# Patient Record
Sex: MALE | Race: WHITE | NOT HISPANIC OR LATINO | Employment: UNEMPLOYED | URBAN - METROPOLITAN AREA
[De-identification: names, ages, dates, MRNs, and addresses within clinical notes are randomized per-mention and may not be internally consistent; named-entity substitution may affect disease eponyms.]

---

## 2018-09-04 ENCOUNTER — HOSPITAL ENCOUNTER (OUTPATIENT)
Facility: HOSPITAL | Age: 38
LOS: 1 days | Discharge: HOME | End: 2018-09-05
Attending: EMERGENCY MEDICINE | Admitting: INTERNAL MEDICINE
Payer: MEDICAID

## 2018-09-04 DIAGNOSIS — K92.1 MELENA: ICD-10-CM

## 2018-09-04 DIAGNOSIS — R06.2 WHEEZING: ICD-10-CM

## 2018-09-04 DIAGNOSIS — R10.13 EPIGASTRIC PAIN: ICD-10-CM

## 2018-09-04 DIAGNOSIS — R19.7 ACUTE DIARRHEA: ICD-10-CM

## 2018-09-04 DIAGNOSIS — K92.2 ACUTE UPPER GI BLEED: Primary | ICD-10-CM

## 2018-09-04 DIAGNOSIS — R10.11 RUQ PAIN: ICD-10-CM

## 2018-09-04 DIAGNOSIS — K92.2 GASTROINTESTINAL HEMORRHAGE, UNSPECIFIED GASTROINTESTINAL HEMORRHAGE TYPE: ICD-10-CM

## 2018-09-04 DIAGNOSIS — D50.0 IRON DEFICIENCY ANEMIA DUE TO CHRONIC BLOOD LOSS: ICD-10-CM

## 2018-09-04 DIAGNOSIS — D72.10 EOSINOPHILIA: ICD-10-CM

## 2018-09-04 DIAGNOSIS — K92.2 UPPER GI BLEED: ICD-10-CM

## 2018-09-04 DIAGNOSIS — K92.2 GI BLEED: ICD-10-CM

## 2018-09-04 DIAGNOSIS — K27.9 PUD (PEPTIC ULCER DISEASE): ICD-10-CM

## 2018-09-04 PROBLEM — D50.9 MICROCYTIC ANEMIA: Status: ACTIVE | Noted: 2018-09-04

## 2018-09-04 LAB
ABO + RH BLD: NORMAL
ALBUMIN SERPL BCP-MCNC: 3.9 G/DL
ALP SERPL-CCNC: 52 U/L
ALT SERPL W/O P-5'-P-CCNC: 39 U/L
ANION GAP SERPL CALC-SCNC: 11 MMOL/L
AST SERPL-CCNC: 18 U/L
BASOPHILS # BLD AUTO: 0.06 K/UL
BASOPHILS NFR BLD: 0.8 %
BILIRUB SERPL-MCNC: 0.4 MG/DL
BILIRUB UR QL STRIP: NEGATIVE
BLD GP AB SCN CELLS X3 SERPL QL: NORMAL
BUN SERPL-MCNC: 17 MG/DL
CALCIUM SERPL-MCNC: 9.8 MG/DL
CHLORIDE SERPL-SCNC: 107 MMOL/L
CLARITY UR: CLEAR
CO2 SERPL-SCNC: 24 MMOL/L
COLOR UR: YELLOW
CREAT SERPL-MCNC: 0.9 MG/DL
DIFFERENTIAL METHOD: ABNORMAL
EOSINOPHIL # BLD AUTO: 0.9 K/UL
EOSINOPHIL NFR BLD: 12.3 %
ERYTHROCYTE [DISTWIDTH] IN BLOOD BY AUTOMATED COUNT: 19 %
EST. GFR  (AFRICAN AMERICAN): >60 ML/MIN/1.73 M^2
EST. GFR  (NON AFRICAN AMERICAN): >60 ML/MIN/1.73 M^2
GLUCOSE SERPL-MCNC: 96 MG/DL
GLUCOSE UR QL STRIP: NEGATIVE
HCT VFR BLD AUTO: 35.3 %
HGB BLD-MCNC: 10.8 G/DL
HGB UR QL STRIP: NEGATIVE
INR PPP: 1
KETONES UR QL STRIP: NEGATIVE
LEUKOCYTE ESTERASE UR QL STRIP: NEGATIVE
LIPASE SERPL-CCNC: 29 U/L
LYMPHOCYTES # BLD AUTO: 1.9 K/UL
LYMPHOCYTES NFR BLD: 24.5 %
MCH RBC QN AUTO: 23.6 PG
MCHC RBC AUTO-ENTMCNC: 30.6 G/DL
MCV RBC AUTO: 77 FL
MONOCYTES # BLD AUTO: 1.2 K/UL
MONOCYTES NFR BLD: 15.5 %
NEUTROPHILS # BLD AUTO: 3.5 K/UL
NEUTROPHILS NFR BLD: 46.9 %
NITRITE UR QL STRIP: NEGATIVE
PH UR STRIP: 7 [PH] (ref 5–8)
PLATELET # BLD AUTO: 406 K/UL
PMV BLD AUTO: 10.9 FL
POTASSIUM SERPL-SCNC: 4 MMOL/L
PROT SERPL-MCNC: 7.1 G/DL
PROT UR QL STRIP: NEGATIVE
PROTHROMBIN TIME: 10.1 SEC
RBC # BLD AUTO: 4.57 M/UL
SODIUM SERPL-SCNC: 142 MMOL/L
SP GR UR STRIP: 1.01 (ref 1–1.03)
URN SPEC COLLECT METH UR: NORMAL
UROBILINOGEN UR STRIP-ACNC: NEGATIVE EU/DL
WBC # BLD AUTO: 7.54 K/UL

## 2018-09-04 PROCEDURE — 93010 ELECTROCARDIOGRAM REPORT: CPT | Mod: ,,, | Performed by: INTERNAL MEDICINE

## 2018-09-04 PROCEDURE — 25000003 PHARM REV CODE 250: Performed by: INTERNAL MEDICINE

## 2018-09-04 PROCEDURE — 80053 COMPREHEN METABOLIC PANEL: CPT

## 2018-09-04 PROCEDURE — G0378 HOSPITAL OBSERVATION PER HR: HCPCS

## 2018-09-04 PROCEDURE — 82728 ASSAY OF FERRITIN: CPT

## 2018-09-04 PROCEDURE — 84443 ASSAY THYROID STIM HORMONE: CPT

## 2018-09-04 PROCEDURE — 83540 ASSAY OF IRON: CPT

## 2018-09-04 PROCEDURE — 83690 ASSAY OF LIPASE: CPT

## 2018-09-04 PROCEDURE — 96365 THER/PROPH/DIAG IV INF INIT: CPT

## 2018-09-04 PROCEDURE — 85025 COMPLETE CBC W/AUTO DIFF WBC: CPT

## 2018-09-04 PROCEDURE — 25000003 PHARM REV CODE 250: Performed by: STUDENT IN AN ORGANIZED HEALTH CARE EDUCATION/TRAINING PROGRAM

## 2018-09-04 PROCEDURE — 25000003 PHARM REV CODE 250: Performed by: EMERGENCY MEDICINE

## 2018-09-04 PROCEDURE — 96366 THER/PROPH/DIAG IV INF ADDON: CPT

## 2018-09-04 PROCEDURE — 85610 PROTHROMBIN TIME: CPT

## 2018-09-04 PROCEDURE — 86850 RBC ANTIBODY SCREEN: CPT

## 2018-09-04 PROCEDURE — 99285 EMERGENCY DEPT VISIT HI MDM: CPT | Mod: 25

## 2018-09-04 PROCEDURE — 36415 COLL VENOUS BLD VENIPUNCTURE: CPT

## 2018-09-04 PROCEDURE — 63600175 PHARM REV CODE 636 W HCPCS: Performed by: EMERGENCY MEDICINE

## 2018-09-04 PROCEDURE — C9113 INJ PANTOPRAZOLE SODIUM, VIA: HCPCS | Performed by: EMERGENCY MEDICINE

## 2018-09-04 PROCEDURE — 82607 VITAMIN B-12: CPT

## 2018-09-04 PROCEDURE — 25500020 PHARM REV CODE 255: Performed by: EMERGENCY MEDICINE

## 2018-09-04 PROCEDURE — 93005 ELECTROCARDIOGRAM TRACING: CPT

## 2018-09-04 PROCEDURE — 96375 TX/PRO/DX INJ NEW DRUG ADDON: CPT

## 2018-09-04 PROCEDURE — 85045 AUTOMATED RETICULOCYTE COUNT: CPT

## 2018-09-04 PROCEDURE — 96376 TX/PRO/DX INJ SAME DRUG ADON: CPT | Mod: 59

## 2018-09-04 PROCEDURE — 81003 URINALYSIS AUTO W/O SCOPE: CPT

## 2018-09-04 PROCEDURE — 82746 ASSAY OF FOLIC ACID SERUM: CPT

## 2018-09-04 PROCEDURE — 87040 BLOOD CULTURE FOR BACTERIA: CPT

## 2018-09-04 RX ORDER — TRAMADOL HYDROCHLORIDE 50 MG/1
50 TABLET ORAL EVERY 6 HOURS PRN
Status: DISCONTINUED | OUTPATIENT
Start: 2018-09-04 | End: 2018-09-05 | Stop reason: HOSPADM

## 2018-09-04 RX ORDER — ONDANSETRON 2 MG/ML
4 INJECTION INTRAMUSCULAR; INTRAVENOUS
Status: COMPLETED | OUTPATIENT
Start: 2018-09-04 | End: 2018-09-04

## 2018-09-04 RX ORDER — MORPHINE SULFATE 2 MG/ML
2 INJECTION, SOLUTION INTRAMUSCULAR; INTRAVENOUS
Status: COMPLETED | OUTPATIENT
Start: 2018-09-04 | End: 2018-09-04

## 2018-09-04 RX ORDER — METHYLPHENIDATE HYDROCHLORIDE 5 MG/1
20 TABLET ORAL 2 TIMES DAILY WITH MEALS
Status: DISCONTINUED | OUTPATIENT
Start: 2018-09-05 | End: 2018-09-04

## 2018-09-04 RX ORDER — MORPHINE SULFATE 4 MG/ML
4 INJECTION, SOLUTION INTRAMUSCULAR; INTRAVENOUS
Status: COMPLETED | OUTPATIENT
Start: 2018-09-04 | End: 2018-09-04

## 2018-09-04 RX ORDER — SODIUM CHLORIDE 0.9 % (FLUSH) 0.9 %
3 SYRINGE (ML) INJECTION
Status: DISCONTINUED | OUTPATIENT
Start: 2018-09-04 | End: 2018-09-05 | Stop reason: HOSPADM

## 2018-09-04 RX ORDER — METOCLOPRAMIDE HYDROCHLORIDE 5 MG/ML
10 INJECTION INTRAMUSCULAR; INTRAVENOUS
Status: COMPLETED | OUTPATIENT
Start: 2018-09-04 | End: 2018-09-04

## 2018-09-04 RX ORDER — PANTOPRAZOLE SODIUM 40 MG/10ML
80 INJECTION, POWDER, LYOPHILIZED, FOR SOLUTION INTRAVENOUS ONCE
Status: DISCONTINUED | OUTPATIENT
Start: 2018-09-04 | End: 2018-09-04

## 2018-09-04 RX ORDER — PANTOPRAZOLE SODIUM 40 MG/10ML
40 INJECTION, POWDER, LYOPHILIZED, FOR SOLUTION INTRAVENOUS
Status: DISCONTINUED | OUTPATIENT
Start: 2018-09-04 | End: 2018-09-04

## 2018-09-04 RX ORDER — HYDROXYZINE PAMOATE 25 MG/1
25 CAPSULE ORAL EVERY 8 HOURS PRN
Status: DISCONTINUED | OUTPATIENT
Start: 2018-09-04 | End: 2018-09-05 | Stop reason: HOSPADM

## 2018-09-04 RX ORDER — PAROXETINE 10 MG/1
20 TABLET, FILM COATED ORAL EVERY MORNING
Status: DISCONTINUED | OUTPATIENT
Start: 2018-09-05 | End: 2018-09-05 | Stop reason: HOSPADM

## 2018-09-04 RX ORDER — PANTOPRAZOLE SODIUM 40 MG/10ML
40 INJECTION, POWDER, LYOPHILIZED, FOR SOLUTION INTRAVENOUS
Status: DISCONTINUED | OUTPATIENT
Start: 2018-09-05 | End: 2018-09-04

## 2018-09-04 RX ADMIN — HYDROXYZINE PAMOATE 25 MG: 25 CAPSULE ORAL at 08:09

## 2018-09-04 RX ADMIN — ONDANSETRON 4 MG: 2 INJECTION INTRAMUSCULAR; INTRAVENOUS at 05:09

## 2018-09-04 RX ADMIN — TRAMADOL HYDROCHLORIDE 50 MG: 50 TABLET, COATED ORAL at 10:09

## 2018-09-04 RX ADMIN — DEXTROSE 40 MG: 50 INJECTION, SOLUTION INTRAVENOUS at 08:09

## 2018-09-04 RX ADMIN — METOCLOPRAMIDE 10 MG: 5 INJECTION, SOLUTION INTRAMUSCULAR; INTRAVENOUS at 02:09

## 2018-09-04 RX ADMIN — LIDOCAINE HYDROCHLORIDE: 20 SOLUTION ORAL; TOPICAL at 12:09

## 2018-09-04 RX ADMIN — DEXTROSE 8 MG/HR: 50 INJECTION, SOLUTION INTRAVENOUS at 01:09

## 2018-09-04 RX ADMIN — ONDANSETRON 4 MG: 2 INJECTION INTRAMUSCULAR; INTRAVENOUS at 12:09

## 2018-09-04 RX ADMIN — MORPHINE SULFATE 2 MG: 2 INJECTION, SOLUTION INTRAMUSCULAR; INTRAVENOUS at 04:09

## 2018-09-04 RX ADMIN — SODIUM CHLORIDE 1000 ML: 0.9 INJECTION, SOLUTION INTRAVENOUS at 12:09

## 2018-09-04 RX ADMIN — MORPHINE SULFATE 4 MG: 4 INJECTION INTRAVENOUS at 02:09

## 2018-09-04 RX ADMIN — IOHEXOL 125 ML: 350 INJECTION, SOLUTION INTRAVENOUS at 07:09

## 2018-09-04 NOTE — H&P
"[facility] Medicine H&P Note     Admitting Team: [facility] Hospitalist Team A  Attending Physician: Sony Falcon Jr., MD  Resident: Ronnell  Intern: Owen    Date of Admit: 9/4/2018    Chief Complaint     Hemoptysis (started vomiting blood this morning.  Fever for 2 days, abdominal pain, and diarrhea.  Rash to neck and back area with joint pain.  Patient just returned from [place] on a 2 week trip )      Subjective:      History of Present Illness:  Solo Black is a 37 y.o. male who has a h/o eosinophilic esophagitis, erosive gastritis, GERD who presented to the ED for hematemesis.    The patient was in their usual state of health until 2 days ago when he started to develop a red and itchy rash to his back and neck.  He also developed fever at this time to 101.5 and he took Advil, tylenol which resolved the fever.  He had associated joint pain and myalgia.  He felt significant pain in his bilateral knees, hips, wrists which did not improve with tylenol, advil.  Denies joint trauma, swelling.  1 day ago he started to have severe abdominal pain which he describes as intermittent burning and cramping pain to the "inside" of his belly.  "feels like a hot vise ."  He also experienced 8-9 episodes of diarrhea which was watery and black over the last day and a half. He also notes mucus in the stool.  No bright red blood.  On the day of presentation he woke up and vomited a large amount of blood after eating potato soup.  He describes it as bright red blood with chunks of undigested food.  This occurred again and he decided to come to the ED.  He is an avid hiker and recently returned from a 1 week [place] hike in [place].  While there he and his friends hiked barefoot, swam in local rivers, ate native food including fish, were bitten by mosquitos.  He did not take any prophylactic medicines while there and did not receive any vaccines prior to the trip.  None of his friends are having any symptoms.  He denies chest " "pain, shortness of breath, neck stiffness, headache.    Per chart review the patient has had several admissions and workups for "hematemesis" including many colonoscopies and EGDs all of which only revealed mild gastritis/eosinophilic esophagitis.  In May of this year patient underwent one of these work-ups in [place] and per chart review there was concern for factitious disorder vs conversion.  Per notes from outside hospital he was witnessed drinking a vial of blood and spitting it up claiming he vomited blood.    Past Medical History:  Past Medical History:   Diagnosis Date    C. difficile colitis feb 2014    postop of hand surgery    Eosinophilic esophagitis 3/11/2014    GERD (gastroesophageal reflux disease)     MRSA carrier     says multiple times nose swab was +    Nephrolithiasis apr 2014    bilateral punctate - never passed a stone    Urinary tract infection feb 2014    MRSA       Past Surgical History:  Past Surgical History:   Procedure Laterality Date    APPENDECTOMY      BACK SURGERY      CIRCUMCISION, PRIMARY      COLONOSCOPY      drainage of abscess from hand  2009    MRSA    drainage of abscess from R thigh  2006    MRSA    ESOPHAGOGASTRODUODENOSCOPY  3/11/2014    HAND SURGERY  jan 2014    power saw fell on hand - laceration 3 tendons       Allergies:  Review of patient's allergies indicates:   Allergen Reactions    Nsaids (non-steroidal anti-inflammatory drug)      GI bleed    Demerol [meperidine]     Ketamine      Severe dysphoria (bad trip)    Penicillins     Contrast media Rash     Rash after 7/2016 CT abd/pelvis study. NO anaphylaxis       Home Medications:  Prior to Admission medications    Medication Sig Start Date End Date Taking? Authorizing Provider   dextroamphetamine-amphetamine (ADDERALL) 20 mg tablet Take 1 tablet by mouth 2 (two) times daily before meals. Take before breakfast and lunch   Yes Historical Provider, MD   paroxetine (PAXIL) 20 MG tablet Take 20 mg by " mouth every morning.   Yes Historical Provider, MD   hydrocodone-acetaminophen 5-325mg (NORCO) 5-325 mg per tablet Take 1 tablet by mouth every 6 (six) hours as needed for Pain. 3/18/18 9/4/18  Marlin Gomez MD       Family History:  Sister - Celiac  Great Aunt - Crohn's    Social History:  Social History     Tobacco Use    Smoking status: Light Tobacco Smoker    Smokeless tobacco: Never Used   Substance Use Topics    Alcohol use: Yes     Comment: ocassional    Drug use: No       Review of Systems:  Gen: positive for fever, chills, wt. loss, fatigue, change in appetite  HEENT: positive for changes in vision, negative for hearing changes, sore throat, difficulty swallowing  CV: negative for chest pain, palpitations, syncope  Resp: negative for shortness of breath, cough, wheezing  : negative for changes in urgency, frequency, blood in urine, dysuria  GI: positive for abdominal pain, nausea, vomiting, diarrhea, constipation, heartburn, blood in stool  Rheum: positive for joint pain, negative for swelling, weakness  Skin: negative for bruising, edema, jaundice, rashes  Neuro: negative for confusion, seizures, memory loss, falls  All other systems are reviewed and are negative.    Health Maintaince :   Primary Care Physician: none    Immunizations:   TDap 2016  Flu UTD last season  Pna not indicated    Cancer Screening:  Colonoscopy: as recent as May 2018     Objective:   Last 24 Hour Vital Signs:  BP  Min: 109/59  Max: 142/76  Temp  Av.1 °F (36.7 °C)  Min: 97.9 °F (36.6 °C)  Max: 98.2 °F (36.8 °C)  Pulse  Av.4  Min: 72  Max: 97  Resp  Av.8  Min: 14  Max: 20  SpO2  Av.3 %  Min: 93 %  Max: 100 %  Height  Av' (182.9 cm)  Min: 6' (182.9 cm)  Max: 6' (182.9 cm)  Weight  Av.2 kg (190 lb)  Min: 86.2 kg (190 lb)  Max: 86.2 kg (190 lb)  Body mass index is 25.77 kg/m².  No intake/output data recorded.    Physical Examination:  Gen: alert and oriented, appears in distress holding  abdomen in pain  Head: normocephalic, atraumatic  Eyes: PERLLA, EOM intact, sclera and conjunctiva clear, no icterus  ENT: external ear canals clear.  Moist mucus membranes, oropharynx clear and without exudate.  No lymphadenopathy, trachea midline, thyroid non-tender, not grossly enlarged or nodular  Cardiac: regular rate and rhythm, normal S1 and S2, no murmurs, no rubs, no extra heart sounds  Chest: no use of accessory muscles, symmetric chest rise, lung fields clear to auscultation bilaterally, no wheezes, no rales, no rhonci, several small mosquito bites to posterior neck and neck  Abdomen: soft, tender to palpation diffusely, normoactive bowel sounds, no organomegaly  Extremities: warm, no cyanosis, no jaundice, no bruising, no edema, joints non-tender  Pulses: 2+ dorsalis pedis and radial pulses bilaterally  Neuro: Gen: alert, oriented to person, place, time, pressured speech was apparent     Laboratory:  Most Recent Data:  CBC:   Lab Results   Component Value Date    WBC 7.54 09/04/2018    HGB 10.8 (L) 09/04/2018    HCT 35.3 (L) 09/04/2018     (H) 09/04/2018    MCV 77 (L) 09/04/2018    RDW 19.0 (H) 09/04/2018     WBC Differential: 46 % N, 0 % Bands, 24 % L, 15 % M, 1 % Eo, 0 % Baso, 0 additional cells seen  BMP:   Lab Results   Component Value Date     09/04/2018    K 4.0 09/04/2018     09/04/2018    CO2 24 09/04/2018    BUN 17 09/04/2018    CREATININE 0.9 09/04/2018    GLU 96 09/04/2018    CALCIUM 9.8 09/04/2018    MG 1.8 10/21/2016    PHOS 3.6 04/01/2012     LFTs:   Lab Results   Component Value Date    PROT 7.1 09/04/2018    ALBUMIN 3.9 09/04/2018    BILITOT 0.4 09/04/2018    AST 18 09/04/2018    ALKPHOS 52 (L) 09/04/2018    ALT 39 09/04/2018     Coags:   Lab Results   Component Value Date    INR 1.0 09/04/2018     FLP: No results found for: CHOL, HDL, LDLCALC, TRIG, CHOLHDL  DM:   Lab Results   Component Value Date    HGBA1C 5.4 03/27/2016    CREATININE 0.9 09/04/2018     Thyroid: No  results found for: TSH, FREET4, X1CBAWN, H9HBDVJ, THYROIDAB  Anemia: No results found for: IRON, TIBC, FERRITIN, TVCBJQXA61, FOLATE  Cardiac:   Lab Results   Component Value Date    TROPONINI 0.010 03/28/2011     Urinalysis:   Lab Results   Component Value Date    LABURIN  04/10/2014     Multiple organisms isolated. None in predominance.  Repeat if    LABURIN clinically necessary. 04/10/2014    COLORU Yellow 09/04/2018    SPECGRAV 1.015 09/04/2018    NITRITE Negative 09/04/2018    KETONESU Negative 09/04/2018    UROBILINOGEN Negative 09/04/2018    WBCUA 1 03/27/2016       Trended Lab Data:  Recent Labs   Lab  09/04/18   1225   WBC  7.54   HGB  10.8*   HCT  35.3*   PLT  406*   MCV  77*   RDW  19.0*   NA  142   K  4.0   CL  107   CO2  24   BUN  17   CREATININE  0.9   GLU  96   PROT  7.1   ALBUMIN  3.9   BILITOT  0.4   AST  18   ALKPHOS  52*   ALT  39       Trended Cardiac Data:  No results for input(s): TROPONINI, CKTOTAL, CKMB, BNP in the last 168 hours.    Microbiology Data:  BCx: pending  StoolCx: pending    Other Results:  EKG (my interpretation):  pending     Radiology (my interpretation):  CXR: no acute cardiopulmonary processes  KUB: no acute findings, no free air, no dilated loops of bowel  RUQ U/S: post-jane, possible hepatic steatosis     Assessment and Plan:       Upper GI Bleed with Microcytic Anemia  - patient reports 2 episodes of hematemesis day of presentation associated with severe abdominal pain, subjective fevers, diarrhea, joint pain, myalgias and recent travel to [place]  - exam consistent with pain out of proportion to exam, patient pacing around unit prior to exam then acutely in pain and distress  - per chart review history of hematemesis with several workups all negative, mention of patient tampering with specimens and drinking vials of blood from outside hospital records  - Hgb 10.8 on admission, baseline from outside hospital labs around 13-14  - Not currently vomiting, hemodynamically  stable, afebrile  - KUB and RUQ US with no acute findings  - PPI drip  - GI onboard and plan for EGD in AM  - stool studies, cultures, ova/parasites, c. Diff  - Abdominal CT with contrast    Eosinophilic Esophagitis (presumptive)  - EGD 2014 with presumptive clinical diagnosis, no stains were obtained on pathology to rule this diagnosis in or out.  - Subsequent EGDs w/o evidence of eosinophilic characteristics    Erosive Gastritis  - seen on previous EGDs  - takes Protonix and pepcid daily  - PPI drip, EGD in AM    ADHD  - reports taking paxil and adderal  - will continue home meds      Diet: NPO  VTE PPx: SCDs  Code: Full    Dispo: admit for GI workup including EGD in AM    Stephane Weaver MD  [facility] -[facility]    [facility] Medicine Hospitalist Pager numbers:   [facility] Hospitalist Medicine Team A (Jaime/Smita): [phone]  [facility] Hospitalist Medicine Team B (Quintin/Francisca):  [phone]

## 2018-09-04 NOTE — ED TRIAGE NOTES
"Patient states he started throwing up blood this morning. He states he was suppose to get vaccinations before he went on a trip to [place] but didn't. States he just returned from a two week long vacation in [place] and has noticed a rash to his arm and trunk; nausea, abdominal pain , throwing up blood, and chills. Patient states " I just feel like crap"  "

## 2018-09-04 NOTE — ED PROVIDER NOTES
Encounter Date: 9/4/2018       History     Chief Complaint   Patient presents with    Hemoptysis     started vomiting blood this morning.  Fever for 2 days, abdominal pain, and diarrhea.  Rash to neck and back area with joint pain.  Patient just returned from [place] on a 2 week trip           Solo Black is a 37 y.o. male who  has a past medical history of C. difficile colitis (feb 2014), Eosinophilic esophagitis (3/11/2014), GERD (gastroesophageal reflux disease), MRSA carrier, Nephrolithiasis (apr 2014), and Urinary tract infection (feb 2014).    The patient presents to the ED due to hemetemesis.   Patient reports symptoms started this morning, and have been present  3 times. Described as food with blood tinged emesis and that progressed to blood. Aggravating/alleviating factors include eating/ non identified. Patient has had associated subj fever, coryza, myalgia and abdominal pain. He has recent travel to [place] denies sick contacts or abx use.           Review of patient's allergies indicates:   Allergen Reactions    Nsaids (non-steroidal anti-inflammatory drug)      GI bleed    Demerol [meperidine]     Ketamine      Severe dysphoria (bad trip)    Penicillins     Contrast media Rash     Rash after 7/2016 CT abd/pelvis study. NO anaphylaxis     Past Medical History:   Diagnosis Date    C. difficile colitis feb 2014    postop of hand surgery    Eosinophilic esophagitis 3/11/2014    GERD (gastroesophageal reflux disease)     MRSA carrier     says multiple times nose swab was +    Nephrolithiasis apr 2014    bilateral punctate - never passed a stone    Urinary tract infection feb 2014    MRSA     Past Surgical History:   Procedure Laterality Date    APPENDECTOMY      BACK SURGERY      CIRCUMCISION, PRIMARY      COLONOSCOPY      drainage of abscess from hand  2009    MRSA    drainage of abscess from R thigh  2006    MRSA    ESOPHAGOGASTRODUODENOSCOPY  3/11/2014    HAND SURGERY  jan 2014     power saw fell on hand - laceration 3 tendons     Family History   Problem Relation Age of Onset    Urolithiasis Father      Social History     Tobacco Use    Smoking status: Light Tobacco Smoker    Smokeless tobacco: Never Used   Substance Use Topics    Alcohol use: Yes     Comment: ocassional    Drug use: No     Review of Systems   Constitutional: Positive for fever. Negative for chills.   HENT: Negative for rhinorrhea, sore throat and trouble swallowing.    Eyes: Negative for visual disturbance.   Respiratory: Positive for cough. Negative for shortness of breath.    Cardiovascular: Negative for chest pain.   Gastrointestinal: Positive for abdominal pain and vomiting. Negative for blood in stool and diarrhea.   Genitourinary: Negative for dysuria and hematuria.   Musculoskeletal: Negative for back pain.   Skin: Positive for rash.   Neurological: Negative for numbness and headaches.   Psychiatric/Behavioral: Negative for suicidal ideas.       Physical Exam     Initial Vitals [09/04/18 1132]   BP Pulse Resp Temp SpO2   (!) 142/76 97 16 97.9 °F (36.6 °C) 98 %      MAP       --         Physical Exam    Constitutional: He is not diaphoretic.  Non-toxic appearance.   HENT:   Head: Normocephalic and atraumatic.   Eyes: Conjunctivae are normal. Pupils are equal, round, and reactive to light. Right eye exhibits no nystagmus. Left eye exhibits no nystagmus.   Neck: Neck supple.   Cardiovascular: Regular rhythm, S1 normal and S2 normal. Exam reveals no gallop.    No murmur heard.  Pulmonary/Chest: No respiratory distress. He has wheezes.   Faint expiratory wheeze   Abdominal: Soft. He exhibits no distension.   Epigastric/ ruq ttp. No rebound, voluntary guarding    Neurological: He is alert. No cranial nerve deficit. GCS eye subscore is 4. GCS verbal subscore is 5. GCS motor subscore is 6.   Skin: Skin is warm and dry. No rash noted.   Blanchable erythematous papules to back without induration warmth   Psychiatric: He  has a normal mood and affect. His behavior is normal.         ED Course   Procedures  Labs Reviewed   CBC W/ AUTO DIFFERENTIAL - Abnormal; Notable for the following components:       Result Value    RBC 4.57 (*)     Hemoglobin 10.8 (*)     Hematocrit 35.3 (*)     MCV 77 (*)     MCH 23.6 (*)     MCHC 30.6 (*)     RDW 19.0 (*)     Platelets 406 (*)     Mono # 1.2 (*)     Eos # 0.9 (*)     Mono% 15.5 (*)     Eosinophil% 12.3 (*)     All other components within normal limits   COMPREHENSIVE METABOLIC PANEL - Abnormal; Notable for the following components:    Alkaline Phosphatase 52 (*)     All other components within normal limits   PROTIME-INR   LIPASE   URINALYSIS   TYPE & SCREEN     EKG Readings: (Independently Interpreted)   Initial Reading: No STEMI. Rhythm: Normal Sinus Rhythm. Heart Rate: 66.       Imaging Results    None          Medical Decision Making:   History:   Old Medical Records: I decided to obtain old medical records.       <> Summary of Records: Pt with similar presentation in past   Initial Assessment:   Pt with diffuse abdominal pain, hx gi bleed/ esophagititis. Vss. Recent hx of travel and viral syndrome. . Suspect Candice bates vs esophagitis  NO signs of sepsis /menigitis at this time   Differential Diagnosis:   Differential Diagnosis includes, but is not limited to:  Lower GI bleed (AVM, colitis, colon cancer, polyp, internal/external hemorrhoid, IBS, rectal injury/foreign body, chronic diarrhea, anal fissure), upper GI bleed (PUD, perforated ulcer, esophageal variceal bleed), Crohn's disease, ulcerative colitis, chronic diarrhea, dietary intake    Clinical Tests:   Lab Tests: Reviewed       <> Summary of Lab: Labs notable for stable anemia (per note 7/18), eosinophilia   Radiological Study: Reviewed  Medical Tests: Reviewed  ED Management:  Pt guiac negative, pain had improved after protonix / zofran/ however intermittently but worsened, gi was consulted fellow for Dr. Wall recommends  admission for EGD the following day.          Patient admited to [facility] under Dr. Sandoval                    ED Course as of Sep 04 2032   Tue Sep 04, 2018   1335 US Abdomen Limited (Gallbladder) [RN]   1335 US Abdomen Limited (Gallbladder) [RN]   1404 Sodium: 142 [RN]   1405 US Abdomen Limited (Gallbladder) [RN]      ED Course User Index  [RN] Sony Falcon Jr., MD     Clinical Impression:   The primary encounter diagnosis was Acute upper GI bleed. Diagnoses of Wheezing, RUQ pain, Upper GI bleed, GI bleed, PUD (peptic ulcer disease), Acute diarrhea, Eosinophilia, Epigastric pain, Gastrointestinal hemorrhage, unspecified gastrointestinal hemorrhage type, Iron deficiency anemia due to chronic blood loss, and Melena were also pertinent to this visit.      Disposition:   Disposition: Admitted  Condition: Fair                        Sony Falcon Jr., MD  09/06/18 1100       Sony Falcon Jr., MD  09/06/18 1102       Sony Falcon Jr., MD  09/06/18 1112

## 2018-09-04 NOTE — CONSULTS
[facility]    CC:  Hematemesis    HPI   Mr. Black is a 37 year old male with h/o erosive gastritis, presumed eosinophilic esophagitis, recurrent hematemesis, and possible psych hx (see Care Everywhere 1/4/2018 note at [facility] concerning for Factitious disorder) presents with abdominal pain and diarrhea since Monday. The patient was feeling well in his usual state of health until Monday (day PTA) morning, when he awoke from sleep with colicky, squeezing epigastric pain radiating to the back. He also reports black tarry diarrhea with mucus and fevers to T 101.5. He took 2 tabs of Advil yesterday and 1 today for fevers. The patient reports associated myalgias, bilateral knee and hip arthralgias, and a pruritic pustular skin rash on his back. The day of admit, symptoms progressed to include large-volume hematemesis x 4. Of note, the patient recently returned on Thursday from international travel [place] of [place] where he reports eating a lot of local foods including a meal with an indigenous [place] in the forest. He denies any prophylactic antibiotics or vaccinations prior to the trip. He denies sexual activity during his travel or recent ETOH intake. The patient takes daily protonix at home for h/o erosive gastritis.     Chart reviewed with records summarized above.    Past Medical History  Erosive gastritis, Eosinophilic esophagitis (3/11/2014), GERD (gastroesophageal reflux disease), C. difficile colitis (feb 2014), MRSA carrier, Nephrolithiasis (apr 2014), and Urinary tract infection (feb 2014), concern for psych hx (factitious behavior jan 2018)    Past Surgical History  Appendectomy; Back surgery; Colonoscopy; Hand surgery (jan 2014); Circumcision, primary; drainage of abscess from R thigh (2006); drainage of abscess from hand (2009); Esophagogastroduodenoscopy (3/11/2014); ESOPHAGOGASTRODUODENOSCOPY (EGD) (N/A, 7/21/2016); ESOPHAGOGASTRODUODENOSCOPY (EGD) (N/A, 3/17/2016); and EGD  (ESOPHAGOGASTRODUODENOSCOPY) (Left, 3/11/2014).    Social History  Social History     Tobacco Use    Smoking status: Light Tobacco Smoker    Smokeless tobacco: Never Used   Substance Use Topics    Alcohol use: Yes     Comment: ocassional    Drug use: No     Positive for recent international [place]    Family History  Family History   Problem Relation Age of Onset    Urolithiasis Father        Review of Systems  General ROS: positive for fever and recent international travel  Psychological ROS: negative for hallucination, depression or suicidal ideation  Ophthalmic ROS: negative for blurry vision, photophobia or eye pain  ENT ROS: negative for epistaxis, sore throat or rhinorrhea  Respiratory ROS: no cough, shortness of breath, or wheezing  Cardiovascular ROS: no chest pain or dyspnea on exertion  Gastrointestinal ROS: positive for dysentery with melena and mucus, hematemesis, epigastric abdominal pain, intermittent nausea  Genito-Urinary ROS: positive for urinary frequency, no dysuria, trouble voiding, or hematuria  Musculoskeletal ROS: negative for gait disturbance, positive for myalgias  Neurological ROS: no syncope or seizures; no ataxia  Dermatological ROS: positive for pruritic rash, negative for jaundice or cyanosis    Physical Examination  /72 (BP Location: Left arm, Patient Position: Lying)   Pulse 75   Temp 97.9 °F (36.6 °C) (Oral)   Resp 20   Ht 6' (1.829 m)   Wt 86.2 kg (190 lb)   SpO2 98%   BMI 25.77 kg/m²     General appearance: alert, cooperative, moderate intermittent distress due to abdominal cramping pain  HENT: Normocephalic, atraumatic, neck symmetrical, no nasal discharge, moist mucous membranes, no identifiable oral lesions  Eyes: conjunctivae/corneas clear, PERRL, EOM's intact  Lungs: clear to auscultation in all fields, no dullness to percussion bilaterally, no wheeze  Heart: normal rate, regular rhythm without  murmur, palpable peripheral pulses  Abdomen: soft, nondistended, diffusely tender to palpation most severe at epigastrium, normoactive bowel sounds, voluntary guarding, no rebound tenderness, negative Salazar's sign, negative Rovsing sight  Back: costovertebral angle tenderness to palpation/percussion  Rectal: LEONARD negative for overt blood  Extremities: extremities symmetric; no clubbing, cyanosis, or edema  Integument: Skin color, texture, turgor normal; hair distrubution normal; warm erythematous rash on anterior chest wall, dispersed pustules on back  Neurologic: Alert and oriented X 3, normal strength, normal coordination  Psychiatric: no evidence of impaired cognition, questionable reliability as history provided verbally was inconsistent with chart review    Labs:  Lab Results   Component Value Date    WBC 7.54 09/04/2018    HGB 10.8 (L) 09/04/2018    HCT 35.3 (L) 09/04/2018    MCV 77 (L) 09/04/2018     (H) 09/04/2018     CMP  Lab Results   Component Value Date    ALT 39 09/04/2018    AST 18 09/04/2018    ALKPHOS 52 (L) 09/04/2018    BILITOT 0.4 09/04/2018     Lab Results   Component Value Date    INR 1.0 09/04/2018     No results found for: IRON, TIBC, FERRITIN, SATURATEDIRO    Imaging:  RUQ ultrasound and KUB unremarkable.      I have personally reviewed these images    Assessment:   37 y.o. male h/o erosive gastritis and possible eosinophilic esophagitis presenting with acute dysentery, fevers, hematemesis and microcytic anemia in setting of recent travel to [place].    Plan:    Dysentery with Fevers  - recent travel to [place], no antibiotic prophylaxis or vaccinations received  - extensive GI workup in June 2018 at [facility], diagnosed with Campylobacter  - obtain stool studies for ova/parasites  - plan for flex sig tomorrow  - hold off on antibiotics until stool studies result, then recommend starting empiric antibiotics with macrolide or fluoroquinolone as  indicated    Hematemesis   - NPO @ [facility] for EGD/flex sig tomorrow  - IV PPI BID  - trend CBCs  - zofran prn for nausea    Microcytic Anemia  - hgb on admit 10.8  - would recommend iron studies/anemia workup to further evaluate etiology  - trend CBCs    Presumed Eosinophilic Esophagitis  - unclear if this is a definitive diagnosis as specific criteria should be met that were not reported on EGD, differential includes eosinophilic esophagitis versus reactive eosinophilia  - plan for EGD tomorrow with biopsies as indicated      Radha Espino 9/4/2018 4:18 PM   [facility], [facility]

## 2018-09-04 NOTE — ED NOTES
Patient had two episodes of emesis at this time both emesis bloody; Dr. Falcon visualized and aware.

## 2018-09-05 ENCOUNTER — ANESTHESIA (OUTPATIENT)
Dept: ENDOSCOPY | Facility: HOSPITAL | Age: 38
End: 2018-09-05
Payer: MEDICAID

## 2018-09-05 ENCOUNTER — ANESTHESIA EVENT (OUTPATIENT)
Dept: ENDOSCOPY | Facility: HOSPITAL | Age: 38
End: 2018-09-05
Payer: MEDICAID

## 2018-09-05 VITALS
OXYGEN SATURATION: 99 % | TEMPERATURE: 98 F | HEIGHT: 72 IN | SYSTOLIC BLOOD PRESSURE: 119 MMHG | HEART RATE: 67 BPM | DIASTOLIC BLOOD PRESSURE: 72 MMHG | RESPIRATION RATE: 20 BRPM | WEIGHT: 187.38 LBS | BODY MASS INDEX: 25.38 KG/M2

## 2018-09-05 PROBLEM — M02.30 REACTIVE ARTHRITIS: Status: ACTIVE | Noted: 2018-09-05

## 2018-09-05 LAB
ALBUMIN SERPL BCP-MCNC: 3.6 G/DL
ALP SERPL-CCNC: 50 U/L
ALT SERPL W/O P-5'-P-CCNC: 34 U/L
AMPHET+METHAMPHET UR QL: NEGATIVE
ANION GAP SERPL CALC-SCNC: 9 MMOL/L
ANISOCYTOSIS BLD QL SMEAR: SLIGHT
AST SERPL-CCNC: 17 U/L
BARBITURATES UR QL SCN>200 NG/ML: NEGATIVE
BASOPHILS # BLD AUTO: 0.04 K/UL
BASOPHILS # BLD AUTO: 0.04 K/UL
BASOPHILS # BLD AUTO: 0.06 K/UL
BASOPHILS # BLD AUTO: ABNORMAL K/UL
BASOPHILS NFR BLD: 0 %
BASOPHILS NFR BLD: 0.6 %
BASOPHILS NFR BLD: 0.6 %
BASOPHILS NFR BLD: 1.2 %
BENZODIAZ UR QL SCN>200 NG/ML: NEGATIVE
BILIRUB SERPL-MCNC: 0.9 MG/DL
BUN SERPL-MCNC: 14 MG/DL
BZE UR QL SCN: NEGATIVE
CALCIUM SERPL-MCNC: 8.8 MG/DL
CANNABINOIDS UR QL SCN: NEGATIVE
CHLORIDE SERPL-SCNC: 107 MMOL/L
CO2 SERPL-SCNC: 24 MMOL/L
CREAT SERPL-MCNC: 0.9 MG/DL
CREAT UR-MCNC: 26 MG/DL
DIFFERENTIAL METHOD: ABNORMAL
EOSINOPHIL # BLD AUTO: 0.6 K/UL
EOSINOPHIL # BLD AUTO: ABNORMAL K/UL
EOSINOPHIL NFR BLD: 10 %
EOSINOPHIL NFR BLD: 12.7 %
EOSINOPHIL NFR BLD: 6 %
EOSINOPHIL NFR BLD: 8.1 %
ERYTHROCYTE [DISTWIDTH] IN BLOOD BY AUTOMATED COUNT: 18.2 %
ERYTHROCYTE [DISTWIDTH] IN BLOOD BY AUTOMATED COUNT: 18.9 %
ERYTHROCYTE [DISTWIDTH] IN BLOOD BY AUTOMATED COUNT: 18.9 %
ERYTHROCYTE [DISTWIDTH] IN BLOOD BY AUTOMATED COUNT: 19 %
EST. GFR  (AFRICAN AMERICAN): >60 ML/MIN/1.73 M^2
EST. GFR  (NON AFRICAN AMERICAN): >60 ML/MIN/1.73 M^2
ETHANOL UR-MCNC: <10 MG/DL
FERRITIN SERPL-MCNC: 7 NG/ML
FOLATE SERPL-MCNC: 11.8 NG/ML
GLUCOSE SERPL-MCNC: 85 MG/DL
HCT VFR BLD AUTO: 32.2 %
HCT VFR BLD AUTO: 32.3 %
HCT VFR BLD AUTO: 32.4 %
HCT VFR BLD AUTO: 32.9 %
HGB BLD-MCNC: 10 G/DL
HGB BLD-MCNC: 10 G/DL
HGB BLD-MCNC: 10.1 G/DL
HGB BLD-MCNC: 9.9 G/DL
HYPOCHROMIA BLD QL SMEAR: ABNORMAL
IRON SERPL-MCNC: 21 UG/DL
LYMPHOCYTES # BLD AUTO: 1.3 K/UL
LYMPHOCYTES # BLD AUTO: ABNORMAL K/UL
LYMPHOCYTES NFR BLD: 18.8 %
LYMPHOCYTES NFR BLD: 20.1 %
LYMPHOCYTES NFR BLD: 26.5 %
LYMPHOCYTES NFR BLD: 28 %
MCH RBC QN AUTO: 23.7 PG
MCH RBC QN AUTO: 23.8 PG
MCHC RBC AUTO-ENTMCNC: 30.4 G/DL
MCHC RBC AUTO-ENTMCNC: 30.7 G/DL
MCHC RBC AUTO-ENTMCNC: 31.1 G/DL
MCHC RBC AUTO-ENTMCNC: 31.2 G/DL
MCV RBC AUTO: 76 FL
MCV RBC AUTO: 77 FL
MCV RBC AUTO: 77 FL
MCV RBC AUTO: 78 FL
METHADONE UR QL SCN>300 NG/ML: NEGATIVE
MONOCYTES # BLD AUTO: 0.9 K/UL
MONOCYTES # BLD AUTO: ABNORMAL K/UL
MONOCYTES NFR BLD: 13.3 %
MONOCYTES NFR BLD: 13.6 %
MONOCYTES NFR BLD: 17.4 %
MONOCYTES NFR BLD: 4 %
NEUTROPHILS # BLD AUTO: 2.1 K/UL
NEUTROPHILS # BLD AUTO: 3.5 K/UL
NEUTROPHILS # BLD AUTO: 4.1 K/UL
NEUTROPHILS NFR BLD: 42.2 %
NEUTROPHILS NFR BLD: 55.7 %
NEUTROPHILS NFR BLD: 59.2 %
NEUTROPHILS NFR BLD: 61 %
NEUTS BAND NFR BLD MANUAL: 1 %
NRBC BLD-RTO: ABNORMAL /100 WBC
OPIATES UR QL SCN: NORMAL
PCP UR QL SCN>25 NG/ML: NEGATIVE
PLATELET # BLD AUTO: 309 K/UL
PLATELET # BLD AUTO: 318 K/UL
PLATELET # BLD AUTO: 343 K/UL
PLATELET # BLD AUTO: 357 K/UL
PLATELET BLD QL SMEAR: ABNORMAL
PLATELET BLD QL SMEAR: ABNORMAL
PMV BLD AUTO: 10.6 FL
PMV BLD AUTO: 11.3 FL
PMV BLD AUTO: 11.4 FL
PMV BLD AUTO: 18.9 FL
POIKILOCYTOSIS BLD QL SMEAR: SLIGHT
POLYCHROMASIA BLD QL SMEAR: ABNORMAL
POTASSIUM SERPL-SCNC: 3.8 MMOL/L
PROT SERPL-MCNC: 6.2 G/DL
RBC # BLD AUTO: 4.18 M/UL
RBC # BLD AUTO: 4.21 M/UL
RBC # BLD AUTO: 4.22 M/UL
RBC # BLD AUTO: 4.27 M/UL
RETICS/RBC NFR AUTO: 0.9 %
SATURATED IRON: 5 %
SODIUM SERPL-SCNC: 140 MMOL/L
TOTAL IRON BINDING CAPACITY: 453 UG/DL
TOXICOLOGY INFORMATION: NORMAL
TRANSFERRIN SERPL-MCNC: 306 MG/DL
TSH SERPL DL<=0.005 MIU/L-ACNC: 2.24 UIU/ML
VIT B12 SERPL-MCNC: 419 PG/ML
WBC # BLD AUTO: 4.62 K/UL
WBC # BLD AUTO: 4.95 K/UL
WBC # BLD AUTO: 6.32 K/UL
WBC # BLD AUTO: 6.92 K/UL

## 2018-09-05 PROCEDURE — 36415 COLL VENOUS BLD VENIPUNCTURE: CPT

## 2018-09-05 PROCEDURE — 37000008 HC ANESTHESIA 1ST 15 MINUTES: Performed by: INTERNAL MEDICINE

## 2018-09-05 PROCEDURE — 93005 ELECTROCARDIOGRAM TRACING: CPT

## 2018-09-05 PROCEDURE — 88305 TISSUE EXAM BY PATHOLOGIST: CPT | Mod: 26,,, | Performed by: PATHOLOGY

## 2018-09-05 PROCEDURE — 85025 COMPLETE CBC W/AUTO DIFF WBC: CPT | Mod: 91

## 2018-09-05 PROCEDURE — 43239 EGD BIOPSY SINGLE/MULTIPLE: CPT | Performed by: INTERNAL MEDICINE

## 2018-09-05 PROCEDURE — 80307 DRUG TEST PRSMV CHEM ANLYZR: CPT

## 2018-09-05 PROCEDURE — 88305 TISSUE EXAM BY PATHOLOGIST: CPT | Performed by: PATHOLOGY

## 2018-09-05 PROCEDURE — 85027 COMPLETE CBC AUTOMATED: CPT

## 2018-09-05 PROCEDURE — 25000003 PHARM REV CODE 250: Performed by: NURSE ANESTHETIST, CERTIFIED REGISTERED

## 2018-09-05 PROCEDURE — 27201012 HC FORCEPS, HOT/COLD, DISP: Performed by: INTERNAL MEDICINE

## 2018-09-05 PROCEDURE — 45330 DIAGNOSTIC SIGMOIDOSCOPY: CPT | Performed by: INTERNAL MEDICINE

## 2018-09-05 PROCEDURE — 85007 BL SMEAR W/DIFF WBC COUNT: CPT | Mod: NCS

## 2018-09-05 PROCEDURE — 80053 COMPREHEN METABOLIC PANEL: CPT

## 2018-09-05 PROCEDURE — 37000009 HC ANESTHESIA EA ADD 15 MINS: Performed by: INTERNAL MEDICINE

## 2018-09-05 PROCEDURE — 63600175 PHARM REV CODE 636 W HCPCS: Performed by: EMERGENCY MEDICINE

## 2018-09-05 PROCEDURE — G0378 HOSPITAL OBSERVATION PER HR: HCPCS

## 2018-09-05 PROCEDURE — 25000003 PHARM REV CODE 250: Performed by: EMERGENCY MEDICINE

## 2018-09-05 PROCEDURE — C9113 INJ PANTOPRAZOLE SODIUM, VIA: HCPCS | Performed by: EMERGENCY MEDICINE

## 2018-09-05 PROCEDURE — 88342 IMHCHEM/IMCYTCHM 1ST ANTB: CPT | Mod: 26,,, | Performed by: PATHOLOGY

## 2018-09-05 PROCEDURE — 93010 ELECTROCARDIOGRAM REPORT: CPT | Mod: ,,, | Performed by: INTERNAL MEDICINE

## 2018-09-05 PROCEDURE — 94761 N-INVAS EAR/PLS OXIMETRY MLT: CPT

## 2018-09-05 PROCEDURE — 25000003 PHARM REV CODE 250: Performed by: INTERNAL MEDICINE

## 2018-09-05 PROCEDURE — 63600175 PHARM REV CODE 636 W HCPCS: Performed by: NURSE ANESTHETIST, CERTIFIED REGISTERED

## 2018-09-05 RX ORDER — SODIUM CHLORIDE 9 MG/ML
INJECTION, SOLUTION INTRAVENOUS CONTINUOUS PRN
Status: DISCONTINUED | OUTPATIENT
Start: 2018-09-05 | End: 2018-09-05

## 2018-09-05 RX ORDER — FENTANYL CITRATE 50 UG/ML
INJECTION, SOLUTION INTRAMUSCULAR; INTRAVENOUS
Status: DISCONTINUED | OUTPATIENT
Start: 2018-09-05 | End: 2018-09-05

## 2018-09-05 RX ORDER — PROPOFOL 10 MG/ML
VIAL (ML) INTRAVENOUS CONTINUOUS PRN
Status: DISCONTINUED | OUTPATIENT
Start: 2018-09-05 | End: 2018-09-05

## 2018-09-05 RX ORDER — PANTOPRAZOLE SODIUM 40 MG/1
40 TABLET, DELAYED RELEASE ORAL 2 TIMES DAILY
Qty: 180 TABLET | Refills: 3 | Status: ON HOLD | OUTPATIENT
Start: 2018-09-05 | End: 2020-03-25

## 2018-09-05 RX ORDER — PROPOFOL 10 MG/ML
VIAL (ML) INTRAVENOUS
Status: DISCONTINUED | OUTPATIENT
Start: 2018-09-05 | End: 2018-09-05

## 2018-09-05 RX ORDER — FERROUS SULFATE 325(65) MG
325 TABLET ORAL 2 TIMES DAILY
Qty: 180 TABLET | Refills: 3 | Status: SHIPPED | OUTPATIENT
Start: 2018-09-05 | End: 2019-09-05

## 2018-09-05 RX ORDER — LIDOCAINE HCL/PF 100 MG/5ML
SYRINGE (ML) INTRAVENOUS
Status: DISCONTINUED | OUTPATIENT
Start: 2018-09-05 | End: 2018-09-05

## 2018-09-05 RX ADMIN — PROPOFOL 50 MG: 10 INJECTION, EMULSION INTRAVENOUS at 01:09

## 2018-09-05 RX ADMIN — PROPOFOL 150 MCG/KG/MIN: 10 INJECTION, EMULSION INTRAVENOUS at 01:09

## 2018-09-05 RX ADMIN — DEXTROSE 40 MG: 50 INJECTION, SOLUTION INTRAVENOUS at 06:09

## 2018-09-05 RX ADMIN — LIDOCAINE HYDROCHLORIDE 100 MG: 20 INJECTION, SOLUTION INTRAVENOUS at 01:09

## 2018-09-05 RX ADMIN — PROPOFOL 100 MG: 10 INJECTION, EMULSION INTRAVENOUS at 01:09

## 2018-09-05 RX ADMIN — PAROXETINE 20 MG: 10 TABLET, FILM COATED ORAL at 06:09

## 2018-09-05 RX ADMIN — FENTANYL CITRATE 100 MCG: 50 INJECTION, SOLUTION INTRAMUSCULAR; INTRAVENOUS at 01:09

## 2018-09-05 RX ADMIN — TRAMADOL HYDROCHLORIDE 50 MG: 50 TABLET, COATED ORAL at 06:09

## 2018-09-05 RX ADMIN — SODIUM CHLORIDE: 0.9 INJECTION, SOLUTION INTRAVENOUS at 01:09

## 2018-09-05 NOTE — [employer] PATIENT INSTRUCTIONS
Discharge Summary/Instructions after an Endoscopic Procedure  Patient Name: Solo Black  Patient MRN: [id number]  Patient YOB: 1980 Wednesday, September 05, 2018  Kolby Wall MD  RESTRICTIONS:  During your procedure today, you received medications for sedation.  These   medications may affect your judgment, balance and coordination.  Therefore,   for 24 hours, you have the following restrictions:   - DO NOT drive a car, operate machinery, make legal/financial decisions,   sign important papers or drink alcohol.    ACTIVITY:  Today: no heavy lifting, straining or running due to procedural   sedation/anesthesia.  The following day: return to full activity including work.  DIET:  Eat and drink normally unless instructed otherwise.     TREATMENT FOR COMMON SIDE EFFECTS:  - Mild abdominal pain, nausea, belching, bloating or excessive gas:  rest,   eat lightly and use a heating pad.  - Sore Throat: treat with throat lozenges and/or gargle with warm salt   water.  - Because air was used during the procedure, expelling large amounts of air   from your rectum or belching is normal.  - If a bowel prep was taken, you may not have a bowel movement for 1-3 days.    This is normal.  SYMPTOMS TO WATCH FOR AND REPORT TO YOUR PHYSICIAN:  1. Abdominal pain or bloating, other than gas cramps.  2. Chest pain.  3. Back pain.  4. Signs of infection such as: chills or fever occurring within 24 hours   after the procedure.  5. Rectal bleeding, which would show as bright red, maroon, or black stools.   (A tablespoon of blood from the rectum is not serious, especially if   hemorrhoids are present.)  6. Vomiting.  7. Weakness or dizziness.  GO DIRECTLY TO THE NEAREST EMERGENCY ROOM IF YOU HAVE ANY OF THE FOLLOWING:      Difficulty breathing              Chills and/or fever over 101 F   Persistent vomiting and/or vomiting blood   Severe abdominal pain   Severe chest pain   Black, tarry stools   Bleeding- more than one  tablespoon   Any other symptom or condition that you feel may need urgent attention  Your doctor recommends these additional instructions:  If any biopsies were taken, your doctors clinic will contact you in 1 to 2   weeks with any results.  Supportive care for atypical bowel habits   Note history of bizarre behavior with multiple complaints in the past   resulting in repeat hospitalization for a variety of apparently facticious   symptoms   Conservative treatment  For questions, problems or results please call your physician - Kolby Wall MD at Work:  [phone].  EMERGENCY PHONE NUMBER: [phone],  LAB RESULTS: [phone]  IF A COMPLICATION OR EMERGENCY SITUATION ARISES AND YOU ARE UNABLE TO REACH   YOUR PHYSICIAN - GO DIRECTLY TO THE EMERGENCY ROOM.  MD Kolby Irby MD  9/5/2018 1:50:53 PM  This report has been verified and signed electronically.  [employer]

## 2018-09-05 NOTE — NURSING
Discharge instructions and education reviewed with pt. Reviewed follow up appts, new medications, diet, and importance of medication compliance. Allowed time for questions. Patient verbalized complete understanding.    PIV discontinued. Catheter tip intact. Secured with gauze and coban. Patient tolerated well. Telemetry monitor discontinued. No acute distress noted.

## 2018-09-05 NOTE — ANESTHESIA PREPROCEDURE EVALUATION
09/05/2018     Solo Black is a 37 y.o., male here for EGD/sigmoidoscopy for esophagitis and diarrhea.    Patient had been in [place] for trip recently    Patient with EGD 6/22/18 at [facility] with [facility] per chart    Past Medical History:   Diagnosis Date    C. difficile colitis feb 2014    postop of hand surgery    Eosinophilic esophagitis 3/11/2014    GERD (gastroesophageal reflux disease)     MRSA carrier     says multiple times nose swab was +    Nephrolithiasis apr 2014    bilateral punctate - never passed a stone    Urinary tract infection feb 2014    MRSA     Past Surgical History:   Procedure Laterality Date    APPENDECTOMY      BACK SURGERY      CIRCUMCISION, PRIMARY      COLONOSCOPY      drainage of abscess from hand  2009    MRSA    drainage of abscess from R thigh  2006    MRSA    ESOPHAGOGASTRODUODENOSCOPY  3/11/2014    HAND SURGERY  jan 2014    power saw fell on hand - laceration 3 tendons         Anesthesia Evaluation    I have reviewed the Patient Summary Reports.    I have reviewed the Nursing Notes.   I have reviewed the Medications.     Review of Systems  Anesthesia Hx:  History of prior surgery of interest to airway management or planning:   Cardiovascular:   Exercise tolerance: good    Pulmonary:  Pulmonary Normal    Renal/:   renal calculi    Hepatic/GI:   PUD, GERD Esophagitis   Neurological:  Neurology Normal        Physical Exam  General:  Well nourished    Airway/Jaw/Neck:  Airway Findings: Mouth Opening: Normal Tongue: Normal  Mallampati: III  Improves to II with phonation.  TM Distance: Normal, at least 6 cm     Eyes/Ears/Nose:  EYES/EARS/NOSE FINDINGS: Normal    Chest/Lungs:  Chest/Lungs Clear    Heart/Vascular:  Heart Findings: Normal       Mental Status:  Mental Status Findings: Normal        Anesthesia Plan  Type of Anesthesia, risks & benefits  discussed:  Anesthesia Type:  general, MAC  Patient's Preference:   Intra-op Monitoring Plan:   Intra-op Monitoring Plan Comments:   Post Op Pain Control Plan:   Post Op Pain Control Plan Comments:   Induction:   IV  Beta Blocker:         Informed Consent: Patient understands risks and agrees with Anesthesia plan.  Questions answered. Anesthesia consent signed with patient.  ASA Score: 2     Day of Surgery Review of History & Physical:            Ready For Surgery From Anesthesia Perspective.

## 2018-09-05 NOTE — ED NOTES
Spoke with resident, all allergies will be deleted from chart except for penicillin because of pt's statement saying the only allergy he has is to penicillin. Pt requested benadryl for rash, resident notified of request.

## 2018-09-05 NOTE — ED NOTES
Pt currently free of pain, but does c/o intermittent pain rated at 8-9 with onset. Pt awake, alert, oriented. In no acute distress. Rash to pt's chest and back, pt c/o itching in area of rash. IV patent but positional, no c/o pain in area of IV and no signs of redness or swelling at IV site.

## 2018-09-05 NOTE — DISCHARGE SUMMARY
[facility] Hospital Medicine Discharge Summary    Primary Team: [facility] Hospitalist Team A  Attending Physician: Leyda att. providers found  Resident: Ronnell  Intern: Owen    Date of Admit: 9/4/2018  Date of Discharge: 9/5/2018    Discharge to: home  Condition: stable    Discharge Diagnoses     Patient Active Problem List   Diagnosis    Hematemesis    Eosinophilic esophagitis    PUD (peptic ulcer disease)    Erosive gastritis    Lifetime need for proton pump inhibitor    Melena    Abdominal pain    Cervicalgia    Adult ADHD    Adverse reaction to nonsteroidal anti-inflammatory drug (NSAID)    Bright red blood per rectum    Therapeutic opioid induced constipation    Acute upper GI bleed    Acute diarrhea    Epigastric pain    Iron deficiency anemia    Eosinophilia    GI bleed    Upper GI bleed    Reactive arthritis       Consultants and Procedures     Consultants:  GI    Procedures:   EGD, Flex Sig (for results see below)    Imaging:  CXR, KUB, RUQ US, CTA Abdomen (for results see below)    Brief History of Present Illness      In summary Mr. Black is a 37M with a history of eosinophilic esophagitis, erosive gastritis, ADHD, and a recent Campylobacter colitis in June 2018 who presented to the ED for hematemesis.  He had returned from a hiking trip 4 days prior in [place] where he ate local food, swam in rivers, slept outdoors, and was bitten by many mosquitos.  He did not get any vaccines prior to travel.  4 days prior to presentation he developed a fever and diffuse body rash.  A day later he had joint pain and myalgias and developed diffuse abdominal pain associated with watery diarrhea.  On day of presentation he vomited asaf blood twice.  He takes a PPI and H2 blocker daily.  In the ED the patient was afebrile, tachycardic and appeared in pain but otherwise stable.  Exam was significant for flat diffuse erythematous rash along back, neck, chest.  Guarding on abdominal exam with diffuse tenderness to  palpation.  Joints were not swollen or warm to the touch, normal ROM.  Labs showed an anemia of 10.8 (baseline around 13).  KUB, RUQ US, CTA abdomen all unremarkable except for post-appe and post-jane changes.  Patient was started on a PPI drip and evaluated by GI in ED who planned on preforming EGD and Flex Sig the following day.  The patient was admitted for Acute GI Bleed and Reactive arthritis.  His pain was controlled with tramadol, avoiding NSAIDs in setting of GI bleed.  He remained NPO and scope the following day revealed esophagitis, small peptic ulcers, hiatal hernia, no signs of acute bleeding.  Biopsies were taken to check for eosinophilia and H pylori.  GI recommended PPI BID for 8 weeks with re-evaluation with EGD in a few months.  Patent should avoid all NSAIDs until instructed otherwise.  Patient was also found to be significantly iron deficient with an Fe of 21 and ferritin of 7.  He was given Fe 325mg BID on discharge.  He will follow up with Dr. Weaver in the [facility] on 10/15/18.  GI follow up as well within a few weeks.  Instructed to return for fever, bloody bowel movements or bloody vomit.  Patient stable and no longer having diarrhea or emesis at time of discharge.    For the full HPI please refer to the History & Physical from this admission.    Hospital Course By Problem with Pertinent Findings     Acute Upper GI Bleed  - Hgb 10.8 on admission, baseline from outside hospital labs around 13-14  - No subsequent vomiting, hemodynamically stable, afebrile  - KUB and RUQ US with no acute findings, CTA w/o signs of hemorrhage  - PPI drip  - GI preformed EGD with esophagitis, small peptic ulcers  - biopsies taken and sent to path, to be followed up by GI and [facility] Team  - PPI BID for 8weeks, follow up with GI in clinic  - stool studies, cultures, ova/parasites, c. Diff pending at time of discharge to be followed up by [facility] Team.     Iron Deficiency Anemia  - Hgb 10.8 -->9.9 on  day of discharge  - Fe 21, ferritin 7, TIBC 453  - Rx for ferrous sulfate 325mg BID given at discharge  - to follow up with Dr. Weaver in [facility] primary care clinic, plan to recheck labs at that time     Eosinophilic Esophagitis (presumptive)  - EGD 2014 with presumptive clinical diagnosis, no stains were obtained on pathology to rule this diagnosis in or out.  - biopsy specimens taken from esophagus, to be followed up     Erosive Gastritis  - seen on previous EGDs  - takes Protonix and pepcid daily  - avoid NSAIDs  - PPI BID  - to follow up path from EGD bx     ADHD  - reports taking paxil and adderal  - paxil continued while admitted, adderal not on formulary      Discharge Medications      Solo Black   Home Medication Instructions SAIMA:[id number]    Printed on:09/05/18 1600   Medication Information                      dextroamphetamine-amphetamine (ADDERALL) 20 mg tablet  Take 1 tablet by mouth 2 (two) times daily before meals. Take before breakfast and lunch             ferrous sulfate 325 mg (65 mg iron) Tab tablet  Take 1 tablet (325 mg total) by mouth 2 (two) times daily.             pantoprazole (PROTONIX) 40 MG tablet  Take 1 tablet (40 mg total) by mouth 2 (two) times daily.             paroxetine (PAXIL) 20 MG tablet  Take 20 mg by mouth every morning.                 Discharge Information:   Diet:  regular    Physical Activity:  As tolerated             Instructions:  1. Take all medications as prescribed  2. Keep all follow-up appointments  3. Return to the hospital or call your primary care physicians if any worsening symptoms such as fever, vomiting, diarrhea, worsening of abdominal pain, chest pain, shortness of breath, return of symptoms, or any other concerns.    Follow-Up Appointments:  Dr. Weaver (new PCP) 10/5/2018  GI - Dr. Mae Weaver MD  [facility], [facility]

## 2018-09-05 NOTE — TRANSFER OF CARE
Anesthesia Transfer of Care Note    Patient: Solo Black    Procedure(s) Performed: Procedure(s) (LRB):  SIGMOIDOSCOPY, FLEXIBLE (N/A)  EGD (ESOPHAGOGASTRODUODENOSCOPY) (N/A)    Patient location: GI    Anesthesia Type: MAC    Transport from OR: Transported from OR on room air with adequate spontaneous ventilation    Post pain: adequate analgesia    Post assessment: no apparent anesthetic complications and tolerated procedure well    Post vital signs: stable    Level of consciousness: awake, oriented and alert    Nausea/Vomiting: no nausea/vomiting    Complications: none    Transfer of care protocol was followed      Last vitals:   Visit Vitals  /66   Pulse 68   Temp 36.9 °C (98.4 °F) (Temporal)   Resp 20   Ht 6' (1.829 m)   Wt 85 kg (187 lb 6.3 oz)   SpO2 99%   BMI 25.41 kg/m²

## 2018-09-05 NOTE — PLAN OF CARE
Patient's grandmother will pick patient up for DC.      Dr. Wall clinic will contact patient to arrange hospital follow up appt.  Follow-up With  Details  Why  Contact Info   Stephane Weaver MD  Go on 10/15/2018  10/15/2018 at 10:00am  [facility]  [address] [place] [address]  [phone]   Kolby Wall MD  Call  This clinic will call you to set your hospital follow up appt.  [address]  [facility] [address]  [phone]          09/05/18 1411   Final Note   Assessment Type Final Discharge Note   Discharge Disposition Home   Hospital Follow Up  Appt(s) scheduled? Yes  (NOTIFIED CLINIC)   Discharge plans and expectations educations in teach back method with documentation complete? Yes   Right Care Referral Info   Post Acute Recommendation No Care

## 2018-09-05 NOTE — NURSING
"Nurse called to pt's room. Pt reports abdominal pain is increasing and PRN Tramadol is not helping and wants to get "something stronger". Nurse notified Dr. Alvarez and he will take a look at his chart.   "

## 2018-09-05 NOTE — PLAN OF CARE
Problem: Patient Care Overview  Goal: Plan of Care Review  Outcome: Revised  Pt stable. NO distress noted. POC reviewed with pt. Pt verbalized understanding. Pt remains SB on the monitor. NO true red alarms noted. Po pain med administered. No bowel movement noted during shift. Special contact precautions maintained. Fall precautions maintained. Bed in lowest position, call light in reach and bed alarm on.

## 2018-09-05 NOTE — OR NURSING
Patient transferred to [facility] for EGD/Flex Sig (poss colonoscopy) with Dr. Wall. Npo status appropriate for exams, chart reviewed. Consents verified. VS recorded, comfort measures provided.

## 2018-09-05 NOTE — PROVATION PATIENT INSTRUCTIONS
Discharge Summary/Instructions after an Endoscopic Procedure  Patient Name: Solo Black  Patient MRN: [id number]  Patient YOB: 1980 Wednesday, September 05, 2018  Kolby Wall MD  RESTRICTIONS:  During your procedure today, you received medications for sedation.  These   medications may affect your judgment, balance and coordination.  Therefore,   for 24 hours, you have the following restrictions:   - DO NOT drive a car, operate machinery, make legal/financial decisions,   sign important papers or drink alcohol.    ACTIVITY:  Today: no heavy lifting, straining or running due to procedural   sedation/anesthesia.  The following day: return to full activity including work.  DIET:  Eat and drink normally unless instructed otherwise.     TREATMENT FOR COMMON SIDE EFFECTS:  - Mild abdominal pain, nausea, belching, bloating or excessive gas:  rest,   eat lightly and use a heating pad.  - Sore Throat: treat with throat lozenges and/or gargle with warm salt   water.  - Because air was used during the procedure, expelling large amounts of air   from your rectum or belching is normal.  - If a bowel prep was taken, you may not have a bowel movement for 1-3 days.    This is normal.  SYMPTOMS TO WATCH FOR AND REPORT TO YOUR PHYSICIAN:  1. Abdominal pain or bloating, other than gas cramps.  2. Chest pain.  3. Back pain.  4. Signs of infection such as: chills or fever occurring within 24 hours   after the procedure.  5. Rectal bleeding, which would show as bright red, maroon, or black stools.   (A tablespoon of blood from the rectum is not serious, especially if   hemorrhoids are present.)  6. Vomiting.  7. Weakness or dizziness.  GO DIRECTLY TO THE NEAREST EMERGENCY ROOM IF YOU HAVE ANY OF THE FOLLOWING:      Difficulty breathing              Chills and/or fever over 101 F   Persistent vomiting and/or vomiting blood   Severe abdominal pain   Severe chest pain   Black, tarry stools   Bleeding- more than one  tablespoon   Any other symptom or condition that you feel may need urgent attention  Your doctor recommends these additional instructions:  If any biopsies were taken, your doctors clinic will contact you in 1 to 2   weeks with any results.  - Discharge patient to home (ambulatory).   - Advance diet as tolerated.   - Continue present medications.   - Await pathology results.   - Patient states that he is using a PPI once daily so will treat with   Protonix (pantoprazole) 40 mg PO BID for 8 weeks.   For questions, problems or results please call your physician - Kolby Wall MD at Work:  [phone].  EMERGENCY PHONE NUMBER: [phone],  LAB RESULTS: [phone]  IF A COMPLICATION OR EMERGENCY SITUATION ARISES AND YOU ARE UNABLE TO REACH   YOUR PHYSICIAN - GO DIRECTLY TO THE EMERGENCY ROOM.  MD Kolby Irby MD  9/5/2018 1:49:21 PM  This report has been verified and signed electronically.  [employer]

## 2018-09-05 NOTE — PLAN OF CARE
INdependent drives, lives alone has supportive grandmother of same capacity.    Patient states he has Medicaid, [place] notified Francie Zavala of his issues.    Expecting C-scope today, possible DC, denies having PCP.       09/05/18 0954   Discharge Assessment   Assessment Type Discharge Planning Assessment   Confirmed/corrected address and phone number on facesheet? Yes   Assessment information obtained from? Patient   Prior to hospitilization cognitive status: Alert/Oriented   Prior to hospitalization functional status: Independent   Current cognitive status: Alert/Oriented   Current Functional Status: Independent   Facility Arrived From: home   Lives With alone   Able to Return to Prior Arrangements yes   Is patient able to care for self after discharge? Yes   Who are your caregiver(s) and their phone number(s)? Umm Camargo Grandmother  [phone]    Patient's perception of discharge disposition home or selfcare   Patient currently being followed by outpatient case management? No   Patient currently receives any other outside agency services? No   Equipment Currently Used at Home none   Do you have any problems affording any of your prescribed medications? No   Does the patient have transportation home? Yes   Transportation Available family or friend will provide   Dialysis Name and Scheduled days n/a   Does the patient receive services at the Coumadin Clinic? No   Discharge Plan A Home   Patient/Family In Agreement With Plan yes

## 2018-09-05 NOTE — ANESTHESIA POSTPROCEDURE EVALUATION
Anesthesia Post Evaluation    Patient: Solo Black    Procedure(s) Performed: Procedure(s) (LRB):  SIGMOIDOSCOPY, FLEXIBLE (N/A)  EGD (ESOPHAGOGASTRODUODENOSCOPY) (N/A)    Final Anesthesia Type: MAC  Patient location during evaluation: GI PACU  Patient participation: Yes- Able to Participate  Level of consciousness: awake and alert and oriented  Post-procedure vital signs: reviewed and stable  Pain management: adequate  Airway patency: patent  PONV status at discharge: No PONV  Anesthetic complications: no      Cardiovascular status: blood pressure returned to baseline, hemodynamically stable and stable  Respiratory status: unassisted, spontaneous ventilation and room air  Hydration status: euvolemic  Follow-up not needed.        Visit Vitals  /66   Pulse 68   Temp 36.9 °C (98.4 °F) (Temporal)   Resp 20   Ht 6' (1.829 m)   Wt 85 kg (187 lb 6.3 oz)   SpO2 99%   BMI 25.41 kg/m²       Pain/Marcia Score: Pain Assessment Performed: Yes (9/5/2018 11:00 AM)  Presence of Pain: complains of pain/discomfort (9/5/2018 11:00 AM)  Pain Rating Prior to Med Admin: 5 (9/5/2018  6:16 AM)  Pain Rating Post Med Admin: 2 (9/5/2018  7:16 AM)

## 2018-09-05 NOTE — PROGRESS NOTES
[facility] Medicine Progress Note    Primary Team: [facility] Hospitalist Team A  Attending Physician: Jeramie Sandoval MD  Resident: Ronnell  Intern: Owen    Subjective:      Patient doing better this AM.  No reported episodes of emesis or BM since admission.  He is still complaining of some abdominal pain but it is improved.  Plan is for EGD this AM.  Patient again with pressured speech and dodging questions, seems like his story has changes several times.  Denies fever, chills, vomiting, BM, chest pain, shortness of breath.     Objective:     Last 24 Hour Vital Signs:  BP  Min: 89/53  Max: 142/76  Temp  Av.8 °F (36.6 °C)  Min: 97.3 °F (36.3 °C)  Max: 98.5 °F (36.9 °C)  Pulse  Av.9  Min: 54  Max: 97  Resp  Av.4  Min: 14  Max: 20  SpO2  Av.5 %  Min: 93 %  Max: 100 %  Height  Av' (182.9 cm)  Min: 6' (182.9 cm)  Max: 6' (182.9 cm)  Weight  Av.6 kg (188 lb 11.1 oz)  Min: 85 kg (187 lb 6.3 oz)  Max: 86.2 kg (190 lb)  I/O last 3 completed shifts:  In: -   Out: 400 [Urine:400]    Physical Examination:  Gen: alert and oriented, appears in distress holding abdomen in pain  Head: normocephalic, atraumatic  Eyes: PERLLA, EOM intact, sclera and conjunctiva clear, no icterus  ENT: external ear canals clear.  Moist mucus membranes, oropharynx clear and without exudate.  No lymphadenopathy, trachea midline, thyroid non-tender, not grossly enlarged or nodular  Cardiac: regular rate and rhythm, normal S1 and S2, no murmurs, no rubs, no extra heart sounds  Chest: no use of accessory muscles, symmetric chest rise, lung fields clear to auscultation bilaterally, no wheezes, no rales, no rhonci, several small mosquito bites to posterior neck and neck  Abdomen: soft, tender to palpation diffusely, normoactive bowel sounds, no organomegaly  Extremities: warm, no cyanosis, no jaundice, no bruising, no edema, joints non-tender  Pulses: 2+ dorsalis pedis and radial pulses bilaterally  Neuro: Gen: alert, oriented to  person, place, time, pressured speech was apparent      Laboratory:  Laboratory Data Reviewed:   Lab Results   Component Value Date    WBC 6.32 09/05/2018    HGB 9.9 (L) 09/05/2018    HCT 32.3 (L) 09/05/2018    MCV 77 (L) 09/05/2018     (H) 09/05/2018     Lab Results   Component Value Date    IRON 21 (L) 09/04/2018    TIBC 453 (H) 09/04/2018    FERRITIN 7 (L) 09/04/2018     Lab Results   Component Value Date    TSH 2.240 09/04/2018       Microbiology Data Reviewed:  Blood culture 9/4: no growth  stool culture 9/4: not collected    Other Results:   EKG (my interpretation): NSR, rate 60, normal ECG    Radiology:  CXR: no acute cardiopulmonary processes  KUB: no acute findings, no free air, no dilated loops of bowel  RUQ U/S: post-jane, possible hepatic steatosis  CTA Abdomen: no evidence of bleeding, scattered diverticulosis        Current Medications:     Infusions:       Scheduled:   pantoprazole 40 mg in dextrose 5 % 100 mL infusion (ready to mix system)  40 mg Intravenous BID    paroxetine  20 mg Oral QAM        PRN:  hydrOXYzine pamoate, sodium chloride 0.9%, traMADol    Antibiotics and Day Number of Therapy:  none    Lines and Day Number of Therapy:  Piv 9/4    Assessment and Plan:     Upper GI Bleed  - patient reports 2 episodes of hematemesis day of presentation associated with severe abdominal pain, subjective fevers, diarrhea, joint pain, myalgias and recent travel to [place]  - exam consistent with pain out of proportion to exam, patient pacing around unit prior to exam then acutely in pain and distress  - per chart review history of hematemesis with several workups all negative, mention of patient tampering with specimens and drinking vials of blood from outside hospital records  - Hgb 10.8 on admission, baseline from outside hospital labs around 13-14  - Not currently vomiting, hemodynamically stable, afebrile  - KUB and RUQ US with no acute findings, CTA w/o signs of hemorrhage  - PPI drip  - GI  onboard and plan for EGD today  - stool studies, cultures, ova/parasites, c. Diff not collected- no BMs    Iron Deficiency Anemia  - Hgb 10.8 -->9.9  - Fe 21, ferritin 7, TIBC 453  - EGD today     Eosinophilic Esophagitis (presumptive)  - EGD 2014 with presumptive clinical diagnosis, no stains were obtained on pathology to rule this diagnosis in or out.  - Subsequent EGDs w/o evidence of eosinophilic characteristics     Erosive Gastritis  - seen on previous EGDs  - takes Protonix and pepcid daily  - avoid NSAIDs  - PPI drip, EGD today     ADHD  - reports taking paxil and adderal  - will continue home meds      Diet: NPO  VTE PPx: SCDs  Code Status: Full    Dispo: pending EGD    Stephane Weaver MD  [facility] HO-I  [facility] Hospitalist Medicine Team A    [facility] Medicine Hospitalist Pager numbers:   [facility] Hospitalist Medicine Team A (Jaime/Smita): [phone]  [facility] Hospitalist Medicine Team B (Quintin/Francisca):  [phone]

## 2018-09-09 LAB — BACTERIA BLD CULT: NORMAL

## 2018-09-18 ENCOUNTER — TELEPHONE (OUTPATIENT)
Dept: ORTHOPEDICS | Facility: CLINIC | Age: 38
End: 2018-09-18

## 2018-09-18 NOTE — TELEPHONE ENCOUNTER
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  He is requesting a returned call for approval/disapproval discussion.    He can be reached at [phone].    Thank you.

## 2018-09-18 NOTE — TELEPHONE ENCOUNTER
Ortho Telephone Triage Message   [phone]  Pt states that he has found appt with another provider for R shoulder pain.

## 2018-09-21 DIAGNOSIS — S43.004A SHOULDER DISLOCATION, RIGHT, INITIAL ENCOUNTER: Primary | ICD-10-CM

## 2018-09-24 ENCOUNTER — HOSPITAL ENCOUNTER (OUTPATIENT)
Dept: RADIOLOGY | Facility: HOSPITAL | Age: 38
Discharge: HOME | End: 2018-09-24
Attending: PHYSICIAN ASSISTANT
Payer: MEDICAID

## 2018-09-24 DIAGNOSIS — S43.004A SHOULDER DISLOCATION, RIGHT, INITIAL ENCOUNTER: ICD-10-CM

## 2018-09-24 PROCEDURE — 73221 MRI JOINT UPR EXTREM W/O DYE: CPT | Mod: TC,RT

## 2018-09-24 PROCEDURE — 73221 MRI JOINT UPR EXTREM W/O DYE: CPT | Mod: 26,RT,, | Performed by: RADIOLOGY

## 2018-09-24 PROCEDURE — 73221 MRI JOINT UPR EXTREM W/O DYE: CPT | Mod: TC,LT

## 2018-09-24 PROCEDURE — 73221 MRI JOINT UPR EXTREM W/O DYE: CPT | Mod: 26,LT,, | Performed by: RADIOLOGY

## 2018-10-29 ENCOUNTER — ANESTHESIA EVENT (OUTPATIENT)
Dept: SURGERY | Facility: HOSPITAL | Age: 38
End: 2018-10-29
Payer: MEDICAID

## 2018-10-29 ENCOUNTER — HOSPITAL ENCOUNTER (OUTPATIENT)
Dept: PREADMISSION TESTING | Facility: HOSPITAL | Age: 38
Discharge: HOME | End: 2018-10-29
Attending: ORTHOPAEDIC SURGERY
Payer: MEDICAID

## 2018-10-29 VITALS
DIASTOLIC BLOOD PRESSURE: 84 MMHG | TEMPERATURE: 98 F | SYSTOLIC BLOOD PRESSURE: 121 MMHG | HEART RATE: 75 BPM | OXYGEN SATURATION: 100 % | BODY MASS INDEX: 25.86 KG/M2 | HEIGHT: 72 IN | RESPIRATION RATE: 16 BRPM | WEIGHT: 190.94 LBS

## 2018-10-29 DIAGNOSIS — M75.101 TEAR OF RIGHT ROTATOR CUFF, UNSPECIFIED TEAR EXTENT: Primary | ICD-10-CM

## 2018-10-29 RX ORDER — ACETAMINOPHEN 500 MG
1000 TABLET ORAL
Status: CANCELLED | OUTPATIENT
Start: 2018-11-01 | End: 2018-11-01

## 2018-10-29 RX ORDER — CLINDAMYCIN PHOSPHATE 600 MG/50ML
600 INJECTION, SOLUTION INTRAVENOUS
Status: CANCELLED | OUTPATIENT
Start: 2018-11-01 | End: 2018-11-01

## 2018-10-29 RX ORDER — SODIUM CHLORIDE, SODIUM LACTATE, POTASSIUM CHLORIDE, CALCIUM CHLORIDE 600; 310; 30; 20 MG/100ML; MG/100ML; MG/100ML; MG/100ML
INJECTION, SOLUTION INTRAVENOUS CONTINUOUS
Status: CANCELLED | OUTPATIENT
Start: 2018-10-29

## 2018-10-29 RX ORDER — PREGABALIN 75 MG/1
150 CAPSULE ORAL
Status: CANCELLED | OUTPATIENT
Start: 2018-11-01 | End: 2018-11-01

## 2018-10-29 RX ORDER — LIDOCAINE HYDROCHLORIDE 10 MG/ML
1 INJECTION, SOLUTION EPIDURAL; INFILTRATION; INTRACAUDAL; PERINEURAL ONCE
Status: CANCELLED | OUTPATIENT
Start: 2018-10-29 | End: 2018-10-29

## 2018-10-29 NOTE — ANESTHESIA PREPROCEDURE EVALUATION
10/29/2018  Solo Black is a 37 y.o., male PMH ADHD, eosinophilic esophagitis with multiple endoscopies in the past now scheduled for arthroscopic shoulder surgery with right rotator cuff repair and distal clavicle excision.    Anesthesia Evaluation    I have reviewed the Patient Summary Reports.    I have reviewed the Nursing Notes.   I have reviewed the Medications.     Review of Systems  Anesthesia Hx:  Denies Family Hx of Anesthesia complications.   Denies Personal Hx of Anesthesia complications. ( Multiple endoscopies under MAC and lap jane/appy under General with no anesthetic issues per patient)   Social:  Smoker, Social Alcohol Use Light tobacco smoking   Hematology/Oncology:         -- Anemia:   EENT/Dental:EENT/Dental Normal   Cardiovascular:  Cardiovascular Normal Exercise tolerance: good  ECG has been reviewed.    Pulmonary:  Pulmonary Normal    Renal/:  Renal/ Normal     Hepatic/GI:   GERD, well controlled Eosinophilic esophagitis   Neurological:  Neurology Normal    Endocrine:  Endocrine Normal    Psych:   Psychiatric History ( ADHD)          Physical Exam  General:  Well nourished    Airway/Jaw/Neck:  Airway Findings: Mouth Opening: Normal Tongue: Normal  Mallampati: II  TM Distance: Normal, at least 6 cm      Dental:  Dental Findings: In tact   Chest/Lungs:  Chest/Lungs Findings: Clear to auscultation, Normal Respiratory Rate     Heart/Vascular:  Heart Findings: Rate: Normal  Rhythm: Regular Rhythm  Sounds: Normal      Musculoskeletal:  Musculoskeletal Findings: Normal    Mental Status:  Mental Status Findings:  Cooperative, Alert and Oriented       EKG 9/5/18:  Normal sinus rhythm  Normal ECG  When compared with ECG of 04-SEP-2018 17:25,  No significant change was found  Confirmed by Olu FITCH, Ruth ([id number]) on 9/5/2018 12:51:55 PM      Anesthesia Plan  Type of Anesthesia, risks &  benefits discussed:  Anesthesia Type:  general, regional  Patient's Preference:   Intra-op Monitoring Plan: standard ASA monitors  Intra-op Monitoring Plan Comments:   Post Op Pain Control Plan: multimodal analgesia and per primary service following discharge from PACU  Post Op Pain Control Plan Comments:   Induction:   IV  Beta Blocker:  Patient is not currently on a Beta-Blocker (No further documentation required).       Informed Consent: Patient understands risks and agrees with Anesthesia plan.  Questions answered. Anesthesia consent signed with patient.  ASA Score: 2     Day of Surgery Review of History & Physical:            Ready For Surgery From Anesthesia Perspective.

## 2018-10-29 NOTE — DISCHARGE INSTRUCTIONS
Your surgery is scheduled for 11/1    Please report to Outpatient Surgery Intake Office on the 2nd FLOOR at 0630a.m.          INSTRUCTIONS IMPORTANT!!!  ¨ Do not eat or drink after 12 midnight-including water. OK to brush teeth, no   gum, candy or mints!    ____  Proceed to [facility] on *** for additional blood test.        ____  Do not wear makeup, including mascara.  ____  No powder, lotions or creams to surgical area.  ____  Please remove all jewelry, including piercings and leave at home.  ____  No money or valuables needed. Please leave at home.  ____  Please bring any documents given by your doctor.  ____  If going home the same day, arrange for a ride home. You will not be able to             drive if Anesthesia was used.  ____  Children under 18 years require a parent / guardian present the entire time             they are in surgery / recovery.  ____  Wear loose fitting clothing. Allow for dressings, bandages.  ____  Stop Aspirin, Ibuprofen, Motrin and Aleve at least 3-5 days before surgery, unless otherwise instructed by your doctor, or the nurse.   You MAY use Tylenol/acetaminophen until day of surgery.  ____  Wash the surgical area with Hibiclens the night before surgery, and again the             morning of surgery.  Be sure to rinse hibiclens off completely (if instructed by   nurse).  ____  If you take diabetic medication, do not take am of surgery unless instructed by Doctor.  ____  Call MD for temperature above 101 degrees or any other signs of infection such as Urinary (bladder) infection, Upper respiratory infection, skin boils, etc.  ____ Stop taking any Fish Oil supplement or any Vitamins that contain Vitamin E at least 5 days prior to surgery.  ____ Do Not wear your contact lenses the day of your procedure.  You may wear your glasses.      ____Do not shave for 3 days prior to surgery.  ____ Practice Good hand washing before, during, and after procedure.      I have read or had  read and explained to me, and understand the above information.  Additional comments or instructions:  For additional questions call [phone]      Pre-Op Bathing Instructions    Before surgery, you can play an important role in your own health.    Because skin is not sterile, we need to be sure that your skin is as free of germs as possible. By following the instructions below, you can reduce the number of germs on your skin before surgery.    IMPORTANT: You will need to shower with a special soap called Hibiclens*, available at any pharmacy.  If you are allergic to Chlorhexidine (the antiseptic in Hibiclens), use an antibacterial soap such as Dial Soap for your preoperative shower.  You will shower with Hibiclens both the night before your surgery and the morning of your surgery.  Do not use Hibiclens on the head, face or genitals to avoid injury to those areas.    STEP #1: THE NIGHT BEFORE YOUR SURGERY     1. Do not shave the area of your body where your surgery will be performed.  2. Shower and wash your hair and body as usual with your normal soap and shampoo.  3. Rinse your hair and body thoroughly after you shower to remove all soap residue.  4. With your hand, apply one packet of Hibiclens soap to the surgical site.   5. Wash the site gently for five (5) minutes. Do not scrub your skin too hard.   6. Do not wash with your regular soap after Hibiclens is used.  7. Rinse your body thoroughly.  8. Pat yourself dry with a clean, soft towel.  9. Do not use lotion, cream, or powder.  10. Wear clean clothes.    STEP #2: THE MORNING OF YOUR SURGERY     1. Repeat Step #1.    * Not to be used by people allergic to Chlorhexidine.      Shoulder Arthroscopy: Conditions Treated  You will be given instructions for how to prepare for your surgery and when you should arrive at the hospital or surgery center. Just before surgery, a doctor will talk to you about the anesthesia that will be used to keep you free of pain during  surgery. You may be asked by several people to confirm which shoulder is being operated on. This is for your safety. Below are common shoulder problems and how they are treated during arthroscopy.       Impingement  · Repeated overhead movements can inflame your rotator cuff and bursa. A bone spur may also form. This causes pain and problems with certain arm movements. Impingement is also called bursitis or tendinitis.  · During surgery, an inflamed bursa may be removed. Bone may be trimmed. And the coracoacromial ligament may be detached. These steps make more room, relieving pressure and allowing the arm to move more freely.    Torn rotator cuff  · A rotator cuff can tear due to a sudden injury or from overuse. This can cause pain, weakness, and loss of normal shoulder movement.  · During surgery, torn rotator cuff tendons may be trimmed. The tendons are then reattached to the humerus. This is done with stitches, anchors, or surgical tacks.    Stretched capsule  · A stretched capsule will remain loose. A loose capsule cant hold the joint firmly in place. The bones of the joint may feel like they move too much and the shoulder can dislocate.  · During arthroscopy, a stretched capsule is folded over itself and sutured in place. (This is done from inside the capsule.) This tightens the capsule, helping make the shoulder joint more stable.    Torn labrum  · The shoulder is a ball and socket joint.  The labrum normally supports and cushions the shoulder joint. In the case of a torn labrum, the socket that the ball (the upper end of your arm) fits into is not very deep. The shallow socket increases the  potential for instability.  · The labrum may tear off the rim of the glenoid. This can cause the joint to catch or feel like its slipping out of place. The shoulder may even dislocate.  · A torn labrum is repaired by reattaching it to the glenoid. This is often done with special anchors put into the glenoid bone.  Sutures attached to the anchors are tied to hold the labrum in place. The joint then feels more stable.       Arthritis and loose bodies  · Arthritis is damage to joint cartilage with age and use. Injury or disease can also cause it. Wear and tear may also lead to loose bodies (pieces of bone or cartilage) or bone spurs in a joint.  · During surgery, bone spurs are removed. Rough parts of the joint are smoothed. Any loose body is removed from the joint. Bone may also be scraped or shaved to promote new cartilage growth.  Date Last Reviewed: 8/31/2015  © 6303-8952 [employer]. [address] [place] [address], [place] [address]. All rights reserved. This information is not intended as a substitute for professional medical care. Always follow your healthcare professional's instructions.      Types of Anesthesia  Your anesthesiologist is a key member of your surgical team. He or she gives you anesthetics (medications to keep you comfortable and decrease your awareness of surgery) and monitors your condition to keep you safe during surgery. You will have 1 of 3 kinds of anesthesia during your surgery.  Monitored anesthesia care (MAC)  · Often used for surgery that is short or not too invasive.  · Sedatives (medicines to relax you) are given through an IV (intravenous) line.  · The area around the surgical site is usually numbed with a local anesthetic.  · You may choose to remain awake or sleep lightly.  Regional anesthesia (sometimes called spinal epidural or ann block)  · Often used for surgery on the arms, legs, and abdomen. It is also used during childbirth.  · A specific region of your body is numbed by injecting anesthetic near nerves, near your spine, or near the operative site.  · You may also be given sedatives through an IV line to relax you.  · With regional anesthesia, you may choose to remain awake or sleep lightly.  General anesthesia  · Often used for extensive surgery, such as on the heart, brain,  or abdominal operation.  · Also used when the patient wants to be totally asleep.  · May be given as a gas that you breathe and as medicines that are injected through an IV line.  · Because you are asleep, you feel no pain and remember nothing of the surgery.  The risks and complications of anesthesia depend on your overall health. If you are healthy, the risks are low. The risks are higher for patients with heart or lung problems. Your anesthesiologist or nurse anesthetist will discuss the risks with you.   Date Last Reviewed: 12/1/2016 © 2000-2017 [employer]. [address] [place] [address]. All rights reserved. This information is not intended as a substitute for professional medical care. Always follow your healthcare professional's instructions.

## 2018-10-29 NOTE — PRE-PROCEDURE INSTRUCTIONS
Grandmother will be here.    Allergies, medical, surgical, family and psychosocial histories reviewed with patient. Periop plan of care reviewed. Preop instructions given, including medications to take and to hold. Hibiclens soap and instructions on use given. Time allotted for questions to be addressed.  Patient verbalized understanding.
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
## 2018-11-01 ENCOUNTER — ANESTHESIA (OUTPATIENT)
Dept: SURGERY | Facility: HOSPITAL | Age: 38
End: 2018-11-01
Payer: MEDICAID

## 2018-11-01 ENCOUNTER — HOSPITAL ENCOUNTER (OUTPATIENT)
Facility: HOSPITAL | Age: 38
Discharge: HOME | End: 2018-11-01
Attending: ORTHOPAEDIC SURGERY | Admitting: ORTHOPAEDIC SURGERY
Payer: MEDICAID

## 2018-11-01 VITALS
HEART RATE: 95 BPM | TEMPERATURE: 98 F | SYSTOLIC BLOOD PRESSURE: 121 MMHG | BODY MASS INDEX: 25.73 KG/M2 | HEIGHT: 72 IN | DIASTOLIC BLOOD PRESSURE: 65 MMHG | RESPIRATION RATE: 20 BRPM | OXYGEN SATURATION: 99 % | WEIGHT: 190 LBS

## 2018-11-01 DIAGNOSIS — M19.011 ARTHROSIS OF RIGHT ACROMIOCLAVICULAR JOINT: Primary | ICD-10-CM

## 2018-11-01 DIAGNOSIS — M75.111 INCOMPLETE ROTATOR CUFF TEAR OR RUPTURE OF RIGHT SHOULDER, NOT SPECIFIED AS TRAUMATIC: ICD-10-CM

## 2018-11-01 DIAGNOSIS — M75.101 TEAR OF RIGHT ROTATOR CUFF, UNSPECIFIED TEAR EXTENT: ICD-10-CM

## 2018-11-01 PROCEDURE — 25000003 PHARM REV CODE 250: Performed by: ANESTHESIOLOGY

## 2018-11-01 PROCEDURE — 71000016 HC POSTOP RECOV ADDL HR: Performed by: ORTHOPAEDIC SURGERY

## 2018-11-01 PROCEDURE — 64416 NJX AA&/STRD BRCH PL NFS IMG: CPT | Performed by: ANESTHESIOLOGY

## 2018-11-01 PROCEDURE — 63600175 PHARM REV CODE 636 W HCPCS: Performed by: STUDENT IN AN ORGANIZED HEALTH CARE EDUCATION/TRAINING PROGRAM

## 2018-11-01 PROCEDURE — 63600175 PHARM REV CODE 636 W HCPCS: Performed by: ORTHOPAEDIC SURGERY

## 2018-11-01 PROCEDURE — 37000009 HC ANESTHESIA EA ADD 15 MINS: Performed by: ORTHOPAEDIC SURGERY

## 2018-11-01 PROCEDURE — 63600175 PHARM REV CODE 636 W HCPCS: Performed by: ANESTHESIOLOGY

## 2018-11-01 PROCEDURE — 36000710: Performed by: ORTHOPAEDIC SURGERY

## 2018-11-01 PROCEDURE — 71000015 HC POSTOP RECOV 1ST HR: Performed by: ORTHOPAEDIC SURGERY

## 2018-11-01 PROCEDURE — 71000033 HC RECOVERY, INTIAL HOUR: Performed by: ORTHOPAEDIC SURGERY

## 2018-11-01 PROCEDURE — 71000039 HC RECOVERY, EACH ADD'L HOUR: Performed by: ORTHOPAEDIC SURGERY

## 2018-11-01 PROCEDURE — 36000711: Performed by: ORTHOPAEDIC SURGERY

## 2018-11-01 PROCEDURE — 27201423 OPTIME MED/SURG SUP & DEVICES STERILE SUPPLY: Performed by: ORTHOPAEDIC SURGERY

## 2018-11-01 PROCEDURE — 25000003 PHARM REV CODE 250: Performed by: STUDENT IN AN ORGANIZED HEALTH CARE EDUCATION/TRAINING PROGRAM

## 2018-11-01 PROCEDURE — 25000003 PHARM REV CODE 250: Performed by: ORTHOPAEDIC SURGERY

## 2018-11-01 PROCEDURE — 37000008 HC ANESTHESIA 1ST 15 MINUTES: Performed by: ORTHOPAEDIC SURGERY

## 2018-11-01 PROCEDURE — S0077 INJECTION, CLINDAMYCIN PHOSP: HCPCS | Performed by: STUDENT IN AN ORGANIZED HEALTH CARE EDUCATION/TRAINING PROGRAM

## 2018-11-01 RX ORDER — MIDAZOLAM HYDROCHLORIDE 1 MG/ML
INJECTION, SOLUTION INTRAMUSCULAR; INTRAVENOUS
Status: DISCONTINUED | OUTPATIENT
Start: 2018-11-01 | End: 2018-11-01

## 2018-11-01 RX ORDER — PROMETHAZINE HYDROCHLORIDE 25 MG/ML
6.25 INJECTION, SOLUTION INTRAMUSCULAR; INTRAVENOUS EVERY 10 MIN PRN
Status: DISCONTINUED | OUTPATIENT
Start: 2018-11-01 | End: 2018-11-01 | Stop reason: HOSPADM

## 2018-11-01 RX ORDER — PROPOFOL 10 MG/ML
VIAL (ML) INTRAVENOUS
Status: DISCONTINUED | OUTPATIENT
Start: 2018-11-01 | End: 2018-11-01

## 2018-11-01 RX ORDER — EPINEPHRINE 1 MG/ML
INJECTION, SOLUTION INTRACARDIAC; INTRAMUSCULAR; INTRAVENOUS; SUBCUTANEOUS
Status: DISCONTINUED | OUTPATIENT
Start: 2018-11-01 | End: 2018-11-01 | Stop reason: HOSPADM

## 2018-11-01 RX ORDER — SODIUM CHLORIDE, SODIUM LACTATE, POTASSIUM CHLORIDE, CALCIUM CHLORIDE 600; 310; 30; 20 MG/100ML; MG/100ML; MG/100ML; MG/100ML
INJECTION, SOLUTION INTRAVENOUS CONTINUOUS
Status: DISCONTINUED | OUTPATIENT
Start: 2018-11-01 | End: 2018-11-01 | Stop reason: HOSPADM

## 2018-11-01 RX ORDER — ONDANSETRON 2 MG/ML
INJECTION INTRAMUSCULAR; INTRAVENOUS
Status: DISCONTINUED | OUTPATIENT
Start: 2018-11-01 | End: 2018-11-01

## 2018-11-01 RX ORDER — NEOSTIGMINE METHYLSULFATE 1 MG/ML
INJECTION, SOLUTION INTRAVENOUS
Status: DISCONTINUED | OUTPATIENT
Start: 2018-11-01 | End: 2018-11-01

## 2018-11-01 RX ORDER — HYDROCODONE BITARTRATE AND ACETAMINOPHEN 7.5; 325 MG/1; MG/1
1 TABLET ORAL EVERY 6 HOURS PRN
Qty: 42 TABLET | Refills: 0 | Status: SHIPPED | OUTPATIENT
Start: 2018-11-01 | End: 2018-11-12

## 2018-11-01 RX ORDER — FENTANYL CITRATE 50 UG/ML
INJECTION, SOLUTION INTRAMUSCULAR; INTRAVENOUS
Status: DISCONTINUED | OUTPATIENT
Start: 2018-11-01 | End: 2018-11-01

## 2018-11-01 RX ORDER — CLINDAMYCIN PHOSPHATE 900 MG/50ML
INJECTION, SOLUTION INTRAVENOUS
Status: DISCONTINUED | OUTPATIENT
Start: 2018-11-01 | End: 2018-11-01

## 2018-11-01 RX ORDER — ROPIVACAINE HYDROCHLORIDE 5 MG/ML
INJECTION, SOLUTION EPIDURAL; INFILTRATION; PERINEURAL
Status: COMPLETED | OUTPATIENT
Start: 2018-11-01 | End: 2018-11-01

## 2018-11-01 RX ORDER — ACETAMINOPHEN 500 MG
1000 TABLET ORAL
Status: COMPLETED | OUTPATIENT
Start: 2018-11-01 | End: 2018-11-01

## 2018-11-01 RX ORDER — OXYCODONE AND ACETAMINOPHEN 5; 325 MG/1; MG/1
1 TABLET ORAL
Status: DISCONTINUED | OUTPATIENT
Start: 2018-11-01 | End: 2018-11-01 | Stop reason: SDUPTHER

## 2018-11-01 RX ORDER — PREGABALIN 75 MG/1
150 CAPSULE ORAL
Status: COMPLETED | OUTPATIENT
Start: 2018-11-01 | End: 2018-11-01

## 2018-11-01 RX ORDER — HYDROMORPHONE HYDROCHLORIDE 2 MG/ML
0.2 INJECTION, SOLUTION INTRAMUSCULAR; INTRAVENOUS; SUBCUTANEOUS EVERY 5 MIN PRN
Status: DISCONTINUED | OUTPATIENT
Start: 2018-11-01 | End: 2018-11-01 | Stop reason: HOSPADM

## 2018-11-01 RX ORDER — ACETAMINOPHEN 10 MG/ML
INJECTION, SOLUTION INTRAVENOUS
Status: DISCONTINUED | OUTPATIENT
Start: 2018-11-01 | End: 2018-11-01

## 2018-11-01 RX ORDER — CLINDAMYCIN PHOSPHATE 600 MG/50ML
600 INJECTION, SOLUTION INTRAVENOUS
Status: DISCONTINUED | OUTPATIENT
Start: 2018-11-01 | End: 2018-11-01 | Stop reason: HOSPADM

## 2018-11-01 RX ORDER — EPHEDRINE SULFATE 50 MG/ML
INJECTION, SOLUTION INTRAVENOUS
Status: DISCONTINUED | OUTPATIENT
Start: 2018-11-01 | End: 2018-11-01

## 2018-11-01 RX ORDER — LIDOCAINE HYDROCHLORIDE 20 MG/ML
INJECTION, SOLUTION EPIDURAL; INFILTRATION; INTRACAUDAL; PERINEURAL
Status: DISCONTINUED | OUTPATIENT
Start: 2018-11-01 | End: 2018-11-01

## 2018-11-01 RX ORDER — GLYCOPYRROLATE 0.2 MG/ML
INJECTION INTRAMUSCULAR; INTRAVENOUS
Status: DISCONTINUED | OUTPATIENT
Start: 2018-11-01 | End: 2018-11-01

## 2018-11-01 RX ORDER — HYDROMORPHONE HYDROCHLORIDE 2 MG/ML
0.5 INJECTION, SOLUTION INTRAMUSCULAR; INTRAVENOUS; SUBCUTANEOUS EVERY 5 MIN PRN
Status: DISCONTINUED | OUTPATIENT
Start: 2018-11-01 | End: 2018-11-01 | Stop reason: HOSPADM

## 2018-11-01 RX ORDER — OXYCODONE AND ACETAMINOPHEN 5; 325 MG/1; MG/1
1 TABLET ORAL
Status: DISCONTINUED | OUTPATIENT
Start: 2018-11-01 | End: 2018-11-01 | Stop reason: HOSPADM

## 2018-11-01 RX ORDER — HYDROMORPHONE HYDROCHLORIDE 2 MG/ML
0.5 INJECTION, SOLUTION INTRAMUSCULAR; INTRAVENOUS; SUBCUTANEOUS EVERY 5 MIN PRN
Status: DISCONTINUED | OUTPATIENT
Start: 2018-11-01 | End: 2018-11-01 | Stop reason: SDUPTHER

## 2018-11-01 RX ORDER — SUCCINYLCHOLINE CHLORIDE 20 MG/ML
INJECTION INTRAMUSCULAR; INTRAVENOUS
Status: DISCONTINUED | OUTPATIENT
Start: 2018-11-01 | End: 2018-11-01

## 2018-11-01 RX ORDER — HYDROMORPHONE HYDROCHLORIDE 2 MG/ML
0.2 INJECTION, SOLUTION INTRAMUSCULAR; INTRAVENOUS; SUBCUTANEOUS EVERY 5 MIN PRN
Status: DISCONTINUED | OUTPATIENT
Start: 2018-11-01 | End: 2018-11-01 | Stop reason: SDUPTHER

## 2018-11-01 RX ORDER — PROMETHAZINE HYDROCHLORIDE 25 MG/ML
INJECTION, SOLUTION INTRAMUSCULAR; INTRAVENOUS
Status: DISCONTINUED
Start: 2018-11-01 | End: 2018-11-01 | Stop reason: HOSPADM

## 2018-11-01 RX ORDER — LIDOCAINE HYDROCHLORIDE 10 MG/ML
1 INJECTION, SOLUTION EPIDURAL; INFILTRATION; INTRACAUDAL; PERINEURAL ONCE
Status: DISCONTINUED | OUTPATIENT
Start: 2018-11-01 | End: 2018-11-01 | Stop reason: HOSPADM

## 2018-11-01 RX ORDER — SODIUM CHLORIDE, SODIUM LACTATE, POTASSIUM CHLORIDE, CALCIUM CHLORIDE 600; 310; 30; 20 MG/100ML; MG/100ML; MG/100ML; MG/100ML
INJECTION, SOLUTION INTRAVENOUS CONTINUOUS PRN
Status: DISCONTINUED | OUTPATIENT
Start: 2018-11-01 | End: 2018-11-01

## 2018-11-01 RX ORDER — ROCURONIUM BROMIDE 10 MG/ML
INJECTION, SOLUTION INTRAVENOUS
Status: DISCONTINUED | OUTPATIENT
Start: 2018-11-01 | End: 2018-11-01

## 2018-11-01 RX ADMIN — HYDROMORPHONE HYDROCHLORIDE 0.5 MG: 2 INJECTION INTRAMUSCULAR; INTRAVENOUS; SUBCUTANEOUS at 12:11

## 2018-11-01 RX ADMIN — GLYCOPYRROLATE 1 MG: 0.2 INJECTION, SOLUTION INTRAMUSCULAR; INTRAVENOUS at 11:11

## 2018-11-01 RX ADMIN — ROCURONIUM BROMIDE 10 MG: 10 INJECTION, SOLUTION INTRAVENOUS at 11:11

## 2018-11-01 RX ADMIN — ACETAMINOPHEN 1000 MG: 500 TABLET, FILM COATED ORAL at 07:11

## 2018-11-01 RX ADMIN — PREGABALIN 150 MG: 75 CAPSULE ORAL at 07:11

## 2018-11-01 RX ADMIN — FENTANYL CITRATE 50 MCG: 50 INJECTION, SOLUTION INTRAMUSCULAR; INTRAVENOUS at 11:11

## 2018-11-01 RX ADMIN — PROPOFOL 200 MG: 10 INJECTION, EMULSION INTRAVENOUS at 09:11

## 2018-11-01 RX ADMIN — MIDAZOLAM 5 MG: 1 INJECTION INTRAMUSCULAR; INTRAVENOUS at 08:11

## 2018-11-01 RX ADMIN — OXYCODONE HYDROCHLORIDE AND ACETAMINOPHEN 1 TABLET: 5; 325 TABLET ORAL at 12:11

## 2018-11-01 RX ADMIN — NEOSTIGMINE METHYLSULFATE 5 MG: 1 INJECTION INTRAVENOUS at 11:11

## 2018-11-01 RX ADMIN — EPHEDRINE SULFATE 10 MG: 50 INJECTION, SOLUTION INTRAMUSCULAR; INTRAVENOUS; SUBCUTANEOUS at 10:11

## 2018-11-01 RX ADMIN — ROCURONIUM BROMIDE 5 MG: 10 INJECTION, SOLUTION INTRAVENOUS at 09:11

## 2018-11-01 RX ADMIN — HYDROMORPHONE HYDROCHLORIDE 5 MG: 2 INJECTION INTRAMUSCULAR; INTRAVENOUS; SUBCUTANEOUS at 12:11

## 2018-11-01 RX ADMIN — SODIUM CHLORIDE, SODIUM LACTATE, POTASSIUM CHLORIDE, AND CALCIUM CHLORIDE: .6; .31; .03; .02 INJECTION, SOLUTION INTRAVENOUS at 09:11

## 2018-11-01 RX ADMIN — SUCCINYLCHOLINE CHLORIDE 140 MG: 20 INJECTION, SOLUTION INTRAMUSCULAR; INTRAVENOUS at 09:11

## 2018-11-01 RX ADMIN — FENTANYL CITRATE 50 MCG: 50 INJECTION, SOLUTION INTRAMUSCULAR; INTRAVENOUS at 10:11

## 2018-11-01 RX ADMIN — ROCURONIUM BROMIDE 25 MG: 10 INJECTION, SOLUTION INTRAVENOUS at 09:11

## 2018-11-01 RX ADMIN — ROCURONIUM BROMIDE 10 MG: 10 INJECTION, SOLUTION INTRAVENOUS at 10:11

## 2018-11-01 RX ADMIN — ACETAMINOPHEN 1000 MG: 10 INJECTION, SOLUTION INTRAVENOUS at 11:11

## 2018-11-01 RX ADMIN — FENTANYL CITRATE 150 MCG: 50 INJECTION, SOLUTION INTRAMUSCULAR; INTRAVENOUS at 09:11

## 2018-11-01 RX ADMIN — ROPIVACAINE HYDROCHLORIDE 17.5 ML: 5 INJECTION, SOLUTION EPIDURAL; INFILTRATION; PERINEURAL at 08:11

## 2018-11-01 RX ADMIN — CLINDAMYCIN PHOSPHATE 900 MG: 18 INJECTION, SOLUTION INTRAVENOUS at 10:11

## 2018-11-01 RX ADMIN — ONDANSETRON 4 MG: 2 INJECTION, SOLUTION INTRAMUSCULAR; INTRAVENOUS at 11:11

## 2018-11-01 RX ADMIN — LIDOCAINE HYDROCHLORIDE 100 MG: 20 INJECTION, SOLUTION EPIDURAL; INFILTRATION; INTRACAUDAL; PERINEURAL at 09:11

## 2018-11-01 RX ADMIN — PROMETHAZINE HYDROCHLORIDE 6.25 MG: 25 INJECTION INTRAMUSCULAR; INTRAVENOUS at 12:11

## 2018-11-01 NOTE — ANESTHESIA PROCEDURE NOTES
Peripheral    Patient location during procedure: post-op   Block not for primary anesthetic.  Reason for block: at surgeon's request and post-op pain management   Post-op Pain Location: Right Interscalene  Start time: 11/1/2018 8:05 AM  Timeout: 11/1/2018 8:04 AM   End time: 11/1/2018 8:17 AM  Surgery related to: Right Shoulder  Staffing  Anesthesiologist: Chaim Elliott MD  Resident/CRNA: Kaiser Selby MD  Performed: anesthesiologist and resident/CRNA   Preanesthetic Checklist  Completed: patient identified, site marked, surgical consent, pre-op evaluation, timeout performed, IV checked, risks and benefits discussed and monitors and equipment checked  Peripheral Block  Patient position: supine  Prep: ChloraPrep  Patient monitoring: heart rate, cardiac monitor, continuous capnometry and frequent blood pressure checks  Block type: interscalene  Laterality: right  Injection technique: continuous  Needle  Needle type: Echogenic   Needle gauge: 22 G  Needle length: 5 in  Needle localization: ultrasound guidance  Needle insertion depth: 3 cm  Catheter type: non-stimulating  Catheter size: 20 G  Catheter at skin depth: 3 cm     Assessment  Injection assessment: negative aspiration, negative parasthesia and local visualized surrounding nerve  Paresthesia pain: none  Heart rate change: no  Additional Notes  35 ml of 0.25% Ropivacaine injected perineural under ultrasound guidance    0.5% Ropivacaine diluted with saline

## 2018-11-01 NOTE — DISCHARGE INSTRUCTIONS
Discharge Instructions for Open Rotator Cuff Repair  You had a procedure called open rotator cuff repair. The rotator cuff consists of the muscles and tendons that surround your shoulder. The rotator cuff keeps the top of your upper arm bone (humerus) securely in the shoulder joint. Your doctor made an incision near your shoulder blade and repaired the torn muscles or tendons in your shoulder. Here are instructions to follow when caring for your arm at home.  Activity        · After surgery, rest your arm and relax for the rest of day.  · If you had general anesthesia (were put to sleep for the procedure), dont operate power tools or machinery, drink alcohol, or make any major decisions for at least 24 hours after surgery.  · Wear your sling, brace, or immobilizer, as directed.  · Dont drive a car until your doctor says its OK. And never drive while taking opioid pain medicine.  · Flex your wrist and wiggle your fingers often to help blood flow.  Incision care  · Check your incision daily for redness, tenderness, or drainage.  · Dont soak in a bathtub, hot tub, or pool until your doctor says its OK.  · Wait 3 days after your surgery to start showering,. Then shower as needed. Cover incisions/stitches with bandaids after dressing removed carefully wash your incision with soap and water. Gently pat it dry. Dont rub the incision, or apply creams or lotions.  · Your incision was closed using sutures, staples, or strips of tape. If you have sutures or staples, they may need to be removed up to 2 to 3 weeks after surgery. Allow the strips of tape to fall off on their own.  Home care  · Use pain medicine as directed by your doctor.  · Apply an ice pack or bag of frozen peas--or something similar--wrapped in a thin towel on your shoulder to reduce swelling for the first 48 hours. Leave the ice pack on for 20 minutes; then take it off for 20 minutes. Repeat as needed.  · Take your temperature daily for 7 days after  your surgery. Report a fever above 100.4°F (38°C)  to your doctor. Fever may be a sign of infection.  Follow-up care  Follow up with your healthcare provider, or as advised.      When to call your healthcare provider  Call 911 right away if you have any of the following:  · Chest pain  · Shortness of breath  Otherwise, call your healthcare provider right away if you have any of these:  · Increasing shoulder pain or pain not relieved by medicine  · Pain or swelling in the arm on the side of your surgery  · Numbness, tingling, coolness, or blue-gray color of your arm or fingers on the side of your surgery  · Fever above 100.4°F (38°C), or as advised  · Shaking chills  · Drainage or oozing, redness, or warmth at the incision  · Nausea or vomiting   Date Last Reviewed: 7/1/2016  © 1826-1974 [employer]. [address] [place] [address]. All rights reserved. This information is not intended as a substitute for professional medical care. Always follow your healthcare professional's instructions.  ANESTHESIA  -For the first 24 hours after surgery:  Do not drive, use heavy equipment, make important decisions, or drink alcohol  -It is normal to feel sleepy for several hours.  Rest until you are more awake.  -Have someone stay with you, if needed.  They can watch for problems and help keep you safe.  -Some possible post anesthesia side effects include: nausea and vomiting, sore throat and hoarseness, sleepiness, and dizziness.    PAIN  -If you have pain after surgery, pain medicine will help you feel better.  Take it as directed, before pain becomes severe.  Most pain relievers taken by mouth need at least 20-30 minutes to start working.  -Do not drive or drink alcohol while taking pain medicine.  -Pain medication can upset your stomach.  Taking them with a little food may help.  -Other ways to help control pain: elevation, ice, and relaxation  -Call your surgeon if still having unmanageable pain an  hour after taking pain medicine.  -Pain medicine can cause constipation.  Taking an over-the counter stool softener while on prescription pain medicine and drinking plenty of fluids can prevent this side effect.  -Call your surgeon if you have severe side effects like: breathing problems, trouble waking up, dizziness, confusion, or severe constipation.    NAUSEA  -Some people have nausea after surgery.  This is often because of anesthesia, pain, pain medicine, or the stress of surgery.  -Do not push yourself to eat.  Start off with clear liquids and soup.  Slowly move to solid foods.  Don't eat fatty, rich, spicy foods at first.  Eat smaller amounts.  -If you develop persistent nausea and vomiting please notify your surgeon immediately.    BLEEDING  -Different types of surgery require different types of care and dressing changes.  It is important to follow all instructions and advice from your surgeon.  Change dressing as directed.  Call your surgeon for any concerns regarding postop bleeding.    SIGNS OF INFECTION  -Signs of infection include: fever, swelling, drainage, and redness  -Notify your surgeon if you have a fever of 100.4 F (38.0 C) or higher.  -Notify your surgeon if you notice redness, swelling, increased pain, pus, or a foul smell at the incision site.      Acetaminophen; Hydrocodone tablets or capsules  What is this medicine?  ACETAMINOPHEN; HYDROCODONE (a set a RUBEN vitaly fen; jo droe KOE done) is a pain reliever. It is used to treat moderate to severe pain.  How should I use this medicine?  Take this medicine by mouth with a glass of water. Follow the directions on the prescription label. You can take it with or without food. If it upsets your stomach, take it with food. Do not take your medicine more often than directed.  A special MedGuide will be given to you by the pharmacist with each prescription and refill. Be sure to read this information carefully each time.  Talk to your pediatrician  regarding the use of this medicine in children. Special care may be needed.  What side effects may I notice from receiving this medicine?  Side effects that you should report to your doctor or health care professional as soon as possible:  · allergic reactions like skin rash, itching or hives, swelling of the face, lips, or tongue  · breathing problems  · confusion  · redness, blistering, peeling or loosening of the skin, including inside the mouth  · signs and symptoms of low blood pressure like dizziness; feeling faint or lightheaded, falls; unusually weak or tired  · trouble passing urine or change in the amount of urine  · yellowing of the eyes or skin  Side effects that usually do not require medical attention (report to your doctor or health care professional if they continue or are bothersome):  · constipation  · dry mouth  · nausea, vomiting  · tiredness  What may interact with this medicine?  This medicine may interact with the following medications:  · alcohol  · antiviral medicines for HIV or AIDS  · atropine  · antihistamines for allergy, cough and cold  · certain antibiotics like erythromycin, clarithromycin  · certain medicines for anxiety or sleep  · certain medicines for bladder problems like oxybutynin, tolterodine  · certain medicines for depression like amitriptyline, fluoxetine, sertraline  · certain medicines for fungal infections like ketoconazole and itraconazole  · certain medicines for Parkinson's disease like benztropine, trihexyphenidyl  · certain medicines for seizures like carbamazepine, phenobarbital, phenytoin, primidone  · certain medicines for stomach problems like dicyclomine, hyoscyamine  · certain medicines for travel sickness like scopolamine  · general anesthetics like halothane, isoflurane, methoxyflurane, propofol  · ipratropium  · local anesthetics like lidocaine, pramoxine, tetracaine  · MAOIs like Carbex, Eldepryl, Marplan, Nardil, and Parnate  · medicines that relax  muscles for surgery  · other medicines with acetaminophen  · other narcotic medicines for pain or cough  · phenothiazines like chlorpromazine, mesoridazine, prochlorperazine, thioridazine  · rifampin  What if I miss a dose?  If you miss a dose, take it as soon as you can. If it is almost time for your next dose, take only that dose. Do not take double or extra doses.  Where should I keep my medicine?  Keep out of the reach of children. This medicine can be abused. Keep your medicine in a safe place to protect it from theft. Do not share this medicine with anyone. Selling or giving away this medicine is dangerous and against the law.  This medicine may cause accidental overdose and death if it taken by other adults, children, or pets. Mix any unused medicine with a substance like cat litter or coffee grounds. Then throw the medicine away in a sealed container like a sealed bag or a coffee can with a lid. Do not use the medicine after the expiration date.  Store at room temperature between 15 and 30 degrees C (59 and 86 degrees F).  What should I tell my health care provider before I take this medicine?  They need to know if you have any of these conditions:  · brain tumor  · Crohn's disease, inflammatory bowel disease, or ulcerative colitis  · drug abuse or addiction  · head injury  · heart or circulation problems  · if you often drink alcohol  · kidney disease or problems going to the bathroom  · liver disease  · lung disease, asthma, or breathing problems  · an unusual or allergic reaction to acetaminophen, hydrocodone, other opioid analgesics, other medicines, foods, dyes, or preservatives  · pregnant or trying to get pregnant  · breast-feeding  What should I watch for while using this medicine?  Tell your doctor or health care professional if your pain does not go away, if it gets worse, or if you have new or a different type of pain. You may develop tolerance to the medicine. Tolerance means that you will need a  higher dose of the medicine for pain relief. Tolerance is normal and is expected if you take the medicine for a long time.  Do not suddenly stop taking your medicine because you may develop a severe reaction. Your body becomes used to the medicine. This does NOT mean you are addicted. Addiction is a behavior related to getting and using a drug for a non-medical reason. If you have pain, you have a medical reason to take pain medicine. Your doctor will tell you how much medicine to take. If your doctor wants you to stop the medicine, the dose will be slowly lowered over time to avoid any side effects.  There are different types of narcotic medicines (opiates). If you take more than one type at the same time or if you are taking another medicine that also causes drowsiness, you may have more side effects. Give your health care provider a list of all medicines you use. Your doctor will tell you how much medicine to take. Do not take more medicine than directed. Call emergency for help if you have problems breathing or unusual sleepiness.  Do not take other medicines that contain acetaminophen with this medicine. Always read labels carefully. If you have questions, ask your doctor or pharmacist.  If you take too much acetaminophen get medical help right away. Too much acetaminophen can be very dangerous and cause liver damage. Even if you do not have symptoms, it is important to get help right away.  You may get drowsy or dizzy. Do not drive, use machinery, or do anything that needs mental alertness until you know how this medicine affects you. Do not stand or sit up quickly, especially if you are an older patient. This reduces the risk of dizzy or fainting spells. Alcohol may interfere with the effect of this medicine. Avoid alcoholic drinks.  The medicine will cause constipation. Try to have a bowel movement at least every 2 to 3 days. If you do not have a bowel movement for 3 days, call your doctor or health care  professional.  Your mouth may get dry. Chewing sugarless gum or sucking hard candy, and drinking plenty of water may help. Contact your doctor if the problem does not go away or is severe.  NOTE:This sheet is a summary. It may not cover all possible information. If you have questions about this medicine, talk to your doctor, pharmacist, or health care provider. Copyright© 2017 [employer]

## 2018-11-01 NOTE — DISCHARGE SUMMARY
Physician Discharge Summary     Patient ID:      Admit date: 11/1/2018    Discharge date: Same    Admitting Physician: Roberto    Discharge Physician: Same    Admission Diagnoses: Right shoulder AC joint arthrosis    Discharge Diagnoses: Same    Admission Condition: good    Discharged Condition: good    Indication for Admission: Outpatient surgery    Hospital Course: Patient arrived for their surgery and underwent the procedure without issue. He recovered uneventfully and was discharged home on the same day    Consults: None    Significant Diagnostic Studies: None    Treatments: Right shoulder arthroscopy with distal clavicle excision    Discharge Exam:  Dressing in place, clean dry and intact    Disposition: Home or Self Care    Patient Instructions:     Discharge Medications  Refer to Discharge Medication List    Activity: No lifting with RUE. Sling for comfort  Diet: regular diet  Wound Care: keep wound clean and dry and as directed    Discussed plan with patient and answered questions: Yes        Signed:  Trung Weiner  (11/1/18)    I agree with findings outlined by the resident.

## 2018-11-01 NOTE — TRANSFER OF CARE
Anesthesia Transfer of Care Note    Patient: Solo Black    Procedure(s) Performed: Procedure(s) (LRB):  ARTHROSCOPY, SHOULDER, WITH DISTAL CLAVICLE EXCISION (Right)    Patient location: PACU    Anesthesia Type: general and regional    Transport from OR: Transported from OR on 6-10 L/min O2 by face mask with adequate spontaneous ventilation    Post pain: adequate analgesia    Post assessment: no apparent anesthetic complications    Post vital signs: stable    Level of consciousness: awake    Nausea/Vomiting: no nausea/vomiting    Complications: none    Transfer of care protocol was followed      Last vitals:   Visit Vitals  /65   Pulse 107   Temp 37.2 °C (99 °F) (Skin)   Resp 16   Ht 6' (1.829 m)   Wt 86.2 kg (190 lb)   SpO2 100%   BMI 25.77 kg/m²

## 2018-11-01 NOTE — OP NOTE
DATE OF PROCEDURE:  11/1/2018     FACILITY:  [facility] [place].     Attending:  Gabino Mejia M.D.     Surgeon: Trung Weiner MD.     ASSISTANT: Kaylah Benitez MD     PREOPERATIVE DIAGNOSIS:  Right AC joint arthrosis, partial tear of right supraspinatus     INDICATION:  To improve pain and function.     IMPLANTS: None     POSTPROCEDURE DIAGNOSIS:  Right AC joint arthrosis     PROCEDURES:  1.  Right shoulder diagnostic arthroscopy  2.  Right distal clavicle excision     NARRATIVE:  The patient was first correctly identified in the preoperative holding area.  Written informed consent was obtained.  The correct extremity was marked with a surgical pen.  The patient received an interscalene block prior to surgery.  The patient was then brought into the Operating Room.  The patient was placed supine on the operating room table.  General anesthesia was administered.  Once the patient was asleep, he was positioned into a lateral   decubitus position on a beanbag.  All bony prominences were well padded.  The right shoulder was prepped and draped in the usual sterile fashion.  The arm was suspended with 12 pounds of traction.       A surgical time-out was performed verifying the right upper extremity and preoperative administration of IV antibiotics. A standard posterior portal was made.  The arthroscope was introduced into the glenohumeral joint. The cartilaginous surfaces of the glenoid and humeral head were found to be relatively smooth, we documented our anatomy survey with pictures. We noted some fraying along the anterior rotator cuff near the footprint but no discrete tearing. The biceps tendon was intact without evidence of pathology. A [place] complex was noted. The subscapularis was visualized and found to be intact.  We next made our anterior portal with a spinal needle to guide our trajectory. The scope was then positioned   into the subacromial space through the posterior skin portal.  A partial  bursectomy was performed to visualize the rotator cuff.  After gaining adequate visualization of the rotator cuff we noted no tear on the bursal side. We then exposed the bony undersurface of the acromion. We noted that he had adequate subacromial space and his acromial morphology was such that he did not need a subacromial   Decompression. We then exposed the AC joint and distal clavicle. We then performed our distal clavicle excision with a combination of the 30 and 70 degrees scopes and the arthroscopic anitha. We were careful to not remove the superior and posterior AC ligaments. We used the width of the anitha to confirm adequate resection of the distal clavicle. At this point our work was complete. We turned on the suction to removed as much fluid from the glenohumeral joint and subacromial space. The portals were closed with 3-0 nylon and dressed with xeroform, 4x4 gauze and abd pads. The arm was placed in a sling. The patient was   awakened and transferred to PACU in stable condition.     ESTIMATED BLOOD LOSS:  Minimal     COMPLICATIONS:  None known.     DRAINS:  None.    Trung Weiner MD     I agree with findings outlined by the resident.

## 2018-11-01 NOTE — ANESTHESIA RELEASE NOTE
Anesthesia Release from PACU Note    Patient: Solo Black    Procedure(s) Performed: Procedure(s) (LRB):  ARTHROSCOPY, SHOULDER, WITH DISTAL CLAVICLE EXCISION (Right)    Anesthesia type: general    Post pain: Adequate analgesia    Post assessment: no apparent anesthetic complications, tolerated procedure well and no evidence of recall    Last Vitals:   Visit Vitals  BP (!) 121/58   Pulse 90   Temp 36.6 °C (97.9 °F)   Resp 20   Ht 6' (1.829 m)   Wt 86.2 kg (190 lb)   SpO2 97%   BMI 25.77 kg/m²       Post vital signs: stable    Level of consciousness: awake, alert  and oriented    Nausea/Vomiting: no nausea/no vomiting    Complications: none    Airway Patency: patent    Respiratory: unassisted    Cardiovascular: stable and blood pressure at baseline    Hydration: euvolemic

## 2018-11-01 NOTE — ANESTHESIA POSTPROCEDURE EVALUATION
Anesthesia Post Evaluation    Patient: Solo Black    Procedure(s) Performed: Procedure(s) (LRB):  ARTHROSCOPY, SHOULDER, WITH DISTAL CLAVICLE EXCISION (Right)    Final Anesthesia Type: general  Patient location during evaluation: PACU  Patient participation: Yes- Able to Participate  Level of consciousness: awake and alert  Post-procedure vital signs: reviewed and stable  Pain management: adequate  Airway patency: patent  PONV status at discharge: No PONV  Anesthetic complications: no      Cardiovascular status: blood pressure returned to baseline  Respiratory status: unassisted  Hydration status: euvolemic  Follow-up not needed.        Visit Vitals  BP (!) 121/58   Pulse 90   Temp 36.6 °C (97.9 °F)   Resp 20   Ht 6' (1.829 m)   Wt 86.2 kg (190 lb)   SpO2 97%   BMI 25.77 kg/m²       Pain/Marcia Score: Pain Assessment Performed: Yes (11/1/2018 12:44 PM)  Presence of Pain: complains of pain/discomfort (11/1/2018 12:44 PM)  Pain Rating Prior to Med Admin: 7 (11/1/2018 12:44 PM)  Marcia Score: 10 (11/1/2018 12:05 PM)

## 2018-11-02 ENCOUNTER — ANESTHESIA (OUTPATIENT)
Dept: EMERGENCY MEDICINE | Facility: HOSPITAL | Age: 38
End: 2018-11-02
Payer: MEDICAID

## 2018-11-02 ENCOUNTER — ANESTHESIA EVENT (OUTPATIENT)
Dept: EMERGENCY MEDICINE | Facility: HOSPITAL | Age: 38
End: 2018-11-02
Payer: MEDICAID

## 2018-11-02 ENCOUNTER — HOSPITAL ENCOUNTER (EMERGENCY)
Facility: HOSPITAL | Age: 38
Discharge: HOME | End: 2018-11-02
Attending: EMERGENCY MEDICINE
Payer: MEDICAID

## 2018-11-02 VITALS
TEMPERATURE: 99 F | BODY MASS INDEX: 25.73 KG/M2 | WEIGHT: 190 LBS | DIASTOLIC BLOOD PRESSURE: 80 MMHG | HEIGHT: 72 IN | SYSTOLIC BLOOD PRESSURE: 138 MMHG | OXYGEN SATURATION: 98 % | RESPIRATION RATE: 19 BRPM | HEART RATE: 102 BPM

## 2018-11-02 DIAGNOSIS — Z45.1 ENCOUNTER FOR ADJUSTMENT AND MANAGEMENT OF INFUSION PUMP: Primary | ICD-10-CM

## 2018-11-02 PROCEDURE — S0020 INJECTION, BUPIVICAINE HYDRO: HCPCS | Performed by: ANESTHESIOLOGY

## 2018-11-02 PROCEDURE — C9290 INJ, BUPIVACAINE LIPOSOME: HCPCS | Performed by: ANESTHESIOLOGY

## 2018-11-02 PROCEDURE — 64415 NJX AA&/STRD BRCH PLXS IMG: CPT | Performed by: ANESTHESIOLOGY

## 2018-11-02 PROCEDURE — 99281 EMR DPT VST MAYX REQ PHY/QHP: CPT

## 2018-11-02 PROCEDURE — 63600175 PHARM REV CODE 636 W HCPCS: Performed by: ANESTHESIOLOGY

## 2018-11-02 PROCEDURE — 25000003 PHARM REV CODE 250: Performed by: ANESTHESIOLOGY

## 2018-11-02 PROCEDURE — 76942 ECHO GUIDE FOR BIOPSY: CPT | Performed by: ANESTHESIOLOGY

## 2018-11-02 RX ORDER — HYDROCODONE BITARTRATE AND ACETAMINOPHEN 5; 325 MG/1; MG/1
1 TABLET ORAL
Status: DISCONTINUED | OUTPATIENT
Start: 2018-11-02 | End: 2018-11-02

## 2018-11-02 RX ORDER — BUPIVACAINE HYDROCHLORIDE 5 MG/ML
INJECTION, SOLUTION EPIDURAL; INTRACAUDAL
Status: COMPLETED | OUTPATIENT
Start: 2018-11-02 | End: 2018-11-02

## 2018-11-02 RX ADMIN — BUPIVACAINE HYDROCHLORIDE 20 ML: 5 INJECTION, SOLUTION EPIDURAL; INTRACAUDAL; PERINEURAL at 08:11

## 2018-11-02 RX ADMIN — BUPIVACAINE 20 ML: 13.3 INJECTION, SUSPENSION, LIPOSOMAL INFILTRATION at 08:11

## 2018-11-02 NOTE — ANESTHESIA PROCEDURE NOTES
Peripheral    Patient location during procedure: ED   Block not for primary anesthetic.  Reason for block: at surgeon's request and post-op pain management   Post-op Pain Location: right shoulder  Start time: 11/2/2018 7:56 AM  Timeout: 11/2/2018 7:55 AM   End time: 11/2/2018 8:02 AM  Surgery related to: right shoulder  Staffing  Anesthesiologist: Gulshan Wood MD  Performed: anesthesiologist   Preanesthetic Checklist  Completed: patient identified, site marked, surgical consent, pre-op evaluation, timeout performed, IV checked, risks and benefits discussed and monitors and equipment checked  Peripheral Block  Patient position: sitting  Prep: ChloraPrep  Patient monitoring: heart rate, cardiac monitor, continuous pulse ox, continuous capnometry and frequent blood pressure checks  Block type: interscalene  Laterality: right  Injection technique: single shot  Needle  Needle type: Stimuplex   Needle gauge: 22 G  Needle length: 2 in  Needle localization: anatomical landmarks and ultrasound guidance   -ultrasound image captured on disc.  Assessment  Injection assessment: negative aspiration, negative parasthesia and local visualized surrounding nerve  Paresthesia pain: none  Heart rate change: no  Slow fractionated injection: yes  Additional Notes  VSS.  [facility] RN monitoring vitals throughout procedure.  Patient tolerated procedure well.     Exparel 266 mg mixed with bupivacaine 0.5%, injected to interscalene block and PECS.    20 mL injected to each location.

## 2018-11-02 NOTE — ED NOTES
APPEARANCE: Alert, oriented and in no acute distress.  CARDIAC: Tachycardic  PERIPHERAL VASCULAR: peripheral pulses present. Normal cap refill. No edema. Warm to touch.    RESPIRATORY:Normal rate and effort, breath sounds clear bilaterally throughout chest. Respirations are equal and unlabored no obvious signs of distress.  GASTRO: soft, bowel sounds normal, no tenderness, no abdominal distention.  MUSC: Full ROM to LUE, RLE, LLE. Limited  ROM to RUE. RUE soft tissue tenderness. .  SKIN: Skin is warm and dry, normal skin turgor, mucous membranes moist. Pain pump and incision to right shoulder from previous rotator cuff repair.  NEURO: 5/5 strength major flexors/extensors bilaterally. Sensory intact to light touch bilaterally. Christ coma scale: eyes open spontaneously-4, oriented & converses-5, obeys commands-6. No neurological abnormalities.   MENTAL STATUS: awake, alert and aware of environment. Anxious  EYE: PERRL, both eyes: pupils brisk and reactive to light. Normal size.  ENT: EARS: no obvious drainage. NOSE: no active bleeding.   Pt complaining of painful urination and hesitancy.

## 2018-11-02 NOTE — ED NOTES
Instructed by Anesthesiologist that pt is good to go home after 10 minute wait time, Dr. Falcon notified.

## 2018-11-02 NOTE — ED TRIAGE NOTES
Pt states he was instructed to come to ED per Dr. Carr and Dr. Wood. Pt had rotator cuff repair yesterday by Dr. Mejia. Pt complaining of right arm/shoulder pain, pt reports taking his prescribed narcotics and doubling them up but has no relief. Pt reports calling Dr. Vallejo office this morning and telling them he is tried doubling his medications and lidocaine pump not working. Pt appears anxious and jittery.

## 2018-11-02 NOTE — ED PROVIDER NOTES
Encounter Date: 11/2/2018    SCRIBE #1 NOTE: I, Raymundo Salcedo, am scribing for, and in the presence of,  . I have scribed the entire note.       History     Chief Complaint   Patient presents with    Shoulder Pain     pt presents to ED today reports rotator cuff surgery yesterday and c/o severe pain. pt has pain pump to right shoulder pt reports it may be defected. he spoke with doctor and was informed to report to ED to further eval.      Solo Black is a 37 y.o. male who  has a past medical history of C. difficile colitis (feb 2014), Eosinophilic esophagitis (3/11/2014), GERD (gastroesophageal reflux disease), MRSA carrier, Nephrolithiasis (apr 2014), and Urinary tract infection (feb 2014).    The patient presents to the ED due to shoulder pain. Patient had surgery on his rotator cuff yesterday and reports to ER with pain. Patient believes he is having complications with his surgery pain pump not releasing the pain medication as it should. The patient rates the pain 10/10. The pain worsened at 2AM and the patient has been awake in pain since. Patient has been taking his pain medication po without relief.        The history is provided by the patient.     Review of patient's allergies indicates:   Allergen Reactions    Nsaids (non-steroidal anti-inflammatory drug)      GI bleed    Ketamine      Severe dysphoria (bad trip)    Penicillins      Past Medical History:   Diagnosis Date    C. difficile colitis feb 2014    postop of hand surgery    Eosinophilic esophagitis 3/11/2014    GERD (gastroesophageal reflux disease)     MRSA carrier     says multiple times nose swab was +    Nephrolithiasis apr 2014    bilateral punctate - never passed a stone    Urinary tract infection feb 2014    MRSA     Past Surgical History:   Procedure Laterality Date    APPENDECTOMY      BACK SURGERY      CIRCUMCISION, PRIMARY      COLONOSCOPY      drainage of abscess from hand  2009    MRSA    drainage of  abscess from R thigh  2006    MRSA    EGD (ESOPHAGOGASTRODUODENOSCOPY) N/A 9/5/2018    Performed by Kol[facility] at [facility]    EGD (ESOPHAGOGASTRODUODENOSCOPY) Left 3/11/2014    [facility]    ESOPHAGOGASTRODUODENOSCOPY  3/11/2014    ESOPHAGOGASTRODUODENOSCOPY N/A 9/5/2018    Procedure: EGD (ESOPHAGOGASTRODUODENOSCOPY);  Surgeon: Kolby Wall MD;  Location: [facility];  Service: Endoscopy;  Laterality: N/A;    ESOPHAGOGASTRODUODENOSCOPY (EGD) N/A 7/21/2016    Performed [facility] at [facility]    ESOPHAGOGASTRODUODENOSCOPY (EGD) N/A 3/17/2016    Performed by [facility] at [facility]    FLEXIBLE SIGMOIDOSCOPY N/A 9/5/2018    Procedure: SIGMOIDOSCOPY, FLEXIBLE;  Surgeon: Kolby Wall MD;  Location: [facility];  Service: Endoscopy;  Laterality: N/A;    HAND SURGERY  jan 2014    power saw fell on hand - laceration 3 tendons    SIGMOIDOSCOPY, FLEXIBLE N/A 9/5/2018    Performed by [facility] at [facility]     Family History   Problem Relation Age of Onset    Urolithiasis Father      Social History     Tobacco Use    Smoking status: Light Tobacco Smoker    Smokeless tobacco: Never Used   Substance Use Topics    Alcohol use: Yes     Comment: ocassional    Drug use: No     Review of Systems   Constitutional: Negative for chills.   HENT: Negative for facial swelling and trouble swallowing.    Eyes: Negative for redness.   Respiratory: Negative for shortness of breath.    Cardiovascular: Negative for chest pain.   Gastrointestinal: Negative for abdominal pain, diarrhea, nausea and vomiting.   Genitourinary: Negative for dysuria and hematuria.   Musculoskeletal: Positive for joint swelling. Negative for gait problem.        Joint pain   Skin: Negative for rash.   Neurological: Negative for facial asymmetry and speech difficulty.       Physical Exam     Initial Vitals [11/02/18 0734]   BP Pulse Resp Temp SpO2   (!) 143/88 (!) 112 (!)  22 99.2 °F (37.3 °C) (!) 94 %      MAP       --         Physical Exam    Nursing note and vitals reviewed.  Constitutional: He appears well-developed and well-nourished. He appears distressed.   HENT:   Head: Normocephalic and atraumatic.   Eyes: Conjunctivae are normal.   Neck: Neck supple.   Cardiovascular: Normal rate, regular rhythm, normal heart sounds and intact distal pulses. Exam reveals no gallop and no friction rub.    No murmur heard.  Pulmonary/Chest: Breath sounds normal. He has no wheezes. He has no rhonchi. He has no rales.   Abdominal: Soft. He exhibits no distension. There is no tenderness.   Musculoskeletal: Normal range of motion.   Radial pulses palpable, cap refill <2seconds   Neurological: He is alert and oriented to person, place, and time. He has normal strength.   Sensation intact to light touch   Skin: No rash noted. No erythema.   Psychiatric: He has a normal mood and affect.         ED Course   Procedures  Labs Reviewed - No data to display       Imaging Results    None          Medical Decision Making:   Differential Diagnosis:   Post-op pain, pain pump malfunction  ED Management:  Anesthesia consulted to adjust pump    8:23 AM  Patient's pain has resolved after pain pump adjustment    Patient with resolution of symptoms - reported fever at home x 1 is afebrile here and in NAD. Advised follow up/ return to ED if fever persists but do not suspect sepsis/ post operative infection on POD 1.     After taking into careful account the historical factors and physical exam findings of the patient's presentation today, in conjunction with the empirical and objective data obtained on ED workup, no acute emergent medical condition has been identified. The patient appears to be low risk for an emergent medical condition and I feel it is safe and appropriate at this time for the patient to be discharged to follow-up as detailed in their discharge instructions for reevaluation and possible continued  outpatient workup and management. I have discussed the specifics of the workup with the patient and the patient has verbalized understanding of the details of the workup, the diagnosis, the treatment plan, and the need for outpatient follow-up.  Although the patient has no emergent etiology today this does not preclude the development of an emergent condition so in addition, I have advised the patient that they can return to the ED and/or activate EMS at any time with worsening of their symptoms, change of their symptoms, or with any other medical complaint.  The patient remained comfortable and stable during their visit in the ED.  Discharge and follow-up instructions discussed with the patient who expressed understanding and willingness to comply with my recommendations.                         Clinical Impression:     1. Encounter for adjustment and management of infusion pump          Disposition:   Disposition: Discharged  Condition: Stable       Scribe Attestation I, Sony Falcon,  personally performed the services described in this documentation. All medical record entries made by the scribe were at my direction and in my presence.  I have reviewed the chart and agree that the record reflects my personal performance and is accurate and complete. Sony Falcon M.D. 8:30 AM11/02/2018                  Sony Falcon Jr., MD  11/02/18 0830

## 2018-11-04 NOTE — ANESTHESIA POST-OP PAIN MANAGEMENT
Acute Pain Service Progress Note    Solo Black is a 37 y.o., male, [id number].    Surgery:  Right Shoulder arthroscopy with distal clavicle excision    Post Op Day #: 3    Catheter type: n/a , Exparel injection      Problem List:    Active Hospital Problems    Diagnosis  POA    *Incomplete rotator cuff tear or rupture of right shoulder, not specified as traumatic [M75.111]  Yes      Resolved Hospital Problems   No resolved problems to display.       Multiple attempts to contact patient via phone number provided.        Evaluator Kaiser Selby

## 2018-11-05 NOTE — ANESTHESIA POST-OP PAIN MANAGEMENT
Acute Pain Service Progress Note    Solo Black is a 37 y.o., male, [id number].     Surgery:  Right Shoulder arthroscopy with distal clavicle excision     Post Op Day #: 3     Catheter type: n/a , Exparel injection    Problem List:    Active Hospital Problems    Diagnosis  POA    *Incomplete rotator cuff tear or rupture of right shoulder, not specified as traumatic [M75.111]  Yes      Resolved Hospital Problems   No resolved problems to display.       Subjective:    Patient reports adequate pain relief until this morning. Patient received Exparel injection on 11-2. Patient reports the numbness has completely resolved as of 11-4. Patient endorsed a fever and increased shoulder pain     General appearance of alert, oriented, no complaints   Pain with rest: 7    Numbers   Pain with movement: 8    Numbers   Side Effects    1. Pruritis No    2. Nausea No    3. Motor Blockade No, 0=Ability to raise lower extremities off bed    4. Sedation No, 1=awake and alert    Assessment:     Pain control adequate for 2-3 days following exparel injection    Plan:     Patient reported adequate pain relief following exparel injection. Medication likely no longer active. Patient reports starting to feel intense pain at 4am this morning and a fever of 102F. Patient was encouraged to report to urgent care or emergency room for evaluation.     Evaluator Kaiser Selby

## 2018-11-05 NOTE — ADDENDUM NOTE
Addendum  created 11/05/18 1358 by Kaiser Selby MD    Intraprocedure Event edited, Sign clinical note

## 2018-11-09 ENCOUNTER — PATIENT MESSAGE (OUTPATIENT)
Dept: ADMINISTRATIVE | Facility: OTHER | Age: 38
End: 2018-11-09

## 2019-04-12 ENCOUNTER — HOSPITAL ENCOUNTER (INPATIENT)
Facility: HOSPITAL | Age: 39
LOS: 6 days | Discharge: HOME | DRG: 854 | End: 2019-04-18
Attending: EMERGENCY MEDICINE | Admitting: INTERNAL MEDICINE
Payer: MEDICAID

## 2019-04-12 DIAGNOSIS — M75.111 INCOMPLETE ROTATOR CUFF TEAR OR RUPTURE OF RIGHT SHOULDER, NOT SPECIFIED AS TRAUMATIC: ICD-10-CM

## 2019-04-12 DIAGNOSIS — R78.81 BACTEREMIA: ICD-10-CM

## 2019-04-12 DIAGNOSIS — F90.9 ADULT ADHD: Chronic | ICD-10-CM

## 2019-04-12 DIAGNOSIS — D50.0 IRON DEFICIENCY ANEMIA DUE TO CHRONIC BLOOD LOSS: ICD-10-CM

## 2019-04-12 DIAGNOSIS — E87.6 HYPOKALEMIA: ICD-10-CM

## 2019-04-12 DIAGNOSIS — A41.01 STAPHYLOCOCCUS AUREUS BACTEREMIA WITH SEPSIS: ICD-10-CM

## 2019-04-12 DIAGNOSIS — L03.119 CELLULITIS AND ABSCESS OF FOOT: ICD-10-CM

## 2019-04-12 DIAGNOSIS — R10.13 EPIGASTRIC PAIN: ICD-10-CM

## 2019-04-12 DIAGNOSIS — B95.62 MRSA BACTEREMIA: Primary | ICD-10-CM

## 2019-04-12 DIAGNOSIS — K20.0 EOSINOPHILIC ESOPHAGITIS: Chronic | ICD-10-CM

## 2019-04-12 DIAGNOSIS — R78.81 MRSA BACTEREMIA: Primary | ICD-10-CM

## 2019-04-12 DIAGNOSIS — K92.2 UPPER GI BLEED: ICD-10-CM

## 2019-04-12 DIAGNOSIS — K92.2 ACUTE UPPER GI BLEED: ICD-10-CM

## 2019-04-12 DIAGNOSIS — L02.619 CELLULITIS AND ABSCESS OF FOOT: ICD-10-CM

## 2019-04-12 DIAGNOSIS — K62.5 BRIGHT RED BLOOD PER RECTUM: ICD-10-CM

## 2019-04-12 DIAGNOSIS — K92.0 HEMATEMESIS WITH NAUSEA: ICD-10-CM

## 2019-04-12 DIAGNOSIS — L03.90 CELLULITIS: ICD-10-CM

## 2019-04-12 DIAGNOSIS — R07.9 CHEST PAIN: ICD-10-CM

## 2019-04-12 DIAGNOSIS — L03.116 CELLULITIS OF LEFT LOWER EXTREMITY: ICD-10-CM

## 2019-04-12 LAB
ALBUMIN SERPL BCP-MCNC: 3 G/DL (ref 3.5–5.2)
ALP SERPL-CCNC: 47 U/L (ref 55–135)
ALT SERPL W/O P-5'-P-CCNC: 26 U/L (ref 10–44)
AMPHET+METHAMPHET UR QL: NEGATIVE
ANION GAP SERPL CALC-SCNC: 10 MMOL/L (ref 8–16)
AST SERPL-CCNC: 16 U/L (ref 10–40)
BARBITURATES UR QL SCN>200 NG/ML: NEGATIVE
BASOPHILS # BLD AUTO: 0 K/UL (ref 0–0.2)
BASOPHILS NFR BLD: 0 % (ref 0–1.9)
BENZODIAZ UR QL SCN>200 NG/ML: NEGATIVE
BILIRUB SERPL-MCNC: 0.5 MG/DL (ref 0.1–1)
BUN SERPL-MCNC: 12 MG/DL (ref 6–20)
BZE UR QL SCN: NEGATIVE
CALCIUM SERPL-MCNC: 8.8 MG/DL (ref 8.7–10.5)
CANNABINOIDS UR QL SCN: NEGATIVE
CHLORIDE SERPL-SCNC: 99 MMOL/L (ref 95–110)
CK SERPL-CCNC: 162 U/L (ref 20–200)
CO2 SERPL-SCNC: 29 MMOL/L (ref 23–29)
CREAT SERPL-MCNC: 0.8 MG/DL (ref 0.5–1.4)
CREAT UR-MCNC: 27.7 MG/DL (ref 23–375)
DIFFERENTIAL METHOD: ABNORMAL
EOSINOPHIL # BLD AUTO: 0.1 K/UL (ref 0–0.5)
EOSINOPHIL NFR BLD: 0.7 % (ref 0–8)
ERYTHROCYTE [DISTWIDTH] IN BLOOD BY AUTOMATED COUNT: 20.6 % (ref 11.5–14.5)
EST. GFR  (AFRICAN AMERICAN): >60 ML/MIN/1.73 M^2
EST. GFR  (NON AFRICAN AMERICAN): >60 ML/MIN/1.73 M^2
ESTIMATED AVG GLUCOSE: 105 MG/DL (ref 68–131)
FERRITIN SERPL-MCNC: 12 NG/ML (ref 20–300)
GLUCOSE SERPL-MCNC: 109 MG/DL (ref 70–110)
HBA1C MFR BLD HPLC: 5.3 % (ref 4–5.6)
HCT VFR BLD AUTO: 33.5 % (ref 40–54)
HGB BLD-MCNC: 10.1 G/DL (ref 14–18)
LACTATE SERPL-SCNC: 1.7 MMOL/L (ref 0.5–2.2)
LACTATE SERPL-SCNC: 2.8 MMOL/L (ref 0.5–2.2)
LYMPHOCYTES # BLD AUTO: 0.6 K/UL (ref 1–4.8)
LYMPHOCYTES NFR BLD: 5.7 % (ref 18–48)
MAGNESIUM SERPL-MCNC: 2 MG/DL (ref 1.6–2.6)
MCH RBC QN AUTO: 22.8 PG (ref 27–31)
MCHC RBC AUTO-ENTMCNC: 30.1 G/DL (ref 32–36)
MCV RBC AUTO: 76 FL (ref 82–98)
METHADONE UR QL SCN>300 NG/ML: NEGATIVE
MONOCYTES # BLD AUTO: 0.9 K/UL (ref 0.3–1)
MONOCYTES NFR BLD: 9.2 % (ref 4–15)
NEUTROPHILS # BLD AUTO: 8.3 K/UL (ref 1.8–7.7)
NEUTROPHILS NFR BLD: 84.1 % (ref 38–73)
OPIATES UR QL SCN: NEGATIVE
PCP UR QL SCN>25 NG/ML: NEGATIVE
PLATELET # BLD AUTO: 244 K/UL (ref 150–350)
PMV BLD AUTO: 10.3 FL (ref 9.2–12.9)
POTASSIUM SERPL-SCNC: 2.6 MMOL/L (ref 3.5–5.1)
PROCALCITONIN SERPL IA-MCNC: 0.14 NG/ML
PROT SERPL-MCNC: 5.6 G/DL (ref 6–8.4)
RBC # BLD AUTO: 4.43 M/UL (ref 4.6–6.2)
SODIUM SERPL-SCNC: 138 MMOL/L (ref 136–145)
T4 FREE SERPL-MCNC: 0.91 NG/DL (ref 0.71–1.51)
TOXICOLOGY INFORMATION: NORMAL
TSH SERPL DL<=0.005 MIU/L-ACNC: 0.27 UIU/ML (ref 0.4–4)
WBC # BLD AUTO: 9.83 K/UL (ref 3.9–12.7)

## 2019-04-12 PROCEDURE — 94761 N-INVAS EAR/PLS OXIMETRY MLT: CPT

## 2019-04-12 PROCEDURE — 80053 COMPREHEN METABOLIC PANEL: CPT

## 2019-04-12 PROCEDURE — 63600175 PHARM REV CODE 636 W HCPCS: Performed by: INTERNAL MEDICINE

## 2019-04-12 PROCEDURE — 82607 VITAMIN B-12: CPT

## 2019-04-12 PROCEDURE — 87186 SC STD MICRODIL/AGAR DIL: CPT

## 2019-04-12 PROCEDURE — 84439 ASSAY OF FREE THYROXINE: CPT

## 2019-04-12 PROCEDURE — 93010 ELECTROCARDIOGRAM REPORT: CPT | Mod: ,,, | Performed by: INTERNAL MEDICINE

## 2019-04-12 PROCEDURE — 93010 EKG 12-LEAD: ICD-10-PCS | Mod: ,,, | Performed by: INTERNAL MEDICINE

## 2019-04-12 PROCEDURE — 80307 DRUG TEST PRSMV CHEM ANLYZR: CPT

## 2019-04-12 PROCEDURE — 85025 COMPLETE CBC W/AUTO DIFF WBC: CPT

## 2019-04-12 PROCEDURE — 36415 COLL VENOUS BLD VENIPUNCTURE: CPT

## 2019-04-12 PROCEDURE — 83540 ASSAY OF IRON: CPT

## 2019-04-12 PROCEDURE — 63600175 PHARM REV CODE 636 W HCPCS: Performed by: EMERGENCY MEDICINE

## 2019-04-12 PROCEDURE — 82728 ASSAY OF FERRITIN: CPT

## 2019-04-12 PROCEDURE — 96365 THER/PROPH/DIAG IV INF INIT: CPT

## 2019-04-12 PROCEDURE — 25000003 PHARM REV CODE 250: Performed by: INTERNAL MEDICINE

## 2019-04-12 PROCEDURE — 25000003 PHARM REV CODE 250: Performed by: STUDENT IN AN ORGANIZED HEALTH CARE EDUCATION/TRAINING PROGRAM

## 2019-04-12 PROCEDURE — 82746 ASSAY OF FOLIC ACID SERUM: CPT

## 2019-04-12 PROCEDURE — 83605 ASSAY OF LACTIC ACID: CPT

## 2019-04-12 PROCEDURE — 87077 CULTURE AEROBIC IDENTIFY: CPT

## 2019-04-12 PROCEDURE — 99285 EMERGENCY DEPT VISIT HI MDM: CPT | Mod: 25

## 2019-04-12 PROCEDURE — 83735 ASSAY OF MAGNESIUM: CPT

## 2019-04-12 PROCEDURE — 83036 HEMOGLOBIN GLYCOSYLATED A1C: CPT

## 2019-04-12 PROCEDURE — 87040 BLOOD CULTURE FOR BACTERIA: CPT | Mod: 59

## 2019-04-12 PROCEDURE — 82550 ASSAY OF CK (CPK): CPT

## 2019-04-12 PROCEDURE — 83605 ASSAY OF LACTIC ACID: CPT | Mod: 91

## 2019-04-12 PROCEDURE — 25000003 PHARM REV CODE 250: Performed by: EMERGENCY MEDICINE

## 2019-04-12 PROCEDURE — 11000001 HC ACUTE MED/SURG PRIVATE ROOM

## 2019-04-12 PROCEDURE — 84145 PROCALCITONIN (PCT): CPT

## 2019-04-12 PROCEDURE — S0077 INJECTION, CLINDAMYCIN PHOSP: HCPCS | Performed by: EMERGENCY MEDICINE

## 2019-04-12 PROCEDURE — 96375 TX/PRO/DX INJ NEW DRUG ADDON: CPT

## 2019-04-12 PROCEDURE — 93005 ELECTROCARDIOGRAM TRACING: CPT

## 2019-04-12 PROCEDURE — 84443 ASSAY THYROID STIM HORMONE: CPT

## 2019-04-12 RX ORDER — SUCRALFATE 1 G/1
1 TABLET ORAL
COMMUNITY
End: 2023-05-06

## 2019-04-12 RX ORDER — HYDROCODONE BITARTRATE AND ACETAMINOPHEN 5; 325 MG/1; MG/1
1 TABLET ORAL ONCE
Status: COMPLETED | OUTPATIENT
Start: 2019-04-12 | End: 2019-04-12

## 2019-04-12 RX ORDER — SUCRALFATE 1 G/1
1 TABLET ORAL
Status: DISCONTINUED | OUTPATIENT
Start: 2019-04-12 | End: 2019-04-18 | Stop reason: HOSPADM

## 2019-04-12 RX ORDER — POTASSIUM CHLORIDE 7.45 MG/ML
10 INJECTION INTRAVENOUS
Status: DISCONTINUED | OUTPATIENT
Start: 2019-04-12 | End: 2019-04-12

## 2019-04-12 RX ORDER — LORATADINE 10 MG/1
10 TABLET ORAL EVERY MORNING
Status: ON HOLD | COMMUNITY
End: 2020-08-21 | Stop reason: CLARIF

## 2019-04-12 RX ORDER — POTASSIUM CHLORIDE 20 MEQ/1
20 TABLET, EXTENDED RELEASE ORAL 3 TIMES DAILY
Status: DISCONTINUED | OUTPATIENT
Start: 2019-04-12 | End: 2019-04-13

## 2019-04-12 RX ORDER — IBUPROFEN 200 MG
24 TABLET ORAL
Status: DISCONTINUED | OUTPATIENT
Start: 2019-04-12 | End: 2019-04-18 | Stop reason: HOSPADM

## 2019-04-12 RX ORDER — POTASSIUM CHLORIDE 20 MEQ/1
40 TABLET, EXTENDED RELEASE ORAL
Status: COMPLETED | OUTPATIENT
Start: 2019-04-12 | End: 2019-04-12

## 2019-04-12 RX ORDER — SODIUM CHLORIDE 0.9 % (FLUSH) 0.9 %
10 SYRINGE (ML) INJECTION
Status: DISCONTINUED | OUTPATIENT
Start: 2019-04-12 | End: 2019-04-18 | Stop reason: HOSPADM

## 2019-04-12 RX ORDER — ENOXAPARIN SODIUM 100 MG/ML
40 INJECTION SUBCUTANEOUS EVERY 24 HOURS
Status: DISCONTINUED | OUTPATIENT
Start: 2019-04-12 | End: 2019-04-18 | Stop reason: HOSPADM

## 2019-04-12 RX ORDER — GLUCAGON 1 MG
1 KIT INJECTION
Status: DISCONTINUED | OUTPATIENT
Start: 2019-04-12 | End: 2019-04-18 | Stop reason: HOSPADM

## 2019-04-12 RX ORDER — CLINDAMYCIN PHOSPHATE 900 MG/50ML
900 INJECTION, SOLUTION INTRAVENOUS
Status: COMPLETED | OUTPATIENT
Start: 2019-04-12 | End: 2019-04-12

## 2019-04-12 RX ORDER — PAROXETINE 10 MG/1
20 TABLET, FILM COATED ORAL EVERY MORNING
Status: DISCONTINUED | OUTPATIENT
Start: 2019-04-13 | End: 2019-04-18 | Stop reason: HOSPADM

## 2019-04-12 RX ORDER — IBUPROFEN 200 MG
16 TABLET ORAL
Status: DISCONTINUED | OUTPATIENT
Start: 2019-04-12 | End: 2019-04-18 | Stop reason: HOSPADM

## 2019-04-12 RX ORDER — PREDNISONE 20 MG/1
20 TABLET ORAL ONCE
Status: DISCONTINUED | OUTPATIENT
Start: 2019-04-12 | End: 2019-04-12

## 2019-04-12 RX ORDER — PROMETHAZINE HYDROCHLORIDE 12.5 MG/1
25 TABLET ORAL EVERY 6 HOURS PRN
Status: ON HOLD | COMMUNITY
End: 2020-08-28 | Stop reason: SDUPTHER

## 2019-04-12 RX ORDER — FAMOTIDINE 20 MG/1
20 TABLET, FILM COATED ORAL 2 TIMES DAILY
Status: DISCONTINUED | OUTPATIENT
Start: 2019-04-12 | End: 2019-04-18 | Stop reason: HOSPADM

## 2019-04-12 RX ORDER — MORPHINE SULFATE 4 MG/ML
4 INJECTION, SOLUTION INTRAMUSCULAR; INTRAVENOUS
Status: COMPLETED | OUTPATIENT
Start: 2019-04-12 | End: 2019-04-12

## 2019-04-12 RX ORDER — HYDROCODONE BITARTRATE AND ACETAMINOPHEN 5; 325 MG/1; MG/1
1 TABLET ORAL EVERY 4 HOURS PRN
Status: DISCONTINUED | OUTPATIENT
Start: 2019-04-12 | End: 2019-04-18 | Stop reason: HOSPADM

## 2019-04-12 RX ORDER — CETIRIZINE HYDROCHLORIDE 10 MG/1
10 TABLET ORAL DAILY
Status: DISCONTINUED | OUTPATIENT
Start: 2019-04-13 | End: 2019-04-18 | Stop reason: HOSPADM

## 2019-04-12 RX ORDER — MAGNESIUM SULFATE HEPTAHYDRATE 40 MG/ML
2 INJECTION, SOLUTION INTRAVENOUS ONCE
Status: COMPLETED | OUTPATIENT
Start: 2019-04-12 | End: 2019-04-12

## 2019-04-12 RX ORDER — PANTOPRAZOLE SODIUM 40 MG/1
40 TABLET, DELAYED RELEASE ORAL 2 TIMES DAILY
Status: DISCONTINUED | OUTPATIENT
Start: 2019-04-12 | End: 2019-04-18 | Stop reason: HOSPADM

## 2019-04-12 RX ORDER — FAMOTIDINE 20 MG/1
20 TABLET, FILM COATED ORAL 2 TIMES DAILY
Status: ON HOLD | COMMUNITY
End: 2021-03-04 | Stop reason: HOSPADM

## 2019-04-12 RX ORDER — PREDNISONE 20 MG/1
20 TABLET ORAL 2 TIMES DAILY
COMMUNITY
End: 2020-07-05

## 2019-04-12 RX ADMIN — POTASSIUM CHLORIDE 10 MEQ: 7.46 INJECTION, SOLUTION INTRAVENOUS at 03:04

## 2019-04-12 RX ADMIN — MAGNESIUM SULFATE IN WATER 2 G: 40 INJECTION, SOLUTION INTRAVENOUS at 05:04

## 2019-04-12 RX ADMIN — POTASSIUM CHLORIDE 20 MEQ: 20 TABLET, EXTENDED RELEASE ORAL at 03:04

## 2019-04-12 RX ADMIN — CLINDAMYCIN IN 5 PERCENT DEXTROSE 900 MG: 18 INJECTION, SOLUTION INTRAVENOUS at 12:04

## 2019-04-12 RX ADMIN — POTASSIUM CHLORIDE 40 MEQ: 20 TABLET, EXTENDED RELEASE ORAL at 12:04

## 2019-04-12 RX ADMIN — SODIUM CHLORIDE, SODIUM LACTATE, POTASSIUM CHLORIDE, AND CALCIUM CHLORIDE 1000 ML: .6; .31; .03; .02 INJECTION, SOLUTION INTRAVENOUS at 05:04

## 2019-04-12 RX ADMIN — FAMOTIDINE 20 MG: 20 TABLET ORAL at 09:04

## 2019-04-12 RX ADMIN — ENOXAPARIN SODIUM 40 MG: 100 INJECTION SUBCUTANEOUS at 06:04

## 2019-04-12 RX ADMIN — MORPHINE SULFATE 4 MG: 4 INJECTION INTRAVENOUS at 12:04

## 2019-04-12 RX ADMIN — VANCOMYCIN HYDROCHLORIDE 2500 MG: 1 INJECTION, POWDER, LYOPHILIZED, FOR SOLUTION INTRAVENOUS at 06:04

## 2019-04-12 RX ADMIN — HYDROCODONE BITARTRATE AND ACETAMINOPHEN 1 TABLET: 5; 325 TABLET ORAL at 06:04

## 2019-04-12 RX ADMIN — PANTOPRAZOLE SODIUM 40 MG: 40 TABLET, DELAYED RELEASE ORAL at 09:04

## 2019-04-12 RX ADMIN — POTASSIUM CHLORIDE 20 MEQ: 20 TABLET, EXTENDED RELEASE ORAL at 09:04

## 2019-04-12 RX ADMIN — HYDROCODONE BITARTRATE AND ACETAMINOPHEN 1 TABLET: 5; 325 TABLET ORAL at 09:04

## 2019-04-12 RX ADMIN — HYDROCODONE BITARTRATE AND ACETAMINOPHEN 1 TABLET: 5; 325 TABLET ORAL at 02:04

## 2019-04-12 NOTE — ED NOTES
YONATAN Nash MD notified of patient's pain and request of relief. MD en route. See MAR for orders.

## 2019-04-12 NOTE — H&P
[facility] Medicine H&P Note     Admitting Team: [facility] Hospitalist Team A  Attending Physician: Dr. Sandoval  Resident: Dr. Jerry Lerner  Intern: Dr. Randall Hernández    Date of Admit: 4/12/2019    Chief Complaint     Foot pain and swelling x 2 days    Subjective:      History of Present Illness:    Solo Black is a 38 y.o. male with eosinophilic esophagitis, chronic anemia, PUD, ADHD, OCD/Anxiety, and recurrent MRSA infections who presents with 2 days of worsening foot pain and swelling 1 day post discharge from OSH with PIV site infection and cellulitis.     The patient was in their usual state of health (lives with grandparents without functional barriers), until last Monday when he was admitted to [facility] for abdominal pain, and BRBPR consistent with his past episodes of eosinophilic esophagitis, pt reports that he was admitted, treated with sulcrafate, protonix, and had improvement in his symptoms and eventually discharged the day prior to presentation. Pt reports that he had a PIV line placed on Tuesday during that hospitalization. Line was pulled prior to discharge, pt noted some mild pain at the site prior to discharge and was advised to observe it but it would likely resolve. 2-3 hours after returning home, patient noted worsening pain which excalated throughout the night with progressive erythema. Pt awoke at 3 am PTA with fevers measured to 102 and chills. Pain 8/10 and pt was unable to ambulate 2/2 the pain, Attempted tylenol x 1 with minimal pain relief, Pt noted erythema beginning to spread up in inner thigh. Presented to the ED for evaluation.     Brief Hospital Course:  Pt presented to ED noted to be tachycardic to 112, temperature mildly elevated to 99. Hemodynamically stable. Initial labs noted without leukocytosis, stable H/H from baseline, and hypokalemia to 2.6. LE XR was taken with no acute bony abnormality. Pt given 1 dose of clindamycin for LLE cellulitis, 40mEQ of K  and morphine 1 dose for pain.    Past Medical History:  Past Medical History:   Diagnosis Date    C. difficile colitis feb 2014    postop of hand surgery    Eosinophilic esophagitis 3/11/2014    GERD (gastroesophageal reflux disease)     IBS (irritable bowel syndrome)     MRSA carrier     says multiple times nose swab was +    Nephrolithiasis apr 2014    bilateral punctate - never passed a stone    Urinary tract infection feb 2014    MRSA     Past Surgical History:  Past Surgical History:   Procedure Laterality Date    APPENDECTOMY      ARTHROSCOPY, SHOULDER, WITH DISTAL CLAVICLE EXCISION Right 11/1/2018    Per[facility] at [facility] OR    BACK SURGERY      CIRCUMCISION, PRIMARY      COLONOSCOPY N/A 11/14/2018    [facility]O    drainage of abscess from hand  2009    MRSA    drainage of abscess from R thigh  2006    MRSA    EGD (ESOPHAGOGASTRODUODENOSCOPY) N/A 9/5/2018    Performed by Kolby Wall [facility] at [facility] ENDO    EGD (ESOPHAGOGASTRODUODENOSCOPY) Left 3/11/2014    Per[facility]    ESOPHAGOGASTRODUODENOSCOPY  3/11/2014    ESOPHAGOGASTRODUODENOSCOPY (EGD) N/A 7/21/2016    Performed by Kol[facility] at [facility] ENDO    ESOPHAGOGASTRODUODENOSCOPY (EGD) N/A 3/17/2016    Performed by [facility] at [facility] ENDO    HAND SURGERY  jan 2014    power saw fell on hand - laceration 3 tendons    SIGMOIDOSCOPY, FLEXIBLE N/A 9/5/2018    Performed by Kolby Wall [facility] at [facility] ENDO     Allergies:  Review of patient's allergies indicates:   Allergen Reactions    Nsaids (non-steroidal anti-inflammatory drug)      GI bleed    Ketamine      Severe dysphoria (bad trip)    Penicillins      Home Medications:  Prior to Admission medications    Medication Sig Start Date End Date Taking? Authorizing Provider   dextroamphetamine-amphetamine (ADDERALL) 20 mg tablet Take 1 tablet by mouth 2 (two) times daily before meals.  Take before breakfast and lunch   Yes Historical Provider, MD   famotidine (PEPCID) 20 MG tablet Take 20 mg by mouth 2 (two) times daily.   Yes Historical Provider, MD   ferrous sulfate 325 mg (65 mg iron) Tab tablet Take 1 tablet (325 mg total) by mouth 2 (two) times daily. 9/5/18 9/5/19 Yes Stephane Weaver MD   loratadine (CLARITIN) 10 mg tablet Take 10 mg by mouth every morning.   Yes Historical Provider, MD   MULTIVIT-IRON-MIN-FOLIC ACID 3,500-18-0.4 UNIT-MG-MG ORAL CHEW Take 10 mg by mouth once daily.   Yes Historical Provider, MD   pantoprazole (PROTONIX) 40 MG tablet Take 1 tablet (40 mg total) by mouth 2 (two) times daily. 9/5/18 9/5/19 Yes Stephane Weaver MD   paroxetine (PAXIL) 20 MG tablet Take 20 mg by mouth every morning.   Yes Historical Provider, MD   predniSONE (DELTASONE) 20 MG tablet Take 20 mg by mouth 2 (two) times daily.   Yes Historical Provider, MD   promethazine (PHENERGAN) 12.5 MG Tab Take 25 mg by mouth every 6 (six) hours as needed (nausea/vomiting).   Yes Historical Provider, MD   sucralfate (CARAFATE) 1 gram tablet Take 1 g by mouth 4 (four) times daily before meals and nightly.   Yes Historical Provider, MD     Family History:  Family History   Problem Relation Age of Onset    Urolithiasis Father     Celiac disease Sister     Hypertension Maternal Grandmother     Hypertension Maternal Grandfather      Social History:  Social History     Tobacco Use    Smoking status: Light Tobacco Smoker    Smokeless tobacco: Never Used   Substance Use Topics    Alcohol use: Yes     Comment: ocassional    Drug use: No       Review of Systems:  Review of Systems   Constitutional: Fever chills, diaphoresis, fatigue, unexpected weight change.   HENT: Negative for congestion and sore throat, rhinorrhea, vision change.    Respiratory: Negative for cough, shortness of breath and wheezing.    Cardiovascular: Negative for chest pain, palpitations, orhtopnea.   Gastrointestinal: Negative for abdominal  pain, constipation, diarrhea, nausea and vomiting.   Genitourinary: Negative for dysuria and hematuria.   Musculoskeletal: Negative for arthralgias and myalgias.   Skin: As per HPI  Neurological: Negative for light-headedness, numbness and headaches.   All other systems are reviewed and are negative.    Health Maintaince :   Primary Care Physician: Dr. Cecilia Tsai    Immunizations:   TDap Up to date (5 years ago)    Pna Never recieved  Flu Up to date        Cancer Screening:  Colonoscopy: not indicated yet  Low-Dose CT: Not indicated  AAA: not indicated     Objective:   Last 24 Hour Vital Signs:  BP  Min: 114/66  Max: 114/66  Pulse  Av  Min: 101  Max: 101  Resp  Av  Min: 18  Max: 18  SpO2  Av %  Min: 98 %  Max: 98 %  BP  Min: 114/66  Max: 127/74  Temp  Av.8 °F (37.7 °C)  Min: 99.7 °F (37.6 °C)  Max: 99.8 °F (37.7 °C)  Pulse  Av.8  Min: 101  Max: 112  Resp  Av.3  Min: 16  Max: 19  SpO2  Av %  Min: 96 %  Max: 98 %  Height  Av' (182.9 cm)  Min: 6' (182.9 cm)  Max: 6' (182.9 cm)  Weight  Av.5 kg (195 lb)  Min: 88.5 kg (195 lb)  Max: 88.5 kg (195 lb)  Body mass index is 26.45 kg/m².  No intake/output data recorded.    Physical Examination:  Gen:  Uncomfortable appearing, non-toxic  HENT:  Conjunctiva clear with no icterus or pallor   OP clear with excudates. MMM   EYES:  Conjunctivae normal. EOMI. PERRL.   NECK:  Normal ROM. Neck supple.   CV:  Tachycardic  Normal S1 & S2. No murmurs, rubs, gallops.    Pulses:  Radial, DP, and PT 2+ bilateral and symmetric  Cap refil <2 secs.  PULM:  CTAB. No respiratory distress. No wheezes, no rales.   ABD:   Normoactive BS. Soft, NTND. No rebound, no guarding, no mass.  Back:  Spine midline  no deformity, stepoff, or tenderness  no CVA tenderness  SKIN:   Warm and dry.   Shallow Scattered Excoriations on exam including upper back, with linear excoriation to R back. Multiple punctate excoriations to forehead. Erythema to  anterior chest.   Extremity:   RLE unremarkable  LLE with marked erythema and swelling to foot especially over dorsum with tracking up to ankle and down to dorsal toes. Punctate 2x5mm escar on mid dorsal foot as seen on imaging provided. Marked TTP at Dorsal foot. No flatulence or purulent drainage  NEURO:  A&Ox4. MITALI. Face symmetric. Tongue midline. Palatal rise equal. SCM/TPZ intact. [place] intact. Facial sensation intact. Sensation to light tough intact to distal extremities.  Psychiatric:  Normal mood & affect.    Laboratory:  Most Recent Data:  CBC:   Lab Results   Component Value Date    WBC 9.83 04/12/2019    HGB 10.1 (L) 04/12/2019    HCT 33.5 (L) 04/12/2019     04/12/2019    MCV 76 (L) 04/12/2019    RDW 20.6 (H) 04/12/2019     WBC Differential: 84.1 % N, 0 % Bands, 5.7 % L, 9.2 % M, 0.7 % Eo, 0.0 % Baso, 0 additional cells seen  BMP:   Lab Results   Component Value Date     04/12/2019    K 2.6 (LL) 04/12/2019    CL 99 04/12/2019    CO2 29 04/12/2019    BUN 12 04/12/2019    CREATININE 0.8 04/12/2019     04/12/2019    CALCIUM 8.8 04/12/2019    MG 1.8 10/21/2016    PHOS 3.6 04/01/2012     LFTs:   Lab Results   Component Value Date    PROT 5.6 (L) 04/12/2019    ALBUMIN 3.0 (L) 04/12/2019    BILITOT 0.5 04/12/2019    AST 16 04/12/2019    ALKPHOS 47 (L) 04/12/2019    ALT 26 04/12/2019     Coags:   Lab Results   Component Value Date    INR 1.0 09/04/2018     FLP: No results found for: CHOL, HDL, LDLCALC, TRIG, CHOLHDL  DM:   Lab Results   Component Value Date    HGBA1C 5.4 03/27/2016    CREATININE 0.8 04/12/2019     Thyroid:   Lab Results   Component Value Date    TSH 2.240 09/04/2018     Anemia:   Lab Results   Component Value Date    IRON 21 (L) 09/04/2018    TIBC 453 (H) 09/04/2018    FERRITIN 7 (L) 09/04/2018    ZEGIDAHJ11 419 09/04/2018    FOLATE 11.8 09/04/2018     Cardiac:   Lab Results   Component Value Date    TROPONINI 0.010 03/28/2011     Urinalysis:   Lab Results   Component  Value Date    LABURIN  04/10/2014     Multiple organisms isolated. None in predominance.  Repeat if    LABURIN clinically necessary. 04/10/2014    COLORU Yellow 10/05/2018    SPECGRAV 1.020 10/05/2018    NITRITE Negative 10/05/2018    KETONESU Negative 10/05/2018    UROBILINOGEN Negative 10/05/2018    WBCUA 1 03/27/2016       Trended Lab Data:  Recent Labs   Lab 04/12/19  1133   WBC 9.83   HGB 10.1*   HCT 33.5*      MCV 76*   RDW 20.6*      K 2.6*   CL 99   CO2 29   BUN 12   CREATININE 0.8      PROT 5.6*   ALBUMIN 3.0*   BILITOT 0.5   AST 16   ALKPHOS 47*   ALT 26       Trended Cardiac Data:  No results for input(s): TROPONINI, CKTOTAL, CKMB, BNP in the last 168 hours.      Microbiology Data:  4/12/19: Blood cultures pending    Other Results:  EKG (my interpretation):  None    Radiology:  Imaging Results          X-Ray Foot Complete Left (Final result)  Result time 04/12/19 11:50:30    Final result by Chi Cifuentes MD (04/12/19 11:50:30)                 Impression:      No osseous abnormalities.      Electronically signed by: Chi Cifuentes MD  Date:    04/12/2019  Time:    11:50             Narrative:    EXAMINATION:  XR FOOT COMPLETE 3 VIEW LEFT    CLINICAL HISTORY:  .  Cellulitis, unspecified    TECHNIQUE:  AP, lateral and oblique views of the left foot were performed.    COMPARISON:  None    FINDINGS:  The alignment is within normal limits.  No fracture.  No marrow replacement.  No radiopaque foreign body.                              Assessment/Plan:     Solo Black is a 38 y.o. male with eosinophilic esophagitis, chronic anemia, PUD, ADHD, OCD/Anxiety who presents with 2 days of worsening foot pain and swelling 1 day post discharge from OSH with PIV site infection and cellulitis. Pt reports that his GI symptoms from his prior admission have completely resolved at this time. Now with worsening cellulitis of LLE s/p PIV line. Pt afebrile while during admission so far and  hemodynamically stable but with mild tachycardia. Initial labs without leukocytosis but notable for hypokalemia to 2.6 and elevated lactate Will admit for IV antibiotics. Given recent hospitalization, LLE swelling will get RLE U/S to rule out DVT.      LLE Cellulitis  - 1 days of progressive redness and erythema to LLE at prior PIV site from recent hospital discharge with fevers measured to 10, chills and progressive erythema and swelling   SIRS 1/4 (tachycardia 112)  qSOFA 0   - LLE XR without bony abnormality   - pt received clindamycin in ed x 1 dose  - recent hospitalization with RLE swelling and tachycardia, will get LLE US to rule out DVT  - initial lactate elevated 2.8 will give IV bolus and repeat  - Blood cultures pending  - procalcitonin  - high suspicion for MRSA, will cover with Vancomycin IV  - monitor for improvement with plan to transition to PO regiment prior to discharge   - Norco q4h for pain    Eosinophilic esophagitis  - recently discharged from [facility] for N/V, abdominal pain, BRBPR, c/w with patients past episodes of eosinophilic esophagitis  - home medications include pepcid AC 20mg qam, Protonix 20mg bid, loratadin 10mg qam,   - pt reports that he was treated with IV steroids, sulcrafate, protonix and had gradual resolution of his symptoms over 4 day hospital stay and discharge home with prednisone taper.   - will continue prednisone taper  - will continue home medications    PUD  - home meds as above  - continue protonix, H2, H1 blocker    Chronic blood loss Anemia  - microcytic anemia with H/H 10.1 with MCV 76  stable at his baseline since 2018  - profile in 2018  Fe 21  tibc 453  sat Iron 5  Xferrin 306  ferritin 7  folate 11.8  419  - consistent with iron deficiency anemia  - on home iron 325 bid  - will hold in setting of acute infection    Hypokalemia  - K low 2.6 at presentation given 40mEq in EDx1  - will give mag 2mg and IV KCL 10mEQ q3hour   - monitor  and replete PRN    ADD  - no aucte issues  - home medications Adderall 20mg bid (am and lunch)  - will hold acutely     HCM  - flu shot up to date  - tetanus up to date  - has seen PCP once but has not followed up  - follows with Dr. Alaniz for psychiatry  - follows with Dr. Wall for GI  - tsh   - lipid panel   - a1c    Code: Full  PPx: SCDs, lovenox  Diet: Full  Dispo: Admitted to general floor for IV antibiotics pending clinical improvement of LLE cellulitis    Randall Hernández MD  [facility] Internal Medicine HO-I    [facility] Medicine Hospitalist Pager numbers:   [facility] Hospitalist Medicine Team A (Jaime/Smita): [phone]  [facility] Hospitalist Medicine Team B (Quintin/Francisca):  [phone]

## 2019-04-12 NOTE — ED NOTES
Pt updated on plan of care. Pt complaining of severe left foot pain and requesting pain medication.

## 2019-04-12 NOTE — ED NOTES
Pt to Room 26 with c/o left foot pain. Pt states he was discharged from a hospital where he was received IV infusion via IV in foot. Pt states after he was discharge he began having a return of nausea, vomiting and abdominal pain. Pt also began having rapid swelling to the left foot. Diffuse swelling, redness at site, and foot hot to the touch. Pt has pulses present in the foot and has full ROM. Pt describes pain as pressure, rated as a 10/10. Pt also states he was running fever at home, highest temperature was 102. Pt denies any constipation, diarrhea, headache, chest pain, SOB, confusion or vision changes. Pt is AAO x 3. Respirations even and unlabored, lung sounds clear and equal bilaterally. Skin warm and dry to touch. Abdomen soft and non-distended, no guarding or tenderness noted. BS present x 4. VSS. Fall precautions initiated. Family at the bedside. No acute distress noted. Will continue to monitor closely.

## 2019-04-12 NOTE — PLAN OF CARE
VN cued into patients room for introduction and admission questions. VN role explained and informed pt that VN will be working alongside the bedside care team throughout the day. Pt verbalized understanding that VN is available for any questions and education, and nurse and PCT will continue hourly rounding at bedside. Fall education provided. Allotted time given for questions - all questions answered. Will continue to monitor and intervene PRN.    VN paged primary team for uncontrolled pain - pt continues to complain of pain 10/10 at this time. Awaiting call back.

## 2019-04-12 NOTE — PROGRESS NOTES
Vancomycin Dosing and Monitoring Pharmacy Protocol    Solo Black is a 38 y.o. male    Height: 6' (1.829 m)   Wt Readings from Last 3 Encounters:   04/12/19 88.5 kg (195 lb)   11/05/18 86.2 kg (190 lb)   11/02/18 86.2 kg (190 lb)       Temp Readings from Last 3 Encounters:   04/12/19 99.8 °F (37.7 °C) (Oral)   11/14/18 98 °F (36.7 °C) (Oral)   11/05/18 99.7 °F (37.6 °C) (Oral)      Lab Results   Component Value Date/Time    WBC 9.83 04/12/2019 11:33 AM    WBC 6.33 11/05/2018 03:19 PM    WBC 8.04 10/05/2018 12:35 AM      Lab Results   Component Value Date/Time    CREATININE 0.8 04/12/2019 11:33 AM    CREATININE 1.06 11/05/2018 03:19 PM    CREATININE 0.90 10/05/2018 12:35 AM      Lab Results   Component Value Date/Time    LACTATE 2.8 (H) 04/12/2019 11:33 AM    LACTATE 1.4 10/05/2018 12:35 AM    LACTATE 0.7 03/27/2016 03:50 PM       Serum creatinine: 0.8 mg/dL 04/12/19 1133  Estimated creatinine clearance: 140.1 mL/min    Antibiotics (From admission, onward)      Start     Stop Route Frequency Ordered    04/13/19 0430  vancomycin (VANCOCIN) 1,500 mg in dextrose 5 % 250 mL IVPB  (vancomycin IVPB)      -- IV Every 12 hours (non-standard times) 04/12/19 1523    04/12/19 1630  vancomycin (VANCOCIN) 2,500 mg in dextrose 5 % 500 mL IVPB  (vancomycin IVPB)      -- IV Once 04/12/19 1522          Antifungals (From admission, onward)      None            Microbiology Results (last 7 days)       Procedure Component Value Units Date/Time    Blood Culture #2 **CANNOT BE ORDERED STAT** [[id number]] Collected:  04/12/19 1215    Order Status:  Sent Specimen:  Blood from Peripheral, Hand, Right Updated:  04/12/19 1216    Blood Culture #1 **CANNOT BE ORDERED STAT** [[id number]] Collected:  04/12/19 1128    Order Status:  Sent Specimen:  Blood from Peripheral, Hand, Left Updated:  04/12/19 1134            Indication/Target trough:   Skin and skin structure (Target trough: 10-15 mcg/ml)    Hemodialysis:   N/A    Dosing Weight:   Wt  Readings from Last 1 Encounters:   19 88.5 kg (195 lb)       Last Vancomycin dose: N/A   Date/Time given: N/A         Vancomycin level:  No results for input(s): VANCOMYCIN-TROUGH in the last 72 hours.  No results for input(s): VANCOMYCIN, RANDOM in the last 72 hours.    Per Protocol Initial/Adjustments Dosin. Initial/Adjustment Dose: Loading dose = 2500mg x1, followed by Maintenance dose of 1500 mg q12hr to be given at  @ 0430 date/time  2. Vancomycin Trough Level will be drawn on  @ 0330 date/time     Pharmacy will continue to follow.    Please contact if you have any further questions. Thank you.    Xin Lerner, PharmD  [phone]

## 2019-04-12 NOTE — ED PROVIDER NOTES
Encounter Date: 4/12/2019    SCRIBE #1 NOTE: I, Sharad Kohler, am scribing for, and in the presence of,  Dr. Nash. I have scribed the entire note.       History     Chief Complaint   Patient presents with    Foot Pain     pt states he was discharged from hospital yesterday and had IV removed from L foot. Pt states yesterday he began having foot pain, swelling, redness, pus, and fever. Pt states fever today was 102. pt took tylenol with temp relief     Time seen by provider: 10:42 AM    This is a 38 y.o. male who presents with complaint of left foot pain, swelling, redness, and drainage with fever   Patient states he was discharged from a hospital yesterday and had an IV removed from his left foot. Over the past 24 hours, he developed foot pain, redness, swelling, and drainage from the site. The patient reports severe pain with weight-bearing or movement of the foot. He also reports his fever this morning was 102 F. On arrival, patient has a temp of 99.7 F and a HR of 112. Patient does not report any other concerning symptoms. He reports temporary relief with Tylenol this morning. The patient reports he was admitted for the past 4 days after being diagnosed with gastritis, and states his symptoms have mostly resolved.    The history is provided by the patient.     Review of patient's allergies indicates:   Allergen Reactions    Nsaids (non-steroidal anti-inflammatory drug)      GI bleed    Ketamine      Severe dysphoria (bad trip)    Penicillins      Past Medical History:   Diagnosis Date    C. difficile colitis feb 2014    postop of hand surgery    Eosinophilic esophagitis 3/11/2014    GERD (gastroesophageal reflux disease)     IBS (irritable bowel syndrome)     MRSA carrier     says multiple times nose swab was +    Nephrolithiasis apr 2014    bilateral punctate - never passed a stone    Urinary tract infection feb 2014    MRSA     Past Surgical History:   Procedure Laterality Date    APPENDECTOMY       ARTHROSCOPY, SHOULDER, WITH DISTAL CLAVICLE EXCISION Right 11/1/2018    Performed by Gab[facility]jia [facility] at [facility] OR    BACK SURGERY      CIRCUMCISION, PRIMARY      COLONOSCOPY N/A 11/14/2018    Performed by Milton Brunson [facility] at [facility] [place] [facility]    drainage of abscess from hand  2009    MRSA    drainage of abscess from R thigh  2006    MRSA    EGD (ESOPHAGOGASTRODUODENOSCOPY) N/A 9/5/2018    Performed by Kolby Wall MD at [facility] ENDO    EGD (ESOPHAGOGASTRODUODENOSCOPY) Left 3/11/2014    [facility] at [facility]    ESOPHAGOGASTRODUODENOSCOPY  3/11/2014    ESOPHAGOGASTRODUODENOSCOPY (EGD) N/A 7/21/2016    Performed by Kolby Wall MD at [facility] ENDO    ESOPHAGOGASTRODUODENOSCOPY (EGD) N/A 3/17/2016    Performed by Kolby Wall MD at [facility] ENDO    HAND SURGERY  jan 2014    power saw fell on hand - laceration 3 tendons    SIGMOIDOSCOPY, FLEXIBLE N/A 9/5/2018    Performed by Kolby Wall MD at [facility] ENDO     Family History   Problem Relation Age of Onset    Urolithiasis Father      Social History     Tobacco Use    Smoking status: Light Tobacco Smoker    Smokeless tobacco: Never Used   Substance Use Topics    Alcohol use: Yes     Comment: ocassional    Drug use: No     Review of Systems   Constitutional: Positive for fever.   Musculoskeletal: Positive for arthralgias.   All other systems reviewed and are negative.      Physical Exam     Initial Vitals [04/12/19 1009]   BP Pulse Resp Temp SpO2   127/74 (!) 112 16 99.7 °F (37.6 °C) 96 %      MAP       --         Physical Exam    Nursing note and vitals reviewed.  Constitutional: He appears well-developed and well-nourished. He is not diaphoretic. No distress.   HENT:   Head: Normocephalic and atraumatic.   Mouth/Throat: Oropharynx is clear and moist.   Eyes: Conjunctivae and EOM are normal.   Neck: Normal range of motion. Neck supple.   Cardiovascular: Normal rate, regular rhythm and normal heart sounds.   No murmur  heard.  Pulmonary/Chest: Breath sounds normal. No respiratory distress.   Abdominal: Soft. There is no tenderness.   Musculoskeletal: Normal range of motion. He exhibits no edema or tenderness.   Neurological: He is alert and oriented to person, place, and time. He has normal strength.   Skin: Skin is warm and dry.   Left foot: diffuse swelling to the dorsum of the foot extending to the ankle with associated erythema and warmth; no areas of fluctuance         ED Course   Procedures  Labs Reviewed   LACTIC ACID, PLASMA - Abnormal; Notable for the following components:       Result Value    Lactate (Lactic Acid) 2.8 (*)     All other components within normal limits   CBC W/ AUTO DIFFERENTIAL - Abnormal; Notable for the following components:    RBC 4.43 (*)     Hemoglobin 10.1 (*)     Hematocrit 33.5 (*)     MCV 76 (*)     MCH 22.8 (*)     MCHC 30.1 (*)     RDW 20.6 (*)     Gran # (ANC) 8.3 (*)     Lymph # 0.6 (*)     Gran% 84.1 (*)     Lymph% 5.7 (*)     All other components within normal limits   COMPREHENSIVE METABOLIC PANEL - Abnormal; Notable for the following components:    Potassium 2.6 (*)     Total Protein 5.6 (*)     Albumin 3.0 (*)     Alkaline Phosphatase 47 (*)     All other components within normal limits    Narrative:      Potassium  critical result(s) called and verbal readback obtained   from Deanna Frost RN. at 12:26 on 041/12/2019 , 04/12/2019 12:26   CULTURE, BLOOD   CULTURE, BLOOD            X-Rays:   Independently Interpreted Readings:   Other Readings:  Reviewed by myself, read by radiology.       Imaging Results          X-Ray Foot Complete Left (Final result)  Result time 04/12/19 11:50:30    Final result by Chi Cifuentes MD (04/12/19 11:50:30)                 Impression:      No osseous abnormalities.      Electronically signed by: Chi Cifuentes MD  Date:    04/12/2019  Time:    11:50             Narrative:    EXAMINATION:  XR FOOT COMPLETE 3 VIEW LEFT    CLINICAL HISTORY:  .  Cellulitis,  unspecified    TECHNIQUE:  AP, lateral and oblique views of the left foot were performed.    COMPARISON:  None    FINDINGS:  The alignment is within normal limits.  No fracture.  No marrow replacement.  No radiopaque foreign body.                                Medical Decision Making:   Clinical Tests:   Lab Tests: Ordered and Reviewed  Radiological Study: Ordered and Reviewed  ED Management:  38-year-old male with left leg cellulitis.  Patient will be admitted by [facility] internal medicine for IV antibiotics.                      Clinical Impression:       ICD-10-CM ICD-9-CM   1. Cellulitis L03.90 682.9   2. Iron deficiency anemia due to chronic blood loss D50.0 280.0       Disposition:   Disposition: Admitted  Condition: Fair       I, Dr. Jd Nash, personally performed the services described in this documentation. All medical record entries made by the scribe were at my direction and in my presence. I have reviewed the chart and agree that the record reflects my personal performance and is accurate and complete. Jd Nash MD.  2:47 PM 04/12/2019       Jd Nash MD  04/12/19 2034

## 2019-04-12 NOTE — ED NOTES
Pt updated on plan of care and impending admission. VSS. Pain medication administered. Pt repositioned to increase comfort. No acute distress noted. Will continue to monitor closely.

## 2019-04-12 NOTE — ED NOTES
Updated patient on plan of care and impending admission. Pt verbalizes understanding. YONATAN Nash MD notified of patient's pain level. No acute distress noted. Will continue to monitor closely. VSS.

## 2019-04-13 PROBLEM — L03.116 CELLULITIS OF LEFT LOWER EXTREMITY: Status: ACTIVE | Noted: 2019-04-13

## 2019-04-13 PROBLEM — L03.90 CELLULITIS: Status: RESOLVED | Noted: 2019-04-12 | Resolved: 2019-04-13

## 2019-04-13 LAB
ALBUMIN SERPL BCP-MCNC: 3 G/DL (ref 3.5–5.2)
ALP SERPL-CCNC: 50 U/L (ref 55–135)
ALT SERPL W/O P-5'-P-CCNC: 26 U/L (ref 10–44)
ANION GAP SERPL CALC-SCNC: 11 MMOL/L (ref 8–16)
AST SERPL-CCNC: 23 U/L (ref 10–40)
BASOPHILS # BLD AUTO: 0.01 K/UL (ref 0–0.2)
BASOPHILS NFR BLD: 0.1 % (ref 0–1.9)
BILIRUB SERPL-MCNC: 0.9 MG/DL (ref 0.1–1)
BUN SERPL-MCNC: 11 MG/DL (ref 6–20)
CALCIUM SERPL-MCNC: 8.2 MG/DL (ref 8.7–10.5)
CHLORIDE SERPL-SCNC: 98 MMOL/L (ref 95–110)
CHOLEST SERPL-MCNC: 125 MG/DL (ref 120–199)
CHOLEST/HDLC SERPL: 2.6 {RATIO} (ref 2–5)
CO2 SERPL-SCNC: 26 MMOL/L (ref 23–29)
CREAT SERPL-MCNC: 0.8 MG/DL (ref 0.5–1.4)
DIFFERENTIAL METHOD: ABNORMAL
EOSINOPHIL # BLD AUTO: 0.5 K/UL (ref 0–0.5)
EOSINOPHIL NFR BLD: 3.4 % (ref 0–8)
ERYTHROCYTE [DISTWIDTH] IN BLOOD BY AUTOMATED COUNT: 20.6 % (ref 11.5–14.5)
EST. GFR  (AFRICAN AMERICAN): >60 ML/MIN/1.73 M^2
EST. GFR  (NON AFRICAN AMERICAN): >60 ML/MIN/1.73 M^2
FOLATE SERPL-MCNC: 7.5 NG/ML (ref 4–24)
GLUCOSE SERPL-MCNC: 74 MG/DL (ref 70–110)
HCT VFR BLD AUTO: 34.3 % (ref 40–54)
HDLC SERPL-MCNC: 48 MG/DL (ref 40–75)
HDLC SERPL: 38.4 % (ref 20–50)
HGB BLD-MCNC: 10.4 G/DL (ref 14–18)
IRON SERPL-MCNC: 19 UG/DL (ref 45–160)
LDLC SERPL CALC-MCNC: 54.6 MG/DL (ref 63–159)
LYMPHOCYTES # BLD AUTO: 1.4 K/UL (ref 1–4.8)
LYMPHOCYTES NFR BLD: 8.9 % (ref 18–48)
MAGNESIUM SERPL-MCNC: 2.5 MG/DL (ref 1.6–2.6)
MCH RBC QN AUTO: 23 PG (ref 27–31)
MCHC RBC AUTO-ENTMCNC: 30.3 G/DL (ref 32–36)
MCV RBC AUTO: 76 FL (ref 82–98)
MONOCYTES # BLD AUTO: 0.5 K/UL (ref 0.3–1)
MONOCYTES NFR BLD: 3.2 % (ref 4–15)
NEUTROPHILS # BLD AUTO: 12.8 K/UL (ref 1.8–7.7)
NEUTROPHILS NFR BLD: 84.1 % (ref 38–73)
NONHDLC SERPL-MCNC: 77 MG/DL
PLATELET # BLD AUTO: 198 K/UL (ref 150–350)
PMV BLD AUTO: 9.4 FL (ref 9.2–12.9)
POTASSIUM SERPL-SCNC: 3.6 MMOL/L (ref 3.5–5.1)
PROT SERPL-MCNC: 6.2 G/DL (ref 6–8.4)
RBC # BLD AUTO: 4.53 M/UL (ref 4.6–6.2)
SATURATED IRON: 6 % (ref 20–50)
SODIUM SERPL-SCNC: 135 MMOL/L (ref 136–145)
TOTAL IRON BINDING CAPACITY: 336 UG/DL (ref 250–450)
TRANSFERRIN SERPL-MCNC: 227 MG/DL (ref 200–375)
TRIGL SERPL-MCNC: 112 MG/DL (ref 30–150)
VIT B12 SERPL-MCNC: 553 PG/ML (ref 210–950)
WBC # BLD AUTO: 15.22 K/UL (ref 3.9–12.7)

## 2019-04-13 PROCEDURE — S0073 INJECTION, AZTREONAM, 500 MG: HCPCS | Performed by: STUDENT IN AN ORGANIZED HEALTH CARE EDUCATION/TRAINING PROGRAM

## 2019-04-13 PROCEDURE — 80053 COMPREHEN METABOLIC PANEL: CPT

## 2019-04-13 PROCEDURE — 11000001 HC ACUTE MED/SURG PRIVATE ROOM

## 2019-04-13 PROCEDURE — 63600175 PHARM REV CODE 636 W HCPCS: Performed by: INTERNAL MEDICINE

## 2019-04-13 PROCEDURE — 25000003 PHARM REV CODE 250: Performed by: STUDENT IN AN ORGANIZED HEALTH CARE EDUCATION/TRAINING PROGRAM

## 2019-04-13 PROCEDURE — 94761 N-INVAS EAR/PLS OXIMETRY MLT: CPT

## 2019-04-13 PROCEDURE — 36415 COLL VENOUS BLD VENIPUNCTURE: CPT

## 2019-04-13 PROCEDURE — 63600175 PHARM REV CODE 636 W HCPCS: Performed by: STUDENT IN AN ORGANIZED HEALTH CARE EDUCATION/TRAINING PROGRAM

## 2019-04-13 PROCEDURE — 25000003 PHARM REV CODE 250: Performed by: INTERNAL MEDICINE

## 2019-04-13 PROCEDURE — S0030 INJECTION, METRONIDAZOLE: HCPCS | Performed by: STUDENT IN AN ORGANIZED HEALTH CARE EDUCATION/TRAINING PROGRAM

## 2019-04-13 PROCEDURE — 85025 COMPLETE CBC W/AUTO DIFF WBC: CPT

## 2019-04-13 PROCEDURE — 80061 LIPID PANEL: CPT

## 2019-04-13 PROCEDURE — 87040 BLOOD CULTURE FOR BACTERIA: CPT

## 2019-04-13 PROCEDURE — 83735 ASSAY OF MAGNESIUM: CPT

## 2019-04-13 RX ORDER — MORPHINE SULFATE 4 MG/ML
4 INJECTION, SOLUTION INTRAMUSCULAR; INTRAVENOUS ONCE
Status: COMPLETED | OUTPATIENT
Start: 2019-04-13 | End: 2019-04-13

## 2019-04-13 RX ORDER — POTASSIUM CHLORIDE 20 MEQ/1
20 TABLET, EXTENDED RELEASE ORAL
Status: COMPLETED | OUTPATIENT
Start: 2019-04-13 | End: 2019-04-13

## 2019-04-13 RX ORDER — HYDROCODONE BITARTRATE AND ACETAMINOPHEN 5; 325 MG/1; MG/1
1 TABLET ORAL ONCE
Status: COMPLETED | OUTPATIENT
Start: 2019-04-13 | End: 2019-04-13

## 2019-04-13 RX ORDER — METRONIDAZOLE 500 MG/100ML
500 INJECTION, SOLUTION INTRAVENOUS
Status: DISCONTINUED | OUTPATIENT
Start: 2019-04-13 | End: 2019-04-15

## 2019-04-13 RX ADMIN — MORPHINE SULFATE 4 MG: 4 INJECTION INTRAVENOUS at 09:04

## 2019-04-13 RX ADMIN — HYDROCODONE BITARTRATE AND ACETAMINOPHEN 1 TABLET: 5; 325 TABLET ORAL at 05:04

## 2019-04-13 RX ADMIN — HYDROCODONE BITARTRATE AND ACETAMINOPHEN 1 TABLET: 5; 325 TABLET ORAL at 02:04

## 2019-04-13 RX ADMIN — SUCRALFATE 1 G: 1 TABLET ORAL at 12:04

## 2019-04-13 RX ADMIN — SUCRALFATE 1 G: 1 TABLET ORAL at 04:04

## 2019-04-13 RX ADMIN — SUCRALFATE 1 G: 1 TABLET ORAL at 05:04

## 2019-04-13 RX ADMIN — ENOXAPARIN SODIUM 40 MG: 100 INJECTION SUBCUTANEOUS at 04:04

## 2019-04-13 RX ADMIN — METRONIDAZOLE 500 MG: 500 SOLUTION INTRAVENOUS at 09:04

## 2019-04-13 RX ADMIN — PANTOPRAZOLE SODIUM 40 MG: 40 TABLET, DELAYED RELEASE ORAL at 09:04

## 2019-04-13 RX ADMIN — POTASSIUM CHLORIDE 20 MEQ: 20 TABLET, EXTENDED RELEASE ORAL at 02:04

## 2019-04-13 RX ADMIN — FAMOTIDINE 20 MG: 20 TABLET ORAL at 08:04

## 2019-04-13 RX ADMIN — HYDROCODONE BITARTRATE AND ACETAMINOPHEN 1 TABLET: 5; 325 TABLET ORAL at 11:04

## 2019-04-13 RX ADMIN — VANCOMYCIN HYDROCHLORIDE 1500 MG: 1 INJECTION, POWDER, LYOPHILIZED, FOR SOLUTION INTRAVENOUS at 05:04

## 2019-04-13 RX ADMIN — CETIRIZINE HYDROCHLORIDE 10 MG: 10 TABLET, FILM COATED ORAL at 08:04

## 2019-04-13 RX ADMIN — PANTOPRAZOLE SODIUM 40 MG: 40 TABLET, DELAYED RELEASE ORAL at 08:04

## 2019-04-13 RX ADMIN — SUCRALFATE 1 G: 1 TABLET ORAL at 09:04

## 2019-04-13 RX ADMIN — VANCOMYCIN HYDROCHLORIDE 1500 MG: 1 INJECTION, POWDER, LYOPHILIZED, FOR SOLUTION INTRAVENOUS at 04:04

## 2019-04-13 RX ADMIN — POTASSIUM CHLORIDE 10 MEQ: 7.46 INJECTION, SOLUTION INTRAVENOUS at 12:04

## 2019-04-13 RX ADMIN — FAMOTIDINE 20 MG: 20 TABLET ORAL at 09:04

## 2019-04-13 RX ADMIN — POTASSIUM CHLORIDE 20 MEQ: 20 TABLET, EXTENDED RELEASE ORAL at 12:04

## 2019-04-13 RX ADMIN — AZTREONAM 1000 MG: 1 INJECTION, POWDER, LYOPHILIZED, FOR SOLUTION INTRAMUSCULAR; INTRAVENOUS at 09:04

## 2019-04-13 RX ADMIN — HYDROCODONE BITARTRATE AND ACETAMINOPHEN 1 TABLET: 5; 325 TABLET ORAL at 09:04

## 2019-04-13 RX ADMIN — HYDROCODONE BITARTRATE AND ACETAMINOPHEN 1 TABLET: 5; 325 TABLET ORAL at 12:04

## 2019-04-13 RX ADMIN — PAROXETINE HYDROCHLORIDE 20 MG: 10 TABLET, FILM COATED ORAL at 06:04

## 2019-04-13 RX ADMIN — SUCRALFATE 1 G: 1 TABLET ORAL at 11:04

## 2019-04-13 RX ADMIN — POTASSIUM CHLORIDE 20 MEQ: 20 TABLET, EXTENDED RELEASE ORAL at 08:04

## 2019-04-13 NOTE — PLAN OF CARE
Problem: Adult Inpatient Plan of Care  Goal: Plan of Care Review  Outcome: Ongoing (interventions implemented as appropriate)  A&Ox4, VSS, rested quietly over night w/ no complications.   Pain in foot LLE - helped w/ prn Norco provided x2.   No n/v, no SOB.  Pt reports no new events or onset of new s/s.   US of LLE was performed at bedside this shift - results posted   Pt is on room air, pt needs some assistance when trying to void - he desires to stand but is unable to bare weight on his cellulitis affected foot and has been straining to initiate urination for a day or two now according to pt.   Patient safety maintained. Medications given as ordered, having to run on a slower rate for patient's tolerance.

## 2019-04-13 NOTE — PLAN OF CARE
SHARAN contacted MD in regards to patient's temp of 101.3. No PRN meds ordered. MD notified and to review orders.

## 2019-04-13 NOTE — PROGRESS NOTES
[facility] Medicine Progress Note    Admitting Team: [facility] Hospitalist Team A  Attending Physician: Jeramie Sandoval MD  Resident: Dr. Jerry Lerner  Intern: Dr. Randall Hernández    Subjective:      No acute changes overnight. Pt still with significant pain localized to his L foot and unable to bear weight. Reports subjective fevers and chills throughout the night. Denies chest pain, dyspnea, abdominal pain, nausea, vomiting. Reports normal brown stools, no melena or BRBPR.     Objective:     Last 24 Hour Vital Signs:  BP  Min: 100/54  Max: 127/74  Temp  Av.2 °F (37.3 °C)  Min: 97.5 °F (36.4 °C)  Max: 99.8 °F (37.7 °C)  Pulse  Av.8  Min: 71  Max: 112  Resp  Av.7  Min: 16  Max: 19  SpO2  Av.4 %  Min: 96 %  Max: 99 %  Height  Av' (182.9 cm)  Min: 6' (182.9 cm)  Max: 6' (182.9 cm)  Weight  Av.9 kg (206 lb 15.6 oz)  Min: 88.5 kg (195 lb)  Max: 98.7 kg (217 lb 9.5 oz)  I/O last 3 completed shifts:  In: 50 [IV Piggyback:50]  Out: -     Physical Examination:  Gen:                Uncomfortable appearing, non-toxic  HENT:             Conjunctiva clear with no icterus or pallor   OP clear with excudates. MMM   EYES:             Conjunctivae normal. EOMI. PERRL.   NECK:             Normal ROM. Neck supple.   CV:                  RRR  Normal S1 & S2. No murmurs, rubs, gallops.    Pulses:           Radial, DP, and PT 2+ bilateral and symmetric  Cap refil <2 secs.  PULM:             CTAB. No respiratory distress. No wheezes, no rales.   ABD:               Normoactive BS. Soft, NTND. No rebound, no guarding, no mass.  SKIN:              Warm and dry.   Shallow Scattered Excoriations on exam including upper back, with linear excoriation to R back. Multiple punctate excoriations to forehead. Erythema to anterior chest.   Extremity:        RLE unremarkable  LLE with marked erythema and swelling to foot especially over dorsum with tracking up to ankle and down to dorsal toes. Punctate 2x5mm escar on mid  dorsal foot as seen on imaging provided. Marked TTP at Dorsal foot. No flatulence or purulent drainage. Interval improvement in erythema, still TTP  NEURO:          A&Ox4. MITALI. Face symmetric. Tongue midline. Palatal rise equal. SCM/TPZ intact. [place] intact. Facial sensation intact. Sensation to light tough intact to distal extremities.  Psychiatric:    Normal mood & affect.    Laboratory:  Laboratory Data Reviewed: yes  Recent Labs   Lab 04/12/19  1133   WBC 9.83   HGB 10.1*   HCT 33.5*      MCV 76*   RDW 20.6*      K 2.6*   CL 99   CO2 29   BUN 12   CREATININE 0.8      PROT 5.6*   ALBUMIN 3.0*   BILITOT 0.5   AST 16   ALKPHOS 47*   ALT 26     WBC differential: 84% Granulocytes, 0% Bands, 8.9% Lymphocytes, 3.2% Monocytes, 3.4%Eosinophils, 0.1% Basophils    Microbiology Data Reviewed: yes  4/12/19: Blood cultures pending     Other Results:  EKG (my interpretation):  None    Radiology Data Reviewed: yes  Pertinent Findings:  None new    Current Medications:     Infusions:       Scheduled:   cetirizine  10 mg Oral Daily    enoxaparin  40 mg Subcutaneous Daily    famotidine  20 mg Oral BID    pantoprazole  40 mg Oral BID    paroxetine  20 mg Oral QAM    potassium chloride  20 mEq Oral TID    sucralfate  1 g Oral QID (AC & HS)    vancomycin (VANCOCIN) IVPB  1,500 mg Intravenous Q12H        PRN:  dextrose 50%, dextrose 50%, glucagon (human recombinant), glucose, glucose, HYDROcodone-acetaminophen, sodium chloride 0.9%    Antibiotics and Day Number of Therapy:  Vancomycin    Lines and Day Number of Therapy:  PIV    Assessment:     Solo Black is a 38 y.o. male with eosinophilic esophagitis, chronic anemia, PUD, ADHD, OCD/Anxiety who presents with 2 days of worsening foot pain and swelling 1 day post discharge from OSH with PIV site infection and cellulitis. Pt reports that his GI symptoms from his prior admission have completely resolved at this time. Now with worsening cellulitis of  LLE s/p PIV line. Pt afebrile while during admission so far and hemodynamically stable but with mild tachycardia. Initial labs without leukocytosis but notable for hypokalemia to 2.6 and elevated lactate Will admit for IV antibiotics. Given recent hospitalization, LLE swelling will get RLE U/S to rule out DVT.     Plan:     LLE Cellulitis  - 1 days of progressive redness and erythema to LLE at prior PIV site from recent hospital discharge with fevers measured to 10, chills and progressive erythema and swelling   SIRS 1/4 (tachycardia 112)  qSOFA 0   - LLE XR without bony abnormality   - pt received clindamycin in ed x 1 dose  - recent hospitalization with RLE swelling and tachycardia, will get LLE US to rule out DVT  - initial lactate elevated 2.8 normalized with 1L bolus  - Blood cultures NG x1   - procalcitonin normal  - high suspicion for MRSA, will cover with Vancomycin IV  - monitor for improvement with plan to transition to PO regiment prior to discharge   - Norco q4h for pain     Eosinophilic esophagitis  - recently discharged from [facility] for N/V, abdominal pain, BRBPR, c/w with patients past episodes of eosinophilic esophagitis  - home medications include pepcid AC 20mg qam, Protonix 20mg bid, loratadin 10mg qam,   - pt reports that he was treated with IV steroids, sulcrafate, protonix and had gradual resolution of his symptoms over 4 day hospital stay and discharge home with prednisone taper.   - will continue prednisone taper  - will continue home medications     PUD  - home meds as above  - continue protonix, H2, H1 blocker     Chronic blood loss Anemia  - microcytic anemia with H/H 10.1 with MCV 76  stable at his baseline since 2018  - profile in 2018  Fe 21  tibc 453  sat Iron 5  Xferrin 306  ferritin 7  folate 11.8  419  - repeat iron profile consistent with ASAD  - consistent with iron deficiency anemia  - on home iron 325 bid  - will hold in setting of acute  infection     Hypokalemia  - K low 2.6 at presentation given 40mEq in EDx1  - will give mag 2mg and IV KCL 10mEQ q3hour   - monitor and replete PRN     ADD  - no aucte issues  - home medications Adderall 20mg bid (am and lunch)  - will hold acutely      HCM  - flu shot up to date  - tetanus up to date  - has seen PCP once but has not followed up  - follows with Dr. Alaniz for psychiatry  - follows with Dr. Wall for GI  - tsh   - lipid panel   - a1c     Code: Full  PPx: SCDs, lovenox  Diet: Full  Dispo: Admitted to general floor for IV antibiotics pending clinical improvement of LLE cellulitis    Randall Hernández MD  [facility] Internal Medicine HO-I    [facility] Medicine Hospitalist Pager numbers:   [facility] Hospitalist Medicine Team A (Jaime/Smita): [phone]  [facility] Hospitalist Medicine Team B (Quintin/Francisca):  [phone]

## 2019-04-13 NOTE — PLAN OF CARE
Problem: Adult Inpatient Plan of Care  Goal: Plan of Care Review  Patient safety maintained. Bed in low position, wheels locked, alarm set up. Medications given as ordered, having to run on a slower rate for patient's tolerance. Assistance provided as needed for standing.

## 2019-04-13 NOTE — PLAN OF CARE
Notified Dr. Hernández (primary team) of blood culture results called from lab at [facility]. Results: gram + cocci resembling staph.

## 2019-04-13 NOTE — PLAN OF CARE
VN cued into patients room for rounding. VN role explained and informed pt that VN will be working alongside the bedside care team throughout the day. Pt verbalized understanding that VN is available for any questions and education, and nurse and PCT will continue hourly rounding at bedside. Fall education provided. Pain medications reviewed with patient. Pt states that pain to left foot is not controlled at this time; PRN med not due yet; MD notified - additional, one time dose of norco ordered and to be administered. Allotted time given for questions - all questions answered. Will continue to monitor and intervene PRN.       04/13/19 1205   Type of Frequent Check   Type Other (see comments)  (VN rounds)   Safety/Activity   Patient Rounds bed in low position;bed wheels locked;visualized patient   Safety Promotion/Fall Prevention side rails raised x 2   Positioning   Body Position positioned/repositioned independently   Head of Bed (HOB) HOB elevated   Pain/Comfort/Sleep   Preferred Pain Scale number (Numeric Rating Pain Scale)   Pain Rating (0-10): Rest 4   Pain Rating (0-10): Activity 4   Assessments (Pre/Post)   Level of Consciousness (AVPU) alert

## 2019-04-13 NOTE — PLAN OF CARE
VN contacted MD as pt lost PIV. Pt states he is a hard stick. Bedside nurse and ICU/ER nurse to attempt. If unsuccessful, team to be paged again for anesthesia consult. Bedside nurse aware.

## 2019-04-14 LAB
ALBUMIN SERPL BCP-MCNC: 2.7 G/DL (ref 3.5–5.2)
ALP SERPL-CCNC: 49 U/L (ref 55–135)
ALT SERPL W/O P-5'-P-CCNC: 18 U/L (ref 10–44)
ANION GAP SERPL CALC-SCNC: 7 MMOL/L (ref 8–16)
AST SERPL-CCNC: 14 U/L (ref 10–40)
BASOPHILS # BLD AUTO: 0 K/UL (ref 0–0.2)
BASOPHILS NFR BLD: 0 % (ref 0–1.9)
BILIRUB SERPL-MCNC: 0.4 MG/DL (ref 0.1–1)
BUN SERPL-MCNC: 9 MG/DL (ref 6–20)
CALCIUM SERPL-MCNC: 8.2 MG/DL (ref 8.7–10.5)
CHLORIDE SERPL-SCNC: 103 MMOL/L (ref 95–110)
CO2 SERPL-SCNC: 25 MMOL/L (ref 23–29)
CREAT SERPL-MCNC: 0.8 MG/DL (ref 0.5–1.4)
DIFFERENTIAL METHOD: ABNORMAL
EOSINOPHIL # BLD AUTO: 1.3 K/UL (ref 0–0.5)
EOSINOPHIL NFR BLD: 11.2 % (ref 0–8)
ERYTHROCYTE [DISTWIDTH] IN BLOOD BY AUTOMATED COUNT: 20.8 % (ref 11.5–14.5)
EST. GFR  (AFRICAN AMERICAN): >60 ML/MIN/1.73 M^2
EST. GFR  (NON AFRICAN AMERICAN): >60 ML/MIN/1.73 M^2
GLUCOSE SERPL-MCNC: 93 MG/DL (ref 70–110)
HCT VFR BLD AUTO: 34.3 % (ref 40–54)
HGB BLD-MCNC: 10.4 G/DL (ref 14–18)
HYPOCHROMIA BLD QL SMEAR: ABNORMAL
LYMPHOCYTES # BLD AUTO: 1.1 K/UL (ref 1–4.8)
LYMPHOCYTES NFR BLD: 9.2 % (ref 18–48)
MCH RBC QN AUTO: 22.7 PG (ref 27–31)
MCHC RBC AUTO-ENTMCNC: 30.3 G/DL (ref 32–36)
MCV RBC AUTO: 75 FL (ref 82–98)
MONOCYTES # BLD AUTO: 1.1 K/UL (ref 0.3–1)
MONOCYTES NFR BLD: 9.1 % (ref 4–15)
NEUTROPHILS # BLD AUTO: 8.2 K/UL (ref 1.8–7.7)
NEUTROPHILS NFR BLD: 70.5 % (ref 38–73)
PLATELET # BLD AUTO: 274 K/UL (ref 150–350)
PLATELET BLD QL SMEAR: ABNORMAL
PMV BLD AUTO: 10.4 FL (ref 9.2–12.9)
POTASSIUM SERPL-SCNC: 3.3 MMOL/L (ref 3.5–5.1)
PROT SERPL-MCNC: 5.8 G/DL (ref 6–8.4)
RBC # BLD AUTO: 4.59 M/UL (ref 4.6–6.2)
SODIUM SERPL-SCNC: 135 MMOL/L (ref 136–145)
VANCOMYCIN SERPL-MCNC: 7.2 UG/ML
WBC # BLD AUTO: 11.7 K/UL (ref 3.9–12.7)

## 2019-04-14 PROCEDURE — 85025 COMPLETE CBC W/AUTO DIFF WBC: CPT

## 2019-04-14 PROCEDURE — 36415 COLL VENOUS BLD VENIPUNCTURE: CPT

## 2019-04-14 PROCEDURE — 25000003 PHARM REV CODE 250: Performed by: STUDENT IN AN ORGANIZED HEALTH CARE EDUCATION/TRAINING PROGRAM

## 2019-04-14 PROCEDURE — 94761 N-INVAS EAR/PLS OXIMETRY MLT: CPT

## 2019-04-14 PROCEDURE — 93010 ELECTROCARDIOGRAM REPORT: CPT | Mod: ,,, | Performed by: INTERNAL MEDICINE

## 2019-04-14 PROCEDURE — S0030 INJECTION, METRONIDAZOLE: HCPCS | Performed by: STUDENT IN AN ORGANIZED HEALTH CARE EDUCATION/TRAINING PROGRAM

## 2019-04-14 PROCEDURE — 80202 ASSAY OF VANCOMYCIN: CPT

## 2019-04-14 PROCEDURE — 63600175 PHARM REV CODE 636 W HCPCS: Performed by: STUDENT IN AN ORGANIZED HEALTH CARE EDUCATION/TRAINING PROGRAM

## 2019-04-14 PROCEDURE — 25000003 PHARM REV CODE 250: Performed by: INTERNAL MEDICINE

## 2019-04-14 PROCEDURE — 25000003 PHARM REV CODE 250

## 2019-04-14 PROCEDURE — 93005 ELECTROCARDIOGRAM TRACING: CPT

## 2019-04-14 PROCEDURE — 80053 COMPREHEN METABOLIC PANEL: CPT

## 2019-04-14 PROCEDURE — 63600175 PHARM REV CODE 636 W HCPCS: Performed by: INTERNAL MEDICINE

## 2019-04-14 PROCEDURE — S0073 INJECTION, AZTREONAM, 500 MG: HCPCS | Performed by: STUDENT IN AN ORGANIZED HEALTH CARE EDUCATION/TRAINING PROGRAM

## 2019-04-14 PROCEDURE — 11000001 HC ACUTE MED/SURG PRIVATE ROOM

## 2019-04-14 PROCEDURE — 93010 EKG 12-LEAD: ICD-10-PCS | Mod: ,,, | Performed by: INTERNAL MEDICINE

## 2019-04-14 RX ORDER — TRAMADOL HYDROCHLORIDE 50 MG/1
TABLET ORAL
Status: COMPLETED
Start: 2019-04-14 | End: 2019-04-14

## 2019-04-14 RX ORDER — MORPHINE SULFATE 15 MG/1
15 TABLET, FILM COATED, EXTENDED RELEASE ORAL EVERY 12 HOURS
Status: DISCONTINUED | OUTPATIENT
Start: 2019-04-14 | End: 2019-04-16

## 2019-04-14 RX ORDER — MORPHINE SULFATE 2 MG/ML
2 INJECTION, SOLUTION INTRAMUSCULAR; INTRAVENOUS EVERY 4 HOURS PRN
Status: DISCONTINUED | OUTPATIENT
Start: 2019-04-14 | End: 2019-04-18 | Stop reason: HOSPADM

## 2019-04-14 RX ORDER — TRAMADOL HYDROCHLORIDE 50 MG/1
50 TABLET ORAL ONCE
Status: COMPLETED | OUTPATIENT
Start: 2019-04-14 | End: 2019-04-14

## 2019-04-14 RX ORDER — MORPHINE SULFATE 15 MG/1
TABLET, FILM COATED, EXTENDED RELEASE ORAL
Status: COMPLETED
Start: 2019-04-14 | End: 2019-04-14

## 2019-04-14 RX ORDER — LIDOCAINE HYDROCHLORIDE 10 MG/ML
1 INJECTION INFILTRATION; PERINEURAL ONCE
Status: DISCONTINUED | OUTPATIENT
Start: 2019-04-14 | End: 2019-04-18 | Stop reason: HOSPADM

## 2019-04-14 RX ORDER — POTASSIUM CHLORIDE 20 MEQ/1
TABLET, EXTENDED RELEASE ORAL
Status: COMPLETED
Start: 2019-04-14 | End: 2019-04-14

## 2019-04-14 RX ADMIN — MORPHINE SULFATE 2 MG: 2 INJECTION, SOLUTION INTRAMUSCULAR; INTRAVENOUS at 01:04

## 2019-04-14 RX ADMIN — SUCRALFATE 1 G: 1 TABLET ORAL at 08:04

## 2019-04-14 RX ADMIN — AZTREONAM 1000 MG: 1 INJECTION, POWDER, LYOPHILIZED, FOR SOLUTION INTRAMUSCULAR; INTRAVENOUS at 06:04

## 2019-04-14 RX ADMIN — METRONIDAZOLE 500 MG: 500 SOLUTION INTRAVENOUS at 05:04

## 2019-04-14 RX ADMIN — SUCRALFATE 1 G: 1 TABLET ORAL at 11:04

## 2019-04-14 RX ADMIN — PANTOPRAZOLE SODIUM 40 MG: 40 TABLET, DELAYED RELEASE ORAL at 09:04

## 2019-04-14 RX ADMIN — TRAMADOL HYDROCHLORIDE: 50 TABLET ORAL at 11:04

## 2019-04-14 RX ADMIN — MORPHINE SULFATE 15 MG: 15 TABLET, FILM COATED, EXTENDED RELEASE ORAL at 10:04

## 2019-04-14 RX ADMIN — MORPHINE SULFATE: 15 TABLET, FILM COATED, EXTENDED RELEASE ORAL at 11:04

## 2019-04-14 RX ADMIN — SUCRALFATE 1 G: 1 TABLET ORAL at 04:04

## 2019-04-14 RX ADMIN — AZTREONAM 1000 MG: 1 INJECTION, POWDER, LYOPHILIZED, FOR SOLUTION INTRAMUSCULAR; INTRAVENOUS at 08:04

## 2019-04-14 RX ADMIN — POTASSIUM CHLORIDE: 1500 TABLET, EXTENDED RELEASE ORAL at 11:04

## 2019-04-14 RX ADMIN — TRAMADOL HYDROCHLORIDE 50 MG: 50 TABLET, FILM COATED ORAL at 10:04

## 2019-04-14 RX ADMIN — MORPHINE SULFATE: 15 TABLET, EXTENDED RELEASE ORAL at 11:04

## 2019-04-14 RX ADMIN — MORPHINE SULFATE 2 MG: 2 INJECTION, SOLUTION INTRAMUSCULAR; INTRAVENOUS at 09:04

## 2019-04-14 RX ADMIN — SUCRALFATE 1 G: 1 TABLET ORAL at 06:04

## 2019-04-14 RX ADMIN — CETIRIZINE HYDROCHLORIDE 10 MG: 10 TABLET, FILM COATED ORAL at 09:04

## 2019-04-14 RX ADMIN — FAMOTIDINE 20 MG: 20 TABLET ORAL at 09:04

## 2019-04-14 RX ADMIN — METRONIDAZOLE 500 MG: 500 SOLUTION INTRAVENOUS at 01:04

## 2019-04-14 RX ADMIN — METRONIDAZOLE 500 MG: 500 SOLUTION INTRAVENOUS at 08:04

## 2019-04-14 RX ADMIN — FAMOTIDINE 20 MG: 20 TABLET ORAL at 08:04

## 2019-04-14 RX ADMIN — PANTOPRAZOLE SODIUM 40 MG: 40 TABLET, DELAYED RELEASE ORAL at 08:04

## 2019-04-14 RX ADMIN — PAROXETINE HYDROCHLORIDE 20 MG: 10 TABLET, FILM COATED ORAL at 07:04

## 2019-04-14 RX ADMIN — AZTREONAM 1000 MG: 1 INJECTION, POWDER, LYOPHILIZED, FOR SOLUTION INTRAMUSCULAR; INTRAVENOUS at 01:04

## 2019-04-14 RX ADMIN — MORPHINE SULFATE 2 MG: 2 INJECTION, SOLUTION INTRAMUSCULAR; INTRAVENOUS at 05:04

## 2019-04-14 RX ADMIN — VANCOMYCIN HYDROCHLORIDE 1500 MG: 1 INJECTION, POWDER, LYOPHILIZED, FOR SOLUTION INTRAVENOUS at 04:04

## 2019-04-14 NOTE — NURSING
Notified Dr. Adame that pt is in severe pain on left foot radiating to knee, rated as 10 out of 10.  Pt requested to have IV medication.  MD will visualize pt soon.

## 2019-04-14 NOTE — PROGRESS NOTES
[facility] Medicine Progress Note    Admitting Team: [facility] Hospitalist Team A  Attending Physician: Jeramie Sandoval MD  Resident: Dr. Jerry Lerner  Intern: Dr. Randall Hernández    Subjective:     Overnight with worsening of pain to foot only briefly relieved by IV pain medications. Also noted to be febrile ~ 830pm with antibiotics broadened to include aztreonam and flagyl. He denies chest pain shortness of breath, abdominal pain, nausea/ vomiting.      Objective:     Last 24 Hour Vital Signs:  BP  Min: 125/66  Max: 127/69  Temp  Av.1 °F (37.8 °C)  Min: 99.7 °F (37.6 °C)  Max: 100.5 °F (38.1 °C)  Pulse  Av.3  Min: 92  Max: 110  Resp  Av  Min: 18  Max: 18  SpO2  Av.5 %  Min: 96 %  Max: 97 %  I/O last 3 completed shifts:  In: 775 [P.O.:775]  Out: 2575 [Urine:2575]    Physical Examination:  Gen:                Uncomfortable appearing, non-toxic  HENT:             Conjunctiva clear with no icterus or pallor   OP clear with excudates. MMM   EYES:             Conjunctivae normal. EOMI. PERRL.   NECK:             Normal ROM. Neck supple.   CV:                  RRR  Normal S1 & S2. No murmurs, rubs, gallops.    Pulses:           Radial, DP, and PT 2+ bilateral and symmetric  Cap refil <2 secs.  PULM:             CTAB. No respiratory distress. No wheezes, no rales.   ABD:               Normoactive BS. Soft, NTND. No rebound, no guarding, no mass.  SKIN:              Warm and dry.   Shallow Scattered Excoriations on exam including upper back, with linear excoriation to R back. Multiple punctate excoriations to forehead.  Extremity:        RLE unremarkable  LLE with marked erythema and swelling to foot especially over dorsum with tracking up to ankle and down to dorsal toes. Punctate 2x5mm escar on mid dorsal foot. Marked TTP at Dorsal foot. No fluctuance or purulent drainage. More defined area of induration on exam this morning    NEURO:          A&Ox4. MITALI. Face symmetric.   Psychiatric:    Normal mood  & affect.    Laboratory:  Laboratory Data Reviewed: yes  Recent Labs   Lab 04/12/19  1133 04/13/19  0619 04/13/19  0705 04/14/19  0800   WBC 9.83  --  15.22* 11.70   HGB 10.1*  --  10.4* 10.4*   HCT 33.5*  --  34.3* 34.3*     --  198 274   MCV 76*  --  76* 75*   RDW 20.6*  --  20.6* 20.8*    135*  --  135*   K 2.6* 3.6  --  3.3*   CL 99 98  --  103   CO2 29 26  --  25   BUN 12 11  --  9   CREATININE 0.8 0.8  --  0.8    74  --  93   PROT 5.6* 6.2  --  5.8*   ALBUMIN 3.0* 3.0*  --  2.7*   BILITOT 0.5 0.9  --  0.4   AST 16 23  --  14   ALKPHOS 47* 50*  --  49*   ALT 26 26  --  18     WBC differential: 84% Granulocytes, 0% Bands, 8.9% Lymphocytes, 3.2% Monocytes, 3.4%Eosinophils, 0.1% Basophils    Microbiology Data Reviewed: yes  4/12/19: Blood cultures pending     Other Results:  EKG (my interpretation):  None    Radiology Data Reviewed: yes  Pertinent Findings:  None new    Current Medications:     Infusions:       Scheduled:   aztreonam  1,000 mg Intravenous Q8H    cetirizine  10 mg Oral Daily    enoxaparin  40 mg Subcutaneous Daily    famotidine  20 mg Oral BID    metronidazole  500 mg Intravenous Q8H    morphine        pantoprazole  40 mg Oral BID    paroxetine  20 mg Oral QAM    potassium chloride SA        sucralfate  1 g Oral QID (AC & HS)    traMADol        vancomycin (VANCOCIN) IVPB  1,500 mg Intravenous Q12H        PRN:  dextrose 50%, dextrose 50%, glucagon (human recombinant), glucose, glucose, HYDROcodone-acetaminophen, morphine, sodium chloride 0.9%    Antibiotics and Day Number of Therapy:  Vancomycin    Lines and Day Number of Therapy:  PIV    Assessment:     Solo Black is a 38 y.o. male with eosinophilic esophagitis, chronic anemia, PUD, ADHD, OCD/Anxiety who presents with 2 days of worsening foot pain and swelling 1 day post discharge from OSH with PIV site infection and cellulitis. Pt reports that his GI symptoms from his prior admission have completely resolved  at this time. Now with worsening cellulitis of LLE s/p PIV line.    Plan:     LLE Cellulitis  - 1 days of progressive redness and erythema to LLE at prior PIV site from recent hospital discharge with fevers measured to 10, chills and progressive erythema and swelling   SIRS 1/4 (tachycardia 112)  qSOFA 0   - LLE XR without bony abnormality   - pt received clindamycin in ed x 1 dose  - recent hospitalization with RLE swelling and tachycardia, will get LLE US to rule out DVT  - initial lactate elevated 2.8 normalized with 1L bolus  - Blood cultures NG x1   - procalcitonin normal  - high suspicion for MRSA, will cover with Vancomycin IV. Added Aztreonam and Flagyl with acute worsening on 4/13, will continue   - Consulted surgery for I&D, will perform 4/15   - monitor for improvement with plan to transition to PO regiment prior to discharge   - MS Contin 15 mg BID with Morphine 2 mg q4 PRN for breakthrough pain       Eosinophilic esophagitis  - recently discharged from [facility] for N/V, abdominal pain, BRBPR, c/w with patients past episodes of eosinophilic esophagitis  - home medications include pepcid AC 20mg qam, Protonix 20mg bid, loratadin 10mg qam,   - pt reports that he was treated with IV steroids, sulcrafate, protonix and had gradual resolution of his symptoms over 4 day hospital stay and discharge home with prednisone taper.   - will continue prednisone taper  - will continue home medications     PUD  - home meds as above  - continue protonix, H2, H1 blocker     Chronic blood loss Anemia  - microcytic anemia with H/H 10.1 with MCV 76  stable at his baseline since 2018  - profile in 2018  Fe 21  tibc 453  sat Iron 5  Xferrin 306  ferritin 7  folate 11.8  419  - repeat iron profile consistent with ASAD  - consistent with iron deficiency anemia  - on home iron 325 bid  - will hold in setting of acute infection     Hypokalemia  - K low 2.6 at presentation given 40mEq in EDx1  - given  mag 2mg and IV KCL 10mEQ q3hour   - monitor and replete PRN     ADD  - no aucte issues  - home medications Adderall 20mg bid (am and lunch)  - will hold acutely      HCM  - flu shot up to date  - tetanus up to date  - has seen PCP once but has not followed up  - follows with Dr. Alaniz for psychiatry  - follows with Dr. Wall for GI  - tsh   - lipid panel   - a1c     Code: Full  PPx: SCDs, lovenox  Diet: Full  Dispo: pending I&D with gen surgery     Santiago Alves MD  [facility]/Pediatrics PGY-3  4/14/2019  6:46 PM    [facility] Medicine Hospitalist Pager numbers:   [facility] Hospitalist Medicine Team A (Jaime/Smita): [phone]  [facility]ospit[facility] Hospitalist Medicine Team B (Quintin/Francisca):  [phone]

## 2019-04-14 NOTE — NURSING
Spoke to Dr. Love that pt is complaining of pain on L foot and stated morphine IV was working better than hydrocodone po.  Requested MD that pt will need prn order of morphine.  MD will review the pt chart and will order.

## 2019-04-14 NOTE — PLAN OF CARE
"Notified by nursing earlier this evening that pt was febrile to 101.3, immediate repeat temp of 100. Deferred additional orders at that time as pt reportedly did not have subjective complaints other than a headache. Called by RN at ~8pm regarding increased LLE pain. Evaluated pt at bedside, complaints of progressive, worsening LLE pain, throbbing "bruising" pain up to the knee, increased L foot swelling. Overall, pt feels L foot pain and swelling has worsening today. L foot with palpable DP pulse, very TTP. L foot imaged and sent to primary team for comparison; primary team reports improved erythema but worsened edema based on image. Decision made to broaden antibiotic coverage to cover potential gram negative & anaerobic organisms. Aztreonam & Flagyl added given pt's history of PCN allergy (reports hives as a child). One time dose of Morphine 4mg IV ordered for pain. Will defer ID consult to primary team in AM.     Joe Adame MD  [facility]  "

## 2019-04-14 NOTE — PLAN OF CARE
Problem: Adult Inpatient Plan of Care  Goal: Plan of Care Review  Outcome: Ongoing (interventions implemented as appropriate)  Patient safety maintained. Bed in low position, wheels locked, alarm set up. Medications given as ordered. Monitoring pain and administering medications as ordered. Assistance provided to the patient as needed for standing.

## 2019-04-14 NOTE — NURSING
Contacted by MD in relation to patient having a fever. Received order to administered the next dose of pain medication at this time to control pain and help fever with the acetaminophen present on this medication.

## 2019-04-15 ENCOUNTER — ANESTHESIA (OUTPATIENT)
Dept: SURGERY | Facility: HOSPITAL | Age: 39
DRG: 854 | End: 2019-04-15
Payer: MEDICAID

## 2019-04-15 ENCOUNTER — ANESTHESIA EVENT (OUTPATIENT)
Dept: SURGERY | Facility: HOSPITAL | Age: 39
DRG: 854 | End: 2019-04-15
Payer: MEDICAID

## 2019-04-15 PROBLEM — A41.01 STAPHYLOCOCCUS AUREUS BACTEREMIA WITH SEPSIS: Status: ACTIVE | Noted: 2019-04-15

## 2019-04-15 LAB
ALBUMIN SERPL BCP-MCNC: 2.8 G/DL (ref 3.5–5.2)
ALP SERPL-CCNC: 45 U/L (ref 55–135)
ALT SERPL W/O P-5'-P-CCNC: 18 U/L (ref 10–44)
ANION GAP SERPL CALC-SCNC: 9 MMOL/L (ref 8–16)
AST SERPL-CCNC: 11 U/L (ref 10–40)
BASOPHILS # BLD AUTO: 0.01 K/UL (ref 0–0.2)
BASOPHILS NFR BLD: 0.1 % (ref 0–1.9)
BILIRUB SERPL-MCNC: 0.4 MG/DL (ref 0.1–1)
BUN SERPL-MCNC: 13 MG/DL (ref 6–20)
CALCIUM SERPL-MCNC: 8.8 MG/DL (ref 8.7–10.5)
CHLORIDE SERPL-SCNC: 101 MMOL/L (ref 95–110)
CO2 SERPL-SCNC: 25 MMOL/L (ref 23–29)
CREAT SERPL-MCNC: 0.8 MG/DL (ref 0.5–1.4)
DIFFERENTIAL METHOD: ABNORMAL
EOSINOPHIL # BLD AUTO: 1 K/UL (ref 0–0.5)
EOSINOPHIL NFR BLD: 12.3 % (ref 0–8)
ERYTHROCYTE [DISTWIDTH] IN BLOOD BY AUTOMATED COUNT: 20.7 % (ref 11.5–14.5)
EST. GFR  (AFRICAN AMERICAN): >60 ML/MIN/1.73 M^2
EST. GFR  (NON AFRICAN AMERICAN): >60 ML/MIN/1.73 M^2
GLUCOSE SERPL-MCNC: 100 MG/DL (ref 70–110)
GRAM STN SPEC: NORMAL
GRAM STN SPEC: NORMAL
HCT VFR BLD AUTO: 34.4 % (ref 40–54)
HGB BLD-MCNC: 10.8 G/DL (ref 14–18)
LYMPHOCYTES # BLD AUTO: 1.3 K/UL (ref 1–4.8)
LYMPHOCYTES NFR BLD: 15.7 % (ref 18–48)
MCH RBC QN AUTO: 23.3 PG (ref 27–31)
MCHC RBC AUTO-ENTMCNC: 31.4 G/DL (ref 32–36)
MCV RBC AUTO: 74 FL (ref 82–98)
MONOCYTES # BLD AUTO: 1.1 K/UL (ref 0.3–1)
MONOCYTES NFR BLD: 13 % (ref 4–15)
NEUTROPHILS # BLD AUTO: 5 K/UL (ref 1.8–7.7)
NEUTROPHILS NFR BLD: 58.5 % (ref 38–73)
PLATELET # BLD AUTO: 281 K/UL (ref 150–350)
PMV BLD AUTO: 9.3 FL (ref 9.2–12.9)
POTASSIUM SERPL-SCNC: 3.8 MMOL/L (ref 3.5–5.1)
PROT SERPL-MCNC: 6.2 G/DL (ref 6–8.4)
RBC # BLD AUTO: 4.63 M/UL (ref 4.6–6.2)
SODIUM SERPL-SCNC: 135 MMOL/L (ref 136–145)
WBC # BLD AUTO: 8.47 K/UL (ref 3.9–12.7)

## 2019-04-15 PROCEDURE — 63600175 PHARM REV CODE 636 W HCPCS: Performed by: NURSE ANESTHETIST, CERTIFIED REGISTERED

## 2019-04-15 PROCEDURE — S0030 INJECTION, METRONIDAZOLE: HCPCS | Performed by: STUDENT IN AN ORGANIZED HEALTH CARE EDUCATION/TRAINING PROGRAM

## 2019-04-15 PROCEDURE — 36000704 HC OR TIME LEV I 1ST 15 MIN: Performed by: SURGERY

## 2019-04-15 PROCEDURE — 25000003 PHARM REV CODE 250: Performed by: NURSE ANESTHETIST, CERTIFIED REGISTERED

## 2019-04-15 PROCEDURE — 87070 CULTURE OTHR SPECIMN AEROBIC: CPT

## 2019-04-15 PROCEDURE — 36000705 HC OR TIME LEV I EA ADD 15 MIN: Performed by: SURGERY

## 2019-04-15 PROCEDURE — S0077 INJECTION, CLINDAMYCIN PHOSP: HCPCS | Performed by: NURSE ANESTHETIST, CERTIFIED REGISTERED

## 2019-04-15 PROCEDURE — 80053 COMPREHEN METABOLIC PANEL: CPT

## 2019-04-15 PROCEDURE — 37000009 HC ANESTHESIA EA ADD 15 MINS: Performed by: SURGERY

## 2019-04-15 PROCEDURE — 87102 FUNGUS ISOLATION CULTURE: CPT

## 2019-04-15 PROCEDURE — 87075 CULTR BACTERIA EXCEPT BLOOD: CPT

## 2019-04-15 PROCEDURE — 63600175 PHARM REV CODE 636 W HCPCS: Performed by: INTERNAL MEDICINE

## 2019-04-15 PROCEDURE — 87077 CULTURE AEROBIC IDENTIFY: CPT

## 2019-04-15 PROCEDURE — 63600175 PHARM REV CODE 636 W HCPCS: Performed by: ANESTHESIOLOGY

## 2019-04-15 PROCEDURE — 71000033 HC RECOVERY, INTIAL HOUR: Performed by: SURGERY

## 2019-04-15 PROCEDURE — 63600175 PHARM REV CODE 636 W HCPCS: Performed by: STUDENT IN AN ORGANIZED HEALTH CARE EDUCATION/TRAINING PROGRAM

## 2019-04-15 PROCEDURE — 85025 COMPLETE CBC W/AUTO DIFF WBC: CPT

## 2019-04-15 PROCEDURE — 36415 COLL VENOUS BLD VENIPUNCTURE: CPT

## 2019-04-15 PROCEDURE — 11000001 HC ACUTE MED/SURG PRIVATE ROOM

## 2019-04-15 PROCEDURE — S0073 INJECTION, AZTREONAM, 500 MG: HCPCS | Performed by: STUDENT IN AN ORGANIZED HEALTH CARE EDUCATION/TRAINING PROGRAM

## 2019-04-15 PROCEDURE — 87205 SMEAR GRAM STAIN: CPT

## 2019-04-15 PROCEDURE — 25000003 PHARM REV CODE 250: Performed by: SURGERY

## 2019-04-15 PROCEDURE — 37000008 HC ANESTHESIA 1ST 15 MINUTES: Performed by: SURGERY

## 2019-04-15 PROCEDURE — 94761 N-INVAS EAR/PLS OXIMETRY MLT: CPT

## 2019-04-15 PROCEDURE — 25000003 PHARM REV CODE 250: Performed by: STUDENT IN AN ORGANIZED HEALTH CARE EDUCATION/TRAINING PROGRAM

## 2019-04-15 PROCEDURE — 25000003 PHARM REV CODE 250: Performed by: INTERNAL MEDICINE

## 2019-04-15 PROCEDURE — 87186 SC STD MICRODIL/AGAR DIL: CPT

## 2019-04-15 RX ORDER — LIDOCAINE HYDROCHLORIDE 10 MG/ML
INJECTION, SOLUTION EPIDURAL; INFILTRATION; INTRACAUDAL; PERINEURAL
Status: DISCONTINUED | OUTPATIENT
Start: 2019-04-15 | End: 2019-04-15 | Stop reason: HOSPADM

## 2019-04-15 RX ORDER — CLINDAMYCIN PHOSPHATE 900 MG/50ML
INJECTION, SOLUTION INTRAVENOUS
Status: DISCONTINUED | OUTPATIENT
Start: 2019-04-15 | End: 2019-04-15

## 2019-04-15 RX ORDER — PROPOFOL 10 MG/ML
VIAL (ML) INTRAVENOUS
Status: DISCONTINUED | OUTPATIENT
Start: 2019-04-15 | End: 2019-04-15

## 2019-04-15 RX ORDER — ONDANSETRON 2 MG/ML
4 INJECTION INTRAMUSCULAR; INTRAVENOUS DAILY PRN
Status: DISCONTINUED | OUTPATIENT
Start: 2019-04-15 | End: 2019-04-18 | Stop reason: HOSPADM

## 2019-04-15 RX ORDER — PROPOFOL 10 MG/ML
VIAL (ML) INTRAVENOUS CONTINUOUS PRN
Status: DISCONTINUED | OUTPATIENT
Start: 2019-04-15 | End: 2019-04-15

## 2019-04-15 RX ORDER — SODIUM CHLORIDE, SODIUM LACTATE, POTASSIUM CHLORIDE, CALCIUM CHLORIDE 600; 310; 30; 20 MG/100ML; MG/100ML; MG/100ML; MG/100ML
INJECTION, SOLUTION INTRAVENOUS CONTINUOUS PRN
Status: DISCONTINUED | OUTPATIENT
Start: 2019-04-15 | End: 2019-04-15

## 2019-04-15 RX ORDER — OXYCODONE HYDROCHLORIDE 5 MG/1
5 TABLET ORAL
Status: DISCONTINUED | OUTPATIENT
Start: 2019-04-15 | End: 2019-04-16

## 2019-04-15 RX ORDER — HYDROMORPHONE HYDROCHLORIDE 2 MG/ML
0.5 INJECTION, SOLUTION INTRAMUSCULAR; INTRAVENOUS; SUBCUTANEOUS EVERY 5 MIN PRN
Status: COMPLETED | OUTPATIENT
Start: 2019-04-15 | End: 2019-04-15

## 2019-04-15 RX ORDER — MIDAZOLAM HYDROCHLORIDE 1 MG/ML
INJECTION, SOLUTION INTRAMUSCULAR; INTRAVENOUS
Status: DISCONTINUED | OUTPATIENT
Start: 2019-04-15 | End: 2019-04-15

## 2019-04-15 RX ORDER — BACITRACIN 50000 [IU]/1
INJECTION, POWDER, FOR SOLUTION INTRAMUSCULAR
Status: DISCONTINUED | OUTPATIENT
Start: 2019-04-15 | End: 2019-04-15 | Stop reason: HOSPADM

## 2019-04-15 RX ORDER — FENTANYL CITRATE 50 UG/ML
INJECTION, SOLUTION INTRAMUSCULAR; INTRAVENOUS
Status: DISCONTINUED | OUTPATIENT
Start: 2019-04-15 | End: 2019-04-15

## 2019-04-15 RX ADMIN — SUCRALFATE 1 G: 1 TABLET ORAL at 09:04

## 2019-04-15 RX ADMIN — MORPHINE SULFATE 2 MG: 2 INJECTION, SOLUTION INTRAMUSCULAR; INTRAVENOUS at 07:04

## 2019-04-15 RX ADMIN — MORPHINE SULFATE 2 MG: 2 INJECTION, SOLUTION INTRAMUSCULAR; INTRAVENOUS at 03:04

## 2019-04-15 RX ADMIN — HYDROMORPHONE HYDROCHLORIDE 0.5 MG: 2 INJECTION, SOLUTION INTRAMUSCULAR; INTRAVENOUS; SUBCUTANEOUS at 12:04

## 2019-04-15 RX ADMIN — FAMOTIDINE 20 MG: 20 TABLET ORAL at 08:04

## 2019-04-15 RX ADMIN — MORPHINE SULFATE 2 MG: 2 INJECTION, SOLUTION INTRAMUSCULAR; INTRAVENOUS at 06:04

## 2019-04-15 RX ADMIN — MORPHINE SULFATE 15 MG: 15 TABLET, FILM COATED, EXTENDED RELEASE ORAL at 09:04

## 2019-04-15 RX ADMIN — CLINDAMYCIN PHOSPHATE 900 MG: 18 INJECTION, SOLUTION INTRAVENOUS at 12:04

## 2019-04-15 RX ADMIN — VANCOMYCIN HYDROCHLORIDE 1500 MG: 1 INJECTION, POWDER, LYOPHILIZED, FOR SOLUTION INTRAVENOUS at 04:04

## 2019-04-15 RX ADMIN — PAROXETINE HYDROCHLORIDE 20 MG: 10 TABLET, FILM COATED ORAL at 06:04

## 2019-04-15 RX ADMIN — PROPOFOL 100 MCG/KG/MIN: 10 INJECTION, EMULSION INTRAVENOUS at 11:04

## 2019-04-15 RX ADMIN — METRONIDAZOLE 500 MG: 500 SOLUTION INTRAVENOUS at 05:04

## 2019-04-15 RX ADMIN — FENTANYL CITRATE 50 MCG: 50 INJECTION, SOLUTION INTRAMUSCULAR; INTRAVENOUS at 12:04

## 2019-04-15 RX ADMIN — MORPHINE SULFATE 2 MG: 2 INJECTION, SOLUTION INTRAMUSCULAR; INTRAVENOUS at 11:04

## 2019-04-15 RX ADMIN — MIDAZOLAM 1 MG: 1 INJECTION INTRAMUSCULAR; INTRAVENOUS at 12:04

## 2019-04-15 RX ADMIN — FENTANYL CITRATE 50 MCG: 50 INJECTION, SOLUTION INTRAMUSCULAR; INTRAVENOUS at 11:04

## 2019-04-15 RX ADMIN — FAMOTIDINE 20 MG: 20 TABLET ORAL at 09:04

## 2019-04-15 RX ADMIN — MORPHINE SULFATE 2 MG: 2 INJECTION, SOLUTION INTRAMUSCULAR; INTRAVENOUS at 02:04

## 2019-04-15 RX ADMIN — MIDAZOLAM 2 MG: 1 INJECTION INTRAMUSCULAR; INTRAVENOUS at 11:04

## 2019-04-15 RX ADMIN — PANTOPRAZOLE SODIUM 40 MG: 40 TABLET, DELAYED RELEASE ORAL at 09:04

## 2019-04-15 RX ADMIN — ENOXAPARIN SODIUM 40 MG: 100 INJECTION SUBCUTANEOUS at 04:04

## 2019-04-15 RX ADMIN — SUCRALFATE 1 G: 1 TABLET ORAL at 05:04

## 2019-04-15 RX ADMIN — PANTOPRAZOLE SODIUM 40 MG: 40 TABLET, DELAYED RELEASE ORAL at 08:04

## 2019-04-15 RX ADMIN — PROPOFOL 50 MG: 10 INJECTION, EMULSION INTRAVENOUS at 11:04

## 2019-04-15 RX ADMIN — SODIUM CHLORIDE, SODIUM LACTATE, POTASSIUM CHLORIDE, AND CALCIUM CHLORIDE: .6; .31; .03; .02 INJECTION, SOLUTION INTRAVENOUS at 11:04

## 2019-04-15 RX ADMIN — HYDROCODONE BITARTRATE AND ACETAMINOPHEN 1 TABLET: 5; 325 TABLET ORAL at 12:04

## 2019-04-15 RX ADMIN — SUCRALFATE 1 G: 1 TABLET ORAL at 06:04

## 2019-04-15 RX ADMIN — CETIRIZINE HYDROCHLORIDE 10 MG: 10 TABLET, FILM COATED ORAL at 08:04

## 2019-04-15 RX ADMIN — VANCOMYCIN HYDROCHLORIDE 1500 MG: 1 INJECTION, POWDER, LYOPHILIZED, FOR SOLUTION INTRAVENOUS at 05:04

## 2019-04-15 RX ADMIN — AZTREONAM 1000 MG: 1 INJECTION, POWDER, LYOPHILIZED, FOR SOLUTION INTRAMUSCULAR; INTRAVENOUS at 05:04

## 2019-04-15 NOTE — PLAN OF CARE
VN note: VN cued into pt's room for introduction.  The patient's family member is in the room, but the patient is not.  He is in endo.   VN will continue to be available to patient and intervene prn.

## 2019-04-15 NOTE — ANESTHESIA POSTPROCEDURE EVALUATION
Anesthesia Post Evaluation    Patient: Solo Black    Procedure(s) Performed: Procedure(s) (LRB):  INCISION AND DRAINAGE, LOWER EXTREMITY (Left)    Final Anesthesia Type: MAC  Patient location during evaluation: PACU  Patient participation: Yes- Able to Participate  Level of consciousness: awake and alert and oriented  Post-procedure vital signs: reviewed and stable  Pain management: adequate  Airway patency: patent  PONV status at discharge: No PONV  Anesthetic complications: no      Cardiovascular status: blood pressure returned to baseline  Respiratory status: unassisted  Hydration status: euvolemic  Follow-up not needed.          Vitals Value Taken Time   /73 4/15/2019  8:24 AM   Temp 36.6 °C (97.9 °F) 4/15/2019  8:24 AM   Pulse 64 4/15/2019  8:24 AM   Resp 20 4/15/2019  8:24 AM   SpO2 96 % 4/15/2019  8:20 AM         No case tracking events are documented in the log.      Pain/Marcia Score: Pain Rating Prior to Med Admin: 9 (4/15/2019  9:51 AM)  Pain Rating Post Med Admin: 5 (4/15/2019  2:30 AM)

## 2019-04-15 NOTE — OP NOTE
DATE OF PROCEDURE:  04/15/2019.    PREOPERATIVE DIAGNOSES:  Left foot abscess x2, history of drug abuse,   cellulitis, abscess of the foot.    POSTOPERATIVE DIAGNOSES:  Left foot abscess x2, history of drug abuse,   cellulitis, abscess of the foot.    OPERATION:  I and D, excision and debridement of left foot abscess x2.    SURGEON:  Troy Honeycutt M.D.    ANESTHESIA:  1% with IV sedation.    PROCEDURE IN DETAILS:  After satisfactory IV sedation, the patient was   positioned and left foot was prepped and draped in normal sterile manner using   Betadine scrub solution.  Stockinette was applied.  A timeout was called.  The   area was confirmed.  The area of the infected abscess was infiltrated using 1%   Xylocaine solution.  Incision was made in the same area, taken down to deep   subcutaneous tissue.  Subcutaneous bleeders clamped and bovied, further taken   down.  Abscess was drained out and cultured for aerobic, anaerobic and Gram   stain.  Hemostasis was satisfactorily maintained.  Wound was thoroughly   irrigated with antibiotic solution.  An incision was made at the base of the   fourth toe.  Infection was drained out.  Wound was irrigated with antibiotic   solution.  Hemostasis satisfactorily maintained.  Wound was packed using   iodoform packing and was dressed using Xeroform, 4 x 4s and Kerlix.  A sterile   gauze dressing was applied.  Instrument count, sponge count, needle count was   correct.  The patient tolerated it well.  Estimated blood loss was 20 mL.    Specimen was removed, but not submitted.    POSTOPERATIVE DIAGNOSES:  Left foot abscess, cellulitis.      MS/HN  dd: 04/15/2019 12:26:53 (CDT)  td: 04/15/2019 12:40:33 (CDT)  Doc ID   #[id number]  Job ID #[id number]    CC:

## 2019-04-15 NOTE — PLAN OF CARE
Visit with pt just prior to being transported to surgery.  Pt in good spirits, independent with all ADL's at baseline.  Anticipates no needs at time of d/c but informed CM will be assisting with any needs regarding f/u's, DME, medications.  Verbalized understanding.    Discharge planning brochure provided. White board updated with CM name & contact info.  Pt encouraged to call with any questions or needs. CM will continue to follow patient throughout the transitions of care, and assist with any discharge needs.       04/15/19 1003   Discharge Assessment   Assessment Type Discharge Planning Assessment   Confirmed/corrected address and phone number on facesheet? Yes   Assessment information obtained from? Patient   Expected Length of Stay (days) 2   Communicated expected length of stay with patient/caregiver yes   Prior to hospitilization cognitive status: Alert/Oriented   Prior to hospitalization functional status: Independent   Current cognitive status: Alert/Oriented   Current Functional Status: Independent   Able to Return to Prior Arrangements yes   Is patient able to care for self after discharge? Yes   Readmission Within the Last 30 Days no previous admission in last 30 days   Patient currently being followed by outpatient case management? No   Patient currently receives any other outside agency services? No   Equipment Currently Used at Home none   Do you have any problems affording any of your prescribed medications? No   Is the patient taking medications as prescribed? yes   Does the patient have transportation home? Yes   Transportation Anticipated family or friend will provide   Does the patient receive services at the [facility]? No   Discharge Plan A Home   Discharge Plan B Home with family   DME Needed Upon Discharge  none   Patient/Family in Agreement with Plan yes

## 2019-04-15 NOTE — PLAN OF CARE
Problem: Adult Inpatient Plan of Care  Goal: Plan of Care Review  Outcome: Ongoing (interventions implemented as appropriate)     04/14/19 0510   Plan of Care Review   Plan of Care Reviewed With patient   POC reviewed with the pt, verbalized understanding.  AAOx3, VSS.  Complaint of pain on left foot pain, prn norco and morphine given.  No other distress or any other complaint of pain throughout night.  On continuous telemetry monitor, SR.  No ectopy noted.  IV antibiotics given.  Safety maintained, free of falls throughout shift.  Instructed to call for any assistance.  Will continue to monitor.

## 2019-04-15 NOTE — PROGRESS NOTES
[facility] Medicine Progress Note    Admitting Team: \[facility]\"" Hospitalist Team A  Attending Physician: Jeramie Sandoval MD  Resident: Dr. Jerry Lerner  Intern: Dr. Randall Hernández    Subjective:     Patient stated he didn't sleep well because of the pain and people coming in and out of the room. He is a febrile. Denies chest pain shortness of breath, abdominal pain, nausea/ vomiting.      Objective:     Last 24 Hour Vital Signs:  BP  Min: 115/71  Max: 131/78  Temp  Av °F (37.2 °C)  Min: 98.6 °F (37 °C)  Max: 99.8 °F (37.7 °C)  Pulse  Av.6  Min: 67  Max: 97  Resp  Av  Min: 20  Max: 20  SpO2  Av %  Min: 98 %  Max: 98 %  I/O last 3 completed shifts:  In: 375 [P.O.:375]  Out: 1775 [Urine:1775]    Physical Examination:  Gen:                Uncomfortable appearing, non-toxic  HENT:             Conjunctiva clear with no icterus or pallor   OP clear with excudates. MMM   EYES:             Conjunctivae normal. EOMI. PERRL.   NECK:             Normal ROM. Neck supple.   CV:                  RRR  Normal S1 & S2. No murmurs, rubs, gallops.    Pulses:           Radial, DP, and PT 2+ bilateral and symmetric  Cap refil <2 secs.  PULM:             CTAB. No respiratory distress. No wheezes, no rales.   ABD:               Normoactive BS. Soft, NTND. No rebound, no guarding, no mass.  SKIN:              Warm and dry.   Shallow Scattered Excoriations on exam including upper back, with linear excoriation to R back. Multiple punctate excoriations to forehead.  Extremity:        RLE unremarkable  LLE with marked erythema and swelling to foot especially over dorsum with tracking up to ankle and down to dorsal toes. Punctate 2x5mm escar on mid dorsal foot. Marked TTP at Dorsal foot. No fluctuance or purulent drainage. Improving in general but TTP  NEURO:          A&Ox4. MITALI. Face symmetric.   Psychiatric:    Normal mood & affect.    Laboratory:  Laboratory Data Reviewed: yes  Recent Labs   Lab 19  0619 19  0705  04/14/19  0800 04/15/19  0527   WBC  --  15.22* 11.70 8.47   HGB  --  10.4* 10.4* 10.8*   HCT  --  34.3* 34.3* 34.4*   PLT  --  198 274 281   MCV  --  76* 75* 74*   RDW  --  20.6* 20.8* 20.7*   *  --  135* 135*   K 3.6  --  3.3* 3.8   CL 98  --  103 101   CO2 26  --  25 25   BUN 11  --  9 13   CREATININE 0.8  --  0.8 0.8   GLU 74  --  93 100   PROT 6.2  --  5.8* 6.2   ALBUMIN 3.0*  --  2.7* 2.8*   BILITOT 0.9  --  0.4 0.4   AST 23  --  14 11   ALKPHOS 50*  --  49* 45*   ALT 26  --  18 18     WBC differential: 84% Granulocytes, 0% Bands, 8.9% Lymphocytes, 3.2% Monocytes, 3.4%Eosinophils, 0.1% Basophils    Microbiology Data Reviewed: yes  4/12/19: Blood cultures Staph Aureus 1/4 bottles  4/13/19: Blood cx NGTD     Other Results:  EKG (my interpretation):  None    Radiology Data Reviewed: yes  Pertinent Findings:  None new    Current Medications:     Infusions:       Scheduled:   aztreonam  1,000 mg Intravenous Q8H    cetirizine  10 mg Oral Daily    enoxaparin  40 mg Subcutaneous Daily    famotidine  20 mg Oral BID    lidocaine HCL 10 mg/ml (1%)  1 mL Intradermal Once    metronidazole  500 mg Intravenous Q8H    morphine  15 mg Oral Q12H    pantoprazole  40 mg Oral BID    paroxetine  20 mg Oral QAM    sucralfate  1 g Oral QID (AC & HS)    vancomycin (VANCOCIN) IVPB  1,500 mg Intravenous Q12H        PRN:  dextrose 50%, dextrose 50%, glucagon (human recombinant), glucose, glucose, HYDROcodone-acetaminophen, morphine, sodium chloride 0.9%    Antibiotics and Day Number of Therapy:  Vancomycin start 4/12  Aztreonam start 4/13-stopped 4/15  Metronidazole 4/13-stopped 4/15    Lines and Day Number of Therapy:  PIV    Assessment:     Sloo Black is a 38 y.o. male with eosinophilic esophagitis, chronic anemia, PUD, ADHD, OCD/Anxiety who presents with 2 days of worsening foot pain and swelling 1 day post discharge from OSH with PIV site infection and cellulitis. Pt reports that his GI symptoms from his  prior admission have completely resolved at this time. Now with worsening cellulitis of LLE s/p PIV line.    Plan:     Sepsis due to staph aureus bacteremia and cellulitis   - Secondary to LL cellulitis.  - Blood cx on 4/12 +ve S.Aureus 1/4 bottles  - Sensitivities pending.  - Surveillance Blood cx until clear.  - Will consult ID once sensitivities resulted.        LLE Cellulitis with possible abscess   - 1 days of progressive redness and erythema to LLE at prior PIV site from recent hospital discharge with fevers measured to 10, chills and progressive erythema and swelling   SIRS 1/4 (tachycardia 112)  qSOFA 0   - LLE XR without bony abnormality   - pt received clindamycin in ed x 1 dose  - RLE with swelling, US negative for DVT.  - initial lactate elevated 2.8 normalized with 1L bolus  - Blood cultures +ve for S. Aureus. Started on Vanc empirically.  - Random Vanc yesterday is 7.2, continue with vanc and check trough today.  - Aztreonam and Flagyl added by night team due to fever, no need for gram -ve coverage at this time. Patient is not diabetic and not immunocompromised. Fever is expected until blood is clear. Will stop Aztreonam and flagyl.  - Surgery consulted for concern of abscess, possible I&D today.  - Continue with pain control.         Eosinophilic esophagitis  - recently discharged from [facility] for N/V, abdominal pain, BRBPR, c/w with patients past episodes of eosinophilic esophagitis  - home medications include pepcid AC 20mg qam, Protonix 20mg bid, loratadin 10mg qam,   - pt reports that he was treated with IV steroids, sulcrafate, protonix and had gradual resolution of his symptoms over 4 day hospital stay and discharge home with prednisone taper.   - will continue home medications.       PUD  - Home meds as above  - continue protonix, H2, H1 blocker       Chronic blood loss Anemia  - microcytic anemia with H/H 10.1 with MCV 76  stable at his baseline since 2018  - profile in  2018  Fe 21  tibc 453  sat Iron 5  Xferrin 306  ferritin 7  folate 11.8  419  - repeat iron profile consistent with ASAD  - consistent with iron deficiency anemia  - on home iron 325 bid  - will hold in setting of acute infection, will increase dose on discharge and refer to Hematology for IV iron.       Hypokalemia  - K low 2.6 at presentation given 40mEq in EDx1  - given mag 2mg and IV KCL 10mEQ q3hour   - monitor and replete PRN       ADD  - no aucte issues  - home medications Adderall 20mg bid (am and lunch)  - will hold acutely        Subclinical hyperthyroidism  - TSH 0.26, T4 0.91  - Need repeat at outpatient.      HCM  - flu and tetanus shot up to date  - has seen PCP once but has not followed up  - follows with Dr. Alaniz for psychiatry  - follows with Dr. Wall for GI  - lipid panel wnl  - A1C wnl         Code: Full  PPx: lovenox  Diet: NPO  Dispo: pending I&D with gen surgery and ID consult for bacteremia       Jerry Lerner MD  [facility] Internal Medicine    [facility] Medicine Hospitalist Pager numbers:   [facility] Hospitalist Medicine Team A (Jaime/Smita): [phone]  [facility] Hospitalist Medicine Team B (Quintin/Francisca):  [phone]

## 2019-04-15 NOTE — PLAN OF CARE
Problem: Adult Inpatient Plan of Care  Goal: Plan of Care Review  Outcome: Ongoing (interventions implemented as appropriate)  Patient safety maintained. Bed in low position, wells locked, alarm set up. Medications administered as ordered. Pain monitored and treated as needed. Patient to be NPO after midnight for surgery tomorrow. Assistance provided as needed for standing.

## 2019-04-15 NOTE — OP NOTE
[facility]  Brief Operative Note    SUMMARY     Surgery Date: 4/15/2019     Surgeon(s) and Role:     * Troy Honeycutt MD - Primary    Assisting Surgeon: None    Pre-op Diagnosis:  Cellulitis [L03.90]  Iron deficiency anemia due to chronic blood loss [D50.0]  Cellulitis of left lower extremity [L03.116]  Hypokalemia [E87.6]    Post-op Diagnosis:  Post-Op Diagnosis Codes:     * Cellulitis [L03.90]     * Iron deficiency anemia due to chronic blood loss [D50.0]     * Cellulitis of left lower extremity [L03.116]     * Hypokalemia [E87.6]    Procedure(s) (LRB):  INCISION AND DRAINAGE, LOWER EXTREMITY (Left)  Foot  X 2   Anesthesia: Local MAC    Description of Procedure: incision and drainage left foot abscess x 2 done under mac anaesthesia , patient tolerated well and was sent to recovery room in stable condition.    Description of the findings of the procedure: abscess left foot drained under mac anaesthesia    Estimated Blood Loss: 30 cc       Specimens:   Specimen (12h ago, onward)    None

## 2019-04-15 NOTE — ANESTHESIA PREPROCEDURE EVALUATION
04/15/2019  Solo Black is a 38 y.o., male PMH ADHD, eosinophilic esophagitis with multiple endoscopies, PUD, h/o shoulder repair. anxiety. Here for I&D lower extremity.     Anesthesia Evaluation    I have reviewed the Patient Summary Reports.    I have reviewed the Nursing Notes.   I have reviewed the Medications.     Review of Systems  Anesthesia Hx:  Denies Family Hx of Anesthesia complications.   Denies Personal Hx of Anesthesia complications. ( Multiple endoscopies under MAC and lap jane/appy under General with no anesthetic issues per patient)   Social:  Smoker, Social Alcohol Use Light tobacco smoking   Hematology/Oncology:         -- Anemia:   EENT/Dental:EENT/Dental Normal   Cardiovascular:  Cardiovascular Normal Exercise tolerance: good  ECG has been reviewed.    Pulmonary:  Pulmonary Normal    Renal/:  Renal/ Normal     Hepatic/GI:   GERD, well controlled Eosinophilic esophagitis   Neurological:  Neurology Normal    Endocrine:  Endocrine Normal    Dermatological:   LLE cellulitis   Psych:   Psychiatric History ( ADHD)          Physical Exam  General:  Well nourished    Airway/Jaw/Neck:  Airway Findings: Mouth Opening: Normal Tongue: Normal  Mallampati: II  TM Distance: Normal, at least 6 cm      Dental:  Dental Findings: In tact   Chest/Lungs:  Chest/Lungs Findings: Clear to auscultation, Normal Respiratory Rate     Heart/Vascular:  Heart Findings: Rate: Normal  Rhythm: Regular Rhythm  Sounds: Normal      Musculoskeletal:  Musculoskeletal Findings: Normal    Mental Status:  Mental Status Findings:  Cooperative, Alert and Oriented       EKG 9/5/18:  Normal sinus rhythm  Normal ECG  When compared with ECG of 04-SEP-2018 17:25,  No significant change was found  Confirmed by Olu FITCH, Ruth ([id number]) on 9/5/2018 12:51:55 PM      Anesthesia Plan  Type of Anesthesia, risks & benefits  discussed:  Anesthesia Type:  MAC  Patient's Preference:   Intra-op Monitoring Plan: standard ASA monitors  Intra-op Monitoring Plan Comments:   Post Op Pain Control Plan: multimodal analgesia and per primary service following discharge from PACU  Post Op Pain Control Plan Comments:   Induction:   IV  Beta Blocker:  Patient is not currently on a Beta-Blocker (No further documentation required).       Informed Consent: Patient understands risks and agrees with Anesthesia plan.  Questions answered. Anesthesia consent signed with patient.  ASA Score: 2     Day of Surgery Review of History & Physical:            Ready For Surgery From Anesthesia Perspective.

## 2019-04-15 NOTE — PROGRESS NOTES
.Pharmacy New Medication Education    Patient accepted medication education.    Pharmacy educated patient on name and purpose of medications and possible side effects, using the teach-back method.     Current Inpatient Medication Orders     D/C Current Inpatient Medication Orders Link Status Route Frequency PRN Reason Start End   D/C cetirizine tablet 10 mg  Dispensed Oral Daily  04/13 0900     D/C dextrose 50% injection 12.5 g  Dispensed IV As needed (PRN) For blood glucose 04/12 1525     D/C dextrose 50% injection 25 g  Dispensed IV As needed (PRN) For blood glucose 04/12 1525     D/C enoxaparin injection 40 mg  Dispensed SubQ Daily  04/12 1700     D/C famotidine tablet 20 mg  Dispensed Oral 2 times daily  04/12 2100     D/C glucagon (human recombinant) injection 1 mg  Verified IM As needed (PRN) Blood Glucose less than 04/12 1525     D/C glucose chewable tablet 16 g  Verified Oral As needed (PRN) For blood glucose 04/12 1525     D/C glucose chewable tablet 24 g  Verified Oral As needed (PRN) Blood Glucose less than 04/12 1525     D/C HYDROcodone-acetaminophen 5-325 mg per tablet 1 tablet  Dispensed Oral Every 4 hours PRN moderate pain 4-6/10 pain scale 04/12 1533     D/C lidocaine HCL 10 mg/ml (1%) injection 1 mL  Verified IDrm Once  04/14 2215     D/C morphine 12 hr tablet 15 mg  Dispensed Oral Every 12 hours  04/14 2215     D/C morphine injection 2 mg  Dispensed IV Every 4 hours PRN severe pain 7-10/10 pain scale 04/14 0155     D/C ondansetron injection 4 mg  Verified IV Daily PRN Nausea/Vomiting (1st choice) - use as first treatment 04/15 1225     D/C oxyCODONE immediate release tablet 5 mg  Verified Oral Every 3 hours PRN  04/15 1225     D/C pantoprazole EC tablet 40 mg  Dispensed Oral 2 times daily  04/12 2100     D/C paroxetine tablet 20 mg  Dispensed Oral Every morning  04/13 0700     D/C sodium chloride 0.9% flush 10 mL  Verified IV As needed (PRN) Line Care 04/12 1525     D/C sucralfate tablet 1 g   Dispensed Oral Before meals & nightly  04/12 9967     D/C vancomycin (VANCOCIN) 1,500 mg in dextrose 5 % 250 mL IVPB  Dispensed IV Every 12 hours (non-standard times)  04/13 0430        Learners of pharmacy medication education included:  Patient    Patient +/- learner response:  Verbalized Understanding, Teachback

## 2019-04-15 NOTE — PLAN OF CARE
,Patient has met PACU discharge criteria, VSS, pain well controlled. Family updated by phone. Released from PACU by Dr.Armstead Rey*

## 2019-04-15 NOTE — TRANSFER OF CARE
Anesthesia Transfer of Care Note    Patient: Solo Black    Procedure(s) Performed: Procedure(s) (LRB):  INCISION AND DRAINAGE, LOWER EXTREMITY (Left)    Patient location: PACU    Anesthesia Type: MAC    Transport from OR: Transported from OR on room air with adequate spontaneous ventilation    Post pain: adequate analgesia    Post assessment: no apparent anesthetic complications and tolerated procedure well    Post vital signs: stable    Level of consciousness: awake, alert and oriented    Nausea/Vomiting: no nausea/vomiting    Complications: none    Transfer of care protocol was followed      Last vitals:   Visit Vitals  /73 (Patient Position: Lying)   Pulse 64   Temp 36.6 °C (97.9 °F) (Oral)   Resp 20   Ht 6' (1.829 m)   Wt 98.7 kg (217 lb 9.5 oz)   SpO2 96%   BMI 29.51 kg/m²

## 2019-04-15 NOTE — PLAN OF CARE
Problem: Adult Inpatient Plan of Care  Goal: Plan of Care Review  Outcome: Ongoing (interventions implemented as appropriate)     04/15/19 9948   Plan of Care Review   Plan of Care Reviewed With patient   POC reviewed with the pt, verbalized understanding.  AAOx3, VSS.  Complaint of pain on left foot pain, prn morphine and scheduled mscontin given.  No other distress or any other complaint of pain throughout night.  On continuous telemetry monitor, SR.  No ectopy noted.  IV antibiotics given.  On NPO started at midnight; pt aware.  Pt will receive US and I/D.  Safety maintained, free of falls throughout shift.  Instructed to call for any assistance.  Will continue to monitor.

## 2019-04-15 NOTE — CONSULTS
Today`s Date: 4/14/2019     Admit Date: 4/12/2019    Admitting Physician: Jeramie Sandoval MD    Patient`s Name: Solo Black , 38 y.o. male    Reason for consultation Left foot abscess    Patient Active Problem List:     Hematemesis     Eosinophilic esophagitis     PUD (peptic ulcer disease)     Erosive gastritis     Lifetime need for proton pump inhibitor     Melena     Hypokalemia     Abdominal pain     Cervicalgia     Adult ADHD     Adverse reaction to nonsteroidal anti-inflammatory drug (NSAID)     Bright red blood per rectum     Therapeutic opioid induced constipation     Acute upper GI bleed     Acute diarrhea     Epigastric pain     Iron deficiency anemia     Eosinophilia     GI bleed     Upper GI bleed     Reactive arthritis     Incomplete rotator cuff tear or rupture of right shoulder, not specified as traumatic     Cellulitis      Past Medical History:  feb 2014: C. difficile colitis      Comment:  postop of hand surgery  3/11/2014: Eosinophilic esophagitis  No date: GERD (gastroesophageal reflux disease)  No date: IBS (irritable bowel syndrome)  No date: MRSA carrier      Comment:  says multiple times nose swab was +  apr 2014: Nephrolithiasis      Comment:  bilateral punctate - never passed a stone  feb 2014: Urinary tract infection      Comment:  MRSA    Past Surgical History:  No date: APPENDECTOMY  11/1/2018: ARTHROSCOPY, SHOULDER, WITH DISTAL CLAVICLE EXCISION; Right      Comment:  Performed by Gabino Mejia MD at [facility] OR  No date: BACK SURGERY  No date: CIRCUMCISION, PRIMARY  11/14/2018: COLONOSCOPY; N/A      Comment:  Performed by Milton Brunson MD at [facility] ENDO  2009: drainage of abscess from hand      Comment:  MRSA  2006: drainage of abscess from R thigh      Comment:  MRSA  9/5/2018: EGD (ESOPHAGOGASTRODUODENOSCOPY); N/A      Comment:  Performed by Kolby Wall MD at [facility] ENDO  3/11/2014: EGD (ESOPHAGOGASTRODUODENOSCOPY); Left      Comment:  Performed by Galo Hdez MD at  [facility] ENDO  3/11/2014: ESOPHAGOGASTRODUODENOSCOPY  7/21/2016: ESOPHAGOGASTRODUODENOSCOPY (EGD); N/A      Comment:  Performed by Kolby Wall MD at [facility] ENDO  3/17/2016: ESOPHAGOGASTRODUODENOSCOPY (EGD); N/A      Comment:  Performed by Kolby Wall MD at [facility] ENDO  jan 2014: HAND SURGERY      Comment:  power saw fell on hand - laceration 3 tendons  9/5/2018: SIGMOIDOSCOPY, FLEXIBLE; N/A      Comment:  Performed by Kolby Wall MD at [facility] ENDO    Prior to Admission medications :  Medication dextroamphetamine-amphetamine (ADDERALL) 20 mg tablet, Sig Take 1 tablet by mouth 2 (two) times daily before meals. Take before breakfast and lunch, Start Date , End Date , Taking? Yes, Authorizing Provider Historical MD Anton    Medication famotidine (PEPCID) 20 MG tablet, Sig Take 20 mg by mouth 2 (two) times daily., Start Date , End Date , Taking? Yes, Authorizing Provider Roz Walton MD    Medication ferrous sulfate 325 mg (65 mg iron) Tab tablet, Sig Take 1 tablet (325 mg total) by mouth 2 (two) times daily., Start Date 9/5/18, End Date 9/5/19, Taking? Yes, Authorizing Provider Stephane Weaver MD    Medication loratadine (CLARITIN) 10 mg tablet, Sig Take 10 mg by mouth every morning., Start Date , End Date , Taking? Yes, Authorizing Provider Historical MD Anton    Medication MULTIVIT-IRON-MIN-FOLIC ACID 3,500-18-0.4 UNIT-MG-MG ORAL CHEW, Sig Take 10 mg by mouth once daily., Start Date , End Date , Taking? Yes, Authorizing Provider Historical MD Anton    Medication pantoprazole (PROTONIX) 40 MG tablet, Sig Take 1 tablet (40 mg total) by mouth 2 (two) times daily., Start Date 9/5/18, End Date 9/5/19, Taking? Yes, Authorizing Provider Stephane Weaver MD    Medication paroxetine (PAXIL) 20 MG tablet, Sig Take 20 mg by mouth every morning., Start Date , End Date , Taking? Yes, Authorizing Provider Roz Walton MD    Medication predniSONE (DELTASONE) 20 MG tablet, Sig Take 20 mg by mouth 2  (two) times daily., Start Date , End Date , Taking? Yes, Authorizing Provider Historical Provider, MD    Medication promethazine (PHENERGAN) 12.5 MG Tab, Sig Take 25 mg by mouth every 6 (six) hours as needed (nausea/vomiting)., Start Date , End Date , Taking? Yes, Authorizing Provider Historical Provider, MD    Medication sucralfate (CARAFATE) 1 gram tablet, Sig Take 1 g by mouth 4 (four) times daily before meals and nightly., Start Date , End Date , Taking? Yes, Authorizing Provider Historical Provider, MD      No current facility-administered medications on file prior to encounter.   Current Outpatient Medications on File Prior to Encounter:  dextroamphetamine-amphetamine (ADDERALL) 20 mg tablet, Take 1 tablet by mouth 2 (two) times daily before meals. Take before breakfast and lunch, Disp: , Rfl:   famotidine (PEPCID) 20 MG tablet, Take 20 mg by mouth 2 (two) times daily., Disp: , Rfl:   ferrous sulfate 325 mg (65 mg iron) Tab tablet, Take 1 tablet (325 mg total) by mouth 2 (two) times daily., Disp: 180 tablet, Rfl: 3  loratadine (CLARITIN) 10 mg tablet, Take 10 mg by mouth every morning., Disp: , Rfl:   MULTIVIT-IRON-MIN-FOLIC ACID 3,500-18-0.4 UNIT-MG-MG ORAL CHEW, Take 10 mg by mouth once daily., Disp: , Rfl:   pantoprazole (PROTONIX) 40 MG tablet, Take 1 tablet (40 mg total) by mouth 2 (two) times daily., Disp: 180 tablet, Rfl: 3  paroxetine (PAXIL) 20 MG tablet, Take 20 mg by mouth every morning., Disp: , Rfl:   predniSONE (DELTASONE) 20 MG tablet, Take 20 mg by mouth 2 (two) times daily., Disp: , Rfl:   promethazine (PHENERGAN) 12.5 MG Tab, Take 25 mg by mouth every 6 (six) hours as needed (nausea/vomiting)., Disp: , Rfl:   sucralfate (CARAFATE) 1 gram tablet, Take 1 g by mouth 4 (four) times daily before meals and nightly., Disp: , Rfl:          Review of patient's allergies indicates:   -- Nsaids (non-steroidal anti-inflammatory drug)     --  GI bleed   -- Ketamine     --  Severe dysphoria (bad trip)   --  Penicillins     Social History:   reports that he has been smoking.  He has never used smokeless tobacco. He reports that he drinks alcohol. He reports that he does not use drugs.     Review of patient's family history indicates:  Problem: Urolithiasis      Relation: Father          Age of Onset: (Not Specified)  Problem: Celiac disease      Relation: Sister          Age of Onset: (Not Specified)  Problem: Hypertension      Relation: Maternal Grandmother          Age of Onset: (Not Specified)  Problem: Hypertension      Relation: Maternal Grandfather          Age of Onset: (Not Specified)      PHYSICAL EXAMINATION  Temp:  [99.7 °F (37.6 °C)-99.8 °F (37.7 °C)] 99.8 °F (37.7 °C)  Pulse:  [] 97  Resp:  [18-20] 20  SpO2:  [97 %] 97 %  BP: (115-125)/(66-71) 115/71    General Condition:   c/o pain left foot s/p iv placement left foot two days ago    Head & Neck  Anemia: None  Jaundice: None  Neck vein: Not distended  Carotid Bruits: none  Lymph nodes: none palpable  Thyroid: normal    Chest: normal    Heart: normal    Rt. Breast: not examined  Lt. Breast: not examined  Axillary lymph nodes: none    Abdomen: Soft,  None tender with no palpable mass or organ  Hernia: none    Rectal: Defered    Extremities: Cellulitis left foot , vein puncture site with redness and erythema, possible abscess    Vascular: normal    Specific focus Examination    Imp: cellulitis , possible abscess,     Plan: npo , check us and possible I&D in am

## 2019-04-16 LAB
ALBUMIN SERPL BCP-MCNC: 2.9 G/DL (ref 3.5–5.2)
ALP SERPL-CCNC: 51 U/L (ref 55–135)
ALT SERPL W/O P-5'-P-CCNC: 21 U/L (ref 10–44)
ANION GAP SERPL CALC-SCNC: 9 MMOL/L (ref 8–16)
AORTIC ROOT ANNULUS: 3.87 CM
AST SERPL-CCNC: 12 U/L (ref 10–40)
AV INDEX (PROSTH): 0.77
AV MEAN GRADIENT: 3.87 MMHG
AV PEAK GRADIENT: 5.86 MMHG
AV VALVE AREA: 3.29 CM2
AV VELOCITY RATIO: 0.69
BACTERIA BLD CULT: NORMAL
BASOPHILS # BLD AUTO: 0.01 K/UL (ref 0–0.2)
BASOPHILS NFR BLD: 0.1 % (ref 0–1.9)
BILIRUB SERPL-MCNC: 0.3 MG/DL (ref 0.1–1)
BSA FOR ECHO PROCEDURE: 2.19 M2
BUN SERPL-MCNC: 13 MG/DL (ref 6–20)
CALCIUM SERPL-MCNC: 8.8 MG/DL (ref 8.7–10.5)
CHLORIDE SERPL-SCNC: 99 MMOL/L (ref 95–110)
CO2 SERPL-SCNC: 30 MMOL/L (ref 23–29)
CREAT SERPL-MCNC: 0.9 MG/DL (ref 0.5–1.4)
CV ECHO LV RWT: 0.45 CM
DIFFERENTIAL METHOD: ABNORMAL
DOP CALC AO PEAK VEL: 1.21 M/S
DOP CALC AO VTI: 19.8 CM
DOP CALC LVOT AREA: 4.26 CM2
DOP CALC LVOT DIAMETER: 2.33 CM
DOP CALC LVOT PEAK VEL: 0.83 M/S
DOP CALC LVOT STROKE VOLUME: 65.16 CM3
DOP CALCLVOT PEAK VEL VTI: 15.29 CM
E WAVE DECELERATION TIME: 244.65 MSEC
E/A RATIO: 1.06
ECHO LV POSTERIOR WALL: 1.13 CM (ref 0.6–1.1)
EOSINOPHIL # BLD AUTO: 0.6 K/UL (ref 0–0.5)
EOSINOPHIL NFR BLD: 7.9 % (ref 0–8)
ERYTHROCYTE [DISTWIDTH] IN BLOOD BY AUTOMATED COUNT: 20.5 % (ref 11.5–14.5)
EST. GFR  (AFRICAN AMERICAN): >60 ML/MIN/1.73 M^2
EST. GFR  (NON AFRICAN AMERICAN): >60 ML/MIN/1.73 M^2
FRACTIONAL SHORTENING: 30 % (ref 28–44)
GLUCOSE SERPL-MCNC: 105 MG/DL (ref 70–110)
HCT VFR BLD AUTO: 36.2 % (ref 40–54)
HGB BLD-MCNC: 11.1 G/DL (ref 14–18)
HYPOCHROMIA BLD QL SMEAR: ABNORMAL
INTERVENTRICULAR SEPTUM: 1.15 CM (ref 0.6–1.1)
LA MAJOR: 3.48 CM
LA MINOR: 3.46 CM
LA WIDTH: 3.48 CM
LEFT ATRIUM SIZE: 3.25 CM
LEFT ATRIUM VOLUME INDEX: 15.1 ML/M2
LEFT ATRIUM VOLUME: 33.36 CM3
LEFT INTERNAL DIMENSION IN SYSTOLE: 3.53 CM (ref 2.1–4)
LEFT VENTRICLE DIASTOLIC VOLUME INDEX: 54.88 ML/M2
LEFT VENTRICLE DIASTOLIC VOLUME: 121.17 ML
LEFT VENTRICLE MASS INDEX: 100.1 G/M2
LEFT VENTRICLE SYSTOLIC VOLUME INDEX: 23.5 ML/M2
LEFT VENTRICLE SYSTOLIC VOLUME: 51.95 ML
LEFT VENTRICULAR INTERNAL DIMENSION IN DIASTOLE: 5.05 CM (ref 3.5–6)
LEFT VENTRICULAR MASS: 221.12 G
LV LATERAL E/E' RATIO: 7.43
LYMPHOCYTES # BLD AUTO: 1.3 K/UL (ref 1–4.8)
LYMPHOCYTES NFR BLD: 17.3 % (ref 18–48)
MCH RBC QN AUTO: 22.9 PG (ref 27–31)
MCHC RBC AUTO-ENTMCNC: 30.7 G/DL (ref 32–36)
MCV RBC AUTO: 75 FL (ref 82–98)
MONOCYTES # BLD AUTO: 1.3 K/UL (ref 0.3–1)
MONOCYTES NFR BLD: 17.7 % (ref 4–15)
MV PEAK A VEL: 0.49 M/S
MV PEAK E VEL: 0.52 M/S
NEUTROPHILS # BLD AUTO: 4.1 K/UL (ref 1.8–7.7)
NEUTROPHILS NFR BLD: 57 % (ref 38–73)
PISA TR MAX VEL: 2.44 M/S
PLATELET # BLD AUTO: 338 K/UL (ref 150–350)
PMV BLD AUTO: 9.8 FL (ref 9.2–12.9)
POTASSIUM SERPL-SCNC: 3.8 MMOL/L (ref 3.5–5.1)
PROT SERPL-MCNC: 6.3 G/DL (ref 6–8.4)
RA MAJOR: 3.08 CM
RA PRESSURE: 3 MMHG
RBC # BLD AUTO: 4.84 M/UL (ref 4.6–6.2)
RIGHT VENTRICULAR END-DIASTOLIC DIMENSION: 3.47 CM
SODIUM SERPL-SCNC: 138 MMOL/L (ref 136–145)
TDI LATERAL: 0.07
TR MAX PG: 23.81 MMHG
TV REST PULMONARY ARTERY PRESSURE: 27 MMHG
VANCOMYCIN TROUGH SERPL-MCNC: 16.3 UG/ML (ref 10–22)
WBC # BLD AUTO: 7.22 K/UL (ref 3.9–12.7)

## 2019-04-16 PROCEDURE — 11000001 HC ACUTE MED/SURG PRIVATE ROOM

## 2019-04-16 PROCEDURE — 25000003 PHARM REV CODE 250: Performed by: STUDENT IN AN ORGANIZED HEALTH CARE EDUCATION/TRAINING PROGRAM

## 2019-04-16 PROCEDURE — 80053 COMPREHEN METABOLIC PANEL: CPT

## 2019-04-16 PROCEDURE — 25000003 PHARM REV CODE 250: Performed by: INTERNAL MEDICINE

## 2019-04-16 PROCEDURE — 63600175 PHARM REV CODE 636 W HCPCS: Performed by: INTERNAL MEDICINE

## 2019-04-16 PROCEDURE — 36415 COLL VENOUS BLD VENIPUNCTURE: CPT

## 2019-04-16 PROCEDURE — 63600175 PHARM REV CODE 636 W HCPCS: Performed by: STUDENT IN AN ORGANIZED HEALTH CARE EDUCATION/TRAINING PROGRAM

## 2019-04-16 PROCEDURE — 25000003 PHARM REV CODE 250: Performed by: ANESTHESIOLOGY

## 2019-04-16 PROCEDURE — 85025 COMPLETE CBC W/AUTO DIFF WBC: CPT

## 2019-04-16 PROCEDURE — 80202 ASSAY OF VANCOMYCIN: CPT

## 2019-04-16 PROCEDURE — A4216 STERILE WATER/SALINE, 10 ML: HCPCS | Performed by: INTERNAL MEDICINE

## 2019-04-16 PROCEDURE — 94761 N-INVAS EAR/PLS OXIMETRY MLT: CPT

## 2019-04-16 RX ORDER — SODIUM CHLORIDE 0.9 % (FLUSH) 0.9 %
10 SYRINGE (ML) INJECTION
Status: DISCONTINUED | OUTPATIENT
Start: 2019-04-16 | End: 2019-04-18 | Stop reason: HOSPADM

## 2019-04-16 RX ORDER — MORPHINE SULFATE 15 MG/1
30 TABLET, FILM COATED, EXTENDED RELEASE ORAL EVERY 12 HOURS
Status: DISCONTINUED | OUTPATIENT
Start: 2019-04-16 | End: 2019-04-18 | Stop reason: HOSPADM

## 2019-04-16 RX ORDER — SODIUM CHLORIDE 0.9 % (FLUSH) 0.9 %
10 SYRINGE (ML) INJECTION EVERY 6 HOURS
Status: DISCONTINUED | OUTPATIENT
Start: 2019-04-16 | End: 2019-04-18 | Stop reason: HOSPADM

## 2019-04-16 RX ORDER — HYDROCODONE BITARTRATE AND ACETAMINOPHEN 7.5; 325 MG/1; MG/1
1 TABLET ORAL EVERY 4 HOURS PRN
Status: DISCONTINUED | OUTPATIENT
Start: 2019-04-16 | End: 2019-04-18 | Stop reason: HOSPADM

## 2019-04-16 RX ADMIN — ENOXAPARIN SODIUM 40 MG: 100 INJECTION SUBCUTANEOUS at 04:04

## 2019-04-16 RX ADMIN — Medication 10 ML: at 06:04

## 2019-04-16 RX ADMIN — MORPHINE SULFATE 15 MG: 15 TABLET, FILM COATED, EXTENDED RELEASE ORAL at 10:04

## 2019-04-16 RX ADMIN — SUCRALFATE 1 G: 1 TABLET ORAL at 04:04

## 2019-04-16 RX ADMIN — MORPHINE SULFATE 2 MG: 2 INJECTION, SOLUTION INTRAMUSCULAR; INTRAVENOUS at 07:04

## 2019-04-16 RX ADMIN — OXYCODONE HYDROCHLORIDE 5 MG: 5 TABLET ORAL at 11:04

## 2019-04-16 RX ADMIN — MORPHINE SULFATE 2 MG: 2 INJECTION, SOLUTION INTRAMUSCULAR; INTRAVENOUS at 11:04

## 2019-04-16 RX ADMIN — FAMOTIDINE 20 MG: 20 TABLET ORAL at 08:04

## 2019-04-16 RX ADMIN — HYDROCODONE BITARTRATE AND ACETAMINOPHEN 1 TABLET: 5; 325 TABLET ORAL at 11:04

## 2019-04-16 RX ADMIN — SUCRALFATE 1 G: 1 TABLET ORAL at 08:04

## 2019-04-16 RX ADMIN — PAROXETINE HYDROCHLORIDE 20 MG: 10 TABLET, FILM COATED ORAL at 06:04

## 2019-04-16 RX ADMIN — MORPHINE SULFATE 2 MG: 2 INJECTION, SOLUTION INTRAMUSCULAR; INTRAVENOUS at 03:04

## 2019-04-16 RX ADMIN — VANCOMYCIN HYDROCHLORIDE 1500 MG: 1 INJECTION, POWDER, LYOPHILIZED, FOR SOLUTION INTRAVENOUS at 05:04

## 2019-04-16 RX ADMIN — HYDROCODONE BITARTRATE AND ACETAMINOPHEN 1 TABLET: 7.5; 325 TABLET ORAL at 02:04

## 2019-04-16 RX ADMIN — PANTOPRAZOLE SODIUM 40 MG: 40 TABLET, DELAYED RELEASE ORAL at 10:04

## 2019-04-16 RX ADMIN — SUCRALFATE 1 G: 1 TABLET ORAL at 06:04

## 2019-04-16 RX ADMIN — MORPHINE SULFATE 2 MG: 2 INJECTION, SOLUTION INTRAMUSCULAR; INTRAVENOUS at 08:04

## 2019-04-16 RX ADMIN — MORPHINE SULFATE 30 MG: 15 TABLET, FILM COATED, EXTENDED RELEASE ORAL at 08:04

## 2019-04-16 RX ADMIN — Medication 10 ML: at 11:04

## 2019-04-16 RX ADMIN — CETIRIZINE HYDROCHLORIDE 10 MG: 10 TABLET, FILM COATED ORAL at 10:04

## 2019-04-16 RX ADMIN — PANTOPRAZOLE SODIUM 40 MG: 40 TABLET, DELAYED RELEASE ORAL at 08:04

## 2019-04-16 RX ADMIN — SUCRALFATE 1 G: 1 TABLET ORAL at 11:04

## 2019-04-16 RX ADMIN — VANCOMYCIN HYDROCHLORIDE 1500 MG: 1 INJECTION, POWDER, LYOPHILIZED, FOR SOLUTION INTRAVENOUS at 06:04

## 2019-04-16 RX ADMIN — FAMOTIDINE 20 MG: 20 TABLET ORAL at 10:04

## 2019-04-16 RX ADMIN — MORPHINE SULFATE 2 MG: 2 INJECTION, SOLUTION INTRAMUSCULAR; INTRAVENOUS at 06:04

## 2019-04-16 NOTE — PROGRESS NOTES
.Pharmacy New Medication Education    Patient accepted medication education.    Pharmacy educated patient on name and purpose of medications and possible side effects, using the teach-back method.     Current Inpatient Medication Orders     D/C Current Inpatient Medication Orders Link Status Route Frequency PRN Reason Start End   D/C cetirizine tablet 10 mg  Dispensed Oral Daily  04/13 0900     D/C dextrose 50% injection 12.5 g  Dispensed IV As needed (PRN) For blood glucose 04/12 1525     D/C dextrose 50% injection 25 g  Dispensed IV As needed (PRN) For blood glucose 04/12 1525     D/C enoxaparin injection 40 mg  Dispensed SubQ Daily  04/12 1700     D/C famotidine tablet 20 mg  Dispensed Oral 2 times daily  04/12 2100     D/C glucagon (human recombinant) injection 1 mg  Verified IM As needed (PRN) Blood Glucose less than 04/12 1525     D/C glucose chewable tablet 16 g  Verified Oral As needed (PRN) For blood glucose 04/12 1525     D/C glucose chewable tablet 24 g  Verified Oral As needed (PRN) Blood Glucose less than 04/12 1525     D/C HYDROcodone-acetaminophen 5-325 mg per tablet 1 tablet  Dispensed Oral Every 4 hours PRN moderate pain 4-6/10 pain scale 04/12 1533     D/C HYDROcodone-acetaminophen 7.5-325 mg per tablet 1 tablet  Dispensed Oral Every 4 hours PRN moderate pain 4-6/10 pain scale, severe pain 7-10/10 pain scale 04/16 1246     D/C lidocaine HCL 10 mg/ml (1%) injection 1 mL  Verified IDrm Once  04/14 2215     D/C morphine 12 hr tablet 30 mg  Verified Oral Every 12 hours  04/16 2100     D/C morphine injection 2 mg  Dispensed IV Every 4 hours PRN severe pain 7-10/10 pain scale 04/14 0155     D/C ondansetron injection 4 mg  Verified IV Daily PRN Nausea/Vomiting (1st choice) - use as first treatment 04/15 1225     D/C pantoprazole EC tablet 40 mg  Dispensed Oral 2 times daily  04/12 2100     D/C paroxetine tablet 20 mg  Dispensed Oral Every morning  04/13 0700     D/C sodium chloride 0.9% flush 10 mL   Verified IV As needed (PRN) Line Care 04/12 1525     D/C sucralfate tablet 1 g  Dispensed Oral Before meals & nightly  04/12 2145     D/C vancomycin (VANCOCIN) 1,500 mg in dextrose 5 % 250 mL IVPB  Dispensed IV Every 12 hours (non-standard times)  04/13 0430        Learners of pharmacy medication education included:  Patient    Patient +/- learner response:  Verbalized Understanding, Teachback

## 2019-04-16 NOTE — PLAN OF CARE
Problem: Adult Inpatient Plan of Care  Goal: Plan of Care Review  Outcome: Ongoing (interventions implemented as appropriate)  AAOx4, room air plan of care reviewed patient verbalized understanding. Medications administered. Left foot dressing clean, dry, and intact. PICC line double lumen inserted left upper  Arm. Confirmed placement. Safety maintained bed alarm on, call bell within reach, bed in lowest position will continue to monitor.

## 2019-04-16 NOTE — PROCEDURES
Solo Black is a 38 y.o. male patient.    Temp: 98.9 °F (37.2 °C) (04/16/19 1619)  Pulse: 76 (04/16/19 1619)  Resp: 17 (04/16/19 1619)  BP: 132/85 (04/16/19 1619)  SpO2: 100 % (04/16/19 0815)  Weight: 92.5 kg (203 lb 14.8 oz) (04/16/19 0410)  Height: 6' (182.9 cm) (04/12/19 1705)    PICC  Date/Time: 4/16/2019 4:40 PM  Consent Done: Yes  Time out: Immediately prior to procedure a time out was called to verify the correct patient, procedure, equipment, support staff and site/side marked as required  Indications: med administration and vascular access  Anesthesia: local infiltration  Local anesthetic: lidocaine 1% without epinephrine  Anesthetic Total (mL): 2  Preparation: skin prepped with ChloraPrep  Skin prep agent dried: skin prep agent completely dried prior to procedure  Sterile barriers: all five maximum sterile barriers used - cap, mask, sterile gown, sterile gloves, and large sterile sheet  Hand hygiene: hand hygiene performed prior to central venous catheter insertion  Location details: left basilic  Catheter type: double lumen  Catheter size: 5 Fr  Catheter Length: 39cm    Ultrasound guidance: yes  Needle advanced into vessel with real time Ultrasound guidance.  Guidewire confirmed in vessel.  Sterile sheath used.  Number of attempts: 1  Post-procedure: blood return through all ports, chlorhexidine patch and sterile dressing applied    Assessment: placement verified by x-ray  Complications: none          Eddie Beasley  4/16/2019

## 2019-04-16 NOTE — PROGRESS NOTES
Vancomycin Dosing and Monitoring Pharmacy Protocol    Solo Black is a 38 y.o. male    Height: 6' (1.829 m)   Wt Readings from Last 3 Encounters:   04/16/19 92.5 kg (203 lb 14.8 oz)   11/05/18 86.2 kg (190 lb)   11/02/18 86.2 kg (190 lb)       Temp Readings from Last 3 Encounters:   04/16/19 97.7 °F (36.5 °C) (Axillary)   11/14/18 98 °F (36.7 °C) (Oral)   11/05/18 99.7 °F (37.6 °C) (Oral)      Lab Results   Component Value Date/Time    WBC 7.22 04/16/2019 03:57 AM    WBC 8.47 04/15/2019 05:27 AM    WBC 11.70 04/14/2019 08:00 AM      Lab Results   Component Value Date/Time    CREATININE 0.9 04/16/2019 03:57 AM    CREATININE 0.8 04/15/2019 05:27 AM    CREATININE 0.8 04/14/2019 08:00 AM      Lab Results   Component Value Date/Time    LACTATE 1.7 04/12/2019 06:47 PM    LACTATE 2.8 (H) 04/12/2019 11:33 AM    LACTATE 1.4 10/05/2018 12:35 AM       Serum creatinine: 0.9 mg/dL 04/16/19 0357  Estimated creatinine clearance: 124.5 mL/min    Antibiotics (From admission, onward)      Start     Stop Route Frequency Ordered    04/13/19 0430  vancomycin (VANCOCIN) 1,500 mg in dextrose 5 % 250 mL IVPB  (vancomycin IVPB)      -- IV Every 12 hours (non-standard times) 04/12/19 1523          Antifungals (From admission, onward)      None            Microbiology Results (last 7 days)       Procedure Component Value Units Date/Time    Culture, Anaerobe [[id number]] Collected:  04/15/19 1215    Order Status:  Completed Specimen:  Abscess from Foot, Left Updated:  04/16/19 0752     Anaerobic Culture Culture in progress    Blood culture [[id number]] Collected:  04/13/19 1716    Order Status:  Completed Specimen:  Blood Updated:  04/15/19 2012     Blood Culture, Routine No Growth to date     Blood Culture, Routine No Growth to date     Blood Culture, Routine No Growth to date    Blood culture [[id number]] Collected:  04/13/19 1705    Order Status:  Completed Specimen:  Blood Updated:  04/15/19 2012     Blood Culture, Routine No Growth to  date     Blood Culture, Routine No Growth to date     Blood Culture, Routine No Growth to date    Blood Culture #2 **CANNOT BE ORDERED STAT** [[id number]] Collected:  04/12/19 1215    Order Status:  Completed Specimen:  Blood from Peripheral, Hand, Right Updated:  04/15/19 2012     Blood Culture, Routine No Growth to date     Blood Culture, Routine No Growth to date     Blood Culture, Routine No Growth to date     Blood Culture, Routine No Growth to date    Gram stain [[id number]] Collected:  04/15/19 1215    Order Status:  Completed Specimen:  Abscess from Foot, Left Updated:  04/15/19 1948     Gram Stain Result No WBC's      Rare Gram positive cocci    Fungus culture [[id number]] Collected:  04/15/19 1215    Order Status:  Sent Specimen:  Abscess from Foot, Left Updated:  04/15/19 1641    Aerobic culture [[id number]] Collected:  04/15/19 1215    Order Status:  Sent Specimen:  Abscess from Foot, Left Updated:  04/15/19 1641    Blood Culture #1 **CANNOT BE ORDERED STAT** [[id number]] Collected:  04/12/19 1128    Order Status:  Completed Specimen:  Blood from Peripheral, Hand, Left Updated:  04/15/19 1010     Blood Culture, Routine Gram stain barby bottle: Gram positive cocci in clusters resembling Staph      Blood Culture, Routine Results called to and read back by: Neris Juarez RN  04/13/2019  13:06     Blood Culture, Routine --     STAPHYLOCOCCUS AUREUS  Susceptibility pending  ID consult required at [facility].              Indication/Target trough:   Diabetic foot infection (Target trough: 10-15 mcg/ml)    Hemodialysis:   N/A    Dosing Weight:   Wt Readings from Last 1 Encounters:   04/16/19 92.5 kg (203 lb 14.8 oz)       Last Vancomycin dose: 1500 mg   Date/Time given: 4/16 0528          Vancomycin level:  Recent Labs   Lab Result Units 04/16/19  0357   Vancomycin-Trough ug/mL 16.3     Recent Labs   Lab Result Units 04/14/19  0800 04/16/19  0357   Vancomycin, Random ug/mL  7.2  --    Vancomycin-Trough ug/mL  --  16.3       Per Protocol Initial/Adjustments Dosin. Initial/Adjustment Dose: NO CHANGE, continue the same Vancomycin dose of 1500mg q12hr  2. Vancomycin Re-assess in 3 days unless renal function worsens.      Pharmacy will continue to follow.    Please contact if you have any further questions. Thank you.    Omar Elliott, PharmD  [phone]

## 2019-04-16 NOTE — PLAN OF CARE
Problem: Adult Inpatient Plan of Care  Goal: Plan of Care Review  Outcome: Ongoing (interventions implemented as appropriate)  Plan of car reviewed patient verbalized understanding. Medications administered. IV antibiotics infused. Incsion and drainage on the left leg dressing intact, clean and dry. Safety maintained bed alarm on, bed in lowest position, call bell within reach. Will continue to monitor.

## 2019-04-16 NOTE — PROGRESS NOTES
[facility] Medicine Progress Note    Admitting Team: [facility] [place] Hospitalist Team A  Attending Physician: Jeramie Sandoval MD  Resident: Dr. Jerry Lerner  Intern: Dr. Randall Hernández    Subjective:     I&D done yesterday without event. He complains this morning of continued significant pain in his right foot. He denies nausea, vomiting, abdominal pain, shortness of breath.      Objective:     Last 24 Hour Vital Signs:  BP  Min: 113/73  Max: 156/85  Temp  Av.9 °F (36.6 °C)  Min: 97.2 °F (36.2 °C)  Max: 98.6 °F (37 °C)  Pulse  Av.4  Min: 64  Max: 94  Resp  Av.5  Min: 16  Max: 20  SpO2  Av.8 %  Min: 96 %  Max: 100 %  Weight  Av.5 kg (203 lb 14.8 oz)  Min: 92.5 kg (203 lb 14.8 oz)  Max: 92.5 kg (203 lb 14.8 oz)  I/O last 3 completed shifts:  In: 525 [P.O.:325; I.V.:200]  Out: 2450 [Urine:2450]    Physical Examination:  Gen:                Uncomfortable appearing, non-toxic  HENT:             Conjunctiva clear with no icterus or pallor   OP clear with excudates. MMM   EYES:             Conjunctivae normal. EOMI. PERRL.   NECK:             Normal ROM. Neck supple.   CV:                  RRR  Normal S1 & S2. No murmurs, rubs, gallops.    Pulses:           Radial, DP, and PT 2+ bilateral and symmetric  Cap refil <2 secs.  PULM:             CTAB. No respiratory distress. No wheezes, no rales.   ABD:               Normoactive BS. Soft, NTND. No rebound, no guarding, no mass.  SKIN:              Warm and dry.   Shallow Scattered Excoriations on exam including upper back, with linear excoriation to R back. Multiple punctate excoriations to forehead.  Extremity:        RLE unremarkable  LLE with marked erythema and swelling to foot especially over dorsum with tracking up to ankle and down to dorsal toes. Punctate 2x5mm escar on mid dorsal foot. Marked TTP at Dorsal foot. No fluctuance or purulent drainage. Improving in general but TTP  NEURO:          A&Ox4. MITALI. Face symmetric.   Psychiatric:    Normal mood  & affect.    Laboratory:  Laboratory Data Reviewed: yes  Recent Labs   Lab 04/14/19  0800 04/15/19  0527 04/16/19  0357   WBC 11.70 8.47 7.22   HGB 10.4* 10.8* 11.1*   HCT 34.3* 34.4* 36.2*    281 338   MCV 75* 74* 75*   RDW 20.8* 20.7* 20.5*   * 135* 138   K 3.3* 3.8 3.8    101 99   CO2 25 25 30*   BUN 9 13 13   CREATININE 0.8 0.8 0.9   GLU 93 100 105   PROT 5.8* 6.2 6.3   ALBUMIN 2.7* 2.8* 2.9*   BILITOT 0.4 0.4 0.3   AST 14 11 12   ALKPHOS 49* 45* 51*   ALT 18 18 21     WBC differential: 84% Granulocytes, 0% Bands, 8.9% Lymphocytes, 3.2% Monocytes, 3.4%Eosinophils, 0.1% Basophils    Microbiology Data Reviewed: yes  4/12/19: Blood cultures Staph Aureus 1/4 bottles  4/13/19: Blood cx NGTD     Other Results:  EKG (my interpretation):  None    Radiology Data Reviewed: yes  Pertinent Findings:  None new    Current Medications:     Infusions:       Scheduled:   cetirizine  10 mg Oral Daily    enoxaparin  40 mg Subcutaneous Daily    famotidine  20 mg Oral BID    lidocaine HCL 10 mg/ml (1%)  1 mL Intradermal Once    morphine  15 mg Oral Q12H    pantoprazole  40 mg Oral BID    paroxetine  20 mg Oral QAM    sucralfate  1 g Oral QID (AC & HS)    vancomycin (VANCOCIN) IVPB  1,500 mg Intravenous Q12H        PRN:  dextrose 50%, dextrose 50%, glucagon (human recombinant), glucose, glucose, HYDROcodone-acetaminophen, morphine, ondansetron, oxyCODONE, sodium chloride 0.9%    Antibiotics and Day Number of Therapy:  Vancomycin start 4/12  Aztreonam start 4/13-stopped 4/15  Metronidazole 4/13-stopped 4/15    Lines and Day Number of Therapy:  PIV    Assessment:     Solo Black is a 38 y.o. male with eosinophilic esophagitis, chronic anemia, PUD, ADHD, OCD/Anxiety who presents with 2 days of worsening foot pain and swelling 1 day post discharge from OSH with PIV site infection and cellulitis. Pt reports that his GI symptoms from his prior admission have completely resolved at this time. Now with  worsening cellulitis of LLE s/p PIV line.    Plan:     Sepsis due to staph aureus bacteremia and cellulitis   - Secondary to LL cellulitis.  - Blood cx on 4/12 +ve S.Aureus 1/4 bottles  - Sensitivities pending.  - Surveillance Blood cx until clear      LLE Cellulitis with possible abscess   - 1 days of progressive redness and erythema to LLE at prior PIV site from recent hospital discharge with fevers measured to 10, chills and progressive erythema and swelling   SIRS 1/4 (tachycardia 112)  qSOFA 0   - LLE XR without bony abnormality   - pt received clindamycin in ed x 1 dose  - RLE with swelling, US negative for DVT.  - initial lactate elevated 2.8 normalized with 1L bolus  - Blood cultures +ve for S. Aureus. Started on Vanc empirically.  - Random Vanc 4/14 is 7.2, continue with vanc and check trough today.  - Aztreonam and Flagyl added by night team due to fever, no need for gram -ve coverage at this time. Patient is not diabetic and not immunocompromised. Fever is expected until blood is clear. Stopped Aztreonam and flagyl.  - Surgery consulted for concern of abscess, I&D 4/16  - Continue with pain control.    Eosinophilic esophagitis  - recently discharged from [facility] for N/V, abdominal pain, BRBPR, c/w with patients past episodes of eosinophilic esophagitis  - home medications include pepcid AC 20mg qam, Protonix 20mg bid, loratadin 10mg qam,   - pt reports that he was treated with IV steroids, sulcrafate, protonix and had gradual resolution of his symptoms over 4 day hospital stay and discharge home with prednisone taper.   - will continue home medications.     PUD  - Home meds as above  - continue protonix, H2, H1 blocker     ASAD 2/2 Chronic blood loss Anemia  - microcytic anemia with H/H 10.1 with MCV 76  stable at his baseline since 2018  - profile in 2018  Fe 21  tibc 453  sat Iron 5  Xferrin 306  ferritin 7  folate 11.8  419  - repeat iron profile consistent with ASAD  -  consistent with iron deficiency anemia  - on home iron 325 bid  - will hold in setting of acute infection, will increase dose on discharge and refer to Hematology for IV iron.     Hypokalemia  - K low 2.6 at presentation given 40mEq in EDx1  - given mag 2mg and IV KCL 10mEQ q3hour   - monitor and replete PRN     ADD  - no aucte issues  - home medications Adderall 20mg bid (am and lunch)  - will hold acutely     Subclinical hyperthyroidism  - TSH 0.26, T4 0.91  - Need repeat at outpatient.    HCM  - flu and tetanus shot up to date  - has seen PCP once but has not followed up  - follows with Dr. Alaniz for psychiatry  - follows with Dr. Wall for GI  - lipid panel wnl  - A1C wnl     Code: Full  PPx: lovenox  Diet: regular   Dispo: pending sensitivities       Santiago Alves MD  [facility] Internal Medicine/Pediatrics PGY-3  4/16/2019  8:08 AM      [facility] Medicine Hospitalist Pager numbers:   [facility] Hospitalist Medicine Team A (Jaime/Smita): [phone]  [facility] Hospitalist Medicine Team B (Quintin/Francisca):  [phone]

## 2019-04-16 NOTE — CONSULTS
[facility] Infectious Disease Consult     Primary Team: Smita  Consultant Attending: Patti  Date of Admit: 4/12/2019    Reason for Consult     S. aureus bacteremia     History of Present Illness   Solo Black is a 38 y.o. male with a relevant history of recurrent MRSA abscesses eosinophilic esophagitis.     The patient presented to [facility] on 4/12/2019 with a primary complaint of left foot pain and swelling. Patient was admitted to a hospital in [place] last week for GI bleed related to his eosinophilic esophagitis. While he was there, he had a peripheral IV placed in his left foot because they were unable to find any other peripheral access. A few days after he was discharged and the IV was removed, he started experiencing pain and swelling in the left foot. He additionally noticed that the foot was very erythematous. The day of admission, he reports a fever to 102 and chills so he presented to the ED. He was admitted for cellulitis and started on vancomycin. Ultrasound on 4/14 confirmed a 1.2 cm abscess on the left foot which underwent I&D on 4/14. Culture from the abscess is positive for staph aureus and blood cultures from 4/12 with MRSA.     Patient reports recurrent MRSA abscesses since he was a teenager. He has required I&D of one on the right groin and one on the right hand. He reports he required intubation and ICU admission for MRSA bacteremia about 1.5 years ago but does not know the source. He denies IVDU. He has previously had nasal decontamination.     Review of Systems (positives in bold):  Constitutional: wt loss, fever, chills, night sweats, fatigue, malaise  HEENT: dysphonia, epistaxis, tinnitus, vertigo, otalgia, otorrhea, visual changes, lacrimation, changes in hearing, rhinorrhea, sore throat  Urogenital: frequency, nocturia, dysuria, hematuria, retention, incontinence, discharge  Neurological: headaches, syncope, weakness, numbness, paresthesia, dysequilibrium, falls, amnesia,  dysarthria, facial assymetry  Gastrointestinal: anorexia, nausea, vomiting, melena, hematochezia, abdominal pain, hematemesis, diarrhea, constipation, dyspepsia, dysphagia, odynophagia, dysgeusia  Respiratory: dyspnea, cough, sputum production, wheeze, hemoptysis, cyanosis, apnea  Integumentary: hypo/hyperpigmentation, rash, lesions, pruritus, alopecia, nail changes  Cardiovascular: chest pain, orthopnea, cyanosis, edema, claudication, syncope, palpitations  Hematological: bleeding, bruising, lymphadenopathy  Psych: insomnia, mood changes, hallucinations, anxiety  Reproductive: erectile dysfunction in men, abnormal uterine bleeding in women, changes in libido  Immune/Allergy: urticaria, localized pain/erythema/warmth/swelling  Musculoskeletal: arthralgia, myalgia, joint swelling, stiffness, wasting, deformity, weakness, back pain  Endocrine: gynecomastia, galactorrhea, weight gain, heat/cold intolerance, polyphagia, polydipsia, polyuria    Allergies:  Review of patient's allergies indicates:   Allergen Reactions    Nsaids (non-steroidal anti-inflammatory drug)      GI bleed    Ketamine      Severe dysphoria (bad trip)    Penicillins      Has taken cephalosporins without issue       Medications:   In-Hospital Scheduled Medications:   cetirizine  10 mg Oral Daily    enoxaparin  40 mg Subcutaneous Daily    famotidine  20 mg Oral BID    lidocaine HCL 10 mg/ml (1%)  1 mL Intradermal Once    morphine  30 mg Oral Q12H    pantoprazole  40 mg Oral BID    paroxetine  20 mg Oral QAM    sucralfate  1 g Oral QID (AC & HS)    vancomycin (VANCOCIN) IVPB  1,500 mg Intravenous Q12H      In-Hospital PRN Medications:  dextrose 50%, dextrose 50%, glucagon (human recombinant), glucose, glucose, HYDROcodone-acetaminophen, HYDROcodone-acetaminophen, morphine, ondansetron, sodium chloride 0.9%   In-Hospital IV Infusion Medications:     Home Medications:  Prior to Admission medications    Medication Sig Start Date End Date  Taking? Authorizing Provider   dextroamphetamine-amphetamine (ADDERALL) 20 mg tablet Take 1 tablet by mouth 2 (two) times daily before meals. Take before breakfast and lunch   Yes Historical Provider, MD   famotidine (PEPCID) 20 MG tablet Take 20 mg by mouth 2 (two) times daily.   Yes Historical Provider, MD   ferrous sulfate 325 mg (65 mg iron) Tab tablet Take 1 tablet (325 mg total) by mouth 2 (two) times daily. 9/5/18 9/5/19 Yes Stephane Weaver MD   loratadine (CLARITIN) 10 mg tablet Take 10 mg by mouth every morning.   Yes Historical Provider, MD   MULTIVIT-IRON-MIN-FOLIC ACID 3,500-18-0.4 UNIT-MG-MG ORAL CHEW Take 10 mg by mouth once daily.   Yes Historical Provider, MD   pantoprazole (PROTONIX) 40 MG tablet Take 1 tablet (40 mg total) by mouth 2 (two) times daily. 9/5/18 9/5/19 Yes Stephane Weaver MD   paroxetine (PAXIL) 20 MG tablet Take 20 mg by mouth every morning.   Yes Historical Provider, MD   predniSONE (DELTASONE) 20 MG tablet Take 20 mg by mouth 2 (two) times daily.   Yes Historical Provider, MD   promethazine (PHENERGAN) 12.5 MG Tab Take 25 mg by mouth every 6 (six) hours as needed (nausea/vomiting).   Yes Historical Provider, MD   sucralfate (CARAFATE) 1 gram tablet Take 1 g by mouth 4 (four) times daily before meals and nightly.   Yes Historical Provider, MD       Antibiotics and Day Number of Therapy:  Vancomycin    Past Medical History:  Past Medical History:   Diagnosis Date    C. difficile colitis feb 2014    postop of hand surgery    Eosinophilic esophagitis 3/11/2014    GERD (gastroesophageal reflux disease)     IBS (irritable bowel syndrome)     MRSA carrier     says multiple times nose swab was +    Nephrolithiasis apr 2014    bilateral punctate - never passed a stone    Urinary tract infection feb 2014    MRSA       Past Surgical History/ObGyn Hx if gender appropriate:  Past Surgical History:   Procedure Laterality Date    APPENDECTOMY      ARTHROSCOPY, SHOULDER, WITH DISTAL  CLAVICLE EXCISION Right 2018    Performed [facility]ia [facility] at [facility] OR    BACK SURGERY      CIRCUMCISION, PRIMARY      COLONOSCOPY N/A 2018    [facility]    drainage of abscess from hand      MRSA    drainage of abscess from R thigh      MRSA    EGD (ESOPHAGOGASTRODUODENOSCOPY) N/A 2018    Performed by Kolby Wall MD at [facility] ENDO    EGD (ESOPHAGOGASTRODUODENOSCOPY) Left 3/11/2014    [facility]    ESOPHAGOGASTRODUODENOSCOPY  3/11/2014    ESOPHAGOGASTRODUODENOSCOPY (EGD) N/A 2016    Performed by [facility] at [facility] ENDO    ESOPHAGOGASTRODUODENOSCOPY (EGD) N/A 3/17/2016    Performed by [facility] MD at [facility] ENDO    HAND SURGERY  2014    power saw fell on hand - laceration 3 tendons    INCISION AND DRAINAGE, LOWER EXTREMITY Left 4/15/2019    Performed by Troy Honeycutt MD at [facility] OR    SIGMOIDOSCOPY, FLEXIBLE N/A 2018    Performed by [facility]by Wall [facility] at [facility] ENDO     OB History   No data available      Family History:  Family History   Problem Relation Age of Onset    Urolithiasis Father     Celiac disease Sister     Hypertension Maternal Grandmother     Hypertension Maternal Grandfather        Social History:  Social History     Tobacco Use    Smoking status: Never Smoker    Smokeless tobacco: Never Used    Tobacco comment: Pt states he has smoked 1 or 2 cigarettes in his life.   Substance Use Topics    Alcohol use: Yes     Comment: occassionaly    Drug use: No       Tobacco: Denies    EtOH: Denies    Illicits: Denies    Lives: in [place]    Works: construction     Objective   Last 24 Hour Vital Signs:  BP  Min: 120/71  Max: 156/85  Temp  Av.2 °F (36.8 °C)  Min: 97.2 °F (36.2 °C)  Max: 99.9 °F (37.7 °C)  Pulse  Av.3  Min: 67  Max: 94  Resp  Av.3  Min: 16  Max: 20  SpO2  Av.1 %  Min: 96 %  Max: 100 %  Weight  Av.5 kg (203 lb  14.8 oz)  Min: 92.5 kg (203 lb 14.8 oz)  Max: 92.5 kg (203 lb 14.8 oz)  I/O's    Lines and Day Number of Therapy:  PIV    Physical Examination:  Examination  General: appears comfortable, not in distress  HEENT: no nasal discharge, normal dentition   Neck: no cervical lymphadenopathy  Cardiac: normal rate and rhythm, no murmurs  Pulmonary/Chest: normal breath sounds, no wheezes or crackles  GI/ Rectal: Abdomen soft non-tender, normal bowel sounds  : deferred  Msk: normal strength, no swelling   Vascular: 2+ pulses, cap refill <2sec  Lymph nodes: no lymphadenopathy  Skin/ Extremities: left foot post surgical bandage in place, no surrounding erythema or swelling   Neurology/ Mental status: AAOx4, moves all extremities symmetrically    Photo taken on admit 4/12        Laboratory:  CBC:   Lab Results   Component Value Date    WBC 7.22 04/16/2019    HGB 11.1 (L) 04/16/2019     04/16/2019    MCV 75 (L) 04/16/2019    RDW 20.5 (H) 04/16/2019       Estimated Creatinine Clearance: 124.5 mL/min (based on SCr of 0.9 mg/dL).    Other Lab data   Procal 0.14  HbA1c 5.3    Microbiology Data:  BCx (4/12) MRSA  BCx (4/13) no growth   Abscess gram stain (4/15) rare gram positive  Abscess culture (4/15) staph aureus, sensitivities pending    Other Results:  EKG  Qtc 439    Radiology:  TTE 4/15  Mild tricuspid regurgitation, no visible vegetations     Assessment     Solo Black is a 38 y.o. male with a history of recurrent MRSA abscesses presenting with left foot abscess following IV placement at outside hospital. Blood cultures positive for MRSA that have since cleared. Abscess culture with staph aureus (sensitivities pending) that was taken during I&D on 4/15. Currently on vancomycin.      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 We will follow.      Sarita Arteaga, HO-1  [facility] Internal Medicine  [facility] Infectious Disease Service

## 2019-04-16 NOTE — PLAN OF CARE
Problem: Adult Inpatient Plan of Care  Goal: Plan of Care Review  Outcome: Ongoing (interventions implemented as appropriate)     04/16/19 1796   Plan of Care Review   Plan of Care Reviewed With patient   POC reviewed with the pt, verbalized understanding.  AAOx3, VSS.  Complaint of pain on left foot pain, prn morphine and scheduled mscontin given.  No other distress or any other complaint of pain throughout night.  On continuous telemetry monitor, SR.  No ectopy noted.  IV antibiotic given.  Dressing on left foot maintained d/c/i.  Safety maintained, free of falls throughout shift.  Instructed to call for any assistance.  Will continue to monitor.

## 2019-04-17 LAB
ALBUMIN SERPL BCP-MCNC: 2.9 G/DL (ref 3.5–5.2)
ALP SERPL-CCNC: 48 U/L (ref 55–135)
ALT SERPL W/O P-5'-P-CCNC: 20 U/L (ref 10–44)
ANION GAP SERPL CALC-SCNC: 6 MMOL/L (ref 8–16)
AST SERPL-CCNC: 14 U/L (ref 10–40)
BACTERIA BLD CULT: NORMAL
BACTERIA SPEC AEROBE CULT: NORMAL
BASOPHILS # BLD AUTO: 0.01 K/UL (ref 0–0.2)
BASOPHILS NFR BLD: 0.1 % (ref 0–1.9)
BILIRUB SERPL-MCNC: 0.3 MG/DL (ref 0.1–1)
BUN SERPL-MCNC: 14 MG/DL (ref 6–20)
CALCIUM SERPL-MCNC: 9.1 MG/DL (ref 8.7–10.5)
CHLORIDE SERPL-SCNC: 103 MMOL/L (ref 95–110)
CO2 SERPL-SCNC: 28 MMOL/L (ref 23–29)
CREAT SERPL-MCNC: 1 MG/DL (ref 0.5–1.4)
CRP SERPL-MCNC: 7.4 MG/L (ref 0–8.2)
DIFFERENTIAL METHOD: ABNORMAL
EOSINOPHIL # BLD AUTO: 0.4 K/UL (ref 0–0.5)
EOSINOPHIL NFR BLD: 5.6 % (ref 0–8)
ERYTHROCYTE [DISTWIDTH] IN BLOOD BY AUTOMATED COUNT: 20.4 % (ref 11.5–14.5)
ERYTHROCYTE [SEDIMENTATION RATE] IN BLOOD BY WESTERGREN METHOD: 33 MM/HR (ref 0–10)
EST. GFR  (AFRICAN AMERICAN): >60 ML/MIN/1.73 M^2
EST. GFR  (NON AFRICAN AMERICAN): >60 ML/MIN/1.73 M^2
GLUCOSE SERPL-MCNC: 105 MG/DL (ref 70–110)
HCT VFR BLD AUTO: 36 % (ref 40–54)
HGB BLD-MCNC: 11 G/DL (ref 14–18)
LYMPHOCYTES # BLD AUTO: 1.8 K/UL (ref 1–4.8)
LYMPHOCYTES NFR BLD: 23 % (ref 18–48)
MCH RBC QN AUTO: 23 PG (ref 27–31)
MCHC RBC AUTO-ENTMCNC: 30.6 G/DL (ref 32–36)
MCV RBC AUTO: 75 FL (ref 82–98)
MONOCYTES # BLD AUTO: 1.4 K/UL (ref 0.3–1)
MONOCYTES NFR BLD: 17.6 % (ref 4–15)
NEUTROPHILS # BLD AUTO: 4.2 K/UL (ref 1.8–7.7)
NEUTROPHILS NFR BLD: 52.9 % (ref 38–73)
PLATELET # BLD AUTO: 303 K/UL (ref 150–350)
PMV BLD AUTO: 9.3 FL (ref 9.2–12.9)
POTASSIUM SERPL-SCNC: 4.1 MMOL/L (ref 3.5–5.1)
PROT SERPL-MCNC: 6.6 G/DL (ref 6–8.4)
RBC # BLD AUTO: 4.78 M/UL (ref 4.6–6.2)
SODIUM SERPL-SCNC: 137 MMOL/L (ref 136–145)
WBC # BLD AUTO: 7.84 K/UL (ref 3.9–12.7)

## 2019-04-17 PROCEDURE — 63600175 PHARM REV CODE 636 W HCPCS: Mod: JG | Performed by: STUDENT IN AN ORGANIZED HEALTH CARE EDUCATION/TRAINING PROGRAM

## 2019-04-17 PROCEDURE — 85652 RBC SED RATE AUTOMATED: CPT

## 2019-04-17 PROCEDURE — 86703 HIV-1/HIV-2 1 RESULT ANTBDY: CPT

## 2019-04-17 PROCEDURE — A4216 STERILE WATER/SALINE, 10 ML: HCPCS | Performed by: INTERNAL MEDICINE

## 2019-04-17 PROCEDURE — 63600175 PHARM REV CODE 636 W HCPCS: Performed by: INTERNAL MEDICINE

## 2019-04-17 PROCEDURE — 80053 COMPREHEN METABOLIC PANEL: CPT

## 2019-04-17 PROCEDURE — 94761 N-INVAS EAR/PLS OXIMETRY MLT: CPT

## 2019-04-17 PROCEDURE — 11000001 HC ACUTE MED/SURG PRIVATE ROOM

## 2019-04-17 PROCEDURE — 86140 C-REACTIVE PROTEIN: CPT

## 2019-04-17 PROCEDURE — 25000003 PHARM REV CODE 250: Performed by: INTERNAL MEDICINE

## 2019-04-17 PROCEDURE — 85025 COMPLETE CBC W/AUTO DIFF WBC: CPT

## 2019-04-17 PROCEDURE — 25000003 PHARM REV CODE 250: Performed by: STUDENT IN AN ORGANIZED HEALTH CARE EDUCATION/TRAINING PROGRAM

## 2019-04-17 PROCEDURE — 36415 COLL VENOUS BLD VENIPUNCTURE: CPT

## 2019-04-17 PROCEDURE — 63600175 PHARM REV CODE 636 W HCPCS: Performed by: STUDENT IN AN ORGANIZED HEALTH CARE EDUCATION/TRAINING PROGRAM

## 2019-04-17 RX ORDER — HYDROCODONE BITARTRATE AND ACETAMINOPHEN 7.5; 325 MG/1; MG/1
1 TABLET ORAL EVERY 6 HOURS PRN
Qty: 25 TABLET | Refills: 0 | Status: ON HOLD | OUTPATIENT
Start: 2019-04-17 | End: 2019-05-06 | Stop reason: HOSPADM

## 2019-04-17 RX ADMIN — ALTEPLASE 2 MG: 2.2 INJECTION, POWDER, LYOPHILIZED, FOR SOLUTION INTRAVENOUS at 05:04

## 2019-04-17 RX ADMIN — PAROXETINE HYDROCHLORIDE 20 MG: 10 TABLET, FILM COATED ORAL at 06:04

## 2019-04-17 RX ADMIN — ENOXAPARIN SODIUM 40 MG: 100 INJECTION SUBCUTANEOUS at 04:04

## 2019-04-17 RX ADMIN — SUCRALFATE 1 G: 1 TABLET ORAL at 06:04

## 2019-04-17 RX ADMIN — MORPHINE SULFATE 2 MG: 2 INJECTION, SOLUTION INTRAMUSCULAR; INTRAVENOUS at 11:04

## 2019-04-17 RX ADMIN — MORPHINE SULFATE 2 MG: 2 INJECTION, SOLUTION INTRAMUSCULAR; INTRAVENOUS at 08:04

## 2019-04-17 RX ADMIN — PANTOPRAZOLE SODIUM 40 MG: 40 TABLET, DELAYED RELEASE ORAL at 09:04

## 2019-04-17 RX ADMIN — MORPHINE SULFATE 2 MG: 2 INJECTION, SOLUTION INTRAMUSCULAR; INTRAVENOUS at 07:04

## 2019-04-17 RX ADMIN — VANCOMYCIN HYDROCHLORIDE 1500 MG: 1 INJECTION, POWDER, LYOPHILIZED, FOR SOLUTION INTRAVENOUS at 07:04

## 2019-04-17 RX ADMIN — SUCRALFATE 1 G: 1 TABLET ORAL at 11:04

## 2019-04-17 RX ADMIN — SUCRALFATE 1 G: 1 TABLET ORAL at 08:04

## 2019-04-17 RX ADMIN — MORPHINE SULFATE 30 MG: 15 TABLET, FILM COATED, EXTENDED RELEASE ORAL at 09:04

## 2019-04-17 RX ADMIN — SUCRALFATE 1 G: 1 TABLET ORAL at 04:04

## 2019-04-17 RX ADMIN — VANCOMYCIN HYDROCHLORIDE 1500 MG: 1 INJECTION, POWDER, LYOPHILIZED, FOR SOLUTION INTRAVENOUS at 05:04

## 2019-04-17 RX ADMIN — MORPHINE SULFATE 2 MG: 2 INJECTION, SOLUTION INTRAMUSCULAR; INTRAVENOUS at 04:04

## 2019-04-17 RX ADMIN — Medication 10 ML: at 05:04

## 2019-04-17 RX ADMIN — MORPHINE SULFATE 2 MG: 2 INJECTION, SOLUTION INTRAMUSCULAR; INTRAVENOUS at 03:04

## 2019-04-17 RX ADMIN — Medication 10 ML: at 06:04

## 2019-04-17 RX ADMIN — FAMOTIDINE 20 MG: 20 TABLET ORAL at 08:04

## 2019-04-17 RX ADMIN — FAMOTIDINE 20 MG: 20 TABLET ORAL at 09:04

## 2019-04-17 RX ADMIN — CETIRIZINE HYDROCHLORIDE 10 MG: 10 TABLET, FILM COATED ORAL at 09:04

## 2019-04-17 RX ADMIN — MORPHINE SULFATE 30 MG: 15 TABLET, FILM COATED, EXTENDED RELEASE ORAL at 08:04

## 2019-04-17 RX ADMIN — PANTOPRAZOLE SODIUM 40 MG: 40 TABLET, DELAYED RELEASE ORAL at 08:04

## 2019-04-17 RX ADMIN — Medication 10 ML: at 11:04

## 2019-04-17 NOTE — PLAN OF CARE
CM did confirm pt has appt arranged by [facility] for f/u on 5/1/19 at 9:30.  Weekly lab results should be faxed to Dr Lemons at [email or web address] at [phone].      Kadie Patel RN    [phone]

## 2019-04-17 NOTE — PLAN OF CARE
Pt has received teaching from Randall at [employer].  Medication will be delivered on tomorrow pending d/c.      Crutches have been ordered, will await authorization from Medicaid for delivery to room.  PT has been ordered for crutch teaching.      Pt awaiting MRI do be done.  Is aware primary team was attempting to d/c today, pt refused d/c due to need for IV pain med.  Informed pt that it would be difficult to get IV antibiotics delivered prior to dose tonight, would be leaving after scheduled infusion.  CM and primary team agree d/c will be held until tomorrow to allow more timely and safe d/c.    Kadie Patel RN    [phone]

## 2019-04-17 NOTE — PROGRESS NOTES
[facility] Infectious Disease Progress Note     Primary Team: Smita  Consultant Attending: Patti  Date of Admit: 4/12/2019    Summary of history   The patient presented to [facility] on 4/12/2019 with a primary complaint of left foot pain and swelling. Patient was admitted to a hospital in [place] last week for GI bleed related to his eosinophilic esophagitis. While he was there, he had a peripheral IV placed in his left foot because they were unable to find any other peripheral access. A few days after he was discharged and the IV was removed, he started experiencing pain and swelling in the left foot. He additionally noticed that the foot was very erythematous. The day of admission, he reports a fever to 102 and chills so he presented to the ED. He was admitted for cellulitis and started on vancomycin. Ultrasound on 4/14 confirmed a 1.2 cm abscess on the left foot which underwent I&D on 4/14. Culture from the abscess is positive for staph aureus and blood cultures from 4/12 with MRSA.      Interval events     Hemodynamically stable     Subjective     Reporting worsening pain on the abscess closest to his toes on his left foot.       Current Medications:     Infusions:       Scheduled:   cetirizine  10 mg Oral Daily    enoxaparin  40 mg Subcutaneous Daily    famotidine  20 mg Oral BID    lidocaine HCL 10 mg/ml (1%)  1 mL Intradermal Once    morphine  30 mg Oral Q12H    pantoprazole  40 mg Oral BID    paroxetine  20 mg Oral QAM    sodium chloride 0.9%  10 mL Intravenous Q6H    sucralfate  1 g Oral QID (AC & HS)    vancomycin (VANCOCIN) IVPB  1,500 mg Intravenous Q12H        PRN:  dextrose 50%, dextrose 50%, glucagon (human recombinant), glucose, glucose, HYDROcodone-acetaminophen, HYDROcodone-acetaminophen, morphine, ondansetron, sodium chloride 0.9%, Flushing PICC Protocol **AND** sodium chloride 0.9% **AND** sodium chloride 0.9%, DIPH,PERTUS (ADACEL),TETANUS PF VAC (ADULT)    Antibiotics and Day Number of  Therapy:  Vancomycin     Objective   Last 24 Hour Vital Signs:  BP  Min: 108/63  Max: 132/85  Temp  Av.5 °F (36.9 °C)  Min: 97.7 °F (36.5 °C)  Max: 99.9 °F (37.7 °C)  Pulse  Av.4  Min: 65  Max: 84  Resp  Av  Min: 14  Max: 18  SpO2  Av.4 %  Min: 97 %  Max: 100 %  Input/Output    Lines and Day Number of Therapy:  PICC    Physical Examination:  Examination  General: appears comfortable, not in distress  HEENT: no nasal discharge, normal dentition   Neck: no cervical lymphadenopathy  Cardiac: normal rate and rhythm, no murmurs  Pulmonary/Chest: normal breath sounds, no wheezes or crackles  GI/ Rectal: Abdomen soft non-tender, normal bowel sounds  : deferred  Msk: normal strength, no swelling   Vascular: 2+ pulses, cap refill <2sec  Lymph nodes: no lymphadenopathy  Skin/ Extremities: left foot post surgical bandage in place, no surrounding erythema or swelling   Neurology/ Mental status: AAOx4, moves all extremities symmetrically     Lab data:  CBC:   Lab Results   Component Value Date    WBC 7.84 2019    HGB 11.0 (L) 2019     2019    MCV 75 (L) 2019    RDW 20.4 (H) 2019       Estimated Creatinine Clearance: 112.1 mL/min (based on SCr of 1 mg/dL).    Other Lab data   Procal 0.14  HbA1c 5.3     Microbiology Data:  BCx () MRSA  BCx () no growth   Abscess gram stain (4/15) rare gram positive  Abscess culture (4/15) staph aureus, sensitivities pending     Other Results:  EKG  Qtc 439     Radiology:  TTE 4/15  Mild tricuspid regurgitation, no visible vegetations      Assessment     Solo Black is a 38 y.o. male with a history of recurrent MRSA abscesses presenting with left foot abscess following IV placement at outside hospital. Blood cultures positive for MRSA that have since cleared. Abscess culture with staph aureus (sensitivities pending) that was taken during I&D on 4/15. Currently on vancomycin.     Recommendations      MRSA  bacteremia  -recommend MRI of left foot given patient having considerable pain   -continue vancomycin, goal trough 15-20, will follow up on MRI to give final recommendations on length of antibiotics but will likely be 2 weeks for bacteremia  -pt will need weekly vancomycin trough, CMP, CBC, ESR, CRP. Please have the labs faxed to Dr. Hartman at [phone]  -will need to follow up with [facility] ID, appointment is May 1 at 9am    Thank you for this consult. We will follow.      Sarita Arteaga, HO-1  [facility] Internal Medicine  [facility] Infectious Disease Service

## 2019-04-17 NOTE — PROGRESS NOTES
[facility] Hospital Medicine Progress Note    Admitting Team: [facility] Hospitalist Team A  Attending Physician: Jeramie Sandoval MD  Resident: Dr. Jerry Lerner  Intern: Dr. Randall Hernández    Subjective:     I&D done yesterday without event. He complains this morning of continued significant pain in his right foot. He denies nausea, vomiting, abdominal pain, shortness of breath.      Objective:     Last 24 Hour Vital Signs:  BP  Min: 108/63  Max: 132/85  Temp  Av.6 °F (37 °C)  Min: 97.7 °F (36.5 °C)  Max: 99.9 °F (37.7 °C)  Pulse  Av.7  Min: 65  Max: 84  Resp  Av  Min: 14  Max: 18  SpO2  Av.6 %  Min: 97 %  Max: 100 %  I/O last 3 completed shifts:  In: 1140 [P.O.:640; IV Piggyback:500]  Out: 3700 [Urine:3700]    Physical Examination:  Gen:                Uncomfortable appearing, non-toxic  HENT:             Conjunctiva clear with no icterus or pallor   OP clear with excudates. MMM   EYES:             Conjunctivae normal. EOMI. PERRL.   NECK:             Normal ROM. Neck supple.   CV:                  RRR  Normal S1 & S2. No murmurs, rubs, gallops.    Pulses:           Radial, DP, and PT 2+ bilateral and symmetric  Cap refil <2 secs.  PULM:             CTAB. No respiratory distress. No wheezes, no rales.   ABD:               Normoactive BS. Soft, NTND. No rebound, no guarding, no mass.  SKIN:              Warm and dry.   Shallow Scattered Excoriations on exam including upper back, with linear excoriation to R back. Multiple punctate excoriations to forehead.  Extremity:        RLE unremarkable  LLE with wound drained over dorsum and erythema improved significantly and swelling to foot  NEURO:          A&Ox4. MITALI. Face symmetric.   Psychiatric:    Normal mood & affect.    Laboratory:  Laboratory Data Reviewed: yes  Recent Labs   Lab 04/15/19  0527 19  0357 19  0411   WBC 8.47 7.22 7.84   HGB 10.8* 11.1* 11.0*   HCT 34.4* 36.2* 36.0*    338 303   MCV 74* 75* 75*   RDW 20.7* 20.5* 20.4*   NA  135* 138 137   K 3.8 3.8 4.1    99 103   CO2 25 30* 28   BUN 13 13 14   CREATININE 0.8 0.9 1.0    105 105   PROT 6.2 6.3 6.6   ALBUMIN 2.8* 2.9* 2.9*   BILITOT 0.4 0.3 0.3   AST 11 12 14   ALKPHOS 45* 51* 48*   ALT 18 21 20     WBC differential: 84% Granulocytes, 0% Bands, 8.9% Lymphocytes, 3.2% Monocytes, 3.4%Eosinophils, 0.1% Basophils    Microbiology Data Reviewed: yes  4/12/19: Blood cultures MRSA 1/4 bottles  4/13/19: Blood cx NGTD     Other Results:  EKG (my interpretation):  None    Radiology Data Reviewed: yes  Pertinent Findings:  None new    Current Medications:     Infusions:       Scheduled:   cetirizine  10 mg Oral Daily    enoxaparin  40 mg Subcutaneous Daily    famotidine  20 mg Oral BID    lidocaine HCL 10 mg/ml (1%)  1 mL Intradermal Once    morphine  30 mg Oral Q12H    pantoprazole  40 mg Oral BID    paroxetine  20 mg Oral QAM    sodium chloride 0.9%  10 mL Intravenous Q6H    sucralfate  1 g Oral QID (AC & HS)    vancomycin (VANCOCIN) IVPB  1,500 mg Intravenous Q12H        PRN:  dextrose 50%, dextrose 50%, glucagon (human recombinant), glucose, glucose, HYDROcodone-acetaminophen, HYDROcodone-acetaminophen, morphine, ondansetron, sodium chloride 0.9%, Flushing PICC Protocol **AND** sodium chloride 0.9% **AND** sodium chloride 0.9%, DIPH,PERTUS (ADACEL),TETANUS PF VAC (ADULT)    Antibiotics and Day Number of Therapy:  Vancomycin start 4/12  Aztreonam start 4/13-stopped 4/15  Metronidazole 4/13-stopped 4/15    Lines and Day Number of Therapy:  PIV    Assessment:     Solo Black is a 38 y.o. male with eosinophilic esophagitis, chronic anemia, PUD, ADHD, OCD/Anxiety who presents with 2 days of worsening foot pain and swelling 1 day post discharge from OSH with PIV site infection and cellulitis. Pt reports that his GI symptoms from his prior admission have completely resolved at this time. Now with worsening cellulitis of LLE s/p PIV line.    Plan:     Sepsis due to staph  aureus bacteremia and cellulitis   - Secondary to LL cellulitis.  - Blood cx on 4/12 +ve MRSA 1/4  - Repeat cx negative.  - Continue with Vanc.  - Patient will need Vanc for 2 weeks from I&D 4/16. End date 4/30.  - PICC line placed, will consult case management for IV Abx.      LLE Cellulitis with possible abscess   - 1 days of progressive redness and erythema to LLE at prior PIV site from recent hospital discharge with fevers measured to 10, chills and progressive erythema and swelling   SIRS 1/4 (tachycardia 112)  qSOFA 0   - LLE XR without bony abnormality   - pt received clindamycin in ed x 1 dose then started on vanc empirically   - RLE with swelling, US negative for DVT.  - initial lactate elevated 2.8 normalized with 1L bolus  - Blood cultures +ve for MRSA 1/4. Repeat Blood cx NGTD.  - TTE with no vegetations.  - Vanc trough 16.3  - Patient s/p I&D on 4/16.  - ID consulted, patient will need IV vanc for at least 2 weeks. End date 4/30.  - Social work consulted to help with IV ABx.        Eosinophilic esophagitis  - recently discharged from [facility] for N/V, abdominal pain, BRBPR, c/w with patients past episodes of eosinophilic esophagitis  - home medications include pepcid AC 20mg qam, Protonix 20mg bid, loratadin 10mg qam,   - pt reports that he was treated with IV steroids, sulcrafate, protonix and had gradual resolution of his symptoms over 4 day hospital stay and discharge home with prednisone taper.   - will continue home medications.     PUD  - Home meds as above  - continue protonix, H2, H1 blocker     ASAD 2/2 Chronic blood loss Anemia  - microcytic anemia with H/H 10.1 with MCV 76  stable at his baseline since 2018  - repeat iron profile consistent with ID  - on home iron 325 bid  - will hold in setting of acute infection, will increase dose on discharge and refer to Hematology for IV iron.     Hypokalemia  - K low 2.6 at presentation given 40mEq in EDx1  - Replete as  needed.       ADD  - no aucte issues  - home medications Adderall 20mg bid (am and lunch)  - will hold acutely     Subclinical hyperthyroidism  - TSH 0.26, T4 0.91  - Need repeat at outpatient.    HCM  - flu and tetanus shot up to date  - has seen PCP once but has not followed up  - follows with Dr. Alaniz for psychiatry  - follows with Dr. Wall for GI  - lipid panel wnl  - A1C wnl       Code: Full  PPx: lovenox  Diet: regular   Dispo: Discharge to home with IV Abx today.        Jerry Lerner MD  [facility] Internal Medicine       [facility] Medicine Hospitalist Pager numbers:   [facility] Hospitalist Medicine Team A (Jaime/Smita): [phone]  [facility] Hospitalist Medicine Team B (Quintin/Francisca):  [phone]

## 2019-04-17 NOTE — PLAN OF CARE
VN note: VN attempted to round on patient to educate regarding CLABSI. VN unable to access room since monitor off, nursing notified.      04/17/19 1021   Type of Frequent Check   Type Patient Rounds;Other (see comments)  (VN Rounds, Monitor Off)

## 2019-04-17 NOTE — PLAN OF CARE
CM is aware pt will need home IV antibiotics.  Did discuss plan with pt, is capable of administering at home.  Referral has been sent to [facility], pt has no preference.  Will arrange ID and Surg f/u.    Kadie Patel RN    [phone]

## 2019-04-17 NOTE — PLAN OF CARE
Problem: Adult Inpatient Plan of Care  Goal: Plan of Care Review  Outcome: Ongoing (interventions implemented as appropriate)  Plan of care reviewed with patient. Patient remained in bed with bed alarm on, yellow socks (one), and call light within reach. Bed in lowest position, wheels locked and thing within reach. Vital signs remained stable. NSR on telemetry. PICC dressing intact. Foot dressing intact. Morphine given for pain with full relief. Contact precautions initiated. IV ABX given. Will continue to monitor.

## 2019-04-17 NOTE — PROGRESS NOTES
Progress Note  General Surgery   Admit Date: 4/12/2019   LOS: 5 days   SUBJECTIVE:   Follow-up For: Left Foot Cellulitis with abscess       Patient seen and examined this AM. Patient continues to endorses pain at left foot this Am. 4/10. Erythema improved. No purulent drainage at site. Pain is controlled. Denies any fevers, chills, n/v, chest pain or SOB.      Review of Systems   Constitutional: Negative for chills and fever.   HENT: Negative for sore throat.    Eyes: Negative for double vision.   Respiratory: Negative for cough.    Cardiovascular: Negative for chest pain and palpitations.   Gastrointestinal: Negative for nausea and vomiting.   Genitourinary: Negative for dysuria and urgency.   Musculoskeletal: Negative for myalgias.        Foot pain      Skin: Negative for itching and rash.   Neurological: Negative for headaches.   Endo/Heme/Allergies: Does not bruise/bleed easily.   Psychiatric/Behavioral: The patient is not nervous/anxious.        OBJECTIVE:   Vital Signs (Most Recent)  Temp: 98.3 °F (36.8 °C) (04/17/19 0729)  Pulse: 79 (04/17/19 0800)  Resp: 16 (04/17/19 0729)  BP: 116/71 (04/17/19 0729)  SpO2: 99 % (04/17/19 0729)    I & O (Last 24H):    Intake/Output Summary (Last 24 hours) at 4/17/2019 1134  Last data filed at 4/17/2019 0700  Gross per 24 hour   Intake 610 ml   Output 3000 ml   Net -2390 ml     Wt Readings from Last 3 Encounters:   04/16/19 92.5 kg (203 lb 14.8 oz)   11/05/18 86.2 kg (190 lb)   11/02/18 86.2 kg (190 lb)       Current Diet Order   Procedures    Diet Adult Regular (IDDSI Level 7)        Physical Exam   Constitutional: He is oriented to person, place, and time and well-developed, well-nourished, and in no distress.   HENT:   Head: Normocephalic.   Eyes: Pupils are equal, round, and reactive to light. EOM are normal.   Neck: Normal range of motion. Neck supple.   Cardiovascular: Normal rate, regular rhythm and normal heart sounds.   Pulmonary/Chest: Effort normal and breath  sounds normal.   Abdominal: Soft. Bowel sounds are normal.   Musculoskeletal: Normal range of motion. He exhibits tenderness.   I&D sites are clean, dry and intact. No notable drainage. Some tenderness along the plantar aspect of the left foot below the 3rd phalange. No edema.     Neurological: He is alert and oriented to person, place, and time.   Skin: Skin is warm and dry.   Psychiatric: Affect normal.     Laboratory Data:  CBC  Recent Labs   Lab 04/15/19  0527 04/16/19  0357 04/17/19  0411   WBC 8.47 7.22 7.84   RBC 4.63 4.84 4.78   HGB 10.8* 11.1* 11.0*   HCT 34.4* 36.2* 36.0*    338 303   MCV 74* 75* 75*   MCH 23.3* 22.9* 23.0*   MCHC 31.4* 30.7* 30.6*     CMP  Recent Labs   Lab 04/15/19  0527 04/16/19  0357 04/17/19  0411   CALCIUM 8.8 8.8 9.1   PROT 6.2 6.3 6.6   * 138 137   K 3.8 3.8 4.1   CO2 25 30* 28    99 103   BUN 13 13 14   CREATININE 0.8 0.9 1.0   ALKPHOS 45* 51* 48*   ALT 18 21 20   AST 11 12 14   BILITOT 0.4 0.3 0.3     POCT-Glucose  No results found for: POCTGLUCOSE  COAGS  No results for input(s): PT, INR, APTT in the last 168 hours.  UA  No results for input(s): COLORU, CLARITYU, SPECGRAV, PHUR, PROTEINUA, GLUCOSEU, BLOODU, WBCU, RBCU, BACTERIA, MUCUS in the last 24 hours.    Invalid input(s):  BILIRUBINCON  MICRO  Microbiology Results (last 7 days)     Procedure Component Value Units Date/Time    Fungus culture [[id number]] Collected:  04/15/19 1215    Order Status:  Completed Specimen:  Abscess from Foot, Left Updated:  04/17/19 1102     Fungus (Mycology) Culture Culture in progress    Blood culture [[id number]] Collected:  04/13/19 1705    Order Status:  Completed Specimen:  Blood Updated:  04/16/19 2012     Blood Culture, Routine No Growth to date     Blood Culture, Routine No Growth to date     Blood Culture, Routine No Growth to date     Blood Culture, Routine No Growth to date    Blood culture [[id number]] Collected:  04/13/19 0826    Order Status:  Completed Specimen:   Blood Updated:  04/16/19 2012     Blood Culture, Routine No Growth to date     Blood Culture, Routine No Growth to date     Blood Culture, Routine No Growth to date     Blood Culture, Routine No Growth to date    Blood Culture #2 **CANNOT BE ORDERED STAT** [[id number]] Collected:  04/12/19 1215    Order Status:  Completed Specimen:  Blood from Peripheral, Hand, Right Updated:  04/16/19 2012     Blood Culture, Routine No Growth to date     Blood Culture, Routine No Growth to date     Blood Culture, Routine No Growth to date     Blood Culture, Routine No Growth to date     Blood Culture, Routine No Growth to date    Blood Culture #1 **CANNOT BE ORDERED STAT** [[id number]]  (Susceptibility) Collected:  04/12/19 1128    Order Status:  Completed Specimen:  Blood from Peripheral, Hand, Left Updated:  04/16/19 1330     Blood Culture, Routine Gram stain barby bottle: Gram positive cocci in clusters resembling Staph      Blood Culture, Routine Results called to and read back by: Neris Juarez RN  04/13/2019  13:06     Blood Culture, Routine --     METHICILLIN RESISTANT STAPHYLOCOCCUS AUREUS  ID consult required at [facility] and [facility]   locations.      Aerobic culture [[id number]] Collected:  04/15/19 1215    Order Status:  Completed Specimen:  Abscess from Foot, Left Updated:  04/16/19 1305     Aerobic Bacterial Culture --     STAPHYLOCOCCUS AUREUS  Few  Susceptibility pending      Culture, Anaerobe [[id number]] Collected:  04/15/19 1215    Order Status:  Completed Specimen:  Abscess from Foot, Left Updated:  04/16/19 0752     Anaerobic Culture Culture in progress    Gram stain [[id number]] Collected:  04/15/19 1215    Order Status:  Completed Specimen:  Abscess from Foot, Left Updated:  04/15/19 1948     Gram Stain Result No WBC's      Rare Gram positive cocci        LIPID PANEL  Lab Results   Component Value Date    CHOL 125 04/13/2019     Lab Results   Component Value Date    HDL 48 04/13/2019     Lab  Results   Component Value Date    LDLCALC 54.6 (L) 04/13/2019     Lab Results   Component Value Date    TRIG 112 04/13/2019     Lab Results   Component Value Date    CHOLHDL 38.4 04/13/2019       Diagnostic Results:  No new imaging     ASSESSMENT/PLAN:   Solo Black is a 38 y.o. male with Left foot cellulitis s/p I&D on 4/16. Foot with reduced erythema. Minimal drainage. Left foot improving.       Plan:  -Will order Xray of the left foot    -Dressing changed at bedside this AM   -Pain control per primary team   -ABx as per ID recs    Discussed with Dr. Yinka Horta MD  [facility], PGY-1

## 2019-04-17 NOTE — PROGRESS NOTES
.Pharmacy New Medication Education    Patient accepted medication education.    Pharmacy educated patient on name and purpose of medications and possible side effects, using the teach-back method.     Current Inpatient Medication Orders     D/C Current Inpatient Medication Orders Link Status Route Frequency PRN Reason Start End   D/C cetirizine tablet 10 mg  Dispensed Oral Daily  04/13 0900     D/C dextrose 50% injection 12.5 g  Dispensed IV As needed (PRN) For blood glucose 04/12 1525     D/C dextrose 50% injection 25 g  Dispensed IV As needed (PRN) For blood glucose 04/12 1525     D/C enoxaparin injection 40 mg  Dispensed SubQ Daily  04/12 1700     D/C famotidine tablet 20 mg  Dispensed Oral 2 times daily  04/12 2100     D/C glucagon (human recombinant) injection 1 mg  Verified IM As needed (PRN) Blood Glucose less than 04/12 1525     D/C glucose chewable tablet 16 g  Verified Oral As needed (PRN) For blood glucose 04/12 1525     D/C glucose chewable tablet 24 g  Verified Oral As needed (PRN) Blood Glucose less than 04/12 1525     D/C HYDROcodone-acetaminophen 5-325 mg per tablet 1 tablet  Dispensed Oral Every 4 hours PRN moderate pain 4-6/10 pain scale 04/12 1533     D/C HYDROcodone-acetaminophen 7.5-325 mg per tablet 1 tablet  Dispensed Oral Every 4 hours PRN moderate pain 4-6/10 pain scale, severe pain 7-10/10 pain scale 04/16 1246     D/C lidocaine HCL 10 mg/ml (1%) injection 1 mL  Verified IDrm Once  04/14 2215     D/C morphine 12 hr tablet 30 mg  Dispensed Oral Every 12 hours  04/16 2100     D/C morphine injection 2 mg  Dispensed IV Every 4 hours PRN severe pain 7-10/10 pain scale 04/14 0155     D/C ondansetron injection 4 mg  Verified IV Daily PRN Nausea/Vomiting (1st choice) - use as first treatment 04/15 1225     D/C pantoprazole EC tablet 40 mg  Dispensed Oral 2 times daily  04/12 2100     D/C paroxetine tablet 20 mg  Dispensed Oral Every morning  04/13 0700     D/C sodium chloride 0.9% flush 10 mL   Verified IV As needed (PRN) Line Care 04/12 1525     D/C sodium chloride 0.9% flush 10 mL L1 Verified IV Every 6 hours  04/16 1800     D/C sodium chloride 0.9% flush 10 mL L1 Verified IV As needed (PRN) Line Care 04/16 1749     D/C sucralfate tablet 1 g  Dispensed Oral Before meals & nightly  04/12 2145     D/C Tdap vaccine injection 0.5 mL  Verified IM vaccine x 1 dose Meets Vaccination Criteria 04/16 1947     D/C vancomycin (VANCOCIN) 1,500 mg in dextrose 5 % 250 mL IVPB  Dispensed IV Every 12 hours (non-standard times)  04/13 0430        Learners of pharmacy medication education included:  Patient    Patient +/- learner response:  Verbalized Understanding, Teachback

## 2019-04-18 VITALS
HEART RATE: 79 BPM | TEMPERATURE: 98 F | RESPIRATION RATE: 18 BRPM | WEIGHT: 203.94 LBS | OXYGEN SATURATION: 100 % | SYSTOLIC BLOOD PRESSURE: 138 MMHG | BODY MASS INDEX: 27.62 KG/M2 | DIASTOLIC BLOOD PRESSURE: 88 MMHG | HEIGHT: 72 IN

## 2019-04-18 LAB
ALBUMIN SERPL BCP-MCNC: 2.9 G/DL (ref 3.5–5.2)
ALP SERPL-CCNC: 50 U/L (ref 55–135)
ALT SERPL W/O P-5'-P-CCNC: 20 U/L (ref 10–44)
ANION GAP SERPL CALC-SCNC: 8 MMOL/L (ref 8–16)
AST SERPL-CCNC: 13 U/L (ref 10–40)
BACTERIA BLD CULT: NORMAL
BACTERIA BLD CULT: NORMAL
BASOPHILS # BLD AUTO: 0.01 K/UL (ref 0–0.2)
BASOPHILS NFR BLD: 0.1 % (ref 0–1.9)
BILIRUB SERPL-MCNC: 0.2 MG/DL (ref 0.1–1)
BUN SERPL-MCNC: 13 MG/DL (ref 6–20)
CALCIUM SERPL-MCNC: 9.3 MG/DL (ref 8.7–10.5)
CHLORIDE SERPL-SCNC: 100 MMOL/L (ref 95–110)
CO2 SERPL-SCNC: 30 MMOL/L (ref 23–29)
CREAT SERPL-MCNC: 0.9 MG/DL (ref 0.5–1.4)
DIFFERENTIAL METHOD: ABNORMAL
EOSINOPHIL # BLD AUTO: 0.3 K/UL (ref 0–0.5)
EOSINOPHIL NFR BLD: 4.6 % (ref 0–8)
ERYTHROCYTE [DISTWIDTH] IN BLOOD BY AUTOMATED COUNT: 20.3 % (ref 11.5–14.5)
EST. GFR  (AFRICAN AMERICAN): >60 ML/MIN/1.73 M^2
EST. GFR  (NON AFRICAN AMERICAN): >60 ML/MIN/1.73 M^2
GLUCOSE SERPL-MCNC: 103 MG/DL (ref 70–110)
HCT VFR BLD AUTO: 35.1 % (ref 40–54)
HGB BLD-MCNC: 10.6 G/DL (ref 14–18)
HIV 1+2 AB+HIV1 P24 AG SERPL QL IA: NEGATIVE
LYMPHOCYTES # BLD AUTO: 1.7 K/UL (ref 1–4.8)
LYMPHOCYTES NFR BLD: 24.3 % (ref 18–48)
MCH RBC QN AUTO: 22.8 PG (ref 27–31)
MCHC RBC AUTO-ENTMCNC: 30.2 G/DL (ref 32–36)
MCV RBC AUTO: 76 FL (ref 82–98)
MONOCYTES # BLD AUTO: 1.3 K/UL (ref 0.3–1)
MONOCYTES NFR BLD: 18.1 % (ref 4–15)
NEUTROPHILS # BLD AUTO: 3.6 K/UL (ref 1.8–7.7)
NEUTROPHILS NFR BLD: 51.7 % (ref 38–73)
PLATELET # BLD AUTO: 278 K/UL (ref 150–350)
PMV BLD AUTO: 9.1 FL (ref 9.2–12.9)
POTASSIUM SERPL-SCNC: 3.8 MMOL/L (ref 3.5–5.1)
PROT SERPL-MCNC: 6.6 G/DL (ref 6–8.4)
RBC # BLD AUTO: 4.64 M/UL (ref 4.6–6.2)
SODIUM SERPL-SCNC: 138 MMOL/L (ref 136–145)
WBC # BLD AUTO: 6.9 K/UL (ref 3.9–12.7)

## 2019-04-18 PROCEDURE — 80053 COMPREHEN METABOLIC PANEL: CPT

## 2019-04-18 PROCEDURE — 25000003 PHARM REV CODE 250: Performed by: STUDENT IN AN ORGANIZED HEALTH CARE EDUCATION/TRAINING PROGRAM

## 2019-04-18 PROCEDURE — 25000003 PHARM REV CODE 250: Performed by: INTERNAL MEDICINE

## 2019-04-18 PROCEDURE — A4216 STERILE WATER/SALINE, 10 ML: HCPCS | Performed by: INTERNAL MEDICINE

## 2019-04-18 PROCEDURE — 63600175 PHARM REV CODE 636 W HCPCS: Performed by: INTERNAL MEDICINE

## 2019-04-18 PROCEDURE — 85025 COMPLETE CBC W/AUTO DIFF WBC: CPT

## 2019-04-18 PROCEDURE — 97161 PT EVAL LOW COMPLEX 20 MIN: CPT

## 2019-04-18 PROCEDURE — 94761 N-INVAS EAR/PLS OXIMETRY MLT: CPT

## 2019-04-18 PROCEDURE — 63600175 PHARM REV CODE 636 W HCPCS: Performed by: STUDENT IN AN ORGANIZED HEALTH CARE EDUCATION/TRAINING PROGRAM

## 2019-04-18 RX ADMIN — MORPHINE SULFATE 30 MG: 15 TABLET, FILM COATED, EXTENDED RELEASE ORAL at 09:04

## 2019-04-18 RX ADMIN — Medication 10 ML: at 06:04

## 2019-04-18 RX ADMIN — MORPHINE SULFATE 2 MG: 2 INJECTION, SOLUTION INTRAMUSCULAR; INTRAVENOUS at 03:04

## 2019-04-18 RX ADMIN — CETIRIZINE HYDROCHLORIDE 10 MG: 10 TABLET, FILM COATED ORAL at 09:04

## 2019-04-18 RX ADMIN — MORPHINE SULFATE 2 MG: 2 INJECTION, SOLUTION INTRAMUSCULAR; INTRAVENOUS at 08:04

## 2019-04-18 RX ADMIN — VANCOMYCIN HYDROCHLORIDE 1500 MG: 1 INJECTION, POWDER, LYOPHILIZED, FOR SOLUTION INTRAVENOUS at 06:04

## 2019-04-18 RX ADMIN — FAMOTIDINE 20 MG: 20 TABLET ORAL at 09:04

## 2019-04-18 RX ADMIN — PANTOPRAZOLE SODIUM 40 MG: 40 TABLET, DELAYED RELEASE ORAL at 09:04

## 2019-04-18 RX ADMIN — HYDROCODONE BITARTRATE AND ACETAMINOPHEN 1 TABLET: 7.5; 325 TABLET ORAL at 10:04

## 2019-04-18 RX ADMIN — SUCRALFATE 1 G: 1 TABLET ORAL at 06:04

## 2019-04-18 RX ADMIN — PAROXETINE HYDROCHLORIDE 20 MG: 10 TABLET, FILM COATED ORAL at 06:04

## 2019-04-18 NOTE — PROGRESS NOTES
.Pharmacy New Medication Education    Patient accepted medication education.    Pharmacy educated patient on name and purpose of medications and possible side effects, using the teach-back method.     [place]    Learners of pharmacy medication education included:  Patient    Patient +/- learner response:  Verbalized Understanding, Teachback

## 2019-04-18 NOTE — PT/OT/SLP PROGRESS
Physical Therapy Crutch Evaluation/Training  Discharge  Solo Black   MRN: [id number]   Admitting Diagnosis: Staphylococcus aureus bacteremia with sepsis                          Billable Minutes:  Evaluation 10     Order: gait training with axillary crutches  Order Date: 4/18/2019    Precautions Weight Bearing Status: non weight bearing: left leg    Patient Active Problem List   Diagnosis    Hematemesis    Eosinophilic esophagitis    PUD (peptic ulcer disease)    Erosive gastritis    Lifetime need for proton pump inhibitor    Melena    Hypokalemia    Abdominal pain    Cervicalgia    Adult ADHD    Adverse reaction to nonsteroidal anti-inflammatory drug (NSAID)    Bright red blood per rectum    Therapeutic opioid induced constipation    Acute upper GI bleed    Acute diarrhea    Epigastric pain    Iron deficiency anemia    Eosinophilia    GI bleed    Upper GI bleed    Reactive arthritis    Incomplete rotator cuff tear or rupture of right shoulder, not specified as traumatic    Cellulitis    Staphylococcus aureus bacteremia with sepsis    Cellulitis and abscess of foot    MRSA bacteremia     Past Medical History:   Diagnosis Date    C. difficile colitis feb 2014    postop of hand surgery    Eosinophilic esophagitis 3/11/2014    GERD (gastroesophageal reflux disease)     IBS (irritable bowel syndrome)     MRSA carrier     says multiple times nose swab was +    Nephrolithiasis apr 2014    bilateral punctate - never passed a stone    Urinary tract infection feb 2014    MRSA     Past Surgical History:   Procedure Laterality Date    APPENDECTOMY      ARTHROSCOPY, SHOULDER, WITH DISTAL CLAVICLE EXCISION Right 11/1/2018    [facility]    BACK SURGERY      CIRCUMCISION, PRIMARY      COLONOSCOPY N/A 11/14/2018    [facility]    drainage of abscess from hand  2009    MRSA    drainage of abscess from R thigh  2006     MRSA    EGD (ESOPHAGOGASTRODUODENOSCOPY) N/A 9/5/2018    Performed [facility] at [facility] ENDO    EGD (ESOPHAGOGASTRODUODENOSCOPY) Left 3/11/2014    [facility]    ESOPHAGOGASTRODUODENOSCOPY  3/11/2014    ESOPHAGOGASTRODUODENOSCOPY (EGD) N/A 7/21/2016    [facility] at [facility] ENDO    ESOPHAGOGASTRODUODENOSCOPY (EGD) N/A 3/17/2016    Per[facility] at [facility] ENDO    HAND SURGERY  jan 2014    power saw fell on hand - laceration 3 tendons    INCISION AND DRAINAGE, LOWER EXTREMITY Left 4/15/2019    Performed by Troy [facility]cutt [facility] at [facility] OR    SIGMOIDOSCOPY, FLEXIBLE N/A 9/5/2018    Performed by Kol[facility] Wall [facility] at [facility] ENDO       Subjective Information     Prior level of function: independent  Residence: lives with their family 1-story house/ trailer   Support available: family  Equipment owned: Crutches, Type: axillary  Mental Status: Oriented X 4  Skin: Intact  Sensation: Intact  Posture: Good  Hearing: Intact  Vision:  Intact    Pain at time of assessment: 2/10 located in left leg, aggravated by activity, relieved by rest.    Objective findings/Assessment  Bed mobility: Mod I  Transfers: Mod I  Gross assessment: WFL  Standing balance: good with AD  ROM: WFL except ankle and foot  Strength: WFL except ankle and foot na  Patient requires crutch fitting and training.    Treatment  Gait: Mod I with axillary crutches   Stairs: teach back method  Transfers: na  Therapeutic Exercise: na  Education provided in form of: stair climbing    AM-PAC 6 CLICK MOBILITY  How much help from another person does this patient currently need?   1 = Unable, Total/Dependent Assistance  2 = A lot, Maximum/Moderate Assistance  3 = A little, Minimum/Contact Guard/Supervision  4 = None, Modified [place]/Independent          AM-PAC Raw Score CMS G-Code Modifier Level of Impairment Assistance   6 % Total / Unable   7 - 9 CM 80 - 100%  Maximal Assist   10 - 14 CL 60 - 80% Moderate Assist   15 - 19 CK 40 - 60% Moderate Assist   20 - 22 CJ 20 - 40% Minimal Assist   23 CI 1-20% SBA / CGA   24 CH 0% Independent/ Mod I     Goals/Discharge Status  Patient safely and effectively ambulates with crutches with  modified independent,  non weight bearing: left leg on level surfaces.    Recommended Plan:  Patient to be discharged to home with family support.    Kolby Feliciano, PT  04/18/2019

## 2019-04-18 NOTE — PROGRESS NOTES
[facility] Infectious Disease Progress Note     Primary Team: Smita  Consultant Attending: Patti  Date of Admit: 4/12/2019    Summary of history   The patient presented to [facility] on 4/12/2019 with a primary complaint of left foot pain and swelling. Patient was admitted to a hospital in [place] last week for GI bleed related to his eosinophilic esophagitis. While he was there, he had a peripheral IV placed in his left foot because they were unable to find any other peripheral access. A few days after he was discharged and the IV was removed, he started experiencing pain and swelling in the left foot. He additionally noticed that the foot was very erythematous. The day of admission, he reports a fever to 102 and chills so he presented to the ED. He was admitted for cellulitis and started on vancomycin. Ultrasound on 4/14 confirmed a 1.2 cm abscess on the left foot which underwent I&D on 4/14. Culture from the abscess is positive for staph aureus and blood cultures from 4/12 with MRSA.      Interval events     Hemodynamically stable, MRI without osteomyelitis or abscess    Subjective     Reports improvement in his pain. Remains afebrile.       Current Medications:     Infusions:       Scheduled:   cetirizine  10 mg Oral Daily    enoxaparin  40 mg Subcutaneous Daily    famotidine  20 mg Oral BID    lidocaine HCL 10 mg/ml (1%)  1 mL Intradermal Once    morphine  30 mg Oral Q12H    pantoprazole  40 mg Oral BID    paroxetine  20 mg Oral QAM    sodium chloride 0.9%  10 mL Intravenous Q6H    sucralfate  1 g Oral QID (AC & HS)    vancomycin (VANCOCIN) IVPB  1,500 mg Intravenous Q12H        PRN:  dextrose 50%, dextrose 50%, glucagon (human recombinant), glucose, glucose, HYDROcodone-acetaminophen, HYDROcodone-acetaminophen, morphine, ondansetron, sodium chloride 0.9%, Flushing PICC Protocol **AND** sodium chloride 0.9% **AND** sodium chloride 0.9%, DIPH,PERTUS (ADACEL),TETANUS PF VAC (ADULT)    Antibiotics and Day  Number of Therapy:  Vancomycin     Objective   Last 24 Hour Vital Signs:  BP  Min: 118/71  Max: 140/75  Temp  Av.1 °F (36.2 °C)  Min: 96.3 °F (35.7 °C)  Max: 99 °F (37.2 °C)  Pulse  Av.5  Min: 62  Max: 100  Resp  Av.2  Min: 14  Max: 19  SpO2  Av.2 %  Min: 97 %  Max: 99 %  Input/Output    Lines and Day Number of Therapy:  PICC    Physical Examination:  Examination  General: appears comfortable, not in distress  HEENT: no nasal discharge, normal dentition   Neck: no cervical lymphadenopathy  Cardiac: normal rate and rhythm, no murmurs  Pulmonary/Chest: normal breath sounds, no wheezes or crackles  GI/ Rectal: Abdomen soft non-tender, normal bowel sounds  : deferred  Msk: normal strength, no swelling   Vascular: 2+ pulses, cap refill <2sec  Lymph nodes: no lymphadenopathy  Skin/ Extremities: left foot post surgical bandage in place, no surrounding erythema or swelling   Neurology/ Mental status: AAOx4, moves all extremities symmetrically     Lab data:  CBC:   Lab Results   Component Value Date    WBC 6.90 2019    HGB 10.6 (L) 2019     2019    MCV 76 (L) 2019    RDW 20.3 (H) 2019       Estimated Creatinine Clearance: 124.5 mL/min (based on SCr of 0.9 mg/dL).    Other Lab data   Procal 0.14  HbA1c 5.3     Microbiology Data:  BCx () MRSA  BCx () no growth   Abscess gram stain (4/15) rare gram positive  Abscess culture (4/15) staph aureus, sensitivities pending     Other Results:  EKG  Qtc 439     Radiology:  TTE 4/15  Mild tricuspid regurgitation, no visible vegetations      Assessment     Solo Black is a 38 y.o. male with a history of recurrent MRSA abscesses presenting with left foot abscess following IV placement at outside hospital. Blood cultures positive for MRSA that have since cleared. Abscess culture with staph aureus (sensitivities pending) that was taken during I&D on 4/15. Currently on vancomycin. MRI without osteomyelitis or abscess.      Recommendations      MRSA bacteremia  -IV vancomycin for 2 weeks from I&D, end date 4/30  -pt will need weekly vancomycin trough, CMP, CBC, ESR, CRP. Please have the labs faxed to Dr. Hartman at [phone]  -will need to follow up with [facility], appointment is May 1 at 9am    Thank you for this consult. We will sign off.      Sarita Arteaga, HO-1  [facility]  [facility]

## 2019-04-18 NOTE — PLAN OF CARE
Pt has received crutches, therapy to come by for teaching.  Pt has been accepted by [facility]  for nursing care to begin on Saturday.  [facility] has accepted pt, teaching done, awaiting medication delivery to room between 10:30-11:30.  Team to round on pt and d/c today, pt anxiously awaiting d/c.  He has transportation home.  appts are in AVS.        Kadie Patel RN    [phone]

## 2019-04-18 NOTE — PLAN OF CARE
D/C rounds complete. All questions answered.  Nurse to discuss d/c medications.  Discussed need to keep f/u appts, adherence to medication regimen for health maintenance, verbalized understanding.    Pt has received crutches and training by physical therapy.       04/18/19 1219   Final Note   Assessment Type Final Discharge Note   Anticipated Discharge Disposition Home-Health   Hospital Follow Up  Appt(s) scheduled? Yes   Discharge plans and expectations educations in teach back method with documentation complete? Yes   Right Care Referral Info   Post Acute Recommendation Home-care

## 2019-04-18 NOTE — PROGRESS NOTES
[facility] Medicine HO-I Progress Note    Subjective:     MRI ordered yesterday due to continuing severe pain in digits of left foot, negative for osteomyelitis. XR showing soft tissue swelling at dorsum of foot without evidence of underlying bone destruction. Gen Surgery evaluated, no new recommendations or interventions.    Mr. Black is resting comfortably this morning, says he finally feels like his pain is improving. Says the pain is worst near the base of his third digit. Reports better movement and decreased swelling. Requests one more dose of IV pain medication before being switched to po. Denies any other problems or complaints at this time, including fever, chills, nausea, vomiting, abdominal pain, rash.      Objective:     Last 24 Hour Vital Signs:  BP  Min: 116/71  Max: 140/75  Temp  Av.4 °F (36.3 °C)  Min: 96.3 °F (35.7 °C)  Max: 99 °F (37.2 °C)  Pulse  Av.6  Min: 63  Max: 100  Resp  Avg: 15.8  Min: 14  Max: 19  SpO2  Av %  Min: 97 %  Max: 99 %  I/O last 3 completed shifts:  In: 500 [IV Piggyback:500]  Out: 3650 [Urine:3650]    Physical Examination:  Gen:                awake and alert, comfortable appearing, non-toxic  HENT:             Conjunctiva clear with no icterus or pallor   OP clear without exudates. MMM   EYES:             Conjunctivae normal. EOMI. PERRL.   NECK:             Normal ROM. Neck supple.   CV:                  RRR  Normal S1 & S2. No murmurs, rubs, gallops.    Pulses:           Radial, DP, and PT 2+ bilateral and symmetric  Cap refil <2 secs.  PULM:             CTAB. No respiratory distress. No wheezes, no rales.   ABD:               Normoactive BS. Soft, NTND. No rebound, no guarding, no mass.  SKIN:              Warm and dry.   Shallow Scattered Excoriations on exam including upper back, with linear excoriation to R back. Multiple punctate excoriations to forehead.  Extremity:        RLE unremarkable  LLE with wound drained over dorsum and erythema  improved significantly and swelling to foot  NEURO:          A&Ox4. MITALI. Face symmetric.   Psychiatric:    Normal mood & affect.    Laboratory:  Laboratory Data Reviewed: yes  Recent Labs   Lab 04/16/19  0357 04/17/19  0411 04/18/19  0407   WBC 7.22 7.84 6.90   HGB 11.1* 11.0* 10.6*   HCT 36.2* 36.0* 35.1*    303 278   MCV 75* 75* 76*   RDW 20.5* 20.4* 20.3*    137 138   K 3.8 4.1 3.8   CL 99 103 100   CO2 30* 28 30*   BUN 13 14 13   CREATININE 0.9 1.0 0.9    105 103   PROT 6.3 6.6 6.6   ALBUMIN 2.9* 2.9* 2.9*   BILITOT 0.3 0.3 0.2   AST 12 14 13   ALKPHOS 51* 48* 50*   ALT 21 20 20       Microbiology Data Reviewed: yes  4/12/19: Blood cultures MRSA 1/4 bottles  4/13/19: Blood cx NGTD final   4/15 Abscess gram stain: no WBCs, rare GPC   4/15 Abscess cx: MRSA      Other Results:  EKG (my interpretation):  None    Radiology Data Reviewed: yes  Pertinent Findings:  None new    Current Medications:     Infusions:       Scheduled:   cetirizine  10 mg Oral Daily    enoxaparin  40 mg Subcutaneous Daily    famotidine  20 mg Oral BID    lidocaine HCL 10 mg/ml (1%)  1 mL Intradermal Once    morphine  30 mg Oral Q12H    pantoprazole  40 mg Oral BID    paroxetine  20 mg Oral QAM    sodium chloride 0.9%  10 mL Intravenous Q6H    sucralfate  1 g Oral QID (AC & HS)    vancomycin (VANCOCIN) IVPB  1,500 mg Intravenous Q12H        PRN:  dextrose 50%, dextrose 50%, glucagon (human recombinant), glucose, glucose, HYDROcodone-acetaminophen, HYDROcodone-acetaminophen, morphine, ondansetron, sodium chloride 0.9%, Flushing PICC Protocol **AND** sodium chloride 0.9% **AND** sodium chloride 0.9%, DIPH,PERTUS (ADACEL),TETANUS PF VAC (ADULT)    Antibiotics and Day Number of Therapy:  Vancomycin start 4/12  Aztreonam start 4/13-stopped 4/15  Metronidazole 4/13-stopped 4/15    Lines and Day Number of Therapy:  PIV  PICC     Assessment:     Solo Black is a 38 y.o. male with eosinophilic esophagitis, chronic  anemia, PUD, ADHD, OCD/Anxiety who presents with 2 days of worsening foot pain and swelling 1 day post discharge from OSH with PIV site infection and cellulitis. Pt reports that his GI symptoms from his prior admission have completely resolved at this time. Now with worsening cellulitis of LLE s/p PIV line.    Plan:     Sepsis due to staph aureus bacteremia and cellulitis   - Secondary to LL cellulitis.  - Blood cx on 4/12 +ve MRSA 1/4. Repeat blood cultures NGTD final.  - Continue with Vanc.  - Patient will need Vanc for 2 weeks from I&D 4/16. End date 4/30.  - PICC line placed, will consult case management for IV Abx.  - Will need outpatient follow up with [facility] ID (Dr. Hill), 5/1/19 9:00am.     LLE Cellulitis with abscess   Pt presents with 1 day of progressive redness and erythema to LLE at prior PIV site from recent hospital discharge with fever, chills and progressive erythema and swelling   - SIRS 1/4 (tachycardia 112)  qSOFA 0   - LLE XR without bony abnormality   - pt received clindamycin in ed x 1 dose then started on vanc empirically   - RLE with swelling, US negative for DVT.  - initial lactate elevated 2.8 normalized with 1L bolus  - Blood cultures +ve for MRSA 1/4. Repeat Blood cx NGTD.  - TTE with no vegetations.  - Patient s/p I&D on 4/16.  - ID consulted, patient will need IV vanc for at least 2 weeks. End date 4/30.  - Social work consulted to help with IV ABx.    Eosinophilic esophagitis  - recently discharged from [facility] for N/V, abdominal pain, BRBPR, c/w with patients past episodes of eosinophilic esophagitis  - home medications include pepcid AC 20mg qam, Protonix 20mg bid, loratadin 10mg qam,   - pt reports that he was treated with IV steroids, sulcrafate, protonix and had gradual resolution of his symptoms over 4 day hospital stay and discharge home with prednisone taper.   - will continue home medications.     PUD  - Home meds as above  - continue protonix, H2, H1  blocker     ASAD 2/2 Chronic blood loss Anemia  - microcytic anemia with H/H 10.1 with MCV 76  stable at his baseline since 2018  - repeat iron profile consistent with ID  - on home iron 325 bid  - will hold in setting of acute infection, will increase dose on discharge and refer to Hematology for IV iron.     Hypokalemia  - K low 2.6 at presentation given 40mEq in EDx1  - Replete as needed.     ADD  - no aucte issues  - home medications Adderall 20mg bid (am and lunch)  - will hold acutely     Subclinical hyperthyroidism  - TSH 0.26, T4 0.91  - Need repeat at outpatient.    HCM  - flu and tetanus shot up to date  - has seen PCP once but has not followed up  - follows with Dr. Alaniz for psychiatry  - follows with Dr. Wall for GI  - lipid panel wnl  - A1C wnl       Code: Full  PPx: lovenox  Diet: regular     Dispo: Discharge to home with IV Abx today. Follow up with [facility] ID 5/1/2019.    Taryn Diaz  \[facility] [place] [facility]\"" Internal Medicine HO-I  [facility] Medicine Service Firm A       \[facility] [place] [facility]\"" Medicine Hospitalist Pager numbers:   \[facility] [place] [facility]\"" Hospitalist Medicine Team A (Jaime/Smita): [phone]  \[facility] [place] [facility]\"" Hospitalist Medicine Team B (Quintin/Francisca):  [phone]

## 2019-04-18 NOTE — PLAN OF CARE
Problem: Adult Inpatient Plan of Care  Goal: Plan of Care Review  Outcome: Ongoing (interventions implemented as appropriate)     04/17/19 1935   Plan of Care Review   Plan of Care Reviewed With patient   Progress improving     POC discussed with pt. VSS,Tele monitoring no ectopy. Pt remains on contact precautions. Wound care done new dressing applied. Patient able to turn self in bed. Bed alarm on, call light in reach,with non skid socks.

## 2019-04-19 NOTE — DISCHARGE SUMMARY
[facility] IM Discharge Summary    Primary Team: [facility] IM Team A  Attending Physician: Smita  Resident: Yann  Intern: Betty    Date of Admit: 4/12/2019  Date of Discharge: 4/18/2019    Discharge to: Home  Condition: Good    Discharge Diagnoses     Patient Active Problem List   Diagnosis    Hematemesis    Eosinophilic esophagitis    PUD (peptic ulcer disease)    Erosive gastritis    Lifetime need for proton pump inhibitor    Melena    Hypokalemia    Abdominal pain    Cervicalgia    Adult ADHD    Adverse reaction to nonsteroidal anti-inflammatory drug (NSAID)    Bright red blood per rectum    Therapeutic opioid induced constipation    Acute upper GI bleed    Acute diarrhea    Epigastric pain    Iron deficiency anemia    Eosinophilia    GI bleed    Upper GI bleed    Reactive arthritis    Incomplete rotator cuff tear or rupture of right shoulder, not specified as traumatic    Cellulitis    Staphylococcus aureus bacteremia with sepsis    Cellulitis and abscess of foot    MRSA bacteremia        Consultants and Procedures     Consultants:  Surgery, ID    Procedures:   I&D on 4/16    Brief History of Present Illness      Solo Black is a 38 y.o. male who  has a past medical history of C. difficile colitis (feb 2014), Eosinophilic esophagitis (3/11/2014), GERD (gastroesophageal reflux disease), IBS (irritable bowel syndrome), MRSA carrier, Nephrolithiasis (apr 2014), and Urinary tract infection (feb 2014).  The patient presented to [facility] on 4/12/2019 with a primary complaint of Foot Pain (pt states he was discharged from hospital yesterday and had IV removed from L foot. Pt states yesterday he began having foot pain, swelling, redness, pus, and fever. Pt states fever today was 102. pt took tylenol with temp relief)    The patient was in their usual state of health (lives with grandparents without functional barriers), until last Monday when he was admitted to [facility] for abdominal pain, and BRBPR consistent with his past episodes of eosinophilic esophagitis, pt reports that he was admitted, treated with sulcrafate, protonix, and had improvement in his symptoms and eventually discharged the day prior to presentation. Pt reports that he had a PIV line placed on Tuesday during that hospitalization. Line was pulled prior to discharge, pt noted some mild pain at the site prior to discharge and was advised to observe it but it would likely resolve. 2-3 hours after returning home, patient noted worsening pain which excalated throughout the night with progressive erythema. Pt awoke at 3 am PTA with fevers measured to 102 and chills. Pain 8/10 and pt was unable to ambulate 2/2 the pain, Attempted tylenol x 1 with minimal pain relief, Pt noted erythema beginning to spread up in inner thigh. Presented to the ED for evaluation.     In ED noted to be tachycardic to 112, temperature mildly elevated to 99. Hemodynamically stable. Initial labs noted without leukocytosis, stable H/H from baseline, and hypokalemia to 2.6. LE XR was taken with no acute bony abnormality. Pt given 1 dose of clindamycin for LLE cellulitis, 40mEQ of K and morphine 1 dose for pain.       For the full HPI please refer to the History & Physical from this admission.    Hospital Course By Problem with Pertinent Findings        Sepsis due to staph aureus bacteremia and cellulitis   - Secondary to LL cellulitis.  - Blood cx on 4/12 +ve MRSA 1/4. Repeat blood cultures NGTD final.  - Continue with Vanc.  - Patient will need Vanc for 2 weeks from I&D 4/16. End date 4/30.  - PICC line placed, case management consulted for IV Abx.  - Will need outpatient follow up with [facility] ID (Dr. Hill), 5/1/19 9:00am.        LLE Cellulitis with abscess   Pt presents with 1 day of progressive redness and erythema to LLE at prior PIV site from recent hospital discharge with fever, chills and progressive erythema and  swelling   - SIRS 1/4 (tachycardia 112)  qSOFA 0   - LLE XR without bony abnormality   - pt received clindamycin in ed x 1 dose then started on vanc empirically   - RLE with swelling, US negative for DVT.  - initial lactate elevated 2.8 normalized with 1L bolus  - Blood cultures +ve for MRSA 1/4. Repeat Blood cx NGTD.  - TTE with no vegetations.  - Patient s/p I&D on 4/16.  - ID consulted, patient will need IV vanc for at least 2 weeks. End date 4/30.  - MRI foot with no evidence of OM.  - Social work consulted, IV ABx arranged. Patient discharged in a good condition with ID follow up on 5/1/19  - Patient seen by PT/OT and crutch was orderd  - Short course of Norco was prescribed.     Eosinophilic esophagitis  - recently discharged from [facility] for N/V, abdominal pain, BRBPR, c/w with patients past episodes of eosinophilic esophagitis  - home medications include pepcid AC 20mg qam, Protonix 20mg bid, loratadin 10mg qam,   - pt reports that he was treated with IV steroids, sulcrafate, protonix and had gradual resolution of his symptoms over 4 day hospital stay and discharge home with prednisone taper.   - will continue home medications.      PUD  - Home meds as above  - continue protonix, H2, H1 blocker     ASAD 2/2 Chronic blood loss Anemia  - microcytic anemia with H/H 10.1 with MCV 76  stable at his baseline since 2018  - repeat iron profile consistent with ID  - on home iron 325 bid  - Held initiialy in setting of acute infection, Resumed home dose on discharge and refered to Hematology for IV iron.     Hypokalemia  - K low 2.6 at presentation given 40mEq in EDx1  - Repleted      ADD  - no aucte issues  - home medications Adderall 20mg bid (am and lunch)  - will hold acutely      Subclinical hyperthyroidism  - TSH 0.26, T4 0.91  - Need repeat at outpatient.     HCM  - flu and tetanus shot up to date  - has seen PCP once but has not followed up  - follows with Dr. Alaniz for psychiatry  -  follows with Dr. Wall for GI  - lipid panel wnl  - A1C wnl        Discharge Medications        Medication List      START taking these medications    dextrose 5 % SolP 250 mL with vancomycin 1,000 mg SolR 1,500 mg  Inject 1,500 mg into the vein every 12 (twelve) hours. for 13 days     HYDROcodone-acetaminophen 7.5-325 mg per tablet  Commonly known as:  NORCO  Take 1 tablet by mouth every 6 (six) hours as needed.        CONTINUE taking these medications    ADDERALL 20 mg tablet  Generic drug:  dextroamphetamine-amphetamine     CENTRUM 3,500-18-0.4 unit-mg-mg Chew  Generic drug:  multivit-iron-min-folic acid     famotidine 20 MG tablet  Commonly known as:  PEPCID     ferrous sulfate 325 mg (65 mg iron) Tab tablet  Commonly known as:  FEOSOL  Take 1 tablet (325 mg total) by mouth 2 (two) times daily.     loratadine 10 mg tablet  Commonly known as:  CLARITIN     pantoprazole 40 MG tablet  Commonly known as:  PROTONIX  Take 1 tablet (40 mg total) by mouth 2 (two) times daily.     paroxetine 20 MG tablet  Commonly known as:  PAXIL     predniSONE 20 MG tablet  Commonly known as:  DELTASONE     promethazine 12.5 MG Tab  Commonly known as:  PHENERGAN     sucralfate 1 gram tablet  Commonly known as:  CARAFATE           Where to Get Your Medications      These medications were sent to [facility] [address] [place] [address] AT [facility] [address] [place] [id number]    Phone:  [phone]   · dextrose 5 % SolP 250 mL with vancomycin 1,000 mg SolR 1,500 mg     You can get these medications from any pharmacy    Bring a paper prescription for each of these medications  · HYDROcodone-acetaminophen 7.5-325 mg per tablet         Discharge Information:   Diet:  Regular    Physical Activity:  As tolerated    Instructions:  1. Take all medications as prescribed  2. Keep all follow-up appointments  3. Return to the hospital or call your primary care physicians if any worsening  symptoms such as fever or increased swelling in left foot occur.      Follow-Up Appointments:  ID, PCP, Hematology       Jerry Lerner MD  [facility], [facility]

## 2019-04-22 LAB — BACTERIA SPEC ANAEROBE CULT: NORMAL

## 2019-04-23 ENCOUNTER — TELEPHONE (OUTPATIENT)
Dept: ADMINISTRATIVE | Facility: CLINIC | Age: 39
End: 2019-04-23

## 2019-04-24 ENCOUNTER — LAB VISIT (OUTPATIENT)
Dept: LAB | Facility: HOSPITAL | Age: 39
End: 2019-04-24
Attending: INTERNAL MEDICINE
Payer: MEDICAID

## 2019-04-24 DIAGNOSIS — L03.90 CELLULITIS: Primary | ICD-10-CM

## 2019-04-24 LAB
ALBUMIN SERPL BCP-MCNC: 3.1 G/DL (ref 3.5–5.2)
ALP SERPL-CCNC: 50 U/L (ref 55–135)
ALT SERPL W/O P-5'-P-CCNC: 41 U/L (ref 10–44)
ANION GAP SERPL CALC-SCNC: 9 MMOL/L (ref 8–16)
AST SERPL-CCNC: 22 U/L (ref 10–40)
BASOPHILS # BLD AUTO: 0.07 K/UL (ref 0–0.2)
BASOPHILS NFR BLD: 1.1 % (ref 0–1.9)
BILIRUB SERPL-MCNC: 0.3 MG/DL (ref 0.1–1)
BUN SERPL-MCNC: 11 MG/DL (ref 6–20)
CALCIUM SERPL-MCNC: 9.1 MG/DL (ref 8.7–10.5)
CHLORIDE SERPL-SCNC: 106 MMOL/L (ref 95–110)
CO2 SERPL-SCNC: 26 MMOL/L (ref 23–29)
CREAT SERPL-MCNC: 0.9 MG/DL (ref 0.5–1.4)
CRP SERPL-MCNC: 1 MG/L (ref 0–8.2)
DIFFERENTIAL METHOD: ABNORMAL
EOSINOPHIL # BLD AUTO: 0.3 K/UL (ref 0–0.5)
EOSINOPHIL NFR BLD: 4 % (ref 0–8)
ERYTHROCYTE [DISTWIDTH] IN BLOOD BY AUTOMATED COUNT: 21.6 % (ref 11.5–14.5)
ERYTHROCYTE [SEDIMENTATION RATE] IN BLOOD BY WESTERGREN METHOD: 15 MM/HR (ref 0–10)
EST. GFR  (AFRICAN AMERICAN): >60 ML/MIN/1.73 M^2
EST. GFR  (NON AFRICAN AMERICAN): >60 ML/MIN/1.73 M^2
GLUCOSE SERPL-MCNC: 73 MG/DL (ref 70–110)
HCT VFR BLD AUTO: 33.3 % (ref 40–54)
HGB BLD-MCNC: 10 G/DL (ref 14–18)
LYMPHOCYTES # BLD AUTO: 1.3 K/UL (ref 1–4.8)
LYMPHOCYTES NFR BLD: 19.9 % (ref 18–48)
MCH RBC QN AUTO: 22.9 PG (ref 27–31)
MCHC RBC AUTO-ENTMCNC: 30 G/DL (ref 32–36)
MCV RBC AUTO: 76 FL (ref 82–98)
MONOCYTES # BLD AUTO: 0.8 K/UL (ref 0.3–1)
MONOCYTES NFR BLD: 12 % (ref 4–15)
NEUTROPHILS # BLD AUTO: 4 K/UL (ref 1.8–7.7)
NEUTROPHILS NFR BLD: 63 % (ref 38–73)
PLATELET # BLD AUTO: 283 K/UL (ref 150–350)
PMV BLD AUTO: 10.2 FL (ref 9.2–12.9)
POTASSIUM SERPL-SCNC: 3.2 MMOL/L (ref 3.5–5.1)
PROT SERPL-MCNC: 6.7 G/DL (ref 6–8.4)
RBC # BLD AUTO: 4.36 M/UL (ref 4.6–6.2)
SODIUM SERPL-SCNC: 141 MMOL/L (ref 136–145)
VANCOMYCIN TROUGH SERPL-MCNC: 6.9 UG/ML (ref 10–22)
WBC # BLD AUTO: 6.42 K/UL (ref 3.9–12.7)

## 2019-04-24 PROCEDURE — 36415 COLL VENOUS BLD VENIPUNCTURE: CPT

## 2019-04-24 PROCEDURE — 85651 RBC SED RATE NONAUTOMATED: CPT

## 2019-04-24 PROCEDURE — 80053 COMPREHEN METABOLIC PANEL: CPT

## 2019-04-24 PROCEDURE — 85025 COMPLETE CBC W/AUTO DIFF WBC: CPT

## 2019-04-24 PROCEDURE — 80202 ASSAY OF VANCOMYCIN: CPT

## 2019-04-24 PROCEDURE — 86140 C-REACTIVE PROTEIN: CPT

## 2019-04-29 ENCOUNTER — TELEPHONE (OUTPATIENT)
Dept: ADMINISTRATIVE | Facility: CLINIC | Age: 39
End: 2019-04-29

## 2019-05-03 ENCOUNTER — HOSPITAL ENCOUNTER (INPATIENT)
Facility: HOSPITAL | Age: 39
LOS: 3 days | Discharge: HOME HEALTH | DRG: 603 | End: 2019-05-06
Attending: EMERGENCY MEDICINE | Admitting: INTERNAL MEDICINE
Payer: MEDICAID

## 2019-05-03 DIAGNOSIS — K29.60 EROSIVE GASTRITIS: ICD-10-CM

## 2019-05-03 DIAGNOSIS — L03.116 CELLULITIS OF LEFT LOWER EXTREMITY: ICD-10-CM

## 2019-05-03 DIAGNOSIS — L03.116 CELLULITIS OF FOOT, LEFT: Primary | ICD-10-CM

## 2019-05-03 DIAGNOSIS — B95.62 MRSA BACTEREMIA: ICD-10-CM

## 2019-05-03 DIAGNOSIS — K27.9 PUD (PEPTIC ULCER DISEASE): ICD-10-CM

## 2019-05-03 DIAGNOSIS — K20.0 EOSINOPHILIC ESOPHAGITIS: Chronic | ICD-10-CM

## 2019-05-03 DIAGNOSIS — E87.6 HYPOKALEMIA: ICD-10-CM

## 2019-05-03 DIAGNOSIS — K92.1 MELENA: ICD-10-CM

## 2019-05-03 DIAGNOSIS — R78.81 MRSA BACTEREMIA: ICD-10-CM

## 2019-05-03 DIAGNOSIS — Z79.899 CURRENT USE OF PROTON PUMP INHIBITOR: Chronic | ICD-10-CM

## 2019-05-03 LAB
ALBUMIN SERPL BCP-MCNC: 3.6 G/DL (ref 3.5–5.2)
ALP SERPL-CCNC: 61 U/L (ref 55–135)
ALT SERPL W/O P-5'-P-CCNC: 34 U/L (ref 10–44)
ANION GAP SERPL CALC-SCNC: 9 MMOL/L (ref 8–16)
AST SERPL-CCNC: 23 U/L (ref 10–40)
BASOPHILS # BLD AUTO: 0.03 K/UL (ref 0–0.2)
BASOPHILS NFR BLD: 0.5 % (ref 0–1.9)
BILIRUB SERPL-MCNC: 0.4 MG/DL (ref 0.1–1)
BUN SERPL-MCNC: 10 MG/DL (ref 6–20)
CALCIUM SERPL-MCNC: 9.3 MG/DL (ref 8.7–10.5)
CHLORIDE SERPL-SCNC: 107 MMOL/L (ref 95–110)
CO2 SERPL-SCNC: 23 MMOL/L (ref 23–29)
CREAT SERPL-MCNC: 1 MG/DL (ref 0.5–1.4)
CRP SERPL-MCNC: 0.9 MG/L (ref 0–8.2)
DIFFERENTIAL METHOD: ABNORMAL
EOSINOPHIL # BLD AUTO: 0.4 K/UL (ref 0–0.5)
EOSINOPHIL NFR BLD: 6.9 % (ref 0–8)
ERYTHROCYTE [DISTWIDTH] IN BLOOD BY AUTOMATED COUNT: 19.1 % (ref 11.5–14.5)
ERYTHROCYTE [SEDIMENTATION RATE] IN BLOOD BY WESTERGREN METHOD: 16 MM/HR (ref 0–10)
EST. GFR  (AFRICAN AMERICAN): >60 ML/MIN/1.73 M^2
EST. GFR  (NON AFRICAN AMERICAN): >60 ML/MIN/1.73 M^2
GLUCOSE SERPL-MCNC: 83 MG/DL (ref 70–110)
HCT VFR BLD AUTO: 36.7 % (ref 40–54)
HGB BLD-MCNC: 11.2 G/DL (ref 14–18)
LACTATE SERPL-SCNC: 0.8 MMOL/L (ref 0.5–2.2)
LYMPHOCYTES # BLD AUTO: 1.4 K/UL (ref 1–4.8)
LYMPHOCYTES NFR BLD: 25.6 % (ref 18–48)
MCH RBC QN AUTO: 23.4 PG (ref 27–31)
MCHC RBC AUTO-ENTMCNC: 30.5 G/DL (ref 32–36)
MCV RBC AUTO: 77 FL (ref 82–98)
MONOCYTES # BLD AUTO: 0.6 K/UL (ref 0.3–1)
MONOCYTES NFR BLD: 10.9 % (ref 4–15)
NEUTROPHILS # BLD AUTO: 3.1 K/UL (ref 1.8–7.7)
NEUTROPHILS NFR BLD: 55.9 % (ref 38–73)
PLATELET # BLD AUTO: 322 K/UL (ref 150–350)
PMV BLD AUTO: 9.4 FL (ref 9.2–12.9)
POTASSIUM SERPL-SCNC: 3.8 MMOL/L (ref 3.5–5.1)
PROT SERPL-MCNC: 7.4 G/DL (ref 6–8.4)
RBC # BLD AUTO: 4.79 M/UL (ref 4.6–6.2)
SODIUM SERPL-SCNC: 139 MMOL/L (ref 136–145)
VANCOMYCIN TROUGH SERPL-MCNC: 18.5 UG/ML (ref 10–22)
WBC # BLD AUTO: 5.51 K/UL (ref 3.9–12.7)

## 2019-05-03 PROCEDURE — A9585 GADOBUTROL INJECTION: HCPCS | Performed by: EMERGENCY MEDICINE

## 2019-05-03 PROCEDURE — 85025 COMPLETE CBC W/AUTO DIFF WBC: CPT

## 2019-05-03 PROCEDURE — 96376 TX/PRO/DX INJ SAME DRUG ADON: CPT

## 2019-05-03 PROCEDURE — 63600175 PHARM REV CODE 636 W HCPCS: Performed by: EMERGENCY MEDICINE

## 2019-05-03 PROCEDURE — 25000003 PHARM REV CODE 250: Performed by: EMERGENCY MEDICINE

## 2019-05-03 PROCEDURE — 63600175 PHARM REV CODE 636 W HCPCS: Mod: JG | Performed by: STUDENT IN AN ORGANIZED HEALTH CARE EDUCATION/TRAINING PROGRAM

## 2019-05-03 PROCEDURE — 63600175 PHARM REV CODE 636 W HCPCS: Performed by: STUDENT IN AN ORGANIZED HEALTH CARE EDUCATION/TRAINING PROGRAM

## 2019-05-03 PROCEDURE — 80202 ASSAY OF VANCOMYCIN: CPT

## 2019-05-03 PROCEDURE — 11000001 HC ACUTE MED/SURG PRIVATE ROOM

## 2019-05-03 PROCEDURE — 25000003 PHARM REV CODE 250: Performed by: STUDENT IN AN ORGANIZED HEALTH CARE EDUCATION/TRAINING PROGRAM

## 2019-05-03 PROCEDURE — 83605 ASSAY OF LACTIC ACID: CPT

## 2019-05-03 PROCEDURE — 96374 THER/PROPH/DIAG INJ IV PUSH: CPT

## 2019-05-03 PROCEDURE — 86140 C-REACTIVE PROTEIN: CPT

## 2019-05-03 PROCEDURE — 80053 COMPREHEN METABOLIC PANEL: CPT

## 2019-05-03 PROCEDURE — 25500020 PHARM REV CODE 255: Performed by: EMERGENCY MEDICINE

## 2019-05-03 PROCEDURE — 87040 BLOOD CULTURE FOR BACTERIA: CPT

## 2019-05-03 PROCEDURE — 99285 EMERGENCY DEPT VISIT HI MDM: CPT | Mod: 25

## 2019-05-03 PROCEDURE — 85652 RBC SED RATE AUTOMATED: CPT

## 2019-05-03 RX ORDER — HYDROMORPHONE HYDROCHLORIDE 2 MG/ML
1 INJECTION, SOLUTION INTRAMUSCULAR; INTRAVENOUS; SUBCUTANEOUS ONCE
Status: DISCONTINUED | OUTPATIENT
Start: 2019-05-04 | End: 2019-05-03

## 2019-05-03 RX ORDER — MORPHINE SULFATE 2 MG/ML
2 INJECTION, SOLUTION INTRAMUSCULAR; INTRAVENOUS EVERY 8 HOURS
Status: DISCONTINUED | OUTPATIENT
Start: 2019-05-04 | End: 2019-05-03

## 2019-05-03 RX ORDER — MORPHINE SULFATE 2 MG/ML
2 INJECTION, SOLUTION INTRAMUSCULAR; INTRAVENOUS ONCE
Status: COMPLETED | OUTPATIENT
Start: 2019-05-03 | End: 2019-05-03

## 2019-05-03 RX ORDER — MORPHINE SULFATE 15 MG/1
15 TABLET, FILM COATED, EXTENDED RELEASE ORAL EVERY 12 HOURS
Status: DISCONTINUED | OUTPATIENT
Start: 2019-05-04 | End: 2019-05-04

## 2019-05-03 RX ORDER — PAROXETINE HYDROCHLORIDE 20 MG/1
20 TABLET, FILM COATED ORAL EVERY MORNING
Status: DISCONTINUED | OUTPATIENT
Start: 2019-05-04 | End: 2019-05-06 | Stop reason: HOSPADM

## 2019-05-03 RX ORDER — DEXTROAMPHETAMINE SACCHARATE, AMPHETAMINE ASPARTATE, DEXTROAMPHETAMINE SULFATE AND AMPHETAMINE SULFATE 5; 5; 5; 5 MG/1; MG/1; MG/1; MG/1
1 TABLET ORAL
Status: DISCONTINUED | OUTPATIENT
Start: 2019-05-03 | End: 2019-05-03

## 2019-05-03 RX ORDER — MORPHINE SULFATE 4 MG/ML
4 INJECTION, SOLUTION INTRAMUSCULAR; INTRAVENOUS
Status: COMPLETED | OUTPATIENT
Start: 2019-05-03 | End: 2019-05-03

## 2019-05-03 RX ORDER — FAMOTIDINE 20 MG/1
20 TABLET, FILM COATED ORAL 2 TIMES DAILY
Status: DISCONTINUED | OUTPATIENT
Start: 2019-05-03 | End: 2019-05-06 | Stop reason: HOSPADM

## 2019-05-03 RX ORDER — OXYCODONE AND ACETAMINOPHEN 7.5; 325 MG/1; MG/1
1 TABLET ORAL EVERY 4 HOURS PRN
Status: DISCONTINUED | OUTPATIENT
Start: 2019-05-03 | End: 2019-05-03

## 2019-05-03 RX ORDER — RAMELTEON 8 MG/1
8 TABLET ORAL ONCE
Status: COMPLETED | OUTPATIENT
Start: 2019-05-04 | End: 2019-05-04

## 2019-05-03 RX ORDER — MORPHINE SULFATE 2 MG/ML
2 INJECTION, SOLUTION INTRAMUSCULAR; INTRAVENOUS EVERY 8 HOURS PRN
Status: DISCONTINUED | OUTPATIENT
Start: 2019-05-04 | End: 2019-05-05

## 2019-05-03 RX ORDER — SODIUM CHLORIDE 0.9 % (FLUSH) 0.9 %
10 SYRINGE (ML) INJECTION
Status: DISCONTINUED | OUTPATIENT
Start: 2019-05-03 | End: 2019-05-06 | Stop reason: HOSPADM

## 2019-05-03 RX ORDER — GADOBUTROL 604.72 MG/ML
10 INJECTION INTRAVENOUS
Status: COMPLETED | OUTPATIENT
Start: 2019-05-03 | End: 2019-05-03

## 2019-05-03 RX ORDER — MORPHINE SULFATE 2 MG/ML
2 INJECTION, SOLUTION INTRAMUSCULAR; INTRAVENOUS EVERY 6 HOURS PRN
Status: DISCONTINUED | OUTPATIENT
Start: 2019-05-03 | End: 2019-05-03

## 2019-05-03 RX ORDER — SUCRALFATE 1 G/1
1 TABLET ORAL
Status: DISCONTINUED | OUTPATIENT
Start: 2019-05-03 | End: 2019-05-06 | Stop reason: HOSPADM

## 2019-05-03 RX ORDER — CETIRIZINE HYDROCHLORIDE 5 MG/1
10 TABLET ORAL DAILY
Status: DISCONTINUED | OUTPATIENT
Start: 2019-05-03 | End: 2019-05-06 | Stop reason: HOSPADM

## 2019-05-03 RX ADMIN — SUCRALFATE 1 G: 1 TABLET ORAL at 06:05

## 2019-05-03 RX ADMIN — VANCOMYCIN HYDROCHLORIDE 2000 MG: 100 INJECTION, POWDER, LYOPHILIZED, FOR SOLUTION INTRAVENOUS at 06:05

## 2019-05-03 RX ADMIN — FAMOTIDINE 20 MG: 20 TABLET, FILM COATED ORAL at 08:05

## 2019-05-03 RX ADMIN — MORPHINE SULFATE 4 MG: 4 INJECTION INTRAVENOUS at 09:05

## 2019-05-03 RX ADMIN — ALTEPLASE 2 MG: 2.2 INJECTION, POWDER, LYOPHILIZED, FOR SOLUTION INTRAVENOUS at 10:05

## 2019-05-03 RX ADMIN — MORPHINE SULFATE 2 MG: 2 INJECTION, SOLUTION INTRAMUSCULAR; INTRAVENOUS at 06:05

## 2019-05-03 RX ADMIN — OXYCODONE HYDROCHLORIDE AND ACETAMINOPHEN 1 TABLET: 7.5; 325 TABLET ORAL at 07:05

## 2019-05-03 RX ADMIN — CETIRIZINE HYDROCHLORIDE 10 MG: 5 TABLET ORAL at 05:05

## 2019-05-03 RX ADMIN — MORPHINE SULFATE 2 MG: 2 INJECTION, SOLUTION INTRAMUSCULAR; INTRAVENOUS at 11:05

## 2019-05-03 RX ADMIN — MORPHINE SULFATE 4 MG: 4 INJECTION INTRAVENOUS at 11:05

## 2019-05-03 RX ADMIN — OXYCODONE HYDROCHLORIDE AND ACETAMINOPHEN 1 TABLET: 7.5; 325 TABLET ORAL at 03:05

## 2019-05-03 RX ADMIN — GADOBUTROL 10 ML: 604.72 INJECTION INTRAVENOUS at 10:05

## 2019-05-03 NOTE — ED PROVIDER NOTES
Encounter Date: 5/3/2019    SCRIBE #1 NOTE: I, Mike Hester, am scribing for, and in the presence of,  Dr. Townsend. I have scribed the entire note.       History     Chief Complaint   Patient presents with    Wound Infection     pt presents to ED today c/o wound infection to left foot. pt was advised by Infectious disease to be readmitted to hospital      Time seen by provider: 8:51 AM    This is a 38 y.o. male who presents with complaint of wound infection. Patient complains of yellow-green drainage from wound on left foot with pain radiating up his left leg. Patient was seen 04/12/2019 for cellulitis in the left foot from an IV during a hospital admission. He had the wound drained by I&D two days ago with alleviation of pain and swelling but reports increased drainage and swelling yesterday associated with fever and chills. He was seen by Infectious disease yesterday who instructed him to come here to be readmitted. Denies nausea, vomiting, or abdominal pain. He has been taking Vancomycin with no alleviation of symptoms and pain medication.        Review of patient's allergies indicates:   Allergen Reactions    Nsaids (non-steroidal anti-inflammatory drug)      GI bleed    Ketamine      Severe dysphoria (bad trip)    Penicillins      Has taken cephalosporins without issue     Past Medical History:   Diagnosis Date    C. difficile colitis feb 2014    postop of hand surgery    Eosinophilic esophagitis 3/11/2014    GERD (gastroesophageal reflux disease)     IBS (irritable bowel syndrome)     MRSA carrier     says multiple times nose swab was +    Nephrolithiasis apr 2014    bilateral punctate - never passed a stone    Urinary tract infection feb 2014    MRSA     Past Surgical History:   Procedure Laterality Date    APPENDECTOMY      ARTHROSCOPY, SHOULDER, WITH DISTAL CLAVICLE EXCISION Right 11/1/2018    [facility] at [facility] OR    BACK SURGERY      CIRCUMCISION, PRIMARY       COLONOSCOPY N/A 11/14/2018    [facility] END[facility]    drainage of abscess from hand  2009    MRSA    drainage of abscess from R thigh  2006    MRSA    EGD (ESOPHAGOGASTRODUODENOSCOPY) N/A 9/5/2018    Per[facility] by Kolby [facility] at [facility] ENDO    EGD (ESOPHAGOGASTRODUODENOSCOPY) Left 3/11/2014    [facility] Galo Hdez [facility] ENDO    ESOPHAGOGASTRODUODENOSCOPY  3/11/2014    ESOPHAGOGASTRODUODENOSCOPY (EGD) N/A 7/21/2016    Performed by Kolby Wall MD at [facility] ENDO    ESOPHAGOGASTRODUODENOSCOPY (EGD) N/A 3/17/2016    Performed by Kolby Wall MD at [facility] ENDO    HAND SURGERY  jan 2014    power saw fell on hand - laceration 3 tendons    INCISION AND DRAINAGE, LOWER EXTREMITY Left 4/15/2019    Performed by Troy Honeycutt MD at [facility] OR    SIGMOIDOSCOPY, FLEXIBLE N/A 9/5/2018    Performed by Kolby Wall MD at [facility] ENDO     Family History   Problem Relation Age of Onset    Urolithiasis Father     Celiac disease Sister     Hypertension Maternal Grandmother     Hypertension Maternal Grandfather      Social History     Tobacco Use    Smoking status: Never Smoker    Smokeless tobacco: Never Used    Tobacco comment: Pt states he has smoked 1 or 2 cigarettes in his life.   Substance Use Topics    Alcohol use: Yes     Comment: occassionaly    Drug use: No     Review of Systems   Constitutional: Positive for chills and fever.   HENT: Negative for facial swelling and sore throat.    Eyes: Negative for pain and redness.   Respiratory: Negative for shortness of breath.    Cardiovascular: Negative for chest pain.   Gastrointestinal: Negative for abdominal pain, diarrhea, nausea and vomiting.   Genitourinary: Negative for dysuria and hematuria.   Musculoskeletal: Negative for back pain and neck pain.        Pain to left foot and leg   Skin: Positive for color change and wound. Negative for rash.   Neurological: Negative for seizures and facial asymmetry.    All other systems reviewed and are negative.      Physical Exam     Initial Vitals [05/03/19 0842]   BP Pulse Resp Temp SpO2   133/82 90 16 98.7 °F (37.1 °C) 100 %      MAP       --         Physical Exam    Nursing note and vitals reviewed.  Constitutional: He appears well-developed and well-nourished. He is not diaphoretic. No distress.   HENT:   Head: Normocephalic and atraumatic.   Eyes: Conjunctivae and EOM are normal.   Neck: Normal range of motion. Neck supple.   Cardiovascular: Normal rate, regular rhythm and normal heart sounds.   Pulmonary/Chest: Breath sounds normal. No respiratory distress.   Lungs clear   Abdominal: Soft. There is no tenderness.   Musculoskeletal: Normal range of motion. He exhibits no edema or tenderness.   Neurological: He is alert and oriented to person, place, and time. He has normal strength.   Skin: Skin is warm and dry. There is erythema.   Dorsum pf left foot with 1 cm area with dehiscence  Surrounding erythema and tenderness  No fluctuants         ED Course   Procedures  Labs Reviewed   SEDIMENTATION RATE - Abnormal; Notable for the following components:       Result Value    Sed Rate 16 (*)     All other components within normal limits   CBC W/ AUTO DIFFERENTIAL - Abnormal; Notable for the following components:    Hemoglobin 11.2 (*)     Hematocrit 36.7 (*)     Mean Corpuscular Volume 77 (*)     Mean Corpuscular Hemoglobin 23.4 (*)     Mean Corpuscular Hemoglobin Conc 30.5 (*)     RDW 19.1 (*)     All other components within normal limits   CULTURE, BLOOD   CULTURE, BLOOD   C-REACTIVE PROTEIN   COMPREHENSIVE METABOLIC PANEL   LACTIC ACID, PLASMA   VANCOMYCIN, TROUGH    Narrative:     Collection Instructions:->before 4th dose           MRI Foot (Midfoot) Left W W/O Contrast   Final Result      See above      No significant abnormality seen.  Distal metatarsals and toes were not imaged.  There is mild edema of the skin and subcutaneous soft tissues of the dorsum of the foot.          Electronically signed by: Kris Perdomo MD   Date:    05/03/2019   Time:    11:11             Medical Decision Making:   Clinical Tests:   Lab Tests: Ordered and Reviewed  Radiological Study: Ordered and Reviewed  ED Management:  38-year-old male with recurrent cellulitis of his left foot.  Patient will be admitted by [facility] internal medicine.                      Clinical Impression:       ICD-10-CM ICD-9-CM   1. Cellulitis of foot, left L03.116 682.7       Disposition:   Disposition: Admitted  Condition: Fair       I, Dr. Jd Nash, personally performed the services described in this documentation. All medical record entries made by the scribe were at my direction and in my presence. I have reviewed the chart and agree that the record reflects my personal performance and is accurate and complete. Jd Nash MD.  9:40 AM 05/03/2019                   Jd Nash MD  05/03/19 1383

## 2019-05-03 NOTE — ED NOTES
Phlebotomy at . Unable to obtain all blood specimens. Will return to draw remaining labs when pt returns from MRI. Dr. Townsend notified.

## 2019-05-03 NOTE — PLAN OF CARE
Problem: Adult Inpatient Plan of Care  Goal: Plan of Care Review  Outcome: Unable to achieve outcome(s) by discharge  Admission completed    Iv antibiotics initiated following validation of picc line placement    Dressing to left foot left in place  Pt reported had been changed 2x today by medical staff and requested to not have any additional exams at present   Reports a throbbing pain level of 8 to left foot   Elevated foot on 2 pillows for comfort   picc line site clean dry and intact with dressing change due this coming Monday   Placed in contact isolation   Medicated for pain

## 2019-05-03 NOTE — PROGRESS NOTES
Pharmacokinetic Initial Assessment: IV Vancomycin    Assessment/Plan:    Initiate intravenous vancomycin with loading dose of 2500 mg once followed by a maintenance dose of vancomycin 1750mg IV every 12 hours  Desired empiric serum trough concentration is 10 to 20 mcg/mL.  Draw vancomycin trough level 30 min prior to fourth dose on  5/5/19 at approximately 0230    Pharmacy will continue to follow and monitor vancomycin.      Please contact pharmacy at extension [phone] with any questions regarding this assessment.     Thank you for the consult,   Allen Kamara     Patient brief summary:  Solo Black is a 38 y.o. male initiated on antimicrobial therapy with IV Vancomycin for treatment of suspected skin & soft tissue    Drug Allergies:   Review of patient's allergies indicates:   Allergen Reactions    Nsaids (non-steroidal anti-inflammatory drug)      GI bleed    Ketamine      Severe dysphoria (bad trip)    Penicillins      Has taken cephalosporins without issue       Actual Body Weight:   92.1kg    Renal Function:   Estimated Creatinine Clearance: 112.1 mL/min (based on SCr of 1 mg/dL).,     Dialysis Method (if applicable):  none     CBC (last 72 hours):  Recent Labs   Lab Result Units 05/03/19  1110   WBC K/uL 5.51   Hemoglobin g/dL 11.2*   Hematocrit % 36.7*   Platelets K/uL 322   Gran% % 55.9   Lymph% % 25.6   Mono% % 10.9   Eosinophil% % 6.9   Basophil% % 0.5   Differential Method  Automated       Metabolic Panel (last 72 hours):  Recent Labs   Lab Result Units 05/03/19  0920   Sodium mmol/L 139   Potassium mmol/L 3.8   Chloride mmol/L 107   CO2 mmol/L 23   Glucose mg/dL 83   BUN, Bld mg/dL 10   Creatinine mg/dL 1.0   Albumin g/dL 3.6   Total Bilirubin mg/dL 0.4   Alkaline Phosphatase U/L 61   AST U/L 23   ALT U/L 34       Drug levels (last 3 results):  Recent Labs   Lab Result Units 05/03/19  1110   Vancomycin-Trough ug/mL 18.5       Microbiologic Results:  Microbiology Results (last 7 days)       Procedure  Component Value Units Date/Time    Blood Culture #1 **CANNOT BE ORDERED STAT** [[id number]] Collected:  05/03/19 0923    Order Status:  Sent Specimen:  Blood from Wrist, Left Updated:  05/03/19 1239    Blood Culture #2 **CANNOT BE ORDERED STAT** [[id number]] Collected:  05/03/19 1110    Order Status:  Sent Specimen:  Blood from Wrist, Right Updated:  05/03/19 1239

## 2019-05-03 NOTE — PROGRESS NOTES
Pharmacokinetic Assessment Follow Up: IV Vancomycin    Vancomycin serum concentration assessment(s):    The trough level was drawned correctly and can be used to guide therapy at this time.    Vancomycin Regimen Plan:    Continue regimen to Vancomycin 2000 mg IV every 12hour with next serum trough concentration measured at 5/5/19 prior to 4th dose on 0300     Pharmacy will continue to follow and monitor vancomycin.    Please contact pharmacy at extension [phone] for questions regarding this assessment.    Thank you for the consult,   Allen Kamara     Patient brief summary:  Solo Black is a 38 y.o. male initiated on antimicrobial therapy with IV Vancomycin for treatment of suspected skin & soft tissue  Per dr. David Chand trough goal 15-20  The patient did not receive a loading dose, followed by the current treatment regimen: vancomycin 2000 mg IV every 12 hours  Patient was receiving vancomycin 2000mg q12 outpatient and has a trough at goal 18.5  Drug Allergies:   Review of patient's allergies indicates:   Allergen Reactions    Nsaids (non-steroidal anti-inflammatory drug)      GI bleed    Ketamine      Severe dysphoria (bad trip)    Penicillins      Has taken cephalosporins without issue       Actual Body Weight:   92.1kg    Renal Function:   Estimated Creatinine Clearance: 112.1 mL/min (based on SCr of 1 mg/dL).,     Dialysis Method (if applicable):  Na      CBC (last 72 hours):  Recent Labs   Lab Result Units 05/03/19  1110   WBC K/uL 5.51   Hemoglobin g/dL 11.2*   Hematocrit % 36.7*   Platelets K/uL 322   Gran% % 55.9   Lymph% % 25.6   Mono% % 10.9   Eosinophil% % 6.9   Basophil% % 0.5   Differential Method  Automated       Metabolic Panel (last 72 hours):  Recent Labs   Lab Result Units 05/03/19  0920   Sodium mmol/L 139   Potassium mmol/L 3.8   Chloride mmol/L 107   CO2 mmol/L 23   Glucose mg/dL 83   BUN, Bld mg/dL 10   Creatinine mg/dL 1.0   Albumin g/dL 3.6   Total Bilirubin mg/dL 0.4   Alkaline  Phosphatase U/L 61   AST U/L 23   ALT U/L 34       Vancomycin Administrations:  vancomycin given in the last 96 hours        No antibiotic orders with administrations found.                      Drug levels (last 3 results):  Recent Labs   Lab Result Units 05/03/19  1110   Vancomycin-Trough ug/mL 18.5       Microbiologic Results:  Microbiology Results (last 7 days)       Procedure Component Value Units Date/Time    Blood Culture #1 **CANNOT BE ORDERED STAT** [[id number]] Collected:  05/03/19 0923    Order Status:  Sent Specimen:  Blood from Wrist, Left Updated:  05/03/19 1239    Blood Culture #2 **CANNOT BE ORDERED STAT** [[id number]] Collected:  05/03/19 1110    Order Status:  Sent Specimen:  Blood from Wrist, Right Updated:  05/03/19 1235

## 2019-05-03 NOTE — ED TRIAGE NOTES
39 Y/O M with CC of wound infection. Reports was admitted a month ago with MRSA of L foot. Pt has PICC line and has been using Vancomycin 2grams Q12 hrs, next dose due for 11Am. Reports last dose of pain medication at 0600 this AM Tyrone 7.5/325. Pt was advised to come to Hospital to be readmitted due to worsening infection, states last culture showed vancomycin resistance with new strand growth. No other complaints verbalized. Will continue to monitor.

## 2019-05-04 LAB
AMPHET+METHAMPHET UR QL: NEGATIVE
APAP SERPL-MCNC: <3 UG/ML (ref 10–20)
BARBITURATES UR QL SCN>200 NG/ML: NEGATIVE
BENZODIAZ UR QL SCN>200 NG/ML: NEGATIVE
BZE UR QL SCN: NEGATIVE
CANNABINOIDS UR QL SCN: NEGATIVE
CREAT UR-MCNC: 41 MG/DL (ref 23–375)
ESTIMATED AVG GLUCOSE: 105 MG/DL (ref 68–131)
ETHANOL UR-MCNC: <10 MG/DL
HBA1C MFR BLD HPLC: 5.3 % (ref 4–5.6)
METHADONE UR QL SCN>300 NG/ML: NEGATIVE
OPIATES UR QL SCN: NORMAL
PCP UR QL SCN>25 NG/ML: NEGATIVE
SALICYLATES SERPL-MCNC: <5 MG/DL (ref 15–30)
TOXICOLOGY INFORMATION: NORMAL

## 2019-05-04 PROCEDURE — 63600175 PHARM REV CODE 636 W HCPCS: Performed by: STUDENT IN AN ORGANIZED HEALTH CARE EDUCATION/TRAINING PROGRAM

## 2019-05-04 PROCEDURE — 80320 DRUG SCREEN QUANTALCOHOLS: CPT

## 2019-05-04 PROCEDURE — 94761 N-INVAS EAR/PLS OXIMETRY MLT: CPT

## 2019-05-04 PROCEDURE — A4216 STERILE WATER/SALINE, 10 ML: HCPCS | Performed by: STUDENT IN AN ORGANIZED HEALTH CARE EDUCATION/TRAINING PROGRAM

## 2019-05-04 PROCEDURE — 83036 HEMOGLOBIN GLYCOSYLATED A1C: CPT

## 2019-05-04 PROCEDURE — 25000003 PHARM REV CODE 250: Performed by: STUDENT IN AN ORGANIZED HEALTH CARE EDUCATION/TRAINING PROGRAM

## 2019-05-04 PROCEDURE — 80307 DRUG TEST PRSMV CHEM ANLYZR: CPT

## 2019-05-04 PROCEDURE — 25000003 PHARM REV CODE 250: Performed by: EMERGENCY MEDICINE

## 2019-05-04 PROCEDURE — 80329 ANALGESICS NON-OPIOID 1 OR 2: CPT

## 2019-05-04 PROCEDURE — 36415 COLL VENOUS BLD VENIPUNCTURE: CPT

## 2019-05-04 PROCEDURE — 63600175 PHARM REV CODE 636 W HCPCS: Performed by: EMERGENCY MEDICINE

## 2019-05-04 PROCEDURE — 11000001 HC ACUTE MED/SURG PRIVATE ROOM

## 2019-05-04 RX ORDER — MORPHINE SULFATE 15 MG/1
15 TABLET, FILM COATED, EXTENDED RELEASE ORAL EVERY 12 HOURS
Status: DISCONTINUED | OUTPATIENT
Start: 2019-05-04 | End: 2019-05-06 | Stop reason: HOSPADM

## 2019-05-04 RX ADMIN — MORPHINE SULFATE 15 MG: 15 TABLET, EXTENDED RELEASE ORAL at 11:05

## 2019-05-04 RX ADMIN — MORPHINE SULFATE 2 MG: 2 INJECTION, SOLUTION INTRAMUSCULAR; INTRAVENOUS at 01:05

## 2019-05-04 RX ADMIN — VANCOMYCIN HYDROCHLORIDE 2000 MG: 100 INJECTION, POWDER, LYOPHILIZED, FOR SOLUTION INTRAVENOUS at 06:05

## 2019-05-04 RX ADMIN — Medication 10 ML: at 06:05

## 2019-05-04 RX ADMIN — CETIRIZINE HYDROCHLORIDE 10 MG: 5 TABLET ORAL at 09:05

## 2019-05-04 RX ADMIN — FAMOTIDINE 20 MG: 20 TABLET, FILM COATED ORAL at 09:05

## 2019-05-04 RX ADMIN — MORPHINE SULFATE 2 MG: 2 INJECTION, SOLUTION INTRAMUSCULAR; INTRAVENOUS at 09:05

## 2019-05-04 RX ADMIN — SUCRALFATE 1 G: 1 TABLET ORAL at 05:05

## 2019-05-04 RX ADMIN — MORPHINE SULFATE 15 MG: 15 TABLET, EXTENDED RELEASE ORAL at 02:05

## 2019-05-04 RX ADMIN — SUCRALFATE 1 G: 1 TABLET ORAL at 09:05

## 2019-05-04 RX ADMIN — SUCRALFATE 1 G: 1 TABLET ORAL at 06:05

## 2019-05-04 RX ADMIN — VANCOMYCIN HYDROCHLORIDE 2000 MG: 100 INJECTION, POWDER, LYOPHILIZED, FOR SOLUTION INTRAVENOUS at 05:05

## 2019-05-04 RX ADMIN — PAROXETINE HYDROCHLORIDE HEMIHYDRATE 20 MG: 20 TABLET, FILM COATED ORAL at 06:05

## 2019-05-04 RX ADMIN — MORPHINE SULFATE 15 MG: 15 TABLET, EXTENDED RELEASE ORAL at 09:05

## 2019-05-04 RX ADMIN — RAMELTEON 8 MG: 8 TABLET, FILM COATED ORAL at 12:05

## 2019-05-04 NOTE — H&P
[facility]   History and Physical  Intern Note    Admitting Team: [facility] medicine Team B  Attending Physician: Meek  Resident: Dalila  Interns: Lexi     Date of Admit: 5/3/2019    Chief Complaint     Worsening pain and drainage from cellulitis of left foot for approximately 5 days     Subjective:      History of Present Illness:  Solo Black is a 38 y.o.  male with a PMHx of ADD, OCD, Eosinophilic esophagitis, Chronic Iron deficiency anemia 2/2 GI blood loss, PUD. The patient presented to [facility] on 5/3/2019 with a primary complaint of worsening pain and drainage from his left foot cellulitis x 5 days.    The patient was in their usual state of health ( lives with grandparents without functional barriers) until approximally the 8th of April when he was admitted to [facility] for abdominal pain and BRBPR. He reportedly has a history of GI bleeds 2/2 his eosinophilic esophagitis. He was admitted and treated with IV protonix and carafate with improvement of his symptoms and he was subsequently discharged. He went home for approximately 1 to 2 days before he began to have worsening pain and erythema from an IV site on his left foot from the previous admission. The IV had been removed prior to his discharge and he noted slight erythema at first but this progressively worsened until he presented to [facility] and was admitted on 4/12/2019 for Sepsis 2/2 MRSA infection and cellulitis in his left foot. He underwent and I&D of his left foot and was placed on Vancomycin. After clinical improvement, he was subsequently discharged from the hospital with PICC line in place. The end date of his Abx was 4/30. He followed up in clinic with his infectious disease physician (Dr. Hill). It was noted that he had worsening rash and pain and so his Abx therapy was extended for another 2 weeks. He was to get an outpatient MRI and follow up with   Prem for further evaluation. He was instructed to return to the ED should his pain/redness/or swelling continue to get worse, which it did. On Presentation, he is afebrile, vital signs stable. Notes severe pain in his left foot radiating into his left ankle. Denies HA, vision changes, CP, SOB, abdominal pain, Nausea, vomiting, Fever, Chills. He notes that his vancomycin dose was recently increased to 2g q 12 due to low troughs.     Past Medical History:  Past Medical History:   Diagnosis Date    C. difficile colitis feb 2014    postop of hand surgery    Eosinophilic esophagitis 3/11/2014    GERD (gastroesophageal reflux disease)     IBS (irritable bowel syndrome)     MRSA carrier     says multiple times nose swab was +    Nephrolithiasis apr 2014    bilateral punctate - never passed a stone    Urinary tract infection feb 2014    MRSA       Past Surgical History:  Past Surgical History:   Procedure Laterality Date    APPENDECTOMY      ARTHROSCOPY, SHOULDER, WITH DISTAL CLAVICLE EXCISION Right 11/1/2018    Performed by [facility]jia [facility] at [facility] OR    BACK SURGERY      CIRCUMCISION, PRIMARY      COLONOSCOPY N/A 11/14/2018    [facility]    drainage of abscess from hand  2009    MRSA    drainage of abscess from R thigh  2006    MRSA    EGD (ESOPHAGOGASTRODUODENOSCOPY) N/A 9/5/2018    Performed by Kolby Wall MD at [facility] ENDO    EGD (ESOPHAGOGASTRODUODENOSCOPY) Left 3/11/2014    [facility]    ESOPHAGOGASTRODUODENOSCOPY  3/11/2014    ESOPHAGOGASTRODUODENOSCOPY (EGD) N/A 7/21/2016    Performed by Kolby Wall [facility] at [facility] ENDO    ESOPHAGOGASTRODUODENOSCOPY (EGD) N/A 3/17/2016    Performed by Kolby Wall MD at [facility] ENDO    HAND SURGERY  jan 2014    power saw fell on hand - laceration 3 tendons    INCISION AND DRAINAGE, LOWER EXTREMITY Left 4/15/2019    Performed by Troy Honeycutt MD at [facility] OR     SIGMOIDOSCOPY, FLEXIBLE N/A 9/5/2018    Performed by Kol[facility] at [facility] ENDO        Allergies:  Review of patient's allergies indicates:   Allergen Reactions    Nsaids (non-steroidal anti-inflammatory drug)      GI bleed    Ketamine      Severe dysphoria (bad trip)    Penicillins      Has taken cephalosporins without issue       Home Medications:  Prior to Admission medications    Medication Sig Start Date End Date Taking? Authorizing Provider   dextroamphetamine-amphetamine (ADDERALL) 20 mg tablet Take 1 tablet by mouth 2 (two) times daily before meals. Take before breakfast and lunch   Yes Historical Provider, MD   famotidine (PEPCID) 20 MG tablet Take 20 mg by mouth 2 (two) times daily.   Yes Historical Provider, MD   ferrous sulfate 325 mg (65 mg iron) Tab tablet Take 1 tablet (325 mg total) by mouth 2 (two) times daily. 9/5/18 9/5/19 Yes Stephane Weaver MD   HYDROcodone-acetaminophen (NORCO) 7.5-325 mg per tablet Take 1 tablet by mouth every 6 (six) hours as needed. 4/17/19  Yes Jerry Lerner MD   loratadine (CLARITIN) 10 mg tablet Take 10 mg by mouth every morning.   Yes Historical Provider, MD   MULTIVIT-IRON-MIN-FOLIC ACID 3,500-18-0.4 UNIT-MG-MG ORAL CHEW Take 10 mg by mouth once daily.   Yes Historical Provider, MD   pantoprazole (PROTONIX) 40 MG tablet Take 1 tablet (40 mg total) by mouth 2 (two) times daily. 9/5/18 9/5/19 Yes Stephane Weaver MD   paroxetine (PAXIL) 20 MG tablet Take 20 mg by mouth every morning.   Yes Historical Provider, MD   sucralfate (CARAFATE) 1 gram tablet Take 1 g by mouth 4 (four) times daily before meals and nightly.   Yes Historical Provider, MD   predniSONE (DELTASONE) 20 MG tablet Take 20 mg by mouth 2 (two) times daily.    Historical Provider, MD   promethazine (PHENERGAN) 12.5 MG Tab Take 25 mg by mouth every 6 (six) hours as needed (nausea/vomiting).    Historical Provider, MD       Family History:  Family History   Problem Relation Age of Onset     Urolithiasis Father     Celiac disease Sister     Hypertension Maternal Grandmother     Hypertension Maternal Grandfather        Social History:  Social History     Tobacco Use    Smoking status: Never Smoker    Smokeless tobacco: Never Used    Tobacco comment: Pt states he has smoked 1 or 2 cigarettes in his life.   Substance Use Topics    Alcohol use: Yes     Comment: occassionaly    Drug use: No       Review of Systems:  Pertinent items are noted in HPI. All other systems are reviewed and are negative.    Health Maintaince :   Primary Care Physician: Sergio  Immunizations:     TDap is up to date.  Influenza is UTD  Pneumovax is not indicated.     Objective:   Last 24 Hour Vital Signs:  BP  Min: 119/75  Max: 146/86  Temp  Av °F (36.7 °C)  Min: 97.1 °F (36.2 °C)  Max: 98.7 °F (37.1 °C)  Pulse  Av.1  Min: 65  Max: 90  Resp  Av.1  Min: 13  Max: 20  SpO2  Av %  Min: 98 %  Max: 100 %  Height  Av' (182.9 cm)  Min: 6' (182.9 cm)  Max: 6' (182.9 cm)  Weight  Av.1 kg (203 lb)  Min: 92.1 kg (203 lb)  Max: 92.1 kg (203 lb)  Body mass index is 27.53 kg/m².  I/O last 3 completed shifts:  In: -   Out: 400 [Urine:400]    Physical Examination:  General: Awake, alert, No apparent distress, sitting up in bed   HEENT: Normocephalic, atraumatic, EOMI, PERR, MMM   Neck: Trachea midline, Supple   Heart: RRR, No murmurs, gallups, or rubs appreciated   Lungs: CTAB, Symmetrical chest rise   Abdomen: Soft, non-distended, non-tender, +BS   Extremities: Left foot with erythematous, warm, tender cellulitis rash over the dorsum of the foot. Two healing incisions from previous I and D procedure, No active drainage present, no Flunctuance. Tender to palpation. Neuro vascularly intact. Otherwise, extremities atraumatic, well perfused.   Skin: cellulitic rash as above, Chest with blanching erythema which patient notes is persistent with his Eosinophilic esophagitis   Psych: Calm, appropriate affect        Laboratory:  Most Recent Data:  CBC:   Lab Results   Component Value Date    WBC 5.51 05/03/2019    HGB 11.2 (L) 05/03/2019    HCT 36.7 (L) 05/03/2019     05/03/2019    MCV 77 (L) 05/03/2019    RDW 19.1 (H) 05/03/2019       BMP:   Lab Results   Component Value Date     05/03/2019    K 3.8 05/03/2019     05/03/2019    CO2 23 05/03/2019    BUN 10 05/03/2019    CREATININE 1.0 05/03/2019    GLU 83 05/03/2019    CALCIUM 9.3 05/03/2019    MG 2.5 04/13/2019    PHOS 3.6 04/01/2012     LFTs:   Lab Results   Component Value Date    PROT 7.4 05/03/2019    ALBUMIN 3.6 05/03/2019    BILITOT 0.4 05/03/2019    AST 23 05/03/2019    ALKPHOS 61 05/03/2019    ALT 34 05/03/2019     Coags:   Lab Results   Component Value Date    INR 1.0 09/04/2018     FLP:   Lab Results   Component Value Date    CHOL 125 04/13/2019    HDL 48 04/13/2019    LDLCALC 54.6 (L) 04/13/2019    TRIG 112 04/13/2019    CHOLHDL 38.4 04/13/2019     DM:   Lab Results   Component Value Date    HGBA1C 5.3 04/12/2019    HGBA1C 5.4 03/27/2016    LDLCALC 54.6 (L) 04/13/2019    CREATININE 1.0 05/03/2019     Thyroid:   Lab Results   Component Value Date    TSH 0.265 (L) 04/12/2019    FREET4 0.91 04/12/2019     Anemia:   Lab Results   Component Value Date    IRON 19 (L) 04/12/2019    TIBC 336 04/12/2019    FERRITIN 12 (L) 04/12/2019    CHUBTTFN78 553 04/12/2019    FOLATE 7.5 04/12/2019     Cardiac:   Lab Results   Component Value Date    TROPONINI 0.010 03/28/2011     Urinalysis:   Lab Results   Component Value Date    LABURIN  04/10/2014     Multiple organisms isolated. None in predominance.  Repeat if    LABURIN clinically necessary. 04/10/2014    COLORU Yellow 10/05/2018    SPECGRAV 1.020 10/05/2018    NITRITE Negative 10/05/2018    KETONESU Negative 10/05/2018    UROBILINOGEN Negative 10/05/2018    WBCUA 1 03/27/2016       Trended Lab Data:  Recent Labs   Lab 04/29/19  1100 05/03/19  0920 05/03/19  1110   WBC 5.54  --  5.51   HGB 9.6*  --  11.2*    HCT 31.6*  --  36.7*     --  322   MCV 77*  --  77*   RDW 20.7*  --  19.1*    139  --    K 3.4* 3.8  --     107  --    CO2 26 23  --    BUN 8 10  --    CREATININE 0.9 1.0  --    * 83  --    PROT 6.3 7.4  --    ALBUMIN 3.0* 3.6  --    BILITOT 0.4 0.4  --    AST 16 23  --    ALKPHOS 45* 61  --    ALT 30 34  --        Trended Cardiac Data:  No results for input(s): TROPONINI, CKTOTAL, CKMB, BNP in the last 168 hours.    Microbiology Data:  Blood Cx x 2 pending     Other Results:    Radiology:  Imaging Results          MRI Foot (Midfoot) Left W W/O Contrast (Final result)  Result time 05/03/19 11:11:49   Procedure changed from MRI Foot Toes Without Contrast Left     Final result by Kris Perdomo III, MD (05/03/19 11:11:49)                 Impression:      See above    No significant abnormality seen.  Distal metatarsals and toes were not imaged.  There is mild edema of the skin and subcutaneous soft tissues of the dorsum of the foot.      Electronically signed by: Kris Perdomo MD  Date:    05/03/2019  Time:    11:11             Narrative:    EXAMINATION:  MRI FOOT (MIDFOOT) LEFT W W/O CONTRAST    CLINICAL HISTORY:  Osteomyelitis suspected, foot swelling, diabetic;    FINDINGS:  Patient was administered 10 cc of Gadavist.  No marrow edema marrow replacement marrow infection neoplasm or trauma seen.  No fracture dislocation bone destruction seen.  Anterior posterolateral and medial tendons are normal.  Anterolateral posterolateral ligaments are intact.  Deltoid ligament is intact.  No fracture dislocation bone destruction or OCD seen.  The talar dome and sinus tarsi are unremarkable.  Distal metatarsals and toes were not imaged.  No soft tissue abnormality seen.  No masses are seen.                                   Assessment:     Solo Black is a 38 y.o. male with a PMHx of ADD, OCD, Esosinophilic esophagitits, PUD, chronic iron deficiency anemia, presenting at [facility] with  worsening of his left lower extremity esophagitis .      Plan:     Cellulitis   - Patient with worsening rash, drainage, and pain x 5 days   -Follows with [facility] as outpatient. Vanc course prolonged x 2 weeks due to persistent rash. S/P I&D last admission   -ESR 16  - MRI completed in the ED notable for soft tissue swelling/edema but not asaf abscess or fluid collection. No evidence of osteomyelitis   -Will consult Dr. Amaro to evaluate wound and make sure not further debridement is warranted   - Will Consult Infectious disease to assist with Abx management and duration of therapy   -Continue current outpatient Vanc dose. 2g q 12    Chronic Iron deficiency anemia 2/2 chronic GI blood loss   - patient on outpatient Iron therapy. Reportedly planning for IV iron infusions   -Will hold iron while admitted with active infection   -Has had several admissions for recurrent GI bleeding   -SCDs for DVT prophylaxis. Avoid NSAIDS     PUD  Continue patient Current Home regimen of Protonix, Famotidine, Loratadine     ADD   home medications include Adderall 20 mg BID   - Hold in acute setting     DVT PPx: SCDs  Diet; Full Diet       Disposition: Pending clinical improvement     Code Status:     Full Code    [unfilled]  [facility] Im Service Team B    [facility] Hospitalist Pager numbers:   [facility] Hospitalist Medicine Team A (Jaime/Smita): [phone]  [facility] Hospitalist Medicine Team B (Quintin/Francisca):  [phone]

## 2019-05-04 NOTE — PLAN OF CARE
Notified Dr. Garcia of patients complaint of pain in his Left foot. He reported mild relief from the percocet stating his pain is still a 8 out of 10.  Physician going to patients bedside to speak with patient.

## 2019-05-04 NOTE — PLAN OF CARE
VN note: VN cued into patient's room for introduction. VN informed patient that VN would be working closely along side bedside nurse, PCT, and the rest of care team and making rounds throughout the shift. He verbalized understanding. Allowed time for questions. VN will continue to be available to patient and intervene prn.       05/04/19 8946   Type of Frequent Check   Type Patient Rounds;Other (see comments)  (VN Rounds)   Safety/Activity   Patient Rounds bed in low position;visualized patient   Safety Promotion/Fall Prevention instructed to call staff for mobility;side rails raised x 2   Activity Management activity adjusted per tolerance   Positioning   Body Position positioned/repositioned independently;side-lying, right   Head of Bed (HOB) HOB at 30-45 degrees   Assessments (Pre/Post)   Level of Consciousness (AVPU) alert

## 2019-05-04 NOTE — PLAN OF CARE
Problem: Adult Inpatient Plan of Care  Goal: Plan of Care Review  Outcome: Ongoing (interventions implemented as appropriate)  Pt remains A+O. Pain controlled with scheduled and PRN medication per MAR. Pt tolerating vancomycin well. Blood return noted to both lumens of PICC, dressing CDI. Dressing to foot removed, AMRIK at this time, no drainage noted. Plan for possible I&D Monday. Will continue to monitor and intervene PRN.

## 2019-05-04 NOTE — PROGRESS NOTES
[facility] Resident HO-II Progress Note    Subjective:      Solo Black is a 38 y.o.  male who is being followed by the [facility] IM service at [facility] for Cellulitis of the left foot.     Patient continues to report significant pain from his left ankle. Denies systemic symptoms of infection. Denies fever, chills, nausea, vomiting, HA, Dizziness, vision changes, Abdominal pain, Dysuria, changes in bowel habits, or other new aches or pains. He is aware of the plan of care. No further questions at this time.     Objective:   Last 24 Hour Vital Signs:  BP  Min: 97/59  Max: 146/86  Temp  Av.9 °F (36.6 °C)  Min: 97.1 °F (36.2 °C)  Max: 98.7 °F (37.1 °C)  Pulse  Av.6  Min: 53  Max: 90  Resp  Av.1  Min: 13  Max: 20  SpO2  Av.6 %  Min: 96 %  Max: 100 %  Height  Av' (182.9 cm)  Min: 6' (182.9 cm)  Max: 6' (182.9 cm)  Weight  Av.1 kg (203 lb)  Min: 92.1 kg (203 lb)  Max: 92.1 kg (203 lb)  I/O last 3 completed shifts:  In: -   Out: 400 [Urine:400]    Physical Examination:  General: Awake, alert, No apparent distress, sitting up in bed   HEENT: Normocephalic, atraumatic, EOMI, PERR, MMM   Neck: Trachea midline, Supple   Heart: RRR, No murmurs, gallups, or rubs appreciated   Lungs: CTAB, Symmetrical chest rise   Abdomen: Soft, non-distended, non-tender, +BS   Extremities: Left foot with erythematous, warm, tender cellulitis rash over the dorsum of the foot. Two healing incisions from previous I and D procedure, No active drainage present, no Flunctuance. Tender to palpation. Neuro vascularly intact. Otherwise, extremities atraumatic, well perfused.   Skin: cellulitic rash as above, Chest with blanching erythema which patient notes is persistent with his Eosinophilic esophagitis   Psych: Calm, appropriate affect     Laboratory:  Laboratory Data Reviewed:   Pertinent Findings:  Recent Labs     19  1110   WBC 5.51   HGB 11.2*   HCT 36.7*      MCV 77*   RDW 19.1*   GRAN  55.9  3.1   LYMPH 25.6  1.4   MONO 10.9  0.6   EOS 0.4   BASO 0.03   EOSINOPHIL 6.9   BASOPHIL 0.5       Recent Labs     05/03/19  0920      K 3.8      CO2 23   BUN 10   CREATININE 1.0   GLU 83     No results for input(s): POCTGLUCOSE in the last 72 hours.  Recent Labs     05/03/19  0920   PROT 7.4   ALBUMIN 3.6   BILITOT 0.4   AST 23   ALT 34   ALKPHOS 61     Morning Labs pending    Microbiology Data Reviewed:   Pertinent Findings:  Blood cultures in process    Radiology Data Reviewed:   Pertinent Findings:  Imaging Results          MRI Foot (Midfoot) Left W W/O Contrast (Final result)  Result time 05/03/19 11:11:49   Procedure changed from MRI Foot Toes Without Contrast Left     Final result by Kris Perdomo III, MD (05/03/19 11:11:49)                 Impression:      See above    No significant abnormality seen.  Distal metatarsals and toes were not imaged.  There is mild edema of the skin and subcutaneous soft tissues of the dorsum of the foot.      Electronically signed by: Kris Perdomo MD  Date:    05/03/2019  Time:    11:11             Narrative:    EXAMINATION:  MRI FOOT (MIDFOOT) LEFT W W/O CONTRAST    CLINICAL HISTORY:  Osteomyelitis suspected, foot swelling, diabetic;    FINDINGS:  Patient was administered 10 cc of Gadavist.  No marrow edema marrow replacement marrow infection neoplasm or trauma seen.  No fracture dislocation bone destruction seen.  Anterior posterolateral and medial tendons are normal.  Anterolateral posterolateral ligaments are intact.  Deltoid ligament is intact.  No fracture dislocation bone destruction or OCD seen.  The talar dome and sinus tarsi are unremarkable.  Distal metatarsals and toes were not imaged.  No soft tissue abnormality seen.  No masses are seen.                                  Current Medications:     Infusions:       Scheduled:   cetirizine  10 mg Oral Daily    famotidine  20 mg Oral BID    morphine  15 mg Oral Q12H    paroxetine  20 mg  Oral QAM    sucralfate  1 g Oral QID (AC & HS)    vancomycin (VANCOCIN) IVPB  2,000 mg Intravenous Q12H        PRN:  morphine, sodium chloride 0.9%    Antibiotics and Day Number of Therapy:  Vancomycin since 5/3/19    Lines and Day Number of Therapy:  PICC double lumen left basilic    Assessment:     Solo Black is a 38 y.o.male with ADD, Eosinophilic esophagitis, Chronic ASAD 2/2 chronic blood loss, PUD, who [facility] with Cellulitis of his left foot after representing due to worsening pain and eytherma as well as purulent drainage from his incision sites. He does not have any systemic signs of infection. MRI does not suggest Osteomyelitis.        Plan:     Cellulitis   - Patient with worsening rash, drainage, and pain x 5 days   -Follows with [facility] as outpatient. Vanc course prolonged x 2 weeks due to persistent rash. S/P I&D last admission   -ESR 16  - MRI completed in the ED notable for soft tissue swelling/edema but not asaf abscess or fluid collection. No evidence of osteomyelitis   -Will consult Dr. Amaro to evaluate wound and make sure not further debridement is warranted   - Will Consult Infectious disease to assist with Abx management and duration of therapy   -Blood cultures pending   -Continue current outpatient Vanc dose. 2g q 12    Chronic Iron deficiency anemia 2/2 chronic GI blood loss   - patient on outpatient Iron therapy. Reportedly planning for IV iron infusions   -Will hold iron while admitted with active infection   -Has had several admissions for recurrent GI bleeding   -SCDs for DVT prophylaxis. Avoid NSAIDS     PUD  Continue patient Current Home regimen of Protonix, Famotidine, Loratadine     ADD   home medications include Adderall 20 mg BID   - Hold in acute setting     DVT PPx: SCDs  Diet; Full Diet       Disposition: Pending clinical improvement     Mike Conner  \[facility]\"" Internal Medicine HO-II  \[facility]\"" IM Service Team B    \[facility]\""  Medicine Hospitalist Pager numbers:   [facility] Hospitalist Medicine Team A (Jaime/Smita): [phone]  [facility] Hospitalist Medicine Team B (Quintin/Francisca):  [phone]

## 2019-05-04 NOTE — PLAN OF CARE
Problem: Adult Inpatient Plan of Care  Goal: Plan of Care Review  Patient on RA, no respiratory distress noted. Will continue to monitor.

## 2019-05-04 NOTE — ASSESSMENT & PLAN NOTE
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

## 2019-05-04 NOTE — CONSULTS
Today`s Date: 5/4/2019     Admit Date: 5/3/2019    Admitting Physician: Oh Espinosa MD    Patient`s Name: Solo Black , 38 y.o. male    Reason for consultation  C/o recurrent swelling and edema left foot   Patient Active Problem List:     Hematemesis     Eosinophilic esophagitis     PUD (peptic ulcer disease)     Erosive gastritis     Lifetime need for proton pump inhibitor     Melena     Hypokalemia     Abdominal pain     Cervicalgia     Adult ADHD     Adverse reaction to nonsteroidal anti-inflammatory drug (NSAID)     Bright red blood per rectum     Therapeutic opioid induced constipation     Acute upper GI bleed     Acute diarrhea     Epigastric pain     Iron deficiency anemia     Eosinophilia     GI bleed     Upper GI bleed     Reactive arthritis     Incomplete rotator cuff tear or rupture of right shoulder, not specified as traumatic     Cellulitis     Staphylococcus aureus bacteremia with sepsis     Cellulitis and abscess of foot     MRSA bacteremia     Cellulitis of foot, left      Past Medical History:  feb 2014: C. difficile colitis      Comment:  postop of hand surgery  3/11/2014: Eosinophilic esophagitis  No date: GERD (gastroesophageal reflux disease)  No date: IBS (irritable bowel syndrome)  No date: MRSA carrier      Comment:  says multiple times nose swab was +  apr 2014: Nephrolithiasis      Comment:  bilateral punctate - never passed a stone  feb 2014: Urinary tract infection      Comment:  MRSA    Past Surgical History:  No date: APPENDECTOMY  11/1/2018: ARTHROSCOPY, SHOULDER, WITH DISTAL CLAVICLE EXCISION; Right      Comment:  Performed by Gabino Mejia MD at [facility] OR  No date: BACK SURGERY  No date: CIRCUMCISION, PRIMARY  11/14/2018: COLONOSCOPY; N/A      Comment:  Performed by Milton Brunson MD at [facility] ENDO  2009: drainage of abscess from hand      Comment:  MRSA  2006: drainage of abscess from R thigh      Comment:  MRSA  9/5/2018: EGD (ESOPHAGOGASTRODUODENOSCOPY); N/A       Comment:  Performed by Kolby Wall MD at [facility] ENDO  3/11/2014: EGD (ESOPHAGOGASTRODUODENOSCOPY); Left      Comment:  Performed by Galo Hdez MD at [facility] ENDO  3/11/2014: ESOPHAGOGASTRODUODENOSCOPY  7/21/2016: ESOPHAGOGASTRODUODENOSCOPY (EGD); N/A      Comment:  Performed by Kolby Wall MD at [facility] ENDO  3/17/2016: ESOPHAGOGASTRODUODENOSCOPY (EGD); N/A      Comment:  Performed by Kolby Wall MD at [facility] ENDO  jan 2014: HAND SURGERY      Comment:  power saw fell on hand - laceration 3 tendons  4/15/2019: INCISION AND DRAINAGE, LOWER EXTREMITY; Left      Comment:  Performed by Troy Honeycutt MD at [facility] OR  9/5/2018: SIGMOIDOSCOPY, FLEXIBLE; N/A      Comment:  Performed by Kolby Wall MD at [facility] ENDO    Prior to Admission medications :  Medication dextroamphetamine-amphetamine (ADDERALL) 20 mg tablet, Sig Take 1 tablet by mouth 2 (two) times daily before meals. Take before breakfast and lunch, Start Date , End Date , Taking? Yes, Authorizing Provider Historical Provider, MD    Medication famotidine (PEPCID) 20 MG tablet, Sig Take 20 mg by mouth 2 (two) times daily., Start Date , End Date , Taking? Yes, Authorizing Provider Historical Provider, MD    Medication ferrous sulfate 325 mg (65 mg iron) Tab tablet, Sig Take 1 tablet (325 mg total) by mouth 2 (two) times daily., Start Date 9/5/18, End Date 9/5/19, Taking? Yes, Authorizing Provider Stephane Weaver MD    Medication HYDROcodone-acetaminophen (NORCO) 7.5-325 mg per tablet, Sig Take 1 tablet by mouth every 6 (six) hours as needed., Start Date 4/17/19, End Date , Taking? Yes, Authorizing Provider Jerry Lerner MD    Medication loratadine (CLARITIN) 10 mg tablet, Sig Take 10 mg by mouth every morning., Start Date , End Date , Taking? Yes, Authorizing Provider Historical Provider, MD    Medication MULTIVIT-IRON-MIN-FOLIC ACID 3,500-18-0.4 UNIT-MG-MG ORAL CHEW, Sig Take 10 mg by mouth once daily., Start Date , End Date , Taking?  Yes, Authorizing Provider Historical MD Anton    Medication pantoprazole (PROTONIX) 40 MG tablet, Sig Take 1 tablet (40 mg total) by mouth 2 (two) times daily., Start Date 9/5/18, End Date 9/5/19, Taking? Yes, Authorizing Provider Stephane Weaver MD    Medication paroxetine (PAXIL) 20 MG tablet, Sig Take 20 mg by mouth every morning., Start Date , End Date , Taking? Yes, Authorizing Provider Roz Walton MD    Medication sucralfate (CARAFATE) 1 gram tablet, Sig Take 1 g by mouth 4 (four) times daily before meals and nightly., Start Date , End Date , Taking? Yes, Authorizing Provider Roz Walton MD    Medication predniSONE (DELTASONE) 20 MG tablet, Sig Take 20 mg by mouth 2 (two) times daily., Start Date , End Date , Taking? , Authorizing Provider Historical MD Anton    Medication promethazine (PHENERGAN) 12.5 MG Tab, Sig Take 25 mg by mouth every 6 (six) hours as needed (nausea/vomiting)., Start Date , End Date , Taking? , Authorizing Provider Roz Walton MD      No current facility-administered medications on file prior to encounter.   Current Outpatient Medications on File Prior to Encounter:  dextroamphetamine-amphetamine (ADDERALL) 20 mg tablet, Take 1 tablet by mouth 2 (two) times daily before meals. Take before breakfast and lunch, Disp: , Rfl:   famotidine (PEPCID) 20 MG tablet, Take 20 mg by mouth 2 (two) times daily., Disp: , Rfl:   ferrous sulfate 325 mg (65 mg iron) Tab tablet, Take 1 tablet (325 mg total) by mouth 2 (two) times daily., Disp: 180 tablet, Rfl: 3  HYDROcodone-acetaminophen (NORCO) 7.5-325 mg per tablet, Take 1 tablet by mouth every 6 (six) hours as needed., Disp: 25 tablet, Rfl: 0  loratadine (CLARITIN) 10 mg tablet, Take 10 mg by mouth every morning., Disp: , Rfl:   MULTIVIT-IRON-MIN-FOLIC ACID 3,500-18-0.4 UNIT-MG-MG ORAL CHEW, Take 10 mg by mouth once daily., Disp: , Rfl:   pantoprazole (PROTONIX) 40 MG tablet, Take 1 tablet (40 mg total) by mouth 2  (two) times daily., Disp: 180 tablet, Rfl: 3  paroxetine (PAXIL) 20 MG tablet, Take 20 mg by mouth every morning., Disp: , Rfl:   sucralfate (CARAFATE) 1 gram tablet, Take 1 g by mouth 4 (four) times daily before meals and nightly., Disp: , Rfl:   predniSONE (DELTASONE) 20 MG tablet, Take 20 mg by mouth 2 (two) times daily., Disp: , Rfl:   promethazine (PHENERGAN) 12.5 MG Tab, Take 25 mg by mouth every 6 (six) hours as needed (nausea/vomiting)., Disp: , Rfl:          Review of patient's allergies indicates:   -- Nsaids (non-steroidal anti-inflammatory drug)     --  GI bleed   -- Ketamine     --  Severe dysphoria (bad trip)   -- Penicillins     --  Has taken cephalosporins without issue    Social History:   reports that he has never smoked. He has never used smokeless tobacco. He reports that he drinks alcohol. He reports that he does not use drugs.     Review of patient's family history indicates:  Problem: Urolithiasis      Relation: Father          Age of Onset: (Not Specified)  Problem: Celiac disease      Relation: Sister          Age of Onset: (Not Specified)  Problem: Hypertension      Relation: Maternal Grandmother          Age of Onset: (Not Specified)  Problem: Hypertension      Relation: Maternal Grandfather          Age of Onset: (Not Specified)      PHYSICAL EXAMINATION  Temp:  [96.5 °F (35.8 °C)-98.6 °F (37 °C)] 97.6 °F (36.4 °C)  Pulse:  [52-90] 54  Resp:  [13-20] 18  SpO2:  [95 %-99 %] 97 %  BP: ()/(53-83) 100/57    General Condition:   alert x 3     Head & Neck  Anemia: None  Jaundice: None  Neck vein: Not distended  Carotid Bruits: none  Lymph nodes: none palpable  Thyroid: normal    Chest: normal    Heart: normal    Rt. Breast: not examined  Lt. Breast: not examined  Axillary lymph nodes: none    Abdomen: Soft,  None tender with no palpable mass or organ  Hernia: none    Rectal: Defered    Extremities: s/p I&D left foot abscess , mild redness and edema with induration at the proximal wound  left foot, no definite abscess  Pulses intact  Vascular: normal    Specific focus Examination    Imp: Recurrent left foot cellulitis    Plan: Agree with the treatment plan

## 2019-05-04 NOTE — HPI
Solo Black is a 38 y.o.  male with a PMHx of ADD, OCD, Eosinophilic esophagitis, Chronic Iron deficiency anemia 2/2 GI blood loss, PUD. The patient presented to [facility] on 5/3/2019 with a primary complaint of worsening pain and drainage from his left foot cellulitis x 5 days.     The patient was in their usual state of health ( lives with grandparents without functional barriers) until approximally the 8th of April when he was admitted to [facility] for abdominal pain and BRBPR. He reportedly has a history of GI bleeds 2/2 his eosinophilic esophagitis. He was admitted and treated with IV protonix and carafate with improvement of his symptoms and he was subsequently discharged. He went home for approximately 1 to 2 days before he began to have worsening pain and erythema from an IV site on his left foot from the previous admission. The IV had been removed prior to his discharge and he noted slight erythema at first but this progressively worsened until he presented to [facility] and was admitted on 4/12/2019 for Sepsis 2/2 MRSA infection and cellulitis in his left foot. He underwent and I&D of his left foot and was placed on Vancomycin. After clinical improvement, he was subsequently discharged from the hospital with PICC line in place. The end date of his Abx was 4/30. He followed up in clinic with his infectious disease physician (Dr. Hill). It was noted that he had worsening rash and pain and so his Abx therapy was extended for another 2 weeks. He was to get an outpatient MRI and follow up with Dr. Amaro for further evaluation. He was instructed to return to the ED should his pain/redness/or swelling continue to get worse, which it did. On Presentation, he is afebrile, vital signs stable. Notes severe pain in his left foot radiating into his left ankle. Denies HA, vision changes, CP, SOB, abdominal pain, Nausea, vomiting, Fever, Chills. He notes that his  vancomycin dose was recently increased to 2g q 12 due to low troughs    Patient noted that pain and swelling is reduced today.  He has had 4-5 SA infections over the past few years - mostly sound liek bacteremia.  No FHX of similar events but he does have the eo esophagitis as described above

## 2019-05-04 NOTE — PLAN OF CARE
VN note: Patient's monitor offline, unable to round. Nursing aware.     05/04/19 1024   Type of Frequent Check   Type Patient Rounds;Other (see comments)  (VN Rounds, Monitor Offline, Bedside Nurse notified.)      no

## 2019-05-04 NOTE — CONSULTS
[facility]  Infectious Disease  Consult Note    Patient Name: Solo Black  MRN: [id number]  Admission Date: 5/3/2019  Hospital Length of Stay: 1 days  Attending Physician: Oh Espinosa MD  Primary Care Provider: Cecilia Tsai MD     Isolation Status: Contact    Patient information was obtained from patient and ER records.      Consults  Assessment/Plan:     Cellulitis of foot, left  Cellulitis as noted above - would keep on vanc and push doses to trough >15    History is suggestive of an immune defect - CGD or possible Job's - hyper IgE   We should try to get these labs but would be better to do this on Monday and ask for the lab assistance - (NBT or whatever the test the lab does for CGD, IgE levels etc.)   IgE levels - were found in Epic but I was not sure that was the correct test to do   He did have normal Igs in 2016 but IgE was not tested         Thank you for your consult. I will follow-up with patient. Please contact us if you have any additional questions.    Gabino Adrian MD  Infectious Disease  [facility]    Subjective:     Principal Problem: <principal problem not specified>    HPI: Solo Black is a 38 y.o.  male with a PMHx of ADD, OCD, Eosinophilic esophagitis, Chronic Iron deficiency anemia 2/2 GI blood loss, PUD. The patient presented to [facility] on 5/3/2019 with a primary complaint of worsening pain and drainage from his left foot cellulitis x 5 days.     The patient was in their usual state of health ( lives with grandparents without functional barriers) until approximally the 8th of April when he was admitted to [facility] for abdominal pain and BRBPR. He reportedly has a history of GI bleeds 2/2 his eosinophilic esophagitis. He was admitted and treated with IV protonix and carafate with improvement of his symptoms and he was subsequently discharged. He went home for approximately 1 to 2 days  before he began to have worsening pain and erythema from an IV site on his left foot from the previous admission. The IV had been removed prior to his discharge and he noted slight erythema at first but this progressively worsened until he presented to [facility] and was admitted on 4/12/2019 for Sepsis 2/2 MRSA infection and cellulitis in his left foot. He underwent and I&D of his left foot and was placed on Vancomycin. After clinical improvement, he was subsequently discharged from the hospital with PICC line in place. The end date of his Abx was 4/30. He followed up in clinic with his infectious disease physician (Dr. Hill). It was noted that he had worsening rash and pain and so his Abx therapy was extended for another 2 weeks. He was to get an outpatient MRI and follow up with Dr. Amaro for further evaluation. He was instructed to return to the ED should his pain/redness/or swelling continue to get worse, which it did. On Presentation, he is afebrile, vital signs stable. Notes severe pain in his left foot radiating into his left ankle. Denies HA, vision changes, CP, SOB, abdominal pain, Nausea, vomiting, Fever, Chills. He notes that his vancomycin dose was recently increased to 2g q 12 due to low troughs    Patient noted that pain and swelling is reduced today.  He has had 4-5 SA infections over the past few years - mostly sound liek bacteremia.  No FHX of similar events but he does have the eo esophagitis as described above     Past Medical History:   Diagnosis Date    C. difficile colitis feb 2014    postop of hand surgery    Eosinophilic esophagitis 3/11/2014    GERD (gastroesophageal reflux disease)     IBS (irritable bowel syndrome)     MRSA carrier     says multiple times nose swab was +    Nephrolithiasis apr 2014    bilateral punctate - never passed a stone    Urinary tract infection feb 2014    MRSA       Past Surgical History:   Procedure Laterality Date    APPENDECTOMY      ARTHROSCOPY,  SHOULDER, WITH DISTAL CLAVICLE EXCISION Right 11/1/2018    [facility]    BACK SURGERY      CIRCUMCISION, PRIMARY      COLONOSCOPY N/A 11/14/2018    [facility]    drainage of abscess from hand  2009    MRSA    drainage of abscess from R thigh  2006    MRSA    EGD (ESOPHAGOGASTRODUODENOSCOPY) N/A 9/5/2018    [facility] by Kolby [facility] at [facility]    EGD (ESOPHAGOGASTRODUODENOSCOPY) Left 3/11/2014    [facility]    ESOPHAGOGASTRODUODENOSCOPY  3/11/2014    ESOPHAGOGASTRODUODENOSCOPY (EGD) N/A 7/21/2016    [facility] ENDO    ESOPHAGOGASTRODUODENOSCOPY (EGD) N/A 3/17/2016    [facility] ENDO    HAND SURGERY  jan 2014    power saw fell on hand - laceration 3 tendons    INCISION AND DRAINAGE, LOWER EXTREMITY Left 4/15/2019    [facility] by Troy [facility]cutt [facility] at [facility] OR    SIGMOIDOSCOPY, FLEXIBLE N/A 9/5/2018    Per[facility] by [facility] at [facility] ENDO       Review of patient's allergies indicates:   Allergen Reactions    Nsaids (non-steroidal anti-inflammatory drug)      GI bleed    Ketamine      Severe dysphoria (bad trip)    Penicillins      Has taken cephalosporins without issue       Medications:  Medications Prior to Admission   Medication Sig    dextroamphetamine-amphetamine (ADDERALL) 20 mg tablet Take 1 tablet by mouth 2 (two) times daily before meals. Take before breakfast and lunch    famotidine (PEPCID) 20 MG tablet Take 20 mg by mouth 2 (two) times daily.    ferrous sulfate 325 mg (65 mg iron) Tab tablet Take 1 tablet (325 mg total) by mouth 2 (two) times daily.    HYDROcodone-acetaminophen (NORCO) 7.5-325 mg per tablet Take 1 tablet by mouth every 6 (six) hours as needed.    loratadine (CLARITIN) 10 mg tablet Take 10 mg by mouth every morning.    MULTIVIT-IRON-MIN-FOLIC ACID 3,500-18-0.4 UNIT-MG-MG ORAL CHEW  Take 10 mg by mouth once daily.    pantoprazole (PROTONIX) 40 MG tablet Take 1 tablet (40 mg total) by mouth 2 (two) times daily.    paroxetine (PAXIL) 20 MG tablet Take 20 mg by mouth every morning.    sucralfate (CARAFATE) 1 gram tablet Take 1 g by mouth 4 (four) times daily before meals and nightly.    predniSONE (DELTASONE) 20 MG tablet Take 20 mg by mouth 2 (two) times daily.    promethazine (PHENERGAN) 12.5 MG Tab Take 25 mg by mouth every 6 (six) hours as needed (nausea/vomiting).     Antibiotics (From admission, onward)    Start     Stop Route Frequency Ordered    05/03/19 1600  vancomycin (VANCOCIN) 2,000 mg in dextrose 5 % 500 mL IVPB  (vancomycin IVPB)      -- IV Every 12 hours (non-standard times) 05/03/19 1531        Antifungals (From admission, onward)    None        Antivirals (From admission, onward)    None           Immunization History   Administered Date(s) Administered    Influenza - Quadrivalent - PF 10/01/2016    Pneumococcal Polysaccharide - 23 Valent 10/01/2016       Family History     Problem Relation (Age of Onset)    Celiac disease Sister    Hypertension Maternal Grandmother, Maternal Grandfather    Urolithiasis Father        Social History     Socioeconomic History    Marital status: Single     Spouse name: Not on file    Number of children: 2    Years of education: Not on file    Highest education level: Not on file   Occupational History    Occupation:  electric forklifts     Employer: [employer]   Social Needs    Financial resource strain: Not on file    Food insecurity:     Worry: Not on file     Inability: Not on file    Transportation needs:     Medical: Not on file     Non-medical: Not on file   Tobacco Use    Smoking status: Never Smoker    Smokeless tobacco: Never Used    Tobacco comment: Pt states he has smoked 1 or 2 cigarettes in his life.   Substance and Sexual Activity    Alcohol use: Yes     Comment: occassionaly    Drug use: No     Sexual activity: Yes     Partners: Female   Lifestyle    Physical activity:     Days per week: Not on file     Minutes per session: Not on file    Stress: Not on file   Relationships    Social connections:     Talks on phone: Not on file     Gets together: Not on file     Attends Latter-day service: Not on file     Active member of club or organization: Not on file     Attends meetings of clubs or organizations: Not on file     Relationship status: Not on file   Other Topics Concern    Not on file   Social History Narrative    Not on file     Review of Systems   Constitutional: Positive for activity change, chills and fever. Negative for diaphoresis and fatigue.   HENT: Negative.    Eyes: Negative.    Respiratory: Negative.    Cardiovascular: Negative.    Gastrointestinal: Negative.    Endocrine: Negative.    Genitourinary: Negative.    Musculoskeletal: Positive for myalgias. Negative for joint swelling.   Skin: Positive for color change, pallor, rash and wound.   Allergic/Immunologic: Positive for immunocompromised state.   Neurological: Negative.      Objective:     Vital Signs (Most Recent):  Temp: 97.6 °F (36.4 °C) (05/04/19 1123)  Pulse: (!) 54 (05/04/19 1123)  Resp: 18 (05/04/19 1123)  BP: (!) 100/57 (05/04/19 1123)  SpO2: 97 % (05/04/19 1200) Vital Signs (24h Range):  Temp:  [96.5 °F (35.8 °C)-98.6 °F (37 °C)] 97.6 °F (36.4 °C)  Pulse:  [52-90] 54  Resp:  [16-20] 18  SpO2:  [95 %-98 %] 97 %  BP: ()/(53-83) 100/57     Weight: 93.6 kg (206 lb 5.6 oz)  Body mass index is 27.99 kg/m².    Estimated Creatinine Clearance: 121.3 mL/min (based on SCr of 1 mg/dL).    Physical Exam   Constitutional: He is oriented to person, place, and time. He appears well-developed and well-nourished.   Normal facies    HENT:   Head: Normocephalic and atraumatic.   Eyes: Pupils are equal, round, and reactive to light.   Neck: Neck supple.   Cardiovascular: Normal rate and regular rhythm.   Abdominal: Soft. Bowel sounds are  normal.   Musculoskeletal: Normal range of motion.   Neurological: He is alert and oriented to person, place, and time.   Skin: Rash noted. There is erythema.   Left ankle with 2 x 3 cm area of erythema and escar present - some induration but no fluctuance        Significant Labs: All pertinent labs within the past 24 hours have been reviewed.    Significant Imaging: I have reviewed all pertinent imaging results/findings within the past 24 hours.

## 2019-05-04 NOTE — PLAN OF CARE
Problem: Adult Inpatient Plan of Care  Goal: Plan of Care Review  Outcome: Ongoing (interventions implemented as appropriate)  Patient medicated for pain as ordered. Notified MD for uncontrolled pain and medications adjusted. Dressing intact no drainage. Patient remains free from falls,bed alarm in use.

## 2019-05-05 LAB — VANCOMYCIN TROUGH SERPL-MCNC: 22 UG/ML (ref 10–22)

## 2019-05-05 PROCEDURE — 25000003 PHARM REV CODE 250: Performed by: STUDENT IN AN ORGANIZED HEALTH CARE EDUCATION/TRAINING PROGRAM

## 2019-05-05 PROCEDURE — 63600175 PHARM REV CODE 636 W HCPCS: Performed by: STUDENT IN AN ORGANIZED HEALTH CARE EDUCATION/TRAINING PROGRAM

## 2019-05-05 PROCEDURE — 63600175 PHARM REV CODE 636 W HCPCS: Performed by: INTERNAL MEDICINE

## 2019-05-05 PROCEDURE — 94761 N-INVAS EAR/PLS OXIMETRY MLT: CPT

## 2019-05-05 PROCEDURE — 94760 N-INVAS EAR/PLS OXIMETRY 1: CPT

## 2019-05-05 PROCEDURE — 25000003 PHARM REV CODE 250: Performed by: INTERNAL MEDICINE

## 2019-05-05 PROCEDURE — 11000001 HC ACUTE MED/SURG PRIVATE ROOM

## 2019-05-05 PROCEDURE — 80202 ASSAY OF VANCOMYCIN: CPT

## 2019-05-05 RX ORDER — ACETAMINOPHEN 325 MG/1
650 TABLET ORAL ONCE
Status: DISCONTINUED | OUTPATIENT
Start: 2019-05-05 | End: 2019-05-06 | Stop reason: HOSPADM

## 2019-05-05 RX ORDER — MORPHINE SULFATE 2 MG/ML
2 INJECTION, SOLUTION INTRAMUSCULAR; INTRAVENOUS EVERY 6 HOURS PRN
Status: DISCONTINUED | OUTPATIENT
Start: 2019-05-05 | End: 2019-05-06 | Stop reason: HOSPADM

## 2019-05-05 RX ORDER — MUPIROCIN 20 MG/G
OINTMENT TOPICAL 2 TIMES DAILY
Status: DISCONTINUED | OUTPATIENT
Start: 2019-05-05 | End: 2019-05-06 | Stop reason: HOSPADM

## 2019-05-05 RX ORDER — CHLORHEXIDINE GLUCONATE ORAL RINSE 1.2 MG/ML
10 SOLUTION DENTAL 2 TIMES DAILY
Status: DISCONTINUED | OUTPATIENT
Start: 2019-05-05 | End: 2019-05-06 | Stop reason: HOSPADM

## 2019-05-05 RX ADMIN — MUPIROCIN: 20 OINTMENT TOPICAL at 08:05

## 2019-05-05 RX ADMIN — SUCRALFATE 1 G: 1 TABLET ORAL at 11:05

## 2019-05-05 RX ADMIN — FAMOTIDINE 20 MG: 20 TABLET, FILM COATED ORAL at 08:05

## 2019-05-05 RX ADMIN — CETIRIZINE HYDROCHLORIDE 10 MG: 5 TABLET ORAL at 08:05

## 2019-05-05 RX ADMIN — MORPHINE SULFATE 15 MG: 15 TABLET, EXTENDED RELEASE ORAL at 08:05

## 2019-05-05 RX ADMIN — LACTOBACILLUS TAB 4 TABLET: TAB at 05:05

## 2019-05-05 RX ADMIN — MORPHINE SULFATE 2 MG: 2 INJECTION, SOLUTION INTRAMUSCULAR; INTRAVENOUS at 05:05

## 2019-05-05 RX ADMIN — MUPIROCIN: 20 OINTMENT TOPICAL at 11:05

## 2019-05-05 RX ADMIN — MORPHINE SULFATE 2 MG: 2 INJECTION, SOLUTION INTRAMUSCULAR; INTRAVENOUS at 11:05

## 2019-05-05 RX ADMIN — CHLORHEXIDINE GLUCONATE 0.12% ORAL RINSE 10 ML: 1.2 LIQUID ORAL at 08:05

## 2019-05-05 RX ADMIN — SUCRALFATE 1 G: 1 TABLET ORAL at 05:05

## 2019-05-05 RX ADMIN — SUCRALFATE 1 G: 1 TABLET ORAL at 06:05

## 2019-05-05 RX ADMIN — PAROXETINE HYDROCHLORIDE HEMIHYDRATE 20 MG: 20 TABLET, FILM COATED ORAL at 06:05

## 2019-05-05 RX ADMIN — CHLORHEXIDINE GLUCONATE 0.12% ORAL RINSE 10 ML: 1.2 LIQUID ORAL at 11:05

## 2019-05-05 RX ADMIN — VANCOMYCIN HYDROCHLORIDE 1500 MG: 100 INJECTION, POWDER, LYOPHILIZED, FOR SOLUTION INTRAVENOUS at 11:05

## 2019-05-05 NOTE — PLAN OF CARE
Chief Complaint   Patient presents with    Wound Infection       pt presents to ED today c/o wound infection to left foot. pt was advised by Infectious disease to be readmitted to hospital           Pt last admitted: [facility], ERNESTINA, Cellulitis. Discharged home with [facility] and [facility] for IV ABX    Pt currently staying with his grandmother, Umm, [place] [address], while he is on IV ABX. She assists him with the admin of the abx. Pt has Axillary crutches.. Grandmother provides transportation while he is unable to drive and will provide transportation on discharge    Future Appointments   Date Time Provider Department Center   5/8/2019  9:30 AM Troy Honeycutt MD [facility] W[facility]       Pt denies any needs or concerns at this time. Discharge planning brochure and business card provided. CM numbers written on white board. Pt encouraged to call with any questions or needs. CM will continue to follow patient throughout the transitions of care, and assist with any discharge needs.       05/05/19 4910   Discharge Assessment   Assessment Type Discharge Planning Assessment   Confirmed/corrected address and phone number on facesheet? Yes   Assessment information obtained from? Patient   Expected Length of Stay (days) 3   Communicated expected length of stay with patient/caregiver yes   Prior to hospitilization cognitive status: Alert/Oriented   Prior to hospitalization functional status: Needs Assistance   Current cognitive status: Alert/Oriented   Current Functional Status: Needs Assistance   Facility Arrived From: (grandmother's home)   Lives With grandparent(s)   Able to Return to Prior Arrangements yes   Is patient able to care for self after discharge? Unable to determine at this time (comments)   Who are your caregiver(s) and their phone number(s)? grandmother: Umm Camargo [phone], [phone]   Patient's perception of discharge disposition home health  (/home  infusion)   Readmission Within the Last 30 Days other (see comments)  (last admit: [facility], IP, Cellulitis 4/12--4/18/19)   Patient currently being followed by outpatient case management? No   Patient currently receives any other outside agency services? No   Equipment Currently Used at Home crutches, axillary   Do you have any problems affording any of your prescribed medications? No   Is the patient taking medications as prescribed? yes   Does the patient have transportation home? Yes   Transportation Anticipated family or friend will provide   Dialysis Name and Scheduled days N/A   Does the patient receive services at the [facility]? No   Discharge Plan A Home Health   Discharge Plan B Home with family   DME Needed Upon Discharge  none   Patient/Family in Agreement with Plan yes     Teresa Ramirez, RN Transitional Navigator  [phone]

## 2019-05-05 NOTE — PLAN OF CARE
VN note: VN cued into patient's room for introduction. VN informed patient that VN would be working closely along side bedside nurse, PCT, and the rest of care team and making rounds throughout the shift. He verbalized understanding. Allowed time for questions. VN will continue to be available to patient and intervene prn.      11:27 pm-Nurse Sadie informed VN patient requested a probiotic. VN notified Dr. Chisholm.     05/05/19 8375   Type of Frequent Check   Type Patient Rounds;Other (see comments)  (VN Rounds)   Safety/Activity   Patient Rounds bed in low position;visualized patient   Safety Promotion/Fall Prevention side rails raised x 2   Activity Management activity adjusted per tolerance   Positioning   Body Position positioned/repositioned independently;supine   Head of Bed (HOB) HOB at 60-90 degrees   Assessments (Pre/Post)   Level of Consciousness (AVPU) alert

## 2019-05-05 NOTE — NURSING
Spoke with [facility] hospitalist to inform of pt's c/o waking up in pain. Next dose of morphine cannot be given until 0510. MD states pt cannot have increased frequency of morphine. Pt updated and states understanding.

## 2019-05-05 NOTE — PLAN OF CARE
Problem: Adult Inpatient Plan of Care  Goal: Plan of Care Review  Outcome: Ongoing (interventions implemented as appropriate)  Pt in NAD. AAOx4. VSS. Pain controlled with prn and scheduled morphine. Swelling, heat, and redness noted to left foot, up into ankle region. IV antibiotics. Vanc trough due for 0530 before next vanc dose. PICC line CDI and dressing change due on 5/6. Contact precautions maintained. Safety maintained. Will continue to monitor.

## 2019-05-05 NOTE — PLAN OF CARE
VN note: VN cued into patient's room. No family at bedside. Patient denies any needs at this time. Will continue to follow.     05/05/19 7987   Type of Frequent Check   Type Patient Rounds;Other (see comments)  (VN Rounds)   Safety/Activity   Patient Rounds bed in low position;visualized patient   Safety Promotion/Fall Prevention side rails raised x 2   Activity Management activity adjusted per tolerance   Positioning   Body Position positioned/repositioned independently;side-lying, left   Head of Bed (HOB) HOB at 30-45 degrees   Assessments (Pre/Post)   Level of Consciousness (AVPU) alert

## 2019-05-05 NOTE — PROGRESS NOTES
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 I will follow-up with patient. Please contact us if you have any additional questions.    David Chand MD, PhD  Infectious Disease  [facility]    Subjective:     Principal Problem:<principal problem not specified>    HPI: Solo Black is a 38 y.o.  male with a PMHx of ADD, OCD, Eosinophilic esophagitis, Chronic Iron deficiency anemia 2/2 GI blood loss, PUD. The patient presented to [facility] on 5/3/2019 with a primary complaint of worsening pain and drainage from his left foot cellulitis x 5 days.     The patient was in their usual state of health ( lives with grandparents without functional barriers) until approximally the 8th of April when he was admitted to [facility] for abdominal pain and BRBPR. He reportedly has a history of GI bleeds 2/2 his eosinophilic esophagitis. He was admitted and treated with IV protonix and carafate with improvement of his symptoms and he was subsequently discharged. He went home for  approximately 1 to 2 days before he began to have worsening pain and erythema from an IV site on his left foot from the previous admission. The IV had been removed prior to his discharge and he noted slight erythema at first but this progressively worsened until he presented to [facility] and was admitted on 4/12/2019 for Sepsis 2/2 MRSA infection and cellulitis in his left foot. He underwent and I&D of his left foot and was placed on Vancomycin. After clinical improvement, he was subsequently discharged from the hospital with PICC line in place. The end date of his Abx was 4/30. He followed up in clinic with his infectious disease physician (Dr. Hill). It was noted that he had worsening rash and pain and so his Abx therapy was extended for another 2 weeks. He was to get an outpatient MRI and follow up with Dr. Amaro for further evaluation. He was instructed to return to the ED should his pain/redness/or swelling continue to get worse, which it did. On Presentation, he is afebrile, vital signs stable. Notes severe pain in his left foot radiating into his left ankle. Denies HA, vision changes, CP, SOB, abdominal pain, Nausea, vomiting, Fever, Chills. He notes that his vancomycin dose was recently increased to 2g q 12 due to low troughs    Patient noted that pain and swelling is reduced today.  He has had 4-5 SA infections over the past few years - mostly sound liek bacteremia.  No FHX of similar events but he does have the eo esophagitis as described above   Interval History: Pt denies fever or chills, nausea or vomiting, chest pain or SOB.    Review of Systems   Constitutional: Negative for chills and fever.   Respiratory: Negative for shortness of breath.    Cardiovascular: Negative for chest pain.   Gastrointestinal: Negative for diarrhea, nausea and vomiting.     Objective:     Vital Signs (Most Recent):  Temp: 98.7 °F (37.1 °C) (05/05/19 1122)  Pulse: 63 (05/05/19 1122)  Resp: 20 (05/05/19 1122)  BP: 113/67  (05/05/19 1122)  SpO2: 99 % (05/05/19 0752) Vital Signs (24h Range):  Temp:  [97.2 °F (36.2 °C)-98.7 °F (37.1 °C)] 98.7 °F (37.1 °C)  Pulse:  [56-77] 63  Resp:  [16-20] 20  SpO2:  [98 %-99 %] 99 %  BP: (104-114)/(57-67) 113/67     Weight: 95.7 kg (210 lb 15.7 oz)  Body mass index is 28.61 kg/m².    Estimated Creatinine Clearance: 122.5 mL/min (based on SCr of 1 mg/dL).    Physical Exam   Constitutional: He is oriented to person, place, and time. He appears well-developed and well-nourished.   Normal facies    HENT:   Head: Normocephalic and atraumatic.   Eyes: Pupils are equal, round, and reactive to light.   Neck: Neck supple.   Cardiovascular: Normal rate and regular rhythm.   Abdominal: Soft. Bowel sounds are normal.   Musculoskeletal: Normal range of motion.   Neurological: He is alert and oriented to person, place, and time.   Skin: Rash noted. There is erythema.   Left ankle with 2 x 3 cm area of erythema and escar present - some induration but no fluctuance        Significant Labs:   CBC: No results for input(s): WBC, HGB, HCT, PLT in the last 48 hours.  CMP: No results for input(s): NA, K, CL, CO2, GLU, BUN, CREATININE, CALCIUM, PROT, ALBUMIN, BILITOT, ALKPHOS, AST, ALT, ANIONGAP, EGFRNONAA in the last 48 hours.    Invalid input(s): [facility]  Microbiology Results (last 7 days)     Procedure Component Value Units Date/Time    Blood Culture #2 **CANNOT BE ORDERED STAT** [[id number]] Collected:  05/03/19 1110    Order Status:  Completed Specimen:  Blood from Wrist, Right Updated:  05/04/19 1412     Blood Culture, Routine No Growth to date     Blood Culture, Routine No Growth to date    Blood Culture #1 **CANNOT BE ORDERED STAT** [[id number]] Collected:  05/03/19 0923    Order Status:  Completed Specimen:  Blood from Wrist, Left Updated:  05/04/19 1412     Blood Culture, Routine No Growth to date     Blood Culture, Routine No Growth to date        All pertinent labs within the past 24 hours have been  reviewed.    Significant Imaging: I have reviewed all pertinent imaging results/findings within the past 24 hours.

## 2019-05-05 NOTE — PLAN OF CARE
Problem: Adult Inpatient Plan of Care  Goal: Plan of Care Review  Outcome: Ongoing (interventions implemented as appropriate)  Complaints of pain the left foot especially after multiple episodes of left foot being palpated. Iv morphine given with relief.  NPO at midnight due to I&D in the morning with Dr mckinley. NPO at midnight.

## 2019-05-05 NOTE — ASSESSMENT & PLAN NOTE
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

## 2019-05-05 NOTE — NURSING
"Cued into patient's room for evening rounds. In supine position states "Nurse is bringing my pain medication." Patient is alert and oriented. Lying in supine position. Reviewed plan of care with patient. Time allowed for questions and concerns on care. Patient education done on fall precautions and pain medication frequency. Patient verbalized understanding.  Bed alarm is activated and is on for patient safety. Will continue to be available to intervene and care of this patient  "

## 2019-05-05 NOTE — SUBJECTIVE & OBJECTIVE
Interval History: Pt denies fever or chills, nausea or vomiting, chest pain or SOB.    Review of Systems   Constitutional: Negative for chills and fever.   Respiratory: Negative for shortness of breath.    Cardiovascular: Negative for chest pain.   Gastrointestinal: Negative for diarrhea, nausea and vomiting.     Objective:     Vital Signs (Most Recent):  Temp: 98.7 °F (37.1 °C) (05/05/19 1122)  Pulse: 63 (05/05/19 1122)  Resp: 20 (05/05/19 1122)  BP: 113/67 (05/05/19 1122)  SpO2: 99 % (05/05/19 0752) Vital Signs (24h Range):  Temp:  [97.2 °F (36.2 °C)-98.7 °F (37.1 °C)] 98.7 °F (37.1 °C)  Pulse:  [56-77] 63  Resp:  [16-20] 20  SpO2:  [98 %-99 %] 99 %  BP: (104-114)/(57-67) 113/67     Weight: 95.7 kg (210 lb 15.7 oz)  Body mass index is 28.61 kg/m².    Estimated Creatinine Clearance: 122.5 mL/min (based on SCr of 1 mg/dL).    Physical Exam   Constitutional: He is oriented to person, place, and time. He appears well-developed and well-nourished.   Normal facies    HENT:   Head: Normocephalic and atraumatic.   Eyes: Pupils are equal, round, and reactive to light.   Neck: Neck supple.   Cardiovascular: Normal rate and regular rhythm.   Abdominal: Soft. Bowel sounds are normal.   Musculoskeletal: Normal range of motion.   Neurological: He is alert and oriented to person, place, and time.   Skin: Rash noted. There is erythema.   Left ankle with 2 x 3 cm area of erythema and escar present - some induration but no fluctuance        Significant Labs:   CBC: No results for input(s): WBC, HGB, HCT, PLT in the last 48 hours.  CMP: No results for input(s): NA, K, CL, CO2, GLU, BUN, CREATININE, CALCIUM, PROT, ALBUMIN, BILITOT, ALKPHOS, AST, ALT, ANIONGAP, EGFRNONAA in the last 48 hours.    Invalid input(s): ESTGFAFRICA  Microbiology Results (last 7 days)     Procedure Component Value Units Date/Time    Blood Culture #2 **CANNOT BE ORDERED STAT** [[id number]] Collected:  05/03/19 1110    Order Status:  Completed Specimen:  Blood  from Wrist, Right Updated:  05/04/19 1412     Blood Culture, Routine No Growth to date     Blood Culture, Routine No Growth to date    Blood Culture #1 **CANNOT BE ORDERED STAT** [[id number]] Collected:  05/03/19 0923    Order Status:  Completed Specimen:  Blood from Wrist, Left Updated:  05/04/19 1412     Blood Culture, Routine No Growth to date     Blood Culture, Routine No Growth to date        All pertinent labs within the past 24 hours have been reviewed.    Significant Imaging: I have reviewed all pertinent imaging results/findings within the past 24 hours.

## 2019-05-05 NOTE — PROGRESS NOTES
Pharmacokinetic Assessment Follow Up: IV Vancomycin    Vancomycin serum concentration assessment(s):    The trough level was drawned correctly and can be used to guide therapy at this time.    Vancomycin Regimen Plan:    Change regimen to Vancomycin 1500 mg IV every 12hour with next serum trough concentration measured at 30 minutes prior to 2300 dose on 5/6/19     Pharmacy will continue to follow and monitor vancomycin.    Please contact pharmacy at extension [phone] for questions regarding this assessment.    Thank you for the consult,   Santosh Drake     Patient brief summary:  Solo Black is a 38 y.o. male initiated on antimicrobial therapy with IV Vancomycin for treatment of suspected skin & soft tissue    The patient did not receive a loading dose, followed by the current treatment regimen: vancomycin 2000 mg IV every 12 hours    Drug Allergies:   Review of patient's allergies indicates:   Allergen Reactions    Nsaids (non-steroidal anti-inflammatory drug)      GI bleed    Ketamine      Severe dysphoria (bad trip)    Penicillins      Has taken cephalosporins without issue       Actual Body Weight:   95.7 kg    Renal Function:   Estimated Creatinine Clearance: 122.5 mL/min (based on SCr of 1 mg/dL).,     Dialysis Method (if applicable):  N/A     CBC (last 72 hours):  Recent Labs   Lab Result Units 05/03/19  1110 05/04/19  0007   WBC K/uL 5.51  --    Hemoglobin g/dL 11.2*  --    Hemoglobin A1C %  --  5.3   Hematocrit % 36.7*  --    Platelets K/uL 322  --    Gran% % 55.9  --    Lymph% % 25.6  --    Mono% % 10.9  --    Eosinophil% % 6.9  --    Basophil% % 0.5  --    Differential Method  Automated  --        Metabolic Panel (last 72 hours):  Recent Labs   Lab Result Units 05/03/19  0920 05/04/19  0007   Sodium mmol/L 139  --    Potassium mmol/L 3.8  --    Chloride mmol/L 107  --    CO2 mmol/L 23  --    Glucose mg/dL 83  --    BUN, Bld mg/dL 10  --    Creatinine mg/dL 1.0  --    Creatinine, Random Ur mg/dL  --   41.0   Albumin g/dL 3.6  --    Total Bilirubin mg/dL 0.4  --    Alkaline Phosphatase U/L 61  --    AST U/L 23  --    ALT U/L 34  --        Vancomycin Administrations:  vancomycin given in the last 96 hours                     vancomycin (VANCOCIN) 2,000 mg in dextrose 5 % 500 mL IVPB (mg) 2,000 mg New Bag 05/04/19 1721     2,000 mg New Bag  0624     2,000 mg New Bag 05/03/19 1818                      Drug levels (last 3 results):  Recent Labs   Lab Result Units 05/03/19  1110 05/05/19  0529   Vancomycin-Trough ug/mL 18.5 22.0       Microbiologic Results:  Microbiology Results (last 7 days)       Procedure Component Value Units Date/Time    Blood Culture #2 **CANNOT BE ORDERED STAT** [[id number]] Collected:  05/03/19 1110    Order Status:  Completed Specimen:  Blood from Wrist, Right Updated:  05/04/19 1412     Blood Culture, Routine No Growth to date     Blood Culture, Routine No Growth to date    Blood Culture #1 **CANNOT BE ORDERED STAT** [[id number]] Collected:  05/03/19 0923    Order Status:  Completed Specimen:  Blood from Wrist, Left Updated:  05/04/19 1412     Blood Culture, Routine No Growth to date     Blood Culture, Routine No Growth to date

## 2019-05-05 NOTE — PROGRESS NOTES
Surgery follow up  /67 (Patient Position: Lying)   Pulse 63   Temp 98.7 °F (37.1 °C) (Oral)   Resp 20   Ht 6' (1.829 m)   Wt 95.7 kg (210 lb 15.7 oz)   SpO2 99%   BMI 28.61 kg/m²   I/O last 3 completed shifts:  In: 2530 [P.O.:1030; IV Piggyback:1500]  Out: 4175 [Urine:4175]  I/O this shift:  In: 200 [P.O.:200]  Out: -   Recent Results (from the past 336 hour(s))   CBC auto differential    Collection Time: 05/03/19 11:10 AM   Result Value Ref Range    WBC 5.51 3.90 - 12.70 K/uL    Hemoglobin 11.2 (L) 14.0 - 18.0 g/dL    Hematocrit 36.7 (L) 40.0 - 54.0 %    Platelets 322 150 - 350 K/uL   CBC W/ AUTO DIFFERENTIAL    Collection Time: 04/29/19 11:00 AM   Result Value Ref Range    WBC 5.54 3.90 - 12.70 K/uL    Hemoglobin 9.6 (L) 14.0 - 18.0 g/dL    Hematocrit 31.6 (L) 40.0 - 54.0 %    Platelets 346 150 - 350 K/uL   CBC auto differential    Collection Time: 04/24/19 12:45 PM   Result Value Ref Range    WBC 6.42 3.90 - 12.70 K/uL    Hemoglobin 10.0 (L) 14.0 - 18.0 g/dL    Hematocrit 33.3 (L) 40.0 - 54.0 %    Platelets 283 150 - 350 K/uL   left foot , swelling better with decreased redness and erythema , less tender    Check us for possible deep abscess..

## 2019-05-05 NOTE — PROGRESS NOTES
[facility] Hospital Medicine Progress Note    Primary Team: [facility] Hospitalist Team A  Attending Physician: Oh Espinosa MD  Resident: Harry  Intern: Kathrine    Subjective:      Patient complains of continued pain on the dorsum of his left foot extending up his ankle and shin. PRNs help alleviate the pain for a short period but it returns before next PRN is due. Denies any other complaints including F/C, N/V, HA or dizziness. Patient states general surgery is planning for I&D tomorrow, but unclear if this is the case per GS consult note.     Objective:     Last 24 Hour Vital Signs:  BP  Min: 100/57  Max: 114/62  Temp  Av °F (36.7 °C)  Min: 97.2 °F (36.2 °C)  Max: 98.7 °F (37.1 °C)  Pulse  Av.6  Min: 54  Max: 77  Resp  Av.6  Min: 16  Max: 20  SpO2  Av %  Min: 97 %  Max: 99 %  Weight  Av.7 kg (210 lb 15.7 oz)  Min: 95.7 kg (210 lb 15.7 oz)  Max: 95.7 kg (210 lb 15.7 oz)  I/O last 3 completed shifts:  In: 2530 [P.O.:1030; IV Piggyback:1500]  Out: 4175 [Urine:4175]    Physical Examination:  General: Awake, alert, No apparent distress, sitting up in bed   HEENT: Normocephalic, atraumatic, EOMI, PERR, MMM   Neck: Trachea midline, Supple   Heart: RRR, No murmurs, gallups, or rubs appreciated   Lungs: CTAB, Symmetrical chest rise   Abdomen: Soft, non-distended, non-tender, +BS   Extremities: Left foot with erythematous, warm, tender cellulitis rash over the dorsum of the foot. Two healing incisions from previous I and D procedure, No active drainage present, no Flunctuance. Tender to palpation. Neuro vascularly intact. Otherwise, extremities atraumatic, well perfused.   Skin: cellulitic rash as above, Chest with blanching erythema which patient notes is persistent with his Eosinophilic esophagitis   Psych: Calm, appropriate affect     Laboratory:  Laboratory Data Reviewed: yes  Pertinent Findings:  Lab Results   Component Value Date    WBC 5.51 2019    HGB 11.2 (L) 2019    HCT 36.7 (L)  05/03/2019    MCV 77 (L) 05/03/2019     05/03/2019     Lab Results   Component Value Date    CREATININE 1.0 05/03/2019    BUN 10 05/03/2019     05/03/2019    K 3.8 05/03/2019     05/03/2019    CO2 23 05/03/2019     Vanc trough 22    Microbiology Data Reviewed: yes  Pertinent Findings:  Microbiology Results (last 7 days)     Procedure Component Value Units Date/Time    Blood Culture #2 **CANNOT BE ORDERED STAT** [[id number]] Collected:  05/03/19 1110    Order Status:  Completed Specimen:  Blood from Wrist, Right Updated:  05/04/19 1412     Blood Culture, Routine No Growth to date     Blood Culture, Routine No Growth to date    Blood Culture #1 **CANNOT BE ORDERED STAT** [[id number]] Collected:  05/03/19 0923    Order Status:  Completed Specimen:  Blood from Wrist, Left Updated:  05/04/19 1412     Blood Culture, Routine No Growth to date     Blood Culture, Routine No Growth to date          Other Results:  EKG (my interpretation):   No new tracings    Radiology Data Reviewed: yes  Pertinent Findings:  5/3 MRI left foot  Patient was administered 10 cc of Gadavist.  No marrow edema marrow replacement marrow infection neoplasm or trauma seen.  No fracture dislocation bone destruction seen.  Anterior posterolateral and medial tendons are normal.  Anterolateral posterolateral ligaments are intact.  Deltoid ligament is intact.  No fracture dislocation bone destruction or OCD seen.  The talar dome and sinus tarsi are unremarkable.  Distal metatarsals and toes were not imaged.  No soft tissue abnormality seen.  No masses are seen.    Current Medications:     Infusions:       Scheduled:   acetaminophen  650 mg Oral Once    cetirizine  10 mg Oral Daily    famotidine  20 mg Oral BID    morphine  15 mg Oral Q12H    paroxetine  20 mg Oral QAM    sucralfate  1 g Oral QID (AC & HS)    vancomycin (VANCOCIN) IVPB  1,500 mg Intravenous Q12H        PRN:  morphine, sodium chloride 0.9%      Antibiotics and Day  Number of Therapy:  Vancomycin since 5/3/19     Lines and Day Number of Therapy:  PICC double lumen left basilic     Assessment:     Solo Black is a 38 y.o.male with ADD, Eosinophilic esophagitis, Chronic ASAD 2/2 chronic blood loss, PUD, who [facility] with Cellulitis of his left foot after representing due to worsening pain and eytherma as well as purulent drainage from his incision sites. He does not have any systemic signs of infection. MRI does not suggest Osteomyelitis.        Plan:     Cellulitis   - Patient with worsening rash, drainage, and pain x 5 days   -Follows with [facility] as outpatient. Vanc course prolonged x 2 weeks due to persistent rash. S/P I&D last admission   -ESR 16  - MRI completed in the ED notable for soft tissue swelling/edema but not asaf abscess or fluid collection. No evidence of osteomyelitis   -Will consult Dr. Amaro to evaluate wound and make sure not further debridement is warranted   - Infectious disease consulted to assist with Abx management and duration of therapy. Recommended continued vancomycin with goal trough >15.  -Blood cultures NGTD  -Initiated vanc 2g q12h. 5/5 changed to vanc 1500mg q12h given recent vanc trough of 22.   - Surgery evaluated patient and agrees with treatment plan. No plans for I&D per consult note.     Chronic Iron deficiency anemia 2/2 chronic GI blood loss   - patient on outpatient Iron therapy. Reportedly planning for IV iron infusions   -Will hold iron while admitted with active infection   -Has had several admissions for recurrent GI bleeding   -SCDs for DVT prophylaxis. Avoid NSAIDS      PUD  Continue patient Current Home regimen of Protonix, Famotidine, Loratadine      ADD   home medications include Adderall 20 mg BID   - Hold in acute setting      DVT PPx: SCDs  Diet; Full Diet         Disposition: Pending clinical improvement     Bethel Chisholm MD  [facility] HO-1    [facility] Medicine  Hospitalist Pager numbers:   [facility] Hospitalist Medicine Team A (Jaime/Smita): [phone]  [facility] Hospitalist Medicine Team B (Quintin/Francisca):  [phone]

## 2019-05-06 VITALS
HEART RATE: 77 BPM | BODY MASS INDEX: 28.58 KG/M2 | SYSTOLIC BLOOD PRESSURE: 109 MMHG | RESPIRATION RATE: 18 BRPM | TEMPERATURE: 99 F | DIASTOLIC BLOOD PRESSURE: 80 MMHG | OXYGEN SATURATION: 100 % | HEIGHT: 72 IN | WEIGHT: 211 LBS

## 2019-05-06 PROBLEM — R10.13 EPIGASTRIC PAIN: Status: RESOLVED | Noted: 2018-09-04 | Resolved: 2019-05-06

## 2019-05-06 PROBLEM — R19.7 ACUTE DIARRHEA: Status: RESOLVED | Noted: 2018-09-04 | Resolved: 2019-05-06

## 2019-05-06 LAB
ALBUMIN SERPL BCP-MCNC: 3.6 G/DL (ref 3.5–5.2)
ALP SERPL-CCNC: 66 U/L (ref 55–135)
ALT SERPL W/O P-5'-P-CCNC: 44 U/L (ref 10–44)
ANION GAP SERPL CALC-SCNC: 8 MMOL/L (ref 8–16)
AST SERPL-CCNC: 20 U/L (ref 10–40)
BASOPHILS # BLD AUTO: 0.02 K/UL (ref 0–0.2)
BASOPHILS NFR BLD: 0.5 % (ref 0–1.9)
BILIRUB SERPL-MCNC: 0.6 MG/DL (ref 0.1–1)
BUN SERPL-MCNC: 11 MG/DL (ref 6–20)
CALCIUM SERPL-MCNC: 9.5 MG/DL (ref 8.7–10.5)
CHLORIDE SERPL-SCNC: 103 MMOL/L (ref 95–110)
CO2 SERPL-SCNC: 28 MMOL/L (ref 23–29)
CREAT SERPL-MCNC: 0.9 MG/DL (ref 0.5–1.4)
DIFFERENTIAL METHOD: ABNORMAL
EOSINOPHIL # BLD AUTO: 0.6 K/UL (ref 0–0.5)
EOSINOPHIL NFR BLD: 13.3 % (ref 0–8)
ERYTHROCYTE [DISTWIDTH] IN BLOOD BY AUTOMATED COUNT: 18.3 % (ref 11.5–14.5)
ERYTHROCYTE [SEDIMENTATION RATE] IN BLOOD BY WESTERGREN METHOD: 16 MM/HR (ref 0–10)
EST. GFR  (AFRICAN AMERICAN): >60 ML/MIN/1.73 M^2
EST. GFR  (NON AFRICAN AMERICAN): >60 ML/MIN/1.73 M^2
GLUCOSE SERPL-MCNC: 78 MG/DL (ref 70–110)
HCT VFR BLD AUTO: 36.6 % (ref 40–54)
HGB BLD-MCNC: 11.1 G/DL (ref 14–18)
LYMPHOCYTES # BLD AUTO: 1 K/UL (ref 1–4.8)
LYMPHOCYTES NFR BLD: 22.8 % (ref 18–48)
MCH RBC QN AUTO: 22.9 PG (ref 27–31)
MCHC RBC AUTO-ENTMCNC: 30.3 G/DL (ref 32–36)
MCV RBC AUTO: 76 FL (ref 82–98)
MONOCYTES # BLD AUTO: 0.8 K/UL (ref 0.3–1)
MONOCYTES NFR BLD: 18.9 % (ref 4–15)
NEUTROPHILS # BLD AUTO: 1.9 K/UL (ref 1.8–7.7)
NEUTROPHILS NFR BLD: 44.5 % (ref 38–73)
PLATELET # BLD AUTO: 308 K/UL (ref 150–350)
PMV BLD AUTO: 9.7 FL (ref 9.2–12.9)
POTASSIUM SERPL-SCNC: 3.8 MMOL/L (ref 3.5–5.1)
PROT SERPL-MCNC: 7.2 G/DL (ref 6–8.4)
RBC # BLD AUTO: 4.85 M/UL (ref 4.6–6.2)
SODIUM SERPL-SCNC: 139 MMOL/L (ref 136–145)
WBC # BLD AUTO: 4.29 K/UL (ref 3.9–12.7)

## 2019-05-06 PROCEDURE — 94761 N-INVAS EAR/PLS OXIMETRY MLT: CPT

## 2019-05-06 PROCEDURE — 63600175 PHARM REV CODE 636 W HCPCS: Performed by: INTERNAL MEDICINE

## 2019-05-06 PROCEDURE — 63600175 PHARM REV CODE 636 W HCPCS: Performed by: STUDENT IN AN ORGANIZED HEALTH CARE EDUCATION/TRAINING PROGRAM

## 2019-05-06 PROCEDURE — 85652 RBC SED RATE AUTOMATED: CPT

## 2019-05-06 PROCEDURE — 63600175 PHARM REV CODE 636 W HCPCS: Mod: JG | Performed by: STUDENT IN AN ORGANIZED HEALTH CARE EDUCATION/TRAINING PROGRAM

## 2019-05-06 PROCEDURE — 86352 CELL FUNCTION ASSAY W/STIM: CPT

## 2019-05-06 PROCEDURE — 36415 COLL VENOUS BLD VENIPUNCTURE: CPT

## 2019-05-06 PROCEDURE — 85025 COMPLETE CBC W/AUTO DIFF WBC: CPT

## 2019-05-06 PROCEDURE — 25000003 PHARM REV CODE 250: Performed by: INTERNAL MEDICINE

## 2019-05-06 PROCEDURE — 82785 ASSAY OF IGE: CPT

## 2019-05-06 PROCEDURE — 25000003 PHARM REV CODE 250: Performed by: STUDENT IN AN ORGANIZED HEALTH CARE EDUCATION/TRAINING PROGRAM

## 2019-05-06 PROCEDURE — 30000890 MAYO MISCELLANEOUS TEST (REFLEX)

## 2019-05-06 PROCEDURE — 80053 COMPREHEN METABOLIC PANEL: CPT

## 2019-05-06 RX ORDER — HYDROCODONE BITARTRATE AND ACETAMINOPHEN 7.5; 325 MG/1; MG/1
1 TABLET ORAL EVERY 6 HOURS PRN
Qty: 20 TABLET | Refills: 0 | Status: SHIPPED | OUTPATIENT
Start: 2019-05-06 | End: 2019-05-11

## 2019-05-06 RX ADMIN — PAROXETINE HYDROCHLORIDE HEMIHYDRATE 20 MG: 20 TABLET, FILM COATED ORAL at 06:05

## 2019-05-06 RX ADMIN — MORPHINE SULFATE 2 MG: 2 INJECTION, SOLUTION INTRAMUSCULAR; INTRAVENOUS at 11:05

## 2019-05-06 RX ADMIN — LACTOBACILLUS TAB 4 TABLET: TAB at 12:05

## 2019-05-06 RX ADMIN — CHLORHEXIDINE GLUCONATE 0.12% ORAL RINSE 10 ML: 1.2 LIQUID ORAL at 08:05

## 2019-05-06 RX ADMIN — MORPHINE SULFATE 2 MG: 2 INJECTION, SOLUTION INTRAMUSCULAR; INTRAVENOUS at 05:05

## 2019-05-06 RX ADMIN — FAMOTIDINE 20 MG: 20 TABLET, FILM COATED ORAL at 08:05

## 2019-05-06 RX ADMIN — VANCOMYCIN HYDROCHLORIDE 1500 MG: 100 INJECTION, POWDER, LYOPHILIZED, FOR SOLUTION INTRAVENOUS at 12:05

## 2019-05-06 RX ADMIN — CETIRIZINE HYDROCHLORIDE 10 MG: 5 TABLET ORAL at 08:05

## 2019-05-06 RX ADMIN — MUPIROCIN: 20 OINTMENT TOPICAL at 08:05

## 2019-05-06 RX ADMIN — ALTEPLASE 2 MG: 2.2 INJECTION, POWDER, LYOPHILIZED, FOR SOLUTION INTRAVENOUS at 10:05

## 2019-05-06 RX ADMIN — MORPHINE SULFATE 15 MG: 15 TABLET, EXTENDED RELEASE ORAL at 08:05

## 2019-05-06 RX ADMIN — LACTOBACILLUS TAB 4 TABLET: TAB at 08:05

## 2019-05-06 NOTE — PLAN OF CARE
TN spoke with MD - pt to d/c to home today - will resume iv abx until 5/15;  home health   TN spoke with intake at [facility] - pt to resume IV abx infusion - all info sent per [facility]     TN spoke with Bettie at [facility] - pt will resume HH -- ok per Bettie   pt has retained PICC line - no new line insertion       Future Appointments   Date Time Provider Department Center   5/8/2019  9:30 AM Troy Honeycutt MD General acute hospitali   pt aware that he must see ID MD at [facility] post infusion -- 5/15        05/06/19 1610   Final Note   Assessment Type Final Discharge Note   Anticipated Discharge Disposition IV Therapy  ([facility] )   What phone number can be called within the next 1-3 days to see how you are doing after discharge? [phone]   Hospital Follow Up  Appt(s) scheduled? Yes   Discharge plans and expectations educations in teach back method with documentation complete? Yes   [facility] Referral Info   Post Acute Recommendation No Care   Referral Type   (home infusion/home health )   [facility] )

## 2019-05-06 NOTE — DISCHARGE INSTRUCTIONS
Cellulitis, Discharge Instructions for (English) View Edit Remove  Skin Infection, Cellulitis (English) View Edit Remove  Caring for Your Peripherally Inserted Central Catheter (PICC), Discharge Instructions for (English) View Edit Remove  Changing the Dressing on Your Peripherally Inserted Central Catheter (PICC), Discharge Instructions (English) View Edit Remove  Flushing Your PICC Line at Home (English) View Edit Remove  Acetaminophen; Hydrocodone tablets or capsules (English) View Edit Remove

## 2019-05-06 NOTE — PLAN OF CARE
VN cued into pt's room for introduction. VN informed pt that VN would be working along side bedside nurse and PCT throughout shift. Level of present pain assessed. At present no distress noted. Thoroughly discussed with patient today's plan of care and surgery. Discussed with patient High fall risk protocol and interventions that have been initiated and cont be in place for safety. Patient verbalized clear understanding and cooperation using teach back method. Bed alarm presently activated and in use. Will cont to be available to patient and intervene prn.

## 2019-05-06 NOTE — PLAN OF CARE
VN note: Patient requested VN via I Pad. Patient wanting update on today's surgery time. VN contacted Surgery desk, then Dr Honeycutt. Dr Honeycutt states that surgery was cancelled today and ordered Regular diet for patient. VN cued back into patient's room and informed him of update. Patient verbalized clear understanding of all discussed. Will cont to be available and intervene prn.

## 2019-05-06 NOTE — PROGRESS NOTES
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   We should try to get these labs but would be better to do this on Monday and ask for the lab assistance - (NBT or whatever the test the lab does for CGD, IgE levels etc.)   IgE levels - were found in Epic but I was not sure that was the correct test to do   He did have normal Igs in 2016 but IgE was not tested   No washout today as repeat US without visible fluid collection  Will extend pt's vancomycin course to 5/15/2019 at which time he will been seen in ID clinic prior to discontinuation  Dose will be 1.5g q 12 hours, discussed with [facility] pharmacy who will extend the course. They will also get weekly CBC, CMP, ESR, CRP and vancomycin levels and fax them to [facility].   Will sign off at this time, please call with further questions.          Anticipated Disposition: As per primary team    Thank you for your consult. I will sign off. Please contact us if you have any additional questions.    David Chand MD, PhD  Infectious Disease, PGY-5  [facility]    Subjective:     Principal Problem:<principal problem not specified>    HPI: Solo Black is a 38 y.o.  male with a PMHx of ADD, OCD, Eosinophilic esophagitis, Chronic Iron deficiency anemia 2/2 GI blood loss, PUD. The patient presented to [facility] on 5/3/2019 with a primary complaint of worsening pain and drainage from his left foot cellulitis x 5 days.     The patient was in their usual state of health ( lives with grandparents without  functional barriers) until approximally the 8th of April when he was admitted to [facility] for abdominal pain and BRBPR. He reportedly has a history of GI bleeds 2/2 his eosinophilic esophagitis. He was admitted and treated with IV protonix and carafate with improvement of his symptoms and he was subsequently discharged. He went home for approximately 1 to 2 days before he began to have worsening pain and erythema from an IV site on his left foot from the previous admission. The IV had been removed prior to his discharge and he noted slight erythema at first but this progressively worsened until he presented to [facility] and was admitted on 4/12/2019 for Sepsis 2/2 MRSA infection and cellulitis in his left foot. He underwent and I&D of his left foot and was placed on Vancomycin. After clinical improvement, he was subsequently discharged from the hospital with PICC line in place. The end date of his Abx was 4/30. He followed up in clinic with his infectious disease physician (Dr. Hill). It was noted that he had worsening rash and pain and so his Abx therapy was extended for another 2 weeks. He was to get an outpatient MRI and follow up with Dr. Amaro for further evaluation. He was instructed to return to the ED should his pain/redness/or swelling continue to get worse, which it did. On Presentation, he is afebrile, vital signs stable. Notes severe pain in his left foot radiating into his left ankle. Denies HA, vision changes, CP, SOB, abdominal pain, Nausea, vomiting, Fever, Chills. He notes that his vancomycin dose was recently increased to 2g q 12 due to low troughs    Patient noted that pain and swelling is reduced today.  He has had 4-5 SA infections over the past few years - mostly sound liek bacteremia.  No FHX of similar events but he does have the eo esophagitis as described above   Interval History: Pt denies fever or chills, nausea or vomiting, chest pain or SOB. States that his  foot is much improved.     Review of Systems   Constitutional: Negative for chills and fever.   Respiratory: Negative for shortness of breath.    Cardiovascular: Negative for chest pain.   Gastrointestinal: Negative for diarrhea, nausea and vomiting.     Objective:     Vital Signs (Most Recent):  Temp: 98.8 °F (37.1 °C) (05/06/19 1134)  Pulse: 100 (05/06/19 1134)  Resp: 18 (05/06/19 1134)  BP: (!) 128/90 (05/06/19 1134)  SpO2: 100 % (05/06/19 0807) Vital Signs (24h Range):  Temp:  [97.4 °F (36.3 °C)-98.8 °F (37.1 °C)] 98.8 °F (37.1 °C)  Pulse:  [] 100  Resp:  [16-20] 18  SpO2:  [95 %-100 %] 100 %  BP: ()/(51-90) 128/90     Weight: 95.7 kg (210 lb 15.7 oz)  Body mass index is 28.61 kg/m².    Estimated Creatinine Clearance: 136.1 mL/min (based on SCr of 0.9 mg/dL).    Physical Exam   Constitutional: He is oriented to person, place, and time. He appears well-developed and well-nourished.   Normal facies    HENT:   Head: Normocephalic and atraumatic.   Eyes: Pupils are equal, round, and reactive to light.   Neck: Neck supple.   Cardiovascular: Normal rate and regular rhythm.   Abdominal: Soft. Bowel sounds are normal.   Musculoskeletal: Normal range of motion.   Neurological: He is alert and oriented to person, place, and time.   Skin: Rash noted. There is erythema.   Left ankle with 1 x 2 cm area of erythema, appears to be healing nicely.         Significant Labs:   CBC:   Recent Labs   Lab 05/06/19  1115   WBC 4.29   HGB 11.1*   HCT 36.6*        CMP:   Recent Labs   Lab 05/06/19  1115      K 3.8      CO2 28   GLU 78   BUN 11   CREATININE 0.9   CALCIUM 9.5   PROT 7.2   ALBUMIN 3.6   BILITOT 0.6   ALKPHOS 66   AST 20   ALT 44   ANIONGAP 8   EGFRNONAA >60     Microbiology Results (last 7 days)     Procedure Component Value Units Date/Time    Blood Culture #2 **CANNOT BE ORDERED STAT** [[id number]] Collected:  05/03/19 1110    Order Status:  Completed Specimen:  Blood from Wrist, Right  Updated:  05/06/19 1412     Blood Culture, Routine No Growth to date     Blood Culture, Routine No Growth to date     Blood Culture, Routine No Growth to date     Blood Culture, Routine No Growth to date    Blood Culture #1 **CANNOT BE ORDERED STAT** [[id number]] Collected:  05/03/19 0923    Order Status:  Completed Specimen:  Blood from Wrist, Left Updated:  05/06/19 1412     Blood Culture, Routine No Growth to date     Blood Culture, Routine No Growth to date     Blood Culture, Routine No Growth to date     Blood Culture, Routine No Growth to date        All pertinent labs within the past 24 hours have been reviewed.    Significant Imaging: I have reviewed all pertinent imaging results/findings within the past 24 hours.

## 2019-05-06 NOTE — PROGRESS NOTES
Follow-Up       Follow-up With  Details  Why  Contact Info   [facility]  On 5/8/2019  9:30 am   [address]  [facility] [id number] [phone]   [facility]    Infusion - stop date 5/15   [address]  [place] [address]  [phone]   [facility] [place]    Home Health  [address]  [address]  [place] [address]  [phone]

## 2019-05-06 NOTE — PROGRESS NOTES
Surgery follow up  /63   Pulse (!) 55   Temp 98.3 °F (36.8 °C)   Resp 16   Ht 6' (1.829 m)   Wt 95.7 kg (210 lb 15.7 oz)   SpO2 100%   BMI 28.61 kg/m²   I/O last 3 completed shifts:  In: 1930 [P.O.:1430; IV Piggyback:500]  Out: 5950 [Urine:5950]  I/O this shift:  In: -   Out: 250 [Urine:250]   Recent Results (from the past 336 hour(s))   CBC auto differential    Collection Time: 05/03/19 11:10 AM   Result Value Ref Range    WBC 5.51 3.90 - 12.70 K/uL    Hemoglobin 11.2 (L) 14.0 - 18.0 g/dL    Hematocrit 36.7 (L) 40.0 - 54.0 %    Platelets 322 150 - 350 K/uL   CBC W/ AUTO DIFFERENTIAL    Collection Time: 04/29/19 11:00 AM   Result Value Ref Range    WBC 5.54 3.90 - 12.70 K/uL    Hemoglobin 9.6 (L) 14.0 - 18.0 g/dL    Hematocrit 31.6 (L) 40.0 - 54.0 %    Platelets 346 150 - 350 K/uL   CBC auto differential    Collection Time: 04/24/19 12:45 PM   Result Value Ref Range    WBC 6.42 3.90 - 12.70 K/uL    Hemoglobin 10.0 (L) 14.0 - 18.0 g/dL    Hematocrit 33.3 (L) 40.0 - 54.0 %    Platelets 283 150 - 350 K/uL       Us negative for drain able fluid collection , responding well to iv antibiotics.

## 2019-05-06 NOTE — PLAN OF CARE
Problem: Adult Inpatient Plan of Care  Goal: Plan of Care Review  Outcome: Ongoing (interventions implemented as appropriate)  Pt in NAD. AAOx4. VSS. Pain controlled with prn and scheduled morphine. PICC line CDI and dressing change due today 5/6. IV antibiotics. NPO status maintained since midnight. Scheduled for I&D of left foot in am. Safety maintained. Will continue to monitor.

## 2019-05-06 NOTE — PLAN OF CARE
Cued into patient's room.  Permission received per patient to turn camera to view patient.  Introduced as VN for night shift that will be working with floor nurse and nursing assistant.  Mari RN at bedside.  Educated patient on VN's role in patient care. Plan of care reviewed with patient. Education per flowsheet.  Opportunity given for questions and questions answered.  No complaints.  Instructed to call for assistance.  Will cont to monitor.

## 2019-05-06 NOTE — PROGRESS NOTES
[facility] Hospital Medicine Progress Note    Primary Team: [facility] Hospitalist Team A  Attending Physician: Oh Espinosa MD  Resident:  Dalila  Intern: Lexi    Subjective:      Patient reports that he is doing well this morning. He notes continued pain over the dorsum of his foot but otherwise denies having any complaints. He is aware of the plan of care to go for I&D today. Denies fever, chills, nausea, vomiting, abdominal pain, CP, SOB.       Objective:     Last 24 Hour Vital Signs:  BP  Min: 94/51  Max: 121/74  Temp  Av °F (36.7 °C)  Min: 97.4 °F (36.3 °C)  Max: 98.7 °F (37.1 °C)  Pulse  Av.2  Min: 59  Max: 68  Resp  Av.7  Min: 16  Max: 20  SpO2  Av %  Min: 95 %  Max: 99 %  I/O last 3 completed shifts:  In: 2680 [P.O.:1430; IV Piggyback:1250]  Out: 4625 [Urine:4625]    Physical Examination:  General: Awake, alert, No apparent distress, sitting up in bed   HEENT: Normocephalic, atraumatic, EOMI, PERR, MMM   Neck: Trachea midline, Supple   Heart: RRR, No murmurs, gallups, or rubs appreciated   Lungs: CTAB, Symmetrical chest rise   Abdomen: Soft, non-distended, non-tender, +BS   Extremities: Left foot wrapped in fresh gauz and kurlex, clean and dry. Otherwise extremities are atraumatic, no swelling, no erythema.   Skin: Chest with blanching erythema which patient notes is persistent with his Eosinophilic esophagitis   Psych: Calm, appropriate affect     Laboratory:  Laboratory Data Reviewed: yes  Pertinent Findings:  Lab Results   Component Value Date    WBC 5.51 2019    HGB 11.2 (L) 2019    HCT 36.7 (L) 2019    MCV 77 (L) 2019     2019     Lab Results   Component Value Date    CREATININE 1.0 2019    BUN 10 2019     2019    K 3.8 2019     2019    CO2 23 2019     Vanc trough 22    Microbiology Data Reviewed: yes  Pertinent Findings:  Microbiology Results (last 7 days)     Procedure Component Value Units  Date/Time    Blood Culture #1 **CANNOT BE ORDERED STAT** [[id number]] Collected:  05/03/19 0923    Order Status:  Completed Specimen:  Blood from Wrist, Left Updated:  05/05/19 1412     Blood Culture, Routine No Growth to date     Blood Culture, Routine No Growth to date     Blood Culture, Routine No Growth to date    Blood Culture #2 **CANNOT BE ORDERED STAT** [[id number]] Collected:  05/03/19 1110    Order Status:  Completed Specimen:  Blood from Wrist, Right Updated:  05/05/19 1412     Blood Culture, Routine No Growth to date     Blood Culture, Routine No Growth to date     Blood Culture, Routine No Growth to date          Other Results:  EKG (my interpretation):   No new tracings    Radiology Data Reviewed: yes  Pertinent Findings:  5/3 MRI left foot  Patient was administered 10 cc of Gadavist.  No marrow edema marrow replacement marrow infection neoplasm or trauma seen.  No fracture dislocation bone destruction seen.  Anterior posterolateral and medial tendons are normal.  Anterolateral posterolateral ligaments are intact.  Deltoid ligament is intact.  No fracture dislocation bone destruction or OCD seen.  The talar dome and sinus tarsi are unremarkable.  Distal metatarsals and toes were not imaged.  No soft tissue abnormality seen.  No masses are seen.    Current Medications:     Infusions:       Scheduled:   acetaminophen  650 mg Oral Once    cetirizine  10 mg Oral Daily    chlorhexidine  10 mL Mouth/Throat BID    famotidine  20 mg Oral BID    Lactobacillus acidoph-L.bulgar  4 tablet Oral TID WM    morphine  15 mg Oral Q12H    mupirocin   Nasal BID    paroxetine  20 mg Oral QAM    sucralfate  1 g Oral QID (AC & HS)    vancomycin (VANCOCIN) IVPB  1,500 mg Intravenous Q12H        PRN:  morphine, sodium chloride 0.9%      Antibiotics and Day Number of Therapy:  Vancomycin since 5/3/19     Lines and Day Number of Therapy:  PICC double lumen left basilic     Assessment:     Solo Black is a 38  y.o.male with ADD, Eosinophilic esophagitis, Chronic ASAD 2/2 chronic blood loss, PUD, who [facility] with Cellulitis of his left foot after representing due to worsening pain and eytherma as well as purulent drainage from his incision sites. He does not have any systemic signs of infection. MRI does not suggest Osteomyelitis.        Plan:     Cellulitis   - Patient with worsening rash, drainage, and pain x 5 days   -Follows with [facility] as outpatient. Vanc course prolonged x 2 weeks due to persistent rash. S/P I&D last admission   -ESR 16  - MRI completed in the ED notable for soft tissue swelling/edema but not asaf abscess or fluid collection. No evidence of osteomyelitis   -Will consult Dr. Amaro to evaluate wound and make sure not further debridement is warranted   - Infectious disease consulted to assist with Abx management and duration of therapy. Recommended continued vancomycin with goal trough >15.  -Blood cultures NGTD  -Initiated vanc 2g q12h. 5/5 changed to vanc 1500mg q12h given recent vanc trough of 22.   - Surgery evaluated patient and agrees with treatment plan. Planning to take for I&D this morning. Per ID, will likely extend Abx therapy to two weeks past I & D.   - Inquired with laboratory about CGD testing. Waiting for return call.      Chronic Iron deficiency anemia 2/2 chronic GI blood loss   - patient on outpatient Iron therapy. Reportedly planning for IV iron infusions   -Will hold iron while admitted with active infection   -Has had several admissions for recurrent GI bleeding   -SCDs for DVT prophylaxis. Avoid NSAIDS      PUD  Continue patient Current Home regimen of Protonix, Famotidine, Loratadine      ADD   home medications include Adderall 20 mg BID   - Hold in acute setting      DVT PPx: SCDs  Diet; Full Diet         Disposition: Pending clinical improvement     Mike Conner MD  [facility]    [facility] Hospitalist  Pager numbers:   [facility] Hospitalist Medicine Team A (Jaime/Smita): [phone]  [facility] Hospitalist Medicine Team B (Quintin/Francisca):  [phone]

## 2019-05-06 NOTE — PLAN OF CARE
AVS and educational attachments shared with patient via [employer]. Discussed thoroughly. Patient verbalized clear understanding using teach back method. Notified bedside nurse of completion of education. At present no distress noted. Patient to be discharged via w/c with escort service and family with all of patient's belonings. Will cont to be available to patient and family and intervene prn.

## 2019-05-06 NOTE — ASSESSMENT & PLAN NOTE
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 They will also get weekly CBC, CMP, ESR, CRP and vancomycin levels and fax them to [facility].   Will sign off at this time, please call with further questions.

## 2019-05-06 NOTE — SUBJECTIVE & OBJECTIVE
Interval History: Pt denies fever or chills, nausea or vomiting, chest pain or SOB. States that his foot is much improved.     Review of Systems   Constitutional: Negative for chills and fever.   Respiratory: Negative for shortness of breath.    Cardiovascular: Negative for chest pain.   Gastrointestinal: Negative for diarrhea, nausea and vomiting.     Objective:     Vital Signs (Most Recent):  Temp: 98.8 °F (37.1 °C) (05/06/19 1134)  Pulse: 100 (05/06/19 1134)  Resp: 18 (05/06/19 1134)  BP: (!) 128/90 (05/06/19 1134)  SpO2: 100 % (05/06/19 0807) Vital Signs (24h Range):  Temp:  [97.4 °F (36.3 °C)-98.8 °F (37.1 °C)] 98.8 °F (37.1 °C)  Pulse:  [] 100  Resp:  [16-20] 18  SpO2:  [95 %-100 %] 100 %  BP: ()/(51-90) 128/90     Weight: 95.7 kg (210 lb 15.7 oz)  Body mass index is 28.61 kg/m².    Estimated Creatinine Clearance: 136.1 mL/min (based on SCr of 0.9 mg/dL).    Physical Exam   Constitutional: He is oriented to person, place, and time. He appears well-developed and well-nourished.   Normal facies    HENT:   Head: Normocephalic and atraumatic.   Eyes: Pupils are equal, round, and reactive to light.   Neck: Neck supple.   Cardiovascular: Normal rate and regular rhythm.   Abdominal: Soft. Bowel sounds are normal.   Musculoskeletal: Normal range of motion.   Neurological: He is alert and oriented to person, place, and time.   Skin: Rash noted. There is erythema.   Left ankle with 1 x 2 cm area of erythema, appears to be healing nicely.         Significant Labs:   CBC:   Recent Labs   Lab 05/06/19  1115   WBC 4.29   HGB 11.1*   HCT 36.6*        CMP:   Recent Labs   Lab 05/06/19  1115      K 3.8      CO2 28   GLU 78   BUN 11   CREATININE 0.9   CALCIUM 9.5   PROT 7.2   ALBUMIN 3.6   BILITOT 0.6   ALKPHOS 66   AST 20   ALT 44   ANIONGAP 8   EGFRNONAA >60     Microbiology Results (last 7 days)     Procedure Component Value Units Date/Time    Blood Culture #2 **CANNOT BE ORDERED STAT**  [[id number]] Collected:  05/03/19 1110    Order Status:  Completed Specimen:  Blood from Wrist, Right Updated:  05/06/19 1412     Blood Culture, Routine No Growth to date     Blood Culture, Routine No Growth to date     Blood Culture, Routine No Growth to date     Blood Culture, Routine No Growth to date    Blood Culture #1 **CANNOT BE ORDERED STAT** [[id number]] Collected:  05/03/19 0923    Order Status:  Completed Specimen:  Blood from Wrist, Left Updated:  05/06/19 1412     Blood Culture, Routine No Growth to date     Blood Culture, Routine No Growth to date     Blood Culture, Routine No Growth to date     Blood Culture, Routine No Growth to date        All pertinent labs within the past 24 hours have been reviewed.    Significant Imaging: I have reviewed all pertinent imaging results/findings within the past 24 hours.

## 2019-05-07 ENCOUNTER — PATIENT OUTREACH (OUTPATIENT)
Dept: ADMINISTRATIVE | Facility: CLINIC | Age: 39
End: 2019-05-07

## 2019-05-07 LAB
AMPHETAMINES SERPL QL: NEGATIVE
BARBITURATES SERPL QL SCN: NEGATIVE
BENZODIAZ SERPL QL SCN: NEGATIVE
BZE SERPL QL: NEGATIVE
CARBOXYTHC SERPL QL SCN: NEGATIVE
ETHANOL SERPL QL SCN: NEGATIVE
IGE SERPL-ACNC: 629 IU/ML (ref 0–100)
METHADONE SERPL QL SCN: NEGATIVE
OPIATES SERPL QL SCN: NEGATIVE
PCP SERPL QL SCN: NEGATIVE
PROPOXYPH SERPL QL: NEGATIVE

## 2019-05-07 NOTE — DISCHARGE SUMMARY
[facility] Hospital Medicine Discharge Summary    Primary Team: [facility] Hospitalist Team B  Attending Physician: Oh Espinosa MD  Resident: Mike Conner MD  Intern: Aman Elliott MD    Date of Admit: 5/3/2019  Date of Discharge: 5/6/2019    Discharge to: Home/Self-care  Condition: Stable    Discharge Diagnoses     Patient Active Problem List   Diagnosis    Eosinophilic esophagitis    PUD (peptic ulcer disease)    Erosive gastritis    Lifetime need for proton pump inhibitor    Cervicalgia    Adult ADHD    Adverse reaction to nonsteroidal anti-inflammatory drug (NSAID)    Therapeutic opioid induced constipation    Iron deficiency anemia    Eosinophilia    Reactive arthritis    Incomplete rotator cuff tear or rupture of right shoulder, not specified as traumatic    Cellulitis    Staphylococcus aureus bacteremia with sepsis    Cellulitis and abscess of foot    MRSA bacteremia    Cellulitis of foot, left       Consultants and Procedures     Consultants:  Infectious Disease  General Surgery    Procedures:   None    Brief History of Present Illness      Solo Black is a 39y/o white man with a significant medical history of eosinophilic esophagitis complicated by multiple GI bleeds and PUD, recent diagnosis of MRSA bacteremia and L foot cellulitis on OP IV antibiotics, who presented to [facility] on 5/3/2019 with a primary complaint of worsening pain and drainage from his left foot wound x5 days.      Was reportedly admitted at OSH for GIB 2/2 eosinophilic esophagitis; due to poor vascular access, IV access was obtained via PIV to his left foot.  1-2 days following his discharge, he began to have worsening pain and erythema to his left foot where his PIV had been placed.  He was admitted at this facility from 4/12/19 for sepsis 2/2 MRSA bacteremia and left foot cellulitis; during that admission, he underwent I&D of his left foot and was initiated on long-term IV vancomycin with  end date of 4/30/19.  He followed-up OP with ID Clinic (Dr. Hill); noted that he had worsening rash and pain and was to have his antibiotic therapy extended for another 2 weeks and undergo OP MRI.  However, symptoms progressed (pain, erythema, discharge) and he was recommended to report to the ED by his MD.      For the full HPI please refer to the History & Physical from this admission.    Hospital Course By Problem with Pertinent Findings     #Left Foot Cellulitis  - Previously diagnosed with MRSA bacteremia and left foot cellulitis.  Underwent inpatient I&D and discharged home on long-term OP IV vancomycin  - Worsening erythema, drainage, and pain x5 days PTA despite IV vancomycin therapy.  Follows with [facility] ID as OP.  Vanc course prolonged x2 weeks due to persistent rash, and dosage increased due to recent low vancomycin levels.  - ESR 16, but no leukocytosis or bandemia.  CPK WNL  - MRI completed in ED notable for soft tissue swelling/edema but not asaf abscess or fluid collection; no evidence of osteomyelitis   - Dr. Amaro (Surgery) consulted to evaluate wound.  Left foot soft tissue ultrasound with no evidence for drainable abscess.  No surgical intervention warranted.  - Infectious disease consulted to assist with Abx management and duration of therapy.  Blood cultures NGTD.  Recommended continued vancomycin (1500mg IV vancomycin q12hrs) with goal trough >15.  New end date: 5/15/19.  Follow-up with Dr. Hill at [facility] on 5/15/19  - Discharged with resumption of Home Health for outpatient IV antibiotic therapy.  Short-course Norco prescribed.    #Eosinophilic Esophagitis and PUD  - Home regimen pantoprazole, famotidine, and loratadine continued inpatient and at discharge  - NSAIDs avoided as able  - With history of multiple Staph infections, concern for possible chronic granulomatous disease.  IgE level and dihydrorhodamine (DHR) flow cytometry studies pending at time of discharge.    #Chronic  Iron Deficiency Anemia 2/2 Chronic GI Blood Loss   - On chart review, noted that patient has had several admissions for recurrent GI bleeding, likely 2/2 eosinophilic esophagitis  - Patient on outpatient iron therapy; reportedly planning for IV iron infusions   - Iron supplementation held while admitted with active infection; continued at discharge  - NSAIDs avoided as able while inpatient     #ADD  - Home medication Adderall 20 mg BID held in acute setting, but resumed at discharge    Discharge Medications      Solo Black   Home Medication Instructions SAIMA:[id number]    Printed on:05/06/19 2048   Medication Information                      dextroamphetamine-amphetamine (ADDERALL) 20 mg tablet  Take 1 tablet by mouth 2 (two) times daily before meals. Take before breakfast and lunch             famotidine (PEPCID) 20 MG tablet  Take 20 mg by mouth 2 (two) times daily.             ferrous sulfate 325 mg (65 mg iron) Tab tablet  Take 1 tablet (325 mg total) by mouth 2 (two) times daily.             HYDROcodone-acetaminophen (NORCO) 7.5-325 mg per tablet  Take 1 tablet by mouth every 6 (six) hours as needed for Pain.             loratadine (CLARITIN) 10 mg tablet  Take 10 mg by mouth every morning.             MULTIVIT-IRON-MIN-FOLIC ACID 3,500-18-0.4 UNIT-MG-MG ORAL CHEW  Take 10 mg by mouth once daily.             pantoprazole (PROTONIX) 40 MG tablet  Take 1 tablet (40 mg total) by mouth 2 (two) times daily.             paroxetine (PAXIL) 20 MG tablet  Take 20 mg by mouth every morning.             predniSONE (DELTASONE) 20 MG tablet  Take 20 mg by mouth 2 (two) times daily.             promethazine (PHENERGAN) 12.5 MG Tab  Take 25 mg by mouth every 6 (six) hours as needed (nausea/vomiting).             sucralfate (CARAFATE) 1 gram tablet  Take 1 g by mouth 4 (four) times daily before meals and nightly.                 Discharge Information:     Diet:  Regular    Physical Activity:  Activity as tolerated              Instructions:  1. Take all medications as prescribed  2. Keep all follow-up appointments  3. Return to the hospital or call your primary care physicians if any worsening symptoms such as fever, chest pain, shortness of breath, return of symptoms, or any other concerns.    Follow-Up Appointments:  Future Appointments   Date Time Provider Department Center   5/8/2019  9:30 AM Troy Honeycutt MD [facility] WOUND [facility]   5/15/19: Anthony Hill MD at [facility] Infectious Disease    ----------------------  Aman Elliott MD  [facility] IM/EM PGY-1  5/6/2019

## 2019-05-07 NOTE — PROGRESS NOTES
C3 nurse attempted to contact patient. The following occurred:   C3 nurse attempted to contact Solo Black for a TCC post hospital discharge follow up call. The patient is unable to conduct the call @ this time. The patient requested a callback.    The patient does not have a scheduled HOSFU appointment within 7-14 days post hospital discharge date 5/6/19.

## 2019-05-08 ENCOUNTER — HOSPITAL ENCOUNTER (OUTPATIENT)
Dept: WOUND CARE | Facility: HOSPITAL | Age: 39
Discharge: HOME | End: 2019-05-08
Attending: SURGERY
Payer: MEDICAID

## 2019-05-08 VITALS
DIASTOLIC BLOOD PRESSURE: 87 MMHG | SYSTOLIC BLOOD PRESSURE: 134 MMHG | HEIGHT: 72 IN | WEIGHT: 200 LBS | TEMPERATURE: 99 F | HEART RATE: 89 BPM | BODY MASS INDEX: 27.09 KG/M2

## 2019-05-08 DIAGNOSIS — R78.81 MRSA BACTEREMIA: ICD-10-CM

## 2019-05-08 DIAGNOSIS — L03.119 CELLULITIS AND ABSCESS OF FOOT: ICD-10-CM

## 2019-05-08 DIAGNOSIS — L02.619 CELLULITIS AND ABSCESS OF FOOT: ICD-10-CM

## 2019-05-08 DIAGNOSIS — B95.62 MRSA BACTEREMIA: ICD-10-CM

## 2019-05-08 DIAGNOSIS — L03.116 CELLULITIS OF LEFT LOWER EXTREMITY: ICD-10-CM

## 2019-05-08 DIAGNOSIS — F90.9 ADULT ADHD: Primary | Chronic | ICD-10-CM

## 2019-05-08 LAB
ACETONE SERPL-MCNC: NOT DETECTED MG/DL
BACTERIA BLD CULT: NORMAL
BACTERIA BLD CULT: NORMAL
ETHANOL SERPL-MCNC: NOT DETECTED MG/DL
ISOPROPANOL SERPL-MCNC: NOT DETECTED MG/DL
METHANOL SERPL-MCNC: NOT DETECTED MG/DL
VOLATILE SCREEN SERUM, CHAIN OF CUSTODY: NORMAL
VOLATILES SERPL: NORMAL

## 2019-05-08 PROCEDURE — 99203 OFFICE O/P NEW LOW 30 MIN: CPT

## 2019-05-08 NOTE — PATIENT INSTRUCTIONS
Discharge Instructions for Cellulitis  You have been diagnosed with cellulitis. This is an infection in the deepest layer of the skin. In some cases, the infection also affects the muscle. Cellulitis is caused by bacteria. The bacteria can enter the body through broken skin. This can happen with a cut, scratch, animal bite, or an insect bite that has been scratched. You may have been treated in the hospital with antibiotics and fluids. You will likely be given a prescription for antibiotics to take at home. This sheet will help you take care of yourself at home.  Home care  When you are home:  · Take the prescribed antibiotic medicine you are given as directed until it is gone. Take it even if you feel better. It treats the infection and stops it from returning. Not taking all the medicine can make future infections hard to treat.  · Keep the infected area clean.  · When possible, raise the infected area above the level of your heart. This helps keep swelling down.  · Talk with your healthcare provider if you are in pain. Ask what kind of over-the-counter medicine you can take for pain.  · Apply clean bandages as advised.  · Take your temperature once a day for a week.  · Wash your hands often to prevent spreading the infection.  In the future, wash your hands before and after you touch cuts, scratches, or bandages. This will help prevent infection.   When to call your healthcare provider  Call your healthcare provider immediately if you have any of the following:  · Difficulty or pain when moving the joints above or below the infected area  · Discharge or pus draining from the area  · Fever of 100.4°F (38°C) or higher, or as directed by your healthcare provider  · Pain that gets worse in or around the infected   · Redness that gets worse in or around the infected area, particularly if the area of redness expands to a wider area  · Shaking chills  · Swelling of the infected area  · Vomiting   Date Last Reviewed:  8/1/2016  © 1992-9408 [employer]. [address] [place] [address], [place] [address]. All rights reserved. This information is not intended as a substitute for professional medical care. Always follow your healthcare professional's instructions.

## 2019-05-08 NOTE — PROGRESS NOTES
Subjective:       Patient ID: Solo Black is a 38 y.o. male.    Chief Complaint: Wound Care (left foot)    Seen today by Dr. Honeycutt. Client seen by Dr. Honeycutt for MRSA infection of left foot in ED. Currently receiving Vanco IV 1500ml every 12 hours via left arm PICC per [facility]. Stop date 5/15/19. Today left foot slightly red, warm, and indurated. No exudate. Client is afebrile. States has had multiple MRSA infections in past.    Review of Systems   Constitutional: Negative.    HENT: Negative.    Eyes: Negative.    Respiratory: Negative.    Cardiovascular: Negative.    Gastrointestinal: Negative.    Genitourinary: Negative.    Musculoskeletal: Negative.    Neurological: Negative.    Psychiatric/Behavioral: Negative.        Objective:      Physical Exam   Constitutional: He is oriented to person, place, and time. He appears well-developed and well-nourished.   HENT:   Head: Normocephalic.   Eyes: Pupils are equal, round, and reactive to light. Conjunctivae and EOM are normal.   Neck: Normal range of motion. Neck supple.   Cardiovascular: Normal rate, regular rhythm, normal heart sounds and intact distal pulses.   Pulmonary/Chest: Effort normal and breath sounds normal.   Abdominal: Soft. Bowel sounds are normal.   Musculoskeletal: Normal range of motion.   Neurological: He is alert and oriented to person, place, and time. He has normal reflexes.   Skin: Skin is warm and dry.       Assessment:       1. Adult ADHD    2. Cellulitis of left lower extremity    3. Cellulitis and abscess of foot    4. MRSA bacteremia           Wound 05/08/19 0850 Other (comment) dorsal Foot #1 (Active)   05/08/19 0850    Pre-existing: Yes   Primary Wound Type: Other   Side: Left   Orientation: dorsal   Location: Foot   Wound/PI Number (optional): #1   Ankle-Brachial Index:    Pulses: doppler   Removal Indication and Assessment:    Wound Outcome:    (Retired) Wound Type:    (Retired) Wound Length (cm):    (Retired) Wound  Width (cm):    (Retired) Depth (cm):    Wound Description (Comments):    Removal Indications:    Dressing Appearance Intact;Dry 5/8/2019  9:59 AM   Drainage Amount None 5/8/2019  9:59 AM   Appearance Red;Dry 5/8/2019  9:59 AM   Tissue loss description Partial thickness 5/8/2019  9:59 AM   Red (%), Wound Tissue Color 100 % 5/8/2019  9:59 AM   Periwound Area Dry;Redness;Swelling 5/8/2019  9:59 AM   Wound Edges Jagged 5/8/2019  9:59 AM   Wound Length (cm) 0.6 cm 5/8/2019  9:59 AM   Wound Width (cm) 0.3 cm 5/8/2019  9:59 AM   Wound Depth (cm) 0.1 cm 5/8/2019  9:59 AM   Wound Volume (cm^3) 0.02 cm^3 5/8/2019  9:59 AM   Wound Surface Area (cm^2) 0.18 cm^2 5/8/2019  9:59 AM   Care Cleansed with:;Sterile normal saline 5/8/2019  9:59 AM   Dressing Non-adherent;Island/border;Foam 5/8/2019  9:59 AM   Periwound Care Skin barrier film applied 5/8/2019  9:59 AM   Dressing Change Due 05/09/19 5/8/2019  9:59 AM      Cleaned with NS. Xeroform, small square of foam, covered with 4 x 4 aquacel bordered adhesive dressing. Client changes daily.     Plan:       Dr. Honeycutt Wednesday 5/15/19.

## 2019-05-09 LAB — MAYO MISCELLANEOUS RESULT (REF): NORMAL

## 2019-05-11 ENCOUNTER — HOSPITAL ENCOUNTER (EMERGENCY)
Facility: HOSPITAL | Age: 39
Discharge: HOME | End: 2019-05-11
Attending: EMERGENCY MEDICINE
Payer: MEDICAID

## 2019-05-11 VITALS
RESPIRATION RATE: 15 BRPM | DIASTOLIC BLOOD PRESSURE: 89 MMHG | WEIGHT: 200 LBS | SYSTOLIC BLOOD PRESSURE: 138 MMHG | TEMPERATURE: 98 F | HEART RATE: 71 BPM | OXYGEN SATURATION: 100 % | HEIGHT: 72 IN | BODY MASS INDEX: 27.09 KG/M2

## 2019-05-11 DIAGNOSIS — Z87.2 HISTORY OF CELLULITIS: Primary | ICD-10-CM

## 2019-05-11 DIAGNOSIS — M79.672 LEFT FOOT PAIN: ICD-10-CM

## 2019-05-11 LAB
ANISOCYTOSIS BLD QL SMEAR: SLIGHT
BASOPHILS # BLD AUTO: 0.04 K/UL (ref 0–0.2)
BASOPHILS NFR BLD: 0.6 % (ref 0–1.9)
CRP SERPL-MCNC: 2.6 MG/L (ref 0–8.2)
DIFFERENTIAL METHOD: ABNORMAL
EOSINOPHIL # BLD AUTO: 0.4 K/UL (ref 0–0.5)
EOSINOPHIL NFR BLD: 6.1 % (ref 0–8)
ERYTHROCYTE [DISTWIDTH] IN BLOOD BY AUTOMATED COUNT: 18.3 % (ref 11.5–14.5)
ERYTHROCYTE [SEDIMENTATION RATE] IN BLOOD BY WESTERGREN METHOD: 15 MM/HR (ref 0–10)
HCT VFR BLD AUTO: 30.5 % (ref 40–54)
HGB BLD-MCNC: 9.4 G/DL (ref 14–18)
HYPOCHROMIA BLD QL SMEAR: ABNORMAL
LYMPHOCYTES # BLD AUTO: 1 K/UL (ref 1–4.8)
LYMPHOCYTES NFR BLD: 14.1 % (ref 18–48)
MCH RBC QN AUTO: 23 PG (ref 27–31)
MCHC RBC AUTO-ENTMCNC: 30.8 G/DL (ref 32–36)
MCV RBC AUTO: 75 FL (ref 82–98)
MONOCYTES # BLD AUTO: 1.3 K/UL (ref 0.3–1)
MONOCYTES NFR BLD: 17.9 % (ref 4–15)
NEUTROPHILS # BLD AUTO: 4.4 K/UL (ref 1.8–7.7)
NEUTROPHILS NFR BLD: 61.3 % (ref 38–73)
OVALOCYTES BLD QL SMEAR: ABNORMAL
PLATELET # BLD AUTO: 241 K/UL (ref 150–350)
PLATELET BLD QL SMEAR: ABNORMAL
PMV BLD AUTO: 9.8 FL (ref 9.2–12.9)
POIKILOCYTOSIS BLD QL SMEAR: SLIGHT
RBC # BLD AUTO: 4.08 M/UL (ref 4.6–6.2)
WBC # BLD AUTO: 7.17 K/UL (ref 3.9–12.7)

## 2019-05-11 PROCEDURE — 85652 RBC SED RATE AUTOMATED: CPT

## 2019-05-11 PROCEDURE — 63600175 PHARM REV CODE 636 W HCPCS: Performed by: EMERGENCY MEDICINE

## 2019-05-11 PROCEDURE — 99284 EMERGENCY DEPT VISIT MOD MDM: CPT | Mod: 25

## 2019-05-11 PROCEDURE — 85025 COMPLETE CBC W/AUTO DIFF WBC: CPT

## 2019-05-11 PROCEDURE — 86140 C-REACTIVE PROTEIN: CPT

## 2019-05-11 RX ORDER — KETOROLAC TROMETHAMINE 30 MG/ML
15 INJECTION, SOLUTION INTRAMUSCULAR; INTRAVENOUS
Status: DISCONTINUED | OUTPATIENT
Start: 2019-05-11 | End: 2019-05-11 | Stop reason: HOSPADM

## 2019-05-11 RX ORDER — VANCOMYCIN/0.9 % SOD CHLORIDE 750 MG/250
1500 PLASTIC BAG, INJECTION (ML) INTRAVENOUS EVERY 12 HOURS
Status: ON HOLD | COMMUNITY
End: 2020-03-25 | Stop reason: HOSPADM

## 2019-05-11 NOTE — ED PROVIDER NOTES
Encounter Date: 5/11/2019    SCRIBE #1 NOTE: I, Sharad Kohler, am scribing for, and in the presence of,  Dr. Oakes. I have scribed the entire note.       History     Chief Complaint   Patient presents with    Leg Swelling     pt was hospitalized recently for MRSA infection to his left foot. Is still on vancomycin through PICC line. Began having swelling to his left foot and ankle yesterday. Also c/o fever since yesterday.      Time seen by provider: 4:07 PM    The patient is a  38 y.o. male who presents with left foot and ankle swelling and pain with fever. He was been having pain to the proximal left foot for about a month due to cellulitis at this area that developed subsequent to intravenous access in the foot. He was last hospitalized on 5/3 for cellulitis and was discharged on 5/6. He is receiving twice daily Vancomycin through a PICC line. He has been taking Norco 7.5 and Tylenol for the pain. He reports that his pain was well-controlled with this regimen until last night. He describes the pain as a severe tightness that is worse with palpation and movement. He has been keeping the extremity elevated. He reports fever with an oral temperature of 102° F earlier today.  He also reports recent SOB, nausea, and diarrhea. He denies any CP, SOB, HA, or vomiting. He last took Norco and Tylenol at 2:30 this afternoon.    The history is provided by the patient.     Review of patient's allergies indicates:   Allergen Reactions    Legumes Nausea And Vomiting and Swelling     Other reaction(s): GI Intolerance  vomiting  vomiting  Pt reports legumes make his lips swell and induce severe vomiting  Pt reports legumes make his lips swell and induce severe vomiting      Nsaids (non-steroidal anti-inflammatory drug)      GI bleed    Bean pod extract      Lips swelling, vomiting  Lips swelling, vomiting      Ketamine      Severe dysphoria (bad trip)    Lentils      Lips swelling, vomiting  Lips swelling, vomiting       "Meloxicam Nausea Only     GI bleed    Peas      Lips swelling, vomiting    Penicillins      Has taken cephalosporins without issue    Haloperidol Anxiety and Other (See Comments)     "feels like crawling out of his skin"       Past Medical History:   Diagnosis Date    C. difficile colitis feb 2014    postop of hand surgery    Eosinophilic esophagitis 3/11/2014    GERD (gastroesophageal reflux disease)     IBS (irritable bowel syndrome)     MRSA carrier     says multiple times nose swab was +    Nephrolithiasis apr 2014    bilateral punctate - never passed a stone    Urinary tract infection feb 2014    MRSA     Past Surgical History:   Procedure Laterality Date    APPENDECTOMY      ARTHROSCOPY, SHOULDER, WITH DISTAL CLAVICLE EXCISION Right 11/1/2018    [facility]    BACK SURGERY      CIRCUMCISION, PRIMARY      COLONOSCOPY N/A 11/14/2018    [facility]    drainage of abscess from hand  2009    MRSA    drainage of abscess from R thigh  2006    MRSA    EGD (ESOPHAGOGASTRODUODENOSCOPY) N/A 9/5/2018    Performed by Kolby Wall [facility] at [facility] ENDO    EGD (ESOPHAGOGASTRODUODENOSCOPY) Left 3/11/2014    [facility]    ESOPHAGOGASTRODUODENOSCOPY  3/11/2014    ESOPHAGOGASTRODUODENOSCOPY (EGD) N/A 7/21/2016    [facility] ENDO    ESOPHAGOGASTRODUODENOSCOPY (EGD) N/A 3/17/2016    [facility] at [facility] ENDO    HAND SURGERY  jan 2014    power saw fell on hand - laceration 3 tendons    INCISION AND DRAINAGE, LOWER EXTREMITY Left 4/15/2019    [facility] by Troy [facility]cutt [facility] at [facility] OR    SIGMOIDOSCOPY, FLEXIBLE N/A 9/5/2018    Performed by Kol[facility] at [facility] ENDO     Family History   Problem Relation Age of Onset    Urolithiasis Father     Celiac disease Sister     Hypertension Maternal Grandmother     Hypertension Maternal Grandfather  "     Social History     Tobacco Use    Smoking status: Never Smoker    Smokeless tobacco: Never Used    Tobacco comment: Pt states he has smoked 1 or 2 cigarettes in his life.   Substance Use Topics    Alcohol use: Yes     Comment: occassionaly    Drug use: No     Review of Systems   Constitutional: Positive for fever. Negative for chills.   HENT: Negative for sore throat.    Respiratory: Positive for shortness of breath. Negative for cough.    Cardiovascular: Positive for leg swelling. Negative for chest pain.   Gastrointestinal: Positive for blood in stool, diarrhea and nausea. Negative for abdominal pain and vomiting.   Musculoskeletal:        + pain and swelling to left ankle and foot   Neurological: Negative for dizziness, light-headedness and headaches.     Physical Exam     Initial Vitals [05/11/19 1602]   BP Pulse Resp Temp SpO2   (!) 141/78 100 20 98.8 °F (37.1 °C) 100 %      MAP       --         Physical Exam    Nursing note and vitals reviewed.  Constitutional: He appears well-developed and well-nourished. He is not diaphoretic. No distress.   HENT:   Head: Normocephalic and atraumatic.   Mouth/Throat: Oropharynx is clear and moist.   Eyes: Conjunctivae are normal. No scleral icterus.   Neck: Normal range of motion and phonation normal. Neck supple. No stridor present. No neck rigidity.   Cardiovascular: Normal rate and regular rhythm. Exam reveals no gallop and no friction rub.    No murmur heard.  Pulses:       Popliteal pulses are 2+ on the left side.        Dorsalis pedis pulses are 2+ on the left side.        Posterior tibial pulses are 2+ on the left side.   Pulmonary/Chest: Effort normal and breath sounds normal. No respiratory distress. He has no decreased breath sounds. He has no wheezes. He has no rhonchi. He has no rales.   Abdominal: Soft. He exhibits no distension. There is no tenderness.   Musculoskeletal:   Left ankle/foot:  Mild swelling. The skin is not hot to touch compared to  surrounding areas.  No overlying rashes to the ankle.  There is a healing wound to the dorsal surface of the proximal foot with mild hyperpigmentation surrounding the wound.  Disproportionate tenderness to the ankle and proximal foot.  No areas of fluctuance.   Neurological: He is alert and oriented to person, place, and time. He has normal strength. No cranial nerve deficit. Coordination normal. GCS eye subscore is 4. GCS verbal subscore is 5. GCS motor subscore is 6.   Skin: Skin is warm and dry. No pallor.                     ED Course   Procedures  Labs Reviewed   CBC W/ AUTO DIFFERENTIAL - Abnormal; Notable for the following components:       Result Value    RBC 4.08 (*)     Hemoglobin 9.4 (*)     Hematocrit 30.5 (*)     Mean Corpuscular Volume 75 (*)     Mean Corpuscular Hemoglobin 23.0 (*)     Mean Corpuscular Hemoglobin Conc 30.8 (*)     RDW 18.3 (*)     Mono # 1.3 (*)     Lymph% 14.1 (*)     Mono% 17.9 (*)     All other components within normal limits   SEDIMENTATION RATE - Abnormal; Notable for the following components:    Sed Rate 15 (*)     All other components within normal limits   C-REACTIVE PROTEIN          X-Rays:   Independently Interpreted Readings:   Other Readings:  Reviewed by myself, read by radiology.    Imaging Results          X-Ray Foot Complete Left (Final result)  Result time 05/11/19 16:58:22    Final result by Kim Zafar MD (05/11/19 16:58:22)                 Impression:      No fracture or malalignment.      Electronically signed by: Kim Zafar  Date:    05/11/2019  Time:    16:58             Narrative:    EXAMINATION:  XR FOOT COMPLETE 3 VIEW LEFT    CLINICAL HISTORY:  .  Pain in left foot    TECHNIQUE:  AP, lateral and oblique views of the left foot were performed.    COMPARISON:  04/17/2019    FINDINGS:  Frontal, oblique and lateral views presented.  Midfoot alignment appears normal.  No fracture or erosion or periosteal reaction or effusion.  The Achilles tendon appears  normal.  No focal soft tissue swelling.  The toes are partially excluded on the lateral view.  The lateral process of the talus and the anterior process of the calcaneus appear normal.                              Medical Decision Making:   Clinical Tests:   Lab Tests: Ordered and Reviewed  Radiological Study: Ordered and Reviewed  Medical decision making:  This patient was evaluated for acute worsening left ankle and foot pain and swelling. He is currently receiving IV antibiotics for cellulitis.  He reports fever at home earlier today.  He is afebrile here.  He is well and nontoxic appearing.  There is mild swelling to the ankle and proximal foot on exam as well as tenderness. There is a healing wound to the proximal foot without concerning findings for abscess or acute worsening of the previously diagnosed cellulitis.  There is no leukocytosis.  CRP is without elevation.  Sed rate is slightly improved from several days ago.  There are no concerning findings on plain radiographs of the foot.  The patient was discharged in good condition. He was instructed on supportive management of pain and swelling. He has previously scheduled follow-up with infectious disease on 5/15.                      Clinical Impression:       ICD-10-CM ICD-9-CM   1. History of cellulitis Z87.2 V13.3   2. Left foot pain M79.672 729.5       Disposition:   Disposition: Discharged  Condition: Stable       Morgan Oakes III, MD  05/11/19 1956

## 2019-05-11 NOTE — ED NOTES
Dr Okaes notified that the patient is unable to take the prescribed Ketorolac and is still in significant pain. He will come and speak to the patient.

## 2019-05-11 NOTE — ED NOTES
APPEARANCE: Alert, oriented and in no acute distress.  CARDIAC: Sinus tachycardia, no murmur heard.   PERIPHERAL VASCULAR: peripheral pulses present. Normal cap refill. No edema. Warm to touch.    RESPIRATORY:Normal rate and effort, breath sounds clear bilaterally throughout chest. Respirations are equal and unlabored no obvious signs of distress.  GASTRO: soft, bowel sounds normal, no tenderness, no abdominal distention.  MUSC: Full ROM. Swollen and painful L foot and ankle.  SKIN: Skin is warm and dry, normal skin turgor, mucous membranes moist.  NEURO: 5/5 strength major flexors/extensors bilaterally. Sensory intact to light touch bilaterally. Christ coma scale: eyes open spontaneously-4, oriented & converses-5, obeys commands-6. No neurological abnormalities.

## 2019-05-13 ENCOUNTER — LAB VISIT (OUTPATIENT)
Dept: LAB | Facility: HOSPITAL | Age: 39
End: 2019-05-13
Attending: INTERNAL MEDICINE
Payer: MEDICAID

## 2019-05-13 DIAGNOSIS — A41.01 MSSA (METHICILLIN SUSCEPTIBLE STAPHYLOCOCCUS AUREUS) SEPTICEMIA: Primary | ICD-10-CM

## 2019-05-13 LAB
ALBUMIN SERPL BCP-MCNC: 3.7 G/DL (ref 3.5–5.2)
ALP SERPL-CCNC: 54 U/L (ref 55–135)
ALT SERPL W/O P-5'-P-CCNC: 29 U/L (ref 10–44)
ANION GAP SERPL CALC-SCNC: 10 MMOL/L (ref 8–16)
AST SERPL-CCNC: 19 U/L (ref 10–40)
BASOPHILS # BLD AUTO: 0.03 K/UL (ref 0–0.2)
BASOPHILS NFR BLD: 0.6 % (ref 0–1.9)
BILIRUB SERPL-MCNC: 0.4 MG/DL (ref 0.1–1)
BUN SERPL-MCNC: 8 MG/DL (ref 6–20)
CALCIUM SERPL-MCNC: 9 MG/DL (ref 8.7–10.5)
CHLORIDE SERPL-SCNC: 107 MMOL/L (ref 95–110)
CO2 SERPL-SCNC: 26 MMOL/L (ref 23–29)
CREAT SERPL-MCNC: 1.1 MG/DL (ref 0.5–1.4)
CRP SERPL-MCNC: 3.2 MG/L (ref 0–8.2)
DIFFERENTIAL METHOD: ABNORMAL
EOSINOPHIL # BLD AUTO: 0.3 K/UL (ref 0–0.5)
EOSINOPHIL NFR BLD: 6.4 % (ref 0–8)
ERYTHROCYTE [DISTWIDTH] IN BLOOD BY AUTOMATED COUNT: 19.1 % (ref 11.5–14.5)
ERYTHROCYTE [SEDIMENTATION RATE] IN BLOOD BY WESTERGREN METHOD: 3 MM/HR (ref 0–10)
EST. GFR  (AFRICAN AMERICAN): >60 ML/MIN/1.73 M^2
EST. GFR  (NON AFRICAN AMERICAN): >60 ML/MIN/1.73 M^2
GLUCOSE SERPL-MCNC: 82 MG/DL (ref 70–110)
HCT VFR BLD AUTO: 31.8 % (ref 40–54)
HGB BLD-MCNC: 9.5 G/DL (ref 14–18)
LYMPHOCYTES # BLD AUTO: 1.2 K/UL (ref 1–4.8)
LYMPHOCYTES NFR BLD: 25.3 % (ref 18–48)
MCH RBC QN AUTO: 22.8 PG (ref 27–31)
MCHC RBC AUTO-ENTMCNC: 29.9 G/DL (ref 32–36)
MCV RBC AUTO: 76 FL (ref 82–98)
MONOCYTES # BLD AUTO: 0.9 K/UL (ref 0.3–1)
MONOCYTES NFR BLD: 17.8 % (ref 4–15)
NEUTROPHILS # BLD AUTO: 2.4 K/UL (ref 1.8–7.7)
NEUTROPHILS NFR BLD: 49.9 % (ref 38–73)
PLATELET # BLD AUTO: 264 K/UL (ref 150–350)
PMV BLD AUTO: 11.5 FL (ref 9.2–12.9)
POTASSIUM SERPL-SCNC: 3.4 MMOL/L (ref 3.5–5.1)
PROT SERPL-MCNC: 6.9 G/DL (ref 6–8.4)
RBC # BLD AUTO: 4.16 M/UL (ref 4.6–6.2)
SODIUM SERPL-SCNC: 143 MMOL/L (ref 136–145)
VANCOMYCIN TROUGH SERPL-MCNC: 13.5 UG/ML (ref 10–22)
WBC # BLD AUTO: 4.82 K/UL (ref 3.9–12.7)

## 2019-05-13 PROCEDURE — 80202 ASSAY OF VANCOMYCIN: CPT

## 2019-05-13 PROCEDURE — 36415 COLL VENOUS BLD VENIPUNCTURE: CPT

## 2019-05-13 PROCEDURE — 85025 COMPLETE CBC W/AUTO DIFF WBC: CPT

## 2019-05-13 PROCEDURE — 80053 COMPREHEN METABOLIC PANEL: CPT

## 2019-05-13 PROCEDURE — 85651 RBC SED RATE NONAUTOMATED: CPT

## 2019-05-13 PROCEDURE — 86140 C-REACTIVE PROTEIN: CPT

## 2019-05-20 LAB — FUNGUS SPEC CULT: NORMAL

## 2020-03-19 ENCOUNTER — TELEPHONE (OUTPATIENT)
Dept: GASTROENTEROLOGY | Facility: CLINIC | Age: 40
End: 2020-03-19

## 2020-03-19 ENCOUNTER — TELEPHONE (OUTPATIENT)
Dept: NEUROLOGY | Facility: HOSPITAL | Age: 40
End: 2020-03-19

## 2020-03-19 NOTE — TELEPHONE ENCOUNTER
Pt with heart burn, unable to sleep times 2 weeks.  Now  spitting up phlegm with blood.  Pt takes 40mg pantoprazole twice daily with maalox . H/O erosive gastritis, pud, e. esophagitis  Pt requesting to schedule egd.  Pt informed Dr. Wall is not available at this time, Messages sent to [facility] and attempt to contact Dr. Wu's staff to schedule clinic appt this evening or tomorrow, 3/20/20.  Pt advised to go to urgent care.  Pt states he is not willing to go unless symptoms worsen.  Pt informed he can contact clinic early tomorrow morning with status of appt with other gi staff. Pt to call clinic in am.

## 2020-03-19 NOTE — TELEPHONE ENCOUNTER
----- Message from Teresa Newton LPN sent at 3/19/2020  3:22 PM CDT -----  Dr. Wall out due to quarantine.  I have a pt on call with heart burn and spitting up blood.  Do you have any md that can see pt today or tomorrow/

## 2020-03-20 ENCOUNTER — TELEPHONE (OUTPATIENT)
Dept: GASTROENTEROLOGY | Facility: CLINIC | Age: 40
End: 2020-03-20

## 2020-03-20 ENCOUNTER — OFFICE VISIT (OUTPATIENT)
Dept: GASTROENTEROLOGY | Facility: CLINIC | Age: 40
End: 2020-03-20
Payer: MEDICAID

## 2020-03-20 ENCOUNTER — TELEPHONE (OUTPATIENT)
Dept: NEUROLOGY | Facility: HOSPITAL | Age: 40
End: 2020-03-20

## 2020-03-20 VITALS — BODY MASS INDEX: 28.49 KG/M2 | WEIGHT: 210.13 LBS

## 2020-03-20 DIAGNOSIS — K92.0 HEMATEMESIS WITHOUT NAUSEA: Primary | ICD-10-CM

## 2020-03-20 DIAGNOSIS — Z71.89 ADVICE GIVEN ABOUT COVID-19 VIRUS INFECTION: ICD-10-CM

## 2020-03-20 PROCEDURE — 99215 PR OFFICE/OUTPT VISIT, EST, LEVL V, 40-54 MIN: ICD-10-PCS | Mod: S$PBB,,, | Performed by: INTERNAL MEDICINE

## 2020-03-20 PROCEDURE — 99213 OFFICE O/P EST LOW 20 MIN: CPT | Mod: PBBFAC,PO | Performed by: INTERNAL MEDICINE

## 2020-03-20 PROCEDURE — 99215 OFFICE O/P EST HI 40 MIN: CPT | Mod: S$PBB,,, | Performed by: INTERNAL MEDICINE

## 2020-03-20 PROCEDURE — 99999 PR PBB SHADOW E&M-EST. PATIENT-LVL III: ICD-10-PCS | Mod: PBBFAC,,, | Performed by: INTERNAL MEDICINE

## 2020-03-20 PROCEDURE — 99999 PR PBB SHADOW E&M-EST. PATIENT-LVL III: CPT | Mod: PBBFAC,,, | Performed by: INTERNAL MEDICINE

## 2020-03-20 NOTE — PATIENT INSTRUCTIONS
EGD Prep Instructions    [facility] [address] Clinic Office [phone]  Endoscopy Lab [phone]    You are scheduled for an EGD with Dr. Tillman on 3/26/2020 at [facility].  You will check in at Admit on the first floor of the hospital. Please contact the office two days before procedure date to reschedule if needed.    You may not have anything to eat after 7pm and nothing to drink after midnight.  You can drink clear liquids between 7pm and midnight.    You MAY take your blood pressure, heart, and seizure medication on the morning of the procedure, with a SIP of water.  Hold ALL other medications until after the procedure.    If you are on blood thinners THAT YOU HAVE BEEN INSTRUCTED TO HOLD BY YOUR DOCTOR FOR THIS PROCEDURE, then do NOT take this the morning of your EGD.  Do NOT stop these medications on your own, they must be approved to be held by your doctor.  Your EGD can NOT be done if you are on these medications.  Examples of blood thinners include: Coumadin, Aggrenox, Plavix, Pradaxa, Reapro, Pletal, Xarelto, Ticagrelor, Brilinta, Eliquis, and high dose aspirin (325 mg).  You do not have to stop baby aspirin 81 mg.

## 2020-03-20 NOTE — H&P (VIEW-ONLY)
"Subjective:       Patient ID: Solo Black is a 39 y.o. male.    Chief Complaint: Rectal Bleeding and Abdominal Pain    Patient here today with above complaints.  Patient generally follows with Dr. Wall however he is out. Apparently for the last week he has been experiencing an acute worsening of his chronic gerd. He reports daily, unceasing epigastric burning that does not seem to respond to current antacid regimen: Protonix BID + Pepcid AC BID + Maalox. It is worse in the evening and precludes him from laying down. He associates intermittent nausea/vomiting with occasional hematemesis with his c/o. He is also experiencing significant pruritis for which he is taking benadryl. This is typical for his "flares" he's had previously.     Most recent endoscopic history reviewed  - colonoscopy 11/2018: normal  - sigmoidoscopy 09/2018:  Limited by prep however no sources of bleeding noted  - EGD 09/2018:  Endoscopic EOE, biopsied.  LA grade A esophagitis.  Hiatal hernia, medium.  Gastric ulcer without stigmata.  Gastritis.  Endoclip in stomach.      Past Medical History:   Diagnosis Date    C. difficile colitis feb 2014    postop of hand surgery    Eosinophilic esophagitis 3/11/2014    GERD (gastroesophageal reflux disease)     IBS (irritable bowel syndrome)     MRSA carrier     says multiple times nose swab was +    Nephrolithiasis apr 2014    bilateral punctate - never passed a stone    Urinary tract infection feb 2014    MRSA       Past Surgical History:   Procedure Laterality Date    APPENDECTOMY      ARTHROSCOPY OF SHOULDER WITH REMOVAL OF DISTAL CLAVICLE Right 11/1/2018    Procedure: ARTHROSCOPY, SHOULDER, WITH DISTAL CLAVICLE EXCISION;  Surgeon: Gabino Mejia MD;  Location: [facility] OR;  Service: Orthopedics;  Laterality: Right;  video    BACK SURGERY      CIRCUMCISION, PRIMARY      COLONOSCOPY N/A 11/14/2018    Procedure: COLONOSCOPY;  Surgeon: Milton Brunson MD;  Location: [facility] ENDO;  " Service: Endoscopy;  Laterality: N/A;    drainage of abscess from hand  2009    MRSA    drainage of abscess from R thigh  2006    MRSA    ESOPHAGOGASTRODUODENOSCOPY  3/11/2014    ESOPHAGOGASTRODUODENOSCOPY N/A 9/5/2018    Procedure: EGD (ESOPHAGOGASTRODUODENOSCOPY);  Surgeon: Kolby Wall MD;  Location: [facility];  Service: Endoscopy;  Laterality: N/A;    FLEXIBLE SIGMOIDOSCOPY N/A 9/5/2018    Procedure: SIGMOIDOSCOPY, FLEXIBLE;  Surgeon: Kolby Wall MD;  Location: [facility] ENDO;  Service: Endoscopy;  Laterality: N/A;    HAND SURGERY  jan 2014    power saw fell on hand - laceration 3 tendons       Social History  Social History     Tobacco Use    Smoking status: Never Smoker    Smokeless tobacco: Never Used    Tobacco comment: Pt states he has smoked 1 or 2 cigarettes in his life.   Substance Use Topics    Alcohol use: Yes     Comment: occassionaly    Drug use: No       Family History   Problem Relation Age of Onset    Urolithiasis Father     Celiac disease Sister     Hypertension Maternal Grandmother     Hypertension Maternal Grandfather        Review of Systems   Constitutional: Negative for chills, fever and unexpected weight change.   HENT: Positive for trouble swallowing. Negative for congestion.    Eyes: Negative for photophobia and visual disturbance.   Respiratory: Negative for cough and shortness of breath.    Cardiovascular: Negative for chest pain and leg swelling.   Gastrointestinal: Positive for abdominal pain and blood in stool. Negative for abdominal distention, constipation, diarrhea, nausea and vomiting.   Genitourinary: Negative for dysuria and hematuria.   Musculoskeletal: Negative for arthralgias and myalgias.   Skin: Positive for rash. Negative for color change.   Neurological: Negative for dizziness, light-headedness and numbness.   Psychiatric/Behavioral: Negative for agitation and confusion.           Objective:      Physical Exam   Constitutional: He is oriented to  person, place, and time. He appears well-developed and well-nourished.   HENT:   Head: Normocephalic and atraumatic.   Eyes: Pupils are equal, round, and reactive to light. EOM are normal. No scleral icterus.   Neck: Normal range of motion. Neck supple.   Cardiovascular: Normal rate, regular rhythm, normal heart sounds and intact distal pulses.   Pulmonary/Chest: Effort normal and breath sounds normal. No respiratory distress.   Abdominal: Soft. Bowel sounds are normal. He exhibits no distension. There is tenderness. There is no guarding.   Musculoskeletal: Normal range of motion. He exhibits no edema.   Neurological: He is alert and oriented to person, place, and time.   No asterixis   Skin: Skin is warm and dry. Rash noted. No erythema.   Psychiatric: He has a normal mood and affect. His behavior is normal.   Nursing note and vitals reviewed.        Pertinent labs and imaging studies reviewed    Assessment:       1. Hematemesis without nausea        Plan:       Will plan expedited EGD  Discussed COVID risk. Pt understands and is amendable    (Portions of this note were dictated using voice recognition software and may contain dictation related errors in spelling/grammar/syntax not found on text review)

## 2020-03-20 NOTE — TELEPHONE ENCOUNTER
Pt requesting status of requested appt.  Pt informed Dr. Tillman's staff to contact.  Pt acknowledged understanding.

## 2020-03-20 NOTE — TELEPHONE ENCOUNTER
----- Message from Lola Patel sent at 3/20/2020  8:58 AM CDT -----  Contact: self [phone]  GI - Patient is calling to speak with you he said the problem is getting worse. Please call

## 2020-03-20 NOTE — TELEPHONE ENCOUNTER
----- Message from Arline King sent at 3/20/2020  3:40 PM CDT -----  Contact: [phone]/ self   Patient requesting to speak with you regarding vomiting blood with abdominal pain. Please call.

## 2020-03-20 NOTE — TELEPHONE ENCOUNTER
Spoke with patient informing him that he will have to go to the ER since he's vommitting blood and having abdominal pain. Patient stated he do not want to go to the ER unless Dr. Tillman says its fine for him to go and he do not want to expose himself to the COVID-19. Informed patient that Dr. Tillman is out of the office at this time and a message will be sent explaining his concerns. Staff again informed patient that he needed to go to the hospital ASAP.

## 2020-03-20 NOTE — TELEPHONE ENCOUNTER
Spoke with patient informing him that staff spoke with Dr. Tillman, and Dr. Tillman would like for him to go to the ER. Patient stated he was going to the ER.

## 2020-03-20 NOTE — PROGRESS NOTES
"Subjective:       Patient ID: Solo Black is a 39 y.o. male.    Chief Complaint: Rectal Bleeding and Abdominal Pain    Patient here today with above complaints.  Patient generally follows with Dr. Wall however he is out. Apparently for the last week he has been experiencing an acute worsening of his chronic gerd. He reports daily, unceasing epigastric burning that does not seem to respond to current antacid regimen: Protonix BID + Pepcid AC BID + Maalox. It is worse in the evening and precludes him from laying down. He associates intermittent nausea/vomiting with occasional hematemesis with his c/o. He is also experiencing significant pruritis for which he is taking benadryl. This is typical for his "flares" he's had previously.     Most recent endoscopic history reviewed  - colonoscopy 11/2018: normal  - sigmoidoscopy 09/2018:  Limited by prep however no sources of bleeding noted  - EGD 09/2018:  Endoscopic EOE, biopsied.  LA grade A esophagitis.  Hiatal hernia, medium.  Gastric ulcer without stigmata.  Gastritis.  Endoclip in stomach.      Past Medical History:   Diagnosis Date    C. difficile colitis feb 2014    postop of hand surgery    Eosinophilic esophagitis 3/11/2014    GERD (gastroesophageal reflux disease)     IBS (irritable bowel syndrome)     MRSA carrier     says multiple times nose swab was +    Nephrolithiasis apr 2014    bilateral punctate - never passed a stone    Urinary tract infection feb 2014    MRSA       Past Surgical History:   Procedure Laterality Date    APPENDECTOMY      ARTHROSCOPY OF SHOULDER WITH REMOVAL OF DISTAL CLAVICLE Right 11/1/2018    Procedure: ARTHROSCOPY, SHOULDER, WITH DISTAL CLAVICLE EXCISION;  Surgeon: Gabino Mejia MD;  Location: [facility] OR;  Service: Orthopedics;  Laterality: Right;  video    BACK SURGERY      CIRCUMCISION, PRIMARY      COLONOSCOPY N/A 11/14/2018    Procedure: COLONOSCOPY;  Surgeon: Milton Brunson MD;  Location: [facility] ENDO;  " Service: Endoscopy;  Laterality: N/A;    drainage of abscess from hand  2009    MRSA    drainage of abscess from R thigh  2006    MRSA    ESOPHAGOGASTRODUODENOSCOPY  3/11/2014    ESOPHAGOGASTRODUODENOSCOPY N/A 9/5/2018    Procedure: EGD (ESOPHAGOGASTRODUODENOSCOPY);  Surgeon: Kolby Wall MD;  Location: [facility];  Service: Endoscopy;  Laterality: N/A;    FLEXIBLE SIGMOIDOSCOPY N/A 9/5/2018    Procedure: SIGMOIDOSCOPY, FLEXIBLE;  Surgeon: Kolby Wall MD;  Location: [facility] ENDO;  Service: Endoscopy;  Laterality: N/A;    HAND SURGERY  jan 2014    power saw fell on hand - laceration 3 tendons       Social History  Social History     Tobacco Use    Smoking status: Never Smoker    Smokeless tobacco: Never Used    Tobacco comment: Pt states he has smoked 1 or 2 cigarettes in his life.   Substance Use Topics    Alcohol use: Yes     Comment: occassionaly    Drug use: No       Family History   Problem Relation Age of Onset    Urolithiasis Father     Celiac disease Sister     Hypertension Maternal Grandmother     Hypertension Maternal Grandfather        Review of Systems   Constitutional: Negative for chills, fever and unexpected weight change.   HENT: Positive for trouble swallowing. Negative for congestion.    Eyes: Negative for photophobia and visual disturbance.   Respiratory: Negative for cough and shortness of breath.    Cardiovascular: Negative for chest pain and leg swelling.   Gastrointestinal: Positive for abdominal pain and blood in stool. Negative for abdominal distention, constipation, diarrhea, nausea and vomiting.   Genitourinary: Negative for dysuria and hematuria.   Musculoskeletal: Negative for arthralgias and myalgias.   Skin: Positive for rash. Negative for color change.   Neurological: Negative for dizziness, light-headedness and numbness.   Psychiatric/Behavioral: Negative for agitation and confusion.           Objective:      Physical Exam   Constitutional: He is oriented to  person, place, and time. He appears well-developed and well-nourished.   HENT:   Head: Normocephalic and atraumatic.   Eyes: Pupils are equal, round, and reactive to light. EOM are normal. No scleral icterus.   Neck: Normal range of motion. Neck supple.   Cardiovascular: Normal rate, regular rhythm, normal heart sounds and intact distal pulses.   Pulmonary/Chest: Effort normal and breath sounds normal. No respiratory distress.   Abdominal: Soft. Bowel sounds are normal. He exhibits no distension. There is tenderness. There is no guarding.   Musculoskeletal: Normal range of motion. He exhibits no edema.   Neurological: He is alert and oriented to person, place, and time.   No asterixis   Skin: Skin is warm and dry. Rash noted. No erythema.   Psychiatric: He has a normal mood and affect. His behavior is normal.   Nursing note and vitals reviewed.        Pertinent labs and imaging studies reviewed    Assessment:       1. Hematemesis without nausea        Plan:       Will plan expedited EGD  Discussed COVID risk. Pt understands and is amendable    (Portions of this note were dictated using voice recognition software and may contain dictation related errors in spelling/grammar/syntax not found on text review)

## 2020-03-21 ENCOUNTER — TELEPHONE (OUTPATIENT)
Dept: GASTROENTEROLOGY | Facility: HOSPITAL | Age: 40
End: 2020-03-21

## 2020-03-21 RX ORDER — FLUCONAZOLE 200 MG/1
TABLET ORAL
Qty: 15 TABLET | Refills: 0 | Status: SHIPPED | OUTPATIENT
Start: 2020-03-21 | End: 2020-04-04

## 2020-03-21 NOTE — TELEPHONE ENCOUNTER
Patient called to discuss symptoms. He is having issues with esophageal pain with swallowing that is progressively worsening. He discussed this with Dr. Tillman  yesterday but feels like it is not improving. He was treated for oral candidiasis 4 weeks ago with Nystatin and continues to use oral fluticasone. He takes Protonix bid and Prevacid bid along with Carafate qid. He denies fever. Plan is to do EGD on Thursday, which is the soonest it can be done due to recent outpatient procedure limitations.     Plan: Will give fluconazole 400 mg on day 1 and then 200 mg daily for 13 days to empirically treat for esophageal candidiasis. If symptoms do not improve after a few days then he will call back. He is avoiding the ED due to possible contamination with COVID-19. He will go to the ED if there is concern for his symptoms that cannot be addressed via telephone.

## 2020-03-22 ENCOUNTER — TELEPHONE (OUTPATIENT)
Dept: GASTROENTEROLOGY | Facility: HOSPITAL | Age: 40
End: 2020-03-22

## 2020-03-22 NOTE — TELEPHONE ENCOUNTER
Patient called back today complaining of nausea, vomiting, and small volume thin hematemesis. He states that his stool is also darker in color. He to day 1 of fluconazole and is unsure of improvement. He still states that he would like to stay out of the ED and can be treated outpatient. I am concerned that he has a small mucosal tear, Candiec-Person, from retching that appears to be mild.     Plan:   - He has Zofran 4 mg disintegrating tablets at home which he will take to control nausea.   - Continue PPI, H2-blocker therapy, and Carafate therapy  - Continue fluconazole for candida esophagitis  - He will come to the ED if his symptoms progress or if he feels like they cannot be managed at home  - He will call back to Dr. Tillman to check in on symptom improvement/worsening.

## 2020-03-23 ENCOUNTER — TELEPHONE (OUTPATIENT)
Dept: GASTROENTEROLOGY | Facility: CLINIC | Age: 40
End: 2020-03-23

## 2020-03-23 NOTE — TELEPHONE ENCOUNTER
I have reviewed the chart and have discussed the patient in detail with the resident/fellow. I have reviewed the resident/fellow progress note and agree with the findings and plan of care as documented.

## 2020-03-23 NOTE — TELEPHONE ENCOUNTER
Spoke with patient. Patient stated he went to the ER over the weekend and the doctor gave him some medicine but his symptoms are getting worst. Patient wanted to know if his procedure can be moved up. Informed patient that Dr. Tillman do not do procedures on Tuesdays but he can come that Wednesday instead of that Thursday he's scheduled. Patient stated he will schedule for Wednesday, Informed patient the office will give him a call with his time.

## 2020-03-23 NOTE — TELEPHONE ENCOUNTER
----- Message from Solo Garcia sent at 3/23/2020 12:03 PM CDT -----  Contact: [phone] / self   Patient is returning a call from your office regarding an urgent matter. Please Advise.

## 2020-03-24 ENCOUNTER — TELEPHONE (OUTPATIENT)
Dept: ENDOSCOPY | Facility: HOSPITAL | Age: 40
End: 2020-03-24

## 2020-03-24 NOTE — TELEPHONE ENCOUNTER
Spoke with patient about arrival time @ 0830.     NPO status reviewed: Patient must have nothing to eat after midnight.      Medications: Do not take Insulin or oral diabetic medications the day of the procedure.  Take as prescribed: heart, seizure and blood pressure medication in the morning with a sip of water (less than an ounce).  Take any breathing medications and bring inhalers to hospital with you Leave all valuables and jewelry at home.     Wear comfortable clothes to procedure to change into hospital gown You cannot drive for 24 hours after your procedure because you will receive sedation for your procedure to make you comfortable.  A ride must be provided at discharge.     Patient is a very hard stick, requires US and such in past.

## 2020-03-25 ENCOUNTER — TELEPHONE (OUTPATIENT)
Dept: SURGERY | Facility: CLINIC | Age: 40
End: 2020-03-25

## 2020-03-25 ENCOUNTER — ANESTHESIA (OUTPATIENT)
Dept: ENDOSCOPY | Facility: HOSPITAL | Age: 40
End: 2020-03-25
Payer: MEDICAID

## 2020-03-25 ENCOUNTER — ANESTHESIA EVENT (OUTPATIENT)
Dept: ENDOSCOPY | Facility: HOSPITAL | Age: 40
End: 2020-03-25
Payer: MEDICAID

## 2020-03-25 ENCOUNTER — HOSPITAL ENCOUNTER (OUTPATIENT)
Facility: HOSPITAL | Age: 40
Discharge: HOME | End: 2020-03-25
Attending: INTERNAL MEDICINE | Admitting: INTERNAL MEDICINE
Payer: MEDICAID

## 2020-03-25 VITALS
TEMPERATURE: 99 F | OXYGEN SATURATION: 98 % | SYSTOLIC BLOOD PRESSURE: 106 MMHG | HEIGHT: 72 IN | RESPIRATION RATE: 20 BRPM | WEIGHT: 209 LBS | HEART RATE: 78 BPM | BODY MASS INDEX: 28.31 KG/M2 | DIASTOLIC BLOOD PRESSURE: 70 MMHG

## 2020-03-25 DIAGNOSIS — R10.9 ABDOMINAL PAIN: ICD-10-CM

## 2020-03-25 DIAGNOSIS — K20.0 EOSINOPHILIC ESOPHAGITIS: Primary | Chronic | ICD-10-CM

## 2020-03-25 PROCEDURE — 25000003 PHARM REV CODE 250: Performed by: NURSE ANESTHETIST, CERTIFIED REGISTERED

## 2020-03-25 PROCEDURE — 88305 TISSUE EXAM BY PATHOLOGIST: ICD-10-PCS | Mod: 26,,, | Performed by: PATHOLOGY

## 2020-03-25 PROCEDURE — 43239 EGD BIOPSY SINGLE/MULTIPLE: CPT | Mod: ,,, | Performed by: INTERNAL MEDICINE

## 2020-03-25 PROCEDURE — 00731 ANES UPR GI NDSC PX NOS: CPT | Performed by: INTERNAL MEDICINE

## 2020-03-25 PROCEDURE — 88305 TISSUE EXAM BY PATHOLOGIST: CPT | Performed by: PATHOLOGY

## 2020-03-25 PROCEDURE — 43239 EGD BIOPSY SINGLE/MULTIPLE: CPT | Performed by: INTERNAL MEDICINE

## 2020-03-25 PROCEDURE — 27201012 HC FORCEPS, HOT/COLD, DISP: Performed by: INTERNAL MEDICINE

## 2020-03-25 PROCEDURE — 88305 TISSUE EXAM BY PATHOLOGIST: CPT | Mod: 26,,, | Performed by: PATHOLOGY

## 2020-03-25 PROCEDURE — 37000009 HC ANESTHESIA EA ADD 15 MINS: Performed by: INTERNAL MEDICINE

## 2020-03-25 PROCEDURE — 43239 PR EGD, FLEX, W/BIOPSY, SGL/MULTI: ICD-10-PCS | Mod: ,,, | Performed by: INTERNAL MEDICINE

## 2020-03-25 PROCEDURE — 63600175 PHARM REV CODE 636 W HCPCS: Performed by: NURSE ANESTHETIST, CERTIFIED REGISTERED

## 2020-03-25 PROCEDURE — 37000008 HC ANESTHESIA 1ST 15 MINUTES: Performed by: INTERNAL MEDICINE

## 2020-03-25 RX ORDER — SODIUM CHLORIDE 9 MG/ML
INJECTION, SOLUTION INTRAVENOUS CONTINUOUS
Status: DISCONTINUED | OUTPATIENT
Start: 2020-03-25 | End: 2020-03-25 | Stop reason: HOSPADM

## 2020-03-25 RX ORDER — SODIUM CHLORIDE 9 MG/ML
INJECTION, SOLUTION INTRAVENOUS CONTINUOUS PRN
Status: DISCONTINUED | OUTPATIENT
Start: 2020-03-25 | End: 2020-03-25

## 2020-03-25 RX ORDER — ONDANSETRON 2 MG/ML
INJECTION INTRAMUSCULAR; INTRAVENOUS
Status: DISCONTINUED | OUTPATIENT
Start: 2020-03-25 | End: 2020-03-25

## 2020-03-25 RX ORDER — PROPOFOL 10 MG/ML
VIAL (ML) INTRAVENOUS
Status: DISCONTINUED | OUTPATIENT
Start: 2020-03-25 | End: 2020-03-25

## 2020-03-25 RX ORDER — LIDOCAINE HCL/PF 100 MG/5ML
SYRINGE (ML) INTRAVENOUS
Status: DISCONTINUED | OUTPATIENT
Start: 2020-03-25 | End: 2020-03-25

## 2020-03-25 RX ORDER — FENTANYL CITRATE 50 UG/ML
INJECTION, SOLUTION INTRAMUSCULAR; INTRAVENOUS
Status: DISCONTINUED | OUTPATIENT
Start: 2020-03-25 | End: 2020-03-25

## 2020-03-25 RX ORDER — PANTOPRAZOLE SODIUM 40 MG/1
40 TABLET, DELAYED RELEASE ORAL 2 TIMES DAILY
Qty: 180 TABLET | Refills: 3 | Status: ON HOLD | OUTPATIENT
Start: 2020-03-25 | End: 2020-08-31 | Stop reason: HOSPADM

## 2020-03-25 RX ORDER — GLYCOPYRROLATE 0.2 MG/ML
INJECTION INTRAMUSCULAR; INTRAVENOUS
Status: DISCONTINUED | OUTPATIENT
Start: 2020-03-25 | End: 2020-03-25

## 2020-03-25 RX ORDER — PANTOPRAZOLE SODIUM 40 MG/1
40 TABLET, DELAYED RELEASE ORAL 2 TIMES DAILY
Qty: 180 TABLET | Refills: 3 | Status: SHIPPED | OUTPATIENT
Start: 2020-03-25 | End: 2020-03-25 | Stop reason: SDUPTHER

## 2020-03-25 RX ORDER — SODIUM CHLORIDE 0.9 % (FLUSH) 0.9 %
10 SYRINGE (ML) INJECTION
Status: DISCONTINUED | OUTPATIENT
Start: 2020-03-25 | End: 2020-03-25 | Stop reason: HOSPADM

## 2020-03-25 RX ADMIN — PROPOFOL 50 MG: 10 INJECTION, EMULSION INTRAVENOUS at 09:03

## 2020-03-25 RX ADMIN — GLYCOPYRROLATE 0.1 MG: 0.2 INJECTION, SOLUTION INTRAMUSCULAR; INTRAVENOUS at 09:03

## 2020-03-25 RX ADMIN — LIDOCAINE HYDROCHLORIDE 40 MG: 20 INJECTION, SOLUTION INTRAVENOUS at 09:03

## 2020-03-25 RX ADMIN — SODIUM CHLORIDE: 0.9 INJECTION, SOLUTION INTRAVENOUS at 09:03

## 2020-03-25 RX ADMIN — PROPOFOL 250 MG: 10 INJECTION, EMULSION INTRAVENOUS at 09:03

## 2020-03-25 RX ADMIN — PROPOFOL 200 MG: 10 INJECTION, EMULSION INTRAVENOUS at 09:03

## 2020-03-25 RX ADMIN — FENTANYL CITRATE 100 MCG: 50 INJECTION, SOLUTION INTRAMUSCULAR; INTRAVENOUS at 09:03

## 2020-03-25 RX ADMIN — ONDANSETRON 8 MG: 2 INJECTION, SOLUTION INTRAMUSCULAR; INTRAVENOUS at 09:03

## 2020-03-25 NOTE — TELEPHONE ENCOUNTER
----- Message from Giuliana Juarez Jhon sent at 3/25/2020  2:27 PM CDT -----  Contact:  [phone]/self   Type:  Sooner Appointment Request     Caller is requesting a sooner appointment.  Caller declined first available appointment listed below.  Caller will not accept being placed on the waitlist and is requesting a message be sent to doctor.  Name of Caller: pt  When is the first available appointment? 04-03-20  Symptoms or Reason: Moderate grade 3 hiatal hernia   Would the patient rather a call back or a response via [facility]? Call back  Best Call Back Number: [phone]  Additional Information: Pt had endoscopy today with Dr. Tillman and was told he needed additional surgery      3/25/20  4:36pm  Spoke to patient regarding above message. Patient scheduled to see Dr. John on Thur 3/26/20 @ 10:00am. Patient verbalized understanding.

## 2020-03-25 NOTE — ANESTHESIA POSTPROCEDURE EVALUATION
Anesthesia Post Evaluation    Patient: Solo Black    Procedure(s) Performed: Procedure(s) (LRB):  EGD (ESOPHAGOGASTRODUODENOSCOPY) (N/A)    Final Anesthesia Type: MAC    Patient location during evaluation: GI PACU  Patient participation: Yes- Able to Participate  Level of consciousness: awake and alert and oriented  Post-procedure vital signs: reviewed and stable  Pain management: adequate  Airway patency: patent    PONV status at discharge: No PONV  Anesthetic complications: no      Cardiovascular status: blood pressure returned to baseline, hemodynamically stable and stable  Respiratory status: unassisted, spontaneous ventilation and room air  Hydration status: euvolemic  Follow-up not needed.          Vitals Value Taken Time   /84 3/25/2020  8:37 AM   Temp 36.6 °C (97.8 °F) 3/25/2020  8:37 AM   Pulse 88 3/25/2020  8:37 AM   Resp 18 3/25/2020  8:37 AM   SpO2 95% 3/25/2020  8:37 AM         No case tracking events are documented in the log.      Pain/Marcia Score: No data recorded

## 2020-03-25 NOTE — [employer] PATIENT INSTRUCTIONS
Discharge Summary/Instructions after an Endoscopic Procedure  Patient Name: Solo Black  Patient MRN: [id number]  Patient YOB: 1980 Wednesday, March 25, 2020  Ruslan Tillman MD  RESTRICTIONS:  During your procedure today, you received medications for sedation.  These   medications may affect your judgment, balance and coordination.  Therefore,   for 24 hours, you have the following restrictions:   - DO NOT drive a car, operate machinery, make legal/financial decisions,   sign important papers or drink alcohol.    ACTIVITY:  Today: no heavy lifting, straining or running due to procedural   sedation/anesthesia.  The following day: return to full activity including work.  DIET:  Eat and drink normally unless instructed otherwise.     TREATMENT FOR COMMON SIDE EFFECTS:  - Mild abdominal pain, nausea, belching, bloating or excessive gas:  rest,   eat lightly and use a heating pad.  - Sore Throat: treat with throat lozenges and/or gargle with warm salt   water.  - Because air was used during the procedure, expelling large amounts of air   from your rectum or belching is normal.  - If a bowel prep was taken, you may not have a bowel movement for 1-3 days.    This is normal.  SYMPTOMS TO WATCH FOR AND REPORT TO YOUR PHYSICIAN:  1. Abdominal pain or bloating, other than gas cramps.  2. Chest pain.  3. Back pain.  4. Signs of infection such as: chills or fever occurring within 24 hours   after the procedure.  5. Rectal bleeding, which would show as bright red, maroon, or black stools.   (A tablespoon of blood from the rectum is not serious, especially if   hemorrhoids are present.)  6. Vomiting.  7. Weakness or dizziness.  GO DIRECTLY TO THE NEAREST EMERGENCY ROOM IF YOU HAVE ANY OF THE FOLLOWING:      Difficulty breathing              Chills and/or fever over 101 F   Persistent vomiting and/or vomiting blood   Severe abdominal pain   Severe chest pain   Black, tarry stools   Bleeding- more than one  tablespoon   Any other symptom or condition that you feel may need urgent attention  Your doctor recommends these additional instructions:  If any biopsies were taken, your doctors clinic will contact you in 1 to 2   weeks with any results.  - Discharge patient to home.   - Resume previous diet.   - Continue present medications.   - Await pathology results.   - Return to GI clinic ([facility]) as previously scheduled.  For questions, problems or results please call your physician - Ruslan Tillman MD at Work:  [phone].  EMERGENCY PHONE NUMBER: [phone],  LAB RESULTS: [phone]  IF A COMPLICATION OR EMERGENCY SITUATION ARISES AND YOU ARE UNABLE TO REACH   YOUR PHYSICIAN - GO DIRECTLY TO THE EMERGENCY ROOM.  Ruslan Tillman MD  3/25/2020 9:50:24 AM  This report has been verified and signed electronically.  [employer]

## 2020-03-25 NOTE — OR NURSING
Procedure had to be interrupted due to extreme agitation by pt while trying to sedate. IV needle pulled out. Replaced by CRNA. Pt thrashing and cursing and unable to settle down. After a few minutes, pt calmed complained of dry heaves and nausea. Sedation administered again.

## 2020-03-25 NOTE — TRANSFER OF CARE
Anesthesia Transfer of Care Note    Patient: Solo Black    Procedure(s) Performed: Procedure(s) (LRB):  EGD (ESOPHAGOGASTRODUODENOSCOPY) (N/A)    Patient location: GI    Anesthesia Type: MAC    Transport from OR: Transported from OR on room air with adequate spontaneous ventilation    Post pain: adequate analgesia    Post assessment: no apparent anesthetic complications and tolerated procedure well    Post vital signs: stable    Level of consciousness: awake and alert    Nausea/Vomiting: no nausea/vomiting    Complications: none    Transfer of care protocol was followed      Last vitals:   Visit Vitals  /84 (Patient Position: Lying)   Pulse 88   Temp 36.6 °C (97.8 °F) (Temporal)   Resp 18   Ht 6' (1.829 m)   Wt 94.8 kg (209 lb)   SpO2 95%   BMI 28.35 kg/m²

## 2020-03-26 ENCOUNTER — TELEPHONE (OUTPATIENT)
Dept: SURGERY | Facility: CLINIC | Age: 40
End: 2020-03-26

## 2020-03-26 ENCOUNTER — OFFICE VISIT (OUTPATIENT)
Dept: SURGERY | Facility: CLINIC | Age: 40
End: 2020-03-26
Payer: MEDICAID

## 2020-03-26 VITALS
TEMPERATURE: 99 F | DIASTOLIC BLOOD PRESSURE: 87 MMHG | BODY MASS INDEX: 29.32 KG/M2 | SYSTOLIC BLOOD PRESSURE: 129 MMHG | HEIGHT: 72 IN | WEIGHT: 216.5 LBS | HEART RATE: 86 BPM

## 2020-03-26 DIAGNOSIS — K44.9 HIATAL HERNIA: Primary | ICD-10-CM

## 2020-03-26 LAB
FINAL PATHOLOGIC DIAGNOSIS: NORMAL
GROSS: NORMAL

## 2020-03-26 PROCEDURE — 99213 OFFICE O/P EST LOW 20 MIN: CPT | Mod: PBBFAC,PO | Performed by: STUDENT IN AN ORGANIZED HEALTH CARE EDUCATION/TRAINING PROGRAM

## 2020-03-26 PROCEDURE — 99204 OFFICE O/P NEW MOD 45 MIN: CPT | Mod: S$PBB,,, | Performed by: STUDENT IN AN ORGANIZED HEALTH CARE EDUCATION/TRAINING PROGRAM

## 2020-03-26 PROCEDURE — 99999 PR PBB SHADOW E&M-EST. PATIENT-LVL III: ICD-10-PCS | Mod: PBBFAC,,, | Performed by: STUDENT IN AN ORGANIZED HEALTH CARE EDUCATION/TRAINING PROGRAM

## 2020-03-26 PROCEDURE — 99999 PR PBB SHADOW E&M-EST. PATIENT-LVL III: CPT | Mod: PBBFAC,,, | Performed by: STUDENT IN AN ORGANIZED HEALTH CARE EDUCATION/TRAINING PROGRAM

## 2020-03-26 PROCEDURE — 99204 PR OFFICE/OUTPT VISIT, NEW, LEVL IV, 45-59 MIN: ICD-10-PCS | Mod: S$PBB,,, | Performed by: STUDENT IN AN ORGANIZED HEALTH CARE EDUCATION/TRAINING PROGRAM

## 2020-03-26 NOTE — LETTER
March 27, 2020      Ruslan Tillman MD  [address]  [facility] [address]           [facility]  [address]  [facility] [address]  Phone: [phone]          Patient: Solo Black   MR Number: [id number]   YOB: 1980   Date of Visit: 3/26/2020       Dear Dr. Ruslan Tillman:    Thank you for referring Solo Black to me for evaluation. Attached you will find relevant portions of my assessment and plan of care.    If you have questions, please do not hesitate to call me. I look forward to following Solo Black along with you.    Sincerely,    Carlos John MD    Enclosure  CC:  No Recipients    If you would like to receive this communication electronically, please contact [email or web address] or [phone] to request more information on [employer] access.    For providers and/or their staff who would like to refer a patient to [facility], please contact us through our one-stop-shop provider referral line, [facility], at [phone].    If you feel you have received this communication in error or would no longer like to receive these types of communications, please e-mail [email or web address]

## 2020-03-26 NOTE — TELEPHONE ENCOUNTER
"----- Message from Eduard Ayoub sent at 3/26/2020 11:54 AM CDT -----  Contact: [phone]/self  Patient calling to speak with the nurse about his symptoms getting worse and he states he's vomiting blood.    Please call back to assist today at [phone]      3/26/20  12:59pm  Spoke to patient regarding above message. I advised patient to report to nearest emergency room. Patient stated "if it gets worse, I'll go". Verbalized understanding.  "

## 2020-03-27 PROBLEM — K44.9 HIATAL HERNIA: Status: ACTIVE | Noted: 2020-03-27

## 2020-03-27 NOTE — PROGRESS NOTES
Patient ID: Solo Black is a 39 y.o. male.    Chief Complaint: No chief complaint on file.      HPI:  39M with epigastric pain that has been severe for about two weeks. He has had this pain off and on for years but never this bad. Diagnosed with eosinophilic esophagitis years ago, known GERD, multiple EGDs most recently last week. EGD showed camerons ulcer and moderate HH that appeared stable. Hx of occasional bloody vomitus including last week. Colonoscopy unremarkable. Hx of MRSA infections in foot but no recent abx. Takes protonix for his GERD and recently increased dosage with no benefit. Pain is burning, squeezing pain, worse after eating or drinking, worse if he strains or lays flat. Feels like it comes up to his throat some times.       Review of Systems   Constitutional: Negative for chills, diaphoresis and fever.   HENT: Negative for trouble swallowing.    Respiratory: Negative for cough, shortness of breath, wheezing and stridor.    Cardiovascular: Negative for chest pain and palpitations.   Gastrointestinal: Positive for abdominal pain, blood in stool, nausea and vomiting. Negative for abdominal distention and diarrhea.   Endocrine: Negative for cold intolerance and heat intolerance.   Genitourinary: Negative for difficulty urinating.   Musculoskeletal: Negative for back pain.   Skin: Negative for rash.   Allergic/Immunologic: Negative for immunocompromised state.   Neurological: Negative for dizziness, syncope and numbness.   Hematological: Negative for adenopathy.   Psychiatric/Behavioral: Negative for agitation.       Current Outpatient Medications   Medication Sig Dispense Refill    dextroamphetamine-amphetamine (ADDERALL) 20 mg tablet Take 1 tablet by mouth 2 (two) times daily before meals. Take before breakfast and lunch      famotidine (PEPCID) 20 MG tablet Take 20 mg by mouth 2 (two) times daily.      fluconazole (DIFLUCAN) 200 MG Tab Take 2 tablets (400 mg total) by mouth once daily for  "1 day, THEN 1 tablet (200 mg total) once daily for 13 days. 15 tablet 0    loratadine (CLARITIN) 10 mg tablet Take 10 mg by mouth every morning.      MULTIVIT-IRON-MIN-FOLIC ACID 3,500-18-0.4 UNIT-MG-MG ORAL CHEW Take 10 mg by mouth once daily.      pantoprazole (PROTONIX) 40 MG tablet Take 1 tablet (40 mg total) by mouth 2 (two) times daily. 180 tablet 3    paroxetine (PAXIL) 20 MG tablet Take 20 mg by mouth every morning.      predniSONE (DELTASONE) 20 MG tablet Take 20 mg by mouth 2 (two) times daily.      promethazine (PHENERGAN) 12.5 MG Tab Take 25 mg by mouth every 6 (six) hours as needed (nausea/vomiting).      sucralfate (CARAFATE) 1 gram tablet Take 1 g by mouth 4 (four) times daily before meals and nightly.       No current facility-administered medications for this visit.        Review of patient's allergies indicates:   Allergen Reactions    Legumes Nausea And Vomiting and Swelling     Other reaction(s): GI Intolerance  vomiting  vomiting  Pt reports legumes make his lips swell and induce severe vomiting  Pt reports legumes make his lips swell and induce severe vomiting      Nsaids (non-steroidal anti-inflammatory drug)      GI bleed    Bean pod extract      Lips swelling, vomiting  Lips swelling, vomiting      Ketamine      Severe dysphoria (bad trip)    Lentils      Lips swelling, vomiting  Lips swelling, vomiting      Meloxicam Nausea Only     GI bleed    Peas      Lips swelling, vomiting    Penicillins      Has taken cephalosporins without issue    Haloperidol Anxiety and Other (See Comments)     "feels like crawling out of his skin"         Past Medical History:   Diagnosis Date    C. difficile colitis feb 2014    postop of hand surgery    Eosinophilic esophagitis 3/11/2014    GERD (gastroesophageal reflux disease)     IBS (irritable bowel syndrome)     MRSA carrier     says multiple times nose swab was +    Nephrolithiasis apr 2014    bilateral punctate - never passed a stone "    Urinary tract infection feb 2014    MRSA       Past Surgical History:   Procedure Laterality Date    APPENDECTOMY      ARTHROSCOPY OF SHOULDER WITH REMOVAL OF DISTAL CLAVICLE Right 11/1/2018    Procedure: ARTHROSCOPY, SHOULDER, WITH DISTAL CLAVICLE EXCISION;  Surgeon: Gabino Mejia MD;  Location: [facility];  Service: Orthopedics;  Laterality: Right;  video    BACK SURGERY      CIRCUMCISION, PRIMARY      COLONOSCOPY N/A 11/14/2018    Procedure: COLONOSCOPY;  Surgeon: Milton Brunson MD;  Location: [facility];  Service: Endoscopy;  Laterality: N/A;    drainage of abscess from hand  2009    MRSA    drainage of abscess from R thigh  2006    MRSA    ESOPHAGOGASTRODUODENOSCOPY  3/11/2014    ESOPHAGOGASTRODUODENOSCOPY N/A 9/5/2018    Procedure: EGD (ESOPHAGOGASTRODUODENOSCOPY);  Surgeon: Kolby Wall MD;  Location: [facility];  Service: Endoscopy;  Laterality: N/A;    ESOPHAGOGASTRODUODENOSCOPY N/A 3/25/2020    Procedure: EGD (ESOPHAGOGASTRODUODENOSCOPY);  Surgeon: Ruslan Tillman MD;  Location: [facility];  Service: Endoscopy;  Laterality: N/A;    FLEXIBLE SIGMOIDOSCOPY N/A 9/5/2018    Procedure: SIGMOIDOSCOPY, FLEXIBLE;  Surgeon: Kolby Wall MD;  Location: [facility];  Service: Endoscopy;  Laterality: N/A;    HAND SURGERY  jan 2014    power saw fell on hand - laceration 3 tendons       Family History   Problem Relation Age of Onset    Urolithiasis Father     Celiac disease Sister     Hypertension Maternal Grandmother     Hypertension Maternal Grandfather        Social History     Socioeconomic History    Marital status: Single     Spouse name: Not on file    Number of children: 2    Years of education: Not on file    Highest education level: Not on file   Occupational History    Occupation:  electric forklifts     Employer: [employer]   Social Needs    Financial resource strain: Not on file    Food insecurity:     Worry: Not on file     Inability: Not on  file    Transportation needs:     Medical: Not on file     Non-medical: Not on file   Tobacco Use    Smoking status: Never Smoker    Smokeless tobacco: Never Used    Tobacco comment: Pt states he has smoked 1 or 2 cigarettes in his life.   Substance and Sexual Activity    Alcohol use: Not Currently    Drug use: No    Sexual activity: Yes     Partners: Female   Lifestyle    Physical activity:     Days per week: Not on file     Minutes per session: Not on file    Stress: Not on file   Relationships    Social connections:     Talks on phone: Not on file     Gets together: Not on file     Attends Restorationist service: Not on file     Active member of club or organization: Not on file     Attends meetings of clubs or organizations: Not on file     Relationship status: Not on file   Other Topics Concern    Not on file   Social History Narrative    Not on file       Vitals:    03/26/20 0953   BP: 129/87   Pulse: 86   Temp: 99.1 °F (37.3 °C)       Physical Exam   Constitutional: He is oriented to person, place, and time. He appears well-nourished. No distress.   HENT:   Head: Normocephalic and atraumatic.   Eyes: No scleral icterus.   Cardiovascular: Normal rate.   Pulmonary/Chest: Effort normal. No stridor. No respiratory distress.   Abdominal: Soft. He exhibits no distension. There is no tenderness.   Lymphadenopathy:     He has no cervical adenopathy.   Neurological: He is alert and oriented to person, place, and time.   Skin: Skin is warm. No erythema.   Psychiatric: He has a normal mood and affect. His behavior is normal.     CT abdomen two years ago showed possible small HH  EGD with biopsies, no sign of h pylori  HH low going back for two years.     Assessment & Plan:   39M with GERD, moderate hiatal hernia, eosinophilic esophagitis now with worsening symptoms  Needs work up. EGD done. Will schedule upper GI contrast study to assess esophagus, anatomy, reflux, gastric empyting  May need manometry. Will be  complicated to get done at this time with COVID  Will call after uppger GI study

## 2020-03-30 ENCOUNTER — TELEPHONE (OUTPATIENT)
Dept: SURGERY | Facility: CLINIC | Age: 40
End: 2020-03-30

## 2020-03-30 ENCOUNTER — HOSPITAL ENCOUNTER (OUTPATIENT)
Dept: RADIOLOGY | Facility: HOSPITAL | Age: 40
Discharge: HOME | End: 2020-03-30
Attending: STUDENT IN AN ORGANIZED HEALTH CARE EDUCATION/TRAINING PROGRAM
Payer: MEDICAID

## 2020-03-30 DIAGNOSIS — K44.9 HIATAL HERNIA: ICD-10-CM

## 2020-03-30 NOTE — TELEPHONE ENCOUNTER
----- Message from Gini Rey sent at 3/30/2020  9:01 AM CDT -----  Contact: [phone]  Type:  Needs Medical Advice    Who Called: Solo  Would the patient rather a call back or a response via [facility]? Call back  Best Call Back Number:  [phone]  Additional Information: requested a call back ASAP due to staff just made him leave the hospital as he was not able to to take the test so needs to speak with you about his surgery

## 2020-03-31 ENCOUNTER — PATIENT MESSAGE (OUTPATIENT)
Dept: SURGERY | Facility: CLINIC | Age: 40
End: 2020-03-31

## 2020-04-01 ENCOUNTER — TELEPHONE (OUTPATIENT)
Dept: SURGERY | Facility: CLINIC | Age: 40
End: 2020-04-01

## 2020-04-01 NOTE — TELEPHONE ENCOUNTER
----- Message from Mary Hussein sent at 4/1/2020 10:37 AM CDT -----  Contact: Self [phone]  Patient would like to speak with you about the email he sent and he states its urgent. Please advise        04/01/2020     1537  Contacted patient regarding the above message. Patient requests that Dr. John read and address his concerns in an e-mail that he sent a few days ago. Assured patient that his e-mail was sent to the doctor. Also let patient know because the doctors are working in the hospital it may take a little longer for some messages to be answered. Patient asked that Dr. John please respond ASAP, and verbalized understanding.

## 2020-04-02 ENCOUNTER — HOSPITAL ENCOUNTER (OUTPATIENT)
Facility: HOSPITAL | Age: 40
Discharge: HOME | End: 2020-04-05
Attending: EMERGENCY MEDICINE | Admitting: STUDENT IN AN ORGANIZED HEALTH CARE EDUCATION/TRAINING PROGRAM
Payer: MEDICAID

## 2020-04-02 DIAGNOSIS — L03.115 CELLULITIS OF RIGHT LEG: Primary | ICD-10-CM

## 2020-04-02 DIAGNOSIS — R52 INTRACTABLE PAIN: ICD-10-CM

## 2020-04-02 DIAGNOSIS — M79.604 PAIN OF RIGHT LOWER EXTREMITY: ICD-10-CM

## 2020-04-02 DIAGNOSIS — L03.90 CELLULITIS: ICD-10-CM

## 2020-04-02 DIAGNOSIS — L03.115 CELLULITIS OF RIGHT LOWER EXTREMITY: ICD-10-CM

## 2020-04-02 PROCEDURE — 85025 COMPLETE CBC W/AUTO DIFF WBC: CPT

## 2020-04-02 PROCEDURE — 80053 COMPREHEN METABOLIC PANEL: CPT

## 2020-04-02 PROCEDURE — 96365 THER/PROPH/DIAG IV INF INIT: CPT

## 2020-04-02 PROCEDURE — 83605 ASSAY OF LACTIC ACID: CPT

## 2020-04-02 PROCEDURE — 96374 THER/PROPH/DIAG INJ IV PUSH: CPT | Mod: 59

## 2020-04-02 PROCEDURE — 83615 LACTATE (LD) (LDH) ENZYME: CPT

## 2020-04-02 PROCEDURE — 99285 EMERGENCY DEPT VISIT HI MDM: CPT | Mod: 25

## 2020-04-02 PROCEDURE — 86140 C-REACTIVE PROTEIN: CPT

## 2020-04-02 PROCEDURE — 96361 HYDRATE IV INFUSION ADD-ON: CPT

## 2020-04-02 PROCEDURE — 96376 TX/PRO/DX INJ SAME DRUG ADON: CPT

## 2020-04-02 PROCEDURE — 82550 ASSAY OF CK (CPK): CPT

## 2020-04-02 PROCEDURE — 85652 RBC SED RATE AUTOMATED: CPT

## 2020-04-02 PROCEDURE — 86901 BLOOD TYPING SEROLOGIC RH(D): CPT

## 2020-04-02 PROCEDURE — 96375 TX/PRO/DX INJ NEW DRUG ADDON: CPT

## 2020-04-02 PROCEDURE — 85730 THROMBOPLASTIN TIME PARTIAL: CPT

## 2020-04-02 PROCEDURE — 85610 PROTHROMBIN TIME: CPT

## 2020-04-02 RX ORDER — MORPHINE SULFATE 2 MG/ML
6 INJECTION, SOLUTION INTRAMUSCULAR; INTRAVENOUS
Status: COMPLETED | OUTPATIENT
Start: 2020-04-02 | End: 2020-04-03

## 2020-04-03 ENCOUNTER — TELEPHONE (OUTPATIENT)
Dept: CARDIOLOGY | Facility: CLINIC | Age: 40
End: 2020-04-03

## 2020-04-03 ENCOUNTER — ANESTHESIA EVENT (OUTPATIENT)
Dept: MEDSURG UNIT | Facility: HOSPITAL | Age: 40
End: 2020-04-03

## 2020-04-03 ENCOUNTER — ANESTHESIA (OUTPATIENT)
Dept: MEDSURG UNIT | Facility: HOSPITAL | Age: 40
End: 2020-04-03

## 2020-04-03 LAB
ABO + RH BLD: NORMAL
ALBUMIN SERPL BCP-MCNC: 4.2 G/DL (ref 3.5–5.2)
ALBUMIN SERPL BCP-MCNC: 4.2 G/DL (ref 3.5–5.2)
ALP SERPL-CCNC: 65 U/L (ref 55–135)
ALP SERPL-CCNC: 65 U/L (ref 55–135)
ALT SERPL W/O P-5'-P-CCNC: 31 U/L (ref 10–44)
ALT SERPL W/O P-5'-P-CCNC: 31 U/L (ref 10–44)
ANION GAP SERPL CALC-SCNC: 14 MMOL/L (ref 8–16)
ANION GAP SERPL CALC-SCNC: 15 MMOL/L (ref 8–16)
ANION GAP SERPL CALC-SCNC: 15 MMOL/L (ref 8–16)
ANISOCYTOSIS BLD QL SMEAR: SLIGHT
APTT BLDCRRT: 27.9 SEC (ref 21–32)
AST SERPL-CCNC: 40 U/L (ref 10–40)
AST SERPL-CCNC: 40 U/L (ref 10–40)
BASOPHILS # BLD AUTO: 0.05 K/UL (ref 0–0.2)
BASOPHILS NFR BLD: 0.5 % (ref 0–1.9)
BASOPHILS NFR BLD: 0.5 % (ref 0–1.9)
BASOPHILS NFR BLD: 0.6 % (ref 0–1.9)
BILIRUB SERPL-MCNC: 1.3 MG/DL (ref 0.1–1)
BILIRUB SERPL-MCNC: 1.3 MG/DL (ref 0.1–1)
BLD GP AB SCN CELLS X3 SERPL QL: NORMAL
BUN SERPL-MCNC: 11 MG/DL (ref 6–20)
BUN SERPL-MCNC: 17 MG/DL (ref 6–20)
BUN SERPL-MCNC: 17 MG/DL (ref 6–20)
BURR CELLS BLD QL SMEAR: ABNORMAL
CALCIUM SERPL-MCNC: 8 MG/DL (ref 8.7–10.5)
CALCIUM SERPL-MCNC: 9.5 MG/DL (ref 8.7–10.5)
CALCIUM SERPL-MCNC: 9.5 MG/DL (ref 8.7–10.5)
CHLORIDE SERPL-SCNC: 102 MMOL/L (ref 95–110)
CHLORIDE SERPL-SCNC: 102 MMOL/L (ref 95–110)
CHLORIDE SERPL-SCNC: 105 MMOL/L (ref 95–110)
CK SERPL-CCNC: 649 U/L (ref 20–200)
CO2 SERPL-SCNC: 17 MMOL/L (ref 23–29)
CO2 SERPL-SCNC: 20 MMOL/L (ref 23–29)
CO2 SERPL-SCNC: 20 MMOL/L (ref 23–29)
CREAT SERPL-MCNC: 1 MG/DL (ref 0.5–1.4)
CREAT SERPL-MCNC: 1.1 MG/DL (ref 0.5–1.4)
CRP SERPL-MCNC: 15.5 MG/L (ref 0–8.2)
DIFFERENTIAL METHOD: ABNORMAL
EOSINOPHIL # BLD AUTO: 0.4 K/UL (ref 0–0.5)
EOSINOPHIL NFR BLD: 3.3 % (ref 0–8)
EOSINOPHIL NFR BLD: 3.3 % (ref 0–8)
EOSINOPHIL NFR BLD: 4.7 % (ref 0–8)
ERYTHROCYTE [DISTWIDTH] IN BLOOD BY AUTOMATED COUNT: 19.1 % (ref 11.5–14.5)
ERYTHROCYTE [DISTWIDTH] IN BLOOD BY AUTOMATED COUNT: 19.1 % (ref 11.5–14.5)
ERYTHROCYTE [DISTWIDTH] IN BLOOD BY AUTOMATED COUNT: 19.5 % (ref 11.5–14.5)
ERYTHROCYTE [SEDIMENTATION RATE] IN BLOOD BY WESTERGREN METHOD: 11 MM/HR (ref 0–10)
EST. GFR  (AFRICAN AMERICAN): >60 ML/MIN/1.73 M^2
EST. GFR  (NON AFRICAN AMERICAN): >60 ML/MIN/1.73 M^2
GIANT PLATELETS BLD QL SMEAR: PRESENT
GLUCOSE SERPL-MCNC: 92 MG/DL (ref 70–110)
HCT VFR BLD AUTO: 29.4 % (ref 40–54)
HCT VFR BLD AUTO: 30.4 % (ref 40–54)
HCT VFR BLD AUTO: 30.4 % (ref 40–54)
HGB BLD-MCNC: 8.6 G/DL (ref 14–18)
HGB BLD-MCNC: 8.9 G/DL (ref 14–18)
HGB BLD-MCNC: 8.9 G/DL (ref 14–18)
HYPOCHROMIA BLD QL SMEAR: ABNORMAL
IMM GRANULOCYTES # BLD AUTO: 0.02 K/UL (ref 0–0.04)
IMM GRANULOCYTES # BLD AUTO: 0.03 K/UL (ref 0–0.04)
IMM GRANULOCYTES # BLD AUTO: 0.03 K/UL (ref 0–0.04)
IMM GRANULOCYTES NFR BLD AUTO: 0.3 % (ref 0–0.5)
INR PPP: 1 (ref 0.8–1.2)
LACTATE SERPL-SCNC: 1 MMOL/L (ref 0.5–2.2)
LDH SERPL L TO P-CCNC: 399 U/L (ref 110–260)
LYMPHOCYTES # BLD AUTO: 0.7 K/UL (ref 1–4.8)
LYMPHOCYTES NFR BLD: 6.8 % (ref 18–48)
LYMPHOCYTES NFR BLD: 6.8 % (ref 18–48)
LYMPHOCYTES NFR BLD: 8.4 % (ref 18–48)
MCH RBC QN AUTO: 20.2 PG (ref 27–31)
MCH RBC QN AUTO: 20.4 PG (ref 27–31)
MCH RBC QN AUTO: 20.4 PG (ref 27–31)
MCHC RBC AUTO-ENTMCNC: 29.3 G/DL (ref 32–36)
MCV RBC AUTO: 69 FL (ref 82–98)
MCV RBC AUTO: 70 FL (ref 82–98)
MCV RBC AUTO: 70 FL (ref 82–98)
MONOCYTES # BLD AUTO: 1 K/UL (ref 0.3–1)
MONOCYTES # BLD AUTO: 1 K/UL (ref 0.3–1)
MONOCYTES # BLD AUTO: 1.2 K/UL (ref 0.3–1)
MONOCYTES NFR BLD: 14.7 % (ref 4–15)
MONOCYTES NFR BLD: 8.9 % (ref 4–15)
MONOCYTES NFR BLD: 8.9 % (ref 4–15)
NEUTROPHILS # BLD AUTO: 5.6 K/UL (ref 1.8–7.7)
NEUTROPHILS # BLD AUTO: 8.6 K/UL (ref 1.8–7.7)
NEUTROPHILS # BLD AUTO: 8.6 K/UL (ref 1.8–7.7)
NEUTROPHILS NFR BLD: 71.3 % (ref 38–73)
NEUTROPHILS NFR BLD: 80.2 % (ref 38–73)
NEUTROPHILS NFR BLD: 80.2 % (ref 38–73)
NRBC BLD-RTO: 0 /100 WBC
PATH REV BLD -IMP: NORMAL
PLATELET # BLD AUTO: 210 K/UL (ref 150–350)
PLATELET # BLD AUTO: 284 K/UL (ref 150–350)
PLATELET # BLD AUTO: 284 K/UL (ref 150–350)
PLATELET BLD QL SMEAR: ABNORMAL
PMV BLD AUTO: 10.2 FL (ref 9.2–12.9)
PMV BLD AUTO: 10.2 FL (ref 9.2–12.9)
PMV BLD AUTO: 11.2 FL (ref 9.2–12.9)
POIKILOCYTOSIS BLD QL SMEAR: SLIGHT
POLYCHROMASIA BLD QL SMEAR: ABNORMAL
POTASSIUM SERPL-SCNC: 3.5 MMOL/L (ref 3.5–5.1)
PROT SERPL-MCNC: 7.6 G/DL (ref 6–8.4)
PROT SERPL-MCNC: 7.6 G/DL (ref 6–8.4)
PROTHROMBIN TIME: 11 SEC (ref 9–12.5)
RBC # BLD AUTO: 4.25 M/UL (ref 4.6–6.2)
RBC # BLD AUTO: 4.36 M/UL (ref 4.6–6.2)
RBC # BLD AUTO: 4.36 M/UL (ref 4.6–6.2)
SAMPLE: NORMAL
SCHISTOCYTES BLD QL SMEAR: ABNORMAL
SODIUM SERPL-SCNC: 136 MMOL/L (ref 136–145)
SODIUM SERPL-SCNC: 137 MMOL/L (ref 136–145)
SODIUM SERPL-SCNC: 137 MMOL/L (ref 136–145)
WBC # BLD AUTO: 10.74 K/UL (ref 3.9–12.7)
WBC # BLD AUTO: 10.74 K/UL (ref 3.9–12.7)
WBC # BLD AUTO: 7.9 K/UL (ref 3.9–12.7)

## 2020-04-03 PROCEDURE — 96374 THER/PROPH/DIAG INJ IV PUSH: CPT | Mod: 59

## 2020-04-03 PROCEDURE — G0378 HOSPITAL OBSERVATION PER HR: HCPCS

## 2020-04-03 PROCEDURE — 85025 COMPLETE CBC W/AUTO DIFF WBC: CPT

## 2020-04-03 PROCEDURE — 63600175 PHARM REV CODE 636 W HCPCS: Performed by: EMERGENCY MEDICINE

## 2020-04-03 PROCEDURE — 96376 TX/PRO/DX INJ SAME DRUG ADON: CPT | Performed by: EMERGENCY MEDICINE

## 2020-04-03 PROCEDURE — 96375 TX/PRO/DX INJ NEW DRUG ADDON: CPT | Mod: 59 | Performed by: EMERGENCY MEDICINE

## 2020-04-03 PROCEDURE — S0077 INJECTION, CLINDAMYCIN PHOSP: HCPCS | Performed by: EMERGENCY MEDICINE

## 2020-04-03 PROCEDURE — 94761 N-INVAS EAR/PLS OXIMETRY MLT: CPT

## 2020-04-03 PROCEDURE — S0030 INJECTION, METRONIDAZOLE: HCPCS | Performed by: EMERGENCY MEDICINE

## 2020-04-03 PROCEDURE — 85060 PATHOLOGIST REVIEW: ICD-10-PCS | Mod: ,,, | Performed by: PATHOLOGY

## 2020-04-03 PROCEDURE — 63600175 PHARM REV CODE 636 W HCPCS: Performed by: STUDENT IN AN ORGANIZED HEALTH CARE EDUCATION/TRAINING PROGRAM

## 2020-04-03 PROCEDURE — 36415 COLL VENOUS BLD VENIPUNCTURE: CPT

## 2020-04-03 PROCEDURE — 99214 OFFICE O/P EST MOD 30 MIN: CPT | Mod: ,,, | Performed by: STUDENT IN AN ORGANIZED HEALTH CARE EDUCATION/TRAINING PROGRAM

## 2020-04-03 PROCEDURE — 80048 BASIC METABOLIC PNL TOTAL CA: CPT

## 2020-04-03 PROCEDURE — 85060 BLOOD SMEAR INTERPRETATION: CPT | Mod: ,,, | Performed by: PATHOLOGY

## 2020-04-03 PROCEDURE — 25500020 PHARM REV CODE 255: Performed by: EMERGENCY MEDICINE

## 2020-04-03 PROCEDURE — 96365 THER/PROPH/DIAG IV INF INIT: CPT

## 2020-04-03 PROCEDURE — 99214 PR OFFICE/OUTPT VISIT, EST, LEVL IV, 30-39 MIN: ICD-10-PCS | Mod: ,,, | Performed by: STUDENT IN AN ORGANIZED HEALTH CARE EDUCATION/TRAINING PROGRAM

## 2020-04-03 PROCEDURE — 25000003 PHARM REV CODE 250: Performed by: EMERGENCY MEDICINE

## 2020-04-03 PROCEDURE — 96374 THER/PROPH/DIAG INJ IV PUSH: CPT | Performed by: EMERGENCY MEDICINE

## 2020-04-03 PROCEDURE — 25000003 PHARM REV CODE 250: Performed by: STUDENT IN AN ORGANIZED HEALTH CARE EDUCATION/TRAINING PROGRAM

## 2020-04-03 PROCEDURE — S0077 INJECTION, CLINDAMYCIN PHOSP: HCPCS | Performed by: STUDENT IN AN ORGANIZED HEALTH CARE EDUCATION/TRAINING PROGRAM

## 2020-04-03 PROCEDURE — 96361 HYDRATE IV INFUSION ADD-ON: CPT | Performed by: EMERGENCY MEDICINE

## 2020-04-03 RX ORDER — HYDROMORPHONE HYDROCHLORIDE 1 MG/ML
1 INJECTION, SOLUTION INTRAMUSCULAR; INTRAVENOUS; SUBCUTANEOUS
Status: COMPLETED | OUTPATIENT
Start: 2020-04-03 | End: 2020-04-03

## 2020-04-03 RX ORDER — MORPHINE SULFATE 4 MG/ML
4 INJECTION, SOLUTION INTRAMUSCULAR; INTRAVENOUS EVERY 4 HOURS PRN
Status: DISCONTINUED | OUTPATIENT
Start: 2020-04-03 | End: 2020-04-05 | Stop reason: HOSPADM

## 2020-04-03 RX ORDER — FENTANYL CITRATE 50 UG/ML
50 INJECTION, SOLUTION INTRAMUSCULAR; INTRAVENOUS
Status: COMPLETED | OUTPATIENT
Start: 2020-04-03 | End: 2020-04-03

## 2020-04-03 RX ORDER — SODIUM CHLORIDE 0.9 % (FLUSH) 0.9 %
10 SYRINGE (ML) INJECTION
Status: DISCONTINUED | OUTPATIENT
Start: 2020-04-03 | End: 2020-04-05 | Stop reason: HOSPADM

## 2020-04-03 RX ORDER — PANTOPRAZOLE SODIUM 40 MG/1
40 TABLET, DELAYED RELEASE ORAL 2 TIMES DAILY
Status: DISCONTINUED | OUTPATIENT
Start: 2020-04-03 | End: 2020-04-05 | Stop reason: HOSPADM

## 2020-04-03 RX ORDER — CLINDAMYCIN PHOSPHATE 900 MG/50ML
900 INJECTION, SOLUTION INTRAVENOUS
Status: DISCONTINUED | OUTPATIENT
Start: 2020-04-03 | End: 2020-04-05 | Stop reason: HOSPADM

## 2020-04-03 RX ORDER — CLINDAMYCIN PHOSPHATE 900 MG/50ML
900 INJECTION, SOLUTION INTRAVENOUS
Status: COMPLETED | OUTPATIENT
Start: 2020-04-03 | End: 2020-04-03

## 2020-04-03 RX ORDER — SODIUM CHLORIDE, SODIUM LACTATE, POTASSIUM CHLORIDE, CALCIUM CHLORIDE 600; 310; 30; 20 MG/100ML; MG/100ML; MG/100ML; MG/100ML
INJECTION, SOLUTION INTRAVENOUS CONTINUOUS
Status: DISCONTINUED | OUTPATIENT
Start: 2020-04-03 | End: 2020-04-05 | Stop reason: HOSPADM

## 2020-04-03 RX ORDER — KETOROLAC TROMETHAMINE 30 MG/ML
15 INJECTION, SOLUTION INTRAMUSCULAR; INTRAVENOUS
Status: ACTIVE | OUTPATIENT
Start: 2020-04-03 | End: 2020-04-03

## 2020-04-03 RX ORDER — HEPARIN SODIUM,PORCINE/D5W 25000/250
18 INTRAVENOUS SOLUTION INTRAVENOUS CONTINUOUS
Status: DISCONTINUED | OUTPATIENT
Start: 2020-04-03 | End: 2020-04-03

## 2020-04-03 RX ORDER — METRONIDAZOLE 500 MG/100ML
500 INJECTION, SOLUTION INTRAVENOUS
Status: COMPLETED | OUTPATIENT
Start: 2020-04-03 | End: 2020-04-03

## 2020-04-03 RX ORDER — ONDANSETRON 2 MG/ML
4 INJECTION INTRAMUSCULAR; INTRAVENOUS
Status: COMPLETED | OUTPATIENT
Start: 2020-04-03 | End: 2020-04-03

## 2020-04-03 RX ORDER — PAROXETINE HYDROCHLORIDE 20 MG/1
20 TABLET, FILM COATED ORAL EVERY MORNING
Status: DISCONTINUED | OUTPATIENT
Start: 2020-04-03 | End: 2020-04-05 | Stop reason: HOSPADM

## 2020-04-03 RX ORDER — ACETAMINOPHEN 325 MG/1
650 TABLET ORAL EVERY 8 HOURS PRN
Status: DISCONTINUED | OUTPATIENT
Start: 2020-04-03 | End: 2020-04-05 | Stop reason: HOSPADM

## 2020-04-03 RX ORDER — HYDROCODONE BITARTRATE AND ACETAMINOPHEN 5; 325 MG/1; MG/1
2 TABLET ORAL EVERY 4 HOURS PRN
Status: DISCONTINUED | OUTPATIENT
Start: 2020-04-03 | End: 2020-04-05 | Stop reason: HOSPADM

## 2020-04-03 RX ORDER — VANCOMYCIN HCL IN 5 % DEXTROSE 1G/250ML
1000 PLASTIC BAG, INJECTION (ML) INTRAVENOUS
Status: DISCONTINUED | OUTPATIENT
Start: 2020-04-03 | End: 2020-04-03

## 2020-04-03 RX ORDER — TALC
6 POWDER (GRAM) TOPICAL NIGHTLY PRN
Status: DISCONTINUED | OUTPATIENT
Start: 2020-04-03 | End: 2020-04-05 | Stop reason: HOSPADM

## 2020-04-03 RX ORDER — FAMOTIDINE 20 MG/1
20 TABLET, FILM COATED ORAL 2 TIMES DAILY
Status: DISCONTINUED | OUTPATIENT
Start: 2020-04-03 | End: 2020-04-05 | Stop reason: HOSPADM

## 2020-04-03 RX ORDER — ONDANSETRON 8 MG/1
8 TABLET, ORALLY DISINTEGRATING ORAL EVERY 8 HOURS PRN
Status: DISCONTINUED | OUTPATIENT
Start: 2020-04-03 | End: 2020-04-05 | Stop reason: HOSPADM

## 2020-04-03 RX ADMIN — HYDROCODONE BITARTRATE AND ACETAMINOPHEN 2 TABLET: 5; 325 TABLET ORAL at 04:04

## 2020-04-03 RX ADMIN — LORAZEPAM 1 MG: 2 INJECTION INTRAMUSCULAR; INTRAVENOUS at 02:04

## 2020-04-03 RX ADMIN — PANTOPRAZOLE SODIUM 40 MG: 40 TABLET, DELAYED RELEASE ORAL at 08:04

## 2020-04-03 RX ADMIN — SODIUM CHLORIDE 1000 ML: 0.9 INJECTION, SOLUTION INTRAVENOUS at 12:04

## 2020-04-03 RX ADMIN — SODIUM CHLORIDE, SODIUM LACTATE, POTASSIUM CHLORIDE, AND CALCIUM CHLORIDE: .6; .31; .03; .02 INJECTION, SOLUTION INTRAVENOUS at 11:04

## 2020-04-03 RX ADMIN — FAMOTIDINE 20 MG: 20 TABLET ORAL at 09:04

## 2020-04-03 RX ADMIN — ONDANSETRON 4 MG: 2 INJECTION INTRAMUSCULAR; INTRAVENOUS at 02:04

## 2020-04-03 RX ADMIN — HYDROCODONE BITARTRATE AND ACETAMINOPHEN 2 TABLET: 5; 325 TABLET ORAL at 09:04

## 2020-04-03 RX ADMIN — HYDROMORPHONE HYDROCHLORIDE 1 MG: 1 INJECTION, SOLUTION INTRAMUSCULAR; INTRAVENOUS; SUBCUTANEOUS at 01:04

## 2020-04-03 RX ADMIN — MORPHINE SULFATE 4 MG: 4 INJECTION INTRAVENOUS at 12:04

## 2020-04-03 RX ADMIN — MORPHINE SULFATE 4 MG: 4 INJECTION INTRAVENOUS at 05:04

## 2020-04-03 RX ADMIN — FENTANYL CITRATE 50 MCG: 50 INJECTION INTRAMUSCULAR; INTRAVENOUS at 12:04

## 2020-04-03 RX ADMIN — PANTOPRAZOLE SODIUM 40 MG: 40 TABLET, DELAYED RELEASE ORAL at 09:04

## 2020-04-03 RX ADMIN — VANCOMYCIN HYDROCHLORIDE 2250 MG: 100 INJECTION, POWDER, LYOPHILIZED, FOR SOLUTION INTRAVENOUS at 12:04

## 2020-04-03 RX ADMIN — CLINDAMYCIN IN 5 PERCENT DEXTROSE 900 MG: 18 INJECTION, SOLUTION INTRAVENOUS at 06:04

## 2020-04-03 RX ADMIN — CLINDAMYCIN IN 5 PERCENT DEXTROSE 900 MG: 18 INJECTION, SOLUTION INTRAVENOUS at 03:04

## 2020-04-03 RX ADMIN — IOHEXOL 100 ML: 350 INJECTION, SOLUTION INTRAVENOUS at 01:04

## 2020-04-03 RX ADMIN — SODIUM CHLORIDE, SODIUM LACTATE, POTASSIUM CHLORIDE, AND CALCIUM CHLORIDE: .6; .31; .03; .02 INJECTION, SOLUTION INTRAVENOUS at 03:04

## 2020-04-03 RX ADMIN — FAMOTIDINE 20 MG: 20 TABLET ORAL at 08:04

## 2020-04-03 RX ADMIN — MORPHINE SULFATE 4 MG: 4 INJECTION INTRAVENOUS at 08:04

## 2020-04-03 RX ADMIN — CLINDAMYCIN IN 5 PERCENT DEXTROSE 900 MG: 18 INJECTION, SOLUTION INTRAVENOUS at 09:04

## 2020-04-03 RX ADMIN — HYDROMORPHONE HYDROCHLORIDE 1 MG: 1 INJECTION, SOLUTION INTRAMUSCULAR; INTRAVENOUS; SUBCUTANEOUS at 02:04

## 2020-04-03 RX ADMIN — VANCOMYCIN HYDROCHLORIDE 2250 MG: 100 INJECTION, POWDER, LYOPHILIZED, FOR SOLUTION INTRAVENOUS at 04:04

## 2020-04-03 RX ADMIN — MORPHINE SULFATE 6 MG: 2 INJECTION, SOLUTION INTRAMUSCULAR; INTRAVENOUS at 12:04

## 2020-04-03 RX ADMIN — MORPHINE SULFATE 4 MG: 4 INJECTION INTRAVENOUS at 09:04

## 2020-04-03 RX ADMIN — METRONIDAZOLE 500 MG: 500 INJECTION, SOLUTION INTRAVENOUS at 02:04

## 2020-04-03 RX ADMIN — HYDROCODONE BITARTRATE AND ACETAMINOPHEN 2 TABLET: 5; 325 TABLET ORAL at 02:04

## 2020-04-03 RX ADMIN — PAROXETINE HYDROCHLORIDE 20 MG: 20 TABLET, FILM COATED ORAL at 08:04

## 2020-04-03 NOTE — CARE UPDATE
Evaluated patient by bedside for acute onset right leg pain.    38 yo male h/o LLE MRSA infection, eosinophilic esophagitis  Presented with acute onset right leg pain, concerning for acute limb ischemia.     Examined at the bedside and noted to have palpable popliteal and DP pulses (PT could not be palpated d/t TTP in the area). Swelling localised to ankle and feet, not involving toe digits. Reports erythema that started in the afternoon, and has progressively worsened since, dieter in the last 2 hours, to involve the RLE up to the knee.    CTA RLE with patent CFA, FA, PA. Infiltration noted suggestive of cellulitis.    Reports had some ?fungal build up that he had scraped 2 days ago.    He is being admitted to Dr John's service for RLE cellulitis.

## 2020-04-03 NOTE — PLAN OF CARE
VN requested by patient. Patient c/o RLE pain 8/10. Patient requests IV morphine. ANICETO Johnson notified.

## 2020-04-03 NOTE — H&P
Patient ID: Solo Black is a 39 y.o. male.    Chief Complaint: Leg Swelling (39y M ambulatory to ED with c/o swelling, pain, redness to RLE without injury. )      HPI:  39M with RLE pain, swelling, redness that just started this afternoon. Hx of left foot MRSA infections in the past but never on the left. Denies fevers. No drainage, no injury. Was painting a door today and noticed the pain, gradually worsened, now severe. Thinks swelling and redness is moving up his inner leg.       Review of Systems   Constitutional: Negative for chills, diaphoresis and fever.   HENT: Negative for trouble swallowing.    Respiratory: Negative for cough, shortness of breath, wheezing and stridor.    Cardiovascular: Negative for chest pain and palpitations.   Gastrointestinal: Negative for abdominal distention, abdominal pain, blood in stool, diarrhea, nausea and vomiting.   Endocrine: Negative for cold intolerance and heat intolerance.   Genitourinary: Negative for difficulty urinating.   Musculoskeletal: Negative for back pain.   Skin: Positive for rash. Negative for wound.   Allergic/Immunologic: Negative for immunocompromised state.   Neurological: Negative for dizziness, syncope and numbness.   Hematological: Negative for adenopathy.   Psychiatric/Behavioral: Negative for agitation.       Current Facility-Administered Medications   Medication Dose Route Frequency Provider Last Rate Last Dose    clindamycin 900 MG/50 ML D5W 900 mg/50 mL IVPB 900 mg  900 mg Intravenous ED 1 Time Loretta Arteaga MD        heparin 25,000 units in dextrose 5% (100 units/ml) IV bolus from bag - ADDITIONAL PRN BOLUS - 30 units/kg  30 Units/kg (Adjusted) Intravenous PRN Loretta Arteaga MD        heparin 25,000 units in dextrose 5% (100 units/ml) IV bolus from bag - ADDITIONAL PRN BOLUS - 60 units/kg  60 Units/kg (Adjusted) Intravenous PRN Loretta Arteaga MD        heparin 25,000 units in dextrose 5% (100 units/ml) IV bolus from bag  INITIAL BOLUS  80 Units/kg (Adjusted) Intravenous Once Loretta Arteaga MD        heparin 25,000 units in dextrose 5% 250 mL (100 units/mL) infusion HIGH INTENSITY nomogram - [facility]  18 Units/kg/hr (Adjusted) Intravenous Continuous Loretta Arteaga MD        ketorolac injection 15 mg  15 mg Intravenous ED 1 Time Loretta Arteaga MD        lorazepam (ATIVAN) injection 1 mg  1 mg Intravenous ED 1 Time Loretta Arteaga MD        metronidazole IVPB 500 mg  500 mg Intravenous ED 1 Time Loretta Arteaga  mL/hr at 04/03/20 0228 500 mg at 04/03/20 0228    vancomycin (VANCOCIN) 2,250 mg in dextrose 5 % 500 mL IVPB  2,250 mg Intravenous ED 1 Time Loretta Arteaga MD         Current Outpatient Medications   Medication Sig Dispense Refill    dextroamphetamine-amphetamine (ADDERALL) 20 mg tablet Take 1 tablet by mouth 2 (two) times daily before meals. Take before breakfast and lunch      famotidine (PEPCID) 20 MG tablet Take 20 mg by mouth 2 (two) times daily.      fluconazole (DIFLUCAN) 200 MG Tab Take 2 tablets (400 mg total) by mouth once daily for 1 day, THEN 1 tablet (200 mg total) once daily for 13 days. 15 tablet 0    loratadine (CLARITIN) 10 mg tablet Take 10 mg by mouth every morning.      MULTIVIT-IRON-MIN-FOLIC ACID 3,500-18-0.4 UNIT-MG-MG ORAL CHEW Take 10 mg by mouth once daily.      pantoprazole (PROTONIX) 40 MG tablet Take 1 tablet (40 mg total) by mouth 2 (two) times daily. 180 tablet 3    paroxetine (PAXIL) 20 MG tablet Take 20 mg by mouth every morning.      predniSONE (DELTASONE) 20 MG tablet Take 20 mg by mouth 2 (two) times daily.      promethazine (PHENERGAN) 12.5 MG Tab Take 25 mg by mouth every 6 (six) hours as needed (nausea/vomiting).      sucralfate (CARAFATE) 1 gram tablet Take 1 g by mouth 4 (four) times daily before meals and nightly.         Review of patient's allergies indicates:   Allergen Reactions    Legumes Nausea And Vomiting and Swelling     Other reaction(s):  "GI Intolerance  vomiting  vomiting  Pt reports legumes make his lips swell and induce severe vomiting  Pt reports legumes make his lips swell and induce severe vomiting      Nsaids (non-steroidal anti-inflammatory drug)      GI bleed    Bean pod extract      Lips swelling, vomiting  Lips swelling, vomiting      Ketamine      Severe dysphoria (bad trip)    Lentils      Lips swelling, vomiting  Lips swelling, vomiting      Meloxicam Nausea Only     GI bleed    Peas      Lips swelling, vomiting    Penicillins      Has taken cephalosporins without issue    Haloperidol Anxiety and Other (See Comments)     "feels like crawling out of his skin"         Past Medical History:   Diagnosis Date    C. difficile colitis feb 2014    postop of hand surgery    Eosinophilic esophagitis 3/11/2014    GERD (gastroesophageal reflux disease)     IBS (irritable bowel syndrome)     MRSA carrier     says multiple times nose swab was +    Nephrolithiasis apr 2014    bilateral punctate - never passed a stone    Urinary tract infection feb 2014    MRSA       Past Surgical History:   Procedure Laterality Date    APPENDECTOMY      ARTHROSCOPY OF SHOULDER WITH REMOVAL OF DISTAL CLAVICLE Right 11/1/2018    Procedure: ARTHROSCOPY, SHOULDER, WITH DISTAL CLAVICLE EXCISION;  Surgeon: Gabino Mejia MD;  Location: [facility];  Service: Orthopedics;  Laterality: Right;  video    BACK SURGERY      CIRCUMCISION, PRIMARY      COLONOSCOPY N/A 11/14/2018    Procedure: COLONOSCOPY;  Surgeon: Milton Brunson MD;  Location: [facility];  Service: Endoscopy;  Laterality: N/A;    drainage of abscess from hand  2009    MRSA    drainage of abscess from R thigh  2006    MRSA    ESOPHAGOGASTRODUODENOSCOPY  3/11/2014    ESOPHAGOGASTRODUODENOSCOPY N/A 9/5/2018    Procedure: EGD (ESOPHAGOGASTRODUODENOSCOPY);  Surgeon: Kolby Wall MD;  Location: [facility] ENDO;  Service: Endoscopy;  Laterality: N/A;    ESOPHAGOGASTRODUODENOSCOPY N/A 3/25/2020    " Procedure: EGD (ESOPHAGOGASTRODUODENOSCOPY);  Surgeon: Ruslan Tillman MD;  Location: [facility] ENDO;  Service: Endoscopy;  Laterality: N/A;    FLEXIBLE SIGMOIDOSCOPY N/A 9/5/2018    Procedure: SIGMOIDOSCOPY, FLEXIBLE;  Surgeon: Kolby Wall MD;  Location: [facility] ENDO;  Service: Endoscopy;  Laterality: N/A;    HAND SURGERY  jan 2014    power saw fell on hand - laceration 3 tendons       Family History   Problem Relation Age of Onset    Urolithiasis Father     Celiac disease Sister     Hypertension Maternal Grandmother     Hypertension Maternal Grandfather        Social History     Socioeconomic History    Marital status: Single     Spouse name: Not on file    Number of children: 2    Years of education: Not on file    Highest education level: Not on file   Occupational History    Occupation:  electric forklifts     Employer: [employer]   Social Needs    Financial resource strain: Not on file    Food insecurity:     Worry: Not on file     Inability: Not on file    Transportation needs:     Medical: Not on file     Non-medical: Not on file   Tobacco Use    Smoking status: Never Smoker    Smokeless tobacco: Never Used    Tobacco comment: Pt states he has smoked 1 or 2 cigarettes in his life.   Substance and Sexual Activity    Alcohol use: Not Currently    Drug use: No    Sexual activity: Yes     Partners: Female   Lifestyle    Physical activity:     Days per week: Not on file     Minutes per session: Not on file    Stress: Not on file   Relationships    Social connections:     Talks on phone: Not on file     Gets together: Not on file     Attends [employer] service: Not on file     Active member of club or organization: Not on file     Attends meetings of clubs or organizations: Not on file     Relationship status: Not on file   Other Topics Concern    Not on file   Social History Narrative    Not on file       Vitals:    04/03/20 0201   BP: (!) 138/98   Pulse: (!) 117    Resp:    Temp:        Physical Exam   Constitutional: He is oriented to person, place, and time. He appears well-nourished. No distress.   HENT:   Head: Normocephalic and atraumatic.   Eyes: No scleral icterus.   Cardiovascular: Normal rate.   Pulmonary/Chest: Effort normal. No stridor. No respiratory distress.   Abdominal: Soft. There is no tenderness.   Lymphadenopathy:     He has no cervical adenopathy.   Neurological: He is alert and oriented to person, place, and time.   Skin: Skin is warm. There is erythema.   Faint erythema around right ankle to mid lower leg, TTP, some edema around ankle. No drainage, no wound.    Psychiatric: He has a normal mood and affect. His behavior is normal.     WBC 10  Hg 8.9  INR 1.0  Na 137  Cr 1.1  CTA RLE shows normal vasculature, no air. Some cellulitis lower leg, no abscess    Assessment & Plan:   39M with RLE cellulitis  Got rocephin, vanc, clinda in ER  Clears ok  Pain control  Will eval response to abx, pain improvement. Hopefully home soon.

## 2020-04-03 NOTE — ED NOTES
Pt to ED from home with c/o swelling, pain, redness to RLE without injury. Patient states he has been isolating himself at home for the past 3 weeks. Pt states he was home painting a door when he felt some calf pain around 2pm. He went rest and then noticed the swelling with extreme pain tonight and came into the ER to be evaluated.

## 2020-04-03 NOTE — TELEPHONE ENCOUNTER
----- Message from Ana Catalan MD sent at 4/3/2020  8:49 AM CDT -----  Please schedule for a FUP in 3 weeks with me (he was admitted overnight) telemedicine.

## 2020-04-03 NOTE — ED NOTES
Pt reports red streaking up into lower thigh area. Dr. Arteaga notified, at bedside for evaluation

## 2020-04-03 NOTE — PROGRESS NOTES
TN has been trying to reach pt via rm phone but it has been busy for the last several hours. TN originally called pt at 12:45p, but MD was with pt. (TN attempted to call at: 1:36pm, 1:48pm, 1:54pm, 2:08pm, 2:29pm, 2:43pm, 3:35pm).     TN attempted to reach pt on his cell phone [phone], voice mail left for pt to call TN back. TN to follow up

## 2020-04-03 NOTE — PROGRESS NOTES
"Pharmacokinetic Initial Assessment: IV Vancomycin    Assessment/Plan:    Initiate intravenous vancomycin with loading dose of 2250 mg once    Please contact pharmacy at extension [phone] with any questions regarding this assessment.     Thank you for the consult,   Allen Kamara       Patient brief summary:  Solo Black is a 39 y.o. male initiated on antimicrobial therapy with IV Vancomycin for treatment of suspected lower respiratory infection    Drug Allergies:   Review of patient's allergies indicates:   Allergen Reactions    Legumes Nausea And Vomiting and Swelling     Other reaction(s): GI Intolerance  vomiting  vomiting  Pt reports legumes make his lips swell and induce severe vomiting  Pt reports legumes make his lips swell and induce severe vomiting      Nsaids (non-steroidal anti-inflammatory drug)      GI bleed    Bean pod extract      Lips swelling, vomiting  Lips swelling, vomiting      Ketamine      Severe dysphoria (bad trip)    Lentils      Lips swelling, vomiting  Lips swelling, vomiting      Meloxicam Nausea Only     GI bleed    Peas      Lips swelling, vomiting    Penicillins      Has taken cephalosporins without issue    Haloperidol Anxiety and Other (See Comments)     "feels like crawling out of his skin"         Actual Body Weight:   90    Renal Function:   Estimated Creatinine Clearance: 100.9 mL/min (based on SCr of 1.1 mg/dL).,     Dialysis Method (if applicable):  N/A    CBC (last 72 hours):  Recent Labs   Lab Result Units 04/02/20  2344   WBC K/uL 10.74  10.74   Hemoglobin g/dL 8.9*  8.9*   Hematocrit % 30.4*  30.4*   Platelets K/uL 284  284   Gran% % 80.2*  80.2*   Lymph% % 6.8*  6.8*   Mono% % 8.9  8.9   Eosinophil% % 3.3  3.3   Basophil% % 0.5  0.5   Differential Method  Automated  Automated       Metabolic Panel (last 72 hours):  Recent Labs   Lab Result Units 04/02/20  2344   Sodium mmol/L 137  137   Potassium mmol/L 3.5  3.5   Chloride mmol/L 102  102   CO2 mmol/L " 20*  20*   Glucose mg/dL 92  92   BUN, Bld mg/dL 17  17   Creatinine mg/dL 1.1  1.1   Albumin g/dL 4.2  4.2   Total Bilirubin mg/dL 1.3*  1.3*   Alkaline Phosphatase U/L 65  65   AST U/L 40  40   ALT U/L 31  31       Drug levels (last 3 results):  No results for input(s): VANCOMYCINRA, VANCOMYCINPE, VANCOMYCINTR in the last 72 hours.    Microbiologic Results:  Microbiology Results (last 7 days)       ** No results found for the last 168 hours. **

## 2020-04-03 NOTE — PLAN OF CARE
Pt requested to speak to VN. vn cued in to pt's room. Pt c/o pain 9/10 and requesting prn morphine. Pt also requesting pillow, washcloths, hand towels, and wanting to stand up to use urinal when nurse comes to admin med. Bedside nurse, tiffany, notified.

## 2020-04-03 NOTE — PROGRESS NOTES
[facility]  General Surgery  Progress Note    Subjective:     Interval History:   RLE cellulitis stable, mildly improved but still painful  No drainage  Afebrile  Lost IV access    Post-Op Info:  * No surgery found *          Medications:  Continuous Infusions:   lactated ringers 100 mL/hr at 04/03/20 0311     Scheduled Meds:   clindamycin 900 MG/50 ML D5W  900 mg Intravenous Q8H    famotidine  20 mg Oral BID    ketorolac  15 mg Intravenous ED 1 Time    pantoprazole  40 mg Oral BID    paroxetine  20 mg Oral QAM     PRN Meds:acetaminophen, HYDROcodone-acetaminophen, melatonin, morphine, ondansetron, sodium chloride 0.9%, Pharmacy to dose Vancomycin consult **AND** vancomycin - pharmacy to dose     Objective:     Vital Signs (Most Recent):  Temp: (!) 101.8 °F (38.8 °C) (04/03/20 0423)  Pulse: (!) 113 (04/03/20 0423)  Resp: 17 (04/03/20 0423)  BP: 122/64 (04/03/20 0423)  SpO2: (P) 100 % (04/03/20 1000) Vital Signs (24h Range):  Temp:  [98.5 °F (36.9 °C)-101.8 °F (38.8 °C)] 101.8 °F (38.8 °C)  Pulse:  [107-117] 113  Resp:  [17-21] 17  SpO2:  [87 %-100 %] (P) 100 %  BP: (122-141)/(64-98) 122/64       Intake/Output Summary (Last 24 hours) at 4/3/2020 1039  Last data filed at 4/3/2020 0800  Gross per 24 hour   Intake --   Output 600 ml   Net -600 ml       Physical Exam   Constitutional: He appears well-nourished.   Cardiovascular: Normal rate.   Pulmonary/Chest: Effort normal.   Abdominal: Soft.   Neurological: He is alert.   Skin:   RLE erythema to mid calf, mildly improved.  Edema somewhat better with elevation       Significant Labs:  CBC:   Recent Labs   Lab 04/02/20  2344   WBC 10.74  10.74   RBC 4.36*  4.36*   HGB 8.9*  8.9*   HCT 30.4*  30.4*     284   MCV 70*  70*   MCH 20.4*  20.4*   MCHC 29.3*  29.3*     CMP:   Recent Labs   Lab 04/02/20  2344   GLU 92  92   CALCIUM 9.5  9.5   ALBUMIN 4.2  4.2   PROT 7.6  7.6     137   K 3.5  3.5   CO2 20*  20*     102    BUN 17  17   CREATININE 1.1  1.1   ALKPHOS 65  65   ALT 31  31   AST 40  40   BILITOT 1.3*  1.3*       Significant Diagnostics:  I have reviewed all pertinent imaging results/findings within the past 24 hours.    Assessment/Plan:     Active Diagnoses:    Diagnosis Date Noted POA    Cellulitis [L03.90] 04/12/2019 Yes      Problems Resolved During this Admission:     39M with RLE cellulitis  Vanc, clinda  Reg diet  Pain control          Carlos John MD  General Surgery  [facility]

## 2020-04-03 NOTE — ED PROVIDER NOTES
"Encounter Date: 4/2/2020    SCRIBE #1 NOTE: I, Liam Jalloh , am scribing for, and in the presence of,  . I have scribed the entire note.       History     Chief Complaint   Patient presents with    Leg Swelling     39y M ambulatory to ED with c/o swelling, pain, redness to RLE without injury.    This patient was evaluated in the Emergency Department during the coronavirus pandemic.     The  of Health and Human Services and Chaim Hobbs, Governor of the [facility], have declared a State of Public Health Emergency due to the spread of a novel coronavirus and disease (COVID-19).  There is no currently accepted treatment except conservative measures and respiratory support if appropriate.  This has lead to significant resource capacity and potential delays in care.    Solo Black is a 39 y.o. male who  has a past medical history of C. difficile colitis (feb 2014), Eosinophilic esophagitis (3/11/2014), GERD (gastroesophageal reflux disease), IBS (irritable bowel syndrome), MRSA carrier, Nephrolithiasis (apr 2014), and Urinary tract infection (feb 2014).    The patient presents to the ED due to Rt leg swelling. Patient felt his leg started swelling around 2pm today. By 5pm, he explained that it was too much pain. Patient reports that he feels numb from his Rt ankle down and can not walk on it. He describes the pain as a 10/10 and states that it feels as though pressure is building up and "going to explode".  Patient admits to not twisting, hitting, or doing anything that could cause his symptoms. He has no other associated symptoms. Denies fever, chills and IV Drug Abuse at this time.             The history is provided by the patient.     Review of patient's allergies indicates:   Allergen Reactions    Legumes Nausea And Vomiting and Swelling     Other reaction(s): GI Intolerance  vomiting  vomiting  Pt reports legumes make his lips swell and induce severe vomiting  Pt reports " PC, told him we had paged Dr. Giuseppe Robb a couple times last week but do not believe he connected with Dr. Bishop Lozano yet re med adjustments. Earline Montiel said that's ok, not urgent. He mentioned it might be easier to call Dr. Gary Rosas, general cardiologist and have her ask Dr. Giuseppe Robb as they are in the same practice. .. Told him will call back when we have more info as Dr. Bishop Lozano is out of the office today. "legumes make his lips swell and induce severe vomiting      Nsaids (non-steroidal anti-inflammatory drug)      GI bleed    Bean pod extract      Lips swelling, vomiting  Lips swelling, vomiting      Ketamine      Severe dysphoria (bad trip)    Lentils      Lips swelling, vomiting  Lips swelling, vomiting      Meloxicam Nausea Only     GI bleed    Peas      Lips swelling, vomiting    Penicillins      Has taken cephalosporins without issue    Haloperidol Anxiety and Other (See Comments)     "feels like crawling out of his skin"       Past Medical History:   Diagnosis Date    C. difficile colitis feb 2014    postop of hand surgery    Eosinophilic esophagitis 3/11/2014    GERD (gastroesophageal reflux disease)     IBS (irritable bowel syndrome)     MRSA carrier     says multiple times nose swab was +    Nephrolithiasis apr 2014    bilateral punctate - never passed a stone    Urinary tract infection feb 2014    MRSA     Past Surgical History:   Procedure Laterality Date    APPENDECTOMY      ARTHROSCOPY OF SHOULDER WITH REMOVAL OF DISTAL CLAVICLE Right 11/1/2018    Procedure: ARTHROSCOPY, SHOULDER, WITH DISTAL CLAVICLE EXCISION;  Surgeon: Gabino Mejia MD;  Location: [facility];  Service: Orthopedics;  Laterality: Right;  video    BACK SURGERY      CIRCUMCISION, PRIMARY      COLONOSCOPY N/A 11/14/2018    Procedure: COLONOSCOPY;  Surgeon: Milton Brunson MD;  Location: [facility];  Service: Endoscopy;  Laterality: N/A;    drainage of abscess from hand  2009    MRSA    drainage of abscess from R thigh  2006    MRSA    ESOPHAGOGASTRODUODENOSCOPY  3/11/2014    ESOPHAGOGASTRODUODENOSCOPY N/A 9/5/2018    Procedure: EGD (ESOPHAGOGASTRODUODENOSCOPY);  Surgeon: Kolby Wall MD;  Location: [facility];  Service: Endoscopy;  Laterality: N/A;    ESOPHAGOGASTRODUODENOSCOPY N/A 3/25/2020    Procedure: EGD (ESOPHAGOGASTRODUODENOSCOPY);  Surgeon: Ruslan Tillman MD;  Location: [facility];  Service: " Endoscopy;  Laterality: N/A;    FLEXIBLE SIGMOIDOSCOPY N/A 9/5/2018    Procedure: SIGMOIDOSCOPY, FLEXIBLE;  Surgeon: Kolby Wall MD;  Location: [facility];  Service: Endoscopy;  Laterality: N/A;    HAND SURGERY  jan 2014    power saw fell on hand - laceration 3 tendons     Family History   Problem Relation Age of Onset    Urolithiasis Father     Celiac disease Sister     Hypertension Maternal Grandmother     Hypertension Maternal Grandfather      Social History     Tobacco Use    Smoking status: Never Smoker    Smokeless tobacco: Never Used    Tobacco comment: Pt states he has smoked 1 or 2 cigarettes in his life.   Substance Use Topics    Alcohol use: Not Currently    Drug use: No     Review of Systems   Constitutional: Negative for chills and fever.   Cardiovascular: Positive for leg swelling.   Skin: Positive for color change.   Neurological: Positive for numbness (Rt ankle/foot).   All other systems reviewed and are negative.      Physical Exam     Initial Vitals [04/02/20 2258]   BP Pulse Resp Temp SpO2   (!) 135/94 (!) 114 20 99.1 °F (37.3 °C) 96 %      MAP       --         Physical Exam    Constitutional: He appears well-developed and well-nourished. He appears distressed ( distress from pain).   HENT:   Head: Normocephalic and atraumatic.   Eyes: Pupils are equal, round, and reactive to light.   Neck: Normal range of motion.   Cardiovascular: Normal rate, regular rhythm and normal heart sounds.   Pulmonary/Chest: Breath sounds normal. No respiratory distress. He has no wheezes.   Abdominal: Soft.   Musculoskeletal:   Left lower extremity normal    Right lower extremity:  Right foot, cool, cold, diminished pulses obtained via ultrasound, significant pain to palpation delayed cap refill 3 sec    Right tib-fib erythema mild warmth to the anterior surface, tense calf, pain to palpation   Neurological: He is alert and oriented to person, place, and time. GCS score is 15. GCS eye subscore is 4. GCS  verbal subscore is 5. GCS motor subscore is 6.   Skin: Skin is warm and dry.   Psychiatric: He has a normal mood and affect. Thought content normal.         ED Course   Procedures  Labs Reviewed   CBC W/ AUTO DIFFERENTIAL - Abnormal; Notable for the following components:       Result Value    RBC 4.36 (*)     Hemoglobin 8.9 (*)     Hematocrit 30.4 (*)     Mean Corpuscular Volume 70 (*)     Mean Corpuscular Hemoglobin 20.4 (*)     Mean Corpuscular Hemoglobin Conc 29.3 (*)     RDW 19.1 (*)     Gran # (ANC) 8.6 (*)     Lymph # 0.7 (*)     Gran% 80.2 (*)     Lymph% 6.8 (*)     All other components within normal limits   COMPREHENSIVE METABOLIC PANEL - Abnormal; Notable for the following components:    CO2 20 (*)     Total Bilirubin 1.3 (*)     All other components within normal limits   CBC W/ AUTO DIFFERENTIAL - Abnormal; Notable for the following components:    RBC 4.36 (*)     Hemoglobin 8.9 (*)     Hematocrit 30.4 (*)     Mean Corpuscular Volume 70 (*)     Mean Corpuscular Hemoglobin 20.4 (*)     Mean Corpuscular Hemoglobin Conc 29.3 (*)     RDW 19.1 (*)     Gran # (ANC) 8.6 (*)     Lymph # 0.7 (*)     Gran% 80.2 (*)     Lymph% 6.8 (*)     All other components within normal limits   COMPREHENSIVE METABOLIC PANEL - Abnormal; Notable for the following components:    CO2 20 (*)     Total Bilirubin 1.3 (*)     All other components within normal limits   CK - Abnormal; Notable for the following components:     (*)     All other components within normal limits   C-REACTIVE PROTEIN - Abnormal; Notable for the following components:    CRP 15.5 (*)     All other components within normal limits   LACTATE DEHYDROGENASE - Abnormal; Notable for the following components:     (*)     All other components within normal limits   SEDIMENTATION RATE - Abnormal; Notable for the following components:    Sed Rate 11 (*)     All other components within normal limits   APTT   PROTIME-INR   LACTIC ACID, PLASMA   C-REACTIVE  PROTEIN   CK   LACTATE DEHYDROGENASE   SEDIMENTATION RATE   BASIC METABOLIC PANEL   CBC W/ AUTO DIFFERENTIAL   TYPE & SCREEN   ISTAT CREATININE          Imaging Results          CTA Lower Extremity (Final result)  Result time 04/03/20 01:45:45    Final result by Laura Hernandez MD (04/03/20 01:45:45)                 Impression:      Limited evaluation of the lower leg secondary to beam hardening artifact and suboptimal IV bolus.  Infiltration in the subcutaneous fat, which suggest cellulitis.  No air in the subcutaneous fat of the fascial planes.      Electronically signed by: Laura Hernandez  Date:    04/03/2020  Time:    01:45             Narrative:    EXAMINATION:  CTA OF THE RIGHT LOWER EXTREMITY    CLINICAL HISTORY:  Lower leg erythema, swelling, cellulitis suspected;acute thrombus;    TECHNIQUE:  1.25 mm axial images were obtained through the arteries of the right lower extremity.  Coronal and sagittal reformatted images were provided.  100 cc Omnipaque was injected.  Axial MIPS were provided.    COMPARISON:  None.    FINDINGS:  There is suboptimal timing of the intravenous bolus.  The major arteries in the corresponding veins are similarly opacified.  The common femoral, femoral artery, and popliteal artery are patent.  There is no acute fracture or dislocation.  The bones are normally mineralized.  No gas is seen within the soft tissues or fascial planes.    Subcutaneous edema is seen at the level of the patella and extends caudally to the level of the visualized proximal ankle.  Evaluation below the knee is limited secondary to beam hardening artifact.  The trifurcation appears to be patent.                               X-Ray Foot Complete Right (Final result)  Result time 04/02/20 23:32:23    Final result by Laura Hernandez MD (04/02/20 23:32:23)                 Impression:      No acute bony abnormality detected.      Electronically signed by: Laura Hernandez  Date:    04/02/2020  Time:    23:32              Narrative:    EXAMINATION:  THREE VIEWS OF THE RIGHT FOOT AND RIGHT TIBIA AND FIBULA    CLINICAL HISTORY:  Cellulitis, unspecified    TECHNIQUE:  AP, lateral, and oblique view of the right foot and right tibia and fibula    COMPARISON:  None.    FINDINGS:  Three views of the right foot demonstrate no acute fracture or dislocation.  Two ovoid sclerotic densities are seen within the calcaneus.  The possibility of bone island should be considered.    Two views of the right tibia and fibula demonstrate no acute fracture or dislocation.                               X-Ray Tibia Fibula 2 View Right (Final result)  Result time 04/02/20 23:32:23    Final result by Laura Hernandez MD (04/02/20 23:32:23)                 Impression:      No acute bony abnormality detected.      Electronically signed by: Laura Hernandez  Date:    04/02/2020  Time:    23:32             Narrative:    EXAMINATION:  THREE VIEWS OF THE RIGHT FOOT AND RIGHT TIBIA AND FIBULA    CLINICAL HISTORY:  Cellulitis, unspecified    TECHNIQUE:  AP, lateral, and oblique view of the right foot and right tibia and fibula    COMPARISON:  None.    FINDINGS:  Three views of the right foot demonstrate no acute fracture or dislocation.  Two ovoid sclerotic densities are seen within the calcaneus.  The possibility of bone island should be considered.    Two views of the right tibia and fibula demonstrate no acute fracture or dislocation.                                 Medical Decision Making:   Initial Assessment:   This is a 39 presents the ER for evaluation acute right lower extremity pain.  Happened at 2 progressively worsening, no significant pain.  No known aggravating factors.  Tense pale cool right lower foot, with swelling proximally and some erythema the anterior surface.  Diminished pulses but palpable with ultrasound, delayed cap refill.  Main concern is acute limb ischemia vs acute arterial thrombosis vs acute DVT,vs  necrotizing fasciitis.   No real risk factors for either.  Does have a history of MRSA infection.  Will ensure pain control , stat CT, x-ray, blood work.  Disposition depends on diagnosis of acute limb ischemia versus necrotizing fasciitis.  Clinical Tests:   Lab Tests: Reviewed and Ordered  Radiological Study: Reviewed and Ordered            Scribe Attestation:   Scribe #1: I performed the above scribed service and the documentation accurately describes the services I performed. I attest to the accuracy of the note.            ED Course as of Apr 03 0322   Thu Apr 02, 2020   2342 Discussed with radiology, no obvious signs of free air.  Possible limb ischemia.  Awaiting blood work, stat CT scan.    [SE]   Fri Apr 03, 2020   0003 Discussed with Cardiology, recommended STAT Heparin drip.     [JJ]   0119 CTA Lower Extremity [SE]   0208 Spoke with Surgery discussed my concern with patient.  Will, evaluate patient.    [SE]   0248 Discussed with both surgery, and interventional cardiology bedside, we discussed CT scanning patient's blood work.  Agreed less likely an acute leg ischemia, likely a cellulitis picture versus early necrotizing fasciitis.  Dr. John, accepted patient to his service for IV antibiotics and observation.  Attempting pain control patient.  Awaiting bed.    [SE]      ED Course User Index  [JJ] Liam Shubham  [SE] Loretta Arteaga MD                Clinical Impression:       ICD-10-CM ICD-9-CM   1. Cellulitis of right leg L03.115 682.6   2. Cellulitis L03.90 682.9   3. Cellulitis of right lower extremity L03.115 682.6   4. Pain of right lower extremity M79.604 729.5   5. Intractable pain R52 780.96         Disposition:   Disposition: Admitted  Condition: Fair     ED Disposition Condition    Observation                           Loretta Arteaga MD  04/03/20 0322

## 2020-04-03 NOTE — NURSING
Patient said Dr. John told him if his pain medication is not sufficient, to contact him to increase dose or frequency. I have contacted Dr. John and he does not want to change pain medication dose or frequency at this time. I informed Dr. John that patient lost both of his original IVs and anesthesia had to place 2 more and already one went bad, very hard stick. We have one good working IV. Dr. John is ok with having at least one good IV.

## 2020-04-03 NOTE — PROGRESS NOTES
"Pharmacokinetic Initial Assessment: IV Vancomycin    Assessment/Plan:    Initiate intravenous vancomycin with loading dose of 2250 mg once followed by a maintenance dose of vancomycin 1750mg IV every q12h hours  Desired empiric serum trough concentration is 10 to 15 mcg/mL  Draw vancomycin trough level 30 min prior to fourth dose on 04/05/2020 at approximately 0030   Pharmacy will continue to follow and monitor vancomycin.      Please contact pharmacy at extension [phone] with any questions regarding this assessment.     Thank you for the consult,   Kristine Dudley       Patient brief summary:  Solo Black is a 39 y.o. male initiated on antimicrobial therapy with IV Vancomycin for treatment of suspected skin & soft tissue infection    Drug Allergies:   Review of patient's allergies indicates:   Allergen Reactions    Legumes Nausea And Vomiting and Swelling     Other reaction(s): GI Intolerance  vomiting  vomiting  Pt reports legumes make his lips swell and induce severe vomiting  Pt reports legumes make his lips swell and induce severe vomiting      Nsaids (non-steroidal anti-inflammatory drug)      GI bleed    Bean pod extract      Lips swelling, vomiting  Lips swelling, vomiting      Ketamine      Severe dysphoria (bad trip)    Lentils      Lips swelling, vomiting  Lips swelling, vomiting      Meloxicam Nausea Only     GI bleed    Peas      Lips swelling, vomiting    Penicillins      Has taken cephalosporins without issue    Haloperidol Anxiety and Other (See Comments)     "feels like crawling out of his skin"         Actual Body Weight:   94.1 kg    Renal Function:   Estimated Creatinine Clearance: 109.5 mL/min (based on SCr of 1.1 mg/dL).,     Dialysis Method (if applicable):  N/A    CBC (last 72 hours):  Recent Labs   Lab Result Units 04/02/20  2344   WBC K/uL 10.74  10.74   Hemoglobin g/dL 8.9*  8.9*   Hematocrit % 30.4*  30.4*   Platelets K/uL 284  284   Gran% % 80.2*  80.2*   Lymph% % " 6.8*  6.8*   Mono% % 8.9  8.9   Eosinophil% % 3.3  3.3   Basophil% % 0.5  0.5   Differential Method  Automated  Automated       Metabolic Panel (last 72 hours):  Recent Labs   Lab Result Units 04/02/20  2344   Sodium mmol/L 137  137   Potassium mmol/L 3.5  3.5   Chloride mmol/L 102  102   CO2 mmol/L 20*  20*   Glucose mg/dL 92  92   BUN, Bld mg/dL 17  17   Creatinine mg/dL 1.1  1.1   Albumin g/dL 4.2  4.2   Total Bilirubin mg/dL 1.3*  1.3*   Alkaline Phosphatase U/L 65  65   AST U/L 40  40   ALT U/L 31  31       Drug levels (last 3 results):  No results for input(s): VANCOMYCINRA, VANCOMYCINPE, VANCOMYCINTR in the last 72 hours.    Microbiologic Results:  Microbiology Results (last 7 days)       ** No results found for the last 168 hours. **

## 2020-04-03 NOTE — NURSING
VIRTUAL NURSE:  Cued into patient's room.  Permission received per patient to turn camera to view patient. Bedside nurse in room  Introduced as VN for night shift that will be working with floor nurse and nursing assistant.  Educated patient on VN's role in patient care. Plan of care reviewed with patient. Education per flowsheet.  Opportunity given for questions and questions answered.  Admission assessment questions answered.  Instructed to call for assistance.  Will cont to monitor.    Labs, notes, and orders reviewed.

## 2020-04-04 ENCOUNTER — ANESTHESIA (OUTPATIENT)
Dept: MEDSURG UNIT | Facility: HOSPITAL | Age: 40
End: 2020-04-04
Payer: MEDICAID

## 2020-04-04 ENCOUNTER — ANESTHESIA EVENT (OUTPATIENT)
Dept: MEDSURG UNIT | Facility: HOSPITAL | Age: 40
End: 2020-04-04
Payer: MEDICAID

## 2020-04-04 PROCEDURE — 99224 PR SUBSEQUENT OBSERVATION CARE,LEVEL I: CPT | Mod: ,,, | Performed by: SURGERY

## 2020-04-04 PROCEDURE — 96376 TX/PRO/DX INJ SAME DRUG ADON: CPT | Mod: 59 | Performed by: EMERGENCY MEDICINE

## 2020-04-04 PROCEDURE — 96361 HYDRATE IV INFUSION ADD-ON: CPT | Mod: 59 | Performed by: EMERGENCY MEDICINE

## 2020-04-04 PROCEDURE — 96374 THER/PROPH/DIAG INJ IV PUSH: CPT | Mod: 59 | Performed by: EMERGENCY MEDICINE

## 2020-04-04 PROCEDURE — U0002 COVID-19 LAB TEST NON-CDC: HCPCS

## 2020-04-04 PROCEDURE — 99224 PR SUBSEQUENT OBSERVATION CARE,LEVEL I: ICD-10-PCS | Mod: ,,, | Performed by: SURGERY

## 2020-04-04 PROCEDURE — G0378 HOSPITAL OBSERVATION PER HR: HCPCS

## 2020-04-04 PROCEDURE — 94761 N-INVAS EAR/PLS OXIMETRY MLT: CPT

## 2020-04-04 PROCEDURE — 36000 PLACE NEEDLE IN VEIN: CPT | Performed by: ANESTHESIOLOGY

## 2020-04-04 PROCEDURE — 96375 TX/PRO/DX INJ NEW DRUG ADDON: CPT | Mod: 59 | Performed by: EMERGENCY MEDICINE

## 2020-04-04 PROCEDURE — 25000003 PHARM REV CODE 250: Performed by: SURGERY

## 2020-04-04 PROCEDURE — 63600175 PHARM REV CODE 636 W HCPCS: Performed by: STUDENT IN AN ORGANIZED HEALTH CARE EDUCATION/TRAINING PROGRAM

## 2020-04-04 PROCEDURE — 25000003 PHARM REV CODE 250: Performed by: STUDENT IN AN ORGANIZED HEALTH CARE EDUCATION/TRAINING PROGRAM

## 2020-04-04 PROCEDURE — S0077 INJECTION, CLINDAMYCIN PHOSP: HCPCS | Performed by: STUDENT IN AN ORGANIZED HEALTH CARE EDUCATION/TRAINING PROGRAM

## 2020-04-04 RX ORDER — GUAIFENESIN/DEXTROMETHORPHAN 100-10MG/5
5 SYRUP ORAL EVERY 4 HOURS PRN
Status: DISCONTINUED | OUTPATIENT
Start: 2020-04-04 | End: 2020-04-05 | Stop reason: HOSPADM

## 2020-04-04 RX ADMIN — MORPHINE SULFATE 4 MG: 4 INJECTION INTRAVENOUS at 10:04

## 2020-04-04 RX ADMIN — MORPHINE SULFATE 4 MG: 4 INJECTION INTRAVENOUS at 02:04

## 2020-04-04 RX ADMIN — HYDROCODONE BITARTRATE AND ACETAMINOPHEN 2 TABLET: 5; 325 TABLET ORAL at 08:04

## 2020-04-04 RX ADMIN — HYDROCODONE BITARTRATE AND ACETAMINOPHEN 2 TABLET: 5; 325 TABLET ORAL at 04:04

## 2020-04-04 RX ADMIN — SODIUM CHLORIDE, SODIUM LACTATE, POTASSIUM CHLORIDE, AND CALCIUM CHLORIDE: .6; .31; .03; .02 INJECTION, SOLUTION INTRAVENOUS at 06:04

## 2020-04-04 RX ADMIN — HYDROCODONE BITARTRATE AND ACETAMINOPHEN 2 TABLET: 5; 325 TABLET ORAL at 12:04

## 2020-04-04 RX ADMIN — FAMOTIDINE 20 MG: 20 TABLET ORAL at 08:04

## 2020-04-04 RX ADMIN — VANCOMYCIN HYDROCHLORIDE 1750 MG: 100 INJECTION, POWDER, LYOPHILIZED, FOR SOLUTION INTRAVENOUS at 02:04

## 2020-04-04 RX ADMIN — PAROXETINE HYDROCHLORIDE 20 MG: 20 TABLET, FILM COATED ORAL at 06:04

## 2020-04-04 RX ADMIN — PANTOPRAZOLE SODIUM 40 MG: 40 TABLET, DELAYED RELEASE ORAL at 08:04

## 2020-04-04 RX ADMIN — CLINDAMYCIN IN 5 PERCENT DEXTROSE 900 MG: 18 INJECTION, SOLUTION INTRAVENOUS at 06:04

## 2020-04-04 RX ADMIN — VANCOMYCIN HYDROCHLORIDE 1750 MG: 100 INJECTION, POWDER, LYOPHILIZED, FOR SOLUTION INTRAVENOUS at 12:04

## 2020-04-04 RX ADMIN — MORPHINE SULFATE 4 MG: 4 INJECTION INTRAVENOUS at 06:04

## 2020-04-04 RX ADMIN — CLINDAMYCIN IN 5 PERCENT DEXTROSE 900 MG: 18 INJECTION, SOLUTION INTRAVENOUS at 12:04

## 2020-04-04 RX ADMIN — GUAIFENESIN AND DEXTROMETHORPHAN 5 ML: 100; 10 SYRUP ORAL at 10:04

## 2020-04-04 RX ADMIN — GUAIFENESIN AND DEXTROMETHORPHAN 5 ML: 100; 10 SYRUP ORAL at 06:04

## 2020-04-04 RX ADMIN — CLINDAMYCIN IN 5 PERCENT DEXTROSE 900 MG: 18 INJECTION, SOLUTION INTRAVENOUS at 02:04

## 2020-04-04 NOTE — PROGRESS NOTES
Progress Note  General Surgery    Admit Date: 4/2/2020  S/P: * No surgery found *    Post-operative Day:      Hospital Day: 3    SUBJECTIVE:     No acute events overnight. Feels better.  Still with pain at RLE but improving.  OBJECTIVE:     Vital Signs (Most Recent)  Temp: (pt was eating ice when I went in to recheck, will f/u) (04/04/20 0500)  Pulse: 85 (04/04/20 0500)  Resp: 17 (04/04/20 0442)  BP: 110/67 (04/04/20 0442)  SpO2: 99 %(reassessed due to previous reading recorded, no distress) (04/04/20 0500)    Vital Signs Range (Last 24H):  Temp:  [94.9 °F (34.9 °C)-98.9 °F (37.2 °C)]   Pulse:  []   Resp:  [17-25]   BP: (110-119)/(67-87)   SpO2:  [91 %-100 %]     I & O (Last 24H):    Intake/Output Summary (Last 24 hours) at 4/4/2020 0748  Last data filed at 4/4/2020 0519  Gross per 24 hour   Intake 1211.66 ml   Output 2100 ml   Net -888.34 ml       Physical Exam:  Gen: NAD   HEENT: NCAT  Skin: +erythema RLE, no fluctuance       Laboratory:  n/a    ASSESSMENT/PLAN:     Assessment: improving RLE cellulitis.    Plan:  Continue IV abx today, likely home tomorrow    Stephane Ortiz M.D., F.A.C.S.  [facility] and General Surgery  [facility]

## 2020-04-04 NOTE — PLAN OF CARE
VIRTUAL NURSE:  Cued into patient's room.  Permission received per patient to turn camera to view patient.  Introduced as VN for night shift that will be working with floor nurse and nursing assistant.  Educated patient on VN's role in patient care. Plan of care reviewed with patient. Education per flowsheet.  Opportunity given for questions and questions answered.  Instructed to call for assistance.  Will cont to monitor.    Labs, notes, and orders reviewed.

## 2020-04-04 NOTE — ANESTHESIA PROCEDURE NOTES
Peripheral IV Insertion    Diagnosis: I87.9 Disorder of vein, unspecified.    Patient location during procedure: floor  Procedure start time: 4/4/2020 11:50 AM  Procedure end time: 4/4/2020 11:52 AM    Staffing  Authorizing Provider: Fabio Snyder MD  Performing Provider: Fabio Snyder MD    Anesthesiologist was present at the time of the procedure.    Preanesthetic Checklist  Completed: patient identified and risks and benefits discussedPeripheral IV Insertion  Skin Prep: chlorhexidine gluconate and isopropyl alcohol  Local Infiltration: none  Orientation: left  Location: upper arm  Catheter Size: 20 G  Catheter placement by Ultrasound guidance. Heme positive aspiration all ports.  Vessel Caliber: medium, patentInsertion Attempts: 1  Assessment  Dressing: secured with tape and tegaderm  Patient: Tolerated well  Line flushed easily.

## 2020-04-04 NOTE — PLAN OF CARE
Problem: Wound  Goal: Optimal Wound Healing  Outcome: Ongoing, Progressing     Problem: Adult Inpatient Plan of Care  Goal: Plan of Care Review  Outcome: Ongoing, Progressing     Problem: Infection  Goal: Infection Symptom Resolution  Outcome: Ongoing, Progressing     Pt.  AAOx4.  Redness to right lower leg continues, no broken skin noted.  Patient continues to complain of pain, states it feels like bone pain)  5-8/10, Morphine given per MD PRN order.  Patient received 3rd dose of Vanco, Clindamycin continues.  Patient voiding without difficulty per urinal.  No acute distress noted.  Safety precautions in place and maintained.  Will continue to monitor.

## 2020-04-04 NOTE — PLAN OF CARE
Pt remains A+Ox4. Medicated for c/o continued pain per MAR. New PIV intact, tolerating IVF, IV abx well. Tolerating diet well. Voiding well. Airborne, contact, and droplet isolation maintained. Safety precautions maintained.

## 2020-04-04 NOTE — NURSING
1240--Pt c/o dry cough presenting this AM to now, c/o chest discomfort when coughing. Pt requesting to be tested for COVID-19. Dr Ortiz notified via secure chat, states will evaluate criteria and place orders.     1450--Viral PCR for COVID-19, nasopharyngeal swab obtained. Pt educated to isolation precautions, clustered care, and POC. Pt verbalized understanding.

## 2020-04-05 VITALS
TEMPERATURE: 99 F | HEIGHT: 72 IN | WEIGHT: 207.44 LBS | HEART RATE: 91 BPM | DIASTOLIC BLOOD PRESSURE: 90 MMHG | BODY MASS INDEX: 28.1 KG/M2 | RESPIRATION RATE: 18 BRPM | SYSTOLIC BLOOD PRESSURE: 141 MMHG | OXYGEN SATURATION: 93 %

## 2020-04-05 LAB
SARS-COV-2 RNA RESP QL NAA+PROBE: NOT DETECTED
VANCOMYCIN TROUGH SERPL-MCNC: 15.5 UG/ML (ref 10–22)

## 2020-04-05 PROCEDURE — 99225 PR SUBSEQUENT OBSERVATION CARE,LEVEL II: ICD-10-PCS | Mod: ,,, | Performed by: SURGERY

## 2020-04-05 PROCEDURE — 99225 PR SUBSEQUENT OBSERVATION CARE,LEVEL II: CPT | Mod: ,,, | Performed by: SURGERY

## 2020-04-05 PROCEDURE — 80202 ASSAY OF VANCOMYCIN: CPT

## 2020-04-05 PROCEDURE — S0077 INJECTION, CLINDAMYCIN PHOSP: HCPCS | Performed by: STUDENT IN AN ORGANIZED HEALTH CARE EDUCATION/TRAINING PROGRAM

## 2020-04-05 PROCEDURE — 96376 TX/PRO/DX INJ SAME DRUG ADON: CPT | Mod: 59 | Performed by: EMERGENCY MEDICINE

## 2020-04-05 PROCEDURE — 25000003 PHARM REV CODE 250: Performed by: STUDENT IN AN ORGANIZED HEALTH CARE EDUCATION/TRAINING PROGRAM

## 2020-04-05 PROCEDURE — 96361 HYDRATE IV INFUSION ADD-ON: CPT | Mod: 59 | Performed by: EMERGENCY MEDICINE

## 2020-04-05 PROCEDURE — 94761 N-INVAS EAR/PLS OXIMETRY MLT: CPT

## 2020-04-05 PROCEDURE — 25000003 PHARM REV CODE 250: Performed by: SURGERY

## 2020-04-05 PROCEDURE — 36415 COLL VENOUS BLD VENIPUNCTURE: CPT

## 2020-04-05 PROCEDURE — G0378 HOSPITAL OBSERVATION PER HR: HCPCS

## 2020-04-05 PROCEDURE — 63600175 PHARM REV CODE 636 W HCPCS: Performed by: STUDENT IN AN ORGANIZED HEALTH CARE EDUCATION/TRAINING PROGRAM

## 2020-04-05 RX ORDER — BENZONATATE 100 MG/1
100 CAPSULE ORAL 3 TIMES DAILY PRN
Status: DISCONTINUED | OUTPATIENT
Start: 2020-04-05 | End: 2020-04-05 | Stop reason: HOSPADM

## 2020-04-05 RX ADMIN — BENZONATATE 100 MG: 100 CAPSULE ORAL at 01:04

## 2020-04-05 RX ADMIN — PANTOPRAZOLE SODIUM 40 MG: 40 TABLET, DELAYED RELEASE ORAL at 08:04

## 2020-04-05 RX ADMIN — HYDROCODONE BITARTRATE AND ACETAMINOPHEN 2 TABLET: 5; 325 TABLET ORAL at 04:04

## 2020-04-05 RX ADMIN — VANCOMYCIN HYDROCHLORIDE 1500 MG: 1.5 INJECTION, POWDER, LYOPHILIZED, FOR SOLUTION INTRAVENOUS at 01:04

## 2020-04-05 RX ADMIN — MORPHINE SULFATE 4 MG: 4 INJECTION INTRAVENOUS at 03:04

## 2020-04-05 RX ADMIN — FAMOTIDINE 20 MG: 20 TABLET ORAL at 08:04

## 2020-04-05 RX ADMIN — PAROXETINE HYDROCHLORIDE 20 MG: 20 TABLET, FILM COATED ORAL at 06:04

## 2020-04-05 RX ADMIN — MORPHINE SULFATE 4 MG: 4 INJECTION INTRAVENOUS at 06:04

## 2020-04-05 RX ADMIN — CLINDAMYCIN IN 5 PERCENT DEXTROSE 900 MG: 18 INJECTION, SOLUTION INTRAVENOUS at 11:04

## 2020-04-05 RX ADMIN — SODIUM CHLORIDE, SODIUM LACTATE, POTASSIUM CHLORIDE, AND CALCIUM CHLORIDE: .6; .31; .03; .02 INJECTION, SOLUTION INTRAVENOUS at 04:04

## 2020-04-05 RX ADMIN — SODIUM CHLORIDE, SODIUM LACTATE, POTASSIUM CHLORIDE, AND CALCIUM CHLORIDE: .6; .31; .03; .02 INJECTION, SOLUTION INTRAVENOUS at 05:04

## 2020-04-05 RX ADMIN — GUAIFENESIN AND DEXTROMETHORPHAN 5 ML: 100; 10 SYRUP ORAL at 05:04

## 2020-04-05 RX ADMIN — CLINDAMYCIN IN 5 PERCENT DEXTROSE 900 MG: 18 INJECTION, SOLUTION INTRAVENOUS at 03:04

## 2020-04-05 RX ADMIN — BENZONATATE 100 MG: 100 CAPSULE ORAL at 08:04

## 2020-04-05 RX ADMIN — MORPHINE SULFATE 4 MG: 4 INJECTION INTRAVENOUS at 11:04

## 2020-04-05 RX ADMIN — HYDROCODONE BITARTRATE AND ACETAMINOPHEN 2 TABLET: 5; 325 TABLET ORAL at 01:04

## 2020-04-05 RX ADMIN — VANCOMYCIN HYDROCHLORIDE 1750 MG: 100 INJECTION, POWDER, LYOPHILIZED, FOR SOLUTION INTRAVENOUS at 01:04

## 2020-04-05 NOTE — PROGRESS NOTES
"Pharmacokinetic Assessment Follow Up: IV Vancomycin    Vancomycin serum concentration assessment(s):    The trough level was drawn correctly and can be used to guide therapy at this time. The measurement is above the desired definitive target range of 10 to 15 mcg/mL.    Vancomycin Regimen Plan:    Change regimen to Vancomycin 1500 mg IV every 12 hours with next serum trough concentration measured at 00:15 prior to 4th dose on 4-7-20     Drug levels (last 3 results):  Recent Labs   Lab Result Units 04/05/20  0115   Vancomycin-Trough ug/mL 15.5       Pharmacy will continue to follow and monitor vancomycin.    Please contact pharmacy at extension [phone] for questions regarding this assessment.    Thank you for the consult,   Jh Johnson       Patient brief summary:  Solo Black is a 39 y.o. male initiated on antimicrobial therapy with IV Vancomycin for treatment of skin & soft tissue infection    The patient's current regimen is Vancomycin 1750 mg IVPB q12h     Drug Allergies:   Review of patient's allergies indicates:   Allergen Reactions    Legumes Nausea And Vomiting and Swelling     Other reaction(s): GI Intolerance  vomiting  vomiting  Pt reports legumes make his lips swell and induce severe vomiting  Pt reports legumes make his lips swell and induce severe vomiting      Nsaids (non-steroidal anti-inflammatory drug)      GI bleed    Bean pod extract      Lips swelling, vomiting  Lips swelling, vomiting      Ketamine      Severe dysphoria (bad trip)    Lentils      Lips swelling, vomiting  Lips swelling, vomiting      Meloxicam Nausea Only     GI bleed    Peas      Lips swelling, vomiting    Penicillins      Has taken cephalosporins without issue    Haloperidol Anxiety and Other (See Comments)     "feels like crawling out of his skin"         Actual Body Weight:   94.1 kg    Renal Function:   Estimated Creatinine Clearance: 120.5 mL/min (based on SCr of 1 mg/dL).,     Dialysis Method (if " applicable):  N/A    CBC (last 72 hours):  Recent Labs   Lab Result Units 04/02/20  2344 04/03/20  1512   WBC K/uL 10.74  10.74 7.90   Hemoglobin g/dL 8.9*  8.9* 8.6*   Hematocrit % 30.4*  30.4* 29.4*   Platelets K/uL 284  284 210   Gran% % 80.2*  80.2* 71.3   Lymph% % 6.8*  6.8* 8.4*   Mono% % 8.9  8.9 14.7   Eosinophil% % 3.3  3.3 4.7   Basophil% % 0.5  0.5 0.6   Differential Method  Automated  Automated Automated       Metabolic Panel (last 72 hours):  Recent Labs   Lab Result Units 04/02/20  2344 04/03/20  1512   Sodium mmol/L 137  137 136   Potassium mmol/L 3.5  3.5 3.5   Chloride mmol/L 102  102 105   CO2 mmol/L 20*  20* 17*   Glucose mg/dL 92  92 92   BUN, Bld mg/dL 17  17 11   Creatinine mg/dL 1.1  1.1 1.0   Albumin g/dL 4.2  4.2  --    Total Bilirubin mg/dL 1.3*  1.3*  --    Alkaline Phosphatase U/L 65  65  --    AST U/L 40  40  --    ALT U/L 31  31  --        Vancomycin Administrations:  vancomycin given in the last 96 hours                     vancomycin (VANCOCIN) 1,750 mg in dextrose 5 % 500 mL IVPB (mg) 1,750 mg New Bag 04/05/20 0113     1,750 mg New Bag 04/04/20 1440     1,750 mg New Bag  0019    vancomycin (VANCOCIN) 2,250 mg in dextrose 5 % 500 mL IVPB (mg) 2,250 mg New Bag 04/03/20 1240                      Microbiologic Results:  Microbiology Results (last 7 days)       ** No results found for the last 168 hours. **

## 2020-04-05 NOTE — PROGRESS NOTES
Progress Note  General Surgery    Admit Date: 4/2/2020  S/P: * No surgery found *    Post-operative Day:      Hospital Day: 4    SUBJECTIVE:     No acute events overnight. Leg feels better.  Has developed cough and low grade fever.  COVID test pending.   OBJECTIVE:     Vital Signs (Most Recent)  Temp: 98.3 °F (36.8 °C) (04/05/20 0520)  Pulse: 85 (04/05/20 0520)  Resp: 18 (04/05/20 0520)  BP: 116/75 (04/05/20 0520)  SpO2: (!) 92 % (04/05/20 0900)    Vital Signs Range (Last 24H):  Temp:  [97.5 °F (36.4 °C)-99.3 °F (37.4 °C)]   Pulse:  [82-88]   Resp:  [18]   BP: ()/(54-82)   SpO2:  [88 %-96 %]     I & O (Last 24H):    Intake/Output Summary (Last 24 hours) at 4/5/2020 0958  Last data filed at 4/5/2020 0620  Gross per 24 hour   Intake 3651.67 ml   Output 2925 ml   Net 726.67 ml       Physical Exam:  Gen: NAD   HEENT: NCAT  Pulm: +cough, unlabored, on room air  Abd: Soft, nttp. No rebound, guarding.   Extremities: no cyanosis or edema, or clubbing  Skin: Erythema virtually resolved, minimal pain      Laboratory:  Labs within the past 24 hours have been reviewed.  COVID PCR pending    ASSESSMENT/PLAN:     Assessment: Cellulitis, COVID testing pending.    Plan:  Hopefully can D/C today.  Pt says he lives with his elderly grandparents.  COVID test should be back soon.    Stephane Ortiz M.D., F.A.C.S.  [facility] and General Surgery  [facility] [place]

## 2020-04-05 NOTE — PLAN OF CARE
Chief Complaint   Patient presents with    Leg Swelling       39y M ambulatory to ED with c/o swelling, pain, redness to RLE without injury.      TN attempted numerous time to perform discharge assessment with pt, room number always busy and left VM on pt's cell phone [phone]. Assessment information obtained from medical record    No current HH, has axillary crutches. Lives with grandmother, Umm Camargo who provided transportation home for pt on last admission.       04/04/20 1244   Discharge Assessment   Assessment Type Discharge Planning Assessment   Confirmed/corrected address and phone number on facesheet? No   Assessment information obtained from? Medical Record   Expected Length of Stay (days) 2   Communicated expected length of stay with patient/caregiver no   Prior to hospitilization cognitive status: Alert/Oriented   Prior to hospitalization functional status: Independent   Current cognitive status: Alert/Oriented   Current Functional Status: Independent   Facility Arrived From: (home)   Lives With grandparent(s)  (Grandmother: Umm Camargo [phone])   Able to Return to Prior Arrangements yes   Is patient able to care for self after discharge? Yes   Who are your caregiver(s) and their phone number(s)? Grandmother: Umm Camargo [phone]   Patient's perception of discharge disposition home or selfcare   Readmission Within the Last 30 Days no previous admission in last 30 days   Patient currently being followed by outpatient case management? No   Patient currently receives any other outside agency services? No   Equipment Currently Used at Home crutches, axillary   Do you have any problems affording any of your prescribed medications? No   Is the patient taking medications as prescribed? yes   Does the patient have transportation home? Yes   Transportation Anticipated family or friend will provide   Dialysis Name and Scheduled days N/A   Discharge Plan A Home   Discharge Plan B Home with  family   DME Needed Upon Discharge  none   Patient/Family in Agreement with Plan unable to assess     Teresa Ramirez RN Transitional Navigator  [phone]

## 2020-04-05 NOTE — PLAN OF CARE
VN cued into patients room for DC instructions. Discharge teaching was reviewed with patient. Pt verbalized understanding of medications/changes to medications, FU appts, clinical education and resources, including covid prevention guidelines. Refer to clinical references for pt education. Tele and IV to be removed by bedside nurse prior to leaving. All questions answered. Patient to call  once ride arrives to be escorted down.

## 2020-04-05 NOTE — DISCHARGE INSTRUCTIONS
Cellulitis, Discharge Instructions for (English) View Edit Remove  [facility] COVID 19 PREVENTION View Edit Remove

## 2020-04-05 NOTE — PLAN OF CARE
Discharge orders noted, no HH or HME ordered.    Pt's nurse will go over medications/signs and symptoms prior to discharge       04/05/20 1816   Final Note   Assessment Type Final Discharge Note   Anticipated Discharge Disposition Home   What phone number can be called within the next 1-3 days to see how you are doing after discharge? [phone]   Hospital Follow Up  Appt(s) scheduled? No  (Offices closed for Weekend. Patient to schedule own follow up appointment.  )   Right Care Referral Info   Post Acute Recommendation No Care     Teresa Ramirez RN Transitional Navigator  [phone]

## 2020-04-05 NOTE — PLAN OF CARE
VN cued into patients room for q2h rounding - patient resting in bed in no acute distress at this time. Denies any needs at this time. Call bell within reach. Will continue to monitor and be available to intervene PRN.

## 2020-04-05 NOTE — PLAN OF CARE
Pt AAOx3. Medications administered per order. IVFs infusing. Pain to RLE managed with prn medications. Cough managed with prn cough medications; pt verbalized tessalon provided some relief. Urinal at the bedside, voids spontaneously. Encouraged to call with questions/concerns/assistance. Will continue to monitor. Safety maintained.

## 2020-04-05 NOTE — PLAN OF CARE
VN cued into patients room for q2h rounding - patient using the restroom at this time; fall education provided. Requesting inhaler - MD aware; will contact MD again to see if orders are going to be placed. Call bell within reach. Will continue to monitor and be available to intervene PRN.

## 2020-04-05 NOTE — PLAN OF CARE
VN cued into patient's room. Permission received per patient to turn camera to view patient.  Introduced as one of their virtual nurses for the shift. Call light in reach, bed alarm on, requesting tessalon perle at this time - bedside nurse made aware. Informed patient that I will round on them every 2 hours but to use call light for any other needs they may have. Patient verbalized understanding.  Will continue to monitor closely.

## 2020-04-05 NOTE — PLAN OF CARE
VN cued into patients room for q2h rounding - patient resting in bed; continues with non-productive cough. Pt expresses that he is worried if he gets DC, as his cough has not gotten better and he lives with elderly grandparents. Awaiting Covid results. Requesting inhaler/breathing treatment - MD informed as no current orders. Call bell within reach. Will continue to monitor and be available to intervene PRN.

## 2020-04-06 LAB — PATH REV BLD -IMP: NORMAL

## 2020-04-07 NOTE — DISCHARGE SUMMARY
[facility]  Discharge Summary  General Surgery      Admit Date: 4/2/2020    Discharge Date: 4/5/2020  7:08 PM    Attending Physician: Polo John MD    Discharge Provider: Stephane Ortiz M.D., F.A.CTriniS.    Reason for Admission: RLE cellulitis    Procedures Performed: * No surgery found *    Hospital Course:  Patient was admitted for RLE cellulitis.  He was treated with IV antibiotics for several days and showed great improvement. He developed low grade fevers and a cough.  He was tested and found to be COVID negative.  He was discharged in a stable condition.  Consults: None    Significant Diagnostic Studies:     Final Diagnoses:   Principal Problem: Cellulitis of right leg     Discharged Condition: Good    Disposition: Home or Self Care    Follow Up/Patient Instructions: Follow up with Dr. John in 2 weeks.    Medications:  Bactrim Rx given  Discharge Procedure Orders   Diet Adult Regular     Activity as tolerated     Follow-up Information     Carlos John MD. Schedule an appointment as soon as possible for a visit in 2 weeks.    Specialties:  Surgery, General Surgery  Why:  Offices closed for Weekend. Patient to schedule own follow up appointment.    Contact information:  [address]  [address]  [facility] [address] [phone]                   Activity: As tolerated.  Diet: regular  Wound Care: As above.      Stephane Ortiz M.D., F.A.C.S.  [facility]rr[facility] and General Surgery  [facility] [place]

## 2020-04-16 ENCOUNTER — TELEPHONE (OUTPATIENT)
Dept: GASTROENTEROLOGY | Facility: CLINIC | Age: 40
End: 2020-04-16

## 2020-04-16 NOTE — TELEPHONE ENCOUNTER
----- Message from Clarissa Cha sent at 4/16/2020 10:14 AM CDT -----  Contact: Pt  Type:  Sooner Appointment Request    Caller is requesting a sooner appointment.  Caller declined first available appointment listed below.  Caller will not accept being placed on the waitlist and is requesting a message be sent to doctor.  Name of Caller: Solo Black  When is the first available appointment? None  Symptoms: hospital f/u and testing for surgery  Would the patient rather a call back or a response via [facility]? Callback   Best Call Back Number: [phone]  Additional Information:  Says he was released out of the hospital on 4/5/2020 and was told to f/u within 2 weeks

## 2020-04-22 ENCOUNTER — TELEPHONE (OUTPATIENT)
Dept: SURGERY | Facility: CLINIC | Age: 40
End: 2020-04-22

## 2020-04-22 NOTE — TELEPHONE ENCOUNTER
----- Message from Lacie Johnson sent at 4/22/2020  2:02 PM CDT -----  Pt can be reached at [phone]    Pt is calling to speak with the nurse pt is having a lot of problems and would like to get a sooner date for procedure.    Please contact pt    Thank you!

## 2020-04-24 ENCOUNTER — DOCUMENT SCAN (OUTPATIENT)
Dept: HOME HEALTH SERVICES | Facility: HOSPITAL | Age: 40
End: 2020-04-24

## 2020-05-21 ENCOUNTER — TELEPHONE (OUTPATIENT)
Dept: GASTROENTEROLOGY | Facility: CLINIC | Age: 40
End: 2020-05-21

## 2020-05-21 NOTE — TELEPHONE ENCOUNTER
Spoke with patient. Relayed Dr Wu's message below. Patient verbalized understanding. States he will f/u with Dr Wall.

## 2020-05-21 NOTE — TELEPHONE ENCOUNTER
Spoke with patient. He states that he would like to see you as soon as possible because he has been having diarrhea with rectal bleeding for three days. He states that he was seeing Dr Tillman in [place], but will not go back over there because he had a bad experience. He recently had hiatal hernia surgery with Dr Meyers at  who told him to follow up with GI for rectal bleeding. Was referred to [facility] but they aren't taking new patients and the nurse there said she used to work with you and recommended he try to get an appointment with you. Please advise.

## 2020-05-21 NOTE — TELEPHONE ENCOUNTER
----- Message from Gini Rey sent at 5/21/2020 10:39 AM CDT -----  Contact: patient  Type:  Needs Medical Advice    Who Called:  patient  Symptoms (please be specific):  GI bleed   How long has patient had these symptoms:  3 days  Pharmacy name and phone #:   n/a  Would the patient rather a call back or a response via [facility]?  Call back  Best Call Back Number:  [phone]  Additional Information:  He really would like to be seen as soon as possible

## 2020-07-06 ENCOUNTER — TELEPHONE (OUTPATIENT)
Dept: ALLERGY | Facility: CLINIC | Age: 40
End: 2020-07-06

## 2020-07-06 NOTE — TELEPHONE ENCOUNTER
Patient recently went to ER due to swelling of the throat.  Patient was administered epi x 2, Solumedrol and monitored for hours.  Patient was referred to allergy due to angioedema.  Scheduled patient for 7/7/20 with Dr. Ornelas.      ----- Message from Letty Booth sent at 7/6/2020 11:16 AM CDT -----  Regarding: same day appt request from ed  Pt is calling for  same day appt request for Angioedema, initial encounter and stomach and throat swelling. Pt went to ed and they wanted him to be seen today .Please give pt a call back at [phone] .

## 2020-07-07 ENCOUNTER — OFFICE VISIT (OUTPATIENT)
Dept: ALLERGY | Facility: CLINIC | Age: 40
End: 2020-07-07
Payer: MEDICAID

## 2020-07-07 ENCOUNTER — TELEPHONE (OUTPATIENT)
Dept: ALLERGY | Facility: CLINIC | Age: 40
End: 2020-07-07

## 2020-07-07 VITALS — HEIGHT: 72 IN | WEIGHT: 212.31 LBS | BODY MASS INDEX: 28.76 KG/M2

## 2020-07-07 DIAGNOSIS — T78.3XXA ANGIOEDEMA, INITIAL ENCOUNTER: ICD-10-CM

## 2020-07-07 DIAGNOSIS — R22.2 LUMP IN CHEST: ICD-10-CM

## 2020-07-07 DIAGNOSIS — D72.119 HYPEREOSINOPHILIC SYNDROME: Primary | ICD-10-CM

## 2020-07-07 DIAGNOSIS — L50.8 RECURRENT URTICARIA: ICD-10-CM

## 2020-07-07 DIAGNOSIS — K52.81 EOSINOPHILIC GASTRITIS: ICD-10-CM

## 2020-07-07 DIAGNOSIS — K20.0 EOSINOPHILIC ESOPHAGITIS: ICD-10-CM

## 2020-07-07 DIAGNOSIS — D72.10 EOSINOPHILIA: ICD-10-CM

## 2020-07-07 DIAGNOSIS — T78.3XXD ANGIOEDEMA, SUBSEQUENT ENCOUNTER: ICD-10-CM

## 2020-07-07 DIAGNOSIS — Z88.0 HISTORY OF PENICILLIN ALLERGY: ICD-10-CM

## 2020-07-07 DIAGNOSIS — Z91.018 FOOD ALLERGY: ICD-10-CM

## 2020-07-07 PROCEDURE — 99999 PR PBB SHADOW E&M-EST. PATIENT-LVL III: CPT | Mod: PBBFAC,,, | Performed by: STUDENT IN AN ORGANIZED HEALTH CARE EDUCATION/TRAINING PROGRAM

## 2020-07-07 PROCEDURE — 99204 PR OFFICE/OUTPT VISIT, NEW, LEVL IV, 45-59 MIN: ICD-10-PCS | Mod: S$PBB,,, | Performed by: STUDENT IN AN ORGANIZED HEALTH CARE EDUCATION/TRAINING PROGRAM

## 2020-07-07 PROCEDURE — 99204 OFFICE O/P NEW MOD 45 MIN: CPT | Mod: S$PBB,,, | Performed by: STUDENT IN AN ORGANIZED HEALTH CARE EDUCATION/TRAINING PROGRAM

## 2020-07-07 PROCEDURE — 99999 PR PBB SHADOW E&M-EST. PATIENT-LVL III: ICD-10-PCS | Mod: PBBFAC,,, | Performed by: STUDENT IN AN ORGANIZED HEALTH CARE EDUCATION/TRAINING PROGRAM

## 2020-07-07 PROCEDURE — 99213 OFFICE O/P EST LOW 20 MIN: CPT | Mod: PBBFAC | Performed by: STUDENT IN AN ORGANIZED HEALTH CARE EDUCATION/TRAINING PROGRAM

## 2020-07-07 NOTE — TELEPHONE ENCOUNTER
----- Message from Aiyana Bardaels sent at 7/7/2020 11:54 AM CDT -----  Regarding: Pt was seen today and is much worse.  Contact: [phone]  Please call pt asap.  Feeling very bad.  Was seen at 0900 this morning.    
Spoke to patient. He was rude and abrupt.  Patient stated that his pain has increased and his abdomen is more swollen than in clinic. He now c/o eye lid swelling. Patient asked if I was a nurse and I should know his symptoms. Informed patient that I was at a different clinic and asked that he hold on so I can reach Dr. Ornelas.  Called [phone] ,spoke to Pura.  Dr. Ornelas was in a room , Dr. Wright stated that patient should report to the ED.  Relayed that information to patient. He asked who advised him to go. I said Dr. Wright the doctor that you seen with Dr. Ornelas this morning. Patient on way to ED now.  
Hand fracture

## 2020-07-07 NOTE — PATIENT INSTRUCTIONS
Please increase zyrtec (cetirizine) to 2 pills (20 mg) twice per day. The cheapest way to get this is the generic form in bulk (at [facility] or [email or web address]).    We will attempt to get a biologic medication (either benralizumab or mepalizumab approved).    Please schedule an appointment with your PCP to get the lump in your chest worked up.    Please schedule an appointment with your GI doctor to discuss the blood in your stool.

## 2020-07-07 NOTE — PROGRESS NOTES
ALLERGY & IMMUNOLOGY CLINIC - INITIAL CONSULTATION      HISTORY OF PRESENT ILLNESS     Patient ID: Solo Black is a 39 y.o. male    CC: angioedema    HPI: Solo Black is a 39 y.o. male with a history of hypereosinophilic syndrome, EoE, eosinophilic gastritis, and chronic recurrent angioedema and urticaria here to establish care with us.    He reports he has a history of angioedema and hypereosinophelia. Currently, he says he has abdominal swelling and pain. He said a couple days ago, he had swelling in his throat. His eyes are swelling now. His lips were swelling yesterday, he took an epi pen, which he says helped for a while. He has seen allergists in the past in [place]. He does remember getting tested to foods and aeroallergans. He does not remember what was positive, but remembers that the results did not seem to be helpful for his symptoms. He said he gets the angioedema on hands, feet, around anus, around eyes, lips, mouth. He says imaging done in [place] has shown intestinal swelling. He also gets urticaria on his arms, abdomen, chest. The urticaria are pruritic, and the angioedema is painful. He also gets blister-like lesions on his back, that open up, ooze out, scab, then scar. He has been intubated twice in the past for angioedema that compromised his airway.    He has been dealing with all of this for the past 8-9 years, but it has gotten worse in the past 2 years.    He has been tried on omalizumab, which he said didn't help, but he only took it for a month or two (he vaguely remembers himself not tolerating side effects- maybe cramping and diarrhea). He has also been given fresh frozen plasma a few times, which he remembers helping. He is on zyrtec 10 mg twice per day, famotidine 20 mg BID.  He says sometimes his symptoms seem responsive to antihistamines, but it takes a lot of benadryl in the hospital.    He is currently on prednisone 50 for 5 days (ends in 3 days), yet he is still  getting new angioedema (around eyes currently). He says that iv solumedrol followed by prednisone usually helps more than po steroids alone.    He says he has had eosinophilic esophagitis and gastritis for a long time. His most recent scope (EGD 3/2020) was negative for eosinophils, but he was on systemic steroids. He has tried swallowing steroid inhaler which helped with the esophagitis, but not gastritis. He is following with GI here. He has tried elimination diets and food journals. He has required esophageal dilation in the past. He is on protonix 40.    His eosinophils have been as high as 1600 in our system. He said in [place], they diagnosed him with hypereosinophilic syndrome.    He has had scopes without evidedence of malignancy (Last EGD was 3/2020, last colonoscopy he reports was a couple years ago). His thyroid studies have been normal, For the past few months, he notices a lump in his rt axilla that comes and goes. A couple of months ago, he noticed a lump in his chest, and it has been getting larger since. He says he does bruise and bleed easily. He says he has been bleeding from his rectum recently and plans on scheduling an appt with his GI doctor.     REVIEW OF SYSTEMS     CONST: no F/C, + NS x 3-6 months, no unintentional weight changes, + fatigue  NEURO: no weakness, no paresthesias  EYES: no discharge, + pruritus and burning with the angioedema  EARS: no hearing loss, no sensation of fullness  NOSE: no congestion, no rhinorrhea, no discharge, no itching, no sneezing  PULM: no SOB, no wheezing, no cough  CV: no CP, no palpitations  GI: + dysphagia, no heartburn, + pain, + nausea, + diarrhea, + BRBPR  URO: no dysuria, no hematuria, no nocturia  MSK: + knee pain with swelling (knees feel hot when happens), no muscle pain  DERM: see HPI  Rest of ROS negative unless otherwise stated in the HPI.     MEDICAL HISTORY     MedHx:   Patient Active Problem List   Diagnosis    Eosinophilic esophagitis     PUD (peptic ulcer disease)    Erosive gastritis    Lifetime need for proton pump inhibitor    Cervicalgia    Adult ADHD    Adverse reaction to nonsteroidal anti-inflammatory drug (NSAID)    Therapeutic opioid induced constipation    Iron deficiency anemia    Eosinophilia    Reactive arthritis    Incomplete rotator cuff tear or rupture of right shoulder, not specified as traumatic    Cellulitis    Staphylococcus aureus bacteremia with sepsis    Cellulitis and abscess of foot    MRSA bacteremia    Cellulitis of foot, left    Abdominal pain    Hiatal hernia    Cellulitis of right leg       SurgHx:   Past Surgical History:   Procedure Laterality Date    APPENDECTOMY      ARTHROSCOPY OF SHOULDER WITH REMOVAL OF DISTAL CLAVICLE Right 11/1/2018    Procedure: ARTHROSCOPY, SHOULDER, WITH DISTAL CLAVICLE EXCISION;  Surgeon: Gabino Mejia MD;  Location: [facility];  Service: Orthopedics;  Laterality: Right;  video    BACK SURGERY      CIRCUMCISION, PRIMARY      COLONOSCOPY N/A 11/14/2018    Procedure: COLONOSCOPY;  Surgeon: Milton Brunson MD;  Location: [facility];  Service: Endoscopy;  Laterality: N/A;    drainage of abscess from hand  2009    MRSA    drainage of abscess from R thigh  2006    MRSA    ESOPHAGOGASTRODUODENOSCOPY  3/11/2014    ESOPHAGOGASTRODUODENOSCOPY N/A 9/5/2018    Procedure: EGD (ESOPHAGOGASTRODUODENOSCOPY);  Surgeon: Kolby Wall MD;  Location: [facility];  Service: Endoscopy;  Laterality: N/A;    ESOPHAGOGASTRODUODENOSCOPY N/A 3/25/2020    Procedure: EGD (ESOPHAGOGASTRODUODENOSCOPY);  Surgeon: Ruslan Tillman MD;  Location: [facility];  Service: Endoscopy;  Laterality: N/A;    FLEXIBLE SIGMOIDOSCOPY N/A 9/5/2018    Procedure: SIGMOIDOSCOPY, FLEXIBLE;  Surgeon: Kolby Wall MD;  Location: [facility];  Service: Endoscopy;  Laterality: N/A;    HAND SURGERY  jan 2014    power saw fell on hand - laceration 3 tendons         Medications:   Current Outpatient  Medications on File Prior to Visit   Medication Sig Dispense Refill    dextroamphetamine-amphetamine (ADDERALL) 20 mg tablet Take 1 tablet by mouth 2 (two) times daily before meals. Take before breakfast and lunch      EPINEPHrine (EPIPEN) 0.3 mg/0.3 mL AtIn Inject 0.3 mLs (0.3 mg total) into the muscle as needed. 2 Device 1    famotidine (PEPCID) 20 MG tablet Take 20 mg by mouth 2 (two) times daily.      loratadine (CLARITIN) 10 mg tablet Take 10 mg by mouth every morning.      MULTIVIT-IRON-MIN-FOLIC ACID 3,500-18-0.4 UNIT-MG-MG ORAL CHEW Take 10 mg by mouth once daily.      pantoprazole (PROTONIX) 40 MG tablet Take 1 tablet (40 mg total) by mouth 2 (two) times daily. 180 tablet 3    paroxetine (PAXIL) 20 MG tablet Take 20 mg by mouth every morning.      predniSONE (DELTASONE) 50 MG Tab Take 1 tablet (50 mg total) by mouth once daily. for 5 days 5 tablet 0    promethazine (PHENERGAN) 12.5 MG Tab Take 25 mg by mouth every 6 (six) hours as needed (nausea/vomiting).      sucralfate (CARAFATE) 1 gram tablet Take 1 g by mouth 4 (four) times daily before meals and nightly.       No current facility-administered medications on file prior to visit.        Vaccines: got flu vaccine 3543-2607 season    H/o Asthma: as a child. Still carries albuterol inhaler, uses once to twice per year  H/o Eczema: as a child, not as bad anymore  H/o Rhinitis: occasional  Oral Allergy: cantaloupe, watermelon  Food Allergy: does not eat beans due to lip swelling and vomiting  Venom Allergy: denies  Latex Allergy: denies  Drug Allergies:   Review of patient's allergies indicates:   Allergen Reactions    Legumes Nausea And Vomiting and Swelling     Other reaction(s): GI Intolerance  vomiting  vomiting  Pt reports legumes make his lips swell and induce severe vomiting  Pt reports legumes make his lips swell and induce severe vomiting      Nsaids (non-steroidal anti-inflammatory drug)      GI bleed    Bean pod extract      Lips  "swelling, vomiting  Lips swelling, vomiting      Ketamine      Severe dysphoria (bad trip)    Lentils      Lips swelling, vomiting  Lips swelling, vomiting      Meloxicam Nausea Only     GI bleed    Peas      Lips swelling, vomiting    Penicillins      Has taken cephalosporins without issue    Haloperidol Anxiety and Other (See Comments)     "feels like crawling out of his skin"      PCN- had hives when he was a child      Infection Hx: He had pneumonia once a few years ago. Gets MRSA infections frequently. No frequent sinus infections.    SocHx:   ETOH: no  Tobacco: no  Illicit Drug Use: no  Occupation: works in biomedical field    FamHx:   Asthma: sister  Allergic rhinitis: father  Food Allergy: no  Immune deficiency: no  Angioedema: no  Autoimmune: sister with celiac, grandmother with crohns     PHYSICAL EXAM     VS: There were no vitals taken for this visit.  GENERAL: AAOx4, NAD, appears to be in pain, cooperative  EYES: EOMI, no conjunctival injection, no discharge, + angioedema of periorbital area  NOSE: NT 2 + and pink B/L, clear mucus in B/L nares, no polyps  ORAL: MMM, no ulcers, possible thrush (mild white film on tongue), no cobblestoning  NECK: no thyromegaly, no LAD  CHEST and AXILLA: 1-2 cm mobile lump of lower right section of right breast; I was not able to feel lymphadenopathy in his right axilla but patient felt soreness to my palpation  LUNGS: CTAB, no w/r/c, no increased WOB  HEART: RRR, normal S1/S2, no m/g/r  ABDOMEN: BS absent, tender to palpation diffusely  EXTREMITIES: No edema, no cyanosis, no clubbing  DERM: excoriations present on right forearm, diffuse erythema of chest, periorbital swelling, multiple scars on upper back.  NEURO: normal gait, no facial asymmetry     LABORATORY STUDIES     Component      Latest Ref Rng & Units 4/3/2020 4/2/2020   WBC      3.90 - 12.70 K/uL 7.90    RBC      4.60 - 6.20 M/uL 4.25 (L)    Hemoglobin      14.0 - 18.0 g/dL 8.6 (L)    Hematocrit      40.0 " - 54.0 % 29.4 (L)    MCV      82 - 98 fL 69 (L)    MCH      27.0 - 31.0 pg 20.2 (L)    MCHC      32.0 - 36.0 g/dL 29.3 (L)    RDW      11.5 - 14.5 % 19.5 (H)    Platelets      150 - 350 K/uL 210    MPV      9.2 - 12.9 fL 11.2    Immature Granulocytes      0.0 - 0.5 % 0.3    Gran # (ANC)      1.8 - 7.7 K/uL 5.6    Immature Grans (Abs)      0.00 - 0.04 K/uL 0.02    Lymph #      1.0 - 4.8 K/uL 0.7 (L)    Mono #      0.3 - 1.0 K/uL 1.2 (H)    Eos #      0.0 - 0.5 K/uL 0.4    Baso #      0.00 - 0.20 K/uL 0.05    nRBC      0 /100 WBC 0    Gran%      38.0 - 73.0 % 71.3    Lymph%      18.0 - 48.0 % 8.4 (L)    Mono%      4.0 - 15.0 % 14.7    Eosinophil%      0.0 - 8.0 % 4.7    Basophil%      0.0 - 1.9 % 0.6    Platelet Estimate       Appears normal    Aniso       Slight    Poik       Slight    Poly       Occasional    Hypo       Occasional    Silvio Cells       Occasional    Large/Giant Platelets       Present    Fragmented Cells       Occasional    Differential Method       Automated    Sodium      136 - 145 mmol/L 136    Potassium      3.5 - 5.1 mmol/L 3.5    Chloride      95 - 110 mmol/L 105    CO2      23 - 29 mmol/L 17 (L)    Glucose      70 - 110 mg/dL 92    BUN, Bld      6 - 20 mg/dL 11    Creatinine      0.5 - 1.4 mg/dL 1.0    Calcium      8.7 - 10.5 mg/dL 8.0 (L)    Anion Gap      8 - 16 mmol/L 14    eGFR if African American      >60 mL/min/1.73 m:2 >60    eGFR if non African American      >60 mL/min/1.73 m:2 >60    CPK      20 - 200 U/L  649 (H)   CRP      0.0 - 8.2 mg/L  15.5 (H)   LD      110 - 260 U/L  399 (H)   Sed Rate      0 - 10 mm/Hr  11 (H)     Component      Latest Ref Rng & Units 4/12/2019 9/4/2018 6/7/2013   C2 Complement, Functional, S      25 - 47 U/mL   47   Interpretation         See Comments   Complement (C-4)      11 - 44 mg/dl   20   C1 Esterase Inhibitor      21 - 39 mg/dl   36   C1Q Binding      0.0 - 3.9 ugE/ml   3.3   TSH      0.400 - 4.000 uIU/mL 0.265 (L) 2.240    Free T4      0.71 - 1.51  ng/dL 0.91       Component      Latest Ref Rng & Units 10/5/2018   Eos #      0.0 - 0.5 K/uL 1.6 (H)        ALLERGEN TESTING     He reports prior allergy testing in [place] but does not remember what he was positive to.     IMAGING & OTHER DIAGNOSTICS     Echocardiogram 4/15/19:  · Concentric left ventricular remodeling.  · Normal left ventricular systolic function. The estimated ejection fraction is 60%  · Grade I (mild) left ventricular diastolic dysfunction consistent with impaired relaxation.  · Mild tricuspid regurgitation.  · Normal right ventricular systolic function.  · Normal left atrial pressure.  · Normal central venous pressure (3 mm Hg).  · The estimated PA systolic pressure is 27 mm Hg     CHART REVIEW     Reviewed prior labs, notes, care everywhere notes, imaging, procedures.     ASSESSMENT & PLAN     Solo Black is a 39 y.o. male with     There are no diagnoses linked to this encounter.    # Hypereosinophilic syndrome: Patient reports he had diagnosis of HES in [place]. Unsure which variant. He has had one set of labs here with eos>1500. He has not had a second eos level here with eos>1500, likely due to him frequently being on steroids. It is possible his urticaria and angioedema are related to HES. He has been on and off of steroids, but currently with issues while on 50 mg prednisone  -checking FISH for CHIC2 deletion (FIP1L1 And PDGFRA Fusion), strongyloides antibodies, cbc with diff, cmp, tryptase, B12, B and T cell receptor gene rearrangement  -unfortunately, neither mepolizumab or benralizumab are approved for HES yet  -if results consistent with myeloid variant, will consider imatinib  -if not myeloid variant, will discuss other forms of treatment (hydroxyurea, interferon alpha)    # Chronic angioedema and urticaria: Significant sxs including intestinal swelling currently, and airway swelling in the past requiring intubation. He has been on xolair in the past without help, but he  only stayed on for 1-2 months  -increase cetirizine to 20 mg bid  -treating HES, may help with urticaria and angioedema  -if we do not use imatinib, consider trying xolair again    # Eosinophilic esophagitis and gastritis: Patient has ongoing symptoms, follows with GI here. He reports that steroid slurries help with the esophagitis but not gastritis. He reports he has tried food elimination diets without success  -continue protonix  -advised him to schedule appointment with GI soon, especially as he has been noticing blood in his stool recently.    # Lump in right breast: Also with sore, occasionally enlarged lymph node of rt axilla. May be benign, but he should get worked up for malignancy  -advised him to establish with a PCP ASAP to work this lump up    # Allergy to legumes: Vomiting and lip swelling  -continue avoiding beans/legumes  -continue to carry epi pen    # Allergy status to penicillin: had hives when he was a child. Unlikely that he has current IgE mediated allergy to pcn  -consider amoxicillin when we get his other symptoms under control    # Possible thrush:  -Advised him to let us know if tongue sarah didn;t improve when he finishes steroid course in 3 days    Discussed with: Dr. Wright  Follow up: 1-2 months (giving enough time for labs to result). Sooner if needed     Michelle Ornelas MD  Allergy/Immunology Fellow

## 2020-07-11 ENCOUNTER — NURSE TRIAGE (OUTPATIENT)
Dept: ADMINISTRATIVE | Facility: CLINIC | Age: 40
End: 2020-07-11

## 2020-07-12 NOTE — TELEPHONE ENCOUNTER
Pt refused to speak to on call nurse. States he is waiting on his MD to call him back and hung up.

## 2020-07-13 ENCOUNTER — APPOINTMENT (OUTPATIENT)
Dept: LAB | Facility: HOSPITAL | Age: 40
End: 2020-07-13
Attending: INTERNAL MEDICINE
Payer: MEDICAID

## 2020-07-14 ENCOUNTER — OFFICE VISIT (OUTPATIENT)
Dept: GASTROENTEROLOGY | Facility: CLINIC | Age: 40
End: 2020-07-14
Payer: MEDICAID

## 2020-07-14 VITALS
SYSTOLIC BLOOD PRESSURE: 131 MMHG | WEIGHT: 209.69 LBS | HEART RATE: 67 BPM | HEIGHT: 72 IN | RESPIRATION RATE: 16 BRPM | DIASTOLIC BLOOD PRESSURE: 88 MMHG | BODY MASS INDEX: 28.4 KG/M2

## 2020-07-14 DIAGNOSIS — R19.7 DIARRHEA, UNSPECIFIED TYPE: ICD-10-CM

## 2020-07-14 DIAGNOSIS — Z01.812 PRE-PROCEDURE LAB EXAM: Primary | ICD-10-CM

## 2020-07-14 DIAGNOSIS — K92.1 HEMATOCHEZIA: ICD-10-CM

## 2020-07-14 PROCEDURE — 99214 OFFICE O/P EST MOD 30 MIN: CPT | Mod: PBBFAC,PO | Performed by: INTERNAL MEDICINE

## 2020-07-14 PROCEDURE — 99999 PR PBB SHADOW E&M-EST. PATIENT-LVL IV: CPT | Mod: PBBFAC,,, | Performed by: INTERNAL MEDICINE

## 2020-07-14 PROCEDURE — 99214 PR OFFICE/OUTPT VISIT, EST, LEVL IV, 30-39 MIN: ICD-10-PCS | Mod: S$PBB,,, | Performed by: INTERNAL MEDICINE

## 2020-07-14 PROCEDURE — 99999 PR PBB SHADOW E&M-EST. PATIENT-LVL IV: ICD-10-PCS | Mod: PBBFAC,,, | Performed by: INTERNAL MEDICINE

## 2020-07-14 PROCEDURE — 99214 OFFICE O/P EST MOD 30 MIN: CPT | Mod: S$PBB,,, | Performed by: INTERNAL MEDICINE

## 2020-07-14 RX ORDER — SODIUM, POTASSIUM,MAG SULFATES 17.5-3.13G
1 SOLUTION, RECONSTITUTED, ORAL ORAL DAILY
Qty: 1 KIT | Refills: 0 | Status: SHIPPED | OUTPATIENT
Start: 2020-07-14 | End: 2020-07-16

## 2020-07-14 NOTE — H&P (VIEW-ONLY)
Subjective:       Patient ID: Solo Black is a 39 y.o. male.    Chief Complaint: Abdominal Pain, Rectal Bleeding (bright red blood in toilet), Bloated (abd swelling), and Hiatal Hernia (had surgery)    Patient here today in follow-up with aforementioned complaint.  Patient was previously seen by me in March with chief complaint of hematemesis.  An expedited an EGD was performed which was unremarkable, save for a medium-sized hiatal hernia.  As patient typically sees Dr. Wall he was instructed to follow up with him.  He since reports having his hiatal hernia repaired in note significant improvement in complaints.    Patient carries diagnosis of hypereosinophilic syndrome and chronic recurrence angioedema/urticaria.  He follows with the  clinic at [facility].  He recently stopped his prednisone to have some blood tests performed.  Since patient reports significant change in bowel habits.  He now reports diarrhea, quantified as passing 5+ loose bowel movements per day often with associated blood in mucus.  He had colonoscopy performed in 2018 by Dr. Brunson which was essentially normal.    Review of Systems   Constitutional: Positive for fatigue. Negative for chills, fever and unexpected weight change.   HENT: Positive for facial swelling. Negative for congestion and trouble swallowing.    Respiratory: Negative for cough and shortness of breath.    Cardiovascular: Negative for chest pain and palpitations.   Gastrointestinal: Positive for abdominal pain, blood in stool, diarrhea and rectal pain. Negative for abdominal distention.   Skin: Positive for rash.   Allergic/Immunologic: Positive for environmental allergies and food allergies.   Psychiatric/Behavioral: The patient is nervous/anxious.          The following portions of the patient's history were reviewed and updated as appropriate: allergies, current medications, past family history, past medical history, past social history, past surgical history and  problem list.    Objective:      Physical Exam  Vitals signs and nursing note reviewed.   Constitutional:       Appearance: He is well-developed.   HENT:      Head: Normocephalic and atraumatic.   Eyes:      General: No scleral icterus.     Pupils: Pupils are equal, round, and reactive to light.   Cardiovascular:      Rate and Rhythm: Normal rate and regular rhythm.      Heart sounds: Normal heart sounds.   Pulmonary:      Effort: Pulmonary effort is normal. No respiratory distress.      Breath sounds: Normal breath sounds.   Abdominal:      General: Bowel sounds are normal. There is no distension.      Palpations: Abdomen is soft.      Tenderness: There is no abdominal tenderness.   Musculoskeletal: Normal range of motion.   Neurological:      Mental Status: He is alert and oriented to person, place, and time.      Comments: No asterixis   Psychiatric:         Behavior: Behavior normal.           Pertinent labs and imaging studies reviewed    Assessment:       1. Pre-procedure lab exam    2. Diarrhea, unspecified type    3. Hematochezia        Plan:       Will plan for EGD/colonoscopy to evaluate for causes of diarrhea and hematochezia  Will repeat random gastric biopsies as patient is now off of prednisone    25 minutes were spent in coordination of patient's care, record review and counseling.  More than 50% of the time was face-to-face.    (Portions of this note were dictated using voice recognition software and may contain dictation related errors in spelling/grammar/syntax not found on text review)

## 2020-07-14 NOTE — PATIENT INSTRUCTIONS
SUPREP Instructions    [facility] [address]  Clinic Office [phone]  Endoscopy Lab [phone]    You are scheduled for a EGDColonoscopy with Primo on__________ ,  at [facility].  You will check in at the Information desk on the first floor of the hospital. Please contact the office to reschedule if needed [phone].     A responsible adult (family member or friend) must come with you and transport you home.  You are not allowed to drive, take a taxi/ride share or bus or leave the Endoscopy Center alone.  If you do not have a responsible adult with you to take you home, your exam will be cancelled.      Please follow instructions of  if you are taking anticoagulant/blood thinning medications such as Aggrenox, Brilinta, Effient, Eliquis, Lovenox, Plavix, Pletal, Pradaxa, Ticilid, Xarelto or Coumadin.      Please skip your morning dose of insulin or other oral medications for diabetes the morning of the procedure unless instructed otherwise by your doctor. You should take your blood pressure, heart, anti-rejection and or seizure medication the morning of your procedure.     To ensure that your test is accurate and complete, you must follow these instructions - please do not use the instructions provided from the pharmacy.      The Day Before The Procedure:     Follow a CLEAR LIQUID DIET for the entire day before your scheduled colonoscopy.  This means no solid food the entire day.   A clear liquid diet includes the following:  o Water, Coffee or decaffeinated coffee (no milk or cream)  o Tea, Herbal tea  o Carbonated beverages (soft drinks), regular and sugar free  o Gelatin  o Apple Juice, grape juice, white cranberry juice  o Gatorade, Power Aid, Crystal Light, Mathew Aid  o Lemonade and Limeade  o Bouillon, clear consomme'  o Snowball, popsicles  o               DO NOT DRINK ANY LIQUIDS CONTAINING RED DYE  DO NOT DRINK ANY LIQUIDS NOT SPECIFICALLY  LISTED  DO NOT DRINK ALCOHOL     At 5 pm the evening before your colonoscopy:  o Pour one (1) bottle of SUPREP into the container provided in the box. Add water to the line on the container and mix well.  Drink the whole container and then drink 2 more containers of water over 1 hour.  - This is sometimes easier to drink if this solution is cold, so you can mix the solution 20 minutes ahead of time and place in the refrigerator prior to drinking.  You have to drink the solution within 30-45 minutes of mixing it.  Do NOT put this solution over ice.  It IS ok to drink with a straw.    The Day of the Procedure - The Endoscopy department will call you 2 days prior to your procedure to give you the exact time     5 hours prior to your ARRIVAL TIME (this may be in the middle of the night)  o Pour the 2nd bottle of SUPREP into the container provided in the box.  Add water to the line on the container and mix well.  Drink the whole container and then drink 2 more containers of water over 1 hour.    o AFTER YOU HAVE COMPLETED YOUR PREP, YOU MAY HAVE NOTHING ELSE BY MOUTH    o Please leave all valuables and jewelry at home.    o At 600 am, you may take the last dose of any medications you are allowed to take with a small sip of water (blood pressure, heart, anti-rejection and or seizure medication).  If you use inhalers bring them with you.    o You may call the Endoscopy department at [phone] with any questions regarding your procedure.      Covid 19 screening to be done 72 hours prior to your procedure. Please have this test done on ________ at [address] [id number]

## 2020-07-14 NOTE — PROGRESS NOTES
Subjective:       Patient ID: Solo Black is a 39 y.o. male.    Chief Complaint: Abdominal Pain, Rectal Bleeding (bright red blood in toilet), Bloated (abd swelling), and Hiatal Hernia (had surgery)    Patient here today in follow-up with aforementioned complaint.  Patient was previously seen by me in March with chief complaint of hematemesis.  An expedited an EGD was performed which was unremarkable, save for a medium-sized hiatal hernia.  As patient typically sees Dr. Wall he was instructed to follow up with him.  He since reports having his hiatal hernia repaired in note significant improvement in complaints.    Patient carries diagnosis of hypereosinophilic syndrome and chronic recurrence angioedema/urticaria.  He follows with the  clinic at [facility].  He recently stopped his prednisone to have some blood tests performed.  Since patient reports significant change in bowel habits.  He now reports diarrhea, quantified as passing 5+ loose bowel movements per day often with associated blood in mucus.  He had colonoscopy performed in 2018 by Dr. Brunson which was essentially normal.    Review of Systems   Constitutional: Positive for fatigue. Negative for chills, fever and unexpected weight change.   HENT: Positive for facial swelling. Negative for congestion and trouble swallowing.    Respiratory: Negative for cough and shortness of breath.    Cardiovascular: Negative for chest pain and palpitations.   Gastrointestinal: Positive for abdominal pain, blood in stool, diarrhea and rectal pain. Negative for abdominal distention.   Skin: Positive for rash.   Allergic/Immunologic: Positive for environmental allergies and food allergies.   Psychiatric/Behavioral: The patient is nervous/anxious.          The following portions of the patient's history were reviewed and updated as appropriate: allergies, current medications, past family history, past medical history, past social history, past surgical history and  problem list.    Objective:      Physical Exam  Vitals signs and nursing note reviewed.   Constitutional:       Appearance: He is well-developed.   HENT:      Head: Normocephalic and atraumatic.   Eyes:      General: No scleral icterus.     Pupils: Pupils are equal, round, and reactive to light.   Cardiovascular:      Rate and Rhythm: Normal rate and regular rhythm.      Heart sounds: Normal heart sounds.   Pulmonary:      Effort: Pulmonary effort is normal. No respiratory distress.      Breath sounds: Normal breath sounds.   Abdominal:      General: Bowel sounds are normal. There is no distension.      Palpations: Abdomen is soft.      Tenderness: There is no abdominal tenderness.   Musculoskeletal: Normal range of motion.   Neurological:      Mental Status: He is alert and oriented to person, place, and time.      Comments: No asterixis   Psychiatric:         Behavior: Behavior normal.           Pertinent labs and imaging studies reviewed    Assessment:       1. Pre-procedure lab exam    2. Diarrhea, unspecified type    3. Hematochezia        Plan:       Will plan for EGD/colonoscopy to evaluate for causes of diarrhea and hematochezia  Will repeat random gastric biopsies as patient is now off of prednisone    25 minutes were spent in coordination of patient's care, record review and counseling.  More than 50% of the time was face-to-face.    (Portions of this note were dictated using voice recognition software and may contain dictation related errors in spelling/grammar/syntax not found on text review)

## 2020-07-16 ENCOUNTER — TELEPHONE (OUTPATIENT)
Dept: ENDOSCOPY | Facility: HOSPITAL | Age: 40
End: 2020-07-16

## 2020-07-16 NOTE — TELEPHONE ENCOUNTER
Left message instructing patient to call dept @ [phone] between 8am-4pm.    Arrival time to be given @ 4102

## 2020-07-16 NOTE — TELEPHONE ENCOUNTER
Spoke with patient about arrival time @ 1400.     Prep instructions reviewed: the day before the procedure, follow a clear liquid diet all day, then start the first 1/2 of prep at 5pm and take 2nd 1/2 of prep @ 0900.  Pt must be completely NPO when prep completed @ 1100.              Medications: Do not take Insulin or oral diabetic medications the day of the procedure.  Take as prescribed: heart, seizure and blood pressure medication in the morning with a sip of water (less than an ounce).  Take any breathing medications and bring inhalers to hospital with you Leave all valuables and jewelry at home.     Wear comfortable clothes to procedure to change into hospital gown You cannot drive for 24 hours after your procedure because you will receive sedation for your procedure to make you comfortable.  A ride must be provided at discharge.

## 2020-07-17 ENCOUNTER — LAB VISIT (OUTPATIENT)
Dept: FAMILY MEDICINE | Facility: CLINIC | Age: 40
End: 2020-07-17
Payer: MEDICAID

## 2020-07-17 ENCOUNTER — TELEPHONE (OUTPATIENT)
Dept: ALLERGY | Facility: CLINIC | Age: 40
End: 2020-07-17

## 2020-07-17 DIAGNOSIS — Z01.812 PRE-PROCEDURE LAB EXAM: ICD-10-CM

## 2020-07-17 PROCEDURE — U0003 INFECTIOUS AGENT DETECTION BY NUCLEIC ACID (DNA OR RNA); SEVERE ACUTE RESPIRATORY SYNDROME CORONAVIRUS 2 (SARS-COV-2) (CORONAVIRUS DISEASE [COVID-19]), AMPLIFIED PROBE TECHNIQUE, MAKING USE OF HIGH THROUGHPUT TECHNOLOGIES AS DESCRIBED BY CMS-2020-01-R: HCPCS

## 2020-07-17 RX ORDER — IVERMECTIN 3 MG/1
200 TABLET ORAL DAILY
Qty: 13 TABLET | Refills: 0 | Status: ON HOLD | OUTPATIENT
Start: 2020-07-17 | End: 2020-07-20 | Stop reason: HOSPADM

## 2020-07-17 NOTE — TELEPHONE ENCOUNTER
I called Mr. Black and informed him of his lab results. Eos were 500, but he was on prednisone. 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 Will treat with ivermectin. Once treated, will try to get a CBC while he is off prednisone to see if his eosinophils go down.    Michelle Ornelas MD  Allergy/Immunology Fellow

## 2020-07-18 LAB — SARS-COV-2 RNA RESP QL NAA+PROBE: NOT DETECTED

## 2020-07-20 ENCOUNTER — ANESTHESIA EVENT (OUTPATIENT)
Dept: ENDOSCOPY | Facility: HOSPITAL | Age: 40
End: 2020-07-20
Payer: MEDICAID

## 2020-07-20 ENCOUNTER — ANESTHESIA (OUTPATIENT)
Dept: ENDOSCOPY | Facility: HOSPITAL | Age: 40
End: 2020-07-20
Payer: MEDICAID

## 2020-07-20 ENCOUNTER — HOSPITAL ENCOUNTER (OUTPATIENT)
Facility: HOSPITAL | Age: 40
Discharge: HOME | End: 2020-07-20
Attending: INTERNAL MEDICINE | Admitting: INTERNAL MEDICINE
Payer: MEDICAID

## 2020-07-20 VITALS
RESPIRATION RATE: 19 BRPM | HEART RATE: 68 BPM | SYSTOLIC BLOOD PRESSURE: 120 MMHG | OXYGEN SATURATION: 100 % | DIASTOLIC BLOOD PRESSURE: 56 MMHG | WEIGHT: 210 LBS | BODY MASS INDEX: 28.44 KG/M2 | TEMPERATURE: 99 F | HEIGHT: 72 IN

## 2020-07-20 DIAGNOSIS — K29.60 EROSIVE GASTRITIS: Primary | ICD-10-CM

## 2020-07-20 PROCEDURE — 63600175 PHARM REV CODE 636 W HCPCS: Performed by: NURSE ANESTHETIST, CERTIFIED REGISTERED

## 2020-07-20 PROCEDURE — 25000003 PHARM REV CODE 250: Performed by: NURSE ANESTHETIST, CERTIFIED REGISTERED

## 2020-07-20 PROCEDURE — 37000008 HC ANESTHESIA 1ST 15 MINUTES: Performed by: INTERNAL MEDICINE

## 2020-07-20 PROCEDURE — 88305 TISSUE EXAM BY PATHOLOGIST: CPT | Mod: 26,,, | Performed by: PATHOLOGY

## 2020-07-20 PROCEDURE — 88305 TISSUE EXAM BY PATHOLOGIST: CPT | Mod: 59 | Performed by: PATHOLOGY

## 2020-07-20 PROCEDURE — 00813 ANES UPR LWR GI NDSC PX: CPT | Performed by: INTERNAL MEDICINE

## 2020-07-20 PROCEDURE — 45380 PR COLONOSCOPY,BIOPSY: ICD-10-PCS | Mod: ,,, | Performed by: INTERNAL MEDICINE

## 2020-07-20 PROCEDURE — 88342 IMHCHEM/IMCYTCHM 1ST ANTB: CPT | Mod: 26,,, | Performed by: PATHOLOGY

## 2020-07-20 PROCEDURE — 43239 EGD BIOPSY SINGLE/MULTIPLE: CPT | Mod: 51,,, | Performed by: INTERNAL MEDICINE

## 2020-07-20 PROCEDURE — 88342 IMHCHEM/IMCYTCHM 1ST ANTB: CPT | Performed by: PATHOLOGY

## 2020-07-20 PROCEDURE — 45380 COLONOSCOPY AND BIOPSY: CPT | Performed by: INTERNAL MEDICINE

## 2020-07-20 PROCEDURE — 45380 COLONOSCOPY AND BIOPSY: CPT | Mod: ,,, | Performed by: INTERNAL MEDICINE

## 2020-07-20 PROCEDURE — 37000009 HC ANESTHESIA EA ADD 15 MINS: Performed by: INTERNAL MEDICINE

## 2020-07-20 PROCEDURE — 01813 HC ANESTHESIA EA ADD 15 MINS: CPT | Performed by: INTERNAL MEDICINE

## 2020-07-20 PROCEDURE — 01813 HC ANESTHESIA 1ST 15 MINUTES: CPT | Performed by: INTERNAL MEDICINE

## 2020-07-20 PROCEDURE — 88342 CHG IMMUNOCYTOCHEMISTRY: ICD-10-PCS | Mod: 26,,, | Performed by: PATHOLOGY

## 2020-07-20 PROCEDURE — 43239 PR EGD, FLEX, W/BIOPSY, SGL/MULTI: ICD-10-PCS | Mod: 51,,, | Performed by: INTERNAL MEDICINE

## 2020-07-20 PROCEDURE — 88305 TISSUE EXAM BY PATHOLOGIST: ICD-10-PCS | Mod: 26,,, | Performed by: PATHOLOGY

## 2020-07-20 PROCEDURE — 43239 EGD BIOPSY SINGLE/MULTIPLE: CPT | Performed by: INTERNAL MEDICINE

## 2020-07-20 PROCEDURE — 27201012 HC FORCEPS, HOT/COLD, DISP: Performed by: INTERNAL MEDICINE

## 2020-07-20 RX ORDER — PROPOFOL 10 MG/ML
VIAL (ML) INTRAVENOUS CONTINUOUS PRN
Status: DISCONTINUED | OUTPATIENT
Start: 2020-07-20 | End: 2020-07-20

## 2020-07-20 RX ORDER — FENTANYL CITRATE 50 UG/ML
INJECTION, SOLUTION INTRAMUSCULAR; INTRAVENOUS
Status: DISCONTINUED | OUTPATIENT
Start: 2020-07-20 | End: 2020-07-20

## 2020-07-20 RX ORDER — PHENYLEPHRINE HYDROCHLORIDE 10 MG/ML
INJECTION INTRAVENOUS
Status: DISCONTINUED | OUTPATIENT
Start: 2020-07-20 | End: 2020-07-20

## 2020-07-20 RX ORDER — LIDOCAINE HYDROCHLORIDE 20 MG/ML
INJECTION INTRAVENOUS
Status: DISCONTINUED | OUTPATIENT
Start: 2020-07-20 | End: 2020-07-20

## 2020-07-20 RX ORDER — ONDANSETRON 2 MG/ML
INJECTION INTRAMUSCULAR; INTRAVENOUS
Status: DISCONTINUED | OUTPATIENT
Start: 2020-07-20 | End: 2020-07-20

## 2020-07-20 RX ORDER — PROPOFOL 10 MG/ML
VIAL (ML) INTRAVENOUS
Status: DISCONTINUED | OUTPATIENT
Start: 2020-07-20 | End: 2020-07-20

## 2020-07-20 RX ORDER — SODIUM CHLORIDE 9 MG/ML
INJECTION, SOLUTION INTRAVENOUS CONTINUOUS PRN
Status: DISCONTINUED | OUTPATIENT
Start: 2020-07-20 | End: 2020-07-20

## 2020-07-20 RX ORDER — MIDAZOLAM HYDROCHLORIDE 1 MG/ML
INJECTION, SOLUTION INTRAMUSCULAR; INTRAVENOUS
Status: DISCONTINUED | OUTPATIENT
Start: 2020-07-20 | End: 2020-07-20

## 2020-07-20 RX ADMIN — ONDANSETRON 4 MG: 2 INJECTION, SOLUTION INTRAMUSCULAR; INTRAVENOUS at 04:07

## 2020-07-20 RX ADMIN — PROPOFOL 200 MCG/KG/MIN: 10 INJECTION, EMULSION INTRAVENOUS at 04:07

## 2020-07-20 RX ADMIN — FENTANYL CITRATE 100 MCG: 50 INJECTION, SOLUTION INTRAMUSCULAR; INTRAVENOUS at 04:07

## 2020-07-20 RX ADMIN — PHENYLEPHRINE HYDROCHLORIDE 100 MCG: 10 INJECTION INTRAVENOUS at 04:07

## 2020-07-20 RX ADMIN — SODIUM CHLORIDE: 0.9 INJECTION, SOLUTION INTRAVENOUS at 04:07

## 2020-07-20 RX ADMIN — PROPOFOL 200 MG: 10 INJECTION, EMULSION INTRAVENOUS at 04:07

## 2020-07-20 RX ADMIN — LIDOCAINE HYDROCHLORIDE 60 MG: 20 INJECTION, SOLUTION INTRAVENOUS at 04:07

## 2020-07-20 RX ADMIN — MIDAZOLAM 2 MG: 1 INJECTION INTRAMUSCULAR; INTRAVENOUS at 04:07

## 2020-07-20 NOTE — ANESTHESIA POSTPROCEDURE EVALUATION
Anesthesia Post Evaluation    Patient: Solo Black    Procedure(s) Performed: Procedure(s) (LRB):  EGD (ESOPHAGOGASTRODUODENOSCOPY) (N/A)  COLONOSCOPY (N/A)    Final Anesthesia Type: MAC    Patient location during evaluation: GI PACU  Patient participation: Yes- Able to Participate  Level of consciousness: awake and alert  Post-procedure vital signs: reviewed and stable  Pain management: adequate  Airway patency: patent    PONV status at discharge: No PONV  Anesthetic complications: no      Cardiovascular status: blood pressure returned to baseline and hemodynamically stable  Respiratory status: unassisted, spontaneous ventilation and room air  Hydration status: euvolemic  Follow-up not needed.          Vitals Value Taken Time   /64 07/20/20 1700   Temp 98.2 07/20/20 1700   Pulse 60 07/20/20 1700   Resp 18 07/20/20 1700   SpO2 100 07/20/20 1700         No case tracking events are documented in the log.      Pain/Marcia Score: No data recorded

## 2020-07-20 NOTE — PLAN OF CARE
Admission process complete. Patient ready for procedure. POC reviewed with patient. NPO ststus confirmed. Call bell in reach, bed in lowest postion, side rails up x2    Waiting on anesthesia to do IV

## 2020-07-20 NOTE — ANESTHESIA PREPROCEDURE EVALUATION
07/20/2020  Solo Black is a 39 y.o., male for EGD under MAC    Past Medical History:   Diagnosis Date    C. difficile colitis feb 2014    postop of hand surgery    Eosinophilic esophagitis 3/11/2014    GERD (gastroesophageal reflux disease)     IBS (irritable bowel syndrome)     MRSA carrier     says multiple times nose swab was +    Nephrolithiasis apr 2014    bilateral punctate - never passed a stone    Urinary tract infection feb 2014    MRSA     PMH ADHD, eosinophilic esophagitis with multiple endoscopies, PUD, h/o shoulder repair. anxiety.    Pre-op Assessment    I have reviewed the Patient Summary Reports.     I have reviewed the Nursing Notes.    I have reviewed the Medications.     Review of Systems  Anesthesia Hx:  Denies Family Hx of Anesthesia complications.   Denies Personal Hx of Anesthesia complications. ( Multiple endoscopies under MAC and lap jane/appy under General with no anesthetic issues per patient)   Social:  Smoker, Social Alcohol Use Light tobacco smoking   Hematology/Oncology:         -- Anemia:   Cardiovascular:  Cardiovascular Normal Exercise tolerance: good  ECG has been reviewed.    Renal/:  Renal/ Normal     Hepatic/GI:   GERD, well controlled Eosinophilic esophagitis   Neurological:  Neurology Normal    Dermatological:   LLE cellulitis   Psych:   Psychiatric History ( ADHD)          Physical Exam  General:  Well nourished    Airway/Jaw/Neck:  Airway Findings: Mouth Opening: Normal Tongue: Normal  Mallampati: II  TM Distance: Normal, at least 6 cm      Dental:  Dental Findings: In tact   Chest/Lungs:  Chest/Lungs Findings: Clear to auscultation, Normal Respiratory Rate     Heart/Vascular:  Heart Findings: Rate: Normal  Rhythm: Regular Rhythm  Sounds: Normal        Mental Status:  Mental Status Findings:  Cooperative, Alert and Oriented        4/2019  · Concentric left ventricular remodeling.  · Normal left ventricular systolic function. The estimated ejection fraction is 60%  · Grade I (mild) left ventricular diastolic dysfunction consistent with impaired relaxation.  · Mild tricuspid regurgitation.  · Normal right ventricular systolic function.  · Normal left atrial pressure.  · Normal central venous pressure (3 mm Hg).  · The estimated PA systolic pressure is 27 mm Hg      Anesthesia Plan  Type of Anesthesia, risks & benefits discussed:  Anesthesia Type:  MAC  Patient's Preference:   Intra-op Monitoring Plan: standard ASA monitors  Intra-op Monitoring Plan Comments:   Post Op Pain Control Plan: multimodal analgesia and per primary service following discharge from PACU  Post Op Pain Control Plan Comments:   Induction:   IV  Beta Blocker:  Patient is not currently on a Beta-Blocker (No further documentation required).       Informed Consent: Patient understands risks and agrees with Anesthesia plan.  Questions answered. Anesthesia consent signed with patient.  ASA Score: 2     Day of Surgery Review of History & Physical:            Ready For Surgery From Anesthesia Perspective.

## 2020-07-20 NOTE — TRANSFER OF CARE
Anesthesia Transfer of Care Note    Patient: Solo Black    Procedure(s) Performed: Procedure(s) (LRB):  EGD (ESOPHAGOGASTRODUODENOSCOPY) (N/A)  COLONOSCOPY (N/A)    Patient location: GI    Anesthesia Type: MAC    Transport from OR: Transported from OR on room air with adequate spontaneous ventilation    Post pain: adequate analgesia    Post assessment: tolerated procedure well and no apparent anesthetic complications    Post vital signs: stable    Level of consciousness: awake, oriented and alert    Nausea/Vomiting: no nausea/vomiting    Complications: none    Transfer of care protocol was followed      Last vitals:   Visit Vitals  /66 (BP Location: Left arm, Patient Position: Sitting)   Pulse 86   Temp 36.9 °C (98.4 °F) (Skin)   Resp 20   Ht 6' (1.829 m)   Wt 95.3 kg (210 lb)   SpO2 100%   BMI 28.48 kg/m²

## 2020-07-20 NOTE — [employer]OVATION PATIENT INSTRUCTIONS
Discharge Summary/Instructions after an Endoscopic Procedure  Patient Name: Solo Black  Patient MRN: [id number]  Patient YOB: 1980 Monday, July 20, 2020  Ruslan Tillman MD  Your health is very important to us during the Covid Crisis. Following your   procedure today, you will receive a daily text for 2 weeks asking about   signs or symptoms of Covid 19.  Please respond to this text when you   receive it so we can follow up and keep you as safe as possible.   RESTRICTIONS:  During your procedure today, you received medications for sedation.  These   medications may affect your judgment, balance and coordination.  Therefore,   for 24 hours, you have the following restrictions:   - DO NOT drive a car, operate machinery, make legal/financial decisions,   sign important papers or drink alcohol.    ACTIVITY:  Today: no heavy lifting, straining or running due to procedural   sedation/anesthesia.  The following day: return to full activity including work.  DIET:  Eat and drink normally unless instructed otherwise.     TREATMENT FOR COMMON SIDE EFFECTS:  - Mild abdominal pain, nausea, belching, bloating or excessive gas:  rest,   eat lightly and use a heating pad.  - Sore Throat: treat with throat lozenges and/or gargle with warm salt   water.  - Because air was used during the procedure, expelling large amounts of air   from your rectum or belching is normal.  - If a bowel prep was taken, you may not have a bowel movement for 1-3 days.    This is normal.  SYMPTOMS TO WATCH FOR AND REPORT TO YOUR PHYSICIAN:  1. Abdominal pain or bloating, other than gas cramps.  2. Chest pain.  3. Back pain.  4. Signs of infection such as: chills or fever occurring within 24 hours   after the procedure.  5. Rectal bleeding, which would show as bright red, maroon, or black stools.   (A tablespoon of blood from the rectum is not serious, especially if   hemorrhoids are present.)  6. Vomiting.  7. Weakness or  dizziness.  GO DIRECTLY TO THE NEAREST EMERGENCY ROOM IF YOU HAVE ANY OF THE FOLLOWING:      Difficulty breathing              Chills and/or fever over 101 F   Persistent vomiting and/or vomiting blood   Severe abdominal pain   Severe chest pain   Black, tarry stools   Bleeding- more than one tablespoon   Any other symptom or condition that you feel may need urgent attention  Your doctor recommends these additional instructions:  If any biopsies were taken, your doctors clinic will contact you in 1 to 2   weeks with any results.  - Discharge patient to home.   - Resume previous diet.   - Continue present medications.   - Await pathology results.   - Repeat colonoscopy at age 52 for screening purposes.   - Patient has a contact number available for emergencies.  The signs and   symptoms of potential delayed complications were discussed with the   patient.  Return to normal activities tomorrow.  Written discharge   instructions were provided to the patient.  For questions, problems or results please call your physician - Ruslan Tillman MD.  EMERGENCY PHONE NUMBER: [phone],  LAB RESULTS: [phone]  IF A COMPLICATION OR EMERGENCY SITUATION ARISES AND YOU ARE UNABLE TO REACH   YOUR PHYSICIAN - GO DIRECTLY TO THE EMERGENCY ROOM.  Ruslan Tillman MD  7/20/2020 5:01:26 PM  This report has been verified and signed electronically.  [employer]ATION

## 2020-07-20 NOTE — PROVATION PATIENT INSTRUCTIONS
Discharge Summary/Instructions after an Endoscopic Procedure  Patient Name: Solo Black  Patient MRN: [id number]  Patient YOB: 1980 Monday, July 20, 2020  Ruslan Tillman MD  Your health is very important to us during the Covid Crisis. Following your   procedure today, you will receive a daily text for 2 weeks asking about   signs or symptoms of Covid 19.  Please respond to this text when you   receive it so we can follow up and keep you as safe as possible.   RESTRICTIONS:  During your procedure today, you received medications for sedation.  These   medications may affect your judgment, balance and coordination.  Therefore,   for 24 hours, you have the following restrictions:   - DO NOT drive a car, operate machinery, make legal/financial decisions,   sign important papers or drink alcohol.    ACTIVITY:  Today: no heavy lifting, straining or running due to procedural   sedation/anesthesia.  The following day: return to full activity including work.  DIET:  Eat and drink normally unless instructed otherwise.     TREATMENT FOR COMMON SIDE EFFECTS:  - Mild abdominal pain, nausea, belching, bloating or excessive gas:  rest,   eat lightly and use a heating pad.  - Sore Throat: treat with throat lozenges and/or gargle with warm salt   water.  - Because air was used during the procedure, expelling large amounts of air   from your rectum or belching is normal.  - If a bowel prep was taken, you may not have a bowel movement for 1-3 days.    This is normal.  SYMPTOMS TO WATCH FOR AND REPORT TO YOUR PHYSICIAN:  1. Abdominal pain or bloating, other than gas cramps.  2. Chest pain.  3. Back pain.  4. Signs of infection such as: chills or fever occurring within 24 hours   after the procedure.  5. Rectal bleeding, which would show as bright red, maroon, or black stools.   (A tablespoon of blood from the rectum is not serious, especially if   hemorrhoids are present.)  6. Vomiting.  7. Weakness or  dizziness.  GO DIRECTLY TO THE NEAREST EMERGENCY ROOM IF YOU HAVE ANY OF THE FOLLOWING:      Difficulty breathing              Chills and/or fever over 101 F   Persistent vomiting and/or vomiting blood   Severe abdominal pain   Severe chest pain   Black, tarry stools   Bleeding- more than one tablespoon   Any other symptom or condition that you feel may need urgent attention  Your doctor recommends these additional instructions:  If any biopsies were taken, your doctors clinic will contact you in 1 to 2   weeks with any results.  - Discharge patient to home.   - Resume previous diet.   - Continue present medications.   - Await pathology results.   - Perform a colonoscopy as previously scheduled.  For questions, problems or results please call your physician - Ruslan Tillman MD.  EMERGENCY PHONE NUMBER: [phone],  LAB RESULTS: [phone]  IF A COMPLICATION OR EMERGENCY SITUATION ARISES AND YOU ARE UNABLE TO REACH   YOUR PHYSICIAN - GO DIRECTLY TO THE EMERGENCY ROOM.  Ruslan Tillman MD  7/20/2020 4:44:37 PM  This report has been verified and signed electronically.  [employer]OVATION

## 2020-07-27 LAB
FINAL PATHOLOGIC DIAGNOSIS: NORMAL
GROSS: NORMAL
MICROSCOPIC EXAM: NORMAL

## 2020-07-31 ENCOUNTER — TELEPHONE (OUTPATIENT)
Dept: ALLERGY | Facility: CLINIC | Age: 40
End: 2020-07-31

## 2020-07-31 NOTE — TELEPHONE ENCOUNTER
----- Message from Lucrecia Main sent at 7/31/2020  9:13 AM CDT -----  Regarding: pt  Pt is returning the Doctors call from during the week about test results can you please call pt at [phone].    KATTY

## 2020-07-31 NOTE — TELEPHONE ENCOUNTER
I called Mr. Black to see how he was doing. He said he is still getting a lot of swelling, but he only just completed his ivermectin yesterday. Will follow up with him in a little over a week to see if it helped.    Michelle Ornelas MD  Allergy/Immunology Fellow

## 2020-08-04 ENCOUNTER — TELEPHONE (OUTPATIENT)
Dept: GASTROENTEROLOGY | Facility: CLINIC | Age: 40
End: 2020-08-04

## 2020-08-04 DIAGNOSIS — R19.7 DIARRHEA, UNSPECIFIED TYPE: Primary | ICD-10-CM

## 2020-08-04 NOTE — TELEPHONE ENCOUNTER
As expected. Consistent with chronic eosinophil condition. Will check celiac panel to complete work up

## 2020-08-06 ENCOUNTER — NURSE TRIAGE (OUTPATIENT)
Dept: ADMINISTRATIVE | Facility: CLINIC | Age: 40
End: 2020-08-06

## 2020-08-06 ENCOUNTER — HOSPITAL ENCOUNTER (INPATIENT)
Facility: HOSPITAL | Age: 40
LOS: 4 days | Discharge: HOME | DRG: 815 | End: 2020-08-10
Attending: EMERGENCY MEDICINE | Admitting: EMERGENCY MEDICINE
Payer: MEDICAID

## 2020-08-06 ENCOUNTER — TELEPHONE (OUTPATIENT)
Dept: ALLERGY | Facility: CLINIC | Age: 40
End: 2020-08-06

## 2020-08-06 DIAGNOSIS — K20.0 EOSINOPHILIC ESOPHAGITIS: Chronic | ICD-10-CM

## 2020-08-06 DIAGNOSIS — K29.60 EROSIVE GASTRITIS: ICD-10-CM

## 2020-08-06 DIAGNOSIS — R53.83 FATIGUE: ICD-10-CM

## 2020-08-06 DIAGNOSIS — D72.119 HYPEREOSINOPHILIC SYNDROME: ICD-10-CM

## 2020-08-06 DIAGNOSIS — R07.9 CHEST PAIN: ICD-10-CM

## 2020-08-06 DIAGNOSIS — K52.81 EOSINOPHILIC GASTRITIS: Primary | ICD-10-CM

## 2020-08-06 DIAGNOSIS — D72.119 HYPEREOSINOPHILIC SYNDROME: Primary | ICD-10-CM

## 2020-08-06 PROBLEM — L03.116 CELLULITIS OF FOOT, LEFT: Status: RESOLVED | Noted: 2019-05-03 | Resolved: 2020-08-06

## 2020-08-06 PROBLEM — L03.90 CELLULITIS: Status: RESOLVED | Noted: 2019-04-12 | Resolved: 2020-08-06

## 2020-08-06 PROBLEM — R10.9 ABDOMINAL PAIN: Status: RESOLVED | Noted: 2020-03-25 | Resolved: 2020-08-06

## 2020-08-06 PROBLEM — A41.01 STAPHYLOCOCCUS AUREUS BACTEREMIA WITH SEPSIS: Status: RESOLVED | Noted: 2019-04-15 | Resolved: 2020-08-06

## 2020-08-06 LAB
ALBUMIN SERPL BCP-MCNC: 4.5 G/DL (ref 3.5–5.2)
ALP SERPL-CCNC: 67 U/L (ref 55–135)
ALT SERPL W/O P-5'-P-CCNC: 37 U/L (ref 10–44)
ANION GAP SERPL CALC-SCNC: 12 MMOL/L (ref 8–16)
AST SERPL-CCNC: 29 U/L (ref 10–40)
BASOPHILS # BLD AUTO: 0.08 K/UL (ref 0–0.2)
BASOPHILS NFR BLD: 1 % (ref 0–1.9)
BILIRUB SERPL-MCNC: 0.4 MG/DL (ref 0.1–1)
BUN SERPL-MCNC: 15 MG/DL (ref 6–20)
CALCIUM SERPL-MCNC: 9.9 MG/DL (ref 8.7–10.5)
CHLORIDE SERPL-SCNC: 106 MMOL/L (ref 95–110)
CK SERPL-CCNC: 327 U/L (ref 20–200)
CO2 SERPL-SCNC: 24 MMOL/L (ref 23–29)
CREAT SERPL-MCNC: 1.2 MG/DL (ref 0.5–1.4)
CRP SERPL-MCNC: 1.6 MG/L (ref 0–8.2)
DIFFERENTIAL METHOD: ABNORMAL
EOSINOPHIL # BLD AUTO: 0.4 K/UL (ref 0–0.5)
EOSINOPHIL NFR BLD: 5.5 % (ref 0–8)
ERYTHROCYTE [DISTWIDTH] IN BLOOD BY AUTOMATED COUNT: 22 % (ref 11.5–14.5)
ERYTHROCYTE [SEDIMENTATION RATE] IN BLOOD BY WESTERGREN METHOD: 9 MM/HR (ref 0–23)
EST. GFR  (AFRICAN AMERICAN): >60 ML/MIN/1.73 M^2
EST. GFR  (NON AFRICAN AMERICAN): >60 ML/MIN/1.73 M^2
GLUCOSE SERPL-MCNC: 80 MG/DL (ref 70–110)
HCT VFR BLD AUTO: 45.4 % (ref 40–54)
HGB BLD-MCNC: 13.7 G/DL (ref 14–18)
IMM GRANULOCYTES # BLD AUTO: 0.01 K/UL (ref 0–0.04)
IMM GRANULOCYTES NFR BLD AUTO: 0.1 % (ref 0–0.5)
LYMPHOCYTES # BLD AUTO: 1.4 K/UL (ref 1–4.8)
LYMPHOCYTES NFR BLD: 17.4 % (ref 18–48)
MCH RBC QN AUTO: 23.8 PG (ref 27–31)
MCHC RBC AUTO-ENTMCNC: 30.2 G/DL (ref 32–36)
MCV RBC AUTO: 79 FL (ref 82–98)
MONOCYTES # BLD AUTO: 0.9 K/UL (ref 0.3–1)
MONOCYTES NFR BLD: 11.6 % (ref 4–15)
NEUTROPHILS # BLD AUTO: 5.1 K/UL (ref 1.8–7.7)
NEUTROPHILS NFR BLD: 64.4 % (ref 38–73)
NRBC BLD-RTO: 0 /100 WBC
PLATELET # BLD AUTO: 282 K/UL (ref 150–350)
PMV BLD AUTO: 11 FL (ref 9.2–12.9)
POTASSIUM SERPL-SCNC: 3.7 MMOL/L (ref 3.5–5.1)
PROT SERPL-MCNC: 8.1 G/DL (ref 6–8.4)
RBC # BLD AUTO: 5.75 M/UL (ref 4.6–6.2)
SARS-COV-2 RDRP RESP QL NAA+PROBE: NEGATIVE
SODIUM SERPL-SCNC: 142 MMOL/L (ref 136–145)
WBC # BLD AUTO: 7.87 K/UL (ref 3.9–12.7)

## 2020-08-06 PROCEDURE — 93010 EKG 12-LEAD: ICD-10-PCS | Mod: ,,, | Performed by: INTERNAL MEDICINE

## 2020-08-06 PROCEDURE — 80053 COMPREHEN METABOLIC PANEL: CPT

## 2020-08-06 PROCEDURE — U0002 COVID-19 LAB TEST NON-CDC: HCPCS

## 2020-08-06 PROCEDURE — 96375 TX/PRO/DX INJ NEW DRUG ADDON: CPT

## 2020-08-06 PROCEDURE — 25000003 PHARM REV CODE 250: Performed by: HOSPITALIST

## 2020-08-06 PROCEDURE — 96374 THER/PROPH/DIAG INJ IV PUSH: CPT

## 2020-08-06 PROCEDURE — 63600175 PHARM REV CODE 636 W HCPCS: Performed by: PHYSICIAN ASSISTANT

## 2020-08-06 PROCEDURE — 86140 C-REACTIVE PROTEIN: CPT

## 2020-08-06 PROCEDURE — 25000003 PHARM REV CODE 250: Performed by: STUDENT IN AN ORGANIZED HEALTH CARE EDUCATION/TRAINING PROGRAM

## 2020-08-06 PROCEDURE — 93010 ELECTROCARDIOGRAM REPORT: CPT | Mod: ,,, | Performed by: INTERNAL MEDICINE

## 2020-08-06 PROCEDURE — 11000001 HC ACUTE MED/SURG PRIVATE ROOM

## 2020-08-06 PROCEDURE — 99285 PR EMERGENCY DEPT VISIT,LEVEL V: ICD-10-PCS | Mod: ,,, | Performed by: EMERGENCY MEDICINE

## 2020-08-06 PROCEDURE — 85025 COMPLETE CBC W/AUTO DIFF WBC: CPT

## 2020-08-06 PROCEDURE — 63600175 PHARM REV CODE 636 W HCPCS: Performed by: HOSPITALIST

## 2020-08-06 PROCEDURE — 99285 EMERGENCY DEPT VISIT HI MDM: CPT | Mod: 25

## 2020-08-06 PROCEDURE — 63600175 PHARM REV CODE 636 W HCPCS: Performed by: STUDENT IN AN ORGANIZED HEALTH CARE EDUCATION/TRAINING PROGRAM

## 2020-08-06 PROCEDURE — 82550 ASSAY OF CK (CPK): CPT

## 2020-08-06 PROCEDURE — 85652 RBC SED RATE AUTOMATED: CPT

## 2020-08-06 PROCEDURE — 99285 EMERGENCY DEPT VISIT HI MDM: CPT | Mod: ,,, | Performed by: EMERGENCY MEDICINE

## 2020-08-06 PROCEDURE — 63600175 PHARM REV CODE 636 W HCPCS

## 2020-08-06 PROCEDURE — 93005 ELECTROCARDIOGRAM TRACING: CPT

## 2020-08-06 RX ORDER — SODIUM CHLORIDE 0.9 % (FLUSH) 0.9 %
10 SYRINGE (ML) INJECTION
Status: DISCONTINUED | OUTPATIENT
Start: 2020-08-06 | End: 2020-08-07

## 2020-08-06 RX ORDER — PREDNISONE 10 MG/1
50 TABLET ORAL 2 TIMES DAILY
Status: ON HOLD | COMMUNITY
End: 2020-08-10 | Stop reason: HOSPADM

## 2020-08-06 RX ORDER — IBUPROFEN 200 MG
16 TABLET ORAL
Status: DISCONTINUED | OUTPATIENT
Start: 2020-08-06 | End: 2020-08-10 | Stop reason: HOSPADM

## 2020-08-06 RX ORDER — FAMOTIDINE 20 MG/1
20 TABLET, FILM COATED ORAL 2 TIMES DAILY
Status: DISCONTINUED | OUTPATIENT
Start: 2020-08-06 | End: 2020-08-10 | Stop reason: HOSPADM

## 2020-08-06 RX ORDER — EPINEPHRINE 0.3 MG/.3ML
0.3 INJECTION SUBCUTANEOUS ONCE AS NEEDED
Status: DISCONTINUED | OUTPATIENT
Start: 2020-08-07 | End: 2020-08-10 | Stop reason: HOSPADM

## 2020-08-06 RX ORDER — EPINEPHRINE 0.3 MG/.3ML
0.3 INJECTION SUBCUTANEOUS ONCE
Status: COMPLETED | OUTPATIENT
Start: 2020-08-06 | End: 2020-08-06

## 2020-08-06 RX ORDER — HYDROMORPHONE HYDROCHLORIDE 1 MG/ML
2 INJECTION, SOLUTION INTRAMUSCULAR; INTRAVENOUS; SUBCUTANEOUS
Status: DISCONTINUED | OUTPATIENT
Start: 2020-08-06 | End: 2020-08-06

## 2020-08-06 RX ORDER — IBUPROFEN 200 MG
24 TABLET ORAL
Status: DISCONTINUED | OUTPATIENT
Start: 2020-08-06 | End: 2020-08-10 | Stop reason: HOSPADM

## 2020-08-06 RX ORDER — EPINEPHRINE 1 MG/ML
INJECTION, SOLUTION INTRACARDIAC; INTRAMUSCULAR; INTRAVENOUS; SUBCUTANEOUS
Status: DISCONTINUED
Start: 2020-08-06 | End: 2020-08-06 | Stop reason: WASHOUT

## 2020-08-06 RX ORDER — METHYLPREDNISOLONE SOD SUCC 125 MG
125 VIAL (EA) INJECTION
Status: COMPLETED | OUTPATIENT
Start: 2020-08-06 | End: 2020-08-06

## 2020-08-06 RX ORDER — PANTOPRAZOLE SODIUM 40 MG/1
40 TABLET, DELAYED RELEASE ORAL 2 TIMES DAILY
Status: DISCONTINUED | OUTPATIENT
Start: 2020-08-06 | End: 2020-08-10 | Stop reason: HOSPADM

## 2020-08-06 RX ORDER — PAROXETINE HYDROCHLORIDE 20 MG/1
20 TABLET, FILM COATED ORAL EVERY MORNING
Status: DISCONTINUED | OUTPATIENT
Start: 2020-08-07 | End: 2020-08-10 | Stop reason: HOSPADM

## 2020-08-06 RX ORDER — EPINEPHRINE 0.15 MG/.3ML
0.15 INJECTION INTRAMUSCULAR ONCE
Status: DISCONTINUED | OUTPATIENT
Start: 2020-08-07 | End: 2020-08-06

## 2020-08-06 RX ORDER — HYDROMORPHONE HYDROCHLORIDE 1 MG/ML
1 INJECTION, SOLUTION INTRAMUSCULAR; INTRAVENOUS; SUBCUTANEOUS ONCE
Status: COMPLETED | OUTPATIENT
Start: 2020-08-06 | End: 2020-08-06

## 2020-08-06 RX ORDER — HYDROMORPHONE HYDROCHLORIDE 1 MG/ML
1 INJECTION, SOLUTION INTRAMUSCULAR; INTRAVENOUS; SUBCUTANEOUS
Status: COMPLETED | OUTPATIENT
Start: 2020-08-06 | End: 2020-08-06

## 2020-08-06 RX ORDER — CETIRIZINE HYDROCHLORIDE 10 MG/1
40 TABLET ORAL 2 TIMES DAILY
Status: DISCONTINUED | OUTPATIENT
Start: 2020-08-06 | End: 2020-08-06

## 2020-08-06 RX ORDER — EPINEPHRINE 0.1 MG/ML
INJECTION INTRAVENOUS
Status: COMPLETED
Start: 2020-08-06 | End: 2020-08-06

## 2020-08-06 RX ORDER — GLUCAGON 1 MG
1 KIT INJECTION
Status: DISCONTINUED | OUTPATIENT
Start: 2020-08-06 | End: 2020-08-10 | Stop reason: HOSPADM

## 2020-08-06 RX ORDER — SODIUM CHLORIDE 0.9 % (FLUSH) 0.9 %
10 SYRINGE (ML) INJECTION
Status: DISCONTINUED | OUTPATIENT
Start: 2020-08-06 | End: 2020-08-10 | Stop reason: HOSPADM

## 2020-08-06 RX ORDER — METHYLPREDNISOLONE SOD SUCC 125 MG
125 VIAL (EA) INJECTION EVERY 6 HOURS
Status: DISCONTINUED | OUTPATIENT
Start: 2020-08-06 | End: 2020-08-10 | Stop reason: HOSPADM

## 2020-08-06 RX ORDER — DIPHENHYDRAMINE HYDROCHLORIDE 50 MG/ML
INJECTION INTRAMUSCULAR; INTRAVENOUS
Status: COMPLETED
Start: 2020-08-06 | End: 2020-08-06

## 2020-08-06 RX ORDER — CETIRIZINE HYDROCHLORIDE 5 MG/1
20 TABLET ORAL 2 TIMES DAILY
Status: DISCONTINUED | OUTPATIENT
Start: 2020-08-07 | End: 2020-08-07

## 2020-08-06 RX ORDER — DIPHENHYDRAMINE HYDROCHLORIDE 50 MG/ML
25 INJECTION INTRAMUSCULAR; INTRAVENOUS
Status: DISCONTINUED | OUTPATIENT
Start: 2020-08-06 | End: 2020-08-06

## 2020-08-06 RX ORDER — PREDNISONE 20 MG/1
80 TABLET ORAL DAILY
Status: DISCONTINUED | OUTPATIENT
Start: 2020-08-07 | End: 2020-08-06

## 2020-08-06 RX ORDER — DIPHENHYDRAMINE HCL 25 MG
50 TABLET ORAL 3 TIMES DAILY
COMMUNITY
End: 2020-09-08 | Stop reason: CLARIF

## 2020-08-06 RX ORDER — CETIRIZINE HYDROCHLORIDE 10 MG/1
20 TABLET ORAL 2 TIMES DAILY
COMMUNITY

## 2020-08-06 RX ORDER — HYDROXYZINE PAMOATE 25 MG/1
50 CAPSULE ORAL EVERY 4 HOURS PRN
Status: DISCONTINUED | OUTPATIENT
Start: 2020-08-07 | End: 2020-08-10 | Stop reason: HOSPADM

## 2020-08-06 RX ORDER — DIPHENHYDRAMINE HYDROCHLORIDE 50 MG/ML
50 INJECTION INTRAMUSCULAR; INTRAVENOUS
Status: COMPLETED | OUTPATIENT
Start: 2020-08-06 | End: 2020-08-06

## 2020-08-06 RX ORDER — METHYLPREDNISOLONE SOD SUCC 125 MG
VIAL (EA) INJECTION
Status: COMPLETED
Start: 2020-08-06 | End: 2020-08-06

## 2020-08-06 RX ORDER — HYDROXYZINE PAMOATE 25 MG/1
50 CAPSULE ORAL ONCE
Status: COMPLETED | OUTPATIENT
Start: 2020-08-07 | End: 2020-08-07

## 2020-08-06 RX ORDER — HYDROMORPHONE HYDROCHLORIDE 1 MG/ML
2 INJECTION, SOLUTION INTRAMUSCULAR; INTRAVENOUS; SUBCUTANEOUS EVERY 6 HOURS PRN
Status: DISCONTINUED | OUTPATIENT
Start: 2020-08-06 | End: 2020-08-07

## 2020-08-06 RX ORDER — DIPHENHYDRAMINE HCL 50 MG
50 CAPSULE ORAL 3 TIMES DAILY
Status: DISCONTINUED | OUTPATIENT
Start: 2020-08-07 | End: 2020-08-07

## 2020-08-06 RX ADMIN — DIPHENHYDRAMINE HYDROCHLORIDE 50 MG: 50 INJECTION, SOLUTION INTRAMUSCULAR; INTRAVENOUS at 11:08

## 2020-08-06 RX ADMIN — METHYLPREDNISOLONE SODIUM SUCCINATE 125 MG: 125 INJECTION, POWDER, FOR SOLUTION INTRAMUSCULAR; INTRAVENOUS at 11:08

## 2020-08-06 RX ADMIN — METHYLPREDNISOLONE SODIUM SUCCINATE 125 MG: 125 INJECTION, POWDER, FOR SOLUTION INTRAMUSCULAR; INTRAVENOUS at 06:08

## 2020-08-06 RX ADMIN — PANTOPRAZOLE SODIUM 40 MG: 40 TABLET, DELAYED RELEASE ORAL at 09:08

## 2020-08-06 RX ADMIN — HYDROMORPHONE HYDROCHLORIDE 1 MG: 1 INJECTION, SOLUTION INTRAMUSCULAR; INTRAVENOUS; SUBCUTANEOUS at 07:08

## 2020-08-06 RX ADMIN — EPINEPHRINE 0.3 MG: 0.3 INJECTION INTRAMUSCULAR at 11:08

## 2020-08-06 RX ADMIN — HYDROMORPHONE HYDROCHLORIDE 1 MG: 1 INJECTION, SOLUTION INTRAMUSCULAR; INTRAVENOUS; SUBCUTANEOUS at 09:08

## 2020-08-06 RX ADMIN — HYDROMORPHONE HYDROCHLORIDE 2 MG: 1 INJECTION, SOLUTION INTRAMUSCULAR; INTRAVENOUS; SUBCUTANEOUS at 11:08

## 2020-08-06 RX ADMIN — DIPHENHYDRAMINE HYDROCHLORIDE 50 MG: 50 INJECTION, SOLUTION INTRAMUSCULAR; INTRAVENOUS at 07:08

## 2020-08-06 RX ADMIN — FAMOTIDINE 20 MG: 20 TABLET, FILM COATED ORAL at 09:08

## 2020-08-06 NOTE — FIRST PROVIDER EVALUATION
" Emergency Department TeleTRIAGE Encounter Note      CHIEF COMPLAINT    Chief Complaint   Patient presents with    Rectal Bleeding     + bright red rectal bleeding w/ new onset today. Denies dizziness or weakness.        VITAL SIGNS   Initial Vitals [08/06/20 1534]   BP Pulse Resp Temp SpO2   (!) 155/91 110 16 98.4 °F (36.9 °C) 100 %      MAP       --            ALLERGIES    Review of patient's allergies indicates:   Allergen Reactions    Legumes Nausea And Vomiting and Swelling     Other reaction(s): GI Intolerance  vomiting  vomiting  Pt reports legumes make his lips swell and induce severe vomiting  Pt reports legumes make his lips swell and induce severe vomiting      Nsaids (non-steroidal anti-inflammatory drug)      GI bleed    Bean pod extract      Lips swelling, vomiting  Lips swelling, vomiting      Ketamine      Severe dysphoria (bad trip)    Lentils      Lips swelling, vomiting  Lips swelling, vomiting      Meloxicam Nausea Only     GI bleed    Peas      Lips swelling, vomiting    Penicillins      Has taken cephalosporins without issue    Haloperidol Anxiety and Other (See Comments)     "feels like crawling out of his skin"         PROVIDER TRIAGE NOTE  Patient reports diarrhea and rectal bleeding starting 4-5 hours ago.  He reports history of eosinophilic esophagitis and angioedema.  He reports bilateral eye and lip swelling and used an epi pen last night with improvement of swelling.  Patient was advised to come to the ER by his immunologist for possible admission.    Patient is currently taking prednisone.  No current lip or tongue swelling, no shortness of breath. Will order labs, solumedrol, pending ED provider evaluation.      ORDERS  Labs Reviewed - No data to display    ED Orders (720h ago, onward)    None            Virtual Visit Note: The provider triage portion of this emergency department evaluation and documentation was performed via [employer], a HIPAA-compliant telemedicine " application, in concert with a tele-presenter in the room. A face to face patient evaluation with one of my colleagues will occur once the patient is placed in an emergency department room.      DISCLAIMER: This note was prepared with [employer] ([employer]) voice recognition transcription software. Garbled syntax, mangled pronouns, and other bizarre constructions may be attributed to that software system.

## 2020-08-06 NOTE — TELEPHONE ENCOUNTER
Mr. Black called the triage nurse this AM, who connected me to him. He said he is not doing well. He is having feet swelling, eye swelling, severe abdominal pain, and blood per rectum, while on prednisone 20 mg. He took his ivermectin last week, but has not had improvement, so strongyloides is not likely what is causing his symptoms. He reports [place] and he said he found multiple labs where his peripheral eos were >2000. He said he also found a lab that showed a normal C1 esterase. I advised him to go to the ED. I will see him on Tuesday. Will convert that visit from a telemedicine visit to an in-person visit. He said he will bring his records to that visit. Highly suspect that he has HES. I will consult heme onc for their input and for possible bone marrow biopsy.    Michelle Ornelas MD  Allergy/Immunology Fellow

## 2020-08-06 NOTE — TELEPHONE ENCOUNTER
The patient called and wanted to speak with someone from immunology. The patient was transferred to Dr. Ornelas.     Reason for Disposition   General information question, no triage required and triager able to answer question    Additional Information   Negative: [1] Caller is not with the adult (patient) AND [2] reporting urgent symptoms   Negative: Lab result questions   Negative: Medication questions   Negative: Caller can't be reached by phone   Negative: Caller has already spoken to PCP or another triager   Negative: RN needs further essential information from caller in order to complete triage   Negative: Requesting regular office appointment   Negative: [1] Caller requesting NON-URGENT health information AND [2] PCP's office is the best resource   Negative: Health Information question, no triage required and triager able to answer question    Protocols used: INFORMATION ONLY CALL-A-

## 2020-08-06 NOTE — ED NOTES
This RN attempted unsuccessful x 2 to start IV.  Pt stated that he needs US for IV start     Eloisa Martinez, RHONDA  08/06/20 7137

## 2020-08-06 NOTE — ED TRIAGE NOTES
"Solo Black, a 39 y.o. male presents to the ED w/ complaint of intense and sudden abd pain with rectal bleeding that started today.     Triage note:  Chief Complaint   Patient presents with    Rectal Bleeding     + bright red rectal bleeding w/ new onset today. Denies dizziness or weakness.      Review of patient's allergies indicates:   Allergen Reactions    Legumes Nausea And Vomiting and Swelling     Other reaction(s): GI Intolerance  vomiting  vomiting  Pt reports legumes make his lips swell and induce severe vomiting  Pt reports legumes make his lips swell and induce severe vomiting      Nsaids (non-steroidal anti-inflammatory drug)      GI bleed    Bean pod extract      Lips swelling, vomiting  Lips swelling, vomiting      Ketamine      Severe dysphoria (bad trip)    Lentils      Lips swelling, vomiting  Lips swelling, vomiting      Meloxicam Nausea Only     GI bleed    Peas      Lips swelling, vomiting    Penicillins      Has taken cephalosporins without issue    Haloperidol Anxiety and Other (See Comments)     "feels like crawling out of his skin"       Past Medical History:   Diagnosis Date    C. difficile colitis feb 2014    postop of hand surgery    Eosinophilic esophagitis 3/11/2014    GERD (gastroesophageal reflux disease)     IBS (irritable bowel syndrome)     MRSA carrier     says multiple times nose swab was +    Nephrolithiasis apr 2014    bilateral punctate - never passed a stone    Urinary tract infection feb 2014    MRSA     "

## 2020-08-07 LAB
ALBUMIN SERPL BCP-MCNC: 4 G/DL (ref 3.5–5.2)
ALP SERPL-CCNC: 65 U/L (ref 55–135)
ALT SERPL W/O P-5'-P-CCNC: 31 U/L (ref 10–44)
ANION GAP SERPL CALC-SCNC: 9 MMOL/L (ref 8–16)
AST SERPL-CCNC: 23 U/L (ref 10–40)
BASOPHILS # BLD AUTO: 0.03 K/UL (ref 0–0.2)
BASOPHILS NFR BLD: 0.3 % (ref 0–1.9)
BILIRUB SERPL-MCNC: 0.6 MG/DL (ref 0.1–1)
BUN SERPL-MCNC: 18 MG/DL (ref 6–20)
CALCIUM SERPL-MCNC: 9.4 MG/DL (ref 8.7–10.5)
CHLORIDE SERPL-SCNC: 104 MMOL/L (ref 95–110)
CO2 SERPL-SCNC: 26 MMOL/L (ref 23–29)
CREAT SERPL-MCNC: 1.1 MG/DL (ref 0.5–1.4)
DIFFERENTIAL METHOD: ABNORMAL
EOSINOPHIL # BLD AUTO: 0 K/UL (ref 0–0.5)
EOSINOPHIL NFR BLD: 0 % (ref 0–8)
ERYTHROCYTE [DISTWIDTH] IN BLOOD BY AUTOMATED COUNT: 22 % (ref 11.5–14.5)
EST. GFR  (AFRICAN AMERICAN): >60 ML/MIN/1.73 M^2
EST. GFR  (NON AFRICAN AMERICAN): >60 ML/MIN/1.73 M^2
FERRITIN SERPL-MCNC: 11 NG/ML (ref 20–300)
GLUCOSE SERPL-MCNC: 132 MG/DL (ref 70–110)
HCT VFR BLD AUTO: 43.2 % (ref 40–54)
HGB BLD-MCNC: 13 G/DL (ref 14–18)
IMM GRANULOCYTES # BLD AUTO: 0.05 K/UL (ref 0–0.04)
IMM GRANULOCYTES NFR BLD AUTO: 0.5 % (ref 0–0.5)
INR PPP: 1 (ref 0.8–1.2)
IRON SERPL-MCNC: 61 UG/DL (ref 45–160)
LYMPHOCYTES # BLD AUTO: 0.4 K/UL (ref 1–4.8)
LYMPHOCYTES NFR BLD: 3.9 % (ref 18–48)
MAGNESIUM SERPL-MCNC: 2.1 MG/DL (ref 1.6–2.6)
MCH RBC QN AUTO: 24.1 PG (ref 27–31)
MCHC RBC AUTO-ENTMCNC: 30.1 G/DL (ref 32–36)
MCV RBC AUTO: 80 FL (ref 82–98)
MONOCYTES # BLD AUTO: 0.1 K/UL (ref 0.3–1)
MONOCYTES NFR BLD: 0.6 % (ref 4–15)
NEUTROPHILS # BLD AUTO: 10.5 K/UL (ref 1.8–7.7)
NEUTROPHILS NFR BLD: 94.7 % (ref 38–73)
NRBC BLD-RTO: 0 /100 WBC
PHOSPHATE SERPL-MCNC: 3.3 MG/DL (ref 2.7–4.5)
PLATELET # BLD AUTO: 294 K/UL (ref 150–350)
PMV BLD AUTO: 11.2 FL (ref 9.2–12.9)
POTASSIUM SERPL-SCNC: 4.3 MMOL/L (ref 3.5–5.1)
PROT SERPL-MCNC: 7.8 G/DL (ref 6–8.4)
PROTHROMBIN TIME: 11.3 SEC (ref 9–12.5)
RBC # BLD AUTO: 5.39 M/UL (ref 4.6–6.2)
SATURATED IRON: 11 % (ref 20–50)
SODIUM SERPL-SCNC: 139 MMOL/L (ref 136–145)
TOTAL IRON BINDING CAPACITY: 546 UG/DL (ref 250–450)
TRANSFERRIN SERPL-MCNC: 369 MG/DL (ref 200–375)
URATE SERPL-MCNC: 3.2 MG/DL (ref 3.4–7)
WBC # BLD AUTO: 11.07 K/UL (ref 3.9–12.7)

## 2020-08-07 PROCEDURE — 25000003 PHARM REV CODE 250: Performed by: HOSPITALIST

## 2020-08-07 PROCEDURE — 80053 COMPREHEN METABOLIC PANEL: CPT

## 2020-08-07 PROCEDURE — 25000003 PHARM REV CODE 250: Performed by: STUDENT IN AN ORGANIZED HEALTH CARE EDUCATION/TRAINING PROGRAM

## 2020-08-07 PROCEDURE — 76937 US GUIDE VASCULAR ACCESS: CPT

## 2020-08-07 PROCEDURE — 63600175 PHARM REV CODE 636 W HCPCS: Performed by: HOSPITALIST

## 2020-08-07 PROCEDURE — 83540 ASSAY OF IRON: CPT

## 2020-08-07 PROCEDURE — 82728 ASSAY OF FERRITIN: CPT

## 2020-08-07 PROCEDURE — 63600175 PHARM REV CODE 636 W HCPCS: Performed by: STUDENT IN AN ORGANIZED HEALTH CARE EDUCATION/TRAINING PROGRAM

## 2020-08-07 PROCEDURE — 36410 VNPNXR 3YR/> PHY/QHP DX/THER: CPT

## 2020-08-07 PROCEDURE — 36415 COLL VENOUS BLD VENIPUNCTURE: CPT

## 2020-08-07 PROCEDURE — 85025 COMPLETE CBC W/AUTO DIFF WBC: CPT

## 2020-08-07 PROCEDURE — 83735 ASSAY OF MAGNESIUM: CPT

## 2020-08-07 PROCEDURE — 94761 N-INVAS EAR/PLS OXIMETRY MLT: CPT

## 2020-08-07 PROCEDURE — C1751 CATH, INF, PER/CENT/MIDLINE: HCPCS

## 2020-08-07 PROCEDURE — 99223 PR INITIAL HOSPITAL CARE,LEVL III: ICD-10-PCS | Mod: ,,, | Performed by: HOSPITALIST

## 2020-08-07 PROCEDURE — 83516 IMMUNOASSAY NONANTIBODY: CPT | Mod: 59

## 2020-08-07 PROCEDURE — 84550 ASSAY OF BLOOD/URIC ACID: CPT

## 2020-08-07 PROCEDURE — 85610 PROTHROMBIN TIME: CPT

## 2020-08-07 PROCEDURE — 84100 ASSAY OF PHOSPHORUS: CPT

## 2020-08-07 PROCEDURE — 99223 1ST HOSP IP/OBS HIGH 75: CPT | Mod: ,,, | Performed by: HOSPITALIST

## 2020-08-07 PROCEDURE — 11000001 HC ACUTE MED/SURG PRIVATE ROOM

## 2020-08-07 RX ORDER — ERYTHROMYCIN 5 MG/G
OINTMENT OPHTHALMIC NIGHTLY
Status: DISCONTINUED | OUTPATIENT
Start: 2020-08-07 | End: 2020-08-10 | Stop reason: HOSPADM

## 2020-08-07 RX ORDER — PROMETHAZINE HYDROCHLORIDE 25 MG/1
25 TABLET ORAL EVERY 6 HOURS PRN
Status: DISCONTINUED | OUTPATIENT
Start: 2020-08-07 | End: 2020-08-10 | Stop reason: HOSPADM

## 2020-08-07 RX ORDER — CETIRIZINE HYDROCHLORIDE 10 MG/1
40 TABLET ORAL 2 TIMES DAILY
Status: DISCONTINUED | OUTPATIENT
Start: 2020-08-07 | End: 2020-08-10 | Stop reason: HOSPADM

## 2020-08-07 RX ORDER — OXYCODONE HYDROCHLORIDE 10 MG/1
10 TABLET ORAL EVERY 4 HOURS PRN
Status: DISCONTINUED | OUTPATIENT
Start: 2020-08-07 | End: 2020-08-10 | Stop reason: HOSPADM

## 2020-08-07 RX ORDER — HYDROMORPHONE HYDROCHLORIDE 1 MG/ML
1 INJECTION, SOLUTION INTRAMUSCULAR; INTRAVENOUS; SUBCUTANEOUS ONCE AS NEEDED
Status: COMPLETED | OUTPATIENT
Start: 2020-08-07 | End: 2020-08-07

## 2020-08-07 RX ORDER — HYDROMORPHONE HYDROCHLORIDE 1 MG/ML
1 INJECTION, SOLUTION INTRAMUSCULAR; INTRAVENOUS; SUBCUTANEOUS
Status: DISCONTINUED | OUTPATIENT
Start: 2020-08-07 | End: 2020-08-08

## 2020-08-07 RX ORDER — SODIUM CHLORIDE 9 MG/ML
INJECTION, SOLUTION INTRAVENOUS CONTINUOUS
Status: ACTIVE | OUTPATIENT
Start: 2020-08-07 | End: 2020-08-08

## 2020-08-07 RX ORDER — OXYCODONE HYDROCHLORIDE 5 MG/1
5 TABLET ORAL EVERY 4 HOURS PRN
Status: DISCONTINUED | OUTPATIENT
Start: 2020-08-07 | End: 2020-08-10 | Stop reason: HOSPADM

## 2020-08-07 RX ORDER — DIPHENHYDRAMINE HYDROCHLORIDE 50 MG/ML
50 INJECTION INTRAMUSCULAR; INTRAVENOUS 3 TIMES DAILY
Status: COMPLETED | OUTPATIENT
Start: 2020-08-07 | End: 2020-08-07

## 2020-08-07 RX ORDER — HYDROMORPHONE HYDROCHLORIDE 1 MG/ML
1 INJECTION, SOLUTION INTRAMUSCULAR; INTRAVENOUS; SUBCUTANEOUS EVERY 4 HOURS PRN
Status: DISCONTINUED | OUTPATIENT
Start: 2020-08-07 | End: 2020-08-07

## 2020-08-07 RX ORDER — SUCRALFATE 1 G/1
1 TABLET ORAL
Status: DISCONTINUED | OUTPATIENT
Start: 2020-08-07 | End: 2020-08-10 | Stop reason: HOSPADM

## 2020-08-07 RX ORDER — OXYCODONE HYDROCHLORIDE 10 MG/1
10 TABLET ORAL EVERY 4 HOURS PRN
Status: DISCONTINUED | OUTPATIENT
Start: 2020-08-07 | End: 2020-08-07

## 2020-08-07 RX ORDER — ACETAMINOPHEN 500 MG
1000 TABLET ORAL EVERY 8 HOURS PRN
Status: DISCONTINUED | OUTPATIENT
Start: 2020-08-07 | End: 2020-08-10 | Stop reason: HOSPADM

## 2020-08-07 RX ADMIN — HYDROXYZINE PAMOATE 50 MG: 25 CAPSULE ORAL at 02:08

## 2020-08-07 RX ADMIN — DIPHENHYDRAMINE HYDROCHLORIDE, ZINC ACETATE: 2; .1 CREAM TOPICAL at 01:08

## 2020-08-07 RX ADMIN — OXYCODONE HYDROCHLORIDE 10 MG: 10 TABLET ORAL at 05:08

## 2020-08-07 RX ADMIN — PANTOPRAZOLE SODIUM 40 MG: 40 TABLET, DELAYED RELEASE ORAL at 08:08

## 2020-08-07 RX ADMIN — HYDROMORPHONE HYDROCHLORIDE 1 MG: 1 INJECTION, SOLUTION INTRAMUSCULAR; INTRAVENOUS; SUBCUTANEOUS at 10:08

## 2020-08-07 RX ADMIN — HYDROMORPHONE HYDROCHLORIDE 2 MG: 1 INJECTION, SOLUTION INTRAMUSCULAR; INTRAVENOUS; SUBCUTANEOUS at 05:08

## 2020-08-07 RX ADMIN — METHYLPREDNISOLONE SODIUM SUCCINATE 125 MG: 125 INJECTION, POWDER, FOR SOLUTION INTRAMUSCULAR; INTRAVENOUS at 05:08

## 2020-08-07 RX ADMIN — CETIRIZINE HYDROCHLORIDE 20 MG: 5 TABLET ORAL at 08:08

## 2020-08-07 RX ADMIN — HYDROMORPHONE HYDROCHLORIDE 1 MG: 1 INJECTION, SOLUTION INTRAMUSCULAR; INTRAVENOUS; SUBCUTANEOUS at 08:08

## 2020-08-07 RX ADMIN — DIPHENHYDRAMINE HYDROCHLORIDE 50 MG: 50 INJECTION, SOLUTION INTRAMUSCULAR; INTRAVENOUS at 08:08

## 2020-08-07 RX ADMIN — SUCRALFATE 1 G: 1 TABLET ORAL at 10:08

## 2020-08-07 RX ADMIN — FAMOTIDINE 20 MG: 20 TABLET, FILM COATED ORAL at 08:08

## 2020-08-07 RX ADMIN — SUCRALFATE 1 G: 1 TABLET ORAL at 05:08

## 2020-08-07 RX ADMIN — SUCRALFATE 1 G: 1 TABLET ORAL at 09:08

## 2020-08-07 RX ADMIN — METHYLPREDNISOLONE SODIUM SUCCINATE 125 MG: 125 INJECTION, POWDER, FOR SOLUTION INTRAMUSCULAR; INTRAVENOUS at 01:08

## 2020-08-07 RX ADMIN — SODIUM CHLORIDE: 0.9 INJECTION, SOLUTION INTRAVENOUS at 04:08

## 2020-08-07 RX ADMIN — HYPROMELLOSE 2910 1 DROP: 5 SOLUTION OPHTHALMIC at 10:08

## 2020-08-07 RX ADMIN — ERYTHROMYCIN: 5 OINTMENT OPHTHALMIC at 09:08

## 2020-08-07 RX ADMIN — DIPHENHYDRAMINE HYDROCHLORIDE 50 MG: 50 INJECTION, SOLUTION INTRAMUSCULAR; INTRAVENOUS at 10:08

## 2020-08-07 RX ADMIN — OXYCODONE HYDROCHLORIDE 10 MG: 10 TABLET ORAL at 10:08

## 2020-08-07 RX ADMIN — CETIRIZINE HYDROCHLORIDE 40 MG: 10 TABLET, FILM COATED ORAL at 09:08

## 2020-08-07 RX ADMIN — HYDROMORPHONE HYDROCHLORIDE 1 MG: 1 INJECTION, SOLUTION INTRAMUSCULAR; INTRAVENOUS; SUBCUTANEOUS at 05:08

## 2020-08-07 RX ADMIN — DIPHENHYDRAMINE HYDROCHLORIDE 50 MG: 50 CAPSULE ORAL at 08:08

## 2020-08-07 RX ADMIN — HYDROMORPHONE HYDROCHLORIDE 1 MG: 1 INJECTION, SOLUTION INTRAMUSCULAR; INTRAVENOUS; SUBCUTANEOUS at 02:08

## 2020-08-07 RX ADMIN — HYDROMORPHONE HYDROCHLORIDE 1 MG: 1 INJECTION, SOLUTION INTRAMUSCULAR; INTRAVENOUS; SUBCUTANEOUS at 11:08

## 2020-08-07 RX ADMIN — OXYCODONE HYDROCHLORIDE 10 MG: 10 TABLET ORAL at 01:08

## 2020-08-07 RX ADMIN — PROMETHAZINE HYDROCHLORIDE 25 MG: 25 TABLET ORAL at 05:08

## 2020-08-07 RX ADMIN — PAROXETINE HYDROCHLORIDE 20 MG: 20 TABLET, FILM COATED ORAL at 08:08

## 2020-08-07 RX ADMIN — DIPHENHYDRAMINE HYDROCHLORIDE 50 MG: 50 INJECTION, SOLUTION INTRAMUSCULAR; INTRAVENOUS at 02:08

## 2020-08-07 NOTE — ASSESSMENT & PLAN NOTE
On the day of admission, pt had ~8 bloody, loose BMs. Pt reports bright red blood per rectum with and/or without BM.  · CBC q8h  · Abdominal pain management with dilaudid  · Continuing home meds (famotidine, pantoprazole, and sucralafate)  · Ordered Celiac Pannel, per GI recs

## 2020-08-07 NOTE — H&P
[facility] Medicine  History & Physical    Patient Name: Solo Black  MRN: [id number]  Admission Date: 8/6/2020  Attending Physician: Aylin Colin MD   Primary Care Provider: Cecilia Tsai MD    [facility] Medicine Team: [facility] 4 Chelsey Dickson MD     Patient information was obtained from patient, past medical records and ER records.     Subjective:     Principal Problem:Hypereosinophilic syndrome    Chief Complaint:   Chief Complaint   Patient presents with    Rectal Bleeding     + bright red rectal bleeding w/ new onset today. Denies dizziness or weakness.         HPI: Mr. 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 Pt is treated by GI (Dr. Tillman) and Immunology (Dr. Ornelas). Pt reports that his flares have progressively worsened over the past few years and they occur more frequently than in the past. At home, he typically takes 50mg prednisone daily, H1 blockers, H2 blockers, and benadryl daily. Pt also prescribed epi pens PRN for angioedema. 2 weeks ago, pt was off of his medications for ~1 week in order to have an EGD and colonoscopy, which revealed eosinophilic esophagitis and erosive gastritis. Pt believes this triggered this flare up. Yesterday, pt had an episode of angioedema wh/ resolved after epi pen administration at home. He reports another episode of angioedema today (which resolved without intervention), severe abdominal pain (onset a few days ago), and bloody diarrhea which has progressed to bleeding rectum/lesions surrounding anus regardless of bowel movements. He attributes his GI symptoms to internal eruptions.    Past Medical History:   Diagnosis Date    C. difficile colitis feb 2014    postop of hand surgery    Eosinophilic esophagitis 3/11/2014     GERD (gastroesophageal reflux disease)     IBS (irritable bowel syndrome)     MRSA carrier     says multiple times nose swab was +    Nephrolithiasis apr 2014    bilateral punctate - never passed a stone    Urinary tract infection feb 2014    MRSA       Past Surgical History:   Procedure Laterality Date    APPENDECTOMY      ARTHROSCOPY OF SHOULDER WITH REMOVAL OF DISTAL CLAVICLE Right 11/1/2018    Procedure: ARTHROSCOPY, SHOULDER, WITH DISTAL CLAVICLE EXCISION;  Surgeon: Gabino Mejia MD;  Location[facility];  Service: Orthopedics;  Laterality: Right;  video    BACK SURGERY      CIRCUMCISION, PRIMARY      COLONOSCOPY N/A 11/14/2018    Procedure: COLONOSCOPY;  Surgeon: Milton Brunson MD;  Location: [facility];  Service: Endoscopy;  Laterality: N/A;    COLONOSCOPY N/A 7/20/2020    Procedure: COLONOSCOPY;  Surgeon: Ruslan Tillman MD;  Location: [facility];  Service: Endoscopy;  Laterality: N/A;    drainage of abscess from hand  2009    MRSA    drainage of abscess from R thigh  2006    MRSA    ESOPHAGOGASTRODUODENOSCOPY  3/11/2014    ESOPHAGOGASTRODUODENOSCOPY N/A 9/5/2018    Procedure: EGD (ESOPHAGOGASTRODUODENOSCOPY);  Surgeon: Kolby Wall MD;  Location: [facility];  Service: Endoscopy;  Laterality: N/A;    ESOPHAGOGASTRODUODENOSCOPY N/A 3/25/2020    Procedure: EGD (ESOPHAGOGASTRODUODENOSCOPY);  Surgeon: Ruslan Tillman MD;  Location: [facility];  Service: Endoscopy;  Laterality: N/A;    ESOPHAGOGASTRODUODENOSCOPY N/A 7/20/2020    Procedure: EGD (ESOPHAGOGASTRODUODENOSCOPY);  Surgeon: Ruslan Tillman MD;  Location: [facility];  Service: Endoscopy;  Laterality: N/A;  Patient is a very hard stick, requires anesthesia stick.    FLEXIBLE SIGMOIDOSCOPY N/A 9/5/2018    Procedure: SIGMOIDOSCOPY, FLEXIBLE;  Surgeon: Kolby Wall MD;  Location: [facility];  Service: Endoscopy;  Laterality: N/A;    HAND SURGERY  jan 2014    power saw fell on hand - laceration 3 tendons  "      Review of patient's allergies indicates:   Allergen Reactions    Legumes Nausea And Vomiting and Swelling     Other reaction(s): GI Intolerance  vomiting  vomiting  Pt reports legumes make his lips swell and induce severe vomiting  Pt reports legumes make his lips swell and induce severe vomiting      Nsaids (non-steroidal anti-inflammatory drug)      GI bleed    Bean pod extract      Lips swelling, vomiting  Lips swelling, vomiting      Ketamine      Severe dysphoria (bad trip)    Lentils      Lips swelling, vomiting  Lips swelling, vomiting      Meloxicam Nausea Only     GI bleed    Peas      Lips swelling, vomiting    Penicillins      Has taken third gen cephalosporins without issue per patient report    Haloperidol Anxiety and Other (See Comments)     "feels like crawling out of his skin"         No current facility-administered medications on file prior to encounter.      Current Outpatient Medications on File Prior to Encounter   Medication Sig    cetirizine (ZYRTEC) 10 MG tablet Take 40 mg by mouth 2 (two) times a day.    dextroamphetamine-amphetamine (ADDERALL) 20 mg tablet Take 1 tablet by mouth 2 (two) times daily before meals. Take before breakfast and lunch    diphenhydrAMINE (SOMINEX) 25 mg tablet Take 50 mg by mouth 3 (three) times daily.    famotidine (PEPCID) 20 MG tablet Take 20 mg by mouth 2 (two) times daily.    loratadine (CLARITIN) 10 mg tablet Take 10 mg by mouth every morning.    MULTIVIT-IRON-MIN-FOLIC ACID 3,500-18-0.4 UNIT-MG-MG ORAL CHEW Take 10 mg by mouth once daily.    pantoprazole (PROTONIX) 40 MG tablet Take 1 tablet (40 mg total) by mouth 2 (two) times daily.    paroxetine (PAXIL) 20 MG tablet Take 20 mg by mouth every morning.    predniSONE (DELTASONE) 10 MG tablet Take 50 mg by mouth 2 (two) times daily.    promethazine (PHENERGAN) 12.5 MG Tab Take 25 mg by mouth every 6 (six) hours as needed (nausea/vomiting).    sucralfate (CARAFATE) 1 gram tablet Take 1 " g by mouth 4 (four) times daily before meals and nightly.    EPINEPHrine (EPIPEN) 0.3 mg/0.3 mL AtIn Inject 0.3 mLs (0.3 mg total) into the muscle as needed.     Family History     Problem Relation (Age of Onset)    Celiac disease Sister    Hypertension Maternal Grandmother, Maternal Grandfather    Urolithiasis Father        Tobacco Use    Smoking status: Never Smoker    Smokeless tobacco: Never Used    Tobacco comment: Pt states he has smoked 1 or 2 cigarettes in his life.   Substance and Sexual Activity    Alcohol use: Not Currently    Drug use: No    Sexual activity: Yes     Partners: Female     Review of Systems   Constitutional: Positive for activity change and fatigue. Negative for appetite change, chills, diaphoresis and fever.   HENT: Positive for congestion, ear pain, facial swelling, mouth sores and sore throat. Negative for dental problem, drooling, ear discharge, hearing loss, nosebleeds, postnasal drip, rhinorrhea, sinus pressure, sinus pain, sneezing, tinnitus and trouble swallowing.    Eyes: Positive for pain, discharge, redness, itching and visual disturbance. Negative for photophobia.   Respiratory: Positive for cough, choking, wheezing and stridor. Negative for apnea, chest tightness and shortness of breath.    Cardiovascular: Positive for leg swelling. Negative for chest pain and palpitations.   Gastrointestinal: Positive for abdominal distention, abdominal pain, anal bleeding, blood in stool, diarrhea, nausea and rectal pain. Negative for constipation and vomiting.   Endocrine: Negative for cold intolerance, heat intolerance, polydipsia, polyphagia and polyuria.   Genitourinary: Positive for genital sores and scrotal swelling. Negative for difficulty urinating, dysuria, enuresis, flank pain, penile pain, penile swelling and urgency.   Musculoskeletal: Positive for arthralgias and joint swelling. Negative for back pain and myalgias.   Skin: Positive for color change, rash and wound.  Negative for pallor.   Allergic/Immunologic: Positive for environmental allergies, food allergies and immunocompromised state.   Neurological: Negative for dizziness, numbness and headaches.   Hematological: Negative for adenopathy. Does not bruise/bleed easily.   Psychiatric/Behavioral: Positive for agitation, decreased concentration and sleep disturbance. Negative for dysphoric mood, hallucinations, self-injury and suicidal ideas. The patient is nervous/anxious and is hyperactive.      Objective:     Vital Signs (Most Recent):  Temp: 98 °F (36.7 °C) (08/07/20 0824)  Pulse: 95 (08/07/20 1115)  Resp: 18 (08/07/20 1016)  BP: (!) 140/78 (08/07/20 0824)  SpO2: 96 % (08/07/20 0824) Vital Signs (24h Range):  Temp:  [98 °F (36.7 °C)-98.5 °F (36.9 °C)] 98 °F (36.7 °C)  Pulse:  [8-110] 95  Resp:  [16-20] 18  SpO2:  [96 %-100 %] 96 %  BP: (124-155)/(78-91) 140/78     Weight: 96 kg (211 lb 10.3 oz)  Body mass index is 27.92 kg/m².    Physical Exam  Constitutional:       General: He is in acute distress.      Appearance: He is ill-appearing. He is not diaphoretic.   HENT:      Head: Atraumatic.      Right Ear: Ear canal normal.      Left Ear: Ear canal normal.      Ears:      Comments: Excoriations and rash on external ear and behind ear. Rash in multiple stages of healing     Nose: Nose normal. No congestion or rhinorrhea.      Mouth/Throat:      Mouth: Mucous membranes are moist.      Pharynx: Oropharynx is clear. No oropharyngeal exudate or posterior oropharyngeal erythema.      Comments: No swelling or signs of angioedema  Eyes:      General: No scleral icterus.        Right eye: No discharge.         Left eye: No discharge.      Extraocular Movements: Extraocular movements intact.      Conjunctiva/sclera: Conjunctivae normal.      Pupils: Pupils are equal, round, and reactive to light.   Neck:      Musculoskeletal: Normal range of motion. No neck rigidity or muscular tenderness.      Comments: Rash in multiple stages of  healing  Cardiovascular:      Rate and Rhythm: Regular rhythm. Tachycardia present.      Pulses: Normal pulses.      Heart sounds: No murmur. No gallop.    Pulmonary:      Effort: Pulmonary effort is normal. No respiratory distress.      Breath sounds: Normal breath sounds. No stridor. No wheezing, rhonchi or rales.   Chest:      Chest wall: No tenderness (due to rash in multiple stages of healing).   Abdominal:      General: There is distension.      Palpations: Abdomen is soft. There is no mass.      Tenderness: There is abdominal tenderness. There is no right CVA tenderness, guarding or rebound.      Hernia: No hernia is present.      Comments: Hyperactive bowel sounds   Genitourinary:     Penis: Normal.       Comments: Rash in multiple stages of healing. Located on the scrotum and diffusely in the genital and anal regions  Musculoskeletal: Normal range of motion.         General: Swelling and tenderness present. No deformity or signs of injury.      Right lower leg: Edema present.      Left lower leg: Edema present.      Comments: Swelling, tenderness; swollen, warm bilateral knees   Lymphadenopathy:      Cervical: No cervical adenopathy.   Skin:     Capillary Refill: Capillary refill takes less than 2 seconds.      Coloration: Skin is not jaundiced.      Findings: Erythema, lesion and rash present. No bruising.      Comments: Rash in multiple stages of healing located on his back, chest, scrotum, anus, hands, and feet   Neurological:      Mental Status: He is alert and oriented to person, place, and time.      Cranial Nerves: No cranial nerve deficit.      Motor: No weakness.      Coordination: Coordination normal.   Psychiatric:         Mood and Affect: Mood normal.         Thought Content: Thought content normal.         Judgment: Judgment normal.           CRANIAL NERVES     CN III, IV, VI   Pupils are equal, round, and reactive to light.       Significant Labs:   BMP:   Recent Labs   Lab 08/07/20  8279  "  *      K 4.3      CO2 26   BUN 18   CREATININE 1.1   CALCIUM 9.4   MG 2.1     CBC:   Recent Labs   Lab 08/06/20  1653 08/07/20  0528   WBC 7.87 11.07   HGB 13.7* 13.0*   HCT 45.4 43.2    294     CMP:   Recent Labs   Lab 08/06/20  1653 08/07/20  0528    139   K 3.7 4.3    104   CO2 24 26   GLU 80 132*   BUN 15 18   CREATININE 1.2 1.1   CALCIUM 9.9 9.4   PROT 8.1 7.8   ALBUMIN 4.5 4.0   BILITOT 0.4 0.6   ALKPHOS 67 65   AST 29 23   ALT 37 31   ANIONGAP 12 9   EGFRNONAA >60.0 >60.0     Lactic Acid: No results for input(s): LACTATE in the last 48 hours.    Significant Imaging: No new imaging on this encounter    Assessment/Plan:     * Hypereosinophilic syndrome  · Allergy consulted, appreciate recs  · Ceterizine BID  · Benadryl 50 mg PO TID  · Prednisone 80  · Topical Benadryl  · Vistaril  · Dilaudid 2mg for pain  · Labs:  · Celiac Panel  · CBC q8h      BRBPR (bright red blood per rectum)  On the day of admission, pt had ~8 bloody, loose BMs. Pt reports bright red blood per rectum with and/or without BM.  · CBC q8h  · Abdominal pain management with dilaudid  · Continuing home meds (famotidine, pantoprazole, and sucralafate)  · Ordered Celiac Pannel, per GI recs      Erosive gastritis  Diagnosed on EGD/colonoscopy with biopsies 7/20/2020  · See "Hypereosinophilic Syndrome"      Eosinophilic esophagitis  Diagnosed on EGD/colonoscopy with biopsies 7/20/2020  · See "Hypereosinophilic Syndrome"        VTE Risk Mitigation (From admission, onward)         Ordered     IP VTE HIGH RISK PATIENT  Once      08/06/20 2206     Place sequential compression device  Until discontinued      08/06/20 2119                   Chelsey Dickson MD  Department of Hospital Medicine   [facility]  "

## 2020-08-07 NOTE — CARE UPDATE
Ophthalmology assessed patient for changes in vision.  They recommended warm compresses, artificial tears, and erythromycin ointment.  PO Benadryl switched to IV Benadryl.

## 2020-08-07 NOTE — SUBJECTIVE & OBJECTIVE
Past Medical History:   Diagnosis Date    C. difficile colitis feb 2014    postop of hand surgery    Eosinophilic esophagitis 3/11/2014    GERD (gastroesophageal reflux disease)     IBS (irritable bowel syndrome)     MRSA carrier     says multiple times nose swab was +    Nephrolithiasis apr 2014    bilateral punctate - never passed a stone    Urinary tract infection feb 2014    MRSA       Past Surgical History:   Procedure Laterality Date    APPENDECTOMY      ARTHROSCOPY OF SHOULDER WITH REMOVAL OF DISTAL CLAVICLE Right 11/1/2018    Procedure: ARTHROSCOPY, SHOULDER, WITH DISTAL CLAVICLE EXCISION;  Surgeon: Gabino Mejia MD;  Location: [facility];  Service: Orthopedics;  Laterality: Right;  video    BACK SURGERY      CIRCUMCISION, PRIMARY      COLONOSCOPY N/A 11/14/2018    Procedure: COLONOSCOPY;  Surgeon: Milton Brunson MD;  Location: [facility] ENDO;  Service: Endoscopy;  Laterality: N/A;    COLONOSCOPY N/A 7/20/2020    Procedure: COLONOSCOPY;  Surgeon: Ruslan Tillman MD;  Location: [facility];  Service: Endoscopy;  Laterality: N/A;    drainage of abscess from hand  2009    MRSA    drainage of abscess from R thigh  2006    MRSA    ESOPHAGOGASTRODUODENOSCOPY  3/11/2014    ESOPHAGOGASTRODUODENOSCOPY N/A 9/5/2018    Procedure: EGD (ESOPHAGOGASTRODUODENOSCOPY);  Surgeon: Kolby Wall MD;  Location: [facility];  Service: Endoscopy;  Laterality: N/A;    ESOPHAGOGASTRODUODENOSCOPY N/A 3/25/2020    Procedure: EGD (ESOPHAGOGASTRODUODENOSCOPY);  Surgeon: Ruslan Tillman MD;  Location: [facility];  Service: Endoscopy;  Laterality: N/A;    ESOPHAGOGASTRODUODENOSCOPY N/A 7/20/2020    Procedure: EGD (ESOPHAGOGASTRODUODENOSCOPY);  Surgeon: Ruslan Tillman MD;  Location: [facility];  Service: Endoscopy;  Laterality: N/A;  Patient is a very hard stick, requires anesthesia stick.    FLEXIBLE SIGMOIDOSCOPY N/A 9/5/2018    Procedure: SIGMOIDOSCOPY, FLEXIBLE;  Surgeon: Kolby Wall  "MD;  Location: [facility];  Service: Endoscopy;  Laterality: N/A;    HAND SURGERY  jan 2014    power saw fell on hand - laceration 3 tendons       Review of patient's allergies indicates:   Allergen Reactions    Legumes Nausea And Vomiting and Swelling     Other reaction(s): GI Intolerance  vomiting  vomiting  Pt reports legumes make his lips swell and induce severe vomiting  Pt reports legumes make his lips swell and induce severe vomiting      Nsaids (non-steroidal anti-inflammatory drug)      GI bleed    Bean pod extract      Lips swelling, vomiting  Lips swelling, vomiting      Ketamine      Severe dysphoria (bad trip)    Lentils      Lips swelling, vomiting  Lips swelling, vomiting      Meloxicam Nausea Only     GI bleed    Peas      Lips swelling, vomiting    Penicillins      Has taken third gen cephalosporins without issue per patient report    Haloperidol Anxiety and Other (See Comments)     "feels like crawling out of his skin"         No current facility-administered medications on file prior to encounter.      Current Outpatient Medications on File Prior to Encounter   Medication Sig    cetirizine (ZYRTEC) 10 MG tablet Take 40 mg by mouth 2 (two) times a day.    dextroamphetamine-amphetamine (ADDERALL) 20 mg tablet Take 1 tablet by mouth 2 (two) times daily before meals. Take before breakfast and lunch    diphenhydrAMINE (SOMINEX) 25 mg tablet Take 50 mg by mouth 3 (three) times daily.    famotidine (PEPCID) 20 MG tablet Take 20 mg by mouth 2 (two) times daily.    loratadine (CLARITIN) 10 mg tablet Take 10 mg by mouth every morning.    MULTIVIT-IRON-MIN-FOLIC ACID 3,500-18-0.4 UNIT-MG-MG ORAL CHEW Take 10 mg by mouth once daily.    pantoprazole (PROTONIX) 40 MG tablet Take 1 tablet (40 mg total) by mouth 2 (two) times daily.    paroxetine (PAXIL) 20 MG tablet Take 20 mg by mouth every morning.    predniSONE (DELTASONE) 10 MG tablet Take 50 mg by mouth 2 (two) times daily.    " promethazine (PHENERGAN) 12.5 MG Tab Take 25 mg by mouth every 6 (six) hours as needed (nausea/vomiting).    sucralfate (CARAFATE) 1 gram tablet Take 1 g by mouth 4 (four) times daily before meals and nightly.    EPINEPHrine (EPIPEN) 0.3 mg/0.3 mL AtIn Inject 0.3 mLs (0.3 mg total) into the muscle as needed.     Family History     Problem Relation (Age of Onset)    Celiac disease Sister    Hypertension Maternal Grandmother, Maternal Grandfather    Urolithiasis Father        Tobacco Use    Smoking status: Never Smoker    Smokeless tobacco: Never Used    Tobacco comment: Pt states he has smoked 1 or 2 cigarettes in his life.   Substance and Sexual Activity    Alcohol use: Not Currently    Drug use: No    Sexual activity: Yes     Partners: Female     Review of Systems   Constitutional: Positive for activity change and fatigue. Negative for appetite change, chills, diaphoresis and fever.   HENT: Positive for congestion, ear pain, facial swelling, mouth sores and sore throat. Negative for dental problem, drooling, ear discharge, hearing loss, nosebleeds, postnasal drip, rhinorrhea, sinus pressure, sinus pain, sneezing, tinnitus and trouble swallowing.    Eyes: Positive for pain, discharge, redness, itching and visual disturbance. Negative for photophobia.   Respiratory: Positive for cough, choking, wheezing and stridor. Negative for apnea, chest tightness and shortness of breath.    Cardiovascular: Positive for leg swelling. Negative for chest pain and palpitations.   Gastrointestinal: Positive for abdominal distention, abdominal pain, anal bleeding, blood in stool, diarrhea, nausea and rectal pain. Negative for constipation and vomiting.   Endocrine: Negative for cold intolerance, heat intolerance, polydipsia, polyphagia and polyuria.   Genitourinary: Positive for genital sores and scrotal swelling. Negative for difficulty urinating, dysuria, enuresis, flank pain, penile pain, penile swelling and urgency.    Musculoskeletal: Positive for arthralgias and joint swelling. Negative for back pain and myalgias.   Skin: Positive for color change, rash and wound. Negative for pallor.   Allergic/Immunologic: Positive for environmental allergies, food allergies and immunocompromised state.   Neurological: Negative for dizziness, numbness and headaches.   Hematological: Negative for adenopathy. Does not bruise/bleed easily.   Psychiatric/Behavioral: Positive for agitation, decreased concentration and sleep disturbance. Negative for dysphoric mood, hallucinations, self-injury and suicidal ideas. The patient is nervous/anxious and is hyperactive.      Objective:     Vital Signs (Most Recent):  Temp: 98 °F (36.7 °C) (08/07/20 0824)  Pulse: 95 (08/07/20 1115)  Resp: 18 (08/07/20 1016)  BP: (!) 140/78 (08/07/20 0824)  SpO2: 96 % (08/07/20 0824) Vital Signs (24h Range):  Temp:  [98 °F (36.7 °C)-98.5 °F (36.9 °C)] 98 °F (36.7 °C)  Pulse:  [8-110] 95  Resp:  [16-20] 18  SpO2:  [96 %-100 %] 96 %  BP: (124-155)/(78-91) 140/78     Weight: 96 kg (211 lb 10.3 oz)  Body mass index is 27.92 kg/m².    Physical Exam  Constitutional:       General: He is in acute distress.      Appearance: He is ill-appearing. He is not diaphoretic.   HENT:      Head: Atraumatic.      Right Ear: Ear canal normal.      Left Ear: Ear canal normal.      Ears:      Comments: Excoriations and rash on external ear and behind ear. Rash in multiple stages of healing     Nose: Nose normal. No congestion or rhinorrhea.      Mouth/Throat:      Mouth: Mucous membranes are moist.      Pharynx: Oropharynx is clear. No oropharyngeal exudate or posterior oropharyngeal erythema.      Comments: No swelling or signs of angioedema  Eyes:      General: No scleral icterus.        Right eye: No discharge.         Left eye: No discharge.      Extraocular Movements: Extraocular movements intact.      Conjunctiva/sclera: Conjunctivae normal.      Pupils: Pupils are equal, round, and  reactive to light.   Neck:      Musculoskeletal: Normal range of motion. No neck rigidity or muscular tenderness.      Comments: Rash in multiple stages of healing  Cardiovascular:      Rate and Rhythm: Regular rhythm. Tachycardia present.      Pulses: Normal pulses.      Heart sounds: No murmur. No gallop.    Pulmonary:      Effort: Pulmonary effort is normal. No respiratory distress.      Breath sounds: Normal breath sounds. No stridor. No wheezing, rhonchi or rales.   Chest:      Chest wall: No tenderness (due to rash in multiple stages of healing).   Abdominal:      General: There is distension.      Palpations: Abdomen is soft. There is no mass.      Tenderness: There is abdominal tenderness. There is no right CVA tenderness, guarding or rebound.      Hernia: No hernia is present.      Comments: Hyperactive bowel sounds   Genitourinary:     Penis: Normal.       Comments: Rash in multiple stages of healing. Located on the scrotum and diffusely in the genital and anal regions  Musculoskeletal: Normal range of motion.         General: Swelling and tenderness present. No deformity or signs of injury.      Right lower leg: Edema present.      Left lower leg: Edema present.      Comments: Swelling, tenderness; swollen, warm bilateral knees   Lymphadenopathy:      Cervical: No cervical adenopathy.   Skin:     Capillary Refill: Capillary refill takes less than 2 seconds.      Coloration: Skin is not jaundiced.      Findings: Erythema, lesion and rash present. No bruising.      Comments: Rash in multiple stages of healing located on his back, chest, scrotum, anus, hands, and feet   Neurological:      Mental Status: He is alert and oriented to person, place, and time.      Cranial Nerves: No cranial nerve deficit.      Motor: No weakness.      Coordination: Coordination normal.   Psychiatric:         Mood and Affect: Mood normal.         Thought Content: Thought content normal.         Judgment: Judgment normal.            CRANIAL NERVES     CN III, IV, VI   Pupils are equal, round, and reactive to light.       Significant Labs:   BMP:   Recent Labs   Lab 08/07/20  0528   *      K 4.3      CO2 26   BUN 18   CREATININE 1.1   CALCIUM 9.4   MG 2.1     CBC:   Recent Labs   Lab 08/06/20  1653 08/07/20  0528   WBC 7.87 11.07   HGB 13.7* 13.0*   HCT 45.4 43.2    294     CMP:   Recent Labs   Lab 08/06/20  1653 08/07/20  0528    139   K 3.7 4.3    104   CO2 24 26   GLU 80 132*   BUN 15 18   CREATININE 1.2 1.1   CALCIUM 9.9 9.4   PROT 8.1 7.8   ALBUMIN 4.5 4.0   BILITOT 0.4 0.6   ALKPHOS 67 65   AST 29 23   ALT 37 31   ANIONGAP 12 9   EGFRNONAA >60.0 >60.0     Lactic Acid: No results for input(s): LACTATE in the last 48 hours.    Significant Imaging: No new imaging on this encounter

## 2020-08-07 NOTE — PLAN OF CARE
Problem: Fall Injury Risk  Goal: Absence of Fall and Fall-Related Injury  Outcome: Ongoing, Progressing     Problem: Adult Inpatient Plan of Care  Goal: Plan of Care Review  Outcome: Ongoing, Progressing  Goal: Patient-Specific Goal (Individualization)  Outcome: Ongoing, Progressing  Goal: Absence of Hospital-Acquired Illness or Injury  Outcome: Ongoing, Progressing  Goal: Optimal Comfort and Wellbeing  Outcome: Ongoing, Progressing  Goal: Readiness for Transition of Care  Outcome: Ongoing, Progressing  Goal: Rounds/Family Conference  Outcome: Ongoing, Progressing      Pt. AAOx4. C/o pain noted, dilaudid and oxy given prn per MD orders. Cream was applied to pt back for itching prn per MD orders. Pt remained calm and receptive to care. Vitals remain stable. Bed remained low, locked, with all personal items in reach. Will continue to monitor.

## 2020-08-07 NOTE — NURSING
During assessment pt began talking strangely,began beating himself in the chest,slapping his face and ask I don t get afraid it's his disease that cause these strange sounds and abnormal movements and sound.stated he needed his pain med and benadryl.DR rossi and she help him immediately with  Charge nurse administering epinephrine,benadryl ,hydromphone.within 15 mins. was walking around requesting something to eat and answer direct question without any epsoide Pt to be monitored thur out the night..

## 2020-08-07 NOTE — CONSULTS
Placed 18g, 10 cm Midline in left brachial vein using u/s guidance.  Max dwell date 9/5/2020.  Lot # [id number].

## 2020-08-07 NOTE — ASSESSMENT & PLAN NOTE
· Allergy consulted, appreciate recs  · Ceterizine BID  · Benadryl 50 mg PO TID  · Prednisone 80  · Topical Benadryl  · Vistaril  · Dilaudid 2mg for pain  · Labs:  · Celiac Panel  · CBC q8h

## 2020-08-07 NOTE — PLAN OF CARE
08/07/20 1335   Discharge Assessment   Assessment Type Discharge Planning Assessment   Confirmed/corrected address and phone number on facesheet? Yes   Assessment information obtained from? Patient   Expected Length of Stay (days) 4   Communicated expected length of stay with patient/caregiver yes   Prior to hospitilization cognitive status: Alert/Oriented   Prior to hospitalization functional status: Independent   Current cognitive status: Alert/Oriented   Current Functional Status: Independent   Lives With grandparent(s)   Able to Return to Prior Arrangements yes   Is patient able to care for self after discharge? Yes   Patient's perception of discharge disposition home or selfcare   Readmission Within the Last 30 Days no previous admission in last 30 days   Patient currently being followed by outpatient case management? No   Patient currently receives any other outside agency services? No   Equipment Currently Used at Home none   Do you have any problems affording any of your prescribed medications? No   Is the patient taking medications as prescribed? yes   Does the patient have transportation home? Yes   Transportation Anticipated family or friend will provide  (grandparents)   Does the patient receive services at the [facility]? No   Discharge Plan A Home with family   Discharge Plan B Home Health   DME Needed Upon Discharge  other (see comments)  (tbd)   Patient/Family in Agreement with Plan yes       Dx: hypereosinophillic syndrome  Consult: allergy & ophth      [id number] - [facility] [address] [facility] [address] [facility]  [address]  [facility] [address]  Phone: [phone] Fax: [phone]    Payor: MEDICAID / Plan: [employer] CONNECT / Product Type: Managed Medicaid /     Cecilia Tsai MD (PCP)  Ruslan Tillman ([facility])    Will continue to follow.

## 2020-08-07 NOTE — CONSULTS
Chief complaint/Reason for Consult: vision      History of Present Illness: Solo Black is a 39 y.o. 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 Endorses halos, blurred vision. Denies flashes, floaters, curtain over vision. No history of trauma. Pt spends a lot of time at his computer screen, and does a lot of video manohar and streaming.    Past Ocular Hx: no past ocular surgeries, does not use glasses or contacts     Current eye gtts: none      PMHx:  has a past medical history of C. difficile colitis (feb 2014), Eosinophilic esophagitis (3/11/2014), GERD (gastroesophageal reflux disease), IBS (irritable bowel syndrome), MRSA carrier, Nephrolithiasis (apr 2014), and Urinary tract infection (feb 2014).     PSurgHx:  has a past surgical history that includes Appendectomy; Back surgery; Hand surgery (jan 2014); Circumcision, primary; drainage of abscess from R thigh (2006); drainage of abscess from hand (2009); Esophagogastroduodenoscopy (3/11/2014); Flexible sigmoidoscopy (N/A, 9/5/2018); Esophagogastroduodenoscopy (N/A, 9/5/2018); Arthroscopy of shoulder with removal of distal clavicle (Right, 11/1/2018); Colonoscopy (N/A, 11/14/2018); Esophagogastroduodenoscopy (N/A, 3/25/2020); Esophagogastroduodenoscopy (N/A, 7/20/2020); and Colonoscopy (N/A, 7/20/2020).     Home Medications:   Prior to Admission medications    Medication Sig Start Date End Date Taking? Authorizing Provider   cetirizine (ZYRTEC) 10 MG tablet Take 40 mg by mouth 2 (two) times a day.   Yes Historical Provider, MD   dextroamphetamine-amphetamine (ADDERALL) 20 mg tablet Take 1 tablet by mouth 2 (two) times daily before meals. Take before breakfast and lunch   Yes Historical Provider, MD   diphenhydrAMINE (SOMINEX) 25 mg tablet Take 50 mg by mouth 3 (three) times daily.   Yes Historical Provider, MD   famotidine (PEPCID) 20 MG tablet Take 20 mg by  mouth 2 (two) times daily.   Yes Historical Provider, MD   loratadine (CLARITIN) 10 mg tablet Take 10 mg by mouth every morning.   Yes Historical Provider, MD   MULTIVIT-IRON-MIN-FOLIC ACID 3,500-18-0.4 UNIT-MG-MG ORAL CHEW Take 10 mg by mouth once daily.   Yes Historical Provider, MD   pantoprazole (PROTONIX) 40 MG tablet Take 1 tablet (40 mg total) by mouth 2 (two) times daily. 3/25/20 3/25/21 Yes Ruslan Tillman MD   paroxetine (PAXIL) 20 MG tablet Take 20 mg by mouth every morning.   Yes Historical Provider, MD   predniSONE (DELTASONE) 10 MG tablet Take 50 mg by mouth 2 (two) times daily.   Yes Historical Provider, MD   promethazine (PHENERGAN) 12.5 MG Tab Take 25 mg by mouth every 6 (six) hours as needed (nausea/vomiting).   Yes Historical Provider, MD   sucralfate (CARAFATE) 1 gram tablet Take 1 g by mouth 4 (four) times daily before meals and nightly.   Yes Historical Provider, MD   EPINEPHrine (EPIPEN) 0.3 mg/0.3 mL AtIn Inject 0.3 mLs (0.3 mg total) into the muscle as needed. 7/5/20 7/5/21  Mumtaz Huffman MD        Medications this encounter:    cetirizine  40 mg Oral BID    diphenhydrAMINE  50 mg Intravenous TID    famotidine  20 mg Oral BID    methylPREDNISolone sodium succinate  125 mg Intravenous Q6H    pantoprazole  40 mg Oral BID    paroxetine  20 mg Oral QAM    sucralfate  1 g Oral QID (AC & HS)       Allergies: is allergic to legumes; nsaids (non-steroidal anti-inflammatory drug); bean pod extract; ketamine; lentils; meloxicam; peas; penicillins; and haloperidol.     Social Hx:  reports that he has never smoked. He has never used smokeless tobacco. He reports previous alcohol use. He reports that he does not use drugs.     Family Hx: No family history of glaucoma. family history includes Celiac disease in his sister; Hypertension in his maternal grandfather and maternal grandmother; Urolithiasis in his father.     ROS: Negative x 10 except for complaints as described in HPI;  negative for fever, chills, weight loss, nausea, vomiting, diarrhea, shortness of breath, nasal discharge, cough, abdominal pain, dyspnea, difficulty moving arms and legs, confusion, dysuria, palpitations, or chest pain     Ocular examination/Dilated fundus examination:  Base Eye Exam     Visual Acuity (Snellen - Linear)       Right Left    Dist sc 20/30 -1 20/30    Dist ph sc 20/20 20/20          Tonometry (Tonopen, 12:58 PM)       Right Left    Pressure 13 13          Pupils       Pupils Dark Light Shape React APD    Right PERRL 3 2 Round Brisk None    Left PERRL 3 2 Round Brisk None          Visual Fields       Right Left     Full Full          Extraocular Movement       Right Left     Full, Ortho Full, Ortho            Slit Lamp and Fundus Exam     External Exam       Right Left    External Crusting of medial canthus Crusting of medial canthus          Pen Light Exam       Right Left    Lids/Lashes MGD MGD    Conjunctiva/Sclera White and quiet White and quiet    Cornea Clear Clear    Anterior Chamber Deep and formed Deep and formed    Iris Round and reactive Round and reactive    Lens Clear Clear    Vitreous Normal Normal          Fundus Exam       Right Left    Disc Normal Normal    C/D Ratio 0.45 0.45    Macula Normal Normal    Vessels Normal Normal    Periphery Normal Normal              Assessment/Plan:   1. Dry eye syndrome/MGD  - Pt endorses blurred vision, scratchy sensation, nonspecific visual symptoms  - On multiple medications including benadryl, zyrtec that can worsen dry eye  - Recommend starting warm compresses, artificial tears 4-6x daily, erythromycin ointment qhs    Discussed patient's history, physical, assessment and plan with Dr. Alston.    Newton Spain MD  [employer] [facility] PGY-2

## 2020-08-07 NOTE — HPI
Mr. 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 Pt is treated by GI (Dr. Tillman) and Immunology (Dr. Ornelas). Pt reports that his flares have progressively worsened over the past few years and they occur more frequently than in the past. At home, he typically takes 50mg prednisone daily, H1 blockers, H2 blockers, and benadryl daily. Pt also prescribed epi pens PRN for angioedema. 2 weeks ago, pt was off of his medications for ~1 week in order to have an EGD and colonoscopy, which revealed eosinophilic esophagitis and erosive gastritis. Pt believes this triggered this flare up. Yesterday, pt had an episode of angioedema wh/ resolved after epi pen administration at home. He reports another episode of angioedema today (which resolved without intervention), severe abdominal pain (onset a few days ago), and bloody diarrhea which has progressed to bleeding rectum/lesions surrounding anus regardless of bowel movements. He attributes his GI symptoms to internal eruptions.

## 2020-08-07 NOTE — PLAN OF CARE
I have seen the patient, discussed the resident's history and physical, assessment and plan. I have personally interviewed and examined the patient at bedside.  In summary:    Mr. Solo Black is a 39 y.o. male with hypereosinophilic syndrome, followed by Michelle Ornelas MD of Immunology, who presents to the ER by her request for evaluation of BRBPR, skin lesions, and recurrent eye swelling and angioedema.  He is on an extensive regimen including H1 and H2 blockers, Benadryl, and Prednisone.  He was off all of his therapies for about 6 days, while he went for EGD/cscope 7/20 with GI that showed esophagitis and and erosive gastropathy.  Shortly after, he began to have facial swelling, abdominal swelling, and recurrent skin lesions.  Check Celiac panel per GI recs.  Steroids per Immunology/  Continue IV solumedrol for now.  Atarax and Benadryl cream prn itching.  Epi pen at bedside    Full H&P by team intern to follow.    Brandy Eubanks MD  [facility]  Medical Director, [facility]  Cell:  [phone]  Spectra:  [phone]

## 2020-08-07 NOTE — PLAN OF CARE
Problem: Wound  Goal: Optimal Wound Healing  Outcome: Ongoing, Progressing     Problem: Fall Injury Risk  Goal: Absence of Fall and Fall-Related Injury  Outcome: Ongoing, Progressing     Problem: Adult Inpatient Plan of Care  Goal: Plan of Care Review  Outcome: Ongoing, Progressing   Pt received on unit from ED. Pt assessed and vitals taken, pt oriented to room all questions and concerns addressed, plan of care established for rest of shift.

## 2020-08-08 LAB
ALBUMIN SERPL BCP-MCNC: 3.7 G/DL (ref 3.5–5.2)
ALP SERPL-CCNC: 59 U/L (ref 55–135)
ALT SERPL W/O P-5'-P-CCNC: 27 U/L (ref 10–44)
ANION GAP SERPL CALC-SCNC: 11 MMOL/L (ref 8–16)
AST SERPL-CCNC: 21 U/L (ref 10–40)
BASOPHILS # BLD AUTO: 0.02 K/UL (ref 0–0.2)
BASOPHILS NFR BLD: 0.1 % (ref 0–1.9)
BILIRUB SERPL-MCNC: 0.3 MG/DL (ref 0.1–1)
BUN SERPL-MCNC: 18 MG/DL (ref 6–20)
CALCIUM SERPL-MCNC: 8.1 MG/DL (ref 8.7–10.5)
CHLORIDE SERPL-SCNC: 106 MMOL/L (ref 95–110)
CO2 SERPL-SCNC: 16 MMOL/L (ref 23–29)
CREAT SERPL-MCNC: 0.9 MG/DL (ref 0.5–1.4)
DIFFERENTIAL METHOD: ABNORMAL
EOSINOPHIL # BLD AUTO: 0 K/UL (ref 0–0.5)
EOSINOPHIL NFR BLD: 0 % (ref 0–8)
ERYTHROCYTE [DISTWIDTH] IN BLOOD BY AUTOMATED COUNT: 21.8 % (ref 11.5–14.5)
EST. GFR  (AFRICAN AMERICAN): >60 ML/MIN/1.73 M^2
EST. GFR  (NON AFRICAN AMERICAN): >60 ML/MIN/1.73 M^2
GLUCOSE SERPL-MCNC: 145 MG/DL (ref 70–110)
HCT VFR BLD AUTO: 40 % (ref 40–54)
HGB BLD-MCNC: 12.5 G/DL (ref 14–18)
IMM GRANULOCYTES # BLD AUTO: 0.09 K/UL (ref 0–0.04)
IMM GRANULOCYTES NFR BLD AUTO: 0.5 % (ref 0–0.5)
LYMPHOCYTES # BLD AUTO: 0.6 K/UL (ref 1–4.8)
LYMPHOCYTES NFR BLD: 2.9 % (ref 18–48)
MAGNESIUM SERPL-MCNC: 2 MG/DL (ref 1.6–2.6)
MCH RBC QN AUTO: 24.8 PG (ref 27–31)
MCHC RBC AUTO-ENTMCNC: 31.3 G/DL (ref 32–36)
MCV RBC AUTO: 79 FL (ref 82–98)
MONOCYTES # BLD AUTO: 0.8 K/UL (ref 0.3–1)
MONOCYTES NFR BLD: 4 % (ref 4–15)
NEUTROPHILS # BLD AUTO: 17.7 K/UL (ref 1.8–7.7)
NEUTROPHILS NFR BLD: 92.5 % (ref 38–73)
NRBC BLD-RTO: 0 /100 WBC
PHOSPHATE SERPL-MCNC: 2.3 MG/DL (ref 2.7–4.5)
PLATELET # BLD AUTO: 254 K/UL (ref 150–350)
PMV BLD AUTO: 11.9 FL (ref 9.2–12.9)
POTASSIUM SERPL-SCNC: 4.5 MMOL/L (ref 3.5–5.1)
PROT SERPL-MCNC: 6.6 G/DL (ref 6–8.4)
RBC # BLD AUTO: 5.05 M/UL (ref 4.6–6.2)
SODIUM SERPL-SCNC: 133 MMOL/L (ref 136–145)
WBC # BLD AUTO: 19.1 K/UL (ref 3.9–12.7)

## 2020-08-08 PROCEDURE — 25000003 PHARM REV CODE 250: Performed by: STUDENT IN AN ORGANIZED HEALTH CARE EDUCATION/TRAINING PROGRAM

## 2020-08-08 PROCEDURE — 63600175 PHARM REV CODE 636 W HCPCS: Performed by: STUDENT IN AN ORGANIZED HEALTH CARE EDUCATION/TRAINING PROGRAM

## 2020-08-08 PROCEDURE — 63600175 PHARM REV CODE 636 W HCPCS: Performed by: HOSPITALIST

## 2020-08-08 PROCEDURE — 25000003 PHARM REV CODE 250: Performed by: HOSPITALIST

## 2020-08-08 PROCEDURE — 83735 ASSAY OF MAGNESIUM: CPT

## 2020-08-08 PROCEDURE — 36415 COLL VENOUS BLD VENIPUNCTURE: CPT

## 2020-08-08 PROCEDURE — 11000001 HC ACUTE MED/SURG PRIVATE ROOM

## 2020-08-08 PROCEDURE — 85025 COMPLETE CBC W/AUTO DIFF WBC: CPT

## 2020-08-08 PROCEDURE — 99233 SBSQ HOSP IP/OBS HIGH 50: CPT | Mod: ,,, | Performed by: HOSPITALIST

## 2020-08-08 PROCEDURE — 99233 PR SUBSEQUENT HOSPITAL CARE,LEVL III: ICD-10-PCS | Mod: ,,, | Performed by: HOSPITALIST

## 2020-08-08 PROCEDURE — 84100 ASSAY OF PHOSPHORUS: CPT

## 2020-08-08 PROCEDURE — 80053 COMPREHEN METABOLIC PANEL: CPT

## 2020-08-08 RX ORDER — DIPHENHYDRAMINE HYDROCHLORIDE 50 MG/ML
50 INJECTION INTRAMUSCULAR; INTRAVENOUS EVERY 6 HOURS PRN
Status: DISCONTINUED | OUTPATIENT
Start: 2020-08-08 | End: 2020-08-09

## 2020-08-08 RX ORDER — SODIUM,POTASSIUM PHOSPHATES 280-250MG
1 POWDER IN PACKET (EA) ORAL ONCE
Status: COMPLETED | OUTPATIENT
Start: 2020-08-08 | End: 2020-08-08

## 2020-08-08 RX ORDER — DIPHENHYDRAMINE HYDROCHLORIDE 50 MG/ML
50 INJECTION INTRAMUSCULAR; INTRAVENOUS 3 TIMES DAILY
Status: COMPLETED | OUTPATIENT
Start: 2020-08-08 | End: 2020-08-09

## 2020-08-08 RX ORDER — HYDROMORPHONE HYDROCHLORIDE 1 MG/ML
1 INJECTION, SOLUTION INTRAMUSCULAR; INTRAVENOUS; SUBCUTANEOUS EVERY 4 HOURS PRN
Status: DISCONTINUED | OUTPATIENT
Start: 2020-08-08 | End: 2020-08-09

## 2020-08-08 RX ADMIN — DIPHENHYDRAMINE HYDROCHLORIDE 50 MG: 50 INJECTION, SOLUTION INTRAMUSCULAR; INTRAVENOUS at 04:08

## 2020-08-08 RX ADMIN — HYDROMORPHONE HYDROCHLORIDE 1 MG: 1 INJECTION, SOLUTION INTRAMUSCULAR; INTRAVENOUS; SUBCUTANEOUS at 12:08

## 2020-08-08 RX ADMIN — METHYLPREDNISOLONE SODIUM SUCCINATE 125 MG: 125 INJECTION, POWDER, FOR SOLUTION INTRAMUSCULAR; INTRAVENOUS at 05:08

## 2020-08-08 RX ADMIN — HYDROMORPHONE HYDROCHLORIDE 1 MG: 1 INJECTION, SOLUTION INTRAMUSCULAR; INTRAVENOUS; SUBCUTANEOUS at 09:08

## 2020-08-08 RX ADMIN — OXYCODONE HYDROCHLORIDE 10 MG: 10 TABLET ORAL at 08:08

## 2020-08-08 RX ADMIN — SUCRALFATE 1 G: 1 TABLET ORAL at 05:08

## 2020-08-08 RX ADMIN — METHYLPREDNISOLONE SODIUM SUCCINATE 125 MG: 125 INJECTION, POWDER, FOR SOLUTION INTRAMUSCULAR; INTRAVENOUS at 12:08

## 2020-08-08 RX ADMIN — HYDROXYZINE PAMOATE 50 MG: 25 CAPSULE ORAL at 10:08

## 2020-08-08 RX ADMIN — PANTOPRAZOLE SODIUM 40 MG: 40 TABLET, DELAYED RELEASE ORAL at 09:08

## 2020-08-08 RX ADMIN — CETIRIZINE HYDROCHLORIDE 40 MG: 10 TABLET, FILM COATED ORAL at 08:08

## 2020-08-08 RX ADMIN — PANTOPRAZOLE SODIUM 40 MG: 40 TABLET, DELAYED RELEASE ORAL at 08:08

## 2020-08-08 RX ADMIN — CETIRIZINE HYDROCHLORIDE 40 MG: 10 TABLET, FILM COATED ORAL at 09:08

## 2020-08-08 RX ADMIN — OXYCODONE HYDROCHLORIDE 10 MG: 10 TABLET ORAL at 11:08

## 2020-08-08 RX ADMIN — FAMOTIDINE 20 MG: 20 TABLET, FILM COATED ORAL at 09:08

## 2020-08-08 RX ADMIN — ERYTHROMYCIN: 5 OINTMENT OPHTHALMIC at 10:08

## 2020-08-08 RX ADMIN — METHYLPREDNISOLONE SODIUM SUCCINATE 125 MG: 125 INJECTION, POWDER, FOR SOLUTION INTRAMUSCULAR; INTRAVENOUS at 01:08

## 2020-08-08 RX ADMIN — HYDROMORPHONE HYDROCHLORIDE 1 MG: 1 INJECTION, SOLUTION INTRAMUSCULAR; INTRAVENOUS; SUBCUTANEOUS at 02:08

## 2020-08-08 RX ADMIN — HYPROMELLOSE 2910 1 DROP: 5 SOLUTION OPHTHALMIC at 02:08

## 2020-08-08 RX ADMIN — METHYLPREDNISOLONE SODIUM SUCCINATE 125 MG: 125 INJECTION, POWDER, FOR SOLUTION INTRAMUSCULAR; INTRAVENOUS at 06:08

## 2020-08-08 RX ADMIN — SUCRALFATE 1 G: 1 TABLET ORAL at 09:08

## 2020-08-08 RX ADMIN — DIPHENHYDRAMINE HYDROCHLORIDE 50 MG: 50 INJECTION, SOLUTION INTRAMUSCULAR; INTRAVENOUS at 09:08

## 2020-08-08 RX ADMIN — DIPHENHYDRAMINE HYDROCHLORIDE 50 MG: 50 INJECTION, SOLUTION INTRAMUSCULAR; INTRAVENOUS at 03:08

## 2020-08-08 RX ADMIN — HYPROMELLOSE 2910 1 DROP: 5 SOLUTION OPHTHALMIC at 10:08

## 2020-08-08 RX ADMIN — FAMOTIDINE 20 MG: 20 TABLET, FILM COATED ORAL at 08:08

## 2020-08-08 RX ADMIN — SUCRALFATE 1 G: 1 TABLET ORAL at 12:08

## 2020-08-08 RX ADMIN — HYPROMELLOSE 2910 1 DROP: 5 SOLUTION OPHTHALMIC at 05:08

## 2020-08-08 RX ADMIN — HYDROMORPHONE HYDROCHLORIDE 1 MG: 1 INJECTION, SOLUTION INTRAMUSCULAR; INTRAVENOUS; SUBCUTANEOUS at 10:08

## 2020-08-08 RX ADMIN — PROMETHAZINE HYDROCHLORIDE 25 MG: 25 TABLET ORAL at 12:08

## 2020-08-08 RX ADMIN — PROMETHAZINE HYDROCHLORIDE 25 MG: 25 TABLET ORAL at 11:08

## 2020-08-08 RX ADMIN — DIPHENHYDRAMINE HYDROCHLORIDE 50 MG: 50 INJECTION, SOLUTION INTRAMUSCULAR; INTRAVENOUS at 10:08

## 2020-08-08 RX ADMIN — HYDROMORPHONE HYDROCHLORIDE 1 MG: 1 INJECTION, SOLUTION INTRAMUSCULAR; INTRAVENOUS; SUBCUTANEOUS at 05:08

## 2020-08-08 RX ADMIN — HYDROMORPHONE HYDROCHLORIDE 1 MG: 1 INJECTION, SOLUTION INTRAMUSCULAR; INTRAVENOUS; SUBCUTANEOUS at 06:08

## 2020-08-08 RX ADMIN — HYDROMORPHONE HYDROCHLORIDE 1 MG: 1 INJECTION, SOLUTION INTRAMUSCULAR; INTRAVENOUS; SUBCUTANEOUS at 01:08

## 2020-08-08 RX ADMIN — PAROXETINE HYDROCHLORIDE 20 MG: 20 TABLET, FILM COATED ORAL at 09:08

## 2020-08-08 RX ADMIN — POTASSIUM & SODIUM PHOSPHATES POWDER PACK 280-160-250 MG 1 PACKET: 280-160-250 PACK at 12:08

## 2020-08-08 RX ADMIN — HYDROXYZINE PAMOATE 50 MG: 25 CAPSULE ORAL at 11:08

## 2020-08-08 RX ADMIN — OXYCODONE HYDROCHLORIDE 10 MG: 10 TABLET ORAL at 03:08

## 2020-08-08 NOTE — PROGRESS NOTES
[facility] Hospital Medicine  Progress Note    Patient Name: Solo Black  MRN: [id number]  Patient Class: IP- Inpatient   Admission Date: 8/6/2020  Length of Stay: 2 days  Attending Physician: Aylin Colin MD  Primary Care Provider: Cecilia Tsai MD    [facility] Medicine Team: [facility] MED 4 Skyler Marquez MD    Subjective:     Principal Problem:Hypereosinophilic syndrome    HPI:  Mr. 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 Pt is treated by GI (Dr. Tillman) and Immunology (Dr. Ornelas). Pt reports that his flares have progressively worsened over the past few years and they occur more frequently than in the past. At home, he typically takes 50mg prednisone daily, H1 blockers, H2 blockers, and benadryl daily. Pt also prescribed epi pens PRN for angioedema. 2 weeks ago, pt was off of his medications for ~1 week in order to have an EGD and colonoscopy, which revealed eosinophilic esophagitis and erosive gastritis. Pt believes this triggered this flare up. Yesterday, pt had an episode of angioedema wh/ resolved after epi pen administration at home. He reports another episode of angioedema today (which resolved without intervention), severe abdominal pain (onset a few days ago), and bloody diarrhea which has progressed to bleeding rectum/lesions surrounding anus regardless of bowel movements. He attributes his GI symptoms to internal eruptions.    Overview/Hospital Course:  No notes on file    Interval History: Overall unchanged, uptitrated breakthrough pain regimen to dlaudid 2mg IV q4 PRN. Still having significant abdominal pain and neck,chest rash. Continue steroids, IV bendadryl (through today), H1 and H2 blockers      Objective:     Vital Signs (Most Recent):  Temp: 98.9 °F (37.2  °C) (08/08/20 1523)  Pulse: 94 (08/08/20 1523)  Resp: 20 (08/08/20 1527)  BP: (!) 143/83 (08/08/20 1523)  SpO2: (!) 81 % (08/08/20 1523) Vital Signs (24h Range):  Temp:  [96.3 °F (35.7 °C)-98.9 °F (37.2 °C)] 98.9 °F (37.2 °C)  Pulse:  [72-94] 94  Resp:  [16-20] 20  SpO2:  [81 %-99 %] 81 %  BP: (112-143)/(68-87) 143/83     Weight: 96 kg (211 lb 10.3 oz)  Body mass index is 27.92 kg/m².    Intake/Output Summary (Last 24 hours) at 8/8/2020 1550  Last data filed at 8/8/2020 0444  Gross per 24 hour   Intake 2480 ml   Output 1650 ml   Net 830 ml      Physical Exam  Constitutional:       General: He is in acute distress.      Appearance: He is ill-appearing. He is not diaphoretic.   HENT:      Head: Atraumatic.      Right Ear: Ear canal normal.      Left Ear: Ear canal normal.      Ears:      Comments: Excoriations and rash on external ear and behind ear. Rash in multiple stages of healing     Nose: Nose normal. No congestion or rhinorrhea.      Mouth/Throat:      Mouth: Mucous membranes are moist.      Pharynx: Oropharynx is clear. No oropharyngeal exudate or posterior oropharyngeal erythema.      Comments: No swelling or signs of angioedema  Eyes:      General: No scleral icterus.        Right eye: No discharge.         Left eye: No discharge.      Extraocular Movements: Extraocular movements intact.      Conjunctiva/sclera: Conjunctivae normal.      Pupils: Pupils are equal, round, and reactive to light.   Neck:      Musculoskeletal: Normal range of motion. No neck rigidity or muscular tenderness.      Comments: Rash in multiple stages of healing  Cardiovascular:      Rate and Rhythm: Regular rhythm. Tachycardia present.      Pulses: Normal pulses.      Heart sounds: No murmur. No gallop.    Pulmonary:      Effort: Pulmonary effort is normal. No respiratory distress.      Breath sounds: Normal breath sounds. No stridor. No wheezing, rhonchi or rales.   Chest:      Chest wall: No tenderness (due to rash in multiple  stages of healing).   Abdominal:      General: There is distension.      Palpations: Abdomen is soft. There is no mass.      Tenderness: There is abdominal tenderness. There is no right CVA tenderness, guarding or rebound.      Hernia: No hernia is present.      Comments: Hyperactive bowel sounds   Genitourinary:     Penis: Normal.       Comments: Rash in multiple stages of healing. Located on the scrotum and diffusely in the genital and anal regions  Musculoskeletal: Normal range of motion.         General: Swelling and tenderness present. No deformity or signs of injury.      Right lower leg: Edema present.      Left lower leg: Edema present.      Comments: Swelling, tenderness; swollen, warm bilateral knees   Lymphadenopathy:      Cervical: No cervical adenopathy.   Skin:     Capillary Refill: Capillary refill takes less than 2 seconds.      Coloration: Skin is not jaundiced.      Findings: Erythema, lesion and rash present. No bruising.      Comments: Rash in multiple stages of healing located on his back, chest, scrotum, anus, hands, and feet   Neurological:      Mental Status: He is alert and oriented to person, place, and time.      Cranial Nerves: No cranial nerve deficit.      Motor: No weakness.      Coordination: Coordination normal.   Psychiatric:         Mood and Affect: Mood normal.         Thought Content: Thought content normal.         Judgment: Judgment normal.         Significant Labs:   CBC:   Recent Labs   Lab 08/06/20  1653 08/07/20  0528 08/08/20  0831   WBC 7.87 11.07 19.10*   HGB 13.7* 13.0* 12.5*   HCT 45.4 43.2 40.0    294 254     CMP:   Recent Labs   Lab 08/06/20  1653 08/07/20  0528 08/08/20  0407    139 133*   K 3.7 4.3 4.5    104 106   CO2 24 26 16*   GLU 80 132* 145*   BUN 15 18 18   CREATININE 1.2 1.1 0.9   CALCIUM 9.9 9.4 8.1*   PROT 8.1 7.8 6.6   ALBUMIN 4.5 4.0 3.7   BILITOT 0.4 0.6 0.3   ALKPHOS 67 65 59   AST 29 23 21   ALT 37 31 27   ANIONGAP 12 9 11  "  EGFRNONAA >60.0 >60.0 >60.0       Assessment/Plan:      * Hypereosinophilic syndrome  Treated by GI (Dr. Tillman) and Immunology (Dr. Ornelas). Flares progressively worsening over past few years. Pt typically manages symptoms at home with his epi pen, prednisone, benadryl, and H1 and H2 blockers. Pt recently off of his home regimen for further diagnostic work-up. Likely can attribute this flare to the break from his typical treatment. Pt reports his symptoms are the worst they've ever been.    08/08/2020  Spoke with patient's primary allergist Dr. Ornelas who expressed her concern that patient's HES is still not confirmed but the best guess we have for his ongoing symptoms. Unfortunately, steroids seem to be the mainstay and his only real acute option for management. She does not recommend any other inpatient workup. Pt has appointment with allergy on Tuesday, if patient is still in the hospital, they will see him here.    Plan  -IV methylprednisolone 125 mg IV q6h  -pantoprazole 40mg BID PO for ulcer ppx  -H1 (cetirizine 40mg BID) and H2 blocker (famotidine 20mg BID)  -50mg IV benadryl TID-- will transition off vs PO tomorrow    BRBPR (bright red blood per rectum)  On the day of admission, pt had ~8 bloody, loose BMs. Hgb 13.7 on admit. Pt reports bright red blood per rectum with and/or without BM. Likely secondary to erosive gastritis from suspected HES     Hgb 13.7>13.0>12.5    · CBC daily  · Abdominal pain management with dilaudid  · Continuing home meds (famotidine, pantoprazole, and sucralafate)  · Ordered Celiac Pannel, per GI recs      Erosive gastritis  Diagnosed on EGD/colonoscopy with biopsies 7/20/2020  · See "Hypereosinophilic Syndrome"      Eosinophilic esophagitis  Diagnosed on EGD/colonoscopy with biopsies 7/20/2020  · See "Hypereosinophilic Syndrome"        VTE Risk Mitigation (From admission, onward)         Ordered     IP VTE HIGH RISK PATIENT  Once      08/06/20 2206     Place sequential " compression device  Until discontinued      08/06/20 4305                      Skyler Marquez MD  Department of Hospital Medicine   [facility]

## 2020-08-08 NOTE — PLAN OF CARE
Problem: Wound  Goal: Optimal Wound Healing  Outcome: Ongoing, Progressing     Problem: Fall Injury Risk  Goal: Absence of Fall and Fall-Related Injury  Outcome: Ongoing, Progressing     Problem: Adult Inpatient Plan of Care  Goal: Plan of Care Review  Outcome: Ongoing, Progressing   Vitals and assessment done as ordered. Pain meds given as ordered, Mild relief.Pt encourage to relax ,itching cream applied q 3 hours as  with pain ed med.Call light  in reach . Pt free of injury or falls.

## 2020-08-08 NOTE — ASSESSMENT & PLAN NOTE
On the day of admission, pt had ~8 bloody, loose BMs. Hgb 13.7 on admit. Pt reports bright red blood per rectum with and/or without BM. Likely secondary to erosive gastritis from suspected HES     Hgb 13.7>13.0>12.5    · CBC daily  · Abdominal pain management with dilaudid  · Continuing home meds (famotidine, pantoprazole, and sucralafate)  · Ordered Celiac Pannel, per GI recs

## 2020-08-08 NOTE — SUBJECTIVE & OBJECTIVE
Interval History: Overall unchanged, uptitrated breakthrough pain regimen to dlaudid 2mg IV q4 PRN. Still having significant abdominal pain and neck,chest rash. Continue steroids, IV bendadryl (through today), H1 and H2 blockers      Objective:     Vital Signs (Most Recent):  Temp: 98.9 °F (37.2 °C) (08/08/20 1523)  Pulse: 94 (08/08/20 1523)  Resp: 20 (08/08/20 1527)  BP: (!) 143/83 (08/08/20 1523)  SpO2: (!) 81 % (08/08/20 1523) Vital Signs (24h Range):  Temp:  [96.3 °F (35.7 °C)-98.9 °F (37.2 °C)] 98.9 °F (37.2 °C)  Pulse:  [72-94] 94  Resp:  [16-20] 20  SpO2:  [81 %-99 %] 81 %  BP: (112-143)/(68-87) 143/83     Weight: 96 kg (211 lb 10.3 oz)  Body mass index is 27.92 kg/m².    Intake/Output Summary (Last 24 hours) at 8/8/2020 1550  Last data filed at 8/8/2020 0444  Gross per 24 hour   Intake 2480 ml   Output 1650 ml   Net 830 ml      Physical Exam  Constitutional:       General: He is in acute distress.      Appearance: He is ill-appearing. He is not diaphoretic.   HENT:      Head: Atraumatic.      Right Ear: Ear canal normal.      Left Ear: Ear canal normal.      Ears:      Comments: Excoriations and rash on external ear and behind ear. Rash in multiple stages of healing     Nose: Nose normal. No congestion or rhinorrhea.      Mouth/Throat:      Mouth: Mucous membranes are moist.      Pharynx: Oropharynx is clear. No oropharyngeal exudate or posterior oropharyngeal erythema.      Comments: No swelling or signs of angioedema  Eyes:      General: No scleral icterus.        Right eye: No discharge.         Left eye: No discharge.      Extraocular Movements: Extraocular movements intact.      Conjunctiva/sclera: Conjunctivae normal.      Pupils: Pupils are equal, round, and reactive to light.   Neck:      Musculoskeletal: Normal range of motion. No neck rigidity or muscular tenderness.      Comments: Rash in multiple stages of healing  Cardiovascular:      Rate and Rhythm: Regular rhythm. Tachycardia present.       Pulses: Normal pulses.      Heart sounds: No murmur. No gallop.    Pulmonary:      Effort: Pulmonary effort is normal. No respiratory distress.      Breath sounds: Normal breath sounds. No stridor. No wheezing, rhonchi or rales.   Chest:      Chest wall: No tenderness (due to rash in multiple stages of healing).   Abdominal:      General: There is distension.      Palpations: Abdomen is soft. There is no mass.      Tenderness: There is abdominal tenderness. There is no right CVA tenderness, guarding or rebound.      Hernia: No hernia is present.      Comments: Hyperactive bowel sounds   Genitourinary:     Penis: Normal.       Comments: Rash in multiple stages of healing. Located on the scrotum and diffusely in the genital and anal regions  Musculoskeletal: Normal range of motion.         General: Swelling and tenderness present. No deformity or signs of injury.      Right lower leg: Edema present.      Left lower leg: Edema present.      Comments: Swelling, tenderness; swollen, warm bilateral knees   Lymphadenopathy:      Cervical: No cervical adenopathy.   Skin:     Capillary Refill: Capillary refill takes less than 2 seconds.      Coloration: Skin is not jaundiced.      Findings: Erythema, lesion and rash present. No bruising.      Comments: Rash in multiple stages of healing located on his back, chest, scrotum, anus, hands, and feet   Neurological:      Mental Status: He is alert and oriented to person, place, and time.      Cranial Nerves: No cranial nerve deficit.      Motor: No weakness.      Coordination: Coordination normal.   Psychiatric:         Mood and Affect: Mood normal.         Thought Content: Thought content normal.         Judgment: Judgment normal.         Significant Labs:   CBC:   Recent Labs   Lab 08/06/20  1653 08/07/20  0528 08/08/20  0831   WBC 7.87 11.07 19.10*   HGB 13.7* 13.0* 12.5*   HCT 45.4 43.2 40.0    294 254     CMP:   Recent Labs   Lab 08/06/20  1653 08/07/20  0528  08/08/20  0407    139 133*   K 3.7 4.3 4.5    104 106   CO2 24 26 16*   GLU 80 132* 145*   BUN 15 18 18   CREATININE 1.2 1.1 0.9   CALCIUM 9.9 9.4 8.1*   PROT 8.1 7.8 6.6   ALBUMIN 4.5 4.0 3.7   BILITOT 0.4 0.6 0.3   ALKPHOS 67 65 59   AST 29 23 21   ALT 37 31 27   ANIONGAP 12 9 11   EGFRNONAA >60.0 >60.0 >60.0

## 2020-08-08 NOTE — ASSESSMENT & PLAN NOTE
Treated by GI (Dr. Tillman) and Immunology (Dr. Ornelas). Flares progressively worsening over past few years. Pt typically manages symptoms at home with his epi pen, prednisone, benadryl, and H1 and H2 blockers. Pt recently off of his home regimen for further diagnostic work-up. Likely can attribute this flare to the break from his typical treatment. Pt reports his symptoms are the worst they've ever been.    08/08/2020  Spoke with patient's primary allergist Dr. Ornelas who expressed her concern that patient's HES is still not confirmed but the best guess we have for his ongoing symptoms. Unfortunately, steroids seem to be the mainstay and his only real acute option for management. She does not recommend any other inpatient workup. Pt has appointment with allergy on Tuesday, if patient is still in the hospital, they will see him here.    Plan  -IV methylprednisolone 125 mg IV q6h  -pantoprazole 40mg BID PO for ulcer ppx  -H1 (cetirizine 40mg BID) and H2 blocker (famotidine 20mg BID)  -50mg IV benadryl TID-- will transition off vs PO tomorrow

## 2020-08-09 LAB
ALBUMIN SERPL BCP-MCNC: 3.4 G/DL (ref 3.5–5.2)
ALP SERPL-CCNC: 53 U/L (ref 55–135)
ALT SERPL W/O P-5'-P-CCNC: 23 U/L (ref 10–44)
ANION GAP SERPL CALC-SCNC: 8 MMOL/L (ref 8–16)
AST SERPL-CCNC: 13 U/L (ref 10–40)
BASOPHILS # BLD AUTO: 0.03 K/UL (ref 0–0.2)
BASOPHILS NFR BLD: 0.2 % (ref 0–1.9)
BILIRUB SERPL-MCNC: 0.4 MG/DL (ref 0.1–1)
BUN SERPL-MCNC: 17 MG/DL (ref 6–20)
CALCIUM SERPL-MCNC: 8.1 MG/DL (ref 8.7–10.5)
CHLORIDE SERPL-SCNC: 104 MMOL/L (ref 95–110)
CO2 SERPL-SCNC: 27 MMOL/L (ref 23–29)
CREAT SERPL-MCNC: 0.9 MG/DL (ref 0.5–1.4)
DIFFERENTIAL METHOD: ABNORMAL
EOSINOPHIL # BLD AUTO: 0 K/UL (ref 0–0.5)
EOSINOPHIL NFR BLD: 0 % (ref 0–8)
ERYTHROCYTE [DISTWIDTH] IN BLOOD BY AUTOMATED COUNT: 22 % (ref 11.5–14.5)
EST. GFR  (AFRICAN AMERICAN): >60 ML/MIN/1.73 M^2
EST. GFR  (NON AFRICAN AMERICAN): >60 ML/MIN/1.73 M^2
GLUCOSE SERPL-MCNC: 135 MG/DL (ref 70–110)
HCT VFR BLD AUTO: 38.2 % (ref 40–54)
HGB BLD-MCNC: 11.3 G/DL (ref 14–18)
IMM GRANULOCYTES # BLD AUTO: 0.16 K/UL (ref 0–0.04)
IMM GRANULOCYTES NFR BLD AUTO: 0.9 % (ref 0–0.5)
LYMPHOCYTES # BLD AUTO: 0.4 K/UL (ref 1–4.8)
LYMPHOCYTES NFR BLD: 2.5 % (ref 18–48)
MAGNESIUM SERPL-MCNC: 2 MG/DL (ref 1.6–2.6)
MCH RBC QN AUTO: 24.5 PG (ref 27–31)
MCHC RBC AUTO-ENTMCNC: 29.6 G/DL (ref 32–36)
MCV RBC AUTO: 83 FL (ref 82–98)
MONOCYTES # BLD AUTO: 0.5 K/UL (ref 0.3–1)
MONOCYTES NFR BLD: 2.9 % (ref 4–15)
NEUTROPHILS # BLD AUTO: 16.6 K/UL (ref 1.8–7.7)
NEUTROPHILS NFR BLD: 93.5 % (ref 38–73)
NRBC BLD-RTO: 0 /100 WBC
PHOSPHATE SERPL-MCNC: 1.8 MG/DL (ref 2.7–4.5)
PLATELET # BLD AUTO: 259 K/UL (ref 150–350)
PMV BLD AUTO: 10.7 FL (ref 9.2–12.9)
POTASSIUM SERPL-SCNC: 3.8 MMOL/L (ref 3.5–5.1)
PROT SERPL-MCNC: 6.3 G/DL (ref 6–8.4)
RBC # BLD AUTO: 4.62 M/UL (ref 4.6–6.2)
SODIUM SERPL-SCNC: 139 MMOL/L (ref 136–145)
WBC # BLD AUTO: 17.79 K/UL (ref 3.9–12.7)

## 2020-08-09 PROCEDURE — 94761 N-INVAS EAR/PLS OXIMETRY MLT: CPT

## 2020-08-09 PROCEDURE — 80053 COMPREHEN METABOLIC PANEL: CPT

## 2020-08-09 PROCEDURE — 63600175 PHARM REV CODE 636 W HCPCS: Performed by: STUDENT IN AN ORGANIZED HEALTH CARE EDUCATION/TRAINING PROGRAM

## 2020-08-09 PROCEDURE — 11000001 HC ACUTE MED/SURG PRIVATE ROOM

## 2020-08-09 PROCEDURE — 25000003 PHARM REV CODE 250: Performed by: STUDENT IN AN ORGANIZED HEALTH CARE EDUCATION/TRAINING PROGRAM

## 2020-08-09 PROCEDURE — 99233 PR SUBSEQUENT HOSPITAL CARE,LEVL III: ICD-10-PCS | Mod: ,,, | Performed by: HOSPITALIST

## 2020-08-09 PROCEDURE — 84100 ASSAY OF PHOSPHORUS: CPT

## 2020-08-09 PROCEDURE — 25000003 PHARM REV CODE 250: Performed by: HOSPITALIST

## 2020-08-09 PROCEDURE — 99233 SBSQ HOSP IP/OBS HIGH 50: CPT | Mod: ,,, | Performed by: HOSPITALIST

## 2020-08-09 PROCEDURE — 83735 ASSAY OF MAGNESIUM: CPT

## 2020-08-09 PROCEDURE — 63600175 PHARM REV CODE 636 W HCPCS: Performed by: HOSPITALIST

## 2020-08-09 PROCEDURE — 85025 COMPLETE CBC W/AUTO DIFF WBC: CPT

## 2020-08-09 PROCEDURE — 36415 COLL VENOUS BLD VENIPUNCTURE: CPT

## 2020-08-09 RX ORDER — SODIUM,POTASSIUM PHOSPHATES 280-250MG
2 POWDER IN PACKET (EA) ORAL ONCE
Status: COMPLETED | OUTPATIENT
Start: 2020-08-09 | End: 2020-08-09

## 2020-08-09 RX ORDER — DIPHENHYDRAMINE HCL 50 MG
50 CAPSULE ORAL EVERY 6 HOURS PRN
Status: DISCONTINUED | OUTPATIENT
Start: 2020-08-09 | End: 2020-08-10 | Stop reason: HOSPADM

## 2020-08-09 RX ORDER — OXYCODONE HYDROCHLORIDE 5 MG/1
5 TABLET ORAL ONCE
Status: COMPLETED | OUTPATIENT
Start: 2020-08-09 | End: 2020-08-09

## 2020-08-09 RX ADMIN — HYDROMORPHONE HYDROCHLORIDE 1 MG: 1 INJECTION, SOLUTION INTRAMUSCULAR; INTRAVENOUS; SUBCUTANEOUS at 06:08

## 2020-08-09 RX ADMIN — PROMETHAZINE HYDROCHLORIDE 25 MG: 25 TABLET ORAL at 01:08

## 2020-08-09 RX ADMIN — DIPHENHYDRAMINE HYDROCHLORIDE, ZINC ACETATE: 2; .1 CREAM TOPICAL at 12:08

## 2020-08-09 RX ADMIN — ERYTHROMYCIN: 5 OINTMENT OPHTHALMIC at 10:08

## 2020-08-09 RX ADMIN — PAROXETINE HYDROCHLORIDE 20 MG: 20 TABLET, FILM COATED ORAL at 06:08

## 2020-08-09 RX ADMIN — SUCRALFATE 1 G: 1 TABLET ORAL at 11:08

## 2020-08-09 RX ADMIN — FAMOTIDINE 20 MG: 20 TABLET, FILM COATED ORAL at 08:08

## 2020-08-09 RX ADMIN — HYPROMELLOSE 2910 1 DROP: 5 SOLUTION OPHTHALMIC at 09:08

## 2020-08-09 RX ADMIN — PANTOPRAZOLE SODIUM 40 MG: 40 TABLET, DELAYED RELEASE ORAL at 08:08

## 2020-08-09 RX ADMIN — OXYCODONE HYDROCHLORIDE 10 MG: 10 TABLET ORAL at 12:08

## 2020-08-09 RX ADMIN — HYPROMELLOSE 2910 1 DROP: 5 SOLUTION OPHTHALMIC at 06:08

## 2020-08-09 RX ADMIN — SUCRALFATE 1 G: 1 TABLET ORAL at 09:08

## 2020-08-09 RX ADMIN — HYPROMELLOSE 2910 1 DROP: 5 SOLUTION OPHTHALMIC at 08:08

## 2020-08-09 RX ADMIN — SUCRALFATE 1 G: 1 TABLET ORAL at 05:08

## 2020-08-09 RX ADMIN — DIPHENHYDRAMINE HYDROCHLORIDE 50 MG: 50 INJECTION, SOLUTION INTRAMUSCULAR; INTRAVENOUS at 08:08

## 2020-08-09 RX ADMIN — POTASSIUM & SODIUM PHOSPHATES POWDER PACK 280-160-250 MG 2 PACKET: 280-160-250 PACK at 08:08

## 2020-08-09 RX ADMIN — SUCRALFATE 1 G: 1 TABLET ORAL at 06:08

## 2020-08-09 RX ADMIN — METHYLPREDNISOLONE SODIUM SUCCINATE 125 MG: 125 INJECTION, POWDER, FOR SOLUTION INTRAMUSCULAR; INTRAVENOUS at 05:08

## 2020-08-09 RX ADMIN — METHYLPREDNISOLONE SODIUM SUCCINATE 125 MG: 125 INJECTION, POWDER, FOR SOLUTION INTRAMUSCULAR; INTRAVENOUS at 01:08

## 2020-08-09 RX ADMIN — DIPHENHYDRAMINE HYDROCHLORIDE 50 MG: 50 INJECTION, SOLUTION INTRAMUSCULAR; INTRAVENOUS at 12:08

## 2020-08-09 RX ADMIN — CETIRIZINE HYDROCHLORIDE 40 MG: 10 TABLET, FILM COATED ORAL at 10:08

## 2020-08-09 RX ADMIN — OXYCODONE HYDROCHLORIDE 10 MG: 10 TABLET ORAL at 01:08

## 2020-08-09 RX ADMIN — OXYCODONE HYDROCHLORIDE 10 MG: 10 TABLET ORAL at 09:08

## 2020-08-09 RX ADMIN — OXYCODONE 5 MG: 5 TABLET ORAL at 11:08

## 2020-08-09 RX ADMIN — OXYCODONE HYDROCHLORIDE 10 MG: 10 TABLET ORAL at 05:08

## 2020-08-09 RX ADMIN — DIPHENHYDRAMINE HYDROCHLORIDE, ZINC ACETATE: 2; .1 CREAM TOPICAL at 08:08

## 2020-08-09 RX ADMIN — HYPROMELLOSE 2910 1 DROP: 5 SOLUTION OPHTHALMIC at 10:08

## 2020-08-09 RX ADMIN — METHYLPREDNISOLONE SODIUM SUCCINATE 125 MG: 125 INJECTION, POWDER, FOR SOLUTION INTRAMUSCULAR; INTRAVENOUS at 11:08

## 2020-08-09 RX ADMIN — OXYCODONE HYDROCHLORIDE 10 MG: 10 TABLET ORAL at 10:08

## 2020-08-09 RX ADMIN — DIPHENHYDRAMINE HYDROCHLORIDE 50 MG: 50 INJECTION, SOLUTION INTRAMUSCULAR; INTRAVENOUS at 01:08

## 2020-08-09 RX ADMIN — METHYLPREDNISOLONE SODIUM SUCCINATE 125 MG: 125 INJECTION, POWDER, FOR SOLUTION INTRAMUSCULAR; INTRAVENOUS at 10:08

## 2020-08-09 RX ADMIN — DIPHENHYDRAMINE HYDROCHLORIDE, ZINC ACETATE: 2; .1 CREAM TOPICAL at 06:08

## 2020-08-09 RX ADMIN — DIPHENHYDRAMINE HYDROCHLORIDE 50 MG: 50 CAPSULE ORAL at 06:08

## 2020-08-09 RX ADMIN — HYPROMELLOSE 2910 1 DROP: 5 SOLUTION OPHTHALMIC at 05:08

## 2020-08-09 RX ADMIN — HYDROXYZINE PAMOATE 50 MG: 25 CAPSULE ORAL at 10:08

## 2020-08-09 RX ADMIN — HYDROXYZINE PAMOATE 50 MG: 25 CAPSULE ORAL at 05:08

## 2020-08-09 RX ADMIN — HYDROMORPHONE HYDROCHLORIDE 1 MG: 1 INJECTION, SOLUTION INTRAMUSCULAR; INTRAVENOUS; SUBCUTANEOUS at 02:08

## 2020-08-09 RX ADMIN — METHYLPREDNISOLONE SODIUM SUCCINATE 125 MG: 125 INJECTION, POWDER, FOR SOLUTION INTRAMUSCULAR; INTRAVENOUS at 06:08

## 2020-08-09 RX ADMIN — HYPROMELLOSE 2910 1 DROP: 5 SOLUTION OPHTHALMIC at 01:08

## 2020-08-09 RX ADMIN — DIPHENHYDRAMINE HYDROCHLORIDE 50 MG: 50 INJECTION, SOLUTION INTRAMUSCULAR; INTRAVENOUS at 06:08

## 2020-08-09 RX ADMIN — DIPHENHYDRAMINE HYDROCHLORIDE 50 MG: 50 CAPSULE ORAL at 10:08

## 2020-08-09 RX ADMIN — PROMETHAZINE HYDROCHLORIDE 25 MG: 25 TABLET ORAL at 08:08

## 2020-08-09 NOTE — PROGRESS NOTES
[facility] Hospital Medicine  Progress Note    Patient Name: Solo Black  MRN: [id number]  Patient Class: IP- Inpatient   Admission Date: 8/6/2020  Length of Stay: 3 days  Attending Physician: Aylin Colin MD  Primary Care Provider: Cecilia Tsai MD    [facility] Medicine Team: [facility] MED 4 Memo Solano MD    Subjective:     Principal Problem:Hypereosinophilic syndrome        HPI:  Mr. 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 Pt is treated by GI (Dr. Tillman) and Immunology (Dr. Ornelas). Pt reports that his flares have progressively worsened over the past few years and they occur more frequently than in the past. At home, he typically takes 50mg prednisone daily, H1 blockers, H2 blockers, and benadryl daily. Pt also prescribed epi pens PRN for angioedema. 2 weeks ago, pt was off of his medications for ~1 week in order to have an EGD and colonoscopy, which revealed eosinophilic esophagitis and erosive gastritis. Pt believes this triggered this flare up. Yesterday, pt had an episode of angioedema wh/ resolved after epi pen administration at home. He reports another episode of angioedema today (which resolved without intervention), severe abdominal pain (onset a few days ago), and bloody diarrhea which has progressed to bleeding rectum/lesions surrounding anus regardless of bowel movements. He attributes his GI symptoms to internal eruptions.    Overview/Hospital Course:  Patient presented with acute flare of presumed hypereosinophilic syndrome. Patient resumed on H1, blocker, H2 blocker, and steroids. PRN pain meds in place. Patient has improved since start of steroids. Patient to follow up with immunologist on 8/11    Interval History: No acute events overnight. IV pain medications  weaned off. Patient feels he will be ready to leave tomorrow. Still having significant abdominal pain and neck, chest rash. No new complaints today.       Objective:     Vital Signs (Most Recent):  Temp: 98.1 °F (36.7 °C) (08/09/20 1525)  Pulse: 72 (08/09/20 1525)  Resp: 18 (08/09/20 1525)  BP: 134/79 (08/09/20 1525)  SpO2: 96 % (08/09/20 1525) Vital Signs (24h Range):  Temp:  [98.1 °F (36.7 °C)-98.6 °F (37 °C)] 98.1 °F (36.7 °C)  Pulse:  [72-92] 72  Resp:  [16-18] 18  SpO2:  [94 %-98 %] 96 %  BP: (129-139)/(74-88) 134/79     Weight: 96 kg (211 lb 10.3 oz)  Body mass index is 27.92 kg/m².    Intake/Output Summary (Last 24 hours) at 8/9/2020 1646  Last data filed at 8/9/2020 1221  Gross per 24 hour   Intake 1440 ml   Output 650 ml   Net 790 ml      Physical Exam  Constitutional:       General: He is in acute distress.      Appearance: He is ill-appearing. He is not diaphoretic.   HENT:      Head: Atraumatic.      Right Ear: Ear canal normal.      Left Ear: Ear canal normal.      Ears:      Comments: Excoriations and rash on external ear and behind ear. Rash in multiple stages of healing     Nose: Nose normal. No congestion or rhinorrhea.      Mouth/Throat:      Mouth: Mucous membranes are moist.      Pharynx: Oropharynx is clear. No oropharyngeal exudate or posterior oropharyngeal erythema.      Comments: No swelling or signs of angioedema  Eyes:      General: No scleral icterus.        Right eye: No discharge.         Left eye: No discharge.      Extraocular Movements: Extraocular movements intact.      Conjunctiva/sclera: Conjunctivae normal.      Pupils: Pupils are equal, round, and reactive to light.   Neck:      Musculoskeletal: Normal range of motion. No neck rigidity or muscular tenderness.      Comments: Rash in multiple stages of healing  Cardiovascular:      Rate and Rhythm: Regular rhythm. Tachycardia present.      Pulses: Normal pulses.      Heart sounds: No murmur. No gallop.    Pulmonary:      Effort:  Pulmonary effort is normal. No respiratory distress.      Breath sounds: Normal breath sounds. No stridor. No wheezing, rhonchi or rales.   Chest:      Chest wall: No tenderness (due to rash in multiple stages of healing).   Abdominal:      General: There is distension.      Palpations: Abdomen is soft. There is no mass.      Tenderness: There is abdominal tenderness. There is no right CVA tenderness, guarding or rebound.      Hernia: No hernia is present.      Comments: Hyperactive bowel sounds   Genitourinary:     Penis: Normal.       Comments: Rash in multiple stages of healing. Located on the scrotum and diffusely in the genital and anal regions  Musculoskeletal: Normal range of motion.         General: Swelling and tenderness present. No deformity or signs of injury.      Right lower leg: Edema present.      Left lower leg: Edema present.      Comments: Swelling, tenderness; swollen, warm bilateral knees   Lymphadenopathy:      Cervical: No cervical adenopathy.   Skin:     Capillary Refill: Capillary refill takes less than 2 seconds.      Coloration: Skin is not jaundiced.      Findings: Erythema, lesion and rash present. No bruising.      Comments: Rash in multiple stages of healing located on his back, chest, scrotum, anus, hands, and feet   Neurological:      Mental Status: He is alert and oriented to person, place, and time.      Cranial Nerves: No cranial nerve deficit.      Motor: No weakness.      Coordination: Coordination normal.   Psychiatric:         Mood and Affect: Mood normal.         Thought Content: Thought content normal.         Judgment: Judgment normal.         Significant Labs:   CBC:   Recent Labs   Lab 08/08/20  0831 08/09/20  0404   WBC 19.10* 17.79*   HGB 12.5* 11.3*   HCT 40.0 38.2*    259     CMP:   Recent Labs   Lab 08/08/20  0407 08/09/20  0404   * 139   K 4.5 3.8    104   CO2 16* 27   * 135*   BUN 18 17   CREATININE 0.9 0.9   CALCIUM 8.1* 8.1*   PROT 6.6  "6.3   ALBUMIN 3.7 3.4*   BILITOT 0.3 0.4   ALKPHOS 59 53*   AST 21 13   ALT 27 23   ANIONGAP 11 8   EGFRNONAA >60.0 >60.0     Review of Systems        Assessment/Plan:      * Hypereosinophilic syndrome  Treated by GI (Dr. Tillman) and Immunology (Dr. Ornelas). Flares progressively worsening over past few years. Pt typically manages symptoms at home with his epi pen, prednisone, benadryl, and H1 and H2 blockers. Pt recently off of his home regimen for further diagnostic work-up. Likely can attribute this flare to the break from his typical treatment. Pt reports his symptoms are the worst they've ever been.    08/09/2020  Spoke with patient's primary allergist Dr. Ornelas who expressed her concern that patient's HES is still not confirmed but the best guess we have for his ongoing symptoms. Unfortunately, steroids seem to be the mainstay and his only real acute option for management. She does not recommend any other inpatient workup. Pt has appointment with allergy on Tuesday, if patient is still in the hospital, they will see him here.    Plan  -IV methylprednisolone 125 mg IV q6h.  - Patient to be discontinued on 60-80 prednisone daily  -pantoprazole 40mg BID PO for ulcer ppx  -H1 (cetirizine 40mg BID) and H2 blocker (famotidine 20mg BID)  -50mg PO benadryl PRN    BRBPR (bright red blood per rectum)  On the day of admission, pt had ~8 bloody, loose BMs. Hgb 13.7 on admit. Pt reports bright red blood per rectum with and/or without BM. Likely secondary to erosive gastritis from suspected HES     Hgb 13.7>13.0>12.5>11.3    · CBC daily  · Abdominal pain management with PO oxy  · Continuing home meds (famotidine, pantoprazole, and sucralafate)  · Ordered Celiac Pannel, per GI recs      Erosive gastritis  Diagnosed on EGD/colonoscopy with biopsies 7/20/2020  · See "Hypereosinophilic Syndrome"      Eosinophilic esophagitis  Diagnosed on EGD/colonoscopy with biopsies 7/20/2020  · See "Hypereosinophilic Syndrome"        VTE " Risk Mitigation (From admission, onward)         Ordered     IP VTE HIGH RISK PATIENT  Once      08/06/20 2206     Place sequential compression device  Until discontinued      08/06/20 2119                Dispo: Discharge patient to home. Folloe up with Immunologist on 8/11.      Memo Solano MD  Department of Hospital Medicine   [facility]

## 2020-08-09 NOTE — SUBJECTIVE & OBJECTIVE
Interval History: No acute events overnight. IV pain medications weaned off. Patient feels he will be ready to leave tomorrow. Still having significant abdominal pain and neck, chest rash. No new complaints today.       Objective:     Vital Signs (Most Recent):  Temp: 98.1 °F (36.7 °C) (08/09/20 1525)  Pulse: 72 (08/09/20 1525)  Resp: 18 (08/09/20 1525)  BP: 134/79 (08/09/20 1525)  SpO2: 96 % (08/09/20 1525) Vital Signs (24h Range):  Temp:  [98.1 °F (36.7 °C)-98.6 °F (37 °C)] 98.1 °F (36.7 °C)  Pulse:  [72-92] 72  Resp:  [16-18] 18  SpO2:  [94 %-98 %] 96 %  BP: (129-139)/(74-88) 134/79     Weight: 96 kg (211 lb 10.3 oz)  Body mass index is 27.92 kg/m².    Intake/Output Summary (Last 24 hours) at 8/9/2020 1646  Last data filed at 8/9/2020 1221  Gross per 24 hour   Intake 1440 ml   Output 650 ml   Net 790 ml      Physical Exam  Constitutional:       General: He is in acute distress.      Appearance: He is ill-appearing. He is not diaphoretic.   HENT:      Head: Atraumatic.      Right Ear: Ear canal normal.      Left Ear: Ear canal normal.      Ears:      Comments: Excoriations and rash on external ear and behind ear. Rash in multiple stages of healing     Nose: Nose normal. No congestion or rhinorrhea.      Mouth/Throat:      Mouth: Mucous membranes are moist.      Pharynx: Oropharynx is clear. No oropharyngeal exudate or posterior oropharyngeal erythema.      Comments: No swelling or signs of angioedema  Eyes:      General: No scleral icterus.        Right eye: No discharge.         Left eye: No discharge.      Extraocular Movements: Extraocular movements intact.      Conjunctiva/sclera: Conjunctivae normal.      Pupils: Pupils are equal, round, and reactive to light.   Neck:      Musculoskeletal: Normal range of motion. No neck rigidity or muscular tenderness.      Comments: Rash in multiple stages of healing  Cardiovascular:      Rate and Rhythm: Regular rhythm. Tachycardia present.      Pulses: Normal pulses.       Heart sounds: No murmur. No gallop.    Pulmonary:      Effort: Pulmonary effort is normal. No respiratory distress.      Breath sounds: Normal breath sounds. No stridor. No wheezing, rhonchi or rales.   Chest:      Chest wall: No tenderness (due to rash in multiple stages of healing).   Abdominal:      General: There is distension.      Palpations: Abdomen is soft. There is no mass.      Tenderness: There is abdominal tenderness. There is no right CVA tenderness, guarding or rebound.      Hernia: No hernia is present.      Comments: Hyperactive bowel sounds   Genitourinary:     Penis: Normal.       Comments: Rash in multiple stages of healing. Located on the scrotum and diffusely in the genital and anal regions  Musculoskeletal: Normal range of motion.         General: Swelling and tenderness present. No deformity or signs of injury.      Right lower leg: Edema present.      Left lower leg: Edema present.      Comments: Swelling, tenderness; swollen, warm bilateral knees   Lymphadenopathy:      Cervical: No cervical adenopathy.   Skin:     Capillary Refill: Capillary refill takes less than 2 seconds.      Coloration: Skin is not jaundiced.      Findings: Erythema, lesion and rash present. No bruising.      Comments: Rash in multiple stages of healing located on his back, chest, scrotum, anus, hands, and feet   Neurological:      Mental Status: He is alert and oriented to person, place, and time.      Cranial Nerves: No cranial nerve deficit.      Motor: No weakness.      Coordination: Coordination normal.   Psychiatric:         Mood and Affect: Mood normal.         Thought Content: Thought content normal.         Judgment: Judgment normal.         Significant Labs:   CBC:   Recent Labs   Lab 08/08/20  0831 08/09/20  0404   WBC 19.10* 17.79*   HGB 12.5* 11.3*   HCT 40.0 38.2*    259     CMP:   Recent Labs   Lab 08/08/20  0407 08/09/20  0404   * 139   K 4.5 3.8    104   CO2 16* 27   * 135*    BUN 18 17   CREATININE 0.9 0.9   CALCIUM 8.1* 8.1*   PROT 6.6 6.3   ALBUMIN 3.7 3.4*   BILITOT 0.3 0.4   ALKPHOS 59 53*   AST 21 13   ALT 27 23   ANIONGAP 11 8   EGFRNONAA >60.0 >60.0     Review of Systems

## 2020-08-09 NOTE — HOSPITAL COURSE
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 Patient resumed on H1, blocker, H2 blocker, sucralfate, and steroids. PRN pain meds in place. Patient has improved since start of IV steroids. Patient to follow up with immunologist on 8/11.  Ophthalmology was consulted for changes in vision.  They recommend artificial tears for dry eyes.  Patient patient is to be discharged with 5 day course of prednisone 60 mg daily.  Patient is stable for discharge.  Patient is to follow with his allergist, general surgery, and GI    For Dr. Ornelas:  Patient has been discharged with 5 day course of prednisone 60 mg daily.  Patient has reported that he has run out of steroids at home.

## 2020-08-09 NOTE — ASSESSMENT & PLAN NOTE
On the day of admission, pt had ~8 bloody, loose BMs. Hgb 13.7 on admit. Pt reports bright red blood per rectum with and/or without BM. Likely secondary to erosive gastritis from suspected HES     Hgb 13.7>13.0>12.5>11.3    · CBC daily  · Abdominal pain management with PO oxy  · Continuing home meds (famotidine, pantoprazole, and sucralafate)  · Ordered Celiac Pannel, per GI recs

## 2020-08-09 NOTE — PLAN OF CARE
Problem: Wound  Goal: Optimal Wound Healing  Outcome: Ongoing, Progressing     Problem: Fall Injury Risk  Goal: Absence of Fall and Fall-Related Injury  Outcome: Ongoing, Progressing     Problem: Adult Inpatient Plan of Care  Goal: Plan of Care Review  Outcome: Ongoing, Progressing  Goal: Patient-Specific Goal (Individualization)  Outcome: Ongoing, Progressing  Goal: Absence of Hospital-Acquired Illness or Injury  Outcome: Ongoing, Progressing  Goal: Optimal Comfort and Wellbeing  Outcome: Ongoing, Progressing  Goal: Readiness for Transition of Care  Outcome: Ongoing, Progressing  Goal: Rounds/Family Conference  Outcome: Ongoing, Progressing     Problem: Infection  Goal: Infection Symptom Resolution  Outcome: Ongoing, Progressing     Problem: Skin Injury Risk Increased  Goal: Skin Health and Integrity  Outcome: Ongoing, Progressing   Patient up all night wanting pain  medicine and did not sleep. Always stated pain intensity at a 7. No other request. CB near.

## 2020-08-09 NOTE — ASSESSMENT & PLAN NOTE
Treated by GI (Dr. Tillman) and Immunology (Dr. Ornelas). Flares progressively worsening over past few years. Pt typically manages symptoms at home with his epi pen, prednisone, benadryl, and H1 and H2 blockers. Pt recently off of his home regimen for further diagnostic work-up. Likely can attribute this flare to the break from his typical treatment. Pt reports his symptoms are the worst they've ever been.    08/09/2020  Spoke with patient's primary allergist Dr. Ornelas who expressed her concern that patient's HES is still not confirmed but the best guess we have for his ongoing symptoms. Unfortunately, steroids seem to be the mainstay and his only real acute option for management. She does not recommend any other inpatient workup. Pt has appointment with allergy on Tuesday, if patient is still in the hospital, they will see him here.    Plan  -IV methylprednisolone 125 mg IV q6h.  - Patient to be discontinued on 60-80 prednisone daily  -pantoprazole 40mg BID PO for ulcer ppx  -H1 (cetirizine 40mg BID) and H2 blocker (famotidine 20mg BID)  -50mg PO benadryl PRN

## 2020-08-09 NOTE — PLAN OF CARE
Problem: Wound  Goal: Optimal Wound Healing  Outcome: Ongoing, Progressing     Problem: Fall Injury Risk  Goal: Absence of Fall and Fall-Related Injury  Outcome: Ongoing, Progressing     Problem: Adult Inpatient Plan of Care  Goal: Plan of Care Review  Outcome: Ongoing, Progressing  Goal: Patient-Specific Goal (Individualization)  Outcome: Ongoing, Progressing  Goal: Absence of Hospital-Acquired Illness or Injury  Outcome: Ongoing, Progressing  Goal: Optimal Comfort and Wellbeing  Outcome: Ongoing, Progressing  Goal: Readiness for Transition of Care  Outcome: Ongoing, Progressing  Goal: Rounds/Family Conference  Outcome: Ongoing, Progressing     POC reviewed with pt, pt verbalizes understanding.   Amb indep. AAOX4.  Good appetite for meals.  Pain meds titrated down to PO only, pt tolerating ok.  Benadryl IV as needed for itching.   Bed low and locked and call light in reach, wctm.

## 2020-08-10 ENCOUNTER — TELEPHONE (OUTPATIENT)
Dept: HEMATOLOGY/ONCOLOGY | Facility: CLINIC | Age: 40
End: 2020-08-10

## 2020-08-10 VITALS
TEMPERATURE: 98 F | WEIGHT: 211.63 LBS | OXYGEN SATURATION: 94 % | SYSTOLIC BLOOD PRESSURE: 127 MMHG | HEIGHT: 73 IN | DIASTOLIC BLOOD PRESSURE: 80 MMHG | RESPIRATION RATE: 17 BRPM | BODY MASS INDEX: 28.05 KG/M2 | HEART RATE: 65 BPM

## 2020-08-10 LAB
ALBUMIN SERPL BCP-MCNC: 3.1 G/DL (ref 3.5–5.2)
ALP SERPL-CCNC: 56 U/L (ref 55–135)
ALT SERPL W/O P-5'-P-CCNC: 23 U/L (ref 10–44)
ANION GAP SERPL CALC-SCNC: 11 MMOL/L (ref 8–16)
AST SERPL-CCNC: 12 U/L (ref 10–40)
BASOPHILS # BLD AUTO: 0.02 K/UL (ref 0–0.2)
BASOPHILS NFR BLD: 0.1 % (ref 0–1.9)
BILIRUB SERPL-MCNC: 0.4 MG/DL (ref 0.1–1)
BUN SERPL-MCNC: 15 MG/DL (ref 6–20)
CALCIUM SERPL-MCNC: 7.9 MG/DL (ref 8.7–10.5)
CHLORIDE SERPL-SCNC: 104 MMOL/L (ref 95–110)
CO2 SERPL-SCNC: 26 MMOL/L (ref 23–29)
CREAT SERPL-MCNC: 0.8 MG/DL (ref 0.5–1.4)
DIFFERENTIAL METHOD: ABNORMAL
EOSINOPHIL # BLD AUTO: 0 K/UL (ref 0–0.5)
EOSINOPHIL NFR BLD: 0 % (ref 0–8)
ERYTHROCYTE [DISTWIDTH] IN BLOOD BY AUTOMATED COUNT: 21.8 % (ref 11.5–14.5)
EST. GFR  (AFRICAN AMERICAN): >60 ML/MIN/1.73 M^2
EST. GFR  (NON AFRICAN AMERICAN): >60 ML/MIN/1.73 M^2
GLIADIN PEPTIDE IGA SER-ACNC: 14 UNITS
GLIADIN PEPTIDE IGG SER-ACNC: 16 UNITS
GLUCOSE SERPL-MCNC: 146 MG/DL (ref 70–110)
HCT VFR BLD AUTO: 39.5 % (ref 40–54)
HGB BLD-MCNC: 11.8 G/DL (ref 14–18)
IGA SERPL-MCNC: 226 MG/DL (ref 70–400)
IMM GRANULOCYTES # BLD AUTO: 0.21 K/UL (ref 0–0.04)
IMM GRANULOCYTES NFR BLD AUTO: 1.5 % (ref 0–0.5)
LYMPHOCYTES # BLD AUTO: 0.4 K/UL (ref 1–4.8)
LYMPHOCYTES NFR BLD: 2.9 % (ref 18–48)
MAGNESIUM SERPL-MCNC: 2.1 MG/DL (ref 1.6–2.6)
MCH RBC QN AUTO: 24.3 PG (ref 27–31)
MCHC RBC AUTO-ENTMCNC: 29.9 G/DL (ref 32–36)
MCV RBC AUTO: 81 FL (ref 82–98)
MONOCYTES # BLD AUTO: 0.5 K/UL (ref 0.3–1)
MONOCYTES NFR BLD: 3.8 % (ref 4–15)
NEUTROPHILS # BLD AUTO: 12.5 K/UL (ref 1.8–7.7)
NEUTROPHILS NFR BLD: 91.7 % (ref 38–73)
NRBC BLD-RTO: 0 /100 WBC
PHOSPHATE SERPL-MCNC: 1.8 MG/DL (ref 2.7–4.5)
PLATELET # BLD AUTO: 245 K/UL (ref 150–350)
PMV BLD AUTO: 11.2 FL (ref 9.2–12.9)
POTASSIUM SERPL-SCNC: 3.6 MMOL/L (ref 3.5–5.1)
PROT SERPL-MCNC: 6.2 G/DL (ref 6–8.4)
RBC # BLD AUTO: 4.85 M/UL (ref 4.6–6.2)
SODIUM SERPL-SCNC: 141 MMOL/L (ref 136–145)
TTG IGA SER-ACNC: 10 UNITS
TTG IGG SER-ACNC: 6 UNITS
WBC # BLD AUTO: 13.68 K/UL (ref 3.9–12.7)

## 2020-08-10 PROCEDURE — 99239 PR HOSPITAL DISCHARGE DAY,>30 MIN: ICD-10-PCS | Mod: ,,, | Performed by: HOSPITALIST

## 2020-08-10 PROCEDURE — 83735 ASSAY OF MAGNESIUM: CPT

## 2020-08-10 PROCEDURE — 85025 COMPLETE CBC W/AUTO DIFF WBC: CPT

## 2020-08-10 PROCEDURE — 84100 ASSAY OF PHOSPHORUS: CPT

## 2020-08-10 PROCEDURE — 25000003 PHARM REV CODE 250: Performed by: HOSPITALIST

## 2020-08-10 PROCEDURE — 80053 COMPREHEN METABOLIC PANEL: CPT

## 2020-08-10 PROCEDURE — 63600175 PHARM REV CODE 636 W HCPCS: Performed by: HOSPITALIST

## 2020-08-10 PROCEDURE — 25000003 PHARM REV CODE 250: Performed by: STUDENT IN AN ORGANIZED HEALTH CARE EDUCATION/TRAINING PROGRAM

## 2020-08-10 PROCEDURE — 36415 COLL VENOUS BLD VENIPUNCTURE: CPT

## 2020-08-10 PROCEDURE — 99239 HOSP IP/OBS DSCHRG MGMT >30: CPT | Mod: ,,, | Performed by: HOSPITALIST

## 2020-08-10 RX ORDER — PREDNISONE 20 MG/1
60 TABLET ORAL DAILY
Qty: 6 TABLET | Refills: 0 | Status: SHIPPED | OUTPATIENT
Start: 2020-08-10 | End: 2020-08-10 | Stop reason: SDUPTHER

## 2020-08-10 RX ORDER — PREDNISONE 20 MG/1
60 TABLET ORAL DAILY
Qty: 15 TABLET | Refills: 0 | Status: SHIPPED | OUTPATIENT
Start: 2020-08-10 | End: 2020-08-11

## 2020-08-10 RX ADMIN — DIPHENHYDRAMINE HYDROCHLORIDE 50 MG: 50 CAPSULE ORAL at 06:08

## 2020-08-10 RX ADMIN — CETIRIZINE HYDROCHLORIDE 40 MG: 10 TABLET, FILM COATED ORAL at 08:08

## 2020-08-10 RX ADMIN — HYPROMELLOSE 2910 1 DROP: 5 SOLUTION OPHTHALMIC at 02:08

## 2020-08-10 RX ADMIN — PROMETHAZINE HYDROCHLORIDE 25 MG: 25 TABLET ORAL at 08:08

## 2020-08-10 RX ADMIN — HYPROMELLOSE 2910 1 DROP: 5 SOLUTION OPHTHALMIC at 06:08

## 2020-08-10 RX ADMIN — PAROXETINE HYDROCHLORIDE 20 MG: 20 TABLET, FILM COATED ORAL at 06:08

## 2020-08-10 RX ADMIN — SUCRALFATE 1 G: 1 TABLET ORAL at 10:08

## 2020-08-10 RX ADMIN — FAMOTIDINE 20 MG: 20 TABLET, FILM COATED ORAL at 08:08

## 2020-08-10 RX ADMIN — PANTOPRAZOLE SODIUM 40 MG: 40 TABLET, DELAYED RELEASE ORAL at 08:08

## 2020-08-10 RX ADMIN — METHYLPREDNISOLONE SODIUM SUCCINATE 125 MG: 125 INJECTION, POWDER, FOR SOLUTION INTRAMUSCULAR; INTRAVENOUS at 06:08

## 2020-08-10 RX ADMIN — OXYCODONE HYDROCHLORIDE 10 MG: 10 TABLET ORAL at 08:08

## 2020-08-10 RX ADMIN — METHYLPREDNISOLONE SODIUM SUCCINATE 125 MG: 125 INJECTION, POWDER, FOR SOLUTION INTRAMUSCULAR; INTRAVENOUS at 01:08

## 2020-08-10 RX ADMIN — HYPROMELLOSE 2910 1 DROP: 5 SOLUTION OPHTHALMIC at 10:08

## 2020-08-10 RX ADMIN — HYDROXYZINE PAMOATE 50 MG: 25 CAPSULE ORAL at 02:08

## 2020-08-10 NOTE — PLAN OF CARE
Problem: Wound  Goal: Optimal Wound Healing  8/10/2020 1322 by Shelly Johnson LPN  Outcome: Adequate for Care Transition  8/10/2020 1321 by Shelly Johnson LPN  Outcome: Adequate for Care Transition  Intervention: Promote Effective Wound Healing  Flowsheets (Taken 8/10/2020 0830)  Sleep/Rest Enhancement: awakenings minimized  Pain Management Interventions: pain management plan reviewed with patient/caregiver     Problem: Adult Inpatient Plan of Care  Goal: Rounds/Family Conference  8/10/2020 1322 by Shelly Johnson LPN  Outcome: Adequate for Care Transition  8/10/2020 1321 by Shelly Johnson LPN  Outcome: Adequate for Care Transition   Pt turns and repositions independently. No skin breakdown noted. Pt pain and safety monitored q 1-2 hrs this shift. Bed locked and in lowest position. Rails elevated x 3. Brakes on. Call light and personal belongings in reach. Ready for discharge.

## 2020-08-10 NOTE — ASSESSMENT & PLAN NOTE
On the day of admission, pt had ~8 bloody, loose BMs. Hgb 13.7 on admit. Pt reports bright red blood per rectum with and/or without BM. Likely secondary to erosive gastritis from suspected HES     Hgb 13.7>13.0>12.5>11.3>11.8    · CBC daily  · Abdominal pain management with PO oxy  · Continuing home meds (famotidine, pantoprazole, and sucralafate)  · Ordered Celiac Pannel, per GI recs

## 2020-08-10 NOTE — NURSING
Pt with orders to d/c IV and d/c home.  Tolerated d/c of IV.   Awake, alert, and oriented with no acute distress noted. Reviewed discharge orders including ;medicine orders, prescriptions, followup appts, and patient education materials for diet and diagnosis.  Belongings packed for transport to home. Ambulating out per self at time of discharge.

## 2020-08-10 NOTE — ASSESSMENT & PLAN NOTE
Treated by GI (Dr. Tillman) and Immunology (Dr. Ornelas). Flares progressively worsening over past few years. Pt typically manages symptoms at home with his epi pen, prednisone, benadryl, and H1 and H2 blockers. Pt recently off of his home regimen for further diagnostic work-up. Likely can attribute this flare to the break from his typical treatment. Pt reports his symptoms are the worst they've ever been.    08/10/2020  Spoke with patient's primary allergist Dr. Ornelas who expressed her concern that patient's HES is still not confirmed but the best guess we have for his ongoing symptoms. Unfortunately, steroids seem to be the mainstay and his only real acute option for management. She does not recommend any other inpatient workup. Pt has appointment with allergy on Tuesday, if patient is still in the hospital, they will see him here.    Plan  -IV methylprednisolone 125 mg IV q6h.  - Patient to be discharged on 60 prednisone daily  -pantoprazole 40mg BID PO for ulcer ppx  -H1 (cetirizine 40mg BID) and H2 blocker (famotidine 20mg BID)  -50mg PO benadryl PRN  -Benadryl cream PRN

## 2020-08-10 NOTE — PLAN OF CARE
Patient is being d/c today with no Social Service needs identified at this time.        08/10/20 0922   Post-Acute Status   Post-Acute Authorization Other   Other Status No Post-Acute Service Needs       Gerda Deras LMSW   - [facility]  Ext. [phone]

## 2020-08-10 NOTE — DISCHARGE INSTRUCTIONS
You are prescribed a 5 day course of steroids.  Take 60 mg daily.  Please let Dr. Ornelas know that this prescription is only for 5 days.  Please get OTC artificial tears for dry eyes and use drops as needed.    Please follow-up with your Dr. Ornelas on 8/11 at 4:00 pm .  Please follow-up with Dr. Honeycutt on 8/13 at 2:45 pm  Referral has been ordered for GI.  You should receive a call to set up an appointment in 1 week.

## 2020-08-10 NOTE — DISCHARGE SUMMARY
[facility] Hospital Medicine  Discharge Summary      Patient Name: Solo Black  MRN: [id number]  Admission Date: 8/6/2020  Hospital Length of Stay: 4 days  Discharge Date and Time:  08/10/2020 9:13 AM  Attending Physician: Aylin Colin MD   Discharging Provider: Memo Solano MD  Primary Care Provider: Cecilia Tsai MD  [facility] Medicine Team: [facility] HOSP MED 4 Memo Solano MD    HPI:   Mr. 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 Pt is treated by GI (Dr. Tillman) and Immunology (Dr. Ornelas). Pt reports that his flares have progressively worsened over the past few years and they occur more frequently than in the past. At home, he typically takes 50mg prednisone daily, H1 blockers, H2 blockers, and benadryl daily. Pt also prescribed epi pens PRN for angioedema. 2 weeks ago, pt was off of his medications for ~1 week in order to have an EGD and colonoscopy, which revealed eosinophilic esophagitis and erosive gastritis. Pt believes this triggered this flare up. Yesterday, pt had an episode of angioedema wh/ resolved after epi pen administration at home. He reports another episode of angioedema today (which resolved without intervention), severe abdominal pain (onset a few days ago), and bloody diarrhea which has progressed to bleeding rectum/lesions surrounding anus regardless of bowel movements. He attributes his GI symptoms to internal eruptions.    * No surgery found *      Hospital Course:   Patient presented with acute flare of presumed hypereosinophilic syndrome. Patient resumed on H1, blocker, H2 blocker, sucralfate, and steroids. PRN pain meds in place. Patient has improved since start of IV steroids. Patient to follow up with immunologist on 8/11.  Ophthalmology was consulted  for changes in vision.  They recommend artificial tears for dry eyes.  Patient patient is to be discharged with 5 day course of prednisone 60 mg daily.  Patient is stable for discharge.  Patient is to follow with his allergist, general surgery, and GI    For Dr. Ornelas:  Patient has been discharged with 5 day course of prednisone 60 mg daily.  Patient has reported that he has run out of steroids at home.     Consults:   Consults (From admission, onward)        Status Ordering Provider     Inpatient consult to Midline team  Once     Provider:  (Not yet assigned)    Completed VY PRIETO     Inpatient consult to Ophthalmology  Once     Provider:  (Not yet assigned)    Completed VY PRIETO          Review of Systems   Constitutional: Positive for activity change and fatigue. Negative for appetite change, chills, diaphoresis and fever.   HENT: Positive for congestion, ear pain, facial swelling, mouth sores and sore throat. Negative for dental problem, drooling, ear discharge, hearing loss, nosebleeds, postnasal drip, rhinorrhea, sinus pressure, sinus pain, sneezing, tinnitus and trouble swallowing.    Eyes: Positive for pain, discharge, redness, itching. Negative for photophobia.   Respiratory: Positive for cough. Negative for apnea, chest tightness and shortness of breath.    Cardiovascular: Positive for leg swelling. Negative for chest pain and palpitations.   Gastrointestinal: Positive for abdominal distention, abdominal pain, and nausea. Negative for constipation and vomiting.   Endocrine: Negative for cold intolerance, heat intolerance, polydipsia, polyphagia and polyuria.   Genitourinary: Positive for genital sores. Negative for difficulty urinating, dysuria, enuresis, flank pain, penile pain, penile swelling and urgency.   Musculoskeletal: Positive for arthralgias and joint swelling. Negative for back pain and myalgias.   Skin: Positive for color change, rash and wound. Negative for pallor.    Allergic/Immunologic: Positive for environmental allergies, food allergies and immunocompromised state.   Neurological: Negative for dizziness, numbness and headaches.   Hematological: Negative for adenopathy. Does not bruise/bleed easily.   Psychiatric/Behavioral: Positive for agitation, decreased concentration and sleep disturbance. Negative for dysphoric mood, hallucinations, self-injury and suicidal ideas. The patient is nervous/anxious and is hyperactive.       Vitals:    08/10/20 0830   BP: 127/80   Pulse: 65   Resp: 17   Temp: 98.2 °F (36.8 °C)          Physical Exam  Constitutional:       General: He is not in distress.      Appearance: He is not diaphoretic.   HENT:      Head: Atraumatic.      Right Ear: Ear canal normal.      Left Ear: Ear canal normal.      Ears:      Comments: Excoriations and rash on external ear and behind ear. Rash in multiple stages of healing     Nose: Nose normal. No congestion or rhinorrhea.      Mouth/Throat:      Mouth: Mucous membranes are moist.      Pharynx: Oropharynx is clear. No oropharyngeal exudate or posterior oropharyngeal erythema.      Comments: No swelling or signs of angioedema  Eyes:      General: No scleral icterus.        Right eye: No discharge.         Left eye: No discharge.      Extraocular Movements: Extraocular movements intact.      Conjunctiva/sclera: Conjunctivae normal.      Pupils: Pupils are equal, round, and reactive to light.   Neck:      Musculoskeletal: Normal range of motion. No neck rigidity or muscular tenderness.      Comments: Rash in multiple stages of healing  Cardiovascular:      Rate and Rhythm: Regular rate and rhythm.      Pulses: Normal pulses.      Heart sounds: No murmur. No gallop.    Pulmonary:      Effort: Pulmonary effort is normal. No respiratory distress.      Breath sounds: Normal breath sounds. No stridor. No wheezing, rhonchi or rales.   Chest:      Chest wall: No tenderness (due to rash in multiple stages of healing).    Abdominal:      General: There is no distention.      Palpations: Abdomen is soft. There is no mass.      Tenderness: There is abdominal tenderness. There is no right CVA tenderness, guarding or rebound.      Hernia: No hernia is present.      Comments: Hyperactive bowel sounds   Genitourinary:     Penis: Normal.       Comments: Rash in multiple stages of healing. Located on the scrotum and diffusely in the genital and anal regions  Musculoskeletal: Normal range of motion.         General: Swelling and tenderness present. No deformity or signs of injury.      Right lower leg: Edema present.      Left lower leg: Edema present.      Comments: Swelling, tenderness; swollen, warm bilateral knees   Lymphadenopathy:      Cervical: No cervical adenopathy.   Skin:     Capillary Refill: Capillary refill takes less than 2 seconds.      Coloration: Skin is not jaundiced.      Findings: Erythema, lesion and rash present. No bruising.      Comments: Rash in multiple stages of healing located on his back, chest, scrotum, anus, hands, and feet   Neurological:      Mental Status: He is alert and oriented to person, place, and time.      Cranial Nerves: No cranial nerve deficit.      Motor: No weakness.      Coordination: Coordination normal.   Psychiatric:         Mood and Affect: Mood normal.         Thought Content: Thought content normal.         Judgment: Judgment normal.         * Hypereosinophilic syndrome  Treated by GI (Dr. Tillman) and Immunology (Dr. Ornelas). Flares progressively worsening over past few years. Pt typically manages symptoms at home with his epi pen, prednisone, benadryl, and H1 and H2 blockers. Pt recently off of his home regimen for further diagnostic work-up. Likely can attribute this flare to the break from his typical treatment. Pt reports his symptoms are the worst they've ever been.    08/10/2020  Spoke with patient's primary allergist Dr. Ornelas who expressed her concern that patient's HES is still  "not confirmed but the best guess we have for his ongoing symptoms. Unfortunately, steroids seem to be the mainstay and his only real acute option for management. She does not recommend any other inpatient workup. Pt has appointment with allergy on Tuesday, if patient is still in the hospital, they will see him here.    Plan  -IV methylprednisolone 125 mg IV q6h.  - Patient to be discharged on 60 prednisone daily  -pantoprazole 40mg BID PO for ulcer ppx  -H1 (cetirizine 40mg BID) and H2 blocker (famotidine 20mg BID)  -50mg PO benadryl PRN  -Benadryl cream PRN    BRBPR (bright red blood per rectum)  On the day of admission, pt had ~8 bloody, loose BMs. Hgb 13.7 on admit. Pt reports bright red blood per rectum with and/or without BM. Likely secondary to erosive gastritis from suspected HES     Hgb 13.7>13.0>12.5>11.3>11.8    · CBC daily  · Abdominal pain management with PO oxy  · Continuing home meds (famotidine, pantoprazole, and sucralafate)  · Ordered Celiac Pannel, per GI recs      Erosive gastritis  Diagnosed on EGD/colonoscopy with biopsies 7/20/2020  · See "Hypereosinophilic Syndrome"      Eosinophilic esophagitis  Diagnosed on EGD/colonoscopy with biopsies 7/20/2020  · See "Hypereosinophilic Syndrome"      Final Active Diagnoses:    Diagnosis Date Noted POA    PRINCIPAL PROBLEM:  Hypereosinophilic syndrome [D72.1] 08/06/2020 Yes    BRBPR (bright red blood per rectum) [K62.5]  Yes    Erosive gastritis [K29.60] 03/17/2016 Yes    Eosinophilic esophagitis [K20.0] 03/11/2014 Yes     Chronic      Problems Resolved During this Admission:       Discharged Condition: stable    Disposition: Home or Self Care    Follow Up:    Patient Instructions:      Ambulatory referral/consult to Gastroenterology   Standing Status: Future   Referral Priority: Routine Referral Type: Consultation   Referral Reason: Specialty Services Required   Requested Specialty: Gastroenterology   Number of Visits Requested: 1     Notify your " health care provider if you experience any of the following:  temperature >100.4     Notify your health care provider if you experience any of the following:  persistent nausea and vomiting or diarrhea     Notify your health care provider if you experience any of the following:  redness, tenderness, or signs of infection (pain, swelling, redness, odor or green/yellow discharge around incision site)     Notify your health care provider if you experience any of the following:  difficulty breathing or increased cough     Notify your health care provider if you experience any of the following:  worsening rash     Notify your health care provider if you experience any of the following:  persistent dizziness, light-headedness, or visual disturbances     Notify your health care provider if you experience any of the following:  severe persistent headache     Notify your health care provider if you experience any of the following:  severe uncontrolled pain     Notify your health care provider if you experience any of the following:  increased confusion or weakness     Activity as tolerated       Significant Diagnostic Studies: Labs:   CMP   Recent Labs   Lab 08/09/20  0404 08/10/20  0510    141   K 3.8 3.6    104   CO2 27 26   * 146*   BUN 17 15   CREATININE 0.9 0.8   CALCIUM 8.1* 7.9*   PROT 6.3 6.2   ALBUMIN 3.4* 3.1*   BILITOT 0.4 0.4   ALKPHOS 53* 56   AST 13 12   ALT 23 23   ANIONGAP 8 11   ESTGFRAFRICA >60.0 >60.0   EGFRNONAA >60.0 >60.0    and CBC   Recent Labs   Lab 08/09/20  0404 08/10/20  0510   WBC 17.79* 13.68*   HGB 11.3* 11.8*   HCT 38.2* 39.5*    245       Pending Diagnostic Studies:     None         Medications:  Reconciled Home Medications:      Medication List      START taking these medications    artificial tears 0.5 % ophthalmic solution  Commonly known as: ISOPTO TEARS  Place 1 drop into both eyes 6 (six) times daily.        CHANGE how you take these medications    predniSONE  20 MG tablet  Commonly known as: DELTASONE  Take 3 tablets (60 mg total) by mouth once daily. for 2 days  What changed:   · medication strength  · how much to take  · when to take this        CONTINUE taking these medications    AdderalL 20 mg tablet  Generic drug: dextroamphetamine-amphetamine  Take 1 tablet by mouth 2 (two) times daily before meals. Take before breakfast and lunch     CENTRUM 3,500-18-0.4 unit-mg-mg Chew  Generic drug: multivit-iron-min-folic acid  Take 10 mg by mouth once daily.     cetirizine 10 MG tablet  Commonly known as: ZYRTEC  Take 40 mg by mouth 2 (two) times a day.     diphenhydrAMINE 25 mg tablet  Commonly known as: SOMINEX  Take 50 mg by mouth 3 (three) times daily.     EPINEPHrine 0.3 mg/0.3 mL Atin  Commonly known as: EPIPEN  Inject 0.3 mLs (0.3 mg total) into the muscle as needed.     famotidine 20 MG tablet  Commonly known as: PEPCID  Take 20 mg by mouth 2 (two) times daily.     loratadine 10 mg tablet  Commonly known as: CLARITIN  Take 10 mg by mouth every morning.     pantoprazole 40 MG tablet  Commonly known as: PROTONIX  Take 1 tablet (40 mg total) by mouth 2 (two) times daily.     paroxetine 20 MG tablet  Commonly known as: PAXIL  Take 20 mg by mouth every morning.     promethazine 12.5 MG Tab  Commonly known as: PHENERGAN  Take 25 mg by mouth every 6 (six) hours as needed (nausea/vomiting).     sucralfate 1 gram tablet  Commonly known as: CARAFATE  Take 1 g by mouth 4 (four) times daily before meals and nightly.            Indwelling Lines/Drains at time of discharge:   Lines/Drains/Airways     None                 Time spent on the discharge of patient: 30 minutes  Patient was seen and examined on the date of discharge and determined to be suitable for discharge.         Memo Solano MD  Department of Hospital Medicine  [facility]

## 2020-08-11 ENCOUNTER — OFFICE VISIT (OUTPATIENT)
Dept: ALLERGY | Facility: CLINIC | Age: 40
DRG: 815 | End: 2020-08-11
Payer: MEDICAID

## 2020-08-11 ENCOUNTER — TELEPHONE (OUTPATIENT)
Dept: HEMATOLOGY/ONCOLOGY | Facility: CLINIC | Age: 40
End: 2020-08-11

## 2020-08-11 VITALS — BODY MASS INDEX: 29.17 KG/M2 | HEIGHT: 72 IN | WEIGHT: 215.38 LBS

## 2020-08-11 DIAGNOSIS — T78.3XXD ANGIOEDEMA, SUBSEQUENT ENCOUNTER: ICD-10-CM

## 2020-08-11 DIAGNOSIS — D72.110 HYPEREOSINOPHILIC SYNDROME, IDIOPATHIC: ICD-10-CM

## 2020-08-11 DIAGNOSIS — D72.10 EOSINOPHILIA: Primary | ICD-10-CM

## 2020-08-11 DIAGNOSIS — B37.0 THRUSH: ICD-10-CM

## 2020-08-11 DIAGNOSIS — K20.0 EOSINOPHILIC ESOPHAGITIS: ICD-10-CM

## 2020-08-11 DIAGNOSIS — L50.8 RECURRENT URTICARIA: ICD-10-CM

## 2020-08-11 DIAGNOSIS — R22.2 LUMP IN CHEST: ICD-10-CM

## 2020-08-11 DIAGNOSIS — L98.9 SKIN LESIONS: ICD-10-CM

## 2020-08-11 PROCEDURE — 99214 OFFICE O/P EST MOD 30 MIN: CPT | Mod: S$PBB,,, | Performed by: STUDENT IN AN ORGANIZED HEALTH CARE EDUCATION/TRAINING PROGRAM

## 2020-08-11 PROCEDURE — 99999 PR PBB SHADOW E&M-EST. PATIENT-LVL IV: CPT | Mod: PBBFAC,,, | Performed by: STUDENT IN AN ORGANIZED HEALTH CARE EDUCATION/TRAINING PROGRAM

## 2020-08-11 PROCEDURE — 99999 PR PBB SHADOW E&M-EST. PATIENT-LVL IV: ICD-10-PCS | Mod: PBBFAC,,, | Performed by: STUDENT IN AN ORGANIZED HEALTH CARE EDUCATION/TRAINING PROGRAM

## 2020-08-11 PROCEDURE — 99214 OFFICE O/P EST MOD 30 MIN: CPT | Mod: PBBFAC | Performed by: STUDENT IN AN ORGANIZED HEALTH CARE EDUCATION/TRAINING PROGRAM

## 2020-08-11 PROCEDURE — 99214 PR OFFICE/OUTPT VISIT, EST, LEVL IV, 30-39 MIN: ICD-10-PCS | Mod: S$PBB,,, | Performed by: STUDENT IN AN ORGANIZED HEALTH CARE EDUCATION/TRAINING PROGRAM

## 2020-08-11 RX ORDER — PREDNISONE 20 MG/1
20 TABLET ORAL DAILY
Qty: 42 TABLET | Refills: 0 | Status: SHIPPED | OUTPATIENT
Start: 2020-08-11 | End: 2020-08-22

## 2020-08-11 RX ORDER — CLOTRIMAZOLE 10 MG/1
10 LOZENGE ORAL; TOPICAL
Qty: 50 TABLET | Refills: 0 | Status: SHIPPED | OUTPATIENT
Start: 2020-08-11 | End: 2020-08-21

## 2020-08-11 RX ORDER — EPINEPHRINE 0.3 MG/.3ML
1 INJECTION SUBCUTANEOUS
Qty: 2 DEVICE | Refills: 1 | Status: SHIPPED | OUTPATIENT
Start: 2020-08-11 | End: 2020-08-14 | Stop reason: SDUPTHER

## 2020-08-11 NOTE — PLAN OF CARE
08/11/20 0743   Final Note   Assessment Type Final Discharge Note     Patient discharged home with no needs on 8/10/2020. Discharge summary faxed to [employer] [facility] [employer].

## 2020-08-11 NOTE — PROGRESS NOTES
ALLERGY & IMMUNOLOGY CLINIC - FOLLOW UP     HISTORY OF PRESENT ILLNESS     Patient ID: Solo Black is a 39 y.o. male    CC: angioedema    HPI: Solo Black is a 39 y.o. male with a history of EoE, eosinophilic gastritis, chronic recurrent angioedema and urticaria, and possible HES here for follow up.    He says he is doing a bit better with his prednisone. He had recent hospitalization with abdominal pain, blood per rectum, bilateral feet swelling. Symptoms improved with steroids. He was discharged yesterday and is now on 50 mg prednisone BID. He had positive strongyloides IgG and was treated with ivermectin a little over a week ago. Unfortunately symptoms did not get better after treatment. The skin lesions on his back are a little better with the steroids as well and are not currently oozing as they sometimes are. He also thinks he has thrush, which he often gets when he is on high dose corticosteroids.    He says he has an appointment coming up this week to get his breast lump evaluated.   He also has an appointment coming up with heme onc.     REVIEW OF SYSTEMS     CONST: no F/C, + NS x 3-6 months, no unintentional weight changes, + fatigue  NEURO: no weakness, no paresthesias  EYES: no discharge, + pruritus and burning with the angioedema  EARS: no hearing loss, no sensation of fullness  NOSE: no congestion, no rhinorrhea, no discharge, no itching, no sneezing  PULM: no SOB, no wheezing, no cough  CV: no CP, no palpitations  GI: + dysphagia, no heartburn, + pain, + nausea, + diarrhea, + BRBPR  URO: no dysuria, no hematuria, no nocturia  DERM: + urticaria and angioedema, + blistering/oozing lesions on back (currently scabbed over and not oozing)  Rest of ROS negative unless otherwise stated in the HPI.     MEDICAL HISTORY     MedHx:   Patient Active Problem List   Diagnosis    Eosinophilic esophagitis    PUD (peptic ulcer disease)    Erosive gastritis    Lifetime need for proton pump inhibitor     Cervicalgia    Adult ADHD    Adverse reaction to nonsteroidal anti-inflammatory drug (NSAID)    Therapeutic opioid induced constipation    Iron deficiency anemia    Eosinophilia    BRBPR (bright red blood per rectum)    Reactive arthritis    Incomplete rotator cuff tear or rupture of right shoulder, not specified as traumatic    MRSA bacteremia    Hiatal hernia    Hypereosinophilic syndrome       SurgHx:   Past Surgical History:   Procedure Laterality Date    APPENDECTOMY      ARTHROSCOPY OF SHOULDER WITH REMOVAL OF DISTAL CLAVICLE Right 11/1/2018    Procedure: ARTHROSCOPY, SHOULDER, WITH DISTAL CLAVICLE EXCISION;  Surgeon: Gabino Mejia MD;  Location: [facility];  Service: Orthopedics;  Laterality: Right;  video    BACK SURGERY      CIRCUMCISION, PRIMARY      COLONOSCOPY N/A 11/14/2018    Procedure: COLONOSCOPY;  Surgeon: Milton Brunson MD;  Location: [facility] ENDO;  Service: Endoscopy;  Laterality: N/A;    COLONOSCOPY N/A 7/20/2020    Procedure: COLONOSCOPY;  Surgeon: Ruslan Tillman MD;  Location: [facility];  Service: Endoscopy;  Laterality: N/A;    drainage of abscess from hand  2009    MRSA    drainage of abscess from R thigh  2006    MRSA    ESOPHAGOGASTRODUODENOSCOPY  3/11/2014    ESOPHAGOGASTRODUODENOSCOPY N/A 9/5/2018    Procedure: EGD (ESOPHAGOGASTRODUODENOSCOPY);  Surgeon: Kolby Wall MD;  Location: [facility];  Service: Endoscopy;  Laterality: N/A;    ESOPHAGOGASTRODUODENOSCOPY N/A 3/25/2020    Procedure: EGD (ESOPHAGOGASTRODUODENOSCOPY);  Surgeon: Ruslan Tillman MD;  Location: [facility];  Service: Endoscopy;  Laterality: N/A;    ESOPHAGOGASTRODUODENOSCOPY N/A 7/20/2020    Procedure: EGD (ESOPHAGOGASTRODUODENOSCOPY);  Surgeon: Ruslan Tillman MD;  Location: [facility];  Service: Endoscopy;  Laterality: N/A;  Patient is a very hard stick, requires anesthesia stick.    FLEXIBLE SIGMOIDOSCOPY N/A 9/5/2018    Procedure: SIGMOIDOSCOPY, FLEXIBLE;   Surgeon: Kolby Wall MD;  Location: [facility];  Service: Endoscopy;  Laterality: N/A;    HAND SURGERY  jan 2014    power saw fell on hand - laceration 3 tendons         Medications:   Current Outpatient Medications on File Prior to Visit   Medication Sig Dispense Refill    artificial tears (ISOPTO TEARS) 0.5 % ophthalmic solution Place 1 drop into both eyes 6 (six) times daily.      cetirizine (ZYRTEC) 10 MG tablet Take 40 mg by mouth 2 (two) times a day.      dextroamphetamine-amphetamine (ADDERALL) 20 mg tablet Take 1 tablet by mouth 2 (two) times daily before meals. Take before breakfast and lunch      diphenhydrAMINE (SOMINEX) 25 mg tablet Take 50 mg by mouth 3 (three) times daily.      famotidine (PEPCID) 20 MG tablet Take 20 mg by mouth 2 (two) times daily.      MULTIVIT-IRON-MIN-FOLIC ACID 3,500-18-0.4 UNIT-MG-MG ORAL CHEW Take 10 mg by mouth once daily.      pantoprazole (PROTONIX) 40 MG tablet Take 1 tablet (40 mg total) by mouth 2 (two) times daily. 180 tablet 3    paroxetine (PAXIL) 20 MG tablet Take 20 mg by mouth every morning.      promethazine (PHENERGAN) 12.5 MG Tab Take 25 mg by mouth every 6 (six) hours as needed (nausea/vomiting).      sucralfate (CARAFATE) 1 gram tablet Take 1 g by mouth 4 (four) times daily before meals and nightly.      EPINEPHrine (EPIPEN) 0.3 mg/0.3 mL AtIn Inject 0.3 mLs (0.3 mg total) into the muscle as needed. (Patient not taking: Reported on 8/11/2020) 2 Device 1    loratadine (CLARITIN) 10 mg tablet Take 10 mg by mouth every morning.      predniSONE (DELTASONE) 20 MG tablet Take 3 tablets (60 mg total) by mouth once daily. for 5 days (Patient not taking: Reported on 8/11/2020) 15 tablet 0     No current facility-administered medications on file prior to visit.        Vaccines: got flu vaccine [id number] season    H/o Asthma: as a child. Still carries albuterol inhaler, uses once to twice per year  H/o Eczema: as a child, not as bad anymore  H/o  "Rhinitis: occasional  Oral Allergy: cantaloupe, watermelon  Food Allergy: does not eat beans due to lip swelling and vomiting  Venom Allergy: denies  Latex Allergy: denies  Drug Allergies:   Review of patient's allergies indicates:   Allergen Reactions    Legumes Nausea And Vomiting and Swelling     Other reaction(s): GI Intolerance  vomiting  vomiting  Pt reports legumes make his lips swell and induce severe vomiting  Pt reports legumes make his lips swell and induce severe vomiting      Nsaids (non-steroidal anti-inflammatory drug)      GI bleed    Bean pod extract      Lips swelling, vomiting  Lips swelling, vomiting      Ketamine      Severe dysphoria (bad trip)    Lentils      Lips swelling, vomiting  Lips swelling, vomiting      Meloxicam Nausea Only     GI bleed    Peas      Lips swelling, vomiting    Penicillins      Has taken third gen cephalosporins without issue per patient report    Haloperidol Anxiety and Other (See Comments)     "feels like crawling out of his skin"      PCN- had hives when he was a child      Infection Hx: He had pneumonia once a few years ago. Gets MRSA infections frequently. No frequent sinus infections.    SocHx:   ETOH: no  Tobacco: no  Illicit Drug Use: no  Occupation: works in [employer] field    FamHx:   Asthma: sister  Allergic rhinitis: father  Food Allergy: no  Immune deficiency: no  Angioedema: no  Autoimmune: sister with celiac, grandmother with crohns     PHYSICAL EXAM     VS: Ht 6' (1.829 m)   Wt 97.7 kg (215 lb 6.2 oz)   BMI 29.21 kg/m²   GENERAL: AAOx4, NAD, appears to be in pain, cooperative  EYES: EOMI, no conjunctival injection, no discharge,   NOSE: NT 2 + and pink B/L, clear mucus in B/L nares, no polyps  ORAL: MMM, no ulcers, evidence of thrush (mild white film on tongue, does not go away with scraping), no cobblestoning  NECK: no thyromegaly, no LAD  LUNGS: CTAB, no w/r/c, no increased WOB  HEART: RRR, normal S1/S2, no m/g/r  ABDOMEN: BS absent, " tender to palpation diffusely  EXTREMITIES: No edema, no cyanosis, no clubbing  DERM: ecchymosis present on bilateral forearms, multiple round scabbed over lesions on back up to 1.5cm in diameter  NEURO: normal gait, no facial asymmetry     LABORATORY STUDIES     Component      Latest Ref Rng & Units 7/13/2020   WBC      3.90 - 12.70 K/uL 5.58   RBC      4.60 - 6.20 M/uL 5.94   Hemoglobin      14.0 - 18.0 g/dL 14.0   Hematocrit      40.0 - 54.0 % 46.1   MCV      82 - 98 fL 78 (L)   MCH      27.0 - 31.0 pg 23.6 (L)   MCHC      32.0 - 36.0 g/dL 30.4 (L)   RDW      11.5 - 14.5 % 23.4 (H)   Platelets      150 - 350 K/uL 250   MPV      9.2 - 12.9 fL 10.8   Immature Granulocytes      0.0 - 0.5 % 0.2   Gran # (ANC)      1.8 - 7.7 K/uL 3.0   Immature Grans (Abs)      0.00 - 0.04 K/uL 0.01   Lymph #      1.0 - 4.8 K/uL 1.4   Mono #      0.3 - 1.0 K/uL 0.6   Eos #      0.0 - 0.5 K/uL 0.5   Baso #      0.00 - 0.20 K/uL 0.05   nRBC      0 /100 WBC 0   Gran%      38.0 - 73.0 % 54.3   Lymph%      18.0 - 48.0 % 24.2   Mono%      4.0 - 15.0 % 10.9   Eosinophil%      0.0 - 8.0 % 9.5 (H)   Basophil%      0.0 - 1.9 % 0.9   Differential Method       Automated   Sodium      136 - 145 mmol/L 143   Potassium      3.5 - 5.1 mmol/L 3.9   Chloride      95 - 110 mmol/L 106   CO2      23 - 29 mmol/L 26   Glucose      70 - 110 mg/dL 86   BUN, Bld      2 - 20 mg/dL 17   Creatinine      0.50 - 1.40 mg/dL 0.86   Calcium      8.7 - 10.5 mg/dL 9.5   PROTEIN TOTAL      6.0 - 8.4 g/dL 7.9   Albumin      3.5 - 5.2 g/dL 4.6   BILIRUBIN TOTAL      0.1 - 1.0 mg/dL 1.3 (H)   Alkaline Phosphatase      38 - 126 U/L 62   AST      15 - 46 U/L 32   ALT      10 - 44 U/L 45 (H)   Anion Gap      8 - 16 mmol/L 11   eGFR if African American      >60 mL/min/1.73 m:2 >60.0   eGFR if non African American      >60 mL/min/1.73 m:2 >60.0   CHIC2, 4q12 Deletion Result Summary       Normal   Chic2, 4Q12 Deletion, Interpretation       SEE BELOW   CHIC2, 4q12 Deletion Result  Table       SEE BELOW   Chic2, 4Q12 Deletion, Results       SEE BELOW   Chic2, 4Q12 Deletion, Reason For Referral       D72.1  Eosinophilia   Chic2, 4Q12 Deletion, Specimen       Blood   Chic2, 4Q12 Deletion, Source       Test Not Performed   Chic2, 4Q12 Deletion, Method       SEE BELOW   CHIC2, 4q12 Deletion Additional Information       Test Not Performed   CHIC2, 4q12 Deletion Disclaimer       SEE BELOW   CHIC2, 4q12 Deletion Released by       Chey Allen D.O.   Strongyloides Ab IgG      Negative Positive (A)   Tryptase      <11.5 ng/mL 7.7   Vitamin B-12      210 - 950 pg/mL 390   T Cell Receptor Gene Rearrangement, Blood       SEE BELOW   Final Diagnosis       SEE BELOW       Component      Latest Ref Rng & Units 4/12/2019 9/4/2018 6/7/2013   C2 Complement, Functional, S      25 - 47 U/mL   47   Interpretation         See Comments   Complement (C-4)      11 - 44 mg/dl   20   C1 Esterase Inhibitor      21 - 39 mg/dl   36   C1Q Binding      0.0 - 3.9 ugE/ml   3.3   TSH      0.400 - 4.000 uIU/mL 0.265 (L) 2.240    Free T4      0.71 - 1.51 ng/dL 0.91       Component      Latest Ref Rng & Units 10/5/2018   Eos #      0.0 - 0.5 K/uL 1.6 (H)        ALLERGEN TESTING     He reports prior allergy testing in [place] but does not remember what he was positive to.     IMAGING & OTHER DIAGNOSTICS     Echocardiogram 4/15/19:  · Concentric left ventricular remodeling.  · Normal left ventricular systolic function. The estimated ejection fraction is 60%  · Grade I (mild) left ventricular diastolic dysfunction consistent with impaired relaxation.  · Mild tricuspid regurgitation.  · Normal right ventricular systolic function.  · Normal left atrial pressure.  · Normal central venous pressure (3 mm Hg).  · The estimated PA systolic pressure is 27 mm Hg     CHART REVIEW     Reviewed prior labs, notes, records from [facility].     ASSESSMENT & PLAN     Solo Black is a 39 y.o. male with     There are no diagnoses linked  to this encounter.    # Eosinophilia: Patient reports he had diagnosis of HES in [place]. Unsure which variant. He has had one set of labs here with eos>1500. He has not had a second eos level here with eos>1500, possibly due to him frequently being on steroids. Records from [facility] with peripheral eos no higher than 700. Patient says he has more records and will print them out and bring to next visit. It is possible his urticaria and angioedema are related to HES. He is currently on 50 mg prednisone BID. Will attempt to wean to and check eos weekly to see if the rise with the weaning of the steroids. Will try to get to lowest dose of steroids on which peripheral eos are still controlled.  -prescribed prednisone 60 daily X7 days, then 40 daily X7 days, then 20 daily X7 days  -will check peripheral eos level at the end of weeks 1,2, and 3 and follow up with him in 3 weeks with visit (will call him if issues arise earlier)  -FISH for CHIC2 deletion (FIP1L1 And PDGFRA Fusion), tryptase, B12, B and T cell receptor gene rearrangement were all wnl  -strongyloides IgG was positive, but treatment with ivermectin did not improve symptoms  -referred to heme/onc for further eval and possible bone marrow bx    # Chronic angioedema and urticaria: Significant sxs including reported intestinal swelling and airway swelling in the past requiring intubation. He has been on omalizumab in the past without help, but he only stayed on for 1-2 months  -steroids as above  -sxs do not seem antihistamine responsive  -can consider trying omalizumab again in the future    # Skin lesions on back: round lesions currently scabbed over, but sometimes ooze  -referring to derm for further eval and possible biopsy    # Eosinophilic esophagitis and gastritis: Patient has ongoing symptoms, follows with GI here. He reports that steroid slurries help with the esophagitis but not gastritis. He reports he has tried food elimination diets without  success  -continue protonix  -continue following with GI  -consider dupilumab if/when it gets approved for EoE    # Lump in right breast:   -patient says he has appt to evaluate this later this week.    # Allergy to legumes: Vomiting and lip swelling  -continue avoiding beans/legumes  -continue to carry epi pen    # Allergy status to penicillin: had hives when he was a child. Unlikely that he has current IgE mediated allergy to pcn  -consider amoxicillin challenge when we get his other symptoms under control    # thrush:  -clotrimazole troches 5x daily for 10 days ordered    Discussed with: Dr. Wright  Follow up: 3 weeks     Michelle Ornelas MD  Allergy/Immunology Fellow

## 2020-08-12 ENCOUNTER — PATIENT OUTREACH (OUTPATIENT)
Dept: ADMINISTRATIVE | Facility: CLINIC | Age: 40
End: 2020-08-12

## 2020-08-12 NOTE — PROGRESS NOTES
C3 nurse attempted to contact patient. The following occurred:   C3 nurse attempted to contact Solo Black  for a TCC post hospital discharge follow up call. The patient is unable to conduct the call @ this time. The patient requested a callback.    The patient does not have a scheduled HOSFU appointment within 7-14 days post hospital discharge date 08/10/2020 Message sent to Physician staff to assist with HOSFU appointment scheduling.

## 2020-08-13 NOTE — PATIENT INSTRUCTIONS
Eosinophilic Esophagitis (EoE)  Eosinophilic esophagitis (EoE) is an allergic reaction. It occurs in the esophagus. This is the tube that leads from the mouth down to the stomach. The immune system responds to an allergen by making white blood cells called eosinophils. The esophagus then becomes red and swollen. EoE is much more common in people with asthma or allergies.  What causes eosinophilic esophagitis?  Researchers are working to understand the causes of EoE. It may be caused by allergens in the environment. Or it may be caused by food allergens. Foods that are often found to be the cause of EoE include wheat, dairy foods, eggs, and soy. You may be at higher risk for EoE if you have a family history of allergies or EoE.  Symptoms of eosinophilic esophagitis  Symptoms of EoE vary from person to person and may include:  · Trouble swallowing  · Chest pain  · Pain in the belly (abdomen)  · Vomiting  · Food getting stuck in the throat (a medical emergency)  Diagnosing eosinophilic esophagitis  Your health care provider will ask about your health history and symptoms. He or she will likely want to test you for allergies. You may have an endoscopy. A thin, flexible tube (endoscope) is passed through your mouth and down your throat. The scope has a camera. This lets your health care provider look at your esophagus. The doctor will check for signs of inflammation and an increased number of eosinophils. You may have a biopsy during the procedure. This is a small tissue sample taken from your esophagus.  Other diseases can cause eosinophils in the esophagus. These include gastroesophageal reflux disease (GERD) and inflammatory bowel disease. Your health care provider will check for these.  Treatment for eosinophilic esophagitis  You will likely work with special doctors for treatment. You may see an allergy doctor. And you may see a doctor who treats digestive problems (gastroenterologist). Your treatment may  include:  · Medicine. No medicines can cure EoE. But some can help reduce the redness and swelling. These include corticosteroids and proton pump inhibitors.  · Elimination diet. Not eating certain foods can also help reduce the redness and swelling in your esophagus. These may include dairy foods, egg, wheat, soy, peanut, tree nuts, and fish. Your health care team will give you a plan for finding out what foods may cause your symptoms. This will likely include an elimination diet. On this type of diet, you eat very few foods at first. You then slowly add more foods to see if you have a reaction. An EoE reaction may take days or weeks to develop. Keep this in mind when starting a food elimination diet. It may take some time after avoiding a food to see if that strategy worked.  Living with EoE  You can help manage EoE by learning what things cause your allergic reaction. You will need to avoid these things ongoing to prevent symptoms of EoE.  When to call your health care provider  Call your health care provider if you have any of the following:  · Increasing weight loss  · Increase in vomiting  · Chest pain     When to call 911  Call 911 if you have:  · Food stuck in your throat  · Trouble breathing or talking   Date Last Reviewed: 6/10/2015  © 0010-5292 [employer]. [address] [place] [address], [place] [address]. All rights reserved. This information is not intended as a substitute for professional medical care. Always follow your healthcare professional's instructions.

## 2020-08-13 NOTE — PROGRESS NOTES
Please forward this important TCC information to your provider in order to maximize the post discharge care delivery of this patient.    C3 nurse spoke with Solo Black  for a TCC post hospital discharge follow up call. The patient does not have a scheduled HOSFU appointment with Cecilia Tsai MD  within 7-14 days post hospital discharge date 08/10/2020. C3 nurse was unable to schedule HOSFU appointment in [facility].  Please contact pcp  and schedule follow up appointment using HOSFU visit type on or before 08/24/2020. Patient appt is 08/27/2020    Respectfully,  Nu Carr LPN    [facility]    [email or web address]       Please do not reply to this message, as this inbox is not routinely monitored.

## 2020-08-14 ENCOUNTER — TELEPHONE (OUTPATIENT)
Dept: GASTROENTEROLOGY | Facility: CLINIC | Age: 40
End: 2020-08-14

## 2020-08-14 ENCOUNTER — TELEPHONE (OUTPATIENT)
Dept: DERMATOLOGY | Facility: CLINIC | Age: 40
End: 2020-08-14

## 2020-08-14 RX ORDER — FLUCONAZOLE 200 MG/1
200 TABLET ORAL DAILY
Qty: 7 TABLET | Refills: 0 | Status: SHIPPED | OUTPATIENT
Start: 2020-08-14 | End: 2020-08-21

## 2020-08-14 NOTE — TELEPHONE ENCOUNTER
Called and spoke to pt.  Offered to schedule pt appt in gastro, pt says he will wait to see hem/onc on Monday, 08/17 and will call back to schedule.

## 2020-08-14 NOTE — TELEPHONE ENCOUNTER
Spoke with pt and pt stated that he has a referral in the system to see a dermatologist for his skin conditions. Pt stated that his [facility]EM[facility] provider is the one who sent the referral in. I explained to pt that I would need to talk to someone due to his insurance to confirm he will be able to be seen. I let pt know that after speaking with supervisor in regards to insurance and gave available resources that take his insurance. Pt stated he will speak with his provider first and give us a call back.

## 2020-08-14 NOTE — TELEPHONE ENCOUNTER
----- Message from Codie Cardenas sent at 8/14/2020 12:01 PM CDT -----  Patient called to schedule an appointment specifically with derm. Has a referral in chart  And wishes to speak with a nurse regarding this matter.          can be reached at [phone]    Thanks  KB

## 2020-08-14 NOTE — TELEPHONE ENCOUNTER
----- Message from Codie Cardenas sent at 8/14/2020 12:04 PM CDT -----  Patient called to schedule an appointment specifically with gastro. Has a referral in chart  And wishes to speak with a nurse regarding this matter.          can be reached at [phone]    Thanks  KB

## 2020-08-14 NOTE — PROGRESS NOTES
I received a message from Mr. Black that his thrush was worsening and sxs were spreading despite topical treatment. Prescribing course of fluconazole to treat his oropharyngeal candidiasis which has been resistant to topical therapy.    Michelle Ornelas MD  Allergy/Immunology Fellow

## 2020-08-17 ENCOUNTER — OFFICE VISIT (OUTPATIENT)
Dept: HEMATOLOGY/ONCOLOGY | Facility: CLINIC | Age: 40
End: 2020-08-17
Payer: MEDICAID

## 2020-08-17 VITALS
DIASTOLIC BLOOD PRESSURE: 83 MMHG | OXYGEN SATURATION: 98 % | SYSTOLIC BLOOD PRESSURE: 135 MMHG | HEIGHT: 72 IN | WEIGHT: 206.38 LBS | HEART RATE: 133 BPM | RESPIRATION RATE: 18 BRPM | BODY MASS INDEX: 27.95 KG/M2

## 2020-08-17 DIAGNOSIS — D72.10 EOSINOPHILIA: Primary | ICD-10-CM

## 2020-08-17 PROCEDURE — 99205 PR OFFICE/OUTPT VISIT, NEW, LEVL V, 60-74 MIN: ICD-10-PCS | Mod: S$PBB,,, | Performed by: INTERNAL MEDICINE

## 2020-08-17 PROCEDURE — 99999 PR PBB SHADOW E&M-EST. PATIENT-LVL V: ICD-10-PCS | Mod: PBBFAC,,, | Performed by: INTERNAL MEDICINE

## 2020-08-17 PROCEDURE — 99215 OFFICE O/P EST HI 40 MIN: CPT | Mod: PBBFAC | Performed by: INTERNAL MEDICINE

## 2020-08-17 PROCEDURE — 99205 OFFICE O/P NEW HI 60 MIN: CPT | Mod: S$PBB,,, | Performed by: INTERNAL MEDICINE

## 2020-08-17 PROCEDURE — 99999 PR PBB SHADOW E&M-EST. PATIENT-LVL V: CPT | Mod: PBBFAC,,, | Performed by: INTERNAL MEDICINE

## 2020-08-17 RX ORDER — CLOTRIMAZOLE 1 %
CREAM (GRAM) TOPICAL
Status: ON HOLD | COMMUNITY
Start: 2020-08-14 | End: 2020-08-31 | Stop reason: HOSPADM

## 2020-08-17 RX ORDER — MULTIVITAMIN
1 TABLET ORAL
Status: ON HOLD | COMMUNITY
End: 2020-08-28

## 2020-08-17 RX ORDER — LIDOCAINE HYDROCHLORIDE 20 MG/ML
5 SOLUTION OROPHARYNGEAL
Status: ON HOLD | COMMUNITY
Start: 2017-12-28 | End: 2020-10-13 | Stop reason: HOSPADM

## 2020-08-17 RX ORDER — TAMSULOSIN HYDROCHLORIDE 0.4 MG/1
0.4 CAPSULE ORAL
COMMUNITY
End: 2020-09-16 | Stop reason: SDUPTHER

## 2020-08-17 NOTE — LETTER
August 24, 2020      Michelle Ornelas MD  [address]  [facility] [address]           [facility]  [address]  [facility] [address]  Phone: [phone]          Patient: Solo Black   MR Number: [id number]   YOB: 1980   Date of Visit: 8/17/2020       Dear Dr. Michelle Ornelas:    Thank you for referring Solo Black to me for evaluation. Attached you will find relevant portions of my assessment and plan of care.    If you have questions, please do not hesitate to call me. I look forward to following Solo Black along with you.    Sincerely,    Ivan Gaspar MD    Enclosure  CC:  No Recipients    If you would like to receive this communication electronically, please contact [email or web address] or [phone] to request more information on [email or web address] Link access.    For providers and/or their staff who would like to refer a patient to [facility], please contact us through our one-stop-shop provider referral line, [facility], at [phone].    If you feel you have received this communication in error or would no longer like to receive these types of communications, please e-mail [email or web address]

## 2020-08-18 ENCOUNTER — TELEPHONE (OUTPATIENT)
Dept: HEMATOLOGY/ONCOLOGY | Facility: CLINIC | Age: 40
End: 2020-08-18

## 2020-08-18 NOTE — TELEPHONE ENCOUNTER
"----- Message from Janay Sterling sent at 8/18/2020  3:38 PM CDT -----  Regarding: Scheduling request  Patient Assist    Name of caller:  Solo   Provider name: Ivan Gaspar MD  Contact Preference:  [phone]  Current patient or new patient?: current   Does Patient feel the need to see the MD today? No   What is the nature of the call?    - pt called to schedule his next appts scan biopsy, etc. Stating that   there were already orders for the appts and he just saw the MD. Please call and assist    Additional Notes:   "Thank you for all that you do for our patients'"          "

## 2020-08-19 ENCOUNTER — ANESTHESIA EVENT (OUTPATIENT)
Dept: SURGERY | Facility: HOSPITAL | Age: 40
End: 2020-08-19
Payer: MEDICAID

## 2020-08-19 ENCOUNTER — LAB VISIT (OUTPATIENT)
Dept: FAMILY MEDICINE | Facility: CLINIC | Age: 40
End: 2020-08-19
Payer: MEDICAID

## 2020-08-19 DIAGNOSIS — D72.10 EOSINOPHILIA: Primary | ICD-10-CM

## 2020-08-19 DIAGNOSIS — Z01.818 PREOP TESTING: ICD-10-CM

## 2020-08-19 DIAGNOSIS — F41.9 ANXIETY: ICD-10-CM

## 2020-08-19 PROCEDURE — U0003 INFECTIOUS AGENT DETECTION BY NUCLEIC ACID (DNA OR RNA); SEVERE ACUTE RESPIRATORY SYNDROME CORONAVIRUS 2 (SARS-COV-2) (CORONAVIRUS DISEASE [COVID-19]), AMPLIFIED PROBE TECHNIQUE, MAKING USE OF HIGH THROUGHPUT TECHNOLOGIES AS DESCRIBED BY CMS-2020-01-R: HCPCS

## 2020-08-19 NOTE — TELEPHONE ENCOUNTER
----- Message from Daniela Posada sent at 8/18/2020  4:39 PM CDT -----  Returned pt call, pt is very nervous about BMBX and wants to know if he can have something for anxiety prescribed before he has it done. Pt would like a call back regarding this.      Thanks!

## 2020-08-19 NOTE — TELEPHONE ENCOUNTER
----- Message from Daneila Posada sent at 8/18/2020  4:39 PM CDT -----  Returned pt call, pt is very nervous about BMBX and wants to know if he can have something for anxiety prescribed before he has it done. Pt would like a call back regarding this.      Thanks!

## 2020-08-20 LAB — SARS-COV-2 RNA RESP QL NAA+PROBE: NOT DETECTED

## 2020-08-21 ENCOUNTER — HOSPITAL ENCOUNTER (OUTPATIENT)
Facility: HOSPITAL | Age: 40
Discharge: HOME | End: 2020-08-21
Attending: SURGERY | Admitting: SURGERY
Payer: MEDICAID

## 2020-08-21 ENCOUNTER — ANESTHESIA (OUTPATIENT)
Dept: SURGERY | Facility: HOSPITAL | Age: 40
End: 2020-08-21
Payer: MEDICAID

## 2020-08-21 VITALS
HEIGHT: 72 IN | HEART RATE: 78 BPM | TEMPERATURE: 98 F | WEIGHT: 206 LBS | OXYGEN SATURATION: 97 % | DIASTOLIC BLOOD PRESSURE: 78 MMHG | BODY MASS INDEX: 27.9 KG/M2 | RESPIRATION RATE: 18 BRPM | SYSTOLIC BLOOD PRESSURE: 127 MMHG

## 2020-08-21 DIAGNOSIS — Z45.2 ENCOUNTER FOR INSERTION OF VENOUS ACCESS PORT: ICD-10-CM

## 2020-08-21 DIAGNOSIS — Z01.818 PREOP TESTING: ICD-10-CM

## 2020-08-21 DIAGNOSIS — K27.9 PUD (PEPTIC ULCER DISEASE): ICD-10-CM

## 2020-08-21 DIAGNOSIS — D72.10 EOSINOPHILIA: Primary | ICD-10-CM

## 2020-08-21 DIAGNOSIS — Z79.899 CURRENT USE OF PROTON PUMP INHIBITOR: Chronic | ICD-10-CM

## 2020-08-21 PROCEDURE — 71000015 HC POSTOP RECOV 1ST HR: Performed by: SURGERY

## 2020-08-21 PROCEDURE — C1788 PORT, INDWELLING, IMP: HCPCS | Performed by: SURGERY

## 2020-08-21 PROCEDURE — 71000016 HC POSTOP RECOV ADDL HR: Performed by: SURGERY

## 2020-08-21 PROCEDURE — 00532 ANES ACCESS CTR VENOUS CRCJ: CPT | Performed by: SURGERY

## 2020-08-21 PROCEDURE — 25000003 PHARM REV CODE 250: Performed by: SURGERY

## 2020-08-21 PROCEDURE — 63600175 PHARM REV CODE 636 W HCPCS: Performed by: NURSE ANESTHETIST, CERTIFIED REGISTERED

## 2020-08-21 PROCEDURE — 25000003 PHARM REV CODE 250: Performed by: NURSE ANESTHETIST, CERTIFIED REGISTERED

## 2020-08-21 PROCEDURE — 37000009 HC ANESTHESIA EA ADD 15 MINS: Performed by: SURGERY

## 2020-08-21 PROCEDURE — 36000706: Performed by: SURGERY

## 2020-08-21 PROCEDURE — 63600175 PHARM REV CODE 636 W HCPCS: Performed by: SURGERY

## 2020-08-21 PROCEDURE — 37000008 HC ANESTHESIA 1ST 15 MINUTES: Performed by: SURGERY

## 2020-08-21 PROCEDURE — 36000707: Performed by: SURGERY

## 2020-08-21 PROCEDURE — C1894 INTRO/SHEATH, NON-LASER: HCPCS | Performed by: SURGERY

## 2020-08-21 DEVICE — PORT POWER CLEAR VIEW: Type: IMPLANTABLE DEVICE | Site: CHEST | Status: FUNCTIONAL

## 2020-08-21 RX ORDER — LIDOCAINE HYDROCHLORIDE 10 MG/ML
INJECTION, SOLUTION EPIDURAL; INFILTRATION; INTRACAUDAL; PERINEURAL
Status: DISCONTINUED | OUTPATIENT
Start: 2020-08-21 | End: 2020-08-21 | Stop reason: HOSPADM

## 2020-08-21 RX ORDER — CLINDAMYCIN PHOSPHATE 900 MG/50ML
INJECTION, SOLUTION INTRAVENOUS
Status: DISCONTINUED | OUTPATIENT
Start: 2020-08-21 | End: 2020-08-21

## 2020-08-21 RX ORDER — SODIUM CHLORIDE, SODIUM LACTATE, POTASSIUM CHLORIDE, CALCIUM CHLORIDE 600; 310; 30; 20 MG/100ML; MG/100ML; MG/100ML; MG/100ML
INJECTION, SOLUTION INTRAVENOUS CONTINUOUS PRN
Status: DISCONTINUED | OUTPATIENT
Start: 2020-08-21 | End: 2020-08-21

## 2020-08-21 RX ORDER — MIDAZOLAM HYDROCHLORIDE 1 MG/ML
INJECTION, SOLUTION INTRAMUSCULAR; INTRAVENOUS
Status: DISCONTINUED | OUTPATIENT
Start: 2020-08-21 | End: 2020-08-21

## 2020-08-21 RX ORDER — MUPIROCIN 20 MG/G
OINTMENT TOPICAL
Status: DISCONTINUED | OUTPATIENT
Start: 2020-08-21 | End: 2020-08-21 | Stop reason: HOSPADM

## 2020-08-21 RX ORDER — ACETAMINOPHEN 325 MG/1
650 TABLET ORAL EVERY 4 HOURS PRN
Status: DISCONTINUED | OUTPATIENT
Start: 2020-08-21 | End: 2020-08-21 | Stop reason: HOSPADM

## 2020-08-21 RX ORDER — HEPARIN SODIUM 5000 [USP'U]/ML
INJECTION, SOLUTION INTRAVENOUS; SUBCUTANEOUS
Status: DISCONTINUED | OUTPATIENT
Start: 2020-08-21 | End: 2020-08-21 | Stop reason: HOSPADM

## 2020-08-21 RX ORDER — HEPARIN SODIUM 1000 [USP'U]/ML
INJECTION, SOLUTION INTRAVENOUS; SUBCUTANEOUS
Status: DISCONTINUED | OUTPATIENT
Start: 2020-08-21 | End: 2020-08-21 | Stop reason: HOSPADM

## 2020-08-21 RX ORDER — HYDROCODONE BITARTRATE AND ACETAMINOPHEN 5; 325 MG/1; MG/1
1 TABLET ORAL EVERY 6 HOURS PRN
Qty: 24 TABLET | Refills: 0 | Status: ON HOLD
Start: 2020-08-21 | End: 2020-08-31 | Stop reason: HOSPADM

## 2020-08-21 RX ORDER — HYDROCODONE BITARTRATE AND ACETAMINOPHEN 5; 325 MG/1; MG/1
1 TABLET ORAL EVERY 4 HOURS PRN
Status: DISCONTINUED | OUTPATIENT
Start: 2020-08-21 | End: 2020-08-21 | Stop reason: HOSPADM

## 2020-08-21 RX ORDER — HYDROMORPHONE HYDROCHLORIDE 2 MG/ML
0.5 INJECTION, SOLUTION INTRAMUSCULAR; INTRAVENOUS; SUBCUTANEOUS EVERY 5 MIN PRN
Status: DISCONTINUED | OUTPATIENT
Start: 2020-08-21 | End: 2020-08-21 | Stop reason: HOSPADM

## 2020-08-21 RX ORDER — DIPHENHYDRAMINE HYDROCHLORIDE 50 MG/ML
INJECTION INTRAMUSCULAR; INTRAVENOUS
Status: DISCONTINUED | OUTPATIENT
Start: 2020-08-21 | End: 2020-08-21

## 2020-08-21 RX ORDER — PROPOFOL 10 MG/ML
VIAL (ML) INTRAVENOUS CONTINUOUS PRN
Status: DISCONTINUED | OUTPATIENT
Start: 2020-08-21 | End: 2020-08-21

## 2020-08-21 RX ORDER — FENTANYL CITRATE 50 UG/ML
INJECTION, SOLUTION INTRAMUSCULAR; INTRAVENOUS
Status: DISCONTINUED | OUTPATIENT
Start: 2020-08-21 | End: 2020-08-21

## 2020-08-21 RX ORDER — GLYCOPYRROLATE 0.2 MG/ML
INJECTION INTRAMUSCULAR; INTRAVENOUS
Status: DISCONTINUED | OUTPATIENT
Start: 2020-08-21 | End: 2020-08-21

## 2020-08-21 RX ORDER — LORAZEPAM 1 MG/1
1 TABLET ORAL ONCE
Qty: 1 TABLET | Refills: 0 | Status: ON HOLD | OUTPATIENT
Start: 2020-08-21 | End: 2020-08-31 | Stop reason: HOSPADM

## 2020-08-21 RX ORDER — SODIUM CHLORIDE 9 MG/ML
INJECTION, SOLUTION INTRAVENOUS CONTINUOUS
Status: DISCONTINUED | OUTPATIENT
Start: 2020-08-21 | End: 2020-08-21 | Stop reason: HOSPADM

## 2020-08-21 RX ORDER — PROPOFOL 10 MG/ML
VIAL (ML) INTRAVENOUS
Status: DISCONTINUED | OUTPATIENT
Start: 2020-08-21 | End: 2020-08-21

## 2020-08-21 RX ADMIN — PROPOFOL 130 MG: 10 INJECTION, EMULSION INTRAVENOUS at 09:08

## 2020-08-21 RX ADMIN — MIDAZOLAM 2 MG: 1 INJECTION INTRAMUSCULAR; INTRAVENOUS at 09:08

## 2020-08-21 RX ADMIN — DIPHENHYDRAMINE HYDROCHLORIDE 50 MG: 50 INJECTION INTRAMUSCULAR; INTRAVENOUS at 09:08

## 2020-08-21 RX ADMIN — CLINDAMYCIN IN 5 PERCENT DEXTROSE 900 MG: 18 INJECTION, SOLUTION INTRAVENOUS at 09:08

## 2020-08-21 RX ADMIN — FENTANYL CITRATE 100 MCG: 50 INJECTION, SOLUTION INTRAMUSCULAR; INTRAVENOUS at 09:08

## 2020-08-21 RX ADMIN — PROPOFOL 150 MCG/KG/MIN: 10 INJECTION, EMULSION INTRAVENOUS at 09:08

## 2020-08-21 RX ADMIN — PROPOFOL 30 MG: 10 INJECTION, EMULSION INTRAVENOUS at 09:08

## 2020-08-21 RX ADMIN — HYDROCODONE BITARTRATE AND ACETAMINOPHEN 1 TABLET: 5; 325 TABLET ORAL at 10:08

## 2020-08-21 RX ADMIN — SODIUM CHLORIDE, SODIUM LACTATE, POTASSIUM CHLORIDE, AND CALCIUM CHLORIDE: .6; .31; .03; .02 INJECTION, SOLUTION INTRAVENOUS at 09:08

## 2020-08-21 RX ADMIN — HYDROMORPHONE HYDROCHLORIDE 0.5 MG: 2 INJECTION, SOLUTION INTRAMUSCULAR; INTRAVENOUS; SUBCUTANEOUS at 10:08

## 2020-08-21 RX ADMIN — PROPOFOL 50 MG: 10 INJECTION, EMULSION INTRAVENOUS at 09:08

## 2020-08-21 RX ADMIN — GLYCOPYRROLATE 0.1 MG: 0.2 INJECTION, SOLUTION INTRAMUSCULAR; INTRAVENOUS at 09:08

## 2020-08-21 RX ADMIN — PROPOFOL 20 MG: 10 INJECTION, EMULSION INTRAVENOUS at 09:08

## 2020-08-21 NOTE — PLAN OF CARE
POC board updated and reviewed with pt. Pt and pt's family verbalize understanding. Safety precautions maintained. Call bell in reach.  Bed locked and in lowest position. Instructed pt to call for assistance. Pt verbalizes understanding.

## 2020-08-21 NOTE — H&P
"History & Physical    SUBJECTIVE:     History of Present Illness:  Patient is a 39 y.o. male presents with      No chief complaint on file.      Review of patient's allergies indicates:   Allergen Reactions    Legumes Nausea And Vomiting and Swelling     Other reaction(s): GI Intolerance  vomiting  vomiting  Pt reports legumes make his lips swell and induce severe vomiting  Pt reports legumes make his lips swell and induce severe vomiting      Nsaids (non-steroidal anti-inflammatory drug)      GI bleed    Bean pod extract      Lips swelling, vomiting  Lips swelling, vomiting      Ketamine      Severe dysphoria (bad trip)    Lentils      Lips swelling, vomiting  Lips swelling, vomiting      Meloxicam Nausea Only     GI bleed    Peas      Lips swelling, vomiting    Penicillins      Has taken third gen cephalosporins without issue per patient report    Haloperidol Anxiety and Other (See Comments)     "feels like crawling out of his skin"         Current Facility-Administered Medications   Medication Dose Route Frequency Provider Last Rate Last Dose    mupirocin 2 % ointment   Nasal On Call Procedure Troy Honeycutt MD           Past Medical History:   Diagnosis Date    C. difficile colitis feb 2014    postop of hand surgery    Eosinophilic esophagitis 3/11/2014    GERD (gastroesophageal reflux disease)     IBS (irritable bowel syndrome)     MRSA carrier     says multiple times nose swab was +    Nephrolithiasis apr 2014    bilateral punctate - never passed a stone    Urinary tract infection feb 2014    MRSA     Past Surgical History:   Procedure Laterality Date    APPENDECTOMY      ARTHROSCOPY OF SHOULDER WITH REMOVAL OF DISTAL CLAVICLE Right 11/1/2018    Procedure: ARTHROSCOPY, SHOULDER, WITH DISTAL CLAVICLE EXCISION;  Surgeon: Gabino Mejia MD;  Location: [facility];  Service: Orthopedics;  Laterality: Right;  video    BACK SURGERY      CIRCUMCISION, PRIMARY      COLONOSCOPY N/A " 11/14/2018    Procedure: COLONOSCOPY;  Surgeon: Milton Brunson MD;  Location: [facility];  Service: Endoscopy;  Laterality: N/A;    COLONOSCOPY N/A 7/20/2020    Procedure: COLONOSCOPY;  Surgeon: Ruslan Tillman MD;  Location: [facility];  Service: Endoscopy;  Laterality: N/A;    drainage of abscess from hand  2009    MRSA    drainage of abscess from R thigh  2006    MRSA    ESOPHAGOGASTRODUODENOSCOPY  3/11/2014    ESOPHAGOGASTRODUODENOSCOPY N/A 9/5/2018    Procedure: EGD (ESOPHAGOGASTRODUODENOSCOPY);  Surgeon: Kolby Wall MD;  Location: [facility];  Service: Endoscopy;  Laterality: N/A;    ESOPHAGOGASTRODUODENOSCOPY N/A 3/25/2020    Procedure: EGD (ESOPHAGOGASTRODUODENOSCOPY);  Surgeon: Ruslan Tillman MD;  Location: [facility];  Service: Endoscopy;  Laterality: N/A;    ESOPHAGOGASTRODUODENOSCOPY N/A 7/20/2020    Procedure: EGD (ESOPHAGOGASTRODUODENOSCOPY);  Surgeon: Ruslan Tillman MD;  Location: [facility];  Service: Endoscopy;  Laterality: N/A;  Patient is a very hard stick, requires anesthesia stick.    FLEXIBLE SIGMOIDOSCOPY N/A 9/5/2018    Procedure: SIGMOIDOSCOPY, FLEXIBLE;  Surgeon: Kolby Wall MD;  Location: [facility];  Service: Endoscopy;  Laterality: N/A;    HAND SURGERY  jan 2014    power saw fell on hand - laceration 3 tendons     Family History   Problem Relation Age of Onset    Urolithiasis Father     Celiac disease Sister     Hypertension Maternal Grandmother     Hypertension Maternal Grandfather      Social History     Tobacco Use    Smoking status: Never Smoker    Smokeless tobacco: Never Used    Tobacco comment: Pt states he has smoked 1 or 2 cigarettes in his life.   Substance Use Topics    Alcohol use: Not Currently    Drug use: No        Review of Systems:    Review of Systems    OBJECTIVE:     Vital Signs (Most Recent)  Temp: 99.3 °F (37.4 °C) (08/21/20 0736)  Pulse: 85 (08/21/20 0736)  Resp: 20 (08/21/20 0736)  BP: (!) 142/88 (08/21/20  0736)  SpO2: 100 % (08/21/20 0736)  6' (1.829 m)  93.4 kg (206 lb)     Physical Exam:    Physical Exam    Laboratory      Diagnostic Results:      ASSESSMENT/PLAN:     Eosinophilia lukamia    PLAN:Plan   Insertion chem port with us and flouroscope today

## 2020-08-21 NOTE — DISCHARGE INSTRUCTIONS
Vascular Access Port Implantation   Port implantation is surgery to place (implant) a port under the skin. For vascular access, it is placed into a vein. The port allows medicines or nutrition to be sent right into your bloodstream. Blood can also be taken or given through the port. During the procedure, a long, thin tube called a catheter is threaded into one of your large veins. The tube is then attached to the port. This usually sits under the skin of your chest and causes a small bump. To use the port, a special needle is passed through your skin and into the port. The needle can stay in your skin for up to 7 days, if needed. A port can stay in place for weeks or months or longer.    Why is a vascular access port needed?  A vascular access port may allow healthcare providers to give you:  · Chemotherapy or other cancer-fighting drugs  · IV treatments, such as antibiotics or nutrition  · Hemodialysis (for kidney failure)  The port may also be used to draw blood.  Before the procedure  Follow any instructions you are given on how to prepare.  Tell your provider about any medicines you are taking. This includes:  · All prescription medicines  · Over-the-counter medicines such as aspirin or ibuprofen  · Herbs, vitamins, and other supplements  Also be sure your provider knows:  · If you are pregnant or think you may be pregnant  · If you are allergic to any medicines or substances, especially local anesthetics or iodine  · Your full medical history, including why you will need the port  · If you plan on doing any contact sports  During the procedure  · Before the procedure, an IV may be put into a vein in your arm or hand. This gives you fluids and medicines. You may be given medicine through the IV to help you relax during the procedure. This is called sedation. But some surgeons place ports using general anesthesia.  · The chest is used most often for the port. In some cases, your belly (abdomen) or arm will be  used instead.  · The skin over the insertion area is numbed with local anesthetic.  · Ultrasound or X-rays are used to help the healthcare provider guide the catheter into the proper location during the procedure.  · A cut (incision) is made in the skin where the port will be placed. A small pocket for the port is formed under the skin.  · A second small incision is made in the skin near the first incision. A tunnel under the skin is created. The catheter is put through the tunnel and into the blood vessel.  · The skin is closed over the port. It is held shut with stitches (sutures) or surgical glue or tape. The second small incision is also closed.  · A chest X-ray may be done to make sure the port is placed properly.  After the procedure  You may be taken to a recovery room where youll recover from the sedation. Nurses will check on you as you rest. If you have pain, nurses can give you medicine. If you are not staying in the hospital overnight, you will be sent home a few hours after the procedure is done. A healthcare provider will tell you when you can go home. An adult family member or friend will need to drive you home.  Recovering at home  · Take pain medicine as directed by your healthcare provider.  · Take it easy for 24 hours after the procedure. Avoid physical activity and heavy lifting until your healthcare provider says its OK.  · Keep the port clean and dry. Ask when you can shower again. You will need to keep the port dry by covering it when you shower.  · Care for the insertion site as you are directed.  · Dont swim, bathe, or do other activities that cause water to cover the insertion site.  · To keep the port from getting blocked with blood clots, flush it as often as directed. You should be shown the proper way to flush the port before you go home. It is important to follow these directions.     Risks and possible complications of implantation  · Bleeding  · Infection of the insertion  site  · Damage to a blood vessel  · Nerve injury or irritation  · Collapsed lung (for chest port placements)  · Skin breakdown over the port  Risks and possible complications of having a port  · Blocked  port or catheter  · Leakage or breakage of the port or catheter  · The port moves out of position  · Blood clot  · Skin or bloodstream infection  · Skin breakdown over the port      When to seek medical care  Call your healthcare provider right away if you have any of the following:  · A fever of 100.4°F (38.0°C) or higher  · You can't access or use the port properly  · You can't flush the port or get a blood return  · The skin near the port is red, warm, swollen, or broken  · You have shoulder pain on the side where the port is located  · You feel a heart flutter or racing heart   · Swollen arm, if the port is placed in your arm   Date Last Reviewed: 7/1/2016  © 8837-7342 [employer]. [address] [place] [address]. All rights reserved. This information is not intended as a substitute for professional medical care. Always follow your healthcare professional's instructions.  ANESTHESIA  -For the first 24 hours after surgery:  Do not drive, use heavy equipment, make important decisions, or drink alcohol  -It is normal to feel sleepy for several hours.  Rest until you are more awake.  -Have someone stay with you, if needed.  They can watch for problems and help keep you safe.  -Some possible post anesthesia side effects include: nausea and vomiting, sore throat and hoarseness, sleepiness, and dizziness.    PAIN  -If you have pain after surgery, pain medicine will help you feel better.  Take it as directed, before pain becomes severe.  Most pain relievers taken by mouth need at least 20-30 minutes to start working.  -Do not drive or drink alcohol while taking pain medicine.  -Pain medication can upset your stomach.  Taking them with a little food may help.  -Other ways to help control pain:  elevation, ice, and relaxation  -Call your surgeon if still having unmanageable pain an hour after taking pain medicine.  -Pain medicine can cause constipation.  Taking an over-the counter stool softener while on prescription pain medicine and drinking plenty of fluids can prevent this side effect.  -Call your surgeon if you have severe side effects like: breathing problems, trouble waking up, dizziness, confusion, or severe constipation.    NAUSEA  -Some people have nausea after surgery.  This is often because of anesthesia, pain, pain medicine, or the stress of surgery.  -Do not push yourself to eat.  Start off with clear liquids and soup.  Slowly move to solid foods.  Don't eat fatty, rich, spicy foods at first.  Eat smaller amounts.  -If you develop persistent nausea and vomiting please notify your surgeon immediately.    BLEEDING  -Different types of surgery require different types of care and dressing changes.  It is important to follow all instructions and advice from your surgeon.  Change dressing as directed.  Call your surgeon for any concerns regarding postop bleeding.    SIGNS OF INFECTION  -Signs of infection include: fever, swelling, drainage, and redness  -Notify your surgeon if you have a fever of 100.4 F (38.0 C) or higher.  -Notify your surgeon if you notice redness, swelling, increased pain, pus, or a foul smell at the incision site.    Acetaminophen; Hydrocodone tablets or capsules  What is this medicine?  ACETAMINOPHEN; HYDROCODONE (a set a RUBEN vitaly fen; jo droe KOE done) is a pain reliever. It is used to treat moderate to severe pain.  How should I use this medicine?  Take this medicine by mouth with a glass of water. Follow the directions on the prescription label. You can take it with or without food. If it upsets your stomach, take it with food. Do not take your medicine more often than directed.  A special MedGuide will be given to you by the pharmacist with each prescription and refill. Be  sure to read this information carefully each time.  Talk to your pediatrician regarding the use of this medicine in children. Special care may be needed.  What side effects may I notice from receiving this medicine?  Side effects that you should report to your doctor or health care professional as soon as possible:  · allergic reactions like skin rash, itching or hives, swelling of the face, lips, or tongue  · breathing problems  · confusion  · redness, blistering, peeling or loosening of the skin, including inside the mouth  · signs and symptoms of low blood pressure like dizziness; feeling faint or lightheaded, falls; unusually weak or tired  · trouble passing urine or change in the amount of urine  · yellowing of the eyes or skin  Side effects that usually do not require medical attention (report to your doctor or health care professional if they continue or are bothersome):  · constipation  · dry mouth  · nausea, vomiting  · tiredness  What may interact with this medicine?  This medicine may interact with the following medications:  · alcohol  · antiviral medicines for HIV or AIDS  · atropine  · antihistamines for allergy, cough and cold  · certain antibiotics like erythromycin, clarithromycin  · certain medicines for anxiety or sleep  · certain medicines for bladder problems like oxybutynin, tolterodine  · certain medicines for depression like amitriptyline, fluoxetine, sertraline  · certain medicines for fungal infections like ketoconazole and itraconazole  · certain medicines for Parkinson's disease like benztropine, trihexyphenidyl  · certain medicines for seizures like carbamazepine, phenobarbital, phenytoin, primidone  · certain medicines for stomach problems like dicyclomine, hyoscyamine  · certain medicines for travel sickness like scopolamine  · general anesthetics like halothane, isoflurane, methoxyflurane, propofol  · ipratropium  · local anesthetics like lidocaine, pramoxine, tetracaine  · MAOIs like  Carbex, Eldepryl, Marplan, Nardil, and Parnate  · medicines that relax muscles for surgery  · other medicines with acetaminophen  · other narcotic medicines for pain or cough  · phenothiazines like chlorpromazine, mesoridazine, prochlorperazine, thioridazine  · rifampin  What if I miss a dose?  If you miss a dose, take it as soon as you can. If it is almost time for your next dose, take only that dose. Do not take double or extra doses.  Where should I keep my medicine?  Keep out of the reach of children. This medicine can be abused. Keep your medicine in a safe place to protect it from theft. Do not share this medicine with anyone. Selling or giving away this medicine is dangerous and against the law.  This medicine may cause accidental overdose and death if it taken by other adults, children, or pets. Mix any unused medicine with a substance like cat litter or coffee grounds. Then throw the medicine away in a sealed container like a sealed bag or a coffee can with a lid. Do not use the medicine after the expiration date.  Store at room temperature between 15 and 30 degrees C (59 and 86 degrees F).  What should I tell my health care provider before I take this medicine?  They need to know if you have any of these conditions:  · brain tumor  · Crohn's disease, inflammatory bowel disease, or ulcerative colitis  · drug abuse or addiction  · head injury  · heart or circulation problems  · if you often drink alcohol  · kidney disease or problems going to the bathroom  · liver disease  · lung disease, asthma, or breathing problems  · an unusual or allergic reaction to acetaminophen, hydrocodone, other opioid analgesics, other medicines, foods, dyes, or preservatives  · pregnant or trying to get pregnant  · breast-feeding  What should I watch for while using this medicine?  Tell your doctor or health care professional if your pain does not go away, if it gets worse, or if you have new or a different type of pain. You may  develop tolerance to the medicine. Tolerance means that you will need a higher dose of the medicine for pain relief. Tolerance is normal and is expected if you take the medicine for a long time.  Do not suddenly stop taking your medicine because you may develop a severe reaction. Your body becomes used to the medicine. This does NOT mean you are addicted. Addiction is a behavior related to getting and using a drug for a non-medical reason. If you have pain, you have a medical reason to take pain medicine. Your doctor will tell you how much medicine to take. If your doctor wants you to stop the medicine, the dose will be slowly lowered over time to avoid any side effects.  There are different types of narcotic medicines (opiates). If you take more than one type at the same time or if you are taking another medicine that also causes drowsiness, you may have more side effects. Give your health care provider a list of all medicines you use. Your doctor will tell you how much medicine to take. Do not take more medicine than directed. Call emergency for help if you have problems breathing or unusual sleepiness.  Do not take other medicines that contain acetaminophen with this medicine. Always read labels carefully. If you have questions, ask your doctor or pharmacist.  If you take too much acetaminophen get medical help right away. Too much acetaminophen can be very dangerous and cause liver damage. Even if you do not have symptoms, it is important to get help right away.  You may get drowsy or dizzy. Do not drive, use machinery, or do anything that needs mental alertness until you know how this medicine affects you. Do not stand or sit up quickly, especially if you are an older patient. This reduces the risk of dizzy or fainting spells. Alcohol may interfere with the effect of this medicine. Avoid alcoholic drinks.  The medicine will cause constipation. Try to have a bowel movement at least every 2 to 3 days. If you do  not have a bowel movement for 3 days, call your doctor or health care professional.  Your mouth may get dry. Chewing sugarless gum or sucking hard candy, and drinking plenty of water may help. Contact your doctor if the problem does not go away or is severe.  NOTE:This sheet is a summary. It may not cover all possible information. If you have questions about this medicine, talk to your doctor, pharmacist, or health care provider. Copyright© 2017 [employer]    Clindamycin capsules  What is this medicine?  CLINDAMYCIN (KLIN brennan VELASQUEZ sin) is a lincosamide antibiotic. It is used to treat certain kinds of bacterial infections. It will not work for colds, flu, or other viral infections.  How should I use this medicine?  Take this medicine by mouth with a full glass of water. Follow the directions on the prescription label. You can take this medicine with food or on an empty stomach. If the medicine upsets your stomach, take it with food. Take your medicine at regular intervals. Do not take your medicine more often than directed. Take all of your medicine as directed even if you think your are better. Do not skip doses or stop your medicine early.  Talk to your pediatrician regarding the use of this medicine in children. Special care may be needed.  What side effects may I notice from receiving this medicine?  Side effects that you should report to your doctor or health care professional as soon as possible:  · allergic reactions like skin rash, itching or hives, swelling of the face, lips, or tongue  · dark urine  · pain on swallowing  · redness, blistering, peeling or loosening of the skin, including inside the mouth  · unusual bleeding or bruising  · unusually weak or tired  · yellowing of eyes or skin  Side effects that usually do not require medical attention (report to your doctor or health care professional if they continue or are bothersome):  · diarrhea  · itching in the rectal or genital area  · joint  pain  · nausea, vomiting  · stomach pain  What may interact with this medicine?  · birth control pills  · chloramphenicol  · erythromycin  · kaolin products  What if I miss a dose?  If you miss a dose, take it as soon as you can. If it is almost time for your next dose, take only that dose. Do not take double or extra doses.  Where should I keep my medicine?  Keep out of the reach of children.  Store at room temperature between 20 and 25 degrees C (68 and 77 degrees F). Throw away any unused medicine after the expiration date.  What should I tell my health care provider before I take this medicine?  They need to know if you have any of these conditions:  · kidney disease  · liver disease  · stomach problems like colitis  · an unusual or allergic reaction to clindamycin, lincomycin, or other medicines, foods, dyes like tartrazine or preservatives  · pregnant or trying to get pregnant  · breast-feeding  What should I watch for while using this medicine?  Tell your doctor or healthcare professional if your symptoms do not start to get better or if they get worse.  Do not treat diarrhea with over the counter products. Contact your doctor if you have diarrhea that lasts more than 2 days or if it is severe and watery.  NOTE:This sheet is a summary. It may not cover all possible information. If you have questions about this medicine, talk to your doctor, pharmacist, or health care provider. Copyright© 2017 [employer]

## 2020-08-21 NOTE — ANESTHESIA PREPROCEDURE EVALUATION
08/21/2020  Solo Black is a 39 y.o., male here for port placment    Past Medical History:   Diagnosis Date    C. difficile colitis feb 2014    postop of hand surgery    Eosinophilic esophagitis 3/11/2014    GERD (gastroesophageal reflux disease)     IBS (irritable bowel syndrome)     MRSA carrier     says multiple times nose swab was +    Nephrolithiasis apr 2014    bilateral punctate - never passed a stone    Urinary tract infection feb 2014    MRSA     Past Surgical History:   Procedure Laterality Date    APPENDECTOMY      ARTHROSCOPY OF SHOULDER WITH REMOVAL OF DISTAL CLAVICLE Right 11/1/2018    Procedure: ARTHROSCOPY, SHOULDER, WITH DISTAL CLAVICLE EXCISION;  Surgeon: Gabino Mejia MD;  Location: [facility];  Service: Orthopedics;  Laterality: Right;  video    BACK SURGERY      CIRCUMCISION, PRIMARY      COLONOSCOPY N/A 11/14/2018    Procedure: COLONOSCOPY;  Surgeon: Milton Brunson MD;  Location: [facility] ENDO;  Service: Endoscopy;  Laterality: N/A;    COLONOSCOPY N/A 7/20/2020    Procedure: COLONOSCOPY;  Surgeon: Ruslan Tillman MD;  Location: [facility];  Service: Endoscopy;  Laterality: N/A;    drainage of abscess from hand  2009    MRSA    drainage of abscess from R thigh  2006    MRSA    ESOPHAGOGASTRODUODENOSCOPY  3/11/2014    ESOPHAGOGASTRODUODENOSCOPY N/A 9/5/2018    Procedure: EGD (ESOPHAGOGASTRODUODENOSCOPY);  Surgeon: Kolby Wall MD;  Location: [facility];  Service: Endoscopy;  Laterality: N/A;    ESOPHAGOGASTRODUODENOSCOPY N/A 3/25/2020    Procedure: EGD (ESOPHAGOGASTRODUODENOSCOPY);  Surgeon: Ruslan Tillman MD;  Location: [facility];  Service: Endoscopy;  Laterality: N/A;    ESOPHAGOGASTRODUODENOSCOPY N/A 7/20/2020    Procedure: EGD (ESOPHAGOGASTRODUODENOSCOPY);  Surgeon: Ruslan Tillman MD;  Location: [facility];  Service: Endoscopy;   Laterality: N/A;  Patient is a very hard stick, requires anesthesia stick.    FLEXIBLE SIGMOIDOSCOPY N/A 9/5/2018    Procedure: SIGMOIDOSCOPY, FLEXIBLE;  Surgeon: Kolby Wall MD;  Location: [facility];  Service: Endoscopy;  Laterality: N/A;    HAND SURGERY  jan 2014    power saw fell on hand - laceration 3 tendons         Anesthesia Evaluation    I have reviewed the Patient Summary Reports.    I have reviewed the Nursing Notes. I have reviewed the NPO Status.      Review of Systems  Anesthesia Hx:  History of prior surgery of interest to airway management or planning: Denies Family Hx of Anesthesia complications.    Cardiovascular:   Exercise tolerance: good    Pulmonary:  Pulmonary Normal    Renal/:   renal calculi    Hepatic/GI:   PUD, Hiatal Hernia, GERD    Neurological:  Neurology Normal        Physical Exam  General:  Well nourished    Airway/Jaw/Neck:  Airway Findings: Mouth Opening: Normal Tongue: Normal  General Airway Assessment: Adult  Mallampati: II     Eyes/Ears/Nose:  EYES/EARS/NOSE FINDINGS: Normal    Chest/Lungs:  Chest/Lungs Clear    Heart/Vascular:  Heart Findings: Normal       Mental Status:  Mental Status Findings: Normal        Anesthesia Plan  Type of Anesthesia, risks & benefits discussed:  Anesthesia Type:  MAC, general  Patient's Preference:   Intra-op Monitoring Plan:   Intra-op Monitoring Plan Comments:   Post Op Pain Control Plan:   Post Op Pain Control Plan Comments:   Induction:    Beta Blocker:         Informed Consent: Patient understands risks and agrees with Anesthesia plan.  Questions answered. Anesthesia consent signed with patient.  ASA Score: 3     Day of Surgery Review of History & Physical:            Ready For Surgery From Anesthesia Perspective.

## 2020-08-21 NOTE — TRANSFER OF CARE
Anesthesia Transfer of Care Note    Patient: Solo Black    Procedure(s) Performed: Procedure(s) (LRB):  INSERTION, VENOUS ACCESS PORT (Right)    Patient location: OPS    Anesthesia Type: MAC    Transport from OR: Transported from OR on room air with adequate spontaneous ventilation    Post pain: adequate analgesia    Post assessment: no apparent anesthetic complications and tolerated procedure well    Post vital signs: stable    Level of consciousness: sedated    Nausea/Vomiting: no nausea/vomiting    Complications: none    Transfer of care protocol was followed      Last vitals:   Visit Vitals  BP (!) 142/88 (BP Location: Left arm, Patient Position: Lying)   Pulse 85   Temp 37.4 °C (99.3 °F) (Temporal)   Resp 20   Ht 6' (1.829 m)   Wt 93.4 kg (206 lb)   SpO2 100%   BMI 27.94 kg/m²

## 2020-08-21 NOTE — ANESTHESIA POSTPROCEDURE EVALUATION
Anesthesia Post Evaluation    Patient: Solo Black    Procedure(s) Performed: Procedure(s) (LRB):  INSERTION, VENOUS ACCESS PORT (Right)    Final Anesthesia Type: MAC    Patient location during evaluation: [facility]  Patient participation: Yes- Able to Participate  Level of consciousness: awake and alert  Post-procedure vital signs: reviewed and stable  Pain management: adequate  Airway patency: patent    PONV status at discharge: No PONV  Anesthetic complications: no      Cardiovascular status: hemodynamically stable  Respiratory status: room air, unassisted and spontaneous ventilation  Hydration status: euvolemic  Follow-up not needed.          Vitals Value Taken Time   /69 08/21/20 1015   Temp 36.7 °C (98 °F) 08/21/20 1015   Pulse 75 08/21/20 1015   Resp 18 08/21/20 1015   SpO2 100 % 08/21/20 1015         No case tracking events are documented in the log.      Pain/Marcia Score: No data recorded

## 2020-08-21 NOTE — BRIEF OP NOTE
Operative Note       Surgery Date: 8/21/2020     Surgeon(s) and Role:     * Troy Honeycutt MD - Primary    Pre-op Diagnosis:  Eosinophilia [D72.1]    Post-op Diagnosis: Post-Op Diagnosis Codes:     * Eosinophilia [D72.1]    Procedure(s) (LRB):  INSERTION, VENOUS ACCESS PORT (Right)  With us / flouroscope  Anesthesia: Local MAC    Procedure in Detail/Findings:  Operative Note       Surgery Date: 8/21/2020     Surgeon(s) and Role:     * Troy Honeycutt MD - Primary    Pre-op Diagnosis:  Eosinophilia [D72.1]    Post-op Diagnosis: Post-Op Diagnosis Codes:     * Eosinophilia [D72.1]    Procedure(s) (LRB):  INSERTION, VENOUS ACCESS PORT (Right)    Anesthesia: Local MAC    Procedure in Detail/Findings:    After satisfactory IV sedation, the patient in supine   position, the right side neck and chest were prepped and draped in normal   sterile manner using ChloraPrep.  with us right subclavian nein was located, area infilterated with 1 % lidocaine solution , 18 gauge needle introduced into the right subclavian , position checked with flouroscope ,     Guidewire was positioned in superior vena cava.  Tract was dilated with vein dilator  .  A small pocket was created in the right anterior chest wall after   infiltration using 1% Xylocaine solution.  Port was placed in the pocket and   catheter was brought under a subcutaneous tunnel and with help of a peel-away   sheath and fluoroscopy, catheter was positioned in the superior vena cava,   sutured in place using 2-0 nylon suture.  Port cavity was irrigated with   antibiotic solution.  Final x-ray showed good positioning of the catheter.  The   port was flushed using heparin flush with good backflow.  A 1000 unit heparin   was placed in the port.  The wound was closed using interrupted 3-0 Vicryl for   subcutaneous tissue.  The skin was closed using 4-0 Monocryl.  Sterile gauze   dressing was applied.  The instrument count, sponge count, needle count was   correct.   The patient tolerated it well.  Estimated blood loss was 30 mL.  No   specimen was removed.  The patient was sent to Recovery Room in stable   condition.         Estimated Blood Loss: 30 cc         Specimens (From admission, onward)    None        Implants:   Implant Name Type Inv. Item Serial No.  Lot No. LRB No. Used Action   PORT POWER CLEAR VIEW - [id number]  PORT POWER CLEAR VIEW  [employer] [id number] Right 1 Implanted              Disposition: PACU - hemodynamically stable.           Condition: Good    Attestation:  I performed the procedure.           Discharge Note    Admit Date: 8/21/2020    Attending Physician: Troy Honeycutt MD     Discharge Physician: Troy Honeycutt MD    Final Diagnosis: Post-Op Diagnosis Codes:     * Eosinophilia [D72.1]    Disposition: Home or Self Care    Patient Instructions:   Current Discharge Medication List      START taking these medications    Details   HYDROcodone-acetaminophen (NORCO) 5-325 mg per tablet Take 1 tablet by mouth every 6 (six) hours as needed for Pain.  Qty: 24 tablet, Refills: 0    Comments: Quantity prescribed more than 7 day supply? Yes, quantity medically necessary         CONTINUE these medications which have NOT CHANGED    Details   artificial tears (ISOPTO TEARS) 0.5 % ophthalmic solution Place 1 drop into both eyes 6 (six) times daily.  Qty:        cetirizine (ZYRTEC) 10 MG tablet Take 40 mg by mouth 2 (two) times a day.      clotrimazole (ANTIFUNGAL, CLOTRIMAZOLE,) 1 % cream       clotrimazole (MYCELEX) 10 mg lita Take 1 tablet (10 mg total) by mouth 5 (five) times daily. For treatment of thrush for 10 days  Qty: 50 tablet, Refills: 0      dextroamphetamine-amphetamine (ADDERALL) 20 mg tablet Take 1 tablet by mouth 2 (two) times daily before meals. Take before breakfast and lunch      diphenhydrAMINE (SOMINEX) 25 mg tablet Take 50 mg by mouth 3 (three) times daily.      famotidine (PEPCID) 20 MG tablet Take 20 mg by mouth 2  (two) times daily.      fluconazole (DIFLUCAN) 200 MG Tab Take 1 tablet (200 mg total) by mouth once daily. for 7 days  Qty: 7 tablet, Refills: 0      Lactobacillus acidophilus 10 billion cell Cap Take 1 capsule by mouth.      lidocaine HCl 2% (LIDOCAINE VISCOUS) 2 % Soln Take 5 mLs by mouth.      MULTIVIT-IRON-MIN-FOLIC ACID 3,500-18-0.4 UNIT-MG-MG ORAL CHEW Take 10 mg by mouth once daily.      multivitamin (THERAGRAN) per tablet Take 1 tablet by mouth.      pantoprazole (PROTONIX) 40 MG tablet Take 1 tablet (40 mg total) by mouth 2 (two) times daily.  Qty: 180 tablet, Refills: 3      paroxetine (PAXIL) 20 MG tablet Take 20 mg by mouth every morning.      predniSONE (DELTASONE) 20 MG tablet Take 1 tablet (20 mg total) by mouth once daily. Take 60 mg daily for 7 days, then 40 mg daily for 7 days, the 20 mg daily for 7 days.  Qty: 42 tablet, Refills: 0      promethazine (PHENERGAN) 12.5 MG Tab Take 25 mg by mouth every 6 (six) hours as needed (nausea/vomiting).      sucralfate (CARAFATE) 1 gram tablet Take 1 g by mouth 4 (four) times daily before meals and nightly.      tamsulosin (FLOMAX) 0.4 mg Cap Take 0.4 mg by mouth.      EPINEPHrine (EPIPEN) 0.3 mg/0.3 mL AtIn Inject 0.3 mLs (0.3 mg total) into the muscle as needed.  Qty: 2 Device, Refills: 1             Discharge Procedure Orders (must include Diet, Follow-up, Activity)   Discharge Procedure Orders (must include Diet, Follow-up, Activity)   COVID-19 Routine Screening   Standing Status: Future Number of Occurrences: 1 Standing Exp. Date: 10/17/21     Order Specific Question Answer Comments   Is the patient symptomatic? No    Is this needed for pre-procedure or pre-op testing? Yes    Diagnosis: Preop testing [535571]      Diet general     Keep surgical extremity elevated     Lifting restrictions     Call MD for:  temperature >100.4     Call MD for:  persistent nausea and vomiting     Call MD for:  severe uncontrolled pain     Call MD for:  redness, tenderness,  or signs of infection (pain, swelling, redness, odor or green/yellow discharge around incision site)     Leave dressing on - Keep it clean, dry, and intact until clinic visit        Discharge Date: No discharge date for patient encounter.      Estimated Blood Loss: * No values recorded between 8/21/2020  9:38 AM and 8/21/2020 10:06 AM *           Specimens (From admission, onward)    None        Implants:   Implant Name Type Inv. Item Serial No.  Lot No. LRB No. Used Action   PORT POWER CLEAR VIEW - [id number]  PORT POWER CLEAR VIEW  [employer] [id number] Right 1 Implanted              Disposition: PACU - hemodynamically stable.           Condition: Good    Attestation:  I performed the procedure.           Discharge Note    Admit Date: 8/21/2020    Attending Physician: Troy Honeycutt MD     Discharge Physician: Troy Honeycutt MD    Final Diagnosis: Post-Op Diagnosis Codes:     * Eosinophilia [D72.1]    Disposition: Home or Self Care    Patient Instructions:   Current Discharge Medication List      START taking these medications    Details   HYDROcodone-acetaminophen (NORCO) 5-325 mg per tablet Take 1 tablet by mouth every 6 (six) hours as needed for Pain.  Qty: 24 tablet, Refills: 0    Comments: Quantity prescribed more than 7 day supply? Yes, quantity medically necessary         CONTINUE these medications which have NOT CHANGED    Details   artificial tears (ISOPTO TEARS) 0.5 % ophthalmic solution Place 1 drop into both eyes 6 (six) times daily.  Qty:        cetirizine (ZYRTEC) 10 MG tablet Take 40 mg by mouth 2 (two) times a day.      clotrimazole (ANTIFUNGAL, CLOTRIMAZOLE,) 1 % cream       clotrimazole (MYCELEX) 10 mg lita Take 1 tablet (10 mg total) by mouth 5 (five) times daily. For treatment of thrush for 10 days  Qty: 50 tablet, Refills: 0      dextroamphetamine-amphetamine (ADDERALL) 20 mg tablet Take 1 tablet by mouth 2 (two) times daily before meals. Take before breakfast and lunch       diphenhydrAMINE (SOMINEX) 25 mg tablet Take 50 mg by mouth 3 (three) times daily.      famotidine (PEPCID) 20 MG tablet Take 20 mg by mouth 2 (two) times daily.      fluconazole (DIFLUCAN) 200 MG Tab Take 1 tablet (200 mg total) by mouth once daily. for 7 days  Qty: 7 tablet, Refills: 0      Lactobacillus acidophilus 10 billion cell Cap Take 1 capsule by mouth.      lidocaine HCl 2% (LIDOCAINE VISCOUS) 2 % Soln Take 5 mLs by mouth.      MULTIVIT-IRON-MIN-FOLIC ACID 3,500-18-0.4 UNIT-MG-MG ORAL CHEW Take 10 mg by mouth once daily.      multivitamin (THERAGRAN) per tablet Take 1 tablet by mouth.      pantoprazole (PROTONIX) 40 MG tablet Take 1 tablet (40 mg total) by mouth 2 (two) times daily.  Qty: 180 tablet, Refills: 3      paroxetine (PAXIL) 20 MG tablet Take 20 mg by mouth every morning.      predniSONE (DELTASONE) 20 MG tablet Take 1 tablet (20 mg total) by mouth once daily. Take 60 mg daily for 7 days, then 40 mg daily for 7 days, the 20 mg daily for 7 days.  Qty: 42 tablet, Refills: 0      promethazine (PHENERGAN) 12.5 MG Tab Take 25 mg by mouth every 6 (six) hours as needed (nausea/vomiting).      sucralfate (CARAFATE) 1 gram tablet Take 1 g by mouth 4 (four) times daily before meals and nightly.      tamsulosin (FLOMAX) 0.4 mg Cap Take 0.4 mg by mouth.      EPINEPHrine (EPIPEN) 0.3 mg/0.3 mL AtIn Inject 0.3 mLs (0.3 mg total) into the muscle as needed.  Qty: 2 Device, Refills: 1             Discharge Procedure Orders (regular diet, no heavy lifting , keep dressing dry rtc office one week)   Discharge Procedure Orders (must include Diet, Follow-up, Activity)   COVID-19 Routine Screening   Standing Status: Future Number of Occurrences: 1 Standing Exp. Date: 10/17/21     Order Specific Question Answer Comments   Is the patient symptomatic? No    Is this needed for pre-procedure or pre-op testing? Yes    Diagnosis: Preop testing [988284]      Diet general     Keep surgical extremity elevated     Lifting  restrictions     Call MD for:  temperature >100.4     Call MD for:  persistent nausea and vomiting     Call MD for:  severe uncontrolled pain     Call MD for:  redness, tenderness, or signs of infection (pain, swelling, redness, odor or green/yellow discharge around incision site)     Leave dressing on - Keep it clean, dry, and intact until clinic visit        Discharge Date: 8/21/2020.

## 2020-08-22 ENCOUNTER — HOSPITAL ENCOUNTER (EMERGENCY)
Facility: HOSPITAL | Age: 40
Discharge: HOME | End: 2020-08-22
Attending: EMERGENCY MEDICINE
Payer: MEDICAID

## 2020-08-22 VITALS
HEART RATE: 77 BPM | BODY MASS INDEX: 28.44 KG/M2 | RESPIRATION RATE: 18 BRPM | HEIGHT: 72 IN | SYSTOLIC BLOOD PRESSURE: 157 MMHG | TEMPERATURE: 100 F | DIASTOLIC BLOOD PRESSURE: 74 MMHG | OXYGEN SATURATION: 95 % | WEIGHT: 210 LBS

## 2020-08-22 DIAGNOSIS — K62.5 RECTAL BLEEDING: Primary | ICD-10-CM

## 2020-08-22 DIAGNOSIS — R10.9 ABDOMINAL PAIN, UNSPECIFIED ABDOMINAL LOCATION: ICD-10-CM

## 2020-08-22 DIAGNOSIS — K92.1 HEMATOCHEZIA: ICD-10-CM

## 2020-08-22 DIAGNOSIS — K92.2 LOWER GI BLEED: ICD-10-CM

## 2020-08-22 LAB
ALBUMIN SERPL BCP-MCNC: 3.4 G/DL (ref 3.5–5.2)
ALP SERPL-CCNC: 68 U/L (ref 55–135)
ALT SERPL W/O P-5'-P-CCNC: 220 U/L (ref 10–44)
ANION GAP SERPL CALC-SCNC: 8 MMOL/L (ref 8–16)
AST SERPL-CCNC: 66 U/L (ref 10–40)
BASOPHILS # BLD AUTO: 0.01 K/UL (ref 0–0.2)
BASOPHILS NFR BLD: 0.1 % (ref 0–1.9)
BILIRUB SERPL-MCNC: 0.5 MG/DL (ref 0.1–1)
BILIRUB UR QL STRIP: NEGATIVE
BNP SERPL-MCNC: 17 PG/ML (ref 0–99)
BUN SERPL-MCNC: 20 MG/DL (ref 6–20)
CALCIUM SERPL-MCNC: 8.5 MG/DL (ref 8.7–10.5)
CHLORIDE SERPL-SCNC: 101 MMOL/L (ref 95–110)
CLARITY UR: CLEAR
CO2 SERPL-SCNC: 28 MMOL/L (ref 23–29)
COLOR UR: YELLOW
CREAT SERPL-MCNC: 0.9 MG/DL (ref 0.5–1.4)
DIFFERENTIAL METHOD: ABNORMAL
EOSINOPHIL # BLD AUTO: 0 K/UL (ref 0–0.5)
EOSINOPHIL NFR BLD: 0 % (ref 0–8)
ERYTHROCYTE [DISTWIDTH] IN BLOOD BY AUTOMATED COUNT: 21.2 % (ref 11.5–14.5)
EST. GFR  (AFRICAN AMERICAN): >60 ML/MIN/1.73 M^2
EST. GFR  (NON AFRICAN AMERICAN): >60 ML/MIN/1.73 M^2
GIANT PLATELETS BLD QL SMEAR: PRESENT
GLUCOSE SERPL-MCNC: 121 MG/DL (ref 70–110)
GLUCOSE UR QL STRIP: NEGATIVE
HCT VFR BLD AUTO: 38.2 % (ref 40–54)
HGB BLD-MCNC: 12.1 G/DL (ref 14–18)
HGB UR QL STRIP: NEGATIVE
IMM GRANULOCYTES # BLD AUTO: 0.16 K/UL (ref 0–0.04)
IMM GRANULOCYTES NFR BLD AUTO: 1 % (ref 0–0.5)
KETONES UR QL STRIP: NEGATIVE
LACTATE SERPL-SCNC: 1.2 MMOL/L (ref 0.5–2.2)
LEUKOCYTE ESTERASE UR QL STRIP: NEGATIVE
LIPASE SERPL-CCNC: 68 U/L (ref 4–60)
LYMPHOCYTES # BLD AUTO: 0.2 K/UL (ref 1–4.8)
LYMPHOCYTES NFR BLD: 1.5 % (ref 18–48)
MAGNESIUM SERPL-MCNC: 1.9 MG/DL (ref 1.6–2.6)
MCH RBC QN AUTO: 24.9 PG (ref 27–31)
MCHC RBC AUTO-ENTMCNC: 31.7 G/DL (ref 32–36)
MCV RBC AUTO: 79 FL (ref 82–98)
MONOCYTES # BLD AUTO: 0.4 K/UL (ref 0.3–1)
MONOCYTES NFR BLD: 2.3 % (ref 4–15)
NEUTROPHILS # BLD AUTO: 14.8 K/UL (ref 1.8–7.7)
NEUTROPHILS NFR BLD: 95.1 % (ref 38–73)
NITRITE UR QL STRIP: NEGATIVE
NRBC BLD-RTO: 0 /100 WBC
PH UR STRIP: 7 [PH] (ref 5–8)
PLATELET # BLD AUTO: 207 K/UL (ref 150–350)
PMV BLD AUTO: 10.9 FL (ref 9.2–12.9)
POTASSIUM SERPL-SCNC: 4.2 MMOL/L (ref 3.5–5.1)
PROT SERPL-MCNC: 6.3 G/DL (ref 6–8.4)
PROT UR QL STRIP: NEGATIVE
RBC # BLD AUTO: 4.86 M/UL (ref 4.6–6.2)
SARS-COV-2 RDRP RESP QL NAA+PROBE: NEGATIVE
SODIUM SERPL-SCNC: 137 MMOL/L (ref 136–145)
SP GR UR STRIP: 1.02 (ref 1–1.03)
TROPONIN I SERPL DL<=0.01 NG/ML-MCNC: 0.02 NG/ML (ref 0–0.03)
URN SPEC COLLECT METH UR: NORMAL
UROBILINOGEN UR STRIP-ACNC: NEGATIVE EU/DL
WBC # BLD AUTO: 15.52 K/UL (ref 3.9–12.7)

## 2020-08-22 PROCEDURE — 25500020 PHARM REV CODE 255: Performed by: EMERGENCY MEDICINE

## 2020-08-22 PROCEDURE — 96365 THER/PROPH/DIAG IV INF INIT: CPT

## 2020-08-22 PROCEDURE — 99285 EMERGENCY DEPT VISIT HI MDM: CPT | Mod: 25

## 2020-08-22 PROCEDURE — U0002 COVID-19 LAB TEST NON-CDC: HCPCS

## 2020-08-22 PROCEDURE — 85025 COMPLETE CBC W/AUTO DIFF WBC: CPT

## 2020-08-22 PROCEDURE — 96375 TX/PRO/DX INJ NEW DRUG ADDON: CPT

## 2020-08-22 PROCEDURE — 84484 ASSAY OF TROPONIN QUANT: CPT

## 2020-08-22 PROCEDURE — 87040 BLOOD CULTURE FOR BACTERIA: CPT | Mod: 59

## 2020-08-22 PROCEDURE — 96361 HYDRATE IV INFUSION ADD-ON: CPT

## 2020-08-22 PROCEDURE — 83690 ASSAY OF LIPASE: CPT

## 2020-08-22 PROCEDURE — 83735 ASSAY OF MAGNESIUM: CPT

## 2020-08-22 PROCEDURE — 83605 ASSAY OF LACTIC ACID: CPT

## 2020-08-22 PROCEDURE — 25000003 PHARM REV CODE 250: Performed by: EMERGENCY MEDICINE

## 2020-08-22 PROCEDURE — 81003 URINALYSIS AUTO W/O SCOPE: CPT

## 2020-08-22 PROCEDURE — 83880 ASSAY OF NATRIURETIC PEPTIDE: CPT

## 2020-08-22 PROCEDURE — 63600175 PHARM REV CODE 636 W HCPCS: Performed by: EMERGENCY MEDICINE

## 2020-08-22 PROCEDURE — 80053 COMPREHEN METABOLIC PANEL: CPT

## 2020-08-22 PROCEDURE — 96368 THER/DIAG CONCURRENT INF: CPT

## 2020-08-22 PROCEDURE — 96376 TX/PRO/DX INJ SAME DRUG ADON: CPT

## 2020-08-22 RX ORDER — PREDNISONE 20 MG/1
60 TABLET ORAL DAILY
Qty: 21 TABLET | Refills: 0 | Status: SHIPPED | OUTPATIENT
Start: 2020-08-22 | End: 2020-08-22 | Stop reason: SDUPTHER

## 2020-08-22 RX ORDER — ONDANSETRON 4 MG/1
4 TABLET, ORALLY DISINTEGRATING ORAL
Status: DISCONTINUED | OUTPATIENT
Start: 2020-08-22 | End: 2020-08-22

## 2020-08-22 RX ORDER — MORPHINE SULFATE 4 MG/ML
4 INJECTION, SOLUTION INTRAMUSCULAR; INTRAVENOUS
Status: DISCONTINUED | OUTPATIENT
Start: 2020-08-22 | End: 2020-08-22 | Stop reason: HOSPADM

## 2020-08-22 RX ORDER — ONDANSETRON 2 MG/ML
4 INJECTION INTRAMUSCULAR; INTRAVENOUS
Status: COMPLETED | OUTPATIENT
Start: 2020-08-22 | End: 2020-08-22

## 2020-08-22 RX ORDER — CEFEPIME HYDROCHLORIDE 1 G/50ML
1 INJECTION, SOLUTION INTRAVENOUS
Status: COMPLETED | OUTPATIENT
Start: 2020-08-22 | End: 2020-08-22

## 2020-08-22 RX ORDER — HYDROCODONE BITARTRATE AND ACETAMINOPHEN 5; 325 MG/1; MG/1
1 TABLET ORAL EVERY 4 HOURS PRN
Qty: 12 TABLET | Refills: 0 | Status: SHIPPED | OUTPATIENT
Start: 2020-08-22 | End: 2020-08-25

## 2020-08-22 RX ORDER — PROMETHAZINE HYDROCHLORIDE 25 MG/1
25 TABLET ORAL EVERY 6 HOURS PRN
Qty: 15 TABLET | Refills: 0 | Status: ON HOLD | OUTPATIENT
Start: 2020-08-22 | End: 2020-08-31 | Stop reason: SDUPTHER

## 2020-08-22 RX ORDER — PREDNISONE 20 MG/1
60 TABLET ORAL DAILY
Qty: 21 TABLET | Refills: 0 | Status: ON HOLD | OUTPATIENT
Start: 2020-08-22 | End: 2020-08-31 | Stop reason: HOSPADM

## 2020-08-22 RX ORDER — DIPHENHYDRAMINE HCL 25 MG
25 CAPSULE ORAL
Status: COMPLETED | OUTPATIENT
Start: 2020-08-22 | End: 2020-08-22

## 2020-08-22 RX ORDER — FENTANYL CITRATE 50 UG/ML
100 INJECTION, SOLUTION INTRAMUSCULAR; INTRAVENOUS
Status: DISCONTINUED | OUTPATIENT
Start: 2020-08-22 | End: 2020-08-22 | Stop reason: HOSPADM

## 2020-08-22 RX ORDER — LIDOCAINE AND PRILOCAINE 25; 25 MG/G; MG/G
CREAM TOPICAL
Status: COMPLETED | OUTPATIENT
Start: 2020-08-22 | End: 2020-08-22

## 2020-08-22 RX ADMIN — SODIUM CHLORIDE 1000 ML: 0.9 INJECTION, SOLUTION INTRAVENOUS at 08:08

## 2020-08-22 RX ADMIN — DIPHENHYDRAMINE HYDROCHLORIDE 25 MG: 25 CAPSULE ORAL at 11:08

## 2020-08-22 RX ADMIN — FENTANYL CITRATE 100 MCG: 50 INJECTION INTRAMUSCULAR; INTRAVENOUS at 10:08

## 2020-08-22 RX ADMIN — MORPHINE SULFATE 4 MG: 4 INJECTION INTRAVENOUS at 11:08

## 2020-08-22 RX ADMIN — CEFEPIME HYDROCHLORIDE 1 G: 1 INJECTION, SOLUTION INTRAVENOUS at 08:08

## 2020-08-22 RX ADMIN — PROMETHAZINE HYDROCHLORIDE 6.25 MG: 25 INJECTION INTRAMUSCULAR; INTRAVENOUS at 08:08

## 2020-08-22 RX ADMIN — IOHEXOL 100 ML: 350 INJECTION, SOLUTION INTRAVENOUS at 10:08

## 2020-08-22 RX ADMIN — ONDANSETRON 4 MG: 2 INJECTION INTRAMUSCULAR; INTRAVENOUS at 11:08

## 2020-08-22 RX ADMIN — LIDOCAINE AND PRILOCAINE: 25; 25 CREAM TOPICAL at 08:08

## 2020-08-22 RX ADMIN — MORPHINE SULFATE 4 MG: 4 INJECTION INTRAVENOUS at 08:08

## 2020-08-22 NOTE — ED TRIAGE NOTES
"Patient presents to the ED to the ED with reports of having a hx of cancer. State with hx of steroid and other cancer treatment, patient has been having lesions to the arm and back. Also reports having been passing blood from the rectum and having bone pain to the left wrist, hip, knees, and ankles. States having just had a port placed by Dr. Honeycutt to the right chest wall yesterday. Denies any fever, chills, nausea, or vomiting.     Review of patient's allergies indicates:   Allergen Reactions    Legumes Nausea And Vomiting and Swelling     Other reaction(s): GI Intolerance  vomiting  vomiting  Pt reports legumes make his lips swell and induce severe vomiting  Pt reports legumes make his lips swell and induce severe vomiting      Nsaids (non-steroidal anti-inflammatory drug)      GI bleed    Bean pod extract      Lips swelling, vomiting  Lips swelling, vomiting      Ketamine      Severe dysphoria (bad trip)    Lentils      Lips swelling, vomiting  Lips swelling, vomiting      Meloxicam Nausea Only     GI bleed    Peas      Lips swelling, vomiting    Penicillins      Has taken third gen cephalosporins without issue per patient report    Haloperidol Anxiety and Other (See Comments)     "feels like crawling out of his skin"          Patient has verified the spelling of their name and  on armband.   APPEARANCE: Patient is alert, calm, oriented x 4, and does not appear distressed.  SKIN: Skin is normal for race, warm, and dry. Normal skin turgor and mucous membranes moist. Ulcer to the upper back and left wrist.   CARDIAC: Rate is tachycardic, no murmur heard.   RESPIRATORY:Normal rate and effort. Breath sounds clear bilaterally throughout chest. Respirations are equal and unlabored.    GASTRO: Bowel sounds normal, abdomen is soft, no tenderness, and no abdominal distention. +Rectal bleeding.   MUSCLE: Full ROM. No soft tissue tenderness. No obvious deformity. Reports having pain to the left wrist, hip, " knees, and ankles.   PERIPHERAL VASCULAR: peripheral pulses present. Normal cap refill. No edema. Warm to touch.  ENT: EARS: no obvious drainage. NOSE: no active bleeding. THROAT: no redness or swelling.  : Voids without complication

## 2020-08-22 NOTE — ED PROVIDER NOTES
"Encounter Date: 8/22/2020       History     Chief Complaint   Patient presents with    Rectal Bleeding     39y M ambulatory to ED with multiple medical complaints. c/o fever, chills, bleeding from rectum, abdominal pain, rash on back, bone pain to left arm. last took pain medication and tylenol one hour ago. pt had right chest port placed yesterday, recently diagnosed with leukemia     This is a 39-year-old man with a complex past medical history including an eosinophilic version of leukemia which he is currently undergoing treatment for including placement of a port yesterday.  He began having fevers, body aches rigors chills pain in the left wrist pain in the left great toe and then bony pain through his legs and back.  He has never anything like this in the past.  He was recently seen and evaluated by his primary oncologist and surgeon Dr. Honeycutt who is also spoken to.  He notes that he has ulcerated oozing wound on his back, rashes, in addition to his body aches pain fevers and chills.          Review of patient's allergies indicates:   Allergen Reactions    Legumes Nausea And Vomiting and Swelling     Other reaction(s): GI Intolerance  vomiting  vomiting  Pt reports legumes make his lips swell and induce severe vomiting  Pt reports legumes make his lips swell and induce severe vomiting      Nsaids (non-steroidal anti-inflammatory drug)      GI bleed    Bean pod extract      Lips swelling, vomiting  Lips swelling, vomiting      Ketamine      Severe dysphoria (bad trip)    Lentils      Lips swelling, vomiting  Lips swelling, vomiting      Meloxicam Nausea Only     GI bleed    Peas      Lips swelling, vomiting    Penicillins      Has taken third gen cephalosporins without issue per patient report    Haloperidol Anxiety and Other (See Comments)     "feels like crawling out of his skin"       Past Medical History:   Diagnosis Date    C. difficile colitis feb 2014    postop of hand surgery    " Eosinophilic esophagitis 3/11/2014    GERD (gastroesophageal reflux disease)     IBS (irritable bowel syndrome)     MRSA carrier     says multiple times nose swab was +    Nephrolithiasis apr 2014    bilateral punctate - never passed a stone    Urinary tract infection feb 2014    MRSA     Past Surgical History:   Procedure Laterality Date    APPENDECTOMY      ARTHROSCOPY OF SHOULDER WITH REMOVAL OF DISTAL CLAVICLE Right 11/1/2018    Procedure: ARTHROSCOPY, SHOULDER, WITH DISTAL CLAVICLE EXCISION;  Surgeon: Gabino Mejia MD;  Location: [facility];  Service: Orthopedics;  Laterality: Right;  video    BACK SURGERY      CIRCUMCISION, PRIMARY      COLONOSCOPY N/A 11/14/2018    Procedure: COLONOSCOPY;  Surgeon: Milton Brunson MD;  Location: [facility] ENDO;  Service: Endoscopy;  Laterality: N/A;    COLONOSCOPY N/A 7/20/2020    Procedure: COLONOSCOPY;  Surgeon: Ruslan Tillman MD;  Location: [facility];  Service: Endoscopy;  Laterality: N/A;    drainage of abscess from hand  2009    MRSA    drainage of abscess from R thigh  2006    MRSA    ESOPHAGOGASTRODUODENOSCOPY  3/11/2014    ESOPHAGOGASTRODUODENOSCOPY N/A 9/5/2018    Procedure: EGD (ESOPHAGOGASTRODUODENOSCOPY);  Surgeon: Kolby Wall MD;  Location: [facility];  Service: Endoscopy;  Laterality: N/A;    ESOPHAGOGASTRODUODENOSCOPY N/A 3/25/2020    Procedure: EGD (ESOPHAGOGASTRODUODENOSCOPY);  Surgeon: Ruslan Tillman MD;  Location: [facility];  Service: Endoscopy;  Laterality: N/A;    ESOPHAGOGASTRODUODENOSCOPY N/A 7/20/2020    Procedure: EGD (ESOPHAGOGASTRODUODENOSCOPY);  Surgeon: Ruslan Tillman MD;  Location: [facility];  Service: Endoscopy;  Laterality: N/A;  Patient is a very hard stick, requires anesthesia stick.    FLEXIBLE SIGMOIDOSCOPY N/A 9/5/2018    Procedure: SIGMOIDOSCOPY, FLEXIBLE;  Surgeon: Kolby Wall MD;  Location: [facility];  Service: Endoscopy;  Laterality: N/A;    HAND SURGERY  jan 2014    power saw  fell on hand - laceration 3 tendons     Family History   Problem Relation Age of Onset    Urolithiasis Father     Celiac disease Sister     Hypertension Maternal Grandmother     Hypertension Maternal Grandfather      Social History     Tobacco Use    Smoking status: Never Smoker    Smokeless tobacco: Never Used    Tobacco comment: Pt states he has smoked 1 or 2 cigarettes in his life.   Substance Use Topics    Alcohol use: Not Currently    Drug use: No     Review of Systems  Constitutional-positive fever positive chills  HEENT-no congestion, no ear pain, no nose bleed, no sinus pain,  Eyes-no discharge, no itching, no redness, no visual change  Respiratory-no apnea, no chest tightness, no choking, no cough, no shortness of breath, no wheezing  Cardio-no chest pain  GI-+ distention, + abdominal pain, + diarrhea, no constipation, positive rectal bleeding  Endocrine-no cold intolerance, no heat intolerance  -no difficulty urinating, no dysuria, no flank pain,  MSK-positive arthralgias, no joint swelling, positive myalgias  Skin-positive rashes, positive ulcers,  Allergy-no environmental allergy  Neurologic-no dizziness, no headache, no numbness, no seizure  Hematology-no swollen nodes  Behavioral-no confusion, no hallucinations, no nervousness  Physical Exam     Initial Vitals [08/22/20 0640]   BP Pulse Resp Temp SpO2   (!) 157/104 (!) 114 18 99.6 °F (37.6 °C) 98 %      MAP       --         Physical Exam  Constitutional:  Uncomfortable appearing, moderate distress.  Eyes: Conjunctivae normal.  ENT       Head: Normocephalic, atraumatic.       Nose: No congestion.       Mouth/Throat: Mucous membranes are moist.  Hematological/Lymphatic/Immunilogical: No cervical lymphadenopathy.  Cardiovascular:  Rapid rate, regular rhythm. Normal and symmetric distal pulses.  Respiratory: Normal respiratory effort. Breath sounds are normal.  Gastrointestinal:  Diffusely crampy abdominal pain, no rebound, no  guarding  Musculoskeletal: Normal range of motion in all extremities. No obvious deformities or swelling.  Neurologic: Alert, oriented. Normal speech and language. No gross focal neurologic deficits are appreciated.  Skin:  Diffuse acne over the chest and posterior, there is an ulcerated wound in between the shoulder blades which is oozing, multiple ulcerated wounds on the digits  Psychiatric: Mood and affect are normal.   ED Course   Procedures  Labs Reviewed - No data to display       Imaging Results    None          Medical Decision Making:   Initial Assessment:   39-year-old man with leukemia.  Differential Diagnosis:   Rectal bleeding, diverticulitis, diverticulosis,  Independently Interpreted Test(s):   I have ordered and independently interpreted X-rays - see prior notes.  I have ordered and independently interpreted EKG Reading(s) - see prior notes  Clinical Tests:   Lab Tests: Ordered and Reviewed  Radiological Study: Ordered and Reviewed  Medical Tests: Ordered and Reviewed  ED Management:  Spoke to patient's general surgeon.  No other further concerns from their point of view at this time.  This gentleman's hemodynamically stable, had an episode of bright red blood per rectum however has reassuring exam at this time.  Imaging is relatively stable from previous.  No rebound no guarding at this time.  Plan will be for him to undergo outpatient follow-up, returning case of any worsening.                                   Clinical Impression:       ICD-10-CM ICD-9-CM   1. Rectal bleeding  K62.5 569.3   2. Abdominal pain, unspecified abdominal location  R10.9 789.00   3. Lower GI bleed  K92.2 578.9   4. Hematochezia  K92.1 578.1                                Eliel Tavera MD  08/22/20 6374

## 2020-08-24 NOTE — PROGRESS NOTES
HEMATOLOGIC MALIGNANCIES CONSULT NOTE    IDENTIFYING STATEMENT   Solo Black (Solo) is a 39 y.o. male with a  of 1980 from [place], referred by Dr. Ornelas for evaluation of eosinophilia.     HISTORY OF PRESENT ILLNESS:      Mr. Black is 39 and has reported history of eosinophilic esophagitis, recurrent angioedema and urticaria and eosinophilia and presents for initial hematologic evaluation.    Over the last several years, he has had recurrent hosptializations for allergic symptoms and has reportedly been found to have high eosinophil counts. He has recurrently been on steroids and been diagnosed with eosinophilic esophagitis. [place], and he was unable to produce records of these visits during our visit today.    He has more recently been under the care of Dr. Ornelas at [facility], and he is currently on high dose steroids for his allergic symptoms. This has controlled the allergic symtpoms and eosinophil count better. He was found to have positive Strongyloides IgG and completed ivermectin therapy approximately 2 weeks ago.    On steroids, he reports feeling anxious, irritable, and shaky. He also reports that he has had ulcers develop on his back and in the perianal area.     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     Past Medical History:   Diagnosis Date    C. difficile colitis 2014    postop of hand surgery    Eosinophilic esophagitis 3/11/2014    GERD (gastroesophageal reflux disease)     IBS (irritable bowel syndrome)     MRSA carrier     says multiple times nose swab was +    Nephrolithiasis 2014    bilateral punctate - never passed a stone    Urinary tract infection 2014    MRSA       Family History   Problem Relation Age of Onset    Urolithiasis Father     Celiac disease Sister     Hypertension Maternal Grandmother     Hypertension Maternal  Grandfather        Social History     Socioeconomic History    Marital status: Single     Spouse name: Not on file    Number of children: 2    Years of education: Not on file    Highest education level: Not on file   Occupational History    Occupation:  electric [employer]     Employer: [email or web address]   Social Needs    Financial resource strain: Not on file    Food insecurity     Worry: Not on file     Inability: Not on file    Transportation needs     Medical: Not on file     Non-medical: Not on file   Tobacco Use    Smoking status: Never Smoker    Smokeless tobacco: Never Used    Tobacco comment: Pt states he has smoked 1 or 2 cigarettes in his life.   Substance and Sexual Activity    Alcohol use: Not Currently    Drug use: No    Sexual activity: Yes     Partners: Female   Lifestyle    Physical activity     Days per week: Not on file     Minutes per session: Not on file    Stress: Not on file   Relationships    Social connections     Talks on phone: Not on file     Gets together: Not on file     Attends Mandaen service: Not on file     Active member of club or organization: Not on file     Attends meetings of clubs or organizations: Not on file     Relationship status: Not on file   Other Topics Concern    Not on file   Social History Narrative    Not on file         MEDICATIONS:     Current Outpatient Medications on File Prior to Visit   Medication Sig Dispense Refill    artificial tears (ISOPTO TEARS) 0.5 % ophthalmic solution Place 1 drop into both eyes 6 (six) times daily.      cetirizine (ZYRTEC) 10 MG tablet Take 40 mg by mouth 2 (two) times a day.      clotrimazole (ANTIFUNGAL, CLOTRIMAZOLE,) 1 % cream       dextroamphetamine-amphetamine (ADDERALL) 20 mg tablet Take 1 tablet by mouth 2 (two) times daily before meals. Take before breakfast and lunch      diphenhydrAMINE (SOMINEX) 25 mg tablet Take 50 mg by mouth 3 (three) times daily.      EPINEPHrine (EPIPEN) 0.3  "mg/0.3 mL AtIn Inject 0.3 mLs (0.3 mg total) into the muscle as needed. 2 Device 1    famotidine (PEPCID) 20 MG tablet Take 20 mg by mouth 2 (two) times daily.      Lactobacillus acidophilus 10 billion cell Cap Take 1 capsule by mouth.      lidocaine HCl 2% (LIDOCAINE VISCOUS) 2 % Soln Take 5 mLs by mouth.      MULTIVIT-IRON-MIN-FOLIC ACID 3,500-18-0.4 UNIT-MG-MG ORAL CHEW Take 10 mg by mouth once daily.      multivitamin (THERAGRAN) per tablet Take 1 tablet by mouth.      pantoprazole (PROTONIX) 40 MG tablet Take 1 tablet (40 mg total) by mouth 2 (two) times daily. 180 tablet 3    paroxetine (PAXIL) 20 MG tablet Take 20 mg by mouth every morning.      promethazine (PHENERGAN) 12.5 MG Tab Take 25 mg by mouth every 6 (six) hours as needed (nausea/vomiting).      sucralfate (CARAFATE) 1 gram tablet Take 1 g by mouth 4 (four) times daily before meals and nightly.      tamsulosin (FLOMAX) 0.4 mg Cap Take 0.4 mg by mouth.       No current facility-administered medications on file prior to visit.        ALLERGIES:   Review of patient's allergies indicates:   Allergen Reactions    Legumes Nausea And Vomiting and Swelling     Other reaction(s): GI Intolerance  vomiting  vomiting  Pt reports legumes make his lips swell and induce severe vomiting  Pt reports legumes make his lips swell and induce severe vomiting      Nsaids (non-steroidal anti-inflammatory drug)      GI bleed    Bean pod extract      Lips swelling, vomiting  Lips swelling, vomiting      Ketamine      Severe dysphoria (bad trip)    Lentils      Lips swelling, vomiting  Lips swelling, vomiting      Meloxicam Nausea Only     GI bleed    Peas      Lips swelling, vomiting    Penicillins      Has taken third gen cephalosporins without issue per patient report    Haloperidol Anxiety and Other (See Comments)     "feels like crawling out of his skin"          ROS:       Review of Systems   Constitutional: Negative for diaphoresis, fatigue, fever and " unexpected weight change.   HENT:   Negative for lump/mass and sore throat.    Eyes: Negative for icterus.   Respiratory: Negative for cough and shortness of breath.    Cardiovascular: Negative for chest pain and palpitations.   Gastrointestinal: Negative for abdominal distention, constipation, diarrhea, nausea and vomiting.   Genitourinary: Negative for dysuria and frequency.    Musculoskeletal: Negative for arthralgias, gait problem and myalgias.   Skin: Negative for rash.   Neurological: Negative for dizziness, gait problem and headaches.   Hematological: Negative for adenopathy. Does not bruise/bleed easily.   Psychiatric/Behavioral: The patient is nervous/anxious.        PHYSICAL EXAM:  Vitals:    08/17/20 1501   BP: 135/83   Pulse: (!) 133   Resp: 18   SpO2: 98%   Weight: 93.6 kg (206 lb 5.6 oz)   Height: 6' (1.829 m)   PainSc:   7   PainLoc: Abdomen       Physical Exam  Constitutional:       General: He is not in acute distress.     Appearance: He is well-developed.   HENT:      Head: Normocephalic and atraumatic.      Mouth/Throat:      Mouth: No oral lesions.   Eyes:      Conjunctiva/sclera: Conjunctivae normal.   Neck:      Thyroid: No thyromegaly.   Cardiovascular:      Rate and Rhythm: Normal rate and regular rhythm.      Heart sounds: Normal heart sounds. No murmur.   Pulmonary:      Breath sounds: Normal breath sounds. No wheezing or rales.   Abdominal:      General: There is no distension.      Palpations: Abdomen is soft. There is no hepatomegaly, splenomegaly or mass.      Tenderness: There is no abdominal tenderness.   Lymphadenopathy:      Cervical: No cervical adenopathy.      Right cervical: No deep cervical adenopathy.     Left cervical: No deep cervical adenopathy.   Skin:     Findings: No rash.      Comments: Multiple skin excoriations on the back. Small ulcerated perianal excoriation (shown on phone images by patient)   Neurological:      Mental Status: He is alert and oriented to person,  place, and time.      Cranial Nerves: No cranial nerve deficit.      Coordination: Coordination normal.      Deep Tendon Reflexes: Reflexes are normal and symmetric.         LAB:   Results for orders placed or performed during the hospital encounter of 08/06/20   CBC auto differential   Result Value Ref Range    WBC 7.87 3.90 - 12.70 K/uL    RBC 5.75 4.60 - 6.20 M/uL    Hemoglobin 13.7 (L) 14.0 - 18.0 g/dL    Hematocrit 45.4 40.0 - 54.0 %    Mean Corpuscular Volume 79 (L) 82 - 98 fL    Mean Corpuscular Hemoglobin 23.8 (L) 27.0 - 31.0 pg    Mean Corpuscular Hemoglobin Conc 30.2 (L) 32.0 - 36.0 g/dL    RDW 22.0 (H) 11.5 - 14.5 %    Platelets 282 150 - 350 K/uL    MPV 11.0 9.2 - 12.9 fL    Immature Granulocytes 0.1 0.0 - 0.5 %    Gran # (ANC) 5.1 1.8 - 7.7 K/uL    Immature Grans (Abs) 0.01 0.00 - 0.04 K/uL    Lymph # 1.4 1.0 - 4.8 K/uL    Mono # 0.9 0.3 - 1.0 K/uL    Eos # 0.4 0.0 - 0.5 K/uL    Baso # 0.08 0.00 - 0.20 K/uL    nRBC 0 0 /100 WBC    Gran% 64.4 38.0 - 73.0 %    Lymph% 17.4 (L) 18.0 - 48.0 %    Mono% 11.6 4.0 - 15.0 %    Eosinophil% 5.5 0.0 - 8.0 %    Basophil% 1.0 0.0 - 1.9 %    Differential Method Automated    Comprehensive metabolic panel   Result Value Ref Range    Sodium 142 136 - 145 mmol/L    Potassium 3.7 3.5 - 5.1 mmol/L    Chloride 106 95 - 110 mmol/L    CO2 24 23 - 29 mmol/L    Glucose 80 70 - 110 mg/dL    BUN, Bld 15 6 - 20 mg/dL    Creatinine 1.2 0.5 - 1.4 mg/dL    Calcium 9.9 8.7 - 10.5 mg/dL    Total Protein 8.1 6.0 - 8.4 g/dL    Albumin 4.5 3.5 - 5.2 g/dL    Total Bilirubin 0.4 0.1 - 1.0 mg/dL    Alkaline Phosphatase 67 55 - 135 U/L    AST 29 10 - 40 U/L    ALT 37 10 - 44 U/L    Anion Gap 12 8 - 16 mmol/L    eGFR if African American >60.0 >60 mL/min/1.73 m^2    eGFR if non African American >60.0 >60 mL/min/1.73 m^2   Sedimentation rate   Result Value Ref Range    Sed Rate 9 0 - 23 mm/Hr   C-reactive protein   Result Value Ref Range    CRP 1.6 0.0 - 8.2 mg/L   CPK   Result Value Ref Range      (H) 20 - 200 U/L   COVID-19 Rapid Screening   Result Value Ref Range    SARS-CoV-2 RNA, Amplification, Qual Negative Negative   Comprehensive Metabolic Panel (CMP)   Result Value Ref Range    Sodium 139 136 - 145 mmol/L    Potassium 4.3 3.5 - 5.1 mmol/L    Chloride 104 95 - 110 mmol/L    CO2 26 23 - 29 mmol/L    Glucose 132 (H) 70 - 110 mg/dL    BUN, Bld 18 6 - 20 mg/dL    Creatinine 1.1 0.5 - 1.4 mg/dL    Calcium 9.4 8.7 - 10.5 mg/dL    Total Protein 7.8 6.0 - 8.4 g/dL    Albumin 4.0 3.5 - 5.2 g/dL    Total Bilirubin 0.6 0.1 - 1.0 mg/dL    Alkaline Phosphatase 65 55 - 135 U/L    AST 23 10 - 40 U/L    ALT 31 10 - 44 U/L    Anion Gap 9 8 - 16 mmol/L    eGFR if African American >60.0 >60 mL/min/1.73 m^2    eGFR if non African American >60.0 >60 mL/min/1.73 m^2   Magnesium   Result Value Ref Range    Magnesium 2.1 1.6 - 2.6 mg/dL   Phosphorus   Result Value Ref Range    Phosphorus 3.3 2.7 - 4.5 mg/dL   CBC with Automated Differential   Result Value Ref Range    WBC 11.07 3.90 - 12.70 K/uL    RBC 5.39 4.60 - 6.20 M/uL    Hemoglobin 13.0 (L) 14.0 - 18.0 g/dL    Hematocrit 43.2 40.0 - 54.0 %    Mean Corpuscular Volume 80 (L) 82 - 98 fL    Mean Corpuscular Hemoglobin 24.1 (L) 27.0 - 31.0 pg    Mean Corpuscular Hemoglobin Conc 30.1 (L) 32.0 - 36.0 g/dL    RDW 22.0 (H) 11.5 - 14.5 %    Platelets 294 150 - 350 K/uL    MPV 11.2 9.2 - 12.9 fL    Immature Granulocytes 0.5 0.0 - 0.5 %    Gran # (ANC) 10.5 (H) 1.8 - 7.7 K/uL    Immature Grans (Abs) 0.05 (H) 0.00 - 0.04 K/uL    Lymph # 0.4 (L) 1.0 - 4.8 K/uL    Mono # 0.1 (L) 0.3 - 1.0 K/uL    Eos # 0.0 0.0 - 0.5 K/uL    Baso # 0.03 0.00 - 0.20 K/uL    nRBC 0 0 /100 WBC    Gran% 94.7 (H) 38.0 - 73.0 %    Lymph% 3.9 (L) 18.0 - 48.0 %    Mono% 0.6 (L) 4.0 - 15.0 %    Eosinophil% 0.0 0.0 - 8.0 %    Basophil% 0.3 0.0 - 1.9 %    Differential Method Automated    Iron and TIBC   Result Value Ref Range    Iron 61 45 - 160 ug/dL    Transferrin 369 200 - 375 mg/dL    TIBC 546 (H) 250  - 450 ug/dL    Saturated Iron 11 (L) 20 - 50 %   Ferritin   Result Value Ref Range    Ferritin 11 (L) 20.0 - 300.0 ng/mL   PT/INR   Result Value Ref Range    Prothrombin Time 11.3 9.0 - 12.5 sec    INR 1.0 0.8 - 1.2   Celiac Disease Panel   Result Value Ref Range    Antigliadin Abs, IgA 14 <20 UNITS    Antigliadin Ab IgG 16 <20 UNITS    TTG IgA 10 <20 UNITS    TTG IgG 6 <20 UNITS    Immunoglobulin A (IgA) 226 70 - 400 mg/dL   Uric acid   Result Value Ref Range    Uric Acid 3.2 (L) 3.4 - 7.0 mg/dL   Comprehensive Metabolic Panel (CMP)   Result Value Ref Range    Sodium 133 (L) 136 - 145 mmol/L    Potassium 4.5 3.5 - 5.1 mmol/L    Chloride 106 95 - 110 mmol/L    CO2 16 (L) 23 - 29 mmol/L    Glucose 145 (H) 70 - 110 mg/dL    BUN, Bld 18 6 - 20 mg/dL    Creatinine 0.9 0.5 - 1.4 mg/dL    Calcium 8.1 (L) 8.7 - 10.5 mg/dL    Total Protein 6.6 6.0 - 8.4 g/dL    Albumin 3.7 3.5 - 5.2 g/dL    Total Bilirubin 0.3 0.1 - 1.0 mg/dL    Alkaline Phosphatase 59 55 - 135 U/L    AST 21 10 - 40 U/L    ALT 27 10 - 44 U/L    Anion Gap 11 8 - 16 mmol/L    eGFR if African American >60.0 >60 mL/min/1.73 m^2    eGFR if non African American >60.0 >60 mL/min/1.73 m^2   Magnesium   Result Value Ref Range    Magnesium 2.0 1.6 - 2.6 mg/dL   Phosphorus   Result Value Ref Range    Phosphorus 2.3 (L) 2.7 - 4.5 mg/dL   CBC auto differential   Result Value Ref Range    WBC 19.10 (H) 3.90 - 12.70 K/uL    RBC 5.05 4.60 - 6.20 M/uL    Hemoglobin 12.5 (L) 14.0 - 18.0 g/dL    Hematocrit 40.0 40.0 - 54.0 %    Mean Corpuscular Volume 79 (L) 82 - 98 fL    Mean Corpuscular Hemoglobin 24.8 (L) 27.0 - 31.0 pg    Mean Corpuscular Hemoglobin Conc 31.3 (L) 32.0 - 36.0 g/dL    RDW 21.8 (H) 11.5 - 14.5 %    Platelets 254 150 - 350 K/uL    MPV 11.9 9.2 - 12.9 fL    Immature Granulocytes 0.5 0.0 - 0.5 %    Gran # (ANC) 17.7 (H) 1.8 - 7.7 K/uL    Immature Grans (Abs) 0.09 (H) 0.00 - 0.04 K/uL    Lymph # 0.6 (L) 1.0 - 4.8 K/uL    Mono # 0.8 0.3 - 1.0 K/uL    Eos # 0.0  0.0 - 0.5 K/uL    Baso # 0.02 0.00 - 0.20 K/uL    nRBC 0 0 /100 WBC    Gran% 92.5 (H) 38.0 - 73.0 %    Lymph% 2.9 (L) 18.0 - 48.0 %    Mono% 4.0 4.0 - 15.0 %    Eosinophil% 0.0 0.0 - 8.0 %    Basophil% 0.1 0.0 - 1.9 %    Differential Method Automated    Comprehensive Metabolic Panel (CMP)   Result Value Ref Range    Sodium 139 136 - 145 mmol/L    Potassium 3.8 3.5 - 5.1 mmol/L    Chloride 104 95 - 110 mmol/L    CO2 27 23 - 29 mmol/L    Glucose 135 (H) 70 - 110 mg/dL    BUN, Bld 17 6 - 20 mg/dL    Creatinine 0.9 0.5 - 1.4 mg/dL    Calcium 8.1 (L) 8.7 - 10.5 mg/dL    Total Protein 6.3 6.0 - 8.4 g/dL    Albumin 3.4 (L) 3.5 - 5.2 g/dL    Total Bilirubin 0.4 0.1 - 1.0 mg/dL    Alkaline Phosphatase 53 (L) 55 - 135 U/L    AST 13 10 - 40 U/L    ALT 23 10 - 44 U/L    Anion Gap 8 8 - 16 mmol/L    eGFR if African American >60.0 >60 mL/min/1.73 m^2    eGFR if non African American >60.0 >60 mL/min/1.73 m^2   Magnesium   Result Value Ref Range    Magnesium 2.0 1.6 - 2.6 mg/dL   Phosphorus   Result Value Ref Range    Phosphorus 1.8 (L) 2.7 - 4.5 mg/dL   CBC auto differential   Result Value Ref Range    WBC 17.79 (H) 3.90 - 12.70 K/uL    RBC 4.62 4.60 - 6.20 M/uL    Hemoglobin 11.3 (L) 14.0 - 18.0 g/dL    Hematocrit 38.2 (L) 40.0 - 54.0 %    Mean Corpuscular Volume 83 82 - 98 fL    Mean Corpuscular Hemoglobin 24.5 (L) 27.0 - 31.0 pg    Mean Corpuscular Hemoglobin Conc 29.6 (L) 32.0 - 36.0 g/dL    RDW 22.0 (H) 11.5 - 14.5 %    Platelets 259 150 - 350 K/uL    MPV 10.7 9.2 - 12.9 fL    Immature Granulocytes 0.9 (H) 0.0 - 0.5 %    Gran # (ANC) 16.6 (H) 1.8 - 7.7 K/uL    Immature Grans (Abs) 0.16 (H) 0.00 - 0.04 K/uL    Lymph # 0.4 (L) 1.0 - 4.8 K/uL    Mono # 0.5 0.3 - 1.0 K/uL    Eos # 0.0 0.0 - 0.5 K/uL    Baso # 0.03 0.00 - 0.20 K/uL    nRBC 0 0 /100 WBC    Gran% 93.5 (H) 38.0 - 73.0 %    Lymph% 2.5 (L) 18.0 - 48.0 %    Mono% 2.9 (L) 4.0 - 15.0 %    Eosinophil% 0.0 0.0 - 8.0 %    Basophil% 0.2 0.0 - 1.9 %    Differential Method  Automated    Comprehensive Metabolic Panel (CMP)   Result Value Ref Range    Sodium 141 136 - 145 mmol/L    Potassium 3.6 3.5 - 5.1 mmol/L    Chloride 104 95 - 110 mmol/L    CO2 26 23 - 29 mmol/L    Glucose 146 (H) 70 - 110 mg/dL    BUN, Bld 15 6 - 20 mg/dL    Creatinine 0.8 0.5 - 1.4 mg/dL    Calcium 7.9 (L) 8.7 - 10.5 mg/dL    Total Protein 6.2 6.0 - 8.4 g/dL    Albumin 3.1 (L) 3.5 - 5.2 g/dL    Total Bilirubin 0.4 0.1 - 1.0 mg/dL    Alkaline Phosphatase 56 55 - 135 U/L    AST 12 10 - 40 U/L    ALT 23 10 - 44 U/L    Anion Gap 11 8 - 16 mmol/L    eGFR if African American >60.0 >60 mL/min/1.73 m^2    eGFR if non African American >60.0 >60 mL/min/1.73 m^2   Magnesium   Result Value Ref Range    Magnesium 2.1 1.6 - 2.6 mg/dL   Phosphorus   Result Value Ref Range    Phosphorus 1.8 (L) 2.7 - 4.5 mg/dL   CBC auto differential   Result Value Ref Range    WBC 13.68 (H) 3.90 - 12.70 K/uL    RBC 4.85 4.60 - 6.20 M/uL    Hemoglobin 11.8 (L) 14.0 - 18.0 g/dL    Hematocrit 39.5 (L) 40.0 - 54.0 %    Mean Corpuscular Volume 81 (L) 82 - 98 fL    Mean Corpuscular Hemoglobin 24.3 (L) 27.0 - 31.0 pg    Mean Corpuscular Hemoglobin Conc 29.9 (L) 32.0 - 36.0 g/dL    RDW 21.8 (H) 11.5 - 14.5 %    Platelets 245 150 - 350 K/uL    MPV 11.2 9.2 - 12.9 fL    Immature Granulocytes 1.5 (H) 0.0 - 0.5 %    Gran # (ANC) 12.5 (H) 1.8 - 7.7 K/uL    Immature Grans (Abs) 0.21 (H) 0.00 - 0.04 K/uL    Lymph # 0.4 (L) 1.0 - 4.8 K/uL    Mono # 0.5 0.3 - 1.0 K/uL    Eos # 0.0 0.0 - 0.5 K/uL    Baso # 0.02 0.00 - 0.20 K/uL    nRBC 0 0 /100 WBC    Gran% 91.7 (H) 38.0 - 73.0 %    Lymph% 2.9 (L) 18.0 - 48.0 %    Mono% 3.8 (L) 4.0 - 15.0 %    Eosinophil% 0.0 0.0 - 8.0 %    Basophil% 0.1 0.0 - 1.9 %    Differential Method Automated        PROBLEMS ASSESSED THIS VISIT:    1. Eosinophilia        IMPRESSION:    1. 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 History of recurrent MRSA bacteremia and  sepsis  3. Anxiety  4. Hospitalization for recurrent GI bleeding, including hematemesis  5. Consideration of diagnosis of factitious disorder during admission from 5/31/2019 - 6/3/2019 at [facility]    PLAN:       Mr. Blcak has eosinophilia that appears to be chronic and recurrent when not on steroids. Evaluation is challenged as he does not have eosinophilia currently because he is on high dose steroids (or not being exposed to allergens).    His history would be atypical for a myeloid or lymphoid neoplasm with eosinophilia. He has been tested on peripehral blood for PDGFR-alpha mutation, and this was negative. B-cell and T-cell studies to identify clonal lymphocyte populations were similarly negative.    I think the probability of a hematologic malignancy is overall low; however, given clear chronic eosinophilia, we will obtain bone marrow biopsy to exclude this definitively.    I am concerned his eosinophilia is either related to the history of strongyloidiasis or exposure to other allergens.    Additionally, I have reviewed all available outside [facility]. I am concerned about his high utilization rates of multiple health systems. A discharge summary from [facility] notes that he had unusual behavior during a hospitalization, and a diagnosis of factitious disorder was being considered. I did not address this directly with him, as it is important to address the merits of his eosinophilia completely first. If a cause is unable to be identified, we may need to review this history with other prior providers to discuss whether this consideration may still be warranted.     Follow-up  For bone marrow biopsy    Ivan Gaspar MD  Hematology and Stem Cell Transplant    This visit lasted one hour, with more than half dedicated to education/counseling.

## 2020-08-25 ENCOUNTER — TELEPHONE (OUTPATIENT)
Dept: ALLERGY | Facility: CLINIC | Age: 40
End: 2020-08-25

## 2020-08-25 NOTE — TELEPHONE ENCOUNTER
I received a message from Mr. Black saying he had to go to the ED again for abdominal pain, GI bleeding, urticaria, and angioedema, while he was on prednisone and his eosinophils were under good control (absolute eosinophil count of 0). I called him and explained that if his symptoms were due to a hypereosinophilic syndrome, he should only get symptoms when his eosinophils are high. If symptoms were due to eosinophils, they should not occur while his eosinophils are controlled. Therefore, I do not think his reported symptoms of GI bleeding, abdominal pain, urticaria, and angioedema are due to eosinophils. Even if his GI symptoms are due to EoE or eosinophilic gastritis, this should have been well controlled while he is on prednisone. I told him that I agree with him getting a bone marrow biopsy to rule out a diagnosis of HES. He referred to HES as an eosinophilic leukemia, so I explained that even if he has a diagnosis of HES, it would not be considered a cancer or leukemia. I told him that he should continue to see GI for his GI symptoms, but we will continue trying to help him with his urticaria and angioedema. I have follow up with him scheduled for 2 weeks.  Also of note, he has back on high dose steroids, so he was not able to go through with our planned steroid taper.     Michelle Ornelas MD  Allergy/Immunology Fellow

## 2020-08-26 ENCOUNTER — NURSE TRIAGE (OUTPATIENT)
Dept: ADMINISTRATIVE | Facility: CLINIC | Age: 40
End: 2020-08-26

## 2020-08-27 LAB
BACTERIA BLD CULT: NORMAL
BACTERIA BLD CULT: NORMAL

## 2020-08-27 NOTE — TELEPHONE ENCOUNTER
"Recently diagnosed with autoimmune cancer. Vomiting blood and bleeding from rectum. Pain in bones. Abdominal pain severe for past hour. On call Dr. Pandey contacted  to notify pt sent to ED at this time.    Reason for Disposition   [1] SEVERE pain (e.g., excruciating) AND [2] present > 1 hour   [1] Vomiting AND [2] contains red blood or black ("coffee ground") material  (Exception: few red streaks in vomit that only happened once)   Blood in bowel movements  (Exception: Blood on surface of BM with constipation)    Additional Information   Negative: Shock suspected (e.g., cold/pale/clammy skin, too weak to stand, low BP, rapid pulse)   Negative: Difficult to awaken or acting confused (e.g., disoriented, slurred speech)   Negative: Passed out (i.e., lost consciousness, collapsed and was not responding)   Negative: Sounds like a life-threatening emergency to the triager   Negative: Chest pain   Negative: Pain is mainly in upper abdomen  (if needed ask: "is it mainly above the belly button?")   Negative: Followed an abdomen (stomach) injury    Protocols used: ABDOMINAL PAIN - MALE-A-AH      "

## 2020-08-28 ENCOUNTER — HOSPITAL ENCOUNTER (OUTPATIENT)
Facility: HOSPITAL | Age: 40
LOS: 1 days | Discharge: HOME | End: 2020-08-31
Attending: FAMILY MEDICINE | Admitting: FAMILY MEDICINE
Payer: MEDICAID

## 2020-08-28 ENCOUNTER — TELEPHONE (OUTPATIENT)
Dept: GASTROENTEROLOGY | Facility: CLINIC | Age: 40
End: 2020-08-28

## 2020-08-28 DIAGNOSIS — D72.10 EOSINOPHILIA: Primary | ICD-10-CM

## 2020-08-28 DIAGNOSIS — K29.60 EROSIVE GASTRITIS: ICD-10-CM

## 2020-08-28 DIAGNOSIS — K92.2 GI BLEED: ICD-10-CM

## 2020-08-28 DIAGNOSIS — M54.2 CERVICALGIA: Chronic | ICD-10-CM

## 2020-08-28 DIAGNOSIS — D72.10 EOSINOPHILIA: ICD-10-CM

## 2020-08-28 DIAGNOSIS — D72.119 HYPEREOSINOPHILIC SYNDROME: Primary | ICD-10-CM

## 2020-08-28 PROBLEM — H04.123 DRY EYES: Status: ACTIVE | Noted: 2020-08-28

## 2020-08-28 PROBLEM — Z95.828 PORT-A-CATH IN PLACE: Status: ACTIVE | Noted: 2020-08-28

## 2020-08-28 LAB
BASOPHILS # BLD AUTO: 0.03 K/UL (ref 0–0.2)
BASOPHILS NFR BLD: 0.4 % (ref 0–1.9)
DIFFERENTIAL METHOD: ABNORMAL
EOSINOPHIL # BLD AUTO: 0.3 K/UL (ref 0–0.5)
EOSINOPHIL NFR BLD: 4 % (ref 0–8)
ERYTHROCYTE [DISTWIDTH] IN BLOOD BY AUTOMATED COUNT: 21.1 % (ref 11.5–14.5)
HCT VFR BLD AUTO: 39.4 % (ref 40–54)
HGB BLD-MCNC: 12.2 G/DL (ref 14–18)
IMM GRANULOCYTES # BLD AUTO: 0.06 K/UL (ref 0–0.04)
IMM GRANULOCYTES NFR BLD AUTO: 0.8 % (ref 0–0.5)
LYMPHOCYTES # BLD AUTO: 1.3 K/UL (ref 1–4.8)
LYMPHOCYTES NFR BLD: 16.4 % (ref 18–48)
MCH RBC QN AUTO: 24.7 PG (ref 27–31)
MCHC RBC AUTO-ENTMCNC: 31 G/DL (ref 32–36)
MCV RBC AUTO: 80 FL (ref 82–98)
MONOCYTES # BLD AUTO: 0.7 K/UL (ref 0.3–1)
MONOCYTES NFR BLD: 9.2 % (ref 4–15)
NEUTROPHILS # BLD AUTO: 5.5 K/UL (ref 1.8–7.7)
NEUTROPHILS NFR BLD: 69.2 % (ref 38–73)
NRBC BLD-RTO: 0 /100 WBC
PLATELET # BLD AUTO: 183 K/UL (ref 150–350)
PMV BLD AUTO: 10.1 FL (ref 9.2–12.9)
RBC # BLD AUTO: 4.93 M/UL (ref 4.6–6.2)
WBC # BLD AUTO: 7.97 K/UL (ref 3.9–12.7)

## 2020-08-28 PROCEDURE — G0378 HOSPITAL OBSERVATION PER HR: HCPCS

## 2020-08-28 PROCEDURE — 85025 COMPLETE CBC W/AUTO DIFF WBC: CPT | Mod: 91

## 2020-08-28 PROCEDURE — 96365 THER/PROPH/DIAG IV INF INIT: CPT

## 2020-08-28 PROCEDURE — 25000003 PHARM REV CODE 250: Performed by: NURSE PRACTITIONER

## 2020-08-28 PROCEDURE — 63600175 PHARM REV CODE 636 W HCPCS: Performed by: NURSE PRACTITIONER

## 2020-08-28 PROCEDURE — 96375 TX/PRO/DX INJ NEW DRUG ADDON: CPT

## 2020-08-28 PROCEDURE — G0379 DIRECT REFER HOSPITAL OBSERV: HCPCS

## 2020-08-28 PROCEDURE — 36415 COLL VENOUS BLD VENIPUNCTURE: CPT

## 2020-08-28 PROCEDURE — C9113 INJ PANTOPRAZOLE SODIUM, VIA: HCPCS | Performed by: NURSE PRACTITIONER

## 2020-08-28 RX ORDER — TAMSULOSIN HYDROCHLORIDE 0.4 MG/1
0.4 CAPSULE ORAL DAILY
Status: DISCONTINUED | OUTPATIENT
Start: 2020-08-29 | End: 2020-08-31 | Stop reason: HOSPADM

## 2020-08-28 RX ORDER — ONDANSETRON 2 MG/ML
4 INJECTION INTRAMUSCULAR; INTRAVENOUS EVERY 6 HOURS PRN
Status: DISCONTINUED | OUTPATIENT
Start: 2020-08-28 | End: 2020-08-31 | Stop reason: HOSPADM

## 2020-08-28 RX ORDER — PAROXETINE 10 MG/1
20 TABLET, FILM COATED ORAL EVERY MORNING
Status: DISCONTINUED | OUTPATIENT
Start: 2020-08-29 | End: 2020-08-31 | Stop reason: HOSPADM

## 2020-08-28 RX ORDER — DIPHENHYDRAMINE HYDROCHLORIDE 50 MG/ML
25 INJECTION INTRAMUSCULAR; INTRAVENOUS ONCE
Status: COMPLETED | OUTPATIENT
Start: 2020-08-28 | End: 2020-08-28

## 2020-08-28 RX ORDER — PREDNISONE 20 MG/1
60 TABLET ORAL DAILY
Status: DISCONTINUED | OUTPATIENT
Start: 2020-08-29 | End: 2020-08-31

## 2020-08-28 RX ORDER — CLINDAMYCIN HYDROCHLORIDE 150 MG/1
300 CAPSULE ORAL ONCE
Status: COMPLETED | OUTPATIENT
Start: 2020-08-28 | End: 2020-08-28

## 2020-08-28 RX ORDER — SODIUM CHLORIDE 9 MG/ML
INJECTION, SOLUTION INTRAVENOUS CONTINUOUS
Status: DISCONTINUED | OUTPATIENT
Start: 2020-08-28 | End: 2020-08-30

## 2020-08-28 RX ORDER — HYDROMORPHONE HYDROCHLORIDE 1 MG/ML
1 INJECTION, SOLUTION INTRAMUSCULAR; INTRAVENOUS; SUBCUTANEOUS ONCE
Status: COMPLETED | OUTPATIENT
Start: 2020-08-28 | End: 2020-08-28

## 2020-08-28 RX ADMIN — DIPHENHYDRAMINE HYDROCHLORIDE 25 MG: 50 INJECTION, SOLUTION INTRAMUSCULAR; INTRAVENOUS at 10:08

## 2020-08-28 RX ADMIN — PANTOPRAZOLE SODIUM 8 MG/HR: 40 INJECTION, POWDER, FOR SOLUTION INTRAVENOUS at 10:08

## 2020-08-28 RX ADMIN — SODIUM CHLORIDE: 0.9 INJECTION, SOLUTION INTRAVENOUS at 10:08

## 2020-08-28 RX ADMIN — HYDROMORPHONE HYDROCHLORIDE 1 MG: 1 INJECTION, SOLUTION INTRAMUSCULAR; INTRAVENOUS; SUBCUTANEOUS at 10:08

## 2020-08-28 RX ADMIN — CLINDAMYCIN HYDROCHLORIDE 300 MG: 150 CAPSULE ORAL at 10:08

## 2020-08-28 NOTE — PROGRESS NOTES
Placed ordered for BMBx in clinic.    Cora Ybarra, FNP  Hematology/Oncology/Bone Marrow Transplant

## 2020-08-28 NOTE — TELEPHONE ENCOUNTER
I called patient in regards to his message of rectal bleeding. Patient stated that he is vomiting blood. I advised him to go to the ED. Patient will f/u on monday

## 2020-08-29 ENCOUNTER — ANESTHESIA EVENT (OUTPATIENT)
Dept: ENDOSCOPY | Facility: HOSPITAL | Age: 40
End: 2020-08-29
Payer: MEDICAID

## 2020-08-29 PROBLEM — R21 SKIN RASH: Status: ACTIVE | Noted: 2020-08-29

## 2020-08-29 LAB
ALBUMIN SERPL BCP-MCNC: 3.2 G/DL (ref 3.5–5.2)
ALP SERPL-CCNC: 58 U/L (ref 55–135)
ALT SERPL W/O P-5'-P-CCNC: 143 U/L (ref 10–44)
ANION GAP SERPL CALC-SCNC: 8 MMOL/L (ref 8–16)
AST SERPL-CCNC: 91 U/L (ref 10–40)
BASOPHILS # BLD AUTO: 0.01 K/UL (ref 0–0.2)
BASOPHILS NFR BLD: 0.1 % (ref 0–1.9)
BILIRUB SERPL-MCNC: 0.8 MG/DL (ref 0.1–1)
BUN SERPL-MCNC: 19 MG/DL (ref 6–20)
CALCIUM SERPL-MCNC: 8.8 MG/DL (ref 8.7–10.5)
CHLORIDE SERPL-SCNC: 101 MMOL/L (ref 95–110)
CO2 SERPL-SCNC: 29 MMOL/L (ref 23–29)
CREAT SERPL-MCNC: 1.1 MG/DL (ref 0.5–1.4)
DIFFERENTIAL METHOD: ABNORMAL
EOSINOPHIL # BLD AUTO: 0.3 K/UL (ref 0–0.5)
EOSINOPHIL NFR BLD: 3.8 % (ref 0–8)
ERYTHROCYTE [DISTWIDTH] IN BLOOD BY AUTOMATED COUNT: 20.9 % (ref 11.5–14.5)
EST. GFR  (AFRICAN AMERICAN): >60 ML/MIN/1.73 M^2
EST. GFR  (NON AFRICAN AMERICAN): >60 ML/MIN/1.73 M^2
GLUCOSE SERPL-MCNC: 79 MG/DL (ref 70–110)
HCT VFR BLD AUTO: 36.4 % (ref 40–54)
HGB BLD-MCNC: 11.6 G/DL (ref 14–18)
IMM GRANULOCYTES # BLD AUTO: 0.04 K/UL (ref 0–0.04)
IMM GRANULOCYTES NFR BLD AUTO: 0.6 % (ref 0–0.5)
LYMPHOCYTES # BLD AUTO: 1.2 K/UL (ref 1–4.8)
LYMPHOCYTES NFR BLD: 16.9 % (ref 18–48)
MAGNESIUM SERPL-MCNC: 1.5 MG/DL (ref 1.6–2.6)
MCH RBC QN AUTO: 25.3 PG (ref 27–31)
MCHC RBC AUTO-ENTMCNC: 31.9 G/DL (ref 32–36)
MCV RBC AUTO: 79 FL (ref 82–98)
MONOCYTES # BLD AUTO: 0.7 K/UL (ref 0.3–1)
MONOCYTES NFR BLD: 10 % (ref 4–15)
NEUTROPHILS # BLD AUTO: 4.7 K/UL (ref 1.8–7.7)
NEUTROPHILS NFR BLD: 68.6 % (ref 38–73)
NRBC BLD-RTO: 0 /100 WBC
PHOSPHATE SERPL-MCNC: 3.6 MG/DL (ref 2.7–4.5)
PLATELET # BLD AUTO: 169 K/UL (ref 150–350)
PMV BLD AUTO: 9.8 FL (ref 9.2–12.9)
POTASSIUM SERPL-SCNC: 3.9 MMOL/L (ref 3.5–5.1)
PROT SERPL-MCNC: 5.9 G/DL (ref 6–8.4)
RBC # BLD AUTO: 4.59 M/UL (ref 4.6–6.2)
SODIUM SERPL-SCNC: 138 MMOL/L (ref 136–145)
WBC # BLD AUTO: 6.8 K/UL (ref 3.9–12.7)

## 2020-08-29 PROCEDURE — C9113 INJ PANTOPRAZOLE SODIUM, VIA: HCPCS | Performed by: NURSE PRACTITIONER

## 2020-08-29 PROCEDURE — G0378 HOSPITAL OBSERVATION PER HR: HCPCS

## 2020-08-29 PROCEDURE — 96375 TX/PRO/DX INJ NEW DRUG ADDON: CPT

## 2020-08-29 PROCEDURE — 84100 ASSAY OF PHOSPHORUS: CPT

## 2020-08-29 PROCEDURE — 96366 THER/PROPH/DIAG IV INF ADDON: CPT

## 2020-08-29 PROCEDURE — 63600175 PHARM REV CODE 636 W HCPCS: Performed by: NURSE PRACTITIONER

## 2020-08-29 PROCEDURE — 80053 COMPREHEN METABOLIC PANEL: CPT

## 2020-08-29 PROCEDURE — 99214 OFFICE O/P EST MOD 30 MIN: CPT | Mod: ,,, | Performed by: INTERNAL MEDICINE

## 2020-08-29 PROCEDURE — 85025 COMPLETE CBC W/AUTO DIFF WBC: CPT

## 2020-08-29 PROCEDURE — 25000003 PHARM REV CODE 250: Performed by: NURSE PRACTITIONER

## 2020-08-29 PROCEDURE — 96376 TX/PRO/DX INJ SAME DRUG ADON: CPT

## 2020-08-29 PROCEDURE — 25000003 PHARM REV CODE 250: Performed by: FAMILY MEDICINE

## 2020-08-29 PROCEDURE — 63600175 PHARM REV CODE 636 W HCPCS: Performed by: FAMILY MEDICINE

## 2020-08-29 PROCEDURE — 99214 PR OFFICE/OUTPT VISIT, EST, LEVL IV, 30-39 MIN: ICD-10-PCS | Mod: ,,, | Performed by: INTERNAL MEDICINE

## 2020-08-29 PROCEDURE — 83735 ASSAY OF MAGNESIUM: CPT

## 2020-08-29 RX ORDER — DIPHENHYDRAMINE HYDROCHLORIDE 50 MG/ML
25 INJECTION INTRAMUSCULAR; INTRAVENOUS EVERY 6 HOURS PRN
Status: DISCONTINUED | OUTPATIENT
Start: 2020-08-29 | End: 2020-08-31 | Stop reason: HOSPADM

## 2020-08-29 RX ORDER — HYDROMORPHONE HYDROCHLORIDE 1 MG/ML
1 INJECTION, SOLUTION INTRAMUSCULAR; INTRAVENOUS; SUBCUTANEOUS ONCE
Status: COMPLETED | OUTPATIENT
Start: 2020-08-30 | End: 2020-08-30

## 2020-08-29 RX ORDER — ACETAMINOPHEN 325 MG/1
650 TABLET ORAL EVERY 6 HOURS PRN
Status: DISCONTINUED | OUTPATIENT
Start: 2020-08-29 | End: 2020-08-31 | Stop reason: HOSPADM

## 2020-08-29 RX ORDER — HYDROMORPHONE HYDROCHLORIDE 1 MG/ML
1 INJECTION, SOLUTION INTRAMUSCULAR; INTRAVENOUS; SUBCUTANEOUS ONCE
Status: COMPLETED | OUTPATIENT
Start: 2020-08-29 | End: 2020-08-29

## 2020-08-29 RX ORDER — MORPHINE SULFATE 2 MG/ML
2 INJECTION, SOLUTION INTRAMUSCULAR; INTRAVENOUS EVERY 4 HOURS PRN
Status: DISCONTINUED | OUTPATIENT
Start: 2020-08-29 | End: 2020-08-29

## 2020-08-29 RX ORDER — LANOLIN ALCOHOL/MO/W.PET/CERES
400 CREAM (GRAM) TOPICAL 2 TIMES DAILY
Status: DISCONTINUED | OUTPATIENT
Start: 2020-08-29 | End: 2020-08-31 | Stop reason: HOSPADM

## 2020-08-29 RX ORDER — DIPHENHYDRAMINE HYDROCHLORIDE 50 MG/ML
25 INJECTION INTRAMUSCULAR; INTRAVENOUS ONCE
Status: COMPLETED | OUTPATIENT
Start: 2020-08-29 | End: 2020-08-29

## 2020-08-29 RX ORDER — HYDROMORPHONE HYDROCHLORIDE 1 MG/ML
1 INJECTION, SOLUTION INTRAMUSCULAR; INTRAVENOUS; SUBCUTANEOUS EVERY 4 HOURS PRN
Status: DISCONTINUED | OUTPATIENT
Start: 2020-08-29 | End: 2020-08-31 | Stop reason: HOSPADM

## 2020-08-29 RX ADMIN — TAMSULOSIN HYDROCHLORIDE 0.4 MG: 0.4 CAPSULE ORAL at 09:08

## 2020-08-29 RX ADMIN — ONDANSETRON 4 MG: 2 INJECTION INTRAMUSCULAR; INTRAVENOUS at 06:08

## 2020-08-29 RX ADMIN — HYDROMORPHONE HYDROCHLORIDE 1 MG: 1 INJECTION, SOLUTION INTRAMUSCULAR; INTRAVENOUS; SUBCUTANEOUS at 10:08

## 2020-08-29 RX ADMIN — HYPROMELLOSE 2910 1 DROP: 5 SOLUTION OPHTHALMIC at 05:08

## 2020-08-29 RX ADMIN — PANTOPRAZOLE SODIUM 8 MG/HR: 40 INJECTION, POWDER, FOR SOLUTION INTRAVENOUS at 07:08

## 2020-08-29 RX ADMIN — PANTOPRAZOLE SODIUM 8 MG/HR: 40 INJECTION, POWDER, FOR SOLUTION INTRAVENOUS at 02:08

## 2020-08-29 RX ADMIN — HYPROMELLOSE 2910 1 DROP: 5 SOLUTION OPHTHALMIC at 01:08

## 2020-08-29 RX ADMIN — HYPROMELLOSE 2910 1 DROP: 5 SOLUTION OPHTHALMIC at 09:08

## 2020-08-29 RX ADMIN — PAROXETINE HYDROCHLORIDE 20 MG: 10 TABLET, FILM COATED ORAL at 05:08

## 2020-08-29 RX ADMIN — DIPHENHYDRAMINE HYDROCHLORIDE 25 MG: 50 INJECTION, SOLUTION INTRAMUSCULAR; INTRAVENOUS at 06:08

## 2020-08-29 RX ADMIN — ONDANSETRON 4 MG: 2 INJECTION INTRAMUSCULAR; INTRAVENOUS at 02:08

## 2020-08-29 RX ADMIN — HYDROMORPHONE HYDROCHLORIDE 1 MG: 1 INJECTION, SOLUTION INTRAMUSCULAR; INTRAVENOUS; SUBCUTANEOUS at 06:08

## 2020-08-29 RX ADMIN — HYDROMORPHONE HYDROCHLORIDE 1 MG: 1 INJECTION, SOLUTION INTRAMUSCULAR; INTRAVENOUS; SUBCUTANEOUS at 03:08

## 2020-08-29 RX ADMIN — DIPHENHYDRAMINE HYDROCHLORIDE 25 MG: 50 INJECTION, SOLUTION INTRAMUSCULAR; INTRAVENOUS at 11:08

## 2020-08-29 RX ADMIN — HYPROMELLOSE 2910 1 DROP: 5 SOLUTION OPHTHALMIC at 06:08

## 2020-08-29 RX ADMIN — HYDROMORPHONE HYDROCHLORIDE 1 MG: 1 INJECTION, SOLUTION INTRAMUSCULAR; INTRAVENOUS; SUBCUTANEOUS at 09:08

## 2020-08-29 RX ADMIN — MAGNESIUM OXIDE 400 MG (241.3 MG MAGNESIUM) TABLET 400 MG: TABLET at 09:08

## 2020-08-29 RX ADMIN — PANTOPRAZOLE SODIUM 8 MG/HR: 40 INJECTION, POWDER, FOR SOLUTION INTRAVENOUS at 08:08

## 2020-08-29 RX ADMIN — ONDANSETRON 4 MG: 2 INJECTION INTRAMUSCULAR; INTRAVENOUS at 11:08

## 2020-08-29 RX ADMIN — HYDROMORPHONE HYDROCHLORIDE 1 MG: 1 INJECTION, SOLUTION INTRAMUSCULAR; INTRAVENOUS; SUBCUTANEOUS at 01:08

## 2020-08-29 RX ADMIN — HYPROMELLOSE 2910 1 DROP: 5 SOLUTION OPHTHALMIC at 02:08

## 2020-08-29 RX ADMIN — SODIUM CHLORIDE: 0.9 INJECTION, SOLUTION INTRAVENOUS at 09:08

## 2020-08-29 RX ADMIN — PREDNISONE 60 MG: 20 TABLET ORAL at 09:08

## 2020-08-29 RX ADMIN — DIPHENHYDRAMINE HYDROCHLORIDE 25 MG: 50 INJECTION INTRAMUSCULAR; INTRAVENOUS at 05:08

## 2020-08-29 NOTE — SUBJECTIVE & OBJECTIVE
Discharge Instructions [facility]  GUIDELINE FOR ACTIVITY     During your first week at home we strongly suggest that you have assistance from family and friends. You can expect to have good days and bad days, so regulate activities according to what your body tells you. Try to balance rest and activity, and when resting remember to keep your legs elevated.       It is important to continue to weigh yourself everyday, just as you were weighed daily in the hospital. Try to weigh yourself every morning after going to the bathroom, using the same scale. A gradual weight gain of 5 pounds in 3 to 5 days, or any increase in puffiness or swelling in fingers and ankles, should be reported to your doctor immediately.       It is also important to shower daily, in order to keep your incisions clean. You should check your incision daily, and report any increased redness, drainage, or a temperature over 101 to your surgeon immediately. A simple task of taking a shower can be very challenging the first week or two after surgery and you may find that you need some assistance. A chair in the shower can be helpful if standing for a long period is too tiring. A simple plastic lawn chair can be placed in the shower, or a small shower bench can be purchased at a local drug store. A handheld shower head could also be helpful to use while sitting in the shower. You may also find that you just need an extra set of hands to help reach your back or wash your hair.           If you have an incision on your chest you should avoid lifting anything more than 5 pounds for 6 weeks. People do not usually realize how much this may affect them. A gallon of milk weighs about 8 pounds.   Also please avoid bending over greater than a 90 degree angle for 6 weeks post op.      RESPIRATORY/BREATHING    For the first week at home, continue to use the Incentive Spirometer that you were using in the hospital. Try to use it 4-5 times each day, doing 10 puffs  Past Medical History:   Diagnosis Date    Arthritis     C. difficile colitis feb 2014    postop of hand surgery    Cancer     Encounter for blood transfusion     Eosinophilic esophagitis 3/11/2014    GERD (gastroesophageal reflux disease)     IBS (irritable bowel syndrome)     MRSA carrier     says multiple times nose swab was +    Nephrolithiasis apr 2014    bilateral punctate - never passed a stone    Urinary tract infection feb 2014    MRSA       Past Surgical History:   Procedure Laterality Date    APPENDECTOMY      ARTHROSCOPY OF SHOULDER WITH REMOVAL OF DISTAL CLAVICLE Right 11/1/2018    Procedure: ARTHROSCOPY, SHOULDER, WITH DISTAL CLAVICLE EXCISION;  Surgeon: Gabino Mejia MD;  Location[facility];  Service: Orthopedics;  Laterality: Right;  video    BACK SURGERY      CIRCUMCISION, PRIMARY      COLONOSCOPY N/A 11/14/2018    Procedure: COLONOSCOPY;  Surgeon: Milton Brunson MD;  Location: [facility] ENDO;  Service: Endoscopy;  Laterality: N/A;    COLONOSCOPY N/A 7/20/2020    Procedure: COLONOSCOPY;  Surgeon: Ruslan Tillman MD;  Location: [facility];  Service: Endoscopy;  Laterality: N/A;    drainage of abscess from hand  2009    MRSA    drainage of abscess from R thigh  2006    MRSA    ESOPHAGOGASTRODUODENOSCOPY  3/11/2014    ESOPHAGOGASTRODUODENOSCOPY N/A 9/5/2018    Procedure: EGD (ESOPHAGOGASTRODUODENOSCOPY);  Surgeon: Kolby Wall MD;  Location: [facility];  Service: Endoscopy;  Laterality: N/A;    ESOPHAGOGASTRODUODENOSCOPY N/A 3/25/2020    Procedure: EGD (ESOPHAGOGASTRODUODENOSCOPY);  Surgeon: Ruslan Tillman MD;  Location: [facility];  Service: Endoscopy;  Laterality: N/A;    ESOPHAGOGASTRODUODENOSCOPY N/A 7/20/2020    Procedure: EGD (ESOPHAGOGASTRODUODENOSCOPY);  Surgeon: Ruslan Tillman MD;  Location: [facility];  Service: Endoscopy;  Laterality: N/A;  Patient is a very hard stick, requires anesthesia stick.    FLEXIBLE SIGMOIDOSCOPY N/A 9/5/2018     "Procedure: SIGMOIDOSCOPY, FLEXIBLE;  Surgeon: Kolby Wall MD;  Location: [facility] ENDO;  Service: Endoscopy;  Laterality: N/A;    HAND SURGERY  jan 2014    power saw fell on hand - laceration 3 tendons    INSERTION OF VENOUS ACCESS PORT Right 8/21/2020    Procedure: INSERTION, VENOUS ACCESS PORT;  Surgeon: Troy Honeycutt MD;  Location: [facility] OR;  Service: General;  Laterality: Right;    NISSEN FUNDOPLICATION         Review of patient's allergies indicates:   Allergen Reactions    Legumes Nausea And Vomiting and Swelling     Other reaction(s): GI Intolerance  vomiting  vomiting  Pt reports legumes make his lips swell and induce severe vomiting  Pt reports legumes make his lips swell and induce severe vomiting      Nsaids (non-steroidal anti-inflammatory drug)      GI bleed    Bean pod extract      Lips swelling, vomiting  Lips swelling, vomiting      Ketamine      Severe dysphoria (bad trip)    Lentils      Lips swelling, vomiting  Lips swelling, vomiting      Meloxicam Nausea Only     GI bleed    Peas      Lips swelling, vomiting    Penicillins      Has taken third gen cephalosporins without issue per patient report    Haloperidol Anxiety and Other (See Comments)     "feels like crawling out of his skin"       Family History     Problem Relation (Age of Onset)    Celiac disease Sister    Hypertension Maternal Grandmother, Maternal Grandfather    Urolithiasis Father        Tobacco Use    Smoking status: Never Smoker    Smokeless tobacco: Never Used    Tobacco comment: Pt states he has smoked 1 or 2 cigarettes in his life.   Substance and Sexual Activity    Alcohol use: Not Currently    Drug use: No    Sexual activity: Yes     Partners: Female     Review of Systems   Constitutional: Positive for fatigue. Negative for chills, fever and unexpected weight change.   HENT: Negative for congestion and trouble swallowing.    Eyes: Negative for photophobia and visual disturbance.   Respiratory: " each time. Should any unusual shortness of breath occur at any time, especially when lying flat, notify your doctor immediately.       DIET   For the first week at home, eat what you want! We just want to make sure that you eat! It may take a while for your appetite to come back, so maybe six small meals a day will be easier than three large meals. Also try to limit your fluid intake to 6-7 cups per day. Remember to weigh yourself daily and report any sudden weight gain. Avoid high salt content foods.       BOWELS   If you do not have a bowel movement within 2 days of being at home, try some prune juice or an over-the-counter laxative(Milk of Magnesia or Bisacodyl suppositories are the quick answers, Miralax is another option but take a couple of days to work). If there is no bowel movement in the first week, notify your doctor immediately.       MEDICATIONS    At discharge from the hospital, your nurse went over all the medications you are going to take at home. Remember only to take the medication listed on your discharge instructions. DO NOT try to incorporate previous medications into your daily instructed mediations. A home care nurse will help you set up a schedule for taking your medications usually during the 1st home visit. If you elected not to par take in home care, take your medications according to the schedule given to you by your discharging nurse.   You will be discharged on Aspirin and Plavix. You will take both of these for 6 months after your procedure. If for some reason you are not on plavix you may be on another blood thinning medication for another reason. This medication may continue longer then 6 months.       PAIN MANAGEMENT   Please try 2 extra strength Tylenol (acetaminophen) every 6- 8 hrs for incisional discomfort. You will also be sent home with prescription strength pain medication. Use these pain pills when you need them, and if the Tylenol is not helping. Suggestions for use include  Positive for shortness of breath. Negative for cough.    Cardiovascular: Positive for chest pain. Negative for leg swelling.   Gastrointestinal: Positive for abdominal pain, blood in stool, nausea and vomiting. Negative for abdominal distention.   Genitourinary: Negative for dysuria and hematuria.   Musculoskeletal: Negative for arthralgias and myalgias.   Skin: Positive for color change and rash.   Neurological: Positive for weakness and light-headedness. Negative for dizziness and numbness.   Psychiatric/Behavioral: Positive for agitation. Negative for confusion. The patient is nervous/anxious.      Objective:     Vital Signs (Most Recent):  Temp: 96.5 °F (35.8 °C) (08/29/20 0749)  Pulse: 90 (08/29/20 1146)  Resp: 16 (08/29/20 0902)  BP: (!) 136/90 (08/29/20 0749)  SpO2: 98 % (08/29/20 0749) Vital Signs (24h Range):  Temp:  [96.5 °F (35.8 °C)-98.6 °F (37 °C)] 96.5 °F (35.8 °C)  Pulse:  [] 90  Resp:  [16-19] 16  SpO2:  [93 %-100 %] 98 %  BP: (136-147)/() 136/90     Weight: 92.6 kg (204 lb 2.3 oz) (08/28/20 2145)  Body mass index is 27.69 kg/m².      Intake/Output Summary (Last 24 hours) at 8/29/2020 1324  Last data filed at 8/29/2020 1017  Gross per 24 hour   Intake 956 ml   Output 800 ml   Net 156 ml       Lines/Drains/Airways     Central Venous Catheter Line                 PowerPort A Cath Single Lumen 08/21/20 1258 right subclavian 8 days                Physical Exam  Vitals signs and nursing note reviewed.   Constitutional:       General: He is not in acute distress.     Appearance: He is well-developed. He is not toxic-appearing or diaphoretic.   HENT:      Head: Normocephalic and atraumatic.   Eyes:      General: No scleral icterus.     Pupils: Pupils are equal, round, and reactive to light.   Neck:      Musculoskeletal: Normal range of motion and neck supple.   Cardiovascular:      Rate and Rhythm: Normal rate and regular rhythm.      Heart sounds: Normal heart sounds.   Pulmonary:      Effort:  medicating- before activity, at bedtime, and then when needed. By the second week you should be able to use just Extra Strength Tylenol for pain. Remember, no driving while taking prescription pain meds!       FAMILY   For the first week at home you should try to limit your visitors. Entertaining even your closest friends and family may tire you out too much. Remember that it is okay to excuse yourself when you are too tired. During the second to third week you can start to increase your number of visitors as tolerated. By the fourth week, enjoy yourself!           It is also important to mention that sometimes following surgery people become depressed. Family and friends should be aware of signs of depression. It is important to watch for changes in appetite, changes in sleep patterns, not wanting to see friends/family, not wanting to participate in daily activities. Any sign of depression should be reported to the doctor.           FOLLOW UP APPOINTMENTS  You will be given an appointment to return to your surgeon’s office 1 month after surgery. This appointment will follow an Echocardiogram appointment at [facility] as well. So go get your echo on the street level [facility] then come on up to the [facility] Level “E” for your doctor’s appointment.   You also should be calling your cardiologist for an appointment in 1-2 weeks after your discharge date. This is the doctor who will continue to manage your heart condition after your surgeon sees you in 1 month’s time.              WHEN TO CALL THE DOCTOR   As previously mentioned, call the doctor for:   ~ Temperature over 101   ~ Increased redness or drainage from incisions   ~ Unusual shortness of breath   ~ No bowel movement in the first week   ~ Gradual weight gain of 5 pounds in 3-5 days   ~ Increased puffiness or swelling in fingers or ankles   ~ Pain not relieved by pain pills       FOR ANY QUESTIONS OR CONCERNS,    CALL SURGEON'S OFFICE: [phone]  Ask for Chelsy Garcia, Nurse Practitioner          Pulmonary effort is normal. No respiratory distress.      Breath sounds: Normal breath sounds.   Abdominal:      General: Bowel sounds are normal. There is no distension.      Palpations: Abdomen is soft.      Tenderness: There is abdominal tenderness (Subjective). There is no guarding or rebound.   Musculoskeletal: Normal range of motion.   Skin:     General: Skin is warm and dry.      Findings: Bruising, erythema and rash present.   Neurological:      Mental Status: He is alert and oriented to person, place, and time.      Comments: No asterixis   Psychiatric:         Behavior: Behavior normal.         Significant Labs:  Recent Lab Results       08/29/20  0758   08/28/20  2250   08/28/20  1853   08/28/20  1714   08/28/20  1627        Albumin 3.2             Alkaline Phosphatase 58                          Anion Gap 8             aPTT       24.2  Comment:  [facility] aPTT therapeutic range = 47-76 seconds       AST 91             Baso # 0.01 0.03           Basophil% 0.1 0.4           BILIRUBIN TOTAL 0.8  Comment:  For infants and newborns, interpretation of results should be based  on gestational age, weight and in agreement with clinical  observations.  Premature Infant recommended reference ranges:  Up to 24 hours.............<8.0 mg/dL  Up to 48 hours............<12.0 mg/dL  3-5 days..................<15.0 mg/dL  6-29 days.................<15.0 mg/dL               BUN, Bld 19             Calcium 8.8             Chloride 101             CO2 29             Creatinine 1.1             Differential Method Automated Automated           eGFR if  >60             eGFR if non  >60  Comment:  Calculation used to obtain the estimated glomerular filtration  rate (eGFR) is the CKD-EPI equation.                Eos # 0.3 0.3           Eosinophil% 3.8 4.0           Glucose 79             Gran # (ANC) 4.7 5.5           Gran% 68.6 69.2           Hematocrit 36.4 39.4            Hemoglobin 11.6 12.2           Immature Grans (Abs) 0.04  Comment:  Mild elevation in immature granulocytes is non specific and   can be seen in a variety of conditions including stress response,   acute inflammation, trauma and pregnancy. Correlation with other   laboratory and clinical findings is essential.   0.06  Comment:  Mild elevation in immature granulocytes is non specific and   can be seen in a variety of conditions including stress response,   acute inflammation, trauma and pregnancy. Correlation with other   laboratory and clinical findings is essential.             Immature Granulocytes 0.6 0.8           INR       0.9  Comment:  Coumadin Therapy:  2.0 - 3.0 for INR for all indicators except mechanical heart valves  and antiphospholipid syndromes which should use 2.5 - 3.5.         Lymph # 1.2 1.3           Lymph% 16.9 16.4           Magnesium 1.5             MCH 25.3 24.7           MCHC 31.9 31.0           MCV 79 80           Mono # 0.7 0.7           Mono% 10.0 9.2           MPV 9.8 10.1           nRBC 0 0           Occult Blood         Positive     Phosphorus 3.6             Platelets 169 183           Potassium 3.9             PROTEIN TOTAL 5.9             Protime       9.6       RBC 4.59 4.93           RDW 20.9 21.1           SARS-CoV-2 RNA, Amplification, Qual     Negative  Comment:  This test utilizes isothermal nucleic acid amplification   technology to detect the SARS-CoV-2 RdRp nucleic acid segment.   The analytical sensitivity (limit of detection) is 125 genome   equivalents/mL.   A POSITIVE result implies infection with the SARS-CoV-2 virus;  the patient is presumed to be contagious.    A NEGATIVE result means that SARS-CoV-2 nucleic acids are not  present above the limit of detection. A NEGATIVE result should be   treated as presumptive. It does not rule out the possibility of   COVID-19 and should not be the sole basis for treatment decisions.   If COVID-19 is strongly suspected based on  clinical and exposure   history, re-testing using an alternate molecular assay should be   considered.   This test is only for use under the Food and Drug   Administration s Emergency Use Authorization (EUA).   Commercial kits are provided by [employer].   Performance characteristics of the EUA have been independently  verified by [facility] Department of  Pathology and Laboratory Medicine.   _________________________________________________________________  The ID NOW COVID-19 Letter of Authorization, along with the   authorized Fact Sheet for Healthcare Providers, the authorized Fact  Sheet for Patients, and authorized labeling are available on the FDA   website:  www.fda.gov/MedicalDevices/Safety/EmergencySituations/sal683467.htm           Sodium 138             WBC 6.80 7.97                            08/28/20  1620        Albumin 3.7     Alkaline Phosphatase 57     ALT 75     Anion Gap 7     aPTT       AST 32     Baso # 0.03     Basophil% 0.4     BILIRUBIN TOTAL 0.5  Comment:  For infants and newborns, interpretation of results should be based  on gestational age, weight and in agreement with clinical  observations.  Premature Infant recommended reference ranges:  Up to 24 hours.............<8.0 mg/dL  Up to 48 hours............<12.0 mg/dL  3-5 days..................<15.0 mg/dL  6-29 days.................<15.0 mg/dL       BUN, Bld 24     Calcium 8.9     Chloride 102     CO2 32     Creatinine 0.98     Differential Method Automated     eGFR if African American >60.0     eGFR if non  >60.0  Comment:  Calculation used to obtain the estimated glomerular filtration  rate (eGFR) is the CKD-EPI equation.        Eos # 0.3     Eosinophil% 4.8     Glucose 106     Gran # (ANC) 4.5     Gran% 63.5     Hematocrit 40.2     Hemoglobin 12.6     Immature Grans (Abs) 0.07  Comment:  Mild elevation in immature granulocytes is non specific and   can be seen in a variety of conditions including  stress response,   acute inflammation, trauma and pregnancy. Correlation with other   laboratory and clinical findings is essential.       Immature Granulocytes 1.0     INR       Lymph # 1.5     Lymph% 21.9     Magnesium       MCH 25.0     MCHC 31.3     MCV 80     Mono # 0.6     Mono% 8.4     MPV 10.3     nRBC 0     Occult Blood       Phosphorus       Platelets 186     Potassium 4.1     PROTEIN TOTAL 6.5     Protime       RBC 5.05     RDW 21.1     SARS-CoV-2 RNA, Amplification, Qual       Sodium 141     WBC 7.03           Significant Imaging:  Imaging results within the past 24 hours have been reviewed.

## 2020-08-29 NOTE — PLAN OF CARE
VN cued into pt's room for introduction. VN informed pt that VN would be working along side bedside nurse and PCT throughout shift. Level of present pain assessed. At present patient stated experiencing pain in bones of bilat arms and hips. Pain medication PRN admin by bedside nurse. Discussed with patient the plan of care. Discussed with patient High fall risk protocol and interventions that have been initiated and cont be in place for safety. Patient verbalized clear understanding and cooperation using teach back method. Bed alarm presently activated and in use. Will cont to be available to patient and intervene prn.

## 2020-08-29 NOTE — ANESTHESIA PREPROCEDURE EVALUATION
"                                                                                                             08/31/2020  Solo Black is a 39 y.o., male admitted w/ eosinophilic esophagitis and erosive gastritis currently being worked up for possible hypereosinophilic syndrome here now for EGD to evaluate hematemesis. States last vomited approximately 12 hours ago and denies current nausea.   On prednisone    States has had anesthesia many times but states he has "PTSD" and requesting versed prior to procedure.   Also notes has had a previous EGD where he has had to convert to general anesthesia due to emesis, however, has had many since then under MAC without complication.     Of note, per Dr. Love hematology note 8/24/20, "5. Consideration of diagnosis of factitious disorder during admission from 5/31/2019 - 6/3/2019 at [facility]"     Last hgb 11.2    Past Medical History:   Diagnosis Date    Arthritis     C. difficile colitis feb 2014    postop of hand surgery    Cancer     Encounter for blood transfusion     Eosinophilic esophagitis 3/11/2014    GERD (gastroesophageal reflux disease)     IBS (irritable bowel syndrome)     MRSA carrier     says multiple times nose swab was +    Nephrolithiasis apr 2014    bilateral punctate - never passed a stone    Urinary tract infection feb 2014    MRSA     Past Surgical History:   Procedure Laterality Date    APPENDECTOMY      ARTHROSCOPY OF SHOULDER WITH REMOVAL OF DISTAL CLAVICLE Right 11/1/2018    Procedure: ARTHROSCOPY, SHOULDER, WITH DISTAL CLAVICLE EXCISION;  Surgeon: Gabino Mejia MD;  Location: [facility] OR;  Service: Orthopedics;  Laterality: Right;  video    BACK SURGERY      CIRCUMCISION, PRIMARY      COLONOSCOPY N/A 11/14/2018    Procedure: COLONOSCOPY;  Surgeon: Milton Brunson MD;  Location: [facility];  Service: Endoscopy;  Laterality: N/A;    COLONOSCOPY N/A 7/20/2020    Procedure: COLONOSCOPY;  Surgeon: Ruslan Tillman MD;  " Location: [facility];  Service: Endoscopy;  Laterality: N/A;    drainage of abscess from hand  2009    MRSA    drainage of abscess from R thigh  2006    MRSA    ESOPHAGOGASTRODUODENOSCOPY  3/11/2014    ESOPHAGOGASTRODUODENOSCOPY N/A 9/5/2018    Procedure: EGD (ESOPHAGOGASTRODUODENOSCOPY);  Surgeon: Kolby Wall MD;  Location: [facility];  Service: Endoscopy;  Laterality: N/A;    ESOPHAGOGASTRODUODENOSCOPY N/A 3/25/2020    Procedure: EGD (ESOPHAGOGASTRODUODENOSCOPY);  Surgeon: Ruslan Tillman MD;  Location: [facility];  Service: Endoscopy;  Laterality: N/A;    ESOPHAGOGASTRODUODENOSCOPY N/A 7/20/2020    Procedure: EGD (ESOPHAGOGASTRODUODENOSCOPY);  Surgeon: Ruslan Tillman MD;  Location: [facility];  Service: Endoscopy;  Laterality: N/A;  Patient is a very hard stick, requires anesthesia stick.    FLEXIBLE SIGMOIDOSCOPY N/A 9/5/2018    Procedure: SIGMOIDOSCOPY, FLEXIBLE;  Surgeon: Kolby Wall MD;  Location: [facility];  Service: Endoscopy;  Laterality: N/A;    HAND SURGERY  jan 2014    power saw fell on hand - laceration 3 tendons    INSERTION OF VENOUS ACCESS PORT Right 8/21/2020    Procedure: INSERTION, VENOUS ACCESS PORT;  Surgeon: Troy Honeycutt MD;  Location: [facility];  Service: General;  Laterality: Right;    NISSEN FUNDOPLICATION           Anesthesia Evaluation    I have reviewed the Patient Summary Reports.    I have reviewed the Nursing Notes. I have reviewed the NPO Status.      Review of Systems  Anesthesia Hx:  History of prior surgery of interest to airway management or planning: Denies Family Hx of Anesthesia complications.   Denies Personal Hx of Anesthesia complications.   Cardiovascular:   Exercise tolerance: good    Pulmonary:  Pulmonary Normal    Renal/:   renal calculi    Hepatic/GI:   PUD, Hiatal Hernia, GERD    Neurological:  Neurology Normal        Physical Exam  General:  Well nourished    Airway/Jaw/Neck:  Airway Findings: Mouth Opening: Normal Tongue:  Normal  General Airway Assessment: Adult  Mallampati: II     Eyes/Ears/Nose:  EYES/EARS/NOSE FINDINGS: Normal    Chest/Lungs:  Chest/Lungs Clear    Heart/Vascular:  Heart Findings: Normal       Mental Status:  Mental Status Findings: Normal      Lab Results   Component Value Date    WBC 9.37 08/31/2020    HGB 11.2 (L) 08/31/2020    HCT 35.4 (L) 08/31/2020     08/31/2020    CHOL 125 04/13/2019    TRIG 112 04/13/2019    HDL 48 04/13/2019     (H) 08/29/2020    AST 91 (H) 08/29/2020     08/29/2020    K 3.9 08/29/2020     08/29/2020    CREATININE 1.1 08/29/2020    BUN 19 08/29/2020    CO2 29 08/29/2020    TSH 0.265 (L) 04/12/2019    INR 0.9 08/28/2020    HGBA1C 5.3 05/04/2019         Anesthesia Plan  Type of Anesthesia, risks & benefits discussed:  Anesthesia Type:  MAC, general  Patient's Preference:   Intra-op Monitoring Plan: standard ASA monitors  Intra-op Monitoring Plan Comments:   Post Op Pain Control Plan: per primary service following discharge from PACU  Post Op Pain Control Plan Comments:   Induction:   IV  Beta Blocker:  Patient is not currently on a Beta-Blocker (No further documentation required).       Informed Consent: Patient understands risks and agrees with Anesthesia plan.  Questions answered. Anesthesia consent signed with patient.  ASA Score: 3     Day of Surgery Review of History & Physical:    H&P update referred to the surgeon.         Ready For Surgery From Anesthesia Perspective.

## 2020-08-29 NOTE — ASSESSMENT & PLAN NOTE
Eosinophilia  Erosive gastritis  PUD (peptic ulcer disease)  Eosinophilic esophagitis  Abdominal pain  Skin Rash    IVFs  Protoprozole Infusion   Monitor CBS and transfuse for < 7  Keep NPO- CLD   GI Consult- EGD plan for 8/31  Continue home prednisone   Skin rash related to above diagnosis, benadryl prn  Prn pain medication

## 2020-08-29 NOTE — HPI
Patient is a 39 y.o. male who has a past medical history of C. difficile colitis, Eosinophilic esophagitis, leukemia associated with eosinophilic esophagitis, GERD, IBS, MRSA carrier, nephrolithiasis presented tp [facility] with hematemesis, bright red blood per rectum, abdominal pain, nausea. Patient has history of hypereosinophilic syndrome and erosive gastritis causing GI bleed. He underwent EGD/colonoscopy on 7/20. H/H stable per hospital records. No active bleeding noted in ED. Patient admitted to [facility] for further medical management and GI consultation

## 2020-08-29 NOTE — PLAN OF CARE
VN cued into room to complete admit assessment, VIP model introduced, VN working alongside bedside treatment team.  Plan of care reviewed with patient. Patient verbalized understanding. Patient informed of fall risk and fall precautions, call light within reach, 2x bed rails. Patient notified to ask staff for assistance and pt verbalized complete understanding. Time allowed for questions. Will continue to monitor and intervene as needed.    Pt c/o pain to stomach, Left hip, and Left arm 8-9/10. Bedside nurse, douglas, aware and to administer dilaudid and benadryl iv.

## 2020-08-29 NOTE — SUBJECTIVE & OBJECTIVE
Past Medical History:   Diagnosis Date    Arthritis     C. difficile colitis feb 2014    postop of hand surgery    Cancer     Encounter for blood transfusion     Eosinophilic esophagitis 3/11/2014    GERD (gastroesophageal reflux disease)     IBS (irritable bowel syndrome)     MRSA carrier     says multiple times nose swab was +    Nephrolithiasis apr 2014    bilateral punctate - never passed a stone    Urinary tract infection feb 2014    MRSA       Past Surgical History:   Procedure Laterality Date    APPENDECTOMY      ARTHROSCOPY OF SHOULDER WITH REMOVAL OF DISTAL CLAVICLE Right 11/1/2018    Procedure: ARTHROSCOPY, SHOULDER, WITH DISTAL CLAVICLE EXCISION;  Surgeon: Gabino Mejia MD;  Location: [facility];  Service: Orthopedics;  Laterality: Right;  video    BACK SURGERY      CIRCUMCISION, PRIMARY      COLONOSCOPY N/A 11/14/2018    Procedure: COLONOSCOPY;  Surgeon: Milton Brunson MD;  Location: [facility];  Service: Endoscopy;  Laterality: N/A;    COLONOSCOPY N/A 7/20/2020    Procedure: COLONOSCOPY;  Surgeon: Ruslan Tillman MD;  Location: [facility];  Service: Endoscopy;  Laterality: N/A;    drainage of abscess from hand  2009    MRSA    drainage of abscess from R thigh  2006    MRSA    ESOPHAGOGASTRODUODENOSCOPY  3/11/2014    ESOPHAGOGASTRODUODENOSCOPY N/A 9/5/2018    Procedure: EGD (ESOPHAGOGASTRODUODENOSCOPY);  Surgeon: Kolby Wall MD;  Location: [facility];  Service: Endoscopy;  Laterality: N/A;    ESOPHAGOGASTRODUODENOSCOPY N/A 3/25/2020    Procedure: EGD (ESOPHAGOGASTRODUODENOSCOPY);  Surgeon: Ruslan Tillman MD;  Location: [facility];  Service: Endoscopy;  Laterality: N/A;    ESOPHAGOGASTRODUODENOSCOPY N/A 7/20/2020    Procedure: EGD (ESOPHAGOGASTRODUODENOSCOPY);  Surgeon: Ruslan Tillman MD;  Location: [facility];  Service: Endoscopy;  Laterality: N/A;  Patient is a very hard stick, requires anesthesia stick.    FLEXIBLE SIGMOIDOSCOPY N/A 9/5/2018     "Procedure: SIGMOIDOSCOPY, FLEXIBLE;  Surgeon: Kolby Wall MD;  Location: [facility] ENDO;  Service: Endoscopy;  Laterality: N/A;    HAND SURGERY  jan 2014    power saw fell on hand - laceration 3 tendons    INSERTION OF VENOUS ACCESS PORT Right 8/21/2020    Procedure: INSERTION, VENOUS ACCESS PORT;  Surgeon: Troy Honeycutt MD;  Location: [facility] OR;  Service: General;  Laterality: Right;    NISSEN FUNDOPLICATION         Review of patient's allergies indicates:   Allergen Reactions    Legumes Nausea And Vomiting and Swelling     Other reaction(s): GI Intolerance  vomiting  vomiting  Pt reports legumes make his lips swell and induce severe vomiting  Pt reports legumes make his lips swell and induce severe vomiting      Nsaids (non-steroidal anti-inflammatory drug)      GI bleed    Bean pod extract      Lips swelling, vomiting  Lips swelling, vomiting      Ketamine      Severe dysphoria (bad trip)    Lentils      Lips swelling, vomiting  Lips swelling, vomiting      Meloxicam Nausea Only     GI bleed    Peas      Lips swelling, vomiting    Penicillins      Has taken third gen cephalosporins without issue per patient report    Haloperidol Anxiety and Other (See Comments)     "feels like crawling out of his skin"         Current Facility-Administered Medications on File Prior to Encounter   Medication    [COMPLETED] diphenhydrAMINE injection 25 mg    [COMPLETED] hydromorphone (PF) injection 0.5 mg    [COMPLETED] hydromorphone (PF) injection 0.5 mg    [COMPLETED] hydromorphone (PF) injection 0.5 mg    [COMPLETED] ondansetron injection 4 mg    [COMPLETED] ondansetron injection 8 mg    [COMPLETED] oxyCODONE-acetaminophen 5-325 mg per tablet 2 tablet    [DISCONTINUED] heparin, porcine (PF) 100 unit/mL injection flush 500 Units    [DISCONTINUED] sodium chloride 0.9% flush 10 mL     Current Outpatient Medications on File Prior to Encounter   Medication Sig    artificial tears (ISOPTO TEARS) 0.5 % " ophthalmic solution Place 1 drop into both eyes 6 (six) times daily.    cetirizine (ZYRTEC) 10 MG tablet Take 20 mg by mouth 2 (two) times a day.     clindamycin (CLEOCIN) 300 MG capsule Take 1 capsule (300 mg total) by mouth 2 (two) times a day.    clotrimazole (ANTIFUNGAL, CLOTRIMAZOLE,) 1 % cream as needed.     dextroamphetamine-amphetamine (ADDERALL) 20 mg tablet Take 1 tablet by mouth 2 (two) times daily before meals. Take before breakfast and lunch    diphenhydrAMINE (SOMINEX) 25 mg tablet Take 50 mg by mouth 3 (three) times daily.    EPINEPHrine (EPIPEN) 0.3 mg/0.3 mL AtIn Inject 0.3 mLs (0.3 mg total) into the muscle as needed.    famotidine (PEPCID) 20 MG tablet Take 20 mg by mouth 2 (two) times daily.    HYDROcodone-acetaminophen (NORCO) 5-325 mg per tablet Take 1 tablet by mouth every 6 (six) hours as needed for Pain.    Lactobacillus acidophilus 10 billion cell Cap Take 1 capsule by mouth once daily.     lidocaine HCl 2% (LIDOCAINE VISCOUS) 2 % Soln Take 5 mLs by mouth as needed.     MULTIVIT-IRON-MIN-FOLIC ACID 3,500-18-0.4 UNIT-MG-MG ORAL CHEW Take 10 mg by mouth once daily.    pantoprazole (PROTONIX) 40 MG tablet Take 1 tablet (40 mg total) by mouth 2 (two) times daily.    paroxetine (PAXIL) 20 MG tablet Take 20 mg by mouth every morning.    predniSONE (DELTASONE) 20 MG tablet Take 3 tablets (60 mg total) by mouth once daily. for 7 days (Patient taking differently: Take 60 mg by mouth once daily. Then 40 mg  Daily for 7 days starting 8/30)    promethazine (PHENERGAN) 25 MG tablet Take 1 tablet (25 mg total) by mouth every 6 (six) hours as needed for Nausea.    sucralfate (CARAFATE) 1 gram tablet Take 1 g by mouth before meals and at bedtime as needed.     LORazepam (ATIVAN) 1 MG tablet Take 1 tablet (1 mg total) by mouth once. 30 minutes before procedure for 1 dose    tamsulosin (FLOMAX) 0.4 mg Cap Take 0.4 mg by mouth as needed.     [DISCONTINUED] multivitamin (THERAGRAN) per  tablet Take 1 tablet by mouth.    [DISCONTINUED] promethazine (PHENERGAN) 12.5 MG Tab Take 25 mg by mouth every 6 (six) hours as needed (nausea/vomiting).     Family History     Problem Relation (Age of Onset)    Celiac disease Sister    Hypertension Maternal Grandmother, Maternal Grandfather    Urolithiasis Father        Tobacco Use    Smoking status: Never Smoker    Smokeless tobacco: Never Used    Tobacco comment: Pt states he has smoked 1 or 2 cigarettes in his life.   Substance and Sexual Activity    Alcohol use: Not Currently    Drug use: No    Sexual activity: Yes     Partners: Female     Review of Systems   Constitutional: Negative for chills and fever.   HENT: Negative for congestion, postnasal drip and rhinorrhea.    Eyes: Negative for visual disturbance.   Respiratory: Negative for chest tightness and shortness of breath.    Cardiovascular: Negative for chest pain and palpitations.   Gastrointestinal: Positive for blood in stool, nausea and vomiting (hematemasis). Negative for abdominal distention.   Genitourinary: Negative for difficulty urinating.   Musculoskeletal: Negative for arthralgias and myalgias.   Skin: Negative for color change.   Neurological: Negative for dizziness.   Hematological: Does not bruise/bleed easily.   Psychiatric/Behavioral: Negative for agitation.     Objective:     Vital Signs (Most Recent):  Temp: 97.2 °F (36.2 °C) (08/28/20 2159)  Pulse: 81 (08/28/20 2159)  Resp: 18 (08/28/20 2230)  BP: (!) 145/95 (08/28/20 2159)  SpO2: 97 % (08/28/20 2159) Vital Signs (24h Range):  Temp:  [97.2 °F (36.2 °C)-98.6 °F (37 °C)] 97.2 °F (36.2 °C)  Pulse:  [] 81  Resp:  [17-19] 18  SpO2:  [95 %-100 %] 97 %  BP: (139-146)/() 145/95     Weight: 92.6 kg (204 lb 2.3 oz)  Body mass index is 27.69 kg/m².    Physical Exam  Constitutional:       Appearance: Normal appearance.   HENT:      Head: Normocephalic and atraumatic.      Nose: Nose normal. No congestion or rhinorrhea.       Mouth/Throat:      Mouth: Mucous membranes are moist.   Eyes:      Pupils: Pupils are equal, round, and reactive to light.   Neck:      Musculoskeletal: Normal range of motion.   Cardiovascular:      Rate and Rhythm: Normal rate.      Heart sounds: No murmur.   Pulmonary:      Effort: Pulmonary effort is normal.      Breath sounds: Normal breath sounds.   Abdominal:      General: Bowel sounds are normal.   Musculoskeletal:         General: No swelling or tenderness.   Skin:     General: Skin is warm and dry.   Neurological:      General: No focal deficit present.      Mental Status: He is alert and oriented to person, place, and time.   Psychiatric:         Mood and Affect: Mood normal.           CRANIAL NERVES     CN III, IV, VI   Pupils are equal, round, and reactive to light.       Significant Labs:     Bilirubin:   Recent Labs   Lab 08/08/20  0407 08/09/20  0404 08/10/20  0510 08/22/20  0858 08/28/20  1620   BILITOT 0.3 0.4 0.4 0.5 0.5     BMP:   Recent Labs   Lab 08/28/20  1620         K 4.1      CO2 32*   BUN 24*   CREATININE 0.98   CALCIUM 8.9     CBC:   Recent Labs   Lab 08/28/20  1007 08/28/20  1620   WBC 8.07 7.03   HGB 13.5* 12.6*   HCT 42.3 40.2    186     CMP:   Recent Labs   Lab 08/28/20  1620      K 4.1      CO2 32*      BUN 24*   CREATININE 0.98   CALCIUM 8.9   PROT 6.5   ALBUMIN 3.7   BILITOT 0.5   ALKPHOS 57   AST 32   ALT 75*   ANIONGAP 7*   EGFRNONAA >60.0     Coagulation:   Recent Labs   Lab 08/28/20  1714   INR 0.9   APTT 24.2       Significant Imaging: I have reviewed all pertinent imaging results/findings within the past 24 hours.

## 2020-08-29 NOTE — HPI
39-year-old gentleman with past medical history significant for eosinophilic esophagitis, recurrent angioedema/urticaria and possible HES, and erosive gastritis who presents the emergency department as transfer from outside hospital with chief complaint of hematemesis.  Patient is well known to me.  He recently underwent EGD colonoscopy for evaluation of hypereosinophilia.  He is in the middle of being worked up for potential leukemia with bone marrow biopsy scheduled.  Over the past few days patient reports new onset hematemesis with associated abdominal pain and bright red blood per rectum.  Patient presented to outside hospital and ultimately transferred here for clinical monitoring.
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
## 2020-08-29 NOTE — H&P
[facility] Medicine  History & Physical    Patient Name: Solo Black  MRN: [id number]  Admission Date: 8/28/2020  Attending Physician: Doretha Armstrong*   Primary Care Provider: Cecilia Tsai MD         Patient information was obtained from patient, past medical records and ER records.     Subjective:     Principal Problem:GI bleed    Chief Complaint: No chief complaint on file.       HPI: Patient is a 39 y.o. male who has a past medical history of C. difficile colitis, Eosinophilic esophagitis, leukemia associated with eosinophilic esophagitis, GERD, IBS, MRSA carrier, nephrolithiasis presented tp [facility] with hematemesis, bright red blood per rectum, abdominal pain, nausea. Patient has history of hypereosinophilic syndrome and erosive gastritis causing GI bleed. He underwent EGD/colonoscopy on 7/20. H/H stable per hospital records. No active bleeding noted in ED. Patient admitted to [facility] for further medical management and GI consultation.    Past Medical History:   Diagnosis Date    Arthritis     C. difficile colitis feb 2014    postop of hand surgery    Cancer     Encounter for blood transfusion     Eosinophilic esophagitis 3/11/2014    GERD (gastroesophageal reflux disease)     IBS (irritable bowel syndrome)     MRSA carrier     says multiple times nose swab was +    Nephrolithiasis apr 2014    bilateral punctate - never passed a stone    Urinary tract infection feb 2014    MRSA       Past Surgical History:   Procedure Laterality Date    APPENDECTOMY      ARTHROSCOPY OF SHOULDER WITH REMOVAL OF DISTAL CLAVICLE Right 11/1/2018    Procedure: ARTHROSCOPY, SHOULDER, WITH DISTAL CLAVICLE EXCISION;  Surgeon: Gabino Mejia MD;  Location: [facility];  Service: Orthopedics;  Laterality: Right;  video    BACK SURGERY      CIRCUMCISION, PRIMARY      COLONOSCOPY N/A 11/14/2018    Procedure: COLONOSCOPY;  Surgeon: Milton Brunson MD;   Location: [facility];  Service: Endoscopy;  Laterality: N/A;    COLONOSCOPY N/A 7/20/2020    Procedure: COLONOSCOPY;  Surgeon: Ruslan Tillman MD;  Location: [facility];  Service: Endoscopy;  Laterality: N/A;    drainage of abscess from hand  2009    MRSA    drainage of abscess from R thigh  2006    MRSA    ESOPHAGOGASTRODUODENOSCOPY  3/11/2014    ESOPHAGOGASTRODUODENOSCOPY N/A 9/5/2018    Procedure: EGD (ESOPHAGOGASTRODUODENOSCOPY);  Surgeon: Kolby Wall MD;  Location: [facility];  Service: Endoscopy;  Laterality: N/A;    ESOPHAGOGASTRODUODENOSCOPY N/A 3/25/2020    Procedure: EGD (ESOPHAGOGASTRODUODENOSCOPY);  Surgeon: Ruslan Tillman MD;  Location: [facility];  Service: Endoscopy;  Laterality: N/A;    ESOPHAGOGASTRODUODENOSCOPY N/A 7/20/2020    Procedure: EGD (ESOPHAGOGASTRODUODENOSCOPY);  Surgeon: Ruslan Tillman MD;  Location: [facility];  Service: Endoscopy;  Laterality: N/A;  Patient is a very hard stick, requires anesthesia stick.    FLEXIBLE SIGMOIDOSCOPY N/A 9/5/2018    Procedure: SIGMOIDOSCOPY, FLEXIBLE;  Surgeon: Kolby Wall MD;  Location: [facility];  Service: Endoscopy;  Laterality: N/A;    HAND SURGERY  jan 2014    power saw fell on hand - laceration 3 tendons    INSERTION OF VENOUS ACCESS PORT Right 8/21/2020    Procedure: INSERTION, VENOUS ACCESS PORT;  Surgeon: Troy Honeycutt MD;  Location: [facility];  Service: General;  Laterality: Right;    NISSEN FUNDOPLICATION         Review of patient's allergies indicates:   Allergen Reactions    Legumes Nausea And Vomiting and Swelling     Other reaction(s): GI Intolerance  vomiting  vomiting  Pt reports legumes make his lips swell and induce severe vomiting  Pt reports legumes make his lips swell and induce severe vomiting      Nsaids (non-steroidal anti-inflammatory drug)      GI bleed    Bean pod extract      Lips swelling, vomiting  Lips swelling, vomiting      Ketamine      Severe dysphoria (bad trip)  "   Lentils      Lips swelling, vomiting  Lips swelling, vomiting      Meloxicam Nausea Only     GI bleed    Peas      Lips swelling, vomiting    Penicillins      Has taken third gen cephalosporins without issue per patient report    Haloperidol Anxiety and Other (See Comments)     "feels like crawling out of his skin"         Current Facility-Administered Medications on File Prior to Encounter   Medication    [COMPLETED] diphenhydrAMINE injection 25 mg    [COMPLETED] hydromorphone (PF) injection 0.5 mg    [COMPLETED] hydromorphone (PF) injection 0.5 mg    [COMPLETED] hydromorphone (PF) injection 0.5 mg    [COMPLETED] ondansetron injection 4 mg    [COMPLETED] ondansetron injection 8 mg    [COMPLETED] oxyCODONE-acetaminophen 5-325 mg per tablet 2 tablet    [DISCONTINUED] heparin, porcine (PF) 100 unit/mL injection flush 500 Units    [DISCONTINUED] sodium chloride 0.9% flush 10 mL     Current Outpatient Medications on File Prior to Encounter   Medication Sig    artificial tears (ISOPTO TEARS) 0.5 % ophthalmic solution Place 1 drop into both eyes 6 (six) times daily.    cetirizine (ZYRTEC) 10 MG tablet Take 20 mg by mouth 2 (two) times a day.     clindamycin (CLEOCIN) 300 MG capsule Take 1 capsule (300 mg total) by mouth 2 (two) times a day.    clotrimazole (ANTIFUNGAL, CLOTRIMAZOLE,) 1 % cream as needed.     dextroamphetamine-amphetamine (ADDERALL) 20 mg tablet Take 1 tablet by mouth 2 (two) times daily before meals. Take before breakfast and lunch    diphenhydrAMINE (SOMINEX) 25 mg tablet Take 50 mg by mouth 3 (three) times daily.    EPINEPHrine (EPIPEN) 0.3 mg/0.3 mL AtIn Inject 0.3 mLs (0.3 mg total) into the muscle as needed.    famotidine (PEPCID) 20 MG tablet Take 20 mg by mouth 2 (two) times daily.    HYDROcodone-acetaminophen (NORCO) 5-325 mg per tablet Take 1 tablet by mouth every 6 (six) hours as needed for Pain.    Lactobacillus acidophilus 10 billion cell Cap Take 1 capsule by mouth " once daily.     lidocaine HCl 2% (LIDOCAINE VISCOUS) 2 % Soln Take 5 mLs by mouth as needed.     MULTIVIT-IRON-MIN-FOLIC ACID 3,500-18-0.4 UNIT-MG-MG ORAL CHEW Take 10 mg by mouth once daily.    pantoprazole (PROTONIX) 40 MG tablet Take 1 tablet (40 mg total) by mouth 2 (two) times daily.    paroxetine (PAXIL) 20 MG tablet Take 20 mg by mouth every morning.    predniSONE (DELTASONE) 20 MG tablet Take 3 tablets (60 mg total) by mouth once daily. for 7 days (Patient taking differently: Take 60 mg by mouth once daily. Then 40 mg  Daily for 7 days starting 8/30)    promethazine (PHENERGAN) 25 MG tablet Take 1 tablet (25 mg total) by mouth every 6 (six) hours as needed for Nausea.    sucralfate (CARAFATE) 1 gram tablet Take 1 g by mouth before meals and at bedtime as needed.     LORazepam (ATIVAN) 1 MG tablet Take 1 tablet (1 mg total) by mouth once. 30 minutes before procedure for 1 dose    tamsulosin (FLOMAX) 0.4 mg Cap Take 0.4 mg by mouth as needed.     [DISCONTINUED] multivitamin (THERAGRAN) per tablet Take 1 tablet by mouth.    [DISCONTINUED] promethazine (PHENERGAN) 12.5 MG Tab Take 25 mg by mouth every 6 (six) hours as needed (nausea/vomiting).     Family History     Problem Relation (Age of Onset)    Celiac disease Sister    Hypertension Maternal Grandmother, Maternal Grandfather    Urolithiasis Father        Tobacco Use    Smoking status: Never Smoker    Smokeless tobacco: Never Used    Tobacco comment: Pt states he has smoked 1 or 2 cigarettes in his life.   Substance and Sexual Activity    Alcohol use: Not Currently    Drug use: No    Sexual activity: Yes     Partners: Female     Review of Systems   Constitutional: Negative for chills and fever.   HENT: Negative for congestion, postnasal drip and rhinorrhea.    Eyes: Negative for visual disturbance.   Respiratory: Negative for chest tightness and shortness of breath.    Cardiovascular: Negative for chest pain and palpitations.    Gastrointestinal: Positive for blood in stool, nausea and vomiting (hematemasis). Negative for abdominal distention.   Genitourinary: Negative for difficulty urinating.   Musculoskeletal: Negative for arthralgias and myalgias.   Skin: Negative for color change.   Neurological: Negative for dizziness.   Hematological: Does not bruise/bleed easily.   Psychiatric/Behavioral: Negative for agitation.     Objective:     Vital Signs (Most Recent):  Temp: 97.2 °F (36.2 °C) (08/28/20 2159)  Pulse: 81 (08/28/20 2159)  Resp: 18 (08/28/20 2230)  BP: (!) 145/95 (08/28/20 2159)  SpO2: 97 % (08/28/20 2159) Vital Signs (24h Range):  Temp:  [97.2 °F (36.2 °C)-98.6 °F (37 °C)] 97.2 °F (36.2 °C)  Pulse:  [] 81  Resp:  [17-19] 18  SpO2:  [95 %-100 %] 97 %  BP: (139-146)/() 145/95     Weight: 92.6 kg (204 lb 2.3 oz)  Body mass index is 27.69 kg/m².    Physical Exam  Constitutional:       Appearance: Normal appearance.   HENT:      Head: Normocephalic and atraumatic.      Nose: Nose normal. No congestion or rhinorrhea.      Mouth/Throat:      Mouth: Mucous membranes are moist.   Eyes:      Pupils: Pupils are equal, round, and reactive to light.   Neck:      Musculoskeletal: Normal range of motion.   Cardiovascular:      Rate and Rhythm: Normal rate.      Heart sounds: No murmur.   Pulmonary:      Effort: Pulmonary effort is normal.      Breath sounds: Normal breath sounds.   Abdominal:      General: Bowel sounds are normal.   Musculoskeletal:         General: No swelling or tenderness.   Skin:     General: Skin is warm and dry.   Neurological:      General: No focal deficit present.      Mental Status: He is alert and oriented to person, place, and time.   Psychiatric:         Mood and Affect: Mood normal.           CRANIAL NERVES     CN III, IV, VI   Pupils are equal, round, and reactive to light.       Significant Labs:     Bilirubin:   Recent Labs   Lab 08/08/20  0407 08/09/20  0404 08/10/20  0510 08/22/20  0858  08/28/20  1620   BILITOT 0.3 0.4 0.4 0.5 0.5     BMP:   Recent Labs   Lab 08/28/20  1620         K 4.1      CO2 32*   BUN 24*   CREATININE 0.98   CALCIUM 8.9     CBC:   Recent Labs   Lab 08/28/20  1007 08/28/20  1620   WBC 8.07 7.03   HGB 13.5* 12.6*   HCT 42.3 40.2    186     CMP:   Recent Labs   Lab 08/28/20  1620      K 4.1      CO2 32*      BUN 24*   CREATININE 0.98   CALCIUM 8.9   PROT 6.5   ALBUMIN 3.7   BILITOT 0.5   ALKPHOS 57   AST 32   ALT 75*   ANIONGAP 7*   EGFRNONAA >60.0     Coagulation:   Recent Labs   Lab 08/28/20  1714   INR 0.9   APTT 24.2       Significant Imaging: I have reviewed all pertinent imaging results/findings within the past 24 hours.    Assessment/Plan:     * GI bleed  Eosinophilia  Erosive gastritis  PUD (peptic ulcer disease)  Eosinophilic esophagitis  Abdominal pain  Skin Rash    IVFs  Protoprozole Infusion   Monitor CBS and transfuse for < 7  Keep NPO  GI Consult  Continue home prednisone   Patient reports he has a form of leukemia related to eosinophilic esophagitis.  Port-a-cath intact, patient suppose to start outpatient treatment, was given clindamycin prophylactic after placement of port a cath, has one more pill left to complete, will order last dose for tonight.  Patient will continue outpatient follow up for eosinophilic esophagitis once medically stable.  Skin rash related to above diagnosis, benadryl prn  Prn pain medication    Dry eyes  Artifical tears prn        VTE Risk Mitigation (From admission, onward)         Ordered     Place sequential compression device  Until discontinued      08/28/20 8460                   Hyun Whyte NP  Department of Hospital Medicine   [facility]

## 2020-08-29 NOTE — ASSESSMENT & PLAN NOTE
With reported/witnessed hematemesis at outside hospital  Trend hemoglobin.  Transfuse hemoglobin greater than 7 unless comorbidities indicate more liberal threshold  Continue PPI  Plan for EGD Monday  Make NPO Sunday evening/Monday night  Hold pharmacologic DVT prophylaxis
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
## 2020-08-29 NOTE — CONSULTS
[facility]  Gastroenterology  Consult Note    Patient Name: Solo Black  MRN: [id number]  Admission Date: 8/28/2020  Hospital Length of Stay: 1 days  Code Status: Full Code   Attending Provider: Doretha Armstrong*   Consulting Provider: Ruslan Tillman MD  Primary Care Physician: Cecilia Tsai MD  Principal Problem:GI bleed    Inpatient consult to [facility]  Consult performed by: Ruslan Tillman MD  Consult ordered by: Hyun Whyte NP  Reason for consult: Hematemesis  Assessment/Recommendations: EGD Monday        Subjective:     HPI:  39-year-old gentleman with past medical history significant for eosinophilic esophagitis, recurrent angioedema/urticaria and possible HES, and erosive gastritis who presents the emergency department as transfer from outside hospital with chief complaint of hematemesis.  Patient is well known to me.  He recently underwent EGD colonoscopy for evaluation of hypereosinophilia.  He is in the middle of being worked up for potential leukemia with bone marrow biopsy scheduled.  Over the past few days patient reports new onset hematemesis with associated abdominal pain and bright red blood per rectum.  Patient presented to outside hospital and ultimately transferred here for clinical monitoring.    Past Medical History:   Diagnosis Date    Arthritis     C. difficile colitis feb 2014    postop of hand surgery    Cancer     Encounter for blood transfusion     Eosinophilic esophagitis 3/11/2014    GERD (gastroesophageal reflux disease)     IBS (irritable bowel syndrome)     MRSA carrier     says multiple times nose swab was +    Nephrolithiasis apr 2014    bilateral punctate - never passed a stone    Urinary tract infection feb 2014    MRSA       Past Surgical History:   Procedure Laterality Date    APPENDECTOMY      ARTHROSCOPY OF SHOULDER WITH REMOVAL OF DISTAL CLAVICLE Right 11/1/2018    Procedure: ARTHROSCOPY,  SHOULDER, WITH DISTAL CLAVICLE EXCISION;  Surgeon: Gabino Mejia MD;  Location: [facility];  Service: Orthopedics;  Laterality: Right;  video    BACK SURGERY      CIRCUMCISION, PRIMARY      COLONOSCOPY N/A 11/14/2018    Procedure: COLONOSCOPY;  Surgeon: Milton Brunson MD;  Location: [facility];  Service: Endoscopy;  Laterality: N/A;    COLONOSCOPY N/A 7/20/2020    Procedure: COLONOSCOPY;  Surgeon: Ruslan Tillman MD;  Location: [facility];  Service: Endoscopy;  Laterality: N/A;    drainage of abscess from hand  2009    MRSA    drainage of abscess from R thigh  2006    MRSA    ESOPHAGOGASTRODUODENOSCOPY  3/11/2014    ESOPHAGOGASTRODUODENOSCOPY N/A 9/5/2018    Procedure: EGD (ESOPHAGOGASTRODUODENOSCOPY);  Surgeon: Kolby Wall MD;  Location: [facility];  Service: Endoscopy;  Laterality: N/A;    ESOPHAGOGASTRODUODENOSCOPY N/A 3/25/2020    Procedure: EGD (ESOPHAGOGASTRODUODENOSCOPY);  Surgeon: Ruslan Tillman MD;  Location: [facility];  Service: Endoscopy;  Laterality: N/A;    ESOPHAGOGASTRODUODENOSCOPY N/A 7/20/2020    Procedure: EGD (ESOPHAGOGASTRODUODENOSCOPY);  Surgeon: Ruslan Tillman MD;  Location: [facility];  Service: Endoscopy;  Laterality: N/A;  Patient is a very hard stick, requires anesthesia stick.    FLEXIBLE SIGMOIDOSCOPY N/A 9/5/2018    Procedure: SIGMOIDOSCOPY, FLEXIBLE;  Surgeon: Kolby Wall MD;  Location: [facility];  Service: Endoscopy;  Laterality: N/A;    HAND SURGERY  jan 2014    power saw fell on hand - laceration 3 tendons    INSERTION OF VENOUS ACCESS PORT Right 8/21/2020    Procedure: INSERTION, VENOUS ACCESS PORT;  Surgeon: Troy Honeycutt MD;  Location: [facility];  Service: General;  Laterality: Right;    NISSEN FUNDOPLICATION         Review of patient's allergies indicates:   Allergen Reactions    Legumes Nausea And Vomiting and Swelling     Other reaction(s): GI Intolerance  vomiting  vomiting  Pt reports legumes make his lips swell and  "induce severe vomiting  Pt reports legumes make his lips swell and induce severe vomiting      Nsaids (non-steroidal anti-inflammatory drug)      GI bleed    Bean pod extract      Lips swelling, vomiting  Lips swelling, vomiting      Ketamine      Severe dysphoria (bad trip)    Lentils      Lips swelling, vomiting  Lips swelling, vomiting      Meloxicam Nausea Only     GI bleed    Peas      Lips swelling, vomiting    Penicillins      Has taken third gen cephalosporins without issue per patient report    Haloperidol Anxiety and Other (See Comments)     "feels like crawling out of his skin"       Family History     Problem Relation (Age of Onset)    Celiac disease Sister    Hypertension Maternal Grandmother, Maternal Grandfather    Urolithiasis Father        Tobacco Use    Smoking status: Never Smoker    Smokeless tobacco: Never Used    Tobacco comment: Pt states he has smoked 1 or 2 cigarettes in his life.   Substance and Sexual Activity    Alcohol use: Not Currently    Drug use: No    Sexual activity: Yes     Partners: Female     Review of Systems   Constitutional: Positive for fatigue. Negative for chills, fever and unexpected weight change.   HENT: Negative for congestion and trouble swallowing.    Eyes: Negative for photophobia and visual disturbance.   Respiratory: Positive for shortness of breath. Negative for cough.    Cardiovascular: Positive for chest pain. Negative for leg swelling.   Gastrointestinal: Positive for abdominal pain, blood in stool, nausea and vomiting. Negative for abdominal distention.   Genitourinary: Negative for dysuria and hematuria.   Musculoskeletal: Negative for arthralgias and myalgias.   Skin: Positive for color change and rash.   Neurological: Positive for weakness and light-headedness. Negative for dizziness and numbness.   Psychiatric/Behavioral: Positive for agitation. Negative for confusion. The patient is nervous/anxious.      Objective:     Vital Signs (Most " Recent):  Temp: 96.5 °F (35.8 °C) (08/29/20 0749)  Pulse: 90 (08/29/20 1146)  Resp: 16 (08/29/20 0902)  BP: (!) 136/90 (08/29/20 0749)  SpO2: 98 % (08/29/20 0749) Vital Signs (24h Range):  Temp:  [96.5 °F (35.8 °C)-98.6 °F (37 °C)] 96.5 °F (35.8 °C)  Pulse:  [] 90  Resp:  [16-19] 16  SpO2:  [93 %-100 %] 98 %  BP: (136-147)/() 136/90     Weight: 92.6 kg (204 lb 2.3 oz) (08/28/20 2145)  Body mass index is 27.69 kg/m².      Intake/Output Summary (Last 24 hours) at 8/29/2020 1324  Last data filed at 8/29/2020 1017  Gross per 24 hour   Intake 956 ml   Output 800 ml   Net 156 ml       Lines/Drains/Airways     Central Venous Catheter Line                 PowerPort A Cath Single Lumen 08/21/20 1258 right subclavian 8 days                Physical Exam  Vitals signs and nursing note reviewed.   Constitutional:       General: He is not in acute distress.     Appearance: He is well-developed. He is not toxic-appearing or diaphoretic.   HENT:      Head: Normocephalic and atraumatic.   Eyes:      General: No scleral icterus.     Pupils: Pupils are equal, round, and reactive to light.   Neck:      Musculoskeletal: Normal range of motion and neck supple.   Cardiovascular:      Rate and Rhythm: Normal rate and regular rhythm.      Heart sounds: Normal heart sounds.   Pulmonary:      Effort: Pulmonary effort is normal. No respiratory distress.      Breath sounds: Normal breath sounds.   Abdominal:      General: Bowel sounds are normal. There is no distension.      Palpations: Abdomen is soft.      Tenderness: There is abdominal tenderness (Subjective). There is no guarding or rebound.   Musculoskeletal: Normal range of motion.   Skin:     General: Skin is warm and dry.      Findings: Bruising, erythema and rash present.   Neurological:      Mental Status: He is alert and oriented to person, place, and time.      Comments: No asterixis   Psychiatric:         Behavior: Behavior normal.         Significant Labs:  Recent Lab  Results       08/29/20  0758   08/28/20  2250   08/28/20  1853   08/28/20  1714   08/28/20  1627        Albumin 3.2             Alkaline Phosphatase 58                          Anion Gap 8             aPTT       24.2  Comment:  [facility] aPTT therapeutic range = 47-76 seconds       AST 91             Baso # 0.01 0.03           Basophil% 0.1 0.4           BILIRUBIN TOTAL 0.8  Comment:  For infants and newborns, interpretation of results should be based  on gestational age, weight and in agreement with clinical  observations.  Premature Infant recommended reference ranges:  Up to 24 hours.............<8.0 mg/dL  Up to 48 hours............<12.0 mg/dL  3-5 days..................<15.0 mg/dL  6-29 days.................<15.0 mg/dL               BUN, Bld 19             Calcium 8.8             Chloride 101             CO2 29             Creatinine 1.1             Differential Method Automated Automated           eGFR if  >60             eGFR if non  >60  Comment:  Calculation used to obtain the estimated glomerular filtration  rate (eGFR) is the CKD-EPI equation.                Eos # 0.3 0.3           Eosinophil% 3.8 4.0           Glucose 79             Gran # (ANC) 4.7 5.5           Gran% 68.6 69.2           Hematocrit 36.4 39.4           Hemoglobin 11.6 12.2           Immature Grans (Abs) 0.04  Comment:  Mild elevation in immature granulocytes is non specific and   can be seen in a variety of conditions including stress response,   acute inflammation, trauma and pregnancy. Correlation with other   laboratory and clinical findings is essential.   0.06  Comment:  Mild elevation in immature granulocytes is non specific and   can be seen in a variety of conditions including stress response,   acute inflammation, trauma and pregnancy. Correlation with other   laboratory and clinical findings is essential.             Immature Granulocytes 0.6 0.8           INR        0.9  Comment:  Coumadin Therapy:  2.0 - 3.0 for INR for all indicators except mechanical heart valves  and antiphospholipid syndromes which should use 2.5 - 3.5.         Lymph # 1.2 1.3           Lymph% 16.9 16.4           Magnesium 1.5             MCH 25.3 24.7           MCHC 31.9 31.0           MCV 79 80           Mono # 0.7 0.7           Mono% 10.0 9.2           MPV 9.8 10.1           nRBC 0 0           Occult Blood         Positive     Phosphorus 3.6             Platelets 169 183           Potassium 3.9             PROTEIN TOTAL 5.9             Protime       9.6       RBC 4.59 4.93           RDW 20.9 21.1           SARS-CoV-2 RNA, Amplification, Qual     Negative  Comment:  This test utilizes isothermal nucleic acid amplification   technology to detect the SARS-CoV-2 RdRp nucleic acid segment.   The analytical sensitivity (limit of detection) is 125 genome   equivalents/mL.   A POSITIVE result implies infection with the SARS-CoV-2 virus;  the patient is presumed to be contagious.    A NEGATIVE result means that SARS-CoV-2 nucleic acids are not  present above the limit of detection. A NEGATIVE result should be   treated as presumptive. It does not rule out the possibility of   COVID-19 and should not be the sole basis for treatment decisions.   If COVID-19 is strongly suspected based on clinical and exposure   history, re-testing using an alternate molecular assay should be   considered.   This test is only for use under the Food and Drug   Administration s Emergency Use Authorization (EUA).   Commercial kits are provided by [employer].   Performance characteristics of the EUA have been independently  verified by [facility] Department of  Pathology and Laboratory Medicine.   _________________________________________________________________  The ID NOW COVID-19 Letter of Authorization, along with the   authorized Fact Sheet for Healthcare Providers, the authorized Fact  Sheet for Patients, and  authorized labeling are available on the FDA   website:  www.fda.gov/MedicalDevices/Safety/EmergencySituations/fpt503976.htm           Sodium 138             WBC 6.80 7.97                            08/28/20  1620        Albumin 3.7     Alkaline Phosphatase 57     ALT 75     Anion Gap 7     aPTT       AST 32     Baso # 0.03     Basophil% 0.4     BILIRUBIN TOTAL 0.5  Comment:  For infants and newborns, interpretation of results should be based  on gestational age, weight and in agreement with clinical  observations.  Premature Infant recommended reference ranges:  Up to 24 hours.............<8.0 mg/dL  Up to 48 hours............<12.0 mg/dL  3-5 days..................<15.0 mg/dL  6-29 days.................<15.0 mg/dL       BUN, Bld 24     Calcium 8.9     Chloride 102     CO2 32     Creatinine 0.98     Differential Method Automated     eGFR if African American >60.0     eGFR if non  >60.0  Comment:  Calculation used to obtain the estimated glomerular filtration  rate (eGFR) is the CKD-EPI equation.        Eos # 0.3     Eosinophil% 4.8     Glucose 106     Gran # (ANC) 4.5     Gran% 63.5     Hematocrit 40.2     Hemoglobin 12.6     Immature Grans (Abs) 0.07  Comment:  Mild elevation in immature granulocytes is non specific and   can be seen in a variety of conditions including stress response,   acute inflammation, trauma and pregnancy. Correlation with other   laboratory and clinical findings is essential.       Immature Granulocytes 1.0     INR       Lymph # 1.5     Lymph% 21.9     Magnesium       MCH 25.0     MCHC 31.3     MCV 80     Mono # 0.6     Mono% 8.4     MPV 10.3     nRBC 0     Occult Blood       Phosphorus       Platelets 186     Potassium 4.1     PROTEIN TOTAL 6.5     Protime       RBC 5.05     RDW 21.1     SARS-CoV-2 RNA, Amplification, Qual       Sodium 141     WBC 7.03           Significant Imaging:  Imaging results within the past 24 hours have been reviewed.    Assessment/Plan:     * GI  bleed  With reported/witnessed hematemesis at outside hospital  Trend hemoglobin.  Transfuse hemoglobin greater than 7 unless comorbidities indicate more liberal threshold  Continue PPI  Plan for EGD Monday  Make NPO Sunday evening/Monday night  Hold pharmacologic DVT prophylaxis        Thank you for your consult. I will follow-up with patient. Please contact us if you have any additional questions.    Ruslan Tillman MD  Gastroenterology  [facility]

## 2020-08-29 NOTE — NURSING
"0200 Pt had been sleeping soundly and suddenly woke up with nausea and bright red bloody emesis, total amount not observed by RN but a significant amount was noted in the sink at bedside. Pt the c/o severe pain "10/10" message secure message sent  0230 secure message resent and order for pain meds obtained at 0245  0300 dilaudid given for pain  0315 pt reports complete relief  0400 pt c/o increase in itching from rash noted on upper body. Lotion applied but relief was temporary. Pt requested Benadryl secure message sent for order.   0500 benadryl given as per orders. Vial would not scan, called pharmacy they think it was a new batch placed on day shift and will make a note.  0530 pt states itching has been relieved from benadryl  "

## 2020-08-29 NOTE — ASSESSMENT & PLAN NOTE
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  Port-a-cath intact, patient suppose to start outpatient treatment, was given clindamycin prophylactic after placement of port a cath, has one more pill left to complete, will order last dose for tonight.  Patient will continue outpatient follow up for eosinophilic esophagitis once medically stable.  Skin rash related to above diagnosis, benadryl prn  Prn pain medication

## 2020-08-30 ENCOUNTER — PATIENT MESSAGE (OUTPATIENT)
Dept: GASTROENTEROLOGY | Facility: CLINIC | Age: 40
End: 2020-08-30

## 2020-08-30 LAB
BASOPHILS # BLD AUTO: 0.03 K/UL (ref 0–0.2)
BASOPHILS NFR BLD: 0.4 % (ref 0–1.9)
DIFFERENTIAL METHOD: ABNORMAL
EOSINOPHIL # BLD AUTO: 0.2 K/UL (ref 0–0.5)
EOSINOPHIL NFR BLD: 2 % (ref 0–8)
ERYTHROCYTE [DISTWIDTH] IN BLOOD BY AUTOMATED COUNT: 20.5 % (ref 11.5–14.5)
HCT VFR BLD AUTO: 36.5 % (ref 40–54)
HGB BLD-MCNC: 11.8 G/DL (ref 14–18)
IMM GRANULOCYTES # BLD AUTO: 0.03 K/UL (ref 0–0.04)
IMM GRANULOCYTES NFR BLD AUTO: 0.4 % (ref 0–0.5)
LYMPHOCYTES # BLD AUTO: 0.9 K/UL (ref 1–4.8)
LYMPHOCYTES NFR BLD: 10.8 % (ref 18–48)
MAGNESIUM SERPL-MCNC: 1.7 MG/DL (ref 1.6–2.6)
MCH RBC QN AUTO: 25.4 PG (ref 27–31)
MCHC RBC AUTO-ENTMCNC: 32.3 G/DL (ref 32–36)
MCV RBC AUTO: 79 FL (ref 82–98)
MONOCYTES # BLD AUTO: 0.8 K/UL (ref 0.3–1)
MONOCYTES NFR BLD: 9.3 % (ref 4–15)
NEUTROPHILS # BLD AUTO: 6.4 K/UL (ref 1.8–7.7)
NEUTROPHILS NFR BLD: 77.1 % (ref 38–73)
NRBC BLD-RTO: 0 /100 WBC
PHOSPHATE SERPL-MCNC: 2.6 MG/DL (ref 2.7–4.5)
PLATELET # BLD AUTO: 187 K/UL (ref 150–350)
PMV BLD AUTO: 9.5 FL (ref 9.2–12.9)
RBC # BLD AUTO: 4.64 M/UL (ref 4.6–6.2)
WBC # BLD AUTO: 8.31 K/UL (ref 3.9–12.7)

## 2020-08-30 PROCEDURE — 96376 TX/PRO/DX INJ SAME DRUG ADON: CPT

## 2020-08-30 PROCEDURE — C9113 INJ PANTOPRAZOLE SODIUM, VIA: HCPCS | Performed by: NURSE PRACTITIONER

## 2020-08-30 PROCEDURE — 25000003 PHARM REV CODE 250: Performed by: FAMILY MEDICINE

## 2020-08-30 PROCEDURE — 25000003 PHARM REV CODE 250: Performed by: NURSE PRACTITIONER

## 2020-08-30 PROCEDURE — 96366 THER/PROPH/DIAG IV INF ADDON: CPT

## 2020-08-30 PROCEDURE — 63600175 PHARM REV CODE 636 W HCPCS: Performed by: NURSE PRACTITIONER

## 2020-08-30 PROCEDURE — 99214 OFFICE O/P EST MOD 30 MIN: CPT | Mod: ,,, | Performed by: INTERNAL MEDICINE

## 2020-08-30 PROCEDURE — 84100 ASSAY OF PHOSPHORUS: CPT

## 2020-08-30 PROCEDURE — 83735 ASSAY OF MAGNESIUM: CPT

## 2020-08-30 PROCEDURE — G0378 HOSPITAL OBSERVATION PER HR: HCPCS

## 2020-08-30 PROCEDURE — 85025 COMPLETE CBC W/AUTO DIFF WBC: CPT

## 2020-08-30 PROCEDURE — 99214 PR OFFICE/OUTPT VISIT, EST, LEVL IV, 30-39 MIN: ICD-10-PCS | Mod: ,,, | Performed by: INTERNAL MEDICINE

## 2020-08-30 RX ORDER — HYDRALAZINE HYDROCHLORIDE 20 MG/ML
10 INJECTION INTRAMUSCULAR; INTRAVENOUS EVERY 8 HOURS PRN
Status: DISCONTINUED | OUTPATIENT
Start: 2020-08-30 | End: 2020-08-31 | Stop reason: HOSPADM

## 2020-08-30 RX ORDER — HYDROXYZINE HYDROCHLORIDE 25 MG/1
25 TABLET, FILM COATED ORAL 3 TIMES DAILY PRN
Status: DISCONTINUED | OUTPATIENT
Start: 2020-08-30 | End: 2020-08-31 | Stop reason: HOSPADM

## 2020-08-30 RX ORDER — HYDROCODONE BITARTRATE AND ACETAMINOPHEN 10; 325 MG/1; MG/1
1 TABLET ORAL EVERY 6 HOURS PRN
Status: DISCONTINUED | OUTPATIENT
Start: 2020-08-30 | End: 2020-08-31 | Stop reason: HOSPADM

## 2020-08-30 RX ORDER — CETIRIZINE HYDROCHLORIDE 10 MG/1
20 TABLET ORAL 2 TIMES DAILY
Status: DISCONTINUED | OUTPATIENT
Start: 2020-08-30 | End: 2020-08-31

## 2020-08-30 RX ORDER — OXYCODONE AND ACETAMINOPHEN 5; 325 MG/1; MG/1
1 TABLET ORAL EVERY 8 HOURS PRN
Status: DISCONTINUED | OUTPATIENT
Start: 2020-08-30 | End: 2020-08-31 | Stop reason: HOSPADM

## 2020-08-30 RX ORDER — CETIRIZINE HYDROCHLORIDE 10 MG/1
10 TABLET ORAL DAILY
Status: DISCONTINUED | OUTPATIENT
Start: 2020-08-30 | End: 2020-08-30

## 2020-08-30 RX ORDER — DIPHENHYDRAMINE HCL 50 MG
50 CAPSULE ORAL 3 TIMES DAILY
Status: DISCONTINUED | OUTPATIENT
Start: 2020-08-30 | End: 2020-08-31 | Stop reason: HOSPADM

## 2020-08-30 RX ORDER — AMOXICILLIN 250 MG
1 CAPSULE ORAL 2 TIMES DAILY
Status: DISCONTINUED | OUTPATIENT
Start: 2020-08-30 | End: 2020-08-31 | Stop reason: HOSPADM

## 2020-08-30 RX ADMIN — ONDANSETRON 4 MG: 2 INJECTION INTRAMUSCULAR; INTRAVENOUS at 06:08

## 2020-08-30 RX ADMIN — HYDROMORPHONE HYDROCHLORIDE 1 MG: 1 INJECTION, SOLUTION INTRAMUSCULAR; INTRAVENOUS; SUBCUTANEOUS at 10:08

## 2020-08-30 RX ADMIN — DIPHENHYDRAMINE HYDROCHLORIDE 25 MG: 50 INJECTION, SOLUTION INTRAMUSCULAR; INTRAVENOUS at 06:08

## 2020-08-30 RX ADMIN — PAROXETINE HYDROCHLORIDE 20 MG: 10 TABLET, FILM COATED ORAL at 06:08

## 2020-08-30 RX ADMIN — HYPROMELLOSE 2910 1 DROP: 5 SOLUTION OPHTHALMIC at 09:08

## 2020-08-30 RX ADMIN — MAGNESIUM OXIDE 400 MG (241.3 MG MAGNESIUM) TABLET 400 MG: TABLET at 08:08

## 2020-08-30 RX ADMIN — HYPROMELLOSE 2910 1 DROP: 5 SOLUTION OPHTHALMIC at 08:08

## 2020-08-30 RX ADMIN — PANTOPRAZOLE SODIUM 8 MG/HR: 40 INJECTION, POWDER, FOR SOLUTION INTRAVENOUS at 04:08

## 2020-08-30 RX ADMIN — PANTOPRAZOLE SODIUM 8 MG/HR: 40 INJECTION, POWDER, FOR SOLUTION INTRAVENOUS at 06:08

## 2020-08-30 RX ADMIN — PANTOPRAZOLE SODIUM 8 MG/HR: 40 INJECTION, POWDER, FOR SOLUTION INTRAVENOUS at 09:08

## 2020-08-30 RX ADMIN — HYPROMELLOSE 2910 1 DROP: 5 SOLUTION OPHTHALMIC at 02:08

## 2020-08-30 RX ADMIN — ACETAMINOPHEN 650 MG: 325 TABLET ORAL at 04:08

## 2020-08-30 RX ADMIN — DOCUSATE SODIUM 50 MG AND SENNOSIDES 8.6 MG 1 TABLET: 8.6; 5 TABLET, FILM COATED ORAL at 09:08

## 2020-08-30 RX ADMIN — DIPHENHYDRAMINE HYDROCHLORIDE 50 MG: 50 CAPSULE ORAL at 09:08

## 2020-08-30 RX ADMIN — HYDROMORPHONE HYDROCHLORIDE 1 MG: 1 INJECTION, SOLUTION INTRAMUSCULAR; INTRAVENOUS; SUBCUTANEOUS at 06:08

## 2020-08-30 RX ADMIN — PANTOPRAZOLE SODIUM 8 MG/HR: 40 INJECTION, POWDER, FOR SOLUTION INTRAVENOUS at 11:08

## 2020-08-30 RX ADMIN — OXYCODONE HYDROCHLORIDE AND ACETAMINOPHEN 1 TABLET: 5; 325 TABLET ORAL at 02:08

## 2020-08-30 RX ADMIN — ONDANSETRON 4 MG: 2 INJECTION INTRAMUSCULAR; INTRAVENOUS at 12:08

## 2020-08-30 RX ADMIN — HYDROCODONE BITARTRATE AND ACETAMINOPHEN 1 TABLET: 10; 325 TABLET ORAL at 10:08

## 2020-08-30 RX ADMIN — DIPHENHYDRAMINE HYDROCHLORIDE 50 MG: 50 CAPSULE ORAL at 08:08

## 2020-08-30 RX ADMIN — HYDROCODONE BITARTRATE AND ACETAMINOPHEN 1 TABLET: 10; 325 TABLET ORAL at 04:08

## 2020-08-30 RX ADMIN — HYDROXYZINE HYDROCHLORIDE 25 MG: 25 TABLET, FILM COATED ORAL at 12:08

## 2020-08-30 RX ADMIN — DIPHENHYDRAMINE HYDROCHLORIDE 25 MG: 50 INJECTION, SOLUTION INTRAMUSCULAR; INTRAVENOUS at 12:08

## 2020-08-30 RX ADMIN — HYDROMORPHONE HYDROCHLORIDE 1 MG: 1 INJECTION, SOLUTION INTRAMUSCULAR; INTRAVENOUS; SUBCUTANEOUS at 02:08

## 2020-08-30 RX ADMIN — HYDROXYZINE HYDROCHLORIDE 25 MG: 25 TABLET, FILM COATED ORAL at 08:08

## 2020-08-30 RX ADMIN — HYDROMORPHONE HYDROCHLORIDE 1 MG: 1 INJECTION, SOLUTION INTRAMUSCULAR; INTRAVENOUS; SUBCUTANEOUS at 12:08

## 2020-08-30 RX ADMIN — DIPHENHYDRAMINE HYDROCHLORIDE 50 MG: 50 CAPSULE ORAL at 02:08

## 2020-08-30 RX ADMIN — PANTOPRAZOLE SODIUM 8 MG/HR: 40 INJECTION, POWDER, FOR SOLUTION INTRAVENOUS at 01:08

## 2020-08-30 RX ADMIN — CETIRIZINE HYDROCHLORIDE 10 MG: 10 TABLET, FILM COATED ORAL at 12:08

## 2020-08-30 RX ADMIN — TAMSULOSIN HYDROCHLORIDE 0.4 MG: 0.4 CAPSULE ORAL at 08:08

## 2020-08-30 RX ADMIN — PREDNISONE 60 MG: 20 TABLET ORAL at 08:08

## 2020-08-30 RX ADMIN — DOCUSATE SODIUM 50 MG AND SENNOSIDES 8.6 MG 1 TABLET: 8.6; 5 TABLET, FILM COATED ORAL at 08:08

## 2020-08-30 RX ADMIN — OXYCODONE HYDROCHLORIDE AND ACETAMINOPHEN 1 TABLET: 5; 325 TABLET ORAL at 10:08

## 2020-08-30 RX ADMIN — HYPROMELLOSE 2910 1 DROP: 5 SOLUTION OPHTHALMIC at 06:08

## 2020-08-30 RX ADMIN — SODIUM CHLORIDE: 0.9 INJECTION, SOLUTION INTRAVENOUS at 04:08

## 2020-08-30 RX ADMIN — ACETAMINOPHEN 650 MG: 325 TABLET ORAL at 08:08

## 2020-08-30 RX ADMIN — CETIRIZINE HYDROCHLORIDE 20 MG: 10 TABLET, FILM COATED ORAL at 08:08

## 2020-08-30 NOTE — ASSESSMENT & PLAN NOTE
-see by Heme/Onc  8/24 in clinic  -scheduled for bone marrow biopsy 9/2 as out-pt  -no further in-pt work-up recommended at this time from a heme standpoint  -encouraged him to f/u with  for further eval and management of possible hypereosinophilic syndrome

## 2020-08-30 NOTE — CONSULTS
[facility]  Hematology/Oncology  Consult Note    Patient Name: Solo Black  MRN: [id number]  Admission Date: 8/28/2020  Hospital Length of Stay: 1 days  Code Status: Full Code   Attending Provider: Doretha Armstrong*  Consulting Provider: Jovanny Meza MD  Primary Care Physician: Cecilia Tsai MD  Principal Problem:GI bleed    Consults  Subjective:     HPI:  38 yo male with reported h/o eosinophilic esophagitis, recurrent angioedema and urticaria and eosinophilia admitted with hematemesis and BRBPR. Seen by GI - scheduled for endoscopy tomorrow.    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    Currently his CBC is unremarkable.    He also has a right chest port and was told he would need this to start 'chemo'.     Oncology Treatment Plan:   [No treatment plan]    Medications:  Continuous Infusions:   pantoprozole (PROTONIX) IV infusion 8 mg/hr (08/30/20 1635)     Scheduled Meds:   artificial tears  1 drop Both Eyes 6x Daily    cetirizine  20 mg Oral BID    diphenhydrAMINE  50 mg Oral TID    magnesium oxide  400 mg Oral BID    paroxetine  20 mg Oral QAM    predniSONE  60 mg Oral Daily    senna-docusate 8.6-50 mg  1 tablet Oral BID    tamsulosin  0.4 mg Oral Daily     PRN Meds:acetaminophen, diphenhydrAMINE, hydrALAZINE, HYDROcodone-acetaminophen, HYDROmorphone, hydrOXYzine HCL, ondansetron, oxyCODONE-acetaminophen     Review of patient's allergies indicates:   Allergen Reactions    Legumes Nausea And Vomiting and Swelling     Other reaction(s): GI Intolerance  vomiting  vomiting  Pt reports legumes make his lips swell and induce severe vomiting  Pt reports legumes make his lips swell and induce severe vomiting      Nsaids (non-steroidal anti-inflammatory drug)      GI bleed    Bean pod extract      Lips swelling, vomiting  Lips swelling, vomiting      Ketamine      Severe dysphoria  "(bad trip)    Lentils      Lips swelling, vomiting  Lips swelling, vomiting      Meloxicam Nausea Only     GI bleed    Peas      Lips swelling, vomiting    Penicillins      Has taken third gen cephalosporins without issue per patient report    Haloperidol Anxiety and Other (See Comments)     "feels like crawling out of his skin"          Past Medical History:   Diagnosis Date    Arthritis     C. difficile colitis feb 2014    postop of hand surgery    Cancer     Encounter for blood transfusion     Eosinophilic esophagitis 3/11/2014    GERD (gastroesophageal reflux disease)     IBS (irritable bowel syndrome)     MRSA carrier     says multiple times nose swab was +    Nephrolithiasis apr 2014    bilateral punctate - never passed a stone    Urinary tract infection feb 2014    MRSA     Past Surgical History:   Procedure Laterality Date    APPENDECTOMY      ARTHROSCOPY OF SHOULDER WITH REMOVAL OF DISTAL CLAVICLE Right 11/1/2018    Procedure: ARTHROSCOPY, SHOULDER, WITH DISTAL CLAVICLE EXCISION;  Surgeon: Gabino eMjia MD;  Location[facility];  Service: Orthopedics;  Laterality: Right;  video    BACK SURGERY      CIRCUMCISION, PRIMARY      COLONOSCOPY N/A 11/14/2018    Procedure: COLONOSCOPY;  Surgeon: Milton Brunson MD;  Location: [facility];  Service: Endoscopy;  Laterality: N/A;    COLONOSCOPY N/A 7/20/2020    Procedure: COLONOSCOPY;  Surgeon: Ruslan Tillman MD;  Location: [facility];  Service: Endoscopy;  Laterality: N/A;    drainage of abscess from hand  2009    MRSA    drainage of abscess from R thigh  2006    MRSA    ESOPHAGOGASTRODUODENOSCOPY  3/11/2014    ESOPHAGOGASTRODUODENOSCOPY N/A 9/5/2018    Procedure: EGD (ESOPHAGOGASTRODUODENOSCOPY);  Surgeon: Kolby Wall MD;  Location: [facility];  Service: Endoscopy;  Laterality: N/A;    ESOPHAGOGASTRODUODENOSCOPY N/A 3/25/2020    Procedure: EGD (ESOPHAGOGASTRODUODENOSCOPY);  Surgeon: Ruslan Tillman MD;  Location: [facility];  Service: Endoscopy;  Laterality: N/A;    ESOPHAGOGASTRODUODENOSCOPY N/A 7/20/2020    Procedure: EGD (ESOPHAGOGASTRODUODENOSCOPY);  Surgeon: Ruslan Tillman MD;  Location: [facility];  Service: Endoscopy;  Laterality: N/A;  Patient is a very hard stick, requires anesthesia stick.    FLEXIBLE SIGMOIDOSCOPY N/A 9/5/2018    Procedure: SIGMOIDOSCOPY, FLEXIBLE;  Surgeon: Kolby Wall MD;  Location: [facility];  Service: Endoscopy;  Laterality: N/A;    HAND SURGERY  jan 2014    power saw fell on hand - laceration 3 tendons    INSERTION OF VENOUS ACCESS PORT Right 8/21/2020    Procedure: INSERTION, VENOUS ACCESS PORT;  Surgeon: Troy Honeycutt MD;  Location: [facility];  Service: General;  Laterality: Right;    NISSEN FUNDOPLICATION       Family History     Problem Relation (Age of Onset)    Celiac disease Sister    Hypertension Maternal Grandmother, Maternal Grandfather    Urolithiasis Father        Tobacco Use    Smoking status: Never Smoker    Smokeless tobacco: Never Used    Tobacco comment: Pt states he has smoked 1 or 2 cigarettes in his life.   Substance and Sexual Activity    Alcohol use: Not Currently    Drug use: No    Sexual activity: Yes     Partners: Female       Review of Systems   Constitutional: Negative for fever and unexpected weight change.   HENT: Negative for mouth sores and nosebleeds.    Cardiovascular: Negative for chest pain and palpitations.   Gastrointestinal: Positive for blood in stool. Negative for abdominal pain and vomiting.   Musculoskeletal: Positive for arthralgias.   Skin: Positive for rash.   Hematological: Negative for adenopathy. Does not bruise/bleed easily.   Psychiatric/Behavioral: The patient is nervous/anxious.      Objective:     Vital Signs (Most Recent):  Temp: 97.6 °F (36.4 °C) (08/30/20 1715)  Pulse: 103 (08/30/20 1715)  Resp: 18 (08/30/20 1715)  BP: 131/78 (08/30/20 1715)  SpO2: (!) 93 % (08/30/20 1715) Vital Signs (24h Range):  Temp:   [96.1 °F (35.6 °C)-97.6 °F (36.4 °C)] 97.6 °F (36.4 °C)  Pulse:  [] 103  Resp:  [16-21] 18  SpO2:  [93 %-99 %] 93 %  BP: (131-170)/(78-97) 131/78     Weight: 92.6 kg (204 lb 2.3 oz)  Body mass index is 27.69 kg/m².  Body surface area is 2.17 meters squared.      Intake/Output Summary (Last 24 hours) at 8/30/2020 1743  Last data filed at 8/30/2020 1500  Gross per 24 hour   Intake 3130 ml   Output 1350 ml   Net 1780 ml       Physical Exam  Vitals signs reviewed.   Constitutional:       Appearance: He is not toxic-appearing.   HENT:      Mouth/Throat:      Mouth: No oral lesions.      Pharynx: No oropharyngeal exudate.   Neck:      Musculoskeletal: Neck supple.      Thyroid: No thyroid mass or thyromegaly.   Cardiovascular:      Rate and Rhythm: Normal rate and regular rhythm.   Pulmonary:      Effort: Pulmonary effort is normal. No accessory muscle usage or respiratory distress.      Breath sounds: Normal breath sounds. No wheezing or rales.   Abdominal:      General: Bowel sounds are normal.      Palpations: There is no mass.      Tenderness: There is no abdominal tenderness.   Lymphadenopathy:      Cervical: No cervical adenopathy.   Skin:     Findings: No bruising, lesion or rash.          Neurological:      Mental Status: He is alert.   Psychiatric:         Thought Content: Thought content normal.         Judgment: Judgment normal.         Significant Labs:   All pertinent labs from the last 24 hours have been reviewed.    Diagnostic Results:  I have reviewed all pertinent imaging results/findings within the past 24 hours.    Assessment/Plan:     * GI bleed  -seen by GI  -EGD tomorrow    Eosinophilic esophagitis  -see by Heme/Onc  8/24 in clinic  -scheduled for bone marrow biopsy 9/2 as out-pt  -no further in-pt work-up recommended at this time from a heme standpoint  -encouraged him to f/u with  for further eval and management of possible hypereosinophilic syndrome           Jovanny Meza  MD  Hematology/Oncology  [facility]

## 2020-08-30 NOTE — SUBJECTIVE & OBJECTIVE
"Oncology Treatment Plan:   [No treatment plan]    Medications:  Continuous Infusions:   pantoprozole (PROTONIX) IV infusion 8 mg/hr (08/30/20 1635)     Scheduled Meds:   artificial tears  1 drop Both Eyes 6x Daily    cetirizine  20 mg Oral BID    diphenhydrAMINE  50 mg Oral TID    magnesium oxide  400 mg Oral BID    paroxetine  20 mg Oral QAM    predniSONE  60 mg Oral Daily    senna-docusate 8.6-50 mg  1 tablet Oral BID    tamsulosin  0.4 mg Oral Daily     PRN Meds:acetaminophen, diphenhydrAMINE, hydrALAZINE, HYDROcodone-acetaminophen, HYDROmorphone, hydrOXYzine HCL, ondansetron, oxyCODONE-acetaminophen     Review of patient's allergies indicates:   Allergen Reactions    Legumes Nausea And Vomiting and Swelling     Other reaction(s): GI Intolerance  vomiting  vomiting  Pt reports legumes make his lips swell and induce severe vomiting  Pt reports legumes make his lips swell and induce severe vomiting      Nsaids (non-steroidal anti-inflammatory drug)      GI bleed    Bean pod extract      Lips swelling, vomiting  Lips swelling, vomiting      Ketamine      Severe dysphoria (bad trip)    Lentils      Lips swelling, vomiting  Lips swelling, vomiting      Meloxicam Nausea Only     GI bleed    Peas      Lips swelling, vomiting    Penicillins      Has taken third gen cephalosporins without issue per patient report    Haloperidol Anxiety and Other (See Comments)     "feels like crawling out of his skin"          Past Medical History:   Diagnosis Date    Arthritis     C. difficile colitis feb 2014    postop of hand surgery    Cancer     Encounter for blood transfusion     Eosinophilic esophagitis 3/11/2014    GERD (gastroesophageal reflux disease)     IBS (irritable bowel syndrome)     MRSA carrier     says multiple times nose swab was +    Nephrolithiasis apr 2014    bilateral punctate - never passed a stone    Urinary tract infection feb 2014    MRSA     Past Surgical History:   Procedure " Laterality Date    APPENDECTOMY      ARTHROSCOPY OF SHOULDER WITH REMOVAL OF DISTAL CLAVICLE Right 11/1/2018    Procedure: ARTHROSCOPY, SHOULDER, WITH DISTAL CLAVICLE EXCISION;  Surgeon: Gabino Mejia MD;  Location: [facility];  Service: Orthopedics;  Laterality: Right;  video    BACK SURGERY      CIRCUMCISION, PRIMARY      COLONOSCOPY N/A 11/14/2018    Procedure: COLONOSCOPY;  Surgeon: Milton Brunson MD;  Location: [facility];  Service: Endoscopy;  Laterality: N/A;    COLONOSCOPY N/A 7/20/2020    Procedure: COLONOSCOPY;  Surgeon: Ruslan Tillman MD;  Location: [facility];  Service: Endoscopy;  Laterality: N/A;    drainage of abscess from hand  2009    MRSA    drainage of abscess from R thigh  2006    MRSA    ESOPHAGOGASTRODUODENOSCOPY  3/11/2014    ESOPHAGOGASTRODUODENOSCOPY N/A 9/5/2018    Procedure: EGD (ESOPHAGOGASTRODUODENOSCOPY);  Surgeon: Kolby Wall MD;  Location: [facility];  Service: Endoscopy;  Laterality: N/A;    ESOPHAGOGASTRODUODENOSCOPY N/A 3/25/2020    Procedure: EGD (ESOPHAGOGASTRODUODENOSCOPY);  Surgeon: Ruslan Tillman MD;  Location: [facility];  Service: Endoscopy;  Laterality: N/A;    ESOPHAGOGASTRODUODENOSCOPY N/A 7/20/2020    Procedure: EGD (ESOPHAGOGASTRODUODENOSCOPY);  Surgeon: Ruslan Tillman MD;  Location: [facility];  Service: Endoscopy;  Laterality: N/A;  Patient is a very hard stick, requires anesthesia stick.    FLEXIBLE SIGMOIDOSCOPY N/A 9/5/2018    Procedure: SIGMOIDOSCOPY, FLEXIBLE;  Surgeon: Kolby Wall MD;  Location: [facility];  Service: Endoscopy;  Laterality: N/A;    HAND SURGERY  jan 2014    power saw fell on hand - laceration 3 tendons    INSERTION OF VENOUS ACCESS PORT Right 8/21/2020    Procedure: INSERTION, VENOUS ACCESS PORT;  Surgeon: Troy Honeycutt MD;  Location: [facility];  Service: General;  Laterality: Right;    NISSEN FUNDOPLICATION       Family History     Problem Relation (Age of Onset)    Celiac disease  Sister    Hypertension Maternal Grandmother, Maternal Grandfather    Urolithiasis Father        Tobacco Use    Smoking status: Never Smoker    Smokeless tobacco: Never Used    Tobacco comment: Pt states he has smoked 1 or 2 cigarettes in his life.   Substance and Sexual Activity    Alcohol use: Not Currently    Drug use: No    Sexual activity: Yes     Partners: Female       Review of Systems   Constitutional: Negative for fever and unexpected weight change.   HENT: Negative for mouth sores and nosebleeds.    Cardiovascular: Negative for chest pain and palpitations.   Gastrointestinal: Positive for blood in stool. Negative for abdominal pain and vomiting.   Musculoskeletal: Positive for arthralgias.   Skin: Positive for rash.   Hematological: Negative for adenopathy. Does not bruise/bleed easily.   Psychiatric/Behavioral: The patient is nervous/anxious.      Objective:     Vital Signs (Most Recent):  Temp: 97.6 °F (36.4 °C) (08/30/20 1715)  Pulse: 103 (08/30/20 1715)  Resp: 18 (08/30/20 1715)  BP: 131/78 (08/30/20 1715)  SpO2: (!) 93 % (08/30/20 1715) Vital Signs (24h Range):  Temp:  [96.1 °F (35.6 °C)-97.6 °F (36.4 °C)] 97.6 °F (36.4 °C)  Pulse:  [] 103  Resp:  [16-21] 18  SpO2:  [93 %-99 %] 93 %  BP: (131-170)/(78-97) 131/78     Weight: 92.6 kg (204 lb 2.3 oz)  Body mass index is 27.69 kg/m².  Body surface area is 2.17 meters squared.      Intake/Output Summary (Last 24 hours) at 8/30/2020 6603  Last data filed at 8/30/2020 1500  Gross per 24 hour   Intake 3130 ml   Output 1350 ml   Net 1780 ml       Physical Exam  Vitals signs reviewed.   Constitutional:       Appearance: He is not toxic-appearing.   HENT:      Mouth/Throat:      Mouth: No oral lesions.      Pharynx: No oropharyngeal exudate.   Neck:      Musculoskeletal: Neck supple.      Thyroid: No thyroid mass or thyromegaly.   Cardiovascular:      Rate and Rhythm: Normal rate and regular rhythm.   Pulmonary:      Effort: Pulmonary effort is  normal. No accessory muscle usage or respiratory distress.      Breath sounds: Normal breath sounds. No wheezing or rales.   Abdominal:      General: Bowel sounds are normal.      Palpations: There is no mass.      Tenderness: There is no abdominal tenderness.   Lymphadenopathy:      Cervical: No cervical adenopathy.   Skin:     Findings: No bruising, lesion or rash.          Neurological:      Mental Status: He is alert.   Psychiatric:         Thought Content: Thought content normal.         Judgment: Judgment normal.         Significant Labs:   All pertinent labs from the last 24 hours have been reviewed.    Diagnostic Results:  I have reviewed all pertinent imaging results/findings within the past 24 hours.

## 2020-08-30 NOTE — HPI
40 yo male with reported h/o eosinophilic esophagitis, recurrent angioedema and urticaria and eosinophilia admitted with hematemesis and BRBPR. Seen by GI - scheduled for endoscopy tomorrow.    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    Currently his CBC is unremarkable.    He also has a right chest port and was told he would need this to start 'chemo'.

## 2020-08-30 NOTE — PROGRESS NOTES
[facility] Hospital Medicine  Progress Note    Patient Name: Solo Black  MRN: [id number]  Patient Class: OP- Observation   Admission Date: 8/28/2020  Length of Stay: 1 days  Attending Physician: Doretha Armstrong*  Primary Care Provider: Cecilia Tsai MD        Subjective:     Principal Problem:GI bleed        HPI:    Patient is a 39 y.o. male who has a past medical history of C. difficile colitis, Eosinophilic esophagitis, leukemia associated with eosinophilic esophagitis, GERD, IBS, MRSA carrier, nephrolithiasis presented tp [facility] with hematemesis, bright red blood per rectum, abdominal pain, nausea. Patient has history of hypereosinophilic syndrome and erosive gastritis causing GI bleed. He underwent EGD/colonoscopy on 7/20. H/H stable per hospital records. No active bleeding noted in ED. Patient admitted to [facility] for further medical management and GI consultation    Overview/Hospital Course:  No notes on file    Interval History: awake and alert, complained of bone pain.   Requesting to restart home Zyrtec, Benadryl,   He decline starting Adderall.   Requesting for more pain medicatio. On Dilaudid, add percocet  Chemo reschedule for next week  Appreciates GI- for scope on Monday 8/31    Review of Systems   Constitutional: Negative for chills and fever.   Respiratory: Negative for chest tightness and shortness of breath.    Cardiovascular: Negative for chest pain and palpitations.   Gastrointestinal: Negative for abdominal distention, blood in stool, nausea and vomiting.   Genitourinary: Negative for difficulty urinating.   Musculoskeletal: Positive for arthralgias and myalgias. Negative for back pain.   Skin: Positive for color change and rash.   Neurological: Negative for dizziness.   Psychiatric/Behavioral: Negative for agitation.     Objective:     Vital Signs (Most Recent):  Temp: 96.5 °F (35.8 °C) (08/30/20 1244)  Pulse: 82 (08/30/20 1244)  Resp:  17 (08/30/20 1244)  BP: (!) 170/91 (08/30/20 1244)  SpO2: 95 % (08/30/20 1244) Vital Signs (24h Range):  Temp:  [96.1 °F (35.6 °C)-98.2 °F (36.8 °C)] 96.5 °F (35.8 °C)  Pulse:  [] 82  Resp:  [16-21] 17  SpO2:  [93 %-99 %] 95 %  BP: (129-170)/(80-97) 170/91     Weight: 92.6 kg (204 lb 2.3 oz)  Body mass index is 27.69 kg/m².    Intake/Output Summary (Last 24 hours) at 8/30/2020 1256  Last data filed at 8/30/2020 1146  Gross per 24 hour   Intake 3130 ml   Output 2450 ml   Net 680 ml      Physical Exam  Constitutional:       Appearance: Normal appearance.   HENT:      Head: Normocephalic and atraumatic.   Neck:      Musculoskeletal: Normal range of motion.   Cardiovascular:      Rate and Rhythm: Normal rate.      Heart sounds: No murmur.   Pulmonary:      Effort: Pulmonary effort is normal.      Breath sounds: Normal breath sounds.   Abdominal:      General: Bowel sounds are normal.      Tenderness: There is no abdominal tenderness.   Musculoskeletal:         General: Tenderness present. No swelling.   Skin:     General: Skin is warm and dry.      Findings: Rash present.      Comments: Maculopapular rash scattered on the upper body.    Neurological:      General: No focal deficit present.      Mental Status: He is alert and oriented to person, place, and time.   Psychiatric:         Mood and Affect: Mood normal.         Significant Labs:   ABGs: No results for input(s): PH, PCO2, HCO3, POCSATURATED, BE, TOTALHB, COHB, METHB, O2HB, POCFIO2 in the last 48 hours.  CBC:   Recent Labs   Lab 08/28/20  2250 08/29/20  0758 08/30/20  0811   WBC 7.97 6.80 8.31   HGB 12.2* 11.6* 11.8*   HCT 39.4* 36.4* 36.5*    169 187     CMP:   Recent Labs   Lab 08/28/20  1620 08/29/20  0758    138   K 4.1 3.9    101   CO2 32* 29    79   BUN 24* 19   CREATININE 0.98 1.1   CALCIUM 8.9 8.8   PROT 6.5 5.9*   ALBUMIN 3.7 3.2*   BILITOT 0.5 0.8   ALKPHOS 57 58   AST 32 91*   ALT 75* 143*   ANIONGAP 7* 8   EGFRNONAA  >60.0 >60     Cardiac Markers: No results for input(s): CKMB, MYOGLOBIN, BNP, TROPISTAT in the last 48 hours.  Lactic Acid: No results for input(s): LACTATE in the last 48 hours.  Lipid Panel: No results for input(s): CHOL, HDL, LDLCALC, TRIG, CHOLHDL in the last 48 hours.  Magnesium:   Recent Labs   Lab 08/29/20  0758 08/30/20  0811   MG 1.5* 1.7     Respiratory Culture: No results for input(s): GSRESP, RESPIRATORYC in the last 48 hours.  Troponin: No results for input(s): TROPONINI in the last 48 hours.  TSH: No results for input(s): TSH in the last 4320 hours.  Urine Culture: No results for input(s): LABURIN in the last 48 hours.  Urine Studies: No results for input(s): COLORU, APPEARANCEUA, PHUR, SPECGRAV, PROTEINUA, GLUCUA, KETONESU, BILIRUBINUA, OCCULTUA, NITRITE, UROBILINOGEN, LEUKOCYTESUR, RBCUA, WBCUA, BACTERIA, SQUAMEPITHEL, HYALINECASTS in the last 48 hours.    Invalid input(s): WRIGHTSUR    Significant Imaging: I have reviewed all pertinent imaging results/findings within the past 24 hours.      Assessment/Plan:      * GI bleed  Eosinophilia  Erosive gastritis  PUD (peptic ulcer disease)  Eosinophilic esophagitis  Abdominal pain  Skin Rash    IVFs  Protoprozole Infusion   Monitor CBS and transfuse for < 7  Keep NPO- CLD   GI Consult- EGD plan for 8/31  Continue home prednisone   Skin rash related to above diagnosis, benadryl prn  Prn pain medication    Dry eyes  Artifical tears prn      Eosinophilic esophagitis  Port-a-cath intact  Plan for chemo next week.           VTE Risk Mitigation (From admission, onward)         Ordered     Place sequential compression device  Until discontinued      08/28/20 7202                Discharge Planning   LIUDMILA:      Code Status: Full Code   Is the patient medically ready for discharge?:     Reason for patient still in hospital (select all that apply): Patient trending condition                     Doretha Armstrong MD  Department of Hospital Medicine   [facility] [place]

## 2020-08-30 NOTE — PLAN OF CARE
"Patient lying in bed, A/O. PRN pain management medications admin per MAR for c/o "bone, hip, anal area, and abdominal pain." NADN. Verbal, able to make needs known. Up with standby assist to toilet. Patient had one bloody bowel movement this shift, no bloody emesis noted. Protonix gtt infusing @ 20 mL/hour. Plan of care and medications reviewed with patient- verbalizes understanding. Bed in lowest position, side rails up x 2, call light within reach. Will endorse patient to oncoming nurse.  "

## 2020-08-30 NOTE — PLAN OF CARE
VN cued into pt's room for introduction. VN informed pt that VN would be working along side bedside nurse and PCT throughout shift. Level of present pain assessed. Patient states pain level remains at 8-10 even with all PRN medications admin. Dr Meza consulted to assist with better Ca pain management. Thoroughly discussed with patient the plan of care. Discussed with patient High fall risk protocol and interventions that have been initiated and cont be in place for safety. Patient verbalized clear understanding and cooperation using teach back method. Bed alarm presently activated and in use. Will cont to be available to patient and intervene prn.

## 2020-08-30 NOTE — SUBJECTIVE & OBJECTIVE
Interval History: awake and alert, complained of bone pain.   Requesting to restart home Zyrtec, Benadryl,   He decline starting Adderall.   Requesting for more pain medicatio. On Dilaudid, add percocet  Chemo reschedule for next week  Appreciates GI- for scope on Monday 8/31    Review of Systems   Constitutional: Negative for chills and fever.   Respiratory: Negative for chest tightness and shortness of breath.    Cardiovascular: Negative for chest pain and palpitations.   Gastrointestinal: Negative for abdominal distention, blood in stool, nausea and vomiting.   Genitourinary: Negative for difficulty urinating.   Musculoskeletal: Positive for arthralgias and myalgias. Negative for back pain.   Skin: Positive for color change and rash.   Neurological: Negative for dizziness.   Psychiatric/Behavioral: Negative for agitation.     Objective:     Vital Signs (Most Recent):  Temp: 96.5 °F (35.8 °C) (08/30/20 1244)  Pulse: 82 (08/30/20 1244)  Resp: 17 (08/30/20 1244)  BP: (!) 170/91 (08/30/20 1244)  SpO2: 95 % (08/30/20 1244) Vital Signs (24h Range):  Temp:  [96.1 °F (35.6 °C)-98.2 °F (36.8 °C)] 96.5 °F (35.8 °C)  Pulse:  [] 82  Resp:  [16-21] 17  SpO2:  [93 %-99 %] 95 %  BP: (129-170)/(80-97) 170/91     Weight: 92.6 kg (204 lb 2.3 oz)  Body mass index is 27.69 kg/m².    Intake/Output Summary (Last 24 hours) at 8/30/2020 1256  Last data filed at 8/30/2020 1146  Gross per 24 hour   Intake 3130 ml   Output 2450 ml   Net 680 ml      Physical Exam  Constitutional:       Appearance: Normal appearance.   HENT:      Head: Normocephalic and atraumatic.   Neck:      Musculoskeletal: Normal range of motion.   Cardiovascular:      Rate and Rhythm: Normal rate.      Heart sounds: No murmur.   Pulmonary:      Effort: Pulmonary effort is normal.      Breath sounds: Normal breath sounds.   Abdominal:      General: Bowel sounds are normal.      Tenderness: There is no abdominal tenderness.   Musculoskeletal:         General:  Tenderness present. No swelling.   Skin:     General: Skin is warm and dry.      Findings: Rash present.      Comments: Maculopapular rash scattered on the upper body.    Neurological:      General: No focal deficit present.      Mental Status: He is alert and oriented to person, place, and time.   Psychiatric:         Mood and Affect: Mood normal.         Significant Labs:   ABGs: No results for input(s): PH, PCO2, HCO3, POCSATURATED, BE, TOTALHB, COHB, METHB, O2HB, POCFIO2 in the last 48 hours.  CBC:   Recent Labs   Lab 08/28/20  2250 08/29/20  0758 08/30/20  0811   WBC 7.97 6.80 8.31   HGB 12.2* 11.6* 11.8*   HCT 39.4* 36.4* 36.5*    169 187     CMP:   Recent Labs   Lab 08/28/20  1620 08/29/20  0758    138   K 4.1 3.9    101   CO2 32* 29    79   BUN 24* 19   CREATININE 0.98 1.1   CALCIUM 8.9 8.8   PROT 6.5 5.9*   ALBUMIN 3.7 3.2*   BILITOT 0.5 0.8   ALKPHOS 57 58   AST 32 91*   ALT 75* 143*   ANIONGAP 7* 8   EGFRNONAA >60.0 >60     Cardiac Markers: No results for input(s): CKMB, MYOGLOBIN, BNP, TROPISTAT in the last 48 hours.  Lactic Acid: No results for input(s): LACTATE in the last 48 hours.  Lipid Panel: No results for input(s): CHOL, HDL, LDLCALC, TRIG, CHOLHDL in the last 48 hours.  Magnesium:   Recent Labs   Lab 08/29/20  0758 08/30/20  0811   MG 1.5* 1.7     Respiratory Culture: No results for input(s): GSRESP, RESPIRATORYC in the last 48 hours.  Troponin: No results for input(s): TROPONINI in the last 48 hours.  TSH: No results for input(s): TSH in the last 4320 hours.  Urine Culture: No results for input(s): LABURIN in the last 48 hours.  Urine Studies: No results for input(s): COLORU, APPEARANCEUA, PHUR, SPECGRAV, PROTEINUA, GLUCUA, KETONESU, BILIRUBINUA, OCCULTUA, NITRITE, UROBILINOGEN, LEUKOCYTESUR, RBCUA, WBCUA, BACTERIA, SQUAMEPITHEL, HYALINECASTS in the last 48 hours.    Invalid input(s): ZACHARY    Significant Imaging: I have reviewed all pertinent imaging  results/findings within the past 24 hours.

## 2020-08-30 NOTE — PLAN OF CARE
Plan of care reviewed with patient. Patient verbalized understanding. Protonix drip continued. PRN pain medication and benadryl given. Telemetry applied. Zero true red alarms. Patient is on room air. No distress reported. No signs/symptoms of distress observed. Bed locked and low, call light within reach, non skid socks applied, side rails x2. Instructed to call if needing assistance. Will continue to monitor.

## 2020-08-30 NOTE — PLAN OF CARE
Safety maintained, bed alarm on and call bell in reach. Telemetry monitoring in progress Sinus Tach noted.c/o pain all shift, pain management inadequate.had no sleep.Protonix drip continues. PAC site without redness or edema. Will cntinue to monitor.

## 2020-08-31 ENCOUNTER — ANESTHESIA (OUTPATIENT)
Dept: ENDOSCOPY | Facility: HOSPITAL | Age: 40
End: 2020-08-31
Payer: MEDICAID

## 2020-08-31 ENCOUNTER — TELEPHONE (OUTPATIENT)
Dept: GASTROENTEROLOGY | Facility: CLINIC | Age: 40
End: 2020-08-31

## 2020-08-31 ENCOUNTER — TELEPHONE (OUTPATIENT)
Dept: HEMATOLOGY/ONCOLOGY | Facility: CLINIC | Age: 40
End: 2020-08-31

## 2020-08-31 ENCOUNTER — PATIENT MESSAGE (OUTPATIENT)
Dept: ENDOSCOPY | Facility: HOSPITAL | Age: 40
End: 2020-08-31

## 2020-08-31 ENCOUNTER — TELEPHONE (OUTPATIENT)
Dept: PAIN MEDICINE | Facility: CLINIC | Age: 40
End: 2020-08-31

## 2020-08-31 ENCOUNTER — TELEPHONE (OUTPATIENT)
Dept: UROLOGY | Facility: CLINIC | Age: 40
End: 2020-08-31

## 2020-08-31 VITALS
WEIGHT: 218.94 LBS | SYSTOLIC BLOOD PRESSURE: 142 MMHG | BODY MASS INDEX: 29.65 KG/M2 | OXYGEN SATURATION: 99 % | HEART RATE: 75 BPM | RESPIRATION RATE: 20 BRPM | DIASTOLIC BLOOD PRESSURE: 96 MMHG | HEIGHT: 72 IN | TEMPERATURE: 96 F

## 2020-08-31 LAB
BASOPHILS # BLD AUTO: 0.02 K/UL (ref 0–0.2)
BASOPHILS NFR BLD: 0.2 % (ref 0–1.9)
DIFFERENTIAL METHOD: ABNORMAL
EOSINOPHIL # BLD AUTO: 0.1 K/UL (ref 0–0.5)
EOSINOPHIL NFR BLD: 0.9 % (ref 0–8)
ERYTHROCYTE [DISTWIDTH] IN BLOOD BY AUTOMATED COUNT: 20.8 % (ref 11.5–14.5)
HCT VFR BLD AUTO: 35.4 % (ref 40–54)
HGB BLD-MCNC: 11.2 G/DL (ref 14–18)
IMM GRANULOCYTES # BLD AUTO: 0.07 K/UL (ref 0–0.04)
IMM GRANULOCYTES NFR BLD AUTO: 0.7 % (ref 0–0.5)
LYMPHOCYTES # BLD AUTO: 0.8 K/UL (ref 1–4.8)
LYMPHOCYTES NFR BLD: 8.2 % (ref 18–48)
MAGNESIUM SERPL-MCNC: 1.9 MG/DL (ref 1.6–2.6)
MCH RBC QN AUTO: 25.3 PG (ref 27–31)
MCHC RBC AUTO-ENTMCNC: 31.6 G/DL (ref 32–36)
MCV RBC AUTO: 80 FL (ref 82–98)
MONOCYTES # BLD AUTO: 0.9 K/UL (ref 0.3–1)
MONOCYTES NFR BLD: 9.4 % (ref 4–15)
NEUTROPHILS # BLD AUTO: 7.6 K/UL (ref 1.8–7.7)
NEUTROPHILS NFR BLD: 80.6 % (ref 38–73)
NRBC BLD-RTO: 0 /100 WBC
PHOSPHATE SERPL-MCNC: 2 MG/DL (ref 2.7–4.5)
PLATELET # BLD AUTO: 200 K/UL (ref 150–350)
PMV BLD AUTO: 9.2 FL (ref 9.2–12.9)
RBC # BLD AUTO: 4.43 M/UL (ref 4.6–6.2)
WBC # BLD AUTO: 9.37 K/UL (ref 3.9–12.7)

## 2020-08-31 PROCEDURE — 96366 THER/PROPH/DIAG IV INF ADDON: CPT

## 2020-08-31 PROCEDURE — 37000008 HC ANESTHESIA 1ST 15 MINUTES: Performed by: INTERNAL MEDICINE

## 2020-08-31 PROCEDURE — 83735 ASSAY OF MAGNESIUM: CPT

## 2020-08-31 PROCEDURE — 37000009 HC ANESTHESIA EA ADD 15 MINS: Performed by: INTERNAL MEDICINE

## 2020-08-31 PROCEDURE — 85025 COMPLETE CBC W/AUTO DIFF WBC: CPT

## 2020-08-31 PROCEDURE — G0378 HOSPITAL OBSERVATION PER HR: HCPCS

## 2020-08-31 PROCEDURE — 00731 ANES UPR GI NDSC PX NOS: CPT | Performed by: INTERNAL MEDICINE

## 2020-08-31 PROCEDURE — 25000003 PHARM REV CODE 250: Performed by: FAMILY MEDICINE

## 2020-08-31 PROCEDURE — 43235 EGD DIAGNOSTIC BRUSH WASH: CPT | Performed by: INTERNAL MEDICINE

## 2020-08-31 PROCEDURE — 96376 TX/PRO/DX INJ SAME DRUG ADON: CPT

## 2020-08-31 PROCEDURE — 63600175 PHARM REV CODE 636 W HCPCS: Performed by: NURSE PRACTITIONER

## 2020-08-31 PROCEDURE — 63600175 PHARM REV CODE 636 W HCPCS: Performed by: NURSE ANESTHETIST, CERTIFIED REGISTERED

## 2020-08-31 PROCEDURE — 25000003 PHARM REV CODE 250: Performed by: NURSE ANESTHETIST, CERTIFIED REGISTERED

## 2020-08-31 PROCEDURE — 84100 ASSAY OF PHOSPHORUS: CPT

## 2020-08-31 PROCEDURE — 63600175 PHARM REV CODE 636 W HCPCS: Performed by: FAMILY MEDICINE

## 2020-08-31 PROCEDURE — 25000003 PHARM REV CODE 250: Performed by: NURSE PRACTITIONER

## 2020-08-31 PROCEDURE — C9113 INJ PANTOPRAZOLE SODIUM, VIA: HCPCS | Performed by: NURSE PRACTITIONER

## 2020-08-31 RX ORDER — PREDNISONE 20 MG/1
40 TABLET ORAL DAILY
Qty: 14 TABLET | Refills: 0 | Status: SHIPPED | OUTPATIENT
Start: 2020-09-01 | End: 2020-08-31

## 2020-08-31 RX ORDER — CETIRIZINE HYDROCHLORIDE 10 MG/1
10 TABLET ORAL 2 TIMES DAILY
Status: DISCONTINUED | OUTPATIENT
Start: 2020-08-31 | End: 2020-08-31 | Stop reason: HOSPADM

## 2020-08-31 RX ORDER — OXYCODONE AND ACETAMINOPHEN 5; 325 MG/1; MG/1
1 TABLET ORAL EVERY 8 HOURS PRN
Qty: 15 TABLET | Refills: 0 | Status: ON HOLD | OUTPATIENT
Start: 2020-08-31 | End: 2020-10-13 | Stop reason: HOSPADM

## 2020-08-31 RX ORDER — PREDNISONE 20 MG/1
40 TABLET ORAL DAILY
Status: DISCONTINUED | OUTPATIENT
Start: 2020-09-01 | End: 2020-08-31 | Stop reason: HOSPADM

## 2020-08-31 RX ORDER — SODIUM CHLORIDE 9 MG/ML
INJECTION, SOLUTION INTRAVENOUS CONTINUOUS PRN
Status: DISCONTINUED | OUTPATIENT
Start: 2020-08-31 | End: 2020-08-31

## 2020-08-31 RX ORDER — LIDOCAINE HYDROCHLORIDE 20 MG/ML
INJECTION INTRAVENOUS
Status: DISCONTINUED | OUTPATIENT
Start: 2020-08-31 | End: 2020-08-31

## 2020-08-31 RX ORDER — PREDNISONE 20 MG/1
40 TABLET ORAL DAILY
Qty: 14 TABLET | Refills: 0 | Status: SHIPPED | OUTPATIENT
Start: 2020-09-01 | End: 2020-09-09

## 2020-08-31 RX ORDER — MIDAZOLAM HYDROCHLORIDE 1 MG/ML
INJECTION, SOLUTION INTRAMUSCULAR; INTRAVENOUS
Status: DISCONTINUED | OUTPATIENT
Start: 2020-08-31 | End: 2020-08-31

## 2020-08-31 RX ORDER — PROMETHAZINE HYDROCHLORIDE 25 MG/1
25 TABLET ORAL EVERY 6 HOURS PRN
Qty: 15 TABLET | Refills: 0 | Status: ON HOLD | OUTPATIENT
Start: 2020-08-31 | End: 2021-04-17 | Stop reason: HOSPADM

## 2020-08-31 RX ORDER — OXYCODONE AND ACETAMINOPHEN 5; 325 MG/1; MG/1
1 TABLET ORAL EVERY 8 HOURS PRN
Qty: 10 TABLET | Refills: 0 | Status: SHIPPED | OUTPATIENT
Start: 2020-08-31 | End: 2020-08-31

## 2020-08-31 RX ORDER — HYDROXYZINE HYDROCHLORIDE 25 MG/1
25 TABLET, FILM COATED ORAL 3 TIMES DAILY PRN
Qty: 30 TABLET | Refills: 1 | Status: SHIPPED | OUTPATIENT
Start: 2020-08-31 | End: 2020-08-31

## 2020-08-31 RX ORDER — HEPARIN 100 UNIT/ML
5 SYRINGE INTRAVENOUS ONCE
Status: COMPLETED | OUTPATIENT
Start: 2020-08-31 | End: 2020-08-31

## 2020-08-31 RX ORDER — HYDROXYZINE HYDROCHLORIDE 25 MG/1
25 TABLET, FILM COATED ORAL 3 TIMES DAILY PRN
Qty: 30 TABLET | Refills: 1 | Status: SHIPPED | OUTPATIENT
Start: 2020-08-31

## 2020-08-31 RX ORDER — PROPOFOL 10 MG/ML
VIAL (ML) INTRAVENOUS
Status: DISCONTINUED | OUTPATIENT
Start: 2020-08-31 | End: 2020-08-31

## 2020-08-31 RX ORDER — KETAMINE HCL IN 0.9 % NACL 50 MG/5 ML
SYRINGE (ML) INTRAVENOUS
Status: DISCONTINUED | OUTPATIENT
Start: 2020-08-31 | End: 2020-08-31

## 2020-08-31 RX ORDER — GLYCOPYRROLATE 0.2 MG/ML
INJECTION INTRAMUSCULAR; INTRAVENOUS
Status: DISCONTINUED | OUTPATIENT
Start: 2020-08-31 | End: 2020-08-31

## 2020-08-31 RX ADMIN — TAMSULOSIN HYDROCHLORIDE 0.4 MG: 0.4 CAPSULE ORAL at 11:08

## 2020-08-31 RX ADMIN — LIDOCAINE HYDROCHLORIDE 100 MG: 20 INJECTION, SOLUTION INTRAVENOUS at 10:08

## 2020-08-31 RX ADMIN — Medication 25 MG: at 10:08

## 2020-08-31 RX ADMIN — DIPHENHYDRAMINE HYDROCHLORIDE 50 MG: 50 CAPSULE ORAL at 11:08

## 2020-08-31 RX ADMIN — CETIRIZINE HYDROCHLORIDE 20 MG: 10 TABLET, FILM COATED ORAL at 11:08

## 2020-08-31 RX ADMIN — HYDROMORPHONE HYDROCHLORIDE 1 MG: 1 INJECTION, SOLUTION INTRAMUSCULAR; INTRAVENOUS; SUBCUTANEOUS at 07:08

## 2020-08-31 RX ADMIN — PROPOFOL 200 MG: 10 INJECTION, EMULSION INTRAVENOUS at 10:08

## 2020-08-31 RX ADMIN — HYPROMELLOSE 2910 1 DROP: 5 SOLUTION OPHTHALMIC at 11:08

## 2020-08-31 RX ADMIN — HYPROMELLOSE 2910 1 DROP: 5 SOLUTION OPHTHALMIC at 07:08

## 2020-08-31 RX ADMIN — MAGNESIUM OXIDE 400 MG (241.3 MG MAGNESIUM) TABLET 400 MG: TABLET at 11:08

## 2020-08-31 RX ADMIN — HYDROMORPHONE HYDROCHLORIDE 1 MG: 1 INJECTION, SOLUTION INTRAMUSCULAR; INTRAVENOUS; SUBCUTANEOUS at 11:08

## 2020-08-31 RX ADMIN — HYPROMELLOSE 2910 1 DROP: 5 SOLUTION OPHTHALMIC at 01:08

## 2020-08-31 RX ADMIN — HYDROCODONE BITARTRATE AND ACETAMINOPHEN 1 TABLET: 10; 325 TABLET ORAL at 01:08

## 2020-08-31 RX ADMIN — PREDNISONE 60 MG: 20 TABLET ORAL at 11:08

## 2020-08-31 RX ADMIN — SODIUM CHLORIDE: 0.9 INJECTION, SOLUTION INTRAVENOUS at 10:08

## 2020-08-31 RX ADMIN — PANTOPRAZOLE SODIUM 8 MG/HR: 40 INJECTION, POWDER, FOR SOLUTION INTRAVENOUS at 04:08

## 2020-08-31 RX ADMIN — GLYCOPYRROLATE 0.2 MG: 0.2 INJECTION, SOLUTION INTRAMUSCULAR; INTRAVENOUS at 10:08

## 2020-08-31 RX ADMIN — HEPARIN SODIUM (PORCINE) LOCK FLUSH IV SOLN 100 UNIT/ML 500 UNITS: 100 SOLUTION at 01:08

## 2020-08-31 RX ADMIN — DOCUSATE SODIUM 50 MG AND SENNOSIDES 8.6 MG 1 TABLET: 8.6; 5 TABLET, FILM COATED ORAL at 11:08

## 2020-08-31 RX ADMIN — HYDROMORPHONE HYDROCHLORIDE 1 MG: 1 INJECTION, SOLUTION INTRAMUSCULAR; INTRAVENOUS; SUBCUTANEOUS at 03:08

## 2020-08-31 RX ADMIN — MIDAZOLAM 2 MG: 1 INJECTION INTRAMUSCULAR; INTRAVENOUS at 10:08

## 2020-08-31 RX ADMIN — PAROXETINE HYDROCHLORIDE 20 MG: 10 TABLET, FILM COATED ORAL at 07:08

## 2020-08-31 NOTE — DISCHARGE SUMMARY
[facility] Hospital Medicine  Discharge Summary      Patient Name: Solo Black  MRN: [id number]  Admission Date: 8/28/2020  Hospital Length of Stay: 1 days  Discharge Date and Time: 8/31/2020  2:31 PM  Attending Physician: Doretha Armstrong*   Discharging Provider: Doretha Armstrong MD  Primary Care Provider: Cecilia Tsai MD      HPI:     Patient is a 39 y.o. male who has a past medical history of C. difficile colitis, Eosinophilic esophagitis, leukemia associated with eosinophilic esophagitis, GERD, IBS, MRSA carrier, nephrolithiasis presented tp [facility] with hematemesis, bright red blood per rectum, abdominal pain, nausea. Patient has history of hypereosinophilic syndrome and erosive gastritis causing GI bleed. He underwent EGD/colonoscopy on 7/20. H/H stable per hospital records. No active bleeding noted in ED. Patient admitted to [facility] for further medical management and GI consultation    Procedure(s) (LRB):  EGD (ESOPHAGOGASTRODUODENOSCOPY) (N/A)      Hospital Course:   No notes on file     Consults:   Consults (From admission, onward)        Status Ordering Provider     Inpatient consult to [facility]  Once     Provider:  Ruslan Tillman MD    Completed YISEL CERVANTES     Inpatient consult to General Surgery  Once     Provider:  Troy Honeycutt MD    Ordered DORETHA ARMSTRONG.     Inpatient consult to Hematology/Oncology  Once     Provider:  Jovanny Meza MD    Acknowledged NASIR ARMSTRONGEANTONIO POSADAS     Inpatient consult to Social Work/Case Management  Once     Provider:  (Not yet assigned)    Acknowledged NASIR ARMSTRONGEOMA N.          Dry eyes  Artifical tears prn      Erosive gastritis  S/p EGD no stigmata of bleeding      Eosinophilic esophagitis  Port-a-cath intact  Plan for chemo next week.           Final Active Diagnoses:    Diagnosis Date Noted POA    PRINCIPAL PROBLEM:  GI bleed [K92.2]  Yes     Skin rash [R21] 08/29/2020 Yes    Port-A-Cath in place [Z95.828] 08/28/2020 Not Applicable    Dry eyes [H04.123] 08/28/2020 Yes    Hypereosinophilic syndrome [D72.1] 08/06/2020 Yes    Abdominal pain [R10.9] 03/25/2020 Yes    Eosinophilia [D72.1] 09/04/2018 Yes    Erosive gastritis [K29.60] 03/17/2016 Yes    PUD (peptic ulcer disease) [K27.9] 03/17/2016 Yes    Eosinophilic esophagitis [K20.0] 03/11/2014 Yes     Chronic      Problems Resolved During this Admission:       Discharged Condition: stable    Disposition: Home or Self Care    Follow Up:  Follow-up Information     Cecilia Tsai MD.    Specialty: Cardiology  Contact information:  [address]  [facility] [address]  [phone]                 Patient Instructions:      Diet Adult Regular     Activity as tolerated       Significant Diagnostic Studies:     Pending Diagnostic Studies:     None         Medications:  Reconciled Home Medications:      Medication List      START taking these medications    hydrOXYzine HCL 25 MG tablet  Commonly known as: ATARAX  Take 1 tablet (25 mg total) by mouth 3 (three) times daily as needed for Itching.     oxyCODONE-acetaminophen 5-325 mg per tablet  Commonly known as: PERCOCET  Take 1 tablet by mouth every 8 (eight) hours as needed.        CHANGE how you take these medications    predniSONE 20 MG tablet  Commonly known as: DELTASONE  Take 2 tablets (40 mg total) by mouth once daily. for 7 days  Start taking on: September 1, 2020  What changed: how much to take        CONTINUE taking these medications    AdderalL 20 mg tablet  Generic drug: dextroamphetamine-amphetamine  Take 1 tablet by mouth 2 (two) times daily before meals. Take before breakfast and lunch     artificial tears 0.5 % ophthalmic solution  Commonly known as: ISOPTO TEARS  Place 1 drop into both eyes 6 (six) times daily.     CENTRUM 3,500-18-0.4 unit-mg-mg Chew  Generic drug: multivit-iron-min-folic acid  Take 10 mg by mouth once daily.      cetirizine 10 MG tablet  Commonly known as: ZYRTEC  Take 20 mg by mouth 2 (two) times a day.     diphenhydrAMINE 25 mg tablet  Commonly known as: SOMINEX  Take 50 mg by mouth 3 (three) times daily.     EPINEPHrine 0.3 mg/0.3 mL Atin  Commonly known as: EPIPEN  Inject 0.3 mLs (0.3 mg total) into the muscle as needed.     famotidine 20 MG tablet  Commonly known as: PEPCID  Take 20 mg by mouth 2 (two) times daily.     Lactobacillus acidophilus 10 billion cell Cap  Take 1 capsule by mouth once daily.     LIDOCAINE VISCOUS 2 % Soln  Generic drug: lidocaine HCl 2%  Take 5 mLs by mouth as needed.     paroxetine 20 MG tablet  Commonly known as: PAXIL  Take 20 mg by mouth every morning.     promethazine 25 MG tablet  Commonly known as: PHENERGAN  Take 1 tablet (25 mg total) by mouth every 6 (six) hours as needed for Nausea.     sucralfate 1 gram tablet  Commonly known as: CARAFATE  Take 1 g by mouth before meals and at bedtime as needed.     tamsulosin 0.4 mg Cap  Commonly known as: FLOMAX  Take 0.4 mg by mouth as needed.        STOP taking these medications    AntifungaL (clotrimazole) 1 % cream  Generic drug: clotrimazole     clindamycin 300 MG capsule  Commonly known as: CLEOCIN     HYDROcodone-acetaminophen 5-325 mg per tablet  Commonly known as: NORCO     LORazepam 1 MG tablet  Commonly known as: ATIVAN     pantoprazole 40 MG tablet  Commonly known as: PROTONIX            Indwelling Lines/Drains at time of discharge:   Lines/Drains/Airways     Central Venous Catheter Line                 PowerPort A Cath Single Lumen 08/21/20 1258 right subclavian 9 days                Time spent on the discharge of patient: 45 minutes  Patient was seen and examined on the date of discharge and determined to be suitable for discharge.         Doretha Armstrong MD  Department of Hospital Medicine  [facility]

## 2020-08-31 NOTE — PLAN OF CARE
Discharge rounds on patient. Discussed followup appointments, blue discharge folder, discharge nurse will go over home medications and reasons for medications and final discharge instructions. All patient/caregiver questions answered. Patient verbalized understanding.    [place] - Urology  Go on 9/16/2020  UROLOGY APPOINTMENT AFTER DISCHARGE, OFFICE TO CALL PATIENT WITH EARLIER APPOINTMENT  [address]  [facility] [address] [phone]   Rodolfo Puri Jr., MD  Call  FOLLOW UP WITH PAIN MANAGEMENT; Pt stated he would make this appt himself, As needed  [address]  [address]  [facility] [address]  [phone]   [facility] GASTROENTEROLOGY  Go on 9/14/2020  at 2pm; FOLLOW UP WITH GASTROENTEROLOGY (Appt cancelled by patient)  [address] [facility] [address]  [phone]   Aron Acosta III, MD  Schedule an appointment as soon as possible for a visit  As needed; Appt to be set up by patient., FOLLOW UP WITH PAIN MANAGEMENT  [address]  [address]  [facility] [address]  [phone]   [facility]  Schedule an appointment as soon as possible for a visit  [facility] AFTER RECENT DISCHARGE, OFFICE TO CALL PATIENT/FAMILY TO SET UP APPT TIME  [address]; [address]  [place] [address]  [phone]     Pt reports that he cancelled the Gastro F/U appt done by [place]. He reports that he will schedule with Dr. Tillman later.    Pt declined BS RX here and wanted scripts sent to [facility].       08/31/20 1232   Final Note   Assessment Type Final Discharge Note   Anticipated Discharge Disposition Home   Hospital Follow Up  Appt(s) scheduled? Yes  (Pt would like to complete some of his follow up appts himself)   Discharge plans and expectations educations in teach back method with documentation complete? Yes   Right Care Referral Info   Post Acute Recommendation No Care   Post-Acute Status   Post-Acute Authorization Other    Other Status Awaiting f/u Appts  (Referral sent for [facility].  They will reach out to patient. Pt reports he already had a referral for pain management)     Kiara Corrales RN  RN Transition Navigator  [phone]

## 2020-08-31 NOTE — PLAN OF CARE
@ bedside to discuss procedure findings. All questions answered. Report called to RHONDA Green. All questions answered. VSS. In no apparent signs of distress. Denies nausea. Complaints of pain to R arm and hip. Hot packs applied. Transferred w/ tech back to floor.

## 2020-08-31 NOTE — TELEPHONE ENCOUNTER
Returned call, spoke with patient.  He is currently in the hospital at [facility].  Experiencing hematuria and requesting an appointment with a Urologist.  Spoke with nurse, will inform attending MD of patient's complaint.  Currently admitted with GI bleed.  Appointment on hold for now.

## 2020-08-31 NOTE — TELEPHONE ENCOUNTER
----- Message from Mary Hussein sent at 8/31/2020  8:30 AM CDT -----  Contact: SELF [phone]  New Patient would like to speak with you about being seen for blood in urine Please advise

## 2020-08-31 NOTE — PLAN OF CARE
Pt oriented. DCA done at bedside with patient. Demographics/Ins/NextofKin/PCP verified with patient/family and updated as needed. Denies any DME needs at present from pt/family. Patient/family plans to return home with family. Educated on bedside RX. Patient consents for [place] to discuss discharge plan with next of kin. Preferences appointment times and location obtained. Patient reports they will have transportation home upon discharge.    Future Appointments   Date Time Provider Department Center   9/2/2020 11:00 AM BMT BIOPSY, [facility] HC BMT [facility]   9/8/2020 10:00 AM Michelle Ornelas MD [facility] ALLERGY [address] PCW   9/9/2020  2:00 PM Ivan Gaspar MD [facility] HC BMT [facility]   9/16/2020  3:30 PM Anupama Hobbs NP [facility] [place]     This  put name on white board and explained blue discharge folder to patient. Discharge planning brochure and/or business card given to patient.  Patient verbalized understanding.       08/31/20 1230   Discharge Assessment   Assessment Type Discharge Planning Assessment   Confirmed/corrected address and phone number on facesheet? Yes   Assessment information obtained from? Patient   Prior to hospitilization cognitive status: Alert/Oriented;No Deficits   Prior to hospitalization functional status: Independent   Current cognitive status: Alert/Oriented;No Deficits   Current Functional Status: Independent   Lives With grandparent(s)   Able to Return to Prior Arrangements yes   Is patient able to care for self after discharge? Yes   Patient's perception of discharge disposition home or selfcare   Readmission Within the Last 30 Days no previous admission in last 30 days   Patient currently being followed by outpatient case management? No   Equipment Currently Used at Home none   Do you have any problems affording any of your prescribed medications? No   Is the patient taking medications as prescribed? yes   Does the patient have transportation home? Yes    Transportation Anticipated family or friend will provide   Does the patient receive services at the Coumadin Clinic? No   Discharge Plan A Home   Patient/Family in Agreement with Plan yes     Kiara Corrales RN  RN Transition Navigator  [phone]

## 2020-08-31 NOTE — ANESTHESIA POSTPROCEDURE EVALUATION
Anesthesia Post Evaluation    Patient: Solo Black    Procedure(s) Performed: Procedure(s) (LRB):  EGD (ESOPHAGOGASTRODUODENOSCOPY) (N/A)    Final Anesthesia Type: MAC    Patient location during evaluation: GI PACU  Patient participation: Yes- Able to Participate  Level of consciousness: awake and alert  Post-procedure vital signs: reviewed and stable  Pain management: adequate  Airway patency: patent  MEÑO mitigation strategies: Multimodal analgesia  PONV status at discharge: No PONV  Anesthetic complications: no      Cardiovascular status: hemodynamically stable and blood pressure returned to baseline  Respiratory status: room air, unassisted and spontaneous ventilation  Hydration status: euvolemic  Follow-up not needed.          Vitals Value Taken Time   /96 08/31/20 1050   Temp  08/31/20 1151   Pulse 89 08/31/20 1150   Resp 20 08/31/20 1116   SpO2 100 % 08/31/20 1050         Event Time   Out of Recovery 08/31/2020 11:05:00         Pain/Marcia Score: Pain Rating Prior to Med Admin: 8 (8/31/2020 11:16 AM)  Pain Rating Post Med Admin: 7 (8/30/2020  5:00 AM)  Marcia Score: 10 (8/31/2020 11:02 AM)

## 2020-08-31 NOTE — PROVATION PATIENT INSTRUCTIONS
Discharge Summary/Instructions after an Endoscopic Procedure  Patient Name: Solo Black  Patient MRN: [id number]  Patient YOB: 1980 Monday, August 31, 2020  Kolby [place] MD  Your health is very important to us during the Covid Crisis. Following your   procedure today, you will receive a daily text for 2 weeks asking about   signs or symptoms of Covid 19.  Please respond to this text when you   receive it so we can follow up and keep you as safe as possible.   RESTRICTIONS:  During your procedure today, you received medications for sedation.  These   medications may affect your judgment, balance and coordination.  Therefore,   for 24 hours, you have the following restrictions:   - DO NOT drive a car, operate machinery, make legal/financial decisions,   sign important papers or drink alcohol.    ACTIVITY:  Today: no heavy lifting, straining or running due to procedural   sedation/anesthesia.  The following day: return to full activity including work.  DIET:  Eat and drink normally unless instructed otherwise.     TREATMENT FOR COMMON SIDE EFFECTS:  - Mild abdominal pain, nausea, belching, bloating or excessive gas:  rest,   eat lightly and use a heating pad.  - Sore Throat: treat with throat lozenges and/or gargle with warm salt   water.  - Because air was used during the procedure, expelling large amounts of air   from your rectum or belching is normal.  - If a bowel prep was taken, you may not have a bowel movement for 1-3 days.    This is normal.  SYMPTOMS TO WATCH FOR AND REPORT TO YOUR PHYSICIAN:  1. Abdominal pain or bloating, other than gas cramps.  2. Chest pain.  3. Back pain.  4. Signs of infection such as: chills or fever occurring within 24 hours   after the procedure.  5. Rectal bleeding, which would show as bright red, maroon, or black stools.   (A tablespoon of blood from the rectum is not serious, especially if   hemorrhoids are present.)  6. Vomiting.  7. Weakness or dizziness.  GO  DIRECTLY TO THE NEAREST EMERGENCY ROOM IF YOU HAVE ANY OF THE FOLLOWING:      Difficulty breathing              Chills and/or fever over 101 F   Persistent vomiting and/or vomiting blood   Severe abdominal pain   Severe chest pain   Black, tarry stools   Bleeding- more than one tablespoon   Any other symptom or condition that you feel may need urgent attention  Your doctor recommends these additional instructions:  If any biopsies were taken, your doctors clinic will contact you in 1 to 2   weeks with any results.  - Return patient to [facility] for ongoing care.   - Resume previous diet.   - Deescalate from PPI gtt to PO PPI  - Plan evaluation with [facility] as outpatient for delayed gastric emptying given   large amount of food residue present in stomach  For questions, problems or results please call your physician - Kolby [place] MD.  EMERGENCY PHONE NUMBER: [phone],  LAB RESULTS: [phone]  IF A COMPLICATION OR EMERGENCY SITUATION ARISES AND YOU ARE UNABLE TO REACH   YOUR PHYSICIAN - GO DIRECTLY TO THE EMERGENCY ROOM.  MD Kolby Irby MD  8/31/2020 10:47:56 AM  This report has been verified and signed electronically.  PROVATION

## 2020-08-31 NOTE — PROGRESS NOTES
Pharmacy New Medication Education    Patient and/or Caregiver ACCEPTED medication education.    Pharmacy has provided education on the name, indication, and possible side effects of the medication(s) prescribed, using teach-back method.     Learners of pharmacy medication education includes:  patient    The following medications have also been discussed, during this admission.     Current Facility-Administered Medications   Medication Frequency    acetaminophen tablet 650 mg Q6H PRN    artificial tears 0.5 % ophthalmic solution 1 drop 6x Daily    cetirizine tablet 10 mg BID    diphenhydrAMINE capsule 50 mg TID    diphenhydrAMINE injection 25 mg Q6H PRN    heparin, porcine (PF) 100 unit/mL injection flush 500 Units Once    hydrALAZINE injection 10 mg Q8H PRN    HYDROcodone-acetaminophen  mg per tablet 1 tablet Q6H PRN    HYDROmorphone injection 1 mg Q4H PRN    hydrOXYzine HCL tablet 25 mg TID PRN    magnesium oxide tablet 400 mg BID    ondansetron injection 4 mg Q6H PRN    oxyCODONE-acetaminophen 5-325 mg per tablet 1 tablet Q8H PRN    pantoprazole (PROTONIX) 40 mg in dextrose 5 % 100 mL infusion Continuous    paroxetine tablet 20 mg QAM    [START ON 9/1/2020] predniSONE tablet 40 mg Daily    senna-docusate 8.6-50 mg per tablet 1 tablet BID    tamsulosin 24 hr capsule 0.4 mg Daily        Thank you  Pryiank Elliott, MikaD

## 2020-08-31 NOTE — PLAN OF CARE
Patient discharge instructions given and reviewed. Med rec reviewed with patient. Patient verbalized understanding. Education provided on new medication, diagnosis, and follow-up appointments.  POC deaccessed.  Tip intact. Pt tolerated well.  Tele removed and returned to monitor tech.  Pt's grandma here to  pt.

## 2020-08-31 NOTE — TELEPHONE ENCOUNTER
Patient has been scheduled for a  Follow up virtual visit on 9/3/20 at 12:00. Pt stated that he needed additional testing done.

## 2020-08-31 NOTE — OR NURSING
Patient schedukled for EGD later today. Chart reviewed and report taken from nurse Singer. NPO status appropriate for exam and IV access available.

## 2020-08-31 NOTE — TELEPHONE ENCOUNTER
----- Message from Gabino Irvin sent at 8/31/2020  3:13 PM CDT -----  Contact: pt  Calling to speak with nurse regarding scheduling appt    Call back: [phone]

## 2020-08-31 NOTE — TELEPHONE ENCOUNTER
----- Message from Eduard Ayoub sent at 8/31/2020  8:47 AM CDT -----  Contact: patient/ [phone]  CCTIMESENSITIVE         Name of Caller:    PATIENT    Reason for Visit/Symptoms:   TROUBLE URINATING BLOOD IN URINE//HOSPITAL FOLLOW-UP   Best Contact Number or Confirm if Mychart Preferred: [phone]  Preferred Date/Time of Appointment: 9-7-2020  Interested in Virtual Visit: NO  Additional Information:PATIENT IS IN THE HOSPITAL HE STATES HE SHOULD BE OUT THIS WEEK AND WOULD LIKE TO BE SEEN ON 9-7-2020

## 2020-08-31 NOTE — TELEPHONE ENCOUNTER
Was informed by Dr. Tillman's staff that patient should follow up with Dr. [place]. Can someone get him scheduled for a follow up?

## 2020-08-31 NOTE — TELEPHONE ENCOUNTER
----- Message from Shanique Carlson sent at 8/31/2020 12:19 PM CDT -----  Regarding: follow up  Type:  Needs Medical Advice    Who Called: patient  Symptoms (please be specific): stomach bleeding   Reason: follow up and further testing appointment.  Would the patient rather a call back or a response via [facility]? Call back  Best Call Back Number: [phone]  Additional Information:

## 2020-08-31 NOTE — TELEPHONE ENCOUNTER
----- Message from Mary Hussein sent at 8/31/2020  8:34 AM CDT -----  Contact: SELF [phone]  New Patient would like to speak with you about being seen for bone pain due to cancer Please advise

## 2020-08-31 NOTE — TRANSFER OF CARE
Anesthesia Transfer of Care Note    Patient: Solo Black    Procedure(s) Performed: Procedure(s) (LRB):  EGD (ESOPHAGOGASTRODUODENOSCOPY) (N/A)    Patient location: GI    Anesthesia Type: MAC    Transport from OR: Transported from OR on room air with adequate spontaneous ventilation    Post pain: adequate analgesia    Post assessment: no apparent anesthetic complications and tolerated procedure well    Post vital signs: stable    Level of consciousness: awake, alert and oriented    Nausea/Vomiting: no nausea/vomiting    Complications: none    Transfer of care protocol was followed      Last vitals:   Visit Vitals  BP (!) 142/88 (Patient Position: Lying)   Pulse 93   Temp 36.6 °C (97.9 °F) (Temporal)   Resp 18   Ht 6' (1.829 m)   Wt 99.3 kg (218 lb 14.7 oz)   SpO2 100%   BMI 29.69 kg/m²

## 2020-08-31 NOTE — SUBJECTIVE & OBJECTIVE
Interval History: awake and alert, complained on urinary retention on Zyrtec- decreased dose  He reported no hematuria on this hospitalization but had it in the past. - outpatient FU with urology  S/p EGD -reported delay gastric emptying - planning outpatient workup  HES- appreciates hem/onc rec;s - FU with scheduled outpatient bone marrow bx and chemo  Wean prednisone    Review of Systems   Constitutional: Negative for chills and fever.   Respiratory: Negative for chest tightness and shortness of breath.    Cardiovascular: Negative for chest pain and palpitations.   Gastrointestinal: Negative for abdominal distention, blood in stool, nausea and vomiting.   Genitourinary: Negative for difficulty urinating.   Musculoskeletal: Negative for arthralgias, back pain and myalgias.   Skin: Positive for color change and rash.   Neurological: Negative for dizziness.   Psychiatric/Behavioral: Negative for agitation.     Objective:     Vital Signs (Most Recent):  Temp: 97.9 °F (36.6 °C) (08/31/20 0940)  Pulse: 88 (08/31/20 1050)  Resp: 20 (08/31/20 1116)  BP: (!) 156/96 (08/31/20 1050)  SpO2: 100 % (08/31/20 1050) Vital Signs (24h Range):  Temp:  [96.5 °F (35.8 °C)-98.5 °F (36.9 °C)] 97.9 °F (36.6 °C)  Pulse:  [] 88  Resp:  [9-20] 20  SpO2:  [93 %-100 %] 100 %  BP: (131-170)/(78-96) 156/96     Weight: 99.3 kg (218 lb 14.7 oz)  Body mass index is 29.69 kg/m².    Intake/Output Summary (Last 24 hours) at 8/31/2020 1130  Last data filed at 8/31/2020 1100  Gross per 24 hour   Intake 800 ml   Output 1925 ml   Net -1125 ml      Physical Exam  Constitutional:       Appearance: Normal appearance.   HENT:      Head: Normocephalic and atraumatic.   Neck:      Musculoskeletal: Normal range of motion.   Cardiovascular:      Rate and Rhythm: Normal rate.      Heart sounds: No murmur.   Pulmonary:      Effort: Pulmonary effort is normal.      Breath sounds: Normal breath sounds.   Abdominal:      General: Bowel sounds are normal.       Tenderness: There is no abdominal tenderness.   Musculoskeletal:         General: Tenderness present. No swelling.   Skin:     General: Skin is warm and dry.      Findings: Rash present.      Comments: Maculopapular rash scattered on the upper body.    Neurological:      General: No focal deficit present.      Mental Status: He is alert and oriented to person, place, and time.   Psychiatric:         Mood and Affect: Mood normal.         Significant Labs:   ABGs: No results for input(s): PH, PCO2, HCO3, POCSATURATED, BE, TOTALHB, COHB, METHB, O2HB, POCFIO2 in the last 48 hours.  CBC:   Recent Labs   Lab 08/30/20  0811 08/31/20  0415   WBC 8.31 9.37   HGB 11.8* 11.2*   HCT 36.5* 35.4*    200     CMP:   No results for input(s): NA, K, CL, CO2, GLU, BUN, CREATININE, CALCIUM, PROT, ALBUMIN, BILITOT, ALKPHOS, AST, ALT, ANIONGAP, EGFRNONAA in the last 48 hours.    Invalid input(s): ESTGFAFRICA  Cardiac Markers: No results for input(s): CKMB, MYOGLOBIN, BNP, TROPISTAT in the last 48 hours.  Lactic Acid: No results for input(s): LACTATE in the last 48 hours.  Lipid Panel: No results for input(s): CHOL, HDL, LDLCALC, TRIG, CHOLHDL in the last 48 hours.  Magnesium:   Recent Labs   Lab 08/30/20  0811 08/31/20  0415   MG 1.7 1.9     Respiratory Culture: No results for input(s): GSRESP, RESPIRATORYC in the last 48 hours.  Troponin: No results for input(s): TROPONINI in the last 48 hours.  TSH: No results for input(s): TSH in the last 4320 hours.  Urine Culture: No results for input(s): LABURIN in the last 48 hours.  Urine Studies: No results for input(s): COLORU, APPEARANCEUA, PHUR, SPECGRAV, PROTEINUA, GLUCUA, KETONESU, BILIRUBINUA, OCCULTUA, NITRITE, UROBILINOGEN, LEUKOCYTESUR, RBCUA, WBCUA, BACTERIA, SQUAMEPITHEL, HYALINECASTS in the last 48 hours.    Invalid input(s): WRIGHTSUR    Significant Imaging: I have reviewed all pertinent imaging results/findings within the past 24 hours.

## 2020-08-31 NOTE — TELEPHONE ENCOUNTER
Left Vm with return number to discuss appointment. Confirmed that pre-medication for anxiety is at local pharmacy.

## 2020-08-31 NOTE — PROGRESS NOTES
[facility] Hospital Medicine  Progress Note    Patient Name: Solo Black  MRN: [id number]  Patient Class: OP- Observation   Admission Date: 8/28/2020  Length of Stay: 1 days  Attending Physician: Doretha Armstrong*  Primary Care Provider: Cecilia Tsai MD        Subjective:     Principal Problem:GI bleed        HPI:    Patient is a 39 y.o. male who has a past medical history of C. difficile colitis, Eosinophilic esophagitis, leukemia associated with eosinophilic esophagitis, GERD, IBS, MRSA carrier, nephrolithiasis presented tp [facility] with hematemesis, bright red blood per rectum, abdominal pain, nausea. Patient has history of hypereosinophilic syndrome and erosive gastritis causing GI bleed. He underwent EGD/colonoscopy on 7/20. H/H stable per hospital records. No active bleeding noted in ED. Patient admitted to [facility] for further medical management and GI consultation    Overview/Hospital Course:  No notes on file    Interval History: awake and alert, complained on urinary retention on Zyrtec- decreased dose  He reported no hematuria on this hospitalization but had it in the past. - outpatient FU with urology  S/p EGD -reported delay gastric emptying - planning outpatient workup  HES- appreciates hem/onc rec;s - FU with scheduled outpatient bone marrow bx and chemo  Wean prednisone    Review of Systems   Constitutional: Negative for chills and fever.   Respiratory: Negative for chest tightness and shortness of breath.    Cardiovascular: Negative for chest pain and palpitations.   Gastrointestinal: Negative for abdominal distention, blood in stool, nausea and vomiting.   Genitourinary: Negative for difficulty urinating.   Musculoskeletal: Negative for arthralgias, back pain and myalgias.   Skin: Positive for color change and rash.   Neurological: Negative for dizziness.   Psychiatric/Behavioral: Negative for agitation.     Objective:     Vital Signs (Most  Recent):  Temp: 97.9 °F (36.6 °C) (08/31/20 0940)  Pulse: 88 (08/31/20 1050)  Resp: 20 (08/31/20 1116)  BP: (!) 156/96 (08/31/20 1050)  SpO2: 100 % (08/31/20 1050) Vital Signs (24h Range):  Temp:  [96.5 °F (35.8 °C)-98.5 °F (36.9 °C)] 97.9 °F (36.6 °C)  Pulse:  [] 88  Resp:  [9-20] 20  SpO2:  [93 %-100 %] 100 %  BP: (131-170)/(78-96) 156/96     Weight: 99.3 kg (218 lb 14.7 oz)  Body mass index is 29.69 kg/m².    Intake/Output Summary (Last 24 hours) at 8/31/2020 1130  Last data filed at 8/31/2020 1100  Gross per 24 hour   Intake 800 ml   Output 1925 ml   Net -1125 ml      Physical Exam  Constitutional:       Appearance: Normal appearance.   HENT:      Head: Normocephalic and atraumatic.   Neck:      Musculoskeletal: Normal range of motion.   Cardiovascular:      Rate and Rhythm: Normal rate.      Heart sounds: No murmur.   Pulmonary:      Effort: Pulmonary effort is normal.      Breath sounds: Normal breath sounds.   Abdominal:      General: Bowel sounds are normal.      Tenderness: There is no abdominal tenderness.   Musculoskeletal:         General: Tenderness present. No swelling.   Skin:     General: Skin is warm and dry.      Findings: Rash present.      Comments: Maculopapular rash scattered on the upper body.    Neurological:      General: No focal deficit present.      Mental Status: He is alert and oriented to person, place, and time.   Psychiatric:         Mood and Affect: Mood normal.         Significant Labs:   ABGs: No results for input(s): PH, PCO2, HCO3, POCSATURATED, BE, TOTALHB, COHB, METHB, O2HB, POCFIO2 in the last 48 hours.  CBC:   Recent Labs   Lab 08/30/20  0811 08/31/20  0415   WBC 8.31 9.37   HGB 11.8* 11.2*   HCT 36.5* 35.4*    200     CMP:   No results for input(s): NA, K, CL, CO2, GLU, BUN, CREATININE, CALCIUM, PROT, ALBUMIN, BILITOT, ALKPHOS, AST, ALT, ANIONGAP, EGFRNONAA in the last 48 hours.    Invalid input(s): ESTGFAFRICA  Cardiac Markers: No results for input(s): CKMB,  MYOGLOBIN, BNP, TROPISTAT in the last 48 hours.  Lactic Acid: No results for input(s): LACTATE in the last 48 hours.  Lipid Panel: No results for input(s): CHOL, HDL, LDLCALC, TRIG, CHOLHDL in the last 48 hours.  Magnesium:   Recent Labs   Lab 08/30/20  0811 08/31/20  0415   MG 1.7 1.9     Respiratory Culture: No results for input(s): GSRESP, RESPIRATORYC in the last 48 hours.  Troponin: No results for input(s): TROPONINI in the last 48 hours.  TSH: No results for input(s): TSH in the last 4320 hours.  Urine Culture: No results for input(s): LABURIN in the last 48 hours.  Urine Studies: No results for input(s): COLORU, APPEARANCEUA, PHUR, SPECGRAV, PROTEINUA, GLUCUA, KETONESU, BILIRUBINUA, OCCULTUA, NITRITE, UROBILINOGEN, LEUKOCYTESUR, RBCUA, WBCUA, BACTERIA, SQUAMEPITHEL, HYALINECASTS in the last 48 hours.    Invalid input(s): WRIGHTSUR    Significant Imaging: I have reviewed all pertinent imaging results/findings within the past 24 hours.      Assessment/Plan:      * GI bleed  Eosinophilia  Erosive gastritis  PUD (peptic ulcer disease)  Eosinophilic esophagitis  Abdominal pain  Skin Rash    IVFs  Protoprozole Infusion   Monitor CBS and transfuse for < 7  Keep NPO- CLD   GI Consult- EGD plan for 8/31- no stigmata of bleeding, reported GES - fu as outpatient   Continue home prednisone   Skin rash related to above diagnosis, benadryl prn  Prn pain medication    Dry eyes  Artifical tears prn      Eosinophilic esophagitis  Port-a-cath intact  Plan for chemo next week.           VTE Risk Mitigation (From admission, onward)         Ordered     heparin, porcine (PF) 100 unit/mL injection flush 500 Units  Once      08/31/20 1126     Place sequential compression device  Until discontinued      08/28/20 2155                Discharge Planning   LIUDMILA:      Code Status: Full Code   Is the patient medically ready for discharge?:     Reason for patient still in hospital (select all that apply): Other (specify) discharge today                      Doretha Armstrong MD  Department of Hospital Medicine   [facility]

## 2020-09-01 ENCOUNTER — TELEPHONE (OUTPATIENT)
Dept: HEMATOLOGY/ONCOLOGY | Facility: CLINIC | Age: 40
End: 2020-09-01

## 2020-09-01 NOTE — TELEPHONE ENCOUNTER
"----- Message from Jovanny King sent at 9/1/2020 12:58 PM CDT -----  Reschedule Existing Appointment    Appt Date: 08/24 & 09/2  Type of appt : CT CHEST ABD PEL CON [[id number]] & PROCEDURE [2226]  Physician: Dr Gaspar  Reason for rescheduling? Patient was hospitalized & needs to rescheduled biopsy and CT.  Please contact to discuss availability   Caller: Solo  Contact Preference: [phone]    Additional Information:  "Thank you for all that you do for our patients'"            "

## 2020-09-01 NOTE — TELEPHONE ENCOUNTER
Schedule him for a gastric emptying study. Then clinic follow up after it is resulted. Video visit is preferable as I believe he is from out of town. Thanks

## 2020-09-02 ENCOUNTER — TELEPHONE (OUTPATIENT)
Dept: PAIN MEDICINE | Facility: CLINIC | Age: 40
End: 2020-09-02

## 2020-09-02 ENCOUNTER — OFFICE VISIT (OUTPATIENT)
Dept: PRIMARY CARE CLINIC | Facility: CLINIC | Age: 40
End: 2020-09-02
Payer: MEDICAID

## 2020-09-02 ENCOUNTER — TELEPHONE (OUTPATIENT)
Dept: ALLERGY | Facility: CLINIC | Age: 40
End: 2020-09-02

## 2020-09-02 VITALS
DIASTOLIC BLOOD PRESSURE: 85 MMHG | HEART RATE: 118 BPM | TEMPERATURE: 98 F | BODY MASS INDEX: 28.46 KG/M2 | WEIGHT: 209.88 LBS | OXYGEN SATURATION: 97 % | SYSTOLIC BLOOD PRESSURE: 150 MMHG

## 2020-09-02 DIAGNOSIS — D72.119 HYPEREOSINOPHILIC SYNDROME: Primary | ICD-10-CM

## 2020-09-02 DIAGNOSIS — K29.60 EROSIVE GASTRITIS: ICD-10-CM

## 2020-09-02 PROBLEM — R10.9 ABDOMINAL PAIN: Status: RESOLVED | Noted: 2020-03-25 | Resolved: 2020-09-02

## 2020-09-02 PROCEDURE — 99213 PR OFFICE/OUTPT VISIT, EST, LEVL III, 20-29 MIN: ICD-10-PCS | Mod: S$PBB,,, | Performed by: HOSPITALIST

## 2020-09-02 PROCEDURE — 99213 OFFICE O/P EST LOW 20 MIN: CPT | Mod: PBBFAC,PO | Performed by: HOSPITALIST

## 2020-09-02 PROCEDURE — 99999 PR PBB SHADOW E&M-EST. PATIENT-LVL III: ICD-10-PCS | Mod: PBBFAC,,, | Performed by: HOSPITALIST

## 2020-09-02 PROCEDURE — 99213 OFFICE O/P EST LOW 20 MIN: CPT | Mod: S$PBB,,, | Performed by: HOSPITALIST

## 2020-09-02 PROCEDURE — 99999 PR PBB SHADOW E&M-EST. PATIENT-LVL III: CPT | Mod: PBBFAC,,, | Performed by: HOSPITALIST

## 2020-09-02 NOTE — TELEPHONE ENCOUNTER
Spoke with patient and explained to him that Dr. Acosta doesn't do medication management, with his diease process that medication should come from his Oncologist. Patient stated that he was referred to pain but he will contact his oncologist.

## 2020-09-02 NOTE — TELEPHONE ENCOUNTER
----- Message from Solo Garcia sent at 9/2/2020  3:11 PM CDT -----  Type:  Needs Medical Advice    Who Called:  Pt   Would the patient rather a call back or a response via [facility]?  Call back   Best Call Back Number:   [phone]  Additional Information:  Patient would like a call back from your office regarding establishing care, pt has a referral. Please Advise.

## 2020-09-02 NOTE — PROGRESS NOTES
[facility]   New Visit Progress Note   Recent Hospital Discharge     PRESENTING HISTORY     Chief Complaint/Reason for Admission:  Follow up Hospital Discharge   PCP: Cecilia Tsai MD  Admission Date: 8/28/2020  Hospital Length of Stay: 1 days  Discharge Date and Time: 8/31/2020  2:31 PM  History of Present Illness:  39-year-old gentleman with past medical history significant for eosinophilic esophagitis, recurrent angioedema/urticaria and possible HES, and erosive gastritis who presents the emergency department as transfer from outside hospital with chief complaint of hematemesis. He recently underwent EGD colonoscopy for evaluation of hypereosinophilia.  He is in the middle of being worked up for potential leukemia with bone marrow biopsy scheduled.  Over the past few days patient reports new onset hematemesis with associated abdominal pain and bright red blood per rectum.  Patient presented to outside hospital and ultimately transferred here for clinical monitoring.    He was seen by GI with Scope done showing    - Small amounts of scar tissue in the distal esophagus   consistent with previous injury from GERD                         - A large amount of food (residue) in the stomach                         likely related to impaired gastric emptying which                         can be associated with eosinophilic gastroenteritis                         - This patient's report of hematemesis can be                         attributed to vomiting from delayed gastric                         emptying associated with MW trauma                         - This patient's report of hematochezia is                         unexplained and could be further evaluated by                         colonoscopy as outpatient     -see by Heme/Onc  8/24 in clinic  -scheduled for bone marrow biopsy 9/8 as out-pt  ______________________________________________    Today: Feeling well today, has not had any more  bleeding since leaving hospital.  Has appointments for biopsy and heme onc set up for this month. Has a rash on his back, likely related to eosinophilia.     Review of Systems  General ROS: negative for chills, fever or weight loss  Psychological ROS: negative for hallucination, depression or suicidal ideation  Ophthalmic ROS: negative for blurry vision, photophobia or eye pain  ENT ROS: negative for epistaxis, sore throat or rhinorrhea  Respiratory ROS: no cough, shortness of breath, or wheezing  Cardiovascular ROS: no chest pain or dyspnea on exertion  Gastrointestinal ROS: no abdominal pain, change in bowel habits, or black/ bloody stools  Genito-Urinary ROS: no dysuria, trouble voiding, or hematuria  Musculoskeletal ROS: negative for gait disturbance or muscular weakness  Neurological ROS: no syncope or seizures; no ataxia  Dermatological ROS: negative for pruritis, rash and jaundice      PAST HISTORY:     Past Medical History:   Diagnosis Date    Arthritis     C. difficile colitis feb 2014    postop of hand surgery    Cancer     Encounter for blood transfusion     Eosinophilic esophagitis 3/11/2014    GERD (gastroesophageal reflux disease)     IBS (irritable bowel syndrome)     MRSA carrier     says multiple times nose swab was +    Nephrolithiasis apr 2014    bilateral punctate - never passed a stone    Urinary tract infection feb 2014    MRSA       Past Surgical History:   Procedure Laterality Date    APPENDECTOMY      ARTHROSCOPY OF SHOULDER WITH REMOVAL OF DISTAL CLAVICLE Right 11/1/2018    Procedure: ARTHROSCOPY, SHOULDER, WITH DISTAL CLAVICLE EXCISION;  Surgeon: Gabino Mejia MD;  Location: [facility];  Service: Orthopedics;  Laterality: Right;  video    BACK SURGERY      CIRCUMCISION, PRIMARY      COLONOSCOPY N/A 11/14/2018    Procedure: COLONOSCOPY;  Surgeon: Milton Brunson MD;  [facility];  Service: Endoscopy;  Laterality: N/A;    COLONOSCOPY N/A 7/20/2020    Procedure:  COLONOSCOPY;  Surgeon: Ruslan Tillman MD;  Location: [facility];  Service: Endoscopy;  Laterality: N/A;    drainage of abscess from hand  2009    MRSA    drainage of abscess from R thigh  2006    MRSA    ESOPHAGOGASTRODUODENOSCOPY  3/11/2014    ESOPHAGOGASTRODUODENOSCOPY N/A 9/5/2018    Procedure: EGD (ESOPHAGOGASTRODUODENOSCOPY);  Surgeon: Kolby Wall MD;  Location: [facility];  Service: Endoscopy;  Laterality: N/A;    ESOPHAGOGASTRODUODENOSCOPY N/A 3/25/2020    Procedure: EGD (ESOPHAGOGASTRODUODENOSCOPY);  Surgeon: Ruslan Tillman MD;  Location: [facility];  Service: Endoscopy;  Laterality: N/A;    ESOPHAGOGASTRODUODENOSCOPY N/A 7/20/2020    Procedure: EGD (ESOPHAGOGASTRODUODENOSCOPY);  Surgeon: Ruslan Tillman MD;  Location: [facility];  Service: Endoscopy;  Laterality: N/A;  Patient is a very hard stick, requires anesthesia stick.    ESOPHAGOGASTRODUODENOSCOPY N/A 8/31/2020    Procedure: EGD (ESOPHAGOGASTRODUODENOSCOPY);  Surgeon: Kolby Wall MD;  Location: [facility];  Service: Endoscopy;  Laterality: N/A;    FLEXIBLE SIGMOIDOSCOPY N/A 9/5/2018    Procedure: SIGMOIDOSCOPY, FLEXIBLE;  Surgeon: Kolby Wall MD;  Location: [facility];  Service: Endoscopy;  Laterality: N/A;    HAND SURGERY  jan 2014    power saw fell on hand - laceration 3 tendons    INSERTION OF VENOUS ACCESS PORT Right 8/21/2020    Procedure: INSERTION, VENOUS ACCESS PORT;  Surgeon: Troy Honeycutt MD;  Location: [facility];  Service: General;  Laterality: Right;    NISSEN FUNDOPLICATION         Family History   Problem Relation Age of Onset    Urolithiasis Father     Celiac disease Sister     Hypertension Maternal Grandmother     Hypertension Maternal Grandfather          MEDICATIONS & ALLERGIES:     Current Outpatient Medications on File Prior to Visit   Medication Sig Dispense Refill    artificial tears (ISOPTO TEARS) 0.5 % ophthalmic solution Place 1 drop into both eyes 6 (six) times  daily.      cetirizine (ZYRTEC) 10 MG tablet Take 20 mg by mouth 2 (two) times a day.       dextroamphetamine-amphetamine (ADDERALL) 20 mg tablet Take 1 tablet by mouth 2 (two) times daily before meals. Take before breakfast and lunch      diphenhydrAMINE (SOMINEX) 25 mg tablet Take 50 mg by mouth 3 (three) times daily.      EPINEPHrine (EPIPEN) 0.3 mg/0.3 mL AtIn Inject 0.3 mLs (0.3 mg total) into the muscle as needed. 2 Device 1    famotidine (PEPCID) 20 MG tablet Take 20 mg by mouth 2 (two) times daily.      hydrOXYzine HCL (ATARAX) 25 MG tablet Take 1 tablet (25 mg total) by mouth 3 (three) times daily as needed for Itching. 30 tablet 1    Lactobacillus acidophilus 10 billion cell Cap Take 1 capsule by mouth once daily.       lidocaine HCl 2% (LIDOCAINE VISCOUS) 2 % Soln Take 5 mLs by mouth as needed.       MULTIVIT-IRON-MIN-FOLIC ACID 3,500-18-0.4 UNIT-MG-MG ORAL CHEW Take 10 mg by mouth once daily.      oxyCODONE-acetaminophen (PERCOCET) 5-325 mg per tablet Take 1 tablet by mouth every 8 (eight) hours as needed. 15 tablet 0    paroxetine (PAXIL) 20 MG tablet Take 20 mg by mouth every morning.      predniSONE (DELTASONE) 20 MG tablet Take 2 tablets (40 mg total) by mouth once daily. for 7 days 14 tablet 0    promethazine (PHENERGAN) 25 MG tablet Take 1 tablet (25 mg total) by mouth every 6 (six) hours as needed for Nausea. 15 tablet 0    sucralfate (CARAFATE) 1 gram tablet Take 1 g by mouth before meals and at bedtime as needed.       tamsulosin (FLOMAX) 0.4 mg Cap Take 0.4 mg by mouth as needed.        No current facility-administered medications on file prior to visit.         Review of patient's allergies indicates:   Allergen Reactions    Legumes Nausea And Vomiting and Swelling     Other reaction(s): GI Intolerance  vomiting  vomiting  Pt reports legumes make his lips swell and induce severe vomiting  Pt reports legumes make his lips swell and induce severe vomiting      Nsaids  "(non-steroidal anti-inflammatory drug)      GI bleed    Bean pod extract      Lips swelling, vomiting  Lips swelling, vomiting      Ketamine      Severe dysphoria (bad trip)    Lentils      Lips swelling, vomiting  Lips swelling, vomiting      Meloxicam Nausea Only     GI bleed    Peas      Lips swelling, vomiting    Penicillins      Has taken third gen cephalosporins without issue per patient report    Haloperidol Anxiety and Other (See Comments)     "feels like crawling out of his skin"         OBJECTIVE:     Vital Signs:  BP (!) 150/85 (BP Location: Left arm, Patient Position: Sitting, BP Method: Small (Automatic))   Pulse (!) 118   Temp 98.3 °F (36.8 °C) (Oral)   Wt 95.2 kg (209 lb 14.1 oz)   SpO2 97%   BMI 28.46 kg/m²   Wt Readings from Last 1 Encounters:   09/02/20 1349 95.2 kg (209 lb 14.1 oz)     Body mass index is 28.46 kg/m².   97%    Physical Exam:  BP (!) 150/85 (BP Location: Left arm, Patient Position: Sitting, BP Method: Small (Automatic))   Pulse (!) 118   Temp 98.3 °F (36.8 °C) (Oral)   Wt 95.2 kg (209 lb 14.1 oz)   SpO2 97%   BMI 28.46 kg/m²   General appearance: alert, cooperative, no distress  Constitutional:Oriented to person, place, and time  + appears well-developed and well-nourished.   HEENT: Normocephalic, atraumatic, neck symmetrical, no nasal discharge   Skin: lesions on back, likely due to eosinophilia  Lungs: clear to auscultation bilaterally, no dullness to percussion bilaterally  Heart: regular rate and rhythm without rub; no displacement of the PMI   Abdomen: soft, non-tender; bowel sounds normoactive; no organomegaly  Extremities: extremities symmetric; no clubbing, cyanosis, or edema  Integument: Skin color, texture, turgor normal; no rashes; hair distrubution normal  Neurologic: Alert and oriented X 3, normal strength, normal coordination and gait  Psychiatric: no pressured speech; normal affect; no evidence of impaired cognition     Laboratory  Lab Results "   Component Value Date    WBC 9.37 08/31/2020    HGB 11.2 (L) 08/31/2020    HCT 35.4 (L) 08/31/2020    MCV 80 (L) 08/31/2020     08/31/2020     BMP  Lab Results   Component Value Date     08/29/2020    K 3.9 08/29/2020     08/29/2020    CO2 29 08/29/2020    BUN 19 08/29/2020    CREATININE 1.1 08/29/2020    CALCIUM 8.8 08/29/2020    ANIONGAP 8 08/29/2020    ESTGFRAFRICA >60 08/29/2020    EGFRNONAA >60 08/29/2020     Lab Results   Component Value Date     (H) 08/29/2020    AST 91 (H) 08/29/2020    ALKPHOS 58 08/29/2020    BILITOT 0.8 08/29/2020     Lab Results   Component Value Date    INR 0.9 08/28/2020    INR 1.0 08/07/2020    INR 1.0 04/02/2020     Lab Results   Component Value Date    HGBA1C 5.3 05/04/2019       ASSESSMENT & PLAN:     Erosive gastritis  S/p EGD no stigmata of bleeding     Eosinophilic esophagitis  Port-a-cath intact  Plan for chemo per heme onc  Has apts for biopsies and GI / heme onc follow up as below as well as urology and allergy.      Scheduled Follow-up :  Future Appointments   Date Time Provider Department Center   9/2/2020  2:00 PM Jada Benito MD [facility] IMPRI [facility]   9/4/2020  9:00 AM CHAIR 03 [facility] INFUSIO [facility]   9/8/2020 10:00 AM Michelle Ornelas MD [facility] ALLERGY [address] [facility]   9/8/2020 12:00 PM [facility] BCC CT1 [facility] CT SHIRIN [facility]   9/9/2020  9:15 AM Kolby Wall MD [facility] TUMOR [facility]   9/14/2020  2:00 PM BMT BIOPSY, [facility] HC BMT [facility]   9/16/2020  3:30 PM Anupama Hobbs NP DESC URO [place]   9/22/2020  2:20 PM Ivan Gaspar MD [facility] HC BMT [facility] [facility]       Post Visit Medication List:     Medication List          Accurate as of September 2, 2020  1:58 PM. If you have any questions, ask your nurse or doctor.            CONTINUE taking these medications    AdderalL 20 mg tablet  Generic drug: dextroamphetamine-amphetamine     artificial tears 0.5 % ophthalmic solution  Commonly known as: ISOPTO TEARS  Place 1 drop into  both eyes 6 (six) times daily.     CENTRUM 3,500-18-0.4 unit-mg-mg Chew  Generic drug: multivit-iron-min-folic acid     cetirizine 10 MG tablet  Commonly known as: ZYRTEC     diphenhydrAMINE 25 mg tablet  Commonly known as: SOMINEX     EPINEPHrine 0.3 mg/0.3 mL Atin  Commonly known as: EPIPEN  Inject 0.3 mLs (0.3 mg total) into the muscle as needed.     famotidine 20 MG tablet  Commonly known as: PEPCID     hydrOXYzine HCL 25 MG tablet  Commonly known as: ATARAX  Take 1 tablet (25 mg total) by mouth 3 (three) times daily as needed for Itching.     Lactobacillus acidophilus 10 billion cell Cap     LIDOCAINE VISCOUS 2 % Soln  Generic drug: lidocaine HCl 2%     oxyCODONE-acetaminophen 5-325 mg per tablet  Commonly known as: PERCOCET  Take 1 tablet by mouth every 8 (eight) hours as needed.     paroxetine 20 MG tablet  Commonly known as: PAXIL     predniSONE 20 MG tablet  Commonly known as: DELTASONE  Take 2 tablets (40 mg total) by mouth once daily. for 7 days     promethazine 25 MG tablet  Commonly known as: PHENERGAN  Take 1 tablet (25 mg total) by mouth every 6 (six) hours as needed for Nausea.     sucralfate 1 gram tablet  Commonly known as: CARAFATE     tamsulosin 0.4 mg Cap  Commonly known as: FLOMAX            Signing Physician:  Jada Benito MD

## 2020-09-02 NOTE — TELEPHONE ENCOUNTER
Reason: Haven from the billing office needs to speak to someone     Contact [phone]       Spoke to Haven. She had questions about a lab that was ordered by Dr. Ornelas on 7/13/2020. 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 Haven stated that this lab needed a pre authorization done with insurance.  Explained to Haven, that our office has no idea what each insurance covers in regards to labs. I looked in the chart and I did not see anything that stated a PA needed to be done.  Our office does not do authorizations for labs.  Explained that if something needs pre approval, usually there is a referral attached to the appointment and from there the referral team works on a PA. Ie, CT scans.  Haven verbalized understanding and asked if anyone in our office could possibly help.

## 2020-09-02 NOTE — TELEPHONE ENCOUNTER
----- Message from Rosalva Luque sent at 9/2/2020  1:16 PM CDT -----  Contact: [phone]  Type:  Patient Returning Call    Who Called:PT  Who Left Message for Patient: Emily  Does the patient know what this is regarding?:appt  Would the patient rather a call back or a response via [facility]?  Call Back  Best Call Back Number:[phone]

## 2020-09-04 ENCOUNTER — TELEPHONE (OUTPATIENT)
Dept: PRIMARY CARE CLINIC | Facility: CLINIC | Age: 40
End: 2020-09-04

## 2020-09-04 ENCOUNTER — TELEPHONE (OUTPATIENT)
Dept: PAIN MEDICINE | Facility: CLINIC | Age: 40
End: 2020-09-04

## 2020-09-04 DIAGNOSIS — K20.0 EOSINOPHILIC ESOPHAGITIS: Primary | Chronic | ICD-10-CM

## 2020-09-04 NOTE — TELEPHONE ENCOUNTER
----- Message from Kamron Seals sent at 9/4/2020 11:22 AM CDT -----  Type:  Needs Medical Advice    Who Called: self  Reason:previously spoke with stephanie. Referal is now in the system and he would like to speak with her to make an appointment   Would the patient rather a call back or a response via [facility]? call  Best Call Back Number: [phone]  Additional Information: none

## 2020-09-08 ENCOUNTER — TELEPHONE (OUTPATIENT)
Dept: HEMATOLOGY/ONCOLOGY | Facility: CLINIC | Age: 40
End: 2020-09-08

## 2020-09-08 ENCOUNTER — OFFICE VISIT (OUTPATIENT)
Dept: ALLERGY | Facility: CLINIC | Age: 40
End: 2020-09-08
Payer: MEDICAID

## 2020-09-08 ENCOUNTER — INFUSION (OUTPATIENT)
Dept: INFUSION THERAPY | Facility: HOSPITAL | Age: 40
End: 2020-09-08
Payer: MEDICAID

## 2020-09-08 ENCOUNTER — HOSPITAL ENCOUNTER (EMERGENCY)
Facility: HOSPITAL | Age: 40
Discharge: HOME | End: 2020-09-08
Attending: EMERGENCY MEDICINE
Payer: MEDICAID

## 2020-09-08 ENCOUNTER — HOSPITAL ENCOUNTER (OUTPATIENT)
Dept: RADIOLOGY | Facility: HOSPITAL | Age: 40
Discharge: HOME | End: 2020-09-08
Attending: INTERNAL MEDICINE
Payer: MEDICAID

## 2020-09-08 VITALS
OXYGEN SATURATION: 96 % | RESPIRATION RATE: 20 BRPM | TEMPERATURE: 99 F | DIASTOLIC BLOOD PRESSURE: 82 MMHG | HEART RATE: 68 BPM | SYSTOLIC BLOOD PRESSURE: 127 MMHG

## 2020-09-08 VITALS — HEIGHT: 72 IN | WEIGHT: 211.88 LBS | BODY MASS INDEX: 28.7 KG/M2

## 2020-09-08 DIAGNOSIS — Z45.2 ENCOUNTER FOR INSERTION OF VENOUS ACCESS PORT: Primary | ICD-10-CM

## 2020-09-08 DIAGNOSIS — K20.0 EOSINOPHILIC ESOPHAGITIS: ICD-10-CM

## 2020-09-08 DIAGNOSIS — D72.10 EOSINOPHILIA: Primary | ICD-10-CM

## 2020-09-08 DIAGNOSIS — R21 RASH: ICD-10-CM

## 2020-09-08 DIAGNOSIS — T78.40XA ALLERGIC REACTION, INITIAL ENCOUNTER: Primary | ICD-10-CM

## 2020-09-08 DIAGNOSIS — T78.3XXD ANGIOEDEMA, SUBSEQUENT ENCOUNTER: ICD-10-CM

## 2020-09-08 DIAGNOSIS — D72.10 EOSINOPHILIA: ICD-10-CM

## 2020-09-08 PROCEDURE — 25500020 PHARM REV CODE 255: Performed by: INTERNAL MEDICINE

## 2020-09-08 PROCEDURE — 96523 IRRIG DRUG DELIVERY DEVICE: CPT

## 2020-09-08 PROCEDURE — 71260 CT THORAX DX C+: CPT | Mod: 26,,, | Performed by: RADIOLOGY

## 2020-09-08 PROCEDURE — S0028 INJECTION, FAMOTIDINE, 20 MG: HCPCS | Performed by: PHYSICIAN ASSISTANT

## 2020-09-08 PROCEDURE — 74177 CT ABD & PELVIS W/CONTRAST: CPT | Mod: TC

## 2020-09-08 PROCEDURE — 99214 OFFICE O/P EST MOD 30 MIN: CPT | Mod: PBBFAC,25 | Performed by: STUDENT IN AN ORGANIZED HEALTH CARE EDUCATION/TRAINING PROGRAM

## 2020-09-08 PROCEDURE — 74177 CT CHEST ABDOMEN PELVIS WITH CONTRAST (XPD): ICD-10-PCS | Mod: 26,,, | Performed by: RADIOLOGY

## 2020-09-08 PROCEDURE — 96372 THER/PROPH/DIAG INJ SC/IM: CPT | Mod: 59

## 2020-09-08 PROCEDURE — 99285 PR EMERGENCY DEPT VISIT,LEVEL V: ICD-10-PCS | Mod: ,,, | Performed by: PHYSICIAN ASSISTANT

## 2020-09-08 PROCEDURE — A4216 STERILE WATER/SALINE, 10 ML: HCPCS | Performed by: SURGERY

## 2020-09-08 PROCEDURE — 99999 PR PBB SHADOW E&M-EST. PATIENT-LVL IV: ICD-10-PCS | Mod: PBBFAC,,, | Performed by: STUDENT IN AN ORGANIZED HEALTH CARE EDUCATION/TRAINING PROGRAM

## 2020-09-08 PROCEDURE — 99214 PR OFFICE/OUTPT VISIT, EST, LEVL IV, 30-39 MIN: ICD-10-PCS | Mod: S$PBB,,, | Performed by: STUDENT IN AN ORGANIZED HEALTH CARE EDUCATION/TRAINING PROGRAM

## 2020-09-08 PROCEDURE — 96375 TX/PRO/DX INJ NEW DRUG ADDON: CPT

## 2020-09-08 PROCEDURE — 63600175 PHARM REV CODE 636 W HCPCS: Performed by: PHYSICIAN ASSISTANT

## 2020-09-08 PROCEDURE — 71260 CT CHEST ABDOMEN PELVIS WITH CONTRAST (XPD): ICD-10-PCS | Mod: 26,,, | Performed by: RADIOLOGY

## 2020-09-08 PROCEDURE — 99285 EMERGENCY DEPT VISIT HI MDM: CPT | Mod: ,,, | Performed by: PHYSICIAN ASSISTANT

## 2020-09-08 PROCEDURE — 99999 PR PBB SHADOW E&M-EST. PATIENT-LVL IV: CPT | Mod: PBBFAC,,, | Performed by: STUDENT IN AN ORGANIZED HEALTH CARE EDUCATION/TRAINING PROGRAM

## 2020-09-08 PROCEDURE — 74177 CT ABD & PELVIS W/CONTRAST: CPT | Mod: 26,,, | Performed by: RADIOLOGY

## 2020-09-08 PROCEDURE — 99284 EMERGENCY DEPT VISIT MOD MDM: CPT | Mod: 25,27

## 2020-09-08 PROCEDURE — 99214 OFFICE O/P EST MOD 30 MIN: CPT | Mod: S$PBB,,, | Performed by: STUDENT IN AN ORGANIZED HEALTH CARE EDUCATION/TRAINING PROGRAM

## 2020-09-08 PROCEDURE — 25000003 PHARM REV CODE 250: Performed by: SURGERY

## 2020-09-08 PROCEDURE — 96374 THER/PROPH/DIAG INJ IV PUSH: CPT

## 2020-09-08 PROCEDURE — 25000003 PHARM REV CODE 250: Performed by: PHYSICIAN ASSISTANT

## 2020-09-08 RX ORDER — DIPHENHYDRAMINE HCL 50 MG
50 CAPSULE ORAL EVERY 6 HOURS PRN
Status: ON HOLD | COMMUNITY
End: 2021-01-16 | Stop reason: HOSPADM

## 2020-09-08 RX ORDER — SODIUM CHLORIDE 0.9 % (FLUSH) 0.9 %
10 SYRINGE (ML) INJECTION
Status: CANCELLED | OUTPATIENT
Start: 2020-09-08

## 2020-09-08 RX ORDER — FAMOTIDINE 10 MG/ML
20 INJECTION INTRAVENOUS
Status: COMPLETED | OUTPATIENT
Start: 2020-09-08 | End: 2020-09-08

## 2020-09-08 RX ORDER — HEPARIN 100 UNIT/ML
500 SYRINGE INTRAVENOUS
Status: DISCONTINUED | OUTPATIENT
Start: 2020-09-08 | End: 2020-09-08 | Stop reason: HOSPADM

## 2020-09-08 RX ORDER — LORAZEPAM 1 MG/1
TABLET ORAL
COMMUNITY
Start: 2020-08-31 | End: 2020-09-13

## 2020-09-08 RX ORDER — HEPARIN 100 UNIT/ML
500 SYRINGE INTRAVENOUS
Status: CANCELLED | OUTPATIENT
Start: 2020-09-08

## 2020-09-08 RX ORDER — NYSTATIN 100000 [USP'U]/ML
SUSPENSION ORAL
Status: ON HOLD | COMMUNITY
Start: 2020-09-06 | End: 2022-10-18

## 2020-09-08 RX ORDER — METOCLOPRAMIDE 10 MG/1
TABLET ORAL
Status: ON HOLD | COMMUNITY
Start: 2020-09-06 | End: 2020-10-12

## 2020-09-08 RX ORDER — DIPHENHYDRAMINE HYDROCHLORIDE 50 MG/ML
25 INJECTION INTRAMUSCULAR; INTRAVENOUS
Status: COMPLETED | OUTPATIENT
Start: 2020-09-08 | End: 2020-09-08

## 2020-09-08 RX ORDER — EPINEPHRINE 0.3 MG/.3ML
0.3 INJECTION SUBCUTANEOUS
Status: COMPLETED | OUTPATIENT
Start: 2020-09-08 | End: 2020-09-08

## 2020-09-08 RX ORDER — HEPARIN 100 UNIT/ML
5 SYRINGE INTRAVENOUS
Status: COMPLETED | OUTPATIENT
Start: 2020-09-08 | End: 2020-09-08

## 2020-09-08 RX ORDER — HEPARIN 100 UNIT/ML
5 SYRINGE INTRAVENOUS
Status: DISCONTINUED | OUTPATIENT
Start: 2020-09-08 | End: 2020-09-08 | Stop reason: SDUPTHER

## 2020-09-08 RX ORDER — EPINEPHRINE 0.3 MG/.3ML
1 INJECTION SUBCUTANEOUS
Qty: 2 DEVICE | Refills: 1 | Status: SHIPPED | OUTPATIENT
Start: 2020-09-08 | End: 2021-11-25 | Stop reason: SDUPTHER

## 2020-09-08 RX ORDER — SODIUM CHLORIDE 0.9 % (FLUSH) 0.9 %
10 SYRINGE (ML) INJECTION
Status: DISCONTINUED | OUTPATIENT
Start: 2020-09-08 | End: 2020-09-08 | Stop reason: HOSPADM

## 2020-09-08 RX ORDER — PREDNISONE 5 MG/1
5 TABLET ORAL DAILY
Qty: 30 TABLET | Refills: 0 | OUTPATIENT
Start: 2020-09-08 | End: 2020-09-13

## 2020-09-08 RX ADMIN — FAMOTIDINE 20 MG: 10 INJECTION INTRAVENOUS at 01:09

## 2020-09-08 RX ADMIN — IOHEXOL 100 ML: 350 INJECTION, SOLUTION INTRAVENOUS at 01:09

## 2020-09-08 RX ADMIN — DIPHENHYDRAMINE HYDROCHLORIDE 25 MG: 50 INJECTION, SOLUTION INTRAMUSCULAR; INTRAVENOUS at 01:09

## 2020-09-08 RX ADMIN — Medication 10 ML: at 12:09

## 2020-09-08 RX ADMIN — EPINEPHRINE 0.3 MG: 0.3 INJECTION INTRAMUSCULAR at 01:09

## 2020-09-08 RX ADMIN — HEPARIN 500 UNITS: 100 SYRINGE at 06:09

## 2020-09-08 NOTE — CARE UPDATE
"RAPID RESPONSE NURSE NOTE     Admit Date: 2020  LOS: 0  Code Status: Prior   Date of Consult: 2020  : 1980  Age: 39 y.o.  Weight:   Wt Readings from Last 1 Encounters:   20 96.1 kg (211 lb 13.8 oz)     Sex: male  Race: White   Bed: ED 15/15:   MRN: [id number]  Time Rapid Response Team page Received: 1328  Time Rapid Response Team at Bedside: 1329  Time Rapid Response Team left Bedside: 1335  Was the patient discharged from an ICU this admission?   no  Was the patient discharged from a PACU within last 24 hours?  no  Did the patient receive conscious sedation/general anesthesia within last 24 hours?  no  Was the patient in the ED within the past 24 hours?  no  Was the patient started on NIPPV within the past 24 hours?  no  Did this progress into an ARC or CPA:  no  Attending Physician: Milton Brown MD  Primary Service: Networked reference to record PCT   Consult Requested By: Milton Brown MD     SITUATION     Notified by code pager.  Reason for alert: "Clearing his throat"  Called to evaluate the patient for Respiratory    BACKGROUND     Why is the patient in the hospital?: <principal problem not specified>    Patient has a past medical history of Arthritis, C. difficile colitis, Cancer, Encounter for blood transfusion, Eosinophilic esophagitis, GERD (gastroesophageal reflux disease), IBS (irritable bowel syndrome), MRSA carrier, Nephrolithiasis, and Urinary tract infection.    ASSESSMENT/INTERVENTIONS     BP (!) 137/93   Pulse 92   Temp 98.7 °F (37.1 °C) (Oral)   Resp 20   SpO2 95%     What did you find:     Pt being wheeled out of [facility] by RN.   RN states pt was undergoing CT w/ contrast when he began clearing his throat frequently.   Pt was brought to ED by [facility] RN. RRN followed.     RECOMMENDATIONS     We recommend: Tx to ED    FOLLOW-UP/CONTINGENCY PLAN     Call the Rapid Response Nurse, Mimi Whiteside RN at x [phone] for additional questions or concerns.    PHYSICIAN " ESCALATION     Orders received and case discussed with NA.    Disposition: Tx to ER bed 15.

## 2020-09-08 NOTE — PROGRESS NOTES
[facility] New Patient Evaluation    Referred by: Dr Jada Benito  Reason for referral: Neck pain    CC:   Chief Complaint   Patient presents with    Neck Pain       Last 3 PDI Scores 9/9/2020   Pain Disability Index (PDI) 42       HPI:   Solo Black is a 39 y.o. male who complains of neck pain    Location: neck   Onset: 2006 - has worsened in the last month  Current Pain Score: 6/10  Daily Pain of Range: 5-8/10  Quality: Dull, Burning, Throbbing, Grabbing, Tight and Deep  Radiation: left shoulder blade/arm  Worsened by: lying down, sitting and standing  Improved by: medications    Previous Therapies:  PT/OT: Yes, remote Hx of completing PT in 2007 following neck/thoracic surgery  HEP: None since Dec 2019  Interventions: Yes, remote Hx of cervical and thoracic EBENEZER with good relief  Surgery:  C5-6 laminectomy and neural foraminotomy in 2006, T8-9 laminectomy in neural foramen out a me in 2009  Medications:   - NSAIDS:  Yes, but currently avoiding due to esophagitis and gastritis  - MSK Relaxants:   - TCAs:   - SNRIs:   - Topicals:   - Anticonvulsants:  Yes, history of gabapentin use postoperatively between 2006 and 2010 with good effect  - Opioids:  Yes, remote history but patient prefers to avoid    Current Pain Medications:  1. Advil  2. Tylenol     Review of Systems:  Review of Systems   Constitutional: Negative for chills and fever.   HENT: Negative for nosebleeds.    Eyes: Negative for blurred vision.   Respiratory: Negative for hemoptysis.    Cardiovascular: Negative for chest pain and palpitations.   Gastrointestinal: Negative for heartburn and vomiting.   Genitourinary: Negative for hematuria.   Musculoskeletal: Positive for myalgias and neck pain. Negative for falls and joint pain.   Skin: Negative for rash.   Neurological: Negative for tingling, focal weakness, seizures and loss of consciousness.   Endo/Heme/Allergies: Does not bruise/bleed easily.   Psychiatric/Behavioral: Negative for  hallucinations, memory loss and substance abuse. The patient is nervous/anxious and has insomnia.        History:  No current facility-administered medications for this visit.     Current Outpatient Medications:     artificial tears (ISOPTO TEARS) 0.5 % ophthalmic solution, Place 1 drop into both eyes 6 (six) times daily., Disp:  , Rfl:     cetirizine (ZYRTEC) 10 MG tablet, Take 20 mg by mouth 2 (two) times a day. , Disp: , Rfl:     dextroamphetamine-amphetamine (ADDERALL) 20 mg tablet, Take 1 tablet by mouth 2 (two) times daily before meals. Take before breakfast and lunch, Disp: , Rfl:     dicyclomine (BENTYL) 20 mg tablet, Take 1 tablet (20 mg total) by mouth 3 (three) times daily., Disp: 30 tablet, Rfl: 0    diphenhydrAMINE (BENADRYL) 50 MG capsule, Take 50 mg by mouth every 6 (six) hours as needed for Itching., Disp: , Rfl:     EPINEPHrine (EPIPEN) 0.3 mg/0.3 mL AtIn, Inject 0.3 mLs (0.3 mg total) into the muscle as needed., Disp: 2 Device, Rfl: 1    famotidine (PEPCID) 20 MG tablet, Take 20 mg by mouth 2 (two) times daily., Disp: , Rfl:     hydrOXYzine HCL (ATARAX) 25 MG tablet, Take 1 tablet (25 mg total) by mouth 3 (three) times daily as needed for Itching., Disp: 30 tablet, Rfl: 1    Lactobacillus acidophilus 10 billion cell Cap, Take 1 capsule by mouth once daily. , Disp: , Rfl:     lidocaine HCl 2% (LIDOCAINE VISCOUS) 2 % Soln, Take 5 mLs by mouth as needed. , Disp: , Rfl:     LORazepam (ATIVAN) 1 MG tablet, TK 1 T PO  30 MINUTES B PROCEDURE, Disp: , Rfl:     metoclopramide HCl (REGLAN) 10 MG tablet, , Disp: , Rfl:     MULTIVIT-IRON-MIN-FOLIC ACID 3,500-18-0.4 UNIT-MG-MG ORAL CHEW, Take 10 mg by mouth once daily., Disp: , Rfl:     nystatin (MYCOSTATIN) 100,000 unit/mL suspension, , Disp: , Rfl:     oxyCODONE-acetaminophen (PERCOCET) 5-325 mg per tablet, Take 1 tablet by mouth every 8 (eight) hours as needed., Disp: 15 tablet, Rfl: 0    paroxetine (PAXIL) 20 MG tablet, Take 20 mg by mouth  every morning., Disp: , Rfl:     predniSONE (DELTASONE) 20 MG tablet, Take 2 tablets (40 mg total) by mouth once daily. for 7 days, Disp: 14 tablet, Rfl: 0    predniSONE (DELTASONE) 5 MG tablet, Take 1 tablet (5 mg total) by mouth once daily. After finishing your week of prednisone 20mg daily, go down to 10 mg daily for 1 week, then 5 mg daily for 1 week., Disp: 30 tablet, Rfl: 0    promethazine (PHENERGAN) 25 MG tablet, Take 1 tablet (25 mg total) by mouth every 6 (six) hours as needed for Nausea., Disp: 15 tablet, Rfl: 0    sucralfate (CARAFATE) 1 gram tablet, Take 1 g by mouth before meals and at bedtime as needed. , Disp: , Rfl:     tamsulosin (FLOMAX) 0.4 mg Cap, Take 0.4 mg by mouth as needed. , Disp: , Rfl:     Facility-Administered Medications Ordered in Other Visits:     heparin, porcine (PF) 100 unit/mL injection flush 500 Units, 500 Units, Intravenous, PRN, Troy Honeycutt MD    sodium chloride 0.9% flush 10 mL, 10 mL, Intravenous, PRN, Troy Honeycutt MD, 10 mL at 09/08/20 1256    Past Medical History:   Diagnosis Date    Arthritis     C. difficile colitis feb 2014    postop of hand surgery    Cancer     Encounter for blood transfusion     Eosinophilic esophagitis 3/11/2014    GERD (gastroesophageal reflux disease)     IBS (irritable bowel syndrome)     MRSA carrier     says multiple times nose swab was +    Nephrolithiasis apr 2014    bilateral punctate - never passed a stone    Urinary tract infection feb 2014    MRSA       Past Surgical History:   Procedure Laterality Date    APPENDECTOMY      ARTHROSCOPY OF SHOULDER WITH REMOVAL OF DISTAL CLAVICLE Right 11/1/2018    Procedure: ARTHROSCOPY, SHOULDER, WITH DISTAL CLAVICLE EXCISION;  Surgeon: Gabino Mejia MD;  Location: [facility];  Service: Orthopedics;  Laterality: Right;  video    BACK SURGERY      CIRCUMCISION, PRIMARY      COLONOSCOPY N/A 11/14/2018    Procedure: COLONOSCOPY;  Surgeon: Milton Brunson MD;   Location: [facility];  Service: Endoscopy;  Laterality: N/A;    COLONOSCOPY N/A 7/20/2020    Procedure: COLONOSCOPY;  Surgeon: Ruslan Tillman MD;  Location: [facility];  Service: Endoscopy;  Laterality: N/A;    drainage of abscess from hand  2009    MRSA    drainage of abscess from R thigh  2006    MRSA    ESOPHAGOGASTRODUODENOSCOPY  3/11/2014    ESOPHAGOGASTRODUODENOSCOPY N/A 9/5/2018    Procedure: EGD (ESOPHAGOGASTRODUODENOSCOPY);  Surgeon: Kolby Wall MD;  Location: [facility];  Service: Endoscopy;  Laterality: N/A;    ESOPHAGOGASTRODUODENOSCOPY N/A 3/25/2020    Procedure: EGD (ESOPHAGOGASTRODUODENOSCOPY);  Surgeon: Ruslan Tillman MD;  Location: [facility];  Service: Endoscopy;  Laterality: N/A;    ESOPHAGOGASTRODUODENOSCOPY N/A 7/20/2020    Procedure: EGD (ESOPHAGOGASTRODUODENOSCOPY);  Surgeon: Ruslan Tillman MD;  Location: [facility];  Service: Endoscopy;  Laterality: N/A;  Patient is a very hard stick, requires anesthesia stick.    ESOPHAGOGASTRODUODENOSCOPY N/A 8/31/2020    Procedure: EGD (ESOPHAGOGASTRODUODENOSCOPY);  Surgeon: Kolby Wall MD;  Location: [facility];  Service: Endoscopy;  Laterality: N/A;    FLEXIBLE SIGMOIDOSCOPY N/A 9/5/2018    Procedure: SIGMOIDOSCOPY, FLEXIBLE;  Surgeon: Kolby Wall MD;  Location: [facility];  Service: Endoscopy;  Laterality: N/A;    HAND SURGERY  jan 2014    power saw fell on hand - laceration 3 tendons    INSERTION OF VENOUS ACCESS PORT Right 8/21/2020    Procedure: INSERTION, VENOUS ACCESS PORT;  Surgeon: Troy Honeycutt MD;  Location: [facility];  Service: General;  Laterality: Right;    NISSEN FUNDOPLICATION         Family History   Problem Relation Age of Onset    Urolithiasis Father     Celiac disease Sister     Hypertension Maternal Grandmother     Hypertension Maternal Grandfather        Social History     Socioeconomic History    Marital status: Single     Spouse name: Not on file    Number of children:  2    Years of education: Not on file    Highest education level: Not on file   Occupational History    Occupation:  electric forklifts     Employer: [id number]   Social Needs    Financial resource strain: Not on file    Food insecurity     Worry: Not on file     Inability: Not on file    Transportation needs     Medical: Not on file     Non-medical: Not on file   Tobacco Use    Smoking status: Never Smoker    Smokeless tobacco: Never Used    Tobacco comment: Pt states he has smoked 1 or 2 cigarettes in his life.   Substance and Sexual Activity    Alcohol use: Not Currently    Drug use: No    Sexual activity: Yes     Partners: Female   Lifestyle    Physical activity     Days per week: Not on file     Minutes per session: Not on file    Stress: Not on file   Relationships    Social connections     Talks on phone: Not on file     Gets together: Not on file     Attends Christian service: Not on file     Active member of club or organization: Not on file     Attends meetings of clubs or organizations: Not on file     Relationship status: Not on file   Other Topics Concern    Not on file   Social History Narrative    Not on file       Review of patient's allergies indicates:   Allergen Reactions    Legumes Nausea And Vomiting and Swelling     Other reaction(s): GI Intolerance  vomiting  vomiting  Pt reports legumes make his lips swell and induce severe vomiting  Pt reports legumes make his lips swell and induce severe vomiting      Nsaids (non-steroidal anti-inflammatory drug)      GI bleed    Bean pod extract      Lips swelling, vomiting  Lips swelling, vomiting      Ketamine      Severe dysphoria (bad trip)    Lentils      Lips swelling, vomiting  Lips swelling, vomiting      Meloxicam Nausea Only     GI bleed    Peas      Lips swelling, vomiting    Penicillins      Has taken third gen cephalosporins without issue per patient report    Haloperidol Anxiety and Other (See  "Comments)     "feels like crawling out of his skin"         Physical Exam:  There were no vitals filed for this visit.  General    Nursing note and vitals reviewed.  Constitutional: He is oriented to person, place, and time. He appears well-developed and well-nourished. No distress.   HENT:   Head: Normocephalic and atraumatic.   Nose: Nose normal.   Eyes: Conjunctivae and EOM are normal. Pupils are equal, round, and reactive to light. Right eye exhibits no discharge. Left eye exhibits no discharge. No scleral icterus.   Neck: No JVD present.   Cardiovascular: Intact distal pulses.    Pulmonary/Chest: Effort normal. No respiratory distress.   Abdominal: He exhibits no distension.   Neurological: He is alert and oriented to person, place, and time. Coordination normal.   Psychiatric: He has a normal mood and affect. His behavior is normal. Judgment and thought content normal.     General Musculoskeletal Exam   Gait: normal     Back (L-Spine & T-Spine) / Neck (C-Spine) Exam     Tenderness Posterior midline palpation reveals tenderness of the Upper C-Spine, Lower C-Spine and Upper T-Spine. Right paramedian tenderness of the Lower C-Spine and Upper T-Spine. Left paramedian tenderness of the Lower C-Spine and Upper T-Spine.     Back (L-Spine & T-Spine) Range of Motion   Extension: abnormal   Flexion: abnormal     Neck (C-Spine) Range of Motion   Flexion:     Limited  Extension: Limited  Right Lateral Bend: abnormal  Left Lateral Bend: abnormal  Right Rotation: abnormal  Left Rotation: abnormal    Spinal Sensation   Right Side Sensation  C-Spine Level: normal   Left Side Sensation  C-Spine Level: normal    Neck (C-Spine) Tests   Spurling's Test   Left:  Negative  Right: negative  Cervical Distraction Test  Right:  Negative  Left:  negative    Other He has no scoliosis .      Muscle Strength   Right Upper Extremity   Biceps: 5/5   Deltoid:  5/5  Triceps:  5/5  Wrist extension: 5/5   Wrist flexion: 5/5   Finger Flexors:  " 5/5  Finger Extensors:  5/5  Left Upper Extremity  Biceps: 5/5   Deltoid:  5/5  Triceps:  5/5  Wrist extension: 5/5   Wrist flexion: 5/5   Finger Flexors:  5/5  Finger Extensors:  5/5  Right Lower Extremity   Hip Flexion: 5/5   Hip Extensors: 5/5  Quadriceps:  5/5   Hamstrin/5   Gastrocsoleus:  5/5   Left Lower Extremity   Hip Flexion: 5/5   Hip Extensors: 5/5  Quadriceps:  5/5   Hamstrin/5   Gastrocsoleus:  5/5     Reflexes     Left Side  Biceps:  2+    Right Side   Biceps:  2+      Imaging:  NONE    Labs:  BMP  Lab Results   Component Value Date     2020    K 3.7 2020     2020    CO2 29 2020    BUN 19 2020    CREATININE 1.16 2020    CALCIUM 8.8 2020    ANIONGAP 7 (L) 2020    ESTGFRAFRICA >60.0 2020    EGFRNONAA >60.0 2020     Lab Results   Component Value Date     (H) 2020    AST 34 2020    ALKPHOS 65 2020    BILITOT 0.5 2020       Assessment:  Problem List Items Addressed This Visit     Hypereosinophilic syndrome    Chronic neck pain - Primary    Myofascial pain syndrome    Status post laminectomy - Thoracic & cervical    Sedentary lifestyle    Adjustment disorder with anxious mood          20 - Solo Black is a 39 y.o. male with a recent diagnosis of hypereosinophilic syndrome currently on high-dose steroid therapy with plans for chemotherapy in the near future who presents complaining of chronic neck and upper thoracic pain related to acute intervertebral disc injury in  while lifting weights.  He is status post laminectomy and neural foraminotomy at C5-6 and T8-9 between  and .  Since that time, his pain has been well managed with home exercise; however, over the past 9 months his investment in home exercise has significantly decreased due to anxiety surrounding his medical condition in fear of opportunistic illness.  During our discussion today, it became apparent to both me  "and the patient that his pain is primarily myofascial in that it has increased inversusly proportionate to decreases in home exercise and activity.  He also notes 20 lb weight gain though this may also be exacerbated by steroid therapy.  Given the presence of his pain despite high-dose steroid therapy, I do not believe any additional antiinflammatory medications, including NSAIDS, will be helpful.  Further, they are now contra-indicated due to esophagitis and gastritis.  I recommend a trial of Gabapentin 300 mg QHS, which he has tried before to good effect.  This medication is not known to exacerbate or cause erosion of the stomach lining.  Gabapentin will be used as a bridge therapy to my primary recommendation of reestablish thing a home exercise regimen to manage chronic myofascial dysfunction likely resulting from the previous spine surgeries.  Should his pain persist despite the use of gabapentin and maintenance of an appropriate home exercise program, I will consider advanced imaging of the spine with cervical and thoracic MRI.    : Reviewed and consistent with medication use as prescribed.    Treatment Plan:   Procedures: None indicated.  Most injections utilize steroid and he is already on very high doses.  PT/OT/HEP: Referral to PT placed today.  We discussed the importance of HE.  Patient is very anxious related to his diagnosis and psychiatric support would also be important.  Medications:    - D/C all NSAIDS   - Start Gabapentin 300 mg QHS and increase to 300 mg TID over 3 weeks   - Plan to titrate Gabapentin to initial goal of 600 mg TID   - Should pain improve substantially with PT and HE, I will consider weaning this medication off   - Patient is not requesting opioids, but we discussed utilizing this class of medications as an option of last resort given the likelihood that he made need stronger pain control in the future as part of his "cancer" treatment  Labs: reviewed and medications are " appropriately dosed for current hepatorenal function.  Imaging: I will consider new MRI of cervical and thoracic spine if pain persists beyond PT, HE, and gabapentin- though it may not be necessary as he is not an interventional procedure candidate and is unlikely to be a surgical candidate either    Follow Up: RTC 6 weeks for med titration.  I plan to follow up with him to ensure that we are on the right path, then refer back to Dr. Benito and follow along side the treatment team approximately once yearly    Rodolfo Puri Jr, MD  Interventional Pain Medicine / Anesthesiology    Disclaimer: This note was partly generated using dictation software which may occasionally result in transcription errors.

## 2020-09-08 NOTE — TELEPHONE ENCOUNTER
"----- Message from Jovanny King sent at 9/8/2020  8:49 AM CDT -----  Consult/Advisory:    Name Of Caller: Solo  Contact Preference?: [phone]    Provider Name: Dr Gaspar    What is the nature of the call?:  Pt states he will need Benadryl with his CT w/Contrast due to a slight allergic reaction from his last experience.  Please contact to discuss    Additional Notes:  "Thank you for all that you do for our patients'"           "

## 2020-09-08 NOTE — DISCHARGE INSTRUCTIONS
Continue your daily Pepcid and Prednisone at home. Use the prescribed Epipen as directed if your symptoms return.   Follow-up with your primary care provider within the next week for re-evaluation  Return to the emergency room for new, worsening, or concerning symptoms.     Our goal in the emergency department is to always give you outstanding care and exceptional service. You may receive a survey by mail or e-mail in the next week regarding your experience in our ED. We would greatly appreciate your completing and returning the survey. Your feedback provides us with a way to recognize our staff who give very good care and it helps us learn how to improve when your experience was below our aspiration of excellence.

## 2020-09-08 NOTE — ED NOTES
Patient resting in stretcher. AAO x 4. NAD noted. Respirations even and unlabored. VSS. Bed in lowest locked position, call bell placed within reach. Pt voices no further needs at this time. Will continue to monitor.

## 2020-09-08 NOTE — ED PROVIDER NOTES
"Encounter Date: 9/8/2020       History     Chief Complaint   Patient presents with    Allergic Reaction     patient was given IV contrast in the cancer center and began having an allergic reaction.       NELSON Black is a 39 y.o. male with medical history of hypereosinophilic syndrome, eosinophilic gastritis, chronic recurrent angioedema and urticaria presenting to the ED with the chief complaint of allergic reaction. Patient received IV contrast for a CT scan for evaluation of his HES. He reports excessive saliva production and throat tightness. He is having the sensation to clear his throat constantly. He has had allergic reactions with contrast before. He took Benadryl 50mg PO about 5 hours prior to the scan. Additionally, he takes Prednisone 40mg QD and Pepcid 20mg BID daily. He has history of intubations x2 in the past for angioedema. He is not on chemotherapy.     Review of patient's allergies indicates:   Allergen Reactions    Legumes Nausea And Vomiting and Swelling     Other reaction(s): GI Intolerance  vomiting  vomiting  Pt reports legumes make his lips swell and induce severe vomiting  Pt reports legumes make his lips swell and induce severe vomiting      Nsaids (non-steroidal anti-inflammatory drug)      GI bleed    Bean pod extract      Lips swelling, vomiting  Lips swelling, vomiting      Ketamine      Severe dysphoria (bad trip)    Lentils      Lips swelling, vomiting  Lips swelling, vomiting      Meloxicam Nausea Only     GI bleed    Peas      Lips swelling, vomiting    Penicillins      Has taken third gen cephalosporins without issue per patient report    Haloperidol Anxiety and Other (See Comments)     "feels like crawling out of his skin"       Past Medical History:   Diagnosis Date    Arthritis     C. difficile colitis feb 2014    postop of hand surgery    Cancer     Encounter for blood transfusion     Eosinophilic esophagitis 3/11/2014    GERD (gastroesophageal " reflux disease)     IBS (irritable bowel syndrome)     MRSA carrier     says multiple times nose swab was +    Nephrolithiasis apr 2014    bilateral punctate - never passed a stone    Urinary tract infection feb 2014    MRSA     Past Surgical History:   Procedure Laterality Date    APPENDECTOMY      ARTHROSCOPY OF SHOULDER WITH REMOVAL OF DISTAL CLAVICLE Right 11/1/2018    Procedure: ARTHROSCOPY, SHOULDER, WITH DISTAL CLAVICLE EXCISION;  Surgeon: Gabino Mejia MD;  Location: [facility];  Service: Orthopedics;  Laterality: Right;  video    BACK SURGERY      CIRCUMCISION, PRIMARY      COLONOSCOPY N/A 11/14/2018    Procedure: COLONOSCOPY;  Surgeon: Milton Brunson MD;  Location: [facility];  Service: Endoscopy;  Laterality: N/A;    COLONOSCOPY N/A 7/20/2020    Procedure: COLONOSCOPY;  Surgeon: Ruslan Tillman MD;  Location: [facility];  Service: Endoscopy;  Laterality: N/A;    drainage of abscess from hand  2009    MRSA    drainage of abscess from R thigh  2006    MRSA    ESOPHAGOGASTRODUODENOSCOPY  3/11/2014    ESOPHAGOGASTRODUODENOSCOPY N/A 9/5/2018    Procedure: EGD (ESOPHAGOGASTRODUODENOSCOPY);  Surgeon: Kolby Wall MD;  Location: [facility];  Service: Endoscopy;  Laterality: N/A;    ESOPHAGOGASTRODUODENOSCOPY N/A 3/25/2020    Procedure: EGD (ESOPHAGOGASTRODUODENOSCOPY);  Surgeon: Ruslan Tillman MD;  Location: [facility];  Service: Endoscopy;  Laterality: N/A;    ESOPHAGOGASTRODUODENOSCOPY N/A 7/20/2020    Procedure: EGD (ESOPHAGOGASTRODUODENOSCOPY);  Surgeon: Ruslan Tillman MD;  Location: [facility];  Service: Endoscopy;  Laterality: N/A;  Patient is a very hard stick, requires anesthesia stick.    ESOPHAGOGASTRODUODENOSCOPY N/A 8/31/2020    Procedure: EGD (ESOPHAGOGASTRODUODENOSCOPY);  Surgeon: Kolby Wall MD;  Location: [facility];  Service: Endoscopy;  Laterality: N/A;    FLEXIBLE SIGMOIDOSCOPY N/A 9/5/2018    Procedure: SIGMOIDOSCOPY, FLEXIBLE;  Surgeon:  Kolby Wall MD;  Location: [facility] ENDO;  Service: Endoscopy;  Laterality: N/A;    HAND SURGERY  jan 2014    power saw fell on hand - laceration 3 tendons    INSERTION OF VENOUS ACCESS PORT Right 8/21/2020    Procedure: INSERTION, VENOUS ACCESS PORT;  Surgeon: Troy Honeycutt MD;  Location: [facility] OR;  Service: General;  Laterality: Right;    NISSEN FUNDOPLICATION       Family History   Problem Relation Age of Onset    Urolithiasis Father     Celiac disease Sister     Hypertension Maternal Grandmother     Hypertension Maternal Grandfather      Social History     Tobacco Use    Smoking status: Never Smoker    Smokeless tobacco: Never Used    Tobacco comment: Pt states he has smoked 1 or 2 cigarettes in his life.   Substance Use Topics    Alcohol use: Not Currently    Drug use: No     Review of Systems   Constitutional: Negative for chills, diaphoresis and fever.   HENT: Positive for trouble swallowing. Negative for sore throat.    Eyes: Negative for pain and redness.   Respiratory: Negative for shortness of breath.    Cardiovascular: Negative for chest pain.   Gastrointestinal: Negative for nausea.   Genitourinary: Negative for dysuria.   Musculoskeletal: Negative for back pain.   Skin: Negative for rash.   Allergic/Immunologic: Positive for immunocompromised state.   Neurological: Negative for weakness.   Hematological: Does not bruise/bleed easily.       Physical Exam     Initial Vitals [09/08/20 1335]   BP Pulse Resp Temp SpO2   136/85 (!) 116 20 98.7 °F (37.1 °C) 99 %      MAP       --         Physical Exam    Constitutional: He appears well-developed and well-nourished. He is not diaphoretic. No distress.   HENT:   Head: Normocephalic and atraumatic.   Mouth/Throat: Oropharynx is clear and moist. No oropharyngeal exudate.   Excessive saliva production. Posterior oropharynx clear. No hoarseness to voice   Eyes: EOM are normal. Pupils are equal, round, and reactive to light.   Neck: Normal range  of motion. Neck supple.   Cardiovascular: Normal rate and regular rhythm.   Pulmonary/Chest: Breath sounds normal. No respiratory distress. He has no wheezes.   Abdominal: Soft. There is no abdominal tenderness.   Musculoskeletal: Normal range of motion. No tenderness.   Neurological: He is alert and oriented to person, place, and time. He has normal strength. No cranial nerve deficit or sensory deficit.   Skin: Skin is warm and dry. No rash noted. No erythema.       ED Course   Procedures  Labs Reviewed - No data to display       Imaging Results    None          Medical Decision Making:   History:   Old Medical Records: I decided to obtain old medical records.  Old Records Summarized: records from clinic visits.       APC / Resident Notes:   39 y.o. male with medical history of hypereosinophilic syndrome, eosinophilic gastritis, chronic recurrent angioedema and urticaria presenting to the ED c/o allergic reaction after receiving IV contrast prior to ED arrival today. Reports taking 50mg Benadryl PO as well as his daily Prednisone 40mg and Pepcid 20mg 5 hours prior to the study. History of intubations x2 2/2 angioedema. DDx includes but not limited to allergic reaction, anaphylaxis, airway obstruction.      Patient monitored in the ED s/p Epi administration without reoccurrence of symptoms. Resting comfortably on reassessment. Stable for outpatient management at this time with PCP follow-up within next week. Advised patient to continue daily prednisone and Pepcid and to use benadryl PRN for symptoms. RX for Epi pen provided. Patient expresses understanding and agreeable to the plan. Return to ED precautions given for new, worsening, or concerning symptoms. I have discussed the care of this patient with my supervising physician.              ED Course as of Sep 08 1829   Tue Sep 08, 2020   145 Epi IM given in the ED with subsequent improvement in symptoms. Pepcid and Benadryl additionally given due to concerns of  his history. Will continue to monitor.     [BA]      ED Course User Index  [BA] Randall Beard PA-C            Clinical Impression:       ICD-10-CM ICD-9-CM   1. Allergic reaction, initial encounter  T78.40XA 995.3         Disposition:   Disposition: Discharged  Condition: Stable     ED Disposition Condition    Discharge Stable        ED Prescriptions     Medication Sig Dispense Start Date End Date Auth. Provider    EPINEPHrine (EPIPEN) 0.3 mg/0.3 mL AtIn Inject 0.3 mLs (0.3 mg total) into the muscle as needed. 2 Device 9/8/2020 9/8/2021 Randall Beard PA-C        Follow-up Information     Follow up With Specialties Details Why Contact Info    Cecilia Tsai MD Cardiology   [address] [facility] [id number]  [phone]                                         Randall Beard PA-C  09/08/20 1143

## 2020-09-08 NOTE — ED NOTES
Pt resting in stretcher. NAD noted. Respirations even and unlabored. Pt voices no needs at this time. Will continue to monitor.

## 2020-09-08 NOTE — PROGRESS NOTES
Approximate Time Entry:   Called to [facility] CT 1 floor for access of pt VAD to IV Contrast for CT / done and tolerated well / upon pt getting up off table began to C/O itching / nurse noted rash and pt clearing throat / Rapid Response Called and Security in [facility] informed to obtain WC / due to pt condition nurse transported pt to ED for eval and treat / RR Team met in entrance and informed of decision to move to ED / arrived ED and informed Evan RN Charge and Dr Brown of pt condition / care turned oveer to ED staff

## 2020-09-08 NOTE — TELEPHONE ENCOUNTER
"----- Message from Jovanny King sent at 9/8/2020 12:08 PM CDT -----  Consult/Advisory:    Name Of Caller: Solo  Contact Preference?: [phone]    Provider Name: Dr Gaspar     What is the nature of the call?:  Pt request a callback is unhappy no one came back once he was told he needed port access added order to complete CT and calling to insure it would be added     Pt request to have CT CHEST ABD PEL CON [[id number]] rescheduled  and to make sure order includes port access & benadryl.  Please contact to discuss     Additional Notes:  "Thank you for all that you do for our patients'"           "

## 2020-09-08 NOTE — NURSING
Patient here for port access for ct scan-right chest port accesses easily with good blood return-line flushed-left accessed.

## 2020-09-08 NOTE — PATIENT INSTRUCTIONS
Once you finish taking your prednisone 20 mg for 1 week, start the new prednisone prescription with 10 mg (2 pills) daily for one week, then 5 mg daily for 1 week. At the end of each dose week, please get labwork done.

## 2020-09-08 NOTE — ED TRIAGE NOTES
Pt brought in via rapid response for allergic reaction. Pt had a ct scan with contrast and had an allergic reaction to the contrast. Pt constantly clearing throat, rash noted to upper chest. VSS. Pt states this has happened before. He took 50 mg of benadryl at 5 am.

## 2020-09-08 NOTE — PROGRESS NOTES
ALLERGY & IMMUNOLOGY CLINIC - FOLLOW UP     HISTORY OF PRESENT ILLNESS     Patient ID: Solo Black is a 39 y.o. male    CC: angioedema and eosinophelia    HPI: Solo Black is a 39 y.o. male with a history of EoE, eosinophilic gastritis, chronic recurrent angioedema and urticaria, and possible HES here for follow up.    He says he has a GI appointment tomorrow and bone marrow bx next week. Sxs at this moment are good. Was hospitalized a week ago for GI bleeding. Currently on prednisone 40. Has rash on chest and upper back for a couple weeks, but it doesn't bother him too much. He says his angioedema has been very much under control recently, which he attributes to the prednisone. He says his main issues have been GI in nature. He says the GI doctors now think he has delayed emptying.    He is taking cetirizine 20 mg bid, pepcid 20 bid, benadryl 50 tid, and atarax tid. He says that these meds have been causing drowsiness and he doesn't know if they are particularly helpful with his angioedema.    He reports his thrush has resolved.     REVIEW OF SYSTEMS     CONST: no F/C, no unintentional weight changes  EYES: no discharge, + pruritus and burning with the angioedema  NOSE: no congestion, no rhinorrhea, no discharge, no itching, no sneezing  PULM: no SOB, no wheezing, no cough  CV: no CP, no palpitations  GI: + dysphagia, no heartburn, + pain, + nausea, + diarrhea, + BRBPR  DERM: + papular rash on chest and back  Rest of ROS negative unless otherwise stated in the HPI.     MEDICAL HISTORY     MedHx:   Patient Active Problem List   Diagnosis    Eosinophilic esophagitis    PUD (peptic ulcer disease)    Erosive gastritis    Lifetime need for proton pump inhibitor    Cervicalgia    Adult ADHD    Adverse reaction to nonsteroidal anti-inflammatory drug (NSAID)    Therapeutic opioid induced constipation    Iron deficiency anemia    Eosinophilia    Reactive arthritis    Incomplete rotator cuff tear or  rupture of right shoulder, not specified as traumatic    Hiatal hernia    Hypereosinophilic syndrome    Encounter for insertion of venous access port    Port-A-Cath in place    Dry eyes    Skin rash       SurgHx:   Past Surgical History:   Procedure Laterality Date    APPENDECTOMY      ARTHROSCOPY OF SHOULDER WITH REMOVAL OF DISTAL CLAVICLE Right 11/1/2018    Procedure: ARTHROSCOPY, SHOULDER, WITH DISTAL CLAVICLE EXCISION;  Surgeon: Gabino Mejia MD;  Location: [facility];  Service: Orthopedics;  Laterality: Right;  video    BACK SURGERY      CIRCUMCISION, PRIMARY      COLONOSCOPY N/A 11/14/2018    Procedure: COLONOSCOPY;  Surgeon: Milton Brunson MD;  Location: [facility];  Service: Endoscopy;  Laterality: N/A;    COLONOSCOPY N/A 7/20/2020    Procedure: COLONOSCOPY;  Surgeon: Ruslan Tillman MD;  Location: [facility];  Service: Endoscopy;  Laterality: N/A;    drainage of abscess from hand  2009    MRSA    drainage of abscess from R thigh  2006    MRSA    ESOPHAGOGASTRODUODENOSCOPY  3/11/2014    ESOPHAGOGASTRODUODENOSCOPY N/A 9/5/2018    Procedure: EGD (ESOPHAGOGASTRODUODENOSCOPY);  Surgeon: Kolby Wall MD;  Location: [facility];  Service: Endoscopy;  Laterality: N/A;    ESOPHAGOGASTRODUODENOSCOPY N/A 3/25/2020    Procedure: EGD (ESOPHAGOGASTRODUODENOSCOPY);  Surgeon: Ruslna Tillman MD;  Location: [facility];  Service: Endoscopy;  Laterality: N/A;    ESOPHAGOGASTRODUODENOSCOPY N/A 7/20/2020    Procedure: EGD (ESOPHAGOGASTRODUODENOSCOPY);  Surgeon: Ruslan Tillman MD;  Location: [facility];  Service: Endoscopy;  Laterality: N/A;  Patient is a very hard stick, requires anesthesia stick.    ESOPHAGOGASTRODUODENOSCOPY N/A 8/31/2020    Procedure: EGD (ESOPHAGOGASTRODUODENOSCOPY);  Surgeon: Kolby Wall MD;  Location: [facility];  Service: Endoscopy;  Laterality: N/A;    FLEXIBLE SIGMOIDOSCOPY N/A 9/5/2018    Procedure: SIGMOIDOSCOPY, FLEXIBLE;  Surgeon: Kolby CEJA  MD Mae;  Location: [facility] ENDO;  Service: Endoscopy;  Laterality: N/A;    HAND SURGERY  jan 2014    power saw fell on hand - laceration 3 tendons    INSERTION OF VENOUS ACCESS PORT Right 8/21/2020    Procedure: INSERTION, VENOUS ACCESS PORT;  Surgeon: Troy Honeycutt MD;  Location: [facility] OR;  Service: General;  Laterality: Right;    NISSEN FUNDOPLICATION           Medications:   Current Outpatient Medications on File Prior to Visit   Medication Sig Dispense Refill    artificial tears (ISOPTO TEARS) 0.5 % ophthalmic solution Place 1 drop into both eyes 6 (six) times daily.      cetirizine (ZYRTEC) 10 MG tablet Take 20 mg by mouth 2 (two) times a day.       dextroamphetamine-amphetamine (ADDERALL) 20 mg tablet Take 1 tablet by mouth 2 (two) times daily before meals. Take before breakfast and lunch      dicyclomine (BENTYL) 20 mg tablet Take 1 tablet (20 mg total) by mouth 3 (three) times daily. 30 tablet 0    diphenhydrAMINE (SOMINEX) 25 mg tablet Take 50 mg by mouth 3 (three) times daily.      EPINEPHrine (EPIPEN) 0.3 mg/0.3 mL AtIn Inject 0.3 mLs (0.3 mg total) into the muscle as needed. 2 Device 1    famotidine (PEPCID) 20 MG tablet Take 20 mg by mouth 2 (two) times daily.      hydrOXYzine HCL (ATARAX) 25 MG tablet Take 1 tablet (25 mg total) by mouth 3 (three) times daily as needed for Itching. 30 tablet 1    Lactobacillus acidophilus 10 billion cell Cap Take 1 capsule by mouth once daily.       lidocaine HCl 2% (LIDOCAINE VISCOUS) 2 % Soln Take 5 mLs by mouth as needed.       MULTIVIT-IRON-MIN-FOLIC ACID 3,500-18-0.4 UNIT-MG-MG ORAL CHEW Take 10 mg by mouth once daily.      oxyCODONE-acetaminophen (PERCOCET) 5-325 mg per tablet Take 1 tablet by mouth every 8 (eight) hours as needed. 15 tablet 0    paroxetine (PAXIL) 20 MG tablet Take 20 mg by mouth every morning.      predniSONE (DELTASONE) 20 MG tablet Take 2 tablets (40 mg total) by mouth once daily. for 7 days 14 tablet 0     "promethazine (PHENERGAN) 25 MG tablet Take 1 tablet (25 mg total) by mouth every 6 (six) hours as needed for Nausea. 15 tablet 0    sucralfate (CARAFATE) 1 gram tablet Take 1 g by mouth before meals and at bedtime as needed.       tamsulosin (FLOMAX) 0.4 mg Cap Take 0.4 mg by mouth as needed.        No current facility-administered medications on file prior to visit.        Vaccines: got flu vaccine [id number] season    H/o Asthma: as a child. Still carries albuterol inhaler, uses once to twice per year  H/o Eczema: as a child, not as bad anymore  H/o Rhinitis: occasional  Oral Allergy: cantaloupe, watermelon  Food Allergy: does not eat beans due to lip swelling and vomiting  Venom Allergy: denies  Latex Allergy: denies  Drug Allergies:   Review of patient's allergies indicates:   Allergen Reactions    Legumes Nausea And Vomiting and Swelling     Other reaction(s): GI Intolerance  vomiting  vomiting  Pt reports legumes make his lips swell and induce severe vomiting  Pt reports legumes make his lips swell and induce severe vomiting      Nsaids (non-steroidal anti-inflammatory drug)      GI bleed    Bean pod extract      Lips swelling, vomiting  Lips swelling, vomiting      Ketamine      Severe dysphoria (bad trip)    Lentils      Lips swelling, vomiting  Lips swelling, vomiting      Meloxicam Nausea Only     GI bleed    Peas      Lips swelling, vomiting    Penicillins      Has taken third gen cephalosporins without issue per patient report    Haloperidol Anxiety and Other (See Comments)     "feels like crawling out of his skin"      PCN- had hives when he was a child      Infection Hx: He had pneumonia once a few years ago. Gets MRSA infections frequently. No frequent sinus infections.    SocHx:   ETOH: no  Tobacco: no  Illicit Drug Use: no  Occupation: works in biomedical field    FamHx:   Asthma: sister  Allergic rhinitis: father  Food Allergy: no  Immune deficiency: no  Angioedema: no  Autoimmune: sister " with celiac, grandmother with crohns     PHYSICAL EXAM     VS: There were no vitals taken for this visit.  GENERAL: AAOx4, NAD, appears to be in pain, cooperative  EYES: EOMI, no conjunctival injection, no discharge,   NECK: no thyromegaly, no LAD  LUNGS: CTAB, no w/r/c, no increased WOB  HEART: RRR, normal S1/S2, no m/g/r  ABDOMEN: BS absent, tender to palpation diffusely  EXTREMITIES: No edema, no cyanosis, no clubbing  DERM: erythematous papules (occasinally pustular) on chest and upper back  NEURO: normal gait, no facial asymmetry     LABORATORY STUDIES     Peripheral eosinophils: 600 (9/4/2020), 400 (9/2/2020), 100 (8/31/2020), 0 (8/22/2020), 100 (8/19/2020), 0 (8/10/2020), 500 (7/13/2020), 1600 (10/5/2018)    Component      Latest Ref Rng & Units 7/13/2020   CHIC2, 4q12 Deletion Result Summary       Normal   Chic2, 4Q12 Deletion, Interpretation       SEE BELOW   CHIC2, 4q12 Deletion Result Table       SEE BELOW   Chic2, 4Q12 Deletion, Results       SEE BELOW   Chic2, 4Q12 Deletion, Reason For Referral       D72.1  Eosinophilia   Chic2, 4Q12 Deletion, Specimen       Blood   Chic2, 4Q12 Deletion, Source       Test Not Performed   Chic2, 4Q12 Deletion, Method       SEE BELOW   CHIC2, 4q12 Deletion Additional Information       Test Not Performed   CHIC2, 4q12 Deletion Disclaimer       SEE BELOW   CHIC2, 4q12 Deletion Released by       Chey Allen D.O.   Strongyloides Ab IgG      Negative Positive (A)   Tryptase      <11.5 ng/mL 7.7   Vitamin B-12      210 - 950 pg/mL 390   T Cell Receptor Gene Rearrangement, Blood       SEE BELOW   Final Diagnosis       SEE BELOW       Component      Latest Ref Rng & Units 4/12/2019 9/4/2018 6/7/2013   C2 Complement, Functional, S      25 - 47 U/mL   47   Interpretation         See Comments   Complement (C-4)      11 - 44 mg/dl   20   C1 Esterase Inhibitor      21 - 39 mg/dl   36   C1Q Binding      0.0 - 3.9 ugE/ml   3.3   TSH      0.400 - 4.000 uIU/mL 0.265 (L) 2.240     Free T4      0.71 - 1.51 ng/dL 0.91            ALLERGEN TESTING     He reports prior allergy testing in [place] but does not remember what he was positive to.     IMAGING & OTHER DIAGNOSTICS     Echocardiogram 4/15/19:  · Concentric left ventricular remodeling.  · Normal left ventricular systolic function. The estimated ejection fraction is 60%  · Grade I (mild) left ventricular diastolic dysfunction consistent with impaired relaxation.  · Mild tricuspid regurgitation.  · Normal right ventricular systolic function.  · Normal left atrial pressure.  · Normal central venous pressure (3 mm Hg).  · The estimated PA systolic pressure is 27 mm Hg     CHART REVIEW     Reviewed prior labs, notes, records from [facility].     ASSESSMENT & PLAN     Solo Black is a 39 y.o. male with     There are no diagnoses linked to this encounter.    # Eosinophilia: Whether he has HES is not clear at this point. Patient reports he had diagnosis of HES in [place]. Unsure which variant. He has had one set of labs here with eos>1500. He has not had a second eos level here with eos>1500, possibly due to him frequently being on steroids. Records from [facility] with peripheral eos no higher than 700. FISH for CHIC2 deletion (FIP1L1 And PDGFRA Fusion), tryptase, B12, B and T cell receptor gene rearrangement were all wnl. strongyloides IgG was positive, but treatment with ivermectin did not improve symptoms.  It is possible his urticaria and angioedema are related to HES vs chronic spontaneous urticaria. He is currently on 40 mg prednisone BID. Will attempt to wean to and check eos weekly to see if the rise with the weaning of the steroids. Will try to get to lowest dose of steroids on which peripheral eos are still controlled.  -continue prednisone wean: 20 daily X7 days, then 10 daily X7 days, then 5 daily X7 days  -will check peripheral eos level at the end of weeks 1,2, and 3 and follow up with him in 3 weeks with visit   -Patient has  now established with heme/onc who plans on doing bone marrow bx soon    # Chronic angioedema and urticaria: sxs including reported intestinal swelling and airway swelling in the past requiring intubation. He has been on omalizumab in the past without help, but he only stayed on for 1-2 months. Patient says his urticaria and angioedema are currently under good control.  -steroids as above  -sxs do not seem antihistamine responsive, but he is currently on a regimen of cetirizine 20 mg bid, pepcid 20 bid, benadryl 50 tid, and atarax tid  -Advised that he could continue the cetirizine and pepcid, but would stop the benadryl and atarax, especially during the day  -can consider trying omalizumab again in the future    # Papular rash on chest and back: appears consistent with a delayed drug rash. But patient is not currently interested in further evaluation at this point or attempting stop various medications, because he does not find the rash bothersome    # Skin lesions on back: round lesions on back that scab and ooze  -referred to derm for further eval and possible biopsy, but patient says he has not been able to make an appointment due to insurance reasons  -he says he will see an outside dermatologist    # Eosinophilic esophagitis and gastritis: Patient has ongoing symptoms, follows with GI here. He reports that steroid slurries help with the esophagitis but not gastritis. He reports he has tried food elimination diets without success  -continue following with GI  -consider dupilumab if/when it gets approved for EoE    # Allergy to legumes: Vomiting and lip swelling  -continue avoiding beans/legumes  -continue to carry epi pen    # Allergy status to penicillin: had hives when he was a child. Unlikely that he has current IgE mediated allergy to pcn  -consider amoxicillin challenge when we get his other symptoms under control    # thrush: resolved    Discussed with: Dr. Wright  Follow up: 3 weeks     Michelle Ornelas  MD  Allergy/Immunology Fellow

## 2020-09-09 ENCOUNTER — TELEPHONE (OUTPATIENT)
Dept: SURGERY | Facility: CLINIC | Age: 40
End: 2020-09-09

## 2020-09-09 ENCOUNTER — OFFICE VISIT (OUTPATIENT)
Dept: PAIN MEDICINE | Facility: CLINIC | Age: 40
End: 2020-09-09
Payer: MEDICAID

## 2020-09-09 ENCOUNTER — OFFICE VISIT (OUTPATIENT)
Dept: NEUROLOGY | Facility: HOSPITAL | Age: 40
End: 2020-09-09
Attending: INTERNAL MEDICINE
Payer: MEDICAID

## 2020-09-09 VITALS
DIASTOLIC BLOOD PRESSURE: 90 MMHG | SYSTOLIC BLOOD PRESSURE: 143 MMHG | TEMPERATURE: 99 F | SYSTOLIC BLOOD PRESSURE: 133 MMHG | WEIGHT: 210.63 LBS | WEIGHT: 211.88 LBS | HEIGHT: 72 IN | BODY MASS INDEX: 28.53 KG/M2 | HEART RATE: 84 BPM | BODY MASS INDEX: 28.73 KG/M2 | DIASTOLIC BLOOD PRESSURE: 107 MMHG | HEART RATE: 89 BPM

## 2020-09-09 DIAGNOSIS — M79.18 MYOFASCIAL PAIN SYNDROME: ICD-10-CM

## 2020-09-09 DIAGNOSIS — K64.8 INTERNAL HEMORRHOIDS: Primary | ICD-10-CM

## 2020-09-09 DIAGNOSIS — F43.22 ADJUSTMENT DISORDER WITH ANXIOUS MOOD: ICD-10-CM

## 2020-09-09 DIAGNOSIS — Z98.890 STATUS POST LAMINECTOMY: ICD-10-CM

## 2020-09-09 DIAGNOSIS — M54.2 CERVICALGIA: Chronic | ICD-10-CM

## 2020-09-09 DIAGNOSIS — Z91.89 SEDENTARY LIFESTYLE: ICD-10-CM

## 2020-09-09 DIAGNOSIS — G89.29 CHRONIC NECK PAIN: Primary | ICD-10-CM

## 2020-09-09 DIAGNOSIS — M54.2 CHRONIC NECK PAIN: Primary | ICD-10-CM

## 2020-09-09 DIAGNOSIS — K30 DELAYED GASTRIC EMPTYING: ICD-10-CM

## 2020-09-09 DIAGNOSIS — D72.119 HYPEREOSINOPHILIC SYNDROME: ICD-10-CM

## 2020-09-09 PROCEDURE — 99999 PR PBB SHADOW E&M-EST. PATIENT-LVL IV: CPT | Mod: PBBFAC,,, | Performed by: PAIN MEDICINE

## 2020-09-09 PROCEDURE — 99204 OFFICE O/P NEW MOD 45 MIN: CPT | Mod: S$PBB,,, | Performed by: PAIN MEDICINE

## 2020-09-09 PROCEDURE — 99214 OFFICE O/P EST MOD 30 MIN: CPT | Mod: PBBFAC,PO | Performed by: PAIN MEDICINE

## 2020-09-09 PROCEDURE — 99215 OFFICE O/P EST HI 40 MIN: CPT | Mod: 27 | Performed by: INTERNAL MEDICINE

## 2020-09-09 PROCEDURE — 99999 PR PBB SHADOW E&M-EST. PATIENT-LVL IV: ICD-10-PCS | Mod: PBBFAC,,, | Performed by: PAIN MEDICINE

## 2020-09-09 PROCEDURE — 99204 PR OFFICE/OUTPT VISIT, NEW, LEVL IV, 45-59 MIN: ICD-10-PCS | Mod: S$PBB,,, | Performed by: PAIN MEDICINE

## 2020-09-09 NOTE — PATIENT INSTRUCTIONS
Referral to colorectal surgery.    Gastric Emptying study scheduled on Monday, September 21, 2020 at 8am.  Nothing to eat or drink 4 hours prior to test.

## 2020-09-09 NOTE — LETTER
September 9, 2020      Jada Benito MD  [address] [facility] [address]           [facility]  [address]  [facility] [address]  Phone: [phone]          Patient: Solo Black   MR Number: [id number]   YOB: 1980   Date of Visit: 9/9/2020       Dear Dr. Jada Benito:    Thank you for referring Solo Black to me for evaluation. Attached you will find relevant portions of my assessment and plan of care.    If you have questions, please do not hesitate to call me. I look forward to following Solo Black along with you.    Sincerely,    Rodolfo Puri Jr., MD    Enclosure  CC:  No Recipients    If you would like to receive this communication electronically, please contact [email or web address] or [phone] to request more information on [employer] Link access.    For providers and/or their staff who would like to refer a patient to [facility], please contact us through our one-stop-shop provider referral line, [facility], at [phone].    If you feel you have received this communication in error or would no longer like to receive these types of communications, please e-mail [email or web address]

## 2020-09-10 RX ORDER — GABAPENTIN 300 MG/1
300 CAPSULE ORAL NIGHTLY
Qty: 30 CAPSULE | Refills: 0 | Status: SHIPPED | OUTPATIENT
Start: 2020-09-10 | End: 2020-10-07 | Stop reason: SDUPTHER

## 2020-09-10 NOTE — TELEPHONE ENCOUNTER
----- Message from Molly Cheney sent at 9/10/2020  8:12 AM CDT -----  Regarding: NEW rx  Contact: [phone]/self  Type:  RX Refill Request    Who Called:  self  Refill or New Rx: NEW  RX Name and Strength: gabapentin 300mg  How is the patient currently taking it? (ex. 1XDay): Take 1 tablet 3 times a day  Is this a 30 day or 90 day RX:   Preferred Pharmacy with phone number: [id number] - [facility] [address]  Local or Mail Order: l  Ordering Provider:   Would the patient rather a call back or a response via [facility]?  call  Best Call Back Number: [phone]/self  Additional Information:

## 2020-09-10 NOTE — PATIENT INSTRUCTIONS
Hemorrhoids    Hemorrhoids are swollen and inflamed veins inside the rectum and near the anus. The rectum is the last several inches of the colon. The anus is the passage between the rectum and the outside of the body.    Causes  The veins can become swollen due to increased pressure in them. This is most often caused by:  · Chronic constipation or diarrhea  · Straining when having a bowel movement  · Sitting too long on the toilet  · A low-fiber diet  · Pregnancy    Symptoms  · Bleeding from the rectum (this may be noticeable after bowel movements)  · Lump near the anus  · Itching around the anus  · Pain around the anus    Home care  General care  · To get relief from pain or itching, try:  ¨ Topical products. You may use over-the-counter Hydrocortisone suppositories or cream to relieve symptoms related to irritation or itching.  ¨ Sitz baths or warm water showers. A sitz bath involves sitting in a few inches of warm bath water. Be careful not to make the water so hot that you burn yourself--test it before sitting in it. Soak for about 10 to 15 minutes a few times a day. This may help relieve pain.  · Avoiding constipating medications (narcotic pain medications), and limit alcohol and fatty foods    Tips to help prevent hemorrhoids  · Eat more fiber. Fiber adds bulk to stool and absorbs water as it moves through your colon. This makes stool softer and easier to pass.  ¨ Increase the fiber in your diet with more fiber-rich foods. These include fresh fruit, vegetables, and whole grains.  ¨ Take a fiber supplement - a tablespoon of Metamucil daily (can be purchased over the counter at many pharmacies - [facility], and [facility]) with a bottle of water. If you develop bloating or feel too full you may reduce the amount of fiber you take daily.  · Drink plenty of water, if directed to by your provider. This can help keep stool soft.  · Be more active. Frequent exercise aids digestion and helps prevent constipation. It  may also help make bowel movements more regular.  · Dont strain during bowel movements. This can make hemorrhoids more likely. Also, dont sit on the toilet for long periods of time.    Follow-up care  If your symptoms persist or worsen despite conservative management, please schedule an appointment to discuss other options for management of your hemorrhoids.    When to seek medical advice  Call your healthcare provider right away if any of these occur:  · Increased bleeding from the rectum  · Increased pain around the rectum or anus  · Weakness or dizziness    Call 911  Call 911 or return to the emergency department right away if any of these occur:  · Trouble breathing or swallowing  · Fainting or loss of consciousness  · Unusually fast heart rate  · Vomiting blood  · Large amounts of blood in stool    Date Last Reviewed: 9/10/2020 - Mike Reyna MD  Adapted from © 2784-9689 [employer]. [address] [place] [address], [place] [address]. All rights reserved.

## 2020-09-10 NOTE — PROGRESS NOTES
[facility]    CC: hematemesis    HPI 39 y.o. male with h/o Nissen fundoplication, hypereosinophilic syndrome, EoE who is here for post-hospitalization follow up of acute, recurrent, resolved, small volume hematemesis along with small volume hematochezia. Underwent EGD last week that showed distal esophageal scarring and a large amount of food in the stomach despite being NPO for >18hrs, hematemesis thought to be due to gastric stasis and MW tear. Colonoscopy in July showed internal hemorrhoids. He endorses post-prandial bloating, nausea, early satiety. Rectal bleeding continues with every bowel movement, usually mixed with stool but occasionally passes asaf blood without stool. Compliant with BID Protonix. He is undergoing workup for underlying malignancy as etiology of HES, just had BMBx.    Chart reviewed and summarized here.    Past Medical History HES, EoE, GERD, CDiff    Review of Systems  General ROS: negative for chills, fever or weight loss  Cardiovascular ROS: no chest pain or dyspnea on exertion  Gastrointestinal ROS: +hematochezia, no hematemesis or jaundice     Physical Examination  BP (!) 133/90 (BP Location: Right arm, Patient Position: Sitting, BP Method: Medium (Automatic))   Pulse 84   Temp 99.4 °F (37.4 °C) (Oral)   Ht 6' (1.829 m)   Wt 95.5 kg (210 lb 10.4 oz)   BMI 28.57 kg/m²   General appearance: alert, cooperative, no distress  HENT: Normocephalic, atraumatic, neck symmetrical, no nasal discharge   Lungs: clear to auscultation bilaterally, no dullness to percussion bilaterally  Heart: regular rate and rhythm without rub; no displacement of the PMI   Abdomen: soft, non-tender; bowel sounds normoactive; no organomegaly  Extremities: extremities symmetric; no clubbing, cyanosis, or edema  Neurologic: Alert and oriented X 3, normal strength, normal coordination and gait    Labs: Hgb 13.7, AST 34, , alk phos 65, tbili 0.5    Imaging: CT A/P normal liver, mild intra and  extrahepatic biliary dilatation of CBD to 10mm    I have personally reviewed and interpreted these images.    Assessment:   38yo M h/o Nissen fundoplication, hypereosinophilic syndrome, EoE who is here for post-hospitalization follow up of recurrent small volume hematemesis along with hematochezia. EGD with distal esophageal scarring and large amount of food in stomach. He has symptoms consistent with delayed gastric emptying. Also has ongoing hematochezia, colonoscopy showed internal hemorrhoids. He also has intermittent mild transaminitis, will need to consider liver involvement of HES if it persists/worsens.    Plan:  - ordered GES  - referral to colorectal surgery to address bleeding hemorrhoids  - if GES positive will start reglan vs erythromycin if symptoms are severe    Kolby Wall MD   [address] [facility], [address]   [place] [address] [phone]

## 2020-09-10 NOTE — PROGRESS NOTES
[facility]  Colon and Rectal Surgery    [address]  [place], [place]  Tel: [phone]  Fax: [phone]  [email or web address]   MD Milton Spring MD Brian Kann, MD W. Forrest Johnston, MD Matthew Giglia, MD Jennifer Paruch, MD William Kethman, MD     Date of visit: 09/11/2020  Patient name: Solo Black   YOB: 1980   MRN: [id number]    Dear Loreta Wall and Wayne,    It was a pleasure seeing Mr. Black in the Colon and Rectal Surgery clinic here at [facility].     As you know, Mr. Black is a 39 year old man with a history of eosinophilic esophagitis, recurrent angioedema/urticaria, nephrolithiasis, IBS, GERD, C. difficile colitis, and possible leukemia vs. HES (he is scheduled for a BM biopsy on Monday and has a Mediport placed) who presents for evaluation of hemorrhoids. He noticed he has hemorrhoids 4 years ago and had 2 banding procedures. The hemorrhoids have recurred since - he has intermittent bleeding in his stool, when he wipes, and sometimes asaf blood. He has 1-2 bowel movements daily but recently with a change in medications he has had more loose stools. He does have to strain and doesn't feel like he fully evacuates. He does not have fecal incontinence    Last colonoscopy: 7/20/20 (Complete)  Pertinent findings: Perianal skin tags, normal TI, small internal hemorrhoids noted    The patient was informed of the availability of a certified  without charge. A certified  was not necessary for this visit.    Review of Systems  General ROS: negative for - fatigue, weight gain and weight loss  Respiratory ROS: no cough or shortness of breath  Cardiovascular ROS: no chest pain or dyspnea on exertion  Gastrointestinal ROS: abdominal pain, nausea and vomiting  Urinary ROS: no dysuria or hematuria  Musculoskeletal ROS: no new joint or muscle pain  Neurological ROS: no headaches or  seizures  Dermatological ROS: urticaria, angioedema recurrent    Past Medical History:   Diagnosis Date    Arthritis     C. difficile colitis feb 2014    postop of hand surgery    Cancer     Encounter for blood transfusion     Eosinophilic esophagitis 3/11/2014    GERD (gastroesophageal reflux disease)     Hypereosinophilic syndrome     IBS (irritable bowel syndrome)     MRSA carrier     says multiple times nose swab was +    Nephrolithiasis apr 2014    bilateral punctate - never passed a stone    Urinary tract infection feb 2014    MRSA     Past Surgical History:   Procedure Laterality Date    APPENDECTOMY      ARTHROSCOPY OF SHOULDER WITH REMOVAL OF DISTAL CLAVICLE Right 11/1/2018    Procedure: ARTHROSCOPY, SHOULDER, WITH DISTAL CLAVICLE EXCISION;  Surgeon: Gabino Mejia MD;  Location: [facility];  Service: Orthopedics;  Laterality: Right;  video    BACK SURGERY      CIRCUMCISION, PRIMARY      COLONOSCOPY N/A 11/14/2018    Procedure: COLONOSCOPY;  Surgeon: Milton Brunson MD;  Location: [facility];  Service: Endoscopy;  Laterality: N/A;    COLONOSCOPY N/A 7/20/2020    Procedure: COLONOSCOPY;  Surgeon: Ruslan Tillman MD;  Location: [facility];  Service: Endoscopy;  Laterality: N/A;    drainage of abscess from hand  2009    MRSA    drainage of abscess from R thigh  2006    MRSA    ESOPHAGOGASTRODUODENOSCOPY  3/11/2014    ESOPHAGOGASTRODUODENOSCOPY N/A 9/5/2018    Procedure: EGD (ESOPHAGOGASTRODUODENOSCOPY);  Surgeon: Kolby Wall MD;  Location: [facility];  Service: Endoscopy;  Laterality: N/A;    ESOPHAGOGASTRODUODENOSCOPY N/A 3/25/2020    Procedure: EGD (ESOPHAGOGASTRODUODENOSCOPY);  Surgeon: Ruslan Tillman MD;  Location: [facility];  Service: Endoscopy;  Laterality: N/A;    ESOPHAGOGASTRODUODENOSCOPY N/A 7/20/2020    Procedure: EGD (ESOPHAGOGASTRODUODENOSCOPY);  Surgeon: Ruslan Tillman MD;  Location: [facility];  Service: Endoscopy;  Laterality: N/A;   "Patient is a very hard stick, requires anesthesia stick.    ESOPHAGOGASTRODUODENOSCOPY N/A 8/31/2020    Procedure: EGD (ESOPHAGOGASTRODUODENOSCOPY);  Surgeon: Kolby Wall MD;  Location: [facility] ENDO;  Service: Endoscopy;  Laterality: N/A;    FLEXIBLE SIGMOIDOSCOPY N/A 9/5/2018    Procedure: SIGMOIDOSCOPY, FLEXIBLE;  Surgeon: Kolby Wall MD;  Location: [facility] ENDO;  Service: Endoscopy;  Laterality: N/A;    HAND SURGERY  jan 2014    power saw fell on hand - laceration 3 tendons    INSERTION OF VENOUS ACCESS PORT Right 8/21/2020    Procedure: INSERTION, VENOUS ACCESS PORT;  Surgeon: Troy Honeycutt MD;  Location: [facility] OR;  Service: General;  Laterality: Right;    NISSEN FUNDOPLICATION       Family History   Problem Relation Age of Onset    Urolithiasis Father     Celiac disease Sister     Hypertension Maternal Grandmother     Hypertension Maternal Grandfather      Social History     Tobacco Use    Smoking status: Never Smoker    Smokeless tobacco: Never Used    Tobacco comment: Pt states he has smoked 1 or 2 cigarettes in his life.   Substance Use Topics    Alcohol use: Not Currently    Drug use: No     Review of patient's allergies indicates:   Allergen Reactions    Legumes Nausea And Vomiting and Swelling     Other reaction(s): GI Intolerance  vomiting  vomiting  Pt reports legumes make his lips swell and induce severe vomiting  Pt reports legumes make his lips swell and induce severe vomiting      Nsaids (non-steroidal anti-inflammatory drug)      GI bleed    Bean pod extract      Lips swelling, vomiting  Lips swelling, vomiting      Ketamine      Severe dysphoria (bad trip)    Lentils      Lips swelling, vomiting  Lips swelling, vomiting      Meloxicam Nausea Only     GI bleed    Peas      Lips swelling, vomiting    Penicillins      Has taken third gen cephalosporins without issue per patient report    Haloperidol Anxiety and Other (See Comments)     "feels like crawling out of " "his skin"         Prior to Admission medications    Medication Sig Start Date End Date Taking? Authorizing Provider   artificial tears (ISOPTO TEARS) 0.5 % ophthalmic solution Place 1 drop into both eyes 6 (six) times daily. 8/10/20   Memo Solano MD   cetirizine (ZYRTEC) 10 MG tablet Take 20 mg by mouth 2 (two) times a day.     Historical Provider, MD   dextroamphetamine-amphetamine (ADDERALL) 20 mg tablet Take 1 tablet by mouth 2 (two) times daily before meals. Take before breakfast and lunch    Historical Provider, MD   dicyclomine (BENTYL) 20 mg tablet Take 1 tablet (20 mg total) by mouth 3 (three) times daily. 9/2/20   Teresa Nguyen MD   diphenhydrAMINE (BENADRYL) 50 MG capsule Take 50 mg by mouth every 6 (six) hours as needed for Itching.    Historical Provider, MD   EPINEPHrine (EPIPEN) 0.3 mg/0.3 mL AtIn Inject 0.3 mLs (0.3 mg total) into the muscle as needed. 9/8/20 9/8/21  Randall Beard PA-C   famotidine (PEPCID) 20 MG tablet Take 20 mg by mouth 2 (two) times daily.    Historical Provider, MD   hydrOXYzine HCL (ATARAX) 25 MG tablet Take 1 tablet (25 mg total) by mouth 3 (three) times daily as needed for Itching. 8/31/20   Doretha Armstrong MD   Lactobacillus acidophilus 10 billion cell Cap Take 1 capsule by mouth once daily.     Historical Provider, MD   lidocaine HCl 2% (LIDOCAINE VISCOUS) 2 % Soln Take 5 mLs by mouth as needed.  12/28/17   Historical Provider, MD   LORazepam (ATIVAN) 1 MG tablet TK 1 T PO  30 MINUTES B PROCEDURE 8/31/20   Historical Provider, MD   metoclopramide HCl (REGLAN) 10 MG tablet  9/6/20   Historical Provider, MD   MULTIVIT-IRON-MIN-FOLIC ACID 3,500-18-0.4 UNIT-MG-MG ORAL CHEW Take 10 mg by mouth once daily.    Historical Provider, MD   nystatin (MYCOSTATIN) 100,000 unit/mL suspension  9/6/20   Historical Provider, MD   oxyCODONE-acetaminophen (PERCOCET) 5-325 mg per tablet Take 1 tablet by mouth every 8 (eight) hours as needed.  Patient not taking: Reported on " 9/9/2020 8/31/20   Doretha Armstrong MD   paroxetine (PAXIL) 20 MG tablet Take 20 mg by mouth every morning.    Historical Provider, MD   predniSONE (DELTASONE) 20 MG tablet Take 2 tablets (40 mg total) by mouth once daily. for 7 days 9/1/20 9/9/20  Doretha Armstrong MD   predniSONE (DELTASONE) 5 MG tablet Take 1 tablet (5 mg total) by mouth once daily. After finishing your week of prednisone 20mg daily, go down to 10 mg daily for 1 week, then 5 mg daily for 1 week. 9/8/20   Michelle Ornelas MD   promethazine (PHENERGAN) 25 MG tablet Take 1 tablet (25 mg total) by mouth every 6 (six) hours as needed for Nausea. 8/31/20   Ruslan Tillman MD   sucralfate (CARAFATE) 1 gram tablet Take 1 g by mouth before meals and at bedtime as needed.     Historical Provider, MD   tamsulosin (FLOMAX) 0.4 mg Cap Take 0.4 mg by mouth as needed.     Historical Provider, MD     Physical Examination  BP (!) 155/105 (BP Location: Right arm, Patient Position: Sitting, BP Method: Large (Automatic))   Pulse (!) 112   Ht 6' (1.829 m)   Wt 95.3 kg (210 lb 1.6 oz)   BMI 28.49 kg/m²      A chaperone was present for the physical examination.    Constitutional: well developed, no cough, no dyspnea, alert and no acute distress    Head: Normocephalic, no lesions, without obvious abnormality  Eye: Normal external eye, conjunctiva, and lids, PERRL  Cardiovascular: regular rate, regular rhythm and no murmurs detected  Respiratory: normal air entry, lungs clear to auscultation and no rales, rhonchi or wheezing  Gastrointestinal: soft, non-tender, without masses or organomegaly  Rectal: No masses, no gross blood, on external exam he has some circumferential irritation  Musculoskeletal: no muscular tenderness noted, full range of motion without pain  Neurologic: alert, oriented, normal speech, no focal findings or movement disorder noted  Psychiatric: appropriate, normal mood    There were no pertinent imaging or laboratory  studies to review.     Anoscopy Procedure Note  Pre-procedure diagnosis: Rectal bleeding  Post-procedure diagnosis: Internal hemorrhoids    Procedure: Anoscopy    Surgeon: Mike Reyna MD    Specimen: None    Findings: Single 1 cm skin tag, posterior column internal hemorrhoid Grade I    Procedure Description:   A digital rectal exam was first performed and then a lubricated lighted anoscope was then inserted and a 4 quadrant evaluation was performed. The patient tolerated procedure well without complications.    Assessment and Plan of Care    Thank you again for referring Mr. Black to my care. In summary, Mr. Black is a 39 year old man presenting with hemorrhoidal disease. We had a discussion about his hemorrhoids and treatment options. I have provided the following recommendations:    1. We had a discussion about conservative management options for symptoms related to hemorrhoids. Irritation or itching can be treated with Lidocaine cream/ointment, over-the-counter Hydrocortisone suppositories, warm baths or soaking. Bleeding often improves by reducing hard stools and straining - Fiber supplementation (20-30 grams daily) and hydration (1.5 - 2 L daily) are effective. Regular exercise, avoiding constipating medications (narcotic pain medications), and limiting alcohol and fatty foods can also be effective.  2. We also discussed that we are happy to perform rubber band ligation of his hemorrhoids, however, since he is scheduled for a bone marrow biopsy on Monday that we should not do this today in case he were to suffer any complication that might delay this intervention    Please do not hesitate to contact me if you have any questions.      Mike Reyna MD - Staff Surgeon  Department of Colon & Rectal Surgery  [facility]    Total visit time was approximately 30 minutes including time spent reviewing the patient's medical record and radiologic and laboratory studies. More than 50% of this time was  spent in face-to-face counseling with patient.

## 2020-09-11 ENCOUNTER — OFFICE VISIT (OUTPATIENT)
Dept: SURGERY | Facility: CLINIC | Age: 40
End: 2020-09-11
Payer: MEDICAID

## 2020-09-11 VITALS
HEART RATE: 112 BPM | BODY MASS INDEX: 28.46 KG/M2 | SYSTOLIC BLOOD PRESSURE: 155 MMHG | WEIGHT: 210.13 LBS | DIASTOLIC BLOOD PRESSURE: 105 MMHG | HEIGHT: 72 IN

## 2020-09-11 DIAGNOSIS — K64.8 INTERNAL HEMORRHOIDS: ICD-10-CM

## 2020-09-11 PROCEDURE — 46600 DIAGNOSTIC ANOSCOPY SPX: CPT | Mod: PBBFAC | Performed by: STUDENT IN AN ORGANIZED HEALTH CARE EDUCATION/TRAINING PROGRAM

## 2020-09-11 PROCEDURE — 46600 DIAGNOSTIC ANOSCOPY SPX: CPT | Mod: S$PBB,,, | Performed by: STUDENT IN AN ORGANIZED HEALTH CARE EDUCATION/TRAINING PROGRAM

## 2020-09-11 PROCEDURE — 46600 PR DIAG2STIC A2SCOPY: ICD-10-PCS | Mod: S$PBB,,, | Performed by: STUDENT IN AN ORGANIZED HEALTH CARE EDUCATION/TRAINING PROGRAM

## 2020-09-11 PROCEDURE — 99999 PR PBB SHADOW E&M-EST. PATIENT-LVL IV: ICD-10-PCS | Mod: PBBFAC,,, | Performed by: STUDENT IN AN ORGANIZED HEALTH CARE EDUCATION/TRAINING PROGRAM

## 2020-09-11 PROCEDURE — 99203 OFFICE O/P NEW LOW 30 MIN: CPT | Mod: 25,S$PBB,, | Performed by: STUDENT IN AN ORGANIZED HEALTH CARE EDUCATION/TRAINING PROGRAM

## 2020-09-11 PROCEDURE — 99999 PR PBB SHADOW E&M-EST. PATIENT-LVL IV: CPT | Mod: PBBFAC,,, | Performed by: STUDENT IN AN ORGANIZED HEALTH CARE EDUCATION/TRAINING PROGRAM

## 2020-09-11 PROCEDURE — 99203 PR OFFICE/OUTPT VISIT, NEW, LEVL III, 30-44 MIN: ICD-10-PCS | Mod: 25,S$PBB,, | Performed by: STUDENT IN AN ORGANIZED HEALTH CARE EDUCATION/TRAINING PROGRAM

## 2020-09-11 PROCEDURE — 99214 OFFICE O/P EST MOD 30 MIN: CPT | Mod: PBBFAC | Performed by: STUDENT IN AN ORGANIZED HEALTH CARE EDUCATION/TRAINING PROGRAM

## 2020-09-13 ENCOUNTER — NURSE TRIAGE (OUTPATIENT)
Dept: ADMINISTRATIVE | Facility: CLINIC | Age: 40
End: 2020-09-13

## 2020-09-13 NOTE — TELEPHONE ENCOUNTER
Pt called disconnected, pt called back, stated that he was trying to find out who called him, pt stated that he no longer needed any services.  Pt advised per protocol and verbalized understanding.    Reason for Disposition   Caller has cancelled the call before the first contact    Additional Information   Negative: Patient already left for the hospital/clinic.   Negative: Busy signal.  First attempt to contact caller.  Follow-up call scheduled within 15 minutes.   Negative: No answer.  First attempt to contact caller.  Follow-up call scheduled within 15 minutes.   Negative: Message left on identified voice mail   Negative: Message left on unidentified voice mail.  Phone number verified.   Negative: Message left with person in household.   Negative: Wrong number reached.  Phone number verified.   Negative: Second attempt to contact family AND no contact made.  Phone number verified.   Negative: Cell phone out of range.  Phone number verified.   Negative: Pager number given.  Answering service notified.    Protocols used: NO CONTACT OR DUPLICATE CONTACT CALL-A-

## 2020-09-14 ENCOUNTER — TELEPHONE (OUTPATIENT)
Dept: HEMATOLOGY/ONCOLOGY | Facility: CLINIC | Age: 40
End: 2020-09-14

## 2020-09-14 NOTE — TELEPHONE ENCOUNTER
----- Message from Mckenzie Corrales sent at 9/14/2020 12:17 PM CDT -----  Regarding: Procedure  Contact: pt  Reason:Calling to cancel procedure today due to evacuation Please call to RS    Communication:[phone]

## 2020-09-16 ENCOUNTER — OFFICE VISIT (OUTPATIENT)
Dept: UROLOGY | Facility: CLINIC | Age: 40
End: 2020-09-16
Payer: MEDICAID

## 2020-09-16 VITALS
RESPIRATION RATE: 19 BRPM | OXYGEN SATURATION: 99 % | SYSTOLIC BLOOD PRESSURE: 143 MMHG | BODY MASS INDEX: 28.44 KG/M2 | TEMPERATURE: 98 F | DIASTOLIC BLOOD PRESSURE: 99 MMHG | WEIGHT: 210 LBS | HEART RATE: 92 BPM | HEIGHT: 72 IN

## 2020-09-16 DIAGNOSIS — R39.15 URINARY URGENCY: ICD-10-CM

## 2020-09-16 DIAGNOSIS — R39.198 DIFFICULTY VOIDING: ICD-10-CM

## 2020-09-16 DIAGNOSIS — R35.1 BENIGN PROSTATIC HYPERPLASIA WITH NOCTURIA: ICD-10-CM

## 2020-09-16 DIAGNOSIS — R30.0 DYSURIA: ICD-10-CM

## 2020-09-16 DIAGNOSIS — R31.0 GROSS HEMATURIA: Primary | ICD-10-CM

## 2020-09-16 DIAGNOSIS — R39.14 FEELING OF INCOMPLETE BLADDER EMPTYING: ICD-10-CM

## 2020-09-16 DIAGNOSIS — R35.0 URINARY FREQUENCY: ICD-10-CM

## 2020-09-16 DIAGNOSIS — N40.1 BENIGN PROSTATIC HYPERPLASIA WITH NOCTURIA: ICD-10-CM

## 2020-09-16 LAB
BILIRUB SERPL-MCNC: NORMAL MG/DL
BLOOD URINE, POC: NORMAL
CLARITY, POC UA: NORMAL
COLOR, POC UA: YELLOW
GLUCOSE UR QL STRIP: NORMAL
KETONES UR QL STRIP: NORMAL
LEUKOCYTE ESTERASE URINE, POC: NORMAL
NITRITE, POC UA: NORMAL
PH, POC UA: 5.5
PROTEIN, POC: NORMAL
SPECIFIC GRAVITY, POC UA: 1.03
UROBILINOGEN, POC UA: 0.2

## 2020-09-16 PROCEDURE — 99999 PR PBB SHADOW E&M-EST. PATIENT-LVL V: CPT | Mod: PBBFAC,,, | Performed by: NURSE PRACTITIONER

## 2020-09-16 PROCEDURE — 99214 OFFICE O/P EST MOD 30 MIN: CPT | Mod: S$PBB,,, | Performed by: NURSE PRACTITIONER

## 2020-09-16 PROCEDURE — 99214 PR OFFICE/OUTPT VISIT, EST, LEVL IV, 30-39 MIN: ICD-10-PCS | Mod: S$PBB,,, | Performed by: NURSE PRACTITIONER

## 2020-09-16 PROCEDURE — 99999 PR PBB SHADOW E&M-EST. PATIENT-LVL V: ICD-10-PCS | Mod: PBBFAC,,, | Performed by: NURSE PRACTITIONER

## 2020-09-16 PROCEDURE — 99215 OFFICE O/P EST HI 40 MIN: CPT | Mod: PBBFAC,PO | Performed by: NURSE PRACTITIONER

## 2020-09-16 PROCEDURE — 81002 URINALYSIS NONAUTO W/O SCOPE: CPT | Mod: PBBFAC,PO | Performed by: NURSE PRACTITIONER

## 2020-09-16 PROCEDURE — 87086 URINE CULTURE/COLONY COUNT: CPT

## 2020-09-16 RX ORDER — TAMSULOSIN HYDROCHLORIDE 0.4 MG/1
0.4 CAPSULE ORAL NIGHTLY
Qty: 30 CAPSULE | Refills: 2 | Status: ON HOLD | OUTPATIENT
Start: 2020-09-16 | End: 2020-09-18 | Stop reason: HOSPADM

## 2020-09-16 NOTE — PATIENT INSTRUCTIONS
1. Urine dipstick  2. Urine cx  3. Schedule patient for cystoscopy by Dr. Moss for gross hematuria if urine cx is normal.   4. Start taking tamsulosin 0.4 mg at bedtime for difficulty voiding.   5. LEONARD today.  6. Follow-up pending urine cx result.

## 2020-09-17 ENCOUNTER — TELEPHONE (OUTPATIENT)
Dept: UROLOGY | Facility: CLINIC | Age: 40
End: 2020-09-17

## 2020-09-17 ENCOUNTER — TELEPHONE (OUTPATIENT)
Dept: HEMATOLOGY/ONCOLOGY | Facility: CLINIC | Age: 40
End: 2020-09-17

## 2020-09-17 ENCOUNTER — DOCUMENTATION ONLY (OUTPATIENT)
Dept: HEMATOLOGY/ONCOLOGY | Facility: CLINIC | Age: 40
End: 2020-09-17

## 2020-09-17 DIAGNOSIS — R10.2 PERINEUM PAIN, MALE: ICD-10-CM

## 2020-09-17 DIAGNOSIS — R30.0 DYSURIA: Primary | ICD-10-CM

## 2020-09-17 DIAGNOSIS — R31.0 GROSS HEMATURIA: ICD-10-CM

## 2020-09-17 LAB — BACTERIA UR CULT: NO GROWTH

## 2020-09-17 RX ORDER — PHENAZOPYRIDINE HYDROCHLORIDE 200 MG/1
200 TABLET, FILM COATED ORAL 3 TIMES DAILY PRN
Qty: 9 TABLET | Refills: 0 | Status: SHIPPED | OUTPATIENT
Start: 2020-09-17 | End: 2020-09-20

## 2020-09-17 NOTE — TELEPHONE ENCOUNTER
"----- Message from Jovanny King sent at 9/17/2020  9:34 AM CDT -----  Reschedule Existing Appointment    Appt Date: 09/14/20  Type of appt :  PROCEDURE [2226]  Physician: Dr Gaspar  Reason for rescheduling? Pt request a callback to reschedule biopsy   Caller: Solo   Contact Preference: [phone]    Additional Information:  "Thank you for all that you do for our patients'"       "

## 2020-09-17 NOTE — TELEPHONE ENCOUNTER
----- Message from Molly Cheney sent at 2020  3:28 PM CDT -----  [phone]/self  Contact: [phone]/self  Type:  Needs Medical Advice    Who Called:  self  Symptoms (please be specific):  Increased pain in new areas and now he can see blood in his urine   How long has patient had these symptoms:  today  Pharmacy name and phone #:     Would the patient rather a call back or a response via [facility]?  call  Best Call Back Number:  [phone]/self  Additional Information:

## 2020-09-17 NOTE — TELEPHONE ENCOUNTER
Return telephone call placed to patient. He reports blood in his urine with blood clots, dysuria, and perineum pain that started 3 hours ago. Urine cx collected on yesterday and still in process.    Diagnoses and all orders for this visit:    Dysuria  -     phenazopyridine (PYRIDIUM) 200 MG tablet; Take 1 tablet (200 mg total) by mouth 3 (three) times daily as needed.    Perineum pain, male    Gross hematuria    Other order  1. Patient will need a cystoscopy once infection is ruled out or taken care of.     Follow-up pending urine cx result.    Anupama Hobbs NP

## 2020-09-17 NOTE — TELEPHONE ENCOUNTER
----- Message from Kamron Seals sent at 9/17/2020 10:25 AM CDT -----  Type:  Needs Medical Advice    Who Called: self  Reason:requesting call back  Would the patient rather a call back or a response via [facility]? Call  Best Call Back Number: [phone]  Additional Information: none

## 2020-09-17 NOTE — PROGRESS NOTES
Patient called today to cancel bone marrow biopsy. He has rescheduled multiple times to date.    Bone marrow biopsy has been recommended for evaluation of his eosinophilia and to rule out hypereosinophilic syndromes.    He has not been diagnosed with hypereosinophilic syndrome or leukemia. Bone marrow biopsy would be required for either of these diagnoses, but he has not had either.     We do want to perform this procedure, but he is currently declining unless it can be performed under sedation. Due to COVID-19, we have not been able to offer bone marrow biopsy under sedation.    We will continue to work with him to perform this diagnostic procedure.    Ivan Gaspar MD

## 2020-09-17 NOTE — TELEPHONE ENCOUNTER
Received a message approximately 45 minutes ago via [employer] Teams regarding call from patient about urology symptoms. Return call placed to patient, but no answer at this time. Voice message left for patient.    Anupama Hobbs NP

## 2020-09-18 ENCOUNTER — TELEPHONE (OUTPATIENT)
Dept: UROLOGY | Facility: CLINIC | Age: 40
End: 2020-09-18

## 2020-09-18 ENCOUNTER — TELEPHONE (OUTPATIENT)
Dept: HEMATOLOGY/ONCOLOGY | Facility: CLINIC | Age: 40
End: 2020-09-18

## 2020-09-18 ENCOUNTER — PATIENT MESSAGE (OUTPATIENT)
Dept: HEMATOLOGY/ONCOLOGY | Facility: CLINIC | Age: 40
End: 2020-09-18

## 2020-09-18 PROBLEM — R31.9 HEMATURIA: Status: ACTIVE | Noted: 2020-09-18

## 2020-09-18 PROBLEM — F41.9 ANXIETY: Status: ACTIVE | Noted: 2020-09-18

## 2020-09-18 NOTE — TELEPHONE ENCOUNTER
Patient currently in emergency room at [facility] for passing clots    Dr powell will evaluate him today and possibly schedule cysto at the hospital today

## 2020-09-18 NOTE — TELEPHONE ENCOUNTER
----- Message from Anupama Hobbs NP sent at 9/18/2020  8:48 AM CDT -----  Please inform patient that his urine cx was normal. He does not have a UTI. Please schedule patient for outpatient cystoscopy (patient's preference) by Dr. Moss for gross hematuria.

## 2020-09-18 NOTE — TELEPHONE ENCOUNTER
"----- Message from Jovanny Fernando sent at 9/18/2020 12:46 PM CDT -----  Consult/Advisory:    Name Of Caller:Solo   Contact Preference?: [phone]    Provider Name: Dr Gaspar     What is the nature of the call?:  Pt due to suffering from PTSD, pt is unable to have traditional biopsy performed and will need IV sedation in order to do so.  If unable, he would like to know if an order can be sent to his general surgeon in [place] to perform procedure.  Patient request a callback today to discuss     Additional Notes:  "Thank you for all that you do for our patients'"           "

## 2020-09-19 PROBLEM — R31.0 GROSS HEMATURIA: Status: RESOLVED | Noted: 2020-09-16 | Resolved: 2020-09-19

## 2020-09-21 ENCOUNTER — TELEPHONE (OUTPATIENT)
Dept: HEMATOLOGY/ONCOLOGY | Facility: CLINIC | Age: 40
End: 2020-09-21

## 2020-09-21 ENCOUNTER — PATIENT MESSAGE (OUTPATIENT)
Dept: ALLERGY | Facility: CLINIC | Age: 40
End: 2020-09-21

## 2020-09-21 NOTE — TELEPHONE ENCOUNTER
----- Message from He Liao sent at 9/21/2020  3:23 PM CDT -----  Regarding: Appt reschedule  Please call Kinsey to reschedule her appts. She said afternoons are better for her [phone] (home).     Spoke with patient informing him that he do not need a follow up appointmtent until he do his swallowing test. Patient received the scheduling number

## 2020-09-21 NOTE — TELEPHONE ENCOUNTER
----- Message from Felisa Verdugo sent at 9/21/2020  4:39 PM CDT -----  Regarding: PT  Contact: PT  PT called to speak to the nurse about scheduling his bone marrow biopsy. He also would like to be sedated for the procedure. He's explained to the doctor multiple times as to why he doesn't want to be awake. Please call back     Callback: [phone]

## 2020-09-22 ENCOUNTER — PATIENT MESSAGE (OUTPATIENT)
Dept: ALLERGY | Facility: CLINIC | Age: 40
End: 2020-09-22

## 2020-09-22 ENCOUNTER — PATIENT MESSAGE (OUTPATIENT)
Dept: UROLOGY | Facility: CLINIC | Age: 40
End: 2020-09-22

## 2020-09-22 ENCOUNTER — PATIENT MESSAGE (OUTPATIENT)
Dept: HEMATOLOGY/ONCOLOGY | Facility: CLINIC | Age: 40
End: 2020-09-22

## 2020-09-22 ENCOUNTER — TELEPHONE (OUTPATIENT)
Dept: HEMATOLOGY/ONCOLOGY | Facility: CLINIC | Age: 40
End: 2020-09-22

## 2020-09-22 ENCOUNTER — TELEPHONE (OUTPATIENT)
Dept: SURGERY | Facility: CLINIC | Age: 40
End: 2020-09-22

## 2020-09-22 NOTE — TELEPHONE ENCOUNTER
Patient is reporting that he has an appt to see another provider in [facility] ( Dr turner ) , he reports that his switch is due to our inability at this time to perform a bone marrow under sedation .   He is scheduled to see the oncologist tomorrow and reports having all he needs for that visit .  I informed him that I would share with Dr Gaspar

## 2020-09-22 NOTE — TELEPHONE ENCOUNTER
----- Message from La Finch sent at 9/22/2020  4:34 PM CDT -----  Contact: Patient  Patient Advice/Staff Message     Caller name/title: Pt    Provider: Hubert    Reason for call: Calling to speak with the doctor in regards to switching oncologist. Wants to know if there's anything he has to do before moving fwd. Does he have to be released from the clinic.     Please advise    Do you feel you need to be seen today:: No        Communication Preference: [phone]    Additional Information:

## 2020-09-22 NOTE — TELEPHONE ENCOUNTER
appts scheduled----- Message from Eduard Ayoub sent at 9/22/2020  4:52 PM CDT -----  Contact: patient// [phone]  Patient calling to speak with you regarding establishing care with Dr Polanco.  Patient states he would like to be seen on tomorrow 9-23-20.  Patient states all his diagnosis is in his Chart   Please advise

## 2020-09-23 ENCOUNTER — PATIENT MESSAGE (OUTPATIENT)
Dept: SURGERY | Facility: HOSPITAL | Age: 40
End: 2020-09-23

## 2020-09-23 ENCOUNTER — OFFICE VISIT (OUTPATIENT)
Dept: HEMATOLOGY/ONCOLOGY | Facility: CLINIC | Age: 40
End: 2020-09-23
Payer: MEDICAID

## 2020-09-23 ENCOUNTER — TELEPHONE (OUTPATIENT)
Dept: UROLOGY | Facility: CLINIC | Age: 40
End: 2020-09-23

## 2020-09-23 VITALS
OXYGEN SATURATION: 100 % | TEMPERATURE: 98 F | WEIGHT: 211.88 LBS | SYSTOLIC BLOOD PRESSURE: 139 MMHG | RESPIRATION RATE: 18 BRPM | DIASTOLIC BLOOD PRESSURE: 93 MMHG | HEART RATE: 86 BPM | BODY MASS INDEX: 28.73 KG/M2

## 2020-09-23 DIAGNOSIS — D72.10 EOSINOPHILIA: Primary | ICD-10-CM

## 2020-09-23 PROCEDURE — 99214 OFFICE O/P EST MOD 30 MIN: CPT | Mod: PBBFAC,PO | Performed by: INTERNAL MEDICINE

## 2020-09-23 PROCEDURE — 99999 PR PBB SHADOW E&M-EST. PATIENT-LVL IV: ICD-10-PCS | Mod: PBBFAC,,, | Performed by: INTERNAL MEDICINE

## 2020-09-23 PROCEDURE — 99214 PR OFFICE/OUTPT VISIT, EST, LEVL IV, 30-39 MIN: ICD-10-PCS | Mod: S$PBB,,, | Performed by: INTERNAL MEDICINE

## 2020-09-23 PROCEDURE — 99999 PR PBB SHADOW E&M-EST. PATIENT-LVL IV: CPT | Mod: PBBFAC,,, | Performed by: INTERNAL MEDICINE

## 2020-09-23 PROCEDURE — 99214 OFFICE O/P EST MOD 30 MIN: CPT | Mod: S$PBB,,, | Performed by: INTERNAL MEDICINE

## 2020-09-23 NOTE — LETTER
September 23, 2020      Troy Honeycutt MD  [address]  [address]  [facility] [address]           [facility]  [address]  [facility] [address]  Phone: [phone]          Patient: Solo Black   MR Number: [id number]   YOB: 1980   Date of Visit: 9/23/2020       Dear Dr. Troy Honeycutt:    Thank you for referring Solo Black to me for evaluation. Attached you will find relevant portions of my assessment and plan of care.    If you have questions, please do not hesitate to call me. I look forward to following Solo Black along with you.    Sincerely,    Trung Polanco MD    Enclosure  CC:  No Recipients    If you would like to receive this communication electronically, please contact [email or web address] or [phone] to request more information on [email or web address] Link access.    For providers and/or their staff who would like to refer a patient to [facility], please contact us through our one-stop-shop provider referral line, [facility], at [phone].    If you feel you have received this communication in error or would no longer like to receive these types of communications, please e-mail [email or web address]

## 2020-09-23 NOTE — TELEPHONE ENCOUNTER
----- Message from Aniya Stewart sent at 9/23/2020  4:42 PM CDT -----  Contact: pt, [phone]  Patient called in returning your call. Please advise.

## 2020-09-23 NOTE — PROGRESS NOTES
"PATIENT: Solo Black  MRN: [id number]  DATE: 9/23/2020    Diagnosis:   1. Eosinophilia      Chief Complaint: eosinophilia    Subjective:    History of Present Illness: Mr. Black is a 39 y.o. male who presents for evaluation and management of eosinophilia. He has previously seen Dr. Gaspar.    Information from Dr. Gaspar's note dated 8/17/20:  "Mr. Black is 39 and has reported history of eosinophilic esophagitis, recurrent angioedema and urticaria and eosinophilia and presents for initial hematologic evaluation.     Over the last several years, he has had recurrent hosptializations for allergic symptoms and has reportedly been found to have high eosinophil counts. He has recurrently been on steroids and been diagnosed with eosinophilic esophagitis. [place], and he was unable to produce records of these visits during our visit today.     He has more recently been under the care of Dr. Ornelas at [facility], and he is currently on high dose steroids for his allergic symptoms. This has controlled the allergic symtpoms and eosinophil count better. He was found to have positive Strongyloides IgG and completed ivermectin therapy approximately 2 weeks ago.     On steroids, he reports feeling anxious, irritable, and shaky. He also reports that he has had ulcers develop on his back and in the perianal area.      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  Mr. Black has eosinophilia that appears to be chronic and recurrent when not on steroids. Evaluation is challenged as he does not have eosinophilia currently because he is on high dose steroids (or not being exposed to allergens).    His history would be atypical for a myeloid or lymphoid neoplasm with eosinophilia. He has been tested on peripehral blood for PDGFR-alpha mutation, and this was negative. B-cell and T-cell studies to identify clonal " "lymphocyte populations were similarly negative.     I think the probability of a hematologic malignancy is overall low; however, given clear chronic eosinophilia, we will obtain bone marrow biopsy to exclude this definitively.     I am concerned his eosinophilia is either related to the history of strongyloidiasis or exposure to other allergens.     Additionally, I have reviewed all available outside records through [facility]. I am concerned about his high utilization rates of multiple health systems. A discharge summary from [facility] notes that he had unusual behavior during a hospitalization, and a diagnosis of factitious disorder was being considered. I did not address this directly with him, as it is important to address the merits of his eosinophilia completely first. If a cause is unable to be identified, we may need to review this history with other prior providers to discuss whether this consideration may still be warranted. "     Today, he is interested in getting a bone marrow biopsy. He would like sedation if at all possible.  - he has been taking prednisone daily for almost 1.5 years. He takes 40mg prednisone 3 times daily. When he takes less than that, his symptoms (skin lesions, angioedema, rash) recur.    Past medical, surgical, family, and social histories have been reviewed and updated below.    Past Medical History:   Past Medical History:   Diagnosis Date    Arthritis     C. difficile colitis feb 2014    postop of hand surgery    Cancer     Encounter for blood transfusion     Eosinophilic esophagitis 3/11/2014    GERD (gastroesophageal reflux disease)     Hypereosinophilic syndrome     IBS (irritable bowel syndrome)     Leukemia     MRSA carrier     says multiple times nose swab was +    Nephrolithiasis apr 2014    bilateral punctate - never passed a stone    Urinary tract infection feb 2014    MRSA       Past Surgical History:   Past Surgical History:   Procedure Laterality Date    " APPENDECTOMY      ARTHROSCOPY OF SHOULDER WITH REMOVAL OF DISTAL CLAVICLE Right 11/1/2018    Procedure: ARTHROSCOPY, SHOULDER, WITH DISTAL CLAVICLE EXCISION;  Surgeon: Gabino Mejia MD;  Location: [facility];  Service: Orthopedics;  Laterality: Right;  video    BACK SURGERY      BLADDER FULGURATION N/A 9/18/2020    Procedure: FULGURATION, BLADDER;  Surgeon: Randall Moss MD;  Location: [facility];  Service: Urology;  Laterality: N/A;  blood vessels of bladder neck and prostate    CIRCUMCISION, PRIMARY      COLONOSCOPY N/A 11/14/2018    Procedure: COLONOSCOPY;  Surgeon: Milton Brunson MD;  Location: [facility];  Service: Endoscopy;  Laterality: N/A;    COLONOSCOPY N/A 7/20/2020    Procedure: COLONOSCOPY;  Surgeon: Ruslan Tillman MD;  Location: [facility];  Service: Endoscopy;  Laterality: N/A;    CYSTOSCOPY W/ RETROGRADES Bilateral 9/18/2020    Procedure: CYSTOSCOPY, WITH RETROGRADE PYELOGRAM;  Surgeon: Randall Moss MD;  Location: [facility];  Service: Urology;  Laterality: Bilateral;    DILATION OF URETHRA N/A 9/18/2020    Procedure: DILATION, URETHRA;  Surgeon: Randall Moss MD;  [facility];  Service: Urology;  Laterality: N/A;    drainage of abscess from hand  2009    MRSA    drainage of abscess from R thigh  2006    MRSA    ESOPHAGOGASTRODUODENOSCOPY  3/11/2014    ESOPHAGOGASTRODUODENOSCOPY N/A 9/5/2018    Procedure: EGD (ESOPHAGOGASTRODUODENOSCOPY);  Surgeon: Kolby Wall MD;  Location: [facility];  Service: Endoscopy;  Laterality: N/A;    ESOPHAGOGASTRODUODENOSCOPY N/A 3/25/2020    Procedure: EGD (ESOPHAGOGASTRODUODENOSCOPY);  Surgeon: Ruslan Tillman MD;  Location: [facility];  Service: Endoscopy;  Laterality: N/A;    ESOPHAGOGASTRODUODENOSCOPY N/A 7/20/2020    Procedure: EGD (ESOPHAGOGASTRODUODENOSCOPY);  Surgeon: Ruslan Tillman MD;  Location: [facility];  Service: Endoscopy;  Laterality: N/A;  Patient is a very hard stick, requires anesthesia stick.  "   ESOPHAGOGASTRODUODENOSCOPY N/A 8/31/2020    Procedure: EGD (ESOPHAGOGASTRODUODENOSCOPY);  Surgeon: Kolby Wall MD;  Location: [facility];  Service: Endoscopy;  Laterality: N/A;    FLEXIBLE SIGMOIDOSCOPY N/A 9/5/2018    Procedure: SIGMOIDOSCOPY, FLEXIBLE;  Surgeon: Kolby Wall MD;  Location: [facility];  Service: Endoscopy;  Laterality: N/A;    HAND SURGERY  jan 2014    power saw fell on hand - laceration 3 tendons    INSERTION OF VENOUS ACCESS PORT Right 8/21/2020    Procedure: INSERTION, VENOUS ACCESS PORT;  Surgeon: Troy Honeycutt MD;  Location: [facility] OR;  Service: General;  Laterality: Right;    NISSEN FUNDOPLICATION         Family History:   Family History   Problem Relation Age of Onset    Urolithiasis Father     Celiac disease Sister     Hypertension Maternal Grandmother     Hypertension Maternal Grandfather     Prostate cancer Neg Hx     Kidney disease Neg Hx        Social History:  reports that he has never smoked. He has never used smokeless tobacco. He reports previous alcohol use. He reports that he does not use drugs.    Allergies:  Review of patient's allergies indicates:   Allergen Reactions    Legumes Nausea And Vomiting and Swelling     Other reaction(s): GI Intolerance  vomiting  vomiting  Pt reports legumes make his lips swell and induce severe vomiting  Pt reports legumes make his lips swell and induce severe vomiting      Nsaids (non-steroidal anti-inflammatory drug)      GI bleed    Bean pod extract      Lips swelling, vomiting  Lips swelling, vomiting      Ketamine      Severe dysphoria (bad trip)    Lentils      Lips swelling, vomiting  Lips swelling, vomiting      Meloxicam Nausea Only     GI bleed    Peas      Lips swelling, vomiting    Penicillins      Has taken third gen cephalosporins without issue per patient report    Haloperidol Anxiety and Other (See Comments)     "feels like crawling out of his skin"         Medications:  Current Outpatient " Medications   Medication Sig Dispense Refill    acetaminophen (TYLENOL) 325 MG tablet Take 1 tablet (325 mg total) by mouth every 6 (six) hours as needed for Pain.      artificial tears (ISOPTO TEARS) 0.5 % ophthalmic solution Place 1 drop into both eyes 6 (six) times daily.      cetirizine (ZYRTEC) 10 MG tablet Take 20 mg by mouth 2 (two) times a day.       ciprofloxacin HCl (CIPRO) 500 MG tablet Take 1 tablet (500 mg total) by mouth every 12 (twelve) hours. for 14 days 28 tablet 0    dextroamphetamine-amphetamine (ADDERALL) 20 mg tablet Take 1 tablet by mouth 2 (two) times daily before meals. Take before breakfast and lunch      dicyclomine (BENTYL) 20 mg tablet Take 1 tablet (20 mg total) by mouth 3 (three) times daily. 30 tablet 0    diphenhydrAMINE (BENADRYL) 50 MG capsule Take 50 mg by mouth every 6 (six) hours as needed for Itching.      EPINEPHrine (EPIPEN) 0.3 mg/0.3 mL AtIn Inject 0.3 mLs (0.3 mg total) into the muscle as needed. 2 Device 1    famotidine (PEPCID) 20 MG tablet Take 20 mg by mouth 2 (two) times daily.      gabapentin (NEURONTIN) 300 MG capsule Take 1 capsule (300 mg total) by mouth every evening. (Patient not taking: Reported on 9/16/2020) 30 capsule 0    HYDROcodone-acetaminophen (NORCO) 5-325 mg per tablet Take 1 tablet by mouth every 6 (six) hours as needed for Pain. 20 tablet 0    hydrOXYzine HCL (ATARAX) 25 MG tablet Take 1 tablet (25 mg total) by mouth 3 (three) times daily as needed for Itching. 30 tablet 1    Lactobacillus acidophilus 10 billion cell Cap Take 1 capsule by mouth once daily.       lidocaine HCl 2% (LIDOCAINE VISCOUS) 2 % Soln Take 5 mLs by mouth as needed.       LORazepam (ATIVAN) 1 MG tablet Take 1 tablet (1 mg total) by mouth every 8 (eight) hours as needed for Anxiety. 15 tablet 0    metoclopramide HCl (REGLAN) 10 MG tablet       MULTIVIT-IRON-MIN-FOLIC ACID 3,500-18-0.4 UNIT-MG-MG ORAL CHEW Take 10 mg by mouth once daily.      nystatin  (MYCOSTATIN) 100,000 unit/mL suspension       oxyCODONE-acetaminophen (PERCOCET) 5-325 mg per tablet Take 1 tablet by mouth every 8 (eight) hours as needed. (Patient not taking: Reported on 9/16/2020) 15 tablet 0    paroxetine (PAXIL) 20 MG tablet Take 20 mg by mouth every morning.      phenazopyridine (PYRIDIUM) 200 MG tablet Take 1 tablet (200 mg total) by mouth 3 (three) times daily as needed for Pain (Dysuria). 90 tablet 1    promethazine (PHENERGAN) 25 MG tablet Take 1 tablet (25 mg total) by mouth every 6 (six) hours as needed for Nausea. (Patient not taking: Reported on 9/16/2020) 15 tablet 0    sucralfate (CARAFATE) 1 gram tablet Take 1 g by mouth before meals and at bedtime as needed.       tamsulosin (FLOMAX) 0.4 mg Cap Take 1 capsule (0.4 mg total) by mouth 2 (two) times daily. 60 capsule 11     No current facility-administered medications for this visit.        Review of Systems   Constitutional: Negative for fatigue.   HENT: Negative for sore throat.    Eyes: Negative for visual disturbance.   Respiratory: Negative for shortness of breath.    Cardiovascular: Negative for chest pain.   Gastrointestinal: Negative for abdominal pain.   Genitourinary: Negative for dysuria.   Musculoskeletal: Negative for back pain.   Skin: Positive for rash.   Neurological: Negative for headaches.   Hematological: Negative for adenopathy.   Psychiatric/Behavioral: The patient is not nervous/anxious.        ECOG Performance Status:   ECOG SCORE 1          Objective:      Vitals:   Vitals:    09/23/20 1550   BP: (!) 139/93   Pulse: 86   Resp: 18   Temp: 97.5 °F (36.4 °C)   TempSrc: Oral   SpO2: 100%   Weight: 96.1 kg (211 lb 13.8 oz)     BMI: Body mass index is 28.73 kg/m².    Physical Exam  Vitals signs and nursing note reviewed.   Constitutional:       Appearance: He is well-developed.   HENT:      Head: Normocephalic and atraumatic.   Eyes:      Pupils: Pupils are equal, round, and reactive to light.   Neck:       Musculoskeletal: Normal range of motion and neck supple.   Cardiovascular:      Rate and Rhythm: Normal rate and regular rhythm.   Pulmonary:      Effort: Pulmonary effort is normal.      Breath sounds: Normal breath sounds.   Abdominal:      General: Bowel sounds are normal.      Palpations: Abdomen is soft.   Musculoskeletal: Normal range of motion.   Skin:     General: Skin is warm and dry.   Neurological:      Mental Status: He is alert and oriented to person, place, and time.   Psychiatric:         Behavior: Behavior normal.         Thought Content: Thought content normal.         Judgment: Judgment normal.      Comments: hyperactive         Laboratory Data:  Labs have been reviewed.    Lab Results   Component Value Date    WBC 6.78 09/19/2020    HGB 11.5 (L) 09/19/2020    HCT 36.7 (L) 09/19/2020    MCV 82 09/19/2020     09/19/2020     B-Cell gene rearrangement (7/13/20):  Peripheral blood, immunoglobulin gene rearrangement   analysis: Negative. No clonal immunoglobulin gene rearrangement was   detected.     T-Cell gene rearrangement (7/13/20):  Peripheral blood, T-cell receptor gene rearrangement   analysis: Negative.  No clonal T-cell receptor gene rearrangement was   detected.     CHIC2 testing (7/13/20):  The result is within normal limits for the CHIC2, PDGFRA   and FIP1L1 gene regions.     Strongyloides Ab IgG (7/13/20): positive      Imaging:    Assessment:       1. Eosinophilia           Plan:     1. Eosinophilia  - I have reviewed his chart.  - he has a history of elevated eosinophil counts. He manages this with high doses of prednisone.  - Dr. Gaspar had previously recommend a bone marrow biopsy. However, patient did not wish to proceed without sedation.  - we will arrange for a bone marrow biopsy here with light sedation for further evaluation of his eosinophilia.  - risks/benefits of procedure were explained. He wishes to proceed and has signed a consent form.  - plan to perform a bone marrow  biopsy in the next 1-2 weeks.  - return to clinic 1-2 weeks after the bone marrow biopsy.        Trung Polanco M.D.  Hematology/Oncology  [facility] - [address], [address]  [place] [address]  Phone: [phone]  Fax: [phone]

## 2020-09-23 NOTE — TELEPHONE ENCOUNTER
Called patient he is in more pain than before the procedure. He is still bleeding. He states he is way past increasing fluids. He would like a call back to discuss

## 2020-09-23 NOTE — H&P (VIEW-ONLY)
"PATIENT: Solo Black  MRN: [id number]  DATE: 9/23/2020    Diagnosis:   1. Eosinophilia      Chief Complaint: eosinophilia    Subjective:    History of Present Illness: Mr. Black is a 39 y.o. male who presents for evaluation and management of eosinophilia. He has previously seen Dr. Gaspar.    Information from Dr. Gaspar's note dated 8/17/20:  "Mr. Black is 39 and has reported history of eosinophilic esophagitis, recurrent angioedema and urticaria and eosinophilia and presents for initial hematologic evaluation.     Over the last several years, he has had recurrent hosptializations for allergic symptoms and has reportedly been found to have high eosinophil counts. He has recurrently been on steroids and been diagnosed with eosinophilic esophagitis. [place], and he was unable to produce records of these visits during our visit today.     He has more recently been under the care of Dr. Ornelas at [facility], and he is currently on high dose steroids for his allergic symptoms. This has controlled the allergic symtpoms and eosinophil count better. He was found to have positive Strongyloides IgG and completed ivermectin therapy approximately 2 weeks ago.     On steroids, he reports feeling anxious, irritable, and shaky. He also reports that he has had ulcers develop on his back and in the perianal area.      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  Mr. Black has eosinophilia that appears to be chronic and recurrent when not on steroids. Evaluation is challenged as he does not have eosinophilia currently because he is on high dose steroids (or not being exposed to allergens).    His history would be atypical for a myeloid or lymphoid neoplasm with eosinophilia. He has been tested on peripehral blood for PDGFR-alpha mutation, and this was negative. B-cell and T-cell studies to identify clonal " "lymphocyte populations were similarly negative.     I think the probability of a hematologic malignancy is overall low; however, given clear chronic eosinophilia, we will obtain bone marrow biopsy to exclude this definitively.     I am concerned his eosinophilia is either related to the history of strongyloidiasis or exposure to other allergens.     Additionally, I have reviewed all available outside records through [facility]. I am concerned about his high utilization rates of multiple health systems. A discharge summary from [facility] notes that he had unusual behavior during a hospitalization, and a diagnosis of factitious disorder was being considered. I did not address this directly with him, as it is important to address the merits of his eosinophilia completely first. If a cause is unable to be identified, we may need to review this history with other prior providers to discuss whether this consideration may still be warranted. "     Today, he is interested in getting a bone marrow biopsy. He would like sedation if at all possible.  - he has been taking prednisone daily for almost 1.5 years. He takes 40mg prednisone 3 times daily. When he takes less than that, his symptoms (skin lesions, angioedema, rash) recur.    Past medical, surgical, family, and social histories have been reviewed and updated below.    Past Medical History:   Past Medical History:   Diagnosis Date    Arthritis     C. difficile colitis feb 2014    postop of hand surgery    Cancer     Encounter for blood transfusion     Eosinophilic esophagitis 3/11/2014    GERD (gastroesophageal reflux disease)     Hypereosinophilic syndrome     IBS (irritable bowel syndrome)     Leukemia     MRSA carrier     says multiple times nose swab was +    Nephrolithiasis apr 2014    bilateral punctate - never passed a stone    Urinary tract infection feb 2014    MRSA       Past Surgical History:   Past Surgical History:   Procedure Laterality Date    " APPENDECTOMY      ARTHROSCOPY OF SHOULDER WITH REMOVAL OF DISTAL CLAVICLE Right 11/1/2018    Procedure: ARTHROSCOPY, SHOULDER, WITH DISTAL CLAVICLE EXCISION;  Surgeon: Gabino Mejia MD;  Location: [facility];  Service: Orthopedics;  Laterality: Right;  video    BACK SURGERY      BLADDER FULGURATION N/A 9/18/2020    Procedure: FULGURATION, BLADDER;  Surgeon: Randall Moss MD;  Location: [facility];  Service: Urology;  Laterality: N/A;  blood vessels of bladder neck and prostate    CIRCUMCISION, PRIMARY      COLONOSCOPY N/A 11/14/2018    Procedure: COLONOSCOPY;  Surgeon: Milton Brunson MD;  Location: [facility];  Service: Endoscopy;  Laterality: N/A;    COLONOSCOPY N/A 7/20/2020    Procedure: COLONOSCOPY;  Surgeon: Ruslan Tillman MD;  Location: [facility];  Service: Endoscopy;  Laterality: N/A;    CYSTOSCOPY W/ RETROGRADES Bilateral 9/18/2020    Procedure: CYSTOSCOPY, WITH RETROGRADE PYELOGRAM;  Surgeon: Randall Moss MD;  Location: [facility];  Service: Urology;  Laterality: Bilateral;    DILATION OF URETHRA N/A 9/18/2020    Procedure: DILATION, URETHRA;  Surgeon: Randall Moss MD;  Location: [facility];  Service: Urology;  Laterality: N/A;    drainage of abscess from hand  2009    MRSA    drainage of abscess from R thigh  2006    MRSA    ESOPHAGOGASTRODUODENOSCOPY  3/11/2014    ESOPHAGOGASTRODUODENOSCOPY N/A 9/5/2018    Procedure: EGD (ESOPHAGOGASTRODUODENOSCOPY);  Surgeon: Kolby Wall MD;  Location: [facility];  Service: Endoscopy;  Laterality: N/A;    ESOPHAGOGASTRODUODENOSCOPY N/A 3/25/2020    Procedure: EGD (ESOPHAGOGASTRODUODENOSCOPY);  Surgeon: Ruslan Tillman MD;  Location: [facility];  Service: Endoscopy;  Laterality: N/A;    ESOPHAGOGASTRODUODENOSCOPY N/A 7/20/2020    Procedure: EGD (ESOPHAGOGASTRODUODENOSCOPY);  Surgeon: Ruslan Tillman MD;  Location: [facility];  Service: Endoscopy;  Laterality: N/A;  Patient is a very hard stick, requires anesthesia stick.  "   ESOPHAGOGASTRODUODENOSCOPY N/A 8/31/2020    Procedure: EGD (ESOPHAGOGASTRODUODENOSCOPY);  Surgeon: Kolby Wall MD;  Location: [facility] END[facility];  Service: Endoscopy;  Laterality: N/A;    FLEXIBLE SIGMOIDOSCOPY N/A 9/5/2018    Procedure: SIGMOIDOSCOPY, FLEXIBLE;  Surgeon: Kolby Wall MD;  Location: [facility] END[facility];  Service: Endoscopy;  Laterality: N/A;    HAND SURGERY  jan 2014    power saw fell on hand - laceration 3 tendons    INSERTION OF VENOUS ACCESS PORT Right 8/21/2020    Procedure: INSERTION, VENOUS ACCESS PORT;  Surgeon: Troy Honeycutt MD;  Location: [facility] OR;  Service: General;  Laterality: Right;    NISSEN FUNDOPLICATION         Family History:   Family History   Problem Relation Age of Onset    Urolithiasis Father     Celiac disease Sister     Hypertension Maternal Grandmother     Hypertension Maternal Grandfather     Prostate cancer Neg Hx     Kidney disease Neg Hx        Social History:  reports that he has never smoked. He has never used smokeless tobacco. He reports previous alcohol use. He reports that he does not use drugs.    Allergies:  Review of patient's allergies indicates:   Allergen Reactions    Legumes Nausea And Vomiting and Swelling     Other reaction(s): GI Intolerance  vomiting  vomiting  Pt reports legumes make his lips swell and induce severe vomiting  Pt reports legumes make his lips swell and induce severe vomiting      Nsaids (non-steroidal anti-inflammatory drug)      GI bleed    Bean pod extract      Lips swelling, vomiting  Lips swelling, vomiting      Ketamine      Severe dysphoria (bad trip)    Lentils      Lips swelling, vomiting  Lips swelling, vomiting      Meloxicam Nausea Only     GI bleed    Peas      Lips swelling, vomiting    Penicillins      Has taken third gen cephalosporins without issue per patient report    Haloperidol Anxiety and Other (See Comments)     "feels like crawling out of his skin"         Medications:  Current Outpatient " Medications   Medication Sig Dispense Refill    acetaminophen (TYLENOL) 325 MG tablet Take 1 tablet (325 mg total) by mouth every 6 (six) hours as needed for Pain.      artificial tears (ISOPTO TEARS) 0.5 % ophthalmic solution Place 1 drop into both eyes 6 (six) times daily.      cetirizine (ZYRTEC) 10 MG tablet Take 20 mg by mouth 2 (two) times a day.       ciprofloxacin HCl (CIPRO) 500 MG tablet Take 1 tablet (500 mg total) by mouth every 12 (twelve) hours. for 14 days 28 tablet 0    dextroamphetamine-amphetamine (ADDERALL) 20 mg tablet Take 1 tablet by mouth 2 (two) times daily before meals. Take before breakfast and lunch      dicyclomine (BENTYL) 20 mg tablet Take 1 tablet (20 mg total) by mouth 3 (three) times daily. 30 tablet 0    diphenhydrAMINE (BENADRYL) 50 MG capsule Take 50 mg by mouth every 6 (six) hours as needed for Itching.      EPINEPHrine (EPIPEN) 0.3 mg/0.3 mL AtIn Inject 0.3 mLs (0.3 mg total) into the muscle as needed. 2 Device 1    famotidine (PEPCID) 20 MG tablet Take 20 mg by mouth 2 (two) times daily.      gabapentin (NEURONTIN) 300 MG capsule Take 1 capsule (300 mg total) by mouth every evening. (Patient not taking: Reported on 9/16/2020) 30 capsule 0    HYDROcodone-acetaminophen (NORCO) 5-325 mg per tablet Take 1 tablet by mouth every 6 (six) hours as needed for Pain. 20 tablet 0    hydrOXYzine HCL (ATARAX) 25 MG tablet Take 1 tablet (25 mg total) by mouth 3 (three) times daily as needed for Itching. 30 tablet 1    Lactobacillus acidophilus 10 billion cell Cap Take 1 capsule by mouth once daily.       lidocaine HCl 2% (LIDOCAINE VISCOUS) 2 % Soln Take 5 mLs by mouth as needed.       LORazepam (ATIVAN) 1 MG tablet Take 1 tablet (1 mg total) by mouth every 8 (eight) hours as needed for Anxiety. 15 tablet 0    metoclopramide HCl (REGLAN) 10 MG tablet       MULTIVIT-IRON-MIN-FOLIC ACID 3,500-18-0.4 UNIT-MG-MG ORAL CHEW Take 10 mg by mouth once daily.      nystatin  (MYCOSTATIN) 100,000 unit/mL suspension       oxyCODONE-acetaminophen (PERCOCET) 5-325 mg per tablet Take 1 tablet by mouth every 8 (eight) hours as needed. (Patient not taking: Reported on 9/16/2020) 15 tablet 0    paroxetine (PAXIL) 20 MG tablet Take 20 mg by mouth every morning.      phenazopyridine (PYRIDIUM) 200 MG tablet Take 1 tablet (200 mg total) by mouth 3 (three) times daily as needed for Pain (Dysuria). 90 tablet 1    promethazine (PHENERGAN) 25 MG tablet Take 1 tablet (25 mg total) by mouth every 6 (six) hours as needed for Nausea. (Patient not taking: Reported on 9/16/2020) 15 tablet 0    sucralfate (CARAFATE) 1 gram tablet Take 1 g by mouth before meals and at bedtime as needed.       tamsulosin (FLOMAX) 0.4 mg Cap Take 1 capsule (0.4 mg total) by mouth 2 (two) times daily. 60 capsule 11     No current facility-administered medications for this visit.        Review of Systems   Constitutional: Negative for fatigue.   HENT: Negative for sore throat.    Eyes: Negative for visual disturbance.   Respiratory: Negative for shortness of breath.    Cardiovascular: Negative for chest pain.   Gastrointestinal: Negative for abdominal pain.   Genitourinary: Negative for dysuria.   Musculoskeletal: Negative for back pain.   Skin: Positive for rash.   Neurological: Negative for headaches.   Hematological: Negative for adenopathy.   Psychiatric/Behavioral: The patient is not nervous/anxious.        ECOG Performance Status:   ECOG SCORE 1          Objective:      Vitals:   Vitals:    09/23/20 1550   BP: (!) 139/93   Pulse: 86   Resp: 18   Temp: 97.5 °F (36.4 °C)   TempSrc: Oral   SpO2: 100%   Weight: 96.1 kg (211 lb 13.8 oz)     BMI: Body mass index is 28.73 kg/m².    Physical Exam  Vitals signs and nursing note reviewed.   Constitutional:       Appearance: He is well-developed.   HENT:      Head: Normocephalic and atraumatic.   Eyes:      Pupils: Pupils are equal, round, and reactive to light.   Neck:       Musculoskeletal: Normal range of motion and neck supple.   Cardiovascular:      Rate and Rhythm: Normal rate and regular rhythm.   Pulmonary:      Effort: Pulmonary effort is normal.      Breath sounds: Normal breath sounds.   Abdominal:      General: Bowel sounds are normal.      Palpations: Abdomen is soft.   Musculoskeletal: Normal range of motion.   Skin:     General: Skin is warm and dry.   Neurological:      Mental Status: He is alert and oriented to person, place, and time.   Psychiatric:         Behavior: Behavior normal.         Thought Content: Thought content normal.         Judgment: Judgment normal.      Comments: hyperactive         Laboratory Data:  Labs have been reviewed.    Lab Results   Component Value Date    WBC 6.78 09/19/2020    HGB 11.5 (L) 09/19/2020    HCT 36.7 (L) 09/19/2020    MCV 82 09/19/2020     09/19/2020     B-Cell gene rearrangement (7/13/20):  Peripheral blood, immunoglobulin gene rearrangement   analysis: Negative. No clonal immunoglobulin gene rearrangement was   detected.     T-Cell gene rearrangement (7/13/20):  Peripheral blood, T-cell receptor gene rearrangement   analysis: Negative.  No clonal T-cell receptor gene rearrangement was   detected.     CHIC2 testing (7/13/20):  The result is within normal limits for the CHIC2, PDGFRA   and FIP1L1 gene regions.     Strongyloides Ab IgG (7/13/20): positive      Imaging:    Assessment:       1. Eosinophilia           Plan:     1. Eosinophilia  - I have reviewed his chart.  - he has a history of elevated eosinophil counts. He manages this with high doses of prednisone.  - Dr. Gaspar had previously recommend a bone marrow biopsy. However, patient did not wish to proceed without sedation.  - we will arrange for a bone marrow biopsy here with light sedation for further evaluation of his eosinophilia.  - risks/benefits of procedure were explained. He wishes to proceed and has signed a consent form.  - plan to perform a bone marrow  biopsy in the next 1-2 weeks.  - return to clinic 1-2 weeks after the bone marrow biopsy.        Trung Polanco M.D.  Hematology/Oncology  [facility] - [address], [address]  [place] [address]  Phone: [phone]  Fax: [phone]

## 2020-09-24 ENCOUNTER — TELEPHONE (OUTPATIENT)
Dept: HEMATOLOGY/ONCOLOGY | Facility: CLINIC | Age: 40
End: 2020-09-24

## 2020-09-24 ENCOUNTER — TELEPHONE (OUTPATIENT)
Dept: UROLOGY | Facility: CLINIC | Age: 40
End: 2020-09-24

## 2020-09-24 NOTE — TELEPHONE ENCOUNTER
Pt. Confirmed bmb at 8am on 10/1/20.  Pre procedure Instructions given to fast after midnight, except for sips of water with medication, to arrive b/w 6am-6:30am at hospital registration desk morning of appt.  Pt. Verbalized understanding with teach back given.

## 2020-09-24 NOTE — TELEPHONE ENCOUNTER
Return telephone call placed to patient, but no answer. Left voicemail for patient to return my call.    Anupama Hobbs NP

## 2020-09-28 ENCOUNTER — PATIENT MESSAGE (OUTPATIENT)
Dept: SURGERY | Facility: HOSPITAL | Age: 40
End: 2020-09-28

## 2020-09-28 ENCOUNTER — TELEPHONE (OUTPATIENT)
Dept: HEMATOLOGY/ONCOLOGY | Facility: CLINIC | Age: 40
End: 2020-09-28

## 2020-09-28 DIAGNOSIS — D72.119 HYPEREOSINOPHILIC SYNDROME: Primary | ICD-10-CM

## 2020-09-29 ENCOUNTER — LAB VISIT (OUTPATIENT)
Dept: FAMILY MEDICINE | Facility: CLINIC | Age: 40
End: 2020-09-29
Payer: MEDICAID

## 2020-09-29 ENCOUNTER — TELEPHONE (OUTPATIENT)
Dept: HEMATOLOGY/ONCOLOGY | Facility: CLINIC | Age: 40
End: 2020-09-29

## 2020-09-29 DIAGNOSIS — D72.119 HYPEREOSINOPHILIC SYNDROME: ICD-10-CM

## 2020-09-29 DIAGNOSIS — D72.119 HYPEREOSINOPHILIC SYNDROME: Primary | ICD-10-CM

## 2020-09-29 PROCEDURE — U0003 INFECTIOUS AGENT DETECTION BY NUCLEIC ACID (DNA OR RNA); SEVERE ACUTE RESPIRATORY SYNDROME CORONAVIRUS 2 (SARS-COV-2) (CORONAVIRUS DISEASE [COVID-19]), AMPLIFIED PROBE TECHNIQUE, MAKING USE OF HIGH THROUGHPUT TECHNOLOGIES AS DESCRIBED BY CMS-2020-01-R: HCPCS

## 2020-09-30 LAB — SARS-COV-2 RNA RESP QL NAA+PROBE: NOT DETECTED

## 2020-10-01 ENCOUNTER — HOSPITAL ENCOUNTER (OUTPATIENT)
Facility: HOSPITAL | Age: 40
Discharge: HOME | End: 2020-10-01
Attending: INTERNAL MEDICINE | Admitting: INTERNAL MEDICINE
Payer: MEDICAID

## 2020-10-01 VITALS
HEART RATE: 83 BPM | DIASTOLIC BLOOD PRESSURE: 83 MMHG | BODY MASS INDEX: 28.44 KG/M2 | SYSTOLIC BLOOD PRESSURE: 128 MMHG | RESPIRATION RATE: 16 BRPM | WEIGHT: 210 LBS | OXYGEN SATURATION: 97 % | HEIGHT: 72 IN | TEMPERATURE: 99 F

## 2020-10-01 DIAGNOSIS — D72.19 EOSINOPHILIC LEUKOCYTOSIS, UNSPECIFIED TYPE: ICD-10-CM

## 2020-10-01 DIAGNOSIS — D72.119 HYPEREOSINOPHILIC SYNDROME: Primary | ICD-10-CM

## 2020-10-01 PROCEDURE — 88271 CYTOGENETICS DNA PROBE: CPT | Mod: 59

## 2020-10-01 PROCEDURE — 88299 UNLISTED CYTOGENETIC STUDY: CPT

## 2020-10-01 PROCEDURE — 88184 FLOWCYTOMETRY/ TC 1 MARKER: CPT | Performed by: PATHOLOGY

## 2020-10-01 PROCEDURE — 63600175 PHARM REV CODE 636 W HCPCS: Performed by: INTERNAL MEDICINE

## 2020-10-01 PROCEDURE — 88305 TISSUE EXAM BY PATHOLOGIST: CPT | Performed by: PATHOLOGY

## 2020-10-01 PROCEDURE — 88342 IMHCHEM/IMCYTCHM 1ST ANTB: CPT | Mod: 26,59,, | Performed by: PATHOLOGY

## 2020-10-01 PROCEDURE — 38222 DX BONE MARROW BX & ASPIR: CPT | Mod: LT,,, | Performed by: INTERNAL MEDICINE

## 2020-10-01 PROCEDURE — 38222 DX BONE MARROW BX & ASPIR: CPT | Performed by: INTERNAL MEDICINE

## 2020-10-01 PROCEDURE — A4216 STERILE WATER/SALINE, 10 ML: HCPCS | Performed by: INTERNAL MEDICINE

## 2020-10-01 PROCEDURE — 88311 DECALCIFY TISSUE: CPT | Performed by: PATHOLOGY

## 2020-10-01 PROCEDURE — 88189 FLOWCYTOMETRY/READ 16 & >: CPT | Mod: ,,, | Performed by: PATHOLOGY

## 2020-10-01 PROCEDURE — 88264 CHROMOSOME ANALYSIS 20-25: CPT

## 2020-10-01 PROCEDURE — 88311 PR  DECALCIFY TISSUE: ICD-10-PCS | Mod: 26,,, | Performed by: PATHOLOGY

## 2020-10-01 PROCEDURE — 88305 TISSUE EXAM BY PATHOLOGIST: ICD-10-PCS | Mod: 26,,, | Performed by: PATHOLOGY

## 2020-10-01 PROCEDURE — 85097 BONE MARROW INTERPRETATION: CPT | Mod: ,,, | Performed by: PATHOLOGY

## 2020-10-01 PROCEDURE — 88185 FLOWCYTOMETRY/TC ADD-ON: CPT | Mod: 59 | Performed by: PATHOLOGY

## 2020-10-01 PROCEDURE — 85097 PR  BONE MARROW,SMEAR INTERPRETATION: ICD-10-PCS | Mod: ,,, | Performed by: PATHOLOGY

## 2020-10-01 PROCEDURE — 88342 IMHCHEM/IMCYTCHM 1ST ANTB: CPT | Performed by: PATHOLOGY

## 2020-10-01 PROCEDURE — 88341 PR IHC OR ICC EACH ADD'L SINGLE ANTIBODY  STAINPR: ICD-10-PCS | Mod: 26,,, | Performed by: PATHOLOGY

## 2020-10-01 PROCEDURE — 81450 HL NEO GSAP 5-50DNA/DNA&RNA: CPT

## 2020-10-01 PROCEDURE — 88275 CYTOGENETICS 100-300: CPT | Mod: 59

## 2020-10-01 PROCEDURE — 88341 IMHCHEM/IMCYTCHM EA ADD ANTB: CPT | Mod: 59 | Performed by: PATHOLOGY

## 2020-10-01 PROCEDURE — 88313 PR  SPECIAL STAINS,GROUP II: ICD-10-PCS | Mod: 26,,, | Performed by: PATHOLOGY

## 2020-10-01 PROCEDURE — 25000003 PHARM REV CODE 250: Performed by: INTERNAL MEDICINE

## 2020-10-01 PROCEDURE — 88305 TISSUE EXAM BY PATHOLOGIST: CPT | Mod: 26,,, | Performed by: PATHOLOGY

## 2020-10-01 PROCEDURE — 38221 DX BONE MARROW BIOPSIES: CPT | Performed by: INTERNAL MEDICINE

## 2020-10-01 PROCEDURE — 38222 BONE MARROW: ICD-10-PCS | Mod: LT,,, | Performed by: INTERNAL MEDICINE

## 2020-10-01 PROCEDURE — 88313 SPECIAL STAINS GROUP 2: CPT | Mod: 26,,, | Performed by: PATHOLOGY

## 2020-10-01 PROCEDURE — 88311 DECALCIFY TISSUE: CPT | Mod: 26,,, | Performed by: PATHOLOGY

## 2020-10-01 PROCEDURE — 88341 IMHCHEM/IMCYTCHM EA ADD ANTB: CPT | Mod: 26,,, | Performed by: PATHOLOGY

## 2020-10-01 PROCEDURE — 88189 PR  FLOWCYTOMETRY/READ, 16 & > MARKERS: ICD-10-PCS | Mod: ,,, | Performed by: PATHOLOGY

## 2020-10-01 PROCEDURE — 88237 TISSUE CULTURE BONE MARROW: CPT

## 2020-10-01 PROCEDURE — 88313 SPECIAL STAINS GROUP 2: CPT | Performed by: PATHOLOGY

## 2020-10-01 PROCEDURE — 88342 CHG IMMUNOCYTOCHEMISTRY: ICD-10-PCS | Mod: 26,59,, | Performed by: PATHOLOGY

## 2020-10-01 RX ORDER — HYDROCODONE BITARTRATE AND ACETAMINOPHEN 5; 325 MG/1; MG/1
1 TABLET ORAL EVERY 6 HOURS PRN
Status: ON HOLD | COMMUNITY
End: 2020-10-13 | Stop reason: HOSPADM

## 2020-10-01 RX ORDER — HEPARIN 100 UNIT/ML
5 SYRINGE INTRAVENOUS ONCE
Status: COMPLETED | OUTPATIENT
Start: 2020-10-01 | End: 2020-10-01

## 2020-10-01 RX ORDER — SODIUM CHLORIDE 0.9 % (FLUSH) 0.9 %
10 SYRINGE (ML) INJECTION ONCE
Status: COMPLETED | OUTPATIENT
Start: 2020-10-01 | End: 2020-10-01

## 2020-10-01 RX ORDER — FENTANYL CITRATE 50 UG/ML
100 INJECTION, SOLUTION INTRAMUSCULAR; INTRAVENOUS
Status: DISCONTINUED | OUTPATIENT
Start: 2020-10-01 | End: 2020-10-01 | Stop reason: HOSPADM

## 2020-10-01 RX ORDER — LIDOCAINE HYDROCHLORIDE 10 MG/ML
1 INJECTION INFILTRATION; PERINEURAL
Status: DISCONTINUED | OUTPATIENT
Start: 2020-10-01 | End: 2020-10-01 | Stop reason: HOSPADM

## 2020-10-01 RX ORDER — MIDAZOLAM HYDROCHLORIDE 5 MG/ML
5 INJECTION INTRAMUSCULAR; INTRAVENOUS
Status: DISCONTINUED | OUTPATIENT
Start: 2020-10-01 | End: 2020-10-01 | Stop reason: HOSPADM

## 2020-10-01 RX ADMIN — PROMETHAZINE HYDROCHLORIDE 12.5 MG: 25 INJECTION INTRAMUSCULAR; INTRAVENOUS at 08:10

## 2020-10-01 RX ADMIN — MIDAZOLAM HYDROCHLORIDE 8 MG: 5 INJECTION, SOLUTION INTRAMUSCULAR; INTRAVENOUS at 08:10

## 2020-10-01 RX ADMIN — Medication 10 ML: at 09:10

## 2020-10-01 RX ADMIN — FENTANYL CITRATE 200 MCG: 50 INJECTION, SOLUTION INTRAMUSCULAR; INTRAVENOUS at 08:10

## 2020-10-01 RX ADMIN — HEPARIN 500 UNITS: 100 SYRINGE at 09:10

## 2020-10-01 NOTE — DISCHARGE SUMMARY
[facility]  Discharge Summary     Patient ID:  Solo Black  [id number]  39 y.o.  1980    Admit date: 10/1/2020    Discharge Date and Time:  10/01/2020 8:40 AM    Admitting Physician: Trung Polanco MD     Discharge Provider: Trung Polanco    Reason for Admission: Eosinophilia [D72.19]    Admission Condition: good    Procedures Performed: Procedure(s) (LRB):  Biopsy-bone marrow (Left)    Hospital Course (synopsis of major diagnoses, care, treatment, and services provided during the course of the hospital stay):   - he was admitted for bone marrow aspiration/biopsy. He tolerated procedure well (see procedure note). He was discharged the same day.     Consults: None    Significant Diagnostic Studies: bone marrow studies are pending.    Final Diagnoses:    Principal Problem: Eosinophilia   Secondary Diagnoses:   Active Hospital Problems    Diagnosis  POA    *Eosinophilia [D72.19]  Yes    Hypereosinophilic syndrome [D72.119]  Yes      Resolved Hospital Problems   No resolved problems to display.       Discharged Condition: good    Discharge Exam:  /87   Pulse 84   Temp 99.3 °F (37.4 °C) (Temporal)   Resp 15   Ht 6' (1.829 m)   Wt 95.3 kg (210 lb)   SpO2 95%   BMI 28.48 kg/m²     General Appearance:    Alert, cooperative, no distress, appears stated age   Head:    Normocephalic, without obvious abnormality, atraumatic   Eyes:    PERRL, conjunctiva/corneas clear   Ears:    Normal external ear canals, both ears   Nose:   Nares normal, septum midline   Throat:   Lips, mucosa, and tongue normal; teeth and gums normal   Neck:   Supple, symmetrical, trachea midline   Back:     Symmetric, no curvature, ROM normal, no CVA tenderness   Lungs:     Clear to auscultation bilaterally, respirations unlabored   Chest wall:    No tenderness or deformity   Heart:    Regular rate and rhythm   Abdomen:     Soft, non-tender           Extremities:   Extremities normal, atraumatic, no cyanosis or edema    Pulses:   2+ and symmetric all extremities       Lymph nodes:   Cervical, supraclavicular, and axillary nodes normal   Neurologic:   CNII-XII intact. Normal strength       Disposition: Home or Self Care    Follow Up/Patient Instructions:     Medications:  Reconciled Home Medications:      Medication List      CONTINUE taking these medications    acetaminophen 325 MG tablet  Commonly known as: TYLENOL  Take 1 tablet (325 mg total) by mouth every 6 (six) hours as needed for Pain.     AdderalL 20 mg tablet  Generic drug: dextroamphetamine-amphetamine  Take 1 tablet by mouth 2 (two) times daily before meals. Take before breakfast and lunch     artificial tears 0.5 % ophthalmic solution  Commonly known as: ISOPTO TEARS  Place 1 drop into both eyes 6 (six) times daily.     CENTRUM 3,500-18-0.4 unit-mg-mg Chew  Generic drug: multivit-iron-min-folic acid  Take 10 mg by mouth once daily.     cetirizine 10 MG tablet  Commonly known as: ZYRTEC  Take 20 mg by mouth 2 (two) times a day.     ciprofloxacin HCl 500 MG tablet  Commonly known as: CIPRO  Take 1 tablet (500 mg total) by mouth every 12 (twelve) hours. for 14 days     dicyclomine 20 mg tablet  Commonly known as: BENTYL  Take 1 tablet (20 mg total) by mouth 3 (three) times daily.     diphenhydrAMINE 50 MG capsule  Commonly known as: BENADRYL  Take 50 mg by mouth every 6 (six) hours as needed for Itching.     EPINEPHrine 0.3 mg/0.3 mL Atin  Commonly known as: EPIPEN  Inject 0.3 mLs (0.3 mg total) into the muscle as needed.     famotidine 20 MG tablet  Commonly known as: PEPCID  Take 20 mg by mouth 2 (two) times daily.     gabapentin 300 MG capsule  Commonly known as: NEURONTIN  Take 1 capsule (300 mg total) by mouth every evening.     HYDROcodone-acetaminophen 5-325 mg per tablet  Commonly known as: NORCO  Take 1 tablet by mouth every 6 (six) hours as needed for Pain.     hydrOXYzine HCL 25 MG tablet  Commonly known as: ATARAX  Take 1 tablet (25 mg total) by mouth 3  (three) times daily as needed for Itching.     Lactobacillus acidophilus 10 billion cell Cap  Take 1 capsule by mouth once daily.     LIDOCAINE VISCOUS 2 % Soln  Generic drug: lidocaine HCl 2%  Take 5 mLs by mouth as needed.     LORazepam 1 MG tablet  Commonly known as: ATIVAN  Take 1 tablet (1 mg total) by mouth every 8 (eight) hours as needed for Anxiety.     metoclopramide HCl 10 MG tablet  Commonly known as: REGLAN     nystatin 100,000 unit/mL suspension  Commonly known as: MYCOSTATIN     oxyCODONE-acetaminophen 5-325 mg per tablet  Commonly known as: PERCOCET  Take 1 tablet by mouth every 8 (eight) hours as needed.     paroxetine 20 MG tablet  Commonly known as: PAXIL  Take 20 mg by mouth every morning.     promethazine 25 MG tablet  Commonly known as: PHENERGAN  Take 1 tablet (25 mg total) by mouth every 6 (six) hours as needed for Nausea.     sucralfate 1 gram tablet  Commonly known as: CARAFATE  Take 1 g by mouth before meals and at bedtime as needed.     tamsulosin 0.4 mg Cap  Commonly known as: FLOMAX  Take 1 capsule (0.4 mg total) by mouth 2 (two) times daily.          Discharge Procedure Orders   Diet Adult Regular     Notify your health care provider if you experience any of the following:  temperature >100.4     Notify your health care provider if you experience any of the following:  severe uncontrolled pain     Notify your health care provider if you experience any of the following:  redness, tenderness, or signs of infection (pain, swelling, redness, odor or green/yellow discharge around incision site)     Activity as tolerated     Follow-up Information     Trung Polanco MD In 2 weeks.    Specialty: Hematology and Oncology  Contact information:  [address]  [address]  [facility] [address] [phone]                 Activity: activity as tolerated  Diet: regular diet  Wound Care: keep wound clean and dry    Follow-up with me (Dr. Polanco) in 2 weeks.    Signed:  Trung REYNOLDS  Collin  10/1/2020  8:40 AM

## 2020-10-01 NOTE — PROCEDURES
Solo Black is a 39 y.o. male patient.    Temp: 99.3 °F (37.4 °C) (10/01/20 0701)  Pulse: 88 (10/01/20 0830)  Resp: 16 (10/01/20 0830)  BP: (!) 140/79 (10/01/20 0830)  SpO2: 97 % (10/01/20 0830)  Weight: 95.3 kg (210 lb) (10/01/20 0701)  Height: 6' (182.9 cm) (10/01/20 0701)  Mallampati Scale: Class I  ASA Classification: Class 1    Bone marrow    Date/Time: 10/1/2020 8:35 AM  Performed by: Trung Polanco MD  Authorized by: Trung Polanco MD     Consent Done?:  Yes (Written)   I was present for the entire procedure.  Anesthesia:  Local infiltration  Local anesthetic:  Lidocaine 1% without epinephrine  Aspiration?: Yes    Biopsy?: Yes     Patient tolerated the procedure well with no immediate complications.   Post-operative instructions were provided for the patient.   Patient was discharged and will follow up if any complications occur    Pre-Op Diagnosis: Eosinophilia    Post-Op Diagnosis: Eosinophilia    Estimated Blood Loss: 2 cc    Informed consent obtained from patient. Time-out done. Patient was then placed in right lateral position. Conscious sedation was administered by me using a combination of Versed and Fentanyl. The procedure started at approximately 8:04 a.m. and was completed by 8:30 a.m. The patient was monitored (heart rate, blood pressure, respirations, oxygen saturation, heart rhythm) throughout the procedure by a trained nurse.    Under strict aseptic precautions Left posterior iliac crest was prepped and draped in a sterile fashion. 1% lidocaine was used for infiltration of the periosteum. Using Jamshidi needle, under aseptic precautions bone marrow aspiration was performed. The needle was then removed. Hemostasis was achieved. Specimen was examined and felt to be adequate. It was sent for appropriate studies.     Under aseptic precautions, the same Jamshidi needle was then advanced through the previous entrance site but the periosteum was entered at a slightly different location than  the aspirate location. A core biopsy was obtained. The needle was then removed and hemostasis was achieved. Core biopsy specimen was examined and felt to be adequate. It was sent off for appropriate studies.     Patient tolerated the procedure well. No immediate post procedure complications were noted. The patient will continue to be monitored by a trained nurse until the effect of fentanyl and versed wears off and will be discharged appropriately.    Amount of fentanyl used: 200 mcg  Amount of midazolam used: 8mg    Trung Polanco  10/1/2020

## 2020-10-01 NOTE — DISCHARGE INSTRUCTIONS
Bone Marrow Aspiration and Biopsy    Bone marrow is the soft, spongy part inside bones. It makes most of the bodys blood cells. Aspiration and biopsy are procedures done to take a sample of bone marrow out of the body for examination. To perform either procedure, a needle is inserted into one of your bones, usually the back of the hip bone. Then a sample of bone marrow is removed.   If a sample of fluid and cells is taken, it is called bone marrow aspiration. If a solid sample of bone marrow tissue is removed, it is called bone marrow biopsy. In either case, the samples are sent to a lab and studied. The procedures can be done alone, but are most often done together. This sheet tells you more about what to expect.  Why the procedures are done  The procedures may be done for a number of reasons. They can help diagnose certain blood or bone marrow disorders or infections. They may help find certain cancers, such as leukemia. They can show if cancer in other areas of the body has spread to the bone marrow. They can be used during cancer treatment, such as chemotherapy, to monitor treatment progress. And they may be done before certain treatments, such as a stem cell transplant, which require a bone marrow sample. Your healthcare provider will explain why you need the procedure and answer any questions you have.  Preparing for the procedure  Prepare for the procedure as told. In addition:  · Tell your healthcare provider:  ¨ What medicines you take. This includes blood thinners, such as aspirin. This also includes over-the-counter medicines, herbs, and other supplements. You may need to stop taking some or all of them before the procedure.  ¨ If you are allergic to any medicines. Also mention if you have ever had a reaction to medicines used during other tests or procedures in the past.  ¨ If you have a history of bleeding problems.  ¨ If you are pregnant or may be pregnant.  · Follow any directions youre given for  not eating or drinking before the procedure.  The day of the procedure  The procedures can be done at a hospital, clinic, or healthcare providers office. They are performed by a healthcare provider or trained healthcare provider. Whether youre having one or both procedures, plan to be at the facility for 1 to 2 hours. Youll likely go home the same day.  Before the procedure begins  What to expect before the procedure:  · Youll change into a patient gown.  · An IV line may be put into a vein in your arm or hand. This line supplies fluids and medicines.  · Youll be given a sedative to help you relax, if needed. This medicine is given by pill, injection, or through the IV line.  During the procedure  What to expect during the procedure:  · Youll lie on your side or your stomach.  · The site to be used for the bone marrow samples is marked and cleaned. The most common site is the back of the hip bone. Less common sites include the front of the hip bone or breastbone.  · Numbing medicine (local anesthesia) is injected at the site.  · A small cut is made through the numbed skin.  · One or both procedures are then done. Be sure to lie still for each procedure. It is normal to feel some pressure or pain during each procedure.  ¨ For the bone marrow aspiration, a thin needle is put through the cut and into the bone. A syringe is then attached to the needle and used to remove a sample of bone marrow fluid and cells.  ¨ For the bone marrow biopsy, a different needle is put through the same cut and into the bone. A small amount of bone marrow tissue is then removed.  · The samples are sent to a lab to be evaluated.  · When the procedure is complete, pressure is applied to the site for 10 to 15 minutes to help stop bleeding. The site is then bandaged.  After the procedure  Most people can go home after a short period of observation. If you need it, youll be given medicine to manage pain. If you were given a sedative, you  may be taken to a recovery room to rest until the medicine wears off. An adult family member or friend must drive you home afterward.  Recovering at home  Once at home, follow any instructions youre given. Be sure to:  · Take all medicines as directed.  · Care for the procedure site as instructed.  · Check for signs of infection at the procedure site (see below).  · Avoid getting the procedure site wet. Do not bathe or shower until your healthcare providers say it is OK to do so. If you wish, you may wash with a sponge or washcloth.  · Avoid heavy lifting and other strenuous activities as directed.     Call the healthcare provider  Call your healthcare provider if you have any of the following:  · Fever of 100.4 ºF (38 ºC) or higher, or as directed by your healthcare provider  · Signs of infection at the procedure site, such as increased redness or swelling, warmth, worsening pain, bleeding, or foul-smelling drainage   Follow-up  Your healthcare provider will discuss the results with you when they are ready. This is usually within a week after the procedure.     Risks and possible complications of these procedures  These include:  · Severe bleeding or bruising at the procedure site  · Infection at the procedure site  · Bone fracture  · Bone infection   Date Last Reviewed: 10/8/2015  © 1936-5333 [employer]. [address] [place] [address], [place] [address]. All rights reserved. This information is not intended as a substitute for professional medical care. Always follow your healthcare professional's instructions.

## 2020-10-01 NOTE — PLAN OF CARE
Pt fully awake and alert and states discomfort left post hip bone marrow site 3/10 and tolerable. VSS Discharge instructions given with time for questions. Printed copy of discharge instructions also mendel.

## 2020-10-01 NOTE — INTERVAL H&P NOTE
The patient has been examined and the H&P has been reviewed:    I concur with the findings and no changes have occurred since H&P was written.    Surgery risks, benefits and alternative options discussed and understood by patient/family.      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

## 2020-10-02 LAB
BODY SITE - BONE MARROW: NORMAL
CLINICAL DIAGNOSIS - BONE MARROW: NORMAL
FLOW CYTOMETRY ANTIBODIES ANALYZED - BONE MARROW: NORMAL
FLOW CYTOMETRY COMMENT - BONE MARROW: NORMAL
FLOW CYTOMETRY INTERPRETATION - BONE MARROW: NORMAL

## 2020-10-05 ENCOUNTER — PATIENT MESSAGE (OUTPATIENT)
Dept: UROLOGY | Facility: CLINIC | Age: 40
End: 2020-10-05

## 2020-10-05 ENCOUNTER — PATIENT MESSAGE (OUTPATIENT)
Dept: HEMATOLOGY/ONCOLOGY | Facility: CLINIC | Age: 40
End: 2020-10-05

## 2020-10-05 ENCOUNTER — TELEPHONE (OUTPATIENT)
Dept: NEUROLOGY | Facility: HOSPITAL | Age: 40
End: 2020-10-05

## 2020-10-05 ENCOUNTER — PATIENT MESSAGE (OUTPATIENT)
Dept: SURGERY | Facility: CLINIC | Age: 40
End: 2020-10-05

## 2020-10-05 DIAGNOSIS — L03.317 CELLULITIS OF BUTTOCK: Primary | ICD-10-CM

## 2020-10-05 LAB
AML FISH ADDITIONAL INFORMATION (BM): NORMAL
AML FISH DISCLAIMER (BM): NORMAL
AML FISH REASON FOR REFERRAL (BM): NORMAL
AML FISH RELEASED BY (BM): NORMAL
AML FISH RESULT (BM): NORMAL
AML FISH RESULT SUMMARY (BM): NORMAL
AML FISH RESULT TABLE (BM): NORMAL
CLINICAL CYTOGENETICIST REVIEW: NORMAL
FAMLB SPECIMEN: NORMAL
REF LAB TEST METHOD: NORMAL
SPECIMEN SOURCE: NORMAL

## 2020-10-05 RX ORDER — DOXYCYCLINE HYCLATE 100 MG
100 TABLET ORAL 2 TIMES DAILY
Qty: 14 TABLET | Refills: 0 | Status: ON HOLD | OUTPATIENT
Start: 2020-10-05 | End: 2020-10-13 | Stop reason: HOSPADM

## 2020-10-05 NOTE — TELEPHONE ENCOUNTER
Pt scheduled with gastric emptying study on 10/7/20, requesting to schedule post ges clinic appt.  Pt informed Dr Wall usually contact pt via telephone with instructions/recommendations after testing.  Pt acknowledged understanding.

## 2020-10-06 ENCOUNTER — PATIENT MESSAGE (OUTPATIENT)
Dept: ALLERGY | Facility: CLINIC | Age: 40
End: 2020-10-06

## 2020-10-06 ENCOUNTER — OFFICE VISIT (OUTPATIENT)
Dept: ALLERGY | Facility: CLINIC | Age: 40
End: 2020-10-06
Payer: MEDICAID

## 2020-10-06 VITALS — BODY MASS INDEX: 29.15 KG/M2 | HEIGHT: 72 IN | WEIGHT: 215.19 LBS

## 2020-10-06 DIAGNOSIS — L50.8 RECURRENT URTICARIA: ICD-10-CM

## 2020-10-06 DIAGNOSIS — D72.19 EOSINOPHILIC LEUKOCYTOSIS, UNSPECIFIED TYPE: Primary | ICD-10-CM

## 2020-10-06 DIAGNOSIS — K20.0 EOSINOPHILIC ESOPHAGITIS: ICD-10-CM

## 2020-10-06 DIAGNOSIS — T78.3XXD ANGIOEDEMA, SUBSEQUENT ENCOUNTER: ICD-10-CM

## 2020-10-06 LAB
CHROM BANDING METHOD: NORMAL
CHROMOSOME ANALYSIS BM ADDITIONAL INFORMATION: NORMAL
CHROMOSOME ANALYSIS BM RELEASED BY: NORMAL
CHROMOSOME ANALYSIS BM RESULT SUMMARY: NORMAL
CLINICAL CYTOGENETICIST REVIEW: NORMAL
DNA/RNA EXTRACT AND HOLD RESULT: NORMAL
DNA/RNA EXTRACTION: NORMAL
EXHR SPECIMEN TYPE: NORMAL
KARYOTYP MAR: NORMAL
REASON FOR REFERRAL (NARRATIVE): NORMAL
REF LAB TEST METHOD: NORMAL
SPECIMEN SOURCE: NORMAL
SPECIMEN: NORMAL

## 2020-10-06 PROCEDURE — 99213 OFFICE O/P EST LOW 20 MIN: CPT | Mod: PBBFAC | Performed by: STUDENT IN AN ORGANIZED HEALTH CARE EDUCATION/TRAINING PROGRAM

## 2020-10-06 PROCEDURE — 99999 PR PBB SHADOW E&M-EST. PATIENT-LVL III: CPT | Mod: PBBFAC,,, | Performed by: STUDENT IN AN ORGANIZED HEALTH CARE EDUCATION/TRAINING PROGRAM

## 2020-10-06 PROCEDURE — 99999 PR PBB SHADOW E&M-EST. PATIENT-LVL III: ICD-10-PCS | Mod: PBBFAC,,, | Performed by: STUDENT IN AN ORGANIZED HEALTH CARE EDUCATION/TRAINING PROGRAM

## 2020-10-06 PROCEDURE — 99214 PR OFFICE/OUTPT VISIT, EST, LEVL IV, 30-39 MIN: ICD-10-PCS | Mod: S$PBB,,, | Performed by: STUDENT IN AN ORGANIZED HEALTH CARE EDUCATION/TRAINING PROGRAM

## 2020-10-06 PROCEDURE — 99214 OFFICE O/P EST MOD 30 MIN: CPT | Mod: S$PBB,,, | Performed by: STUDENT IN AN ORGANIZED HEALTH CARE EDUCATION/TRAINING PROGRAM

## 2020-10-06 RX ORDER — PREDNISONE 10 MG/1
20 TABLET ORAL DAILY
Qty: 30 TABLET | Refills: 1 | Status: ON HOLD | OUTPATIENT
Start: 2020-10-06 | End: 2020-10-13 | Stop reason: HOSPADM

## 2020-10-06 NOTE — PROGRESS NOTES
ALLERGY & IMMUNOLOGY CLINIC - FOLLOW UP     HISTORY OF PRESENT ILLNESS     Patient ID: Solo Black is a 39 y.o. male    CC: angioedema and eosinophelia    HPI: Solo lBack is a 39 y.o. male with a history of EoE, eosinophilic gastritis, chronic recurrent angioedema and urticaria, and possible HES here for follow up.    We had been weaning his prednisone and monitoring his eosinophil count while doing so,  He said when got to 20 mg was doing well. When he went down to 10mg, he started have facial swelling after about 4 days, so he got his CBC and then went back up to prednisone 20 mg daily. His eosinophils had significantly increased to an absolute count of 1100 on the 10 mg prednisone. Now that he is back on prednisone 20 mg, he reports his swelling is improved.    He was recently hospitalized for urinary obstruction and prostatitis and is now following with urology.    He underwent his bone marrow bx last week.     REVIEW OF SYSTEMS     CONST: no F/C, no unintentional weight changes  EYES: no discharge, + pruritus and burning with the angioedema  NOSE: no congestion, no rhinorrhea, no discharge, no itching, no sneezing  PULM: no SOB, no wheezing, no cough  CV: no CP, no palpitations  Rest of ROS negative unless otherwise stated in the HPI.     MEDICAL HISTORY     MedHx:   Patient Active Problem List   Diagnosis    Eosinophilic esophagitis    PUD (peptic ulcer disease)    Erosive gastritis    Lifetime need for proton pump inhibitor    Cervicalgia    Adult ADHD    Adverse reaction to nonsteroidal anti-inflammatory drug (NSAID)    Therapeutic opioid induced constipation    Iron deficiency anemia    Eosinophilia    Reactive arthritis    Incomplete rotator cuff tear or rupture of right shoulder, not specified as traumatic    Hiatal hernia    Hypereosinophilic syndrome    Encounter for insertion of venous access port    Port-A-Cath in place    Dry eyes    Skin rash    Chronic neck pain     Myofascial pain syndrome    Status post laminectomy - Thoracic & cervical    Sedentary lifestyle    Adjustment disorder with anxious mood    Internal hemorrhoids    Benign prostatic hyperplasia with nocturia    Difficulty voiding    Hematuria    Anxiety       SurgHx:   Past Surgical History:   Procedure Laterality Date    APPENDECTOMY      ARTHROSCOPY OF SHOULDER WITH REMOVAL OF DISTAL CLAVICLE Right 11/1/2018    Procedure: ARTHROSCOPY, SHOULDER, WITH DISTAL CLAVICLE EXCISION;  Surgeon: Gabino Mejia MD;  Location: [facility];  Service: Orthopedics;  Laterality: Right;  video    BACK SURGERY      BLADDER FULGURATION N/A 9/18/2020    Procedure: FULGURATION, BLADDER;  Surgeon: Randall Moss MD;  Location: [facility];  Service: Urology;  Laterality: N/A;  blood vessels of bladder neck and prostate    BONE MARROW BIOPSY Left 10/1/2020    Procedure: Biopsy-bone marrow;  Surgeon: Trung Polanco MD;  Location: [facility];  Service: Oncology;  Laterality: Left;    CIRCUMCISION, PRIMARY      COLONOSCOPY N/A 11/14/2018    Procedure: COLONOSCOPY;  Surgeon: Milton Brunson MD;  Location: [facility];  Service: Endoscopy;  Laterality: N/A;    COLONOSCOPY N/A 7/20/2020    Procedure: COLONOSCOPY;  Surgeon: Ruslan Tillman MD;  Location: [facility];  Service: Endoscopy;  Laterality: N/A;    CYSTOSCOPY W/ RETROGRADES Bilateral 9/18/2020    Procedure: CYSTOSCOPY, WITH RETROGRADE PYELOGRAM;  Surgeon: Randall Moss MD;  Location: [facility];  Service: Urology;  Laterality: Bilateral;    DILATION OF URETHRA N/A 9/18/2020    Procedure: DILATION, URETHRA;  Surgeon: Randall Moss MD;  Location: [facility];  Service: Urology;  Laterality: N/A;    drainage of abscess from hand  2009    [facility]SA    drainage of abscess from R thigh  2006    [facility]SA    ESOPHAGOGASTRODUODENOSCOPY  3/11/2014    ESOPHAGOGASTRODUODENOSCOPY N/A 9/5/2018    Procedure: EGD (ESOPHAGOGASTRODUODENOSCOPY);  Surgeon: Kolby Wall MD;   Location: [facility];  Service: Endoscopy;  Laterality: N/A;    ESOPHAGOGASTRODUODENOSCOPY N/A 3/25/2020    Procedure: EGD (ESOPHAGOGASTRODUODENOSCOPY);  Surgeon: Ruslan Tillman MD;  Location: [facility];  Service: Endoscopy;  Laterality: N/A;    ESOPHAGOGASTRODUODENOSCOPY N/A 7/20/2020    Procedure: EGD (ESOPHAGOGASTRODUODENOSCOPY);  Surgeon: Ruslan Tillman MD;  Location: [facility];  Service: Endoscopy;  Laterality: N/A;  Patient is a very hard stick, requires anesthesia stick.    ESOPHAGOGASTRODUODENOSCOPY N/A 8/31/2020    Procedure: EGD (ESOPHAGOGASTRODUODENOSCOPY);  Surgeon: Kolby Wall MD;  Location: [facility];  Service: Endoscopy;  Laterality: N/A;    FLEXIBLE SIGMOIDOSCOPY N/A 9/5/2018    Procedure: SIGMOIDOSCOPY, FLEXIBLE;  Surgeon: Kolby Wall MD;  Location: [facility];  Service: Endoscopy;  Laterality: N/A;    HAND SURGERY  jan 2014    power saw fell on hand - laceration 3 tendons    INSERTION OF VENOUS ACCESS PORT Right 8/21/2020    Procedure: INSERTION, VENOUS ACCESS PORT;  Surgeon: Troy Honeycutt MD;  Location: [facility];  Service: General;  Laterality: Right;    NISSEN FUNDOPLICATION           Medications:   Current Outpatient Medications on File Prior to Visit   Medication Sig Dispense Refill    acetaminophen (TYLENOL) 325 MG tablet Take 1 tablet (325 mg total) by mouth every 6 (six) hours as needed for Pain.      artificial tears (ISOPTO TEARS) 0.5 % ophthalmic solution Place 1 drop into both eyes 6 (six) times daily.      cetirizine (ZYRTEC) 10 MG tablet Take 20 mg by mouth 2 (two) times a day.       dextroamphetamine-amphetamine (ADDERALL) 20 mg tablet Take 1 tablet by mouth 2 (two) times daily before meals. Take before breakfast and lunch      dicyclomine (BENTYL) 20 mg tablet Take 1 tablet (20 mg total) by mouth 3 (three) times daily. 30 tablet 0    diphenhydrAMINE (BENADRYL) 50 MG capsule Take 50 mg by mouth every 6 (six) hours as needed for Itching.       doxycycline (VIBRA-TABS) 100 MG tablet Take 1 tablet (100 mg total) by mouth 2 (two) times daily. 14 tablet 0    EPINEPHrine (EPIPEN) 0.3 mg/0.3 mL AtIn Inject 0.3 mLs (0.3 mg total) into the muscle as needed. 2 Device 1    famotidine (PEPCID) 20 MG tablet Take 20 mg by mouth 2 (two) times daily.      gabapentin (NEURONTIN) 300 MG capsule Take 1 capsule (300 mg total) by mouth every evening. 30 capsule 0    HYDROcodone-acetaminophen (NORCO) 5-325 mg per tablet Take 1 tablet by mouth every 6 (six) hours as needed for Pain.      hydrOXYzine HCL (ATARAX) 25 MG tablet Take 1 tablet (25 mg total) by mouth 3 (three) times daily as needed for Itching. 30 tablet 1    Lactobacillus acidophilus 10 billion cell Cap Take 1 capsule by mouth once daily.       lidocaine HCl 2% (LIDOCAINE VISCOUS) 2 % Soln Take 5 mLs by mouth as needed.       LORazepam (ATIVAN) 1 MG tablet Take 1 tablet (1 mg total) by mouth every 8 (eight) hours as needed for Anxiety. 15 tablet 0    metoclopramide HCl (REGLAN) 10 MG tablet       MULTIVIT-IRON-MIN-FOLIC ACID 3,500-18-0.4 UNIT-MG-MG ORAL CHEW Take 10 mg by mouth once daily.      nystatin (MYCOSTATIN) 100,000 unit/mL suspension       oxyCODONE-acetaminophen (PERCOCET) 5-325 mg per tablet Take 1 tablet by mouth every 8 (eight) hours as needed. 15 tablet 0    paroxetine (PAXIL) 20 MG tablet Take 20 mg by mouth every morning.      promethazine (PHENERGAN) 25 MG tablet Take 1 tablet (25 mg total) by mouth every 6 (six) hours as needed for Nausea. 15 tablet 0    sucralfate (CARAFATE) 1 gram tablet Take 1 g by mouth before meals and at bedtime as needed.       tamsulosin (FLOMAX) 0.4 mg Cap Take 1 capsule (0.4 mg total) by mouth 2 (two) times daily. 60 capsule 11     No current facility-administered medications on file prior to visit.        H/o Asthma: as a child. Still carries albuterol inhaler, uses once to twice per year  H/o Eczema: as a child, not as bad anymore  H/o Rhinitis:  "occasional  Oral Allergy: cantaloupe, watermelon  Food Allergy: does not eat beans due to lip swelling and vomiting  Venom Allergy: denies  Latex Allergy: denies  Drug Allergies:   Review of patient's allergies indicates:   Allergen Reactions    Legumes Nausea And Vomiting and Swelling     Other reaction(s): GI Intolerance  vomiting  vomiting  Pt reports legumes make his lips swell and induce severe vomiting  Pt reports legumes make his lips swell and induce severe vomiting      Nsaids (non-steroidal anti-inflammatory drug)      GI bleed    Bean pod extract      Lips swelling, vomiting  Lips swelling, vomiting      Ketamine      Severe dysphoria (bad trip)    Lentils      Lips swelling, vomiting  Lips swelling, vomiting      Meloxicam Nausea Only     GI bleed    Peas      Lips swelling, vomiting    Penicillins      Has taken third gen cephalosporins without issue per patient report    Haloperidol Anxiety and Other (See Comments)     "feels like crawling out of his skin"      PCN- had hives when he was a child      Infection Hx: He had pneumonia once a few years ago. Gets MRSA infections frequently. No frequent sinus infections.    SocHx:   ETOH: no  Tobacco: no  Illicit Drug Use: no  Occupation: works in biomedical field    FamHx:   Asthma: sister  Allergic rhinitis: father  Food Allergy: no  Immune deficiency: no  Angioedema: no  Autoimmune: sister with celiac, grandmother with crohns     PHYSICAL EXAM     VS: Ht 6' (1.829 m)   Wt 97.6 kg (215 lb 2.7 oz)   BMI 29.18 kg/m²   GENERAL: AAOx4, NAD, appears to be in pain, cooperative  EYES: EOMI, no conjunctival injection, no discharge,   LUNGS: normal effort, no distress  EXTREMITIES: No edema, no cyanosis, no clubbing  DERM: no apparent rashes currently, no skin breaks  NEURO: normal gait, no facial asymmetry     LABORATORY STUDIES     Peripheral eosinophils: 1100 (9/29/20), 100 (9/19/20), 300 (9/17/20) 600 (9/4/2020), 400 (9/2/2020), 100 (8/31/2020), 0 " (8/22/2020), 100 (8/19/2020), 0 (8/10/2020), 500 (7/13/2020), 1600 (10/5/2018)    Component      Latest Ref Rng & Units 7/13/2020   CHIC2, 4q12 Deletion Result Summary       Normal   Chic2, 4Q12 Deletion, Interpretation       SEE BELOW   CHIC2, 4q12 Deletion Result Table       SEE BELOW   Chic2, 4Q12 Deletion, Results       SEE BELOW   Chic2, 4Q12 Deletion, Reason For Referral       D72.1  Eosinophilia   Chic2, 4Q12 Deletion, Specimen       Blood   Chic2, 4Q12 Deletion, Source       Test Not Performed   Chic2, 4Q12 Deletion, Method       SEE BELOW   CHIC2, 4q12 Deletion Additional Information       Test Not Performed   CHIC2, 4q12 Deletion Disclaimer       SEE BELOW   CHIC2, 4q12 Deletion Released by       Chey Allen D.O.   Strongyloides Ab IgG      Negative Positive (A)   Tryptase      <11.5 ng/mL 7.7   Vitamin B-12      210 - 950 pg/mL 390   T Cell Receptor Gene Rearrangement, Blood       SEE BELOW   Final Diagnosis       SEE BELOW       Component      Latest Ref Rng & Units 4/12/2019 9/4/2018 6/7/2013   C2 Complement, Functional, S      25 - 47 U/mL   47   Interpretation         See Comments   Complement (C-4)      11 - 44 mg/dl   20   C1 Esterase Inhibitor      21 - 39 mg/dl   36   C1Q Binding      0.0 - 3.9 ugE/ml   3.3   TSH      0.400 - 4.000 uIU/mL 0.265 (L) 2.240    Free T4      0.71 - 1.51 ng/dL 0.91            ALLERGEN TESTING     He reports prior allergy testing in [place] but does not remember what he was positive to.     IMAGING & OTHER DIAGNOSTICS     Echocardiogram 4/15/19:  · Concentric left ventricular remodeling.  · Normal left ventricular systolic function. The estimated ejection fraction is 60%  · Grade I (mild) left ventricular diastolic dysfunction consistent with impaired relaxation.  · Mild tricuspid regurgitation.  · Normal right ventricular systolic function.  · Normal left atrial pressure.  · Normal central venous pressure (3 mm Hg).  · The estimated PA systolic pressure is 27  mm Hg     CHART REVIEW     Reviewed prior labs, notes, records from [facility].     ASSESSMENT & PLAN     Solo Black is a 39 y.o. male with     # Eosinophilia: We do not yet have a definitive diagnosis of HES. He has had one set of labs here with eos>1500. He has not had a second eos level here with eos>1500, possibly due to him frequently being on steroids. When we weaned down to prednisone 10 mg, his eos went up to 1100, but he increased his prednisone to 20 mg again because he was suffering from angioedema. Records from [facility] with peripheral eos no higher than 700. FISH for CHIC2 deletion (FIP1L1 And PDGFRA Fusion), tryptase, B12, B and T cell receptor gene rearrangement were all wnl. strongyloides IgG was positive, but treatment with ivermectin did not improve symptoms or eosinophilia.    It is possible his urticaria and angioedema are related to HES vs chronic spontaneous urticaria. He is currently on 20mg prednisone daily, which seems to be the minimum dose controlling his symptoms. Will continue at this dose for now.   Mepolizumab has recently been approved for HES. I would like to start him on mepolizumab in order to get him off of his chronic steroids; however, we would need another absolute eosinophil count >1500 to make the diagnosis of HES. He has sent me some of his old records, which will take me awhile to sort through. If I can find another eosinophil count >1500, I will order the mepolizumab. Otherwise, we will have to stop his prednisone for about 2 days, and collect another CBC to see how high his eosinophils go.  -continue prednisone 20 mg daily for now  -if we make a diagnosis of HES, will start mepolizumab as above  -Path report from patient's bone marrow bx appears to be mostly normal    # Chronic angioedema and urticaria: sxs including reported intestinal swelling and airway swelling in the past requiring intubation. He has been on omalizumab in the past without help, but he only  stayed on for 1-2 months. Patient says his urticaria and angioedema are currently under good control.  -steroids as above  -sxs do not seem antihistamine responsive  -can consider trying omalizumab again in the future    # Skin lesions on back: not discussed today. round lesions on back that scab and ooze  -referred to derm for further eval and possible biopsy, but patient says he has not been able to make an appointment due to insurance reasons  -he says he will see an outside dermatologist    # Eosinophilic esophagitis and gastritis: Patient has ongoing symptoms, follows with GI here. He reports that steroid slurries help with the esophagitis but not gastritis. He reports he has tried food elimination diets without success  -continue following with GI  -if he does not start mepolizumab, consider dupilumab if/when it gets approved for EoE    # Allergy to legumes: Vomiting and lip swelling  -continue avoiding beans/legumes  -continue to carry epi pen    # Allergy status to penicillin: had hives when he was a child. Unlikely that he has current IgE mediated allergy to pcn  -consider amoxicillin challenge when we get his other symptoms under control    Discussed with: Dr. Wright  Follow up: 1-4 months depending on eosinophil levels and symptoms     Michelle Ornelas MD  Allergy/Immunology Fellow

## 2020-10-07 ENCOUNTER — PATIENT MESSAGE (OUTPATIENT)
Dept: ALLERGY | Facility: CLINIC | Age: 40
End: 2020-10-07

## 2020-10-07 RX ORDER — GABAPENTIN 300 MG/1
300 CAPSULE ORAL 3 TIMES DAILY
Qty: 90 CAPSULE | Refills: 0 | Status: ON HOLD | OUTPATIENT
Start: 2020-10-07 | End: 2021-04-17 | Stop reason: HOSPADM

## 2020-10-08 ENCOUNTER — PATIENT MESSAGE (OUTPATIENT)
Dept: SURGERY | Facility: CLINIC | Age: 40
End: 2020-10-08

## 2020-10-08 ENCOUNTER — TELEPHONE (OUTPATIENT)
Dept: UROLOGY | Facility: CLINIC | Age: 40
End: 2020-10-08

## 2020-10-08 ENCOUNTER — PATIENT MESSAGE (OUTPATIENT)
Dept: UROLOGY | Facility: CLINIC | Age: 40
End: 2020-10-08

## 2020-10-08 NOTE — TELEPHONE ENCOUNTER
----- Message from Kacy Hernandez sent at 10/8/2020 11:48 AM CDT -----  Type:  Needs Medical Advice    Who Called: Solo   Symptoms (please be specific): pt is requesting a call back from Ms. Hobbs regarding his test results   How long has patient had these symptoms:  unknown  Pharmacy name and phone #:  n/a  Would the patient rather a call back or a response via [facility]? Call back  Best Call Back Number: [phone]  Additional Information: pt is requesting a call back sooner than later

## 2020-10-08 NOTE — TELEPHONE ENCOUNTER
Return telephone call placed to patient, but no answer. Voice message left for patient.     Anupama Hobbs NP

## 2020-10-09 ENCOUNTER — PATIENT MESSAGE (OUTPATIENT)
Dept: UROLOGY | Facility: CLINIC | Age: 40
End: 2020-10-09

## 2020-10-09 ENCOUNTER — PATIENT MESSAGE (OUTPATIENT)
Dept: ALLERGY | Facility: CLINIC | Age: 40
End: 2020-10-09

## 2020-10-09 ENCOUNTER — PATIENT MESSAGE (OUTPATIENT)
Dept: SURGERY | Facility: CLINIC | Age: 40
End: 2020-10-09

## 2020-10-09 ENCOUNTER — TELEPHONE (OUTPATIENT)
Dept: UROLOGY | Facility: CLINIC | Age: 40
End: 2020-10-09

## 2020-10-09 ENCOUNTER — TELEPHONE (OUTPATIENT)
Dept: SURGERY | Facility: CLINIC | Age: 40
End: 2020-10-09

## 2020-10-09 ENCOUNTER — PATIENT MESSAGE (OUTPATIENT)
Dept: HEMATOLOGY/ONCOLOGY | Facility: CLINIC | Age: 40
End: 2020-10-09

## 2020-10-09 ENCOUNTER — TELEPHONE (OUTPATIENT)
Dept: HEMATOLOGY/ONCOLOGY | Facility: CLINIC | Age: 40
End: 2020-10-09

## 2020-10-09 ENCOUNTER — HOSPITAL ENCOUNTER (OUTPATIENT)
Facility: HOSPITAL | Age: 40
Discharge: HOME | End: 2020-10-13
Attending: EMERGENCY MEDICINE | Admitting: INTERNAL MEDICINE
Payer: MEDICAID

## 2020-10-09 ENCOUNTER — TELEPHONE (OUTPATIENT)
Dept: ALLERGY | Facility: CLINIC | Age: 40
End: 2020-10-09

## 2020-10-09 DIAGNOSIS — T78.3XXA ANGIOEDEMA, INITIAL ENCOUNTER: Primary | ICD-10-CM

## 2020-10-09 DIAGNOSIS — R07.9 CHEST PAIN: ICD-10-CM

## 2020-10-09 DIAGNOSIS — R04.2 BLOODY SPUTUM: ICD-10-CM

## 2020-10-09 DIAGNOSIS — K92.1 HEMATOCHEZIA: ICD-10-CM

## 2020-10-09 DIAGNOSIS — R31.0 GROSS HEMATURIA: ICD-10-CM

## 2020-10-09 DIAGNOSIS — R10.9 ABDOMINAL PAIN, UNSPECIFIED ABDOMINAL LOCATION: ICD-10-CM

## 2020-10-09 PROBLEM — L50.9 URTICARIAL RASH: Status: ACTIVE | Noted: 2020-10-09

## 2020-10-09 LAB
ALBUMIN SERPL BCP-MCNC: 3.8 G/DL (ref 3.5–5.2)
ALP SERPL-CCNC: 52 U/L (ref 55–135)
ALT SERPL W/O P-5'-P-CCNC: 36 U/L (ref 10–44)
ANION GAP SERPL CALC-SCNC: 13 MMOL/L (ref 8–16)
APTT BLDCRRT: 26.8 SEC (ref 21–32)
AST SERPL-CCNC: 20 U/L (ref 10–40)
BACTERIA #/AREA URNS AUTO: ABNORMAL /HPF
BASOPHILS # BLD AUTO: 0.02 K/UL (ref 0–0.2)
BASOPHILS # BLD AUTO: 0.07 K/UL (ref 0–0.2)
BASOPHILS NFR BLD: 0.2 % (ref 0–1.9)
BASOPHILS NFR BLD: 1 % (ref 0–1.9)
BILIRUB SERPL-MCNC: 1.2 MG/DL (ref 0.1–1)
BILIRUB UR QL STRIP: NEGATIVE
BUN SERPL-MCNC: 19 MG/DL (ref 6–20)
CALCIUM SERPL-MCNC: 9 MG/DL (ref 8.7–10.5)
CHLORIDE SERPL-SCNC: 106 MMOL/L (ref 95–110)
CLARITY UR REFRACT.AUTO: ABNORMAL
CO2 SERPL-SCNC: 23 MMOL/L (ref 23–29)
COLOR UR AUTO: YELLOW
CREAT SERPL-MCNC: 1.1 MG/DL (ref 0.5–1.4)
CTP QC/QA: YES
DIFFERENTIAL METHOD: ABNORMAL
DIFFERENTIAL METHOD: ABNORMAL
EOSINOPHIL # BLD AUTO: 0 K/UL (ref 0–0.5)
EOSINOPHIL # BLD AUTO: 0.4 K/UL (ref 0–0.5)
EOSINOPHIL NFR BLD: 0 % (ref 0–8)
EOSINOPHIL NFR BLD: 6.5 % (ref 0–8)
ERYTHROCYTE [DISTWIDTH] IN BLOOD BY AUTOMATED COUNT: 18.9 % (ref 11.5–14.5)
ERYTHROCYTE [DISTWIDTH] IN BLOOD BY AUTOMATED COUNT: 19.4 % (ref 11.5–14.5)
EST. GFR  (AFRICAN AMERICAN): >60 ML/MIN/1.73 M^2
EST. GFR  (NON AFRICAN AMERICAN): >60 ML/MIN/1.73 M^2
FERRITIN SERPL-MCNC: 21 NG/ML (ref 20–300)
FIBRINOGEN PPP-MCNC: 355 MG/DL (ref 182–366)
GLUCOSE SERPL-MCNC: 85 MG/DL (ref 70–110)
GLUCOSE UR QL STRIP: NEGATIVE
HCT VFR BLD AUTO: 37.1 % (ref 40–54)
HCT VFR BLD AUTO: 38 % (ref 40–54)
HGB BLD-MCNC: 11.6 G/DL (ref 14–18)
HGB BLD-MCNC: 12.2 G/DL (ref 14–18)
HGB UR QL STRIP: ABNORMAL
HYALINE CASTS UR QL AUTO: 0 /LPF
IMM GRANULOCYTES # BLD AUTO: 0.01 K/UL (ref 0–0.04)
IMM GRANULOCYTES # BLD AUTO: 0.04 K/UL (ref 0–0.04)
IMM GRANULOCYTES NFR BLD AUTO: 0.1 % (ref 0–0.5)
IMM GRANULOCYTES NFR BLD AUTO: 0.3 % (ref 0–0.5)
INR PPP: 1 (ref 0.8–1.2)
INR PPP: 1 (ref 0.8–1.2)
KETONES UR QL STRIP: NEGATIVE
LEUKOCYTE ESTERASE UR QL STRIP: ABNORMAL
LIPASE SERPL-CCNC: 21 U/L (ref 4–60)
LYMPHOCYTES # BLD AUTO: 0.4 K/UL (ref 1–4.8)
LYMPHOCYTES # BLD AUTO: 1.1 K/UL (ref 1–4.8)
LYMPHOCYTES NFR BLD: 16.1 % (ref 18–48)
LYMPHOCYTES NFR BLD: 3.5 % (ref 18–48)
MAGNESIUM SERPL-MCNC: 1.8 MG/DL (ref 1.6–2.6)
MCH RBC QN AUTO: 25.7 PG (ref 27–31)
MCH RBC QN AUTO: 26.2 PG (ref 27–31)
MCHC RBC AUTO-ENTMCNC: 31.3 G/DL (ref 32–36)
MCHC RBC AUTO-ENTMCNC: 32.1 G/DL (ref 32–36)
MCV RBC AUTO: 82 FL (ref 82–98)
MCV RBC AUTO: 82 FL (ref 82–98)
MICROSCOPIC COMMENT: ABNORMAL
MONOCYTES # BLD AUTO: 0.1 K/UL (ref 0.3–1)
MONOCYTES # BLD AUTO: 1.1 K/UL (ref 0.3–1)
MONOCYTES NFR BLD: 0.7 % (ref 4–15)
MONOCYTES NFR BLD: 16.3 % (ref 4–15)
NEUTROPHILS # BLD AUTO: 11 K/UL (ref 1.8–7.7)
NEUTROPHILS # BLD AUTO: 4.1 K/UL (ref 1.8–7.7)
NEUTROPHILS NFR BLD: 60 % (ref 38–73)
NEUTROPHILS NFR BLD: 95.3 % (ref 38–73)
NITRITE UR QL STRIP: NEGATIVE
NRBC BLD-RTO: 0 /100 WBC
NRBC BLD-RTO: 0 /100 WBC
PH UR STRIP: 6 [PH] (ref 5–8)
PHOSPHATE SERPL-MCNC: 2.8 MG/DL (ref 2.7–4.5)
PLATELET # BLD AUTO: 235 K/UL (ref 150–350)
PLATELET # BLD AUTO: 263 K/UL (ref 150–350)
PMV BLD AUTO: 11.1 FL (ref 9.2–12.9)
PMV BLD AUTO: 11.5 FL (ref 9.2–12.9)
POTASSIUM SERPL-SCNC: 3.2 MMOL/L (ref 3.5–5.1)
PROT SERPL-MCNC: 6.6 G/DL (ref 6–8.4)
PROT UR QL STRIP: ABNORMAL
PROTHROMBIN TIME: 11.3 SEC (ref 9–12.5)
PROTHROMBIN TIME: 11.4 SEC (ref 9–12.5)
RBC # BLD AUTO: 4.52 M/UL (ref 4.6–6.2)
RBC # BLD AUTO: 4.65 M/UL (ref 4.6–6.2)
RBC #/AREA URNS AUTO: >100 /HPF (ref 0–4)
SARS-COV-2 RDRP RESP QL NAA+PROBE: NEGATIVE
SODIUM SERPL-SCNC: 142 MMOL/L (ref 136–145)
SP GR UR STRIP: 1.02 (ref 1–1.03)
URN SPEC COLLECT METH UR: ABNORMAL
WBC # BLD AUTO: 11.5 K/UL (ref 3.9–12.7)
WBC # BLD AUTO: 6.79 K/UL (ref 3.9–12.7)
WBC #/AREA URNS AUTO: 14 /HPF (ref 0–5)

## 2020-10-09 PROCEDURE — 96376 TX/PRO/DX INJ SAME DRUG ADON: CPT

## 2020-10-09 PROCEDURE — 99285 EMERGENCY DEPT VISIT HI MDM: CPT | Mod: ,,, | Performed by: EMERGENCY MEDICINE

## 2020-10-09 PROCEDURE — 36415 COLL VENOUS BLD VENIPUNCTURE: CPT

## 2020-10-09 PROCEDURE — 25000003 PHARM REV CODE 250: Performed by: STUDENT IN AN ORGANIZED HEALTH CARE EDUCATION/TRAINING PROGRAM

## 2020-10-09 PROCEDURE — G0378 HOSPITAL OBSERVATION PER HR: HCPCS

## 2020-10-09 PROCEDURE — 81001 URINALYSIS AUTO W/SCOPE: CPT

## 2020-10-09 PROCEDURE — 93005 ELECTROCARDIOGRAM TRACING: CPT

## 2020-10-09 PROCEDURE — 93010 ELECTROCARDIOGRAM REPORT: CPT | Mod: ,,, | Performed by: INTERNAL MEDICINE

## 2020-10-09 PROCEDURE — 25000242 PHARM REV CODE 250 ALT 637 W/ HCPCS: Performed by: STUDENT IN AN ORGANIZED HEALTH CARE EDUCATION/TRAINING PROGRAM

## 2020-10-09 PROCEDURE — 83735 ASSAY OF MAGNESIUM: CPT

## 2020-10-09 PROCEDURE — 85025 COMPLETE CBC W/AUTO DIFF WBC: CPT | Mod: 91

## 2020-10-09 PROCEDURE — 99285 EMERGENCY DEPT VISIT HI MDM: CPT | Mod: 25

## 2020-10-09 PROCEDURE — 63600175 PHARM REV CODE 636 W HCPCS: Performed by: STUDENT IN AN ORGANIZED HEALTH CARE EDUCATION/TRAINING PROGRAM

## 2020-10-09 PROCEDURE — 25000003 PHARM REV CODE 250: Performed by: EMERGENCY MEDICINE

## 2020-10-09 PROCEDURE — 96365 THER/PROPH/DIAG IV INF INIT: CPT

## 2020-10-09 PROCEDURE — 85730 THROMBOPLASTIN TIME PARTIAL: CPT

## 2020-10-09 PROCEDURE — 85384 FIBRINOGEN ACTIVITY: CPT

## 2020-10-09 PROCEDURE — 87088 URINE BACTERIA CULTURE: CPT

## 2020-10-09 PROCEDURE — 83520 IMMUNOASSAY QUANT NOS NONAB: CPT

## 2020-10-09 PROCEDURE — 96375 TX/PRO/DX INJ NEW DRUG ADDON: CPT

## 2020-10-09 PROCEDURE — 87086 URINE CULTURE/COLONY COUNT: CPT

## 2020-10-09 PROCEDURE — 99285 PR EMERGENCY DEPT VISIT,LEVEL V: ICD-10-PCS | Mod: ,,, | Performed by: EMERGENCY MEDICINE

## 2020-10-09 PROCEDURE — 84100 ASSAY OF PHOSPHORUS: CPT

## 2020-10-09 PROCEDURE — 93010 EKG 12-LEAD: ICD-10-PCS | Mod: ,,, | Performed by: INTERNAL MEDICINE

## 2020-10-09 PROCEDURE — 63600175 PHARM REV CODE 636 W HCPCS: Performed by: EMERGENCY MEDICINE

## 2020-10-09 PROCEDURE — U0002 COVID-19 LAB TEST NON-CDC: HCPCS | Performed by: STUDENT IN AN ORGANIZED HEALTH CARE EDUCATION/TRAINING PROGRAM

## 2020-10-09 PROCEDURE — 85610 PROTHROMBIN TIME: CPT | Mod: 91

## 2020-10-09 PROCEDURE — C9113 INJ PANTOPRAZOLE SODIUM, VIA: HCPCS | Performed by: STUDENT IN AN ORGANIZED HEALTH CARE EDUCATION/TRAINING PROGRAM

## 2020-10-09 PROCEDURE — 99220 PR INITIAL OBSERVATION CARE,LEVL III: CPT | Mod: ,,, | Performed by: INTERNAL MEDICINE

## 2020-10-09 PROCEDURE — 85610 PROTHROMBIN TIME: CPT

## 2020-10-09 PROCEDURE — 83690 ASSAY OF LIPASE: CPT

## 2020-10-09 PROCEDURE — 87147 CULTURE TYPE IMMUNOLOGIC: CPT

## 2020-10-09 PROCEDURE — 99220 PR INITIAL OBSERVATION CARE,LEVL III: ICD-10-PCS | Mod: ,,, | Performed by: INTERNAL MEDICINE

## 2020-10-09 PROCEDURE — 82728 ASSAY OF FERRITIN: CPT

## 2020-10-09 PROCEDURE — 96361 HYDRATE IV INFUSION ADD-ON: CPT

## 2020-10-09 PROCEDURE — 80053 COMPREHEN METABOLIC PANEL: CPT

## 2020-10-09 RX ORDER — PANTOPRAZOLE SODIUM 40 MG/10ML
40 INJECTION, POWDER, LYOPHILIZED, FOR SOLUTION INTRAVENOUS 2 TIMES DAILY
Status: DISCONTINUED | OUTPATIENT
Start: 2020-10-10 | End: 2020-10-12

## 2020-10-09 RX ORDER — HYDROCODONE BITARTRATE AND ACETAMINOPHEN 5; 325 MG/1; MG/1
1 TABLET ORAL EVERY 6 HOURS PRN
Status: DISCONTINUED | OUTPATIENT
Start: 2020-10-09 | End: 2020-10-12

## 2020-10-09 RX ORDER — METHYLPREDNISOLONE SOD SUCC 125 MG
125 VIAL (EA) INJECTION
Status: COMPLETED | OUTPATIENT
Start: 2020-10-09 | End: 2020-10-09

## 2020-10-09 RX ORDER — IBUPROFEN 200 MG
24 TABLET ORAL
Status: DISCONTINUED | OUTPATIENT
Start: 2020-10-09 | End: 2020-10-13 | Stop reason: HOSPADM

## 2020-10-09 RX ORDER — DIPHENHYDRAMINE HYDROCHLORIDE 50 MG/ML
12.5 INJECTION INTRAMUSCULAR; INTRAVENOUS
Status: COMPLETED | OUTPATIENT
Start: 2020-10-09 | End: 2020-10-09

## 2020-10-09 RX ORDER — DIPHENHYDRAMINE HYDROCHLORIDE 50 MG/ML
25 INJECTION INTRAMUSCULAR; INTRAVENOUS EVERY 6 HOURS PRN
Status: DISCONTINUED | OUTPATIENT
Start: 2020-10-09 | End: 2020-10-11

## 2020-10-09 RX ORDER — SODIUM CHLORIDE 0.9 % (FLUSH) 0.9 %
10 SYRINGE (ML) INJECTION
Status: DISCONTINUED | OUTPATIENT
Start: 2020-10-09 | End: 2020-10-13 | Stop reason: HOSPADM

## 2020-10-09 RX ORDER — GLUCAGON 1 MG
1 KIT INJECTION
Status: DISCONTINUED | OUTPATIENT
Start: 2020-10-09 | End: 2020-10-13 | Stop reason: HOSPADM

## 2020-10-09 RX ORDER — HYDROXYZINE HYDROCHLORIDE 25 MG/1
25 TABLET, FILM COATED ORAL 3 TIMES DAILY PRN
Status: DISCONTINUED | OUTPATIENT
Start: 2020-10-09 | End: 2020-10-13 | Stop reason: HOSPADM

## 2020-10-09 RX ORDER — HYDROMORPHONE HYDROCHLORIDE 1 MG/ML
1 INJECTION, SOLUTION INTRAMUSCULAR; INTRAVENOUS; SUBCUTANEOUS
Status: COMPLETED | OUTPATIENT
Start: 2020-10-09 | End: 2020-10-09

## 2020-10-09 RX ORDER — GABAPENTIN 300 MG/1
300 CAPSULE ORAL 3 TIMES DAILY
Status: DISCONTINUED | OUTPATIENT
Start: 2020-10-09 | End: 2020-10-13 | Stop reason: HOSPADM

## 2020-10-09 RX ORDER — ALBUTEROL SULFATE 90 UG/1
2 AEROSOL, METERED RESPIRATORY (INHALATION) EVERY 6 HOURS PRN
Status: DISCONTINUED | OUTPATIENT
Start: 2020-10-09 | End: 2020-10-13 | Stop reason: HOSPADM

## 2020-10-09 RX ORDER — ONDANSETRON 2 MG/ML
4 INJECTION INTRAMUSCULAR; INTRAVENOUS EVERY 8 HOURS PRN
Status: DISCONTINUED | OUTPATIENT
Start: 2020-10-09 | End: 2020-10-12

## 2020-10-09 RX ORDER — LORAZEPAM 0.5 MG/1
1 TABLET ORAL EVERY 8 HOURS PRN
Status: DISCONTINUED | OUTPATIENT
Start: 2020-10-09 | End: 2020-10-13

## 2020-10-09 RX ORDER — ONDANSETRON 2 MG/ML
4 INJECTION INTRAMUSCULAR; INTRAVENOUS
Status: COMPLETED | OUTPATIENT
Start: 2020-10-09 | End: 2020-10-09

## 2020-10-09 RX ORDER — ACETAMINOPHEN 325 MG/1
650 TABLET ORAL EVERY 4 HOURS PRN
Status: DISCONTINUED | OUTPATIENT
Start: 2020-10-09 | End: 2020-10-12

## 2020-10-09 RX ORDER — LORAZEPAM 2 MG/ML
1 INJECTION INTRAMUSCULAR
Status: COMPLETED | OUTPATIENT
Start: 2020-10-09 | End: 2020-10-09

## 2020-10-09 RX ORDER — IBUPROFEN 200 MG
16 TABLET ORAL
Status: DISCONTINUED | OUTPATIENT
Start: 2020-10-09 | End: 2020-10-13 | Stop reason: HOSPADM

## 2020-10-09 RX ORDER — HYDROMORPHONE HYDROCHLORIDE 1 MG/ML
1 INJECTION, SOLUTION INTRAMUSCULAR; INTRAVENOUS; SUBCUTANEOUS EVERY 4 HOURS PRN
Status: DISCONTINUED | OUTPATIENT
Start: 2020-10-09 | End: 2020-10-11

## 2020-10-09 RX ORDER — TALC
12 POWDER (GRAM) TOPICAL NIGHTLY PRN
Status: DISCONTINUED | OUTPATIENT
Start: 2020-10-09 | End: 2020-10-13 | Stop reason: HOSPADM

## 2020-10-09 RX ORDER — LORAZEPAM 2 MG/ML
1 INJECTION INTRAMUSCULAR ONCE AS NEEDED
Status: COMPLETED | OUTPATIENT
Start: 2020-10-09 | End: 2020-10-09

## 2020-10-09 RX ORDER — POTASSIUM CHLORIDE 1.5 G/1.58G
40 POWDER, FOR SOLUTION ORAL ONCE
Status: DISCONTINUED | OUTPATIENT
Start: 2020-10-09 | End: 2020-10-13

## 2020-10-09 RX ORDER — PAROXETINE HYDROCHLORIDE 20 MG/1
20 TABLET, FILM COATED ORAL EVERY MORNING
Status: DISCONTINUED | OUTPATIENT
Start: 2020-10-10 | End: 2020-10-13 | Stop reason: HOSPADM

## 2020-10-09 RX ORDER — TAMSULOSIN HYDROCHLORIDE 0.4 MG/1
0.4 CAPSULE ORAL 2 TIMES DAILY
Status: DISCONTINUED | OUTPATIENT
Start: 2020-10-09 | End: 2020-10-13 | Stop reason: HOSPADM

## 2020-10-09 RX ORDER — PROMETHAZINE HYDROCHLORIDE 25 MG/ML
12.5 INJECTION, SOLUTION INTRAMUSCULAR; INTRAVENOUS
Status: DISCONTINUED | OUTPATIENT
Start: 2020-10-09 | End: 2020-10-09

## 2020-10-09 RX ORDER — FAMOTIDINE 10 MG/ML
20 INJECTION INTRAVENOUS 2 TIMES DAILY
Status: DISCONTINUED | OUTPATIENT
Start: 2020-10-09 | End: 2020-10-09

## 2020-10-09 RX ORDER — FLUTICASONE PROPIONATE 44 UG/1
1 AEROSOL, METERED RESPIRATORY (INHALATION) 2 TIMES DAILY
Status: DISCONTINUED | OUTPATIENT
Start: 2020-10-09 | End: 2020-10-13 | Stop reason: HOSPADM

## 2020-10-09 RX ORDER — OXYCODONE AND ACETAMINOPHEN 5; 325 MG/1; MG/1
1 TABLET ORAL
Status: COMPLETED | OUTPATIENT
Start: 2020-10-09 | End: 2020-10-09

## 2020-10-09 RX ORDER — PANTOPRAZOLE SODIUM 40 MG/10ML
80 INJECTION, POWDER, LYOPHILIZED, FOR SOLUTION INTRAVENOUS ONCE
Status: COMPLETED | OUTPATIENT
Start: 2020-10-09 | End: 2020-10-09

## 2020-10-09 RX ADMIN — FLUTICASONE PROPIONATE 1 PUFF: 44 AEROSOL, METERED RESPIRATORY (INHALATION) at 09:10

## 2020-10-09 RX ADMIN — LORAZEPAM 1 MG: 2 INJECTION INTRAMUSCULAR at 10:10

## 2020-10-09 RX ADMIN — DIPHENHYDRAMINE HYDROCHLORIDE 25 MG: 50 INJECTION, SOLUTION INTRAMUSCULAR; INTRAVENOUS at 07:10

## 2020-10-09 RX ADMIN — TAMSULOSIN HYDROCHLORIDE 0.4 MG: 0.4 CAPSULE ORAL at 09:10

## 2020-10-09 RX ADMIN — HYDROMORPHONE HYDROCHLORIDE 1 MG: 1 INJECTION, SOLUTION INTRAMUSCULAR; INTRAVENOUS; SUBCUTANEOUS at 07:10

## 2020-10-09 RX ADMIN — HYDROCODONE BITARTRATE AND ACETAMINOPHEN 1 TABLET: 5; 325 TABLET ORAL at 05:10

## 2020-10-09 RX ADMIN — PANTOPRAZOLE SODIUM 80 MG: 40 INJECTION, POWDER, LYOPHILIZED, FOR SOLUTION INTRAVENOUS at 07:10

## 2020-10-09 RX ADMIN — HYDROXYZINE HYDROCHLORIDE 25 MG: 25 TABLET, FILM COATED ORAL at 05:10

## 2020-10-09 RX ADMIN — PROMETHAZINE HYDROCHLORIDE 12.5 MG: 25 INJECTION INTRAMUSCULAR; INTRAVENOUS at 11:10

## 2020-10-09 RX ADMIN — ONDANSETRON 4 MG: 2 INJECTION INTRAMUSCULAR; INTRAVENOUS at 10:10

## 2020-10-09 RX ADMIN — OXYCODONE HYDROCHLORIDE AND ACETAMINOPHEN 1 TABLET: 5; 325 TABLET ORAL at 10:10

## 2020-10-09 RX ADMIN — HYDROMORPHONE HYDROCHLORIDE 1 MG: 1 INJECTION, SOLUTION INTRAMUSCULAR; INTRAVENOUS; SUBCUTANEOUS at 02:10

## 2020-10-09 RX ADMIN — LORAZEPAM 1 MG: 2 INJECTION INTRAMUSCULAR; INTRAVENOUS at 10:10

## 2020-10-09 RX ADMIN — SODIUM CHLORIDE 1000 ML: 0.9 INJECTION, SOLUTION INTRAVENOUS at 10:10

## 2020-10-09 RX ADMIN — HYDROMORPHONE HYDROCHLORIDE 1 MG: 1 INJECTION, SOLUTION INTRAMUSCULAR; INTRAVENOUS; SUBCUTANEOUS at 11:10

## 2020-10-09 RX ADMIN — ONDANSETRON 4 MG: 2 INJECTION INTRAMUSCULAR; INTRAVENOUS at 05:10

## 2020-10-09 RX ADMIN — GABAPENTIN 300 MG: 300 CAPSULE ORAL at 09:10

## 2020-10-09 RX ADMIN — PROMETHAZINE HYDROCHLORIDE 6.25 MG: 25 INJECTION INTRAMUSCULAR; INTRAVENOUS at 08:10

## 2020-10-09 RX ADMIN — DIPHENHYDRAMINE HYDROCHLORIDE 12.5 MG: 50 INJECTION, SOLUTION INTRAMUSCULAR; INTRAVENOUS at 11:10

## 2020-10-09 RX ADMIN — METHYLPREDNISOLONE SODIUM SUCCINATE 125 MG: 125 INJECTION, POWDER, FOR SOLUTION INTRAMUSCULAR; INTRAVENOUS at 11:10

## 2020-10-09 NOTE — ED NOTES
RN witnessed episode of intense pain that caused pt to double over and writhe around on stretcher, Dr. Hooper notified.

## 2020-10-09 NOTE — ASSESSMENT & PLAN NOTE
Patient with diffuse abdominal pain worse with palpation. Has had episodes of hematochezia/melena.    He refers his abdominal pain started today,  after having discontinued his prednisone for his questionable Hypereosinophilic syndrome. Per Allergy, patient's eosinophil count today (400) not high enough to justify this presentation as a flare. There is the concern that his complaints are related to factitious disorder.  -PRN Zofran, Phenergan for nausea  -IV dilaudid for pain  -GI consult

## 2020-10-09 NOTE — ED NOTES
Patient identifiers verified and correct for Solo Blakc  LOC: The patient is awake, alert and aware of environment with an appropriate affect, the patient is oriented x 3 and speaking appropriately.   APPEARANCE: Patient appears uncomfortable but in no acute distress, patient is clean and well groomed.  SKIN: The skin is warm and dry, color consistent with ethnicity, patient has normal skin turgor and moist mucus membranes, skin intact, no breakdown or bruising noted.   MUSCULOSKELETAL: Patient moving all extremities spontaneously, no swelling noted.  RESPIRATORY: Airway is open and patent, respirations are spontaneous, patient has a normal effort and rate, no accessory muscle use noted, pt placed on continuous pulse ox with O2 sats noted at 97% on room air.  CARDIAC: Pt placed on cardiac monitor. Patient has a tachy rate and regular rhythm, no edema noted, capillary refill < 3 seconds.   GASTRO: Soft and non tender to palpation, no distention noted, normoactive bowel sounds present in all four quadrants. Pt states bowel movements have been regular. Pt reports blood in stool  : Pt denies any pain or frequency with urination.Pt reports blood in urine  NEURO: Pt opens eyes spontaneously, behavior appropriate to situation, follows commands, facial expression symmetrical, bilateral hand grasp equal and even, purposeful motor response noted, normal sensation in all extremities when touched with a finger.

## 2020-10-09 NOTE — SUBJECTIVE & OBJECTIVE
Past Medical History:   Diagnosis Date    Arthritis     C. difficile colitis feb 2014    postop of hand surgery    Cancer     Encounter for blood transfusion     Eosinophilic esophagitis 3/11/2014    GERD (gastroesophageal reflux disease)     Hypereosinophilic syndrome     IBS (irritable bowel syndrome)     Leukemia     MRSA carrier     says multiple times nose swab was +    Nephrolithiasis apr 2014    bilateral punctate - never passed a stone    Urinary tract infection feb 2014    MRSA       Past Surgical History:   Procedure Laterality Date    APPENDECTOMY      ARTHROSCOPY OF SHOULDER WITH REMOVAL OF DISTAL CLAVICLE Right 11/1/2018    Procedure: ARTHROSCOPY, SHOULDER, WITH DISTAL CLAVICLE EXCISION;  Surgeon: Gabino Mejia MD;  Location: [facility];  Service: Orthopedics;  Laterality: Right;  video    BACK SURGERY      BLADDER FULGURATION N/A 9/18/2020    Procedure: FULGURATION, BLADDER;  Surgeon: Randall Moss MD;  Location: [facility];  Service: Urology;  Laterality: N/A;  blood vessels of bladder neck and prostate    BONE MARROW BIOPSY Left 10/1/2020    Procedure: Biopsy-bone marrow;  Surgeon: Trung Polanco MD;  Location: [facility] OR;  Service: Oncology;  Laterality: Left;    CIRCUMCISION, PRIMARY      COLONOSCOPY N/A 11/14/2018    Procedure: COLONOSCOPY;  Surgeon: Milton Brunson MD;  Location: [facility];  Service: Endoscopy;  Laterality: N/A;    COLONOSCOPY N/A 7/20/2020    Procedure: COLONOSCOPY;  Surgeon: Ruslan Tillman MD;  Location: [facility];  Service: Endoscopy;  Laterality: N/A;    CYSTOSCOPY W/ RETROGRADES Bilateral 9/18/2020    Procedure: CYSTOSCOPY, WITH RETROGRADE PYELOGRAM;  Surgeon: Randall Moss MD;  Location: [facility] OR;  Service: Urology;  Laterality: Bilateral;    DILATION OF URETHRA N/A 9/18/2020    Procedure: DILATION, URETHRA;  Surgeon: Randall Moss MD;  Location: [facility];  Service: Urology;  Laterality: N/A;    drainage of abscess from hand  2009     MRSA    drainage of abscess from R thigh  2006    MRSA    ESOPHAGOGASTRODUODENOSCOPY  3/11/2014    ESOPHAGOGASTRODUODENOSCOPY N/A 9/5/2018    Procedure: EGD (ESOPHAGOGASTRODUODENOSCOPY);  Surgeon: Kolby Wall MD;  Location: [facility];  Service: Endoscopy;  Laterality: N/A;    ESOPHAGOGASTRODUODENOSCOPY N/A 3/25/2020    Procedure: EGD (ESOPHAGOGASTRODUODENOSCOPY);  Surgeon: Ruslan Tillman MD;  Location: [facility];  Service: Endoscopy;  Laterality: N/A;    ESOPHAGOGASTRODUODENOSCOPY N/A 7/20/2020    Procedure: EGD (ESOPHAGOGASTRODUODENOSCOPY);  Surgeon: Ruslan Tillman MD;  Location: [facility];  Service: Endoscopy;  Laterality: N/A;  Patient is a very hard stick, requires anesthesia stick.    ESOPHAGOGASTRODUODENOSCOPY N/A 8/31/2020    Procedure: EGD (ESOPHAGOGASTRODUODENOSCOPY);  Surgeon: Kolby Wall MD;  Location: [facility];  Service: Endoscopy;  Laterality: N/A;    FLEXIBLE SIGMOIDOSCOPY N/A 9/5/2018    Procedure: SIGMOIDOSCOPY, FLEXIBLE;  Surgeon: Kolby Wall MD;  Location: [facility];  Service: Endoscopy;  Laterality: N/A;    HAND SURGERY  jan 2014    power saw fell on hand - laceration 3 tendons    INSERTION OF VENOUS ACCESS PORT Right 8/21/2020    Procedure: INSERTION, VENOUS ACCESS PORT;  Surgeon: Troy Honeycutt MD;  Location: [facility];  Service: General;  Laterality: Right;    NISSEN FUNDOPLICATION         Review of patient's allergies indicates:   Allergen Reactions    Legumes Nausea And Vomiting and Swelling     Other reaction(s): GI Intolerance  vomiting  vomiting  Pt reports legumes make his lips swell and induce severe vomiting  Pt reports legumes make his lips swell and induce severe vomiting      Nsaids (non-steroidal anti-inflammatory drug)      GI bleed    Bean pod extract      Lips swelling, vomiting  Lips swelling, vomiting      Ketamine      Severe dysphoria (bad trip)    Lentils      Lips swelling, vomiting  Lips swelling, vomiting       "Meloxicam Nausea Only     GI bleed    Peas      Lips swelling, vomiting    Penicillins      Has taken third gen cephalosporins without issue per patient report    Haloperidol Anxiety and Other (See Comments)     "feels like crawling out of his skin"         No current facility-administered medications on file prior to encounter.      Current Outpatient Medications on File Prior to Encounter   Medication Sig    acetaminophen (TYLENOL) 325 MG tablet Take 1 tablet (325 mg total) by mouth every 6 (six) hours as needed for Pain.    diphenhydrAMINE (BENADRYL) 50 MG capsule Take 50 mg by mouth every 6 (six) hours as needed for Itching.    gabapentin (NEURONTIN) 300 MG capsule Take 1 capsule (300 mg total) by mouth 3 (three) times daily.    HYDROcodone-acetaminophen (NORCO) 5-325 mg per tablet Take 1 tablet by mouth every 6 (six) hours as needed for Pain.    hydrOXYzine HCL (ATARAX) 25 MG tablet Take 1 tablet (25 mg total) by mouth 3 (three) times daily as needed for Itching.    oxyCODONE-acetaminophen (PERCOCET) 5-325 mg per tablet Take 1 tablet by mouth every 8 (eight) hours as needed.    predniSONE (DELTASONE) 10 MG tablet Take 2 tablets (20 mg total) by mouth once daily.    tamsulosin (FLOMAX) 0.4 mg Cap Take 1 capsule (0.4 mg total) by mouth 2 (two) times daily.    artificial tears (ISOPTO TEARS) 0.5 % ophthalmic solution Place 1 drop into both eyes 6 (six) times daily.    cetirizine (ZYRTEC) 10 MG tablet Take 20 mg by mouth 2 (two) times a day.     dextroamphetamine-amphetamine (ADDERALL) 20 mg tablet Take 1 tablet by mouth 2 (two) times daily before meals. Take before breakfast and lunch    dicyclomine (BENTYL) 20 mg tablet Take 1 tablet (20 mg total) by mouth 3 (three) times daily.    doxycycline (VIBRA-TABS) 100 MG tablet Take 1 tablet (100 mg total) by mouth 2 (two) times daily.    EPINEPHrine (EPIPEN) 0.3 mg/0.3 mL AtIn Inject 0.3 mLs (0.3 mg total) into the muscle as needed.    famotidine " (PEPCID) 20 MG tablet Take 20 mg by mouth 2 (two) times daily.    Lactobacillus acidophilus 10 billion cell Cap Take 1 capsule by mouth once daily.     lidocaine HCl 2% (LIDOCAINE VISCOUS) 2 % Soln Take 5 mLs by mouth as needed.     LORazepam (ATIVAN) 1 MG tablet Take 1 tablet (1 mg total) by mouth every 8 (eight) hours as needed for Anxiety.    metoclopramide HCl (REGLAN) 10 MG tablet     MULTIVIT-IRON-MIN-FOLIC ACID 3,500-18-0.4 UNIT-MG-MG ORAL CHEW Take 10 mg by mouth once daily.    nystatin (MYCOSTATIN) 100,000 unit/mL suspension     paroxetine (PAXIL) 20 MG tablet Take 20 mg by mouth every morning.    promethazine (PHENERGAN) 25 MG tablet Take 1 tablet (25 mg total) by mouth every 6 (six) hours as needed for Nausea.    sucralfate (CARAFATE) 1 gram tablet Take 1 g by mouth before meals and at bedtime as needed.      Family History     Problem Relation (Age of Onset)    Celiac disease Sister    Hypertension Maternal Grandmother, Maternal Grandfather    Urolithiasis Father        Tobacco Use    Smoking status: Never Smoker    Smokeless tobacco: Never Used    Tobacco comment: Pt states he has smoked 1 or 2 cigarettes in his life.   Substance and Sexual Activity    Alcohol use: Not Currently    Drug use: No    Sexual activity: Yes     Partners: Female     Review of Systems   Constitutional: Positive for chills, diaphoresis and fever. Negative for fatigue.   Respiratory: Positive for chest tightness. Negative for cough, shortness of breath and wheezing.    Cardiovascular: Positive for chest pain. Negative for palpitations and leg swelling.   Gastrointestinal: Positive for abdominal pain, anal bleeding, blood in stool, diarrhea, nausea and vomiting. Negative for constipation.   Genitourinary: Positive for hematuria. Negative for dysuria and flank pain.   Musculoskeletal: Positive for arthralgias and myalgias.   Neurological: Positive for light-headedness. Negative for dizziness, syncope and headaches.    Psychiatric/Behavioral: Positive for agitation. The patient is nervous/anxious.      Objective:     Vital Signs (Most Recent):  Temp: 98.9 °F (37.2 °C) (10/09/20 0928)  Pulse: 110 (10/09/20 1532)  Resp: (!) 22 (10/09/20 1712)  BP: 136/82 (10/09/20 1532)  SpO2: 97 % (10/09/20 1532) Vital Signs (24h Range):  Temp:  [98.9 °F (37.2 °C)] 98.9 °F (37.2 °C)  Pulse:  [100-129] 110  Resp:  [13-25] 22  SpO2:  [95 %-99 %] 97 %  BP: (131-174)/(59-95) 136/82     Weight: 97.5 kg (215 lb)  Body mass index is 29.16 kg/m².    Physical Exam  Constitutional:       General: He is in acute distress.      Appearance: He is not ill-appearing.      Comments: Pt appears anxious   HENT:      Head: Normocephalic and atraumatic.      Mouth/Throat:      Mouth: Mucous membranes are moist.      Pharynx: Oropharynx is clear.      Comments: Oral ulcers at the corners of the mouth  Eyes:      Extraocular Movements: Extraocular movements intact.      Pupils: Pupils are equal, round, and reactive to light.   Neck:      Musculoskeletal: Normal range of motion and neck supple.   Cardiovascular:      Rate and Rhythm: Regular rhythm. Tachycardia present.      Heart sounds: No murmur. No friction rub. No gallop.    Pulmonary:      Effort: Pulmonary effort is normal.      Breath sounds: Normal breath sounds. No wheezing or rales.   Abdominal:      General: Abdomen is flat. There is no distension.      Palpations: Abdomen is soft.      Tenderness: There is abdominal tenderness (diffuse tenderness to deep palpation througout). There is no guarding.   Musculoskeletal: Normal range of motion.         General: No swelling, tenderness or deformity.   Lymphadenopathy:      Cervical: No cervical adenopathy.   Skin:     General: Skin is warm and dry.      Coloration: Skin is not pale.      Findings: Rash (Scattered urticarial rash to the chest and back with one larger 2 cm area noted to the superior medial aspect of the back) present.   Neurological:      Mental  Status: He is alert.           CRANIAL NERVES     CN III, IV, VI   Pupils are equal, round, and reactive to light.       Significant Labs:   CBC:   Recent Labs   Lab 10/09/20  1017   WBC 6.79   HGB 11.6*   HCT 37.1*        CMP:   Recent Labs   Lab 10/09/20  1017      K 3.2*      CO2 23   GLU 85   BUN 19   CREATININE 1.1   CALCIUM 9.0   PROT 6.6   ALBUMIN 3.8   BILITOT 1.2*   ALKPHOS 52*   AST 20   ALT 36   ANIONGAP 13   EGFRNONAA >60.0       Significant Imaging: I have reviewed all pertinent imaging results/findings within the past 24 hours.

## 2020-10-09 NOTE — ED TRIAGE NOTES
multiple complaints (pt was sent here by onocolgy for fever, mouth ulcers, and blood in stool pt states adminitstered epi pen about an 1 hour ago for angioedema no resp distress noted)

## 2020-10-09 NOTE — MEDICAL/APP STUDENT
"  History     Chief Complaint   Patient presents with    multiple complaints     pt was sent here by onocolgy for fever, mouth ulcers, and blood in stool pt states adminitstered epi pen about an 1 hour ago for angioedema no resp distress noted     HPI     Mr. 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 Pt reports his symptoms began last night with diffuse pain, including chest tightness and severe abdominal pain, blood in urine and stool, with onset of angioedema to his eyes and lips this am which prompted him to use his epi pin at 8:45 am this morning PTA along with the rest of his home meds including 40 mg prednisone, Benadryl and Pepcid. Angioedema currently resolved. Also endorses fever and chills last night, with T Max 101.2. Took his pain medication and tylenol which states brought his temperature down but with minimal relief of his symptoms. Pt reports 5 episodes of hematemesis since last night, including one episode just prior to evaluation by me, one episode of melanotic stool while in the ED with what was described as possible rectal prolapse. Patient stated, " I felt some bowel come out and pushed it back in." He is also complaining of dryness, erythema, pain and skin fissures to the inguinal region bilaterally.     Pt reports that he stopped his medication regimen 3 days ago with the intention of getting repeat testing for his hypereosinophilic syndrome and subsequently placed on a new drug regiment including some form of chemo. He stated that he had some concerns about stopping his meds since he has had similar exacerbations in the past when doing so. Also states that he had a confirmatory diagnosis when he was in [place], however has had " difficulty getting his med records released.     On chart review, the patient was seen by Hematology on 9/23/20, proceeded to have a bone marrow biopsy on 10/1/20 for further evaluation of recurrent symptomatic eosinophilia which did not appear to show any significant findings. It was noted at his hematology visit that from a visit with Dr. Gaspar on 8/17/20, the patient was reported to have tested positive for Strongyloides IgG and completed Ivermectin therapy subsequently. He has been taking prednisone for almost 1.5 years. Takes 40mg TID, and it was noted that when he takes less than that, his symptoms recur. It was proposed that there is some concern for factitious disorder given high utilization rates of multiple health systems, including discharge summary from [facility] noting unusual behavior during a hospitalization, and a diagnosis of factitious disorder was being considered at that time. This has not been addressed with the patient.     ED Course:   On arrival to the ED, patient was noted to be tachycardic, in severe pain and anxious with generalized abdominal tenderness, urticarial rash to the upper chest and back. Initial EKG showed sinus tach at 121 with no ST changes or signs of ischemia. Normal intervals. Pt was treated with fluids and Oxycodone PO for pain which the patient was unable to tolerate. He proceeded with episode of hematemesis prompting administration of Zofran. Pt remained unable to tolerate PO, so was given his medications IV including steroid and benadryl as well as IV phenergan for nausea.        Past Medical History:   Diagnosis Date    Arthritis     C. difficile colitis feb 2014    postop of hand surgery    Cancer     Encounter for blood transfusion     Eosinophilic esophagitis 3/11/2014    GERD (gastroesophageal reflux disease)     Hypereosinophilic syndrome     IBS (irritable bowel syndrome)     Leukemia     MRSA carrier     says multiple times nose swab was +     Nephrolithiasis apr 2014    bilateral punctate - never passed a stone    Urinary tract infection feb 2014    MRSA       Past Surgical History:   Procedure Laterality Date    APPENDECTOMY      ARTHROSCOPY OF SHOULDER WITH REMOVAL OF DISTAL CLAVICLE Right 11/1/2018    Procedure: ARTHROSCOPY, SHOULDER, WITH DISTAL CLAVICLE EXCISION;  Surgeon: Gabino Mejia MD;  Location: [facility];  Service: Orthopedics;  Laterality: Right;  video    BACK SURGERY      BLADDER FULGURATION N/A 9/18/2020    Procedure: FULGURATION, BLADDER;  Surgeon: Randall Moss MD;  Location: [facility];  Service: Urology;  Laterality: N/A;  blood vessels of bladder neck and prostate    BONE MARROW BIOPSY Left 10/1/2020    Procedure: Biopsy-bone marrow;  Surgeon: Trung Polanco MD;  Location: [facility];  Service: Oncology;  Laterality: Left;    CIRCUMCISION, PRIMARY      COLONOSCOPY N/A 11/14/2018    Procedure: COLONOSCOPY;  Surgeon: Milton Brunson MD;  Location: [facility];  Service: Endoscopy;  Laterality: N/A;    COLONOSCOPY N/A 7/20/2020    Procedure: COLONOSCOPY;  Surgeon: Ruslan Tillman MD;  Location: [facility];  Service: Endoscopy;  Laterality: N/A;    CYSTOSCOPY W/ RETROGRADES Bilateral 9/18/2020    Procedure: CYSTOSCOPY, WITH RETROGRADE PYELOGRAM;  Surgeon: Randall Moss MD;  Location: [facility];  Service: Urology;  Laterality: Bilateral;    DILATION OF URETHRA N/A 9/18/2020    Procedure: DILATION, URETHRA;  Surgeon: Randall Moss MD;  Location: [facility];  Service: Urology;  Laterality: N/A;    drainage of abscess from hand  2009    MRSA    drainage of abscess from R thigh  2006    MRSA    ESOPHAGOGASTRODUODENOSCOPY  3/11/2014    ESOPHAGOGASTRODUODENOSCOPY N/A 9/5/2018    Procedure: EGD (ESOPHAGOGASTRODUODENOSCOPY);  Surgeon: Kolby Wall MD;  Location: [facility];  Service: Endoscopy;  Laterality: N/A;    ESOPHAGOGASTRODUODENOSCOPY N/A 3/25/2020    Procedure: EGD (ESOPHAGOGASTRODUODENOSCOPY);  Surgeon:  Ruslan Tillman MD;  Location[facility];  Service: Endoscopy;  Laterality: N/A;    ESOPHAGOGASTRODUODENOSCOPY N/A 7/20/2020    Procedure: EGD (ESOPHAGOGASTRODUODENOSCOPY);  Surgeon: Ruslan Tillman MD;  Location: [facility];  Service: Endoscopy;  Laterality: N/A;  Patient is a very hard stick, requires anesthesia stick.    ESOPHAGOGASTRODUODENOSCOPY N/A 8/31/2020    Procedure: EGD (ESOPHAGOGASTRODUODENOSCOPY);  Surgeon: Kolby Wall MD;  Location: [facility];  Service: Endoscopy;  Laterality: N/A;    FLEXIBLE SIGMOIDOSCOPY N/A 9/5/2018    Procedure: SIGMOIDOSCOPY, FLEXIBLE;  Surgeon: Kolby Wall MD;  Location: [facility];  Service: Endoscopy;  Laterality: N/A;    HAND SURGERY  jan 2014    power saw fell on hand - laceration 3 tendons    INSERTION OF VENOUS ACCESS PORT Right 8/21/2020    Procedure: INSERTION, VENOUS ACCESS PORT;  Surgeon: Troy Honeycutt MD;  Location: [facility];  Service: General;  Laterality: Right;    NISSEN FUNDOPLICATION         Family History   Problem Relation Age of Onset    Urolithiasis Father     Celiac disease Sister     Hypertension Maternal Grandmother     Hypertension Maternal Grandfather     Prostate cancer Neg Hx     Kidney disease Neg Hx        Social History     Tobacco Use    Smoking status: Never Smoker    Smokeless tobacco: Never Used    Tobacco comment: Pt states he has smoked 1 or 2 cigarettes in his life.   Substance Use Topics    Alcohol use: Not Currently    Drug use: No       Review of Systems   Constitutional: Positive for chills and fever.   HENT: Positive for nosebleeds (associated with hematemesis).    Eyes: Positive for visual disturbance.   Respiratory: Positive for chest tightness.    Cardiovascular: Positive for chest pain.   Gastrointestinal: Positive for abdominal pain, blood in stool, diarrhea and vomiting (with hematemesis).   Genitourinary: Positive for hematuria.   Musculoskeletal: Positive for arthralgias (to  right knee ).   Skin: Positive for rash.   Allergic/Immunologic: Positive for food allergies.   Neurological: Negative for speech difficulty, weakness and numbness.   Psychiatric/Behavioral: The patient is nervous/anxious.        Physical Exam   BP (!) 147/95 (BP Location: Right arm, Patient Position: Lying)   Pulse (!) 113   Temp 98.9 °F (37.2 °C) (Oral)   Resp (!) 22   Ht 6' (1.829 m)   Wt 97.5 kg (215 lb)   SpO2 98%   BMI 29.16 kg/m²     Physical Exam    Nursing note and vitals reviewed.  Constitutional: He appears well-developed and well-nourished. He appears distressed.   HENT:   Head: Normocephalic and atraumatic.   Nose: Nose normal.   Mouth/Throat: Oropharynx is clear and moist.   No obvious oropharyngeal edema. Airway appears to be intact. No signs of respiratory distress or stridor.    Eyes: Conjunctivae and EOM are normal. Pupils are equal, round, and reactive to light.   No swelling bilaterally   Neck: Normal range of motion. Neck supple.   Cardiovascular: Regular rhythm, normal heart sounds and intact distal pulses. Tachycardia present.    No murmur heard.  Pulmonary/Chest: Breath sounds normal. No respiratory distress. He has no wheezes.   Abdominal: Soft. There is abdominal tenderness (generalized tenderness). There is no rebound and no guarding.   Genitourinary:    Penis normal.      Genitourinary Comments: Bilateral erythematous skin fissures noted to the inguinal area to the skin folds which patient notes to be painful. No sign of spreading erythema, induration or purulence.No obvious infection     Musculoskeletal: Normal range of motion.   Neurological: He is alert and oriented to person, place, and time. He has normal strength.   Skin: Rash (Scattered urticarial rash to the chest and back with one larger 2 cm area noted to the superior medial aspect of the back) noted.   Psychiatric: His mood appears anxious.           Assessment and Plan:     1. Questionable Hyper Eosinophilic  Syndrome  Patient with reported confirmatory diagnosis when he lived in [place] however unable to get med records transferred over. History of eosinophilic esophagitis, gastritis, recurrent episodes of rash, angioedema, hematuria, hematemesis, diarrhea and melanotic stools. Does have history of high eosinophil count on CBCs in the past, however today not elevated, possibly due to recent steroid use which he has been off of x3 days. Reports stopping his home prednisone, benadryl, hydroxyzine and pepcid 3 days ago in order to get repeat eosinophil counts to confirm his diagnosis and start a new medication regimen.     Plan:   -continue IV steroid, Benadryl and zofran until pt able to tolerate PO  -transition to PO meds when pt can tolerate them  -Consult allergy/Immuno for further recs    2.  Hematemesis/melena  Patient with multiple episodes of hematemesis and melanotic stool in setting of possible hypereosinophilic syndrome. Hx of eosinophilic esophagitis and gastritis.   -Last EGD 8/31/20 which showed findings consistent with previous injury from GERD, likely impaired gastric emptying which can be associated with eosinophilic gastroenteritis. Recommended f/u GES and Colonoscopy for further eval which have not been conducted. Last had colonoscopy on 7/20 which was grossly normal. Biopsy with non specific findings.     Plan:   -Start IV protonix  -Type and Screen  - Trend CBC to monitor Hb level  -Transfuse if Hb <7  -GI consult ??    3. Urticarial rash and angioedema  -Angioedema to face currently resolved after patient injected Epi Pin this am and took his usual medications.   -Given IV Benadryl and steroids in ED as pt cannot tolerate PO  -Once patient able to tolerate PO, continue on home dose of prednisone, Benadryl and Hydroxyzine prn for rash    4. VTE risk mitigation   - Compression devices bilaterally

## 2020-10-09 NOTE — TELEPHONE ENCOUNTER
I called Mr. Black regarding his message this morning - I agree with recommendations that he go to the ED for evaluation. He is on the way and knows if there are any issues I can help with that I am available.      Mike Reyna MD - Staff Surgeon  Department of Colon & Rectal Surgery  [facility]

## 2020-10-09 NOTE — TELEPHONE ENCOUNTER
Solo Black Ryan P, MD 5 minutes ago (8:02 AM)        Well I guess this is kind of urgent but I know you get back to me pretty quick I stopped taking my all my medicines as instructed to get this last CBC we need with higher than 1500  eosinophil count for approval of a new drug to take and get me off of the steroids to control my HES. Well Im going on the 3rd day now waiting on symptoms to slightly appear but that hasnt happened things exploded this morning Im having vomiting with blood diarrhea with some blood stomach swelling bad pain in my joints and my legs are swollen Angioedema on my back of my eyes my feet rashes in eruptions im miserable in agony in great deal of pain trying to get in touch with my meteorologist but havent got a callback so I want to inform you and maybe you can help last time I titrated down my dose I ended up in the hospital for four days on IV steroids to get back on the control. Usually when it gets this ffar out-of-control it takes hospitalization on IV high-dose steroids to get back under control along with IV antihistamines IV nausea and pain medications and other supportive treatment. Close monitoring of my airway and swelling Of organs and bleeding id have to say this is one of the most severe onset of symptoms Ive ever had in my life this is also the first time Ive completely stopped all treatment please advise ASAP in fact when you get this can you please call me at [phone] thank you     Per dr astudillo, instructed to go to ED

## 2020-10-09 NOTE — TELEPHONE ENCOUNTER
----- Message from Molly Cheney sent at 10/9/2020  3:37 PM CDT -----  Regarding: Call  Contact: [phone]/self  Patient is requesting a call from Dr. Moss. He feels like he should speak directly with him about his situation. Please call him at [phone]/self

## 2020-10-09 NOTE — TELEPHONE ENCOUNTER
Patient called complaining of angioedema to face and lips.  He gave himself an epipen approximately 1 hour ago.  Face and lip swelling had decreased, but patient now complaining of calf and back swelling and chest pain.  Instructed patient to go to the ER for evaluation.  Patient denied any shortness of breath, wheezing, or trouble breathing.      Notified Dr. Wright who is requesting a Tryptase and CBC be drawn.    Gave report to RHONDA Green at [facility], with instructions to have Tryptase and CBC drawn upon arrival.      Notified patient that Dr. Wright has ordered these labs and the ER is aware he is coming.  Patient states he was able to swallow his morning medications without difficulty.  Patient is having his grandmother bring him to ER, but if he has any trouble breathing, he will call 911.

## 2020-10-09 NOTE — ED NOTES
Assumed care of pt, report received from Chaim CORDOVA RN. Nurse reports pt vomited PO pain medication fully intact just after administration, MD aware.

## 2020-10-09 NOTE — ASSESSMENT & PLAN NOTE
Patient with multiple episodes of hematemesis. He is hemodynamically stable, Hgb at 11.6. Has had prior EGDs done, most recently 8/31, which showed scar tissue in distal esophagus and signs of delayed gastric emptying.   -IV PPI 80mg, followed by IV PPI BID  -NPO at midnight  -Gi consult

## 2020-10-09 NOTE — ED PROVIDER NOTES
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 He says he typically is on 20mg prednisone TID, benadryl, pepcid, hydroxyzine prn and norco. They are attempting to take him off all medicines to obtain another eosinophil count to help him get on a new medicine. He has been off all steroids since Tuesday. Last night, he began with diffuse pain and blood in stool/urine, with angioedema to eyes and lips beginning this morning. He took an epi pen around 8:45am with improvement in facial swelling. He also took 40mg prednisone, bendaryl, pepcid, and remainder of his home meds this morning before presenting to ED. Has had several episodes like this before when off of steroids or steroids reduced. Also endorses fever.         Review of patient's allergies indicates:   Allergen Reactions    Legumes Nausea And Vomiting and Swelling     Other reaction(s): GI Intolerance  vomiting  vomiting  Pt reports legumes make his lips swell and induce severe vomiting  Pt reports legumes make his lips swell and induce severe vomiting      Nsaids (non-steroidal anti-inflammatory drug)      GI bleed    Bean pod extract      Lips swelling, vomiting  Lips swelling, vomiting      Ketamine      Severe dysphoria (bad trip)    Lentils      Lips swelling, vomiting  Lips swelling, vomiting      Meloxicam Nausea Only     GI bleed    Peas      Lips swelling, vomiting    Penicillins      Has taken third gen cephalosporins without issue per patient report    Haloperidol Anxiety and  "Other (See Comments)     "feels like crawling out of his skin"       Past Medical History:   Diagnosis Date    Arthritis     C. difficile colitis feb 2014    postop of hand surgery    Cancer     Encounter for blood transfusion     Eosinophilic esophagitis 3/11/2014    GERD (gastroesophageal reflux disease)     Hypereosinophilic syndrome     IBS (irritable bowel syndrome)     Leukemia     MRSA carrier     says multiple times nose swab was +    Nephrolithiasis apr 2014    bilateral punctate - never passed a stone    Urinary tract infection feb 2014    MRSA     Past Surgical History:   Procedure Laterality Date    APPENDECTOMY      ARTHROSCOPY OF SHOULDER WITH REMOVAL OF DISTAL CLAVICLE Right 11/1/2018    Procedure: ARTHROSCOPY, SHOULDER, WITH DISTAL CLAVICLE EXCISION;  Surgeon: Gabino Mejia MD;  Location: [facility];  Service: Orthopedics;  Laterality: Right;  video    BACK SURGERY      BLADDER FULGURATION N/A 9/18/2020    Procedure: FULGURATION, BLADDER;  Surgeon: Randall Moss MD;  Location: [facility];  Service: Urology;  Laterality: N/A;  blood vessels of bladder neck and prostate    BONE MARROW BIOPSY Left 10/1/2020    Procedure: Biopsy-bone marrow;  Surgeon: Trung Polanco MD;  Location: [facility];  Service: Oncology;  Laterality: Left;    CIRCUMCISION, PRIMARY      COLONOSCOPY N/A 11/14/2018    Procedure: COLONOSCOPY;  Surgeon: Milton Brunson MD;  Location: [facility];  Service: Endoscopy;  Laterality: N/A;    COLONOSCOPY N/A 7/20/2020    Procedure: COLONOSCOPY;  Surgeon: Ruslan Tillman MD;  [facility];  Service: Endoscopy;  Laterality: N/A;    CYSTOSCOPY W/ RETROGRADES Bilateral 9/18/2020    Procedure: CYSTOSCOPY, WITH RETROGRADE PYELOGRAM;  Surgeon: Randall Moss MD;  Location: [facility];  Service: Urology;  Laterality: Bilateral;    DILATION OF URETHRA N/A 9/18/2020    Procedure: DILATION, URETHRA;  Surgeon: Randall Moss MD;  Location: [facility];  Service: " Urology;  Laterality: N/A;    drainage of abscess from hand  2009    MRSA    drainage of abscess from R thigh  2006    MRSA    ESOPHAGOGASTRODUODENOSCOPY  3/11/2014    ESOPHAGOGASTRODUODENOSCOPY N/A 9/5/2018    Procedure: EGD (ESOPHAGOGASTRODUODENOSCOPY);  Surgeon: Kolby Wall MD;  Location: [facility];  Service: Endoscopy;  Laterality: N/A;    ESOPHAGOGASTRODUODENOSCOPY N/A 3/25/2020    Procedure: EGD (ESOPHAGOGASTRODUODENOSCOPY);  Surgeon: Ruslan Tillman MD;  Location: [facility];  Service: Endoscopy;  Laterality: N/A;    ESOPHAGOGASTRODUODENOSCOPY N/A 7/20/2020    Procedure: EGD (ESOPHAGOGASTRODUODENOSCOPY);  Surgeon: Ruslan Tillman MD;  Location: [facility];  Service: Endoscopy;  Laterality: N/A;  Patient is a very hard stick, requires anesthesia stick.    ESOPHAGOGASTRODUODENOSCOPY N/A 8/31/2020    Procedure: EGD (ESOPHAGOGASTRODUODENOSCOPY);  Surgeon: Kolby Wall MD;  Location: [facility];  Service: Endoscopy;  Laterality: N/A;    FLEXIBLE SIGMOIDOSCOPY N/A 9/5/2018    Procedure: SIGMOIDOSCOPY, FLEXIBLE;  Surgeon: Kolby Wall MD;  Location: [facility];  Service: Endoscopy;  Laterality: N/A;    HAND SURGERY  jan 2014    power saw fell on hand - laceration 3 tendons    INSERTION OF VENOUS ACCESS PORT Right 8/21/2020    Procedure: INSERTION, VENOUS ACCESS PORT;  Surgeon: Troy Honeycutt MD;  Location: [facility];  Service: General;  Laterality: Right;    NISSEN FUNDOPLICATION       Family History   Problem Relation Age of Onset    Urolithiasis Father     Celiac disease Sister     Hypertension Maternal Grandmother     Hypertension Maternal Grandfather     Prostate cancer Neg Hx     Kidney disease Neg Hx      Social History     Tobacco Use    Smoking status: Never Smoker    Smokeless tobacco: Never Used    Tobacco comment: Pt states he has smoked 1 or 2 cigarettes in his life.   Substance Use Topics    Alcohol use: Not Currently    Drug use: No     Review of  Systems   Constitutional: Positive for fever.   HENT: Positive for facial swelling. Negative for congestion, drooling, sore throat and trouble swallowing.    Eyes: Negative for photophobia, redness and visual disturbance.   Respiratory: Positive for chest tightness and shortness of breath. Negative for cough and wheezing.    Cardiovascular: Positive for chest pain.   Gastrointestinal: Positive for abdominal pain, anal bleeding, nausea and vomiting.   Genitourinary: Positive for flank pain and hematuria. Negative for dysuria.   Musculoskeletal: Positive for back pain.   Skin: Positive for rash.   Neurological: Negative for weakness.   Hematological: Does not bruise/bleed easily.       Physical Exam     Initial Vitals [10/09/20 0928]   BP Pulse Resp Temp SpO2   (!) 150/91 (!) 129 20 98.9 °F (37.2 °C) 96 %      MAP       --         Physical Exam    Nursing note and vitals reviewed.  Constitutional: He appears well-developed and well-nourished. He is not diaphoretic. He appears distressed (anxious).   HENT:   Head: Normocephalic and atraumatic.   Right Ear: External ear normal.   Left Ear: External ear normal.   Nose: Nose normal.   Mouth/Throat: No oropharyngeal exudate.   Oropharynx dry  No oropharyngeal edema, uvular deviation, or mouth sores   No angioedema at this time    Eyes: Conjunctivae and EOM are normal. Pupils are equal, round, and reactive to light.   Neck: Normal range of motion. Neck supple. No tracheal deviation present.   Cardiovascular: Regular rhythm, normal heart sounds and intact distal pulses.   No murmur heard.  Tachycardic to 120    Pulmonary/Chest: Breath sounds normal. No stridor. No respiratory distress. He has no wheezes. He has no rhonchi. He has no rales.   Abdominal: Soft. He exhibits no distension. There is abdominal tenderness (diffuse). There is no rebound and no guarding.   Musculoskeletal: Normal range of motion.   Neurological: He is alert and oriented to person, place, and time. GCS  score is 15. GCS eye subscore is 4. GCS verbal subscore is 5. GCS motor subscore is 6.   Skin: Skin is warm. Capillary refill takes less than 2 seconds. Rash (scattered hives to upper back, bilateral knees with blanching purplish skin discoloration, nontedner wtih no swelling) noted.   Psychiatric: He has a normal mood and affect.         ED Course   Procedures  Labs Reviewed   CBC W/ AUTO DIFFERENTIAL - Abnormal; Notable for the following components:       Result Value    RBC 4.52 (*)     Hemoglobin 11.6 (*)     Hematocrit 37.1 (*)     Mean Corpuscular Hemoglobin 25.7 (*)     Mean Corpuscular Hemoglobin Conc 31.3 (*)     RDW 18.9 (*)     Mono # 1.1 (*)     Lymph% 16.1 (*)     Mono% 16.3 (*)     All other components within normal limits   COMPREHENSIVE METABOLIC PANEL - Abnormal; Notable for the following components:    Potassium 3.2 (*)     Total Bilirubin 1.2 (*)     Alkaline Phosphatase 52 (*)     All other components within normal limits   URINALYSIS, REFLEX TO URINE CULTURE - Abnormal; Notable for the following components:    Appearance, UA Hazy (*)     Protein, UA 2+ (*)     Occult Blood UA 3+ (*)     Leukocytes, UA 1+ (*)     All other components within normal limits    Narrative:     Specimen Source->Urine   URINALYSIS MICROSCOPIC - Abnormal; Notable for the following components:    RBC, UA >100 (*)     WBC, UA 14 (*)     All other components within normal limits    Narrative:     Specimen Source->Urine   CULTURE, URINE   MAGNESIUM   PROTIME-INR   LIPASE   PHOSPHORUS   PROTIME-INR   APTT   FIBRINOGEN   TRYPTASE   FERRITIN   SARS-COV-2 RDRP GENE    Narrative:     This test utilizes isothermal nucleic acid amplification   technology to detect the SARS-CoV-2 RdRp nucleic acid segment.   The analytical sensitivity (limit of detection) is 125 genome   equivalents/mL.   A POSITIVE result implies infection with the SARS-CoV-2 virus;   the patient is presumed to be contagious.     A NEGATIVE result means that  SARS-CoV-2 nucleic acids are not   present above the limit of detection. A NEGATIVE result should be   treated as presumptive. It does not rule out the possibility of   COVID-19 and should not be the sole basis for treatment decisions.   If COVID-19 is strongly suspected based on clinical and exposure   history, re-testing using an alternate molecular assay should be   considered.   This test is only for use under the Food and Drug   Administration s Emergency Use Authorization (EUA).   Commercial kits are provided by [employer].   Performance characteristics of the EUA have been independently   verified by [facility] Department of   Pathology and Laboratory Medicine.   _________________________________________________________________   The authorized Fact Sheet for Healthcare Providers and the authorized Fact   Sheet for Patients of the ID NOW COVID-19 are available on the FDA   website:     https://www.fda.gov/media/126264/download  https://www.fda.gov/media/517316/download              ECG Results          EKG 12-lead (Final result)  Result time 10/09/20 16:11:10    Final result by Interface, Lab In [facility] (10/09/20 16:11:10)                 Narrative:    Test Reason : R07.9,    Vent. Rate : 101 BPM     Atrial Rate : 101 BPM     P-R Int : 118 ms          QRS Dur : 092 ms      QT Int : 354 ms       P-R-T Axes : 073 018 081 degrees     QTc Int : 459 ms    Sinus tachycardia  Otherwise normal ECG  When compared with ECG of 09-OCT-2020 09:36,  No significant change was found  Confirmed by Gabino Mercer MD ([id number]) on 10/9/2020 4:10:59 PM    Referred By: [email or web address]   SELF           Confirmed By:Gabino Mercer MD                             EKG 12-lead (Final result)  Result time 10/09/20 15:52:57    Final result by Interface, Lab In [facility] (10/09/20 15:52:57)                 Narrative:    Test Reason : R07.9,    Vent. Rate : 121 BPM     Atrial Rate : 121 BPM     P-R Int : 126 ms          QRS Dur :  088 ms      QT Int : 338 ms       P-R-T Axes : 065 -16 073 degrees     QTc Int : 479 ms    Sinus tachycardia  Otherwise normal ECG  When compared with ECG of 19-AUG-2020 09:53,  No significant change was found  Confirmed by Ruslan FITCH, Gabino LUCAS ([id number]) on 10/9/2020 3:52:44 PM    Referred By: [email or web address]   SELF           Confirmed By:Gabino Mercer MD                            Imaging Results          X-Ray Chest AP Portable (Final result)  Result time 10/09/20 10:27:10    Final result by Kris Perdomo III, MD (10/09/20 10:27:10)                 Impression:      No acute process seen.      Electronically signed by: Kris Perdomo MD  Date:    10/09/2020  Time:    10:27             Narrative:    EXAMINATION:  XR CHEST AP PORTABLE    CLINICAL HISTORY:  Chest pain, unspecified    FINDINGS:  Central line tip upper SVC.  Heart size is normal.  Lungs are clear.                                 Medical Decision Making:   History:   I obtained history from: someone other than patient.  Old Medical Records: I decided to obtain old medical records.  Old Records Summarized: records from clinic visits and records from previous admission(s).  Initial Assessment:   40 yo M hx of hypereosinophilia, chronically on steroids with multiorgan involvement, presenting with diffuse abdominal pain, blood in stool, hematuria, fever, skin rash, angioedema. Sx started last night. Used epipen around 8:45am and took prednisone, benadryl, pepcid. Currently angioedema resolved. Still with diffuse pain and anxiety, tachycardia.  Most likely flair of underlying condition given he stopped all medicines just a few days ago with a&i. May be angioedema related to anaphylaxis although no known other trigger, possibly hereditary angioedema although eosinophilic component and workup underway suggests otherwise. He has had known rectal involvement before, consider colitis/prostatitis, UTI or pyelonephritis.   He has already had epi and antihistamines,  steroids. Doses appropriate. Will montior closely for now for recurrence of angioedema or respiratory distress. Pain, anxiety control. Repeat EKG. Initial sinus tach w no ischemic changes.   Denise Hooper DO -III  [facility] Internal Medicine/Emergency Medicine  10:30 AM      Clinical Tests:   Lab Tests: Ordered and Reviewed  Radiological Study: Ordered and Reviewed  Medical Tests: Ordered and Reviewed  Other:   I have discussed this case with another health care provider.                   ED Course as of Oct 09 1820   Fri Oct 09, 2020   0947 EKG interpretation by ED attending physician:  Sinus tach, rate 121, no ST changes or ischemia, normal intervals.  Compared to prior August 2020, nonspecific T-wave changes have improved.    [SS]   1044 No acute process    X-Ray Chest AP Portable [VS]   1057 SARS-CoV-2 RNA, Amplification, Qual: Negative [VS]   1058 Attending updateNote:  39-year-old male with history of recurrent angioedema, urticaria, and gastritis, with multiple recent evaluations for possible hypereosinophilia, presenting to ER due to multiple complaints including facial swelling, difficulty breathing, chest tightness, abdominal pain, nausea/vomiting, and blood in stool in urine.  He reports numerous previous similar episodes that he attributes to his underlying condition.  He has been on chronic suppressive therapy with steroids for many years, but aunt and was told to stop his steroids a few days ago in preparation for repeat lab work to confirm a diagnosis of HES.  At onset of his symptoms this morning, he took multiple home medications including an EpiPen, p.o. steroids, antihistamines, pain medication, and nausea medication.  On arrival, mildly tachycardic, appears somewhat anxious, generalized abdominal tenderness with cramping, and urticarial rash to upper back and chest.  As patient took his medications prior to arrival, will obtain labs, treat symptomatically with fluids and continue to closely  monitor.     [SS]   1058 Patient vomited his oxycodone with some blood in it. Repeat EKG with Qtc 459. Giving IV zofran and reassess.     [VS]   1100 K 3.2. will attempt PO repletion when vomiting resolves.    Comprehensive metabolic panel(!) [VS]   1101 Magnesium [VS]   1113 Lipase [VS]   1120 Patient requesting IV steroids, IV bendaryl, IV phenergan. Says he cannot tolerate anything po. Given his vomiting, unclear if he had absorption of morning steroids so will provide. Will continue to monitor symptoms closely and reassess cardiopulmonary status. Tachycardia improving w IVF.     [VS]   1136 RBC, UA(!): >100 [VS]   1137 Occult Blood UA(!): 3+ [VS]   1239 Feeling improved after some dilaudid. Says he had a maroon stool but he flushed it down the toiled. Has small amount bright red blood in emesis. No coffee ground emesis. CXR with no free air or widened mediastinum. Given persistent sx and vomiting, plan to admit for obs.    [VS]   1240 Slightly lower than last labs but has had hgb of 11 in the past.    Hemoglobin(!): 11.6 [VS]   1356 Admitted to observation.     [VS]      ED Course User Index  [SS] David Ames MD  [VS] Denise Hooper DO            Clinical Impression:       ICD-10-CM ICD-9-CM   1. Angioedema, initial encounter  T78.3XXA 995.1   2. Chest pain  R07.9 786.50   3. Abdominal pain, unspecified abdominal location  R10.9 789.00   4. Hematochezia  K92.1 578.1   5. Bloody sputum  R04.2 786.30                          ED Disposition Condition    Observation                             Denise Hooper DO  Resident  10/09/20 [id number]

## 2020-10-09 NOTE — ED NOTES
Pt very restless and nervous. Reports feeling warm and sweaty. Given ice pack and cool, wet cloths for comfort. Lights turned down to encourage rest. Will continue to monitor.

## 2020-10-09 NOTE — HPI
Solo Black is a 39 year old male with PMH of Hypereosinophilic Syndrome, eosinophilic esophagitis, GERD, IBS, asthma, ADHD who came to the ED for acute onset abdominal pain, chest discomfort, bloody stool, hematuria, rash, and facial swelling. He reports the symptoms started yesterday night. Patient normally on Prednisone, Benadryl, Atarax and Pepcid for control of his symptoms. He being followed by Allergy/Immunology who had instrurcted him to wean his Prednisone for a repeat check of eosinophils for confirmation of his HES, and a potential medication change to mepolizumab. He reports that he stopped all of his medications three days ago. Today after the onset of his symptoms, patient used his epi pen and resumed all of his home medications. Pt also reports 5 episodes of hematemesis since last night as well as melanotic stools.  Also refers associated fevers, chills, skin and oral ulcers.     Patient was seen by Hematology on 9/23/20, who performed a bone marrow biopsy on 10/1/20. Results were not significant for any myeloid or lymphoid neoplasm. Patient has also had multiple other evaluations that include a recent EGD done in 8/31 that showed scar tissue in the distal esophagus and signs of impaired gastric emptying. Decision to admit patient to hospital medicine for further management.

## 2020-10-10 ENCOUNTER — ANESTHESIA EVENT (OUTPATIENT)
Dept: INTENSIVE CARE | Facility: HOSPITAL | Age: 40
End: 2020-10-10
Payer: MEDICAID

## 2020-10-10 ENCOUNTER — ANESTHESIA (OUTPATIENT)
Dept: INTENSIVE CARE | Facility: HOSPITAL | Age: 40
End: 2020-10-10
Payer: MEDICAID

## 2020-10-10 LAB
ALBUMIN SERPL BCP-MCNC: 4.1 G/DL (ref 3.5–5.2)
ALP SERPL-CCNC: 51 U/L (ref 55–135)
ALT SERPL W/O P-5'-P-CCNC: 33 U/L (ref 10–44)
ANION GAP SERPL CALC-SCNC: 18 MMOL/L (ref 8–16)
AST SERPL-CCNC: 26 U/L (ref 10–40)
BACTERIA UR CULT: ABNORMAL
BASOPHILS # BLD AUTO: 0.01 K/UL (ref 0–0.2)
BASOPHILS NFR BLD: 0.1 % (ref 0–1.9)
BILIRUB SERPL-MCNC: 2 MG/DL (ref 0.1–1)
BUN SERPL-MCNC: 26 MG/DL (ref 6–20)
CALCIUM SERPL-MCNC: 9 MG/DL (ref 8.7–10.5)
CHLORIDE SERPL-SCNC: 105 MMOL/L (ref 95–110)
CO2 SERPL-SCNC: 16 MMOL/L (ref 23–29)
CREAT SERPL-MCNC: 1.3 MG/DL (ref 0.5–1.4)
DIFFERENTIAL METHOD: ABNORMAL
EOSINOPHIL # BLD AUTO: 0 K/UL (ref 0–0.5)
EOSINOPHIL NFR BLD: 0 % (ref 0–8)
ERYTHROCYTE [DISTWIDTH] IN BLOOD BY AUTOMATED COUNT: 19.3 % (ref 11.5–14.5)
EST. GFR  (AFRICAN AMERICAN): >60 ML/MIN/1.73 M^2
EST. GFR  (NON AFRICAN AMERICAN): >60 ML/MIN/1.73 M^2
GLUCOSE SERPL-MCNC: 118 MG/DL (ref 70–110)
HCT VFR BLD AUTO: 35.4 % (ref 40–54)
HGB BLD-MCNC: 11.1 G/DL (ref 14–18)
IMM GRANULOCYTES # BLD AUTO: 0.04 K/UL (ref 0–0.04)
IMM GRANULOCYTES NFR BLD AUTO: 0.4 % (ref 0–0.5)
LYMPHOCYTES # BLD AUTO: 0.5 K/UL (ref 1–4.8)
LYMPHOCYTES NFR BLD: 5.7 % (ref 18–48)
MAGNESIUM SERPL-MCNC: 1.7 MG/DL (ref 1.6–2.6)
MCH RBC QN AUTO: 26 PG (ref 27–31)
MCHC RBC AUTO-ENTMCNC: 31.4 G/DL (ref 32–36)
MCV RBC AUTO: 83 FL (ref 82–98)
MONOCYTES # BLD AUTO: 0.8 K/UL (ref 0.3–1)
MONOCYTES NFR BLD: 8.9 % (ref 4–15)
NEUTROPHILS # BLD AUTO: 7.7 K/UL (ref 1.8–7.7)
NEUTROPHILS NFR BLD: 84.9 % (ref 38–73)
NRBC BLD-RTO: 0 /100 WBC
PHOSPHATE SERPL-MCNC: 4 MG/DL (ref 2.7–4.5)
PLATELET # BLD AUTO: 256 K/UL (ref 150–350)
PMV BLD AUTO: 11.7 FL (ref 9.2–12.9)
POTASSIUM SERPL-SCNC: 3.8 MMOL/L (ref 3.5–5.1)
PROT SERPL-MCNC: 7.2 G/DL (ref 6–8.4)
RBC # BLD AUTO: 4.27 M/UL (ref 4.6–6.2)
SODIUM SERPL-SCNC: 139 MMOL/L (ref 136–145)
WBC # BLD AUTO: 9.1 K/UL (ref 3.9–12.7)

## 2020-10-10 PROCEDURE — 99226 PR SUBSEQUENT OBSERVATION CARE,LEVEL III: ICD-10-PCS | Mod: ,,, | Performed by: INTERNAL MEDICINE

## 2020-10-10 PROCEDURE — G0378 HOSPITAL OBSERVATION PER HR: HCPCS

## 2020-10-10 PROCEDURE — 63600175 PHARM REV CODE 636 W HCPCS: Performed by: STUDENT IN AN ORGANIZED HEALTH CARE EDUCATION/TRAINING PROGRAM

## 2020-10-10 PROCEDURE — S0028 INJECTION, FAMOTIDINE, 20 MG: HCPCS | Performed by: STUDENT IN AN ORGANIZED HEALTH CARE EDUCATION/TRAINING PROGRAM

## 2020-10-10 PROCEDURE — 94640 AIRWAY INHALATION TREATMENT: CPT

## 2020-10-10 PROCEDURE — 94761 N-INVAS EAR/PLS OXIMETRY MLT: CPT

## 2020-10-10 PROCEDURE — 96376 TX/PRO/DX INJ SAME DRUG ADON: CPT

## 2020-10-10 PROCEDURE — 80053 COMPREHEN METABOLIC PANEL: CPT

## 2020-10-10 PROCEDURE — 85025 COMPLETE CBC W/AUTO DIFF WBC: CPT

## 2020-10-10 PROCEDURE — 99226 PR SUBSEQUENT OBSERVATION CARE,LEVEL III: CPT | Mod: ,,, | Performed by: INTERNAL MEDICINE

## 2020-10-10 PROCEDURE — C9113 INJ PANTOPRAZOLE SODIUM, VIA: HCPCS | Performed by: STUDENT IN AN ORGANIZED HEALTH CARE EDUCATION/TRAINING PROGRAM

## 2020-10-10 PROCEDURE — 96375 TX/PRO/DX INJ NEW DRUG ADDON: CPT

## 2020-10-10 PROCEDURE — 36000 PLACE NEEDLE IN VEIN: CPT | Performed by: SURGERY

## 2020-10-10 PROCEDURE — 99214 OFFICE O/P EST MOD 30 MIN: CPT | Mod: ,,, | Performed by: INTERNAL MEDICINE

## 2020-10-10 PROCEDURE — 36415 COLL VENOUS BLD VENIPUNCTURE: CPT

## 2020-10-10 PROCEDURE — 84100 ASSAY OF PHOSPHORUS: CPT

## 2020-10-10 PROCEDURE — 25000003 PHARM REV CODE 250: Performed by: STUDENT IN AN ORGANIZED HEALTH CARE EDUCATION/TRAINING PROGRAM

## 2020-10-10 PROCEDURE — 99214 PR OFFICE/OUTPT VISIT, EST, LEVL IV, 30-39 MIN: ICD-10-PCS | Mod: ,,, | Performed by: INTERNAL MEDICINE

## 2020-10-10 PROCEDURE — 83735 ASSAY OF MAGNESIUM: CPT

## 2020-10-10 RX ORDER — FAMOTIDINE 10 MG/ML
20 INJECTION INTRAVENOUS 2 TIMES DAILY
Status: DISCONTINUED | OUTPATIENT
Start: 2020-10-10 | End: 2020-10-11

## 2020-10-10 RX ORDER — MAGNESIUM SULFATE HEPTAHYDRATE 40 MG/ML
2 INJECTION, SOLUTION INTRAVENOUS ONCE
Status: COMPLETED | OUTPATIENT
Start: 2020-10-10 | End: 2020-10-10

## 2020-10-10 RX ORDER — HYDROMORPHONE HYDROCHLORIDE 2 MG/ML
2 INJECTION, SOLUTION INTRAMUSCULAR; INTRAVENOUS; SUBCUTANEOUS ONCE
Status: COMPLETED | OUTPATIENT
Start: 2020-10-10 | End: 2020-10-10

## 2020-10-10 RX ADMIN — HYDROCODONE BITARTRATE AND ACETAMINOPHEN 1 TABLET: 5; 325 TABLET ORAL at 11:10

## 2020-10-10 RX ADMIN — PROMETHAZINE HYDROCHLORIDE 6.25 MG: 25 INJECTION INTRAMUSCULAR; INTRAVENOUS at 03:10

## 2020-10-10 RX ADMIN — HYDROMORPHONE HYDROCHLORIDE 1 MG: 1 INJECTION, SOLUTION INTRAMUSCULAR; INTRAVENOUS; SUBCUTANEOUS at 02:10

## 2020-10-10 RX ADMIN — GABAPENTIN 300 MG: 300 CAPSULE ORAL at 08:10

## 2020-10-10 RX ADMIN — DIPHENHYDRAMINE HYDROCHLORIDE 25 MG: 50 INJECTION, SOLUTION INTRAMUSCULAR; INTRAVENOUS at 09:10

## 2020-10-10 RX ADMIN — HYDROMORPHONE HYDROCHLORIDE 1 MG: 1 INJECTION, SOLUTION INTRAMUSCULAR; INTRAVENOUS; SUBCUTANEOUS at 07:10

## 2020-10-10 RX ADMIN — TAMSULOSIN HYDROCHLORIDE 0.4 MG: 0.4 CAPSULE ORAL at 08:10

## 2020-10-10 RX ADMIN — LORAZEPAM 1 MG: 0.5 TABLET ORAL at 01:10

## 2020-10-10 RX ADMIN — GABAPENTIN 300 MG: 300 CAPSULE ORAL at 09:10

## 2020-10-10 RX ADMIN — HYDROMORPHONE HYDROCHLORIDE 2 MG: 2 INJECTION INTRAMUSCULAR; INTRAVENOUS; SUBCUTANEOUS at 11:10

## 2020-10-10 RX ADMIN — HYDROMORPHONE HYDROCHLORIDE 1 MG: 1 INJECTION, SOLUTION INTRAMUSCULAR; INTRAVENOUS; SUBCUTANEOUS at 09:10

## 2020-10-10 RX ADMIN — FAMOTIDINE 20 MG: 10 INJECTION INTRAVENOUS at 08:10

## 2020-10-10 RX ADMIN — GABAPENTIN 300 MG: 300 CAPSULE ORAL at 02:10

## 2020-10-10 RX ADMIN — HYDROCODONE BITARTRATE AND ACETAMINOPHEN 1 TABLET: 5; 325 TABLET ORAL at 01:10

## 2020-10-10 RX ADMIN — HYDROXYZINE HYDROCHLORIDE 25 MG: 25 TABLET, FILM COATED ORAL at 05:10

## 2020-10-10 RX ADMIN — HYDROCODONE BITARTRATE AND ACETAMINOPHEN 1 TABLET: 5; 325 TABLET ORAL at 05:10

## 2020-10-10 RX ADMIN — PROMETHAZINE HYDROCHLORIDE 6.25 MG: 25 INJECTION INTRAMUSCULAR; INTRAVENOUS at 09:10

## 2020-10-10 RX ADMIN — HYDROXYZINE HYDROCHLORIDE 25 MG: 25 TABLET, FILM COATED ORAL at 01:10

## 2020-10-10 RX ADMIN — FLUTICASONE PROPIONATE 1 PUFF: 44 AEROSOL, METERED RESPIRATORY (INHALATION) at 09:10

## 2020-10-10 RX ADMIN — PANTOPRAZOLE SODIUM 40 MG: 40 INJECTION, POWDER, LYOPHILIZED, FOR SOLUTION INTRAVENOUS at 09:10

## 2020-10-10 RX ADMIN — MAGNESIUM SULFATE IN WATER 2 G: 40 INJECTION, SOLUTION INTRAVENOUS at 10:10

## 2020-10-10 RX ADMIN — FLUTICASONE PROPIONATE 1 PUFF: 44 AEROSOL, METERED RESPIRATORY (INHALATION) at 08:10

## 2020-10-10 RX ADMIN — HYDROMORPHONE HYDROCHLORIDE 1 MG: 1 INJECTION, SOLUTION INTRAMUSCULAR; INTRAVENOUS; SUBCUTANEOUS at 03:10

## 2020-10-10 RX ADMIN — METHYLPREDNISOLONE SODIUM SUCCINATE 48 MG: 40 INJECTION, POWDER, FOR SOLUTION INTRAMUSCULAR; INTRAVENOUS at 09:10

## 2020-10-10 RX ADMIN — LORAZEPAM 1 MG: 0.5 TABLET ORAL at 05:10

## 2020-10-10 RX ADMIN — FAMOTIDINE 20 MG: 10 INJECTION INTRAVENOUS at 11:10

## 2020-10-10 RX ADMIN — MELATONIN TAB 3 MG 12 MG: 3 TAB at 08:10

## 2020-10-10 RX ADMIN — DIPHENHYDRAMINE HYDROCHLORIDE 25 MG: 50 INJECTION, SOLUTION INTRAMUSCULAR; INTRAVENOUS at 02:10

## 2020-10-10 RX ADMIN — ONDANSETRON 4 MG: 2 INJECTION INTRAMUSCULAR; INTRAVENOUS at 02:10

## 2020-10-10 RX ADMIN — TAMSULOSIN HYDROCHLORIDE 0.4 MG: 0.4 CAPSULE ORAL at 09:10

## 2020-10-10 RX ADMIN — PAROXETINE HYDROCHLORIDE 20 MG: 20 TABLET, FILM COATED ORAL at 09:10

## 2020-10-10 RX ADMIN — DIPHENHYDRAMINE HYDROCHLORIDE 25 MG: 50 INJECTION, SOLUTION INTRAMUSCULAR; INTRAVENOUS at 03:10

## 2020-10-10 NOTE — ASSESSMENT & PLAN NOTE
Patient with multiple episodes of hematemesis. He is hemodynamically stable, Hgb stable at 11.1. Has had multiple prior EGDs done, most recently 8/31, which showed scar tissue in distal esophagus and signs of delayed gastric emptying. Seen by GI today who indicated no need for intervention given his multiple scopes. Hemodynamically stable, Hgb 11-12 this admit (around baseline).  -continuing IV PPi  -trending CBC

## 2020-10-10 NOTE — ASSESSMENT & PLAN NOTE
Patient refers he had a confirmed diagnosis of hypereosinophilia syndrome at [facility] [place]. Is followed by Allergy and Immunology here who have yet to confirm his diagnosis. Per last Allergy notes, patient was supposed to wean off of prednisone, to repeat CBC for confirmation. He refers he stopped all of his medications three days ago, prompting him to present with diffuse abdominal pain, multiple bouts of nausea/vomiting, diffuse urticaria, muscle pain, fevers.     CBC today with eosinophil count of 400. Spoke with Allergy today, who said his current eosinophils do not justify his current presentation. There is a concern that patient's presentation related to factitious disorder.

## 2020-10-10 NOTE — NURSING
Pt called out for the charge nurse and pain medication.  When taken to the room, he refused the 1 mg dilaudid and stated that he and the dr discussed 2mg and also wanted the IV benadryl, phenergan and 2mg of dilaudid all three administered at the same time.  Pt is pacing in the room.  Very anxious.

## 2020-10-10 NOTE — NURSING
Pt having involuntary body movements.  Talking very fast and anxious.  Appears paranoid over noises outside his room, which is normal activity of the staff.  He states that he is coming off of a ton of medication because of a trial medication he is wanting to try here at [facility].  Pt is unable to concentrate or maintain in a sitting or lying motion voluntarily for more than a couple of min.  He is verbally aggressive at times.

## 2020-10-10 NOTE — PLAN OF CARE
No acute events during shift. Pain being controled with Dilaudid IVP. Pt is very restless. Got a new IV in Pts left wrist. Pt did void quarter size blood clots. GI was consulted; they decided to not do a EGD or colonoscopy; diet ordered. Pt remains free from injury/falls for shift. Educated Pt on meds no questions at this time. VSS.  No complaints of CP, SOB, pain/discomfort  Bed locked in lowest position, call bell within reach. All questions addressed.  Will continue to monitor.

## 2020-10-10 NOTE — SUBJECTIVE & OBJECTIVE
Interval History: Patient very anxious, pacing around the room, occasionally yelling to nursing staff. When seen in the morning, referred no pain, no nausea or vomiting, no issues except feeling cold. When seen in the afternoon, patient clutching his abdomen in pain, seen very anxious, requesting an increase of his dilaudid to 2mg.  IV Methylprednisone 125mg q6h, IV dilaudid, IV phenergan, IV benadryl. NPO for potential EGD by GI     Review of Systems   Constitutional: Positive for chills and diaphoresis. Negative for fatigue and fever.   Respiratory: Positive for chest tightness. Negative for cough, shortness of breath and wheezing.    Cardiovascular: Positive for chest pain. Negative for palpitations and leg swelling.   Gastrointestinal: Positive for abdominal pain, anal bleeding, blood in stool, diarrhea, nausea and vomiting. Negative for constipation.   Genitourinary: Positive for hematuria. Negative for dysuria and flank pain.   Musculoskeletal: Positive for arthralgias and myalgias.   Neurological: Positive for light-headedness. Negative for dizziness, syncope and headaches.   Psychiatric/Behavioral: Positive for agitation. The patient is nervous/anxious.      Objective:     Vital Signs (Most Recent):  Temp: 97.9 °F (36.6 °C) (10/10/20 1200)  Pulse: 102 (10/10/20 1200)  Resp: 20 (10/10/20 1450)  BP: (!) 135/98 (10/10/20 1200)  SpO2: 99 % (10/10/20 1200) Vital Signs (24h Range):  Temp:  [97.8 °F (36.6 °C)-98.6 °F (37 °C)] 97.9 °F (36.6 °C)  Pulse:  [] 102  Resp:  [18-48] 20  SpO2:  [93 %-99 %] 99 %  BP: (135-153)/() 135/98     Weight: 97.5 kg (215 lb)  Body mass index is 29.16 kg/m².  No intake or output data in the 24 hours ending 10/10/20 1607   Physical Exam  Constitutional:       General: He is in acute distress.      Appearance: He is not ill-appearing.      Comments: Pt appears anxious   HENT:      Head: Normocephalic and atraumatic.      Mouth/Throat:      Mouth: Mucous membranes are moist.       Pharynx: Oropharynx is clear.      Comments: Oral ulcers at the corners of the mouth  Eyes:      Extraocular Movements: Extraocular movements intact.      Pupils: Pupils are equal, round, and reactive to light.   Neck:      Musculoskeletal: Normal range of motion and neck supple.   Cardiovascular:      Rate and Rhythm: Regular rhythm. Tachycardia present.      Heart sounds: No murmur. No friction rub. No gallop.    Pulmonary:      Effort: Pulmonary effort is normal.      Breath sounds: Normal breath sounds. No wheezing or rales.   Abdominal:      General: Abdomen is flat. There is no distension.      Palpations: Abdomen is soft.      Tenderness: There is abdominal tenderness (diffuse tenderness to deep palpation througout). There is no guarding.   Musculoskeletal: Normal range of motion.         General: No swelling, tenderness or deformity.   Lymphadenopathy:      Cervical: No cervical adenopathy.   Skin:     General: Skin is warm and dry.      Coloration: Skin is not pale.      Findings: Rash (Scattered urticarial rash to the chest and back with one larger 2 cm area noted to the superior medial aspect of the back) present.      Comments: uritcarial rash appears to be improving   Neurological:      Mental Status: He is alert.   Psychiatric:         Mood and Affect: Mood is anxious.         Speech: Speech is rapid and pressured and tangential.         Behavior: Behavior is agitated and hyperactive.         Significant Labs:   CBC:   Recent Labs   Lab 10/09/20  1017 10/09/20  2202 10/10/20  0420   WBC 6.79 11.50 9.10   HGB 11.6* 12.2* 11.1*   HCT 37.1* 38.0* 35.4*    263 256     CMP:   Recent Labs   Lab 10/09/20  1017 10/10/20  0420    139   K 3.2* 3.8    105   CO2 23 16*   GLU 85 118*   BUN 19 26*   CREATININE 1.1 1.3   CALCIUM 9.0 9.0   PROT 6.6 7.2   ALBUMIN 3.8 4.1   BILITOT 1.2* 2.0*   ALKPHOS 52* 51*   AST 20 26   ALT 36 33   ANIONGAP 13 18*   EGFRNONAA >60.0 >60.0       Significant  Imaging: I have reviewed all pertinent imaging results/findings within the past 24 hours.

## 2020-10-10 NOTE — NURSING
Pt is AAOx3. Manic behavior noted. Pt speaking loud and fast. HR in the 140's. Sweating and pacing around the room. PRN ativan 1mg given. On call paged.

## 2020-10-10 NOTE — NURSING
"Pt became verbally aggressive and specially aggressive. Pt stated "Oh you were outside listening I heard you laughing" . This nurse explained, another pt is having a medical emergency and I was assisting that nurse and that nobody is laughing at him. This nurse explain further that I would speak to Anesthesiology Resident about getting a PIV. Pt smiled and laid down. Will continue to monitor.   "

## 2020-10-10 NOTE — HOSPITAL COURSE
"At [facility] ED patient with mutliple bodily complaints including abdominal pain, muscle pain, hematemesis, hematuria,  melena. Patient very anxious, pacing around the room, occasionally yelling to nursing staff. Requesting IV Methylprednisone 125mg q6h, IV dilaudid, IV phenergan, IV benadryl. Concerns for high iatrogenic risk with this patient given his polypharmacy. Called on call fellow for Allergy, who happens to be his provider, who said that given his low eosinophil count (400 on presentation, 0 today) very unlikely his current clinical presentation an HES flare. Explained that his diagnosis of Hyereosinophilic Syndrome remains questionable, as has not reached the diagnostic cutoff. Also expressed concern that patient suffers from factitious disorders, as there have been reports of patient self-phlebotomizing (unable to confirm). Overnight patient very agitated again, requesting medication changes. Per nursing reports, patient removed his chest port. When patient seen, he had no complaints of pain, nausea, vomiting, hematemesis, melena. Said his port was fine. Will treat patient with low dose IV methylpred with plans to wean him down to his home dose of prednisone 20mg. Today patient refusing lab draws, very anxious and concerned about his port. Requesting an ultrasound of the port. Otherwise refers feeling much better regarding his pain and rash. Aggreable about weaning down medications for a potential discharge tomorrow. Port to be accessed, cathflow ordered.  Patient refused his AM labs and then later was amenable to CBC only. This morning he states he still has some abdominal and groin pain although overall improved and tolerating a diet well. He was hesitant about transitioning from IV dilaudid to PO Percocet but understood that this was necessary to be able to discharge him so he can follow up outpatient with specialists. He says "I need to be out of the hospital by tomorrow because I have meetings and " "appointments on Wednesday". Still says he has small blood clots in urine and that he forgets to not flush them before the RN can call the team to take a look. Later in the day, bladder scan was ordered however patient continued to forget to not flush the toilet and forgot to call the RN when he voided so that the bladder scan could be done. Today patient refers feeling well. Denies abdominal pain, nausea, vomiting, hematemesis, melena, itching, or any symptom. Decision to discharge patient as he is medically stable. Patient has follow ups with hematology and CRS this week.   "

## 2020-10-10 NOTE — ASSESSMENT & PLAN NOTE
Patient refers urticarial rash started today, along the onset of his diffuse abdominal pain and episodes of hematemesis/melena. Used his EpiPen this am and took Prednisone, Benadryl, Hydroxizine to some improvement. Given IV Steroids and Benadryl in the ED.     Urticarial rash improving today.  -Once patient able to tolerate PO, continue on home dose of prednisone, Benadryl and Hydroxyzine prn for rash  -Now on PRN IV Benadryl, IV solumedrol, IV famotidine and Hydroxizine.  -Will taper solumedrol as tolerated

## 2020-10-10 NOTE — CONSULTS
"[facility]  Gastroenterology Service  Consult Note    Patient Name: Solo Black  MRN: [id number]  Admission Date: 10/9/2020  Hospital Length of Stay: 0 days  Code Status: Full Code   Attending Provider: Pamela Boston MD   Consulting Provider: Yomi Nunez MD  Primary Care Physician: Cecilia Tsai MD  Principal Problem:Hematemesis    Inpatient consult to Gastroenterology  Consult performed by: Yomi Nunez MD  Consult ordered by: Manuel Cardoza MD        Subjective:     HPI: Solo Black is a 39 y.o. male with history of Nissen fundoplication, hypereosinophilic syndrome, EoE follows at [facility] presenting with abdominal pain, hematemesis and hematochezia for which GI is consulted.    Patient has had multiple EGDs and Colonoscopies (see below) for hematemesis and hematochezia. History of clean based gastric ulcer in 2014, EoE, chronic gastritis (with HP negative), duodenitis, normal colonoscopy last 7/20/20.  Has questionable history of hypereosinophilic syndrome. Patient last seen by [facility], Dr. Wall, on 9/10/20 at which time GES was ordered and referred to CRS for possible bleeding hemorrhoids. Seen by Allergy and was told to hold steroids x2 days for blood tests, but patient states he stopped prednisone, PPI and H2 blocker all of a sudden three days prior to admission. Reports throwing up 4-5X with large amount of blood, noticing "strings" of blood in BMs along with mucous. Also notes diffuse abdominal pain. Denies NSAIDs or blood thinners.    Here, tachycardic, BP elevated. Hb 11-12s from b/l 11s. Plts and INR normal. BUN 19, Cr 1.1.  When I first saw him, he denied further hematemesis or blood in BM since admission, but later during rounds he notes vomiting large amount of blood. Per nursing, he had two small blood clots in toilet but no noted vomiting.    Endoscopic history:  EGD 8/31/20 for hematemesis with distal esophageal scar, large amount of food " in stomach likely from impaired gastric emptying. Recommended outpatient colonoscopy.  EGD 7/20/20 for epigastric pain with grade B esophagitis and medium hiatal hernia and non-bleeding erosive gastropathy.  Gastric biopsies no H pylori, duodenal biopsies normal.  Colonoscopy 7/20/20 for hematochezia with normal TI, normal colon, internal hemorrhoids.  Random colon biopsies normal  EGD 3/25/20 for hematemesis with normal esophagus, medium hiatal hernia, gastritis, and clip in stomach.  Gastric biopsies normal  Colonoscopy 11/14/2018 for hematochezia with normal colon.  Flex sig 9/5/2018 for diarrhea and hematochezia with poor prep, no blood in the rectum.  Some concern at this time for healthcare seeking behavior.  EGD 09/05/2018 for hematemesis and abdominal pain with possible EOE, medium hiatal hernia, grade a esophagitis, nonbleeding gastric ulcer, gastritis, and a clip in stomach, normal duodenum.  Esophageal biopsies 235 eos/hpf, gastric biopsies chronic gastritis and no H pylori.  EGD 07/21/2016 for coffee-ground emesis with normal esophagus, gastric antrum inflammation, duodenal 1st part inflammation.  Gastric biopsies no H pylori.  EGD 03/17/2016 for hematemesis with grade B esophagitis, normal stomach, normal duodenum  EGD 03/11/2014 for hematemesis with esophageal changes suggestive of EOE the, hiatal hernia, gastritis, gastric antral ulcer without stigmata of bleeding, duodenitis.  Duodenal biopsy mild to moderate chronic subacute inflammation.  Gastric biopsy with chemical gastropathy and no H pylori.  Esophageal biopsy with EOE.      Past Medical History:   Diagnosis Date    Arthritis     C. difficile colitis feb 2014    postop of hand surgery    Cancer     Encounter for blood transfusion     Eosinophilic esophagitis 3/11/2014    GERD (gastroesophageal reflux disease)     Hypereosinophilic syndrome     IBS (irritable bowel syndrome)     Leukemia     MRSA carrier     says multiple times nose  swab was +    Nephrolithiasis apr 2014    bilateral punctate - never passed a stone    Urinary tract infection feb 2014    MRSA       Past Surgical History:   Procedure Laterality Date    APPENDECTOMY      ARTHROSCOPY OF SHOULDER WITH REMOVAL OF DISTAL CLAVICLE Right 11/1/2018    Procedure: ARTHROSCOPY, SHOULDER, WITH DISTAL CLAVICLE EXCISION;  Surgeon: Gabino Mejia MD;  Location: [facility];  Service: Orthopedics;  Laterality: Right;  video    BACK SURGERY      BLADDER FULGURATION N/A 9/18/2020    Procedure: FULGURATION, BLADDER;  Surgeon: Randall Moss MD;  Location: [facility];  Service: Urology;  Laterality: N/A;  blood vessels of bladder neck and prostate    BONE MARROW BIOPSY Left 10/1/2020    Procedure: Biopsy-bone marrow;  Surgeon: Trung Polanco MD;  Location: [facility];  Service: Oncology;  Laterality: Left;    CIRCUMCISION, PRIMARY      COLONOSCOPY N/A 11/14/2018    Procedure: COLONOSCOPY;  Surgeon: Milton Brunson MD;  Location: [facility];  Service: Endoscopy;  Laterality: N/A;    COLONOSCOPY N/A 7/20/2020    Procedure: COLONOSCOPY;  Surgeon: Ruslan Tillman MD;  Location: [facility];  Service: Endoscopy;  Laterality: N/A;    CYSTOSCOPY W/ RETROGRADES Bilateral 9/18/2020    Procedure: CYSTOSCOPY, WITH RETROGRADE PYELOGRAM;  Surgeon: Randall Moss MD;  Location: [facility];  Service: Urology;  Laterality: Bilateral;    DILATION OF URETHRA N/A 9/18/2020    Procedure: DILATION, URETHRA;  Surgeon: Randall Moss MD;  Location: [facility];  Service: Urology;  Laterality: N/A;    drainage of abscess from hand  2009    MRSA    drainage of abscess from R thigh  2006    MRSA    ESOPHAGOGASTRODUODENOSCOPY  3/11/2014    ESOPHAGOGASTRODUODENOSCOPY N/A 9/5/2018    Procedure: EGD (ESOPHAGOGASTRODUODENOSCOPY);  Surgeon: Kolby Wall MD;  Location: [facility];  Service: Endoscopy;  Laterality: N/A;    ESOPHAGOGASTRODUODENOSCOPY N/A 3/25/2020    Procedure: EGD (ESOPHAGOGASTRODUODENOSCOPY);   Surgeon: Ruslan Tillman MD;  Location: [facility];  Service: Endoscopy;  Laterality: N/A;    ESOPHAGOGASTRODUODENOSCOPY N/A 7/20/2020    Procedure: EGD (ESOPHAGOGASTRODUODENOSCOPY);  Surgeon: Ruslan Tillman MD;  Location: [facility];  Service: Endoscopy;  Laterality: N/A;  Patient is a very hard stick, requires anesthesia stick.    ESOPHAGOGASTRODUODENOSCOPY N/A 8/31/2020    Procedure: EGD (ESOPHAGOGASTRODUODENOSCOPY);  Surgeon: Kolby Wall MD;  Location: [facility];  Service: Endoscopy;  Laterality: N/A;    FLEXIBLE SIGMOIDOSCOPY N/A 9/5/2018    Procedure: SIGMOIDOSCOPY, FLEXIBLE;  Surgeon: Kolby Wall MD;  Location: [facility];  Service: Endoscopy;  Laterality: N/A;    HAND SURGERY  jan 2014    power saw fell on hand - laceration 3 tendons    INSERTION OF VENOUS ACCESS PORT Right 8/21/2020    Procedure: INSERTION, VENOUS ACCESS PORT;  Surgeon: Troy Honeycutt MD;  Location[facility];  Service: General;  Laterality: Right;    NISSEN FUNDOPLICATION         Family History   Problem Relation Age of Onset    Urolithiasis Father     Celiac disease Sister     Hypertension Maternal Grandmother     Hypertension Maternal Grandfather     Prostate cancer Neg Hx     Kidney disease Neg Hx        Social History     Socioeconomic History    Marital status: Single     Spouse name: Not on file    Number of children: 2    Years of education: Not on file    Highest education level: Not on file   Occupational History    Occupation:  electric forklifts     Employer: [employer]   Social Needs    Financial resource strain: Not on file    Food insecurity     Worry: Not on file     Inability: Not on file    Transportation needs     Medical: Not on file     Non-medical: Not on file   Tobacco Use    Smoking status: Never Smoker    Smokeless tobacco: Never Used    Tobacco comment: Pt states he has smoked 1 or 2 cigarettes in his life.   Substance and Sexual Activity     Alcohol use: Not Currently    Drug use: No    Sexual activity: Yes     Partners: Female   Lifestyle    Physical activity     Days per week: Not on file     Minutes per session: Not on file    Stress: Not on file   Relationships    Social connections     Talks on phone: Not on file     Gets together: Not on file     Attends Restorationism service: Not on file     Active member of club or organization: Not on file     Attends meetings of clubs or organizations: Not on file     Relationship status: Not on file   Other Topics Concern    Not on file   Social History Narrative    Not on file       No current facility-administered medications on file prior to encounter.      Current Outpatient Medications on File Prior to Encounter   Medication Sig Dispense Refill    acetaminophen (TYLENOL) 325 MG tablet Take 1 tablet (325 mg total) by mouth every 6 (six) hours as needed for Pain.      diphenhydrAMINE (BENADRYL) 50 MG capsule Take 50 mg by mouth every 6 (six) hours as needed for Itching.      gabapentin (NEURONTIN) 300 MG capsule Take 1 capsule (300 mg total) by mouth 3 (three) times daily. 90 capsule 0    HYDROcodone-acetaminophen (NORCO) 5-325 mg per tablet Take 1 tablet by mouth every 6 (six) hours as needed for Pain.      hydrOXYzine HCL (ATARAX) 25 MG tablet Take 1 tablet (25 mg total) by mouth 3 (three) times daily as needed for Itching. 30 tablet 1    oxyCODONE-acetaminophen (PERCOCET) 5-325 mg per tablet Take 1 tablet by mouth every 8 (eight) hours as needed. 15 tablet 0    predniSONE (DELTASONE) 10 MG tablet Take 2 tablets (20 mg total) by mouth once daily. 30 tablet 1    tamsulosin (FLOMAX) 0.4 mg Cap Take 1 capsule (0.4 mg total) by mouth 2 (two) times daily. 60 capsule 11    artificial tears (ISOPTO TEARS) 0.5 % ophthalmic solution Place 1 drop into both eyes 6 (six) times daily.      cetirizine (ZYRTEC) 10 MG tablet Take 20 mg by mouth 2 (two) times a day.       dextroamphetamine-amphetamine  (ADDERALL) 20 mg tablet Take 1 tablet by mouth 2 (two) times daily before meals. Take before breakfast and lunch      dicyclomine (BENTYL) 20 mg tablet Take 1 tablet (20 mg total) by mouth 3 (three) times daily. 30 tablet 0    doxycycline (VIBRA-TABS) 100 MG tablet Take 1 tablet (100 mg total) by mouth 2 (two) times daily. 14 tablet 0    EPINEPHrine (EPIPEN) 0.3 mg/0.3 mL AtIn Inject 0.3 mLs (0.3 mg total) into the muscle as needed. 2 Device 1    famotidine (PEPCID) 20 MG tablet Take 20 mg by mouth 2 (two) times daily.      Lactobacillus acidophilus 10 billion cell Cap Take 1 capsule by mouth once daily.       lidocaine HCl 2% (LIDOCAINE VISCOUS) 2 % Soln Take 5 mLs by mouth as needed.       LORazepam (ATIVAN) 1 MG tablet Take 1 tablet (1 mg total) by mouth every 8 (eight) hours as needed for Anxiety. 15 tablet 0    metoclopramide HCl (REGLAN) 10 MG tablet       MULTIVIT-IRON-MIN-FOLIC ACID 3,500-18-0.4 UNIT-MG-MG ORAL CHEW Take 10 mg by mouth once daily.      nystatin (MYCOSTATIN) 100,000 unit/mL suspension       paroxetine (PAXIL) 20 MG tablet Take 20 mg by mouth every morning.      promethazine (PHENERGAN) 25 MG tablet Take 1 tablet (25 mg total) by mouth every 6 (six) hours as needed for Nausea. 15 tablet 0    sucralfate (CARAFATE) 1 gram tablet Take 1 g by mouth before meals and at bedtime as needed.          Review of patient's allergies indicates:   Allergen Reactions    Legumes Nausea And Vomiting and Swelling     Other reaction(s): GI Intolerance  vomiting  vomiting  Pt reports legumes make his lips swell and induce severe vomiting  Pt reports legumes make his lips swell and induce severe vomiting      Nsaids (non-steroidal anti-inflammatory drug)      GI bleed    Bean pod extract      Lips swelling, vomiting  Lips swelling, vomiting      Ketamine      Severe dysphoria (bad trip)    Lentils      Lips swelling, vomiting  Lips swelling, vomiting      Meloxicam Nausea Only     GI bleed     "Peas      Lips swelling, vomiting    Penicillins      Has taken third gen cephalosporins without issue per patient report    Haloperidol Anxiety and Other (See Comments)     "feels like crawling out of his skin"         Review of Systems:   Constitutional: no fever, chills or change in weight   Eyes: no visual changes   ENT: no sore throat or dysphagia  Respiratory: no cough or shortness of breath   Cardiovascular: no chest pain or palpitations   Gastrointestinal: as per HPI  Hematologic/Lymphatic: no easy bruising or lymphadenopathy   Musculoskeletal: no arthralgias or myalgias   Neurological: no change in mental status  Behavioral/Psych: no change in mood    Objective:     Vitals:    10/10/20 0351   BP:    Pulse:    Resp: 18   Temp:        General: Alert and Oriented, no distress  HEENT: Normocephalic, Atraumatic. No scleral icterus.  Resp: Good air entry bilaterally, no adventitious sounds  Cardiac: S1 and S2 normal  Abdomen: Normoactive bowel sounds. Non-distended. Normal tympany. Soft. Non-tender. No peritoneal signs.  Extremities: No peripheral edema.   Neurologic: No gross neurological Deficits  Psych: Anxious appearing, pressured speech    Significant Labs:  Recent Labs   Lab 10/09/20  1017 10/09/20  2202 10/10/20  0420   HGB 11.6* 12.2* 11.1*       Lab Results   Component Value Date    WBC 9.10 10/10/2020    HGB 11.1 (L) 10/10/2020    HCT 35.4 (L) 10/10/2020    MCV 83 10/10/2020     10/10/2020       Lab Results   Component Value Date     10/09/2020    K 3.2 (L) 10/09/2020     10/09/2020    CO2 23 10/09/2020    BUN 19 10/09/2020    CREATININE 1.1 10/09/2020    CALCIUM 9.0 10/09/2020    ANIONGAP 13 10/09/2020    ESTGFRAFRICA >60.0 10/09/2020    EGFRNONAA >60.0 10/09/2020       Lab Results   Component Value Date    ALT 36 10/09/2020    AST 20 10/09/2020    ALKPHOS 52 (L) 10/09/2020    BILITOT 1.2 (H) 10/09/2020       Lab Results   Component Value Date    INR 1.0 10/09/2020    INR 1.0 " 10/09/2020    INR 0.9 09/02/2020       Significant Imaging:  Reviewed pertinent radiology findings.       Assessment/Plan:     Solo Black is a 39 y.o. male with history of Nissen fundoplication, hypereosinophilic syndrome, EoE follows at [facility] presenting with abdominal pain, hematemesis and hematochezia for which GI is consulted.    Patient has had multiple endoscopies in the last few years for hematemesis and hematochezia - most recent EGD 8/31 with possible Candice-Person tear in distal esophagus and large amount of food in the stomach concerning for delayed gastric emptying; last colonoscopy 07/20/2020 with normal colon and TI and internal hemorrhoids.  Currently, hemodynamically stable.  Hemoglobin at baseline.    Problem List:  1. Anemia  2. Hematemesis  3. Hematochezia    Plan:  1. No plan for endoscopy at this time given multiple scopes in the past including very recent EGD and colonoscopy, stable hemoglobin is stable vitals.  2. Consider colorectal surgery consult for hemorrhoids if patient continues to have significant hematochezia.  Can also likely follow up as outpatient.  3. Can follow up with outside GI provider    Thank you for involving us in the care of Solo Black. Please call with any additional questions, concerns or changes in the patient's clinical status.    Yomi Nunez MD  Gastroenterology Fellow PGY IV   [facility]

## 2020-10-10 NOTE — NURSING
Notified Dr Mena that pt wants 2mg of dilaudid instead of the 1mg that is ordered. No new orders given at this time. Will continue to monitor.

## 2020-10-10 NOTE — ASSESSMENT & PLAN NOTE
Patient with diffuse abdominal pain worse with palpation. Has had episodes of hematochezia/melena.    He refers his abdominal pain started today,  after having discontinued his prednisone for his questionable Hypereosinophilic syndrome. Per Allergy, patient's eosinophil count on presentation(400) not high enough to justify this presentation as a flare.     -PRN Zofran, Phenergan for nausea  -IV dilaudid 2mg x1 for pain, then IV dilaudid 1mg PRN  -Continuing IV PPI

## 2020-10-10 NOTE — NURSING
"Manic behavior noted. HR in 120's. Power port not drawing back. Pt reports that home health had issues the last time. Pt request "TPA stuff " and to flush heparin to remove clot.  Will continue to monitor.   "

## 2020-10-10 NOTE — ANESTHESIA PROCEDURE NOTES
Peripheral IV Insertion    Diagnosis: I99.8 Other disorder of circulatory system and I87.9 Disorder of vein, unspecified.    Patient location during procedure: floor  Procedure start time: 10/10/2020 2:20 AM  Timeout: 10/10/2020 2:19 AM  Procedure end time: 10/10/2020 2:30 AM    Staffing  Authorizing Provider: Gabino Pedersen MD  Performing Provider: Gabino Pedersen MD      Preanesthetic Checklist  Completed: patient identified, site marked, surgical consent, pre-op evaluation, timeout performed, IV checked, risks and benefits discussed, monitors and equipment checked and anesthesia consent givenPeripheral IV Insertion  Skin Prep: chlorhexidine gluconate and isopropyl alcohol  Local Infiltration: lidocaine  Orientation: left  Location: brachial  Catheter Size: 18 G  Catheter placement by Ultrasound guidance. Heme positive aspiration all ports.  Vessel Caliber: medium, patent, compressibility normal  Vascular Doppler:  not done  Needle advanced into vessel with real time Ultrasound guidance.  Guidewire confirmed in vessel.  Sterile sheath used.Insertion Attempts: 1  Assessment  Dressing: secured with tape and tegaderm  Patient: Tolerated well  Line flushed easily.

## 2020-10-10 NOTE — PLAN OF CARE
PCP: Cecilia Tsai MD    Pharmacy [facility] [id number] [place] [id number]  [phone]    Payor: [place] [employer] ([phone])    SW contacted pt to complete d/c assessment. Pt alert and oriented. Pt very abrupt w/ SW stating that all his information in his chart is correct and that he sees doctor's multiple times a week bc he has cancer. Pt stated that he lives in home with his grandparents. Stated that he used no DME prior to admit. Denied dialysis and coumadin. Stated that he will have ride at d/c.   Pt stated he no longer wanted to talk due to nurse entering with medication.     No further needs at time of assessment.        10/10/20 1612   Discharge Assessment   Assessment Type Discharge Planning Assessment   Confirmed/corrected address and phone number on facesheet? Yes   Assessment information obtained from? Patient   Communicated expected length of stay with patient/caregiver no   Prior to hospitilization cognitive status: Alert/Oriented   Prior to hospitalization functional status: Independent   Current cognitive status: Alert/Oriented   Current Functional Status: Independent   Facility Arrived From: home   Lives With grandparent(s)   Able to Return to Prior Arrangements yes   Is patient able to care for self after discharge? Unable to determine at this time (comments)   Who are your caregiver(s) and their phone number(s)? Grandparent; Umm Camargo; [phone]   Patient's perception of discharge disposition home or selfcare   Readmission Within the Last 30 Days unable to assess   Patient currently being followed by outpatient case management? Unable to determine (comments)   Patient currently receives any other outside agency services? No   Equipment Currently Used at Home none   Do you have any problems affording any of your prescribed medications? No   Is the patient taking medications as prescribed? yes   Does the patient have transportation home? Yes   Transportation Anticipated family  or friend will provide   Dialysis Name and Scheduled days n/a   Does the patient receive services at the Coumadin Clinic? No   Discharge Plan A Home   Discharge Plan B Home with family   DME Needed Upon Discharge  none   Patient/Family in Agreement with Plan yes       Elizabeth Sierra LMSW  Case Management Social Worker   [facility]

## 2020-10-10 NOTE — NURSING
Pt at first refused to have PIV put in without US is now insisting to have a PIV. This nurse expressed not feeling confident that pt would be able to remain still in his current hyperactive state. PO anx, pain and itching medication given. Will continue to monitor.

## 2020-10-10 NOTE — H&P
[facility] Hospital Medicine  History & Physical    Patient Name: Solo Black  MRN: [id number]  Admission Date: 10/9/2020  Attending Physician: Pamela Boston MD   Primary Care Provider: Cecilia Tsai MD    [facility] Medicine Team: [facility] MED 3 Manuel Cardoza MD     Patient information was obtained from patient, past medical records and ER records.     Subjective:     Principal Problem:Hematemesis    Chief Complaint:   Chief Complaint   Patient presents with    multiple complaints     pt was sent here by onocolgy for fever, mouth ulcers, and blood in stool pt states adminitstered epi pen about an 1 hour ago for angioedema no resp distress noted        HPI: Solo Black is a 39 year old male with PMH of Hypereosinophilic Syndrome, eosinophilic esophagitis, GERD, IBS, asthma, who came to the ED for acute onset abdominal pain, chest discomfort, bloody stool, hematuria, rash, and facial swelling. He reports the symptoms started yesterday night. Patient normally on Prednisone, Benadryl, Atarax and Pepcid for control of his symptoms. He being followed by Allergy/Immunology who had instrurcted him to wean his Prednisone for a repeat check of eosinophils for confirmation of his HES, and a potential medication change to mepolizumab. He reports that he stopped all of his medications three days ago. Today after the onset of his symptoms, patient used his epi pen and resumed all of his home medications. Pt also reports 5 episodes of hematemesis since last night as well as melanotic stools.  Also refers associated fevers, chills, skin and oral ulcers.     Patient was seen by Hematology on 9/23/20, who performed a bone marrow biopsy on 10/1/20. Results were not significant for any myeloid or lymphoid neoplasm. Patient has also had multiple other evaluations that include a recent EGD done in 8/31 that showed scar tissue in the distal esophagus and signs of impaired gastric  emptying. Decision to admit patient to hospital medicine for further management.     Past Medical History:   Diagnosis Date    Arthritis     C. difficile colitis feb 2014    postop of hand surgery    Cancer     Encounter for blood transfusion     Eosinophilic esophagitis 3/11/2014    GERD (gastroesophageal reflux disease)     Hypereosinophilic syndrome     IBS (irritable bowel syndrome)     Leukemia     MRSA carrier     says multiple times nose swab was +    Nephrolithiasis apr 2014    bilateral punctate - never passed a stone    Urinary tract infection feb 2014    MRSA       Past Surgical History:   Procedure Laterality Date    APPENDECTOMY      ARTHROSCOPY OF SHOULDER WITH REMOVAL OF DISTAL CLAVICLE Right 11/1/2018    Procedure: ARTHROSCOPY, SHOULDER, WITH DISTAL CLAVICLE EXCISION;  Surgeon: Gabino Mejia MD;  Location: [facility];  Service: Orthopedics;  Laterality: Right;  video    BACK SURGERY      BLADDER FULGURATION N/A 9/18/2020    Procedure: FULGURATION, BLADDER;  Surgeon: Randall Moss MD;  Location: [facility] OR;  Service: Urology;  Laterality: N/A;  blood vessels of bladder neck and prostate    BONE MARROW BIOPSY Left 10/1/2020    Procedure: Biopsy-bone marrow;  Surgeon: Trung Polanco MD;  Location: [facility] OR;  Service: Oncology;  Laterality: Left;    CIRCUMCISION, PRIMARY      COLONOSCOPY N/A 11/14/2018    Procedure: COLONOSCOPY;  Surgeon: Milton Brunson MD;  Location: [facility];  Service: Endoscopy;  Laterality: N/A;    COLONOSCOPY N/A 7/20/2020    Procedure: COLONOSCOPY;  Surgeon: Ruslan Tillman MD;  Location: [facility];  Service: Endoscopy;  Laterality: N/A;    CYSTOSCOPY W/ RETROGRADES Bilateral 9/18/2020    Procedure: CYSTOSCOPY, WITH RETROGRADE PYELOGRAM;  Surgeon: Randall Moss MD;  Location: [facility] OR;  Service: Urology;  Laterality: Bilateral;    DILATION OF URETHRA N/A 9/18/2020    Procedure: DILATION, URETHRA;  Surgeon: Randall Moss MD;  Location: [facility]  OR;  Service: Urology;  Laterality: N/A;    drainage of abscess from hand  2009    MRSA    drainage of abscess from R thigh  2006    MRSA    ESOPHAGOGASTRODUODENOSCOPY  3/11/2014    ESOPHAGOGASTRODUODENOSCOPY N/A 9/5/2018    Procedure: EGD (ESOPHAGOGASTRODUODENOSCOPY);  Surgeon: Kolby Wall MD;  Location: [facility];  Service: Endoscopy;  Laterality: N/A;    ESOPHAGOGASTRODUODENOSCOPY N/A 3/25/2020    Procedure: EGD (ESOPHAGOGASTRODUODENOSCOPY);  Surgeon: Ruslan Tillman MD;  Location: [facility];  Service: Endoscopy;  Laterality: N/A;    ESOPHAGOGASTRODUODENOSCOPY N/A 7/20/2020    Procedure: EGD (ESOPHAGOGASTRODUODENOSCOPY);  Surgeon: Ruslan Tillman MD;  Location: [facility];  Service: Endoscopy;  Laterality: N/A;  Patient is a very hard stick, requires anesthesia stick.    ESOPHAGOGASTRODUODENOSCOPY N/A 8/31/2020    Procedure: EGD (ESOPHAGOGASTRODUODENOSCOPY);  Surgeon: Kolby Wall MD;  Location: [facility];  Service: Endoscopy;  Laterality: N/A;    FLEXIBLE SIGMOIDOSCOPY N/A 9/5/2018    Procedure: SIGMOIDOSCOPY, FLEXIBLE;  Surgeon: Kolby Wall MD;  Location: [facility];  Service: Endoscopy;  Laterality: N/A;    HAND SURGERY  jan 2014    power saw fell on hand - laceration 3 tendons    INSERTION OF VENOUS ACCESS PORT Right 8/21/2020    Procedure: INSERTION, VENOUS ACCESS PORT;  Surgeon: Troy Honeycutt MD;  Location: [facility];  Service: General;  Laterality: Right;    NISSEN FUNDOPLICATION         Review of patient's allergies indicates:   Allergen Reactions    Legumes Nausea And Vomiting and Swelling     Other reaction(s): GI Intolerance  vomiting  vomiting  Pt reports legumes make his lips swell and induce severe vomiting  Pt reports legumes make his lips swell and induce severe vomiting      Nsaids (non-steroidal anti-inflammatory drug)      GI bleed    Bean pod extract      Lips swelling, vomiting  Lips swelling, vomiting      Ketamine      Severe dysphoria  "(bad trip)    Lentils      Lips swelling, vomiting  Lips swelling, vomiting      Meloxicam Nausea Only     GI bleed    Peas      Lips swelling, vomiting    Penicillins      Has taken third gen cephalosporins without issue per patient report    Haloperidol Anxiety and Other (See Comments)     "feels like crawling out of his skin"         No current facility-administered medications on file prior to encounter.      Current Outpatient Medications on File Prior to Encounter   Medication Sig    acetaminophen (TYLENOL) 325 MG tablet Take 1 tablet (325 mg total) by mouth every 6 (six) hours as needed for Pain.    diphenhydrAMINE (BENADRYL) 50 MG capsule Take 50 mg by mouth every 6 (six) hours as needed for Itching.    gabapentin (NEURONTIN) 300 MG capsule Take 1 capsule (300 mg total) by mouth 3 (three) times daily.    HYDROcodone-acetaminophen (NORCO) 5-325 mg per tablet Take 1 tablet by mouth every 6 (six) hours as needed for Pain.    hydrOXYzine HCL (ATARAX) 25 MG tablet Take 1 tablet (25 mg total) by mouth 3 (three) times daily as needed for Itching.    oxyCODONE-acetaminophen (PERCOCET) 5-325 mg per tablet Take 1 tablet by mouth every 8 (eight) hours as needed.    predniSONE (DELTASONE) 10 MG tablet Take 2 tablets (20 mg total) by mouth once daily.    tamsulosin (FLOMAX) 0.4 mg Cap Take 1 capsule (0.4 mg total) by mouth 2 (two) times daily.    artificial tears (ISOPTO TEARS) 0.5 % ophthalmic solution Place 1 drop into both eyes 6 (six) times daily.    cetirizine (ZYRTEC) 10 MG tablet Take 20 mg by mouth 2 (two) times a day.     dextroamphetamine-amphetamine (ADDERALL) 20 mg tablet Take 1 tablet by mouth 2 (two) times daily before meals. Take before breakfast and lunch    dicyclomine (BENTYL) 20 mg tablet Take 1 tablet (20 mg total) by mouth 3 (three) times daily.    doxycycline (VIBRA-TABS) 100 MG tablet Take 1 tablet (100 mg total) by mouth 2 (two) times daily.    EPINEPHrine (EPIPEN) 0.3 mg/0.3 mL " AtIn Inject 0.3 mLs (0.3 mg total) into the muscle as needed.    famotidine (PEPCID) 20 MG tablet Take 20 mg by mouth 2 (two) times daily.    Lactobacillus acidophilus 10 billion cell Cap Take 1 capsule by mouth once daily.     lidocaine HCl 2% (LIDOCAINE VISCOUS) 2 % Soln Take 5 mLs by mouth as needed.     LORazepam (ATIVAN) 1 MG tablet Take 1 tablet (1 mg total) by mouth every 8 (eight) hours as needed for Anxiety.    metoclopramide HCl (REGLAN) 10 MG tablet     MULTIVIT-IRON-MIN-FOLIC ACID 3,500-18-0.4 UNIT-MG-MG ORAL CHEW Take 10 mg by mouth once daily.    nystatin (MYCOSTATIN) 100,000 unit/mL suspension     paroxetine (PAXIL) 20 MG tablet Take 20 mg by mouth every morning.    promethazine (PHENERGAN) 25 MG tablet Take 1 tablet (25 mg total) by mouth every 6 (six) hours as needed for Nausea.    sucralfate (CARAFATE) 1 gram tablet Take 1 g by mouth before meals and at bedtime as needed.      Family History     Problem Relation (Age of Onset)    Celiac disease Sister    Hypertension Maternal Grandmother, Maternal Grandfather    Urolithiasis Father        Tobacco Use    Smoking status: Never Smoker    Smokeless tobacco: Never Used    Tobacco comment: Pt states he has smoked 1 or 2 cigarettes in his life.   Substance and Sexual Activity    Alcohol use: Not Currently    Drug use: No    Sexual activity: Yes     Partners: Female     Review of Systems   Constitutional: Positive for chills, diaphoresis and fever. Negative for fatigue.   Respiratory: Positive for chest tightness. Negative for cough, shortness of breath and wheezing.    Cardiovascular: Positive for chest pain. Negative for palpitations and leg swelling.   Gastrointestinal: Positive for abdominal pain, anal bleeding, blood in stool, diarrhea, nausea and vomiting. Negative for constipation.   Genitourinary: Positive for hematuria. Negative for dysuria and flank pain.   Musculoskeletal: Positive for arthralgias and myalgias.   Neurological:  Positive for light-headedness. Negative for dizziness, syncope and headaches.   Psychiatric/Behavioral: Positive for agitation. The patient is nervous/anxious.      Objective:     Vital Signs (Most Recent):  Temp: 98.9 °F (37.2 °C) (10/09/20 0928)  Pulse: 110 (10/09/20 1532)  Resp: (!) 22 (10/09/20 1712)  BP: 136/82 (10/09/20 1532)  SpO2: 97 % (10/09/20 1532) Vital Signs (24h Range):  Temp:  [98.9 °F (37.2 °C)] 98.9 °F (37.2 °C)  Pulse:  [100-129] 110  Resp:  [13-25] 22  SpO2:  [95 %-99 %] 97 %  BP: (131-174)/(59-95) 136/82     Weight: 97.5 kg (215 lb)  Body mass index is 29.16 kg/m².    Physical Exam  Constitutional:       General: He is in acute distress.      Appearance: He is not ill-appearing.      Comments: Pt appears anxious   HENT:      Head: Normocephalic and atraumatic.      Mouth/Throat:      Mouth: Mucous membranes are moist.      Pharynx: Oropharynx is clear.      Comments: Oral ulcers at the corners of the mouth  Eyes:      Extraocular Movements: Extraocular movements intact.      Pupils: Pupils are equal, round, and reactive to light.   Neck:      Musculoskeletal: Normal range of motion and neck supple.   Cardiovascular:      Rate and Rhythm: Regular rhythm. Tachycardia present.      Heart sounds: No murmur. No friction rub. No gallop.    Pulmonary:      Effort: Pulmonary effort is normal.      Breath sounds: Normal breath sounds. No wheezing or rales.   Abdominal:      General: Abdomen is flat. There is no distension.      Palpations: Abdomen is soft.      Tenderness: There is abdominal tenderness (diffuse tenderness to deep palpation througout). There is no guarding.   Musculoskeletal: Normal range of motion.         General: No swelling, tenderness or deformity.   Lymphadenopathy:      Cervical: No cervical adenopathy.   Skin:     General: Skin is warm and dry.      Coloration: Skin is not pale.      Findings: Rash (Scattered urticarial rash to the chest and back with one larger 2 cm area noted to  the superior medial aspect of the back) present.   Neurological:      Mental Status: He is alert.           CRANIAL NERVES     CN III, IV, VI   Pupils are equal, round, and reactive to light.       Significant Labs:   CBC:   Recent Labs   Lab 10/09/20  1017   WBC 6.79   HGB 11.6*   HCT 37.1*        CMP:   Recent Labs   Lab 10/09/20  1017      K 3.2*      CO2 23   GLU 85   BUN 19   CREATININE 1.1   CALCIUM 9.0   PROT 6.6   ALBUMIN 3.8   BILITOT 1.2*   ALKPHOS 52*   AST 20   ALT 36   ANIONGAP 13   EGFRNONAA >60.0       Significant Imaging: I have reviewed all pertinent imaging results/findings within the past 24 hours.    Assessment/Plan:     * Hematemesis  Patient with multiple episodes of hematemesis. He is hemodynamically stable, Hgb at 11.6. Has had prior EGDs done, most recently 8/31, which showed scar tissue in distal esophagus and signs of delayed gastric emptying.   -IV PPI 80mg, followed by IV PPI BID  -NPO at midnight  -Gi consult      Urticarial rash  Patient refers urticarial rash started today, along the onset of his diffuse abdominal pain and episodes of hematemesis/melena. Used his EpiPen this am and took Prednisone, Benadryl, Hydroxizine to some improvement. Given IV Steroids and Benadryl in the ED.     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 Per Allergy, patient's eosinophil count today (400) not high enough to justify this presentation as a flare. There is the concern that his complaints are related to factitious disorder.  -Given IV Benadryl and steroids in ED as pt cannot tolerate PO  -Once patient able to tolerate PO, continue on home dose of prednisone, Benadryl and Hydroxyzine prn for rash  -Now on PRN IV Benadryl, IV solumedrol, and Hydroxizine.        Hypereosinophilic syndrome  Patient refers he had a confirmed diagnosis of hypereosinophilia syndrome at OSH in [place]. Is followed by Allergy  and Immunology here who have yet to confirm his diagnosis. Per last Allergy notes, patient was supposed to wean off of prednisone, to repeat CBC for confirmation. He refers he stopped all of his medications three days ago, prompting him to present with diffuse abdominal pain, multiple bouts of nausea/vomiting, diffuse urticaria, muscle pain, fevers.     CBC today with eosinophil count of 400. Spoke with Allergy today, who said his current eosinophils do not justify his current presentation. There is a concern that patient's presentation related to factitious disorder.       Abdominal pain  Patient with diffuse abdominal pain worse with palpation. Has had episodes of hematochezia/melena.    He refers his abdominal pain started today,  after having discontinued his prednisone for his questionable Hypereosinophilic syndrome. Per Allergy, patient's eosinophil count today (400) not high enough to justify this presentation as a flare. There is the concern that his complaints are related to factitious disorder.  -PRN Zofran, Phenergan for nausea  -IV dilaudid for pain  -GI consult         VTE Risk Mitigation (From admission, onward)         Ordered     IP VTE HIGH RISK PATIENT  Once      10/09/20 1627     Place sequential compression device  Until discontinued      10/09/20 1627                   Manuel Cardoza MD  Department of Hospital Medicine   [facility]

## 2020-10-10 NOTE — NURSING
Pt in hyperactive state not able to finish thoughts. HR in the 120-150's. Pt states Dr Boston was just in his room and promised to make changes to MAR. It is unclear to this nurse what changes the pt would like. Resident at bedside. Will continue to monitor.

## 2020-10-10 NOTE — PROGRESS NOTES
[facility] - ICU 14 [facility] Medicine  Progress Note    Patient Name: Solo Black  MRN: [id number]  Patient Class: OP- Observation   Admission Date: 10/9/2020  Length of Stay: 0 days  Attending Physician: Pamela Boston MD  Primary Care Provider: Cecilia Tsai MD        Subjective:     Principal Problem:Hematemesis        HPI:  Solo Black is a 39 year old male with PMH of Hypereosinophilic Syndrome, eosinophilic esophagitis, GERD, IBS, asthma, who came to the ED for acute onset abdominal pain, chest discomfort, bloody stool, hematuria, rash, and facial swelling. He reports the symptoms started yesterday night. Patient normally on Prednisone, Benadryl, Atarax and Pepcid for control of his symptoms. He being followed by Allergy/Immunology who had instrurcted him to wean his Prednisone for a repeat check of eosinophils for confirmation of his HES, and a potential medication change to mepolizumab. He reports that he stopped all of his medications three days ago. Today after the onset of his symptoms, patient used his epi pen and resumed all of his home medications. Pt also reports 5 episodes of hematemesis since last night as well as melanotic stools.  Also refers associated fevers, chills, skin and oral ulcers.     Patient was seen by Hematology on 9/23/20, who performed a bone marrow biopsy on 10/1/20. Results were not significant for any myeloid or lymphoid neoplasm. Patient has also had multiple other evaluations that include a recent EGD done in 8/31 that showed scar tissue in the distal esophagus and signs of impaired gastric emptying. Decision to admit patient to hospital medicine for further management.     Overview/Hospital Course:  [place] [facility] with mutliple bodily complaints including abdominal pain, muscle pain, hematemesis, hematuria,  melena. Patient very anxious, pacing around the room, occasionally yelling to nursing staff. Requesting IV Methylprednisone 125mg q6h,  IV dilaudid, IV phenergan, IV benadryl. Called on call fellow for Allergy, who happens to be his provider, who said that given his low eosinophil count (400 on presentation, 0 today) very unlikely his current clinical presentation an HES flare. Explained that his diagnosis of Hyereosinophilic Syndrome remains questionable, as has not reached the diagnostic cutoff. Also expressed concern that patient suffers from factitious disorders, as there have been reports of patient self-phlebotomizing (unable to confirm). Will treat patient with low dose IV methylpred with plans to wean him down to his home dose of prednisone 20mg.     Interval History: Patient very anxious, pacing around the room, occasionally yelling to nursing staff. When seen in the morning, referred no pain, no nausea or vomiting, no issues except feeling cold. When seen in the afternoon, patient clutching his abdomen in pain, seen very anxious, requesting an increase of his dilaudid to 2mg.  IV Methylprednisone 125mg q6h, IV dilaudid, IV phenergan, IV benadryl. NPO for potential EGD by GI     Review of Systems   Constitutional: Positive for chills and diaphoresis. Negative for fatigue and fever.   Respiratory: Positive for chest tightness. Negative for cough, shortness of breath and wheezing.    Cardiovascular: Positive for chest pain. Negative for palpitations and leg swelling.   Gastrointestinal: Positive for abdominal pain, anal bleeding, blood in stool, diarrhea, nausea and vomiting. Negative for constipation.   Genitourinary: Positive for hematuria. Negative for dysuria and flank pain.   Musculoskeletal: Positive for arthralgias and myalgias.   Neurological: Positive for light-headedness. Negative for dizziness, syncope and headaches.   Psychiatric/Behavioral: Positive for agitation. The patient is nervous/anxious.      Objective:     Vital Signs (Most Recent):  Temp: 97.9 °F (36.6 °C) (10/10/20 1200)  Pulse: 102 (10/10/20 1200)  Resp: 20 (10/10/20  1450)  BP: (!) 135/98 (10/10/20 1200)  SpO2: 99 % (10/10/20 1200) Vital Signs (24h Range):  Temp:  [97.8 °F (36.6 °C)-98.6 °F (37 °C)] 97.9 °F (36.6 °C)  Pulse:  [] 102  Resp:  [18-48] 20  SpO2:  [93 %-99 %] 99 %  BP: (135-153)/() 135/98     Weight: 97.5 kg (215 lb)  Body mass index is 29.16 kg/m².  No intake or output data in the 24 hours ending 10/10/20 1607   Physical Exam  Constitutional:       General: He is in acute distress.      Appearance: He is not ill-appearing.      Comments: Pt appears anxious   HENT:      Head: Normocephalic and atraumatic.      Mouth/Throat:      Mouth: Mucous membranes are moist.      Pharynx: Oropharynx is clear.      Comments: Oral ulcers at the corners of the mouth  Eyes:      Extraocular Movements: Extraocular movements intact.      Pupils: Pupils are equal, round, and reactive to light.   Neck:      Musculoskeletal: Normal range of motion and neck supple.   Cardiovascular:      Rate and Rhythm: Regular rhythm. Tachycardia present.      Heart sounds: No murmur. No friction rub. No gallop.    Pulmonary:      Effort: Pulmonary effort is normal.      Breath sounds: Normal breath sounds. No wheezing or rales.   Abdominal:      General: Abdomen is flat. There is no distension.      Palpations: Abdomen is soft.      Tenderness: There is abdominal tenderness (diffuse tenderness to deep palpation througout). There is no guarding.   Musculoskeletal: Normal range of motion.         General: No swelling, tenderness or deformity.   Lymphadenopathy:      Cervical: No cervical adenopathy.   Skin:     General: Skin is warm and dry.      Coloration: Skin is not pale.      Findings: Rash (Scattered urticarial rash to the chest and back with one larger 2 cm area noted to the superior medial aspect of the back) present.      Comments: uritcarial rash appears to be improving   Neurological:      Mental Status: He is alert.   Psychiatric:         Mood and Affect: Mood is anxious.          Speech: Speech is rapid and pressured and tangential.         Behavior: Behavior is agitated and hyperactive.         Significant Labs:   CBC:   Recent Labs   Lab 10/09/20  1017 10/09/20  2202 10/10/20  0420   WBC 6.79 11.50 9.10   HGB 11.6* 12.2* 11.1*   HCT 37.1* 38.0* 35.4*    263 256     CMP:   Recent Labs   Lab 10/09/20  1017 10/10/20  0420    139   K 3.2* 3.8    105   CO2 23 16*   GLU 85 118*   BUN 19 26*   CREATININE 1.1 1.3   CALCIUM 9.0 9.0   PROT 6.6 7.2   ALBUMIN 3.8 4.1   BILITOT 1.2* 2.0*   ALKPHOS 52* 51*   AST 20 26   ALT 36 33   ANIONGAP 13 18*   EGFRNONAA >60.0 >60.0       Significant Imaging: I have reviewed all pertinent imaging results/findings within the past 24 hours.      Assessment/Plan:      * Hematemesis  Patient with multiple episodes of hematemesis. He is hemodynamically stable, Hgb stable at 11.1. Has had multiple prior EGDs done, most recently 8/31, which showed scar tissue in distal esophagus and signs of delayed gastric emptying. Seen by GI today who indicated no need for intervention given his multiple scopes. Hemodynamically stable, Hgb 11-12 this admit (around baseline).  -continuing IV PPi  -trending CBC        Urticarial rash  Patient refers urticarial rash started today, along the onset of his diffuse abdominal pain and episodes of hematemesis/melena. Used his EpiPen this am and took Prednisone, Benadryl, Hydroxizine to some improvement. Given IV Steroids and Benadryl in the ED.     Urticarial rash improving today.  -Once patient able to tolerate PO, continue on home dose of prednisone, Benadryl and Hydroxyzine prn for rash  -Now on PRN IV Benadryl, IV solumedrol, IV famotidine and Hydroxizine.  -Will taper solumedrol as tolerated      Chest pain        Hypereosinophilic syndrome  Patient refers he had a confirmed diagnosis of hypereosinophilia syndrome at OSH in [place]. Is followed by Allergy and Immunology here who have yet to confirm his diagnosis. Per  last Allergy notes, patient was supposed to wean off of prednisone, to repeat CBC for confirmation. He refers he stopped all of his medications three days ago, prompting him to present with diffuse abdominal pain, multiple bouts of nausea/vomiting, diffuse urticaria, muscle pain, fevers.     CBC with eosinophil count of 400 on admit. Today at 0. Spoke with Allergy yesterday, who said his current eosinophils do not justify his current presentation. There is a concern that patient's presentation related to factitious disorder.       Abdominal pain  Patient with diffuse abdominal pain worse with palpation. Has had episodes of hematochezia/melena.    He refers his abdominal pain started today,  after having discontinued his prednisone for his questionable Hypereosinophilic syndrome. Per Allergy, patient's eosinophil count on presentation(400) not high enough to justify this presentation as a flare.     -PRN Zofran, Phenergan for nausea  -IV dilaudid 2mg x1 for pain, then IV dilaudid 1mg PRN  -Continuing IV PPI         VTE Risk Mitigation (From admission, onward)         Ordered     IP VTE HIGH RISK PATIENT  Once      10/09/20 1627     Place sequential compression device  Until discontinued      10/09/20 1627                Discharge Planning   LIUDMILA:      Code Status: Full Code   Is the patient medically ready for discharge?:     Reason for patient still in hospital (select all that apply): Treatment                     Manuel Cardoza MD  Department of Hospital Medicine   [facility] - ICU 14 [facility]

## 2020-10-10 NOTE — ASSESSMENT & PLAN NOTE
Patient refers he had a confirmed diagnosis of hypereosinophilia syndrome at OS in [place]. Is followed by Allergy and Immunology here who have yet to confirm his diagnosis. Per last Allergy notes, patient was supposed to wean off of prednisone, to repeat CBC for confirmation. He refers he stopped all of his medications three days ago, prompting him to present with diffuse abdominal pain, multiple bouts of nausea/vomiting, diffuse urticaria, muscle pain, fevers.     CBC with eosinophil count of 400 on admit. Today at 0. Spoke with Allergy yesterday, who said his current eosinophils do not justify his current presentation. There is a concern that patient's presentation related to factitious disorder.

## 2020-10-10 NOTE — PROGRESS NOTES
Notified team that Pt would like to see a member from the team. Pt is upset about his pain regimen. He would like his pain meds to be increased and more often. Pt also, wants to know when his port to right chest wall, the one he pulled out, is going to be US. MD to come to bedside to speak with Pt.

## 2020-10-11 LAB
BASOPHILS # BLD AUTO: 0.01 K/UL (ref 0–0.2)
BASOPHILS NFR BLD: 0.1 % (ref 0–1.9)
DIFFERENTIAL METHOD: ABNORMAL
EOSINOPHIL # BLD AUTO: 0 K/UL (ref 0–0.5)
EOSINOPHIL NFR BLD: 0 % (ref 0–8)
ERYTHROCYTE [DISTWIDTH] IN BLOOD BY AUTOMATED COUNT: 19.4 % (ref 11.5–14.5)
HCT VFR BLD AUTO: 33.5 % (ref 40–54)
HGB BLD-MCNC: 10.5 G/DL (ref 14–18)
IMM GRANULOCYTES # BLD AUTO: 0.01 K/UL (ref 0–0.04)
IMM GRANULOCYTES NFR BLD AUTO: 0.1 % (ref 0–0.5)
LYMPHOCYTES # BLD AUTO: 0.3 K/UL (ref 1–4.8)
LYMPHOCYTES NFR BLD: 3.6 % (ref 18–48)
MCH RBC QN AUTO: 26.8 PG (ref 27–31)
MCHC RBC AUTO-ENTMCNC: 31.3 G/DL (ref 32–36)
MCV RBC AUTO: 86 FL (ref 82–98)
MONOCYTES # BLD AUTO: 0.3 K/UL (ref 0.3–1)
MONOCYTES NFR BLD: 4.5 % (ref 4–15)
NEUTROPHILS # BLD AUTO: 6.3 K/UL (ref 1.8–7.7)
NEUTROPHILS NFR BLD: 91.7 % (ref 38–73)
NRBC BLD-RTO: 0 /100 WBC
PLATELET # BLD AUTO: 174 K/UL (ref 150–350)
PMV BLD AUTO: 11.7 FL (ref 9.2–12.9)
RBC # BLD AUTO: 3.92 M/UL (ref 4.6–6.2)
WBC # BLD AUTO: 6.92 K/UL (ref 3.9–12.7)

## 2020-10-11 PROCEDURE — G0378 HOSPITAL OBSERVATION PER HR: HCPCS

## 2020-10-11 PROCEDURE — 94760 N-INVAS EAR/PLS OXIMETRY 1: CPT

## 2020-10-11 PROCEDURE — 99225 PR SUBSEQUENT OBSERVATION CARE,LEVEL II: ICD-10-PCS | Mod: ,,, | Performed by: INTERNAL MEDICINE

## 2020-10-11 PROCEDURE — 94640 AIRWAY INHALATION TREATMENT: CPT

## 2020-10-11 PROCEDURE — 36415 COLL VENOUS BLD VENIPUNCTURE: CPT

## 2020-10-11 PROCEDURE — 96376 TX/PRO/DX INJ SAME DRUG ADON: CPT

## 2020-10-11 PROCEDURE — S0028 INJECTION, FAMOTIDINE, 20 MG: HCPCS | Performed by: STUDENT IN AN ORGANIZED HEALTH CARE EDUCATION/TRAINING PROGRAM

## 2020-10-11 PROCEDURE — 63600175 PHARM REV CODE 636 W HCPCS: Performed by: STUDENT IN AN ORGANIZED HEALTH CARE EDUCATION/TRAINING PROGRAM

## 2020-10-11 PROCEDURE — 85025 COMPLETE CBC W/AUTO DIFF WBC: CPT

## 2020-10-11 PROCEDURE — 99225 PR SUBSEQUENT OBSERVATION CARE,LEVEL II: CPT | Mod: ,,, | Performed by: INTERNAL MEDICINE

## 2020-10-11 PROCEDURE — 25000003 PHARM REV CODE 250: Performed by: STUDENT IN AN ORGANIZED HEALTH CARE EDUCATION/TRAINING PROGRAM

## 2020-10-11 PROCEDURE — C9113 INJ PANTOPRAZOLE SODIUM, VIA: HCPCS | Performed by: STUDENT IN AN ORGANIZED HEALTH CARE EDUCATION/TRAINING PROGRAM

## 2020-10-11 RX ORDER — HYDROMORPHONE HYDROCHLORIDE 1 MG/ML
0.5 INJECTION, SOLUTION INTRAMUSCULAR; INTRAVENOUS; SUBCUTANEOUS EVERY 4 HOURS PRN
Status: DISCONTINUED | OUTPATIENT
Start: 2020-10-11 | End: 2020-10-12

## 2020-10-11 RX ORDER — PREDNISONE 20 MG/1
40 TABLET ORAL DAILY
Status: DISCONTINUED | OUTPATIENT
Start: 2020-10-11 | End: 2020-10-12

## 2020-10-11 RX ORDER — FAMOTIDINE 20 MG/1
20 TABLET, FILM COATED ORAL 2 TIMES DAILY
Status: DISCONTINUED | OUTPATIENT
Start: 2020-10-11 | End: 2020-10-13 | Stop reason: HOSPADM

## 2020-10-11 RX ORDER — DIPHENHYDRAMINE HCL 50 MG
50 CAPSULE ORAL EVERY 6 HOURS PRN
Status: DISCONTINUED | OUTPATIENT
Start: 2020-10-11 | End: 2020-10-13 | Stop reason: HOSPADM

## 2020-10-11 RX ADMIN — GABAPENTIN 300 MG: 300 CAPSULE ORAL at 08:10

## 2020-10-11 RX ADMIN — TAMSULOSIN HYDROCHLORIDE 0.4 MG: 0.4 CAPSULE ORAL at 08:10

## 2020-10-11 RX ADMIN — ALTEPLASE 2 MG: 2.2 INJECTION, POWDER, LYOPHILIZED, FOR SOLUTION INTRAVENOUS at 12:10

## 2020-10-11 RX ADMIN — GABAPENTIN 300 MG: 300 CAPSULE ORAL at 04:10

## 2020-10-11 RX ADMIN — FLUTICASONE PROPIONATE 1 PUFF: 44 AEROSOL, METERED RESPIRATORY (INHALATION) at 07:10

## 2020-10-11 RX ADMIN — HYDROCODONE BITARTRATE AND ACETAMINOPHEN 1 TABLET: 5; 325 TABLET ORAL at 11:10

## 2020-10-11 RX ADMIN — HYDROMORPHONE HYDROCHLORIDE 1 MG: 1 INJECTION, SOLUTION INTRAMUSCULAR; INTRAVENOUS; SUBCUTANEOUS at 03:10

## 2020-10-11 RX ADMIN — HYDROMORPHONE HYDROCHLORIDE 0.5 MG: 1 INJECTION, SOLUTION INTRAMUSCULAR; INTRAVENOUS; SUBCUTANEOUS at 08:10

## 2020-10-11 RX ADMIN — DIPHENHYDRAMINE HYDROCHLORIDE 50 MG: 50 CAPSULE ORAL at 07:10

## 2020-10-11 RX ADMIN — FLUTICASONE PROPIONATE 1 PUFF: 44 AEROSOL, METERED RESPIRATORY (INHALATION) at 08:10

## 2020-10-11 RX ADMIN — DIPHENHYDRAMINE HYDROCHLORIDE 25 MG: 50 INJECTION, SOLUTION INTRAMUSCULAR; INTRAVENOUS at 03:10

## 2020-10-11 RX ADMIN — ONDANSETRON 4 MG: 2 INJECTION INTRAMUSCULAR; INTRAVENOUS at 07:10

## 2020-10-11 RX ADMIN — PANTOPRAZOLE SODIUM 40 MG: 40 INJECTION, POWDER, LYOPHILIZED, FOR SOLUTION INTRAVENOUS at 10:10

## 2020-10-11 RX ADMIN — HYDROMORPHONE HYDROCHLORIDE 1 MG: 1 INJECTION, SOLUTION INTRAMUSCULAR; INTRAVENOUS; SUBCUTANEOUS at 07:10

## 2020-10-11 RX ADMIN — DIPHENHYDRAMINE HYDROCHLORIDE 25 MG: 50 INJECTION, SOLUTION INTRAMUSCULAR; INTRAVENOUS at 11:10

## 2020-10-11 RX ADMIN — HYDROMORPHONE HYDROCHLORIDE 1 MG: 1 INJECTION, SOLUTION INTRAMUSCULAR; INTRAVENOUS; SUBCUTANEOUS at 12:10

## 2020-10-11 RX ADMIN — PANTOPRAZOLE SODIUM 40 MG: 40 INJECTION, POWDER, LYOPHILIZED, FOR SOLUTION INTRAVENOUS at 08:10

## 2020-10-11 RX ADMIN — HYDROCODONE BITARTRATE AND ACETAMINOPHEN 1 TABLET: 5; 325 TABLET ORAL at 07:10

## 2020-10-11 RX ADMIN — LORAZEPAM 1 MG: 0.5 TABLET ORAL at 08:10

## 2020-10-11 RX ADMIN — PAROXETINE HYDROCHLORIDE 20 MG: 20 TABLET, FILM COATED ORAL at 10:10

## 2020-10-11 RX ADMIN — HYDROMORPHONE HYDROCHLORIDE 0.5 MG: 1 INJECTION, SOLUTION INTRAMUSCULAR; INTRAVENOUS; SUBCUTANEOUS at 04:10

## 2020-10-11 RX ADMIN — METHYLPREDNISOLONE SODIUM SUCCINATE 48 MG: 40 INJECTION, POWDER, FOR SOLUTION INTRAMUSCULAR; INTRAVENOUS at 10:10

## 2020-10-11 RX ADMIN — FAMOTIDINE 20 MG: 10 INJECTION INTRAVENOUS at 10:10

## 2020-10-11 RX ADMIN — PROMETHAZINE HYDROCHLORIDE 6.25 MG: 25 INJECTION INTRAMUSCULAR; INTRAVENOUS at 05:10

## 2020-10-11 RX ADMIN — FAMOTIDINE 20 MG: 20 TABLET, FILM COATED ORAL at 08:10

## 2020-10-11 NOTE — PLAN OF CARE
Aaox3. Refusing lab draws, refusing to wear telemetry monitor. Constantly complaints of pain. Requesting increase frequency in pain meds. Convince he has a rare form of cancer with only 2 months to live. Educated on pain management and frequency. Encouraged to follow treatment plan.

## 2020-10-11 NOTE — PROGRESS NOTES
[facility] - ICU 14 [facility] Medicine  Progress Note    Patient Name: Solo Black  MRN: [id number]  Patient Class: OP- Observation   Admission Date: 10/9/2020  Length of Stay: 0 days  Attending Physician: Pamela Boston MD  Primary Care Provider: Cecilia Tsai MD        Subjective:     Principal Problem:Hematemesis        HPI:  Solo Black is a 39 year old male with PMH of Hypereosinophilic Syndrome, eosinophilic esophagitis, GERD, IBS, asthma, who came to the ED for acute onset abdominal pain, chest discomfort, bloody stool, hematuria, rash, and facial swelling. He reports the symptoms started yesterday night. Patient normally on Prednisone, Benadryl, Atarax and Pepcid for control of his symptoms. He being followed by Allergy/Immunology who had instrurcted him to wean his Prednisone for a repeat check of eosinophils for confirmation of his HES, and a potential medication change to mepolizumab. He reports that he stopped all of his medications three days ago. Today after the onset of his symptoms, patient used his epi pen and resumed all of his home medications. Pt also reports 5 episodes of hematemesis since last night as well as melanotic stools.  Also refers associated fevers, chills, skin and oral ulcers.     Patient was seen by Hematology on 9/23/20, who performed a bone marrow biopsy on 10/1/20. Results were not significant for any myeloid or lymphoid neoplasm. Patient has also had multiple other evaluations that include a recent EGD done in 8/31 that showed scar tissue in the distal esophagus and signs of impaired gastric emptying. Decision to admit patient to hospital medicine for further management.     Overview/Hospital Course:  At [facility] [place] ED patient with mutliple bodily complaints including abdominal pain, muscle pain, hematemesis, hematuria,  melena. Patient very anxious, pacing around the room, occasionally yelling to nursing staff. Requesting IV Methylprednisone 125mg q6h,  IV dilaudid, IV phenergan, IV benadryl. Called on call fellow for Allergy, who happens to be his provider, who said that given his low eosinophil count (400 on presentation, 0 today) very unlikely his current clinical presentation an HES flare. Explained that his diagnosis of Hyereosinophilic Syndrome remains questionable, as has not reached the diagnostic cutoff. Also expressed concern that patient suffers from factitious disorders, as there have been reports of patient self-phlebotomizing (unable to confirm). Overnight patient very agitated again, requesting medication changes. Per nursing reports, patient removed his chest port. When patient seen, he had no complaints of pain, nausea, vomiting, hematemesis, melena. Said his port was fine. Will treat patient with low dose IV methylpred with plans to wean him down to his home dose of prednisone 20mg. Today patient refusing lab draws, very anxious and concerned about his port. Requesting an ultrasound of the port. Otherwise refers feeling much better regarding his pain and rash. Aggreable about weaning down medications for a potential discharge tomorrow. Port to be accessed, cathflow ordered.     Interval History: Today patient refusing lab draws, very anxious and concerned about his port. Requesting an ultrasound of the port. Otherwise refers feeling much better regarding his pain and rash. Aggreable about weaning down medications for a potential discharge tomorrow. Port to be accessed, cathflow ordered.    Review of Systems   Constitutional: Negative for chills, diaphoresis, fatigue and fever.   Respiratory: Negative for cough, chest tightness, shortness of breath and wheezing.    Cardiovascular: Negative for chest pain, palpitations and leg swelling.   Gastrointestinal: Positive for diarrhea and nausea. Negative for abdominal pain, anal bleeding, blood in stool, constipation and vomiting.   Genitourinary: Negative for dysuria, flank pain and hematuria.    Musculoskeletal: Positive for arthralgias and myalgias.   Neurological: Negative for dizziness, syncope, light-headedness and headaches.   Psychiatric/Behavioral: Positive for agitation. The patient is nervous/anxious.      Objective:     Vital Signs (Most Recent):  Temp: 98.1 °F (36.7 °C) (10/11/20 0800)  Pulse: 90 (10/11/20 0800)  Resp: 14 (10/11/20 1220)  BP: (!) 142/94 (10/11/20 0800)  SpO2: 98 % (10/11/20 0738) Vital Signs (24h Range):  Temp:  [97.8 °F (36.6 °C)-98.2 °F (36.8 °C)] 98.1 °F (36.7 °C)  Pulse:  [80-98] 90  Resp:  [14-20] 14  SpO2:  [98 %-99 %] 98 %  BP: (135-148)/(65-97) 142/94     Weight: 97.5 kg (215 lb)  Body mass index is 29.16 kg/m².  No intake or output data in the 24 hours ending 10/11/20 1429   Physical Exam  Constitutional:       General: He is not in acute distress.     Appearance: He is not ill-appearing.      Comments: Pt appears anxious   HENT:      Head: Normocephalic and atraumatic.      Mouth/Throat:      Mouth: Mucous membranes are moist.      Pharynx: Oropharynx is clear.      Comments: Oral ulcers at the corners of the mouth  Eyes:      Extraocular Movements: Extraocular movements intact.      Pupils: Pupils are equal, round, and reactive to light.   Neck:      Musculoskeletal: Normal range of motion and neck supple.   Cardiovascular:      Rate and Rhythm: Regular rhythm. Tachycardia present.      Heart sounds: No murmur. No friction rub. No gallop.    Pulmonary:      Effort: Pulmonary effort is normal.      Breath sounds: Normal breath sounds. No wheezing or rales.   Abdominal:      General: Abdomen is flat. There is no distension.      Palpations: Abdomen is soft.      Tenderness: There is no abdominal tenderness. There is no guarding.   Musculoskeletal: Normal range of motion.         General: No swelling, tenderness or deformity.   Lymphadenopathy:      Cervical: No cervical adenopathy.   Skin:     General: Skin is warm and dry.      Coloration: Skin is not pale.       Findings: Rash (Scattered urticarial rash to the chest and back with one larger 2 cm area noted to the superior medial aspect of the back) present.      Comments: uritcarial rash appears to be improving   Neurological:      Mental Status: He is alert.   Psychiatric:         Mood and Affect: Mood is anxious.         Speech: Speech is rapid and pressured and tangential.         Behavior: Behavior is agitated and hyperactive.         Significant Labs:   CBC:   Recent Labs   Lab 10/09/20  2202 10/10/20  0420   WBC 11.50 9.10   HGB 12.2* 11.1*   HCT 38.0* 35.4*    256     CMP:   Recent Labs   Lab 10/10/20  0420      K 3.8      CO2 16*   *   BUN 26*   CREATININE 1.3   CALCIUM 9.0   PROT 7.2   ALBUMIN 4.1   BILITOT 2.0*   ALKPHOS 51*   AST 26   ALT 33   ANIONGAP 18*   EGFRNONAA >60.0       Significant Imaging: I have reviewed all pertinent imaging results/findings within the past 24 hours.      Assessment/Plan:      * Hematemesis  Patient with multiple episodes of hematemesis. He is hemodynamically stable, Hgb stable at 11.1 as of yesterday. No labs today as patient denying lab draws. Has had multiple prior EGDs done, most recently 8/31, which showed scar tissue in distal esophagus and signs of delayed gastric emptying. Seen by GI today who indicated no need for intervention given his multiple scopes. Hemodynamically stable, Hgb 11-12 this admit (around baseline).  -continuing IV PPi  -trending CBC      Urticarial rash  Patient's rash markedly improved. Refers itching is much controlled now.   -switiching IV solumedrol to PO prednisone 40mg  -PO Benadryl, PO Famotidine, PO Atarax PRN for itching      Hypereosinophilic syndrome  Patient refers he had a confirmed diagnosis of hypereosinophilia syndrome at OSH in [place]. Is followed by Allergy and Immunology here who have yet to confirm his diagnosis. Per last Allergy notes, patient was supposed to wean off of prednisone, to repeat CBC for  confirmation. He refers he stopped all of his medications three days ago, prompting him to present with diffuse abdominal pain, multiple bouts of nausea/vomiting, diffuse urticaria, muscle pain, fevers.     CBC with eosinophil count of 400 on admit.  Spoke with Allergy , who said his current eosinophils do not justify his current presentation.     Abdominal pain  Patient now with marked improvement in abdominal pain. Will wean off medications as tolerated  -PRN Zofran, Phenergan for nausea  -IV dilaudid 0.5mg q4PRN, Percocet q6PRN             VTE Risk Mitigation (From admission, onward)         Ordered     IP VTE HIGH RISK PATIENT  Once      10/09/20 1627     Place sequential compression device  Until discontinued      10/09/20 1627                Discharge Planning   LIUDMILA: 10/12/2020     Code Status: Full Code   Is the patient medically ready for discharge?:     Reason for patient still in hospital (select all that apply): Treatment  Discharge Plan A: Home                  Manuel Cardoza MD  Department of Hospital Medicine   [facility] - ICU 14 [facility]

## 2020-10-11 NOTE — SUBJECTIVE & OBJECTIVE
Interval History: Today patient refusing lab draws, very anxious and concerned about his port. Requesting an ultrasound of the port. Otherwise refers feeling much better regarding his pain and rash. Aggreable about weaning down medications for a potential discharge tomorrow. Port to be accessed, cathflow ordered.    Review of Systems   Constitutional: Negative for chills, diaphoresis, fatigue and fever.   Respiratory: Negative for cough, chest tightness, shortness of breath and wheezing.    Cardiovascular: Negative for chest pain, palpitations and leg swelling.   Gastrointestinal: Positive for diarrhea and nausea. Negative for abdominal pain, anal bleeding, blood in stool, constipation and vomiting.   Genitourinary: Negative for dysuria, flank pain and hematuria.   Musculoskeletal: Positive for arthralgias and myalgias.   Neurological: Negative for dizziness, syncope, light-headedness and headaches.   Psychiatric/Behavioral: Positive for agitation. The patient is nervous/anxious.      Objective:     Vital Signs (Most Recent):  Temp: 98.1 °F (36.7 °C) (10/11/20 0800)  Pulse: 90 (10/11/20 0800)  Resp: 14 (10/11/20 1220)  BP: (!) 142/94 (10/11/20 0800)  SpO2: 98 % (10/11/20 0738) Vital Signs (24h Range):  Temp:  [97.8 °F (36.6 °C)-98.2 °F (36.8 °C)] 98.1 °F (36.7 °C)  Pulse:  [80-98] 90  Resp:  [14-20] 14  SpO2:  [98 %-99 %] 98 %  BP: (135-148)/(65-97) 142/94     Weight: 97.5 kg (215 lb)  Body mass index is 29.16 kg/m².  No intake or output data in the 24 hours ending 10/11/20 1429   Physical Exam  Constitutional:       General: He is not in acute distress.     Appearance: He is not ill-appearing.      Comments: Pt appears anxious   HENT:      Head: Normocephalic and atraumatic.      Mouth/Throat:      Mouth: Mucous membranes are moist.      Pharynx: Oropharynx is clear.      Comments: Oral ulcers at the corners of the mouth  Eyes:      Extraocular Movements: Extraocular movements intact.      Pupils: Pupils are equal,  round, and reactive to light.   Neck:      Musculoskeletal: Normal range of motion and neck supple.   Cardiovascular:      Rate and Rhythm: Regular rhythm. Tachycardia present.      Heart sounds: No murmur. No friction rub. No gallop.    Pulmonary:      Effort: Pulmonary effort is normal.      Breath sounds: Normal breath sounds. No wheezing or rales.   Abdominal:      General: Abdomen is flat. There is no distension.      Palpations: Abdomen is soft.      Tenderness: There is no abdominal tenderness. There is no guarding.   Musculoskeletal: Normal range of motion.         General: No swelling, tenderness or deformity.   Lymphadenopathy:      Cervical: No cervical adenopathy.   Skin:     General: Skin is warm and dry.      Coloration: Skin is not pale.      Findings: Rash (Scattered urticarial rash to the chest and back with one larger 2 cm area noted to the superior medial aspect of the back) present.      Comments: uritcarial rash appears to be improving   Neurological:      Mental Status: He is alert.   Psychiatric:         Mood and Affect: Mood is anxious.         Speech: Speech is rapid and pressured and tangential.         Behavior: Behavior is agitated and hyperactive.         Significant Labs:   CBC:   Recent Labs   Lab 10/09/20  2202 10/10/20  0420   WBC 11.50 9.10   HGB 12.2* 11.1*   HCT 38.0* 35.4*    256     CMP:   Recent Labs   Lab 10/10/20  0420      K 3.8      CO2 16*   *   BUN 26*   CREATININE 1.3   CALCIUM 9.0   PROT 7.2   ALBUMIN 4.1   BILITOT 2.0*   ALKPHOS 51*   AST 26   ALT 33   ANIONGAP 18*   EGFRNONAA >60.0       Significant Imaging: I have reviewed all pertinent imaging results/findings within the past 24 hours.

## 2020-10-11 NOTE — ASSESSMENT & PLAN NOTE
Patient now with marked improvement in abdominal pain. Will wean off medications as tolerated  -PRN Zofran, Phenergan for nausea  -IV dilaudid 0.5mg q4PRN, Percocet q6PRN

## 2020-10-11 NOTE — NURSING
Pt insisting that his right sided chest port be re-accessed.  The pt himself de-accessed his port last night shift stating the needle fell out.  The pt has a peripheral IV to the left arm that works well.  He does not want to be stuck for labs but I reminded him that his port does not draw back and he insisted it was malpractice and against the law to leave him with a broken port.  I assured him that his port is not broken.  It has not been re-assessed because he took his own cartwright needle out of his port and claimed it fell out.  Paged team on call and awaiting call back for advise on moving forward in relation to his port.

## 2020-10-11 NOTE — ASSESSMENT & PLAN NOTE
Patient refers he had a confirmed diagnosis of hypereosinophilia syndrome at [facility] in [place]. Is followed by Allergy and Immunology here who have yet to confirm his diagnosis. Per last Allergy notes, patient was supposed to wean off of prednisone, to repeat CBC for confirmation. He refers he stopped all of his medications three days ago, prompting him to present with diffuse abdominal pain, multiple bouts of nausea/vomiting, diffuse urticaria, muscle pain, fevers.     CBC with eosinophil count of 400 on admit.  Spoke with Allergy , who said his current eosinophils do not justify his current presentation.

## 2020-10-11 NOTE — ASSESSMENT & PLAN NOTE
Patient's rash markedly improved. Refers itching is much controlled now.   -switiching IV solumedrol to PO prednisone 40mg  -PO Benadryl, PO Famotidine, PO Atarax PRN for itching

## 2020-10-12 ENCOUNTER — PATIENT MESSAGE (OUTPATIENT)
Dept: UROLOGY | Facility: CLINIC | Age: 40
End: 2020-10-12

## 2020-10-12 LAB
BASOPHILS # BLD AUTO: 0.01 K/UL (ref 0–0.2)
BASOPHILS NFR BLD: 0.1 % (ref 0–1.9)
DIFFERENTIAL METHOD: ABNORMAL
EOSINOPHIL # BLD AUTO: 0 K/UL (ref 0–0.5)
EOSINOPHIL NFR BLD: 0.5 % (ref 0–8)
ERYTHROCYTE [DISTWIDTH] IN BLOOD BY AUTOMATED COUNT: 19.6 % (ref 11.5–14.5)
HCT VFR BLD AUTO: 34.4 % (ref 40–54)
HGB BLD-MCNC: 11 G/DL (ref 14–18)
IMM GRANULOCYTES # BLD AUTO: 0.04 K/UL (ref 0–0.04)
IMM GRANULOCYTES NFR BLD AUTO: 0.5 % (ref 0–0.5)
LYMPHOCYTES # BLD AUTO: 1.1 K/UL (ref 1–4.8)
LYMPHOCYTES NFR BLD: 12.3 % (ref 18–48)
MCH RBC QN AUTO: 26.6 PG (ref 27–31)
MCHC RBC AUTO-ENTMCNC: 32 G/DL (ref 32–36)
MCV RBC AUTO: 83 FL (ref 82–98)
MONOCYTES # BLD AUTO: 1.1 K/UL (ref 0.3–1)
MONOCYTES NFR BLD: 12.9 % (ref 4–15)
NEUTROPHILS # BLD AUTO: 6.3 K/UL (ref 1.8–7.7)
NEUTROPHILS NFR BLD: 73.7 % (ref 38–73)
NRBC BLD-RTO: 0 /100 WBC
PLATELET # BLD AUTO: 218 K/UL (ref 150–350)
PMV BLD AUTO: 11.3 FL (ref 9.2–12.9)
RBC # BLD AUTO: 4.13 M/UL (ref 4.6–6.2)
WBC # BLD AUTO: 8.56 K/UL (ref 3.9–12.7)

## 2020-10-12 PROCEDURE — 99226 PR SUBSEQUENT OBSERVATION CARE,LEVEL III: ICD-10-PCS | Mod: ,,, | Performed by: HOSPITALIST

## 2020-10-12 PROCEDURE — 25000003 PHARM REV CODE 250: Performed by: STUDENT IN AN ORGANIZED HEALTH CARE EDUCATION/TRAINING PROGRAM

## 2020-10-12 PROCEDURE — 99900035 HC TECH TIME PER 15 MIN (STAT)

## 2020-10-12 PROCEDURE — G0378 HOSPITAL OBSERVATION PER HR: HCPCS

## 2020-10-12 PROCEDURE — 94761 N-INVAS EAR/PLS OXIMETRY MLT: CPT

## 2020-10-12 PROCEDURE — C9113 INJ PANTOPRAZOLE SODIUM, VIA: HCPCS | Performed by: STUDENT IN AN ORGANIZED HEALTH CARE EDUCATION/TRAINING PROGRAM

## 2020-10-12 PROCEDURE — 94640 AIRWAY INHALATION TREATMENT: CPT

## 2020-10-12 PROCEDURE — 36415 COLL VENOUS BLD VENIPUNCTURE: CPT

## 2020-10-12 PROCEDURE — 63600175 PHARM REV CODE 636 W HCPCS: Performed by: HOSPITALIST

## 2020-10-12 PROCEDURE — 85025 COMPLETE CBC W/AUTO DIFF WBC: CPT

## 2020-10-12 PROCEDURE — 63600175 PHARM REV CODE 636 W HCPCS: Performed by: STUDENT IN AN ORGANIZED HEALTH CARE EDUCATION/TRAINING PROGRAM

## 2020-10-12 PROCEDURE — 99226 PR SUBSEQUENT OBSERVATION CARE,LEVEL III: CPT | Mod: ,,, | Performed by: HOSPITALIST

## 2020-10-12 PROCEDURE — 96376 TX/PRO/DX INJ SAME DRUG ADON: CPT

## 2020-10-12 RX ORDER — LANSOPRAZOLE 30 MG/1
30 CAPSULE, DELAYED RELEASE ORAL 2 TIMES DAILY
Status: ON HOLD | COMMUNITY
End: 2021-02-02 | Stop reason: HOSPADM

## 2020-10-12 RX ORDER — OXYCODONE AND ACETAMINOPHEN 10; 325 MG/1; MG/1
1 TABLET ORAL EVERY 6 HOURS PRN
Status: DISCONTINUED | OUTPATIENT
Start: 2020-10-12 | End: 2020-10-13

## 2020-10-12 RX ORDER — HEPARIN 100 UNIT/ML
1 SYRINGE INTRAVENOUS ONCE
Status: COMPLETED | OUTPATIENT
Start: 2020-10-12 | End: 2020-10-12

## 2020-10-12 RX ORDER — HEPARIN SODIUM,PORCINE 10 UNIT/ML
10 VIAL (ML) INTRAVENOUS ONCE
Status: DISCONTINUED | OUTPATIENT
Start: 2020-10-12 | End: 2020-10-12

## 2020-10-12 RX ORDER — PANTOPRAZOLE SODIUM 40 MG/1
40 TABLET, DELAYED RELEASE ORAL
Status: DISCONTINUED | OUTPATIENT
Start: 2020-10-12 | End: 2020-10-12

## 2020-10-12 RX ORDER — ACETAMINOPHEN 325 MG/1
650 TABLET ORAL EVERY 4 HOURS PRN
Status: DISCONTINUED | OUTPATIENT
Start: 2020-10-12 | End: 2020-10-13 | Stop reason: HOSPADM

## 2020-10-12 RX ORDER — PROMETHAZINE HYDROCHLORIDE 12.5 MG/1
12.5 TABLET ORAL EVERY 6 HOURS PRN
Status: DISCONTINUED | OUTPATIENT
Start: 2020-10-12 | End: 2020-10-12

## 2020-10-12 RX ORDER — PROMETHAZINE HYDROCHLORIDE 12.5 MG/1
12.5 TABLET ORAL EVERY 6 HOURS PRN
Status: DISCONTINUED | OUTPATIENT
Start: 2020-10-12 | End: 2020-10-13

## 2020-10-12 RX ORDER — ONDANSETRON 4 MG/1
4 TABLET, ORALLY DISINTEGRATING ORAL EVERY 8 HOURS PRN
Status: DISCONTINUED | OUTPATIENT
Start: 2020-10-12 | End: 2020-10-13 | Stop reason: HOSPADM

## 2020-10-12 RX ORDER — LIDOCAINE HYDROCHLORIDE 20 MG/ML
JELLY TOPICAL 3 TIMES DAILY PRN
Status: DISCONTINUED | OUTPATIENT
Start: 2020-10-12 | End: 2020-10-13 | Stop reason: HOSPADM

## 2020-10-12 RX ORDER — OXYCODONE AND ACETAMINOPHEN 7.5; 325 MG/1; MG/1
1 TABLET ORAL EVERY 6 HOURS PRN
Status: DISCONTINUED | OUTPATIENT
Start: 2020-10-12 | End: 2020-10-12

## 2020-10-12 RX ORDER — CETIRIZINE HYDROCHLORIDE 10 MG/1
10 TABLET ORAL DAILY
Status: DISCONTINUED | OUTPATIENT
Start: 2020-10-12 | End: 2020-10-13 | Stop reason: HOSPADM

## 2020-10-12 RX ORDER — PREDNISONE 20 MG/1
20 TABLET ORAL DAILY
Status: DISCONTINUED | OUTPATIENT
Start: 2020-10-13 | End: 2020-10-13 | Stop reason: HOSPADM

## 2020-10-12 RX ADMIN — HYDROMORPHONE HYDROCHLORIDE 0.5 MG: 1 INJECTION, SOLUTION INTRAMUSCULAR; INTRAVENOUS; SUBCUTANEOUS at 04:10

## 2020-10-12 RX ADMIN — FAMOTIDINE 20 MG: 20 TABLET, FILM COATED ORAL at 08:10

## 2020-10-12 RX ADMIN — CETIRIZINE HYDROCHLORIDE 10 MG: 10 TABLET, FILM COATED ORAL at 01:10

## 2020-10-12 RX ADMIN — HYDROXYZINE HYDROCHLORIDE 25 MG: 25 TABLET, FILM COATED ORAL at 06:10

## 2020-10-12 RX ADMIN — TAMSULOSIN HYDROCHLORIDE 0.4 MG: 0.4 CAPSULE ORAL at 08:10

## 2020-10-12 RX ADMIN — PAROXETINE HYDROCHLORIDE 20 MG: 20 TABLET, FILM COATED ORAL at 08:10

## 2020-10-12 RX ADMIN — LORAZEPAM 1 MG: 0.5 TABLET ORAL at 06:10

## 2020-10-12 RX ADMIN — DIPHENHYDRAMINE HYDROCHLORIDE 50 MG: 50 CAPSULE ORAL at 04:10

## 2020-10-12 RX ADMIN — GABAPENTIN 300 MG: 300 CAPSULE ORAL at 08:10

## 2020-10-12 RX ADMIN — PANTOPRAZOLE SODIUM 40 MG: 40 INJECTION, POWDER, LYOPHILIZED, FOR SOLUTION INTRAVENOUS at 08:10

## 2020-10-12 RX ADMIN — HYDROMORPHONE HYDROCHLORIDE 0.5 MG: 1 INJECTION, SOLUTION INTRAMUSCULAR; INTRAVENOUS; SUBCUTANEOUS at 08:10

## 2020-10-12 RX ADMIN — FLUTICASONE PROPIONATE 1 PUFF: 44 AEROSOL, METERED RESPIRATORY (INHALATION) at 08:10

## 2020-10-12 RX ADMIN — OXYCODONE HYDROCHLORIDE AND ACETAMINOPHEN 1 TABLET: 7.5; 325 TABLET ORAL at 01:10

## 2020-10-12 RX ADMIN — LORAZEPAM 1 MG: 0.5 TABLET ORAL at 09:10

## 2020-10-12 RX ADMIN — FLUTICASONE PROPIONATE 1 PUFF: 44 AEROSOL, METERED RESPIRATORY (INHALATION) at 07:10

## 2020-10-12 RX ADMIN — OXYCODONE HYDROCHLORIDE AND ACETAMINOPHEN 1 TABLET: 10; 325 TABLET ORAL at 06:10

## 2020-10-12 RX ADMIN — PREDNISONE 40 MG: 20 TABLET ORAL at 08:10

## 2020-10-12 RX ADMIN — GABAPENTIN 300 MG: 300 CAPSULE ORAL at 03:10

## 2020-10-12 RX ADMIN — HEPARIN 100 UNITS: 100 SYRINGE at 08:10

## 2020-10-12 NOTE — ASSESSMENT & PLAN NOTE
Patient with multiple episodes of hematemesis. He is hemodynamically stable, Hgb stable at 11.1. Has had multiple prior EGDs done, most recently 8/31, which showed scar tissue in distal esophagus and signs of delayed gastric emptying. Seen by GI today who indicated no need for intervention given his multiple scopes. Hemodynamically stable, Hgb 11-12 this admit (around baseline).  -Discontinuing PPI as patient has not had an episode of hematemesis since admission and Hgb remains stable at baseline  -trending CBC

## 2020-10-12 NOTE — SUBJECTIVE & OBJECTIVE
"Interval History: Patient refused his AM labs and then later was amenable to CBC only. This morning he states he still has some abdominal and groin pain although overall improved and tolerating a diet well. He was hesitant about transitioning from IV dilaudid to PO Percocet but understood that this was necessary to be able to discharge him so he can follow up outpatient with specialists. He says "I need to be out of the hospital by tomorrow because I have meetings and appointments on Wednesday". Still says he has small blood clots in urine and that he forgets to not flush them before the RN can call the team to take a look. Later in the day, bladder scan was ordered however patient continued to forget to not flush the toilet and forgot to call the RN when he voided so that the bladder scan could be done.    Review of Systems   Constitutional: Negative for chills, diaphoresis, fatigue and fever.   Respiratory: Negative for cough, chest tightness, shortness of breath and wheezing.    Cardiovascular: Negative for chest pain, palpitations and leg swelling.   Gastrointestinal: Positive for nausea. Negative for abdominal pain, anal bleeding, blood in stool, constipation, diarrhea and vomiting.   Genitourinary: Negative for dysuria, flank pain and hematuria.   Musculoskeletal: Positive for arthralgias and myalgias.   Neurological: Negative for dizziness, syncope, light-headedness and headaches.   Psychiatric/Behavioral: Positive for agitation. The patient is nervous/anxious.      Objective:     Vital Signs (Most Recent):  Temp: 98.5 °F (36.9 °C) (10/12/20 0800)  Pulse: 107 (10/12/20 0854)  Resp: 18 (10/12/20 1333)  BP: (!) 144/88 (10/12/20 0800)  SpO2: 95 % (10/12/20 0854) Vital Signs (24h Range):  Temp:  [97.3 °F (36.3 °C)-98.6 °F (37 °C)] 98.5 °F (36.9 °C)  Pulse:  [] 107  Resp:  [18-20] 18  SpO2:  [95 %-96 %] 95 %  BP: (115-144)/(73-88) 144/88     Weight: 97.5 kg (215 lb)  Body mass index is 29.16 kg/m².  No " intake or output data in the 24 hours ending 10/12/20 1533   Physical Exam  Constitutional:       General: He is not in acute distress.     Appearance: He is not ill-appearing.      Comments: Pt appears anxious   HENT:      Head: Normocephalic and atraumatic.      Mouth/Throat:      Mouth: Mucous membranes are moist.      Pharynx: Oropharynx is clear.      Comments: Oral ulcers at the corners of the mouth  Eyes:      Extraocular Movements: Extraocular movements intact.      Pupils: Pupils are equal, round, and reactive to light.   Neck:      Musculoskeletal: Normal range of motion and neck supple.   Cardiovascular:      Rate and Rhythm: Regular rhythm. Tachycardia present.      Heart sounds: No murmur. No friction rub. No gallop.    Pulmonary:      Effort: Pulmonary effort is normal.      Breath sounds: Normal breath sounds. No wheezing or rales.   Abdominal:      General: Abdomen is flat. There is no distension.      Palpations: Abdomen is soft.      Tenderness: There is no abdominal tenderness. There is no guarding.   Musculoskeletal: Normal range of motion.         General: No swelling, tenderness or deformity.   Lymphadenopathy:      Cervical: No cervical adenopathy.   Skin:     General: Skin is warm and dry.      Coloration: Skin is not pale.      Findings: Rash (Scattered urticarial rash to the chest and back with one larger 2 cm area noted to the superior medial aspect of the back) present.      Comments: uritcarial rash appears to be improving   Neurological:      Mental Status: He is alert.   Psychiatric:         Mood and Affect: Mood is anxious.         Speech: Speech is rapid and pressured and tangential.         Behavior: Behavior is agitated and hyperactive.         Significant Labs:   CBC:   Recent Labs   Lab 10/11/20  1953 10/12/20  0852   WBC 6.92 8.56   HGB 10.5* 11.0*   HCT 33.5* 34.4*    218     All pertinent labs within the past 24 hours have been reviewed.    Significant Imaging: I have  reviewed all pertinent imaging results/findings within the past 24 hours.

## 2020-10-12 NOTE — ASSESSMENT & PLAN NOTE
Patient's rash markedly improved. Refers itching is much controlled now.   -Switched IV solumedrol to PO prednisone 40 mg, then back to home dose 20 mg starting 10/13  -PO Benadryl, PO Famotidine, PO Atarax PRN for itching

## 2020-10-12 NOTE — PROGRESS NOTES
[facility] - ICU [facility] Medicine  Progress Note    Patient Name: Solo Black  MRN: [id number]  Patient Class: OP- Observation   Admission Date: 10/9/2020  Length of Stay: 0 days  Attending Physician: Yoly Acosta MD  Primary Care Provider: Cecilia Tsai MD        Subjective:     Principal Problem:Hematemesis    HPI:  Solo Black is a 39 year old male with PMH of Hypereosinophilic Syndrome, eosinophilic esophagitis, GERD, IBS, asthma, who came to the ED for acute onset abdominal pain, chest discomfort, bloody stool, hematuria, rash, and facial swelling. He reports the symptoms started yesterday night. Patient normally on Prednisone, Benadryl, Atarax and Pepcid for control of his symptoms. He being followed by Allergy/Immunology who had instrurcted him to wean his Prednisone for a repeat check of eosinophils for confirmation of his HES, and a potential medication change to mepolizumab. He reports that he stopped all of his medications three days ago. Today after the onset of his symptoms, patient used his epi pen and resumed all of his home medications. Pt also reports 5 episodes of hematemesis since last night as well as melanotic stools.  Also refers associated fevers, chills, skin and oral ulcers.     Patient was seen by Hematology on 9/23/20, who performed a bone marrow biopsy on 10/1/20. Results were not significant for any myeloid or lymphoid neoplasm. Patient has also had multiple other evaluations that include a recent EGD done in 8/31 that showed scar tissue in the distal esophagus and signs of impaired gastric emptying. Decision to admit patient to hospital medicine for further management.     Overview/Hospital Course:  At [facility] ED patient with mutliple bodily complaints including abdominal pain, muscle pain, hematemesis, hematuria,  melena. Patient very anxious, pacing around the room, occasionally yelling to nursing staff. Requesting IV Methylprednisone 125mg q6h, IV  "dilaudid, IV phenergan, IV benadryl. Called on call fellow for Allergy, who happens to be his provider, who said that given his low eosinophil count (400 on presentation, 0 today) very unlikely his current clinical presentation an HES flare. Explained that his diagnosis of Hyereosinophilic Syndrome remains questionable, as has not reached the diagnostic cutoff. Also expressed concern that patient suffers from factitious disorders, as there have been reports of patient self-phlebotomizing (unable to confirm). Overnight patient very agitated again, requesting medication changes. Per nursing reports, patient removed his chest port. When patient seen, he had no complaints of pain, nausea, vomiting, hematemesis, melena. Said his port was fine. Will treat patient with low dose IV methylpred with plans to wean him down to his home dose of prednisone 20mg. Today patient refusing lab draws, very anxious and concerned about his port. Requesting an ultrasound of the port. Otherwise refers feeling much better regarding his pain and rash. Aggreable about weaning down medications for a potential discharge tomorrow. Port to be accessed, cathflow ordered.     Interval History: Patient refused his AM labs and then later was amenable to CBC only. This morning he states he still has some abdominal and groin pain although overall improved and tolerating a diet well. He was hesitant about transitioning from IV dilaudid to PO Percocet but understood that this was necessary to be able to discharge him so he can follow up outpatient with specialists. He says "I need to be out of the hospital by tomorrow because I have meetings and appointments on Wednesday". Still says he has small blood clots in urine and that he forgets to not flush them before the RN can call the team to take a look. Later in the day, bladder scan was ordered however patient continued to forget to not flush the toilet and forgot to call the RN when he voided so that the " bladder scan could be done.    Review of Systems   Constitutional: Negative for chills, diaphoresis, fatigue and fever.   Respiratory: Negative for cough, chest tightness, shortness of breath and wheezing.    Cardiovascular: Negative for chest pain, palpitations and leg swelling.   Gastrointestinal: Positive for nausea. Negative for abdominal pain, anal bleeding, blood in stool, constipation, diarrhea and vomiting.   Genitourinary: Negative for dysuria, flank pain and hematuria.   Musculoskeletal: Positive for arthralgias and myalgias.   Neurological: Negative for dizziness, syncope, light-headedness and headaches.   Psychiatric/Behavioral: Positive for agitation. The patient is nervous/anxious.      Objective:     Vital Signs (Most Recent):  Temp: 98.5 °F (36.9 °C) (10/12/20 0800)  Pulse: 107 (10/12/20 0854)  Resp: 18 (10/12/20 1333)  BP: (!) 144/88 (10/12/20 0800)  SpO2: 95 % (10/12/20 0854) Vital Signs (24h Range):  Temp:  [97.3 °F (36.3 °C)-98.6 °F (37 °C)] 98.5 °F (36.9 °C)  Pulse:  [] 107  Resp:  [18-20] 18  SpO2:  [95 %-96 %] 95 %  BP: (115-144)/(73-88) 144/88     Weight: 97.5 kg (215 lb)  Body mass index is 29.16 kg/m².  No intake or output data in the 24 hours ending 10/12/20 1533   Physical Exam  Constitutional:       General: He is not in acute distress.     Appearance: He is not ill-appearing.      Comments: Pt appears anxious   HENT:      Head: Normocephalic and atraumatic.      Mouth/Throat:      Mouth: Mucous membranes are moist.      Pharynx: Oropharynx is clear.      Comments: Oral ulcers at the corners of the mouth  Eyes:      Extraocular Movements: Extraocular movements intact.      Pupils: Pupils are equal, round, and reactive to light.   Neck:      Musculoskeletal: Normal range of motion and neck supple.   Cardiovascular:      Rate and Rhythm: Regular rhythm. Tachycardia present.      Heart sounds: No murmur. No friction rub. No gallop.    Pulmonary:      Effort: Pulmonary effort is  normal.      Breath sounds: Normal breath sounds. No wheezing or rales.   Abdominal:      General: Abdomen is flat. There is no distension.      Palpations: Abdomen is soft.      Tenderness: There is no abdominal tenderness. There is no guarding.   Musculoskeletal: Normal range of motion.         General: No swelling, tenderness or deformity.   Lymphadenopathy:      Cervical: No cervical adenopathy.   Skin:     General: Skin is warm and dry.      Coloration: Skin is not pale.      Findings: Rash (Scattered urticarial rash to the chest and back with one larger 2 cm area noted to the superior medial aspect of the back) present.      Comments: uritcarial rash appears to be improving   Neurological:      Mental Status: He is alert.   Psychiatric:         Mood and Affect: Mood is anxious.         Speech: Speech is rapid and pressured and tangential.         Behavior: Behavior is agitated and hyperactive.         Significant Labs:   CBC:   Recent Labs   Lab 10/11/20  1953 10/12/20  0852   WBC 6.92 8.56   HGB 10.5* 11.0*   HCT 33.5* 34.4*    218     All pertinent labs within the past 24 hours have been reviewed.    Significant Imaging: I have reviewed all pertinent imaging results/findings within the past 24 hours.      Assessment/Plan:      * Hematemesis  Patient with multiple episodes of hematemesis. He is hemodynamically stable, Hgb stable at 11.1. Has had multiple prior EGDs done, most recently 8/31, which showed scar tissue in distal esophagus and signs of delayed gastric emptying. Seen by GI today who indicated no need for intervention given his multiple scopes. Hemodynamically stable, Hgb 11-12 this admit (around baseline).  -Discontinuing PPI as patient has not had an episode of hematemesis since admission and Hgb remains stable at baseline  -trending CBC        Urticarial rash  Patient's rash markedly improved. Refers itching is much controlled now.   -Switched IV solumedrol to PO prednisone 40 mg, then  back to home dose 20 mg starting 10/13  -PO Benadryl, PO Famotidine, PO Atarax PRN for itching    Chest pain        Hypereosinophilic syndrome  Patient refers he had a confirmed diagnosis of hypereosinophilia syndrome at [facility] in [place]. Is followed by Allergy and Immunology here who have yet to confirm his diagnosis. Per last Allergy notes, patient was supposed to wean off of prednisone, to repeat CBC for confirmation. He refers he stopped all of his medications three days ago, prompting him to present with diffuse abdominal pain, multiple bouts of nausea/vomiting, diffuse urticaria, muscle pain, fevers.     CBC with eosinophil count of 400 on admit.  Spoke with Allergy , who said his current eosinophils do not justify his current presentation.     Abdominal pain  Patient now with marked improvement in abdominal pain. Will wean off medications as tolerated  -PRN Zofran, Phenergan for nausea  -Transitioned from IV dilaudid 0.5 mg to equivalent PO oxycodone-acetaminophen 10 mg-325 mg q6 PRN      VTE Risk Mitigation (From admission, onward)         Ordered     IP VTE HIGH RISK PATIENT  Once      10/09/20 1627     Place sequential compression device  Until discontinued      10/09/20 1627                Discharge Planning   LIUDMILA: 10/12/2020     Code Status: Full Code   Is the patient medically ready for discharge?: No    Reason for patient still in hospital (select all that apply): Patient trending condition and Other (specify) Uncontrolled pain  Discharge Plan A: Home            Jered Acevedo DO  Department of Hospital Medicine   [facility] - ICU [facility]                      10/12/2020                             STAFF PHYSICIAN NOTE                                   Attending Attestation for Rounds with Resident  I have reviewed and concur with the resident's history, physical, assessment, and plan.  I have personally interviewed and examined the patient at bedside and agree with the resident's findings.             39 y.o. male Chronic urticaria, asthma, IBS,  ?HES  comes in with hematemesis and urticaria.  Nl eos upon admit. C/o gross hematuria. Weaning steroid dose to home dose Pred 20 daily. Declined lab today. Urology consult- s/p recent procedure.                    Yoly Acosta MD  Senior Hospitalist  [id number], [phone]

## 2020-10-12 NOTE — PHARMACY MED REC
"Admission Medication Reconciliation - Pharmacy Consult Note    The home medication history was taken by NAME, TITLE.  Based on information gathered and subsequent review by the clinical pharmacist, the items below may need attention.     You may go to "Admission" then "Reconcile Home Medications" tabs to review and/or act upon these items.     Potentially problematic discrepancies with current MAR  o Patient IS taking the following which was not ordered upon admit  o Lansoprazole 30 mg PO BID (not carried in the hospital)     Potential issues to be addressed PRIOR TO DISCHARGE  o Patient reports being on famotidine and lansoprazole scheduled as well as sucralfate PRN. Consider clarifying regimen prior to discharge    Please address this information as you see fit.  Feel free to contact us if you have any questions or require assistance.    Annie David, PharmD  PGY-2 Internal Medicine Pharmacy Resident  [id number]                .    .            "

## 2020-10-12 NOTE — ASSESSMENT & PLAN NOTE
Patient now with marked improvement in abdominal pain. Will wean off medications as tolerated  -PRN Zofran, Phenergan for nausea  -Transitioned from IV dilaudid 0.5 mg to equivalent PO oxycodone-acetaminophen 10 mg-325 mg q6 PRN

## 2020-10-13 VITALS
RESPIRATION RATE: 20 BRPM | OXYGEN SATURATION: 94 % | SYSTOLIC BLOOD PRESSURE: 116 MMHG | DIASTOLIC BLOOD PRESSURE: 75 MMHG | TEMPERATURE: 97 F | HEIGHT: 72 IN | WEIGHT: 215 LBS | HEART RATE: 80 BPM | BODY MASS INDEX: 29.12 KG/M2

## 2020-10-13 LAB
ALBUMIN SERPL BCP-MCNC: 2.9 G/DL (ref 3.5–5.2)
ALP SERPL-CCNC: 46 U/L (ref 55–135)
ALT SERPL W/O P-5'-P-CCNC: 17 U/L (ref 10–44)
ANION GAP SERPL CALC-SCNC: 9 MMOL/L (ref 8–16)
AST SERPL-CCNC: 9 U/L (ref 10–40)
BILIRUB SERPL-MCNC: 0.6 MG/DL (ref 0.1–1)
BUN SERPL-MCNC: 14 MG/DL (ref 6–20)
CALCIUM SERPL-MCNC: 7.7 MG/DL (ref 8.7–10.5)
CHLORIDE SERPL-SCNC: 102 MMOL/L (ref 95–110)
CO2 SERPL-SCNC: 29 MMOL/L (ref 23–29)
CREAT SERPL-MCNC: 0.7 MG/DL (ref 0.5–1.4)
EST. GFR  (AFRICAN AMERICAN): >60 ML/MIN/1.73 M^2
EST. GFR  (NON AFRICAN AMERICAN): >60 ML/MIN/1.73 M^2
GLUCOSE SERPL-MCNC: 96 MG/DL (ref 70–110)
MAGNESIUM SERPL-MCNC: 2.1 MG/DL (ref 1.6–2.6)
PHOSPHATE SERPL-MCNC: 2.1 MG/DL (ref 2.7–4.5)
POTASSIUM SERPL-SCNC: 3.5 MMOL/L (ref 3.5–5.1)
PROT SERPL-MCNC: 5.6 G/DL (ref 6–8.4)
SODIUM SERPL-SCNC: 140 MMOL/L (ref 136–145)
TRYPTASE LEVEL: 5 NG/ML

## 2020-10-13 PROCEDURE — 80053 COMPREHEN METABOLIC PANEL: CPT

## 2020-10-13 PROCEDURE — 25000003 PHARM REV CODE 250: Performed by: STUDENT IN AN ORGANIZED HEALTH CARE EDUCATION/TRAINING PROGRAM

## 2020-10-13 PROCEDURE — 63600175 PHARM REV CODE 636 W HCPCS: Performed by: STUDENT IN AN ORGANIZED HEALTH CARE EDUCATION/TRAINING PROGRAM

## 2020-10-13 PROCEDURE — 84100 ASSAY OF PHOSPHORUS: CPT

## 2020-10-13 PROCEDURE — 83735 ASSAY OF MAGNESIUM: CPT

## 2020-10-13 PROCEDURE — 99226 PR SUBSEQUENT OBSERVATION CARE,LEVEL III: CPT | Mod: ,,, | Performed by: HOSPITALIST

## 2020-10-13 PROCEDURE — G0378 HOSPITAL OBSERVATION PER HR: HCPCS

## 2020-10-13 PROCEDURE — 99226 PR SUBSEQUENT OBSERVATION CARE,LEVEL III: ICD-10-PCS | Mod: ,,, | Performed by: HOSPITALIST

## 2020-10-13 RX ORDER — ALBUTEROL SULFATE 90 UG/1
2 AEROSOL, METERED RESPIRATORY (INHALATION) EVERY 6 HOURS PRN
Qty: 18 G | Refills: 0 | Status: SHIPPED | OUTPATIENT
Start: 2020-10-13 | End: 2020-10-13 | Stop reason: HOSPADM

## 2020-10-13 RX ORDER — PREDNISONE 20 MG/1
20 TABLET ORAL DAILY
Qty: 30 TABLET | Refills: 3 | Status: ON HOLD | OUTPATIENT
Start: 2020-10-13 | End: 2020-12-08 | Stop reason: SDUPTHER

## 2020-10-13 RX ORDER — HEPARIN 100 UNIT/ML
1 SYRINGE INTRAVENOUS ONCE
Status: COMPLETED | OUTPATIENT
Start: 2020-10-13 | End: 2020-10-13

## 2020-10-13 RX ORDER — LIDOCAINE HYDROCHLORIDE 20 MG/ML
JELLY TOPICAL 3 TIMES DAILY PRN
Qty: 5 ML | Refills: 3 | Status: SHIPPED | OUTPATIENT
Start: 2020-10-13

## 2020-10-13 RX ORDER — SODIUM,POTASSIUM PHOSPHATES 280-250MG
2 POWDER IN PACKET (EA) ORAL
Status: CANCELLED | OUTPATIENT
Start: 2020-10-13 | End: 2020-10-13

## 2020-10-13 RX ORDER — ACETAMINOPHEN 325 MG/1
650 TABLET ORAL EVERY 4 HOURS PRN
Qty: 30 TABLET | Refills: 3 | Status: SHIPPED | OUTPATIENT
Start: 2020-10-13 | End: 2022-08-04 | Stop reason: CLARIF

## 2020-10-13 RX ORDER — POTASSIUM CHLORIDE 20 MEQ/1
40 TABLET, EXTENDED RELEASE ORAL ONCE
Status: COMPLETED | OUTPATIENT
Start: 2020-10-13 | End: 2020-10-13

## 2020-10-13 RX ADMIN — GABAPENTIN 300 MG: 300 CAPSULE ORAL at 09:10

## 2020-10-13 RX ADMIN — LORAZEPAM 1 MG: 0.5 TABLET ORAL at 02:10

## 2020-10-13 RX ADMIN — PAROXETINE HYDROCHLORIDE 20 MG: 20 TABLET, FILM COATED ORAL at 09:10

## 2020-10-13 RX ADMIN — FAMOTIDINE 20 MG: 20 TABLET, FILM COATED ORAL at 09:10

## 2020-10-13 RX ADMIN — POTASSIUM CHLORIDE 40 MEQ: 1500 TABLET, EXTENDED RELEASE ORAL at 09:10

## 2020-10-13 RX ADMIN — DIPHENHYDRAMINE HYDROCHLORIDE 50 MG: 50 CAPSULE ORAL at 02:10

## 2020-10-13 RX ADMIN — HEPARIN 100 UNITS: 100 SYRINGE at 12:10

## 2020-10-13 RX ADMIN — OXYCODONE HYDROCHLORIDE AND ACETAMINOPHEN 1 TABLET: 10; 325 TABLET ORAL at 02:10

## 2020-10-13 RX ADMIN — TAMSULOSIN HYDROCHLORIDE 0.4 MG: 0.4 CAPSULE ORAL at 09:10

## 2020-10-13 RX ADMIN — PREDNISONE 20 MG: 20 TABLET ORAL at 09:10

## 2020-10-13 RX ADMIN — CETIRIZINE HYDROCHLORIDE 10 MG: 10 TABLET, FILM COATED ORAL at 09:10

## 2020-10-13 NOTE — PLAN OF CARE
Patient medically ready for discharge to home.     Future Appointments   Date Time Provider Department Center   10/15/2020  9:40 AM Trung Polanco MD [facility] HEM ONC [facility]   10/16/2020 10:20 AM Mike Reyna MD [facility] COLON [facility]   10/21/2020 10:15 AM Rodolfo Puri Jr., MD [facility] PAINMGT [facility]        10/13/20 1148   Final Note   Assessment Type Final Discharge Note   Anticipated Discharge Disposition Home   Hospital Follow Up  Appt(s) scheduled? Yes   Discharge plans and expectations educations in teach back method with documentation complete? Yes   Right Care Referral Info   Post Acute Recommendation No Care   Post-Acute Status   Post-Acute Authorization Other   Other Status No Post-Acute Service Needs   Discharge Delays None known at this time     Sharita Khan  Case Management  Ext: [phone]  10/13/2020

## 2020-10-13 NOTE — DISCHARGE SUMMARY
[facility] ICU [facility] Medicine  Discharge Summary      Patient Name: Solo Black  MRN: [id number]  Admission Date: 10/9/2020  Hospital Length of Stay: 0 days  Discharge Date and Time:  10/13/2020 1:09 PM  Attending Physician: Yoly Acosta MD   Discharging Provider: Manuel Cardoza MD  Primary Care Provider: Cecilia Tsai MD      HPI:   Solo Black is a 39 year old male with PMH of Hypereosinophilic Syndrome, eosinophilic esophagitis, GERD, IBS, asthma, ADHD who came to the ED for acute onset abdominal pain, chest discomfort, bloody stool, hematuria, rash, and facial swelling. He reports the symptoms started yesterday night. Patient normally on Prednisone, Benadryl, Atarax and Pepcid for control of his symptoms. He being followed by Allergy/Immunology who had instrurcted him to wean his Prednisone for a repeat check of eosinophils for confirmation of his HES, and a potential medication change to mepolizumab. He reports that he stopped all of his medications three days ago. Today after the onset of his symptoms, patient used his epi pen and resumed all of his home medications. Pt also reports 5 episodes of hematemesis since last night as well as melanotic stools.  Also refers associated fevers, chills, skin and oral ulcers.     Patient was seen by Hematology on 9/23/20, who performed a bone marrow biopsy on 10/1/20. Results were not significant for any myeloid or lymphoid neoplasm. Patient has also had multiple other evaluations that include a recent EGD done in 8/31 that showed scar tissue in the distal esophagus and signs of impaired gastric emptying. Decision to admit patient to hospital medicine for further management.     * No surgery found *      Hospital Course:   At [facility] ED patient with mutliple bodily complaints including abdominal pain, muscle pain, hematemesis, hematuria,  melena. Patient very anxious, pacing around the room, occasionally yelling to  "nursing staff. Requesting IV Methylprednisone 125mg q6h, IV dilaudid, IV phenergan, IV benadryl. Concerns for high iatrogenic risk with this patient given his polypharmacy. Called on call fellow for Allergy, who happens to be his provider, who said that given his low eosinophil count (400 on presentation, 0 today) very unlikely his current clinical presentation an HES flare. Explained that his diagnosis of Hyereosinophilic Syndrome remains questionable, as has not reached the diagnostic cutoff. Also expressed concern that patient suffers from factitious disorders, as there have been reports of patient self-phlebotomizing (unable to confirm). Overnight patient very agitated again, requesting medication changes. Per nursing reports, patient removed his chest port. When patient seen, he had no complaints of pain, nausea, vomiting, hematemesis, melena. Said his port was fine. Will treat patient with low dose IV methylpred with plans to wean him down to his home dose of prednisone 20mg. Today patient refusing lab draws, very anxious and concerned about his port. Requesting an ultrasound of the port. Otherwise refers feeling much better regarding his pain and rash. Aggreable about weaning down medications for a potential discharge tomorrow. Port to be accessed, cathflow ordered.  Patient refused his AM labs and then later was amenable to CBC only. This morning he states he still has some abdominal and groin pain although overall improved and tolerating a diet well. He was hesitant about transitioning from IV dilaudid to PO Percocet but understood that this was necessary to be able to discharge him so he can follow up outpatient with specialists. He says "I need to be out of the hospital by tomorrow because I have meetings and appointments on Wednesday". Still says he has small blood clots in urine and that he forgets to not flush them before the RN can call the team to take a look. Later in the day, bladder scan was " ordered however patient continued to forget to not flush the toilet and forgot to call the RN when he voided so that the bladder scan could be done. Today patient refers feeling well. Denies abdominal pain, nausea, vomiting, hematemesis, melena, itching, or any symptom. Decision to discharge patient as he is medically stable. Patient has follow ups with hematology and CRS this week.      Consults:   Consults (From admission, onward)        Status Ordering Provider     Inpatient consult to Gastroenterology  Once     Provider:  (Not yet assigned)    Completed ALEC COTTON          Vitals:    10/13/20 0800   BP: 116/75   Pulse: 80   Resp: 20   Temp: 97.3 °F (36.3 °C)     Physical Exam   Constitutional: He is oriented to person, place, and time and well-developed, well-nourished, and in no distress. No distress.   HENT:   Head: Normocephalic and atraumatic.   Eyes: Pupils are equal, round, and reactive to light. EOM are normal.   Neck: Normal range of motion. Neck supple. No JVD present.   Cardiovascular: Normal rate and regular rhythm. Exam reveals no gallop and no friction rub.   No murmur heard.  Pulmonary/Chest: Effort normal and breath sounds normal. He has no wheezes. He has no rales.   Abdominal: Soft. Bowel sounds are normal. He exhibits no distension. There is no abdominal tenderness.   Musculoskeletal: Normal range of motion.         General: No tenderness or edema.   Lymphadenopathy:     He has no cervical adenopathy.   Neurological: He is oriented to person, place, and time.   Skin: Skin is warm. Rash (urticarial rash in back improved) noted. No erythema.       No new Assessment & Plan notes have been filed under this hospital service since the last note was generated.  Service: Hospital Medicine    Final Active Diagnoses:    Diagnosis Date Noted POA    PRINCIPAL PROBLEM:  Hematemesis [K92.0] 03/10/2014 Unknown    Urticarial rash [L50.9] 10/09/2020 Unknown    Hypereosinophilic syndrome  [D72.119] 08/06/2020 Yes    Abdominal pain [R10.9] 03/25/2020 Yes      Problems Resolved During this Admission:       Discharged Condition: good    Disposition: Home or Self Care    Follow Up: With Heme/Onc, CRS, Allergy/Immun, Urology    Patient Instructions:   No discharge procedures on file.    Significant Diagnostic Studies: Labs:   CMP   Recent Labs   Lab 10/13/20  0414      K 3.5      CO2 29   GLU 96   BUN 14   CREATININE 0.7   CALCIUM 7.7*   PROT 5.6*   ALBUMIN 2.9*   BILITOT 0.6   ALKPHOS 46*   AST 9*   ALT 17   ANIONGAP 9   ESTGFRAFRICA >60.0   EGFRNONAA >60.0    and CBC   Recent Labs   Lab 10/11/20  1953 10/12/20  0852   WBC 6.92 8.56   HGB 10.5* 11.0*   HCT 33.5* 34.4*    218       Pending Diagnostic Studies:     Procedure Component Value Units Date/Time    CBC with Automated Differential [[id number]]     Order Status: Sent Lab Status: No result     Specimen: Blood     Comprehensive Metabolic Panel (CMP) [[id number]]     Order Status: Sent Lab Status: No result     Specimen: Blood     Magnesium [[id number]]     Order Status: Sent Lab Status: No result     Specimen: Blood     Phosphorus [[id number]]     Order Status: Sent Lab Status: No result     Specimen: Blood     Tryptase [[id number]] Collected: 10/09/20 1017    Order Status: Sent Lab Status: In process Updated: 10/09/20 1028    Specimen: Blood          Medications:  Reconciled Home Medications:      Medication List      ASK your doctor about these medications    acetaminophen 325 MG tablet  Commonly known as: TYLENOL  Take 1 tablet (325 mg total) by mouth every 6 (six) hours as needed for Pain.     AdderalL 20 mg tablet  Generic drug: dextroamphetamine-amphetamine  Take 1 tablet by mouth 2 (two) times daily before meals. Take before breakfast and lunch     artificial tears 0.5 % ophthalmic solution  Commonly known as: ISOPTO TEARS  Place 1 drop into both eyes 6 (six) times daily.     CENTRUM 3,500-18-0.4 unit-mg-mg Chew  Generic drug:  multivit-iron-min-folic acid  Take 10 mg by mouth once daily.     cetirizine 10 MG tablet  Commonly known as: ZYRTEC  Take 20 mg by mouth 2 (two) times a day.     dicyclomine 20 mg tablet  Commonly known as: BENTYL  Take 1 tablet (20 mg total) by mouth 3 (three) times daily.     diphenhydrAMINE 50 MG capsule  Commonly known as: BENADRYL  Take 50 mg by mouth every 6 (six) hours as needed for Itching.     doxycycline 100 MG tablet  Commonly known as: VIBRA-TABS  Take 1 tablet (100 mg total) by mouth 2 (two) times daily.     EPINEPHrine 0.3 mg/0.3 mL Atin  Commonly known as: EPIPEN  Inject 0.3 mLs (0.3 mg total) into the muscle as needed.     famotidine 20 MG tablet  Commonly known as: PEPCID  Take 20 mg by mouth 2 (two) times daily.     gabapentin 300 MG capsule  Commonly known as: NEURONTIN  Take 1 capsule (300 mg total) by mouth 3 (three) times daily.     HYDROcodone-acetaminophen 5-325 mg per tablet  Commonly known as: NORCO  Take 1 tablet by mouth every 6 (six) hours as needed for Pain.     hydrOXYzine HCL 25 MG tablet  Commonly known as: ATARAX  Take 1 tablet (25 mg total) by mouth 3 (three) times daily as needed for Itching.     Lactobacillus acidophilus 10 billion cell Cap  Take 1 capsule by mouth once daily.     lansoprazole 30 MG capsule  Commonly known as: PREVACID  Take 30 mg by mouth 2 (two) times a day.     LIDOCAINE VISCOUS 2 % Soln  Generic drug: lidocaine HCl 2%  Take 5 mLs by mouth as needed.     LORazepam 1 MG tablet  Commonly known as: ATIVAN  Take 1 tablet (1 mg total) by mouth every 8 (eight) hours as needed for Anxiety.     nystatin 100,000 unit/mL suspension  Commonly known as: MYCOSTATIN  Take by mouth as needed.     oxyCODONE-acetaminophen 5-325 mg per tablet  Commonly known as: PERCOCET  Take 1 tablet by mouth every 8 (eight) hours as needed.     paroxetine 20 MG tablet  Commonly known as: PAXIL  Take 20 mg by mouth every morning.     predniSONE 10 MG tablet  Commonly known as:  DELTASONE  Take 2 tablets (20 mg total) by mouth once daily.     promethazine 25 MG tablet  Commonly known as: PHENERGAN  Take 1 tablet (25 mg total) by mouth every 6 (six) hours as needed for Nausea.     sucralfate 1 gram tablet  Commonly known as: CARAFATE  Take 1 g by mouth before meals and at bedtime as needed.     tamsulosin 0.4 mg Cap  Commonly known as: FLOMAX  Take 1 capsule (0.4 mg total) by mouth 2 (two) times daily.            Indwelling Lines/Drains at time of discharge:   Lines/Drains/Airways     None                 Time spent on the discharge of patient: 35 minutes  Patient was seen and examined on the date of discharge and determined to be suitable for discharge.         Manuel Cardoza MD  Department of Hospital Medicine  [facility] ICU [facility]                      10/13/2020                             STAFF PHYSICIAN NOTE                                   Attending Attestation for Rounds with Resident  I have reviewed and concur with the resident's history, physical, assessment, and plan.  I have personally interviewed and examined the patient at bedside and agree with the resident's findings.       As above.  Pt had Normal eosinophils upon admission.  He was noted to MS changes upon admission, likely related to polypharmacy.  I discussed with him the importance of limiting exposure to psychoactive drugs including anti-histamies, stimulants, benzodiazepines and opioids. He is high risk of OD and acute delirium.  All of these meds affect neuroplasticity of the brain and lead to progressive psychiatric illness,other complications ( falls, accidents, dementia, head trauma) and iatrogenic complications.  This patient is a high iatrogenic risk and caution should be taken with prescribing and ordering potentially harmful tests.  I recommended he f/u with is primary doctors and discuss ways to limit polypharmacy. He expressed understanding.                   Yoly Acosta,  MD  Senior Hospitalist  [id number], [phone]

## 2020-10-13 NOTE — PLAN OF CARE
Problem: Adult Inpatient Plan of Care  Goal: Plan of Care Review  Outcome: Met   Poc reviewed with pt. Vss a&ox4. Resp with ease, on room air. Pt ambulatory with independence. Port access removed, tolerated well. Pt transported to d/c via wheelchair by transport. Belongings with pt. No acute distress. no concerns voiced at this time.

## 2020-10-13 NOTE — PLAN OF CARE
10/13/20 0951   Post-Acute Status   Post-Acute Authorization Other   Other Status No Post-Acute Service Needs   Discharge Delays None known at this time   Discharge Plan   Discharge Plan A Home     Pt to d/c today, home, no needs.    Sharita Khan LCSW [id number]

## 2020-10-13 NOTE — PLAN OF CARE
Plan of care reviewed regarding treatment plan and expected outcomes. Education provided on scheduled and administered medications. Pt verbalized understanding. Call light within reach. Pt instructed to call out for assistance. Will continue to monitor and enforce plan of care.

## 2020-10-14 ENCOUNTER — NURSE TRIAGE (OUTPATIENT)
Dept: ADMINISTRATIVE | Facility: CLINIC | Age: 40
End: 2020-10-14

## 2020-10-14 NOTE — TELEPHONE ENCOUNTER
Contacted patient on behalf of [facility]'s Covid Post Procedure Call Back symptom tracker.  Patient's identity verified by stating first and last names and .  Patient denies any Covid 19 symptoms since procedure

## 2020-10-15 ENCOUNTER — PATIENT MESSAGE (OUTPATIENT)
Dept: HEMATOLOGY/ONCOLOGY | Facility: CLINIC | Age: 40
End: 2020-10-15

## 2020-10-16 ENCOUNTER — PATIENT MESSAGE (OUTPATIENT)
Dept: SURGERY | Facility: CLINIC | Age: 40
End: 2020-10-16

## 2020-10-21 LAB
ANNOTATION COMMENT IMP: NORMAL
FINAL PATHOLOGIC DIAGNOSIS: NORMAL
GROSS: NORMAL
Lab: NORMAL
MICROSCOPIC EXAM: NORMAL
NGS CLINCIAL TRIALS: NORMAL
NGS INDICATION OF TEST: NORMAL
NGS INTERPRETATION: NORMAL
NGS ONCOHEME PANEL GENE LIST: NORMAL
NGS PATHOGENIC MUTATIONS DETECTED: NORMAL
NGS REVIEWED BY:: NORMAL
NGS VARIANTS OF UNKNOWN SIGNIFICANCE: NORMAL
NGSHM RESULT, BONE MARROW: NORMAL
REF LAB TEST METHOD: NORMAL
SPECIMEN SOURCE: NORMAL
SUPPLEMENTAL DIAGNOSIS: NORMAL
TEST PERFORMANCE INFO SPEC: NORMAL

## 2020-10-22 ENCOUNTER — TELEPHONE (OUTPATIENT)
Dept: NEUROLOGY | Facility: HOSPITAL | Age: 40
End: 2020-10-22

## 2020-11-10 ENCOUNTER — PATIENT MESSAGE (OUTPATIENT)
Dept: ALLERGY | Facility: CLINIC | Age: 40
End: 2020-11-10

## 2020-11-27 ENCOUNTER — OFFICE VISIT (OUTPATIENT)
Dept: HEMATOLOGY/ONCOLOGY | Facility: CLINIC | Age: 40
End: 2020-11-27
Payer: MEDICAID

## 2020-11-27 ENCOUNTER — PATIENT MESSAGE (OUTPATIENT)
Dept: ALLERGY | Facility: CLINIC | Age: 40
End: 2020-11-27

## 2020-11-27 VITALS
DIASTOLIC BLOOD PRESSURE: 83 MMHG | HEART RATE: 74 BPM | WEIGHT: 210.13 LBS | RESPIRATION RATE: 17 BRPM | BODY MASS INDEX: 28.49 KG/M2 | OXYGEN SATURATION: 100 % | TEMPERATURE: 99 F | SYSTOLIC BLOOD PRESSURE: 127 MMHG

## 2020-11-27 DIAGNOSIS — D72.19 OTHER EOSINOPHILIA: Primary | ICD-10-CM

## 2020-11-27 PROCEDURE — 99999 PR PBB SHADOW E&M-EST. PATIENT-LVL III: CPT | Mod: PBBFAC,,, | Performed by: INTERNAL MEDICINE

## 2020-11-27 PROCEDURE — 99214 OFFICE O/P EST MOD 30 MIN: CPT | Mod: S$PBB,,, | Performed by: INTERNAL MEDICINE

## 2020-11-27 PROCEDURE — 99213 OFFICE O/P EST LOW 20 MIN: CPT | Mod: PBBFAC,PO | Performed by: INTERNAL MEDICINE

## 2020-11-27 PROCEDURE — 99214 PR OFFICE/OUTPT VISIT, EST, LEVL IV, 30-39 MIN: ICD-10-PCS | Mod: S$PBB,,, | Performed by: INTERNAL MEDICINE

## 2020-11-27 PROCEDURE — 99999 PR PBB SHADOW E&M-EST. PATIENT-LVL III: ICD-10-PCS | Mod: PBBFAC,,, | Performed by: INTERNAL MEDICINE

## 2020-11-27 NOTE — PROGRESS NOTES
"PATIENT: Solo Black  MRN: [id number]  DATE: 11/27/2020    Diagnosis:   1. Other eosinophilia      Chief Complaint: eosinophilia    Subjective:    History of Present Illness: Mr. Black is a 39 y.o. male who presented in September 2020 for evaluation and management of eosinophilia. He has previously seen Dr. Gaspar.    Information from Dr. Gaspar's note dated 8/17/20:  "Mr. Black is 39 and has reported history of eosinophilic esophagitis, recurrent angioedema and urticaria and eosinophilia and presents for initial hematologic evaluation.     Over the last several years, he has had recurrent hosptializations for allergic symptoms and has reportedly been found to have high eosinophil counts. He has recurrently been on steroids and been diagnosed with eosinophilic esophagitis. [place], and he was unable to produce records of these visits during our visit today.     He has more recently been under the care of Dr. Ornelas at [facility], and he is currently on high dose steroids for his allergic symptoms. This has controlled the allergic symtpoms and eosinophil count better. He was found to have positive Strongyloides IgG and completed ivermectin therapy approximately 2 weeks ago.     On steroids, he reports feeling anxious, irritable, and shaky. He also reports that he has had ulcers develop on his back and in the perianal area.      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  Mr. Black has eosinophilia that appears to be chronic and recurrent when not on steroids. Evaluation is challenged as he does not have eosinophilia currently because he is on high dose steroids (or not being exposed to allergens).    His history would be atypical for a myeloid or lymphoid neoplasm with eosinophilia. He has been tested on peripehral blood for PDGFR-alpha mutation, and this was negative. B-cell and T-cell studies " "to identify clonal lymphocyte populations were similarly negative.     I think the probability of a hematologic malignancy is overall low; however, given clear chronic eosinophilia, we will obtain bone marrow biopsy to exclude this definitively.     I am concerned his eosinophilia is either related to the history of strongyloidiasis or exposure to other allergens.     Additionally, I have reviewed all available outside records through [facility]. I am concerned about his high utilization rates of multiple health systems. A discharge summary from [facility] notes that he had unusual behavior during a hospitalization, and a diagnosis of factitious disorder was being considered. I did not address this directly with him, as it is important to address the merits of his eosinophilia completely first. If a cause is unable to be identified, we may need to review this history with other prior providers to discuss whether this consideration may still be warranted. "     Interval history:  - he presents for a follow-up appointment for his eosinophilia.  - he underwent bone marrow biopsy on 10/1/20.  - he followed up with allergist on 10/6/20. They are considering mepolizumab, dupilumab if they can prove that his eosinophils become elevated when off steroids. However, he is unable to get off steroids for any amount of time due to rash/swelling.  - he was admitted from 10/9/20 to 10/13/20 for abdominal pain, rash, hematuria, facial swelling.  - he underwent a hiatal hernia repair earlier this month and received IV antibiotics (outside hospital)  - he notes muscle soreness today, which he correlates with the prednisone he takes.  - he is taking prednisone 20mg PO TID (managed by immunology). When he takes less than that, his symptoms (skin lesions, angioedema, rash) recur.    Past medical, surgical, family, and social histories have been reviewed and updated below.    Past Medical History:   Past Medical History:   Diagnosis Date "    Arthritis     C. difficile colitis feb 2014    postop of hand surgery    Cancer     Encounter for blood transfusion     Eosinophilic esophagitis 3/11/2014    GERD (gastroesophageal reflux disease)     Hypereosinophilic syndrome     IBS (irritable bowel syndrome)     Leukemia     MRSA carrier     says multiple times nose swab was +    Nephrolithiasis apr 2014    bilateral punctate - never passed a stone    Urinary tract infection feb 2014    MRSA       Past Surgical History:   Past Surgical History:   Procedure Laterality Date    APPENDECTOMY      ARTHROSCOPY OF SHOULDER WITH REMOVAL OF DISTAL CLAVICLE Right 11/1/2018    Procedure: ARTHROSCOPY, SHOULDER, WITH DISTAL CLAVICLE EXCISION;  Surgeon: Gabino Mejia MD;  Location[facility];  Service: Orthopedics;  Laterality: Right;  video    BACK SURGERY      BLADDER FULGURATION N/A 9/18/2020    Procedure: FULGURATION, BLADDER;  Surgeon: Randall Moss MD;  Location[facility];  Service: Urology;  Laterality: N/A;  blood vessels of bladder neck and prostate    BONE MARROW BIOPSY Left 10/1/2020    Procedure: Biopsy-bone marrow;  Surgeon: Trung Polanco MD;  Location: [facility] OR;  Service: Oncology;  Laterality: Left;    CIRCUMCISION, PRIMARY      COLONOSCOPY N/A 11/14/2018    Procedure: COLONOSCOPY;  Surgeon: Milton Brunson MD;  Location: [facility];  Service: Endoscopy;  Laterality: N/A;    COLONOSCOPY N/A 7/20/2020    Procedure: COLONOSCOPY;  Surgeon: Ruslan Tillman MD;  Location: [facility];  Service: Endoscopy;  Laterality: N/A;    CYSTOSCOPY W/ RETROGRADES Bilateral 9/18/2020    Procedure: CYSTOSCOPY, WITH RETROGRADE PYELOGRAM;  Surgeon: Randall Moss MD;  Location: [facility] OR;  Service: Urology;  Laterality: Bilateral;    DILATION OF URETHRA N/A 9/18/2020    Procedure: DILATION, URETHRA;  Surgeon: Randall Moss MD;  Location: [facility] OR;  Service: Urology;  Laterality: N/A;    drainage of abscess from hand  2009    MRSA     drainage of abscess from R thigh  2006    MRSA    ESOPHAGOGASTRODUODENOSCOPY  3/11/2014    ESOPHAGOGASTRODUODENOSCOPY N/A 9/5/2018    Procedure: EGD (ESOPHAGOGASTRODUODENOSCOPY);  Surgeon: Kolby Wall MD;  Location: [facility];  Service: Endoscopy;  Laterality: N/A;    ESOPHAGOGASTRODUODENOSCOPY N/A 3/25/2020    Procedure: EGD (ESOPHAGOGASTRODUODENOSCOPY);  Surgeon: Ruslan Tillman MD;  Location: [facility];  Service: Endoscopy;  Laterality: N/A;    ESOPHAGOGASTRODUODENOSCOPY N/A 7/20/2020    Procedure: EGD (ESOPHAGOGASTRODUODENOSCOPY);  Surgeon: Ruslan Tillman MD;  Location: [facility];  Service: Endoscopy;  Laterality: N/A;  Patient is a very hard stick, requires anesthesia stick.    ESOPHAGOGASTRODUODENOSCOPY N/A 8/31/2020    Procedure: EGD (ESOPHAGOGASTRODUODENOSCOPY);  Surgeon: Kolby Wall MD;  Location: [facility];  Service: Endoscopy;  Laterality: N/A;    FLEXIBLE SIGMOIDOSCOPY N/A 9/5/2018    Procedure: SIGMOIDOSCOPY, FLEXIBLE;  Surgeon: Kolby Wall MD;  Location: [facility];  Service: Endoscopy;  Laterality: N/A;    HAND SURGERY  jan 2014    power saw fell on hand - laceration 3 tendons    INSERTION OF VENOUS ACCESS PORT Right 8/21/2020    Procedure: INSERTION, VENOUS ACCESS PORT;  Surgeon: Troy Honeycutt MD;  Location: [facility];  Service: General;  Laterality: Right;    NISSEN FUNDOPLICATION         Family History:   Family History   Problem Relation Age of Onset    Urolithiasis Father     Celiac disease Sister     Hypertension Maternal Grandmother     Hypertension Maternal Grandfather     Prostate cancer Neg Hx     Kidney disease Neg Hx        Social History:  reports that he has never smoked. He has never used smokeless tobacco. He reports previous alcohol use. He reports that he does not use drugs.    Allergies:  Review of patient's allergies indicates:   Allergen Reactions    Legumes Nausea And Vomiting and Swelling     Other reaction(s): GI  "Intolerance  vomiting  vomiting  Pt reports legumes make his lips swell and induce severe vomiting  Pt reports legumes make his lips swell and induce severe vomiting      Nsaids (non-steroidal anti-inflammatory drug)      GI bleed    Bean pod extract      Lips swelling, vomiting  Lips swelling, vomiting      Ketamine      Severe dysphoria (bad trip)    Lentils      Lips swelling, vomiting  Lips swelling, vomiting      Meloxicam Nausea Only     GI bleed    Peas      Lips swelling, vomiting    Penicillins      Has taken third gen cephalosporins without issue per patient report    Haloperidol Anxiety and Other (See Comments)     "feels like crawling out of his skin"         Medications:  Current Outpatient Medications   Medication Sig Dispense Refill    acetaminophen (TYLENOL) 325 MG tablet Take 2 tablets (650 mg total) by mouth every 4 (four) hours as needed. 30 tablet 3    artificial tears (ISOPTO TEARS) 0.5 % ophthalmic solution Place 1 drop into both eyes 6 (six) times daily.      cetirizine (ZYRTEC) 10 MG tablet Take 20 mg by mouth 2 (two) times a day.       dextroamphetamine-amphetamine (ADDERALL) 20 mg tablet Take 1 tablet by mouth 2 (two) times daily before meals. Take before breakfast and lunch      dicyclomine (BENTYL) 20 mg tablet Take 1 tablet (20 mg total) by mouth 3 (three) times daily. 30 tablet 0    diphenhydrAMINE (BENADRYL) 50 MG capsule Take 50 mg by mouth every 6 (six) hours as needed for Itching.      EPINEPHrine (EPIPEN) 0.3 mg/0.3 mL AtIn Inject 0.3 mLs (0.3 mg total) into the muscle as needed. 2 Device 1    famotidine (PEPCID) 20 MG tablet Take 20 mg by mouth 2 (two) times daily.      gabapentin (NEURONTIN) 300 MG capsule Take 1 capsule (300 mg total) by mouth 3 (three) times daily. 90 capsule 0    hydrOXYzine HCL (ATARAX) 25 MG tablet Take 1 tablet (25 mg total) by mouth 3 (three) times daily as needed for Itching. 30 tablet 1    Lactobacillus acidophilus 10 billion cell Cap " Take 1 capsule by mouth once daily.       lansoprazole (PREVACID) 30 MG capsule Take 30 mg by mouth 2 (two) times a day.      lidocaine HCL 2% (XYLOCAINE) 2 % jelly Apply topically 3 (three) times daily as needed (Hemorrhoids). 5 mL 3    LORazepam (ATIVAN) 1 MG tablet Take 1 tablet (1 mg total) by mouth every 8 (eight) hours as needed for Anxiety. 15 tablet 0    MULTIVIT-IRON-MIN-FOLIC ACID 3,500-18-0.4 UNIT-MG-MG ORAL CHEW Take 10 mg by mouth once daily.      nystatin (MYCOSTATIN) 100,000 unit/mL suspension Take by mouth as needed.       paroxetine (PAXIL) 20 MG tablet Take 20 mg by mouth every morning.      predniSONE (DELTASONE) 20 MG tablet Take 1 tablet (20 mg total) by mouth once daily. 30 tablet 3    promethazine (PHENERGAN) 25 MG tablet Take 1 tablet (25 mg total) by mouth every 6 (six) hours as needed for Nausea. 15 tablet 0    psyllium husk, aspartame, (METAMUCIL) 3.4 gram PwPk packet Take 1 packet by mouth once daily. 30 packet 1    sucralfate (CARAFATE) 1 gram tablet Take 1 g by mouth before meals and at bedtime as needed.       tamsulosin (FLOMAX) 0.4 mg Cap Take 1 capsule (0.4 mg total) by mouth 2 (two) times daily. 60 capsule 11     No current facility-administered medications for this visit.        Review of Systems   Constitutional: Negative for fatigue.   HENT: Negative for sore throat.    Eyes: Negative for visual disturbance.   Respiratory: Negative for shortness of breath.    Cardiovascular: Negative for chest pain.   Gastrointestinal: Negative for abdominal pain.   Genitourinary: Negative for dysuria.   Musculoskeletal: Negative for back pain.   Skin: Positive for rash.   Neurological: Negative for headaches.   Hematological: Negative for adenopathy.   Psychiatric/Behavioral: The patient is not nervous/anxious.      ECOG Performance Status:   ECOG SCORE 1        Objective:      Vitals:   Vitals:    11/27/20 1058   BP: 127/83   Pulse: 74   Resp: 17   Temp: 98.9 °F (37.2 °C)   TempSrc:  "Oral   SpO2: 100%   Weight: 95.3 kg (210 lb 1.6 oz)     BMI: Body mass index is 28.49 kg/m².    Physical Exam  Vitals signs and nursing note reviewed.   Constitutional:       Appearance: He is well-developed.   HENT:      Head: Normocephalic and atraumatic.   Eyes:      Pupils: Pupils are equal, round, and reactive to light.   Neck:      Musculoskeletal: Normal range of motion and neck supple.   Cardiovascular:      Rate and Rhythm: Normal rate and regular rhythm.   Pulmonary:      Effort: Pulmonary effort is normal.      Breath sounds: Normal breath sounds.   Abdominal:      General: Bowel sounds are normal.      Palpations: Abdomen is soft.   Musculoskeletal: Normal range of motion.   Skin:     General: Skin is warm and dry.   Neurological:      Mental Status: He is alert and oriented to person, place, and time.   Psychiatric:         Behavior: Behavior normal.         Thought Content: Thought content normal.         Judgment: Judgment normal.      Comments: hyperactive       Laboratory Data:  Labs have been reviewed.    Lab Results   Component Value Date    WBC 8.56 10/12/2020    HGB 11.0 (L) 10/12/2020    HCT 34.4 (L) 10/12/2020    MCV 83 10/12/2020     10/12/2020     10/1/20:  BONE MARROW, LEFT ILIAC CREST, ASPIRATE, CLOT, AND CORE BIOPSY:  --Suboptimal sampling of normocellular marrow for age, approximately 50-60% with trilineage  hematopoiesis, see comment  --No increase in blasts  --Adequate megakaryocytes  --No increase in stainable iron  COMMENT: Concomitantly submitted flow cytometric analysis detects no diagnostic abnormal  hematopoietic population. B cells are polyclonal and T cells are immunophenotypically unremarkable. The blast gate is not increased.  "AML FISH:  Interpretation: Comments: This result is within normal limits for the AML FISH panel."    Please also see cytogenetic karyotype results reported in Epic from [facility], [address], " "[place] [address], which give, in part, the following results:  "46,XY[20], No clonal abnormality was apparent."  "NGS interpretation  No pathogenic genetic alteration is detected in the listed gene regions. This finding does not exclude the presence of genetic alteration occurring at allele frequency below our established detection limit of 5-10%, or other genetic alterations present in untested gene regions."    B-Cell gene rearrangement (7/13/20):  Peripheral blood, immunoglobulin gene rearrangement   analysis: Negative. No clonal immunoglobulin gene rearrangement was   detected.     T-Cell gene rearrangement (7/13/20):  Peripheral blood, T-cell receptor gene rearrangement   analysis: Negative.  No clonal T-cell receptor gene rearrangement was   detected.     CHIC2 testing (7/13/20):  The result is within normal limits for the CHIC2, PDGFRA   and FIP1L1 gene regions.     Strongyloides Ab IgG (7/13/20): positive    Imaging:    Assessment:       1. Other eosinophilia         Plan:     1. Eosinophilia  - I have reviewed his chart.  - he has a history of elevated eosinophil counts. He manages this with high doses of prednisone.  - Dr. Gaspar had previously recommend a bone marrow biopsy. However, patient did not wish to proceed without sedation.  - I performed a bone marrow biopsy on 10/1/20. There was no evidence of a hematologic malignancy. AML FISH was negative, as was next-generation sequencing.  - we will arrange for a bone marrow biopsy here with light sedation for further evaluation of his eosinophilia.  - chromosome analysis was negative.  - at this point, I told him that there was not much for us to offer at this time, since there is no evidence of a true hematologic malignancy.  - at this time, I feel that he is best served by Dr. Ornelas (Allergy/Immunology)  - return to clinic as needed.    Trung Polanco M.D.  Hematology/Oncology  [facility] - [address], [facility] " [address]  [place] [address]  Phone: [phone]  Fax: [phone]

## 2020-11-30 ENCOUNTER — PATIENT MESSAGE (OUTPATIENT)
Dept: SURGERY | Facility: CLINIC | Age: 40
End: 2020-11-30

## 2020-11-30 ENCOUNTER — PATIENT MESSAGE (OUTPATIENT)
Dept: HEMATOLOGY/ONCOLOGY | Facility: CLINIC | Age: 40
End: 2020-11-30

## 2020-12-01 ENCOUNTER — PATIENT MESSAGE (OUTPATIENT)
Dept: ALLERGY | Facility: CLINIC | Age: 40
End: 2020-12-01

## 2020-12-03 ENCOUNTER — HOSPITAL ENCOUNTER (OUTPATIENT)
Dept: RADIOLOGY | Facility: HOSPITAL | Age: 40
Discharge: HOME | End: 2020-12-03
Attending: SURGERY
Payer: MEDICAID

## 2020-12-03 DIAGNOSIS — R19.00 MASS OF SOFT TISSUE OF ABDOMEN: ICD-10-CM

## 2020-12-03 PROCEDURE — 76705 ECHO EXAM OF ABDOMEN: CPT | Mod: 26,,, | Performed by: RADIOLOGY

## 2020-12-03 PROCEDURE — 76705 US SOFT TISSUE, ABDOMEN: ICD-10-PCS | Mod: 26,,, | Performed by: RADIOLOGY

## 2020-12-03 PROCEDURE — 76705 ECHO EXAM OF ABDOMEN: CPT | Mod: TC

## 2020-12-04 ENCOUNTER — PATIENT MESSAGE (OUTPATIENT)
Dept: SURGERY | Facility: CLINIC | Age: 40
End: 2020-12-04

## 2020-12-07 ENCOUNTER — PATIENT MESSAGE (OUTPATIENT)
Dept: HEMATOLOGY/ONCOLOGY | Facility: CLINIC | Age: 40
End: 2020-12-07

## 2020-12-08 ENCOUNTER — PATIENT MESSAGE (OUTPATIENT)
Dept: ALLERGY | Facility: CLINIC | Age: 40
End: 2020-12-08

## 2020-12-08 ENCOUNTER — PATIENT MESSAGE (OUTPATIENT)
Dept: HEMATOLOGY/ONCOLOGY | Facility: CLINIC | Age: 40
End: 2020-12-08

## 2020-12-08 DIAGNOSIS — L50.1 CHRONIC IDIOPATHIC URTICARIA: ICD-10-CM

## 2020-12-08 DIAGNOSIS — D72.19 OTHER EOSINOPHILIA: Primary | ICD-10-CM

## 2020-12-08 RX ORDER — PREDNISONE 20 MG/1
20 TABLET ORAL DAILY
Qty: 30 TABLET | Refills: 3 | Status: ON HOLD | OUTPATIENT
Start: 2020-12-08 | End: 2021-02-02 | Stop reason: SDUPTHER

## 2020-12-08 NOTE — PROGRESS NOTES
I received a Message from Dr. Polanco inquiring about omalizumab or mepolizumab for Mr. Black. I explained that I needed another eosinophil count above 1500 to prescribe mepolizumab, but could prescribe omalizumab for his chronic urticaria and angioedema. I also messaged Mr. Black, who said he would be willing to try the omalizumab again (tried it in the past, but only for 1-2 months). I will prescribe this to our [facility]. I also will put in another CBC/diff order for the patient to get if he is ever able to stop steroids. I also put in some refills for his prednisone per patient request.    Michelle Ornelas MD  Allergy/Immunology Fellow

## 2020-12-10 PROBLEM — R10.9 ABDOMINAL PAIN: Status: RESOLVED | Noted: 2020-03-25 | Resolved: 2020-12-10

## 2020-12-12 ENCOUNTER — SPECIALTY PHARMACY (OUTPATIENT)
Dept: PHARMACY | Facility: CLINIC | Age: 40
End: 2020-12-12

## 2020-12-15 ENCOUNTER — PATIENT MESSAGE (OUTPATIENT)
Dept: HEMATOLOGY/ONCOLOGY | Facility: CLINIC | Age: 40
End: 2020-12-15

## 2020-12-15 ENCOUNTER — TELEPHONE (OUTPATIENT)
Dept: HEMATOLOGY/ONCOLOGY | Facility: CLINIC | Age: 40
End: 2020-12-15

## 2020-12-15 DIAGNOSIS — D72.19 OTHER EOSINOPHILIA: Primary | ICD-10-CM

## 2020-12-15 NOTE — TELEPHONE ENCOUNTER
Patient confirmed lab appt. Today in [place] at 3pm.    ----- Message from Trung Polanco MD sent at 12/15/2020  1:03 PM CST -----  1. Schedule CBC. If he asks about getting it drawn in the infusion center, let him know that we are unable to get his CBC drawn via a port draw.Thanks

## 2020-12-15 NOTE — TELEPHONE ENCOUNTER
Patient confirmed he will email pictures for Dr. Polanco to evaluate.    ----- Message from Gini Rey sent at 12/15/2020  1:27 PM CST -----  Regarding: Return call    Type:  Patient Returning Call    Who Called: patient  Who Left Message for Patient: Jamal  Does the patient know what this is regarding?: yes  Would the patient rather a call back or a response via [facility]?  Call back  Best Call Back Number: [phone]  Additional Information: please call today

## 2020-12-29 ENCOUNTER — TELEPHONE (OUTPATIENT)
Dept: UROLOGY | Facility: CLINIC | Age: 40
End: 2020-12-29

## 2020-12-29 NOTE — TELEPHONE ENCOUNTER
----- Message from Giuliana Ferreira sent at 12/29/2020  4:03 PM CST -----  Contact: [phone]/Self  Type: Requesting to speak with nurse    Who Called: Pt  Regarding: blood clots in urine, started this morning   Would the patient rather a call back or a response via [facility]? Call back  Best Call Back Number: [phone]  Additional Information: Pain on groin area

## 2020-12-29 NOTE — TELEPHONE ENCOUNTER
Called patient he states that is is urinating blood clots and is having trouble passing urine. He is also in a lot of pain in his groin area. Encouraged to go to the ER

## 2021-01-05 ENCOUNTER — PATIENT MESSAGE (OUTPATIENT)
Dept: HEMATOLOGY/ONCOLOGY | Facility: CLINIC | Age: 41
End: 2021-01-05

## 2021-01-07 ENCOUNTER — NURSE TRIAGE (OUTPATIENT)
Dept: ADMINISTRATIVE | Facility: CLINIC | Age: 41
End: 2021-01-07

## 2021-01-07 ENCOUNTER — PATIENT MESSAGE (OUTPATIENT)
Dept: HEMATOLOGY/ONCOLOGY | Facility: CLINIC | Age: 41
End: 2021-01-07

## 2021-01-07 ENCOUNTER — HOSPITAL ENCOUNTER (EMERGENCY)
Facility: HOSPITAL | Age: 41
Discharge: HOME | End: 2021-01-08
Attending: EMERGENCY MEDICINE
Payer: MEDICAID

## 2021-01-07 DIAGNOSIS — R10.84 GENERALIZED ABDOMINAL PAIN: ICD-10-CM

## 2021-01-07 DIAGNOSIS — R31.9 HEMATURIA, UNSPECIFIED TYPE: ICD-10-CM

## 2021-01-07 DIAGNOSIS — L30.9 IDIOPATHIC DERMATITIS: Primary | ICD-10-CM

## 2021-01-07 LAB
ALBUMIN SERPL BCP-MCNC: 3.5 G/DL (ref 3.5–5.2)
ALP SERPL-CCNC: 60 U/L (ref 55–135)
ALT SERPL W/O P-5'-P-CCNC: 32 U/L (ref 10–44)
ANION GAP SERPL CALC-SCNC: 12 MMOL/L (ref 8–16)
AST SERPL-CCNC: 26 U/L (ref 10–40)
BACTERIA #/AREA URNS HPF: ABNORMAL /HPF
BASOPHILS # BLD AUTO: 0.01 K/UL (ref 0–0.2)
BASOPHILS NFR BLD: 0.1 % (ref 0–1.9)
BILIRUB SERPL-MCNC: 0.7 MG/DL (ref 0.1–1)
BILIRUB UR QL STRIP: NEGATIVE
BUN SERPL-MCNC: 13 MG/DL (ref 6–20)
CALCIUM SERPL-MCNC: 8.1 MG/DL (ref 8.7–10.5)
CHLORIDE SERPL-SCNC: 107 MMOL/L (ref 95–110)
CLARITY UR: ABNORMAL
CO2 SERPL-SCNC: 22 MMOL/L (ref 23–29)
COLOR UR: YELLOW
CREAT SERPL-MCNC: 1 MG/DL (ref 0.5–1.4)
DIFFERENTIAL METHOD: ABNORMAL
EOSINOPHIL # BLD AUTO: 0 K/UL (ref 0–0.5)
EOSINOPHIL NFR BLD: 0 % (ref 0–8)
ERYTHROCYTE [DISTWIDTH] IN BLOOD BY AUTOMATED COUNT: 16.7 % (ref 11.5–14.5)
EST. GFR  (AFRICAN AMERICAN): >60 ML/MIN/1.73 M^2
EST. GFR  (NON AFRICAN AMERICAN): >60 ML/MIN/1.73 M^2
GLUCOSE SERPL-MCNC: 161 MG/DL (ref 70–110)
GLUCOSE UR QL STRIP: NEGATIVE
HCT VFR BLD AUTO: 32.9 % (ref 40–54)
HGB BLD-MCNC: 10.4 G/DL (ref 14–18)
HGB UR QL STRIP: ABNORMAL
HYALINE CASTS #/AREA URNS LPF: 0 /LPF
IMM GRANULOCYTES # BLD AUTO: 0.02 K/UL (ref 0–0.04)
IMM GRANULOCYTES NFR BLD AUTO: 0.3 % (ref 0–0.5)
KETONES UR QL STRIP: ABNORMAL
LEUKOCYTE ESTERASE UR QL STRIP: ABNORMAL
LIPASE SERPL-CCNC: 10 U/L (ref 4–60)
LYMPHOCYTES # BLD AUTO: 0.2 K/UL (ref 1–4.8)
LYMPHOCYTES NFR BLD: 2.7 % (ref 18–48)
MCH RBC QN AUTO: 25.6 PG (ref 27–31)
MCHC RBC AUTO-ENTMCNC: 31.6 G/DL (ref 32–36)
MCV RBC AUTO: 81 FL (ref 82–98)
MICROSCOPIC COMMENT: ABNORMAL
MONOCYTES # BLD AUTO: 0.1 K/UL (ref 0.3–1)
MONOCYTES NFR BLD: 1.5 % (ref 4–15)
NEUTROPHILS # BLD AUTO: 7.2 K/UL (ref 1.8–7.7)
NEUTROPHILS NFR BLD: 95.4 % (ref 38–73)
NITRITE UR QL STRIP: NEGATIVE
NRBC BLD-RTO: 0 /100 WBC
PH UR STRIP: 6 [PH] (ref 5–8)
PLATELET # BLD AUTO: 287 K/UL (ref 150–350)
PMV BLD AUTO: 11 FL (ref 9.2–12.9)
POTASSIUM SERPL-SCNC: 3.6 MMOL/L (ref 3.5–5.1)
PROT SERPL-MCNC: 6.2 G/DL (ref 6–8.4)
PROT UR QL STRIP: ABNORMAL
RBC # BLD AUTO: 4.07 M/UL (ref 4.6–6.2)
RBC #/AREA URNS HPF: >100 /HPF (ref 0–4)
SODIUM SERPL-SCNC: 141 MMOL/L (ref 136–145)
SP GR UR STRIP: 1.02 (ref 1–1.03)
URN SPEC COLLECT METH UR: ABNORMAL
UROBILINOGEN UR STRIP-ACNC: NEGATIVE EU/DL
WBC # BLD AUTO: 7.5 K/UL (ref 3.9–12.7)
WBC #/AREA URNS HPF: 5 /HPF (ref 0–5)

## 2021-01-07 PROCEDURE — 96375 TX/PRO/DX INJ NEW DRUG ADDON: CPT

## 2021-01-07 PROCEDURE — 96372 THER/PROPH/DIAG INJ SC/IM: CPT | Mod: 59

## 2021-01-07 PROCEDURE — 80053 COMPREHEN METABOLIC PANEL: CPT

## 2021-01-07 PROCEDURE — 51798 US URINE CAPACITY MEASURE: CPT

## 2021-01-07 PROCEDURE — 85025 COMPLETE CBC W/AUTO DIFF WBC: CPT

## 2021-01-07 PROCEDURE — 96374 THER/PROPH/DIAG INJ IV PUSH: CPT

## 2021-01-07 PROCEDURE — 83690 ASSAY OF LIPASE: CPT

## 2021-01-07 PROCEDURE — 25000003 PHARM REV CODE 250: Performed by: EMERGENCY MEDICINE

## 2021-01-07 PROCEDURE — 63600175 PHARM REV CODE 636 W HCPCS

## 2021-01-07 PROCEDURE — 63600175 PHARM REV CODE 636 W HCPCS: Performed by: EMERGENCY MEDICINE

## 2021-01-07 PROCEDURE — 99284 EMERGENCY DEPT VISIT MOD MDM: CPT | Mod: 25

## 2021-01-07 PROCEDURE — 81000 URINALYSIS NONAUTO W/SCOPE: CPT

## 2021-01-07 PROCEDURE — 96361 HYDRATE IV INFUSION ADD-ON: CPT

## 2021-01-07 RX ORDER — SODIUM CHLORIDE 9 MG/ML
INJECTION, SOLUTION INTRAVENOUS
Status: COMPLETED | OUTPATIENT
Start: 2021-01-07 | End: 2021-01-07

## 2021-01-07 RX ORDER — NALOXONE HYDROCHLORIDE 1 MG/ML
INJECTION INTRAMUSCULAR; INTRAVENOUS; SUBCUTANEOUS
Status: COMPLETED
Start: 2021-01-07 | End: 2021-01-07

## 2021-01-07 RX ORDER — ZIPRASIDONE MESYLATE 20 MG/ML
20 INJECTION, POWDER, LYOPHILIZED, FOR SOLUTION INTRAMUSCULAR
Status: COMPLETED | OUTPATIENT
Start: 2021-01-07 | End: 2021-01-07

## 2021-01-07 RX ORDER — ONDANSETRON 2 MG/ML
4 INJECTION INTRAMUSCULAR; INTRAVENOUS
Status: COMPLETED | OUTPATIENT
Start: 2021-01-07 | End: 2021-01-07

## 2021-01-07 RX ORDER — METHYLPREDNISOLONE SOD SUCC 125 MG
125 VIAL (EA) INJECTION
Status: COMPLETED | OUTPATIENT
Start: 2021-01-07 | End: 2021-01-07

## 2021-01-07 RX ORDER — MORPHINE SULFATE 4 MG/ML
4 INJECTION, SOLUTION INTRAMUSCULAR; INTRAVENOUS
Status: COMPLETED | OUTPATIENT
Start: 2021-01-07 | End: 2021-01-07

## 2021-01-07 RX ADMIN — ZIPRASIDONE MESYLATE 20 MG: 20 INJECTION, POWDER, LYOPHILIZED, FOR SOLUTION INTRAMUSCULAR at 08:01

## 2021-01-07 RX ADMIN — LORAZEPAM 2 MG: 2 INJECTION INTRAMUSCULAR; INTRAVENOUS at 08:01

## 2021-01-07 RX ADMIN — SODIUM CHLORIDE 1000 ML/HR: 0.9 INJECTION, SOLUTION INTRAVENOUS at 09:01

## 2021-01-07 RX ADMIN — METHYLPREDNISOLONE SODIUM SUCCINATE 125 MG: 125 INJECTION, POWDER, FOR SOLUTION INTRAMUSCULAR; INTRAVENOUS at 08:01

## 2021-01-07 RX ADMIN — NALOXONE HYDROCHLORIDE 4 MG: 1 INJECTION PARENTERAL at 10:01

## 2021-01-07 RX ADMIN — ONDANSETRON 4 MG: 2 INJECTION INTRAMUSCULAR; INTRAVENOUS at 09:01

## 2021-01-07 RX ADMIN — MORPHINE SULFATE 4 MG: 4 INJECTION INTRAVENOUS at 09:01

## 2021-01-08 VITALS
HEART RATE: 92 BPM | RESPIRATION RATE: 16 BRPM | TEMPERATURE: 99 F | BODY MASS INDEX: 28.44 KG/M2 | HEIGHT: 72 IN | SYSTOLIC BLOOD PRESSURE: 121 MMHG | WEIGHT: 210 LBS | DIASTOLIC BLOOD PRESSURE: 72 MMHG | OXYGEN SATURATION: 100 %

## 2021-01-08 RX ORDER — PREDNISONE 10 MG/1
10 TABLET ORAL DAILY
Qty: 21 TABLET | Refills: 0 | Status: ON HOLD | OUTPATIENT
Start: 2021-01-08 | End: 2021-02-02 | Stop reason: HOSPADM

## 2021-01-11 ENCOUNTER — TELEPHONE (OUTPATIENT)
Dept: SURGERY | Facility: CLINIC | Age: 41
End: 2021-01-11

## 2021-01-12 ENCOUNTER — HOSPITAL ENCOUNTER (INPATIENT)
Facility: HOSPITAL | Age: 41
LOS: 4 days | Discharge: HOME | DRG: 488 | End: 2021-01-16
Attending: EMERGENCY MEDICINE | Admitting: INTERNAL MEDICINE
Payer: MEDICAID

## 2021-01-12 DIAGNOSIS — R35.1 BENIGN PROSTATIC HYPERPLASIA WITH NOCTURIA: ICD-10-CM

## 2021-01-12 DIAGNOSIS — N40.1 BENIGN PROSTATIC HYPERPLASIA WITH NOCTURIA: ICD-10-CM

## 2021-01-12 DIAGNOSIS — M79.89 LEG SWELLING: ICD-10-CM

## 2021-01-12 DIAGNOSIS — M25.461 EFFUSION OF RIGHT KNEE: ICD-10-CM

## 2021-01-12 DIAGNOSIS — M70.41 PREPATELLAR BURSITIS OF RIGHT KNEE: Primary | ICD-10-CM

## 2021-01-12 DIAGNOSIS — M25.561 PAIN AND SWELLING OF RIGHT KNEE: ICD-10-CM

## 2021-01-12 DIAGNOSIS — M25.461 PAIN AND SWELLING OF RIGHT KNEE: ICD-10-CM

## 2021-01-12 DIAGNOSIS — D72.119 HYPEREOSINOPHILIC SYNDROME, UNSPECIFIED TYPE: ICD-10-CM

## 2021-01-12 LAB
ALBUMIN SERPL BCP-MCNC: 3.7 G/DL (ref 3.5–5.2)
ALP SERPL-CCNC: 107 U/L (ref 55–135)
ALT SERPL W/O P-5'-P-CCNC: 81 U/L (ref 10–44)
ANION GAP SERPL CALC-SCNC: 13 MMOL/L (ref 8–16)
AST SERPL-CCNC: 114 U/L (ref 10–40)
BASOPHILS # BLD AUTO: 0.04 K/UL (ref 0–0.2)
BASOPHILS NFR BLD: 0.3 % (ref 0–1.9)
BILIRUB SERPL-MCNC: 1.2 MG/DL (ref 0.1–1)
BUN SERPL-MCNC: 15 MG/DL (ref 6–20)
CALCIUM SERPL-MCNC: 8.4 MG/DL (ref 8.7–10.5)
CHLORIDE SERPL-SCNC: 106 MMOL/L (ref 95–110)
CO2 SERPL-SCNC: 19 MMOL/L (ref 23–29)
CREAT SERPL-MCNC: 0.9 MG/DL (ref 0.5–1.4)
CRP SERPL-MCNC: 26.1 MG/L (ref 0–8.2)
CTP QC/QA: YES
DIFFERENTIAL METHOD: ABNORMAL
EOSINOPHIL # BLD AUTO: 0.6 K/UL (ref 0–0.5)
EOSINOPHIL NFR BLD: 4.7 % (ref 0–8)
ERYTHROCYTE [DISTWIDTH] IN BLOOD BY AUTOMATED COUNT: 16.5 % (ref 11.5–14.5)
ERYTHROCYTE [SEDIMENTATION RATE] IN BLOOD BY WESTERGREN METHOD: 45 MM/HR (ref 0–10)
EST. GFR  (AFRICAN AMERICAN): >60 ML/MIN/1.73 M^2
EST. GFR  (NON AFRICAN AMERICAN): >60 ML/MIN/1.73 M^2
GLUCOSE SERPL-MCNC: 70 MG/DL (ref 70–110)
HCT VFR BLD AUTO: 38.5 % (ref 40–54)
HGB BLD-MCNC: 12.1 G/DL (ref 14–18)
IMM GRANULOCYTES # BLD AUTO: 0.11 K/UL (ref 0–0.04)
IMM GRANULOCYTES NFR BLD AUTO: 0.9 % (ref 0–0.5)
LACTATE SERPL-SCNC: 1.8 MMOL/L (ref 0.5–2.2)
LYMPHOCYTES # BLD AUTO: 0.8 K/UL (ref 1–4.8)
LYMPHOCYTES NFR BLD: 6.1 % (ref 18–48)
MCH RBC QN AUTO: 24.9 PG (ref 27–31)
MCHC RBC AUTO-ENTMCNC: 31.4 G/DL (ref 32–36)
MCV RBC AUTO: 79 FL (ref 82–98)
MONOCYTES # BLD AUTO: 1.1 K/UL (ref 0.3–1)
MONOCYTES NFR BLD: 9.2 % (ref 4–15)
NEUTROPHILS # BLD AUTO: 9.7 K/UL (ref 1.8–7.7)
NEUTROPHILS NFR BLD: 78.8 % (ref 38–73)
NRBC BLD-RTO: 0 /100 WBC
PLATELET # BLD AUTO: 305 K/UL (ref 150–350)
PMV BLD AUTO: 11 FL (ref 9.2–12.9)
POTASSIUM SERPL-SCNC: 3.1 MMOL/L (ref 3.5–5.1)
PROT SERPL-MCNC: 6.8 G/DL (ref 6–8.4)
RBC # BLD AUTO: 4.85 M/UL (ref 4.6–6.2)
SARS-COV-2 RDRP RESP QL NAA+PROBE: NEGATIVE
SODIUM SERPL-SCNC: 138 MMOL/L (ref 136–145)
WBC # BLD AUTO: 12.37 K/UL (ref 3.9–12.7)

## 2021-01-12 PROCEDURE — 85025 COMPLETE CBC W/AUTO DIFF WBC: CPT | Mod: 91

## 2021-01-12 PROCEDURE — 25000003 PHARM REV CODE 250: Performed by: EMERGENCY MEDICINE

## 2021-01-12 PROCEDURE — 96365 THER/PROPH/DIAG IV INF INIT: CPT

## 2021-01-12 PROCEDURE — 25000003 PHARM REV CODE 250: Performed by: ORTHOPAEDIC SURGERY

## 2021-01-12 PROCEDURE — G0378 HOSPITAL OBSERVATION PER HR: HCPCS

## 2021-01-12 PROCEDURE — 36415 COLL VENOUS BLD VENIPUNCTURE: CPT

## 2021-01-12 PROCEDURE — 63600175 PHARM REV CODE 636 W HCPCS: Performed by: STUDENT IN AN ORGANIZED HEALTH CARE EDUCATION/TRAINING PROGRAM

## 2021-01-12 PROCEDURE — 63600175 PHARM REV CODE 636 W HCPCS

## 2021-01-12 PROCEDURE — 87040 BLOOD CULTURE FOR BACTERIA: CPT | Mod: 59

## 2021-01-12 PROCEDURE — 80053 COMPREHEN METABOLIC PANEL: CPT | Mod: 91

## 2021-01-12 PROCEDURE — 25000003 PHARM REV CODE 250: Performed by: STUDENT IN AN ORGANIZED HEALTH CARE EDUCATION/TRAINING PROGRAM

## 2021-01-12 PROCEDURE — 11000001 HC ACUTE MED/SURG PRIVATE ROOM

## 2021-01-12 PROCEDURE — 63600175 PHARM REV CODE 636 W HCPCS: Performed by: EMERGENCY MEDICINE

## 2021-01-12 PROCEDURE — U0002 COVID-19 LAB TEST NON-CDC: HCPCS | Performed by: EMERGENCY MEDICINE

## 2021-01-12 PROCEDURE — 63600175 PHARM REV CODE 636 W HCPCS: Performed by: ORTHOPAEDIC SURGERY

## 2021-01-12 PROCEDURE — 99285 EMERGENCY DEPT VISIT HI MDM: CPT | Mod: 25

## 2021-01-12 PROCEDURE — 86140 C-REACTIVE PROTEIN: CPT

## 2021-01-12 PROCEDURE — 85652 RBC SED RATE AUTOMATED: CPT

## 2021-01-12 PROCEDURE — 83605 ASSAY OF LACTIC ACID: CPT

## 2021-01-12 PROCEDURE — 96375 TX/PRO/DX INJ NEW DRUG ADDON: CPT

## 2021-01-12 RX ORDER — VANCOMYCIN HCL IN 5 % DEXTROSE 1G/250ML
1000 PLASTIC BAG, INJECTION (ML) INTRAVENOUS
Status: DISCONTINUED | OUTPATIENT
Start: 2021-01-12 | End: 2021-01-12

## 2021-01-12 RX ORDER — ACETAMINOPHEN 325 MG/1
650 TABLET ORAL EVERY 4 HOURS PRN
Status: DISCONTINUED | OUTPATIENT
Start: 2021-01-12 | End: 2021-01-16 | Stop reason: HOSPADM

## 2021-01-12 RX ORDER — HYDROMORPHONE HYDROCHLORIDE 2 MG/ML
2 INJECTION, SOLUTION INTRAMUSCULAR; INTRAVENOUS; SUBCUTANEOUS
Status: COMPLETED | OUTPATIENT
Start: 2021-01-12 | End: 2021-01-12

## 2021-01-12 RX ORDER — MORPHINE SULFATE 4 MG/ML
4 INJECTION, SOLUTION INTRAMUSCULAR; INTRAVENOUS
Status: COMPLETED | OUTPATIENT
Start: 2021-01-12 | End: 2021-01-12

## 2021-01-12 RX ORDER — HYDROMORPHONE HYDROCHLORIDE 1 MG/ML
1 INJECTION, SOLUTION INTRAMUSCULAR; INTRAVENOUS; SUBCUTANEOUS EVERY 6 HOURS PRN
Status: DISCONTINUED | OUTPATIENT
Start: 2021-01-12 | End: 2021-01-13

## 2021-01-12 RX ORDER — OXYCODONE HYDROCHLORIDE 5 MG/1
10 TABLET ORAL EVERY 6 HOURS PRN
Status: DISCONTINUED | OUTPATIENT
Start: 2021-01-12 | End: 2021-01-13

## 2021-01-12 RX ORDER — HYDROMORPHONE HYDROCHLORIDE 2 MG/ML
INJECTION, SOLUTION INTRAMUSCULAR; INTRAVENOUS; SUBCUTANEOUS
Status: COMPLETED
Start: 2021-01-12 | End: 2021-01-12

## 2021-01-12 RX ORDER — CETIRIZINE HYDROCHLORIDE 10 MG/1
20 TABLET ORAL 2 TIMES DAILY
Status: DISCONTINUED | OUTPATIENT
Start: 2021-01-13 | End: 2021-01-16 | Stop reason: HOSPADM

## 2021-01-12 RX ORDER — HEPARIN SODIUM 5000 [USP'U]/ML
5000 INJECTION, SOLUTION INTRAVENOUS; SUBCUTANEOUS EVERY 8 HOURS
Status: DISCONTINUED | OUTPATIENT
Start: 2021-01-12 | End: 2021-01-13

## 2021-01-12 RX ORDER — HYDROMORPHONE HYDROCHLORIDE 1 MG/ML
1 INJECTION, SOLUTION INTRAMUSCULAR; INTRAVENOUS; SUBCUTANEOUS
Status: COMPLETED | OUTPATIENT
Start: 2021-01-12 | End: 2021-01-12

## 2021-01-12 RX ORDER — PAROXETINE HYDROCHLORIDE 20 MG/1
20 TABLET, FILM COATED ORAL EVERY MORNING
Status: DISCONTINUED | OUTPATIENT
Start: 2021-01-13 | End: 2021-01-16 | Stop reason: HOSPADM

## 2021-01-12 RX ORDER — PREDNISONE 20 MG/1
20 TABLET ORAL DAILY
Status: COMPLETED | OUTPATIENT
Start: 2021-01-13 | End: 2021-01-15

## 2021-01-12 RX ORDER — ONDANSETRON 2 MG/ML
4 INJECTION INTRAMUSCULAR; INTRAVENOUS
Status: COMPLETED | OUTPATIENT
Start: 2021-01-12 | End: 2021-01-12

## 2021-01-12 RX ORDER — TAMSULOSIN HYDROCHLORIDE 0.4 MG/1
0.4 CAPSULE ORAL 2 TIMES DAILY
Status: DISCONTINUED | OUTPATIENT
Start: 2021-01-12 | End: 2021-01-16 | Stop reason: HOSPADM

## 2021-01-12 RX ORDER — KETOROLAC TROMETHAMINE 30 MG/ML
30 INJECTION, SOLUTION INTRAMUSCULAR; INTRAVENOUS ONCE
Status: COMPLETED | OUTPATIENT
Start: 2021-01-12 | End: 2021-01-12

## 2021-01-12 RX ORDER — ONDANSETRON 2 MG/ML
4 INJECTION INTRAMUSCULAR; INTRAVENOUS EVERY 6 HOURS PRN
Status: DISCONTINUED | OUTPATIENT
Start: 2021-01-12 | End: 2021-01-16 | Stop reason: HOSPADM

## 2021-01-12 RX ORDER — SODIUM CHLORIDE 0.9 % (FLUSH) 0.9 %
10 SYRINGE (ML) INJECTION
Status: DISCONTINUED | OUTPATIENT
Start: 2021-01-12 | End: 2021-01-16 | Stop reason: HOSPADM

## 2021-01-12 RX ORDER — DICYCLOMINE HYDROCHLORIDE 20 MG/1
20 TABLET ORAL 3 TIMES DAILY PRN
Status: DISCONTINUED | OUTPATIENT
Start: 2021-01-12 | End: 2021-01-16 | Stop reason: HOSPADM

## 2021-01-12 RX ORDER — GABAPENTIN 300 MG/1
600 CAPSULE ORAL 3 TIMES DAILY
Status: DISCONTINUED | OUTPATIENT
Start: 2021-01-12 | End: 2021-01-16 | Stop reason: HOSPADM

## 2021-01-12 RX ORDER — FAMOTIDINE 20 MG/1
20 TABLET, FILM COATED ORAL 2 TIMES DAILY
Status: DISCONTINUED | OUTPATIENT
Start: 2021-01-12 | End: 2021-01-16 | Stop reason: HOSPADM

## 2021-01-12 RX ADMIN — TAMSULOSIN HYDROCHLORIDE 0.4 MG: 0.4 CAPSULE ORAL at 09:01

## 2021-01-12 RX ADMIN — HYDROMORPHONE HYDROCHLORIDE 2 MG: 2 INJECTION, SOLUTION INTRAMUSCULAR; INTRAVENOUS; SUBCUTANEOUS at 06:01

## 2021-01-12 RX ADMIN — ONDANSETRON 4 MG: 2 INJECTION INTRAMUSCULAR; INTRAVENOUS at 04:01

## 2021-01-12 RX ADMIN — LORAZEPAM 1 MG: 2 INJECTION INTRAMUSCULAR; INTRAVENOUS at 07:01

## 2021-01-12 RX ADMIN — KETOROLAC TROMETHAMINE 30 MG: 30 INJECTION, SOLUTION INTRAMUSCULAR at 07:01

## 2021-01-12 RX ADMIN — HEPARIN SODIUM 5000 UNITS: 5000 INJECTION INTRAVENOUS; SUBCUTANEOUS at 09:01

## 2021-01-12 RX ADMIN — MORPHINE SULFATE 4 MG: 4 INJECTION INTRAVENOUS at 04:01

## 2021-01-12 RX ADMIN — HYDROMORPHONE HYDROCHLORIDE 1 MG: 1 INJECTION, SOLUTION INTRAMUSCULAR; INTRAVENOUS; SUBCUTANEOUS at 05:01

## 2021-01-12 RX ADMIN — PROMETHAZINE HYDROCHLORIDE 25 MG: 25 INJECTION INTRAMUSCULAR; INTRAVENOUS at 05:01

## 2021-01-12 RX ADMIN — OXYCODONE HYDROCHLORIDE 10 MG: 5 TABLET ORAL at 08:01

## 2021-01-12 RX ADMIN — FAMOTIDINE 20 MG: 20 TABLET, FILM COATED ORAL at 09:01

## 2021-01-12 RX ADMIN — HYDROMORPHONE HYDROCHLORIDE 2 MG: 2 INJECTION, SOLUTION INTRAMUSCULAR; INTRAVENOUS; SUBCUTANEOUS at 10:01

## 2021-01-12 RX ADMIN — GABAPENTIN 600 MG: 300 CAPSULE ORAL at 08:01

## 2021-01-12 RX ADMIN — ONDANSETRON 4 MG: 2 INJECTION INTRAMUSCULAR; INTRAVENOUS at 08:01

## 2021-01-12 RX ADMIN — VANCOMYCIN HYDROCHLORIDE 2250 MG: 500 INJECTION, POWDER, LYOPHILIZED, FOR SOLUTION INTRAVENOUS at 10:01

## 2021-01-13 ENCOUNTER — ANESTHESIA (OUTPATIENT)
Dept: SURGERY | Facility: HOSPITAL | Age: 41
DRG: 488 | End: 2021-01-13
Payer: MEDICAID

## 2021-01-13 ENCOUNTER — ANESTHESIA EVENT (OUTPATIENT)
Dept: SURGERY | Facility: HOSPITAL | Age: 41
DRG: 488 | End: 2021-01-13
Payer: MEDICAID

## 2021-01-13 LAB
ALBUMIN SERPL BCP-MCNC: 3.3 G/DL (ref 3.5–5.2)
ALP SERPL-CCNC: 134 U/L (ref 55–135)
ALT SERPL W/O P-5'-P-CCNC: 130 U/L (ref 10–44)
ANION GAP SERPL CALC-SCNC: 11 MMOL/L (ref 8–16)
AST SERPL-CCNC: 101 U/L (ref 10–40)
BACTERIA #/AREA URNS AUTO: ABNORMAL /HPF
BASOPHILS # BLD AUTO: 0.06 K/UL (ref 0–0.2)
BASOPHILS NFR BLD: 0.4 % (ref 0–1.9)
BILIRUB SERPL-MCNC: 2 MG/DL (ref 0.1–1)
BILIRUB UR QL STRIP: NEGATIVE
BUN SERPL-MCNC: 12 MG/DL (ref 6–20)
CALCIUM SERPL-MCNC: 8.2 MG/DL (ref 8.7–10.5)
CHLORIDE SERPL-SCNC: 102 MMOL/L (ref 95–110)
CLARITY UR REFRACT.AUTO: ABNORMAL
CO2 SERPL-SCNC: 21 MMOL/L (ref 23–29)
COLOR UR AUTO: YELLOW
CREAT SERPL-MCNC: 1 MG/DL (ref 0.5–1.4)
DIFFERENTIAL METHOD: ABNORMAL
EOSINOPHIL # BLD AUTO: 0.6 K/UL (ref 0–0.5)
EOSINOPHIL NFR BLD: 4.5 % (ref 0–8)
ERYTHROCYTE [DISTWIDTH] IN BLOOD BY AUTOMATED COUNT: 16.3 % (ref 11.5–14.5)
EST. GFR  (AFRICAN AMERICAN): >60 ML/MIN/1.73 M^2
EST. GFR  (NON AFRICAN AMERICAN): >60 ML/MIN/1.73 M^2
GLUCOSE SERPL-MCNC: 88 MG/DL (ref 70–110)
GLUCOSE UR QL STRIP: NEGATIVE
HCT VFR BLD AUTO: 32.8 % (ref 40–54)
HGB BLD-MCNC: 10.6 G/DL (ref 14–18)
HGB UR QL STRIP: ABNORMAL
IMM GRANULOCYTES # BLD AUTO: 0.05 K/UL (ref 0–0.04)
IMM GRANULOCYTES NFR BLD AUTO: 0.4 % (ref 0–0.5)
KETONES UR QL STRIP: ABNORMAL
LEUKOCYTE ESTERASE UR QL STRIP: ABNORMAL
LYMPHOCYTES # BLD AUTO: 0.8 K/UL (ref 1–4.8)
LYMPHOCYTES NFR BLD: 5.5 % (ref 18–48)
MAGNESIUM SERPL-MCNC: 1.2 MG/DL (ref 1.6–2.6)
MCH RBC QN AUTO: 24.9 PG (ref 27–31)
MCHC RBC AUTO-ENTMCNC: 32.3 G/DL (ref 32–36)
MCV RBC AUTO: 77 FL (ref 82–98)
MICROSCOPIC COMMENT: ABNORMAL
MONOCYTES # BLD AUTO: 1.5 K/UL (ref 0.3–1)
MONOCYTES NFR BLD: 10.9 % (ref 4–15)
NEUTROPHILS # BLD AUTO: 10.7 K/UL (ref 1.8–7.7)
NEUTROPHILS NFR BLD: 78.3 % (ref 38–73)
NITRITE UR QL STRIP: NEGATIVE
NON-SQ EPI CELLS #/AREA URNS AUTO: 3 /HPF
NRBC BLD-RTO: 0 /100 WBC
PH UR STRIP: 6 [PH] (ref 5–8)
PHOSPHATE SERPL-MCNC: 3.1 MG/DL (ref 2.7–4.5)
PLATELET # BLD AUTO: 270 K/UL (ref 150–350)
PMV BLD AUTO: 10.3 FL (ref 9.2–12.9)
POTASSIUM SERPL-SCNC: 3.1 MMOL/L (ref 3.5–5.1)
PROT SERPL-MCNC: 6.1 G/DL (ref 6–8.4)
PROT UR QL STRIP: NEGATIVE
RBC # BLD AUTO: 4.26 M/UL (ref 4.6–6.2)
RBC #/AREA URNS AUTO: 30 /HPF (ref 0–4)
SODIUM SERPL-SCNC: 134 MMOL/L (ref 136–145)
SP GR UR STRIP: <=1.005 (ref 1–1.03)
SQUAMOUS #/AREA URNS AUTO: 7 /HPF
URN SPEC COLLECT METH UR: ABNORMAL
UROBILINOGEN UR STRIP-ACNC: NEGATIVE EU/DL
WBC # BLD AUTO: 13.73 K/UL (ref 3.9–12.7)
WBC #/AREA URNS AUTO: 10 /HPF (ref 0–5)

## 2021-01-13 PROCEDURE — 99900035 HC TECH TIME PER 15 MIN (STAT)

## 2021-01-13 PROCEDURE — 87070 CULTURE OTHR SPECIMN AEROBIC: CPT

## 2021-01-13 PROCEDURE — 84100 ASSAY OF PHOSPHORUS: CPT

## 2021-01-13 PROCEDURE — 63600175 PHARM REV CODE 636 W HCPCS: Performed by: NURSE ANESTHETIST, CERTIFIED REGISTERED

## 2021-01-13 PROCEDURE — 87205 SMEAR GRAM STAIN: CPT

## 2021-01-13 PROCEDURE — 36415 COLL VENOUS BLD VENIPUNCTURE: CPT

## 2021-01-13 PROCEDURE — 85025 COMPLETE CBC W/AUTO DIFF WBC: CPT

## 2021-01-13 PROCEDURE — 63600175 PHARM REV CODE 636 W HCPCS: Performed by: ANESTHESIOLOGY

## 2021-01-13 PROCEDURE — 25000003 PHARM REV CODE 250: Performed by: ORTHOPAEDIC SURGERY

## 2021-01-13 PROCEDURE — 25000003 PHARM REV CODE 250: Performed by: NURSE ANESTHETIST, CERTIFIED REGISTERED

## 2021-01-13 PROCEDURE — G0378 HOSPITAL OBSERVATION PER HR: HCPCS

## 2021-01-13 PROCEDURE — 87186 SC STD MICRODIL/AGAR DIL: CPT

## 2021-01-13 PROCEDURE — 63600175 PHARM REV CODE 636 W HCPCS: Performed by: STUDENT IN AN ORGANIZED HEALTH CARE EDUCATION/TRAINING PROGRAM

## 2021-01-13 PROCEDURE — 63600175 PHARM REV CODE 636 W HCPCS: Performed by: ORTHOPAEDIC SURGERY

## 2021-01-13 PROCEDURE — 36000705 HC OR TIME LEV I EA ADD 15 MIN: Performed by: ORTHOPAEDIC SURGERY

## 2021-01-13 PROCEDURE — 83735 ASSAY OF MAGNESIUM: CPT

## 2021-01-13 PROCEDURE — 87116 MYCOBACTERIA CULTURE: CPT

## 2021-01-13 PROCEDURE — 27301 PR INCIS/DRAIN THIGH/KNEE ABSCESS,DEEP: ICD-10-PCS | Mod: RT,,, | Performed by: ORTHOPAEDIC SURGERY

## 2021-01-13 PROCEDURE — 87077 CULTURE AEROBIC IDENTIFY: CPT

## 2021-01-13 PROCEDURE — 87102 FUNGUS ISOLATION CULTURE: CPT

## 2021-01-13 PROCEDURE — 96376 TX/PRO/DX INJ SAME DRUG ADON: CPT

## 2021-01-13 PROCEDURE — 25000003 PHARM REV CODE 250: Performed by: STUDENT IN AN ORGANIZED HEALTH CARE EDUCATION/TRAINING PROGRAM

## 2021-01-13 PROCEDURE — 36000704 HC OR TIME LEV I 1ST 15 MIN: Performed by: ORTHOPAEDIC SURGERY

## 2021-01-13 PROCEDURE — 11000001 HC ACUTE MED/SURG PRIVATE ROOM

## 2021-01-13 PROCEDURE — 87206 SMEAR FLUORESCENT/ACID STAI: CPT

## 2021-01-13 PROCEDURE — 37000008 HC ANESTHESIA 1ST 15 MINUTES: Performed by: ORTHOPAEDIC SURGERY

## 2021-01-13 PROCEDURE — 63600175 PHARM REV CODE 636 W HCPCS: Performed by: INTERNAL MEDICINE

## 2021-01-13 PROCEDURE — 87075 CULTR BACTERIA EXCEPT BLOOD: CPT

## 2021-01-13 PROCEDURE — 71000033 HC RECOVERY, INTIAL HOUR: Performed by: ORTHOPAEDIC SURGERY

## 2021-01-13 PROCEDURE — 80053 COMPREHEN METABOLIC PANEL: CPT

## 2021-01-13 PROCEDURE — 37000009 HC ANESTHESIA EA ADD 15 MINS: Performed by: ORTHOPAEDIC SURGERY

## 2021-01-13 PROCEDURE — 25000003 PHARM REV CODE 250: Performed by: INTERNAL MEDICINE

## 2021-01-13 PROCEDURE — 27301 DRAIN THIGH/KNEE LESION: CPT | Mod: RT,,, | Performed by: ORTHOPAEDIC SURGERY

## 2021-01-13 PROCEDURE — 87086 URINE CULTURE/COLONY COUNT: CPT

## 2021-01-13 RX ORDER — MIDAZOLAM HYDROCHLORIDE 1 MG/ML
INJECTION, SOLUTION INTRAMUSCULAR; INTRAVENOUS
Status: DISCONTINUED | OUTPATIENT
Start: 2021-01-13 | End: 2021-01-13

## 2021-01-13 RX ORDER — SODIUM CHLORIDE 0.9 % (FLUSH) 0.9 %
10 SYRINGE (ML) INJECTION
Status: DISCONTINUED | OUTPATIENT
Start: 2021-01-13 | End: 2021-01-16 | Stop reason: HOSPADM

## 2021-01-13 RX ORDER — LIDOCAINE HYDROCHLORIDE 20 MG/ML
INJECTION INTRAVENOUS
Status: DISCONTINUED | OUTPATIENT
Start: 2021-01-13 | End: 2021-01-13

## 2021-01-13 RX ORDER — DEXAMETHASONE SODIUM PHOSPHATE 4 MG/ML
INJECTION, SOLUTION INTRA-ARTICULAR; INTRALESIONAL; INTRAMUSCULAR; INTRAVENOUS; SOFT TISSUE
Status: DISCONTINUED | OUTPATIENT
Start: 2021-01-13 | End: 2021-01-13

## 2021-01-13 RX ORDER — FENTANYL CITRATE 50 UG/ML
INJECTION, SOLUTION INTRAMUSCULAR; INTRAVENOUS
Status: DISCONTINUED | OUTPATIENT
Start: 2021-01-13 | End: 2021-01-13

## 2021-01-13 RX ORDER — HYDROMORPHONE HYDROCHLORIDE 2 MG/ML
INJECTION, SOLUTION INTRAMUSCULAR; INTRAVENOUS; SUBCUTANEOUS
Status: DISCONTINUED | OUTPATIENT
Start: 2021-01-13 | End: 2021-01-13

## 2021-01-13 RX ORDER — HYDROMORPHONE HYDROCHLORIDE 2 MG/ML
0.5 INJECTION, SOLUTION INTRAMUSCULAR; INTRAVENOUS; SUBCUTANEOUS EVERY 5 MIN PRN
Status: DISCONTINUED | OUTPATIENT
Start: 2021-01-13 | End: 2021-01-13 | Stop reason: HOSPADM

## 2021-01-13 RX ORDER — VANCOMYCIN HYDROCHLORIDE 1 G/20ML
INJECTION, POWDER, LYOPHILIZED, FOR SOLUTION INTRAVENOUS
Status: DISCONTINUED | OUTPATIENT
Start: 2021-01-13 | End: 2021-01-13 | Stop reason: HOSPADM

## 2021-01-13 RX ORDER — OXYCODONE AND ACETAMINOPHEN 10; 325 MG/1; MG/1
1 TABLET ORAL EVERY 4 HOURS
Status: DISCONTINUED | OUTPATIENT
Start: 2021-01-13 | End: 2021-01-16

## 2021-01-13 RX ORDER — ONDANSETRON 2 MG/ML
INJECTION INTRAMUSCULAR; INTRAVENOUS
Status: DISCONTINUED | OUTPATIENT
Start: 2021-01-13 | End: 2021-01-13

## 2021-01-13 RX ORDER — HYDROMORPHONE HYDROCHLORIDE 1 MG/ML
1 INJECTION, SOLUTION INTRAMUSCULAR; INTRAVENOUS; SUBCUTANEOUS EVERY 4 HOURS PRN
Status: DISCONTINUED | OUTPATIENT
Start: 2021-01-13 | End: 2021-01-14

## 2021-01-13 RX ORDER — SODIUM CHLORIDE 9 MG/ML
INJECTION, SOLUTION INTRAVENOUS CONTINUOUS
Status: DISCONTINUED | OUTPATIENT
Start: 2021-01-13 | End: 2021-01-14

## 2021-01-13 RX ORDER — KETOROLAC TROMETHAMINE 30 MG/ML
30 INJECTION, SOLUTION INTRAMUSCULAR; INTRAVENOUS ONCE
Status: COMPLETED | OUTPATIENT
Start: 2021-01-13 | End: 2021-01-13

## 2021-01-13 RX ORDER — ACETAMINOPHEN 10 MG/ML
INJECTION, SOLUTION INTRAVENOUS
Status: DISCONTINUED | OUTPATIENT
Start: 2021-01-13 | End: 2021-01-13

## 2021-01-13 RX ORDER — PROPOFOL 10 MG/ML
VIAL (ML) INTRAVENOUS
Status: DISCONTINUED | OUTPATIENT
Start: 2021-01-13 | End: 2021-01-13

## 2021-01-13 RX ORDER — CEFEPIME HYDROCHLORIDE 1 G/50ML
2 INJECTION, SOLUTION INTRAVENOUS
Status: DISCONTINUED | OUTPATIENT
Start: 2021-01-13 | End: 2021-01-15

## 2021-01-13 RX ORDER — ONDANSETRON 2 MG/ML
4 INJECTION INTRAMUSCULAR; INTRAVENOUS ONCE AS NEEDED
Status: DISCONTINUED | OUTPATIENT
Start: 2021-01-13 | End: 2021-01-13 | Stop reason: HOSPADM

## 2021-01-13 RX ADMIN — GABAPENTIN 600 MG: 300 CAPSULE ORAL at 09:01

## 2021-01-13 RX ADMIN — FAMOTIDINE 20 MG: 20 TABLET, FILM COATED ORAL at 09:01

## 2021-01-13 RX ADMIN — SODIUM CHLORIDE, SODIUM LACTATE, POTASSIUM CHLORIDE, AND CALCIUM CHLORIDE: .6; .31; .03; .02 INJECTION, SOLUTION INTRAVENOUS at 05:01

## 2021-01-13 RX ADMIN — OXYCODONE HYDROCHLORIDE 10 MG: 5 TABLET ORAL at 07:01

## 2021-01-13 RX ADMIN — LORAZEPAM 1 MG: 2 INJECTION INTRAMUSCULAR; INTRAVENOUS at 08:01

## 2021-01-13 RX ADMIN — OXYCODONE HYDROCHLORIDE AND ACETAMINOPHEN 1 TABLET: 10; 325 TABLET ORAL at 09:01

## 2021-01-13 RX ADMIN — VANCOMYCIN HYDROCHLORIDE 1750 MG: 10 INJECTION, POWDER, LYOPHILIZED, FOR SOLUTION INTRAVENOUS at 09:01

## 2021-01-13 RX ADMIN — HYDROMORPHONE HYDROCHLORIDE 0.6 MG: 2 INJECTION INTRAMUSCULAR; INTRAVENOUS; SUBCUTANEOUS at 06:01

## 2021-01-13 RX ADMIN — ONDANSETRON 4 MG: 2 INJECTION INTRAMUSCULAR; INTRAVENOUS at 04:01

## 2021-01-13 RX ADMIN — LIDOCAINE HYDROCHLORIDE 100 MG: 20 INJECTION, SOLUTION INTRAVENOUS at 05:01

## 2021-01-13 RX ADMIN — ACETAMINOPHEN 650 MG: 325 TABLET ORAL at 08:01

## 2021-01-13 RX ADMIN — DEXAMETHASONE SODIUM PHOSPHATE 8 MG: 4 INJECTION, SOLUTION INTRA-ARTICULAR; INTRALESIONAL; INTRAMUSCULAR; INTRAVENOUS; SOFT TISSUE at 05:01

## 2021-01-13 RX ADMIN — FAMOTIDINE 20 MG: 20 TABLET, FILM COATED ORAL at 08:01

## 2021-01-13 RX ADMIN — CETIRIZINE HYDROCHLORIDE 20 MG: 10 TABLET, FILM COATED ORAL at 08:01

## 2021-01-13 RX ADMIN — POTASSIUM BICARBONATE 50 MEQ: 977.5 TABLET, EFFERVESCENT ORAL at 09:01

## 2021-01-13 RX ADMIN — ACETAMINOPHEN 650 MG: 325 TABLET ORAL at 04:01

## 2021-01-13 RX ADMIN — PROPOFOL 200 MG: 10 INJECTION, EMULSION INTRAVENOUS at 05:01

## 2021-01-13 RX ADMIN — FENTANYL CITRATE 50 MCG: 50 INJECTION, SOLUTION INTRAMUSCULAR; INTRAVENOUS at 05:01

## 2021-01-13 RX ADMIN — THERA TABS 1 TABLET: TAB at 08:01

## 2021-01-13 RX ADMIN — HYDROMORPHONE HYDROCHLORIDE 0.4 MG: 2 INJECTION INTRAMUSCULAR; INTRAVENOUS; SUBCUTANEOUS at 06:01

## 2021-01-13 RX ADMIN — HYDROMORPHONE HYDROCHLORIDE 1 MG: 2 INJECTION INTRAMUSCULAR; INTRAVENOUS; SUBCUTANEOUS at 06:01

## 2021-01-13 RX ADMIN — VANCOMYCIN HYDROCHLORIDE 1750 MG: 10 INJECTION, POWDER, LYOPHILIZED, FOR SOLUTION INTRAVENOUS at 10:01

## 2021-01-13 RX ADMIN — LACTOBACILLUS TAB 1 TABLET: TAB at 07:01

## 2021-01-13 RX ADMIN — CETIRIZINE HYDROCHLORIDE 20 MG: 10 TABLET, FILM COATED ORAL at 09:01

## 2021-01-13 RX ADMIN — LACTOBACILLUS TAB 1 TABLET: TAB at 11:01

## 2021-01-13 RX ADMIN — GABAPENTIN 600 MG: 300 CAPSULE ORAL at 08:01

## 2021-01-13 RX ADMIN — ONDANSETRON 8 MG: 2 INJECTION, SOLUTION INTRAMUSCULAR; INTRAVENOUS at 05:01

## 2021-01-13 RX ADMIN — HYDROMORPHONE HYDROCHLORIDE 1 MG: 1 INJECTION, SOLUTION INTRAMUSCULAR; INTRAVENOUS; SUBCUTANEOUS at 11:01

## 2021-01-13 RX ADMIN — OXYCODONE HYDROCHLORIDE AND ACETAMINOPHEN 1 TABLET: 10; 325 TABLET ORAL at 02:01

## 2021-01-13 RX ADMIN — PAROXETINE HYDROCHLORIDE 20 MG: 20 TABLET, FILM COATED ORAL at 07:01

## 2021-01-13 RX ADMIN — TAMSULOSIN HYDROCHLORIDE 0.4 MG: 0.4 CAPSULE ORAL at 08:01

## 2021-01-13 RX ADMIN — PREDNISONE 20 MG: 20 TABLET ORAL at 08:01

## 2021-01-13 RX ADMIN — HYDROMORPHONE HYDROCHLORIDE 1 MG: 1 INJECTION, SOLUTION INTRAMUSCULAR; INTRAVENOUS; SUBCUTANEOUS at 08:01

## 2021-01-13 RX ADMIN — KETOROLAC TROMETHAMINE 30 MG: 30 INJECTION, SOLUTION INTRAMUSCULAR at 08:01

## 2021-01-13 RX ADMIN — HYDROMORPHONE HYDROCHLORIDE 1 MG: 1 INJECTION, SOLUTION INTRAMUSCULAR; INTRAVENOUS; SUBCUTANEOUS at 04:01

## 2021-01-13 RX ADMIN — KETOROLAC TROMETHAMINE 30 MG: 30 INJECTION, SOLUTION INTRAMUSCULAR at 09:01

## 2021-01-13 RX ADMIN — ONDANSETRON 4 MG: 2 INJECTION INTRAMUSCULAR; INTRAVENOUS at 09:01

## 2021-01-13 RX ADMIN — MIDAZOLAM 2 MG: 1 INJECTION INTRAMUSCULAR; INTRAVENOUS at 05:01

## 2021-01-13 RX ADMIN — HEPARIN SODIUM 5000 UNITS: 5000 INJECTION INTRAVENOUS; SUBCUTANEOUS at 06:01

## 2021-01-13 RX ADMIN — OXYCODONE HYDROCHLORIDE 10 MG: 5 TABLET ORAL at 01:01

## 2021-01-13 RX ADMIN — LORAZEPAM 1 MG: 2 INJECTION INTRAMUSCULAR; INTRAVENOUS at 12:01

## 2021-01-13 RX ADMIN — TAMSULOSIN HYDROCHLORIDE 0.4 MG: 0.4 CAPSULE ORAL at 09:01

## 2021-01-13 RX ADMIN — ACETAMINOPHEN 1000 MG: 10 INJECTION, SOLUTION INTRAVENOUS at 05:01

## 2021-01-14 LAB
ALBUMIN SERPL BCP-MCNC: 3.4 G/DL (ref 3.5–5.2)
ALP SERPL-CCNC: 141 U/L (ref 55–135)
ALT SERPL W/O P-5'-P-CCNC: 98 U/L (ref 10–44)
ANION GAP SERPL CALC-SCNC: 16 MMOL/L (ref 8–16)
AST SERPL-CCNC: 37 U/L (ref 10–40)
BASOPHILS # BLD AUTO: 0.03 K/UL (ref 0–0.2)
BASOPHILS NFR BLD: 0.1 % (ref 0–1.9)
BILIRUB SERPL-MCNC: 0.7 MG/DL (ref 0.1–1)
BUN SERPL-MCNC: 17 MG/DL (ref 6–20)
CALCIUM SERPL-MCNC: 8.9 MG/DL (ref 8.7–10.5)
CHLORIDE SERPL-SCNC: 103 MMOL/L (ref 95–110)
CO2 SERPL-SCNC: 21 MMOL/L (ref 23–29)
CREAT SERPL-MCNC: 1 MG/DL (ref 0.5–1.4)
DIFFERENTIAL METHOD: ABNORMAL
EOSINOPHIL # BLD AUTO: 0 K/UL (ref 0–0.5)
EOSINOPHIL NFR BLD: 0 % (ref 0–8)
ERYTHROCYTE [DISTWIDTH] IN BLOOD BY AUTOMATED COUNT: 16.5 % (ref 11.5–14.5)
EST. GFR  (AFRICAN AMERICAN): >60 ML/MIN/1.73 M^2
EST. GFR  (NON AFRICAN AMERICAN): >60 ML/MIN/1.73 M^2
GLUCOSE SERPL-MCNC: 127 MG/DL (ref 70–110)
GRAM STN SPEC: NORMAL
GRAM STN SPEC: NORMAL
HCT VFR BLD AUTO: 36.9 % (ref 40–54)
HGB BLD-MCNC: 11.5 G/DL (ref 14–18)
IMM GRANULOCYTES # BLD AUTO: 0.15 K/UL (ref 0–0.04)
IMM GRANULOCYTES NFR BLD AUTO: 0.7 % (ref 0–0.5)
LYMPHOCYTES # BLD AUTO: 0.4 K/UL (ref 1–4.8)
LYMPHOCYTES NFR BLD: 2 % (ref 18–48)
MAGNESIUM SERPL-MCNC: 1.8 MG/DL (ref 1.6–2.6)
MCH RBC QN AUTO: 24.9 PG (ref 27–31)
MCHC RBC AUTO-ENTMCNC: 31.2 G/DL (ref 32–36)
MCV RBC AUTO: 80 FL (ref 82–98)
MONOCYTES # BLD AUTO: 1.4 K/UL (ref 0.3–1)
MONOCYTES NFR BLD: 6.4 % (ref 4–15)
NEUTROPHILS # BLD AUTO: 19.9 K/UL (ref 1.8–7.7)
NEUTROPHILS NFR BLD: 90.8 % (ref 38–73)
NRBC BLD-RTO: 0 /100 WBC
PHOSPHATE SERPL-MCNC: 3.2 MG/DL (ref 2.7–4.5)
PLATELET # BLD AUTO: 322 K/UL (ref 150–350)
PMV BLD AUTO: 11.2 FL (ref 9.2–12.9)
POTASSIUM SERPL-SCNC: 4 MMOL/L (ref 3.5–5.1)
PROT SERPL-MCNC: 7.2 G/DL (ref 6–8.4)
RBC # BLD AUTO: 4.61 M/UL (ref 4.6–6.2)
SODIUM SERPL-SCNC: 140 MMOL/L (ref 136–145)
VANCOMYCIN TROUGH SERPL-MCNC: 8.6 UG/ML (ref 10–22)
WBC # BLD AUTO: 21.88 K/UL (ref 3.9–12.7)

## 2021-01-14 PROCEDURE — 80053 COMPREHEN METABOLIC PANEL: CPT

## 2021-01-14 PROCEDURE — 83735 ASSAY OF MAGNESIUM: CPT

## 2021-01-14 PROCEDURE — 99900035 HC TECH TIME PER 15 MIN (STAT)

## 2021-01-14 PROCEDURE — 97161 PT EVAL LOW COMPLEX 20 MIN: CPT

## 2021-01-14 PROCEDURE — 63600175 PHARM REV CODE 636 W HCPCS: Performed by: ORTHOPAEDIC SURGERY

## 2021-01-14 PROCEDURE — 85025 COMPLETE CBC W/AUTO DIFF WBC: CPT

## 2021-01-14 PROCEDURE — 25000003 PHARM REV CODE 250: Performed by: ORTHOPAEDIC SURGERY

## 2021-01-14 PROCEDURE — 36415 COLL VENOUS BLD VENIPUNCTURE: CPT

## 2021-01-14 PROCEDURE — 11000001 HC ACUTE MED/SURG PRIVATE ROOM

## 2021-01-14 PROCEDURE — 94761 N-INVAS EAR/PLS OXIMETRY MLT: CPT

## 2021-01-14 PROCEDURE — 84100 ASSAY OF PHOSPHORUS: CPT

## 2021-01-14 PROCEDURE — 25000003 PHARM REV CODE 250: Performed by: STUDENT IN AN ORGANIZED HEALTH CARE EDUCATION/TRAINING PROGRAM

## 2021-01-14 PROCEDURE — 63600175 PHARM REV CODE 636 W HCPCS: Performed by: INTERNAL MEDICINE

## 2021-01-14 PROCEDURE — 97530 THERAPEUTIC ACTIVITIES: CPT

## 2021-01-14 PROCEDURE — 97116 GAIT TRAINING THERAPY: CPT

## 2021-01-14 PROCEDURE — 80202 ASSAY OF VANCOMYCIN: CPT

## 2021-01-14 PROCEDURE — 96375 TX/PRO/DX INJ NEW DRUG ADDON: CPT

## 2021-01-14 PROCEDURE — 96376 TX/PRO/DX INJ SAME DRUG ADON: CPT

## 2021-01-14 PROCEDURE — 97110 THERAPEUTIC EXERCISES: CPT

## 2021-01-14 PROCEDURE — 63600175 PHARM REV CODE 636 W HCPCS: Performed by: STUDENT IN AN ORGANIZED HEALTH CARE EDUCATION/TRAINING PROGRAM

## 2021-01-14 RX ORDER — HYDROMORPHONE HYDROCHLORIDE 1 MG/ML
1 INJECTION, SOLUTION INTRAMUSCULAR; INTRAVENOUS; SUBCUTANEOUS
Status: DISCONTINUED | OUTPATIENT
Start: 2021-01-14 | End: 2021-01-15

## 2021-01-14 RX ORDER — HYDROMORPHONE HYDROCHLORIDE 1 MG/ML
1 INJECTION, SOLUTION INTRAMUSCULAR; INTRAVENOUS; SUBCUTANEOUS ONCE
Status: DISCONTINUED | OUTPATIENT
Start: 2021-01-14 | End: 2021-01-14

## 2021-01-14 RX ORDER — HYDROMORPHONE HYDROCHLORIDE 1 MG/ML
1 INJECTION, SOLUTION INTRAMUSCULAR; INTRAVENOUS; SUBCUTANEOUS ONCE
Status: COMPLETED | OUTPATIENT
Start: 2021-01-14 | End: 2021-01-14

## 2021-01-14 RX ORDER — BENZONATATE 100 MG/1
100 CAPSULE ORAL 3 TIMES DAILY PRN
Status: DISCONTINUED | OUTPATIENT
Start: 2021-01-14 | End: 2021-01-16 | Stop reason: HOSPADM

## 2021-01-14 RX ADMIN — PREDNISONE 20 MG: 20 TABLET ORAL at 09:01

## 2021-01-14 RX ADMIN — TAMSULOSIN HYDROCHLORIDE 0.4 MG: 0.4 CAPSULE ORAL at 09:01

## 2021-01-14 RX ADMIN — FAMOTIDINE 20 MG: 20 TABLET, FILM COATED ORAL at 09:01

## 2021-01-14 RX ADMIN — OXYCODONE HYDROCHLORIDE AND ACETAMINOPHEN 1 TABLET: 10; 325 TABLET ORAL at 09:01

## 2021-01-14 RX ADMIN — GABAPENTIN 600 MG: 300 CAPSULE ORAL at 08:01

## 2021-01-14 RX ADMIN — OXYCODONE HYDROCHLORIDE AND ACETAMINOPHEN 1 TABLET: 10; 325 TABLET ORAL at 05:01

## 2021-01-14 RX ADMIN — GABAPENTIN 600 MG: 300 CAPSULE ORAL at 02:01

## 2021-01-14 RX ADMIN — FAMOTIDINE 20 MG: 20 TABLET, FILM COATED ORAL at 08:01

## 2021-01-14 RX ADMIN — LORAZEPAM 1 MG: 2 INJECTION INTRAMUSCULAR; INTRAVENOUS at 04:01

## 2021-01-14 RX ADMIN — CETIRIZINE HYDROCHLORIDE 20 MG: 10 TABLET, FILM COATED ORAL at 09:01

## 2021-01-14 RX ADMIN — BENZONATATE 100 MG: 100 CAPSULE ORAL at 06:01

## 2021-01-14 RX ADMIN — CEFEPIME HYDROCHLORIDE 2 G: 2 INJECTION, SOLUTION INTRAVENOUS at 01:01

## 2021-01-14 RX ADMIN — HYDROMORPHONE HYDROCHLORIDE 1 MG: 1 INJECTION, SOLUTION INTRAMUSCULAR; INTRAVENOUS; SUBCUTANEOUS at 03:01

## 2021-01-14 RX ADMIN — LACTOBACILLUS TAB 1 TABLET: TAB at 04:01

## 2021-01-14 RX ADMIN — LORAZEPAM 1 MG: 2 INJECTION INTRAMUSCULAR; INTRAVENOUS at 08:01

## 2021-01-14 RX ADMIN — LACTOBACILLUS TAB 1 TABLET: TAB at 07:01

## 2021-01-14 RX ADMIN — VANCOMYCIN HYDROCHLORIDE 1750 MG: 10 INJECTION, POWDER, LYOPHILIZED, FOR SOLUTION INTRAVENOUS at 10:01

## 2021-01-14 RX ADMIN — LORAZEPAM 2 MG: 2 INJECTION INTRAMUSCULAR; INTRAVENOUS at 12:01

## 2021-01-14 RX ADMIN — HYDROMORPHONE HYDROCHLORIDE 1 MG: 1 INJECTION, SOLUTION INTRAMUSCULAR; INTRAVENOUS; SUBCUTANEOUS at 10:01

## 2021-01-14 RX ADMIN — LORAZEPAM 1 MG: 2 INJECTION INTRAMUSCULAR; INTRAVENOUS at 05:01

## 2021-01-14 RX ADMIN — OXYCODONE HYDROCHLORIDE AND ACETAMINOPHEN 1 TABLET: 10; 325 TABLET ORAL at 07:01

## 2021-01-14 RX ADMIN — CEFEPIME HYDROCHLORIDE 2 G: 2 INJECTION, SOLUTION INTRAVENOUS at 06:01

## 2021-01-14 RX ADMIN — PAROXETINE HYDROCHLORIDE 20 MG: 20 TABLET, FILM COATED ORAL at 07:01

## 2021-01-14 RX ADMIN — HYDROMORPHONE HYDROCHLORIDE 1 MG: 1 INJECTION, SOLUTION INTRAMUSCULAR; INTRAVENOUS; SUBCUTANEOUS at 05:01

## 2021-01-14 RX ADMIN — HYDROMORPHONE HYDROCHLORIDE 1 MG: 1 INJECTION, SOLUTION INTRAMUSCULAR; INTRAVENOUS; SUBCUTANEOUS at 07:01

## 2021-01-14 RX ADMIN — CEFEPIME HYDROCHLORIDE 2 G: 2 INJECTION, SOLUTION INTRAVENOUS at 09:01

## 2021-01-14 RX ADMIN — THERA TABS 1 TABLET: TAB at 09:01

## 2021-01-14 RX ADMIN — TAMSULOSIN HYDROCHLORIDE 0.4 MG: 0.4 CAPSULE ORAL at 08:01

## 2021-01-14 RX ADMIN — VANCOMYCIN HYDROCHLORIDE 1750 MG: 10 INJECTION, POWDER, LYOPHILIZED, FOR SOLUTION INTRAVENOUS at 02:01

## 2021-01-14 RX ADMIN — BENZONATATE 100 MG: 100 CAPSULE ORAL at 10:01

## 2021-01-14 RX ADMIN — LORAZEPAM 1 MG: 2 INJECTION INTRAMUSCULAR; INTRAVENOUS at 01:01

## 2021-01-14 RX ADMIN — HYDROMORPHONE HYDROCHLORIDE 1 MG: 1 INJECTION, SOLUTION INTRAMUSCULAR; INTRAVENOUS; SUBCUTANEOUS at 02:01

## 2021-01-14 RX ADMIN — OXYCODONE HYDROCHLORIDE AND ACETAMINOPHEN 1 TABLET: 10; 325 TABLET ORAL at 01:01

## 2021-01-14 RX ADMIN — OXYCODONE HYDROCHLORIDE AND ACETAMINOPHEN 1 TABLET: 10; 325 TABLET ORAL at 02:01

## 2021-01-14 RX ADMIN — GABAPENTIN 600 MG: 300 CAPSULE ORAL at 09:01

## 2021-01-14 RX ADMIN — HYDROMORPHONE HYDROCHLORIDE 1 MG: 1 INJECTION, SOLUTION INTRAMUSCULAR; INTRAVENOUS; SUBCUTANEOUS at 08:01

## 2021-01-14 RX ADMIN — LACTOBACILLUS TAB 1 TABLET: TAB at 12:01

## 2021-01-15 ENCOUNTER — TELEPHONE (OUTPATIENT)
Dept: ORTHOPEDICS | Facility: CLINIC | Age: 41
End: 2021-01-15

## 2021-01-15 LAB
ALBUMIN SERPL BCP-MCNC: 2.9 G/DL (ref 3.5–5.2)
ALP SERPL-CCNC: 104 U/L (ref 55–135)
ALT SERPL W/O P-5'-P-CCNC: 62 U/L (ref 10–44)
ANION GAP SERPL CALC-SCNC: 9 MMOL/L (ref 8–16)
AST SERPL-CCNC: 17 U/L (ref 10–40)
BACTERIA UR CULT: NO GROWTH
BASOPHILS # BLD AUTO: 0.03 K/UL (ref 0–0.2)
BASOPHILS NFR BLD: 0.2 % (ref 0–1.9)
BILIRUB SERPL-MCNC: 0.3 MG/DL (ref 0.1–1)
BUN SERPL-MCNC: 8 MG/DL (ref 6–20)
CALCIUM SERPL-MCNC: 8.8 MG/DL (ref 8.7–10.5)
CHLORIDE SERPL-SCNC: 104 MMOL/L (ref 95–110)
CO2 SERPL-SCNC: 29 MMOL/L (ref 23–29)
CREAT SERPL-MCNC: 0.8 MG/DL (ref 0.5–1.4)
DIFFERENTIAL METHOD: ABNORMAL
EOSINOPHIL # BLD AUTO: 0.1 K/UL (ref 0–0.5)
EOSINOPHIL NFR BLD: 0.4 % (ref 0–8)
ERYTHROCYTE [DISTWIDTH] IN BLOOD BY AUTOMATED COUNT: 16.7 % (ref 11.5–14.5)
EST. GFR  (AFRICAN AMERICAN): >60 ML/MIN/1.73 M^2
EST. GFR  (NON AFRICAN AMERICAN): >60 ML/MIN/1.73 M^2
GLUCOSE SERPL-MCNC: 110 MG/DL (ref 70–110)
HCT VFR BLD AUTO: 33.1 % (ref 40–54)
HGB BLD-MCNC: 10.3 G/DL (ref 14–18)
IMM GRANULOCYTES # BLD AUTO: 0.24 K/UL (ref 0–0.04)
IMM GRANULOCYTES NFR BLD AUTO: 1.4 % (ref 0–0.5)
LYMPHOCYTES # BLD AUTO: 0.9 K/UL (ref 1–4.8)
LYMPHOCYTES NFR BLD: 5.4 % (ref 18–48)
MAGNESIUM SERPL-MCNC: 1.9 MG/DL (ref 1.6–2.6)
MCH RBC QN AUTO: 25.2 PG (ref 27–31)
MCHC RBC AUTO-ENTMCNC: 31.1 G/DL (ref 32–36)
MCV RBC AUTO: 81 FL (ref 82–98)
MONOCYTES # BLD AUTO: 1.7 K/UL (ref 0.3–1)
MONOCYTES NFR BLD: 10.2 % (ref 4–15)
NEUTROPHILS # BLD AUTO: 13.9 K/UL (ref 1.8–7.7)
NEUTROPHILS NFR BLD: 82.4 % (ref 38–73)
NRBC BLD-RTO: 0 /100 WBC
PHOSPHATE SERPL-MCNC: 1.5 MG/DL (ref 2.7–4.5)
PLATELET # BLD AUTO: 336 K/UL (ref 150–350)
PMV BLD AUTO: 11 FL (ref 9.2–12.9)
POTASSIUM SERPL-SCNC: 3.8 MMOL/L (ref 3.5–5.1)
PROT SERPL-MCNC: 6.3 G/DL (ref 6–8.4)
RBC # BLD AUTO: 4.08 M/UL (ref 4.6–6.2)
SODIUM SERPL-SCNC: 142 MMOL/L (ref 136–145)
WBC # BLD AUTO: 16.9 K/UL (ref 3.9–12.7)

## 2021-01-15 PROCEDURE — 63600175 PHARM REV CODE 636 W HCPCS: Performed by: STUDENT IN AN ORGANIZED HEALTH CARE EDUCATION/TRAINING PROGRAM

## 2021-01-15 PROCEDURE — 85025 COMPLETE CBC W/AUTO DIFF WBC: CPT

## 2021-01-15 PROCEDURE — 94761 N-INVAS EAR/PLS OXIMETRY MLT: CPT

## 2021-01-15 PROCEDURE — 25000003 PHARM REV CODE 250: Performed by: INTERNAL MEDICINE

## 2021-01-15 PROCEDURE — 11000001 HC ACUTE MED/SURG PRIVATE ROOM

## 2021-01-15 PROCEDURE — 97116 GAIT TRAINING THERAPY: CPT

## 2021-01-15 PROCEDURE — 63600175 PHARM REV CODE 636 W HCPCS: Performed by: INTERNAL MEDICINE

## 2021-01-15 PROCEDURE — 36415 COLL VENOUS BLD VENIPUNCTURE: CPT

## 2021-01-15 PROCEDURE — 25000003 PHARM REV CODE 250: Performed by: STUDENT IN AN ORGANIZED HEALTH CARE EDUCATION/TRAINING PROGRAM

## 2021-01-15 PROCEDURE — 63600175 PHARM REV CODE 636 W HCPCS: Performed by: ORTHOPAEDIC SURGERY

## 2021-01-15 PROCEDURE — 80053 COMPREHEN METABOLIC PANEL: CPT

## 2021-01-15 PROCEDURE — 83735 ASSAY OF MAGNESIUM: CPT

## 2021-01-15 PROCEDURE — 84100 ASSAY OF PHOSPHORUS: CPT

## 2021-01-15 PROCEDURE — 25000003 PHARM REV CODE 250: Performed by: ORTHOPAEDIC SURGERY

## 2021-01-15 RX ORDER — LORAZEPAM 1 MG/1
1 TABLET ORAL EVERY 6 HOURS PRN
Status: DISCONTINUED | OUTPATIENT
Start: 2021-01-15 | End: 2021-01-16 | Stop reason: HOSPADM

## 2021-01-15 RX ORDER — HYDROMORPHONE HYDROCHLORIDE 1 MG/ML
1 INJECTION, SOLUTION INTRAMUSCULAR; INTRAVENOUS; SUBCUTANEOUS EVERY 4 HOURS PRN
Status: DISCONTINUED | OUTPATIENT
Start: 2021-01-15 | End: 2021-01-16 | Stop reason: HOSPADM

## 2021-01-15 RX ADMIN — VANCOMYCIN HYDROCHLORIDE 1750 MG: 10 INJECTION, POWDER, LYOPHILIZED, FOR SOLUTION INTRAVENOUS at 10:01

## 2021-01-15 RX ADMIN — OXYCODONE HYDROCHLORIDE AND ACETAMINOPHEN 1 TABLET: 10; 325 TABLET ORAL at 05:01

## 2021-01-15 RX ADMIN — LORAZEPAM 1 MG: 1 TABLET ORAL at 11:01

## 2021-01-15 RX ADMIN — LORAZEPAM 1 MG: 2 INJECTION INTRAMUSCULAR; INTRAVENOUS at 05:01

## 2021-01-15 RX ADMIN — LORAZEPAM 1 MG: 1 TABLET ORAL at 04:01

## 2021-01-15 RX ADMIN — OXYCODONE HYDROCHLORIDE AND ACETAMINOPHEN 1 TABLET: 10; 325 TABLET ORAL at 02:01

## 2021-01-15 RX ADMIN — PAROXETINE HYDROCHLORIDE 20 MG: 20 TABLET, FILM COATED ORAL at 06:01

## 2021-01-15 RX ADMIN — VANCOMYCIN HYDROCHLORIDE 2000 MG: 100 INJECTION, POWDER, LYOPHILIZED, FOR SOLUTION INTRAVENOUS at 09:01

## 2021-01-15 RX ADMIN — CEFEPIME HYDROCHLORIDE 2 G: 2 INJECTION, SOLUTION INTRAVENOUS at 02:01

## 2021-01-15 RX ADMIN — HYDROMORPHONE HYDROCHLORIDE 1 MG: 1 INJECTION, SOLUTION INTRAMUSCULAR; INTRAVENOUS; SUBCUTANEOUS at 11:01

## 2021-01-15 RX ADMIN — GABAPENTIN 600 MG: 300 CAPSULE ORAL at 02:01

## 2021-01-15 RX ADMIN — HYDROMORPHONE HYDROCHLORIDE 1 MG: 1 INJECTION, SOLUTION INTRAMUSCULAR; INTRAVENOUS; SUBCUTANEOUS at 03:01

## 2021-01-15 RX ADMIN — GABAPENTIN 600 MG: 300 CAPSULE ORAL at 09:01

## 2021-01-15 RX ADMIN — CEFEPIME HYDROCHLORIDE 2 G: 2 INJECTION, SOLUTION INTRAVENOUS at 09:01

## 2021-01-15 RX ADMIN — HYDROMORPHONE HYDROCHLORIDE 1 MG: 1 INJECTION, SOLUTION INTRAMUSCULAR; INTRAVENOUS; SUBCUTANEOUS at 07:01

## 2021-01-15 RX ADMIN — LACTOBACILLUS TAB 1 TABLET: TAB at 04:01

## 2021-01-15 RX ADMIN — LORAZEPAM 1 MG: 2 INJECTION INTRAMUSCULAR; INTRAVENOUS at 12:01

## 2021-01-15 RX ADMIN — OXYCODONE HYDROCHLORIDE AND ACETAMINOPHEN 1 TABLET: 10; 325 TABLET ORAL at 09:01

## 2021-01-15 RX ADMIN — PREDNISONE 20 MG: 20 TABLET ORAL at 09:01

## 2021-01-15 RX ADMIN — CETIRIZINE HYDROCHLORIDE 20 MG: 10 TABLET, FILM COATED ORAL at 09:01

## 2021-01-15 RX ADMIN — THERA TABS 1 TABLET: TAB at 09:01

## 2021-01-15 RX ADMIN — TAMSULOSIN HYDROCHLORIDE 0.4 MG: 0.4 CAPSULE ORAL at 09:01

## 2021-01-15 RX ADMIN — GABAPENTIN 600 MG: 300 CAPSULE ORAL at 08:01

## 2021-01-15 RX ADMIN — LACTOBACILLUS TAB 1 TABLET: TAB at 06:01

## 2021-01-15 RX ADMIN — LACTOBACILLUS TAB 1 TABLET: TAB at 11:01

## 2021-01-15 RX ADMIN — TAMSULOSIN HYDROCHLORIDE 0.4 MG: 0.4 CAPSULE ORAL at 08:01

## 2021-01-15 RX ADMIN — FAMOTIDINE 20 MG: 20 TABLET, FILM COATED ORAL at 09:01

## 2021-01-15 RX ADMIN — FAMOTIDINE 20 MG: 20 TABLET, FILM COATED ORAL at 08:01

## 2021-01-15 RX ADMIN — LORAZEPAM 1 MG: 2 INJECTION INTRAMUSCULAR; INTRAVENOUS at 10:01

## 2021-01-15 RX ADMIN — CETIRIZINE HYDROCHLORIDE 20 MG: 10 TABLET, FILM COATED ORAL at 08:01

## 2021-01-16 VITALS
BODY MASS INDEX: 29.95 KG/M2 | HEART RATE: 68 BPM | HEIGHT: 72 IN | RESPIRATION RATE: 18 BRPM | WEIGHT: 221.13 LBS | SYSTOLIC BLOOD PRESSURE: 124 MMHG | DIASTOLIC BLOOD PRESSURE: 76 MMHG | OXYGEN SATURATION: 95 % | TEMPERATURE: 98 F

## 2021-01-16 PROBLEM — M71.161 SEPTIC PREPATELLAR BURSITIS OF RIGHT KNEE: Status: ACTIVE | Noted: 2021-01-12

## 2021-01-16 LAB
ALBUMIN SERPL BCP-MCNC: 2.9 G/DL (ref 3.5–5.2)
ALP SERPL-CCNC: 103 U/L (ref 55–135)
ALT SERPL W/O P-5'-P-CCNC: 50 U/L (ref 10–44)
ANION GAP SERPL CALC-SCNC: 10 MMOL/L (ref 8–16)
AST SERPL-CCNC: 14 U/L (ref 10–40)
BACTERIA SPEC AEROBE CULT: ABNORMAL
BASOPHILS # BLD AUTO: 0.03 K/UL (ref 0–0.2)
BASOPHILS NFR BLD: 0.2 % (ref 0–1.9)
BILIRUB SERPL-MCNC: 0.2 MG/DL (ref 0.1–1)
BUN SERPL-MCNC: 9 MG/DL (ref 6–20)
CALCIUM SERPL-MCNC: 8.8 MG/DL (ref 8.7–10.5)
CHLORIDE SERPL-SCNC: 102 MMOL/L (ref 95–110)
CO2 SERPL-SCNC: 28 MMOL/L (ref 23–29)
CREAT SERPL-MCNC: 0.7 MG/DL (ref 0.5–1.4)
DIFFERENTIAL METHOD: ABNORMAL
EOSINOPHIL # BLD AUTO: 0.2 K/UL (ref 0–0.5)
EOSINOPHIL NFR BLD: 1.5 % (ref 0–8)
ERYTHROCYTE [DISTWIDTH] IN BLOOD BY AUTOMATED COUNT: 16.7 % (ref 11.5–14.5)
EST. GFR  (AFRICAN AMERICAN): >60 ML/MIN/1.73 M^2
EST. GFR  (NON AFRICAN AMERICAN): >60 ML/MIN/1.73 M^2
GLUCOSE SERPL-MCNC: 88 MG/DL (ref 70–110)
HCT VFR BLD AUTO: 33.2 % (ref 40–54)
HGB BLD-MCNC: 10.2 G/DL (ref 14–18)
IMM GRANULOCYTES # BLD AUTO: 0.32 K/UL (ref 0–0.04)
IMM GRANULOCYTES NFR BLD AUTO: 2.5 % (ref 0–0.5)
LYMPHOCYTES # BLD AUTO: 1.4 K/UL (ref 1–4.8)
LYMPHOCYTES NFR BLD: 10.9 % (ref 18–48)
MAGNESIUM SERPL-MCNC: 1.9 MG/DL (ref 1.6–2.6)
MCH RBC QN AUTO: 24.8 PG (ref 27–31)
MCHC RBC AUTO-ENTMCNC: 30.7 G/DL (ref 32–36)
MCV RBC AUTO: 81 FL (ref 82–98)
MONOCYTES # BLD AUTO: 1.5 K/UL (ref 0.3–1)
MONOCYTES NFR BLD: 11.7 % (ref 4–15)
NEUTROPHILS # BLD AUTO: 9.3 K/UL (ref 1.8–7.7)
NEUTROPHILS NFR BLD: 73.2 % (ref 38–73)
NRBC BLD-RTO: 0 /100 WBC
PHOSPHATE SERPL-MCNC: 2 MG/DL (ref 2.7–4.5)
PLATELET # BLD AUTO: 380 K/UL (ref 150–350)
PMV BLD AUTO: 11.1 FL (ref 9.2–12.9)
POTASSIUM SERPL-SCNC: 4 MMOL/L (ref 3.5–5.1)
PROT SERPL-MCNC: 6.4 G/DL (ref 6–8.4)
RBC # BLD AUTO: 4.12 M/UL (ref 4.6–6.2)
SODIUM SERPL-SCNC: 140 MMOL/L (ref 136–145)
WBC # BLD AUTO: 12.7 K/UL (ref 3.9–12.7)

## 2021-01-16 PROCEDURE — 80053 COMPREHEN METABOLIC PANEL: CPT

## 2021-01-16 PROCEDURE — 83735 ASSAY OF MAGNESIUM: CPT

## 2021-01-16 PROCEDURE — 63600175 PHARM REV CODE 636 W HCPCS: Performed by: STUDENT IN AN ORGANIZED HEALTH CARE EDUCATION/TRAINING PROGRAM

## 2021-01-16 PROCEDURE — 25000003 PHARM REV CODE 250: Performed by: ORTHOPAEDIC SURGERY

## 2021-01-16 PROCEDURE — 85025 COMPLETE CBC W/AUTO DIFF WBC: CPT

## 2021-01-16 PROCEDURE — 25000003 PHARM REV CODE 250: Performed by: STUDENT IN AN ORGANIZED HEALTH CARE EDUCATION/TRAINING PROGRAM

## 2021-01-16 PROCEDURE — 99238 HOSP IP/OBS DSCHRG MGMT 30/<: CPT | Mod: ,,, | Performed by: INTERNAL MEDICINE

## 2021-01-16 PROCEDURE — 25000003 PHARM REV CODE 250: Performed by: INTERNAL MEDICINE

## 2021-01-16 PROCEDURE — 84100 ASSAY OF PHOSPHORUS: CPT

## 2021-01-16 PROCEDURE — 36415 COLL VENOUS BLD VENIPUNCTURE: CPT

## 2021-01-16 PROCEDURE — 99238 PR HOSPITAL DISCHARGE DAY,<30 MIN: ICD-10-PCS | Mod: ,,, | Performed by: INTERNAL MEDICINE

## 2021-01-16 PROCEDURE — 94761 N-INVAS EAR/PLS OXIMETRY MLT: CPT

## 2021-01-16 PROCEDURE — 63600175 PHARM REV CODE 636 W HCPCS: Performed by: INTERNAL MEDICINE

## 2021-01-16 RX ORDER — OXYCODONE AND ACETAMINOPHEN 10; 325 MG/1; MG/1
1 TABLET ORAL EVERY 4 HOURS PRN
Qty: 16 TABLET | Refills: 0 | Status: SHIPPED | OUTPATIENT
Start: 2021-01-16 | End: 2021-01-16 | Stop reason: SDUPTHER

## 2021-01-16 RX ORDER — DOXYCYCLINE HYCLATE 100 MG/1
100 TABLET, DELAYED RELEASE ORAL 2 TIMES DAILY
Qty: 22 TABLET | Refills: 0 | Status: SHIPPED | OUTPATIENT
Start: 2021-01-16 | End: 2021-01-27

## 2021-01-16 RX ORDER — OXYCODONE AND ACETAMINOPHEN 10; 325 MG/1; MG/1
1 TABLET ORAL EVERY 6 HOURS
Status: DISCONTINUED | OUTPATIENT
Start: 2021-01-16 | End: 2021-01-16 | Stop reason: HOSPADM

## 2021-01-16 RX ORDER — OXYCODONE AND ACETAMINOPHEN 10; 325 MG/1; MG/1
1 TABLET ORAL EVERY 4 HOURS PRN
Qty: 15 TABLET | Refills: 0 | Status: SHIPPED | OUTPATIENT
Start: 2021-01-16 | End: 2021-01-20

## 2021-01-16 RX ADMIN — GABAPENTIN 600 MG: 300 CAPSULE ORAL at 09:01

## 2021-01-16 RX ADMIN — OXYCODONE HYDROCHLORIDE AND ACETAMINOPHEN 1 TABLET: 10; 325 TABLET ORAL at 09:01

## 2021-01-16 RX ADMIN — THERA TABS 1 TABLET: TAB at 09:01

## 2021-01-16 RX ADMIN — HYDROMORPHONE HYDROCHLORIDE 1 MG: 1 INJECTION, SOLUTION INTRAMUSCULAR; INTRAVENOUS; SUBCUTANEOUS at 07:01

## 2021-01-16 RX ADMIN — LACTOBACILLUS TAB 1 TABLET: TAB at 11:01

## 2021-01-16 RX ADMIN — FAMOTIDINE 20 MG: 20 TABLET, FILM COATED ORAL at 09:01

## 2021-01-16 RX ADMIN — LACTOBACILLUS TAB 1 TABLET: TAB at 09:01

## 2021-01-16 RX ADMIN — TAMSULOSIN HYDROCHLORIDE 0.4 MG: 0.4 CAPSULE ORAL at 09:01

## 2021-01-16 RX ADMIN — HYDROMORPHONE HYDROCHLORIDE 1 MG: 1 INJECTION, SOLUTION INTRAMUSCULAR; INTRAVENOUS; SUBCUTANEOUS at 03:01

## 2021-01-16 RX ADMIN — OXYCODONE HYDROCHLORIDE AND ACETAMINOPHEN 1 TABLET: 10; 325 TABLET ORAL at 01:01

## 2021-01-16 RX ADMIN — CETIRIZINE HYDROCHLORIDE 20 MG: 10 TABLET, FILM COATED ORAL at 09:01

## 2021-01-16 RX ADMIN — OXYCODONE HYDROCHLORIDE AND ACETAMINOPHEN 1 TABLET: 10; 325 TABLET ORAL at 05:01

## 2021-01-16 RX ADMIN — VANCOMYCIN HYDROCHLORIDE 2000 MG: 100 INJECTION, POWDER, LYOPHILIZED, FOR SOLUTION INTRAVENOUS at 09:01

## 2021-01-16 RX ADMIN — LORAZEPAM 1 MG: 1 TABLET ORAL at 05:01

## 2021-01-16 RX ADMIN — HYDROMORPHONE HYDROCHLORIDE 1 MG: 1 INJECTION, SOLUTION INTRAMUSCULAR; INTRAVENOUS; SUBCUTANEOUS at 11:01

## 2021-01-16 RX ADMIN — PAROXETINE HYDROCHLORIDE 20 MG: 20 TABLET, FILM COATED ORAL at 06:01

## 2021-01-18 ENCOUNTER — PATIENT OUTREACH (OUTPATIENT)
Dept: ADMINISTRATIVE | Facility: CLINIC | Age: 41
End: 2021-01-18

## 2021-01-18 LAB
BACTERIA BLD CULT: NORMAL
BACTERIA BLD CULT: NORMAL

## 2021-01-19 DIAGNOSIS — L50.1 CHRONIC IDIOPATHIC URTICARIA: Primary | ICD-10-CM

## 2021-01-19 LAB — BACTERIA SPEC ANAEROBE CULT: NORMAL

## 2021-01-25 ENCOUNTER — OFFICE VISIT (OUTPATIENT)
Dept: ORTHOPEDICS | Facility: CLINIC | Age: 41
End: 2021-01-25
Payer: MEDICAID

## 2021-01-25 VITALS — BODY MASS INDEX: 29.95 KG/M2 | WEIGHT: 221.13 LBS | HEIGHT: 72 IN

## 2021-01-25 DIAGNOSIS — M70.41 BURSITIS, PREPATELLAR, RIGHT: ICD-10-CM

## 2021-01-25 PROCEDURE — 99024 POSTOP FOLLOW-UP VISIT: CPT | Mod: ,,, | Performed by: PHYSICIAN ASSISTANT

## 2021-01-25 PROCEDURE — 99213 OFFICE O/P EST LOW 20 MIN: CPT | Mod: PBBFAC,PN | Performed by: PHYSICIAN ASSISTANT

## 2021-01-25 PROCEDURE — 99999 PR PBB SHADOW E&M-EST. PATIENT-LVL III: CPT | Mod: PBBFAC,,, | Performed by: PHYSICIAN ASSISTANT

## 2021-01-25 PROCEDURE — 99024 PR POST-OP FOLLOW-UP VISIT: ICD-10-PCS | Mod: ,,, | Performed by: PHYSICIAN ASSISTANT

## 2021-01-25 PROCEDURE — 99999 PR PBB SHADOW E&M-EST. PATIENT-LVL III: ICD-10-PCS | Mod: PBBFAC,,, | Performed by: PHYSICIAN ASSISTANT

## 2021-01-25 RX ORDER — CLINDAMYCIN HYDROCHLORIDE 300 MG/1
300 CAPSULE ORAL EVERY 8 HOURS
Qty: 42 CAPSULE | Refills: 0 | Status: ON HOLD | OUTPATIENT
Start: 2021-01-25 | End: 2021-02-02 | Stop reason: HOSPADM

## 2021-01-30 ENCOUNTER — PATIENT MESSAGE (OUTPATIENT)
Dept: ORTHOPEDICS | Facility: CLINIC | Age: 41
End: 2021-01-30

## 2021-01-31 ENCOUNTER — HOSPITAL ENCOUNTER (OUTPATIENT)
Facility: HOSPITAL | Age: 41
Discharge: HOME | End: 2021-02-04
Attending: EMERGENCY MEDICINE | Admitting: HOSPITALIST
Payer: MEDICAID

## 2021-01-31 DIAGNOSIS — D72.119 HYPEREOSINOPHILIC SYNDROME, UNSPECIFIED TYPE: ICD-10-CM

## 2021-01-31 DIAGNOSIS — R31.0 GROSS HEMATURIA: Primary | ICD-10-CM

## 2021-01-31 DIAGNOSIS — M25.461 EFFUSION OF RIGHT KNEE: ICD-10-CM

## 2021-01-31 DIAGNOSIS — M25.461 PAIN AND SWELLING OF RIGHT KNEE: ICD-10-CM

## 2021-01-31 DIAGNOSIS — R41.0 DELIRIUM: ICD-10-CM

## 2021-01-31 DIAGNOSIS — R35.1 BENIGN PROSTATIC HYPERPLASIA WITH NOCTURIA: ICD-10-CM

## 2021-01-31 DIAGNOSIS — M25.561 PAIN AND SWELLING OF RIGHT KNEE: ICD-10-CM

## 2021-01-31 DIAGNOSIS — R31.9 HEMATURIA, UNSPECIFIED TYPE: ICD-10-CM

## 2021-01-31 DIAGNOSIS — R39.198 DIFFICULTY VOIDING: ICD-10-CM

## 2021-01-31 DIAGNOSIS — M25.561 POSTOPERATIVE PAIN OF RIGHT KNEE: ICD-10-CM

## 2021-01-31 DIAGNOSIS — R33.9 URINARY RETENTION: ICD-10-CM

## 2021-01-31 DIAGNOSIS — M71.161 SEPTIC PREPATELLAR BURSITIS OF RIGHT KNEE: ICD-10-CM

## 2021-01-31 DIAGNOSIS — R94.31 QT PROLONGATION: ICD-10-CM

## 2021-01-31 DIAGNOSIS — N40.1 BENIGN PROSTATIC HYPERPLASIA WITH NOCTURIA: ICD-10-CM

## 2021-01-31 DIAGNOSIS — F19.959 STEROID-INDUCED PSYCHOSIS WITH COMPLICATION: ICD-10-CM

## 2021-01-31 DIAGNOSIS — G89.18 POSTOPERATIVE PAIN OF RIGHT KNEE: ICD-10-CM

## 2021-01-31 DIAGNOSIS — M25.561 RIGHT KNEE PAIN: ICD-10-CM

## 2021-01-31 PROBLEM — R07.9 CHEST PAIN: Status: RESOLVED | Noted: 2020-10-09 | Resolved: 2021-01-31

## 2021-01-31 LAB
ALBUMIN SERPL BCP-MCNC: 3.9 G/DL (ref 3.5–5.2)
ALP SERPL-CCNC: 66 U/L (ref 55–135)
ALT SERPL W/O P-5'-P-CCNC: 25 U/L (ref 10–44)
AMPHET+METHAMPHET UR QL: NORMAL
ANION GAP SERPL CALC-SCNC: 17 MMOL/L (ref 8–16)
APPEARANCE FLD: NORMAL
AST SERPL-CCNC: 24 U/L (ref 10–40)
BACTERIA #/AREA URNS AUTO: ABNORMAL /HPF
BARBITURATES UR QL SCN>200 NG/ML: NEGATIVE
BASOPHILS # BLD AUTO: 0.07 K/UL (ref 0–0.2)
BASOPHILS NFR BLD: 1.1 % (ref 0–1.9)
BENZODIAZ UR QL SCN>200 NG/ML: NEGATIVE
BILIRUB SERPL-MCNC: 2 MG/DL (ref 0.1–1)
BILIRUB UR QL STRIP: NEGATIVE
BODY FLD TYPE: NORMAL
BODY FLD TYPE: NORMAL
BUN SERPL-MCNC: 12 MG/DL (ref 6–30)
BUN SERPL-MCNC: 15 MG/DL (ref 6–20)
BZE UR QL SCN: NEGATIVE
CALCIUM SERPL-MCNC: 9.2 MG/DL (ref 8.7–10.5)
CANNABINOIDS UR QL SCN: NEGATIVE
CHLORIDE SERPL-SCNC: 106 MMOL/L (ref 95–110)
CHLORIDE SERPL-SCNC: 107 MMOL/L (ref 95–110)
CK SERPL-CCNC: 200 U/L (ref 20–200)
CLARITY UR REFRACT.AUTO: ABNORMAL
CO2 SERPL-SCNC: 17 MMOL/L (ref 23–29)
COLOR FLD: NORMAL
COLOR UR AUTO: ABNORMAL
CREAT SERPL-MCNC: 1 MG/DL (ref 0.5–1.4)
CREAT SERPL-MCNC: 1.1 MG/DL (ref 0.5–1.4)
CREAT UR-MCNC: 306 MG/DL (ref 23–375)
CRP SERPL-MCNC: 3.6 MG/L (ref 0–8.2)
CRYSTALS FLD MICRO: NEGATIVE
CTP QC/QA: YES
DIFFERENTIAL METHOD: ABNORMAL
EOSINOPHIL # BLD AUTO: 0.4 K/UL (ref 0–0.5)
EOSINOPHIL NFR BLD: 5.9 % (ref 0–8)
EOSINOPHIL NFR FLD MANUAL: 5 %
ERYTHROCYTE [DISTWIDTH] IN BLOOD BY AUTOMATED COUNT: 17.4 % (ref 11.5–14.5)
ERYTHROCYTE [SEDIMENTATION RATE] IN BLOOD BY WESTERGREN METHOD: 14 MM/HR (ref 0–23)
EST. GFR  (AFRICAN AMERICAN): >60 ML/MIN/1.73 M^2
EST. GFR  (NON AFRICAN AMERICAN): >60 ML/MIN/1.73 M^2
ETHANOL UR-MCNC: <10 MG/DL
GLUCOSE SERPL-MCNC: 75 MG/DL (ref 70–110)
GLUCOSE SERPL-MCNC: 81 MG/DL (ref 70–110)
GLUCOSE UR QL STRIP: ABNORMAL
GRAM STN SPEC: NORMAL
GRAM STN SPEC: NORMAL
HCT VFR BLD AUTO: 36 % (ref 40–54)
HCT VFR BLD CALC: 32 %PCV (ref 36–54)
HGB BLD-MCNC: 11 G/DL (ref 14–18)
HGB UR QL STRIP: ABNORMAL
HYALINE CASTS UR QL AUTO: 0 /LPF
IMM GRANULOCYTES # BLD AUTO: 0.02 K/UL (ref 0–0.04)
IMM GRANULOCYTES NFR BLD AUTO: 0.3 % (ref 0–0.5)
KETONES UR QL STRIP: ABNORMAL
LACTATE SERPL-SCNC: 1.5 MMOL/L (ref 0.5–2.2)
LEUKOCYTE ESTERASE UR QL STRIP: NEGATIVE
LYMPHOCYTES # BLD AUTO: 1 K/UL (ref 1–4.8)
LYMPHOCYTES NFR BLD: 15.3 % (ref 18–48)
LYMPHOCYTES NFR FLD MANUAL: 12 %
MCH RBC QN AUTO: 24.6 PG (ref 27–31)
MCHC RBC AUTO-ENTMCNC: 30.6 G/DL (ref 32–36)
MCV RBC AUTO: 80 FL (ref 82–98)
METHADONE UR QL SCN>300 NG/ML: NEGATIVE
MICROSCOPIC COMMENT: ABNORMAL
MONOCYTES # BLD AUTO: 0.9 K/UL (ref 0.3–1)
MONOCYTES NFR BLD: 14 % (ref 4–15)
MONOS+MACROS NFR FLD MANUAL: 3 %
NEUTROPHILS # BLD AUTO: 4.1 K/UL (ref 1.8–7.7)
NEUTROPHILS NFR BLD: 63.4 % (ref 38–73)
NEUTROPHILS NFR FLD MANUAL: 80 %
NITRITE UR QL STRIP: NEGATIVE
NRBC BLD-RTO: 0 /100 WBC
OPIATES UR QL SCN: NEGATIVE
PCP UR QL SCN>25 NG/ML: NEGATIVE
PH UR STRIP: 6 [PH] (ref 5–8)
PLATELET # BLD AUTO: 359 K/UL (ref 150–350)
PMV BLD AUTO: 11 FL (ref 9.2–12.9)
POC IONIZED CALCIUM: 1.08 MMOL/L (ref 1.06–1.42)
POC TCO2 (MEASURED): 23 MMOL/L (ref 23–29)
POTASSIUM BLD-SCNC: 2.9 MMOL/L (ref 3.5–5.1)
POTASSIUM SERPL-SCNC: 3.1 MMOL/L (ref 3.5–5.1)
PROT SERPL-MCNC: 7.1 G/DL (ref 6–8.4)
PROT UR QL STRIP: ABNORMAL
RBC # BLD AUTO: 4.48 M/UL (ref 4.6–6.2)
RBC #/AREA URNS AUTO: >100 /HPF (ref 0–4)
SAMPLE: ABNORMAL
SARS-COV-2 RDRP RESP QL NAA+PROBE: NEGATIVE
SODIUM BLD-SCNC: 140 MMOL/L (ref 136–145)
SODIUM SERPL-SCNC: 141 MMOL/L (ref 136–145)
SP GR UR STRIP: 1.02 (ref 1–1.03)
TOXICOLOGY INFORMATION: NORMAL
TSH SERPL DL<=0.005 MIU/L-ACNC: 0.93 UIU/ML (ref 0.4–4)
URN SPEC COLLECT METH UR: ABNORMAL
WBC # BLD AUTO: 6.45 K/UL (ref 3.9–12.7)
WBC # FLD: 0 /CU MM
WBC #/AREA URNS AUTO: 0 /HPF (ref 0–5)

## 2021-01-31 PROCEDURE — G0378 HOSPITAL OBSERVATION PER HR: HCPCS

## 2021-01-31 PROCEDURE — 80307 DRUG TEST PRSMV CHEM ANLYZR: CPT

## 2021-01-31 PROCEDURE — 87102 FUNGUS ISOLATION CULTURE: CPT

## 2021-01-31 PROCEDURE — 96375 TX/PRO/DX INJ NEW DRUG ADDON: CPT

## 2021-01-31 PROCEDURE — 87116 MYCOBACTERIA CULTURE: CPT

## 2021-01-31 PROCEDURE — 96376 TX/PRO/DX INJ SAME DRUG ADON: CPT

## 2021-01-31 PROCEDURE — 96367 TX/PROPH/DG ADDL SEQ IV INF: CPT | Mod: 59

## 2021-01-31 PROCEDURE — 89051 BODY FLUID CELL COUNT: CPT

## 2021-01-31 PROCEDURE — 87070 CULTURE OTHR SPECIMN AEROBIC: CPT

## 2021-01-31 PROCEDURE — 87206 SMEAR FLUORESCENT/ACID STAI: CPT

## 2021-01-31 PROCEDURE — 87040 BLOOD CULTURE FOR BACTERIA: CPT | Mod: 59

## 2021-01-31 PROCEDURE — 25000003 PHARM REV CODE 250: Performed by: EMERGENCY MEDICINE

## 2021-01-31 PROCEDURE — 99285 PR EMERGENCY DEPT VISIT,LEVEL V: ICD-10-PCS | Mod: CR,,, | Performed by: EMERGENCY MEDICINE

## 2021-01-31 PROCEDURE — 84443 ASSAY THYROID STIM HORMONE: CPT

## 2021-01-31 PROCEDURE — 25500020 PHARM REV CODE 255: Performed by: EMERGENCY MEDICINE

## 2021-01-31 PROCEDURE — 81001 URINALYSIS AUTO W/SCOPE: CPT

## 2021-01-31 PROCEDURE — 20610 DRAIN/INJ JOINT/BURSA W/O US: CPT | Mod: RT

## 2021-01-31 PROCEDURE — 80047 BASIC METABLC PNL IONIZED CA: CPT | Mod: 59

## 2021-01-31 PROCEDURE — U0002 COVID-19 LAB TEST NON-CDC: HCPCS | Performed by: EMERGENCY MEDICINE

## 2021-01-31 PROCEDURE — 99285 EMERGENCY DEPT VISIT HI MDM: CPT | Mod: 25

## 2021-01-31 PROCEDURE — 89060 EXAM SYNOVIAL FLUID CRYSTALS: CPT

## 2021-01-31 PROCEDURE — 87205 SMEAR GRAM STAIN: CPT

## 2021-01-31 PROCEDURE — 96361 HYDRATE IV INFUSION ADD-ON: CPT | Mod: 59

## 2021-01-31 PROCEDURE — 93005 ELECTROCARDIOGRAM TRACING: CPT | Mod: 59

## 2021-01-31 PROCEDURE — 87075 CULTR BACTERIA EXCEPT BLOOD: CPT | Mod: 59

## 2021-01-31 PROCEDURE — 99220 PR INITIAL OBSERVATION CARE,LEVL III: ICD-10-PCS | Mod: ,,, | Performed by: HOSPITALIST

## 2021-01-31 PROCEDURE — 93010 ELECTROCARDIOGRAM REPORT: CPT | Mod: ,,, | Performed by: INTERNAL MEDICINE

## 2021-01-31 PROCEDURE — 85652 RBC SED RATE AUTOMATED: CPT

## 2021-01-31 PROCEDURE — 99220 PR INITIAL OBSERVATION CARE,LEVL III: CPT | Mod: ,,, | Performed by: HOSPITALIST

## 2021-01-31 PROCEDURE — 80053 COMPREHEN METABOLIC PANEL: CPT

## 2021-01-31 PROCEDURE — 86140 C-REACTIVE PROTEIN: CPT

## 2021-01-31 PROCEDURE — 63600175 PHARM REV CODE 636 W HCPCS: Performed by: EMERGENCY MEDICINE

## 2021-01-31 PROCEDURE — 83605 ASSAY OF LACTIC ACID: CPT

## 2021-01-31 PROCEDURE — 82550 ASSAY OF CK (CPK): CPT

## 2021-01-31 PROCEDURE — 51798 US URINE CAPACITY MEASURE: CPT

## 2021-01-31 PROCEDURE — 85025 COMPLETE CBC W/AUTO DIFF WBC: CPT

## 2021-01-31 PROCEDURE — 99285 EMERGENCY DEPT VISIT HI MDM: CPT | Mod: CR,,, | Performed by: EMERGENCY MEDICINE

## 2021-01-31 PROCEDURE — 93010 EKG 12-LEAD: ICD-10-PCS | Mod: ,,, | Performed by: INTERNAL MEDICINE

## 2021-01-31 PROCEDURE — 96365 THER/PROPH/DIAG IV INF INIT: CPT | Mod: 59

## 2021-01-31 RX ORDER — AMOXICILLIN 250 MG
1 CAPSULE ORAL 2 TIMES DAILY PRN
Status: DISCONTINUED | OUTPATIENT
Start: 2021-01-31 | End: 2021-02-04 | Stop reason: HOSPADM

## 2021-01-31 RX ORDER — SODIUM CHLORIDE 0.9 % (FLUSH) 0.9 %
5 SYRINGE (ML) INJECTION
Status: DISCONTINUED | OUTPATIENT
Start: 2021-01-31 | End: 2021-02-04 | Stop reason: HOSPADM

## 2021-01-31 RX ORDER — CLINDAMYCIN HYDROCHLORIDE 150 MG/1
300 CAPSULE ORAL EVERY 8 HOURS
Status: DISCONTINUED | OUTPATIENT
Start: 2021-01-31 | End: 2021-01-31

## 2021-01-31 RX ORDER — LORAZEPAM 2 MG/ML
1 INJECTION INTRAMUSCULAR
Status: COMPLETED | OUTPATIENT
Start: 2021-01-31 | End: 2021-01-31

## 2021-01-31 RX ORDER — CLINDAMYCIN HYDROCHLORIDE 150 MG/1
300 CAPSULE ORAL EVERY 8 HOURS
Status: DISCONTINUED | OUTPATIENT
Start: 2021-01-31 | End: 2021-02-01

## 2021-01-31 RX ORDER — HYDROMORPHONE HYDROCHLORIDE 1 MG/ML
1 INJECTION, SOLUTION INTRAMUSCULAR; INTRAVENOUS; SUBCUTANEOUS
Status: COMPLETED | OUTPATIENT
Start: 2021-01-31 | End: 2021-01-31

## 2021-01-31 RX ORDER — ONDANSETRON 2 MG/ML
8 INJECTION INTRAMUSCULAR; INTRAVENOUS EVERY 8 HOURS PRN
Status: DISCONTINUED | OUTPATIENT
Start: 2021-01-31 | End: 2021-02-04 | Stop reason: HOSPADM

## 2021-01-31 RX ORDER — QUETIAPINE FUMARATE 25 MG/1
25 TABLET, FILM COATED ORAL ONCE
Status: DISCONTINUED | OUTPATIENT
Start: 2021-01-31 | End: 2021-02-04 | Stop reason: HOSPADM

## 2021-01-31 RX ORDER — DIPHENHYDRAMINE HYDROCHLORIDE 50 MG/ML
50 INJECTION INTRAMUSCULAR; INTRAVENOUS
Status: COMPLETED | OUTPATIENT
Start: 2021-01-31 | End: 2021-01-31

## 2021-01-31 RX ORDER — ACETAMINOPHEN 325 MG/1
650 TABLET ORAL EVERY 4 HOURS PRN
Status: DISCONTINUED | OUTPATIENT
Start: 2021-01-31 | End: 2021-02-04 | Stop reason: HOSPADM

## 2021-01-31 RX ORDER — TAMSULOSIN HYDROCHLORIDE 0.4 MG/1
0.4 CAPSULE ORAL DAILY
Status: DISCONTINUED | OUTPATIENT
Start: 2021-02-01 | End: 2021-02-04 | Stop reason: HOSPADM

## 2021-01-31 RX ORDER — LORAZEPAM 2 MG/ML
0.5 INJECTION INTRAMUSCULAR
Status: COMPLETED | OUTPATIENT
Start: 2021-01-31 | End: 2021-01-31

## 2021-01-31 RX ORDER — PAROXETINE HYDROCHLORIDE 20 MG/1
20 TABLET, FILM COATED ORAL EVERY MORNING
Status: DISCONTINUED | OUTPATIENT
Start: 2021-02-01 | End: 2021-02-04 | Stop reason: HOSPADM

## 2021-01-31 RX ORDER — HYDROCODONE BITARTRATE AND ACETAMINOPHEN 5; 325 MG/1; MG/1
1 TABLET ORAL EVERY 4 HOURS PRN
Status: DISCONTINUED | OUTPATIENT
Start: 2021-01-31 | End: 2021-02-04 | Stop reason: HOSPADM

## 2021-01-31 RX ORDER — ONDANSETRON 2 MG/ML
4 INJECTION INTRAMUSCULAR; INTRAVENOUS
Status: COMPLETED | OUTPATIENT
Start: 2021-01-31 | End: 2021-01-31

## 2021-01-31 RX ORDER — FAMOTIDINE 20 MG/1
20 TABLET, FILM COATED ORAL 2 TIMES DAILY
Status: DISCONTINUED | OUTPATIENT
Start: 2021-01-31 | End: 2021-02-04 | Stop reason: HOSPADM

## 2021-01-31 RX ORDER — ENOXAPARIN SODIUM 100 MG/ML
40 INJECTION SUBCUTANEOUS EVERY 24 HOURS
Status: DISCONTINUED | OUTPATIENT
Start: 2021-01-31 | End: 2021-01-31

## 2021-01-31 RX ORDER — HYDROXYZINE HYDROCHLORIDE 25 MG/1
25 TABLET, FILM COATED ORAL 3 TIMES DAILY PRN
Status: DISCONTINUED | OUTPATIENT
Start: 2021-01-31 | End: 2021-02-04 | Stop reason: HOSPADM

## 2021-01-31 RX ORDER — LORAZEPAM 0.5 MG/1
0.5 TABLET ORAL
Status: COMPLETED | OUTPATIENT
Start: 2021-01-31 | End: 2021-01-31

## 2021-01-31 RX ORDER — HYDROCODONE BITARTRATE AND ACETAMINOPHEN 10; 325 MG/1; MG/1
1 TABLET ORAL EVERY 4 HOURS PRN
Status: DISCONTINUED | OUTPATIENT
Start: 2021-01-31 | End: 2021-02-04 | Stop reason: HOSPADM

## 2021-01-31 RX ORDER — GABAPENTIN 300 MG/1
300 CAPSULE ORAL 3 TIMES DAILY
Status: DISCONTINUED | OUTPATIENT
Start: 2021-02-01 | End: 2021-02-04 | Stop reason: HOSPADM

## 2021-01-31 RX ORDER — TALC
6 POWDER (GRAM) TOPICAL NIGHTLY PRN
Status: DISCONTINUED | OUTPATIENT
Start: 2021-01-31 | End: 2021-02-01 | Stop reason: SDUPTHER

## 2021-01-31 RX ORDER — LIDOCAINE HYDROCHLORIDE 20 MG/ML
JELLY TOPICAL
Status: COMPLETED | OUTPATIENT
Start: 2021-01-31 | End: 2021-01-31

## 2021-01-31 RX ORDER — CETIRIZINE HYDROCHLORIDE 5 MG/1
20 TABLET ORAL 2 TIMES DAILY
Status: DISCONTINUED | OUTPATIENT
Start: 2021-01-31 | End: 2021-02-04 | Stop reason: HOSPADM

## 2021-01-31 RX ORDER — DIPHENHYDRAMINE HYDROCHLORIDE 50 MG/ML
50 INJECTION INTRAMUSCULAR; INTRAVENOUS ONCE
Status: DISCONTINUED | OUTPATIENT
Start: 2021-01-31 | End: 2021-01-31

## 2021-01-31 RX ADMIN — SODIUM CHLORIDE, SODIUM LACTATE, POTASSIUM CHLORIDE, AND CALCIUM CHLORIDE 1000 ML: .6; .31; .03; .02 INJECTION, SOLUTION INTRAVENOUS at 07:01

## 2021-01-31 RX ADMIN — LORAZEPAM 1 MG: 2 INJECTION INTRAMUSCULAR; INTRAVENOUS at 09:01

## 2021-01-31 RX ADMIN — HYDROMORPHONE HYDROCHLORIDE 1 MG: 1 INJECTION, SOLUTION INTRAMUSCULAR; INTRAVENOUS; SUBCUTANEOUS at 04:01

## 2021-01-31 RX ADMIN — IOHEXOL 100 ML: 350 INJECTION, SOLUTION INTRAVENOUS at 06:01

## 2021-01-31 RX ADMIN — DIPHENHYDRAMINE HYDROCHLORIDE 50 MG: 50 INJECTION, SOLUTION INTRAMUSCULAR; INTRAVENOUS at 06:01

## 2021-01-31 RX ADMIN — ONDANSETRON 4 MG: 2 INJECTION INTRAMUSCULAR; INTRAVENOUS at 01:01

## 2021-01-31 RX ADMIN — SODIUM CHLORIDE, SODIUM LACTATE, POTASSIUM CHLORIDE, AND CALCIUM CHLORIDE 1000 ML: .6; .31; .03; .02 INJECTION, SOLUTION INTRAVENOUS at 04:01

## 2021-01-31 RX ADMIN — HYDROMORPHONE HYDROCHLORIDE 1 MG: 1 INJECTION, SOLUTION INTRAMUSCULAR; INTRAVENOUS; SUBCUTANEOUS at 01:01

## 2021-01-31 RX ADMIN — VANCOMYCIN HYDROCHLORIDE 2000 MG: 10 INJECTION, POWDER, LYOPHILIZED, FOR SOLUTION INTRAVENOUS at 03:01

## 2021-01-31 RX ADMIN — LORAZEPAM 1 MG: 2 INJECTION INTRAMUSCULAR; INTRAVENOUS at 08:01

## 2021-01-31 RX ADMIN — LORAZEPAM 0.5 MG: 0.5 TABLET ORAL at 07:01

## 2021-01-31 RX ADMIN — HYDROCORTISONE SODIUM SUCCINATE 200 MG: 100 INJECTION, POWDER, FOR SOLUTION INTRAMUSCULAR; INTRAVENOUS at 07:01

## 2021-01-31 RX ADMIN — LIDOCAINE HYDROCHLORIDE 10 ML: 20 JELLY TOPICAL at 01:01

## 2021-01-31 RX ADMIN — CEFTRIAXONE 2 G: 2 INJECTION, SOLUTION INTRAVENOUS at 02:01

## 2021-01-31 RX ADMIN — LORAZEPAM 0.5 MG: 2 INJECTION INTRAMUSCULAR; INTRAVENOUS at 05:01

## 2021-01-31 RX ADMIN — HYDROMORPHONE HYDROCHLORIDE 1 MG: 1 INJECTION, SOLUTION INTRAMUSCULAR; INTRAVENOUS; SUBCUTANEOUS at 05:01

## 2021-02-01 ENCOUNTER — ANESTHESIA EVENT (OUTPATIENT)
Dept: SURGERY | Facility: HOSPITAL | Age: 41
End: 2021-02-01
Payer: MEDICAID

## 2021-02-01 ENCOUNTER — ANESTHESIA (OUTPATIENT)
Dept: SURGERY | Facility: HOSPITAL | Age: 41
End: 2021-02-01
Payer: MEDICAID

## 2021-02-01 LAB
ALBUMIN SERPL BCP-MCNC: 3.2 G/DL (ref 3.5–5.2)
ALP SERPL-CCNC: 62 U/L (ref 55–135)
ALT SERPL W/O P-5'-P-CCNC: 26 U/L (ref 10–44)
ANION GAP SERPL CALC-SCNC: 12 MMOL/L (ref 8–16)
AST SERPL-CCNC: 27 U/L (ref 10–40)
BASOPHILS # BLD AUTO: 0.02 K/UL (ref 0–0.2)
BASOPHILS NFR BLD: 0.4 % (ref 0–1.9)
BILIRUB SERPL-MCNC: 1.6 MG/DL (ref 0.1–1)
BUN SERPL-MCNC: 14 MG/DL (ref 6–20)
CALCIUM SERPL-MCNC: 8.2 MG/DL (ref 8.7–10.5)
CHLORIDE SERPL-SCNC: 108 MMOL/L (ref 95–110)
CK SERPL-CCNC: 199 U/L (ref 20–200)
CO2 SERPL-SCNC: 21 MMOL/L (ref 23–29)
CREAT SERPL-MCNC: 0.8 MG/DL (ref 0.5–1.4)
DIFFERENTIAL METHOD: ABNORMAL
EOSINOPHIL # BLD AUTO: 0 K/UL (ref 0–0.5)
EOSINOPHIL NFR BLD: 0 % (ref 0–8)
ERYTHROCYTE [DISTWIDTH] IN BLOOD BY AUTOMATED COUNT: 17.5 % (ref 11.5–14.5)
EST. GFR  (AFRICAN AMERICAN): >60 ML/MIN/1.73 M^2
EST. GFR  (NON AFRICAN AMERICAN): >60 ML/MIN/1.73 M^2
GLUCOSE SERPL-MCNC: 90 MG/DL (ref 70–110)
HCT VFR BLD AUTO: 33.4 % (ref 40–54)
HGB BLD-MCNC: 10.4 G/DL (ref 14–18)
IMM GRANULOCYTES # BLD AUTO: 0.01 K/UL (ref 0–0.04)
IMM GRANULOCYTES NFR BLD AUTO: 0.2 % (ref 0–0.5)
LYMPHOCYTES # BLD AUTO: 0.6 K/UL (ref 1–4.8)
LYMPHOCYTES NFR BLD: 12 % (ref 18–48)
MAGNESIUM SERPL-MCNC: 1.9 MG/DL (ref 1.6–2.6)
MCH RBC QN AUTO: 24.5 PG (ref 27–31)
MCHC RBC AUTO-ENTMCNC: 31.1 G/DL (ref 32–36)
MCV RBC AUTO: 79 FL (ref 82–98)
MONOCYTES # BLD AUTO: 0.5 K/UL (ref 0.3–1)
MONOCYTES NFR BLD: 10.6 % (ref 4–15)
NEUTROPHILS # BLD AUTO: 3.8 K/UL (ref 1.8–7.7)
NEUTROPHILS NFR BLD: 76.8 % (ref 38–73)
NRBC BLD-RTO: 0 /100 WBC
PATH INTERP FLD-IMP: NORMAL
PHOSPHATE SERPL-MCNC: 5.4 MG/DL (ref 2.7–4.5)
PLATELET # BLD AUTO: 349 K/UL (ref 150–350)
PMV BLD AUTO: 11.1 FL (ref 9.2–12.9)
POTASSIUM SERPL-SCNC: 3.8 MMOL/L (ref 3.5–5.1)
PROT SERPL-MCNC: 6 G/DL (ref 6–8.4)
RBC # BLD AUTO: 4.24 M/UL (ref 4.6–6.2)
SODIUM SERPL-SCNC: 141 MMOL/L (ref 136–145)
WBC # BLD AUTO: 4.9 K/UL (ref 3.9–12.7)

## 2021-02-01 PROCEDURE — 99225 PR SUBSEQUENT OBSERVATION CARE,LEVEL II: ICD-10-PCS | Mod: ,,, | Performed by: HOSPITALIST

## 2021-02-01 PROCEDURE — D9220A PRA ANESTHESIA: ICD-10-PCS | Mod: ANES,,, | Performed by: ANESTHESIOLOGY

## 2021-02-01 PROCEDURE — 51702 PR INSERTION OF TEMPORARY INDWELLING BLADDER CATHETER, SIMPLE: ICD-10-PCS | Mod: ,,, | Performed by: UROLOGY

## 2021-02-01 PROCEDURE — 99205 PR OFFICE/OUTPT VISIT, NEW, LEVL V, 60-74 MIN: ICD-10-PCS | Mod: ,,, | Performed by: PSYCHIATRY & NEUROLOGY

## 2021-02-01 PROCEDURE — 51702 INSERT TEMP BLADDER CATH: CPT | Mod: ,,, | Performed by: UROLOGY

## 2021-02-01 PROCEDURE — 00910 ANES TRANSURETHRAL PX NOS: CPT | Performed by: UROLOGY

## 2021-02-01 PROCEDURE — 83735 ASSAY OF MAGNESIUM: CPT

## 2021-02-01 PROCEDURE — 36000707: Performed by: UROLOGY

## 2021-02-01 PROCEDURE — 25000003 PHARM REV CODE 250: Performed by: HOSPITALIST

## 2021-02-01 PROCEDURE — 36000706: Performed by: UROLOGY

## 2021-02-01 PROCEDURE — 96376 TX/PRO/DX INJ SAME DRUG ADON: CPT | Mod: 59

## 2021-02-01 PROCEDURE — 63600175 PHARM REV CODE 636 W HCPCS: Performed by: NURSE ANESTHETIST, CERTIFIED REGISTERED

## 2021-02-01 PROCEDURE — D9220A PRA ANESTHESIA: Mod: ANES,,, | Performed by: ANESTHESIOLOGY

## 2021-02-01 PROCEDURE — 25000003 PHARM REV CODE 250: Performed by: NURSE ANESTHETIST, CERTIFIED REGISTERED

## 2021-02-01 PROCEDURE — 82550 ASSAY OF CK (CPK): CPT

## 2021-02-01 PROCEDURE — G0378 HOSPITAL OBSERVATION PER HR: HCPCS

## 2021-02-01 PROCEDURE — 99205 OFFICE O/P NEW HI 60 MIN: CPT | Mod: ,,, | Performed by: PSYCHIATRY & NEUROLOGY

## 2021-02-01 PROCEDURE — 82077 ASSAY SPEC XCP UR&BREATH IA: CPT

## 2021-02-01 PROCEDURE — 82693 ASSAY OF ETHYLENE GLYCOL: CPT

## 2021-02-01 PROCEDURE — 37000009 HC ANESTHESIA EA ADD 15 MINS: Performed by: UROLOGY

## 2021-02-01 PROCEDURE — 80053 COMPREHEN METABOLIC PANEL: CPT

## 2021-02-01 PROCEDURE — 71000015 HC POSTOP RECOV 1ST HR: Performed by: UROLOGY

## 2021-02-01 PROCEDURE — U0002 COVID-19 LAB TEST NON-CDC: HCPCS

## 2021-02-01 PROCEDURE — 63600175 PHARM REV CODE 636 W HCPCS: Performed by: HOSPITALIST

## 2021-02-01 PROCEDURE — D9220A PRA ANESTHESIA: Mod: CRNA,,, | Performed by: NURSE ANESTHETIST, CERTIFIED REGISTERED

## 2021-02-01 PROCEDURE — 71000033 HC RECOVERY, INTIAL HOUR: Performed by: UROLOGY

## 2021-02-01 PROCEDURE — 85025 COMPLETE CBC W/AUTO DIFF WBC: CPT

## 2021-02-01 PROCEDURE — D9220A PRA ANESTHESIA: ICD-10-PCS | Mod: CRNA,,, | Performed by: NURSE ANESTHETIST, CERTIFIED REGISTERED

## 2021-02-01 PROCEDURE — 36415 COLL VENOUS BLD VENIPUNCTURE: CPT

## 2021-02-01 PROCEDURE — 99225 PR SUBSEQUENT OBSERVATION CARE,LEVEL II: CPT | Mod: ,,, | Performed by: HOSPITALIST

## 2021-02-01 PROCEDURE — 37000008 HC ANESTHESIA 1ST 15 MINUTES: Performed by: UROLOGY

## 2021-02-01 PROCEDURE — 84100 ASSAY OF PHOSPHORUS: CPT

## 2021-02-01 RX ORDER — TALC
6 POWDER (GRAM) TOPICAL NIGHTLY PRN
Status: DISCONTINUED | OUTPATIENT
Start: 2021-02-01 | End: 2021-02-04 | Stop reason: HOSPADM

## 2021-02-01 RX ORDER — OXYCODONE HYDROCHLORIDE 10 MG/1
10 TABLET ORAL EVERY 6 HOURS PRN
Status: CANCELLED | OUTPATIENT
Start: 2021-02-01

## 2021-02-01 RX ORDER — LORAZEPAM 2 MG/ML
2 INJECTION INTRAMUSCULAR ONCE
Status: COMPLETED | OUTPATIENT
Start: 2021-02-01 | End: 2021-02-01

## 2021-02-01 RX ORDER — LORAZEPAM 1 MG/1
1 TABLET ORAL ONCE
Status: COMPLETED | OUTPATIENT
Start: 2021-02-01 | End: 2021-02-01

## 2021-02-01 RX ORDER — LORAZEPAM 0.5 MG/1
0.5 TABLET ORAL EVERY 8 HOURS PRN
Status: DISCONTINUED | OUTPATIENT
Start: 2021-02-01 | End: 2021-02-04 | Stop reason: HOSPADM

## 2021-02-01 RX ORDER — AMOXICILLIN 250 MG
1 CAPSULE ORAL DAILY
Status: DISCONTINUED | OUTPATIENT
Start: 2021-02-01 | End: 2021-02-04 | Stop reason: HOSPADM

## 2021-02-01 RX ORDER — PROPOFOL 10 MG/ML
VIAL (ML) INTRAVENOUS
Status: DISCONTINUED | OUTPATIENT
Start: 2021-02-01 | End: 2021-02-01

## 2021-02-01 RX ORDER — DIPHENHYDRAMINE HYDROCHLORIDE 50 MG/ML
25 INJECTION INTRAMUSCULAR; INTRAVENOUS EVERY 6 HOURS PRN
Status: DISCONTINUED | OUTPATIENT
Start: 2021-02-01 | End: 2021-02-04 | Stop reason: HOSPADM

## 2021-02-01 RX ORDER — FENTANYL CITRATE 50 UG/ML
25 INJECTION, SOLUTION INTRAMUSCULAR; INTRAVENOUS EVERY 5 MIN PRN
Status: DISCONTINUED | OUTPATIENT
Start: 2021-02-01 | End: 2021-02-01

## 2021-02-01 RX ORDER — HYDROMORPHONE HYDROCHLORIDE 1 MG/ML
1 INJECTION, SOLUTION INTRAMUSCULAR; INTRAVENOUS; SUBCUTANEOUS ONCE
Status: COMPLETED | OUTPATIENT
Start: 2021-02-01 | End: 2021-02-01

## 2021-02-01 RX ORDER — FENTANYL CITRATE 50 UG/ML
INJECTION, SOLUTION INTRAMUSCULAR; INTRAVENOUS
Status: DISCONTINUED | OUTPATIENT
Start: 2021-02-01 | End: 2021-02-01

## 2021-02-01 RX ORDER — SODIUM CHLORIDE 0.9 % (FLUSH) 0.9 %
10 SYRINGE (ML) INJECTION
Status: DISCONTINUED | OUTPATIENT
Start: 2021-02-01 | End: 2021-02-04 | Stop reason: HOSPADM

## 2021-02-01 RX ORDER — HYDROMORPHONE HYDROCHLORIDE 1 MG/ML
0.2 INJECTION, SOLUTION INTRAMUSCULAR; INTRAVENOUS; SUBCUTANEOUS EVERY 5 MIN PRN
Status: DISCONTINUED | OUTPATIENT
Start: 2021-02-01 | End: 2021-02-01

## 2021-02-01 RX ORDER — PANTOPRAZOLE SODIUM 40 MG/1
40 TABLET, DELAYED RELEASE ORAL DAILY
Status: DISCONTINUED | OUTPATIENT
Start: 2021-02-01 | End: 2021-02-01

## 2021-02-01 RX ORDER — LIDOCAINE HCL/PF 100 MG/5ML
SYRINGE (ML) INTRAVENOUS
Status: DISCONTINUED | OUTPATIENT
Start: 2021-02-01 | End: 2021-02-01

## 2021-02-01 RX ORDER — MIDAZOLAM HYDROCHLORIDE 1 MG/ML
INJECTION INTRAMUSCULAR; INTRAVENOUS
Status: DISCONTINUED | OUTPATIENT
Start: 2021-02-01 | End: 2021-02-01

## 2021-02-01 RX ORDER — HYDROMORPHONE HYDROCHLORIDE 1 MG/ML
1 INJECTION, SOLUTION INTRAMUSCULAR; INTRAVENOUS; SUBCUTANEOUS EVERY 6 HOURS PRN
Status: DISCONTINUED | OUTPATIENT
Start: 2021-02-01 | End: 2021-02-02

## 2021-02-01 RX ORDER — LORAZEPAM 2 MG/ML
0.25 INJECTION INTRAMUSCULAR ONCE AS NEEDED
Status: DISCONTINUED | OUTPATIENT
Start: 2021-02-01 | End: 2021-02-01

## 2021-02-01 RX ADMIN — PAROXETINE HYDROCHLORIDE 20 MG: 20 TABLET, FILM COATED ORAL at 06:02

## 2021-02-01 RX ADMIN — FENTANYL CITRATE 50 MCG: 50 INJECTION, SOLUTION INTRAMUSCULAR; INTRAVENOUS at 05:02

## 2021-02-01 RX ADMIN — DOCUSATE SODIUM 50MG AND SENNOSIDES 8.6MG 1 TABLET: 8.6; 5 TABLET, FILM COATED ORAL at 11:02

## 2021-02-01 RX ADMIN — HYDROCODONE BITARTRATE AND ACETAMINOPHEN 1 TABLET: 10; 325 TABLET ORAL at 11:02

## 2021-02-01 RX ADMIN — PREDNISONE 30 MG: 20 TABLET ORAL at 09:02

## 2021-02-01 RX ADMIN — SODIUM CHLORIDE: 9 INJECTION, SOLUTION INTRAVENOUS at 05:02

## 2021-02-01 RX ADMIN — HYDROMORPHONE HYDROCHLORIDE 1 MG: 1 INJECTION, SOLUTION INTRAMUSCULAR; INTRAVENOUS; SUBCUTANEOUS at 12:02

## 2021-02-01 RX ADMIN — GABAPENTIN 300 MG: 300 CAPSULE ORAL at 03:02

## 2021-02-01 RX ADMIN — PANTOPRAZOLE SODIUM 40 MG: 40 TABLET, DELAYED RELEASE ORAL at 11:02

## 2021-02-01 RX ADMIN — TAMSULOSIN HYDROCHLORIDE 0.4 MG: 0.4 CAPSULE ORAL at 09:02

## 2021-02-01 RX ADMIN — HYDROMORPHONE HYDROCHLORIDE 1 MG: 1 INJECTION, SOLUTION INTRAMUSCULAR; INTRAVENOUS; SUBCUTANEOUS at 02:02

## 2021-02-01 RX ADMIN — HYDROMORPHONE HYDROCHLORIDE 1 MG: 1 INJECTION, SOLUTION INTRAMUSCULAR; INTRAVENOUS; SUBCUTANEOUS at 09:02

## 2021-02-01 RX ADMIN — HYPROMELLOSE 2910 1 DROP: 5 SOLUTION OPHTHALMIC at 09:02

## 2021-02-01 RX ADMIN — HYDROCODONE BITARTRATE AND ACETAMINOPHEN 1 TABLET: 10; 325 TABLET ORAL at 06:02

## 2021-02-01 RX ADMIN — FAMOTIDINE 20 MG: 20 TABLET, FILM COATED ORAL at 09:02

## 2021-02-01 RX ADMIN — CETIRIZINE HYDROCHLORIDE 20 MG: 5 TABLET ORAL at 09:02

## 2021-02-01 RX ADMIN — PROPOFOL 50 MG: 10 INJECTION, EMULSION INTRAVENOUS at 05:02

## 2021-02-01 RX ADMIN — HYPROMELLOSE 2910 1 DROP: 5 SOLUTION OPHTHALMIC at 03:02

## 2021-02-01 RX ADMIN — VANCOMYCIN HYDROCHLORIDE 1750 MG: 10 INJECTION, POWDER, LYOPHILIZED, FOR SOLUTION INTRAVENOUS at 03:02

## 2021-02-01 RX ADMIN — HYDROCODONE BITARTRATE AND ACETAMINOPHEN 1 TABLET: 5; 325 TABLET ORAL at 11:02

## 2021-02-01 RX ADMIN — LORAZEPAM 2 MG: 2 INJECTION INTRAMUSCULAR; INTRAVENOUS at 02:02

## 2021-02-01 RX ADMIN — CLINDAMYCIN HYDROCHLORIDE 300 MG: 150 CAPSULE ORAL at 06:02

## 2021-02-01 RX ADMIN — MIDAZOLAM HYDROCHLORIDE 2 MG: 1 INJECTION, SOLUTION INTRAMUSCULAR; INTRAVENOUS at 05:02

## 2021-02-01 RX ADMIN — LIDOCAINE HYDROCHLORIDE 50 MG: 20 INJECTION, SOLUTION INTRAVENOUS at 05:02

## 2021-02-01 RX ADMIN — GABAPENTIN 300 MG: 300 CAPSULE ORAL at 09:02

## 2021-02-01 RX ADMIN — LORAZEPAM 1 MG: 1 TABLET ORAL at 11:02

## 2021-02-02 ENCOUNTER — TELEPHONE (OUTPATIENT)
Dept: UROLOGY | Facility: CLINIC | Age: 41
End: 2021-02-02

## 2021-02-02 ENCOUNTER — TELEPHONE (OUTPATIENT)
Dept: SURGERY | Facility: CLINIC | Age: 41
End: 2021-02-02

## 2021-02-02 LAB
ALBUMIN SERPL BCP-MCNC: 2.9 G/DL (ref 3.5–5.2)
ALP SERPL-CCNC: 59 U/L (ref 55–135)
ALT SERPL W/O P-5'-P-CCNC: 20 U/L (ref 10–44)
ANION GAP SERPL CALC-SCNC: 10 MMOL/L (ref 8–16)
ASCENDING AORTA: 3.29 CM
AST SERPL-CCNC: 17 U/L (ref 10–40)
AV INDEX (PROSTH): 0.67
AV MEAN GRADIENT: 4 MMHG
AV PEAK GRADIENT: 7 MMHG
AV VALVE AREA: 3.53 CM2
AV VELOCITY RATIO: 0.69
BASOPHILS # BLD AUTO: 0.04 K/UL (ref 0–0.2)
BASOPHILS NFR BLD: 0.5 % (ref 0–1.9)
BILIRUB SERPL-MCNC: 1 MG/DL (ref 0.1–1)
BSA FOR ECHO PROCEDURE: 2.21 M2
BUN SERPL-MCNC: 13 MG/DL (ref 6–20)
CALCIUM SERPL-MCNC: 8.1 MG/DL (ref 8.7–10.5)
CHLORIDE SERPL-SCNC: 106 MMOL/L (ref 95–110)
CO2 SERPL-SCNC: 24 MMOL/L (ref 23–29)
CREAT SERPL-MCNC: 0.8 MG/DL (ref 0.5–1.4)
CV ECHO LV RWT: 0.33 CM
DIFFERENTIAL METHOD: ABNORMAL
DOP CALC AO PEAK VEL: 1.32 M/S
DOP CALC AO VTI: 25.5 CM
DOP CALC LVOT AREA: 5.3 CM2
DOP CALC LVOT DIAMETER: 2.59 CM
DOP CALC LVOT PEAK VEL: 0.91 M/S
DOP CALC LVOT STROKE VOLUME: 89.94 CM3
DOP CALCLVOT PEAK VEL VTI: 17.08 CM
E WAVE DECELERATION TIME: 85.65 MSEC
E/A RATIO: 1.27
E/E' RATIO: 6.59 M/S
ECHO LV POSTERIOR WALL: 0.91 CM (ref 0.6–1.1)
EOSINOPHIL # BLD AUTO: 0.1 K/UL (ref 0–0.5)
EOSINOPHIL NFR BLD: 1.1 % (ref 0–8)
ERYTHROCYTE [DISTWIDTH] IN BLOOD BY AUTOMATED COUNT: 17.8 % (ref 11.5–14.5)
EST. GFR  (AFRICAN AMERICAN): >60 ML/MIN/1.73 M^2
EST. GFR  (NON AFRICAN AMERICAN): >60 ML/MIN/1.73 M^2
FRACTIONAL SHORTENING: 37 % (ref 28–44)
GLUCOSE SERPL-MCNC: 84 MG/DL (ref 70–110)
HCT VFR BLD AUTO: 33.1 % (ref 40–54)
HCV AB SERPL QL IA: NEGATIVE
HGB BLD-MCNC: 10 G/DL (ref 14–18)
HIV 1+2 AB+HIV1 P24 AG SERPL QL IA: NEGATIVE
IMM GRANULOCYTES # BLD AUTO: 0.03 K/UL (ref 0–0.04)
IMM GRANULOCYTES NFR BLD AUTO: 0.4 % (ref 0–0.5)
INTERVENTRICULAR SEPTUM: 0.89 CM (ref 0.6–1.1)
LA MAJOR: 4.84 CM
LA MINOR: 5.72 CM
LA WIDTH: 4.27 CM
LEFT ATRIUM SIZE: 3.98 CM
LEFT ATRIUM VOLUME INDEX MOD: 30.5 ML/M2
LEFT ATRIUM VOLUME INDEX: 34.7 ML/M2
LEFT ATRIUM VOLUME MOD: 66.51 CM3
LEFT ATRIUM VOLUME: 75.74 CM3
LEFT INTERNAL DIMENSION IN SYSTOLE: 3.48 CM (ref 2.1–4)
LEFT VENTRICLE DIASTOLIC VOLUME INDEX: 67.21 ML/M2
LEFT VENTRICLE DIASTOLIC VOLUME: 146.51 ML
LEFT VENTRICLE MASS INDEX: 85 G/M2
LEFT VENTRICLE SYSTOLIC VOLUME INDEX: 23 ML/M2
LEFT VENTRICLE SYSTOLIC VOLUME: 50.23 ML
LEFT VENTRICULAR INTERNAL DIMENSION IN DIASTOLE: 5.49 CM (ref 3.5–6)
LEFT VENTRICULAR MASS: 185.26 G
LV LATERAL E/E' RATIO: 5.56 M/S
LV SEPTAL E/E' RATIO: 8.09 M/S
LYMPHOCYTES # BLD AUTO: 0.9 K/UL (ref 1–4.8)
LYMPHOCYTES NFR BLD: 11 % (ref 18–48)
MAGNESIUM SERPL-MCNC: 2.3 MG/DL (ref 1.6–2.6)
MCH RBC QN AUTO: 24.3 PG (ref 27–31)
MCHC RBC AUTO-ENTMCNC: 30.2 G/DL (ref 32–36)
MCV RBC AUTO: 81 FL (ref 82–98)
MONOCYTES # BLD AUTO: 1 K/UL (ref 0.3–1)
MONOCYTES NFR BLD: 12.6 % (ref 4–15)
MV A" WAVE DURATION": 14.56 MSEC
MV PEAK A VEL: 0.7 M/S
MV PEAK E VEL: 0.89 M/S
NEUTROPHILS # BLD AUTO: 6 K/UL (ref 1.8–7.7)
NEUTROPHILS NFR BLD: 74.4 % (ref 38–73)
NRBC BLD-RTO: 0 /100 WBC
PHOSPHATE SERPL-MCNC: 3 MG/DL (ref 2.7–4.5)
PISA TR MAX VEL: 2.06 M/S
PLATELET # BLD AUTO: 378 K/UL (ref 150–350)
PMV BLD AUTO: 11.2 FL (ref 9.2–12.9)
POTASSIUM SERPL-SCNC: 3.3 MMOL/L (ref 3.5–5.1)
PROT SERPL-MCNC: 5.8 G/DL (ref 6–8.4)
PULM VEIN S/D RATIO: 1.47
PV PEAK D VEL: 0.45 M/S
PV PEAK S VEL: 0.66 M/S
RA MAJOR: 5.14 CM
RA PRESSURE: 3 MMHG
RA WIDTH: 3.99 CM
RBC # BLD AUTO: 4.11 M/UL (ref 4.6–6.2)
RIGHT VENTRICULAR END-DIASTOLIC DIMENSION: 4.25 CM
RV TISSUE DOPPLER FREE WALL SYSTOLIC VELOCITY 1 (APICAL 4 CHAMBER VIEW): 14.62 CM/S
SARS-COV-2 RDRP RESP QL NAA+PROBE: NEGATIVE
SINUS: 4.28 CM
SODIUM SERPL-SCNC: 140 MMOL/L (ref 136–145)
STJ: 3.1 CM
TDI LATERAL: 0.16 M/S
TDI SEPTAL: 0.11 M/S
TDI: 0.14 M/S
TR MAX PG: 17 MMHG
TRICUSPID ANNULAR PLANE SYSTOLIC EXCURSION: 2.22 CM
TV REST PULMONARY ARTERY PRESSURE: 20 MMHG
WBC # BLD AUTO: 8.03 K/UL (ref 3.9–12.7)

## 2021-02-02 PROCEDURE — 83735 ASSAY OF MAGNESIUM: CPT

## 2021-02-02 PROCEDURE — 96376 TX/PRO/DX INJ SAME DRUG ADON: CPT

## 2021-02-02 PROCEDURE — 25000003 PHARM REV CODE 250: Performed by: INTERNAL MEDICINE

## 2021-02-02 PROCEDURE — 86803 HEPATITIS C AB TEST: CPT

## 2021-02-02 PROCEDURE — A9585 GADOBUTROL INJECTION: HCPCS | Performed by: HOSPITALIST

## 2021-02-02 PROCEDURE — 85025 COMPLETE CBC W/AUTO DIFF WBC: CPT

## 2021-02-02 PROCEDURE — 25000003 PHARM REV CODE 250: Performed by: HOSPITALIST

## 2021-02-02 PROCEDURE — 80053 COMPREHEN METABOLIC PANEL: CPT

## 2021-02-02 PROCEDURE — 99225 PR SUBSEQUENT OBSERVATION CARE,LEVEL II: ICD-10-PCS | Mod: ,,, | Performed by: HOSPITALIST

## 2021-02-02 PROCEDURE — 63600175 PHARM REV CODE 636 W HCPCS: Performed by: ANESTHESIOLOGY

## 2021-02-02 PROCEDURE — 99225 PR SUBSEQUENT OBSERVATION CARE,LEVEL II: CPT | Mod: ,,, | Performed by: HOSPITALIST

## 2021-02-02 PROCEDURE — 36415 COLL VENOUS BLD VENIPUNCTURE: CPT

## 2021-02-02 PROCEDURE — 63600175 PHARM REV CODE 636 W HCPCS: Performed by: HOSPITALIST

## 2021-02-02 PROCEDURE — 99214 PR OFFICE/OUTPT VISIT, EST, LEVL IV, 30-39 MIN: ICD-10-PCS | Mod: ,,, | Performed by: INTERNAL MEDICINE

## 2021-02-02 PROCEDURE — 84100 ASSAY OF PHOSPHORUS: CPT

## 2021-02-02 PROCEDURE — 86703 HIV-1/HIV-2 1 RESULT ANTBDY: CPT

## 2021-02-02 PROCEDURE — G0378 HOSPITAL OBSERVATION PER HR: HCPCS

## 2021-02-02 PROCEDURE — 25500020 PHARM REV CODE 255: Performed by: HOSPITALIST

## 2021-02-02 PROCEDURE — 99214 OFFICE O/P EST MOD 30 MIN: CPT | Mod: ,,, | Performed by: INTERNAL MEDICINE

## 2021-02-02 RX ORDER — HYDROCODONE BITARTRATE AND ACETAMINOPHEN 10; 325 MG/1; MG/1
1 TABLET ORAL EVERY 4 HOURS PRN
Qty: 20 TABLET | Refills: 0 | Status: SHIPPED | OUTPATIENT
Start: 2021-02-02 | End: 2021-02-04

## 2021-02-02 RX ORDER — PREDNISONE 20 MG/1
30 TABLET ORAL DAILY
Qty: 30 TABLET | Refills: 3 | Status: ON HOLD | OUTPATIENT
Start: 2021-02-02 | End: 2021-03-04 | Stop reason: HOSPADM

## 2021-02-02 RX ORDER — HYDROMORPHONE HYDROCHLORIDE 1 MG/ML
1 INJECTION, SOLUTION INTRAMUSCULAR; INTRAVENOUS; SUBCUTANEOUS EVERY 4 HOURS PRN
Status: DISCONTINUED | OUTPATIENT
Start: 2021-02-02 | End: 2021-02-04 | Stop reason: HOSPADM

## 2021-02-02 RX ORDER — POTASSIUM CHLORIDE 20 MEQ/1
40 TABLET, EXTENDED RELEASE ORAL ONCE
Status: COMPLETED | OUTPATIENT
Start: 2021-02-02 | End: 2021-02-02

## 2021-02-02 RX ORDER — SULFAMETHOXAZOLE AND TRIMETHOPRIM 800; 160 MG/1; MG/1
1 TABLET ORAL 2 TIMES DAILY
Qty: 14 TABLET | Refills: 0 | Status: SHIPPED | OUTPATIENT
Start: 2021-02-02 | End: 2021-02-02

## 2021-02-02 RX ORDER — LORAZEPAM 1 MG/1
1 TABLET ORAL EVERY 8 HOURS PRN
Qty: 15 TABLET | Refills: 0 | Status: SHIPPED | OUTPATIENT
Start: 2021-02-02 | End: 2021-02-04

## 2021-02-02 RX ORDER — SULFAMETHOXAZOLE AND TRIMETHOPRIM 800; 160 MG/1; MG/1
1 TABLET ORAL 2 TIMES DAILY
Status: DISCONTINUED | OUTPATIENT
Start: 2021-02-02 | End: 2021-02-04 | Stop reason: HOSPADM

## 2021-02-02 RX ORDER — SULFAMETHOXAZOLE AND TRIMETHOPRIM 800; 160 MG/1; MG/1
1 TABLET ORAL 2 TIMES DAILY
Qty: 14 TABLET | Refills: 0 | Status: SHIPPED | OUTPATIENT
Start: 2021-02-02 | End: 2021-02-04

## 2021-02-02 RX ORDER — HYDROCODONE BITARTRATE AND ACETAMINOPHEN 10; 325 MG/1; MG/1
1 TABLET ORAL EVERY 4 HOURS PRN
Qty: 20 TABLET | Refills: 0 | Status: SHIPPED | OUTPATIENT
Start: 2021-02-02 | End: 2021-02-02

## 2021-02-02 RX ORDER — GADOBUTROL 604.72 MG/ML
10 INJECTION INTRAVENOUS
Status: COMPLETED | OUTPATIENT
Start: 2021-02-02 | End: 2021-02-02

## 2021-02-02 RX ADMIN — PAROXETINE HYDROCHLORIDE 20 MG: 20 TABLET, FILM COATED ORAL at 06:02

## 2021-02-02 RX ADMIN — HYPROMELLOSE 2910 1 DROP: 5 SOLUTION OPHTHALMIC at 08:02

## 2021-02-02 RX ADMIN — SULFAMETHOXAZOLE AND TRIMETHOPRIM 1 TABLET: 800; 160 TABLET ORAL at 08:02

## 2021-02-02 RX ADMIN — GABAPENTIN 300 MG: 300 CAPSULE ORAL at 08:02

## 2021-02-02 RX ADMIN — HYDROMORPHONE HYDROCHLORIDE 1 MG: 1 INJECTION, SOLUTION INTRAMUSCULAR; INTRAVENOUS; SUBCUTANEOUS at 02:02

## 2021-02-02 RX ADMIN — HYPROMELLOSE 2910 1 DROP: 5 SOLUTION OPHTHALMIC at 06:02

## 2021-02-02 RX ADMIN — HYDROCODONE BITARTRATE AND ACETAMINOPHEN 1 TABLET: 10; 325 TABLET ORAL at 01:02

## 2021-02-02 RX ADMIN — LORAZEPAM 0.5 MG: 0.5 TABLET ORAL at 06:02

## 2021-02-02 RX ADMIN — LORAZEPAM 0.5 MG: 0.5 TABLET ORAL at 10:02

## 2021-02-02 RX ADMIN — GADOBUTROL 10 ML: 604.72 INJECTION INTRAVENOUS at 12:02

## 2021-02-02 RX ADMIN — HYDROMORPHONE HYDROCHLORIDE 1 MG: 1 INJECTION, SOLUTION INTRAMUSCULAR; INTRAVENOUS; SUBCUTANEOUS at 09:02

## 2021-02-02 RX ADMIN — POTASSIUM CHLORIDE 40 MEQ: 1500 TABLET, EXTENDED RELEASE ORAL at 05:02

## 2021-02-02 RX ADMIN — CETIRIZINE HYDROCHLORIDE 20 MG: 5 TABLET ORAL at 08:02

## 2021-02-02 RX ADMIN — HYDROCODONE BITARTRATE AND ACETAMINOPHEN 1 TABLET: 10; 325 TABLET ORAL at 06:02

## 2021-02-02 RX ADMIN — VANCOMYCIN HYDROCHLORIDE 1750 MG: 10 INJECTION, POWDER, LYOPHILIZED, FOR SOLUTION INTRAVENOUS at 03:02

## 2021-02-02 RX ADMIN — HYDROCODONE BITARTRATE AND ACETAMINOPHEN 1 TABLET: 10; 325 TABLET ORAL at 08:02

## 2021-02-02 RX ADMIN — FAMOTIDINE 20 MG: 20 TABLET, FILM COATED ORAL at 08:02

## 2021-02-02 RX ADMIN — HYPROMELLOSE 2910 1 DROP: 5 SOLUTION OPHTHALMIC at 05:02

## 2021-02-02 RX ADMIN — HYDROMORPHONE HYDROCHLORIDE 1 MG: 1 INJECTION, SOLUTION INTRAMUSCULAR; INTRAVENOUS; SUBCUTANEOUS at 10:02

## 2021-02-02 RX ADMIN — TAMSULOSIN HYDROCHLORIDE 0.4 MG: 0.4 CAPSULE ORAL at 09:02

## 2021-02-02 RX ADMIN — DIPHENHYDRAMINE HYDROCHLORIDE 25 MG: 50 INJECTION, SOLUTION INTRAMUSCULAR; INTRAVENOUS at 10:02

## 2021-02-02 RX ADMIN — GABAPENTIN 300 MG: 300 CAPSULE ORAL at 05:02

## 2021-02-02 RX ADMIN — HYPROMELLOSE 2910 1 DROP: 5 SOLUTION OPHTHALMIC at 01:02

## 2021-02-02 RX ADMIN — HYDROMORPHONE HYDROCHLORIDE 1 MG: 1 INJECTION, SOLUTION INTRAMUSCULAR; INTRAVENOUS; SUBCUTANEOUS at 06:02

## 2021-02-02 RX ADMIN — CETIRIZINE HYDROCHLORIDE 20 MG: 5 TABLET ORAL at 09:02

## 2021-02-02 RX ADMIN — GABAPENTIN 300 MG: 300 CAPSULE ORAL at 09:02

## 2021-02-02 RX ADMIN — HYPROMELLOSE 2910 1 DROP: 5 SOLUTION OPHTHALMIC at 09:02

## 2021-02-02 RX ADMIN — SULFAMETHOXAZOLE AND TRIMETHOPRIM 1 TABLET: 800; 160 TABLET ORAL at 10:02

## 2021-02-02 RX ADMIN — HYDROMORPHONE HYDROCHLORIDE 1 MG: 1 INJECTION, SOLUTION INTRAMUSCULAR; INTRAVENOUS; SUBCUTANEOUS at 03:02

## 2021-02-02 RX ADMIN — DOCUSATE SODIUM 50MG AND SENNOSIDES 8.6MG 1 TABLET: 8.6; 5 TABLET, FILM COATED ORAL at 09:02

## 2021-02-02 RX ADMIN — HYPROMELLOSE 2910 1 DROP: 5 SOLUTION OPHTHALMIC at 10:02

## 2021-02-02 RX ADMIN — PREDNISONE 30 MG: 20 TABLET ORAL at 09:02

## 2021-02-02 RX ADMIN — FAMOTIDINE 20 MG: 20 TABLET, FILM COATED ORAL at 09:02

## 2021-02-03 ENCOUNTER — PATIENT MESSAGE (OUTPATIENT)
Dept: ALLERGY | Facility: CLINIC | Age: 41
End: 2021-02-03

## 2021-02-03 PROBLEM — N40.1 BENIGN PROSTATIC HYPERPLASIA WITH NOCTURIA: Chronic | Status: ACTIVE | Noted: 2020-09-16

## 2021-02-03 PROBLEM — R35.1 BENIGN PROSTATIC HYPERPLASIA WITH NOCTURIA: Chronic | Status: ACTIVE | Noted: 2020-09-16

## 2021-02-03 PROBLEM — D72.119 HYPEREOSINOPHILIC SYNDROME: Chronic | Status: ACTIVE | Noted: 2020-08-06

## 2021-02-03 LAB
ALBUMIN SERPL BCP-MCNC: 3 G/DL (ref 3.5–5.2)
ALP SERPL-CCNC: 58 U/L (ref 55–135)
ALT SERPL W/O P-5'-P-CCNC: 19 U/L (ref 10–44)
ANION GAP SERPL CALC-SCNC: 10 MMOL/L (ref 8–16)
APPEARANCE FLD: NORMAL
AST SERPL-CCNC: 14 U/L (ref 10–40)
BASOPHILS # BLD AUTO: 0.05 K/UL (ref 0–0.2)
BASOPHILS NFR BLD: 0.7 % (ref 0–1.9)
BILIRUB SERPL-MCNC: 0.5 MG/DL (ref 0.1–1)
BODY FLD TYPE: NORMAL
BODY FLD TYPE: NORMAL
BUN SERPL-MCNC: 8 MG/DL (ref 6–20)
CALCIUM SERPL-MCNC: 8.2 MG/DL (ref 8.7–10.5)
CHLORIDE SERPL-SCNC: 107 MMOL/L (ref 95–110)
CO2 SERPL-SCNC: 24 MMOL/L (ref 23–29)
COLOR FLD: NORMAL
CREAT SERPL-MCNC: 0.9 MG/DL (ref 0.5–1.4)
CRYSTALS FLD MICRO: NEGATIVE
DIFFERENTIAL METHOD: ABNORMAL
EOSINOPHIL # BLD AUTO: 0.1 K/UL (ref 0–0.5)
EOSINOPHIL NFR BLD: 1.8 % (ref 0–8)
ERYTHROCYTE [DISTWIDTH] IN BLOOD BY AUTOMATED COUNT: 18.6 % (ref 11.5–14.5)
EST. GFR  (AFRICAN AMERICAN): >60 ML/MIN/1.73 M^2
EST. GFR  (NON AFRICAN AMERICAN): >60 ML/MIN/1.73 M^2
ETHYLENE GLYCOL SERPLBLD-MCNC: <5 MG/DL
GLUCOSE SERPL-MCNC: 98 MG/DL (ref 70–110)
GRAM STN SPEC: NORMAL
GRAM STN SPEC: NORMAL
HCT VFR BLD AUTO: 34.4 % (ref 40–54)
HGB BLD-MCNC: 10.6 G/DL (ref 14–18)
IMM GRANULOCYTES # BLD AUTO: 0.06 K/UL (ref 0–0.04)
IMM GRANULOCYTES NFR BLD AUTO: 0.9 % (ref 0–0.5)
LYMPHOCYTES # BLD AUTO: 1.1 K/UL (ref 1–4.8)
LYMPHOCYTES NFR BLD: 16.1 % (ref 18–48)
LYMPHOCYTES NFR FLD MANUAL: 80 %
MAGNESIUM SERPL-MCNC: 2 MG/DL (ref 1.6–2.6)
MCH RBC QN AUTO: 24.7 PG (ref 27–31)
MCHC RBC AUTO-ENTMCNC: 30.8 G/DL (ref 32–36)
MCV RBC AUTO: 80 FL (ref 82–98)
METHANOL SERPL-MCNC: <5 MG/DL
MONOCYTES # BLD AUTO: 0.9 K/UL (ref 0.3–1)
MONOCYTES NFR BLD: 12.7 % (ref 4–15)
MONOS+MACROS NFR FLD MANUAL: 20 %
NEUTROPHILS # BLD AUTO: 4.7 K/UL (ref 1.8–7.7)
NEUTROPHILS NFR BLD: 67.8 % (ref 38–73)
NRBC BLD-RTO: 0 /100 WBC
PATH INTERP FLD-IMP: NORMAL
PHOSPHATE SERPL-MCNC: 2.6 MG/DL (ref 2.7–4.5)
PLATELET # BLD AUTO: 312 K/UL (ref 150–350)
PMV BLD AUTO: 11.7 FL (ref 9.2–12.9)
POTASSIUM SERPL-SCNC: 4 MMOL/L (ref 3.5–5.1)
PROT SERPL-MCNC: 6 G/DL (ref 6–8.4)
RBC # BLD AUTO: 4.3 M/UL (ref 4.6–6.2)
SODIUM SERPL-SCNC: 141 MMOL/L (ref 136–145)
WBC # BLD AUTO: 6.85 K/UL (ref 3.9–12.7)
WBC # FLD: 73 /CU MM

## 2021-02-03 PROCEDURE — 99225 PR SUBSEQUENT OBSERVATION CARE,LEVEL II: CPT | Mod: ,,, | Performed by: HOSPITALIST

## 2021-02-03 PROCEDURE — 25000003 PHARM REV CODE 250: Performed by: HOSPITALIST

## 2021-02-03 PROCEDURE — 87075 CULTR BACTERIA EXCEPT BLOOD: CPT

## 2021-02-03 PROCEDURE — 87206 SMEAR FLUORESCENT/ACID STAI: CPT

## 2021-02-03 PROCEDURE — 63600175 PHARM REV CODE 636 W HCPCS: Performed by: STUDENT IN AN ORGANIZED HEALTH CARE EDUCATION/TRAINING PROGRAM

## 2021-02-03 PROCEDURE — 87116 MYCOBACTERIA CULTURE: CPT

## 2021-02-03 PROCEDURE — G0378 HOSPITAL OBSERVATION PER HR: HCPCS

## 2021-02-03 PROCEDURE — 89060 EXAM SYNOVIAL FLUID CRYSTALS: CPT

## 2021-02-03 PROCEDURE — 87102 FUNGUS ISOLATION CULTURE: CPT

## 2021-02-03 PROCEDURE — 83735 ASSAY OF MAGNESIUM: CPT

## 2021-02-03 PROCEDURE — 96376 TX/PRO/DX INJ SAME DRUG ADON: CPT

## 2021-02-03 PROCEDURE — 36415 COLL VENOUS BLD VENIPUNCTURE: CPT

## 2021-02-03 PROCEDURE — 63600175 PHARM REV CODE 636 W HCPCS: Performed by: ANESTHESIOLOGY

## 2021-02-03 PROCEDURE — 63600175 PHARM REV CODE 636 W HCPCS: Performed by: HOSPITALIST

## 2021-02-03 PROCEDURE — 89051 BODY FLUID CELL COUNT: CPT

## 2021-02-03 PROCEDURE — 84100 ASSAY OF PHOSPHORUS: CPT

## 2021-02-03 PROCEDURE — 87205 SMEAR GRAM STAIN: CPT

## 2021-02-03 PROCEDURE — 85025 COMPLETE CBC W/AUTO DIFF WBC: CPT

## 2021-02-03 PROCEDURE — 87070 CULTURE OTHR SPECIMN AEROBIC: CPT

## 2021-02-03 PROCEDURE — 80053 COMPREHEN METABOLIC PANEL: CPT

## 2021-02-03 PROCEDURE — 25000003 PHARM REV CODE 250: Performed by: INTERNAL MEDICINE

## 2021-02-03 PROCEDURE — 99225 PR SUBSEQUENT OBSERVATION CARE,LEVEL II: ICD-10-PCS | Mod: ,,, | Performed by: HOSPITALIST

## 2021-02-03 PROCEDURE — 25000003 PHARM REV CODE 250: Performed by: EMERGENCY MEDICINE

## 2021-02-03 PROCEDURE — 96375 TX/PRO/DX INJ NEW DRUG ADDON: CPT

## 2021-02-03 RX ORDER — MAG HYDROX/ALUMINUM HYD/SIMETH 200-200-20
30 SUSPENSION, ORAL (FINAL DOSE FORM) ORAL EVERY 6 HOURS PRN
Status: DISCONTINUED | OUTPATIENT
Start: 2021-02-03 | End: 2021-02-04 | Stop reason: HOSPADM

## 2021-02-03 RX ORDER — HYDROMORPHONE HYDROCHLORIDE 1 MG/ML
1 INJECTION, SOLUTION INTRAMUSCULAR; INTRAVENOUS; SUBCUTANEOUS ONCE
Status: COMPLETED | OUTPATIENT
Start: 2021-02-03 | End: 2021-02-03

## 2021-02-03 RX ADMIN — DOCUSATE SODIUM 50MG AND SENNOSIDES 8.6MG 1 TABLET: 8.6; 5 TABLET, FILM COATED ORAL at 08:02

## 2021-02-03 RX ADMIN — HYDROCODONE BITARTRATE AND ACETAMINOPHEN 1 TABLET: 10; 325 TABLET ORAL at 08:02

## 2021-02-03 RX ADMIN — LORAZEPAM 0.5 MG: 0.5 TABLET ORAL at 06:02

## 2021-02-03 RX ADMIN — HYDROMORPHONE HYDROCHLORIDE 1 MG: 1 INJECTION, SOLUTION INTRAMUSCULAR; INTRAVENOUS; SUBCUTANEOUS at 10:02

## 2021-02-03 RX ADMIN — CETIRIZINE HYDROCHLORIDE 20 MG: 5 TABLET ORAL at 08:02

## 2021-02-03 RX ADMIN — SULFAMETHOXAZOLE AND TRIMETHOPRIM 1 TABLET: 800; 160 TABLET ORAL at 08:02

## 2021-02-03 RX ADMIN — HYDROCODONE BITARTRATE AND ACETAMINOPHEN 1 TABLET: 10; 325 TABLET ORAL at 04:02

## 2021-02-03 RX ADMIN — HYDROMORPHONE HYDROCHLORIDE 1 MG: 1 INJECTION, SOLUTION INTRAMUSCULAR; INTRAVENOUS; SUBCUTANEOUS at 04:02

## 2021-02-03 RX ADMIN — HYPROMELLOSE 2910 1 DROP: 5 SOLUTION OPHTHALMIC at 02:02

## 2021-02-03 RX ADMIN — HYDROMORPHONE HYDROCHLORIDE 1 MG: 1 INJECTION, SOLUTION INTRAMUSCULAR; INTRAVENOUS; SUBCUTANEOUS at 05:02

## 2021-02-03 RX ADMIN — DIPHENHYDRAMINE HYDROCHLORIDE 25 MG: 50 INJECTION, SOLUTION INTRAMUSCULAR; INTRAVENOUS at 10:02

## 2021-02-03 RX ADMIN — TAMSULOSIN HYDROCHLORIDE 0.4 MG: 0.4 CAPSULE ORAL at 08:02

## 2021-02-03 RX ADMIN — LORAZEPAM 0.5 MG: 0.5 TABLET ORAL at 11:02

## 2021-02-03 RX ADMIN — HYDROMORPHONE HYDROCHLORIDE 1 MG: 1 INJECTION, SOLUTION INTRAMUSCULAR; INTRAVENOUS; SUBCUTANEOUS at 01:02

## 2021-02-03 RX ADMIN — HYDROCODONE BITARTRATE AND ACETAMINOPHEN 1 TABLET: 10; 325 TABLET ORAL at 12:02

## 2021-02-03 RX ADMIN — HYPROMELLOSE 2910 1 DROP: 5 SOLUTION OPHTHALMIC at 10:02

## 2021-02-03 RX ADMIN — FAMOTIDINE 20 MG: 20 TABLET, FILM COATED ORAL at 08:02

## 2021-02-03 RX ADMIN — PREDNISONE 30 MG: 20 TABLET ORAL at 08:02

## 2021-02-03 RX ADMIN — HYDROMORPHONE HYDROCHLORIDE 1 MG: 1 INJECTION, SOLUTION INTRAMUSCULAR; INTRAVENOUS; SUBCUTANEOUS at 09:02

## 2021-02-03 RX ADMIN — HYPROMELLOSE 2910 1 DROP: 5 SOLUTION OPHTHALMIC at 01:02

## 2021-02-03 RX ADMIN — LORAZEPAM 0.5 MG: 0.5 TABLET ORAL at 02:02

## 2021-02-03 RX ADMIN — DIPHENHYDRAMINE HYDROCHLORIDE 25 MG: 50 INJECTION, SOLUTION INTRAMUSCULAR; INTRAVENOUS at 04:02

## 2021-02-03 RX ADMIN — HYDROMORPHONE HYDROCHLORIDE 1 MG: 1 INJECTION, SOLUTION INTRAMUSCULAR; INTRAVENOUS; SUBCUTANEOUS at 06:02

## 2021-02-03 RX ADMIN — HYDROCODONE BITARTRATE AND ACETAMINOPHEN 1 TABLET: 5; 325 TABLET ORAL at 01:02

## 2021-02-03 RX ADMIN — HYDROMORPHONE HYDROCHLORIDE 1 MG: 1 INJECTION, SOLUTION INTRAMUSCULAR; INTRAVENOUS; SUBCUTANEOUS at 02:02

## 2021-02-03 RX ADMIN — GABAPENTIN 300 MG: 300 CAPSULE ORAL at 08:02

## 2021-02-03 RX ADMIN — HYPROMELLOSE 2910 1 DROP: 5 SOLUTION OPHTHALMIC at 08:02

## 2021-02-03 RX ADMIN — PAROXETINE HYDROCHLORIDE 20 MG: 20 TABLET, FILM COATED ORAL at 05:02

## 2021-02-03 RX ADMIN — PROMETHAZINE HYDROCHLORIDE 6.25 MG: 25 INJECTION INTRAMUSCULAR; INTRAVENOUS at 11:02

## 2021-02-03 RX ADMIN — HYPROMELLOSE 2910 1 DROP: 5 SOLUTION OPHTHALMIC at 05:02

## 2021-02-03 RX ADMIN — PROMETHAZINE HYDROCHLORIDE 6.25 MG: 25 INJECTION INTRAMUSCULAR; INTRAVENOUS at 09:02

## 2021-02-03 RX ADMIN — GABAPENTIN 300 MG: 300 CAPSULE ORAL at 04:02

## 2021-02-03 RX ADMIN — PROMETHAZINE HYDROCHLORIDE 6.25 MG: 25 INJECTION INTRAMUSCULAR; INTRAVENOUS at 04:02

## 2021-02-03 RX ADMIN — MELATONIN TAB 3 MG 6 MG: 3 TAB at 08:02

## 2021-02-03 RX ADMIN — HYPROMELLOSE 2910 1 DROP: 5 SOLUTION OPHTHALMIC at 06:02

## 2021-02-04 VITALS
HEART RATE: 64 BPM | OXYGEN SATURATION: 99 % | TEMPERATURE: 98 F | HEIGHT: 72 IN | RESPIRATION RATE: 16 BRPM | WEIGHT: 212 LBS | BODY MASS INDEX: 28.71 KG/M2 | SYSTOLIC BLOOD PRESSURE: 132 MMHG | DIASTOLIC BLOOD PRESSURE: 85 MMHG

## 2021-02-04 PROBLEM — F19.959 STEROID-INDUCED PSYCHOSIS WITH COMPLICATION: Status: RESOLVED | Noted: 2021-01-31 | Resolved: 2021-02-04

## 2021-02-04 PROBLEM — M71.161 SEPTIC PREPATELLAR BURSITIS OF RIGHT KNEE: Status: RESOLVED | Noted: 2021-01-12 | Resolved: 2021-02-04

## 2021-02-04 LAB
ALBUMIN SERPL BCP-MCNC: 3.2 G/DL (ref 3.5–5.2)
ALP SERPL-CCNC: 62 U/L (ref 55–135)
ALT SERPL W/O P-5'-P-CCNC: 16 U/L (ref 10–44)
ANION GAP SERPL CALC-SCNC: 12 MMOL/L (ref 8–16)
AST SERPL-CCNC: 12 U/L (ref 10–40)
BACTERIA SPEC AEROBE CULT: NO GROWTH
BASOPHILS # BLD AUTO: 0.05 K/UL (ref 0–0.2)
BASOPHILS NFR BLD: 0.7 % (ref 0–1.9)
BILIRUB SERPL-MCNC: 0.4 MG/DL (ref 0.1–1)
BUN SERPL-MCNC: 11 MG/DL (ref 6–20)
CALCIUM SERPL-MCNC: 8.3 MG/DL (ref 8.7–10.5)
CHLORIDE SERPL-SCNC: 102 MMOL/L (ref 95–110)
CO2 SERPL-SCNC: 25 MMOL/L (ref 23–29)
CREAT SERPL-MCNC: 0.9 MG/DL (ref 0.5–1.4)
DIFFERENTIAL METHOD: ABNORMAL
EOSINOPHIL # BLD AUTO: 0.2 K/UL (ref 0–0.5)
EOSINOPHIL NFR BLD: 2.9 % (ref 0–8)
ERYTHROCYTE [DISTWIDTH] IN BLOOD BY AUTOMATED COUNT: 18.2 % (ref 11.5–14.5)
EST. GFR  (AFRICAN AMERICAN): >60 ML/MIN/1.73 M^2
EST. GFR  (NON AFRICAN AMERICAN): >60 ML/MIN/1.73 M^2
GLUCOSE SERPL-MCNC: 106 MG/DL (ref 70–110)
HCT VFR BLD AUTO: 36.5 % (ref 40–54)
HGB BLD-MCNC: 10.8 G/DL (ref 14–18)
IMM GRANULOCYTES # BLD AUTO: 0.02 K/UL (ref 0–0.04)
IMM GRANULOCYTES NFR BLD AUTO: 0.3 % (ref 0–0.5)
LYMPHOCYTES # BLD AUTO: 1.3 K/UL (ref 1–4.8)
LYMPHOCYTES NFR BLD: 17.7 % (ref 18–48)
MAGNESIUM SERPL-MCNC: 1.9 MG/DL (ref 1.6–2.6)
MCH RBC QN AUTO: 24 PG (ref 27–31)
MCHC RBC AUTO-ENTMCNC: 29.6 G/DL (ref 32–36)
MCV RBC AUTO: 81 FL (ref 82–98)
MONOCYTES # BLD AUTO: 0.9 K/UL (ref 0.3–1)
MONOCYTES NFR BLD: 12.7 % (ref 4–15)
NEUTROPHILS # BLD AUTO: 4.8 K/UL (ref 1.8–7.7)
NEUTROPHILS NFR BLD: 65.7 % (ref 38–73)
NRBC BLD-RTO: 0 /100 WBC
PHOSPHATE SERPL-MCNC: 2.8 MG/DL (ref 2.7–4.5)
PLATELET # BLD AUTO: 345 K/UL (ref 150–350)
PMV BLD AUTO: 10.6 FL (ref 9.2–12.9)
POTASSIUM SERPL-SCNC: 3.6 MMOL/L (ref 3.5–5.1)
PROT SERPL-MCNC: 6.2 G/DL (ref 6–8.4)
RBC # BLD AUTO: 4.5 M/UL (ref 4.6–6.2)
SODIUM SERPL-SCNC: 139 MMOL/L (ref 136–145)
WBC # BLD AUTO: 7.34 K/UL (ref 3.9–12.7)

## 2021-02-04 PROCEDURE — 25000003 PHARM REV CODE 250: Performed by: HOSPITALIST

## 2021-02-04 PROCEDURE — 80053 COMPREHEN METABOLIC PANEL: CPT

## 2021-02-04 PROCEDURE — 36415 COLL VENOUS BLD VENIPUNCTURE: CPT

## 2021-02-04 PROCEDURE — 25000003 PHARM REV CODE 250: Performed by: INTERNAL MEDICINE

## 2021-02-04 PROCEDURE — 83735 ASSAY OF MAGNESIUM: CPT

## 2021-02-04 PROCEDURE — G0378 HOSPITAL OBSERVATION PER HR: HCPCS

## 2021-02-04 PROCEDURE — 63600175 PHARM REV CODE 636 W HCPCS: Performed by: HOSPITALIST

## 2021-02-04 PROCEDURE — 85025 COMPLETE CBC W/AUTO DIFF WBC: CPT

## 2021-02-04 PROCEDURE — 96376 TX/PRO/DX INJ SAME DRUG ADON: CPT

## 2021-02-04 PROCEDURE — 99226 PR SUBSEQUENT OBSERVATION CARE,LEVEL III: CPT | Mod: ,,, | Performed by: HOSPITALIST

## 2021-02-04 PROCEDURE — 63600175 PHARM REV CODE 636 W HCPCS: Performed by: ANESTHESIOLOGY

## 2021-02-04 PROCEDURE — 99226 PR SUBSEQUENT OBSERVATION CARE,LEVEL III: ICD-10-PCS | Mod: ,,, | Performed by: HOSPITALIST

## 2021-02-04 PROCEDURE — 84100 ASSAY OF PHOSPHORUS: CPT

## 2021-02-04 RX ORDER — LORAZEPAM 1 MG/1
1 TABLET ORAL EVERY 8 HOURS PRN
Qty: 15 TABLET | Refills: 0 | Status: ON HOLD | OUTPATIENT
Start: 2021-02-04 | End: 2021-04-17 | Stop reason: HOSPADM

## 2021-02-04 RX ORDER — SULFAMETHOXAZOLE AND TRIMETHOPRIM 800; 160 MG/1; MG/1
1 TABLET ORAL DAILY
Qty: 30 TABLET | Refills: 1 | Status: SHIPPED | OUTPATIENT
Start: 2021-02-04 | End: 2021-02-04

## 2021-02-04 RX ORDER — HEPARIN 100 UNIT/ML
500 SYRINGE INTRAVENOUS ONCE
Status: COMPLETED | OUTPATIENT
Start: 2021-02-04 | End: 2021-02-04

## 2021-02-04 RX ORDER — CAPSAICIN 0 G/G
CREAM TOPICAL
Refills: 0 | Status: ON HOLD | COMMUNITY
Start: 2021-02-04 | End: 2021-03-02

## 2021-02-04 RX ORDER — SULFAMETHOXAZOLE AND TRIMETHOPRIM 800; 160 MG/1; MG/1
1 TABLET ORAL DAILY
Qty: 30 TABLET | Refills: 1 | Status: ON HOLD | OUTPATIENT
Start: 2021-02-04 | End: 2021-04-10 | Stop reason: SDUPTHER

## 2021-02-04 RX ORDER — HYDROCODONE BITARTRATE AND ACETAMINOPHEN 10; 325 MG/1; MG/1
1 TABLET ORAL EVERY 4 HOURS PRN
Qty: 20 TABLET | Refills: 0 | Status: ON HOLD | OUTPATIENT
Start: 2021-02-04 | End: 2021-04-10 | Stop reason: SDUPTHER

## 2021-02-04 RX ADMIN — HYPROMELLOSE 2910 1 DROP: 5 SOLUTION OPHTHALMIC at 02:02

## 2021-02-04 RX ADMIN — DIPHENHYDRAMINE HYDROCHLORIDE 25 MG: 50 INJECTION, SOLUTION INTRAMUSCULAR; INTRAVENOUS at 10:02

## 2021-02-04 RX ADMIN — PAROXETINE HYDROCHLORIDE 20 MG: 20 TABLET, FILM COATED ORAL at 08:02

## 2021-02-04 RX ADMIN — HYDROCODONE BITARTRATE AND ACETAMINOPHEN 1 TABLET: 10; 325 TABLET ORAL at 12:02

## 2021-02-04 RX ADMIN — HYDROMORPHONE HYDROCHLORIDE 1 MG: 1 INJECTION, SOLUTION INTRAMUSCULAR; INTRAVENOUS; SUBCUTANEOUS at 06:02

## 2021-02-04 RX ADMIN — HYDROCODONE BITARTRATE AND ACETAMINOPHEN 1 TABLET: 10; 325 TABLET ORAL at 04:02

## 2021-02-04 RX ADMIN — HYPROMELLOSE 2910 1 DROP: 5 SOLUTION OPHTHALMIC at 10:02

## 2021-02-04 RX ADMIN — HEPARIN 300 UNITS: 100 SYRINGE at 02:02

## 2021-02-04 RX ADMIN — HYDROMORPHONE HYDROCHLORIDE 1 MG: 1 INJECTION, SOLUTION INTRAMUSCULAR; INTRAVENOUS; SUBCUTANEOUS at 10:02

## 2021-02-04 RX ADMIN — LORAZEPAM 0.5 MG: 0.5 TABLET ORAL at 01:02

## 2021-02-04 RX ADMIN — TAMSULOSIN HYDROCHLORIDE 0.4 MG: 0.4 CAPSULE ORAL at 08:02

## 2021-02-04 RX ADMIN — FAMOTIDINE 20 MG: 20 TABLET, FILM COATED ORAL at 08:02

## 2021-02-04 RX ADMIN — CETIRIZINE HYDROCHLORIDE 20 MG: 5 TABLET ORAL at 08:02

## 2021-02-04 RX ADMIN — DOCUSATE SODIUM 50MG AND SENNOSIDES 8.6MG 1 TABLET: 8.6; 5 TABLET, FILM COATED ORAL at 08:02

## 2021-02-04 RX ADMIN — HYDROMORPHONE HYDROCHLORIDE 1 MG: 1 INJECTION, SOLUTION INTRAMUSCULAR; INTRAVENOUS; SUBCUTANEOUS at 02:02

## 2021-02-04 RX ADMIN — PREDNISONE 30 MG: 20 TABLET ORAL at 08:02

## 2021-02-04 RX ADMIN — GABAPENTIN 300 MG: 300 CAPSULE ORAL at 08:02

## 2021-02-04 RX ADMIN — PROMETHAZINE HYDROCHLORIDE 6.25 MG: 25 INJECTION INTRAMUSCULAR; INTRAVENOUS at 11:02

## 2021-02-04 RX ADMIN — HYPROMELLOSE 2910 1 DROP: 5 SOLUTION OPHTHALMIC at 06:02

## 2021-02-04 RX ADMIN — DIPHENHYDRAMINE HYDROCHLORIDE 25 MG: 50 INJECTION, SOLUTION INTRAMUSCULAR; INTRAVENOUS at 04:02

## 2021-02-04 RX ADMIN — SULFAMETHOXAZOLE AND TRIMETHOPRIM 1 TABLET: 800; 160 TABLET ORAL at 08:02

## 2021-02-04 RX ADMIN — HYDROCODONE BITARTRATE AND ACETAMINOPHEN 1 TABLET: 10; 325 TABLET ORAL at 08:02

## 2021-02-05 ENCOUNTER — TELEPHONE (OUTPATIENT)
Dept: ORTHOPEDICS | Facility: CLINIC | Age: 41
End: 2021-02-05

## 2021-02-05 DIAGNOSIS — M70.41 BURSITIS, PREPATELLAR, RIGHT: Primary | ICD-10-CM

## 2021-02-05 LAB
BACTERIA BLD CULT: NORMAL
BACTERIA BLD CULT: NORMAL

## 2021-02-06 LAB — BACTERIA SPEC AEROBE CULT: NO GROWTH

## 2021-02-08 ENCOUNTER — TELEPHONE (OUTPATIENT)
Dept: UROLOGY | Facility: CLINIC | Age: 41
End: 2021-02-08

## 2021-02-08 LAB — BACTERIA SPEC ANAEROBE CULT: NORMAL

## 2021-02-10 ENCOUNTER — PATIENT MESSAGE (OUTPATIENT)
Dept: UROLOGY | Facility: CLINIC | Age: 41
End: 2021-02-10

## 2021-02-10 ENCOUNTER — TELEPHONE (OUTPATIENT)
Dept: UROLOGY | Facility: CLINIC | Age: 41
End: 2021-02-10

## 2021-02-10 LAB — BACTERIA SPEC ANAEROBE CULT: NORMAL

## 2021-02-17 LAB — FUNGUS SPEC CULT: NORMAL

## 2021-02-24 ENCOUNTER — OFFICE VISIT (OUTPATIENT)
Dept: ALLERGY | Facility: CLINIC | Age: 41
End: 2021-02-24
Payer: MEDICAID

## 2021-02-24 VITALS — WEIGHT: 208.56 LBS | BODY MASS INDEX: 28.25 KG/M2 | HEIGHT: 72 IN

## 2021-02-24 DIAGNOSIS — L50.1 CHRONIC IDIOPATHIC URTICARIA: ICD-10-CM

## 2021-02-24 DIAGNOSIS — K20.0 EOSINOPHILIC ESOPHAGITIS: ICD-10-CM

## 2021-02-24 DIAGNOSIS — D72.19 OTHER EOSINOPHILIA: Primary | ICD-10-CM

## 2021-02-24 DIAGNOSIS — R21 RASH: ICD-10-CM

## 2021-02-24 PROCEDURE — 99213 OFFICE O/P EST LOW 20 MIN: CPT | Mod: PBBFAC | Performed by: STUDENT IN AN ORGANIZED HEALTH CARE EDUCATION/TRAINING PROGRAM

## 2021-02-24 PROCEDURE — 99999 PR PBB SHADOW E&M-EST. PATIENT-LVL III: ICD-10-PCS | Mod: PBBFAC,,, | Performed by: STUDENT IN AN ORGANIZED HEALTH CARE EDUCATION/TRAINING PROGRAM

## 2021-02-24 PROCEDURE — 99214 PR OFFICE/OUTPT VISIT, EST, LEVL IV, 30-39 MIN: ICD-10-PCS | Mod: S$PBB,,, | Performed by: STUDENT IN AN ORGANIZED HEALTH CARE EDUCATION/TRAINING PROGRAM

## 2021-02-24 PROCEDURE — 99214 OFFICE O/P EST MOD 30 MIN: CPT | Mod: S$PBB,,, | Performed by: STUDENT IN AN ORGANIZED HEALTH CARE EDUCATION/TRAINING PROGRAM

## 2021-02-24 PROCEDURE — 99999 PR PBB SHADOW E&M-EST. PATIENT-LVL III: CPT | Mod: PBBFAC,,, | Performed by: STUDENT IN AN ORGANIZED HEALTH CARE EDUCATION/TRAINING PROGRAM

## 2021-02-24 RX ORDER — PREDNISONE 5 MG/1
5 TABLET ORAL DAILY
Qty: 30 TABLET | Refills: 2 | Status: ON HOLD | OUTPATIENT
Start: 2021-02-24 | End: 2021-03-02

## 2021-03-02 ENCOUNTER — HOSPITAL ENCOUNTER (OUTPATIENT)
Facility: HOSPITAL | Age: 41
LOS: 1 days | Discharge: HOME | End: 2021-03-04
Attending: EMERGENCY MEDICINE | Admitting: INTERNAL MEDICINE
Payer: MEDICAID

## 2021-03-02 DIAGNOSIS — K92.0 HEMATEMESIS: ICD-10-CM

## 2021-03-02 DIAGNOSIS — K92.2 UPPER GI BLEED: ICD-10-CM

## 2021-03-02 DIAGNOSIS — G89.4 CHRONIC PAIN SYNDROME: ICD-10-CM

## 2021-03-02 DIAGNOSIS — E83.42 HYPOMAGNESEMIA: ICD-10-CM

## 2021-03-02 DIAGNOSIS — N40.1 BENIGN PROSTATIC HYPERPLASIA WITH NOCTURIA: Chronic | ICD-10-CM

## 2021-03-02 DIAGNOSIS — Z87.898 HISTORY OF PROLONGED Q-T INTERVAL ON ECG: ICD-10-CM

## 2021-03-02 DIAGNOSIS — K92.0 HEMATEMESIS WITH NAUSEA: Primary | ICD-10-CM

## 2021-03-02 DIAGNOSIS — R00.0 TACHYCARDIA: ICD-10-CM

## 2021-03-02 DIAGNOSIS — Z97.8 INDWELLING FOLEY CATHETER PRESENT: ICD-10-CM

## 2021-03-02 DIAGNOSIS — R35.1 BENIGN PROSTATIC HYPERPLASIA WITH NOCTURIA: Chronic | ICD-10-CM

## 2021-03-02 PROBLEM — F30.9 MANIA: Status: ACTIVE | Noted: 2021-03-02

## 2021-03-02 LAB
ABO + RH BLD: NORMAL
ALBUMIN SERPL BCP-MCNC: 3.9 G/DL (ref 3.5–5.2)
ALP SERPL-CCNC: 60 U/L (ref 55–135)
ALT SERPL W/O P-5'-P-CCNC: 30 U/L (ref 10–44)
AMPHET+METHAMPHET UR QL: NORMAL
ANION GAP SERPL CALC-SCNC: 13 MMOL/L (ref 8–16)
APTT BLDCRRT: 26.8 SEC (ref 21–32)
AST SERPL-CCNC: 27 U/L (ref 10–40)
BARBITURATES UR QL SCN>200 NG/ML: NEGATIVE
BASOPHILS # BLD AUTO: 0.09 K/UL (ref 0–0.2)
BASOPHILS NFR BLD: 1.3 % (ref 0–1.9)
BENZODIAZ UR QL SCN>200 NG/ML: NEGATIVE
BILIRUB SERPL-MCNC: 1.8 MG/DL (ref 0.1–1)
BILIRUB UR QL STRIP: NEGATIVE
BLD GP AB SCN CELLS X3 SERPL QL: NORMAL
BUN SERPL-MCNC: 13 MG/DL (ref 6–20)
BZE UR QL SCN: NEGATIVE
CALCIUM SERPL-MCNC: 8.7 MG/DL (ref 8.7–10.5)
CANNABINOIDS UR QL SCN: NEGATIVE
CHLORIDE SERPL-SCNC: 104 MMOL/L (ref 95–110)
CHOLEST SERPL-MCNC: 171 MG/DL (ref 120–199)
CHOLEST/HDLC SERPL: 3.8 {RATIO} (ref 2–5)
CK SERPL-CCNC: 384 U/L (ref 20–200)
CLARITY UR: ABNORMAL
CO2 SERPL-SCNC: 21 MMOL/L (ref 23–29)
COLOR UR: YELLOW
CREAT SERPL-MCNC: 0.9 MG/DL (ref 0.5–1.4)
CREAT UR-MCNC: 278.6 MG/DL (ref 23–375)
CTP QC/QA: YES
DIFFERENTIAL METHOD: ABNORMAL
EOSINOPHIL # BLD AUTO: 1 K/UL (ref 0–0.5)
EOSINOPHIL NFR BLD: 15.1 % (ref 0–8)
ERYTHROCYTE [DISTWIDTH] IN BLOOD BY AUTOMATED COUNT: 17.4 % (ref 11.5–14.5)
EST. GFR  (AFRICAN AMERICAN): >60 ML/MIN/1.73 M^2
EST. GFR  (NON AFRICAN AMERICAN): >60 ML/MIN/1.73 M^2
ESTIMATED AVG GLUCOSE: 105 MG/DL (ref 68–131)
FERRITIN SERPL-MCNC: 23 NG/ML (ref 20–300)
GLUCOSE SERPL-MCNC: 85 MG/DL (ref 70–110)
GLUCOSE UR QL STRIP: NEGATIVE
HBA1C MFR BLD: 5.3 % (ref 4–5.6)
HCT VFR BLD AUTO: 35.5 % (ref 40–54)
HDLC SERPL-MCNC: 45 MG/DL (ref 40–75)
HDLC SERPL: 26.3 % (ref 20–50)
HGB BLD-MCNC: 11.2 G/DL (ref 14–18)
HGB UR QL STRIP: ABNORMAL
IMM GRANULOCYTES # BLD AUTO: 0.01 K/UL (ref 0–0.04)
IMM GRANULOCYTES NFR BLD AUTO: 0.1 % (ref 0–0.5)
INR PPP: 1.1 (ref 0.8–1.2)
KETONES UR QL STRIP: ABNORMAL
LACTATE SERPL-SCNC: 1.2 MMOL/L (ref 0.5–2.2)
LDLC SERPL CALC-MCNC: 113.8 MG/DL (ref 63–159)
LEUKOCYTE ESTERASE UR QL STRIP: NEGATIVE
LIPASE SERPL-CCNC: 7 U/L (ref 4–60)
LYMPHOCYTES # BLD AUTO: 1.1 K/UL (ref 1–4.8)
LYMPHOCYTES NFR BLD: 16.6 % (ref 18–48)
MAGNESIUM SERPL-MCNC: 1.7 MG/DL (ref 1.6–2.6)
MCH RBC QN AUTO: 24.1 PG (ref 27–31)
MCHC RBC AUTO-ENTMCNC: 31.5 G/DL (ref 32–36)
MCV RBC AUTO: 77 FL (ref 82–98)
METHADONE UR QL SCN>300 NG/ML: NEGATIVE
MICROSCOPIC COMMENT: ABNORMAL
MONOCYTES # BLD AUTO: 1 K/UL (ref 0.3–1)
MONOCYTES NFR BLD: 14.7 % (ref 4–15)
NEUTROPHILS # BLD AUTO: 3.5 K/UL (ref 1.8–7.7)
NEUTROPHILS NFR BLD: 52.2 % (ref 38–73)
NITRITE UR QL STRIP: NEGATIVE
NONHDLC SERPL-MCNC: 126 MG/DL
NRBC BLD-RTO: 0 /100 WBC
OB PNL STL: POSITIVE
OPIATES UR QL SCN: NEGATIVE
PCP UR QL SCN>25 NG/ML: NEGATIVE
PH UR STRIP: 6 [PH] (ref 5–8)
PHOSPHATE SERPL-MCNC: 1.4 MG/DL (ref 2.7–4.5)
PLATELET # BLD AUTO: 301 K/UL (ref 150–350)
PMV BLD AUTO: 11.4 FL (ref 9.2–12.9)
POTASSIUM SERPL-SCNC: 3 MMOL/L (ref 3.5–5.1)
PROT SERPL-MCNC: 6.7 G/DL (ref 6–8.4)
PROT UR QL STRIP: ABNORMAL
PROTHROMBIN TIME: 11.4 SEC (ref 9–12.5)
RBC # BLD AUTO: 4.64 M/UL (ref 4.6–6.2)
RBC #/AREA URNS HPF: >100 /HPF (ref 0–4)
SARS-COV-2 RDRP RESP QL NAA+PROBE: NEGATIVE
SODIUM SERPL-SCNC: 138 MMOL/L (ref 136–145)
SP GR UR STRIP: 1.03 (ref 1–1.03)
TOXICOLOGY INFORMATION: NORMAL
TRIGL SERPL-MCNC: 61 MG/DL (ref 30–150)
TROPONIN I SERPL DL<=0.01 NG/ML-MCNC: 0.03 NG/ML (ref 0–0.03)
URN SPEC COLLECT METH UR: ABNORMAL
UROBILINOGEN UR STRIP-ACNC: NEGATIVE EU/DL
WBC # BLD AUTO: 6.67 K/UL (ref 3.9–12.7)

## 2021-03-02 PROCEDURE — 83540 ASSAY OF IRON: CPT

## 2021-03-02 PROCEDURE — 82550 ASSAY OF CK (CPK): CPT

## 2021-03-02 PROCEDURE — 93010 EKG 12-LEAD: ICD-10-PCS | Mod: ,,, | Performed by: INTERNAL MEDICINE

## 2021-03-02 PROCEDURE — 86900 BLOOD TYPING SEROLOGIC ABO: CPT

## 2021-03-02 PROCEDURE — 96366 THER/PROPH/DIAG IV INF ADDON: CPT

## 2021-03-02 PROCEDURE — 93005 ELECTROCARDIOGRAM TRACING: CPT

## 2021-03-02 PROCEDURE — 83605 ASSAY OF LACTIC ACID: CPT

## 2021-03-02 PROCEDURE — C9113 INJ PANTOPRAZOLE SODIUM, VIA: HCPCS | Performed by: STUDENT IN AN ORGANIZED HEALTH CARE EDUCATION/TRAINING PROGRAM

## 2021-03-02 PROCEDURE — 99285 EMERGENCY DEPT VISIT HI MDM: CPT | Mod: 25

## 2021-03-02 PROCEDURE — 63600175 PHARM REV CODE 636 W HCPCS: Performed by: STUDENT IN AN ORGANIZED HEALTH CARE EDUCATION/TRAINING PROGRAM

## 2021-03-02 PROCEDURE — 96375 TX/PRO/DX INJ NEW DRUG ADDON: CPT

## 2021-03-02 PROCEDURE — 63600175 PHARM REV CODE 636 W HCPCS: Performed by: EMERGENCY MEDICINE

## 2021-03-02 PROCEDURE — 84484 ASSAY OF TROPONIN QUANT: CPT

## 2021-03-02 PROCEDURE — 25000003 PHARM REV CODE 250: Performed by: EMERGENCY MEDICINE

## 2021-03-02 PROCEDURE — 85730 THROMBOPLASTIN TIME PARTIAL: CPT

## 2021-03-02 PROCEDURE — 82272 OCCULT BLD FECES 1-3 TESTS: CPT

## 2021-03-02 PROCEDURE — 81000 URINALYSIS NONAUTO W/SCOPE: CPT

## 2021-03-02 PROCEDURE — 25000003 PHARM REV CODE 250: Performed by: STUDENT IN AN ORGANIZED HEALTH CARE EDUCATION/TRAINING PROGRAM

## 2021-03-02 PROCEDURE — 83690 ASSAY OF LIPASE: CPT

## 2021-03-02 PROCEDURE — 82746 ASSAY OF FOLIC ACID SERUM: CPT

## 2021-03-02 PROCEDURE — G0378 HOSPITAL OBSERVATION PER HR: HCPCS

## 2021-03-02 PROCEDURE — 84100 ASSAY OF PHOSPHORUS: CPT

## 2021-03-02 PROCEDURE — 83036 HEMOGLOBIN GLYCOSYLATED A1C: CPT

## 2021-03-02 PROCEDURE — 80307 DRUG TEST PRSMV CHEM ANLYZR: CPT

## 2021-03-02 PROCEDURE — 93010 ELECTROCARDIOGRAM REPORT: CPT | Mod: ,,, | Performed by: INTERNAL MEDICINE

## 2021-03-02 PROCEDURE — 85025 COMPLETE CBC W/AUTO DIFF WBC: CPT

## 2021-03-02 PROCEDURE — 82728 ASSAY OF FERRITIN: CPT

## 2021-03-02 PROCEDURE — 85610 PROTHROMBIN TIME: CPT

## 2021-03-02 PROCEDURE — U0002 COVID-19 LAB TEST NON-CDC: HCPCS | Performed by: EMERGENCY MEDICINE

## 2021-03-02 PROCEDURE — 96361 HYDRATE IV INFUSION ADD-ON: CPT

## 2021-03-02 PROCEDURE — 80053 COMPREHEN METABOLIC PANEL: CPT

## 2021-03-02 PROCEDURE — 82607 VITAMIN B-12: CPT

## 2021-03-02 PROCEDURE — 80061 LIPID PANEL: CPT

## 2021-03-02 PROCEDURE — C9113 INJ PANTOPRAZOLE SODIUM, VIA: HCPCS | Performed by: EMERGENCY MEDICINE

## 2021-03-02 PROCEDURE — 83735 ASSAY OF MAGNESIUM: CPT

## 2021-03-02 PROCEDURE — 96365 THER/PROPH/DIAG IV INF INIT: CPT

## 2021-03-02 RX ORDER — FERROUS SULFATE 325(65) MG
325 TABLET, DELAYED RELEASE (ENTERIC COATED) ORAL DAILY
Status: DISCONTINUED | OUTPATIENT
Start: 2021-03-02 | End: 2021-03-04 | Stop reason: HOSPADM

## 2021-03-02 RX ORDER — SODIUM CHLORIDE 0.9 % (FLUSH) 0.9 %
10 SYRINGE (ML) INJECTION
Status: DISCONTINUED | OUTPATIENT
Start: 2021-03-02 | End: 2021-03-04 | Stop reason: HOSPADM

## 2021-03-02 RX ORDER — TALC
6 POWDER (GRAM) TOPICAL NIGHTLY PRN
Status: DISCONTINUED | OUTPATIENT
Start: 2021-03-02 | End: 2021-03-02

## 2021-03-02 RX ORDER — SODIUM CHLORIDE, SODIUM LACTATE, POTASSIUM CHLORIDE, CALCIUM CHLORIDE 600; 310; 30; 20 MG/100ML; MG/100ML; MG/100ML; MG/100ML
INJECTION, SOLUTION INTRAVENOUS CONTINUOUS
Status: DISCONTINUED | OUTPATIENT
Start: 2021-03-02 | End: 2021-03-02

## 2021-03-02 RX ORDER — ACETAMINOPHEN 325 MG/1
650 TABLET ORAL EVERY 6 HOURS PRN
Status: DISCONTINUED | OUTPATIENT
Start: 2021-03-02 | End: 2021-03-04 | Stop reason: HOSPADM

## 2021-03-02 RX ORDER — HYDROMORPHONE HYDROCHLORIDE 1 MG/ML
1 INJECTION, SOLUTION INTRAMUSCULAR; INTRAVENOUS; SUBCUTANEOUS
Status: COMPLETED | OUTPATIENT
Start: 2021-03-02 | End: 2021-03-02

## 2021-03-02 RX ORDER — SODIUM,POTASSIUM PHOSPHATES 280-250MG
2 POWDER IN PACKET (EA) ORAL ONCE
Status: COMPLETED | OUTPATIENT
Start: 2021-03-02 | End: 2021-03-02

## 2021-03-02 RX ORDER — PREDNISONE 20 MG/1
20 TABLET ORAL DAILY
Status: DISCONTINUED | OUTPATIENT
Start: 2021-03-03 | End: 2021-03-04 | Stop reason: HOSPADM

## 2021-03-02 RX ORDER — CETIRIZINE HYDROCHLORIDE 10 MG/1
10 TABLET ORAL DAILY
Status: DISCONTINUED | OUTPATIENT
Start: 2021-03-02 | End: 2021-03-04 | Stop reason: HOSPADM

## 2021-03-02 RX ORDER — TAMSULOSIN HYDROCHLORIDE 0.4 MG/1
0.8 CAPSULE ORAL DAILY
Status: DISCONTINUED | OUTPATIENT
Start: 2021-03-02 | End: 2021-03-04 | Stop reason: HOSPADM

## 2021-03-02 RX ORDER — TALC
6 POWDER (GRAM) TOPICAL NIGHTLY PRN
Status: DISCONTINUED | OUTPATIENT
Start: 2021-03-02 | End: 2021-03-04 | Stop reason: HOSPADM

## 2021-03-02 RX ORDER — DIPHENHYDRAMINE HYDROCHLORIDE 50 MG/ML
50 INJECTION INTRAMUSCULAR; INTRAVENOUS ONCE
Status: COMPLETED | OUTPATIENT
Start: 2021-03-02 | End: 2021-03-02

## 2021-03-02 RX ORDER — OXYCODONE HYDROCHLORIDE 5 MG/1
5 TABLET ORAL EVERY 6 HOURS PRN
Status: DISCONTINUED | OUTPATIENT
Start: 2021-03-02 | End: 2021-03-03

## 2021-03-02 RX ORDER — SODIUM CHLORIDE 0.9 % (FLUSH) 0.9 %
10 SYRINGE (ML) INJECTION
Status: DISCONTINUED | OUTPATIENT
Start: 2021-03-02 | End: 2021-03-02

## 2021-03-02 RX ORDER — POTASSIUM CHLORIDE 20 MEQ/1
20 TABLET, EXTENDED RELEASE ORAL
Status: DISCONTINUED | OUTPATIENT
Start: 2021-03-02 | End: 2021-03-02

## 2021-03-02 RX ORDER — LORAZEPAM 1 MG/1
1 TABLET ORAL ONCE
Status: COMPLETED | OUTPATIENT
Start: 2021-03-02 | End: 2021-03-02

## 2021-03-02 RX ORDER — PAROXETINE HYDROCHLORIDE 20 MG/1
20 TABLET, FILM COATED ORAL EVERY MORNING
Status: DISCONTINUED | OUTPATIENT
Start: 2021-03-02 | End: 2021-03-02

## 2021-03-02 RX ORDER — GABAPENTIN 300 MG/1
300 CAPSULE ORAL 3 TIMES DAILY
Status: DISCONTINUED | OUTPATIENT
Start: 2021-03-02 | End: 2021-03-04 | Stop reason: HOSPADM

## 2021-03-02 RX ORDER — SODIUM CHLORIDE, SODIUM LACTATE, POTASSIUM CHLORIDE, CALCIUM CHLORIDE 600; 310; 30; 20 MG/100ML; MG/100ML; MG/100ML; MG/100ML
INJECTION, SOLUTION INTRAVENOUS ONCE
Status: COMPLETED | OUTPATIENT
Start: 2021-03-02 | End: 2021-03-03

## 2021-03-02 RX ORDER — SULFAMETHOXAZOLE AND TRIMETHOPRIM 800; 160 MG/1; MG/1
1 TABLET ORAL DAILY
Status: DISCONTINUED | OUTPATIENT
Start: 2021-03-02 | End: 2021-03-04 | Stop reason: HOSPADM

## 2021-03-02 RX ORDER — ONDANSETRON 4 MG/1
4 TABLET, FILM COATED ORAL EVERY 6 HOURS PRN
Status: ON HOLD | COMMUNITY
End: 2021-03-04 | Stop reason: SDUPTHER

## 2021-03-02 RX ORDER — PANTOPRAZOLE SODIUM 40 MG/10ML
80 INJECTION, POWDER, LYOPHILIZED, FOR SOLUTION INTRAVENOUS
Status: COMPLETED | OUTPATIENT
Start: 2021-03-02 | End: 2021-03-02

## 2021-03-02 RX ORDER — DOCUSATE SODIUM 100 MG/1
100 CAPSULE, LIQUID FILLED ORAL 2 TIMES DAILY
Status: DISCONTINUED | OUTPATIENT
Start: 2021-03-02 | End: 2021-03-04 | Stop reason: HOSPADM

## 2021-03-02 RX ORDER — DIPHENHYDRAMINE HYDROCHLORIDE 50 MG/ML
25 INJECTION INTRAMUSCULAR; INTRAVENOUS
Status: DISCONTINUED | OUTPATIENT
Start: 2021-03-02 | End: 2021-03-02

## 2021-03-02 RX ORDER — MAGNESIUM SULFATE HEPTAHYDRATE 40 MG/ML
2 INJECTION, SOLUTION INTRAVENOUS ONCE
Status: COMPLETED | OUTPATIENT
Start: 2021-03-02 | End: 2021-03-02

## 2021-03-02 RX ORDER — ONDANSETRON 2 MG/ML
8 INJECTION INTRAMUSCULAR; INTRAVENOUS
Status: COMPLETED | OUTPATIENT
Start: 2021-03-02 | End: 2021-03-02

## 2021-03-02 RX ORDER — ONDANSETRON 2 MG/ML
4 INJECTION INTRAMUSCULAR; INTRAVENOUS EVERY 6 HOURS PRN
Status: DISCONTINUED | OUTPATIENT
Start: 2021-03-02 | End: 2021-03-04 | Stop reason: HOSPADM

## 2021-03-02 RX ORDER — DIPHENHYDRAMINE HCL 50 MG
50 CAPSULE ORAL NIGHTLY
Status: DISCONTINUED | OUTPATIENT
Start: 2021-03-02 | End: 2021-03-03

## 2021-03-02 RX ADMIN — DIPHENHYDRAMINE HYDROCHLORIDE 50 MG: 50 CAPSULE ORAL at 08:03

## 2021-03-02 RX ADMIN — HYDROMORPHONE HYDROCHLORIDE 1 MG: 1 INJECTION, SOLUTION INTRAMUSCULAR; INTRAVENOUS; SUBCUTANEOUS at 05:03

## 2021-03-02 RX ADMIN — PANTOPRAZOLE SODIUM 8 MG/HR: 40 INJECTION, POWDER, FOR SOLUTION INTRAVENOUS at 05:03

## 2021-03-02 RX ADMIN — TAMSULOSIN HYDROCHLORIDE 0.8 MG: 0.4 CAPSULE ORAL at 07:03

## 2021-03-02 RX ADMIN — POTASSIUM BICARBONATE 25 MEQ: 977.5 TABLET, EFFERVESCENT ORAL at 10:03

## 2021-03-02 RX ADMIN — ACETAMINOPHEN 650 MG: 325 TABLET ORAL at 08:03

## 2021-03-02 RX ADMIN — SULFAMETHOXAZOLE AND TRIMETHOPRIM 1 TABLET: 800; 160 TABLET ORAL at 07:03

## 2021-03-02 RX ADMIN — MAGNESIUM SULFATE IN WATER 2 G: 40 INJECTION, SOLUTION INTRAVENOUS at 08:03

## 2021-03-02 RX ADMIN — OXYCODONE HYDROCHLORIDE 5 MG: 5 TABLET ORAL at 10:03

## 2021-03-02 RX ADMIN — PAROXETINE HYDROCHLORIDE 20 MG: 10 TABLET, FILM COATED ORAL at 07:03

## 2021-03-02 RX ADMIN — SODIUM CHLORIDE, SODIUM LACTATE, POTASSIUM CHLORIDE, AND CALCIUM CHLORIDE: .6; .31; .03; .02 INJECTION, SOLUTION INTRAVENOUS at 08:03

## 2021-03-02 RX ADMIN — CETIRIZINE HYDROCHLORIDE 10 MG: 10 TABLET, FILM COATED ORAL at 07:03

## 2021-03-02 RX ADMIN — PANTOPRAZOLE SODIUM 8 MG/HR: 40 INJECTION, POWDER, FOR SOLUTION INTRAVENOUS at 08:03

## 2021-03-02 RX ADMIN — LORAZEPAM 1 MG: 1 TABLET ORAL at 08:03

## 2021-03-02 RX ADMIN — DOCUSATE SODIUM 100 MG: 100 CAPSULE, LIQUID FILLED ORAL at 08:03

## 2021-03-02 RX ADMIN — HYDROMORPHONE HYDROCHLORIDE 1 MG: 1 INJECTION, SOLUTION INTRAMUSCULAR; INTRAVENOUS; SUBCUTANEOUS at 06:03

## 2021-03-02 RX ADMIN — MELATONIN TAB 3 MG 6 MG: 3 TAB at 08:03

## 2021-03-02 RX ADMIN — POTASSIUM BICARBONATE 25 MEQ: 977.5 TABLET, EFFERVESCENT ORAL at 08:03

## 2021-03-02 RX ADMIN — LORAZEPAM 1 MG: 2 INJECTION INTRAMUSCULAR; INTRAVENOUS at 03:03

## 2021-03-02 RX ADMIN — FERROUS SULFATE TAB EC 325 MG (65 MG FE EQUIVALENT) 325 MG: 325 (65 FE) TABLET DELAYED RESPONSE at 07:03

## 2021-03-02 RX ADMIN — POTASSIUM & SODIUM PHOSPHATES POWDER PACK 280-160-250 MG 2 PACKET: 280-160-250 PACK at 07:03

## 2021-03-02 RX ADMIN — SODIUM CHLORIDE, SODIUM LACTATE, POTASSIUM CHLORIDE, AND CALCIUM CHLORIDE: .6; .31; .03; .02 INJECTION, SOLUTION INTRAVENOUS at 05:03

## 2021-03-02 RX ADMIN — ONDANSETRON HYDROCHLORIDE 8 MG: 2 SOLUTION INTRAMUSCULAR; INTRAVENOUS at 04:03

## 2021-03-02 RX ADMIN — DIPHENHYDRAMINE HYDROCHLORIDE 50 MG: 50 INJECTION INTRAMUSCULAR; INTRAVENOUS at 04:03

## 2021-03-02 RX ADMIN — HYDROMORPHONE HYDROCHLORIDE 1 MG: 1 INJECTION, SOLUTION INTRAMUSCULAR; INTRAVENOUS; SUBCUTANEOUS at 04:03

## 2021-03-02 RX ADMIN — GABAPENTIN 300 MG: 300 CAPSULE ORAL at 10:03

## 2021-03-02 RX ADMIN — POTASSIUM BICARBONATE 25 MEQ: 977.5 TABLET, EFFERVESCENT ORAL at 11:03

## 2021-03-02 RX ADMIN — PANTOPRAZOLE SODIUM 80 MG: 40 INJECTION, POWDER, FOR SOLUTION INTRAVENOUS at 04:03

## 2021-03-03 ENCOUNTER — ANESTHESIA (OUTPATIENT)
Dept: ENDOSCOPY | Facility: HOSPITAL | Age: 41
End: 2021-03-03
Payer: MEDICAID

## 2021-03-03 ENCOUNTER — ANESTHESIA EVENT (OUTPATIENT)
Dept: ENDOSCOPY | Facility: HOSPITAL | Age: 41
End: 2021-03-03
Payer: MEDICAID

## 2021-03-03 LAB
25(OH)D3+25(OH)D2 SERPL-MCNC: 21 NG/ML (ref 30–96)
ALBUMIN SERPL BCP-MCNC: 3.2 G/DL (ref 3.5–5.2)
ALP SERPL-CCNC: 52 U/L (ref 55–135)
ALT SERPL W/O P-5'-P-CCNC: 32 U/L (ref 10–44)
ANION GAP SERPL CALC-SCNC: 12 MMOL/L (ref 8–16)
AST SERPL-CCNC: 33 U/L (ref 10–40)
BASOPHILS # BLD AUTO: 0.05 K/UL (ref 0–0.2)
BASOPHILS NFR BLD: 1.1 % (ref 0–1.9)
BILIRUB SERPL-MCNC: 2.3 MG/DL (ref 0.1–1)
BUN SERPL-MCNC: 12 MG/DL (ref 6–20)
CALCIUM SERPL-MCNC: 7.8 MG/DL (ref 8.7–10.5)
CHLORIDE SERPL-SCNC: 106 MMOL/L (ref 95–110)
CK SERPL-CCNC: 477 U/L (ref 20–200)
CO2 SERPL-SCNC: 22 MMOL/L (ref 23–29)
CREAT SERPL-MCNC: 1 MG/DL (ref 0.5–1.4)
DIFFERENTIAL METHOD: ABNORMAL
EOSINOPHIL # BLD AUTO: 0.7 K/UL (ref 0–0.5)
EOSINOPHIL NFR BLD: 14.8 % (ref 0–8)
ERYTHROCYTE [DISTWIDTH] IN BLOOD BY AUTOMATED COUNT: 17.6 % (ref 11.5–14.5)
EST. GFR  (AFRICAN AMERICAN): >60 ML/MIN/1.73 M^2
EST. GFR  (NON AFRICAN AMERICAN): >60 ML/MIN/1.73 M^2
FOLATE SERPL-MCNC: 12.3 NG/ML (ref 4–24)
FUNGUS SPEC CULT: NORMAL
FUNGUS SPEC CULT: NORMAL
GLUCOSE SERPL-MCNC: 72 MG/DL (ref 70–110)
HCT VFR BLD AUTO: 31.9 % (ref 40–54)
HGB BLD-MCNC: 10.2 G/DL (ref 14–18)
IMM GRANULOCYTES # BLD AUTO: 0.01 K/UL (ref 0–0.04)
IMM GRANULOCYTES NFR BLD AUTO: 0.2 % (ref 0–0.5)
IRON SERPL-MCNC: 35 UG/DL (ref 45–160)
LYMPHOCYTES # BLD AUTO: 0.8 K/UL (ref 1–4.8)
LYMPHOCYTES NFR BLD: 17.6 % (ref 18–48)
MCH RBC QN AUTO: 24.6 PG (ref 27–31)
MCHC RBC AUTO-ENTMCNC: 32 G/DL (ref 32–36)
MCV RBC AUTO: 77 FL (ref 82–98)
MONOCYTES # BLD AUTO: 0.6 K/UL (ref 0.3–1)
MONOCYTES NFR BLD: 13.7 % (ref 4–15)
NEUTROPHILS # BLD AUTO: 2.4 K/UL (ref 1.8–7.7)
NEUTROPHILS NFR BLD: 52.6 % (ref 38–73)
NRBC BLD-RTO: 0 /100 WBC
PHOSPHATE SERPL-MCNC: 4 MG/DL (ref 2.7–4.5)
PLATELET # BLD AUTO: 249 K/UL (ref 150–350)
PMV BLD AUTO: 11.2 FL (ref 9.2–12.9)
POTASSIUM SERPL-SCNC: 3.4 MMOL/L (ref 3.5–5.1)
PROT SERPL-MCNC: 5.6 G/DL (ref 6–8.4)
RBC # BLD AUTO: 4.15 M/UL (ref 4.6–6.2)
SATURATED IRON: 8 % (ref 20–50)
SODIUM SERPL-SCNC: 140 MMOL/L (ref 136–145)
TOTAL IRON BINDING CAPACITY: 465 UG/DL (ref 250–450)
TRANSFERRIN SERPL-MCNC: 314 MG/DL (ref 200–375)
TROPONIN I SERPL DL<=0.01 NG/ML-MCNC: 0.01 NG/ML (ref 0–0.03)
VIT B12 SERPL-MCNC: 407 PG/ML (ref 210–950)
WBC # BLD AUTO: 4.61 K/UL (ref 3.9–12.7)

## 2021-03-03 PROCEDURE — G0378 HOSPITAL OBSERVATION PER HR: HCPCS

## 2021-03-03 PROCEDURE — 99214 OFFICE O/P EST MOD 30 MIN: CPT | Mod: 25,,, | Performed by: NURSE PRACTITIONER

## 2021-03-03 PROCEDURE — 96361 HYDRATE IV INFUSION ADD-ON: CPT

## 2021-03-03 PROCEDURE — 96366 THER/PROPH/DIAG IV INF ADDON: CPT

## 2021-03-03 PROCEDURE — 99215 OFFICE O/P EST HI 40 MIN: CPT | Mod: ,,, | Performed by: PSYCHIATRY & NEUROLOGY

## 2021-03-03 PROCEDURE — 82306 VITAMIN D 25 HYDROXY: CPT | Performed by: INTERNAL MEDICINE

## 2021-03-03 PROCEDURE — 51702 INSERT TEMP BLADDER CATH: CPT | Mod: ,,, | Performed by: NURSE PRACTITIONER

## 2021-03-03 PROCEDURE — 00731 ANES UPR GI NDSC PX NOS: CPT | Performed by: INTERNAL MEDICINE

## 2021-03-03 PROCEDURE — 63600175 PHARM REV CODE 636 W HCPCS: Performed by: NURSE ANESTHETIST, CERTIFIED REGISTERED

## 2021-03-03 PROCEDURE — 43239 EGD BIOPSY SINGLE/MULTIPLE: CPT | Performed by: INTERNAL MEDICINE

## 2021-03-03 PROCEDURE — 37000008 HC ANESTHESIA 1ST 15 MINUTES: Performed by: INTERNAL MEDICINE

## 2021-03-03 PROCEDURE — 88342 IMHCHEM/IMCYTCHM 1ST ANTB: CPT | Performed by: PATHOLOGY

## 2021-03-03 PROCEDURE — 27201012 HC FORCEPS, HOT/COLD, DISP: Performed by: INTERNAL MEDICINE

## 2021-03-03 PROCEDURE — 99417 PR PROLONGED SVC, OUTPT, W/WO DIRECT PT CONTACT,  EA ADDTL 15 MIN: ICD-10-PCS | Mod: ,,, | Performed by: PSYCHIATRY & NEUROLOGY

## 2021-03-03 PROCEDURE — 99214 PR OFFICE/OUTPT VISIT, EST, LEVL IV, 30-39 MIN: ICD-10-PCS | Mod: 25,,, | Performed by: NURSE PRACTITIONER

## 2021-03-03 PROCEDURE — 88342 IMHCHEM/IMCYTCHM 1ST ANTB: CPT | Mod: 26,,, | Performed by: PATHOLOGY

## 2021-03-03 PROCEDURE — 88305 TISSUE EXAM BY PATHOLOGIST: CPT | Performed by: PATHOLOGY

## 2021-03-03 PROCEDURE — 43239 EGD BIOPSY SINGLE/MULTIPLE: CPT | Mod: ,,, | Performed by: INTERNAL MEDICINE

## 2021-03-03 PROCEDURE — 84100 ASSAY OF PHOSPHORUS: CPT

## 2021-03-03 PROCEDURE — 43239 PR EGD, FLEX, W/BIOPSY, SGL/MULTI: ICD-10-PCS | Mod: ,,, | Performed by: INTERNAL MEDICINE

## 2021-03-03 PROCEDURE — 84484 ASSAY OF TROPONIN QUANT: CPT

## 2021-03-03 PROCEDURE — 25000003 PHARM REV CODE 250: Performed by: STUDENT IN AN ORGANIZED HEALTH CARE EDUCATION/TRAINING PROGRAM

## 2021-03-03 PROCEDURE — 37000009 HC ANESTHESIA EA ADD 15 MINS: Performed by: INTERNAL MEDICINE

## 2021-03-03 PROCEDURE — 99215 PR OFFICE/OUTPT VISIT, EST, LEVL V, 40-54 MIN: ICD-10-PCS | Mod: ,,, | Performed by: PSYCHIATRY & NEUROLOGY

## 2021-03-03 PROCEDURE — 96376 TX/PRO/DX INJ SAME DRUG ADON: CPT | Mod: 59

## 2021-03-03 PROCEDURE — 82550 ASSAY OF CK (CPK): CPT

## 2021-03-03 PROCEDURE — 51702 PR INSERTION OF TEMPORARY INDWELLING BLADDER CATHETER, SIMPLE: ICD-10-PCS | Mod: ,,, | Performed by: NURSE PRACTITIONER

## 2021-03-03 PROCEDURE — 88342 CHG IMMUNOCYTOCHEMISTRY: ICD-10-PCS | Mod: 26,,, | Performed by: PATHOLOGY

## 2021-03-03 PROCEDURE — 25000003 PHARM REV CODE 250: Performed by: NURSE ANESTHETIST, CERTIFIED REGISTERED

## 2021-03-03 PROCEDURE — 36415 COLL VENOUS BLD VENIPUNCTURE: CPT | Performed by: INTERNAL MEDICINE

## 2021-03-03 PROCEDURE — C9113 INJ PANTOPRAZOLE SODIUM, VIA: HCPCS | Performed by: STUDENT IN AN ORGANIZED HEALTH CARE EDUCATION/TRAINING PROGRAM

## 2021-03-03 PROCEDURE — 80053 COMPREHEN METABOLIC PANEL: CPT

## 2021-03-03 PROCEDURE — 63600175 PHARM REV CODE 636 W HCPCS: Performed by: STUDENT IN AN ORGANIZED HEALTH CARE EDUCATION/TRAINING PROGRAM

## 2021-03-03 PROCEDURE — 36415 COLL VENOUS BLD VENIPUNCTURE: CPT

## 2021-03-03 PROCEDURE — 88305 TISSUE EXAM BY PATHOLOGIST: CPT | Mod: 26,,, | Performed by: PATHOLOGY

## 2021-03-03 PROCEDURE — 94761 N-INVAS EAR/PLS OXIMETRY MLT: CPT

## 2021-03-03 PROCEDURE — 99417 PROLNG OP E/M EACH 15 MIN: CPT | Mod: ,,, | Performed by: PSYCHIATRY & NEUROLOGY

## 2021-03-03 PROCEDURE — 85025 COMPLETE CBC W/AUTO DIFF WBC: CPT

## 2021-03-03 PROCEDURE — 88305 TISSUE EXAM BY PATHOLOGIST: ICD-10-PCS | Mod: 26,,, | Performed by: PATHOLOGY

## 2021-03-03 RX ORDER — MAGNESIUM SULFATE HEPTAHYDRATE 40 MG/ML
2 INJECTION, SOLUTION INTRAVENOUS ONCE
Status: COMPLETED | OUTPATIENT
Start: 2021-03-03 | End: 2021-03-03

## 2021-03-03 RX ORDER — OXYCODONE HYDROCHLORIDE 5 MG/1
10 TABLET ORAL EVERY 4 HOURS PRN
Status: DISCONTINUED | OUTPATIENT
Start: 2021-03-03 | End: 2021-03-04 | Stop reason: HOSPADM

## 2021-03-03 RX ORDER — OXYCODONE HYDROCHLORIDE 5 MG/1
10 TABLET ORAL EVERY 6 HOURS PRN
Status: DISCONTINUED | OUTPATIENT
Start: 2021-03-03 | End: 2021-03-03

## 2021-03-03 RX ORDER — PROPOFOL 10 MG/ML
VIAL (ML) INTRAVENOUS
Status: DISCONTINUED | OUTPATIENT
Start: 2021-03-03 | End: 2021-03-03

## 2021-03-03 RX ORDER — ONDANSETRON 2 MG/ML
INJECTION INTRAMUSCULAR; INTRAVENOUS
Status: DISCONTINUED | OUTPATIENT
Start: 2021-03-03 | End: 2021-03-03

## 2021-03-03 RX ORDER — FENTANYL CITRATE 50 UG/ML
INJECTION, SOLUTION INTRAMUSCULAR; INTRAVENOUS
Status: DISCONTINUED | OUTPATIENT
Start: 2021-03-03 | End: 2021-03-03

## 2021-03-03 RX ORDER — LIDOCAINE HYDROCHLORIDE 20 MG/ML
INJECTION INTRAVENOUS
Status: DISCONTINUED | OUTPATIENT
Start: 2021-03-03 | End: 2021-03-03

## 2021-03-03 RX ORDER — PROPOFOL 10 MG/ML
VIAL (ML) INTRAVENOUS CONTINUOUS PRN
Status: DISCONTINUED | OUTPATIENT
Start: 2021-03-03 | End: 2021-03-03

## 2021-03-03 RX ORDER — PAROXETINE HYDROCHLORIDE 20 MG/1
20 TABLET, FILM COATED ORAL DAILY
Status: DISCONTINUED | OUTPATIENT
Start: 2021-03-03 | End: 2021-03-04 | Stop reason: HOSPADM

## 2021-03-03 RX ORDER — LIDOCAINE 50 MG/G
1 PATCH TOPICAL
Status: DISCONTINUED | OUTPATIENT
Start: 2021-03-03 | End: 2021-03-04 | Stop reason: HOSPADM

## 2021-03-03 RX ORDER — HYDROMORPHONE HYDROCHLORIDE 1 MG/ML
1 INJECTION, SOLUTION INTRAMUSCULAR; INTRAVENOUS; SUBCUTANEOUS EVERY 4 HOURS PRN
Status: DISCONTINUED | OUTPATIENT
Start: 2021-03-03 | End: 2021-03-04 | Stop reason: HOSPADM

## 2021-03-03 RX ORDER — SODIUM CHLORIDE, SODIUM LACTATE, POTASSIUM CHLORIDE, CALCIUM CHLORIDE 600; 310; 30; 20 MG/100ML; MG/100ML; MG/100ML; MG/100ML
INJECTION, SOLUTION INTRAVENOUS ONCE
Status: COMPLETED | OUTPATIENT
Start: 2021-03-03 | End: 2021-03-03

## 2021-03-03 RX ORDER — HYDROCODONE BITARTRATE AND ACETAMINOPHEN 5; 325 MG/1; MG/1
1 TABLET ORAL ONCE
Status: COMPLETED | OUTPATIENT
Start: 2021-03-03 | End: 2021-03-03

## 2021-03-03 RX ORDER — PANTOPRAZOLE SODIUM 40 MG/1
40 TABLET, DELAYED RELEASE ORAL 2 TIMES DAILY
Status: DISCONTINUED | OUTPATIENT
Start: 2021-03-03 | End: 2021-03-04 | Stop reason: HOSPADM

## 2021-03-03 RX ADMIN — PROPOFOL 150 MCG/KG/MIN: 10 INJECTION, EMULSION INTRAVENOUS at 12:03

## 2021-03-03 RX ADMIN — GABAPENTIN 300 MG: 300 CAPSULE ORAL at 08:03

## 2021-03-03 RX ADMIN — SODIUM CHLORIDE, SODIUM LACTATE, POTASSIUM CHLORIDE, AND CALCIUM CHLORIDE: .6; .31; .03; .02 INJECTION, SOLUTION INTRAVENOUS at 08:03

## 2021-03-03 RX ADMIN — PAROXETINE HYDROCHLORIDE 20 MG: 20 TABLET, FILM COATED ORAL at 11:03

## 2021-03-03 RX ADMIN — LIDOCAINE HYDROCHLORIDE 60 MG: 20 INJECTION, SOLUTION INTRAVENOUS at 12:03

## 2021-03-03 RX ADMIN — PANTOPRAZOLE SODIUM 40 MG: 40 TABLET, DELAYED RELEASE ORAL at 02:03

## 2021-03-03 RX ADMIN — POTASSIUM BICARBONATE 25 MEQ: 977.5 TABLET, EFFERVESCENT ORAL at 02:03

## 2021-03-03 RX ADMIN — SULFAMETHOXAZOLE AND TRIMETHOPRIM 1 TABLET: 800; 160 TABLET ORAL at 08:03

## 2021-03-03 RX ADMIN — OXYCODONE HYDROCHLORIDE 10 MG: 5 TABLET ORAL at 11:03

## 2021-03-03 RX ADMIN — OXYCODONE HYDROCHLORIDE 5 MG: 5 TABLET ORAL at 08:03

## 2021-03-03 RX ADMIN — MAGNESIUM SULFATE IN WATER 2 G: 40 INJECTION, SOLUTION INTRAVENOUS at 11:03

## 2021-03-03 RX ADMIN — GABAPENTIN 300 MG: 300 CAPSULE ORAL at 02:03

## 2021-03-03 RX ADMIN — HYDROMORPHONE HYDROCHLORIDE 1 MG: 1 INJECTION, SOLUTION INTRAMUSCULAR; INTRAVENOUS; SUBCUTANEOUS at 08:03

## 2021-03-03 RX ADMIN — CETIRIZINE HYDROCHLORIDE 10 MG: 10 TABLET, FILM COATED ORAL at 08:03

## 2021-03-03 RX ADMIN — POTASSIUM BICARBONATE 25 MEQ: 977.5 TABLET, EFFERVESCENT ORAL at 04:03

## 2021-03-03 RX ADMIN — DOCUSATE SODIUM 100 MG: 100 CAPSULE, LIQUID FILLED ORAL at 08:03

## 2021-03-03 RX ADMIN — LIDOCAINE 1 PATCH: 50 PATCH TOPICAL at 02:03

## 2021-03-03 RX ADMIN — FENTANYL CITRATE 100 MCG: 50 INJECTION, SOLUTION INTRAMUSCULAR; INTRAVENOUS at 12:03

## 2021-03-03 RX ADMIN — PROPOFOL 150 MG: 10 INJECTION, EMULSION INTRAVENOUS at 12:03

## 2021-03-03 RX ADMIN — PANTOPRAZOLE SODIUM 40 MG: 40 TABLET, DELAYED RELEASE ORAL at 08:03

## 2021-03-03 RX ADMIN — HYDROMORPHONE HYDROCHLORIDE 1 MG: 1 INJECTION, SOLUTION INTRAMUSCULAR; INTRAVENOUS; SUBCUTANEOUS at 04:03

## 2021-03-03 RX ADMIN — TAMSULOSIN HYDROCHLORIDE 0.8 MG: 0.4 CAPSULE ORAL at 08:03

## 2021-03-03 RX ADMIN — ONDANSETRON 4 MG: 2 INJECTION, SOLUTION INTRAMUSCULAR; INTRAVENOUS at 12:03

## 2021-03-03 RX ADMIN — FERROUS SULFATE TAB EC 325 MG (65 MG FE EQUIVALENT) 325 MG: 325 (65 FE) TABLET DELAYED RESPONSE at 08:03

## 2021-03-03 RX ADMIN — OXYCODONE HYDROCHLORIDE 10 MG: 5 TABLET ORAL at 07:03

## 2021-03-03 RX ADMIN — PREDNISONE 20 MG: 20 TABLET ORAL at 08:03

## 2021-03-03 RX ADMIN — PANTOPRAZOLE SODIUM 8 MG/HR: 40 INJECTION, POWDER, FOR SOLUTION INTRAVENOUS at 09:03

## 2021-03-03 RX ADMIN — HYDROCODONE BITARTRATE AND ACETAMINOPHEN 1 TABLET: 5; 325 TABLET ORAL at 02:03

## 2021-03-04 VITALS
DIASTOLIC BLOOD PRESSURE: 87 MMHG | TEMPERATURE: 99 F | RESPIRATION RATE: 18 BRPM | SYSTOLIC BLOOD PRESSURE: 136 MMHG | HEART RATE: 82 BPM | WEIGHT: 210 LBS | OXYGEN SATURATION: 96 % | BODY MASS INDEX: 28.44 KG/M2 | HEIGHT: 72 IN

## 2021-03-04 PROBLEM — K92.0 HEMATEMESIS: Status: RESOLVED | Noted: 2021-03-02 | Resolved: 2021-03-04

## 2021-03-04 PROBLEM — F19.980: Status: ACTIVE | Noted: 2021-03-02

## 2021-03-04 LAB
ALBUMIN SERPL BCP-MCNC: 3.3 G/DL (ref 3.5–5.2)
ALP SERPL-CCNC: 58 U/L (ref 55–135)
ALT SERPL W/O P-5'-P-CCNC: 32 U/L (ref 10–44)
ANION GAP SERPL CALC-SCNC: 9 MMOL/L (ref 8–16)
AST SERPL-CCNC: 31 U/L (ref 10–40)
BASOPHILS # BLD AUTO: 0.08 K/UL (ref 0–0.2)
BASOPHILS NFR BLD: 1.2 % (ref 0–1.9)
BILIRUB SERPL-MCNC: 0.8 MG/DL (ref 0.1–1)
BUN SERPL-MCNC: 7 MG/DL (ref 6–20)
CALCIUM SERPL-MCNC: 7.8 MG/DL (ref 8.7–10.5)
CHLORIDE SERPL-SCNC: 106 MMOL/L (ref 95–110)
CO2 SERPL-SCNC: 28 MMOL/L (ref 23–29)
CREAT SERPL-MCNC: 1 MG/DL (ref 0.5–1.4)
DIFFERENTIAL METHOD: ABNORMAL
EOSINOPHIL # BLD AUTO: 0.7 K/UL (ref 0–0.5)
EOSINOPHIL NFR BLD: 10.4 % (ref 0–8)
ERYTHROCYTE [DISTWIDTH] IN BLOOD BY AUTOMATED COUNT: 18.2 % (ref 11.5–14.5)
EST. GFR  (AFRICAN AMERICAN): >60 ML/MIN/1.73 M^2
EST. GFR  (NON AFRICAN AMERICAN): >60 ML/MIN/1.73 M^2
GLUCOSE SERPL-MCNC: 83 MG/DL (ref 70–110)
HCT VFR BLD AUTO: 37.6 % (ref 40–54)
HGB BLD-MCNC: 11.7 G/DL (ref 14–18)
IMM GRANULOCYTES # BLD AUTO: 0.02 K/UL (ref 0–0.04)
IMM GRANULOCYTES NFR BLD AUTO: 0.3 % (ref 0–0.5)
LYMPHOCYTES # BLD AUTO: 1.1 K/UL (ref 1–4.8)
LYMPHOCYTES NFR BLD: 16.4 % (ref 18–48)
MCH RBC QN AUTO: 24.3 PG (ref 27–31)
MCHC RBC AUTO-ENTMCNC: 31.1 G/DL (ref 32–36)
MCV RBC AUTO: 78 FL (ref 82–98)
MONOCYTES # BLD AUTO: 0.9 K/UL (ref 0.3–1)
MONOCYTES NFR BLD: 12.7 % (ref 4–15)
NEUTROPHILS # BLD AUTO: 4.1 K/UL (ref 1.8–7.7)
NEUTROPHILS NFR BLD: 59 % (ref 38–73)
NRBC BLD-RTO: 0 /100 WBC
PLATELET # BLD AUTO: 297 K/UL (ref 150–350)
PMV BLD AUTO: 10.6 FL (ref 9.2–12.9)
POTASSIUM SERPL-SCNC: 3.9 MMOL/L (ref 3.5–5.1)
PROT SERPL-MCNC: 6.2 G/DL (ref 6–8.4)
RBC # BLD AUTO: 4.81 M/UL (ref 4.6–6.2)
SODIUM SERPL-SCNC: 143 MMOL/L (ref 136–145)
WBC # BLD AUTO: 6.85 K/UL (ref 3.9–12.7)

## 2021-03-04 PROCEDURE — 94761 N-INVAS EAR/PLS OXIMETRY MLT: CPT

## 2021-03-04 PROCEDURE — 85025 COMPLETE CBC W/AUTO DIFF WBC: CPT | Performed by: STUDENT IN AN ORGANIZED HEALTH CARE EDUCATION/TRAINING PROGRAM

## 2021-03-04 PROCEDURE — 80053 COMPREHEN METABOLIC PANEL: CPT | Performed by: STUDENT IN AN ORGANIZED HEALTH CARE EDUCATION/TRAINING PROGRAM

## 2021-03-04 PROCEDURE — 25000003 PHARM REV CODE 250: Performed by: STUDENT IN AN ORGANIZED HEALTH CARE EDUCATION/TRAINING PROGRAM

## 2021-03-04 PROCEDURE — 63600175 PHARM REV CODE 636 W HCPCS: Performed by: STUDENT IN AN ORGANIZED HEALTH CARE EDUCATION/TRAINING PROGRAM

## 2021-03-04 PROCEDURE — G0378 HOSPITAL OBSERVATION PER HR: HCPCS

## 2021-03-04 PROCEDURE — 36415 COLL VENOUS BLD VENIPUNCTURE: CPT | Performed by: STUDENT IN AN ORGANIZED HEALTH CARE EDUCATION/TRAINING PROGRAM

## 2021-03-04 RX ORDER — FERROUS SULFATE 325(65) MG
325 TABLET, DELAYED RELEASE (ENTERIC COATED) ORAL DAILY
Qty: 90 TABLET | Refills: 1 | Status: SHIPPED | OUTPATIENT
Start: 2021-03-05 | End: 2021-03-04

## 2021-03-04 RX ORDER — VIT C/E/ZN/COPPR/LUTEIN/ZEAXAN 250MG-90MG
1000 CAPSULE ORAL DAILY
Qty: 90 CAPSULE | Refills: 3 | Status: SHIPPED | OUTPATIENT
Start: 2021-03-04 | End: 2021-03-04 | Stop reason: SDUPTHER

## 2021-03-04 RX ORDER — CHOLECALCIFEROL (VITAMIN D3) 10 MCG
1200 TABLET ORAL DAILY
Status: DISCONTINUED | OUTPATIENT
Start: 2021-03-04 | End: 2021-03-04 | Stop reason: HOSPADM

## 2021-03-04 RX ORDER — PANTOPRAZOLE SODIUM 40 MG/1
40 TABLET, DELAYED RELEASE ORAL
Qty: 90 TABLET | Refills: 1 | Status: SHIPPED | OUTPATIENT
Start: 2021-03-04 | End: 2021-03-04

## 2021-03-04 RX ORDER — DOCUSATE SODIUM 100 MG/1
100 CAPSULE, LIQUID FILLED ORAL 2 TIMES DAILY
Qty: 60 CAPSULE | Refills: 3 | Status: SHIPPED | OUTPATIENT
Start: 2021-03-04 | End: 2021-03-04

## 2021-03-04 RX ORDER — PANTOPRAZOLE SODIUM 40 MG/1
40 TABLET, DELAYED RELEASE ORAL
Qty: 120 TABLET | Refills: 1 | Status: ON HOLD | OUTPATIENT
Start: 2021-03-04 | End: 2021-07-12 | Stop reason: CLARIF

## 2021-03-04 RX ORDER — PREDNISONE 20 MG/1
20 TABLET ORAL DAILY
Status: ON HOLD
Start: 2021-03-04 | End: 2021-07-30 | Stop reason: SDUPTHER

## 2021-03-04 RX ORDER — DOCUSATE SODIUM 100 MG/1
100 CAPSULE, LIQUID FILLED ORAL 2 TIMES DAILY
Qty: 60 CAPSULE | Refills: 11 | Status: SHIPPED | OUTPATIENT
Start: 2021-03-04 | End: 2021-03-04

## 2021-03-04 RX ORDER — ONDANSETRON 4 MG/1
4 TABLET, FILM COATED ORAL EVERY 6 HOURS PRN
Qty: 20 TABLET | Refills: 0 | Status: SHIPPED | OUTPATIENT
Start: 2021-03-04 | End: 2021-03-04 | Stop reason: SDUPTHER

## 2021-03-04 RX ORDER — PANTOPRAZOLE SODIUM 40 MG/1
40 TABLET, DELAYED RELEASE ORAL
Qty: 60 TABLET | Refills: 1 | Status: SHIPPED | OUTPATIENT
Start: 2021-03-04 | End: 2021-03-04

## 2021-03-04 RX ORDER — FERROUS SULFATE 325(65) MG
325 TABLET, DELAYED RELEASE (ENTERIC COATED) ORAL DAILY
Qty: 90 TABLET | Refills: 1 | Status: SHIPPED | OUTPATIENT
Start: 2021-03-05

## 2021-03-04 RX ORDER — PANTOPRAZOLE SODIUM 40 MG/1
40 TABLET, DELAYED RELEASE ORAL 2 TIMES DAILY
Qty: 60 TABLET | Refills: 1 | Status: SHIPPED | OUTPATIENT
Start: 2021-03-04 | End: 2021-03-04

## 2021-03-04 RX ORDER — FERROUS SULFATE 325(65) MG
325 TABLET, DELAYED RELEASE (ENTERIC COATED) ORAL DAILY
Qty: 90 TABLET | Refills: 3 | Status: SHIPPED | OUTPATIENT
Start: 2021-03-05 | End: 2021-03-04

## 2021-03-04 RX ORDER — VIT C/E/ZN/COPPR/LUTEIN/ZEAXAN 250MG-90MG
1000 CAPSULE ORAL DAILY
Qty: 90 CAPSULE | Refills: 1 | Status: SHIPPED | OUTPATIENT
Start: 2021-03-04 | End: 2021-03-04 | Stop reason: SDUPTHER

## 2021-03-04 RX ORDER — ONDANSETRON 4 MG/1
4 TABLET, FILM COATED ORAL EVERY 6 HOURS PRN
Qty: 20 TABLET | Refills: 0 | Status: ON HOLD | OUTPATIENT
Start: 2021-03-04 | End: 2021-04-17 | Stop reason: HOSPADM

## 2021-03-04 RX ORDER — VIT C/E/ZN/COPPR/LUTEIN/ZEAXAN 250MG-90MG
1000 CAPSULE ORAL DAILY
Qty: 90 CAPSULE | Refills: 1 | Status: SHIPPED | OUTPATIENT
Start: 2021-03-04

## 2021-03-04 RX ORDER — DOCUSATE SODIUM 100 MG/1
100 CAPSULE, LIQUID FILLED ORAL 2 TIMES DAILY
Qty: 60 CAPSULE | Refills: 3 | Status: SHIPPED | OUTPATIENT
Start: 2021-03-04

## 2021-03-04 RX ADMIN — SULFAMETHOXAZOLE AND TRIMETHOPRIM 1 TABLET: 800; 160 TABLET ORAL at 08:03

## 2021-03-04 RX ADMIN — OXYCODONE HYDROCHLORIDE 10 MG: 5 TABLET ORAL at 10:03

## 2021-03-04 RX ADMIN — PANTOPRAZOLE SODIUM 40 MG: 40 TABLET, DELAYED RELEASE ORAL at 08:03

## 2021-03-04 RX ADMIN — HYDROMORPHONE HYDROCHLORIDE 1 MG: 1 INJECTION, SOLUTION INTRAMUSCULAR; INTRAVENOUS; SUBCUTANEOUS at 02:03

## 2021-03-04 RX ADMIN — TAMSULOSIN HYDROCHLORIDE 0.8 MG: 0.4 CAPSULE ORAL at 08:03

## 2021-03-04 RX ADMIN — PAROXETINE HYDROCHLORIDE 20 MG: 20 TABLET, FILM COATED ORAL at 08:03

## 2021-03-04 RX ADMIN — OXYCODONE HYDROCHLORIDE 10 MG: 5 TABLET ORAL at 06:03

## 2021-03-04 RX ADMIN — HYDROMORPHONE HYDROCHLORIDE 1 MG: 1 INJECTION, SOLUTION INTRAMUSCULAR; INTRAVENOUS; SUBCUTANEOUS at 08:03

## 2021-03-04 RX ADMIN — HYDROMORPHONE HYDROCHLORIDE 1 MG: 1 INJECTION, SOLUTION INTRAMUSCULAR; INTRAVENOUS; SUBCUTANEOUS at 12:03

## 2021-03-04 RX ADMIN — PREDNISONE 20 MG: 20 TABLET ORAL at 08:03

## 2021-03-04 RX ADMIN — CETIRIZINE HYDROCHLORIDE 10 MG: 10 TABLET, FILM COATED ORAL at 08:03

## 2021-03-04 RX ADMIN — Medication 1200 UNITS: at 10:03

## 2021-03-04 RX ADMIN — FERROUS SULFATE TAB EC 325 MG (65 MG FE EQUIVALENT) 325 MG: 325 (65 FE) TABLET DELAYED RESPONSE at 08:03

## 2021-03-04 RX ADMIN — GABAPENTIN 300 MG: 300 CAPSULE ORAL at 08:03

## 2021-03-05 ENCOUNTER — TELEPHONE (OUTPATIENT)
Dept: ADMINISTRATIVE | Facility: OTHER | Age: 41
End: 2021-03-05

## 2021-03-08 ENCOUNTER — TELEPHONE (OUTPATIENT)
Dept: GASTROENTEROLOGY | Facility: CLINIC | Age: 41
End: 2021-03-08

## 2021-03-08 LAB
FINAL PATHOLOGIC DIAGNOSIS: NORMAL
GROSS: NORMAL
Lab: NORMAL

## 2021-03-09 ENCOUNTER — TELEPHONE (OUTPATIENT)
Dept: PRIMARY CARE CLINIC | Facility: CLINIC | Age: 41
End: 2021-03-09

## 2021-03-15 ENCOUNTER — PATIENT MESSAGE (OUTPATIENT)
Dept: ALLERGY | Facility: CLINIC | Age: 41
End: 2021-03-15

## 2021-03-16 ENCOUNTER — PATIENT MESSAGE (OUTPATIENT)
Dept: SURGERY | Facility: CLINIC | Age: 41
End: 2021-03-16

## 2021-03-18 LAB
ACID FAST MOD KINY STN SPEC: NORMAL
MYCOBACTERIUM SPEC QL CULT: NORMAL

## 2021-03-23 ENCOUNTER — OFFICE VISIT (OUTPATIENT)
Dept: SURGERY | Facility: CLINIC | Age: 41
End: 2021-03-23
Payer: MEDICAID

## 2021-03-23 VITALS
WEIGHT: 208.13 LBS | HEART RATE: 72 BPM | OXYGEN SATURATION: 98 % | RESPIRATION RATE: 18 BRPM | HEIGHT: 72 IN | DIASTOLIC BLOOD PRESSURE: 94 MMHG | SYSTOLIC BLOOD PRESSURE: 134 MMHG | BODY MASS INDEX: 28.19 KG/M2

## 2021-03-23 DIAGNOSIS — L76.82 PAIN AT SURGICAL INCISION: ICD-10-CM

## 2021-03-23 DIAGNOSIS — K42.9 UMBILICAL HERNIA WITHOUT OBSTRUCTION AND WITHOUT GANGRENE: Primary | ICD-10-CM

## 2021-03-23 PROCEDURE — 99204 OFFICE O/P NEW MOD 45 MIN: CPT | Mod: S$PBB,,, | Performed by: SURGERY

## 2021-03-23 PROCEDURE — 99999 PR PBB SHADOW E&M-EST. PATIENT-LVL V: CPT | Mod: PBBFAC,,, | Performed by: SURGERY

## 2021-03-23 PROCEDURE — 99204 PR OFFICE/OUTPT VISIT, NEW, LEVL IV, 45-59 MIN: ICD-10-PCS | Mod: S$PBB,,, | Performed by: SURGERY

## 2021-03-23 PROCEDURE — 99999 PR PBB SHADOW E&M-EST. PATIENT-LVL V: ICD-10-PCS | Mod: PBBFAC,,, | Performed by: SURGERY

## 2021-03-23 PROCEDURE — 99215 OFFICE O/P EST HI 40 MIN: CPT | Mod: PBBFAC | Performed by: SURGERY

## 2021-03-23 RX ORDER — MUPIROCIN 20 MG/G
OINTMENT TOPICAL
Status: CANCELLED | OUTPATIENT
Start: 2021-03-23

## 2021-03-23 RX ORDER — DIPHENHYDRAMINE HCL 25 MG
25 TABLET ORAL NIGHTLY PRN
COMMUNITY

## 2021-03-23 RX ORDER — SODIUM CHLORIDE 9 MG/ML
INJECTION, SOLUTION INTRAVENOUS CONTINUOUS
Status: CANCELLED | OUTPATIENT
Start: 2021-03-23

## 2021-03-23 RX ORDER — ONDANSETRON 4 MG/1
4 TABLET, ORALLY DISINTEGRATING ORAL
COMMUNITY

## 2021-03-23 RX ORDER — METOCLOPRAMIDE 10 MG/1
10 TABLET ORAL
COMMUNITY
Start: 2021-01-04 | End: 2021-04-14

## 2021-03-23 RX ORDER — PREDNISOLONE 15 MG/5ML
20 SOLUTION ORAL
Status: ON HOLD | COMMUNITY
Start: 2020-10-29 | End: 2021-04-17 | Stop reason: HOSPADM

## 2021-03-23 RX ORDER — OXYCODONE AND ACETAMINOPHEN 10; 325 MG/1; MG/1
1 TABLET ORAL
Status: ON HOLD | COMMUNITY
End: 2021-04-17 | Stop reason: HOSPADM

## 2021-03-24 ENCOUNTER — PATIENT MESSAGE (OUTPATIENT)
Dept: ADMINISTRATIVE | Facility: OTHER | Age: 41
End: 2021-03-24

## 2021-03-24 ENCOUNTER — PATIENT MESSAGE (OUTPATIENT)
Dept: SURGERY | Facility: HOSPITAL | Age: 41
End: 2021-03-24

## 2021-03-25 ENCOUNTER — PATIENT MESSAGE (OUTPATIENT)
Dept: SURGERY | Facility: HOSPITAL | Age: 41
End: 2021-03-25

## 2021-03-26 ENCOUNTER — PATIENT MESSAGE (OUTPATIENT)
Dept: SURGERY | Facility: HOSPITAL | Age: 41
End: 2021-03-26

## 2021-03-28 ENCOUNTER — IMMUNIZATION (OUTPATIENT)
Dept: INTERNAL MEDICINE | Facility: CLINIC | Age: 41
End: 2021-03-28
Payer: MEDICAID

## 2021-03-28 ENCOUNTER — PATIENT MESSAGE (OUTPATIENT)
Dept: ALLERGY | Facility: CLINIC | Age: 41
End: 2021-03-28

## 2021-03-28 DIAGNOSIS — Z23 NEED FOR VACCINATION: Primary | ICD-10-CM

## 2021-03-28 PROCEDURE — 0011A COVID-19, MRNA, LNP-S, PF, 100 MCG/0.5 ML DOSE VACCINE: CPT | Mod: PBBFAC | Performed by: FAMILY MEDICINE

## 2021-03-30 ENCOUNTER — PATIENT MESSAGE (OUTPATIENT)
Dept: HEMATOLOGY/ONCOLOGY | Facility: CLINIC | Age: 41
End: 2021-03-30

## 2021-03-30 ENCOUNTER — PATIENT MESSAGE (OUTPATIENT)
Dept: ALLERGY | Facility: CLINIC | Age: 41
End: 2021-03-30

## 2021-03-30 ENCOUNTER — TELEPHONE (OUTPATIENT)
Dept: ALLERGY | Facility: CLINIC | Age: 41
End: 2021-03-30

## 2021-03-30 DIAGNOSIS — L50.1 CHRONIC IDIOPATHIC URTICARIA: Primary | ICD-10-CM

## 2021-03-30 DIAGNOSIS — D72.110 HYPEREOSINOPHILIC SYNDROME, IDIOPATHIC: ICD-10-CM

## 2021-03-30 DIAGNOSIS — D72.19 OTHER EOSINOPHILIA: Primary | ICD-10-CM

## 2021-03-31 ENCOUNTER — PATIENT MESSAGE (OUTPATIENT)
Dept: ALLERGY | Facility: CLINIC | Age: 41
End: 2021-03-31

## 2021-03-31 ENCOUNTER — TELEPHONE (OUTPATIENT)
Dept: ALLERGY | Facility: CLINIC | Age: 41
End: 2021-03-31

## 2021-03-31 ENCOUNTER — PATIENT MESSAGE (OUTPATIENT)
Dept: PAIN MEDICINE | Facility: CLINIC | Age: 41
End: 2021-03-31

## 2021-03-31 ENCOUNTER — HOSPITAL ENCOUNTER (OUTPATIENT)
Facility: HOSPITAL | Age: 41
Discharge: HOME | End: 2021-04-10
Attending: EMERGENCY MEDICINE | Admitting: HOSPITALIST
Payer: MEDICAID

## 2021-03-31 ENCOUNTER — SPECIALTY PHARMACY (OUTPATIENT)
Dept: PHARMACY | Facility: CLINIC | Age: 41
End: 2021-03-31

## 2021-03-31 ENCOUNTER — PATIENT MESSAGE (OUTPATIENT)
Dept: SURGERY | Facility: HOSPITAL | Age: 41
End: 2021-03-31

## 2021-03-31 ENCOUNTER — TELEPHONE (OUTPATIENT)
Dept: PHARMACY | Facility: CLINIC | Age: 41
End: 2021-03-31

## 2021-03-31 DIAGNOSIS — R10.9 CONTINUOUS SEVERE ABDOMINAL PAIN: ICD-10-CM

## 2021-03-31 DIAGNOSIS — D72.119 HYPEREOSINOPHILIC SYNDROME, UNSPECIFIED TYPE: ICD-10-CM

## 2021-03-31 DIAGNOSIS — D72.110 IDIOPATHIC HYPEREOSINOPHILIC SYNDROME: Primary | ICD-10-CM

## 2021-03-31 DIAGNOSIS — K62.5 BRBPR (BRIGHT RED BLOOD PER RECTUM): ICD-10-CM

## 2021-03-31 DIAGNOSIS — K27.9 PUD (PEPTIC ULCER DISEASE): Chronic | ICD-10-CM

## 2021-03-31 DIAGNOSIS — K42.9 UMBILICAL HERNIA WITHOUT OBSTRUCTION AND WITHOUT GANGRENE: Chronic | ICD-10-CM

## 2021-03-31 DIAGNOSIS — K92.2 GASTROINTESTINAL HEMORRHAGE, UNSPECIFIED GASTROINTESTINAL HEMORRHAGE TYPE: ICD-10-CM

## 2021-03-31 DIAGNOSIS — R11.2 INTRACTABLE NAUSEA AND VOMITING: Primary | ICD-10-CM

## 2021-03-31 LAB
ALBUMIN SERPL BCP-MCNC: 4.1 G/DL (ref 3.5–5.2)
ALP SERPL-CCNC: 66 U/L (ref 55–135)
ALT SERPL W/O P-5'-P-CCNC: 24 U/L (ref 10–44)
ANION GAP SERPL CALC-SCNC: 14 MMOL/L (ref 8–16)
APTT BLDCRRT: 24.9 SEC (ref 21–32)
AST SERPL-CCNC: 25 U/L (ref 10–40)
BASOPHILS # BLD AUTO: 0.06 K/UL (ref 0–0.2)
BASOPHILS NFR BLD: 0.7 % (ref 0–1.9)
BILIRUB SERPL-MCNC: 1.7 MG/DL (ref 0.1–1)
BUN SERPL-MCNC: 21 MG/DL (ref 6–20)
BUN SERPL-MCNC: 21 MG/DL (ref 6–30)
CALCIUM SERPL-MCNC: 9.3 MG/DL (ref 8.7–10.5)
CHLORIDE SERPL-SCNC: 102 MMOL/L (ref 95–110)
CHLORIDE SERPL-SCNC: 103 MMOL/L (ref 95–110)
CO2 SERPL-SCNC: 21 MMOL/L (ref 23–29)
CREAT SERPL-MCNC: 1.2 MG/DL (ref 0.5–1.4)
CREAT SERPL-MCNC: 1.2 MG/DL (ref 0.5–1.4)
CTP QC/QA: YES
DIFFERENTIAL METHOD: ABNORMAL
EOSINOPHIL # BLD AUTO: 0.1 K/UL (ref 0–0.5)
EOSINOPHIL NFR BLD: 0.9 % (ref 0–8)
ERYTHROCYTE [DISTWIDTH] IN BLOOD BY AUTOMATED COUNT: 17.5 % (ref 11.5–14.5)
EST. GFR  (AFRICAN AMERICAN): >60 ML/MIN/1.73 M^2
EST. GFR  (NON AFRICAN AMERICAN): >60 ML/MIN/1.73 M^2
GLUCOSE SERPL-MCNC: 86 MG/DL (ref 70–110)
GLUCOSE SERPL-MCNC: 87 MG/DL (ref 70–110)
HCT VFR BLD AUTO: 34.1 % (ref 40–54)
HCT VFR BLD CALC: 35 %PCV (ref 36–54)
HGB BLD-MCNC: 10.8 G/DL (ref 14–18)
IMM GRANULOCYTES # BLD AUTO: 0.02 K/UL (ref 0–0.04)
IMM GRANULOCYTES NFR BLD AUTO: 0.2 % (ref 0–0.5)
INR PPP: 1.1 (ref 0.8–1.2)
LACTATE SERPL-SCNC: 1.9 MMOL/L (ref 0.5–2.2)
LYMPHOCYTES # BLD AUTO: 1.4 K/UL (ref 1–4.8)
LYMPHOCYTES NFR BLD: 15.6 % (ref 18–48)
MCH RBC QN AUTO: 23.4 PG (ref 27–31)
MCHC RBC AUTO-ENTMCNC: 31.7 G/DL (ref 32–36)
MCV RBC AUTO: 74 FL (ref 82–98)
MONOCYTES # BLD AUTO: 1.2 K/UL (ref 0.3–1)
MONOCYTES NFR BLD: 13.5 % (ref 4–15)
NEUTROPHILS # BLD AUTO: 6.3 K/UL (ref 1.8–7.7)
NEUTROPHILS NFR BLD: 69.1 % (ref 38–73)
NRBC BLD-RTO: 0 /100 WBC
PLATELET # BLD AUTO: 286 K/UL (ref 150–450)
PMV BLD AUTO: 11.2 FL (ref 9.2–12.9)
POC IONIZED CALCIUM: 1.16 MMOL/L (ref 1.06–1.42)
POC TCO2 (MEASURED): 25 MMOL/L (ref 23–29)
POTASSIUM BLD-SCNC: 2.8 MMOL/L (ref 3.5–5.1)
POTASSIUM SERPL-SCNC: 2.9 MMOL/L (ref 3.5–5.1)
PROT SERPL-MCNC: 7.3 G/DL (ref 6–8.4)
PROTHROMBIN TIME: 11.5 SEC (ref 9–12.5)
RBC # BLD AUTO: 4.62 M/UL (ref 4.6–6.2)
SAMPLE: ABNORMAL
SARS-COV-2 RDRP RESP QL NAA+PROBE: NEGATIVE
SODIUM BLD-SCNC: 138 MMOL/L (ref 136–145)
SODIUM SERPL-SCNC: 138 MMOL/L (ref 136–145)
WBC # BLD AUTO: 9.05 K/UL (ref 3.9–12.7)

## 2021-03-31 PROCEDURE — 99285 EMERGENCY DEPT VISIT HI MDM: CPT | Mod: 25

## 2021-03-31 PROCEDURE — 96376 TX/PRO/DX INJ SAME DRUG ADON: CPT

## 2021-03-31 PROCEDURE — 80053 COMPREHEN METABOLIC PANEL: CPT | Performed by: STUDENT IN AN ORGANIZED HEALTH CARE EDUCATION/TRAINING PROGRAM

## 2021-03-31 PROCEDURE — 83605 ASSAY OF LACTIC ACID: CPT | Performed by: STUDENT IN AN ORGANIZED HEALTH CARE EDUCATION/TRAINING PROGRAM

## 2021-03-31 PROCEDURE — U0002 COVID-19 LAB TEST NON-CDC: HCPCS | Performed by: STUDENT IN AN ORGANIZED HEALTH CARE EDUCATION/TRAINING PROGRAM

## 2021-03-31 PROCEDURE — 85610 PROTHROMBIN TIME: CPT | Performed by: STUDENT IN AN ORGANIZED HEALTH CARE EDUCATION/TRAINING PROGRAM

## 2021-03-31 PROCEDURE — 25500020 PHARM REV CODE 255: Performed by: EMERGENCY MEDICINE

## 2021-03-31 PROCEDURE — 99285 EMERGENCY DEPT VISIT HI MDM: CPT | Mod: CS,,, | Performed by: EMERGENCY MEDICINE

## 2021-03-31 PROCEDURE — 99285 PR EMERGENCY DEPT VISIT,LEVEL V: ICD-10-PCS | Mod: CS,,, | Performed by: EMERGENCY MEDICINE

## 2021-03-31 PROCEDURE — 93010 ELECTROCARDIOGRAM REPORT: CPT | Mod: ,,, | Performed by: INTERNAL MEDICINE

## 2021-03-31 PROCEDURE — G0378 HOSPITAL OBSERVATION PER HR: HCPCS

## 2021-03-31 PROCEDURE — 93005 ELECTROCARDIOGRAM TRACING: CPT

## 2021-03-31 PROCEDURE — 80047 BASIC METABLC PNL IONIZED CA: CPT

## 2021-03-31 PROCEDURE — 96375 TX/PRO/DX INJ NEW DRUG ADDON: CPT

## 2021-03-31 PROCEDURE — 25000003 PHARM REV CODE 250: Performed by: STUDENT IN AN ORGANIZED HEALTH CARE EDUCATION/TRAINING PROGRAM

## 2021-03-31 PROCEDURE — 63600175 PHARM REV CODE 636 W HCPCS: Performed by: STUDENT IN AN ORGANIZED HEALTH CARE EDUCATION/TRAINING PROGRAM

## 2021-03-31 PROCEDURE — 85025 COMPLETE CBC W/AUTO DIFF WBC: CPT | Performed by: STUDENT IN AN ORGANIZED HEALTH CARE EDUCATION/TRAINING PROGRAM

## 2021-03-31 PROCEDURE — 96361 HYDRATE IV INFUSION ADD-ON: CPT

## 2021-03-31 PROCEDURE — 85730 THROMBOPLASTIN TIME PARTIAL: CPT | Performed by: STUDENT IN AN ORGANIZED HEALTH CARE EDUCATION/TRAINING PROGRAM

## 2021-03-31 PROCEDURE — 93010 EKG 12-LEAD: ICD-10-PCS | Mod: ,,, | Performed by: INTERNAL MEDICINE

## 2021-03-31 PROCEDURE — 83690 ASSAY OF LIPASE: CPT | Performed by: STUDENT IN AN ORGANIZED HEALTH CARE EDUCATION/TRAINING PROGRAM

## 2021-03-31 RX ORDER — MORPHINE SULFATE 2 MG/ML
6 INJECTION, SOLUTION INTRAMUSCULAR; INTRAVENOUS
Status: COMPLETED | OUTPATIENT
Start: 2021-03-31 | End: 2021-03-31

## 2021-03-31 RX ORDER — ONDANSETRON 2 MG/ML
4 INJECTION INTRAMUSCULAR; INTRAVENOUS
Status: COMPLETED | OUTPATIENT
Start: 2021-03-31 | End: 2021-03-31

## 2021-03-31 RX ORDER — HYDROMORPHONE HYDROCHLORIDE 1 MG/ML
1 INJECTION, SOLUTION INTRAMUSCULAR; INTRAVENOUS; SUBCUTANEOUS
Status: COMPLETED | OUTPATIENT
Start: 2021-03-31 | End: 2021-03-31

## 2021-03-31 RX ORDER — DIPHENHYDRAMINE HYDROCHLORIDE 50 MG/ML
25 INJECTION INTRAMUSCULAR; INTRAVENOUS
Status: COMPLETED | OUTPATIENT
Start: 2021-03-31 | End: 2021-03-31

## 2021-03-31 RX ORDER — HYDROMORPHONE HYDROCHLORIDE 1 MG/ML
1 INJECTION, SOLUTION INTRAMUSCULAR; INTRAVENOUS; SUBCUTANEOUS
Status: COMPLETED | OUTPATIENT
Start: 2021-04-01 | End: 2021-04-01

## 2021-03-31 RX ORDER — POTASSIUM CHLORIDE 7.45 MG/ML
10 INJECTION INTRAVENOUS
Status: COMPLETED | OUTPATIENT
Start: 2021-03-31 | End: 2021-04-01

## 2021-03-31 RX ADMIN — ONDANSETRON 4 MG: 2 INJECTION INTRAMUSCULAR; INTRAVENOUS at 09:03

## 2021-03-31 RX ADMIN — DIPHENHYDRAMINE HYDROCHLORIDE 25 MG: 50 INJECTION, SOLUTION INTRAMUSCULAR; INTRAVENOUS at 09:03

## 2021-03-31 RX ADMIN — SODIUM CHLORIDE 1000 ML: 0.9 INJECTION, SOLUTION INTRAVENOUS at 09:03

## 2021-03-31 RX ADMIN — MORPHINE SULFATE 6 MG: 2 INJECTION, SOLUTION INTRAMUSCULAR; INTRAVENOUS at 09:03

## 2021-03-31 RX ADMIN — HYDROMORPHONE HYDROCHLORIDE 1 MG: 1 INJECTION, SOLUTION INTRAMUSCULAR; INTRAVENOUS; SUBCUTANEOUS at 10:03

## 2021-03-31 RX ADMIN — POTASSIUM BICARBONATE 20 MEQ: 391 TABLET, EFFERVESCENT ORAL at 10:03

## 2021-03-31 RX ADMIN — ONDANSETRON 4 MG: 2 INJECTION INTRAMUSCULAR; INTRAVENOUS at 11:03

## 2021-03-31 RX ADMIN — IOHEXOL 100 ML: 300 INJECTION, SOLUTION INTRAVENOUS at 10:03

## 2021-04-01 ENCOUNTER — PATIENT MESSAGE (OUTPATIENT)
Dept: SURGERY | Facility: CLINIC | Age: 41
End: 2021-04-01

## 2021-04-01 ENCOUNTER — PATIENT MESSAGE (OUTPATIENT)
Dept: SURGERY | Facility: HOSPITAL | Age: 41
End: 2021-04-01

## 2021-04-01 PROBLEM — K44.9 HIATAL HERNIA: Chronic | Status: ACTIVE | Noted: 2020-03-27

## 2021-04-01 PROBLEM — K62.5 BRBPR (BRIGHT RED BLOOD PER RECTUM): Status: ACTIVE | Noted: 2021-04-01

## 2021-04-01 PROBLEM — K42.9 UMBILICAL HERNIA: Status: ACTIVE | Noted: 2021-04-01

## 2021-04-01 PROBLEM — K42.9 UMBILICAL HERNIA: Chronic | Status: ACTIVE | Noted: 2021-04-01

## 2021-04-01 PROBLEM — D72.10 EOSINOPHILIA: Chronic | Status: ACTIVE | Noted: 2018-09-04

## 2021-04-01 PROBLEM — Z95.828 PORT-A-CATH IN PLACE: Chronic | Status: ACTIVE | Noted: 2020-08-28

## 2021-04-01 PROBLEM — R39.198 DIFFICULTY VOIDING: Chronic | Status: ACTIVE | Noted: 2020-09-16

## 2021-04-01 PROBLEM — R10.9 CONTINUOUS SEVERE ABDOMINAL PAIN: Status: ACTIVE | Noted: 2021-04-01

## 2021-04-01 LAB
ABO + RH BLD: NORMAL
ALBUMIN SERPL BCP-MCNC: 3.9 G/DL (ref 3.5–5.2)
ALP SERPL-CCNC: 64 U/L (ref 55–135)
ALT SERPL W/O P-5'-P-CCNC: 25 U/L (ref 10–44)
ANION GAP SERPL CALC-SCNC: 11 MMOL/L (ref 8–16)
AST SERPL-CCNC: 26 U/L (ref 10–40)
BACTERIA #/AREA URNS AUTO: ABNORMAL /HPF
BASOPHILS # BLD AUTO: 0.09 K/UL (ref 0–0.2)
BASOPHILS NFR BLD: 0.9 % (ref 0–1.9)
BILIRUB SERPL-MCNC: 2 MG/DL (ref 0.1–1)
BILIRUB UR QL STRIP: NEGATIVE
BLD GP AB SCN CELLS X3 SERPL QL: NORMAL
BUN SERPL-MCNC: 19 MG/DL (ref 6–20)
CALCIUM SERPL-MCNC: 8.4 MG/DL (ref 8.7–10.5)
CHLORIDE SERPL-SCNC: 106 MMOL/L (ref 95–110)
CLARITY UR REFRACT.AUTO: ABNORMAL
CO2 SERPL-SCNC: 23 MMOL/L (ref 23–29)
COLOR UR AUTO: YELLOW
CREAT SERPL-MCNC: 1.2 MG/DL (ref 0.5–1.4)
CRP SERPL-MCNC: 10.6 MG/L (ref 0–8.2)
DIFFERENTIAL METHOD: ABNORMAL
EOSINOPHIL # BLD AUTO: 0.2 K/UL (ref 0–0.5)
EOSINOPHIL NFR BLD: 1.9 % (ref 0–8)
ERYTHROCYTE [DISTWIDTH] IN BLOOD BY AUTOMATED COUNT: 17.5 % (ref 11.5–14.5)
ERYTHROCYTE [SEDIMENTATION RATE] IN BLOOD BY WESTERGREN METHOD: 8 MM/HR (ref 0–23)
EST. GFR  (AFRICAN AMERICAN): >60 ML/MIN/1.73 M^2
EST. GFR  (NON AFRICAN AMERICAN): >60 ML/MIN/1.73 M^2
GLUCOSE SERPL-MCNC: 61 MG/DL (ref 70–110)
GLUCOSE UR QL STRIP: NEGATIVE
HCT VFR BLD AUTO: 30.8 % (ref 40–54)
HCT VFR BLD AUTO: 31.4 % (ref 40–54)
HCT VFR BLD AUTO: 32.3 % (ref 40–54)
HGB BLD-MCNC: 10 G/DL (ref 14–18)
HGB BLD-MCNC: 10.1 G/DL (ref 14–18)
HGB BLD-MCNC: 9.9 G/DL (ref 14–18)
HGB UR QL STRIP: ABNORMAL
HYALINE CASTS UR QL AUTO: 0 /LPF
IMM GRANULOCYTES # BLD AUTO: 0.02 K/UL (ref 0–0.04)
IMM GRANULOCYTES NFR BLD AUTO: 0.2 % (ref 0–0.5)
KETONES UR QL STRIP: ABNORMAL
LACTATE SERPL-SCNC: 1.7 MMOL/L (ref 0.5–2.2)
LEUKOCYTE ESTERASE UR QL STRIP: NEGATIVE
LIPASE SERPL-CCNC: 15 U/L (ref 4–60)
LYMPHOCYTES # BLD AUTO: 1.8 K/UL (ref 1–4.8)
LYMPHOCYTES NFR BLD: 17.8 % (ref 18–48)
MAGNESIUM SERPL-MCNC: 1.5 MG/DL (ref 1.6–2.6)
MCH RBC QN AUTO: 23.5 PG (ref 27–31)
MCHC RBC AUTO-ENTMCNC: 31.3 G/DL (ref 32–36)
MCV RBC AUTO: 75 FL (ref 82–98)
MICROSCOPIC COMMENT: ABNORMAL
MONOCYTES # BLD AUTO: 1.4 K/UL (ref 0.3–1)
MONOCYTES NFR BLD: 13.8 % (ref 4–15)
NEUTROPHILS # BLD AUTO: 6.5 K/UL (ref 1.8–7.7)
NEUTROPHILS NFR BLD: 65.4 % (ref 38–73)
NITRITE UR QL STRIP: NEGATIVE
NRBC BLD-RTO: 0 /100 WBC
PH UR STRIP: 5 [PH] (ref 5–8)
PHOSPHATE SERPL-MCNC: 2.4 MG/DL (ref 2.7–4.5)
PLATELET # BLD AUTO: 264 K/UL (ref 150–450)
PMV BLD AUTO: 11.3 FL (ref 9.2–12.9)
POTASSIUM SERPL-SCNC: 3.2 MMOL/L (ref 3.5–5.1)
PROCALCITONIN SERPL IA-MCNC: 0.07 NG/ML
PROT SERPL-MCNC: 6.7 G/DL (ref 6–8.4)
PROT UR QL STRIP: ABNORMAL
RBC # BLD AUTO: 4.3 M/UL (ref 4.6–6.2)
RBC #/AREA URNS AUTO: >100 /HPF (ref 0–4)
SODIUM SERPL-SCNC: 140 MMOL/L (ref 136–145)
SP GR UR STRIP: >=1.03 (ref 1–1.03)
URN SPEC COLLECT METH UR: ABNORMAL
WBC # BLD AUTO: 9.99 K/UL (ref 3.9–12.7)
WBC #/AREA URNS AUTO: 0 /HPF (ref 0–5)

## 2021-04-01 PROCEDURE — 83605 ASSAY OF LACTIC ACID: CPT | Performed by: NURSE PRACTITIONER

## 2021-04-01 PROCEDURE — 25000003 PHARM REV CODE 250: Performed by: NURSE PRACTITIONER

## 2021-04-01 PROCEDURE — 63600175 PHARM REV CODE 636 W HCPCS: Performed by: HOSPITALIST

## 2021-04-01 PROCEDURE — G0378 HOSPITAL OBSERVATION PER HR: HCPCS

## 2021-04-01 PROCEDURE — 84100 ASSAY OF PHOSPHORUS: CPT | Performed by: NURSE PRACTITIONER

## 2021-04-01 PROCEDURE — 85018 HEMOGLOBIN: CPT | Mod: 91 | Performed by: NURSE PRACTITIONER

## 2021-04-01 PROCEDURE — 99214 PR OFFICE/OUTPT VISIT, EST, LEVL IV, 30-39 MIN: ICD-10-PCS | Mod: ,,, | Performed by: SURGERY

## 2021-04-01 PROCEDURE — 81001 URINALYSIS AUTO W/SCOPE: CPT | Performed by: NURSE PRACTITIONER

## 2021-04-01 PROCEDURE — 85014 HEMATOCRIT: CPT | Performed by: NURSE PRACTITIONER

## 2021-04-01 PROCEDURE — 99214 OFFICE O/P EST MOD 30 MIN: CPT | Mod: ,,, | Performed by: SURGERY

## 2021-04-01 PROCEDURE — 99220 PR INITIAL OBSERVATION CARE,LEVL III: CPT | Mod: ,,, | Performed by: NURSE PRACTITIONER

## 2021-04-01 PROCEDURE — 82272 OCCULT BLD FECES 1-3 TESTS: CPT | Performed by: NURSE PRACTITIONER

## 2021-04-01 PROCEDURE — 84145 PROCALCITONIN (PCT): CPT | Performed by: NURSE PRACTITIONER

## 2021-04-01 PROCEDURE — 80053 COMPREHEN METABOLIC PANEL: CPT | Performed by: NURSE PRACTITIONER

## 2021-04-01 PROCEDURE — 25000242 PHARM REV CODE 250 ALT 637 W/ HCPCS: Performed by: NURSE PRACTITIONER

## 2021-04-01 PROCEDURE — 86900 BLOOD TYPING SEROLOGIC ABO: CPT | Performed by: NURSE PRACTITIONER

## 2021-04-01 PROCEDURE — 25000003 PHARM REV CODE 250: Performed by: PHYSICIAN ASSISTANT

## 2021-04-01 PROCEDURE — 25000003 PHARM REV CODE 250: Performed by: HOSPITALIST

## 2021-04-01 PROCEDURE — C9113 INJ PANTOPRAZOLE SODIUM, VIA: HCPCS | Performed by: STUDENT IN AN ORGANIZED HEALTH CARE EDUCATION/TRAINING PROGRAM

## 2021-04-01 PROCEDURE — 83735 ASSAY OF MAGNESIUM: CPT | Mod: 91 | Performed by: NURSE PRACTITIONER

## 2021-04-01 PROCEDURE — 63600175 PHARM REV CODE 636 W HCPCS: Performed by: NURSE PRACTITIONER

## 2021-04-01 PROCEDURE — 63600175 PHARM REV CODE 636 W HCPCS: Performed by: EMERGENCY MEDICINE

## 2021-04-01 PROCEDURE — 85025 COMPLETE CBC W/AUTO DIFF WBC: CPT | Performed by: NURSE PRACTITIONER

## 2021-04-01 PROCEDURE — 96361 HYDRATE IV INFUSION ADD-ON: CPT | Performed by: EMERGENCY MEDICINE

## 2021-04-01 PROCEDURE — 96367 TX/PROPH/DG ADDL SEQ IV INF: CPT | Performed by: EMERGENCY MEDICINE

## 2021-04-01 PROCEDURE — 96365 THER/PROPH/DIAG IV INF INIT: CPT | Performed by: EMERGENCY MEDICINE

## 2021-04-01 PROCEDURE — 99214 OFFICE O/P EST MOD 30 MIN: CPT | Mod: ,,, | Performed by: INTERNAL MEDICINE

## 2021-04-01 PROCEDURE — 83735 ASSAY OF MAGNESIUM: CPT | Performed by: NURSE PRACTITIONER

## 2021-04-01 PROCEDURE — 96376 TX/PRO/DX INJ SAME DRUG ADON: CPT | Performed by: EMERGENCY MEDICINE

## 2021-04-01 PROCEDURE — 85652 RBC SED RATE AUTOMATED: CPT | Performed by: NURSE PRACTITIONER

## 2021-04-01 PROCEDURE — 99220 PR INITIAL OBSERVATION CARE,LEVL III: ICD-10-PCS | Mod: ,,, | Performed by: NURSE PRACTITIONER

## 2021-04-01 PROCEDURE — 25000003 PHARM REV CODE 250: Performed by: STUDENT IN AN ORGANIZED HEALTH CARE EDUCATION/TRAINING PROGRAM

## 2021-04-01 PROCEDURE — 63600175 PHARM REV CODE 636 W HCPCS: Performed by: STUDENT IN AN ORGANIZED HEALTH CARE EDUCATION/TRAINING PROGRAM

## 2021-04-01 PROCEDURE — 63600175 PHARM REV CODE 636 W HCPCS: Performed by: INTERNAL MEDICINE

## 2021-04-01 PROCEDURE — 86140 C-REACTIVE PROTEIN: CPT | Performed by: NURSE PRACTITIONER

## 2021-04-01 PROCEDURE — 96375 TX/PRO/DX INJ NEW DRUG ADDON: CPT | Performed by: EMERGENCY MEDICINE

## 2021-04-01 PROCEDURE — 96372 THER/PROPH/DIAG INJ SC/IM: CPT | Mod: 59 | Performed by: EMERGENCY MEDICINE

## 2021-04-01 PROCEDURE — 99214 PR OFFICE/OUTPT VISIT, EST, LEVL IV, 30-39 MIN: ICD-10-PCS | Mod: ,,, | Performed by: INTERNAL MEDICINE

## 2021-04-01 RX ORDER — SUCRALFATE 1 G/1
1 TABLET ORAL
Status: DISCONTINUED | OUTPATIENT
Start: 2021-04-01 | End: 2021-04-10 | Stop reason: HOSPADM

## 2021-04-01 RX ORDER — MORPHINE SULFATE 2 MG/ML
2 INJECTION, SOLUTION INTRAMUSCULAR; INTRAVENOUS ONCE
Status: COMPLETED | OUTPATIENT
Start: 2021-04-01 | End: 2021-04-01

## 2021-04-01 RX ORDER — PANTOPRAZOLE SODIUM 40 MG/10ML
40 INJECTION, POWDER, LYOPHILIZED, FOR SOLUTION INTRAVENOUS
Status: COMPLETED | OUTPATIENT
Start: 2021-04-01 | End: 2021-04-01

## 2021-04-01 RX ORDER — PANTOPRAZOLE SODIUM 40 MG/1
40 TABLET, DELAYED RELEASE ORAL
Status: DISCONTINUED | OUTPATIENT
Start: 2021-04-01 | End: 2021-04-10 | Stop reason: HOSPADM

## 2021-04-01 RX ORDER — HYDROMORPHONE HYDROCHLORIDE 1 MG/ML
2 INJECTION, SOLUTION INTRAMUSCULAR; INTRAVENOUS; SUBCUTANEOUS EVERY 4 HOURS PRN
Status: DISCONTINUED | OUTPATIENT
Start: 2021-04-01 | End: 2021-04-02

## 2021-04-01 RX ORDER — TAMSULOSIN HYDROCHLORIDE 0.4 MG/1
0.4 CAPSULE ORAL 2 TIMES DAILY
Status: DISCONTINUED | OUTPATIENT
Start: 2021-04-01 | End: 2021-04-10 | Stop reason: HOSPADM

## 2021-04-01 RX ORDER — ONDANSETRON 2 MG/ML
4 INJECTION INTRAMUSCULAR; INTRAVENOUS EVERY 6 HOURS PRN
Status: DISCONTINUED | OUTPATIENT
Start: 2021-04-01 | End: 2021-04-10 | Stop reason: HOSPADM

## 2021-04-01 RX ORDER — DIAZEPAM 10 MG/2ML
10 INJECTION INTRAMUSCULAR ONCE
Status: COMPLETED | OUTPATIENT
Start: 2021-04-01 | End: 2021-04-01

## 2021-04-01 RX ORDER — DICYCLOMINE HYDROCHLORIDE 10 MG/ML
20 INJECTION INTRAMUSCULAR
Status: COMPLETED | OUTPATIENT
Start: 2021-04-01 | End: 2021-04-01

## 2021-04-01 RX ORDER — ACETAMINOPHEN 325 MG/1
650 TABLET ORAL EVERY 4 HOURS PRN
Status: DISCONTINUED | OUTPATIENT
Start: 2021-04-01 | End: 2021-04-10 | Stop reason: HOSPADM

## 2021-04-01 RX ORDER — PAROXETINE HYDROCHLORIDE 20 MG/1
20 TABLET, FILM COATED ORAL EVERY MORNING
Status: DISCONTINUED | OUTPATIENT
Start: 2021-04-01 | End: 2021-04-10 | Stop reason: HOSPADM

## 2021-04-01 RX ORDER — BISACODYL 10 MG
10 SUPPOSITORY, RECTAL RECTAL DAILY PRN
Status: DISCONTINUED | OUTPATIENT
Start: 2021-04-01 | End: 2021-04-10 | Stop reason: HOSPADM

## 2021-04-01 RX ORDER — SULFAMETHOXAZOLE AND TRIMETHOPRIM 800; 160 MG/1; MG/1
1 TABLET ORAL DAILY
Status: DISCONTINUED | OUTPATIENT
Start: 2021-04-01 | End: 2021-04-10 | Stop reason: HOSPADM

## 2021-04-01 RX ORDER — POTASSIUM CHLORIDE 7.45 MG/ML
40 INJECTION INTRAVENOUS ONCE
Status: COMPLETED | OUTPATIENT
Start: 2021-04-01 | End: 2021-04-01

## 2021-04-01 RX ORDER — FERROUS SULFATE 325(65) MG
325 TABLET, DELAYED RELEASE (ENTERIC COATED) ORAL DAILY
Status: DISCONTINUED | OUTPATIENT
Start: 2021-04-01 | End: 2021-04-10 | Stop reason: HOSPADM

## 2021-04-01 RX ORDER — HYDROCODONE BITARTRATE AND ACETAMINOPHEN 10; 325 MG/1; MG/1
1 TABLET ORAL EVERY 4 HOURS PRN
Status: DISCONTINUED | OUTPATIENT
Start: 2021-04-01 | End: 2021-04-07

## 2021-04-01 RX ORDER — DICYCLOMINE HYDROCHLORIDE 20 MG/1
20 TABLET ORAL
Status: DISCONTINUED | OUTPATIENT
Start: 2021-04-01 | End: 2021-04-10 | Stop reason: HOSPADM

## 2021-04-01 RX ORDER — EPINEPHRINE 0.3 MG/.3ML
0.3 INJECTION SUBCUTANEOUS
Status: DISCONTINUED | OUTPATIENT
Start: 2021-04-01 | End: 2021-04-10 | Stop reason: HOSPADM

## 2021-04-01 RX ORDER — METOCLOPRAMIDE 10 MG/1
10 TABLET ORAL
Status: DISCONTINUED | OUTPATIENT
Start: 2021-04-01 | End: 2021-04-10 | Stop reason: HOSPADM

## 2021-04-01 RX ORDER — SODIUM CHLORIDE 9 MG/ML
INJECTION, SOLUTION INTRAVENOUS CONTINUOUS
Status: DISCONTINUED | OUTPATIENT
Start: 2021-04-01 | End: 2021-04-03

## 2021-04-01 RX ORDER — HYDROCORTISONE 25 MG/G
CREAM TOPICAL 4 TIMES DAILY
Status: DISCONTINUED | OUTPATIENT
Start: 2021-04-01 | End: 2021-04-01

## 2021-04-01 RX ORDER — HYDROCORTISONE 25 MG/G
CREAM TOPICAL 4 TIMES DAILY
Status: DISCONTINUED | OUTPATIENT
Start: 2021-04-01 | End: 2021-04-10 | Stop reason: HOSPADM

## 2021-04-01 RX ORDER — PREDNISONE 20 MG/1
20 TABLET ORAL DAILY
Status: DISCONTINUED | OUTPATIENT
Start: 2021-04-01 | End: 2021-04-10 | Stop reason: HOSPADM

## 2021-04-01 RX ORDER — TALC
6 POWDER (GRAM) TOPICAL NIGHTLY PRN
Status: DISCONTINUED | OUTPATIENT
Start: 2021-04-01 | End: 2021-04-10 | Stop reason: HOSPADM

## 2021-04-01 RX ORDER — GABAPENTIN 300 MG/1
300 CAPSULE ORAL 3 TIMES DAILY
Status: DISCONTINUED | OUTPATIENT
Start: 2021-04-01 | End: 2021-04-10 | Stop reason: HOSPADM

## 2021-04-01 RX ORDER — LORAZEPAM 1 MG/1
1 TABLET ORAL EVERY 8 HOURS PRN
Status: DISCONTINUED | OUTPATIENT
Start: 2021-04-01 | End: 2021-04-10 | Stop reason: HOSPADM

## 2021-04-01 RX ORDER — NYSTATIN 100000 [USP'U]/ML
500000 SUSPENSION ORAL
Status: DISCONTINUED | OUTPATIENT
Start: 2021-04-01 | End: 2021-04-10 | Stop reason: HOSPADM

## 2021-04-01 RX ORDER — POTASSIUM CHLORIDE 750 MG/1
30 CAPSULE, EXTENDED RELEASE ORAL
Status: DISPENSED | OUTPATIENT
Start: 2021-04-01 | End: 2021-04-01

## 2021-04-01 RX ORDER — MAGNESIUM SULFATE HEPTAHYDRATE 40 MG/ML
2 INJECTION, SOLUTION INTRAVENOUS ONCE
Status: COMPLETED | OUTPATIENT
Start: 2021-04-01 | End: 2021-04-01

## 2021-04-01 RX ORDER — CHOLECALCIFEROL (VITAMIN D3) 25 MCG
1000 TABLET ORAL DAILY
Status: DISCONTINUED | OUTPATIENT
Start: 2021-04-01 | End: 2021-04-10 | Stop reason: HOSPADM

## 2021-04-01 RX ORDER — SODIUM CHLORIDE 0.9 % (FLUSH) 0.9 %
10 SYRINGE (ML) INJECTION
Status: DISCONTINUED | OUTPATIENT
Start: 2021-04-01 | End: 2021-04-10 | Stop reason: HOSPADM

## 2021-04-01 RX ORDER — HYDROXYZINE HYDROCHLORIDE 25 MG/1
50 TABLET, FILM COATED ORAL 4 TIMES DAILY PRN
Status: DISCONTINUED | OUTPATIENT
Start: 2021-04-01 | End: 2021-04-10 | Stop reason: HOSPADM

## 2021-04-01 RX ORDER — CETIRIZINE HYDROCHLORIDE 10 MG/1
20 TABLET ORAL 2 TIMES DAILY
Status: DISCONTINUED | OUTPATIENT
Start: 2021-04-01 | End: 2021-04-10 | Stop reason: HOSPADM

## 2021-04-01 RX ORDER — PROMETHAZINE HYDROCHLORIDE 25 MG/1
25 TABLET ORAL EVERY 6 HOURS PRN
Status: DISCONTINUED | OUTPATIENT
Start: 2021-04-01 | End: 2021-04-01

## 2021-04-01 RX ORDER — HYDROMORPHONE HYDROCHLORIDE 1 MG/ML
1 INJECTION, SOLUTION INTRAMUSCULAR; INTRAVENOUS; SUBCUTANEOUS EVERY 4 HOURS PRN
Status: DISCONTINUED | OUTPATIENT
Start: 2021-04-01 | End: 2021-04-01

## 2021-04-01 RX ORDER — POLYETHYLENE GLYCOL 3350 17 G/17G
17 POWDER, FOR SOLUTION ORAL 2 TIMES DAILY PRN
Status: DISCONTINUED | OUTPATIENT
Start: 2021-04-01 | End: 2021-04-10 | Stop reason: HOSPADM

## 2021-04-01 RX ORDER — DIPHENHYDRAMINE HYDROCHLORIDE 50 MG/ML
25 INJECTION INTRAMUSCULAR; INTRAVENOUS
Status: COMPLETED | OUTPATIENT
Start: 2021-04-01 | End: 2021-04-01

## 2021-04-01 RX ADMIN — DICYCLOMINE HYDROCHLORIDE 20 MG: 20 TABLET ORAL at 09:04

## 2021-04-01 RX ADMIN — HYDROCODONE BITARTRATE AND ACETAMINOPHEN 1 TABLET: 10; 325 TABLET ORAL at 09:04

## 2021-04-01 RX ADMIN — HYDROMORPHONE HYDROCHLORIDE 1 MG: 1 INJECTION, SOLUTION INTRAMUSCULAR; INTRAVENOUS; SUBCUTANEOUS at 12:04

## 2021-04-01 RX ADMIN — TAMSULOSIN HYDROCHLORIDE 0.4 MG: 0.4 CAPSULE ORAL at 09:04

## 2021-04-01 RX ADMIN — HYDROMORPHONE HYDROCHLORIDE 2 MG: 1 INJECTION, SOLUTION INTRAMUSCULAR; INTRAVENOUS; SUBCUTANEOUS at 12:04

## 2021-04-01 RX ADMIN — SODIUM CHLORIDE, SODIUM LACTATE, POTASSIUM CHLORIDE, AND CALCIUM CHLORIDE 1000 ML: .6; .31; .03; .02 INJECTION, SOLUTION INTRAVENOUS at 01:04

## 2021-04-01 RX ADMIN — DICYCLOMINE HYDROCHLORIDE 20 MG: 20 TABLET ORAL at 02:04

## 2021-04-01 RX ADMIN — HYPROMELLOSE 2910 1 DROP: 5 SOLUTION OPHTHALMIC at 05:04

## 2021-04-01 RX ADMIN — PROMETHAZINE HYDROCHLORIDE 25 MG: 25 INJECTION INTRAMUSCULAR; INTRAVENOUS at 02:04

## 2021-04-01 RX ADMIN — HYDROCORTISONE: 25 CREAM TOPICAL at 05:04

## 2021-04-01 RX ADMIN — POTASSIUM CHLORIDE 40 MEQ: 7.46 INJECTION, SOLUTION INTRAVENOUS at 02:04

## 2021-04-01 RX ADMIN — POTASSIUM CHLORIDE 10 MEQ: 7.46 INJECTION, SOLUTION INTRAVENOUS at 12:04

## 2021-04-01 RX ADMIN — FERROUS SULFATE TAB EC 325 MG (65 MG FE EQUIVALENT) 325 MG: 325 (65 FE) TABLET DELAYED RESPONSE at 09:04

## 2021-04-01 RX ADMIN — HYDROMORPHONE HYDROCHLORIDE 1 MG: 1 INJECTION, SOLUTION INTRAMUSCULAR; INTRAVENOUS; SUBCUTANEOUS at 08:04

## 2021-04-01 RX ADMIN — NYSTATIN 500000 UNITS: 500000 SUSPENSION ORAL at 09:04

## 2021-04-01 RX ADMIN — DICYCLOMINE HYDROCHLORIDE 20 MG: 20 INJECTION, SOLUTION INTRAMUSCULAR at 01:04

## 2021-04-01 RX ADMIN — HYDROMORPHONE HYDROCHLORIDE 1 MG: 1 INJECTION, SOLUTION INTRAMUSCULAR; INTRAVENOUS; SUBCUTANEOUS at 07:04

## 2021-04-01 RX ADMIN — MORPHINE SULFATE 2 MG: 2 INJECTION, SOLUTION INTRAMUSCULAR; INTRAVENOUS at 06:04

## 2021-04-01 RX ADMIN — Medication 1 CAPSULE: at 09:04

## 2021-04-01 RX ADMIN — HYDROCORTISONE: 25 CREAM TOPICAL at 09:04

## 2021-04-01 RX ADMIN — PSYLLIUM HUSK 1 PACKET: 3.4 POWDER ORAL at 09:04

## 2021-04-01 RX ADMIN — SUCRALFATE 1 G: 1 TABLET ORAL at 12:04

## 2021-04-01 RX ADMIN — HYDROCORTISONE: 25 CREAM TOPICAL at 02:04

## 2021-04-01 RX ADMIN — PROMETHAZINE HYDROCHLORIDE 25 MG: 25 TABLET ORAL at 09:04

## 2021-04-01 RX ADMIN — NYSTATIN 500000 UNITS: 500000 SUSPENSION ORAL at 12:04

## 2021-04-01 RX ADMIN — SULFAMETHOXAZOLE AND TRIMETHOPRIM 1 TABLET: 800; 160 TABLET ORAL at 09:04

## 2021-04-01 RX ADMIN — PANTOPRAZOLE SODIUM 40 MG: 40 INJECTION, POWDER, FOR SOLUTION INTRAVENOUS at 12:04

## 2021-04-01 RX ADMIN — DIAZEPAM 10 MG: 10 INJECTION, SOLUTION INTRAMUSCULAR; INTRAVENOUS at 01:04

## 2021-04-01 RX ADMIN — METOCLOPRAMIDE 10 MG: 10 TABLET ORAL at 09:04

## 2021-04-01 RX ADMIN — MAGNESIUM SULFATE 2 G: 2 INJECTION INTRAVENOUS at 02:04

## 2021-04-01 RX ADMIN — HYDROCODONE BITARTRATE AND ACETAMINOPHEN 1 TABLET: 10; 325 TABLET ORAL at 05:04

## 2021-04-01 RX ADMIN — NYSTATIN 500000 UNITS: 500000 SUSPENSION ORAL at 05:04

## 2021-04-01 RX ADMIN — LORAZEPAM 1 MG: 1 TABLET ORAL at 07:04

## 2021-04-01 RX ADMIN — HYDROMORPHONE HYDROCHLORIDE 2 MG: 1 INJECTION, SOLUTION INTRAMUSCULAR; INTRAVENOUS; SUBCUTANEOUS at 09:04

## 2021-04-01 RX ADMIN — CETIRIZINE HYDROCHLORIDE 20 MG: 10 TABLET, FILM COATED ORAL at 09:04

## 2021-04-01 RX ADMIN — SUCRALFATE 1 G: 1 TABLET ORAL at 09:04

## 2021-04-01 RX ADMIN — SODIUM CHLORIDE: 0.9 INJECTION, SOLUTION INTRAVENOUS at 05:04

## 2021-04-01 RX ADMIN — DIPHENHYDRAMINE HYDROCHLORIDE 25 MG: 50 INJECTION, SOLUTION INTRAMUSCULAR; INTRAVENOUS at 02:04

## 2021-04-01 RX ADMIN — HYDROMORPHONE HYDROCHLORIDE 2 MG: 1 INJECTION, SOLUTION INTRAMUSCULAR; INTRAVENOUS; SUBCUTANEOUS at 05:04

## 2021-04-01 RX ADMIN — GABAPENTIN 300 MG: 300 CAPSULE ORAL at 02:04

## 2021-04-01 RX ADMIN — HYDROMORPHONE HYDROCHLORIDE 1 MG: 1 INJECTION, SOLUTION INTRAMUSCULAR; INTRAVENOUS; SUBCUTANEOUS at 03:04

## 2021-04-01 RX ADMIN — HYPROMELLOSE 2910 1 DROP: 5 SOLUTION OPHTHALMIC at 02:04

## 2021-04-01 RX ADMIN — GABAPENTIN 300 MG: 300 CAPSULE ORAL at 09:04

## 2021-04-01 RX ADMIN — HYDROXYZINE HYDROCHLORIDE 50 MG: 50 TABLET, FILM COATED ORAL at 09:04

## 2021-04-01 RX ADMIN — HYPROMELLOSE 2910 1 DROP: 5 SOLUTION OPHTHALMIC at 09:04

## 2021-04-01 RX ADMIN — PAROXETINE HYDROCHLORIDE 20 MG: 20 TABLET, FILM COATED ORAL at 09:04

## 2021-04-01 RX ADMIN — PREDNISONE 20 MG: 20 TABLET ORAL at 09:04

## 2021-04-01 RX ADMIN — PANTOPRAZOLE SODIUM 40 MG: 40 TABLET, DELAYED RELEASE ORAL at 04:04

## 2021-04-01 RX ADMIN — PROMETHAZINE HYDROCHLORIDE 25 MG: 25 INJECTION INTRAMUSCULAR; INTRAVENOUS at 01:04

## 2021-04-01 RX ADMIN — PROMETHAZINE HYDROCHLORIDE 25 MG: 25 INJECTION INTRAMUSCULAR; INTRAVENOUS at 07:04

## 2021-04-01 RX ADMIN — Medication 1000 UNITS: at 09:04

## 2021-04-01 RX ADMIN — HYPROMELLOSE 2910 1 DROP: 5 SOLUTION OPHTHALMIC at 07:04

## 2021-04-01 RX ADMIN — HYDROXYZINE HYDROCHLORIDE 50 MG: 50 TABLET, FILM COATED ORAL at 05:04

## 2021-04-01 RX ADMIN — SUCRALFATE 1 G: 1 TABLET ORAL at 05:04

## 2021-04-01 RX ADMIN — HYDROCORTISONE: 25 CREAM TOPICAL at 04:04

## 2021-04-02 LAB
ALBUMIN SERPL BCP-MCNC: 3.1 G/DL (ref 3.5–5.2)
ALP SERPL-CCNC: 51 U/L (ref 55–135)
ALT SERPL W/O P-5'-P-CCNC: 22 U/L (ref 10–44)
ANION GAP SERPL CALC-SCNC: 5 MMOL/L (ref 8–16)
AST SERPL-CCNC: 28 U/L (ref 10–40)
BASOPHILS # BLD AUTO: 0.03 K/UL (ref 0–0.2)
BASOPHILS NFR BLD: 0.5 % (ref 0–1.9)
BILIRUB SERPL-MCNC: 1.5 MG/DL (ref 0.1–1)
BUN SERPL-MCNC: 12 MG/DL (ref 6–20)
CALCIUM SERPL-MCNC: 7.7 MG/DL (ref 8.7–10.5)
CHLORIDE SERPL-SCNC: 109 MMOL/L (ref 95–110)
CO2 SERPL-SCNC: 26 MMOL/L (ref 23–29)
CREAT SERPL-MCNC: 0.8 MG/DL (ref 0.5–1.4)
DIFFERENTIAL METHOD: ABNORMAL
EOSINOPHIL # BLD AUTO: 0 K/UL (ref 0–0.5)
EOSINOPHIL NFR BLD: 0.6 % (ref 0–8)
ERYTHROCYTE [DISTWIDTH] IN BLOOD BY AUTOMATED COUNT: 17.5 % (ref 11.5–14.5)
EST. GFR  (AFRICAN AMERICAN): >60 ML/MIN/1.73 M^2
EST. GFR  (NON AFRICAN AMERICAN): >60 ML/MIN/1.73 M^2
GLUCOSE SERPL-MCNC: 89 MG/DL (ref 70–110)
HCT VFR BLD AUTO: 28.7 % (ref 40–54)
HCT VFR BLD AUTO: 31.5 % (ref 40–54)
HGB BLD-MCNC: 8.8 G/DL (ref 14–18)
HGB BLD-MCNC: 9.4 G/DL (ref 14–18)
IMM GRANULOCYTES # BLD AUTO: 0.02 K/UL (ref 0–0.04)
IMM GRANULOCYTES NFR BLD AUTO: 0.3 % (ref 0–0.5)
LYMPHOCYTES # BLD AUTO: 0.8 K/UL (ref 1–4.8)
LYMPHOCYTES NFR BLD: 12.4 % (ref 18–48)
MAGNESIUM SERPL-MCNC: 2 MG/DL (ref 1.6–2.6)
MCH RBC QN AUTO: 23.9 PG (ref 27–31)
MCHC RBC AUTO-ENTMCNC: 30.7 G/DL (ref 32–36)
MCV RBC AUTO: 78 FL (ref 82–98)
MONOCYTES # BLD AUTO: 0.9 K/UL (ref 0.3–1)
MONOCYTES NFR BLD: 14.4 % (ref 4–15)
NEUTROPHILS # BLD AUTO: 4.7 K/UL (ref 1.8–7.7)
NEUTROPHILS NFR BLD: 71.8 % (ref 38–73)
NRBC BLD-RTO: 0 /100 WBC
OB PNL STL: POSITIVE
PHOSPHATE SERPL-MCNC: 2.2 MG/DL (ref 2.7–4.5)
PLATELET # BLD AUTO: 206 K/UL (ref 150–450)
PMV BLD AUTO: 10.7 FL (ref 9.2–12.9)
POTASSIUM SERPL-SCNC: 4.1 MMOL/L (ref 3.5–5.1)
PROT SERPL-MCNC: 5.5 G/DL (ref 6–8.4)
RBC # BLD AUTO: 3.68 M/UL (ref 4.6–6.2)
SODIUM SERPL-SCNC: 140 MMOL/L (ref 136–145)
WBC # BLD AUTO: 6.54 K/UL (ref 3.9–12.7)

## 2021-04-02 PROCEDURE — 85014 HEMATOCRIT: CPT | Mod: 91 | Performed by: HOSPITALIST

## 2021-04-02 PROCEDURE — 80053 COMPREHEN METABOLIC PANEL: CPT | Performed by: HOSPITALIST

## 2021-04-02 PROCEDURE — 36415 COLL VENOUS BLD VENIPUNCTURE: CPT | Performed by: HOSPITALIST

## 2021-04-02 PROCEDURE — 99225 PR SUBSEQUENT OBSERVATION CARE,LEVEL II: ICD-10-PCS | Mod: ,,, | Performed by: HOSPITALIST

## 2021-04-02 PROCEDURE — 99225 PR SUBSEQUENT OBSERVATION CARE,LEVEL II: CPT | Mod: ,,, | Performed by: HOSPITALIST

## 2021-04-02 PROCEDURE — 96376 TX/PRO/DX INJ SAME DRUG ADON: CPT | Performed by: EMERGENCY MEDICINE

## 2021-04-02 PROCEDURE — 84100 ASSAY OF PHOSPHORUS: CPT | Performed by: HOSPITALIST

## 2021-04-02 PROCEDURE — 63600175 PHARM REV CODE 636 W HCPCS: Performed by: NURSE PRACTITIONER

## 2021-04-02 PROCEDURE — 96361 HYDRATE IV INFUSION ADD-ON: CPT | Performed by: EMERGENCY MEDICINE

## 2021-04-02 PROCEDURE — 63600175 PHARM REV CODE 636 W HCPCS: Performed by: HOSPITALIST

## 2021-04-02 PROCEDURE — 25000003 PHARM REV CODE 250: Performed by: HOSPITALIST

## 2021-04-02 PROCEDURE — 85018 HEMOGLOBIN: CPT | Performed by: HOSPITALIST

## 2021-04-02 PROCEDURE — 83735 ASSAY OF MAGNESIUM: CPT | Performed by: HOSPITALIST

## 2021-04-02 PROCEDURE — 85025 COMPLETE CBC W/AUTO DIFF WBC: CPT | Performed by: HOSPITALIST

## 2021-04-02 PROCEDURE — G0378 HOSPITAL OBSERVATION PER HR: HCPCS

## 2021-04-02 PROCEDURE — 25000003 PHARM REV CODE 250: Performed by: NURSE PRACTITIONER

## 2021-04-02 RX ORDER — HYDROMORPHONE HYDROCHLORIDE 1 MG/ML
2 INJECTION, SOLUTION INTRAMUSCULAR; INTRAVENOUS; SUBCUTANEOUS
Status: DISCONTINUED | OUTPATIENT
Start: 2021-04-02 | End: 2021-04-10 | Stop reason: HOSPADM

## 2021-04-02 RX ADMIN — HYDROMORPHONE HYDROCHLORIDE 2 MG: 1 INJECTION, SOLUTION INTRAMUSCULAR; INTRAVENOUS; SUBCUTANEOUS at 08:04

## 2021-04-02 RX ADMIN — FERROUS SULFATE TAB EC 325 MG (65 MG FE EQUIVALENT) 325 MG: 325 (65 FE) TABLET DELAYED RESPONSE at 08:04

## 2021-04-02 RX ADMIN — HYDROXYZINE HYDROCHLORIDE 50 MG: 50 TABLET, FILM COATED ORAL at 11:04

## 2021-04-02 RX ADMIN — GABAPENTIN 300 MG: 300 CAPSULE ORAL at 03:04

## 2021-04-02 RX ADMIN — Medication 1000 UNITS: at 08:04

## 2021-04-02 RX ADMIN — HYDROCODONE BITARTRATE AND ACETAMINOPHEN 1 TABLET: 10; 325 TABLET ORAL at 06:04

## 2021-04-02 RX ADMIN — HYPROMELLOSE 2910 1 DROP: 5 SOLUTION OPHTHALMIC at 05:04

## 2021-04-02 RX ADMIN — HYDROMORPHONE HYDROCHLORIDE 2 MG: 1 INJECTION, SOLUTION INTRAMUSCULAR; INTRAVENOUS; SUBCUTANEOUS at 11:04

## 2021-04-02 RX ADMIN — HYDROCODONE BITARTRATE AND ACETAMINOPHEN 1 TABLET: 10; 325 TABLET ORAL at 09:04

## 2021-04-02 RX ADMIN — Medication 1 CAPSULE: at 08:04

## 2021-04-02 RX ADMIN — HYDROCODONE BITARTRATE AND ACETAMINOPHEN 1 TABLET: 10; 325 TABLET ORAL at 10:04

## 2021-04-02 RX ADMIN — PROMETHAZINE HYDROCHLORIDE 25 MG: 25 INJECTION INTRAMUSCULAR; INTRAVENOUS at 05:04

## 2021-04-02 RX ADMIN — HYDROCORTISONE: 25 CREAM TOPICAL at 08:04

## 2021-04-02 RX ADMIN — ONDANSETRON 4 MG: 2 INJECTION INTRAMUSCULAR; INTRAVENOUS at 08:04

## 2021-04-02 RX ADMIN — HYDROXYZINE HYDROCHLORIDE 50 MG: 50 TABLET, FILM COATED ORAL at 10:04

## 2021-04-02 RX ADMIN — GABAPENTIN 300 MG: 300 CAPSULE ORAL at 08:04

## 2021-04-02 RX ADMIN — CETIRIZINE HYDROCHLORIDE 20 MG: 10 TABLET, FILM COATED ORAL at 08:04

## 2021-04-02 RX ADMIN — TAMSULOSIN HYDROCHLORIDE 0.4 MG: 0.4 CAPSULE ORAL at 08:04

## 2021-04-02 RX ADMIN — HYDROXYZINE HYDROCHLORIDE 50 MG: 50 TABLET, FILM COATED ORAL at 05:04

## 2021-04-02 RX ADMIN — HYDROCORTISONE: 25 CREAM TOPICAL at 01:04

## 2021-04-02 RX ADMIN — SUCRALFATE 1 G: 1 TABLET ORAL at 05:04

## 2021-04-02 RX ADMIN — LORAZEPAM 1 MG: 1 TABLET ORAL at 11:04

## 2021-04-02 RX ADMIN — PREDNISONE 20 MG: 20 TABLET ORAL at 08:04

## 2021-04-02 RX ADMIN — NYSTATIN 500000 UNITS: 500000 SUSPENSION ORAL at 05:04

## 2021-04-02 RX ADMIN — GABAPENTIN 300 MG: 300 CAPSULE ORAL at 10:04

## 2021-04-02 RX ADMIN — DICYCLOMINE HYDROCHLORIDE 20 MG: 20 TABLET ORAL at 11:04

## 2021-04-02 RX ADMIN — HYDROMORPHONE HYDROCHLORIDE 2 MG: 1 INJECTION, SOLUTION INTRAMUSCULAR; INTRAVENOUS; SUBCUTANEOUS at 02:04

## 2021-04-02 RX ADMIN — ONDANSETRON 4 MG: 2 INJECTION INTRAMUSCULAR; INTRAVENOUS at 02:04

## 2021-04-02 RX ADMIN — SODIUM CHLORIDE: 0.9 INJECTION, SOLUTION INTRAVENOUS at 07:04

## 2021-04-02 RX ADMIN — HYPROMELLOSE 2910 1 DROP: 5 SOLUTION OPHTHALMIC at 10:04

## 2021-04-02 RX ADMIN — LORAZEPAM 1 MG: 1 TABLET ORAL at 08:04

## 2021-04-02 RX ADMIN — DICYCLOMINE HYDROCHLORIDE 20 MG: 20 TABLET ORAL at 05:04

## 2021-04-02 RX ADMIN — HYDROMORPHONE HYDROCHLORIDE 2 MG: 1 INJECTION, SOLUTION INTRAMUSCULAR; INTRAVENOUS; SUBCUTANEOUS at 05:04

## 2021-04-02 RX ADMIN — NYSTATIN 500000 UNITS: 500000 SUSPENSION ORAL at 08:04

## 2021-04-02 RX ADMIN — HYDROCORTISONE: 25 CREAM TOPICAL at 05:04

## 2021-04-02 RX ADMIN — PANTOPRAZOLE SODIUM 40 MG: 40 TABLET, DELAYED RELEASE ORAL at 05:04

## 2021-04-02 RX ADMIN — METOCLOPRAMIDE 10 MG: 10 TABLET ORAL at 11:04

## 2021-04-02 RX ADMIN — HYDROCODONE BITARTRATE AND ACETAMINOPHEN 1 TABLET: 10; 325 TABLET ORAL at 01:04

## 2021-04-02 RX ADMIN — SUCRALFATE 1 G: 1 TABLET ORAL at 11:04

## 2021-04-02 RX ADMIN — NYSTATIN 500000 UNITS: 500000 SUSPENSION ORAL at 07:04

## 2021-04-02 RX ADMIN — NYSTATIN 500000 UNITS: 500000 SUSPENSION ORAL at 11:04

## 2021-04-02 RX ADMIN — ONDANSETRON 4 MG: 2 INJECTION INTRAMUSCULAR; INTRAVENOUS at 11:04

## 2021-04-02 RX ADMIN — DICYCLOMINE HYDROCHLORIDE 20 MG: 20 TABLET ORAL at 08:04

## 2021-04-02 RX ADMIN — HYDROMORPHONE HYDROCHLORIDE 2 MG: 1 INJECTION, SOLUTION INTRAMUSCULAR; INTRAVENOUS; SUBCUTANEOUS at 07:04

## 2021-04-02 RX ADMIN — HYPROMELLOSE 2910 1 DROP: 5 SOLUTION OPHTHALMIC at 08:04

## 2021-04-02 RX ADMIN — HYPROMELLOSE 2910 1 DROP: 5 SOLUTION OPHTHALMIC at 01:04

## 2021-04-02 RX ADMIN — SULFAMETHOXAZOLE AND TRIMETHOPRIM 1 TABLET: 800; 160 TABLET ORAL at 08:04

## 2021-04-02 RX ADMIN — PAROXETINE HYDROCHLORIDE 20 MG: 20 TABLET, FILM COATED ORAL at 08:04

## 2021-04-03 PROBLEM — J96.01 ACUTE HYPOXEMIC RESPIRATORY FAILURE: Status: ACTIVE | Noted: 2021-04-03

## 2021-04-03 LAB
ALBUMIN SERPL BCP-MCNC: 3.2 G/DL (ref 3.5–5.2)
ALP SERPL-CCNC: 59 U/L (ref 55–135)
ALT SERPL W/O P-5'-P-CCNC: 24 U/L (ref 10–44)
ANION GAP SERPL CALC-SCNC: 5 MMOL/L (ref 8–16)
AST SERPL-CCNC: 23 U/L (ref 10–40)
BASOPHILS # BLD AUTO: 0.04 K/UL (ref 0–0.2)
BASOPHILS NFR BLD: 0.4 % (ref 0–1.9)
BILIRUB SERPL-MCNC: 0.7 MG/DL (ref 0.1–1)
BNP SERPL-MCNC: 256 PG/ML (ref 0–99)
BUN SERPL-MCNC: 9 MG/DL (ref 6–20)
CALCIUM SERPL-MCNC: 7.9 MG/DL (ref 8.7–10.5)
CHLORIDE SERPL-SCNC: 107 MMOL/L (ref 95–110)
CO2 SERPL-SCNC: 29 MMOL/L (ref 23–29)
CREAT SERPL-MCNC: 1 MG/DL (ref 0.5–1.4)
DIFFERENTIAL METHOD: ABNORMAL
EOSINOPHIL # BLD AUTO: 0.3 K/UL (ref 0–0.5)
EOSINOPHIL NFR BLD: 2.9 % (ref 0–8)
ERYTHROCYTE [DISTWIDTH] IN BLOOD BY AUTOMATED COUNT: 18 % (ref 11.5–14.5)
EST. GFR  (AFRICAN AMERICAN): >60 ML/MIN/1.73 M^2
EST. GFR  (NON AFRICAN AMERICAN): >60 ML/MIN/1.73 M^2
GLUCOSE SERPL-MCNC: 100 MG/DL (ref 70–110)
HCT VFR BLD AUTO: 31.3 % (ref 40–54)
HGB BLD-MCNC: 9.5 G/DL (ref 14–18)
IMM GRANULOCYTES # BLD AUTO: 0.03 K/UL (ref 0–0.04)
IMM GRANULOCYTES NFR BLD AUTO: 0.3 % (ref 0–0.5)
LYMPHOCYTES # BLD AUTO: 1 K/UL (ref 1–4.8)
LYMPHOCYTES NFR BLD: 9.5 % (ref 18–48)
MAGNESIUM SERPL-MCNC: 1.8 MG/DL (ref 1.6–2.6)
MCH RBC QN AUTO: 24.1 PG (ref 27–31)
MCHC RBC AUTO-ENTMCNC: 30.4 G/DL (ref 32–36)
MCV RBC AUTO: 79 FL (ref 82–98)
MONOCYTES # BLD AUTO: 0.8 K/UL (ref 0.3–1)
MONOCYTES NFR BLD: 7.9 % (ref 4–15)
NEUTROPHILS # BLD AUTO: 8.3 K/UL (ref 1.8–7.7)
NEUTROPHILS NFR BLD: 79 % (ref 38–73)
NRBC BLD-RTO: 0 /100 WBC
PHOSPHATE SERPL-MCNC: 2.4 MG/DL (ref 2.7–4.5)
PLATELET # BLD AUTO: 229 K/UL (ref 150–450)
PMV BLD AUTO: 10.9 FL (ref 9.2–12.9)
POTASSIUM SERPL-SCNC: 3.8 MMOL/L (ref 3.5–5.1)
PROCALCITONIN SERPL IA-MCNC: 0.11 NG/ML
PROT SERPL-MCNC: 5.8 G/DL (ref 6–8.4)
RBC # BLD AUTO: 3.94 M/UL (ref 4.6–6.2)
SODIUM SERPL-SCNC: 141 MMOL/L (ref 136–145)
WBC # BLD AUTO: 10.49 K/UL (ref 3.9–12.7)

## 2021-04-03 PROCEDURE — 85025 COMPLETE CBC W/AUTO DIFF WBC: CPT | Performed by: HOSPITALIST

## 2021-04-03 PROCEDURE — G0378 HOSPITAL OBSERVATION PER HR: HCPCS

## 2021-04-03 PROCEDURE — 84145 PROCALCITONIN (PCT): CPT | Performed by: HOSPITALIST

## 2021-04-03 PROCEDURE — 25000003 PHARM REV CODE 250: Performed by: HOSPITALIST

## 2021-04-03 PROCEDURE — 25000242 PHARM REV CODE 250 ALT 637 W/ HCPCS: Performed by: NURSE PRACTITIONER

## 2021-04-03 PROCEDURE — 63600175 PHARM REV CODE 636 W HCPCS: Performed by: NURSE PRACTITIONER

## 2021-04-03 PROCEDURE — 83880 ASSAY OF NATRIURETIC PEPTIDE: CPT | Performed by: HOSPITALIST

## 2021-04-03 PROCEDURE — 25000242 PHARM REV CODE 250 ALT 637 W/ HCPCS: Performed by: HOSPITALIST

## 2021-04-03 PROCEDURE — 84100 ASSAY OF PHOSPHORUS: CPT | Performed by: HOSPITALIST

## 2021-04-03 PROCEDURE — 99225 PR SUBSEQUENT OBSERVATION CARE,LEVEL II: CPT | Mod: ,,, | Performed by: HOSPITALIST

## 2021-04-03 PROCEDURE — 83735 ASSAY OF MAGNESIUM: CPT | Performed by: HOSPITALIST

## 2021-04-03 PROCEDURE — 25000003 PHARM REV CODE 250: Performed by: NURSE PRACTITIONER

## 2021-04-03 PROCEDURE — 80053 COMPREHEN METABOLIC PANEL: CPT | Performed by: HOSPITALIST

## 2021-04-03 PROCEDURE — 63600175 PHARM REV CODE 636 W HCPCS: Performed by: HOSPITALIST

## 2021-04-03 PROCEDURE — 96361 HYDRATE IV INFUSION ADD-ON: CPT | Performed by: EMERGENCY MEDICINE

## 2021-04-03 PROCEDURE — 99225 PR SUBSEQUENT OBSERVATION CARE,LEVEL II: ICD-10-PCS | Mod: ,,, | Performed by: HOSPITALIST

## 2021-04-03 PROCEDURE — 96376 TX/PRO/DX INJ SAME DRUG ADON: CPT | Performed by: EMERGENCY MEDICINE

## 2021-04-03 PROCEDURE — 96375 TX/PRO/DX INJ NEW DRUG ADDON: CPT | Performed by: EMERGENCY MEDICINE

## 2021-04-03 RX ORDER — GUAIFENESIN 600 MG/1
600 TABLET, EXTENDED RELEASE ORAL 2 TIMES DAILY
Status: DISCONTINUED | OUTPATIENT
Start: 2021-04-03 | End: 2021-04-10 | Stop reason: HOSPADM

## 2021-04-03 RX ORDER — ALBUTEROL SULFATE 2.5 MG/.5ML
2.5 SOLUTION RESPIRATORY (INHALATION) EVERY 4 HOURS PRN
Status: DISCONTINUED | OUTPATIENT
Start: 2021-04-03 | End: 2021-04-04

## 2021-04-03 RX ORDER — FUROSEMIDE 10 MG/ML
40 INJECTION INTRAMUSCULAR; INTRAVENOUS ONCE
Status: COMPLETED | OUTPATIENT
Start: 2021-04-03 | End: 2021-04-03

## 2021-04-03 RX ORDER — ALBUTEROL SULFATE 90 UG/1
2 AEROSOL, METERED RESPIRATORY (INHALATION) EVERY 6 HOURS PRN
COMMUNITY
End: 2021-11-16 | Stop reason: SDUPTHER

## 2021-04-03 RX ORDER — ALBUTEROL SULFATE 90 UG/1
2 AEROSOL, METERED RESPIRATORY (INHALATION) EVERY 6 HOURS PRN
Status: DISCONTINUED | OUTPATIENT
Start: 2021-04-03 | End: 2021-04-10 | Stop reason: HOSPADM

## 2021-04-03 RX ADMIN — HYDROXYZINE HYDROCHLORIDE 50 MG: 50 TABLET, FILM COATED ORAL at 04:04

## 2021-04-03 RX ADMIN — HYDROCORTISONE: 25 CREAM TOPICAL at 09:04

## 2021-04-03 RX ADMIN — LORAZEPAM 1 MG: 1 TABLET ORAL at 04:04

## 2021-04-03 RX ADMIN — ALBUTEROL SULFATE 2 PUFF: 108 INHALANT RESPIRATORY (INHALATION) at 10:04

## 2021-04-03 RX ADMIN — Medication 1000 UNITS: at 09:04

## 2021-04-03 RX ADMIN — HYDROMORPHONE HYDROCHLORIDE 2 MG: 1 INJECTION, SOLUTION INTRAMUSCULAR; INTRAVENOUS; SUBCUTANEOUS at 04:04

## 2021-04-03 RX ADMIN — PANTOPRAZOLE SODIUM 40 MG: 40 TABLET, DELAYED RELEASE ORAL at 06:04

## 2021-04-03 RX ADMIN — PAROXETINE HYDROCHLORIDE 20 MG: 20 TABLET, FILM COATED ORAL at 09:04

## 2021-04-03 RX ADMIN — METOCLOPRAMIDE 10 MG: 10 TABLET ORAL at 12:04

## 2021-04-03 RX ADMIN — LORAZEPAM 1 MG: 1 TABLET ORAL at 02:04

## 2021-04-03 RX ADMIN — GABAPENTIN 300 MG: 300 CAPSULE ORAL at 09:04

## 2021-04-03 RX ADMIN — HYPROMELLOSE 2910 1 DROP: 5 SOLUTION OPHTHALMIC at 06:04

## 2021-04-03 RX ADMIN — HYDROMORPHONE HYDROCHLORIDE 2 MG: 1 INJECTION, SOLUTION INTRAMUSCULAR; INTRAVENOUS; SUBCUTANEOUS at 08:04

## 2021-04-03 RX ADMIN — NYSTATIN 500000 UNITS: 500000 SUSPENSION ORAL at 12:04

## 2021-04-03 RX ADMIN — HYDROMORPHONE HYDROCHLORIDE 2 MG: 1 INJECTION, SOLUTION INTRAMUSCULAR; INTRAVENOUS; SUBCUTANEOUS at 05:04

## 2021-04-03 RX ADMIN — HYDROMORPHONE HYDROCHLORIDE 2 MG: 1 INJECTION, SOLUTION INTRAMUSCULAR; INTRAVENOUS; SUBCUTANEOUS at 12:04

## 2021-04-03 RX ADMIN — CETIRIZINE HYDROCHLORIDE 20 MG: 10 TABLET, FILM COATED ORAL at 09:04

## 2021-04-03 RX ADMIN — PROMETHAZINE HYDROCHLORIDE 25 MG: 25 INJECTION INTRAMUSCULAR; INTRAVENOUS at 10:04

## 2021-04-03 RX ADMIN — SUCRALFATE 1 G: 1 TABLET ORAL at 07:04

## 2021-04-03 RX ADMIN — HYDROCODONE BITARTRATE AND ACETAMINOPHEN 1 TABLET: 10; 325 TABLET ORAL at 11:04

## 2021-04-03 RX ADMIN — NYSTATIN 500000 UNITS: 500000 SUSPENSION ORAL at 05:04

## 2021-04-03 RX ADMIN — TAMSULOSIN HYDROCHLORIDE 0.4 MG: 0.4 CAPSULE ORAL at 09:04

## 2021-04-03 RX ADMIN — SUCRALFATE 1 G: 1 TABLET ORAL at 12:04

## 2021-04-03 RX ADMIN — POLYETHYLENE GLYCOL 3350 17 G: 17 POWDER, FOR SOLUTION ORAL at 02:04

## 2021-04-03 RX ADMIN — HYPROMELLOSE 2910 1 DROP: 5 SOLUTION OPHTHALMIC at 02:04

## 2021-04-03 RX ADMIN — PSYLLIUM HUSK 1 PACKET: 3.4 POWDER ORAL at 09:04

## 2021-04-03 RX ADMIN — PREDNISONE 20 MG: 20 TABLET ORAL at 09:04

## 2021-04-03 RX ADMIN — PANTOPRAZOLE SODIUM 40 MG: 40 TABLET, DELAYED RELEASE ORAL at 04:04

## 2021-04-03 RX ADMIN — SUCRALFATE 1 G: 1 TABLET ORAL at 09:04

## 2021-04-03 RX ADMIN — PROMETHAZINE HYDROCHLORIDE 25 MG: 25 INJECTION INTRAMUSCULAR; INTRAVENOUS at 02:04

## 2021-04-03 RX ADMIN — FERROUS SULFATE TAB EC 325 MG (65 MG FE EQUIVALENT) 325 MG: 325 (65 FE) TABLET DELAYED RESPONSE at 09:04

## 2021-04-03 RX ADMIN — HYDROCODONE BITARTRATE AND ACETAMINOPHEN 1 TABLET: 10; 325 TABLET ORAL at 02:04

## 2021-04-03 RX ADMIN — DICYCLOMINE HYDROCHLORIDE 20 MG: 20 TABLET ORAL at 12:04

## 2021-04-03 RX ADMIN — DICYCLOMINE HYDROCHLORIDE 20 MG: 20 TABLET ORAL at 09:04

## 2021-04-03 RX ADMIN — HYDROCORTISONE: 25 CREAM TOPICAL at 02:04

## 2021-04-03 RX ADMIN — ONDANSETRON 4 MG: 2 INJECTION INTRAMUSCULAR; INTRAVENOUS at 04:04

## 2021-04-03 RX ADMIN — DICYCLOMINE HYDROCHLORIDE 20 MG: 20 TABLET ORAL at 06:04

## 2021-04-03 RX ADMIN — FUROSEMIDE 40 MG: 10 INJECTION, SOLUTION INTRAMUSCULAR; INTRAVENOUS at 04:04

## 2021-04-03 RX ADMIN — HYDROCODONE BITARTRATE AND ACETAMINOPHEN 1 TABLET: 10; 325 TABLET ORAL at 06:04

## 2021-04-03 RX ADMIN — HYDROMORPHONE HYDROCHLORIDE 2 MG: 1 INJECTION, SOLUTION INTRAMUSCULAR; INTRAVENOUS; SUBCUTANEOUS at 07:04

## 2021-04-03 RX ADMIN — HYPROMELLOSE 2910 1 DROP: 5 SOLUTION OPHTHALMIC at 09:04

## 2021-04-03 RX ADMIN — GUAIFENESIN 600 MG: 600 TABLET, EXTENDED RELEASE ORAL at 09:04

## 2021-04-03 RX ADMIN — HYDROMORPHONE HYDROCHLORIDE 2 MG: 1 INJECTION, SOLUTION INTRAMUSCULAR; INTRAVENOUS; SUBCUTANEOUS at 10:04

## 2021-04-03 RX ADMIN — SODIUM CHLORIDE: 0.9 INJECTION, SOLUTION INTRAVENOUS at 04:04

## 2021-04-03 RX ADMIN — NYSTATIN 500000 UNITS: 500000 SUSPENSION ORAL at 09:04

## 2021-04-03 RX ADMIN — GABAPENTIN 300 MG: 300 CAPSULE ORAL at 02:04

## 2021-04-03 RX ADMIN — SULFAMETHOXAZOLE AND TRIMETHOPRIM 1 TABLET: 800; 160 TABLET ORAL at 09:04

## 2021-04-03 RX ADMIN — ONDANSETRON 4 MG: 2 INJECTION INTRAMUSCULAR; INTRAVENOUS at 05:04

## 2021-04-03 RX ADMIN — METOCLOPRAMIDE 10 MG: 10 TABLET ORAL at 04:04

## 2021-04-03 RX ADMIN — SUCRALFATE 1 G: 1 TABLET ORAL at 04:04

## 2021-04-03 RX ADMIN — HYDROMORPHONE HYDROCHLORIDE 2 MG: 1 INJECTION, SOLUTION INTRAMUSCULAR; INTRAVENOUS; SUBCUTANEOUS at 02:04

## 2021-04-03 RX ADMIN — METOCLOPRAMIDE 10 MG: 10 TABLET ORAL at 09:04

## 2021-04-03 RX ADMIN — DICYCLOMINE HYDROCHLORIDE 20 MG: 20 TABLET ORAL at 04:04

## 2021-04-03 RX ADMIN — Medication 1 CAPSULE: at 09:04

## 2021-04-03 RX ADMIN — HYDROCORTISONE: 25 CREAM TOPICAL at 05:04

## 2021-04-03 RX ADMIN — ACETAMINOPHEN 650 MG: 325 TABLET ORAL at 06:04

## 2021-04-03 RX ADMIN — HYPROMELLOSE 2910 1 DROP: 5 SOLUTION OPHTHALMIC at 05:04

## 2021-04-04 ENCOUNTER — PATIENT MESSAGE (OUTPATIENT)
Dept: SURGERY | Facility: HOSPITAL | Age: 41
End: 2021-04-04

## 2021-04-04 LAB
ACID FAST MOD KINY STN SPEC: NORMAL
ALBUMIN SERPL BCP-MCNC: 3.3 G/DL (ref 3.5–5.2)
ALP SERPL-CCNC: 67 U/L (ref 55–135)
ALT SERPL W/O P-5'-P-CCNC: 21 U/L (ref 10–44)
ANION GAP SERPL CALC-SCNC: 7 MMOL/L (ref 8–16)
AST SERPL-CCNC: 15 U/L (ref 10–40)
BASOPHILS # BLD AUTO: 0.01 K/UL (ref 0–0.2)
BASOPHILS NFR BLD: 0.2 % (ref 0–1.9)
BILIRUB SERPL-MCNC: 0.6 MG/DL (ref 0.1–1)
BUN SERPL-MCNC: 10 MG/DL (ref 6–20)
CALCIUM SERPL-MCNC: 8.1 MG/DL (ref 8.7–10.5)
CHLORIDE SERPL-SCNC: 98 MMOL/L (ref 95–110)
CO2 SERPL-SCNC: 33 MMOL/L (ref 23–29)
CREAT SERPL-MCNC: 0.9 MG/DL (ref 0.5–1.4)
DIFFERENTIAL METHOD: ABNORMAL
EOSINOPHIL # BLD AUTO: 0.1 K/UL (ref 0–0.5)
EOSINOPHIL NFR BLD: 0.8 % (ref 0–8)
ERYTHROCYTE [DISTWIDTH] IN BLOOD BY AUTOMATED COUNT: 18.3 % (ref 11.5–14.5)
EST. GFR  (AFRICAN AMERICAN): >60 ML/MIN/1.73 M^2
EST. GFR  (NON AFRICAN AMERICAN): >60 ML/MIN/1.73 M^2
GLUCOSE SERPL-MCNC: 149 MG/DL (ref 70–110)
HCT VFR BLD AUTO: 31.5 % (ref 40–54)
HGB BLD-MCNC: 9.7 G/DL (ref 14–18)
IMM GRANULOCYTES # BLD AUTO: 0.02 K/UL (ref 0–0.04)
IMM GRANULOCYTES NFR BLD AUTO: 0.3 % (ref 0–0.5)
LYMPHOCYTES # BLD AUTO: 0.6 K/UL (ref 1–4.8)
LYMPHOCYTES NFR BLD: 9.1 % (ref 18–48)
MAGNESIUM SERPL-MCNC: 2 MG/DL (ref 1.6–2.6)
MCH RBC QN AUTO: 23.9 PG (ref 27–31)
MCHC RBC AUTO-ENTMCNC: 30.8 G/DL (ref 32–36)
MCV RBC AUTO: 78 FL (ref 82–98)
MONOCYTES # BLD AUTO: 0.6 K/UL (ref 0.3–1)
MONOCYTES NFR BLD: 9.3 % (ref 4–15)
MYCOBACTERIUM SPEC QL CULT: NORMAL
NEUTROPHILS # BLD AUTO: 5.2 K/UL (ref 1.8–7.7)
NEUTROPHILS NFR BLD: 80.3 % (ref 38–73)
NRBC BLD-RTO: 0 /100 WBC
PHOSPHATE SERPL-MCNC: 2.4 MG/DL (ref 2.7–4.5)
PLATELET # BLD AUTO: 230 K/UL (ref 150–450)
PMV BLD AUTO: 10.9 FL (ref 9.2–12.9)
POTASSIUM SERPL-SCNC: 3.3 MMOL/L (ref 3.5–5.1)
PROT SERPL-MCNC: 6.3 G/DL (ref 6–8.4)
RBC # BLD AUTO: 4.06 M/UL (ref 4.6–6.2)
SODIUM SERPL-SCNC: 138 MMOL/L (ref 136–145)
WBC # BLD AUTO: 6.46 K/UL (ref 3.9–12.7)

## 2021-04-04 PROCEDURE — 94644 CONT INHLJ TX 1ST HOUR: CPT

## 2021-04-04 PROCEDURE — 85025 COMPLETE CBC W/AUTO DIFF WBC: CPT | Performed by: HOSPITALIST

## 2021-04-04 PROCEDURE — 25000242 PHARM REV CODE 250 ALT 637 W/ HCPCS: Performed by: NURSE PRACTITIONER

## 2021-04-04 PROCEDURE — 25000003 PHARM REV CODE 250: Performed by: HOSPITALIST

## 2021-04-04 PROCEDURE — 94760 N-INVAS EAR/PLS OXIMETRY 1: CPT

## 2021-04-04 PROCEDURE — 25000003 PHARM REV CODE 250: Performed by: NURSE PRACTITIONER

## 2021-04-04 PROCEDURE — 94640 AIRWAY INHALATION TREATMENT: CPT

## 2021-04-04 PROCEDURE — 94761 N-INVAS EAR/PLS OXIMETRY MLT: CPT

## 2021-04-04 PROCEDURE — 83735 ASSAY OF MAGNESIUM: CPT | Performed by: HOSPITALIST

## 2021-04-04 PROCEDURE — 63600175 PHARM REV CODE 636 W HCPCS: Mod: JG | Performed by: NURSE PRACTITIONER

## 2021-04-04 PROCEDURE — 80053 COMPREHEN METABOLIC PANEL: CPT | Performed by: HOSPITALIST

## 2021-04-04 PROCEDURE — 25000242 PHARM REV CODE 250 ALT 637 W/ HCPCS: Performed by: HOSPITALIST

## 2021-04-04 PROCEDURE — 99225 PR SUBSEQUENT OBSERVATION CARE,LEVEL II: CPT | Mod: ,,, | Performed by: HOSPITALIST

## 2021-04-04 PROCEDURE — 63600175 PHARM REV CODE 636 W HCPCS: Performed by: NURSE PRACTITIONER

## 2021-04-04 PROCEDURE — 99900035 HC TECH TIME PER 15 MIN (STAT)

## 2021-04-04 PROCEDURE — 96376 TX/PRO/DX INJ SAME DRUG ADON: CPT | Performed by: EMERGENCY MEDICINE

## 2021-04-04 PROCEDURE — 84100 ASSAY OF PHOSPHORUS: CPT | Performed by: HOSPITALIST

## 2021-04-04 PROCEDURE — 63600175 PHARM REV CODE 636 W HCPCS: Performed by: HOSPITALIST

## 2021-04-04 PROCEDURE — 99225 PR SUBSEQUENT OBSERVATION CARE,LEVEL II: ICD-10-PCS | Mod: ,,, | Performed by: HOSPITALIST

## 2021-04-04 PROCEDURE — G0378 HOSPITAL OBSERVATION PER HR: HCPCS

## 2021-04-04 RX ORDER — FUROSEMIDE 10 MG/ML
40 INJECTION INTRAMUSCULAR; INTRAVENOUS ONCE
Status: COMPLETED | OUTPATIENT
Start: 2021-04-04 | End: 2021-04-04

## 2021-04-04 RX ORDER — POTASSIUM CHLORIDE 20 MEQ/1
40 TABLET, EXTENDED RELEASE ORAL ONCE
Status: COMPLETED | OUTPATIENT
Start: 2021-04-04 | End: 2021-04-04

## 2021-04-04 RX ORDER — ALBUTEROL SULFATE 2.5 MG/.5ML
2.5 SOLUTION RESPIRATORY (INHALATION)
Status: DISCONTINUED | OUTPATIENT
Start: 2021-04-04 | End: 2021-04-10 | Stop reason: HOSPADM

## 2021-04-04 RX ADMIN — SUCRALFATE 1 G: 1 TABLET ORAL at 09:04

## 2021-04-04 RX ADMIN — TAMSULOSIN HYDROCHLORIDE 0.4 MG: 0.4 CAPSULE ORAL at 08:04

## 2021-04-04 RX ADMIN — HYDROMORPHONE HYDROCHLORIDE 2 MG: 1 INJECTION, SOLUTION INTRAMUSCULAR; INTRAVENOUS; SUBCUTANEOUS at 04:04

## 2021-04-04 RX ADMIN — Medication 1000 UNITS: at 09:04

## 2021-04-04 RX ADMIN — ALBUTEROL SULFATE 2.5 MG: 2.5 SOLUTION RESPIRATORY (INHALATION) at 07:04

## 2021-04-04 RX ADMIN — HYDROMORPHONE HYDROCHLORIDE 2 MG: 1 INJECTION, SOLUTION INTRAMUSCULAR; INTRAVENOUS; SUBCUTANEOUS at 08:04

## 2021-04-04 RX ADMIN — GUAIFENESIN 600 MG: 600 TABLET, EXTENDED RELEASE ORAL at 08:04

## 2021-04-04 RX ADMIN — HYDROCORTISONE: 25 CREAM TOPICAL at 02:04

## 2021-04-04 RX ADMIN — SULFAMETHOXAZOLE AND TRIMETHOPRIM 1 TABLET: 800; 160 TABLET ORAL at 09:04

## 2021-04-04 RX ADMIN — HYPROMELLOSE 2910 1 DROP: 5 SOLUTION OPHTHALMIC at 08:04

## 2021-04-04 RX ADMIN — LORAZEPAM 1 MG: 1 TABLET ORAL at 04:04

## 2021-04-04 RX ADMIN — CETIRIZINE HYDROCHLORIDE 20 MG: 10 TABLET, FILM COATED ORAL at 09:04

## 2021-04-04 RX ADMIN — HYDROCORTISONE: 25 CREAM TOPICAL at 05:04

## 2021-04-04 RX ADMIN — HYPROMELLOSE 2910 1 DROP: 5 SOLUTION OPHTHALMIC at 05:04

## 2021-04-04 RX ADMIN — POTASSIUM CHLORIDE 40 MEQ: 1500 TABLET, EXTENDED RELEASE ORAL at 09:04

## 2021-04-04 RX ADMIN — LORAZEPAM 1 MG: 1 TABLET ORAL at 09:04

## 2021-04-04 RX ADMIN — HYPROMELLOSE 2910 1 DROP: 5 SOLUTION OPHTHALMIC at 09:04

## 2021-04-04 RX ADMIN — FUROSEMIDE 40 MG: 10 INJECTION, SOLUTION INTRAMUSCULAR; INTRAVENOUS at 01:04

## 2021-04-04 RX ADMIN — PROMETHAZINE HYDROCHLORIDE 25 MG: 25 INJECTION INTRAMUSCULAR; INTRAVENOUS at 07:04

## 2021-04-04 RX ADMIN — DICYCLOMINE HYDROCHLORIDE 20 MG: 20 TABLET ORAL at 11:04

## 2021-04-04 RX ADMIN — PANTOPRAZOLE SODIUM 40 MG: 40 TABLET, DELAYED RELEASE ORAL at 04:04

## 2021-04-04 RX ADMIN — PAROXETINE HYDROCHLORIDE 20 MG: 20 TABLET, FILM COATED ORAL at 09:04

## 2021-04-04 RX ADMIN — NYSTATIN 500000 UNITS: 500000 SUSPENSION ORAL at 09:04

## 2021-04-04 RX ADMIN — PREDNISONE 20 MG: 20 TABLET ORAL at 09:04

## 2021-04-04 RX ADMIN — SUCRALFATE 1 G: 1 TABLET ORAL at 11:04

## 2021-04-04 RX ADMIN — HYDROMORPHONE HYDROCHLORIDE 2 MG: 1 INJECTION, SOLUTION INTRAMUSCULAR; INTRAVENOUS; SUBCUTANEOUS at 01:04

## 2021-04-04 RX ADMIN — Medication 1 CAPSULE: at 09:04

## 2021-04-04 RX ADMIN — LORAZEPAM 1 MG: 1 TABLET ORAL at 01:04

## 2021-04-04 RX ADMIN — ALBUTEROL SULFATE 2 PUFF: 108 INHALANT RESPIRATORY (INHALATION) at 11:04

## 2021-04-04 RX ADMIN — POLYETHYLENE GLYCOL 3350 17 G: 17 POWDER, FOR SOLUTION ORAL at 10:04

## 2021-04-04 RX ADMIN — HYDROCODONE BITARTRATE AND ACETAMINOPHEN 1 TABLET: 10; 325 TABLET ORAL at 05:04

## 2021-04-04 RX ADMIN — GUAIFENESIN 600 MG: 600 TABLET, EXTENDED RELEASE ORAL at 09:04

## 2021-04-04 RX ADMIN — HYDROXYZINE HYDROCHLORIDE 50 MG: 50 TABLET, FILM COATED ORAL at 04:04

## 2021-04-04 RX ADMIN — NYSTATIN 500000 UNITS: 500000 SUSPENSION ORAL at 04:04

## 2021-04-04 RX ADMIN — HYDROMORPHONE HYDROCHLORIDE 2 MG: 1 INJECTION, SOLUTION INTRAMUSCULAR; INTRAVENOUS; SUBCUTANEOUS at 09:04

## 2021-04-04 RX ADMIN — GABAPENTIN 300 MG: 300 CAPSULE ORAL at 05:04

## 2021-04-04 RX ADMIN — HYDROCORTISONE: 25 CREAM TOPICAL at 09:04

## 2021-04-04 RX ADMIN — METOCLOPRAMIDE 10 MG: 10 TABLET ORAL at 04:04

## 2021-04-04 RX ADMIN — ALTEPLASE 2 MG: 2.2 INJECTION, POWDER, LYOPHILIZED, FOR SOLUTION INTRAVENOUS at 05:04

## 2021-04-04 RX ADMIN — DICYCLOMINE HYDROCHLORIDE 20 MG: 20 TABLET ORAL at 04:04

## 2021-04-04 RX ADMIN — ONDANSETRON 4 MG: 2 INJECTION INTRAMUSCULAR; INTRAVENOUS at 04:04

## 2021-04-04 RX ADMIN — TAMSULOSIN HYDROCHLORIDE 0.4 MG: 0.4 CAPSULE ORAL at 09:04

## 2021-04-04 RX ADMIN — GABAPENTIN 300 MG: 300 CAPSULE ORAL at 09:04

## 2021-04-04 RX ADMIN — DICYCLOMINE HYDROCHLORIDE 20 MG: 20 TABLET ORAL at 08:04

## 2021-04-04 RX ADMIN — FERROUS SULFATE TAB EC 325 MG (65 MG FE EQUIVALENT) 325 MG: 325 (65 FE) TABLET DELAYED RESPONSE at 09:04

## 2021-04-04 RX ADMIN — HYDROCORTISONE: 25 CREAM TOPICAL at 08:04

## 2021-04-04 RX ADMIN — ALBUTEROL SULFATE 2.5 MG: 2.5 SOLUTION RESPIRATORY (INHALATION) at 01:04

## 2021-04-04 RX ADMIN — HYDROCODONE BITARTRATE AND ACETAMINOPHEN 1 TABLET: 10; 325 TABLET ORAL at 10:04

## 2021-04-04 RX ADMIN — HYDROXYZINE HYDROCHLORIDE 50 MG: 50 TABLET, FILM COATED ORAL at 11:04

## 2021-04-04 RX ADMIN — METOCLOPRAMIDE 10 MG: 10 TABLET ORAL at 11:04

## 2021-04-04 RX ADMIN — HYPROMELLOSE 2910 1 DROP: 5 SOLUTION OPHTHALMIC at 02:04

## 2021-04-04 RX ADMIN — SUCRALFATE 1 G: 1 TABLET ORAL at 04:04

## 2021-04-04 RX ADMIN — PSYLLIUM HUSK 1 PACKET: 3.4 POWDER ORAL at 09:04

## 2021-04-04 RX ADMIN — HYDROCODONE BITARTRATE AND ACETAMINOPHEN 1 TABLET: 10; 325 TABLET ORAL at 11:04

## 2021-04-04 RX ADMIN — HYDROXYZINE HYDROCHLORIDE 50 MG: 50 TABLET, FILM COATED ORAL at 09:04

## 2021-04-05 ENCOUNTER — PATIENT MESSAGE (OUTPATIENT)
Dept: SURGERY | Facility: CLINIC | Age: 41
End: 2021-04-05

## 2021-04-05 ENCOUNTER — PATIENT MESSAGE (OUTPATIENT)
Dept: SURGERY | Facility: HOSPITAL | Age: 41
End: 2021-04-05

## 2021-04-05 PROBLEM — K62.5 BRBPR (BRIGHT RED BLOOD PER RECTUM): Status: RESOLVED | Noted: 2021-04-01 | Resolved: 2021-04-05

## 2021-04-05 PROBLEM — R11.2 INTRACTABLE NAUSEA AND VOMITING: Status: RESOLVED | Noted: 2021-03-31 | Resolved: 2021-04-05

## 2021-04-05 PROBLEM — J96.01 ACUTE HYPOXEMIC RESPIRATORY FAILURE: Status: RESOLVED | Noted: 2021-04-03 | Resolved: 2021-04-05

## 2021-04-05 LAB
ALBUMIN SERPL BCP-MCNC: 3.3 G/DL (ref 3.5–5.2)
ALP SERPL-CCNC: 66 U/L (ref 55–135)
ALT SERPL W/O P-5'-P-CCNC: 20 U/L (ref 10–44)
ANION GAP SERPL CALC-SCNC: 10 MMOL/L (ref 8–16)
AST SERPL-CCNC: 14 U/L (ref 10–40)
BASOPHILS # BLD AUTO: 0.07 K/UL (ref 0–0.2)
BASOPHILS NFR BLD: 0.8 % (ref 0–1.9)
BILIRUB SERPL-MCNC: 0.6 MG/DL (ref 0.1–1)
BUN SERPL-MCNC: 13 MG/DL (ref 6–20)
CALCIUM SERPL-MCNC: 9 MG/DL (ref 8.7–10.5)
CHLORIDE SERPL-SCNC: 100 MMOL/L (ref 95–110)
CO2 SERPL-SCNC: 28 MMOL/L (ref 23–29)
CREAT SERPL-MCNC: 1 MG/DL (ref 0.5–1.4)
DIFFERENTIAL METHOD: ABNORMAL
EOSINOPHIL # BLD AUTO: 0.9 K/UL (ref 0–0.5)
EOSINOPHIL NFR BLD: 10.2 % (ref 0–8)
ERYTHROCYTE [DISTWIDTH] IN BLOOD BY AUTOMATED COUNT: 19.2 % (ref 11.5–14.5)
EST. GFR  (AFRICAN AMERICAN): >60 ML/MIN/1.73 M^2
EST. GFR  (NON AFRICAN AMERICAN): >60 ML/MIN/1.73 M^2
GLUCOSE SERPL-MCNC: 111 MG/DL (ref 70–110)
HCT VFR BLD AUTO: 36.7 % (ref 40–54)
HGB BLD-MCNC: 11.4 G/DL (ref 14–18)
IMM GRANULOCYTES # BLD AUTO: 0.03 K/UL (ref 0–0.04)
IMM GRANULOCYTES NFR BLD AUTO: 0.3 % (ref 0–0.5)
LYMPHOCYTES # BLD AUTO: 1.5 K/UL (ref 1–4.8)
LYMPHOCYTES NFR BLD: 17.2 % (ref 18–48)
MAGNESIUM SERPL-MCNC: 2 MG/DL (ref 1.6–2.6)
MCH RBC QN AUTO: 24.3 PG (ref 27–31)
MCHC RBC AUTO-ENTMCNC: 31.1 G/DL (ref 32–36)
MCV RBC AUTO: 78 FL (ref 82–98)
MONOCYTES # BLD AUTO: 1 K/UL (ref 0.3–1)
MONOCYTES NFR BLD: 11.1 % (ref 4–15)
NEUTROPHILS # BLD AUTO: 5.4 K/UL (ref 1.8–7.7)
NEUTROPHILS NFR BLD: 60.4 % (ref 38–73)
NRBC BLD-RTO: 0 /100 WBC
PHOSPHATE SERPL-MCNC: 2.6 MG/DL (ref 2.7–4.5)
PLATELET # BLD AUTO: 296 K/UL (ref 150–450)
PMV BLD AUTO: 10.5 FL (ref 9.2–12.9)
POTASSIUM SERPL-SCNC: 4 MMOL/L (ref 3.5–5.1)
PROT SERPL-MCNC: 6.8 G/DL (ref 6–8.4)
RBC # BLD AUTO: 4.7 M/UL (ref 4.6–6.2)
SARS-COV-2 RDRP RESP QL NAA+PROBE: NEGATIVE
SODIUM SERPL-SCNC: 138 MMOL/L (ref 136–145)
WBC # BLD AUTO: 8.94 K/UL (ref 3.9–12.7)

## 2021-04-05 PROCEDURE — 63600175 PHARM REV CODE 636 W HCPCS: Performed by: NURSE PRACTITIONER

## 2021-04-05 PROCEDURE — 99225 PR SUBSEQUENT OBSERVATION CARE,LEVEL II: ICD-10-PCS | Mod: ,,, | Performed by: HOSPITALIST

## 2021-04-05 PROCEDURE — 25000003 PHARM REV CODE 250: Performed by: NURSE PRACTITIONER

## 2021-04-05 PROCEDURE — 85025 COMPLETE CBC W/AUTO DIFF WBC: CPT | Performed by: HOSPITALIST

## 2021-04-05 PROCEDURE — 63600175 PHARM REV CODE 636 W HCPCS: Performed by: HOSPITALIST

## 2021-04-05 PROCEDURE — 96376 TX/PRO/DX INJ SAME DRUG ADON: CPT | Performed by: EMERGENCY MEDICINE

## 2021-04-05 PROCEDURE — 25000003 PHARM REV CODE 250: Performed by: HOSPITALIST

## 2021-04-05 PROCEDURE — 83735 ASSAY OF MAGNESIUM: CPT | Performed by: HOSPITALIST

## 2021-04-05 PROCEDURE — 25000242 PHARM REV CODE 250 ALT 637 W/ HCPCS: Performed by: NURSE PRACTITIONER

## 2021-04-05 PROCEDURE — G0378 HOSPITAL OBSERVATION PER HR: HCPCS

## 2021-04-05 PROCEDURE — 80053 COMPREHEN METABOLIC PANEL: CPT | Performed by: HOSPITALIST

## 2021-04-05 PROCEDURE — 36415 COLL VENOUS BLD VENIPUNCTURE: CPT | Performed by: HOSPITALIST

## 2021-04-05 PROCEDURE — U0002 COVID-19 LAB TEST NON-CDC: HCPCS | Performed by: HOSPITALIST

## 2021-04-05 PROCEDURE — 99225 PR SUBSEQUENT OBSERVATION CARE,LEVEL II: CPT | Mod: ,,, | Performed by: HOSPITALIST

## 2021-04-05 PROCEDURE — 84100 ASSAY OF PHOSPHORUS: CPT | Performed by: HOSPITALIST

## 2021-04-05 RX ADMIN — HYDROMORPHONE HYDROCHLORIDE 2 MG: 1 INJECTION, SOLUTION INTRAMUSCULAR; INTRAVENOUS; SUBCUTANEOUS at 09:04

## 2021-04-05 RX ADMIN — NYSTATIN 500000 UNITS: 500000 SUSPENSION ORAL at 08:04

## 2021-04-05 RX ADMIN — PAROXETINE HYDROCHLORIDE 20 MG: 20 TABLET, FILM COATED ORAL at 08:04

## 2021-04-05 RX ADMIN — SULFAMETHOXAZOLE AND TRIMETHOPRIM 1 TABLET: 800; 160 TABLET ORAL at 08:04

## 2021-04-05 RX ADMIN — HYDROCODONE BITARTRATE AND ACETAMINOPHEN 1 TABLET: 10; 325 TABLET ORAL at 08:04

## 2021-04-05 RX ADMIN — DICYCLOMINE HYDROCHLORIDE 20 MG: 20 TABLET ORAL at 08:04

## 2021-04-05 RX ADMIN — SUCRALFATE 1 G: 1 TABLET ORAL at 10:04

## 2021-04-05 RX ADMIN — HYDROMORPHONE HYDROCHLORIDE 2 MG: 1 INJECTION, SOLUTION INTRAMUSCULAR; INTRAVENOUS; SUBCUTANEOUS at 12:04

## 2021-04-05 RX ADMIN — PSYLLIUM HUSK 1 PACKET: 3.4 POWDER ORAL at 08:04

## 2021-04-05 RX ADMIN — FERROUS SULFATE TAB EC 325 MG (65 MG FE EQUIVALENT) 325 MG: 325 (65 FE) TABLET DELAYED RESPONSE at 08:04

## 2021-04-05 RX ADMIN — DICYCLOMINE HYDROCHLORIDE 20 MG: 20 TABLET ORAL at 04:04

## 2021-04-05 RX ADMIN — Medication 1 CAPSULE: at 08:04

## 2021-04-05 RX ADMIN — NYSTATIN 500000 UNITS: 500000 SUSPENSION ORAL at 12:04

## 2021-04-05 RX ADMIN — Medication 1000 UNITS: at 08:04

## 2021-04-05 RX ADMIN — CETIRIZINE HYDROCHLORIDE 20 MG: 10 TABLET, FILM COATED ORAL at 08:04

## 2021-04-05 RX ADMIN — HYDROCORTISONE: 25 CREAM TOPICAL at 04:04

## 2021-04-05 RX ADMIN — SUCRALFATE 1 G: 1 TABLET ORAL at 08:04

## 2021-04-05 RX ADMIN — GABAPENTIN 300 MG: 300 CAPSULE ORAL at 02:04

## 2021-04-05 RX ADMIN — SUCRALFATE 1 G: 1 TABLET ORAL at 04:04

## 2021-04-05 RX ADMIN — PROMETHAZINE HYDROCHLORIDE 25 MG: 25 INJECTION INTRAMUSCULAR; INTRAVENOUS at 12:04

## 2021-04-05 RX ADMIN — HYDROMORPHONE HYDROCHLORIDE 2 MG: 1 INJECTION, SOLUTION INTRAMUSCULAR; INTRAVENOUS; SUBCUTANEOUS at 03:04

## 2021-04-05 RX ADMIN — MELATONIN TAB 3 MG 6 MG: 3 TAB at 08:04

## 2021-04-05 RX ADMIN — DICYCLOMINE HYDROCHLORIDE 20 MG: 20 TABLET ORAL at 06:04

## 2021-04-05 RX ADMIN — HYPROMELLOSE 2910 1 DROP: 5 SOLUTION OPHTHALMIC at 10:04

## 2021-04-05 RX ADMIN — HYDROMORPHONE HYDROCHLORIDE 2 MG: 1 INJECTION, SOLUTION INTRAMUSCULAR; INTRAVENOUS; SUBCUTANEOUS at 06:04

## 2021-04-05 RX ADMIN — SUCRALFATE 1 G: 1 TABLET ORAL at 06:04

## 2021-04-05 RX ADMIN — NYSTATIN 500000 UNITS: 500000 SUSPENSION ORAL at 04:04

## 2021-04-05 RX ADMIN — LORAZEPAM 1 MG: 1 TABLET ORAL at 08:04

## 2021-04-05 RX ADMIN — ONDANSETRON 4 MG: 2 INJECTION INTRAMUSCULAR; INTRAVENOUS at 02:04

## 2021-04-05 RX ADMIN — HYDROCODONE BITARTRATE AND ACETAMINOPHEN 1 TABLET: 10; 325 TABLET ORAL at 02:04

## 2021-04-05 RX ADMIN — TAMSULOSIN HYDROCHLORIDE 0.4 MG: 0.4 CAPSULE ORAL at 08:04

## 2021-04-05 RX ADMIN — PANTOPRAZOLE SODIUM 40 MG: 40 TABLET, DELAYED RELEASE ORAL at 06:04

## 2021-04-05 RX ADMIN — HYPROMELLOSE 2910 1 DROP: 5 SOLUTION OPHTHALMIC at 06:04

## 2021-04-05 RX ADMIN — HYDROCORTISONE: 25 CREAM TOPICAL at 12:04

## 2021-04-05 RX ADMIN — HYPROMELLOSE 2910 1 DROP: 5 SOLUTION OPHTHALMIC at 01:04

## 2021-04-05 RX ADMIN — PREDNISONE 20 MG: 20 TABLET ORAL at 08:04

## 2021-04-05 RX ADMIN — ONDANSETRON 4 MG: 2 INJECTION INTRAMUSCULAR; INTRAVENOUS at 10:04

## 2021-04-05 RX ADMIN — HYPROMELLOSE 2910 1 DROP: 5 SOLUTION OPHTHALMIC at 02:04

## 2021-04-05 RX ADMIN — PROMETHAZINE HYDROCHLORIDE 25 MG: 25 INJECTION INTRAMUSCULAR; INTRAVENOUS at 06:04

## 2021-04-05 RX ADMIN — GUAIFENESIN 600 MG: 600 TABLET, EXTENDED RELEASE ORAL at 08:04

## 2021-04-05 RX ADMIN — HYDROCORTISONE: 25 CREAM TOPICAL at 08:04

## 2021-04-05 RX ADMIN — HYPROMELLOSE 2910 1 DROP: 5 SOLUTION OPHTHALMIC at 09:04

## 2021-04-05 RX ADMIN — DICYCLOMINE HYDROCHLORIDE 20 MG: 20 TABLET ORAL at 10:04

## 2021-04-05 RX ADMIN — GABAPENTIN 300 MG: 300 CAPSULE ORAL at 08:04

## 2021-04-05 RX ADMIN — PANTOPRAZOLE SODIUM 40 MG: 40 TABLET, DELAYED RELEASE ORAL at 03:04

## 2021-04-06 ENCOUNTER — ANESTHESIA EVENT (OUTPATIENT)
Dept: SURGERY | Facility: HOSPITAL | Age: 41
End: 2021-04-06
Payer: MEDICAID

## 2021-04-06 ENCOUNTER — PATIENT MESSAGE (OUTPATIENT)
Dept: SURGERY | Facility: CLINIC | Age: 41
End: 2021-04-06

## 2021-04-06 LAB
ALBUMIN SERPL BCP-MCNC: 3.2 G/DL (ref 3.5–5.2)
ALP SERPL-CCNC: 73 U/L (ref 55–135)
ALT SERPL W/O P-5'-P-CCNC: 15 U/L (ref 10–44)
ANION GAP SERPL CALC-SCNC: 8 MMOL/L (ref 8–16)
AST SERPL-CCNC: 12 U/L (ref 10–40)
BASOPHILS # BLD AUTO: 0.08 K/UL (ref 0–0.2)
BASOPHILS NFR BLD: 0.6 % (ref 0–1.9)
BILIRUB SERPL-MCNC: 0.5 MG/DL (ref 0.1–1)
BUN SERPL-MCNC: 20 MG/DL (ref 6–20)
CALCIUM SERPL-MCNC: 8.6 MG/DL (ref 8.7–10.5)
CHLORIDE SERPL-SCNC: 100 MMOL/L (ref 95–110)
CO2 SERPL-SCNC: 28 MMOL/L (ref 23–29)
CREAT SERPL-MCNC: 1 MG/DL (ref 0.5–1.4)
DIFFERENTIAL METHOD: ABNORMAL
EOSINOPHIL # BLD AUTO: 0.9 K/UL (ref 0–0.5)
EOSINOPHIL NFR BLD: 6.7 % (ref 0–8)
ERYTHROCYTE [DISTWIDTH] IN BLOOD BY AUTOMATED COUNT: 19.3 % (ref 11.5–14.5)
EST. GFR  (AFRICAN AMERICAN): >60 ML/MIN/1.73 M^2
EST. GFR  (NON AFRICAN AMERICAN): >60 ML/MIN/1.73 M^2
GLUCOSE SERPL-MCNC: 98 MG/DL (ref 70–110)
HCT VFR BLD AUTO: 34.5 % (ref 40–54)
HGB BLD-MCNC: 10.7 G/DL (ref 14–18)
IMM GRANULOCYTES # BLD AUTO: 0.07 K/UL (ref 0–0.04)
IMM GRANULOCYTES NFR BLD AUTO: 0.5 % (ref 0–0.5)
LYMPHOCYTES # BLD AUTO: 1.5 K/UL (ref 1–4.8)
LYMPHOCYTES NFR BLD: 11.1 % (ref 18–48)
MAGNESIUM SERPL-MCNC: 1.9 MG/DL (ref 1.6–2.6)
MCH RBC QN AUTO: 24 PG (ref 27–31)
MCHC RBC AUTO-ENTMCNC: 31 G/DL (ref 32–36)
MCV RBC AUTO: 78 FL (ref 82–98)
MONOCYTES # BLD AUTO: 1.5 K/UL (ref 0.3–1)
MONOCYTES NFR BLD: 11.3 % (ref 4–15)
NEUTROPHILS # BLD AUTO: 9.5 K/UL (ref 1.8–7.7)
NEUTROPHILS NFR BLD: 69.8 % (ref 38–73)
NRBC BLD-RTO: 0 /100 WBC
PHOSPHATE SERPL-MCNC: 2 MG/DL (ref 2.7–4.5)
PLATELET # BLD AUTO: 319 K/UL (ref 150–450)
PMV BLD AUTO: 10.9 FL (ref 9.2–12.9)
POTASSIUM SERPL-SCNC: 3.9 MMOL/L (ref 3.5–5.1)
PROT SERPL-MCNC: 6.4 G/DL (ref 6–8.4)
RBC # BLD AUTO: 4.45 M/UL (ref 4.6–6.2)
SODIUM SERPL-SCNC: 136 MMOL/L (ref 136–145)
WBC # BLD AUTO: 13.63 K/UL (ref 3.9–12.7)

## 2021-04-06 PROCEDURE — 83735 ASSAY OF MAGNESIUM: CPT | Performed by: HOSPITALIST

## 2021-04-06 PROCEDURE — A4216 STERILE WATER/SALINE, 10 ML: HCPCS | Performed by: NURSE PRACTITIONER

## 2021-04-06 PROCEDURE — 94761 N-INVAS EAR/PLS OXIMETRY MLT: CPT

## 2021-04-06 PROCEDURE — 84100 ASSAY OF PHOSPHORUS: CPT | Performed by: HOSPITALIST

## 2021-04-06 PROCEDURE — 96376 TX/PRO/DX INJ SAME DRUG ADON: CPT | Performed by: EMERGENCY MEDICINE

## 2021-04-06 PROCEDURE — 99900035 HC TECH TIME PER 15 MIN (STAT)

## 2021-04-06 PROCEDURE — 80053 COMPREHEN METABOLIC PANEL: CPT | Performed by: HOSPITALIST

## 2021-04-06 PROCEDURE — 99225 PR SUBSEQUENT OBSERVATION CARE,LEVEL II: CPT | Mod: ,,, | Performed by: HOSPITALIST

## 2021-04-06 PROCEDURE — G0378 HOSPITAL OBSERVATION PER HR: HCPCS

## 2021-04-06 PROCEDURE — 25000242 PHARM REV CODE 250 ALT 637 W/ HCPCS: Performed by: HOSPITALIST

## 2021-04-06 PROCEDURE — 25000242 PHARM REV CODE 250 ALT 637 W/ HCPCS: Performed by: NURSE PRACTITIONER

## 2021-04-06 PROCEDURE — 85025 COMPLETE CBC W/AUTO DIFF WBC: CPT | Performed by: HOSPITALIST

## 2021-04-06 PROCEDURE — 25000003 PHARM REV CODE 250: Performed by: HOSPITALIST

## 2021-04-06 PROCEDURE — 63600175 PHARM REV CODE 636 W HCPCS: Performed by: HOSPITALIST

## 2021-04-06 PROCEDURE — 25000003 PHARM REV CODE 250: Performed by: NURSE PRACTITIONER

## 2021-04-06 PROCEDURE — 63600175 PHARM REV CODE 636 W HCPCS: Performed by: NURSE PRACTITIONER

## 2021-04-06 PROCEDURE — 36415 COLL VENOUS BLD VENIPUNCTURE: CPT | Performed by: HOSPITALIST

## 2021-04-06 PROCEDURE — 99225 PR SUBSEQUENT OBSERVATION CARE,LEVEL II: ICD-10-PCS | Mod: ,,, | Performed by: HOSPITALIST

## 2021-04-06 PROCEDURE — 94640 AIRWAY INHALATION TREATMENT: CPT

## 2021-04-06 RX ADMIN — HYDROCORTISONE: 25 CREAM TOPICAL at 08:04

## 2021-04-06 RX ADMIN — PAROXETINE HYDROCHLORIDE 20 MG: 20 TABLET, FILM COATED ORAL at 08:04

## 2021-04-06 RX ADMIN — HYDROMORPHONE HYDROCHLORIDE 2 MG: 1 INJECTION, SOLUTION INTRAMUSCULAR; INTRAVENOUS; SUBCUTANEOUS at 11:04

## 2021-04-06 RX ADMIN — HYDROMORPHONE HYDROCHLORIDE 2 MG: 1 INJECTION, SOLUTION INTRAMUSCULAR; INTRAVENOUS; SUBCUTANEOUS at 04:04

## 2021-04-06 RX ADMIN — HYPROMELLOSE 2910 1 DROP: 5 SOLUTION OPHTHALMIC at 05:04

## 2021-04-06 RX ADMIN — HYDROCORTISONE: 25 CREAM TOPICAL at 01:04

## 2021-04-06 RX ADMIN — LORAZEPAM 1 MG: 1 TABLET ORAL at 05:04

## 2021-04-06 RX ADMIN — HYDROCODONE BITARTRATE AND ACETAMINOPHEN 1 TABLET: 10; 325 TABLET ORAL at 03:04

## 2021-04-06 RX ADMIN — ALBUTEROL SULFATE 2.5 MG: 2.5 SOLUTION RESPIRATORY (INHALATION) at 01:04

## 2021-04-06 RX ADMIN — ONDANSETRON 4 MG: 2 INJECTION INTRAMUSCULAR; INTRAVENOUS at 01:04

## 2021-04-06 RX ADMIN — HYDROCODONE BITARTRATE AND ACETAMINOPHEN 1 TABLET: 10; 325 TABLET ORAL at 09:04

## 2021-04-06 RX ADMIN — ALBUTEROL SULFATE 2.5 MG: 2.5 SOLUTION RESPIRATORY (INHALATION) at 07:04

## 2021-04-06 RX ADMIN — Medication 1000 UNITS: at 08:04

## 2021-04-06 RX ADMIN — PSYLLIUM HUSK 1 PACKET: 3.4 POWDER ORAL at 08:04

## 2021-04-06 RX ADMIN — CETIRIZINE HYDROCHLORIDE 20 MG: 10 TABLET, FILM COATED ORAL at 08:04

## 2021-04-06 RX ADMIN — NYSTATIN 500000 UNITS: 500000 SUSPENSION ORAL at 08:04

## 2021-04-06 RX ADMIN — PREDNISONE 20 MG: 20 TABLET ORAL at 08:04

## 2021-04-06 RX ADMIN — PANTOPRAZOLE SODIUM 40 MG: 40 TABLET, DELAYED RELEASE ORAL at 05:04

## 2021-04-06 RX ADMIN — HYDROCODONE BITARTRATE AND ACETAMINOPHEN 1 TABLET: 10; 325 TABLET ORAL at 07:04

## 2021-04-06 RX ADMIN — HYPROMELLOSE 2910 1 DROP: 5 SOLUTION OPHTHALMIC at 01:04

## 2021-04-06 RX ADMIN — HYPROMELLOSE 2910 1 DROP: 5 SOLUTION OPHTHALMIC at 10:04

## 2021-04-06 RX ADMIN — HYPROMELLOSE 2910 1 DROP: 5 SOLUTION OPHTHALMIC at 09:04

## 2021-04-06 RX ADMIN — HYDROMORPHONE HYDROCHLORIDE 2 MG: 1 INJECTION, SOLUTION INTRAMUSCULAR; INTRAVENOUS; SUBCUTANEOUS at 05:04

## 2021-04-06 RX ADMIN — DICYCLOMINE HYDROCHLORIDE 20 MG: 20 TABLET ORAL at 05:04

## 2021-04-06 RX ADMIN — PROMETHAZINE HYDROCHLORIDE 25 MG: 25 INJECTION INTRAMUSCULAR; INTRAVENOUS at 04:04

## 2021-04-06 RX ADMIN — HYDROCORTISONE: 25 CREAM TOPICAL at 05:04

## 2021-04-06 RX ADMIN — HYDROMORPHONE HYDROCHLORIDE 2 MG: 1 INJECTION, SOLUTION INTRAMUSCULAR; INTRAVENOUS; SUBCUTANEOUS at 01:04

## 2021-04-06 RX ADMIN — METOCLOPRAMIDE 10 MG: 10 TABLET ORAL at 05:04

## 2021-04-06 RX ADMIN — HYDROMORPHONE HYDROCHLORIDE 2 MG: 1 INJECTION, SOLUTION INTRAMUSCULAR; INTRAVENOUS; SUBCUTANEOUS at 07:04

## 2021-04-06 RX ADMIN — SUCRALFATE 1 G: 1 TABLET ORAL at 05:04

## 2021-04-06 RX ADMIN — FERROUS SULFATE TAB EC 325 MG (65 MG FE EQUIVALENT) 325 MG: 325 (65 FE) TABLET DELAYED RESPONSE at 09:04

## 2021-04-06 RX ADMIN — BISACODYL 10 MG: 10 SUPPOSITORY RECTAL at 04:04

## 2021-04-06 RX ADMIN — SULFAMETHOXAZOLE AND TRIMETHOPRIM 1 TABLET: 800; 160 TABLET ORAL at 08:04

## 2021-04-06 RX ADMIN — GUAIFENESIN 600 MG: 600 TABLET, EXTENDED RELEASE ORAL at 08:04

## 2021-04-06 RX ADMIN — DICYCLOMINE HYDROCHLORIDE 20 MG: 20 TABLET ORAL at 08:04

## 2021-04-06 RX ADMIN — DICYCLOMINE HYDROCHLORIDE 20 MG: 20 TABLET ORAL at 11:04

## 2021-04-06 RX ADMIN — Medication 1 CAPSULE: at 08:04

## 2021-04-06 RX ADMIN — NYSTATIN 500000 UNITS: 500000 SUSPENSION ORAL at 10:04

## 2021-04-06 RX ADMIN — TAMSULOSIN HYDROCHLORIDE 0.4 MG: 0.4 CAPSULE ORAL at 08:04

## 2021-04-06 RX ADMIN — SUCRALFATE 1 G: 1 TABLET ORAL at 11:04

## 2021-04-06 RX ADMIN — HYDROMORPHONE HYDROCHLORIDE 2 MG: 1 INJECTION, SOLUTION INTRAMUSCULAR; INTRAVENOUS; SUBCUTANEOUS at 02:04

## 2021-04-06 RX ADMIN — SUCRALFATE 1 G: 1 TABLET ORAL at 08:04

## 2021-04-06 RX ADMIN — LORAZEPAM 1 MG: 1 TABLET ORAL at 04:04

## 2021-04-06 RX ADMIN — PANTOPRAZOLE SODIUM 40 MG: 40 TABLET, DELAYED RELEASE ORAL at 03:04

## 2021-04-06 RX ADMIN — POLYETHYLENE GLYCOL 3350 17 G: 17 POWDER, FOR SOLUTION ORAL at 04:04

## 2021-04-06 RX ADMIN — GUAIFENESIN 600 MG: 600 TABLET, EXTENDED RELEASE ORAL at 09:04

## 2021-04-06 RX ADMIN — HYDROCODONE BITARTRATE AND ACETAMINOPHEN 1 TABLET: 10; 325 TABLET ORAL at 05:04

## 2021-04-06 RX ADMIN — GABAPENTIN 300 MG: 300 CAPSULE ORAL at 08:04

## 2021-04-06 RX ADMIN — GABAPENTIN 300 MG: 300 CAPSULE ORAL at 03:04

## 2021-04-06 RX ADMIN — PROMETHAZINE HYDROCHLORIDE 25 MG: 25 INJECTION INTRAMUSCULAR; INTRAVENOUS at 05:04

## 2021-04-06 RX ADMIN — HYDROMORPHONE HYDROCHLORIDE 2 MG: 1 INJECTION, SOLUTION INTRAMUSCULAR; INTRAVENOUS; SUBCUTANEOUS at 08:04

## 2021-04-06 RX ADMIN — ONDANSETRON 4 MG: 2 INJECTION INTRAMUSCULAR; INTRAVENOUS at 02:04

## 2021-04-06 RX ADMIN — Medication 10 ML: at 02:04

## 2021-04-06 RX ADMIN — DICYCLOMINE HYDROCHLORIDE 20 MG: 20 TABLET ORAL at 09:04

## 2021-04-06 RX ADMIN — HYDROCORTISONE: 25 CREAM TOPICAL at 09:04

## 2021-04-07 ENCOUNTER — ANESTHESIA (OUTPATIENT)
Dept: SURGERY | Facility: HOSPITAL | Age: 41
End: 2021-04-07
Payer: MEDICAID

## 2021-04-07 ENCOUNTER — PATIENT MESSAGE (OUTPATIENT)
Dept: ADMINISTRATIVE | Facility: OTHER | Age: 41
End: 2021-04-07

## 2021-04-07 ENCOUNTER — PATIENT MESSAGE (OUTPATIENT)
Dept: SURGERY | Facility: HOSPITAL | Age: 41
End: 2021-04-07

## 2021-04-07 PROBLEM — R11.2 INTRACTABLE NAUSEA AND VOMITING: Status: ACTIVE | Noted: 2021-04-07

## 2021-04-07 LAB
ACID FAST MOD KINY STN SPEC: NORMAL
ALBUMIN SERPL BCP-MCNC: 3.2 G/DL (ref 3.5–5.2)
ALP SERPL-CCNC: 62 U/L (ref 55–135)
ALT SERPL W/O P-5'-P-CCNC: 14 U/L (ref 10–44)
ANION GAP SERPL CALC-SCNC: 8 MMOL/L (ref 8–16)
AST SERPL-CCNC: 15 U/L (ref 10–40)
BASOPHILS # BLD AUTO: 0.08 K/UL (ref 0–0.2)
BASOPHILS NFR BLD: 0.8 % (ref 0–1.9)
BILIRUB SERPL-MCNC: 0.4 MG/DL (ref 0.1–1)
BUN SERPL-MCNC: 18 MG/DL (ref 6–20)
CALCIUM SERPL-MCNC: 8.5 MG/DL (ref 8.7–10.5)
CHLORIDE SERPL-SCNC: 99 MMOL/L (ref 95–110)
CO2 SERPL-SCNC: 27 MMOL/L (ref 23–29)
CREAT SERPL-MCNC: 1 MG/DL (ref 0.5–1.4)
DIFFERENTIAL METHOD: ABNORMAL
EOSINOPHIL # BLD AUTO: 0.6 K/UL (ref 0–0.5)
EOSINOPHIL NFR BLD: 6.3 % (ref 0–8)
ERYTHROCYTE [DISTWIDTH] IN BLOOD BY AUTOMATED COUNT: 19.4 % (ref 11.5–14.5)
EST. GFR  (AFRICAN AMERICAN): >60 ML/MIN/1.73 M^2
EST. GFR  (NON AFRICAN AMERICAN): >60 ML/MIN/1.73 M^2
GLUCOSE SERPL-MCNC: 86 MG/DL (ref 70–110)
HCT VFR BLD AUTO: 34.9 % (ref 40–54)
HGB BLD-MCNC: 10.7 G/DL (ref 14–18)
IMM GRANULOCYTES # BLD AUTO: 0.09 K/UL (ref 0–0.04)
IMM GRANULOCYTES NFR BLD AUTO: 0.9 % (ref 0–0.5)
LYMPHOCYTES # BLD AUTO: 1.3 K/UL (ref 1–4.8)
LYMPHOCYTES NFR BLD: 13.1 % (ref 18–48)
MAGNESIUM SERPL-MCNC: 2 MG/DL (ref 1.6–2.6)
MCH RBC QN AUTO: 24.2 PG (ref 27–31)
MCHC RBC AUTO-ENTMCNC: 30.7 G/DL (ref 32–36)
MCV RBC AUTO: 79 FL (ref 82–98)
MONOCYTES # BLD AUTO: 1.3 K/UL (ref 0.3–1)
MONOCYTES NFR BLD: 13.5 % (ref 4–15)
MYCOBACTERIUM SPEC QL CULT: NORMAL
NEUTROPHILS # BLD AUTO: 6.5 K/UL (ref 1.8–7.7)
NEUTROPHILS NFR BLD: 65.4 % (ref 38–73)
NRBC BLD-RTO: 0 /100 WBC
PHOSPHATE SERPL-MCNC: 3.3 MG/DL (ref 2.7–4.5)
PLATELET # BLD AUTO: 289 K/UL (ref 150–450)
PMV BLD AUTO: 10.8 FL (ref 9.2–12.9)
POTASSIUM SERPL-SCNC: 4.1 MMOL/L (ref 3.5–5.1)
PROT SERPL-MCNC: 6.5 G/DL (ref 6–8.4)
RBC # BLD AUTO: 4.43 M/UL (ref 4.6–6.2)
SODIUM SERPL-SCNC: 134 MMOL/L (ref 136–145)
WBC # BLD AUTO: 9.86 K/UL (ref 3.9–12.7)

## 2021-04-07 PROCEDURE — 84100 ASSAY OF PHOSPHORUS: CPT | Performed by: HOSPITALIST

## 2021-04-07 PROCEDURE — D9220A PRA ANESTHESIA: ICD-10-PCS | Mod: CRNA,,, | Performed by: NURSE ANESTHETIST, CERTIFIED REGISTERED

## 2021-04-07 PROCEDURE — 12032 INTMD RPR S/A/T/EXT 2.6-7.5: CPT | Mod: 59,,, | Performed by: SURGERY

## 2021-04-07 PROCEDURE — 63600175 PHARM REV CODE 636 W HCPCS: Performed by: NURSE ANESTHETIST, CERTIFIED REGISTERED

## 2021-04-07 PROCEDURE — 71000015 HC POSTOP RECOV 1ST HR: Performed by: SURGERY

## 2021-04-07 PROCEDURE — 94640 AIRWAY INHALATION TREATMENT: CPT | Mod: 59

## 2021-04-07 PROCEDURE — 25000003 PHARM REV CODE 250: Performed by: NURSE ANESTHETIST, CERTIFIED REGISTERED

## 2021-04-07 PROCEDURE — 88305 TISSUE EXAM BY PATHOLOGIST: ICD-10-PCS | Mod: 26,,, | Performed by: PATHOLOGY

## 2021-04-07 PROCEDURE — G0378 HOSPITAL OBSERVATION PER HR: HCPCS

## 2021-04-07 PROCEDURE — 37000009 HC ANESTHESIA EA ADD 15 MINS: Performed by: SURGERY

## 2021-04-07 PROCEDURE — 49585 PR REPAIR UMBILICAL HERN,5+Y/O,REDUC: CPT | Mod: ,,, | Performed by: SURGERY

## 2021-04-07 PROCEDURE — D9220A PRA ANESTHESIA: Mod: CRNA,,, | Performed by: NURSE ANESTHETIST, CERTIFIED REGISTERED

## 2021-04-07 PROCEDURE — 94761 N-INVAS EAR/PLS OXIMETRY MLT: CPT

## 2021-04-07 PROCEDURE — D9220A PRA ANESTHESIA: Mod: ANES,,, | Performed by: ANESTHESIOLOGY

## 2021-04-07 PROCEDURE — 49585 PR REPAIR UMBILICAL HERN,5+Y/O,REDUC: ICD-10-PCS | Mod: ,,, | Performed by: SURGERY

## 2021-04-07 PROCEDURE — 37000008 HC ANESTHESIA 1ST 15 MINUTES: Performed by: SURGERY

## 2021-04-07 PROCEDURE — 83735 ASSAY OF MAGNESIUM: CPT | Performed by: HOSPITALIST

## 2021-04-07 PROCEDURE — 96376 TX/PRO/DX INJ SAME DRUG ADON: CPT | Performed by: EMERGENCY MEDICINE

## 2021-04-07 PROCEDURE — 63600175 PHARM REV CODE 636 W HCPCS: Performed by: NURSE PRACTITIONER

## 2021-04-07 PROCEDURE — 36000706: Performed by: SURGERY

## 2021-04-07 PROCEDURE — 71000039 HC RECOVERY, EACH ADD'L HOUR: Performed by: SURGERY

## 2021-04-07 PROCEDURE — 71000033 HC RECOVERY, INTIAL HOUR: Performed by: SURGERY

## 2021-04-07 PROCEDURE — 99225 PR SUBSEQUENT OBSERVATION CARE,LEVEL II: CPT | Mod: ,,, | Performed by: HOSPITALIST

## 2021-04-07 PROCEDURE — 99214 PR OFFICE/OUTPT VISIT, EST, LEVL IV, 30-39 MIN: ICD-10-PCS | Mod: 57,,, | Performed by: SURGERY

## 2021-04-07 PROCEDURE — 85025 COMPLETE CBC W/AUTO DIFF WBC: CPT | Performed by: HOSPITALIST

## 2021-04-07 PROCEDURE — 25000003 PHARM REV CODE 250: Performed by: SURGERY

## 2021-04-07 PROCEDURE — 12032 PR LAYR CLOS WND TRUNK,ARM,LEG 2.6-7.5 CM: ICD-10-PCS | Mod: 59,,, | Performed by: SURGERY

## 2021-04-07 PROCEDURE — 11403 PR EXC SKIN BENIG 2.1-3 CM TRUNK,ARM,LEG: ICD-10-PCS | Mod: ,,, | Performed by: SURGERY

## 2021-04-07 PROCEDURE — 25000003 PHARM REV CODE 250: Performed by: NURSE PRACTITIONER

## 2021-04-07 PROCEDURE — 00400 ANES INTEGUMENTARY SYS NOS: CPT | Performed by: SURGERY

## 2021-04-07 PROCEDURE — D9220A PRA ANESTHESIA: ICD-10-PCS | Mod: ANES,,, | Performed by: ANESTHESIOLOGY

## 2021-04-07 PROCEDURE — 36415 COLL VENOUS BLD VENIPUNCTURE: CPT | Performed by: HOSPITALIST

## 2021-04-07 PROCEDURE — 63600175 PHARM REV CODE 636 W HCPCS: Performed by: HOSPITALIST

## 2021-04-07 PROCEDURE — 25000003 PHARM REV CODE 250: Performed by: HOSPITALIST

## 2021-04-07 PROCEDURE — 36000707: Performed by: SURGERY

## 2021-04-07 PROCEDURE — 99900035 HC TECH TIME PER 15 MIN (STAT)

## 2021-04-07 PROCEDURE — 25000242 PHARM REV CODE 250 ALT 637 W/ HCPCS: Performed by: HOSPITALIST

## 2021-04-07 PROCEDURE — 80053 COMPREHEN METABOLIC PANEL: CPT | Performed by: HOSPITALIST

## 2021-04-07 PROCEDURE — 11403 EXC TR-EXT B9+MARG 2.1-3CM: CPT | Mod: ,,, | Performed by: SURGERY

## 2021-04-07 PROCEDURE — 88305 TISSUE EXAM BY PATHOLOGIST: CPT | Performed by: PATHOLOGY

## 2021-04-07 PROCEDURE — 99214 OFFICE O/P EST MOD 30 MIN: CPT | Mod: 57,,, | Performed by: SURGERY

## 2021-04-07 PROCEDURE — 99225 PR SUBSEQUENT OBSERVATION CARE,LEVEL II: ICD-10-PCS | Mod: ,,, | Performed by: HOSPITALIST

## 2021-04-07 PROCEDURE — 88305 TISSUE EXAM BY PATHOLOGIST: CPT | Mod: 26,,, | Performed by: PATHOLOGY

## 2021-04-07 RX ORDER — MIDAZOLAM HYDROCHLORIDE 1 MG/ML
INJECTION, SOLUTION INTRAMUSCULAR; INTRAVENOUS
Status: DISCONTINUED | OUTPATIENT
Start: 2021-04-07 | End: 2021-04-07

## 2021-04-07 RX ORDER — PHENYLEPHRINE HYDROCHLORIDE 10 MG/ML
INJECTION INTRAVENOUS
Status: DISCONTINUED | OUTPATIENT
Start: 2021-04-07 | End: 2021-04-07

## 2021-04-07 RX ORDER — FAMOTIDINE 10 MG/ML
INJECTION INTRAVENOUS
Status: DISCONTINUED | OUTPATIENT
Start: 2021-04-07 | End: 2021-04-07

## 2021-04-07 RX ORDER — ONDANSETRON 2 MG/ML
INJECTION INTRAMUSCULAR; INTRAVENOUS
Status: DISCONTINUED | OUTPATIENT
Start: 2021-04-07 | End: 2021-04-07

## 2021-04-07 RX ORDER — FENTANYL CITRATE 50 UG/ML
INJECTION, SOLUTION INTRAMUSCULAR; INTRAVENOUS
Status: DISCONTINUED | OUTPATIENT
Start: 2021-04-07 | End: 2021-04-07

## 2021-04-07 RX ORDER — DEXAMETHASONE SODIUM PHOSPHATE 4 MG/ML
INJECTION, SOLUTION INTRA-ARTICULAR; INTRALESIONAL; INTRAMUSCULAR; INTRAVENOUS; SOFT TISSUE
Status: DISCONTINUED | OUTPATIENT
Start: 2021-04-07 | End: 2021-04-07

## 2021-04-07 RX ORDER — HYDROMORPHONE HYDROCHLORIDE 1 MG/ML
1 INJECTION, SOLUTION INTRAMUSCULAR; INTRAVENOUS; SUBCUTANEOUS
Status: DISCONTINUED | OUTPATIENT
Start: 2021-04-07 | End: 2021-04-10

## 2021-04-07 RX ORDER — DIPHENHYDRAMINE HYDROCHLORIDE 50 MG/ML
INJECTION INTRAMUSCULAR; INTRAVENOUS
Status: DISCONTINUED | OUTPATIENT
Start: 2021-04-07 | End: 2021-04-07

## 2021-04-07 RX ORDER — PROPOFOL 10 MG/ML
VIAL (ML) INTRAVENOUS
Status: DISCONTINUED | OUTPATIENT
Start: 2021-04-07 | End: 2021-04-07

## 2021-04-07 RX ORDER — NEOSTIGMINE METHYLSULFATE 0.5 MG/ML
INJECTION, SOLUTION INTRAVENOUS
Status: DISCONTINUED | OUTPATIENT
Start: 2021-04-07 | End: 2021-04-07

## 2021-04-07 RX ORDER — BUPIVACAINE HYDROCHLORIDE 5 MG/ML
INJECTION, SOLUTION EPIDURAL; INTRACAUDAL
Status: DISCONTINUED | OUTPATIENT
Start: 2021-04-07 | End: 2021-04-07 | Stop reason: HOSPADM

## 2021-04-07 RX ORDER — LIDOCAINE HYDROCHLORIDE 20 MG/ML
INJECTION INTRAVENOUS
Status: DISCONTINUED | OUTPATIENT
Start: 2021-04-07 | End: 2021-04-07

## 2021-04-07 RX ORDER — ROCURONIUM BROMIDE 10 MG/ML
INJECTION, SOLUTION INTRAVENOUS
Status: DISCONTINUED | OUTPATIENT
Start: 2021-04-07 | End: 2021-04-07

## 2021-04-07 RX ORDER — CLINDAMYCIN PHOSPHATE 900 MG/50ML
INJECTION, SOLUTION INTRAVENOUS
Status: DISCONTINUED | OUTPATIENT
Start: 2021-04-07 | End: 2021-04-07

## 2021-04-07 RX ORDER — KETAMINE HCL IN 0.9 % NACL 50 MG/5 ML
SYRINGE (ML) INTRAVENOUS
Status: DISCONTINUED | OUTPATIENT
Start: 2021-04-07 | End: 2021-04-07

## 2021-04-07 RX ADMIN — PHENYLEPHRINE HYDROCHLORIDE 100 MCG: 10 INJECTION INTRAVENOUS at 02:04

## 2021-04-07 RX ADMIN — HYDROMORPHONE HYDROCHLORIDE 2 MG: 1 INJECTION, SOLUTION INTRAMUSCULAR; INTRAVENOUS; SUBCUTANEOUS at 04:04

## 2021-04-07 RX ADMIN — CETIRIZINE HYDROCHLORIDE 20 MG: 10 TABLET, FILM COATED ORAL at 08:04

## 2021-04-07 RX ADMIN — GUAIFENESIN 600 MG: 600 TABLET, EXTENDED RELEASE ORAL at 08:04

## 2021-04-07 RX ADMIN — DIPHENHYDRAMINE HYDROCHLORIDE 50 MG: 50 INJECTION INTRAMUSCULAR; INTRAVENOUS at 01:04

## 2021-04-07 RX ADMIN — PAROXETINE HYDROCHLORIDE 20 MG: 20 TABLET, FILM COATED ORAL at 08:04

## 2021-04-07 RX ADMIN — GUAIFENESIN 600 MG: 600 TABLET, EXTENDED RELEASE ORAL at 09:04

## 2021-04-07 RX ADMIN — LORAZEPAM 1 MG: 1 TABLET ORAL at 12:04

## 2021-04-07 RX ADMIN — MIDAZOLAM HYDROCHLORIDE 2 MG: 1 INJECTION, SOLUTION INTRAMUSCULAR; INTRAVENOUS at 01:04

## 2021-04-07 RX ADMIN — HYPROMELLOSE 2910 1 DROP: 5 SOLUTION OPHTHALMIC at 02:04

## 2021-04-07 RX ADMIN — HYPROMELLOSE 2910 1 DROP: 5 SOLUTION OPHTHALMIC at 09:04

## 2021-04-07 RX ADMIN — HYDROMORPHONE HYDROCHLORIDE 1 MG: 1 INJECTION, SOLUTION INTRAMUSCULAR; INTRAVENOUS; SUBCUTANEOUS at 05:04

## 2021-04-07 RX ADMIN — HYDROXYZINE HYDROCHLORIDE 50 MG: 50 TABLET, FILM COATED ORAL at 07:04

## 2021-04-07 RX ADMIN — HYDROCORTISONE: 25 CREAM TOPICAL at 08:04

## 2021-04-07 RX ADMIN — NEOSTIGMINE METHYLSULFATE 5 MG: 0.5 INJECTION INTRAVENOUS at 02:04

## 2021-04-07 RX ADMIN — SULFAMETHOXAZOLE AND TRIMETHOPRIM 1 TABLET: 800; 160 TABLET ORAL at 08:04

## 2021-04-07 RX ADMIN — Medication 20 MG: at 01:04

## 2021-04-07 RX ADMIN — ROCURONIUM BROMIDE 50 MG: 10 INJECTION, SOLUTION INTRAVENOUS at 01:04

## 2021-04-07 RX ADMIN — HYDROMORPHONE HYDROCHLORIDE 2 MG: 1 INJECTION, SOLUTION INTRAMUSCULAR; INTRAVENOUS; SUBCUTANEOUS at 07:04

## 2021-04-07 RX ADMIN — GABAPENTIN 300 MG: 300 CAPSULE ORAL at 08:04

## 2021-04-07 RX ADMIN — SODIUM CHLORIDE: 0.9 INJECTION, SOLUTION INTRAVENOUS at 01:04

## 2021-04-07 RX ADMIN — CETIRIZINE HYDROCHLORIDE 20 MG: 10 TABLET, FILM COATED ORAL at 09:04

## 2021-04-07 RX ADMIN — ONDANSETRON 4 MG: 2 INJECTION, SOLUTION INTRAMUSCULAR; INTRAVENOUS at 01:04

## 2021-04-07 RX ADMIN — HYDROCODONE BITARTRATE AND ACETAMINOPHEN 1 TABLET: 10; 325 TABLET ORAL at 12:04

## 2021-04-07 RX ADMIN — ONDANSETRON 4 MG: 2 INJECTION INTRAMUSCULAR; INTRAVENOUS at 05:04

## 2021-04-07 RX ADMIN — HYDROMORPHONE HYDROCHLORIDE 2 MG: 1 INJECTION, SOLUTION INTRAMUSCULAR; INTRAVENOUS; SUBCUTANEOUS at 01:04

## 2021-04-07 RX ADMIN — LORAZEPAM 1 MG: 1 TABLET ORAL at 09:04

## 2021-04-07 RX ADMIN — DEXAMETHASONE SODIUM PHOSPHATE 4 MG: 4 INJECTION, SOLUTION INTRAMUSCULAR; INTRAVENOUS at 01:04

## 2021-04-07 RX ADMIN — GABAPENTIN 300 MG: 300 CAPSULE ORAL at 09:04

## 2021-04-07 RX ADMIN — MIDAZOLAM HYDROCHLORIDE 4 MG: 1 INJECTION, SOLUTION INTRAMUSCULAR; INTRAVENOUS at 01:04

## 2021-04-07 RX ADMIN — HYDROMORPHONE HYDROCHLORIDE 2 MG: 1 INJECTION, SOLUTION INTRAMUSCULAR; INTRAVENOUS; SUBCUTANEOUS at 05:04

## 2021-04-07 RX ADMIN — PANTOPRAZOLE SODIUM 40 MG: 40 TABLET, DELAYED RELEASE ORAL at 04:04

## 2021-04-07 RX ADMIN — PHENYLEPHRINE HYDROCHLORIDE 100 MCG: 10 INJECTION INTRAVENOUS at 01:04

## 2021-04-07 RX ADMIN — HYDROMORPHONE HYDROCHLORIDE 1 MG: 1 INJECTION, SOLUTION INTRAMUSCULAR; INTRAVENOUS; SUBCUTANEOUS at 09:04

## 2021-04-07 RX ADMIN — GLYCOPYRROLATE 0.4 MG: 0.2 INJECTION, SOLUTION INTRAMUSCULAR; INTRAVITREAL at 02:04

## 2021-04-07 RX ADMIN — HYDROCORTISONE: 25 CREAM TOPICAL at 09:04

## 2021-04-07 RX ADMIN — HYDROMORPHONE HYDROCHLORIDE 2 MG: 1 INJECTION, SOLUTION INTRAMUSCULAR; INTRAVENOUS; SUBCUTANEOUS at 10:04

## 2021-04-07 RX ADMIN — LORAZEPAM 1 MG: 1 TABLET ORAL at 08:04

## 2021-04-07 RX ADMIN — HYPROMELLOSE 2910 1 DROP: 5 SOLUTION OPHTHALMIC at 08:04

## 2021-04-07 RX ADMIN — FAMOTIDINE 20 MG: 10 INJECTION, SOLUTION INTRAVENOUS at 01:04

## 2021-04-07 RX ADMIN — TAMSULOSIN HYDROCHLORIDE 0.4 MG: 0.4 CAPSULE ORAL at 09:04

## 2021-04-07 RX ADMIN — DICYCLOMINE HYDROCHLORIDE 20 MG: 20 TABLET ORAL at 05:04

## 2021-04-07 RX ADMIN — CLINDAMYCIN PHOSPHATE 900 MG: 18 INJECTION, SOLUTION INTRAVENOUS at 01:04

## 2021-04-07 RX ADMIN — HYDROMORPHONE HYDROCHLORIDE 2 MG: 1 INJECTION, SOLUTION INTRAMUSCULAR; INTRAVENOUS; SUBCUTANEOUS at 02:04

## 2021-04-07 RX ADMIN — HYDROMORPHONE HYDROCHLORIDE 2 MG: 1 INJECTION, SOLUTION INTRAMUSCULAR; INTRAVENOUS; SUBCUTANEOUS at 08:04

## 2021-04-07 RX ADMIN — FENTANYL CITRATE 100 MCG: 50 INJECTION, SOLUTION INTRAMUSCULAR; INTRAVENOUS at 01:04

## 2021-04-07 RX ADMIN — PROPOFOL 200 MG: 10 INJECTION, EMULSION INTRAVENOUS at 01:04

## 2021-04-07 RX ADMIN — DICYCLOMINE HYDROCHLORIDE 20 MG: 20 TABLET ORAL at 09:04

## 2021-04-07 RX ADMIN — HYDROCODONE BITARTRATE AND ACETAMINOPHEN 1 TABLET: 10; 325 TABLET ORAL at 06:04

## 2021-04-07 RX ADMIN — LIDOCAINE HYDROCHLORIDE 100 MG: 20 INJECTION, SOLUTION INTRAVENOUS at 01:04

## 2021-04-07 RX ADMIN — HYDROCODONE BITARTRATE AND ACETAMINOPHEN 1 TABLET: 10; 325 TABLET ORAL at 10:04

## 2021-04-07 RX ADMIN — PREDNISONE 20 MG: 20 TABLET ORAL at 08:04

## 2021-04-07 RX ADMIN — SUCRALFATE 1 G: 1 TABLET ORAL at 05:04

## 2021-04-07 RX ADMIN — ALBUTEROL SULFATE 2.5 MG: 2.5 SOLUTION RESPIRATORY (INHALATION) at 07:04

## 2021-04-07 RX ADMIN — PROMETHAZINE HYDROCHLORIDE 25 MG: 25 INJECTION INTRAMUSCULAR; INTRAVENOUS at 12:04

## 2021-04-07 RX ADMIN — HYPROMELLOSE 2910 1 DROP: 5 SOLUTION OPHTHALMIC at 05:04

## 2021-04-07 RX ADMIN — PANTOPRAZOLE SODIUM 40 MG: 40 TABLET, DELAYED RELEASE ORAL at 05:04

## 2021-04-07 RX ADMIN — NYSTATIN 500000 UNITS: 500000 SUSPENSION ORAL at 09:04

## 2021-04-07 RX ADMIN — SUCRALFATE 1 G: 1 TABLET ORAL at 09:04

## 2021-04-08 ENCOUNTER — PATIENT MESSAGE (OUTPATIENT)
Dept: SURGERY | Facility: CLINIC | Age: 41
End: 2021-04-08

## 2021-04-08 LAB
ABO + RH BLD: NORMAL
ALBUMIN SERPL BCP-MCNC: 3 G/DL (ref 3.5–5.2)
ALP SERPL-CCNC: 57 U/L (ref 55–135)
ALT SERPL W/O P-5'-P-CCNC: 16 U/L (ref 10–44)
ANION GAP SERPL CALC-SCNC: 8 MMOL/L (ref 8–16)
AST SERPL-CCNC: 12 U/L (ref 10–40)
BASOPHILS # BLD AUTO: 0.03 K/UL (ref 0–0.2)
BASOPHILS NFR BLD: 0.2 % (ref 0–1.9)
BILIRUB SERPL-MCNC: 0.3 MG/DL (ref 0.1–1)
BLD GP AB SCN CELLS X3 SERPL QL: NORMAL
BUN SERPL-MCNC: 20 MG/DL (ref 6–20)
CALCIUM SERPL-MCNC: 8.1 MG/DL (ref 8.7–10.5)
CHLORIDE SERPL-SCNC: 99 MMOL/L (ref 95–110)
CO2 SERPL-SCNC: 27 MMOL/L (ref 23–29)
CREAT SERPL-MCNC: 1 MG/DL (ref 0.5–1.4)
DIFFERENTIAL METHOD: ABNORMAL
EOSINOPHIL # BLD AUTO: 0 K/UL (ref 0–0.5)
EOSINOPHIL NFR BLD: 0.2 % (ref 0–8)
ERYTHROCYTE [DISTWIDTH] IN BLOOD BY AUTOMATED COUNT: 19.1 % (ref 11.5–14.5)
EST. GFR  (AFRICAN AMERICAN): >60 ML/MIN/1.73 M^2
EST. GFR  (NON AFRICAN AMERICAN): >60 ML/MIN/1.73 M^2
GLUCOSE SERPL-MCNC: 121 MG/DL (ref 70–110)
HCT VFR BLD AUTO: 32 % (ref 40–54)
HGB BLD-MCNC: 9.9 G/DL (ref 14–18)
IMM GRANULOCYTES # BLD AUTO: 0.18 K/UL (ref 0–0.04)
IMM GRANULOCYTES NFR BLD AUTO: 1.5 % (ref 0–0.5)
LYMPHOCYTES # BLD AUTO: 1 K/UL (ref 1–4.8)
LYMPHOCYTES NFR BLD: 7.9 % (ref 18–48)
MAGNESIUM SERPL-MCNC: 2.1 MG/DL (ref 1.6–2.6)
MCH RBC QN AUTO: 24.4 PG (ref 27–31)
MCHC RBC AUTO-ENTMCNC: 30.9 G/DL (ref 32–36)
MCV RBC AUTO: 79 FL (ref 82–98)
MONOCYTES # BLD AUTO: 1.5 K/UL (ref 0.3–1)
MONOCYTES NFR BLD: 12.5 % (ref 4–15)
NEUTROPHILS # BLD AUTO: 9.5 K/UL (ref 1.8–7.7)
NEUTROPHILS NFR BLD: 77.7 % (ref 38–73)
NRBC BLD-RTO: 0 /100 WBC
PHOSPHATE SERPL-MCNC: 3.6 MG/DL (ref 2.7–4.5)
PLATELET # BLD AUTO: 312 K/UL (ref 150–450)
PMV BLD AUTO: 10.8 FL (ref 9.2–12.9)
POTASSIUM SERPL-SCNC: 4.1 MMOL/L (ref 3.5–5.1)
PROT SERPL-MCNC: 5.9 G/DL (ref 6–8.4)
RBC # BLD AUTO: 4.06 M/UL (ref 4.6–6.2)
SODIUM SERPL-SCNC: 134 MMOL/L (ref 136–145)
WBC # BLD AUTO: 12.21 K/UL (ref 3.9–12.7)

## 2021-04-08 PROCEDURE — 25000003 PHARM REV CODE 250: Performed by: NURSE PRACTITIONER

## 2021-04-08 PROCEDURE — 96376 TX/PRO/DX INJ SAME DRUG ADON: CPT | Performed by: EMERGENCY MEDICINE

## 2021-04-08 PROCEDURE — 85025 COMPLETE CBC W/AUTO DIFF WBC: CPT | Performed by: HOSPITALIST

## 2021-04-08 PROCEDURE — 25000242 PHARM REV CODE 250 ALT 637 W/ HCPCS: Performed by: NURSE PRACTITIONER

## 2021-04-08 PROCEDURE — 83735 ASSAY OF MAGNESIUM: CPT | Performed by: HOSPITALIST

## 2021-04-08 PROCEDURE — 99225 PR SUBSEQUENT OBSERVATION CARE,LEVEL II: ICD-10-PCS | Mod: ,,, | Performed by: INTERNAL MEDICINE

## 2021-04-08 PROCEDURE — 25000003 PHARM REV CODE 250: Performed by: HOSPITALIST

## 2021-04-08 PROCEDURE — 63600175 PHARM REV CODE 636 W HCPCS: Performed by: HOSPITALIST

## 2021-04-08 PROCEDURE — 63600175 PHARM REV CODE 636 W HCPCS: Performed by: NURSE PRACTITIONER

## 2021-04-08 PROCEDURE — 99225 PR SUBSEQUENT OBSERVATION CARE,LEVEL II: CPT | Mod: ,,, | Performed by: INTERNAL MEDICINE

## 2021-04-08 PROCEDURE — 94640 AIRWAY INHALATION TREATMENT: CPT

## 2021-04-08 PROCEDURE — 80053 COMPREHEN METABOLIC PANEL: CPT | Performed by: HOSPITALIST

## 2021-04-08 PROCEDURE — G0378 HOSPITAL OBSERVATION PER HR: HCPCS

## 2021-04-08 PROCEDURE — 99900035 HC TECH TIME PER 15 MIN (STAT)

## 2021-04-08 PROCEDURE — 94761 N-INVAS EAR/PLS OXIMETRY MLT: CPT

## 2021-04-08 PROCEDURE — 36415 COLL VENOUS BLD VENIPUNCTURE: CPT | Performed by: HOSPITALIST

## 2021-04-08 PROCEDURE — 84100 ASSAY OF PHOSPHORUS: CPT | Performed by: HOSPITALIST

## 2021-04-08 PROCEDURE — 25000242 PHARM REV CODE 250 ALT 637 W/ HCPCS: Performed by: HOSPITALIST

## 2021-04-08 PROCEDURE — 86900 BLOOD TYPING SEROLOGIC ABO: CPT | Performed by: HOSPITALIST

## 2021-04-08 RX ORDER — DIPHENHYDRAMINE HCL 25 MG
25 CAPSULE ORAL ONCE
Status: COMPLETED | OUTPATIENT
Start: 2021-04-08 | End: 2021-04-08

## 2021-04-08 RX ADMIN — ALBUTEROL SULFATE 2 PUFF: 108 INHALANT RESPIRATORY (INHALATION) at 09:04

## 2021-04-08 RX ADMIN — LORAZEPAM 1 MG: 1 TABLET ORAL at 11:04

## 2021-04-08 RX ADMIN — NYSTATIN 500000 UNITS: 500000 SUSPENSION ORAL at 09:04

## 2021-04-08 RX ADMIN — HYDROCORTISONE: 25 CREAM TOPICAL at 09:04

## 2021-04-08 RX ADMIN — HYDROMORPHONE HYDROCHLORIDE 1 MG: 1 INJECTION, SOLUTION INTRAMUSCULAR; INTRAVENOUS; SUBCUTANEOUS at 06:04

## 2021-04-08 RX ADMIN — PANTOPRAZOLE SODIUM 40 MG: 40 TABLET, DELAYED RELEASE ORAL at 06:04

## 2021-04-08 RX ADMIN — HYDROMORPHONE HYDROCHLORIDE 2 MG: 1 INJECTION, SOLUTION INTRAMUSCULAR; INTRAVENOUS; SUBCUTANEOUS at 01:04

## 2021-04-08 RX ADMIN — HYDROXYZINE HYDROCHLORIDE 50 MG: 50 TABLET, FILM COATED ORAL at 11:04

## 2021-04-08 RX ADMIN — CETIRIZINE HYDROCHLORIDE 20 MG: 10 TABLET, FILM COATED ORAL at 08:04

## 2021-04-08 RX ADMIN — GABAPENTIN 300 MG: 300 CAPSULE ORAL at 09:04

## 2021-04-08 RX ADMIN — HYDROMORPHONE HYDROCHLORIDE 1 MG: 1 INJECTION, SOLUTION INTRAMUSCULAR; INTRAVENOUS; SUBCUTANEOUS at 03:04

## 2021-04-08 RX ADMIN — Medication 1 CAPSULE: at 09:04

## 2021-04-08 RX ADMIN — SUCRALFATE 1 G: 1 TABLET ORAL at 04:04

## 2021-04-08 RX ADMIN — FERROUS SULFATE TAB EC 325 MG (65 MG FE EQUIVALENT) 325 MG: 325 (65 FE) TABLET DELAYED RESPONSE at 09:04

## 2021-04-08 RX ADMIN — HYDROXYZINE HYDROCHLORIDE 50 MG: 50 TABLET, FILM COATED ORAL at 01:04

## 2021-04-08 RX ADMIN — TAMSULOSIN HYDROCHLORIDE 0.4 MG: 0.4 CAPSULE ORAL at 09:04

## 2021-04-08 RX ADMIN — NYSTATIN 500000 UNITS: 500000 SUSPENSION ORAL at 01:04

## 2021-04-08 RX ADMIN — SUCRALFATE 1 G: 1 TABLET ORAL at 06:04

## 2021-04-08 RX ADMIN — CETIRIZINE HYDROCHLORIDE 20 MG: 10 TABLET, FILM COATED ORAL at 09:04

## 2021-04-08 RX ADMIN — HYDROMORPHONE HYDROCHLORIDE 1 MG: 1 INJECTION, SOLUTION INTRAMUSCULAR; INTRAVENOUS; SUBCUTANEOUS at 09:04

## 2021-04-08 RX ADMIN — PROMETHAZINE HYDROCHLORIDE 25 MG: 25 INJECTION INTRAMUSCULAR; INTRAVENOUS at 08:04

## 2021-04-08 RX ADMIN — HYDROMORPHONE HYDROCHLORIDE 2 MG: 1 INJECTION, SOLUTION INTRAMUSCULAR; INTRAVENOUS; SUBCUTANEOUS at 04:04

## 2021-04-08 RX ADMIN — PAROXETINE HYDROCHLORIDE 20 MG: 20 TABLET, FILM COATED ORAL at 09:04

## 2021-04-08 RX ADMIN — GABAPENTIN 300 MG: 300 CAPSULE ORAL at 02:04

## 2021-04-08 RX ADMIN — GABAPENTIN 300 MG: 300 CAPSULE ORAL at 08:04

## 2021-04-08 RX ADMIN — DICYCLOMINE HYDROCHLORIDE 20 MG: 20 TABLET ORAL at 10:04

## 2021-04-08 RX ADMIN — HYPROMELLOSE 2910 1 DROP: 5 SOLUTION OPHTHALMIC at 10:04

## 2021-04-08 RX ADMIN — HYDROMORPHONE HYDROCHLORIDE 2 MG: 1 INJECTION, SOLUTION INTRAMUSCULAR; INTRAVENOUS; SUBCUTANEOUS at 11:04

## 2021-04-08 RX ADMIN — POLYETHYLENE GLYCOL 3350 17 G: 17 POWDER, FOR SOLUTION ORAL at 12:04

## 2021-04-08 RX ADMIN — NYSTATIN 500000 UNITS: 500000 SUSPENSION ORAL at 04:04

## 2021-04-08 RX ADMIN — HYDROXYZINE HYDROCHLORIDE 50 MG: 50 TABLET, FILM COATED ORAL at 06:04

## 2021-04-08 RX ADMIN — DICYCLOMINE HYDROCHLORIDE 20 MG: 20 TABLET ORAL at 06:04

## 2021-04-08 RX ADMIN — HYDROMORPHONE HYDROCHLORIDE 1 MG: 1 INJECTION, SOLUTION INTRAMUSCULAR; INTRAVENOUS; SUBCUTANEOUS at 12:04

## 2021-04-08 RX ADMIN — SUCRALFATE 1 G: 1 TABLET ORAL at 08:04

## 2021-04-08 RX ADMIN — HYPROMELLOSE 2910 1 DROP: 5 SOLUTION OPHTHALMIC at 09:04

## 2021-04-08 RX ADMIN — SUCRALFATE 1 G: 1 TABLET ORAL at 10:04

## 2021-04-08 RX ADMIN — HYDROMORPHONE HYDROCHLORIDE 2 MG: 1 INJECTION, SOLUTION INTRAMUSCULAR; INTRAVENOUS; SUBCUTANEOUS at 08:04

## 2021-04-08 RX ADMIN — SULFAMETHOXAZOLE AND TRIMETHOPRIM 1 TABLET: 800; 160 TABLET ORAL at 09:04

## 2021-04-08 RX ADMIN — ONDANSETRON 4 MG: 2 INJECTION INTRAMUSCULAR; INTRAVENOUS at 11:04

## 2021-04-08 RX ADMIN — HYPROMELLOSE 2910 1 DROP: 5 SOLUTION OPHTHALMIC at 01:04

## 2021-04-08 RX ADMIN — HYDROMORPHONE HYDROCHLORIDE 2 MG: 1 INJECTION, SOLUTION INTRAMUSCULAR; INTRAVENOUS; SUBCUTANEOUS at 07:04

## 2021-04-08 RX ADMIN — PANTOPRAZOLE SODIUM 40 MG: 40 TABLET, DELAYED RELEASE ORAL at 04:04

## 2021-04-08 RX ADMIN — PROMETHAZINE HYDROCHLORIDE 25 MG: 25 INJECTION INTRAMUSCULAR; INTRAVENOUS at 01:04

## 2021-04-08 RX ADMIN — ALBUTEROL SULFATE 2.5 MG: 2.5 SOLUTION RESPIRATORY (INHALATION) at 08:04

## 2021-04-08 RX ADMIN — HYDROMORPHONE HYDROCHLORIDE 2 MG: 1 INJECTION, SOLUTION INTRAMUSCULAR; INTRAVENOUS; SUBCUTANEOUS at 10:04

## 2021-04-08 RX ADMIN — TAMSULOSIN HYDROCHLORIDE 0.4 MG: 0.4 CAPSULE ORAL at 08:04

## 2021-04-08 RX ADMIN — HYPROMELLOSE 2910 1 DROP: 5 SOLUTION OPHTHALMIC at 06:04

## 2021-04-08 RX ADMIN — DICYCLOMINE HYDROCHLORIDE 20 MG: 20 TABLET ORAL at 04:04

## 2021-04-08 RX ADMIN — DICYCLOMINE HYDROCHLORIDE 20 MG: 20 TABLET ORAL at 08:04

## 2021-04-08 RX ADMIN — DIPHENHYDRAMINE HYDROCHLORIDE 25 MG: 25 CAPSULE ORAL at 04:04

## 2021-04-08 RX ADMIN — ONDANSETRON 4 MG: 2 INJECTION INTRAMUSCULAR; INTRAVENOUS at 06:04

## 2021-04-08 RX ADMIN — GUAIFENESIN 600 MG: 600 TABLET, EXTENDED RELEASE ORAL at 09:04

## 2021-04-08 RX ADMIN — ONDANSETRON 4 MG: 2 INJECTION INTRAMUSCULAR; INTRAVENOUS at 02:04

## 2021-04-08 RX ADMIN — LORAZEPAM 1 MG: 1 TABLET ORAL at 06:04

## 2021-04-08 RX ADMIN — BISACODYL 10 MG: 10 SUPPOSITORY RECTAL at 09:04

## 2021-04-08 RX ADMIN — LORAZEPAM 1 MG: 1 TABLET ORAL at 02:04

## 2021-04-08 RX ADMIN — HYDROCORTISONE: 25 CREAM TOPICAL at 06:04

## 2021-04-08 RX ADMIN — HYDROCORTISONE: 25 CREAM TOPICAL at 01:04

## 2021-04-08 RX ADMIN — PREDNISONE 20 MG: 20 TABLET ORAL at 09:04

## 2021-04-08 RX ADMIN — ONDANSETRON 4 MG: 2 INJECTION INTRAMUSCULAR; INTRAVENOUS at 12:04

## 2021-04-08 RX ADMIN — PSYLLIUM HUSK 1 PACKET: 3.4 POWDER ORAL at 09:04

## 2021-04-08 RX ADMIN — Medication 1000 UNITS: at 09:04

## 2021-04-08 RX ADMIN — GUAIFENESIN 600 MG: 600 TABLET, EXTENDED RELEASE ORAL at 08:04

## 2021-04-09 LAB
ALBUMIN SERPL BCP-MCNC: 2.5 G/DL (ref 3.5–5.2)
ALP SERPL-CCNC: 51 U/L (ref 55–135)
ALT SERPL W/O P-5'-P-CCNC: 11 U/L (ref 10–44)
ANION GAP SERPL CALC-SCNC: 8 MMOL/L (ref 8–16)
AST SERPL-CCNC: 12 U/L (ref 10–40)
BASOPHILS # BLD AUTO: 0.04 K/UL (ref 0–0.2)
BASOPHILS NFR BLD: 0.3 % (ref 0–1.9)
BILIRUB SERPL-MCNC: 0.2 MG/DL (ref 0.1–1)
BUN SERPL-MCNC: 13 MG/DL (ref 6–20)
CALCIUM SERPL-MCNC: 6.2 MG/DL (ref 8.7–10.5)
CHLORIDE SERPL-SCNC: 110 MMOL/L (ref 95–110)
CO2 SERPL-SCNC: 22 MMOL/L (ref 23–29)
CREAT SERPL-MCNC: 0.8 MG/DL (ref 0.5–1.4)
DIFFERENTIAL METHOD: ABNORMAL
EOSINOPHIL # BLD AUTO: 0.1 K/UL (ref 0–0.5)
EOSINOPHIL NFR BLD: 0.7 % (ref 0–8)
ERYTHROCYTE [DISTWIDTH] IN BLOOD BY AUTOMATED COUNT: 19.3 % (ref 11.5–14.5)
EST. GFR  (AFRICAN AMERICAN): >60 ML/MIN/1.73 M^2
EST. GFR  (NON AFRICAN AMERICAN): >60 ML/MIN/1.73 M^2
GLUCOSE SERPL-MCNC: 86 MG/DL (ref 70–110)
HCT VFR BLD AUTO: 31.3 % (ref 40–54)
HGB BLD-MCNC: 9.8 G/DL (ref 14–18)
IMM GRANULOCYTES # BLD AUTO: 0.59 K/UL (ref 0–0.04)
IMM GRANULOCYTES NFR BLD AUTO: 4.5 % (ref 0–0.5)
LYMPHOCYTES # BLD AUTO: 1.3 K/UL (ref 1–4.8)
LYMPHOCYTES NFR BLD: 9.7 % (ref 18–48)
MAGNESIUM SERPL-MCNC: 1.5 MG/DL (ref 1.6–2.6)
MCH RBC QN AUTO: 24.4 PG (ref 27–31)
MCHC RBC AUTO-ENTMCNC: 31.3 G/DL (ref 32–36)
MCV RBC AUTO: 78 FL (ref 82–98)
MONOCYTES # BLD AUTO: 1.9 K/UL (ref 0.3–1)
MONOCYTES NFR BLD: 14.1 % (ref 4–15)
NEUTROPHILS # BLD AUTO: 9.3 K/UL (ref 1.8–7.7)
NEUTROPHILS NFR BLD: 70.7 % (ref 38–73)
NRBC BLD-RTO: 0 /100 WBC
PHOSPHATE SERPL-MCNC: 2.9 MG/DL (ref 2.7–4.5)
PLATELET # BLD AUTO: 311 K/UL (ref 150–450)
PMV BLD AUTO: 10.7 FL (ref 9.2–12.9)
POTASSIUM SERPL-SCNC: 3.9 MMOL/L (ref 3.5–5.1)
PROT SERPL-MCNC: 5 G/DL (ref 6–8.4)
RBC # BLD AUTO: 4.02 M/UL (ref 4.6–6.2)
SODIUM SERPL-SCNC: 140 MMOL/L (ref 136–145)
WBC # BLD AUTO: 13.1 K/UL (ref 3.9–12.7)

## 2021-04-09 PROCEDURE — 63600175 PHARM REV CODE 636 W HCPCS: Performed by: PHYSICIAN ASSISTANT

## 2021-04-09 PROCEDURE — 94761 N-INVAS EAR/PLS OXIMETRY MLT: CPT

## 2021-04-09 PROCEDURE — G0378 HOSPITAL OBSERVATION PER HR: HCPCS

## 2021-04-09 PROCEDURE — 94640 AIRWAY INHALATION TREATMENT: CPT

## 2021-04-09 PROCEDURE — 25000003 PHARM REV CODE 250: Performed by: INTERNAL MEDICINE

## 2021-04-09 PROCEDURE — 80053 COMPREHEN METABOLIC PANEL: CPT | Performed by: HOSPITALIST

## 2021-04-09 PROCEDURE — 63600175 PHARM REV CODE 636 W HCPCS: Performed by: HOSPITALIST

## 2021-04-09 PROCEDURE — 25000003 PHARM REV CODE 250: Performed by: NURSE PRACTITIONER

## 2021-04-09 PROCEDURE — 99900035 HC TECH TIME PER 15 MIN (STAT)

## 2021-04-09 PROCEDURE — 94664 DEMO&/EVAL PT USE INHALER: CPT

## 2021-04-09 PROCEDURE — 96376 TX/PRO/DX INJ SAME DRUG ADON: CPT | Performed by: EMERGENCY MEDICINE

## 2021-04-09 PROCEDURE — 25000242 PHARM REV CODE 250 ALT 637 W/ HCPCS: Performed by: NURSE PRACTITIONER

## 2021-04-09 PROCEDURE — 99225 PR SUBSEQUENT OBSERVATION CARE,LEVEL II: ICD-10-PCS | Mod: ,,, | Performed by: INTERNAL MEDICINE

## 2021-04-09 PROCEDURE — 83735 ASSAY OF MAGNESIUM: CPT | Performed by: HOSPITALIST

## 2021-04-09 PROCEDURE — 84100 ASSAY OF PHOSPHORUS: CPT | Performed by: HOSPITALIST

## 2021-04-09 PROCEDURE — 99225 PR SUBSEQUENT OBSERVATION CARE,LEVEL II: CPT | Mod: ,,, | Performed by: INTERNAL MEDICINE

## 2021-04-09 PROCEDURE — 25500020 PHARM REV CODE 255: Performed by: SURGERY

## 2021-04-09 PROCEDURE — 94645 CONT INHLJ TX EACH ADDL HOUR: CPT

## 2021-04-09 PROCEDURE — 25500020 PHARM REV CODE 255: Performed by: INTERNAL MEDICINE

## 2021-04-09 PROCEDURE — 25000242 PHARM REV CODE 250 ALT 637 W/ HCPCS: Performed by: HOSPITALIST

## 2021-04-09 PROCEDURE — 63600175 PHARM REV CODE 636 W HCPCS: Performed by: NURSE PRACTITIONER

## 2021-04-09 PROCEDURE — 85025 COMPLETE CBC W/AUTO DIFF WBC: CPT | Performed by: HOSPITALIST

## 2021-04-09 PROCEDURE — 25000003 PHARM REV CODE 250: Performed by: HOSPITALIST

## 2021-04-09 RX ORDER — SYRING-NEEDL,DISP,INSUL,0.3 ML 29 G X1/2"
296 SYRINGE, EMPTY DISPOSABLE MISCELLANEOUS
Status: COMPLETED | OUTPATIENT
Start: 2021-04-09 | End: 2021-04-09

## 2021-04-09 RX ORDER — PSEUDOEPHEDRINE/ACETAMINOPHEN 30MG-500MG
100 TABLET ORAL
Status: COMPLETED | OUTPATIENT
Start: 2021-04-09 | End: 2021-04-09

## 2021-04-09 RX ORDER — DIPHENHYDRAMINE HYDROCHLORIDE 50 MG/ML
50 INJECTION INTRAMUSCULAR; INTRAVENOUS
Status: DISCONTINUED | OUTPATIENT
Start: 2021-04-09 | End: 2021-04-10 | Stop reason: HOSPADM

## 2021-04-09 RX ADMIN — HYDROMORPHONE HYDROCHLORIDE 2 MG: 1 INJECTION, SOLUTION INTRAMUSCULAR; INTRAVENOUS; SUBCUTANEOUS at 02:04

## 2021-04-09 RX ADMIN — HYDROMORPHONE HYDROCHLORIDE 2 MG: 1 INJECTION, SOLUTION INTRAMUSCULAR; INTRAVENOUS; SUBCUTANEOUS at 04:04

## 2021-04-09 RX ADMIN — GUAIFENESIN 600 MG: 600 TABLET, EXTENDED RELEASE ORAL at 08:04

## 2021-04-09 RX ADMIN — HYDROMORPHONE HYDROCHLORIDE 1 MG: 1 INJECTION, SOLUTION INTRAMUSCULAR; INTRAVENOUS; SUBCUTANEOUS at 08:04

## 2021-04-09 RX ADMIN — GABAPENTIN 300 MG: 300 CAPSULE ORAL at 08:04

## 2021-04-09 RX ADMIN — Medication 1000 UNITS: at 01:04

## 2021-04-09 RX ADMIN — PROMETHAZINE HYDROCHLORIDE 25 MG: 25 INJECTION INTRAMUSCULAR; INTRAVENOUS at 02:04

## 2021-04-09 RX ADMIN — HYDROMORPHONE HYDROCHLORIDE 1 MG: 1 INJECTION, SOLUTION INTRAMUSCULAR; INTRAVENOUS; SUBCUTANEOUS at 01:04

## 2021-04-09 RX ADMIN — IOHEXOL 15 ML: 350 INJECTION, SOLUTION INTRAVENOUS at 10:04

## 2021-04-09 RX ADMIN — GABAPENTIN 300 MG: 300 CAPSULE ORAL at 04:04

## 2021-04-09 RX ADMIN — METOCLOPRAMIDE 10 MG: 10 TABLET ORAL at 01:04

## 2021-04-09 RX ADMIN — HYDROMORPHONE HYDROCHLORIDE 1 MG: 1 INJECTION, SOLUTION INTRAMUSCULAR; INTRAVENOUS; SUBCUTANEOUS at 03:04

## 2021-04-09 RX ADMIN — TAMSULOSIN HYDROCHLORIDE 0.4 MG: 0.4 CAPSULE ORAL at 08:04

## 2021-04-09 RX ADMIN — ALBUTEROL SULFATE 2.5 MG: 2.5 SOLUTION RESPIRATORY (INHALATION) at 01:04

## 2021-04-09 RX ADMIN — CETIRIZINE HYDROCHLORIDE 20 MG: 10 TABLET, FILM COATED ORAL at 08:04

## 2021-04-09 RX ADMIN — HYDROMORPHONE HYDROCHLORIDE 1 MG: 1 INJECTION, SOLUTION INTRAMUSCULAR; INTRAVENOUS; SUBCUTANEOUS at 05:04

## 2021-04-09 RX ADMIN — ONDANSETRON 4 MG: 2 INJECTION INTRAMUSCULAR; INTRAVENOUS at 05:04

## 2021-04-09 RX ADMIN — PSYLLIUM HUSK 1 PACKET: 3.4 POWDER ORAL at 01:04

## 2021-04-09 RX ADMIN — Medication 1 CAPSULE: at 01:04

## 2021-04-09 RX ADMIN — NYSTATIN 500000 UNITS: 500000 SUSPENSION ORAL at 09:04

## 2021-04-09 RX ADMIN — SULFAMETHOXAZOLE AND TRIMETHOPRIM 1 TABLET: 800; 160 TABLET ORAL at 01:04

## 2021-04-09 RX ADMIN — DICYCLOMINE HYDROCHLORIDE 20 MG: 20 TABLET ORAL at 04:04

## 2021-04-09 RX ADMIN — SODIUM CHLORIDE 500 ML: 0.9 INJECTION, SOLUTION INTRAVENOUS at 04:04

## 2021-04-09 RX ADMIN — SUCRALFATE 1 G: 1 TABLET ORAL at 08:04

## 2021-04-09 RX ADMIN — HYDROXYZINE HYDROCHLORIDE 50 MG: 50 TABLET, FILM COATED ORAL at 02:04

## 2021-04-09 RX ADMIN — DIPHENHYDRAMINE HYDROCHLORIDE 50 MG: 50 INJECTION, SOLUTION INTRAMUSCULAR; INTRAVENOUS at 12:04

## 2021-04-09 RX ADMIN — HYDROMORPHONE HYDROCHLORIDE 1 MG: 1 INJECTION, SOLUTION INTRAMUSCULAR; INTRAVENOUS; SUBCUTANEOUS at 12:04

## 2021-04-09 RX ADMIN — HYPROMELLOSE 2910 1 DROP: 5 SOLUTION OPHTHALMIC at 02:04

## 2021-04-09 RX ADMIN — HYPROMELLOSE 2910 1 DROP: 5 SOLUTION OPHTHALMIC at 08:04

## 2021-04-09 RX ADMIN — TAMSULOSIN HYDROCHLORIDE 0.4 MG: 0.4 CAPSULE ORAL at 01:04

## 2021-04-09 RX ADMIN — HYDROMORPHONE HYDROCHLORIDE 2 MG: 1 INJECTION, SOLUTION INTRAMUSCULAR; INTRAVENOUS; SUBCUTANEOUS at 10:04

## 2021-04-09 RX ADMIN — PREDNISONE 20 MG: 20 TABLET ORAL at 01:04

## 2021-04-09 RX ADMIN — SUCRALFATE 1 G: 1 TABLET ORAL at 01:04

## 2021-04-09 RX ADMIN — HYDROXYZINE HYDROCHLORIDE 50 MG: 50 TABLET, FILM COATED ORAL at 01:04

## 2021-04-09 RX ADMIN — DICYCLOMINE HYDROCHLORIDE 20 MG: 20 TABLET ORAL at 05:04

## 2021-04-09 RX ADMIN — HYDROMORPHONE HYDROCHLORIDE 2 MG: 1 INJECTION, SOLUTION INTRAMUSCULAR; INTRAVENOUS; SUBCUTANEOUS at 07:04

## 2021-04-09 RX ADMIN — SUCRALFATE 1 G: 1 TABLET ORAL at 05:04

## 2021-04-09 RX ADMIN — HYDROXYZINE HYDROCHLORIDE 50 MG: 50 TABLET, FILM COATED ORAL at 10:04

## 2021-04-09 RX ADMIN — METOCLOPRAMIDE 10 MG: 10 TABLET ORAL at 08:04

## 2021-04-09 RX ADMIN — HYDROMORPHONE HYDROCHLORIDE 1 MG: 1 INJECTION, SOLUTION INTRAMUSCULAR; INTRAVENOUS; SUBCUTANEOUS at 10:04

## 2021-04-09 RX ADMIN — HYDROCORTISONE: 25 CREAM TOPICAL at 08:04

## 2021-04-09 RX ADMIN — IOHEXOL 100 ML: 350 INJECTION, SOLUTION INTRAVENOUS at 12:04

## 2021-04-09 RX ADMIN — HYDROMORPHONE HYDROCHLORIDE 2 MG: 1 INJECTION, SOLUTION INTRAMUSCULAR; INTRAVENOUS; SUBCUTANEOUS at 11:04

## 2021-04-09 RX ADMIN — HYPROMELLOSE 2910 1 DROP: 5 SOLUTION OPHTHALMIC at 06:04

## 2021-04-09 RX ADMIN — HYDROMORPHONE HYDROCHLORIDE 1 MG: 1 INJECTION, SOLUTION INTRAMUSCULAR; INTRAVENOUS; SUBCUTANEOUS at 04:04

## 2021-04-09 RX ADMIN — DICYCLOMINE HYDROCHLORIDE 20 MG: 20 TABLET ORAL at 08:04

## 2021-04-09 RX ADMIN — PROMETHAZINE HYDROCHLORIDE 25 MG: 25 INJECTION INTRAMUSCULAR; INTRAVENOUS at 10:04

## 2021-04-09 RX ADMIN — HYDROCORTISONE: 25 CREAM TOPICAL at 01:04

## 2021-04-09 RX ADMIN — ONDANSETRON 4 MG: 2 INJECTION INTRAMUSCULAR; INTRAVENOUS at 06:04

## 2021-04-09 RX ADMIN — ALBUTEROL SULFATE 2.5 MG: 2.5 SOLUTION RESPIRATORY (INHALATION) at 10:04

## 2021-04-09 RX ADMIN — GUAIFENESIN 600 MG: 600 TABLET, EXTENDED RELEASE ORAL at 01:04

## 2021-04-09 RX ADMIN — PANTOPRAZOLE SODIUM 40 MG: 40 TABLET, DELAYED RELEASE ORAL at 05:04

## 2021-04-09 RX ADMIN — GABAPENTIN 300 MG: 300 CAPSULE ORAL at 01:04

## 2021-04-09 RX ADMIN — LORAZEPAM 1 MG: 1 TABLET ORAL at 10:04

## 2021-04-09 RX ADMIN — ONDANSETRON 4 MG: 2 INJECTION INTRAMUSCULAR; INTRAVENOUS at 02:04

## 2021-04-09 RX ADMIN — HYDROMORPHONE HYDROCHLORIDE 2 MG: 1 INJECTION, SOLUTION INTRAMUSCULAR; INTRAVENOUS; SUBCUTANEOUS at 06:04

## 2021-04-09 RX ADMIN — PROMETHAZINE HYDROCHLORIDE 25 MG: 25 INJECTION INTRAMUSCULAR; INTRAVENOUS at 08:04

## 2021-04-09 RX ADMIN — ALBUTEROL SULFATE 2 PUFF: 108 INHALANT RESPIRATORY (INHALATION) at 08:04

## 2021-04-09 RX ADMIN — MAGNESIUM CITRATE 296 ML: 1.75 LIQUID ORAL at 04:04

## 2021-04-09 RX ADMIN — Medication 100 ML: at 04:04

## 2021-04-10 VITALS
BODY MASS INDEX: 28.99 KG/M2 | DIASTOLIC BLOOD PRESSURE: 87 MMHG | SYSTOLIC BLOOD PRESSURE: 140 MMHG | TEMPERATURE: 98 F | HEART RATE: 99 BPM | OXYGEN SATURATION: 100 % | HEIGHT: 72 IN | RESPIRATION RATE: 108 BRPM | WEIGHT: 214.06 LBS

## 2021-04-10 LAB
ALBUMIN SERPL BCP-MCNC: 3 G/DL (ref 3.5–5.2)
ALP SERPL-CCNC: 61 U/L (ref 55–135)
ALT SERPL W/O P-5'-P-CCNC: 18 U/L (ref 10–44)
ANION GAP SERPL CALC-SCNC: 8 MMOL/L (ref 8–16)
AST SERPL-CCNC: 15 U/L (ref 10–40)
BASOPHILS # BLD AUTO: 0.05 K/UL (ref 0–0.2)
BASOPHILS NFR BLD: 0.4 % (ref 0–1.9)
BILIRUB SERPL-MCNC: 0.3 MG/DL (ref 0.1–1)
BUN SERPL-MCNC: 18 MG/DL (ref 6–20)
CALCIUM SERPL-MCNC: 8.1 MG/DL (ref 8.7–10.5)
CHLORIDE SERPL-SCNC: 101 MMOL/L (ref 95–110)
CO2 SERPL-SCNC: 29 MMOL/L (ref 23–29)
CREAT SERPL-MCNC: 1.1 MG/DL (ref 0.5–1.4)
DIFFERENTIAL METHOD: ABNORMAL
EOSINOPHIL # BLD AUTO: 0.2 K/UL (ref 0–0.5)
EOSINOPHIL NFR BLD: 1.2 % (ref 0–8)
ERYTHROCYTE [DISTWIDTH] IN BLOOD BY AUTOMATED COUNT: 18.9 % (ref 11.5–14.5)
EST. GFR  (AFRICAN AMERICAN): >60 ML/MIN/1.73 M^2
EST. GFR  (NON AFRICAN AMERICAN): >60 ML/MIN/1.73 M^2
GLUCOSE SERPL-MCNC: 90 MG/DL (ref 70–110)
HCT VFR BLD AUTO: 30 % (ref 40–54)
HGB BLD-MCNC: 9.1 G/DL (ref 14–18)
IMM GRANULOCYTES # BLD AUTO: 0.55 K/UL (ref 0–0.04)
IMM GRANULOCYTES NFR BLD AUTO: 4.6 % (ref 0–0.5)
LYMPHOCYTES # BLD AUTO: 1.1 K/UL (ref 1–4.8)
LYMPHOCYTES NFR BLD: 9.4 % (ref 18–48)
MAGNESIUM SERPL-MCNC: 2 MG/DL (ref 1.6–2.6)
MCH RBC QN AUTO: 24.1 PG (ref 27–31)
MCHC RBC AUTO-ENTMCNC: 30.3 G/DL (ref 32–36)
MCV RBC AUTO: 79 FL (ref 82–98)
MONOCYTES # BLD AUTO: 1.7 K/UL (ref 0.3–1)
MONOCYTES NFR BLD: 14.1 % (ref 4–15)
NEUTROPHILS # BLD AUTO: 8.4 K/UL (ref 1.8–7.7)
NEUTROPHILS NFR BLD: 70.3 % (ref 38–73)
NRBC BLD-RTO: 0 /100 WBC
PHOSPHATE SERPL-MCNC: 4.3 MG/DL (ref 2.7–4.5)
PLATELET # BLD AUTO: 281 K/UL (ref 150–450)
PMV BLD AUTO: 10.4 FL (ref 9.2–12.9)
POTASSIUM SERPL-SCNC: 3.8 MMOL/L (ref 3.5–5.1)
PROT SERPL-MCNC: 5.8 G/DL (ref 6–8.4)
RBC # BLD AUTO: 3.78 M/UL (ref 4.6–6.2)
SODIUM SERPL-SCNC: 138 MMOL/L (ref 136–145)
WBC # BLD AUTO: 12.02 K/UL (ref 3.9–12.7)

## 2021-04-10 PROCEDURE — 84100 ASSAY OF PHOSPHORUS: CPT | Performed by: HOSPITALIST

## 2021-04-10 PROCEDURE — 25000003 PHARM REV CODE 250: Performed by: NURSE PRACTITIONER

## 2021-04-10 PROCEDURE — 99225 PR SUBSEQUENT OBSERVATION CARE,LEVEL II: ICD-10-PCS | Mod: ,,, | Performed by: INTERNAL MEDICINE

## 2021-04-10 PROCEDURE — 80053 COMPREHEN METABOLIC PANEL: CPT | Performed by: HOSPITALIST

## 2021-04-10 PROCEDURE — 99900035 HC TECH TIME PER 15 MIN (STAT)

## 2021-04-10 PROCEDURE — G0378 HOSPITAL OBSERVATION PER HR: HCPCS

## 2021-04-10 PROCEDURE — 83735 ASSAY OF MAGNESIUM: CPT | Performed by: HOSPITALIST

## 2021-04-10 PROCEDURE — 85025 COMPLETE CBC W/AUTO DIFF WBC: CPT | Performed by: HOSPITALIST

## 2021-04-10 PROCEDURE — 94664 DEMO&/EVAL PT USE INHALER: CPT | Mod: 59

## 2021-04-10 PROCEDURE — 25000242 PHARM REV CODE 250 ALT 637 W/ HCPCS: Performed by: NURSE PRACTITIONER

## 2021-04-10 PROCEDURE — 94761 N-INVAS EAR/PLS OXIMETRY MLT: CPT

## 2021-04-10 PROCEDURE — 63600175 PHARM REV CODE 636 W HCPCS: Performed by: HOSPITALIST

## 2021-04-10 PROCEDURE — 25000242 PHARM REV CODE 250 ALT 637 W/ HCPCS: Performed by: HOSPITALIST

## 2021-04-10 PROCEDURE — 94640 AIRWAY INHALATION TREATMENT: CPT

## 2021-04-10 PROCEDURE — 27000646 HC AEROBIKA DEVICE

## 2021-04-10 PROCEDURE — 63600175 PHARM REV CODE 636 W HCPCS: Performed by: NURSE PRACTITIONER

## 2021-04-10 PROCEDURE — 99225 PR SUBSEQUENT OBSERVATION CARE,LEVEL II: CPT | Mod: ,,, | Performed by: INTERNAL MEDICINE

## 2021-04-10 PROCEDURE — 25000003 PHARM REV CODE 250: Performed by: HOSPITALIST

## 2021-04-10 RX ORDER — OXYCODONE AND ACETAMINOPHEN 10; 325 MG/1; MG/1
1 TABLET ORAL EVERY 4 HOURS PRN
Status: DISCONTINUED | OUTPATIENT
Start: 2021-04-10 | End: 2021-04-10 | Stop reason: HOSPADM

## 2021-04-10 RX ORDER — HEPARIN SODIUM,PORCINE 10 UNIT/ML
10 VIAL (ML) INTRAVENOUS
Status: DISCONTINUED | OUTPATIENT
Start: 2021-04-10 | End: 2021-04-10 | Stop reason: HOSPADM

## 2021-04-10 RX ORDER — SULFAMETHOXAZOLE AND TRIMETHOPRIM 800; 160 MG/1; MG/1
1 TABLET ORAL DAILY
Qty: 30 TABLET | Refills: 4 | Status: ON HOLD | OUTPATIENT
Start: 2021-04-10 | End: 2021-07-30 | Stop reason: SDUPTHER

## 2021-04-10 RX ORDER — HYDROCODONE BITARTRATE AND ACETAMINOPHEN 10; 325 MG/1; MG/1
1 TABLET ORAL EVERY 4 HOURS PRN
Qty: 30 TABLET | Refills: 0 | Status: ON HOLD | OUTPATIENT
Start: 2021-04-10 | End: 2021-04-17 | Stop reason: HOSPADM

## 2021-04-10 RX ADMIN — GUAIFENESIN 600 MG: 600 TABLET, EXTENDED RELEASE ORAL at 08:04

## 2021-04-10 RX ADMIN — Medication 1 CAPSULE: at 08:04

## 2021-04-10 RX ADMIN — PREDNISONE 20 MG: 20 TABLET ORAL at 08:04

## 2021-04-10 RX ADMIN — PSYLLIUM HUSK 1 PACKET: 3.4 POWDER ORAL at 08:04

## 2021-04-10 RX ADMIN — Medication 1000 UNITS: at 08:04

## 2021-04-10 RX ADMIN — HYPROMELLOSE 2910 1 DROP: 5 SOLUTION OPHTHALMIC at 10:04

## 2021-04-10 RX ADMIN — HYDROMORPHONE HYDROCHLORIDE 1 MG: 1 INJECTION, SOLUTION INTRAMUSCULAR; INTRAVENOUS; SUBCUTANEOUS at 01:04

## 2021-04-10 RX ADMIN — SULFAMETHOXAZOLE AND TRIMETHOPRIM 1 TABLET: 800; 160 TABLET ORAL at 08:04

## 2021-04-10 RX ADMIN — TAMSULOSIN HYDROCHLORIDE 0.4 MG: 0.4 CAPSULE ORAL at 08:04

## 2021-04-10 RX ADMIN — HYDROMORPHONE HYDROCHLORIDE 1 MG: 1 INJECTION, SOLUTION INTRAMUSCULAR; INTRAVENOUS; SUBCUTANEOUS at 04:04

## 2021-04-10 RX ADMIN — SUCRALFATE 1 G: 1 TABLET ORAL at 06:04

## 2021-04-10 RX ADMIN — METOCLOPRAMIDE 10 MG: 10 TABLET ORAL at 10:04

## 2021-04-10 RX ADMIN — HYDROMORPHONE HYDROCHLORIDE 2 MG: 1 INJECTION, SOLUTION INTRAMUSCULAR; INTRAVENOUS; SUBCUTANEOUS at 02:04

## 2021-04-10 RX ADMIN — NYSTATIN 500000 UNITS: 500000 SUSPENSION ORAL at 11:04

## 2021-04-10 RX ADMIN — NYSTATIN 500000 UNITS: 500000 SUSPENSION ORAL at 08:04

## 2021-04-10 RX ADMIN — ONDANSETRON 4 MG: 2 INJECTION INTRAMUSCULAR; INTRAVENOUS at 10:04

## 2021-04-10 RX ADMIN — HYDROCORTISONE: 25 CREAM TOPICAL at 08:04

## 2021-04-10 RX ADMIN — ALBUTEROL SULFATE 2.5 MG: 2.5 SOLUTION RESPIRATORY (INHALATION) at 08:04

## 2021-04-10 RX ADMIN — ALBUTEROL SULFATE 2.5 MG: 2.5 SOLUTION RESPIRATORY (INHALATION) at 02:04

## 2021-04-10 RX ADMIN — HYDROCORTISONE: 25 CREAM TOPICAL at 01:04

## 2021-04-10 RX ADMIN — SUCRALFATE 1 G: 1 TABLET ORAL at 10:04

## 2021-04-10 RX ADMIN — HYDROMORPHONE HYDROCHLORIDE 2 MG: 1 INJECTION, SOLUTION INTRAMUSCULAR; INTRAVENOUS; SUBCUTANEOUS at 01:04

## 2021-04-10 RX ADMIN — CETIRIZINE HYDROCHLORIDE 20 MG: 10 TABLET, FILM COATED ORAL at 08:04

## 2021-04-10 RX ADMIN — FERROUS SULFATE TAB EC 325 MG (65 MG FE EQUIVALENT) 325 MG: 325 (65 FE) TABLET DELAYED RESPONSE at 08:04

## 2021-04-10 RX ADMIN — GABAPENTIN 300 MG: 300 CAPSULE ORAL at 08:04

## 2021-04-10 RX ADMIN — DICYCLOMINE HYDROCHLORIDE 20 MG: 20 TABLET ORAL at 10:04

## 2021-04-10 RX ADMIN — PANTOPRAZOLE SODIUM 40 MG: 40 TABLET, DELAYED RELEASE ORAL at 06:04

## 2021-04-10 RX ADMIN — HYDROMORPHONE HYDROCHLORIDE 1 MG: 1 INJECTION, SOLUTION INTRAMUSCULAR; INTRAVENOUS; SUBCUTANEOUS at 08:04

## 2021-04-10 RX ADMIN — HYPROMELLOSE 2910 1 DROP: 5 SOLUTION OPHTHALMIC at 06:04

## 2021-04-10 RX ADMIN — HYPROMELLOSE 2910 1 DROP: 5 SOLUTION OPHTHALMIC at 02:04

## 2021-04-10 RX ADMIN — METOCLOPRAMIDE 10 MG: 10 TABLET ORAL at 06:04

## 2021-04-10 RX ADMIN — DICYCLOMINE HYDROCHLORIDE 20 MG: 20 TABLET ORAL at 06:04

## 2021-04-10 RX ADMIN — HYDROMORPHONE HYDROCHLORIDE 2 MG: 1 INJECTION, SOLUTION INTRAMUSCULAR; INTRAVENOUS; SUBCUTANEOUS at 06:04

## 2021-04-10 RX ADMIN — PAROXETINE HYDROCHLORIDE 20 MG: 20 TABLET, FILM COATED ORAL at 08:04

## 2021-04-10 RX ADMIN — ONDANSETRON 4 MG: 2 INJECTION INTRAMUSCULAR; INTRAVENOUS at 01:04

## 2021-04-10 RX ADMIN — HYDROMORPHONE HYDROCHLORIDE 2 MG: 1 INJECTION, SOLUTION INTRAMUSCULAR; INTRAVENOUS; SUBCUTANEOUS at 10:04

## 2021-04-13 ENCOUNTER — PATIENT MESSAGE (OUTPATIENT)
Dept: PHARMACY | Facility: CLINIC | Age: 41
End: 2021-04-13

## 2021-04-14 ENCOUNTER — PATIENT MESSAGE (OUTPATIENT)
Dept: SURGERY | Facility: CLINIC | Age: 41
End: 2021-04-14

## 2021-04-14 ENCOUNTER — SPECIALTY PHARMACY (OUTPATIENT)
Dept: PHARMACY | Facility: CLINIC | Age: 41
End: 2021-04-14

## 2021-04-14 DIAGNOSIS — D72.119 HYPEREOSINOPHILIC SYNDROME, UNSPECIFIED TYPE: Primary | ICD-10-CM

## 2021-04-14 LAB
FINAL PATHOLOGIC DIAGNOSIS: NORMAL
Lab: NORMAL

## 2021-04-15 ENCOUNTER — PATIENT MESSAGE (OUTPATIENT)
Dept: SURGERY | Facility: CLINIC | Age: 41
End: 2021-04-15

## 2021-04-15 ENCOUNTER — NURSE TRIAGE (OUTPATIENT)
Dept: ADMINISTRATIVE | Facility: CLINIC | Age: 41
End: 2021-04-15

## 2021-04-15 ENCOUNTER — TELEPHONE (OUTPATIENT)
Dept: ALLERGY | Facility: CLINIC | Age: 41
End: 2021-04-15

## 2021-04-15 RX ORDER — DEXTROAMPHETAMINE SACCHARATE, AMPHETAMINE ASPARTATE, DEXTROAMPHETAMINE SULFATE AND AMPHETAMINE SULFATE 5; 5; 5; 5 MG/1; MG/1; MG/1; MG/1
1 TABLET ORAL 2 TIMES DAILY
COMMUNITY
Start: 2021-03-29

## 2021-04-16 ENCOUNTER — PATIENT MESSAGE (OUTPATIENT)
Dept: SURGERY | Facility: CLINIC | Age: 41
End: 2021-04-16

## 2021-04-16 ENCOUNTER — HOSPITAL ENCOUNTER (INPATIENT)
Facility: HOSPITAL | Age: 41
LOS: 1 days | Discharge: HOME | DRG: 863 | End: 2021-04-17
Attending: EMERGENCY MEDICINE | Admitting: EMERGENCY MEDICINE
Payer: MEDICAID

## 2021-04-16 DIAGNOSIS — R00.0 TACHYCARDIA: ICD-10-CM

## 2021-04-16 DIAGNOSIS — R07.9 CHEST PAIN: ICD-10-CM

## 2021-04-16 DIAGNOSIS — R05.9 COUGH: ICD-10-CM

## 2021-04-16 PROBLEM — R35.1 BENIGN PROSTATIC HYPERPLASIA WITH NOCTURIA: Chronic | Status: RESOLVED | Noted: 2020-09-16 | Resolved: 2021-04-16

## 2021-04-16 PROBLEM — N40.1 BENIGN PROSTATIC HYPERPLASIA WITH NOCTURIA: Chronic | Status: RESOLVED | Noted: 2020-09-16 | Resolved: 2021-04-16

## 2021-04-16 LAB
ABO + RH BLD: NORMAL
ALBUMIN SERPL BCP-MCNC: 3.7 G/DL (ref 3.5–5.2)
ALP SERPL-CCNC: 54 U/L (ref 55–135)
ALT SERPL W/O P-5'-P-CCNC: 33 U/L (ref 10–44)
AMPHET+METHAMPHET UR QL: NORMAL
ANION GAP SERPL CALC-SCNC: 9 MMOL/L (ref 8–16)
AST SERPL-CCNC: 24 U/L (ref 10–40)
BARBITURATES UR QL SCN>200 NG/ML: NEGATIVE
BASOPHILS # BLD AUTO: 0.02 K/UL (ref 0–0.2)
BASOPHILS # BLD AUTO: 0.04 K/UL (ref 0–0.2)
BASOPHILS NFR BLD: 0.2 % (ref 0–1.9)
BASOPHILS NFR BLD: 0.3 % (ref 0–1.9)
BENZODIAZ UR QL SCN>200 NG/ML: NEGATIVE
BILIRUB SERPL-MCNC: 0.6 MG/DL (ref 0.1–1)
BILIRUB UR QL STRIP: NEGATIVE
BLD GP AB SCN CELLS X3 SERPL QL: NORMAL
BUN SERPL-MCNC: 21 MG/DL (ref 6–20)
BZE UR QL SCN: NEGATIVE
CALCIUM SERPL-MCNC: 8.5 MG/DL (ref 8.7–10.5)
CANNABINOIDS UR QL SCN: NEGATIVE
CAOX CRY UR QL COMP ASSIST: ABNORMAL
CHLORIDE SERPL-SCNC: 106 MMOL/L (ref 95–110)
CLARITY UR REFRACT.AUTO: CLEAR
CO2 SERPL-SCNC: 24 MMOL/L (ref 23–29)
COLOR UR AUTO: YELLOW
CREAT SERPL-MCNC: 1.1 MG/DL (ref 0.5–1.4)
CREAT UR-MCNC: 114 MG/DL (ref 23–375)
CTP QC/QA: YES
DIFFERENTIAL METHOD: ABNORMAL
DIFFERENTIAL METHOD: ABNORMAL
EOSINOPHIL # BLD AUTO: 0 K/UL (ref 0–0.5)
EOSINOPHIL # BLD AUTO: 0 K/UL (ref 0–0.5)
EOSINOPHIL NFR BLD: 0 % (ref 0–8)
EOSINOPHIL NFR BLD: 0.1 % (ref 0–8)
ERYTHROCYTE [DISTWIDTH] IN BLOOD BY AUTOMATED COUNT: 18.6 % (ref 11.5–14.5)
ERYTHROCYTE [DISTWIDTH] IN BLOOD BY AUTOMATED COUNT: 18.6 % (ref 11.5–14.5)
EST. GFR  (AFRICAN AMERICAN): >60 ML/MIN/1.73 M^2
EST. GFR  (NON AFRICAN AMERICAN): >60 ML/MIN/1.73 M^2
ETHANOL UR-MCNC: <10 MG/DL
GLUCOSE SERPL-MCNC: 92 MG/DL (ref 70–110)
GLUCOSE UR QL STRIP: NEGATIVE
HCT VFR BLD AUTO: 31.2 % (ref 40–54)
HCT VFR BLD AUTO: 31.3 % (ref 40–54)
HGB BLD-MCNC: 9.8 G/DL (ref 14–18)
HGB BLD-MCNC: 9.8 G/DL (ref 14–18)
HGB UR QL STRIP: ABNORMAL
IMM GRANULOCYTES # BLD AUTO: 0.04 K/UL (ref 0–0.04)
IMM GRANULOCYTES # BLD AUTO: 0.05 K/UL (ref 0–0.04)
IMM GRANULOCYTES NFR BLD AUTO: 0.3 % (ref 0–0.5)
IMM GRANULOCYTES NFR BLD AUTO: 0.5 % (ref 0–0.5)
INR PPP: 1.1 (ref 0.8–1.2)
KETONES UR QL STRIP: ABNORMAL
LACTATE SERPL-SCNC: 1 MMOL/L (ref 0.5–2.2)
LEUKOCYTE ESTERASE UR QL STRIP: NEGATIVE
LIPASE SERPL-CCNC: 17 U/L (ref 4–60)
LYMPHOCYTES # BLD AUTO: 0.5 K/UL (ref 1–4.8)
LYMPHOCYTES # BLD AUTO: 1.1 K/UL (ref 1–4.8)
LYMPHOCYTES NFR BLD: 4.4 % (ref 18–48)
LYMPHOCYTES NFR BLD: 8 % (ref 18–48)
MAGNESIUM SERPL-MCNC: 2.1 MG/DL (ref 1.6–2.6)
MCH RBC QN AUTO: 23.7 PG (ref 27–31)
MCH RBC QN AUTO: 23.7 PG (ref 27–31)
MCHC RBC AUTO-ENTMCNC: 31.3 G/DL (ref 32–36)
MCHC RBC AUTO-ENTMCNC: 31.4 G/DL (ref 32–36)
MCV RBC AUTO: 75 FL (ref 82–98)
MCV RBC AUTO: 76 FL (ref 82–98)
METHADONE UR QL SCN>300 NG/ML: NEGATIVE
MICROSCOPIC COMMENT: ABNORMAL
MONOCYTES # BLD AUTO: 0.6 K/UL (ref 0.3–1)
MONOCYTES # BLD AUTO: 1.4 K/UL (ref 0.3–1)
MONOCYTES NFR BLD: 10.5 % (ref 4–15)
MONOCYTES NFR BLD: 5.7 % (ref 4–15)
NEUTROPHILS # BLD AUTO: 11 K/UL (ref 1.8–7.7)
NEUTROPHILS # BLD AUTO: 9.2 K/UL (ref 1.8–7.7)
NEUTROPHILS NFR BLD: 80.8 % (ref 38–73)
NEUTROPHILS NFR BLD: 89.2 % (ref 38–73)
NITRITE UR QL STRIP: NEGATIVE
NRBC BLD-RTO: 0 /100 WBC
NRBC BLD-RTO: 0 /100 WBC
OPIATES UR QL SCN: NORMAL
PCP UR QL SCN>25 NG/ML: NEGATIVE
PH UR STRIP: 6 [PH] (ref 5–8)
PLATELET # BLD AUTO: 370 K/UL (ref 150–450)
PLATELET # BLD AUTO: 421 K/UL (ref 150–450)
PMV BLD AUTO: 9.3 FL (ref 9.2–12.9)
PMV BLD AUTO: 9.3 FL (ref 9.2–12.9)
POTASSIUM SERPL-SCNC: 3.4 MMOL/L (ref 3.5–5.1)
PROT SERPL-MCNC: 6.6 G/DL (ref 6–8.4)
PROT UR QL STRIP: NEGATIVE
PROTHROMBIN TIME: 11.5 SEC (ref 9–12.5)
RBC # BLD AUTO: 4.13 M/UL (ref 4.6–6.2)
RBC # BLD AUTO: 4.14 M/UL (ref 4.6–6.2)
RBC #/AREA URNS AUTO: 68 /HPF (ref 0–4)
SARS-COV-2 RDRP RESP QL NAA+PROBE: NEGATIVE
SODIUM SERPL-SCNC: 139 MMOL/L (ref 136–145)
SP GR UR STRIP: 1.02 (ref 1–1.03)
SQUAMOUS #/AREA URNS AUTO: 0 /HPF
TOXICOLOGY INFORMATION: NORMAL
URN SPEC COLLECT METH UR: ABNORMAL
WBC # BLD AUTO: 10.32 K/UL (ref 3.9–12.7)
WBC # BLD AUTO: 13.62 K/UL (ref 3.9–12.7)
WBC #/AREA URNS AUTO: 1 /HPF (ref 0–5)

## 2021-04-16 PROCEDURE — 81001 URINALYSIS AUTO W/SCOPE: CPT | Performed by: STUDENT IN AN ORGANIZED HEALTH CARE EDUCATION/TRAINING PROGRAM

## 2021-04-16 PROCEDURE — 25000003 PHARM REV CODE 250: Performed by: STUDENT IN AN ORGANIZED HEALTH CARE EDUCATION/TRAINING PROGRAM

## 2021-04-16 PROCEDURE — 25000003 PHARM REV CODE 250: Performed by: HOSPITALIST

## 2021-04-16 PROCEDURE — 99223 1ST HOSP IP/OBS HIGH 75: CPT | Mod: ,,, | Performed by: STUDENT IN AN ORGANIZED HEALTH CARE EDUCATION/TRAINING PROGRAM

## 2021-04-16 PROCEDURE — 99223 PR INITIAL HOSPITAL CARE,LEVL III: ICD-10-PCS | Mod: ,,, | Performed by: HOSPITALIST

## 2021-04-16 PROCEDURE — 99223 PR INITIAL HOSPITAL CARE,LEVL III: ICD-10-PCS | Mod: ,,, | Performed by: STUDENT IN AN ORGANIZED HEALTH CARE EDUCATION/TRAINING PROGRAM

## 2021-04-16 PROCEDURE — 85025 COMPLETE CBC W/AUTO DIFF WBC: CPT | Mod: 91 | Performed by: STUDENT IN AN ORGANIZED HEALTH CARE EDUCATION/TRAINING PROGRAM

## 2021-04-16 PROCEDURE — 96376 TX/PRO/DX INJ SAME DRUG ADON: CPT

## 2021-04-16 PROCEDURE — 93005 ELECTROCARDIOGRAM TRACING: CPT

## 2021-04-16 PROCEDURE — 99285 PR EMERGENCY DEPT VISIT,LEVEL V: ICD-10-PCS | Mod: CS,,, | Performed by: EMERGENCY MEDICINE

## 2021-04-16 PROCEDURE — 83605 ASSAY OF LACTIC ACID: CPT | Performed by: STUDENT IN AN ORGANIZED HEALTH CARE EDUCATION/TRAINING PROGRAM

## 2021-04-16 PROCEDURE — 87040 BLOOD CULTURE FOR BACTERIA: CPT | Mod: 59 | Performed by: STUDENT IN AN ORGANIZED HEALTH CARE EDUCATION/TRAINING PROGRAM

## 2021-04-16 PROCEDURE — 11000001 HC ACUTE MED/SURG PRIVATE ROOM

## 2021-04-16 PROCEDURE — 80307 DRUG TEST PRSMV CHEM ANLYZR: CPT | Performed by: STUDENT IN AN ORGANIZED HEALTH CARE EDUCATION/TRAINING PROGRAM

## 2021-04-16 PROCEDURE — 99285 EMERGENCY DEPT VISIT HI MDM: CPT | Mod: 25

## 2021-04-16 PROCEDURE — 85610 PROTHROMBIN TIME: CPT | Performed by: STUDENT IN AN ORGANIZED HEALTH CARE EDUCATION/TRAINING PROGRAM

## 2021-04-16 PROCEDURE — 96375 TX/PRO/DX INJ NEW DRUG ADDON: CPT

## 2021-04-16 PROCEDURE — 99285 EMERGENCY DEPT VISIT HI MDM: CPT | Mod: CS,,, | Performed by: EMERGENCY MEDICINE

## 2021-04-16 PROCEDURE — C9113 INJ PANTOPRAZOLE SODIUM, VIA: HCPCS | Performed by: STUDENT IN AN ORGANIZED HEALTH CARE EDUCATION/TRAINING PROGRAM

## 2021-04-16 PROCEDURE — 80053 COMPREHEN METABOLIC PANEL: CPT | Performed by: STUDENT IN AN ORGANIZED HEALTH CARE EDUCATION/TRAINING PROGRAM

## 2021-04-16 PROCEDURE — 83735 ASSAY OF MAGNESIUM: CPT | Performed by: STUDENT IN AN ORGANIZED HEALTH CARE EDUCATION/TRAINING PROGRAM

## 2021-04-16 PROCEDURE — U0002 COVID-19 LAB TEST NON-CDC: HCPCS | Performed by: STUDENT IN AN ORGANIZED HEALTH CARE EDUCATION/TRAINING PROGRAM

## 2021-04-16 PROCEDURE — 93010 ELECTROCARDIOGRAM REPORT: CPT | Mod: ,,, | Performed by: INTERNAL MEDICINE

## 2021-04-16 PROCEDURE — 93010 EKG 12-LEAD: ICD-10-PCS | Mod: 59,76,, | Performed by: INTERNAL MEDICINE

## 2021-04-16 PROCEDURE — 99223 1ST HOSP IP/OBS HIGH 75: CPT | Mod: ,,, | Performed by: HOSPITALIST

## 2021-04-16 PROCEDURE — 93010 ELECTROCARDIOGRAM REPORT: CPT | Mod: 59,76,, | Performed by: INTERNAL MEDICINE

## 2021-04-16 PROCEDURE — 96361 HYDRATE IV INFUSION ADD-ON: CPT

## 2021-04-16 PROCEDURE — 86900 BLOOD TYPING SEROLOGIC ABO: CPT | Performed by: STUDENT IN AN ORGANIZED HEALTH CARE EDUCATION/TRAINING PROGRAM

## 2021-04-16 PROCEDURE — 63600175 PHARM REV CODE 636 W HCPCS: Performed by: STUDENT IN AN ORGANIZED HEALTH CARE EDUCATION/TRAINING PROGRAM

## 2021-04-16 PROCEDURE — 85025 COMPLETE CBC W/AUTO DIFF WBC: CPT | Performed by: STUDENT IN AN ORGANIZED HEALTH CARE EDUCATION/TRAINING PROGRAM

## 2021-04-16 PROCEDURE — 83690 ASSAY OF LIPASE: CPT | Performed by: STUDENT IN AN ORGANIZED HEALTH CARE EDUCATION/TRAINING PROGRAM

## 2021-04-16 PROCEDURE — 96374 THER/PROPH/DIAG INJ IV PUSH: CPT

## 2021-04-16 PROCEDURE — 93010 EKG 12-LEAD: ICD-10-PCS | Mod: ,,, | Performed by: INTERNAL MEDICINE

## 2021-04-16 PROCEDURE — 36415 COLL VENOUS BLD VENIPUNCTURE: CPT | Performed by: STUDENT IN AN ORGANIZED HEALTH CARE EDUCATION/TRAINING PROGRAM

## 2021-04-16 RX ORDER — PAROXETINE 10 MG/1
20 TABLET, FILM COATED ORAL EVERY MORNING
Status: DISCONTINUED | OUTPATIENT
Start: 2021-04-16 | End: 2021-04-17 | Stop reason: HOSPADM

## 2021-04-16 RX ORDER — SULFAMETHOXAZOLE AND TRIMETHOPRIM 800; 160 MG/1; MG/1
1 TABLET ORAL DAILY
Status: DISCONTINUED | OUTPATIENT
Start: 2021-04-17 | End: 2021-04-17 | Stop reason: HOSPADM

## 2021-04-16 RX ORDER — IBUPROFEN 200 MG
24 TABLET ORAL
Status: DISCONTINUED | OUTPATIENT
Start: 2021-04-16 | End: 2021-04-17 | Stop reason: HOSPADM

## 2021-04-16 RX ORDER — PANTOPRAZOLE SODIUM 40 MG/1
40 TABLET, DELAYED RELEASE ORAL
Status: DISCONTINUED | OUTPATIENT
Start: 2021-04-16 | End: 2021-04-17 | Stop reason: HOSPADM

## 2021-04-16 RX ORDER — ACETAMINOPHEN 325 MG/1
650 TABLET ORAL EVERY 6 HOURS PRN
Status: DISCONTINUED | OUTPATIENT
Start: 2021-04-16 | End: 2021-04-17 | Stop reason: HOSPADM

## 2021-04-16 RX ORDER — TAMSULOSIN HYDROCHLORIDE 0.4 MG/1
0.4 CAPSULE ORAL 2 TIMES DAILY
Status: DISCONTINUED | OUTPATIENT
Start: 2021-04-16 | End: 2021-04-17 | Stop reason: HOSPADM

## 2021-04-16 RX ORDER — CLINDAMYCIN HYDROCHLORIDE 150 MG/1
300 CAPSULE ORAL EVERY 6 HOURS
Status: DISCONTINUED | OUTPATIENT
Start: 2021-04-16 | End: 2021-04-17 | Stop reason: HOSPADM

## 2021-04-16 RX ORDER — MUPIROCIN 20 MG/G
OINTMENT TOPICAL 2 TIMES DAILY
Status: DISCONTINUED | OUTPATIENT
Start: 2021-04-16 | End: 2021-04-17 | Stop reason: HOSPADM

## 2021-04-16 RX ORDER — HYDROXYZINE HYDROCHLORIDE 25 MG/1
25 TABLET, FILM COATED ORAL 3 TIMES DAILY PRN
Status: DISCONTINUED | OUTPATIENT
Start: 2021-04-16 | End: 2021-04-16

## 2021-04-16 RX ORDER — DROPERIDOL 2.5 MG/ML
0.62 INJECTION, SOLUTION INTRAMUSCULAR; INTRAVENOUS ONCE
Status: COMPLETED | OUTPATIENT
Start: 2021-04-16 | End: 2021-04-16

## 2021-04-16 RX ORDER — HYDROCODONE BITARTRATE AND ACETAMINOPHEN 10; 325 MG/1; MG/1
1 TABLET ORAL EVERY 6 HOURS PRN
Status: DISCONTINUED | OUTPATIENT
Start: 2021-04-16 | End: 2021-04-16

## 2021-04-16 RX ORDER — LORAZEPAM 2 MG/ML
1 INJECTION INTRAMUSCULAR ONCE
Status: COMPLETED | OUTPATIENT
Start: 2021-04-16 | End: 2021-04-16

## 2021-04-16 RX ORDER — MORPHINE SULFATE 4 MG/ML
4 INJECTION, SOLUTION INTRAMUSCULAR; INTRAVENOUS
Status: COMPLETED | OUTPATIENT
Start: 2021-04-16 | End: 2021-04-16

## 2021-04-16 RX ORDER — IBUPROFEN 200 MG
16 TABLET ORAL
Status: DISCONTINUED | OUTPATIENT
Start: 2021-04-16 | End: 2021-04-17 | Stop reason: HOSPADM

## 2021-04-16 RX ORDER — PANTOPRAZOLE SODIUM 40 MG/10ML
40 INJECTION, POWDER, LYOPHILIZED, FOR SOLUTION INTRAVENOUS
Status: COMPLETED | OUTPATIENT
Start: 2021-04-16 | End: 2021-04-16

## 2021-04-16 RX ORDER — PREDNISONE 20 MG/1
20 TABLET ORAL DAILY
Status: DISCONTINUED | OUTPATIENT
Start: 2021-04-17 | End: 2021-04-17 | Stop reason: HOSPADM

## 2021-04-16 RX ORDER — ONDANSETRON 2 MG/ML
4 INJECTION INTRAMUSCULAR; INTRAVENOUS ONCE
Status: COMPLETED | OUTPATIENT
Start: 2021-04-16 | End: 2021-04-16

## 2021-04-16 RX ORDER — GLUCAGON 1 MG
1 KIT INJECTION
Status: DISCONTINUED | OUTPATIENT
Start: 2021-04-16 | End: 2021-04-17 | Stop reason: HOSPADM

## 2021-04-16 RX ORDER — DICYCLOMINE HYDROCHLORIDE 10 MG/1
20 CAPSULE ORAL
Status: COMPLETED | OUTPATIENT
Start: 2021-04-16 | End: 2021-04-16

## 2021-04-16 RX ORDER — HYDROXYZINE HYDROCHLORIDE 25 MG/1
25 TABLET, FILM COATED ORAL 3 TIMES DAILY PRN
Status: DISCONTINUED | OUTPATIENT
Start: 2021-04-16 | End: 2021-04-17 | Stop reason: HOSPADM

## 2021-04-16 RX ORDER — ONDANSETRON 2 MG/ML
4 INJECTION INTRAMUSCULAR; INTRAVENOUS
Status: COMPLETED | OUTPATIENT
Start: 2021-04-16 | End: 2021-04-16

## 2021-04-16 RX ORDER — SODIUM CHLORIDE 0.9 % (FLUSH) 0.9 %
10 SYRINGE (ML) INJECTION
Status: DISCONTINUED | OUTPATIENT
Start: 2021-04-16 | End: 2021-04-17 | Stop reason: HOSPADM

## 2021-04-16 RX ADMIN — TAMSULOSIN HYDROCHLORIDE 0.4 MG: 0.4 CAPSULE ORAL at 09:04

## 2021-04-16 RX ADMIN — POTASSIUM BICARBONATE 25 MEQ: 978 TABLET, EFFERVESCENT ORAL at 04:04

## 2021-04-16 RX ADMIN — MORPHINE SULFATE 4 MG: 4 INJECTION INTRAVENOUS at 11:04

## 2021-04-16 RX ADMIN — DICYCLOMINE HYDROCHLORIDE 20 MG: 10 CAPSULE ORAL at 12:04

## 2021-04-16 RX ADMIN — DROPERIDOL 0.62 MG: 2.5 INJECTION, SOLUTION INTRAMUSCULAR; INTRAVENOUS at 12:04

## 2021-04-16 RX ADMIN — LORAZEPAM 1 MG: 2 INJECTION INTRAMUSCULAR; INTRAVENOUS at 11:04

## 2021-04-16 RX ADMIN — PANTOPRAZOLE SODIUM 40 MG: 40 INJECTION, POWDER, FOR SOLUTION INTRAVENOUS at 12:04

## 2021-04-16 RX ADMIN — MORPHINE SULFATE 4 MG: 4 INJECTION INTRAVENOUS at 12:04

## 2021-04-16 RX ADMIN — SODIUM CHLORIDE, SODIUM LACTATE, POTASSIUM CHLORIDE, AND CALCIUM CHLORIDE 1000 ML: .6; .31; .03; .02 INJECTION, SOLUTION INTRAVENOUS at 01:04

## 2021-04-16 RX ADMIN — CLINDAMYCIN HYDROCHLORIDE 300 MG: 150 CAPSULE ORAL at 06:04

## 2021-04-16 RX ADMIN — MUPIROCIN: 20 OINTMENT TOPICAL at 09:04

## 2021-04-16 RX ADMIN — ONDANSETRON 4 MG: 2 INJECTION INTRAMUSCULAR; INTRAVENOUS at 12:04

## 2021-04-16 RX ADMIN — PAROXETINE HYDROCHLORIDE 20 MG: 10 TABLET, FILM COATED ORAL at 06:04

## 2021-04-16 RX ADMIN — PANTOPRAZOLE SODIUM 40 MG: 40 TABLET, DELAYED RELEASE ORAL at 04:04

## 2021-04-16 RX ADMIN — SODIUM CHLORIDE, SODIUM LACTATE, POTASSIUM CHLORIDE, AND CALCIUM CHLORIDE 1000 ML: .6; .31; .03; .02 INJECTION, SOLUTION INTRAVENOUS at 11:04

## 2021-04-16 RX ADMIN — ONDANSETRON 4 MG: 2 INJECTION INTRAMUSCULAR; INTRAVENOUS at 11:04

## 2021-04-17 ENCOUNTER — PATIENT MESSAGE (OUTPATIENT)
Dept: ALLERGY | Facility: CLINIC | Age: 41
End: 2021-04-17

## 2021-04-17 VITALS
RESPIRATION RATE: 18 BRPM | HEART RATE: 64 BPM | OXYGEN SATURATION: 94 % | DIASTOLIC BLOOD PRESSURE: 72 MMHG | SYSTOLIC BLOOD PRESSURE: 117 MMHG | BODY MASS INDEX: 28.07 KG/M2 | HEIGHT: 72 IN | TEMPERATURE: 97 F | WEIGHT: 207.25 LBS

## 2021-04-17 LAB
ALBUMIN SERPL BCP-MCNC: 3.3 G/DL (ref 3.5–5.2)
ALP SERPL-CCNC: 61 U/L (ref 55–135)
ALT SERPL W/O P-5'-P-CCNC: 73 U/L (ref 10–44)
ANION GAP SERPL CALC-SCNC: 8 MMOL/L (ref 8–16)
AST SERPL-CCNC: 46 U/L (ref 10–40)
BASOPHILS # BLD AUTO: 0.05 K/UL (ref 0–0.2)
BASOPHILS NFR BLD: 0.8 % (ref 0–1.9)
BILIRUB SERPL-MCNC: 1.9 MG/DL (ref 0.1–1)
BUN SERPL-MCNC: 15 MG/DL (ref 6–20)
CALCIUM SERPL-MCNC: 8.4 MG/DL (ref 8.7–10.5)
CHLORIDE SERPL-SCNC: 106 MMOL/L (ref 95–110)
CO2 SERPL-SCNC: 25 MMOL/L (ref 23–29)
CREAT SERPL-MCNC: 1 MG/DL (ref 0.5–1.4)
DIFFERENTIAL METHOD: ABNORMAL
EOSINOPHIL # BLD AUTO: 0.1 K/UL (ref 0–0.5)
EOSINOPHIL NFR BLD: 2.1 % (ref 0–8)
ERYTHROCYTE [DISTWIDTH] IN BLOOD BY AUTOMATED COUNT: 18.7 % (ref 11.5–14.5)
EST. GFR  (AFRICAN AMERICAN): >60 ML/MIN/1.73 M^2
EST. GFR  (NON AFRICAN AMERICAN): >60 ML/MIN/1.73 M^2
GLUCOSE SERPL-MCNC: 73 MG/DL (ref 70–110)
HCT VFR BLD AUTO: 34 % (ref 40–54)
HGB BLD-MCNC: 10.3 G/DL (ref 14–18)
IMM GRANULOCYTES # BLD AUTO: 0.02 K/UL (ref 0–0.04)
IMM GRANULOCYTES NFR BLD AUTO: 0.3 % (ref 0–0.5)
LYMPHOCYTES # BLD AUTO: 1.2 K/UL (ref 1–4.8)
LYMPHOCYTES NFR BLD: 19.8 % (ref 18–48)
MAGNESIUM SERPL-MCNC: 2.1 MG/DL (ref 1.6–2.6)
MCH RBC QN AUTO: 23.3 PG (ref 27–31)
MCHC RBC AUTO-ENTMCNC: 30.3 G/DL (ref 32–36)
MCV RBC AUTO: 77 FL (ref 82–98)
MONOCYTES # BLD AUTO: 0.9 K/UL (ref 0.3–1)
MONOCYTES NFR BLD: 15.1 % (ref 4–15)
NEUTROPHILS # BLD AUTO: 3.8 K/UL (ref 1.8–7.7)
NEUTROPHILS NFR BLD: 61.9 % (ref 38–73)
NRBC BLD-RTO: 0 /100 WBC
PHOSPHATE SERPL-MCNC: 2.8 MG/DL (ref 2.7–4.5)
PLATELET # BLD AUTO: 358 K/UL (ref 150–450)
PMV BLD AUTO: 9.2 FL (ref 9.2–12.9)
POTASSIUM SERPL-SCNC: 3.4 MMOL/L (ref 3.5–5.1)
PROT SERPL-MCNC: 5.9 G/DL (ref 6–8.4)
RBC # BLD AUTO: 4.43 M/UL (ref 4.6–6.2)
SODIUM SERPL-SCNC: 139 MMOL/L (ref 136–145)
WBC # BLD AUTO: 6.15 K/UL (ref 3.9–12.7)

## 2021-04-17 PROCEDURE — 25000242 PHARM REV CODE 250 ALT 637 W/ HCPCS: Performed by: STUDENT IN AN ORGANIZED HEALTH CARE EDUCATION/TRAINING PROGRAM

## 2021-04-17 PROCEDURE — 99238 PR HOSPITAL DISCHARGE DAY,<30 MIN: ICD-10-PCS | Mod: ,,, | Performed by: HOSPITALIST

## 2021-04-17 PROCEDURE — 80053 COMPREHEN METABOLIC PANEL: CPT | Performed by: STUDENT IN AN ORGANIZED HEALTH CARE EDUCATION/TRAINING PROGRAM

## 2021-04-17 PROCEDURE — 36415 COLL VENOUS BLD VENIPUNCTURE: CPT | Performed by: STUDENT IN AN ORGANIZED HEALTH CARE EDUCATION/TRAINING PROGRAM

## 2021-04-17 PROCEDURE — 63600175 PHARM REV CODE 636 W HCPCS: Performed by: STUDENT IN AN ORGANIZED HEALTH CARE EDUCATION/TRAINING PROGRAM

## 2021-04-17 PROCEDURE — 25000003 PHARM REV CODE 250: Performed by: STUDENT IN AN ORGANIZED HEALTH CARE EDUCATION/TRAINING PROGRAM

## 2021-04-17 PROCEDURE — 85025 COMPLETE CBC W/AUTO DIFF WBC: CPT | Performed by: STUDENT IN AN ORGANIZED HEALTH CARE EDUCATION/TRAINING PROGRAM

## 2021-04-17 PROCEDURE — 83735 ASSAY OF MAGNESIUM: CPT | Performed by: STUDENT IN AN ORGANIZED HEALTH CARE EDUCATION/TRAINING PROGRAM

## 2021-04-17 PROCEDURE — 99238 HOSP IP/OBS DSCHRG MGMT 30/<: CPT | Mod: ,,, | Performed by: HOSPITALIST

## 2021-04-17 PROCEDURE — 84100 ASSAY OF PHOSPHORUS: CPT | Performed by: STUDENT IN AN ORGANIZED HEALTH CARE EDUCATION/TRAINING PROGRAM

## 2021-04-17 RX ORDER — HYDROCODONE BITARTRATE AND ACETAMINOPHEN 5; 325 MG/1; MG/1
1 TABLET ORAL ONCE
Status: COMPLETED | OUTPATIENT
Start: 2021-04-17 | End: 2021-04-17

## 2021-04-17 RX ORDER — HEPARIN 100 UNIT/ML
100 SYRINGE INTRAVENOUS
Status: DISCONTINUED | OUTPATIENT
Start: 2021-04-17 | End: 2021-04-17 | Stop reason: HOSPADM

## 2021-04-17 RX ORDER — CLINDAMYCIN HYDROCHLORIDE 300 MG/1
300 CAPSULE ORAL EVERY 6 HOURS
Qty: 24 CAPSULE | Refills: 0 | Status: SHIPPED | OUTPATIENT
Start: 2021-04-17 | End: 2021-04-23

## 2021-04-17 RX ADMIN — CLINDAMYCIN HYDROCHLORIDE 300 MG: 150 CAPSULE ORAL at 06:04

## 2021-04-17 RX ADMIN — PSYLLIUM HUSK 1 PACKET: 3.4 POWDER ORAL at 08:04

## 2021-04-17 RX ADMIN — POTASSIUM BICARBONATE 25 MEQ: 978 TABLET, EFFERVESCENT ORAL at 08:04

## 2021-04-17 RX ADMIN — CLINDAMYCIN HYDROCHLORIDE 300 MG: 150 CAPSULE ORAL at 12:04

## 2021-04-17 RX ADMIN — PANTOPRAZOLE SODIUM 40 MG: 40 TABLET, DELAYED RELEASE ORAL at 06:04

## 2021-04-17 RX ADMIN — PAROXETINE HYDROCHLORIDE 20 MG: 10 TABLET, FILM COATED ORAL at 06:04

## 2021-04-17 RX ADMIN — ACETAMINOPHEN 650 MG: 325 TABLET ORAL at 12:04

## 2021-04-17 RX ADMIN — PREDNISONE 20 MG: 20 TABLET ORAL at 08:04

## 2021-04-17 RX ADMIN — HYDROCODONE BITARTRATE AND ACETAMINOPHEN 1 TABLET: 5; 325 TABLET ORAL at 09:04

## 2021-04-17 RX ADMIN — SULFAMETHOXAZOLE AND TRIMETHOPRIM 1 TABLET: 800; 160 TABLET ORAL at 08:04

## 2021-04-17 RX ADMIN — TAMSULOSIN HYDROCHLORIDE 0.4 MG: 0.4 CAPSULE ORAL at 08:04

## 2021-04-19 ENCOUNTER — TELEPHONE (OUTPATIENT)
Dept: ALLERGY | Facility: CLINIC | Age: 41
End: 2021-04-19

## 2021-04-19 ENCOUNTER — PATIENT MESSAGE (OUTPATIENT)
Dept: ALLERGY | Facility: CLINIC | Age: 41
End: 2021-04-19

## 2021-04-20 ENCOUNTER — PATIENT MESSAGE (OUTPATIENT)
Dept: SURGERY | Facility: CLINIC | Age: 41
End: 2021-04-20

## 2021-04-21 LAB
BACTERIA BLD CULT: NORMAL
BACTERIA BLD CULT: NORMAL

## 2021-04-22 ENCOUNTER — PATIENT MESSAGE (OUTPATIENT)
Dept: PAIN MEDICINE | Facility: CLINIC | Age: 41
End: 2021-04-22

## 2021-04-25 ENCOUNTER — IMMUNIZATION (OUTPATIENT)
Dept: INTERNAL MEDICINE | Facility: CLINIC | Age: 41
End: 2021-04-25
Payer: MEDICAID

## 2021-04-25 DIAGNOSIS — Z23 NEED FOR VACCINATION: Primary | ICD-10-CM

## 2021-04-25 PROCEDURE — 0012A COVID-19, MRNA, LNP-S, PF, 100 MCG/0.5 ML DOSE VACCINE: CPT | Mod: PBBFAC,PO

## 2021-04-27 ENCOUNTER — OFFICE VISIT (OUTPATIENT)
Dept: SURGERY | Facility: CLINIC | Age: 41
End: 2021-04-27
Payer: MEDICAID

## 2021-04-27 VITALS
WEIGHT: 202.06 LBS | HEIGHT: 72 IN | SYSTOLIC BLOOD PRESSURE: 134 MMHG | TEMPERATURE: 99 F | BODY MASS INDEX: 27.37 KG/M2 | HEART RATE: 96 BPM | DIASTOLIC BLOOD PRESSURE: 84 MMHG

## 2021-04-27 DIAGNOSIS — Z48.89 POSTOPERATIVE VISIT: Primary | ICD-10-CM

## 2021-04-27 PROCEDURE — 99024 PR POST-OP FOLLOW-UP VISIT: ICD-10-PCS | Mod: ,,, | Performed by: SURGERY

## 2021-04-27 PROCEDURE — 99214 OFFICE O/P EST MOD 30 MIN: CPT | Mod: PBBFAC | Performed by: SURGERY

## 2021-04-27 PROCEDURE — 99999 PR PBB SHADOW E&M-EST. PATIENT-LVL IV: CPT | Mod: PBBFAC,,, | Performed by: SURGERY

## 2021-04-27 PROCEDURE — 99024 POSTOP FOLLOW-UP VISIT: CPT | Mod: ,,, | Performed by: SURGERY

## 2021-04-27 PROCEDURE — 99999 PR PBB SHADOW E&M-EST. PATIENT-LVL IV: ICD-10-PCS | Mod: PBBFAC,,, | Performed by: SURGERY

## 2021-04-28 ENCOUNTER — CLINICAL SUPPORT (OUTPATIENT)
Dept: INTERNAL MEDICINE | Facility: CLINIC | Age: 41
End: 2021-04-28
Payer: MEDICAID

## 2021-04-28 DIAGNOSIS — D72.119 HYPEREOSINOPHILIC SYNDROME, UNSPECIFIED TYPE: Chronic | ICD-10-CM

## 2021-04-28 PROCEDURE — 96373 THER/PROPH/DIAG INJ IA: CPT | Mod: PBBFAC,PO

## 2021-04-28 PROCEDURE — 96372 THER/PROPH/DIAG INJ SC/IM: CPT | Mod: PBBFAC,PO

## 2021-04-28 PROCEDURE — 99499 NO LOS: ICD-10-PCS | Mod: S$PBB,,, | Performed by: ALLERGY & IMMUNOLOGY

## 2021-04-28 PROCEDURE — 99499 UNLISTED E&M SERVICE: CPT | Mod: S$PBB,,, | Performed by: ALLERGY & IMMUNOLOGY

## 2021-04-28 RX ADMIN — MEPOLIZUMAB 300 MG: 100 INJECTION, POWDER, FOR SOLUTION SUBCUTANEOUS at 02:04

## 2021-04-29 ENCOUNTER — OFFICE VISIT (OUTPATIENT)
Dept: PAIN MEDICINE | Facility: CLINIC | Age: 41
End: 2021-04-29
Payer: MEDICAID

## 2021-04-29 ENCOUNTER — PATIENT MESSAGE (OUTPATIENT)
Dept: SURGERY | Facility: CLINIC | Age: 41
End: 2021-04-29

## 2021-04-29 ENCOUNTER — PATIENT MESSAGE (OUTPATIENT)
Dept: PAIN MEDICINE | Facility: CLINIC | Age: 41
End: 2021-04-29

## 2021-04-29 VITALS
HEART RATE: 118 BPM | WEIGHT: 202.19 LBS | BODY MASS INDEX: 27.42 KG/M2 | DIASTOLIC BLOOD PRESSURE: 102 MMHG | SYSTOLIC BLOOD PRESSURE: 157 MMHG

## 2021-04-29 DIAGNOSIS — D72.119 HYPEREOSINOPHILIC SYNDROME, UNSPECIFIED TYPE: ICD-10-CM

## 2021-04-29 DIAGNOSIS — G89.29 CHRONIC NECK PAIN: Primary | ICD-10-CM

## 2021-04-29 DIAGNOSIS — M54.2 CERVICALGIA: ICD-10-CM

## 2021-04-29 DIAGNOSIS — Z98.890 STATUS POST LAMINECTOMY: ICD-10-CM

## 2021-04-29 DIAGNOSIS — F43.22 ADJUSTMENT DISORDER WITH ANXIOUS MOOD: ICD-10-CM

## 2021-04-29 DIAGNOSIS — M79.18 MYOFASCIAL PAIN SYNDROME: ICD-10-CM

## 2021-04-29 DIAGNOSIS — F41.9 ANXIETY: ICD-10-CM

## 2021-04-29 DIAGNOSIS — Z91.89 SEDENTARY LIFESTYLE: ICD-10-CM

## 2021-04-29 DIAGNOSIS — M54.2 CHRONIC NECK PAIN: Primary | ICD-10-CM

## 2021-04-29 PROCEDURE — 99999 PR PBB SHADOW E&M-EST. PATIENT-LVL IV: CPT | Mod: PBBFAC,,, | Performed by: NURSE PRACTITIONER

## 2021-04-29 PROCEDURE — 99214 OFFICE O/P EST MOD 30 MIN: CPT | Mod: PBBFAC,PO | Performed by: NURSE PRACTITIONER

## 2021-04-29 PROCEDURE — 99214 PR OFFICE/OUTPT VISIT, EST, LEVL IV, 30-39 MIN: ICD-10-PCS | Mod: S$PBB,,, | Performed by: NURSE PRACTITIONER

## 2021-04-29 PROCEDURE — 99214 OFFICE O/P EST MOD 30 MIN: CPT | Mod: S$PBB,,, | Performed by: NURSE PRACTITIONER

## 2021-04-29 PROCEDURE — 99999 PR PBB SHADOW E&M-EST. PATIENT-LVL IV: ICD-10-PCS | Mod: PBBFAC,,, | Performed by: NURSE PRACTITIONER

## 2021-04-30 ENCOUNTER — TELEPHONE (OUTPATIENT)
Dept: PAIN MEDICINE | Facility: CLINIC | Age: 41
End: 2021-04-30

## 2021-04-30 ENCOUNTER — HOSPITAL ENCOUNTER (EMERGENCY)
Facility: HOSPITAL | Age: 41
Discharge: HOME | End: 2021-04-30
Attending: EMERGENCY MEDICINE
Payer: MEDICAID

## 2021-04-30 ENCOUNTER — TELEPHONE (OUTPATIENT)
Dept: NEUROLOGY | Facility: HOSPITAL | Age: 41
End: 2021-04-30

## 2021-04-30 ENCOUNTER — PATIENT MESSAGE (OUTPATIENT)
Dept: NEUROLOGY | Facility: HOSPITAL | Age: 41
End: 2021-04-30

## 2021-04-30 VITALS
HEART RATE: 82 BPM | BODY MASS INDEX: 27.09 KG/M2 | RESPIRATION RATE: 18 BRPM | TEMPERATURE: 98 F | WEIGHT: 200 LBS | HEIGHT: 72 IN | OXYGEN SATURATION: 98 % | DIASTOLIC BLOOD PRESSURE: 83 MMHG | SYSTOLIC BLOOD PRESSURE: 138 MMHG

## 2021-04-30 DIAGNOSIS — R33.9 URINARY RETENTION: Primary | ICD-10-CM

## 2021-04-30 DIAGNOSIS — R50.9 FEVER: ICD-10-CM

## 2021-04-30 LAB
ALBUMIN SERPL BCP-MCNC: 3.6 G/DL (ref 3.5–5.2)
ALP SERPL-CCNC: 61 U/L (ref 55–135)
ALT SERPL W/O P-5'-P-CCNC: 33 U/L (ref 10–44)
AMPHET+METHAMPHET UR QL: NORMAL
ANION GAP SERPL CALC-SCNC: 12 MMOL/L (ref 8–16)
AST SERPL-CCNC: 24 U/L (ref 10–40)
BARBITURATES UR QL SCN>200 NG/ML: NEGATIVE
BASOPHILS # BLD AUTO: 0.05 K/UL (ref 0–0.2)
BASOPHILS NFR BLD: 0.9 % (ref 0–1.9)
BENZODIAZ UR QL SCN>200 NG/ML: NEGATIVE
BILIRUB SERPL-MCNC: 1.7 MG/DL (ref 0.1–1)
BILIRUB UR QL STRIP: NEGATIVE
BUN SERPL-MCNC: 14 MG/DL (ref 6–20)
BUN SERPL-MCNC: 14 MG/DL (ref 6–30)
BZE UR QL SCN: NEGATIVE
CALCIUM SERPL-MCNC: 9 MG/DL (ref 8.7–10.5)
CANNABINOIDS UR QL SCN: NEGATIVE
CHLORIDE SERPL-SCNC: 108 MMOL/L (ref 95–110)
CHLORIDE SERPL-SCNC: 110 MMOL/L (ref 95–110)
CK SERPL-CCNC: 204 U/L (ref 20–200)
CLARITY UR REFRACT.AUTO: CLEAR
CO2 SERPL-SCNC: 20 MMOL/L (ref 23–29)
COLOR UR AUTO: ABNORMAL
CREAT SERPL-MCNC: 1.1 MG/DL (ref 0.5–1.4)
CREAT SERPL-MCNC: 1.1 MG/DL (ref 0.5–1.4)
CREAT UR-MCNC: 48 MG/DL (ref 23–375)
DIFFERENTIAL METHOD: ABNORMAL
EOSINOPHIL # BLD AUTO: 0 K/UL (ref 0–0.5)
EOSINOPHIL NFR BLD: 0.2 % (ref 0–8)
ERYTHROCYTE [DISTWIDTH] IN BLOOD BY AUTOMATED COUNT: 17.9 % (ref 11.5–14.5)
EST. GFR  (AFRICAN AMERICAN): >60 ML/MIN/1.73 M^2
EST. GFR  (NON AFRICAN AMERICAN): >60 ML/MIN/1.73 M^2
GLUCOSE SERPL-MCNC: 90 MG/DL (ref 70–110)
GLUCOSE SERPL-MCNC: 91 MG/DL (ref 70–110)
GLUCOSE UR QL STRIP: NEGATIVE
HCT VFR BLD AUTO: 32.6 % (ref 40–54)
HCT VFR BLD CALC: 30 %PCV (ref 36–54)
HGB BLD-MCNC: 9.9 G/DL (ref 14–18)
HGB UR QL STRIP: NEGATIVE
IMM GRANULOCYTES # BLD AUTO: 0.03 K/UL (ref 0–0.04)
IMM GRANULOCYTES NFR BLD AUTO: 0.5 % (ref 0–0.5)
KETONES UR QL STRIP: ABNORMAL
LEUKOCYTE ESTERASE UR QL STRIP: NEGATIVE
LYMPHOCYTES # BLD AUTO: 0.3 K/UL (ref 1–4.8)
LYMPHOCYTES NFR BLD: 5.6 % (ref 18–48)
MCH RBC QN AUTO: 23.3 PG (ref 27–31)
MCHC RBC AUTO-ENTMCNC: 30.4 G/DL (ref 32–36)
MCV RBC AUTO: 77 FL (ref 82–98)
METHADONE UR QL SCN>300 NG/ML: NEGATIVE
MONOCYTES # BLD AUTO: 0.2 K/UL (ref 0.3–1)
MONOCYTES NFR BLD: 3.1 % (ref 4–15)
NEUTROPHILS # BLD AUTO: 5 K/UL (ref 1.8–7.7)
NEUTROPHILS NFR BLD: 89.7 % (ref 38–73)
NITRITE UR QL STRIP: NEGATIVE
NRBC BLD-RTO: 0 /100 WBC
OPIATES UR QL SCN: NEGATIVE
PCP UR QL SCN>25 NG/ML: NEGATIVE
PH UR STRIP: 6 [PH] (ref 5–8)
PLATELET # BLD AUTO: 254 K/UL (ref 150–450)
PMV BLD AUTO: 10.6 FL (ref 9.2–12.9)
POC IONIZED CALCIUM: 0.93 MMOL/L (ref 1.06–1.42)
POC TCO2 (MEASURED): 21 MMOL/L (ref 23–29)
POTASSIUM BLD-SCNC: 3.2 MMOL/L (ref 3.5–5.1)
POTASSIUM SERPL-SCNC: 3.3 MMOL/L (ref 3.5–5.1)
PROT SERPL-MCNC: 6.4 G/DL (ref 6–8.4)
PROT UR QL STRIP: NEGATIVE
RBC # BLD AUTO: 4.24 M/UL (ref 4.6–6.2)
SAMPLE: ABNORMAL
SODIUM BLD-SCNC: 139 MMOL/L (ref 136–145)
SODIUM SERPL-SCNC: 140 MMOL/L (ref 136–145)
SP GR UR STRIP: 1.03 (ref 1–1.03)
TOXICOLOGY INFORMATION: NORMAL
TROPONIN I SERPL DL<=0.01 NG/ML-MCNC: 0.01 NG/ML (ref 0–0.03)
URN SPEC COLLECT METH UR: ABNORMAL
WBC # BLD AUTO: 5.55 K/UL (ref 3.9–12.7)

## 2021-04-30 PROCEDURE — 82330 ASSAY OF CALCIUM: CPT

## 2021-04-30 PROCEDURE — 99284 PR EMERGENCY DEPT VISIT,LEVEL IV: ICD-10-PCS | Mod: ,,, | Performed by: EMERGENCY MEDICINE

## 2021-04-30 PROCEDURE — 80053 COMPREHEN METABOLIC PANEL: CPT | Performed by: EMERGENCY MEDICINE

## 2021-04-30 PROCEDURE — 93005 ELECTROCARDIOGRAM TRACING: CPT

## 2021-04-30 PROCEDURE — 81003 URINALYSIS AUTO W/O SCOPE: CPT | Mod: 59 | Performed by: EMERGENCY MEDICINE

## 2021-04-30 PROCEDURE — 80047 BASIC METABLC PNL IONIZED CA: CPT

## 2021-04-30 PROCEDURE — 99284 EMERGENCY DEPT VISIT MOD MDM: CPT | Mod: ,,, | Performed by: EMERGENCY MEDICINE

## 2021-04-30 PROCEDURE — 85025 COMPLETE CBC W/AUTO DIFF WBC: CPT | Performed by: EMERGENCY MEDICINE

## 2021-04-30 PROCEDURE — 93010 EKG 12-LEAD: ICD-10-PCS | Mod: ,,, | Performed by: INTERNAL MEDICINE

## 2021-04-30 PROCEDURE — 84484 ASSAY OF TROPONIN QUANT: CPT | Performed by: EMERGENCY MEDICINE

## 2021-04-30 PROCEDURE — 93010 ELECTROCARDIOGRAM REPORT: CPT | Mod: ,,, | Performed by: INTERNAL MEDICINE

## 2021-04-30 PROCEDURE — 80307 DRUG TEST PRSMV CHEM ANLYZR: CPT | Performed by: EMERGENCY MEDICINE

## 2021-04-30 PROCEDURE — 96361 HYDRATE IV INFUSION ADD-ON: CPT

## 2021-04-30 PROCEDURE — 96372 THER/PROPH/DIAG INJ SC/IM: CPT | Mod: 59

## 2021-04-30 PROCEDURE — 25000003 PHARM REV CODE 250: Performed by: EMERGENCY MEDICINE

## 2021-04-30 PROCEDURE — 96365 THER/PROPH/DIAG IV INF INIT: CPT | Mod: 59

## 2021-04-30 PROCEDURE — 82550 ASSAY OF CK (CPK): CPT | Performed by: EMERGENCY MEDICINE

## 2021-04-30 PROCEDURE — 99285 EMERGENCY DEPT VISIT HI MDM: CPT | Mod: 25

## 2021-04-30 PROCEDURE — C9113 INJ PANTOPRAZOLE SODIUM, VIA: HCPCS | Performed by: EMERGENCY MEDICINE

## 2021-04-30 PROCEDURE — 63600175 PHARM REV CODE 636 W HCPCS: Performed by: EMERGENCY MEDICINE

## 2021-04-30 PROCEDURE — 25500020 PHARM REV CODE 255: Performed by: EMERGENCY MEDICINE

## 2021-04-30 PROCEDURE — 96375 TX/PRO/DX INJ NEW DRUG ADDON: CPT

## 2021-04-30 RX ORDER — LORAZEPAM 2 MG/ML
1 INJECTION INTRAMUSCULAR
Status: COMPLETED | OUTPATIENT
Start: 2021-04-30 | End: 2021-04-30

## 2021-04-30 RX ORDER — HEPARIN 100 UNIT/ML
500 SYRINGE INTRAVENOUS ONCE
Status: COMPLETED | OUTPATIENT
Start: 2021-04-30 | End: 2021-04-30

## 2021-04-30 RX ORDER — DIPHENHYDRAMINE HYDROCHLORIDE 50 MG/ML
50 INJECTION INTRAMUSCULAR; INTRAVENOUS
Status: COMPLETED | OUTPATIENT
Start: 2021-04-30 | End: 2021-04-30

## 2021-04-30 RX ORDER — FAMOTIDINE 10 MG/ML
20 INJECTION INTRAVENOUS
Status: COMPLETED | OUTPATIENT
Start: 2021-04-30 | End: 2021-04-30

## 2021-04-30 RX ORDER — PANTOPRAZOLE SODIUM 40 MG/10ML
40 INJECTION, POWDER, LYOPHILIZED, FOR SOLUTION INTRAVENOUS
Status: COMPLETED | OUTPATIENT
Start: 2021-04-30 | End: 2021-04-30

## 2021-04-30 RX ORDER — ACETAMINOPHEN 500 MG
1000 TABLET ORAL
Status: COMPLETED | OUTPATIENT
Start: 2021-04-30 | End: 2021-04-30

## 2021-04-30 RX ORDER — DICYCLOMINE HYDROCHLORIDE 10 MG/ML
20 INJECTION INTRAMUSCULAR
Status: COMPLETED | OUTPATIENT
Start: 2021-04-30 | End: 2021-04-30

## 2021-04-30 RX ORDER — POTASSIUM CHLORIDE 7.45 MG/ML
10 INJECTION INTRAVENOUS
Status: COMPLETED | OUTPATIENT
Start: 2021-04-30 | End: 2021-04-30

## 2021-04-30 RX ORDER — METOCLOPRAMIDE HYDROCHLORIDE 5 MG/ML
10 INJECTION INTRAMUSCULAR; INTRAVENOUS
Status: COMPLETED | OUTPATIENT
Start: 2021-04-30 | End: 2021-04-30

## 2021-04-30 RX ORDER — LIDOCAINE HYDROCHLORIDE 20 MG/ML
JELLY TOPICAL
Status: COMPLETED | OUTPATIENT
Start: 2021-04-30 | End: 2021-04-30

## 2021-04-30 RX ADMIN — METOCLOPRAMIDE 10 MG: 5 INJECTION, SOLUTION INTRAMUSCULAR; INTRAVENOUS at 12:04

## 2021-04-30 RX ADMIN — FAMOTIDINE 20 MG: 10 INJECTION INTRAVENOUS at 12:04

## 2021-04-30 RX ADMIN — DICYCLOMINE HYDROCHLORIDE 20 MG: 20 INJECTION, SOLUTION INTRAMUSCULAR at 12:04

## 2021-04-30 RX ADMIN — POTASSIUM CHLORIDE 10 MEQ: 7.46 INJECTION, SOLUTION INTRAVENOUS at 03:04

## 2021-04-30 RX ADMIN — ACETAMINOPHEN 1000 MG: 500 TABLET, FILM COATED ORAL at 02:04

## 2021-04-30 RX ADMIN — SODIUM CHLORIDE 1000 ML: 0.9 INJECTION, SOLUTION INTRAVENOUS at 12:04

## 2021-04-30 RX ADMIN — LIDOCAINE HYDROCHLORIDE 10 ML: 20 JELLY TOPICAL at 03:04

## 2021-04-30 RX ADMIN — LORAZEPAM 1 MG: 2 INJECTION INTRAMUSCULAR; INTRAVENOUS at 12:04

## 2021-04-30 RX ADMIN — DIPHENHYDRAMINE HYDROCHLORIDE 50 MG: 50 INJECTION, SOLUTION INTRAMUSCULAR; INTRAVENOUS at 12:04

## 2021-04-30 RX ADMIN — IOHEXOL 100 ML: 350 INJECTION, SOLUTION INTRAVENOUS at 02:04

## 2021-04-30 RX ADMIN — PANTOPRAZOLE SODIUM 40 MG: 40 INJECTION, POWDER, FOR SOLUTION INTRAVENOUS at 12:04

## 2021-04-30 RX ADMIN — HEPARIN 500 UNITS: 100 SYRINGE at 05:04

## 2021-05-19 ENCOUNTER — PATIENT MESSAGE (OUTPATIENT)
Dept: ALLERGY | Facility: CLINIC | Age: 41
End: 2021-05-19

## 2021-05-20 ENCOUNTER — SPECIALTY PHARMACY (OUTPATIENT)
Dept: PHARMACY | Facility: CLINIC | Age: 41
End: 2021-05-20

## 2021-05-20 DIAGNOSIS — D72.119 HYPEREOSINOPHILIC SYNDROME, UNSPECIFIED TYPE: Primary | ICD-10-CM

## 2021-05-24 ENCOUNTER — OFFICE VISIT (OUTPATIENT)
Dept: NEUROLOGY | Facility: HOSPITAL | Age: 41
End: 2021-05-24
Attending: INTERNAL MEDICINE
Payer: MEDICAID

## 2021-05-24 ENCOUNTER — TELEPHONE (OUTPATIENT)
Dept: ORTHOPEDICS | Facility: CLINIC | Age: 41
End: 2021-05-24

## 2021-05-24 VITALS
DIASTOLIC BLOOD PRESSURE: 88 MMHG | HEIGHT: 72 IN | TEMPERATURE: 100 F | HEART RATE: 71 BPM | RESPIRATION RATE: 20 BRPM | SYSTOLIC BLOOD PRESSURE: 128 MMHG | BODY MASS INDEX: 28.38 KG/M2 | WEIGHT: 209.56 LBS

## 2021-05-24 DIAGNOSIS — R19.7 DIARRHEA, UNSPECIFIED TYPE: ICD-10-CM

## 2021-05-24 DIAGNOSIS — K20.0 EOSINOPHILIC ESOPHAGITIS: Primary | ICD-10-CM

## 2021-05-24 PROCEDURE — 99215 OFFICE O/P EST HI 40 MIN: CPT | Performed by: INTERNAL MEDICINE

## 2021-05-24 RX ORDER — METOCLOPRAMIDE 10 MG/1
10 TABLET ORAL
Status: ON HOLD | COMMUNITY
Start: 2021-04-26 | End: 2022-10-06

## 2021-05-24 RX ORDER — PHENAZOPYRIDINE HYDROCHLORIDE 200 MG/1
200 TABLET, FILM COATED ORAL 3 TIMES DAILY PRN
COMMUNITY
Start: 2021-04-27 | End: 2021-11-16 | Stop reason: ALTCHOICE

## 2021-05-26 ENCOUNTER — CLINICAL SUPPORT (OUTPATIENT)
Dept: INTERNAL MEDICINE | Facility: CLINIC | Age: 41
End: 2021-05-26
Payer: MEDICAID

## 2021-05-26 DIAGNOSIS — K20.0 EOSINOPHILIC ESOPHAGITIS: Chronic | ICD-10-CM

## 2021-05-26 PROCEDURE — 96372 THER/PROPH/DIAG INJ SC/IM: CPT | Mod: PBBFAC,PO

## 2021-05-26 PROCEDURE — 99499 UNLISTED E&M SERVICE: CPT | Mod: S$PBB,,, | Performed by: ALLERGY & IMMUNOLOGY

## 2021-05-26 PROCEDURE — 99499 NO LOS: ICD-10-PCS | Mod: S$PBB,,, | Performed by: ALLERGY & IMMUNOLOGY

## 2021-05-26 RX ADMIN — MEPOLIZUMAB 300 MG: 100 INJECTION, POWDER, FOR SOLUTION SUBCUTANEOUS at 05:05

## 2021-06-14 ENCOUNTER — SPECIALTY PHARMACY (OUTPATIENT)
Dept: PHARMACY | Facility: CLINIC | Age: 41
End: 2021-06-14

## 2021-06-22 ENCOUNTER — PATIENT MESSAGE (OUTPATIENT)
Dept: ALLERGY | Facility: CLINIC | Age: 41
End: 2021-06-22

## 2021-06-28 ENCOUNTER — CLINICAL SUPPORT (OUTPATIENT)
Dept: INTERNAL MEDICINE | Facility: CLINIC | Age: 41
End: 2021-06-28
Payer: MEDICAID

## 2021-06-28 DIAGNOSIS — K20.0 EOSINOPHILIC ESOPHAGITIS: Chronic | ICD-10-CM

## 2021-06-28 PROCEDURE — 99499 UNLISTED E&M SERVICE: CPT | Mod: S$PBB,,, | Performed by: ALLERGY & IMMUNOLOGY

## 2021-06-28 PROCEDURE — 99499 NO LOS: ICD-10-PCS | Mod: S$PBB,,, | Performed by: ALLERGY & IMMUNOLOGY

## 2021-06-28 PROCEDURE — 96372 THER/PROPH/DIAG INJ SC/IM: CPT | Mod: PBBFAC,PO

## 2021-06-28 RX ADMIN — MEPOLIZUMAB 300 MG: 100 INJECTION, POWDER, FOR SOLUTION SUBCUTANEOUS at 03:06

## 2021-07-07 ENCOUNTER — NURSE TRIAGE (OUTPATIENT)
Dept: ADMINISTRATIVE | Facility: CLINIC | Age: 41
End: 2021-07-07

## 2021-07-07 ENCOUNTER — HOSPITAL ENCOUNTER (EMERGENCY)
Facility: HOSPITAL | Age: 41
Discharge: HOME | End: 2021-07-07
Attending: EMERGENCY MEDICINE
Payer: MEDICAID

## 2021-07-07 VITALS
HEIGHT: 72 IN | SYSTOLIC BLOOD PRESSURE: 118 MMHG | WEIGHT: 210 LBS | OXYGEN SATURATION: 100 % | HEART RATE: 95 BPM | DIASTOLIC BLOOD PRESSURE: 62 MMHG | RESPIRATION RATE: 18 BRPM | TEMPERATURE: 99 F | BODY MASS INDEX: 28.44 KG/M2

## 2021-07-07 DIAGNOSIS — R10.9 ABDOMINAL PAIN, UNSPECIFIED ABDOMINAL LOCATION: Primary | ICD-10-CM

## 2021-07-07 DIAGNOSIS — R00.0 TACHYCARDIA: ICD-10-CM

## 2021-07-07 LAB
ABO + RH BLD: NORMAL
ALBUMIN SERPL BCP-MCNC: 4.4 G/DL (ref 3.5–5.2)
ALP SERPL-CCNC: 55 U/L (ref 55–135)
ALT SERPL W/O P-5'-P-CCNC: 25 U/L (ref 10–44)
ANION GAP SERPL CALC-SCNC: 17 MMOL/L (ref 8–16)
AST SERPL-CCNC: 23 U/L (ref 10–40)
BACTERIA #/AREA URNS HPF: ABNORMAL /HPF
BASOPHILS # BLD AUTO: 0.01 K/UL (ref 0–0.2)
BASOPHILS # BLD AUTO: 0.02 K/UL (ref 0–0.2)
BASOPHILS NFR BLD: 0.1 % (ref 0–1.9)
BASOPHILS NFR BLD: 0.1 % (ref 0–1.9)
BILIRUB SERPL-MCNC: 0.9 MG/DL (ref 0.1–1)
BILIRUB UR QL STRIP: NEGATIVE
BLD GP AB SCN CELLS X3 SERPL QL: NORMAL
BUN SERPL-MCNC: 22 MG/DL (ref 6–20)
CALCIUM SERPL-MCNC: 9.4 MG/DL (ref 8.7–10.5)
CHLORIDE SERPL-SCNC: 106 MMOL/L (ref 95–110)
CLARITY UR: CLEAR
CO2 SERPL-SCNC: 18 MMOL/L (ref 23–29)
COLOR UR: YELLOW
CREAT SERPL-MCNC: 1.2 MG/DL (ref 0.5–1.4)
DIFFERENTIAL METHOD: ABNORMAL
DIFFERENTIAL METHOD: ABNORMAL
EOSINOPHIL # BLD AUTO: 0 K/UL (ref 0–0.5)
EOSINOPHIL # BLD AUTO: 0 K/UL (ref 0–0.5)
EOSINOPHIL NFR BLD: 0 % (ref 0–8)
EOSINOPHIL NFR BLD: 0 % (ref 0–8)
ERYTHROCYTE [DISTWIDTH] IN BLOOD BY AUTOMATED COUNT: 18.6 % (ref 11.5–14.5)
ERYTHROCYTE [DISTWIDTH] IN BLOOD BY AUTOMATED COUNT: 18.6 % (ref 11.5–14.5)
EST. GFR  (AFRICAN AMERICAN): >60 ML/MIN/1.73 M^2
EST. GFR  (NON AFRICAN AMERICAN): >60 ML/MIN/1.73 M^2
GLUCOSE SERPL-MCNC: 114 MG/DL (ref 70–110)
GLUCOSE UR QL STRIP: NEGATIVE
HCT VFR BLD AUTO: 34.2 % (ref 40–54)
HCT VFR BLD AUTO: 35.7 % (ref 40–54)
HGB BLD-MCNC: 10.8 G/DL (ref 14–18)
HGB BLD-MCNC: 11.1 G/DL (ref 14–18)
HGB UR QL STRIP: ABNORMAL
HYALINE CASTS #/AREA URNS LPF: 0 /LPF
IMM GRANULOCYTES # BLD AUTO: 0.04 K/UL (ref 0–0.04)
IMM GRANULOCYTES # BLD AUTO: 0.05 K/UL (ref 0–0.04)
IMM GRANULOCYTES NFR BLD AUTO: 0.3 % (ref 0–0.5)
IMM GRANULOCYTES NFR BLD AUTO: 0.4 % (ref 0–0.5)
INR PPP: 1.1 (ref 0.8–1.2)
KETONES UR QL STRIP: ABNORMAL
LACTATE SERPL-SCNC: 3 MMOL/L (ref 0.5–2.2)
LEUKOCYTE ESTERASE UR QL STRIP: NEGATIVE
LYMPHOCYTES # BLD AUTO: 0.6 K/UL (ref 1–4.8)
LYMPHOCYTES # BLD AUTO: 0.7 K/UL (ref 1–4.8)
LYMPHOCYTES NFR BLD: 3.9 % (ref 18–48)
LYMPHOCYTES NFR BLD: 5.7 % (ref 18–48)
MCH RBC QN AUTO: 22.8 PG (ref 27–31)
MCH RBC QN AUTO: 22.8 PG (ref 27–31)
MCHC RBC AUTO-ENTMCNC: 31.1 G/DL (ref 32–36)
MCHC RBC AUTO-ENTMCNC: 31.6 G/DL (ref 32–36)
MCV RBC AUTO: 72 FL (ref 82–98)
MCV RBC AUTO: 74 FL (ref 82–98)
MICROSCOPIC COMMENT: ABNORMAL
MONOCYTES # BLD AUTO: 0.8 K/UL (ref 0.3–1)
MONOCYTES # BLD AUTO: 0.9 K/UL (ref 0.3–1)
MONOCYTES NFR BLD: 6.5 % (ref 4–15)
MONOCYTES NFR BLD: 7 % (ref 4–15)
NEUTROPHILS # BLD AUTO: 10 K/UL (ref 1.8–7.7)
NEUTROPHILS # BLD AUTO: 12.7 K/UL (ref 1.8–7.7)
NEUTROPHILS NFR BLD: 86.9 % (ref 38–73)
NEUTROPHILS NFR BLD: 89.1 % (ref 38–73)
NITRITE UR QL STRIP: NEGATIVE
NRBC BLD-RTO: 0 /100 WBC
NRBC BLD-RTO: 0 /100 WBC
PH UR STRIP: 6 [PH] (ref 5–8)
PLATELET # BLD AUTO: 454 K/UL (ref 150–450)
PLATELET # BLD AUTO: 518 K/UL (ref 150–450)
PMV BLD AUTO: 10.5 FL (ref 9.2–12.9)
PMV BLD AUTO: 11.4 FL (ref 9.2–12.9)
POTASSIUM SERPL-SCNC: 3.3 MMOL/L (ref 3.5–5.1)
PROT SERPL-MCNC: 7.1 G/DL (ref 6–8.4)
PROT UR QL STRIP: ABNORMAL
PROTHROMBIN TIME: 11.7 SEC (ref 9–12.5)
RBC # BLD AUTO: 4.73 M/UL (ref 4.6–6.2)
RBC # BLD AUTO: 4.86 M/UL (ref 4.6–6.2)
RBC #/AREA URNS HPF: >100 /HPF (ref 0–4)
SODIUM SERPL-SCNC: 141 MMOL/L (ref 136–145)
SP GR UR STRIP: >1.03 (ref 1–1.03)
URN SPEC COLLECT METH UR: ABNORMAL
UROBILINOGEN UR STRIP-ACNC: NEGATIVE EU/DL
WBC # BLD AUTO: 11.47 K/UL (ref 3.9–12.7)
WBC # BLD AUTO: 14.24 K/UL (ref 3.9–12.7)
WBC #/AREA URNS HPF: 12 /HPF (ref 0–5)

## 2021-07-07 PROCEDURE — 83605 ASSAY OF LACTIC ACID: CPT | Performed by: PHYSICIAN ASSISTANT

## 2021-07-07 PROCEDURE — 87088 URINE BACTERIA CULTURE: CPT | Performed by: PHYSICIAN ASSISTANT

## 2021-07-07 PROCEDURE — 80053 COMPREHEN METABOLIC PANEL: CPT | Performed by: PHYSICIAN ASSISTANT

## 2021-07-07 PROCEDURE — 86900 BLOOD TYPING SEROLOGIC ABO: CPT | Performed by: PHYSICIAN ASSISTANT

## 2021-07-07 PROCEDURE — 63600175 PHARM REV CODE 636 W HCPCS: Performed by: EMERGENCY MEDICINE

## 2021-07-07 PROCEDURE — 25000003 PHARM REV CODE 250: Performed by: EMERGENCY MEDICINE

## 2021-07-07 PROCEDURE — 87147 CULTURE TYPE IMMUNOLOGIC: CPT | Performed by: PHYSICIAN ASSISTANT

## 2021-07-07 PROCEDURE — 87086 URINE CULTURE/COLONY COUNT: CPT | Performed by: PHYSICIAN ASSISTANT

## 2021-07-07 PROCEDURE — 85025 COMPLETE CBC W/AUTO DIFF WBC: CPT | Mod: 91 | Performed by: EMERGENCY MEDICINE

## 2021-07-07 PROCEDURE — 93005 ELECTROCARDIOGRAM TRACING: CPT

## 2021-07-07 PROCEDURE — 81000 URINALYSIS NONAUTO W/SCOPE: CPT | Performed by: PHYSICIAN ASSISTANT

## 2021-07-07 PROCEDURE — 96374 THER/PROPH/DIAG INJ IV PUSH: CPT

## 2021-07-07 PROCEDURE — 85610 PROTHROMBIN TIME: CPT | Performed by: PHYSICIAN ASSISTANT

## 2021-07-07 PROCEDURE — 85025 COMPLETE CBC W/AUTO DIFF WBC: CPT | Performed by: PHYSICIAN ASSISTANT

## 2021-07-07 PROCEDURE — 93010 EKG 12-LEAD: ICD-10-PCS | Mod: ,,, | Performed by: INTERNAL MEDICINE

## 2021-07-07 PROCEDURE — 96361 HYDRATE IV INFUSION ADD-ON: CPT

## 2021-07-07 PROCEDURE — 93010 ELECTROCARDIOGRAM REPORT: CPT | Mod: ,,, | Performed by: INTERNAL MEDICINE

## 2021-07-07 PROCEDURE — 99284 EMERGENCY DEPT VISIT MOD MDM: CPT | Mod: 25

## 2021-07-07 RX ORDER — ONDANSETRON 2 MG/ML
4 INJECTION INTRAMUSCULAR; INTRAVENOUS
Status: COMPLETED | OUTPATIENT
Start: 2021-07-07 | End: 2021-07-07

## 2021-07-07 RX ORDER — FAMOTIDINE 20 MG/1
20 TABLET, FILM COATED ORAL
Status: COMPLETED | OUTPATIENT
Start: 2021-07-07 | End: 2021-07-07

## 2021-07-07 RX ORDER — MAG HYDROX/ALUMINUM HYD/SIMETH 200-200-20
5 SUSPENSION, ORAL (FINAL DOSE FORM) ORAL
Status: COMPLETED | OUTPATIENT
Start: 2021-07-07 | End: 2021-07-07

## 2021-07-07 RX ADMIN — SODIUM CHLORIDE 1000 ML: 0.9 INJECTION, SOLUTION INTRAVENOUS at 08:07

## 2021-07-07 RX ADMIN — LIDOCAINE HYDROCHLORIDE: 20 SOLUTION ORAL; TOPICAL at 08:07

## 2021-07-07 RX ADMIN — ALUMINUM HYDROXIDE, MAGNESIUM HYDROXIDE, AND SIMETHICONE 5 ML: 200; 200; 20 SUSPENSION ORAL at 09:07

## 2021-07-07 RX ADMIN — ONDANSETRON 4 MG: 2 INJECTION INTRAMUSCULAR; INTRAVENOUS at 08:07

## 2021-07-07 RX ADMIN — FAMOTIDINE 20 MG: 20 TABLET ORAL at 09:07

## 2021-07-08 ENCOUNTER — TELEPHONE (OUTPATIENT)
Dept: ENDOSCOPY | Facility: HOSPITAL | Age: 41
End: 2021-07-08

## 2021-07-08 LAB — BACTERIA UR CULT: ABNORMAL

## 2021-07-12 ENCOUNTER — ANESTHESIA EVENT (OUTPATIENT)
Dept: ENDOSCOPY | Facility: HOSPITAL | Age: 41
End: 2021-07-12
Payer: MEDICAID

## 2021-07-12 ENCOUNTER — HOSPITAL ENCOUNTER (OUTPATIENT)
Facility: HOSPITAL | Age: 41
Discharge: HOME | End: 2021-07-12
Attending: INTERNAL MEDICINE | Admitting: INTERNAL MEDICINE
Payer: MEDICAID

## 2021-07-12 ENCOUNTER — ANESTHESIA (OUTPATIENT)
Dept: ENDOSCOPY | Facility: HOSPITAL | Age: 41
End: 2021-07-12
Payer: MEDICAID

## 2021-07-12 VITALS
BODY MASS INDEX: 27.77 KG/M2 | TEMPERATURE: 98 F | HEIGHT: 72 IN | DIASTOLIC BLOOD PRESSURE: 75 MMHG | HEART RATE: 61 BPM | OXYGEN SATURATION: 98 % | RESPIRATION RATE: 15 BRPM | WEIGHT: 205 LBS | SYSTOLIC BLOOD PRESSURE: 119 MMHG

## 2021-07-12 DIAGNOSIS — K25.9 GASTRIC EROSIONS: ICD-10-CM

## 2021-07-12 DIAGNOSIS — K27.9 PUD (PEPTIC ULCER DISEASE): Primary | Chronic | ICD-10-CM

## 2021-07-12 PROCEDURE — 63600175 PHARM REV CODE 636 W HCPCS: Performed by: NURSE ANESTHETIST, CERTIFIED REGISTERED

## 2021-07-12 PROCEDURE — 00731 ANES UPR GI NDSC PX NOS: CPT | Performed by: INTERNAL MEDICINE

## 2021-07-12 PROCEDURE — 43239 EGD BIOPSY SINGLE/MULTIPLE: CPT | Performed by: INTERNAL MEDICINE

## 2021-07-12 PROCEDURE — 37000008 HC ANESTHESIA 1ST 15 MINUTES: Performed by: INTERNAL MEDICINE

## 2021-07-12 PROCEDURE — 25000003 PHARM REV CODE 250: Performed by: NURSE ANESTHETIST, CERTIFIED REGISTERED

## 2021-07-12 PROCEDURE — 25000003 PHARM REV CODE 250: Performed by: INTERNAL MEDICINE

## 2021-07-12 PROCEDURE — 63600175 PHARM REV CODE 636 W HCPCS: Performed by: INTERNAL MEDICINE

## 2021-07-12 PROCEDURE — 88305 TISSUE EXAM BY PATHOLOGIST: CPT | Mod: 26,,, | Performed by: PATHOLOGY

## 2021-07-12 PROCEDURE — 88305 TISSUE EXAM BY PATHOLOGIST: ICD-10-PCS | Mod: 26,,, | Performed by: PATHOLOGY

## 2021-07-12 PROCEDURE — 88305 TISSUE EXAM BY PATHOLOGIST: CPT | Performed by: PATHOLOGY

## 2021-07-12 PROCEDURE — 37000009 HC ANESTHESIA EA ADD 15 MINS: Performed by: INTERNAL MEDICINE

## 2021-07-12 PROCEDURE — 27201012 HC FORCEPS, HOT/COLD, DISP: Performed by: INTERNAL MEDICINE

## 2021-07-12 RX ORDER — HEPARIN 100 UNIT/ML
5 SYRINGE INTRAVENOUS ONCE
Status: COMPLETED | OUTPATIENT
Start: 2021-07-12 | End: 2021-07-12

## 2021-07-12 RX ORDER — PROPOFOL 10 MG/ML
VIAL (ML) INTRAVENOUS
Status: DISCONTINUED | OUTPATIENT
Start: 2021-07-12 | End: 2021-07-12

## 2021-07-12 RX ORDER — ONDANSETRON 2 MG/ML
INJECTION INTRAMUSCULAR; INTRAVENOUS
Status: DISCONTINUED | OUTPATIENT
Start: 2021-07-12 | End: 2021-07-12

## 2021-07-12 RX ORDER — LIDOCAINE HYDROCHLORIDE 20 MG/ML
INJECTION INTRAVENOUS
Status: DISCONTINUED | OUTPATIENT
Start: 2021-07-12 | End: 2021-07-12

## 2021-07-12 RX ORDER — SODIUM CHLORIDE 9 MG/ML
INJECTION, SOLUTION INTRAVENOUS CONTINUOUS
Status: DISCONTINUED | OUTPATIENT
Start: 2021-07-12 | End: 2021-07-12 | Stop reason: HOSPADM

## 2021-07-12 RX ORDER — SODIUM CHLORIDE 0.9 % (FLUSH) 0.9 %
10 SYRINGE (ML) INJECTION
Status: DISCONTINUED | OUTPATIENT
Start: 2021-07-12 | End: 2021-07-12 | Stop reason: HOSPADM

## 2021-07-12 RX ORDER — FENTANYL CITRATE 50 UG/ML
INJECTION, SOLUTION INTRAMUSCULAR; INTRAVENOUS
Status: DISCONTINUED | OUTPATIENT
Start: 2021-07-12 | End: 2021-07-12

## 2021-07-12 RX ORDER — OXYCODONE AND ACETAMINOPHEN 10; 325 MG/1; MG/1
1 TABLET ORAL EVERY 4 HOURS PRN
Status: ON HOLD | COMMUNITY
End: 2021-07-30 | Stop reason: HOSPADM

## 2021-07-12 RX ORDER — LANSOPRAZOLE 30 MG/1
30 CAPSULE, DELAYED RELEASE ORAL DAILY
Status: ON HOLD | COMMUNITY
End: 2021-07-30 | Stop reason: HOSPADM

## 2021-07-12 RX ADMIN — PROPOFOL 100 MG: 10 INJECTION, EMULSION INTRAVENOUS at 09:07

## 2021-07-12 RX ADMIN — ONDANSETRON 8 MG: 2 INJECTION, SOLUTION INTRAMUSCULAR; INTRAVENOUS at 09:07

## 2021-07-12 RX ADMIN — FENTANYL CITRATE 100 MCG: 50 INJECTION, SOLUTION INTRAMUSCULAR; INTRAVENOUS at 09:07

## 2021-07-12 RX ADMIN — LIDOCAINE HYDROCHLORIDE 60 MG: 20 INJECTION, SOLUTION INTRAVENOUS at 09:07

## 2021-07-12 RX ADMIN — HEPARIN SODIUM (PORCINE) LOCK FLUSH IV SOLN 100 UNIT/ML 500 UNITS: 100 SOLUTION at 10:07

## 2021-07-12 RX ADMIN — PROPOFOL 200 MG: 10 INJECTION, EMULSION INTRAVENOUS at 09:07

## 2021-07-12 RX ADMIN — SODIUM CHLORIDE 20 ML/HR: 0.9 INJECTION, SOLUTION INTRAVENOUS at 08:07

## 2021-07-13 ENCOUNTER — TELEPHONE (OUTPATIENT)
Dept: ENDOSCOPY | Facility: HOSPITAL | Age: 41
End: 2021-07-13

## 2021-07-14 ENCOUNTER — TELEPHONE (OUTPATIENT)
Dept: NEUROLOGY | Facility: HOSPITAL | Age: 41
End: 2021-07-14

## 2021-07-16 LAB
FINAL PATHOLOGIC DIAGNOSIS: NORMAL
GROSS: NORMAL
Lab: NORMAL

## 2021-07-19 ENCOUNTER — SPECIALTY PHARMACY (OUTPATIENT)
Dept: PHARMACY | Facility: CLINIC | Age: 41
End: 2021-07-19

## 2021-07-26 ENCOUNTER — TELEPHONE (OUTPATIENT)
Dept: NEUROLOGY | Facility: HOSPITAL | Age: 41
End: 2021-07-26

## 2021-07-26 ENCOUNTER — INFUSION (OUTPATIENT)
Dept: INFUSION THERAPY | Facility: HOSPITAL | Age: 41
End: 2021-07-26
Attending: SURGERY
Payer: MEDICAID

## 2021-07-26 ENCOUNTER — OFFICE VISIT (OUTPATIENT)
Dept: NEUROLOGY | Facility: HOSPITAL | Age: 41
End: 2021-07-26
Attending: INTERNAL MEDICINE
Payer: MEDICAID

## 2021-07-26 VITALS — BODY MASS INDEX: 27.5 KG/M2 | HEIGHT: 72 IN | WEIGHT: 203.06 LBS

## 2021-07-26 VITALS
HEIGHT: 72 IN | SYSTOLIC BLOOD PRESSURE: 136 MMHG | HEART RATE: 116 BPM | DIASTOLIC BLOOD PRESSURE: 78 MMHG | WEIGHT: 203.06 LBS | BODY MASS INDEX: 27.5 KG/M2 | TEMPERATURE: 99 F

## 2021-07-26 DIAGNOSIS — K59.03 THERAPEUTIC OPIOID INDUCED CONSTIPATION: ICD-10-CM

## 2021-07-26 DIAGNOSIS — R10.9 CONTINUOUS SEVERE ABDOMINAL PAIN: ICD-10-CM

## 2021-07-26 DIAGNOSIS — D72.119 HYPEREOSINOPHILIC SYNDROME, UNSPECIFIED TYPE: Primary | ICD-10-CM

## 2021-07-26 DIAGNOSIS — T40.2X5A THERAPEUTIC OPIOID INDUCED CONSTIPATION: ICD-10-CM

## 2021-07-26 DIAGNOSIS — F43.22 ADJUSTMENT DISORDER WITH ANXIOUS MOOD: ICD-10-CM

## 2021-07-26 PROCEDURE — A4216 STERILE WATER/SALINE, 10 ML: HCPCS | Performed by: INTERNAL MEDICINE

## 2021-07-26 PROCEDURE — 25000003 PHARM REV CODE 250: Performed by: INTERNAL MEDICINE

## 2021-07-26 PROCEDURE — 63600175 PHARM REV CODE 636 W HCPCS: Performed by: INTERNAL MEDICINE

## 2021-07-26 PROCEDURE — 99213 OFFICE O/P EST LOW 20 MIN: CPT | Mod: 25 | Performed by: INTERNAL MEDICINE

## 2021-07-26 PROCEDURE — 96360 HYDRATION IV INFUSION INIT: CPT

## 2021-07-26 RX ORDER — HEPARIN 100 UNIT/ML
500 SYRINGE INTRAVENOUS
Status: DISCONTINUED | OUTPATIENT
Start: 2021-07-26 | End: 2021-07-26 | Stop reason: HOSPADM

## 2021-07-26 RX ORDER — SODIUM CHLORIDE 0.9 % (FLUSH) 0.9 %
10 SYRINGE (ML) INJECTION
Status: CANCELLED | OUTPATIENT
Start: 2021-07-26

## 2021-07-26 RX ORDER — SODIUM CHLORIDE 0.9 % (FLUSH) 0.9 %
10 SYRINGE (ML) INJECTION
Status: DISCONTINUED | OUTPATIENT
Start: 2021-07-26 | End: 2021-07-26 | Stop reason: HOSPADM

## 2021-07-26 RX ORDER — HEPARIN 100 UNIT/ML
500 SYRINGE INTRAVENOUS
Status: CANCELLED | OUTPATIENT
Start: 2021-07-26

## 2021-07-26 RX ORDER — SODIUM CHLORIDE 0.9 % (FLUSH) 0.9 %
10 SYRINGE (ML) INJECTION
OUTPATIENT
Start: 2021-07-26

## 2021-07-26 RX ORDER — HEPARIN 100 UNIT/ML
500 SYRINGE INTRAVENOUS
OUTPATIENT
Start: 2021-07-26

## 2021-07-26 RX ADMIN — HEPARIN 500 UNITS: 100 SYRINGE at 04:07

## 2021-07-26 RX ADMIN — Medication 10 ML: at 04:07

## 2021-07-26 RX ADMIN — SODIUM CHLORIDE 1000 ML: 0.9 INJECTION, SOLUTION INTRAVENOUS at 03:07

## 2021-07-27 ENCOUNTER — PATIENT MESSAGE (OUTPATIENT)
Dept: NEUROLOGY | Facility: HOSPITAL | Age: 41
End: 2021-07-27

## 2021-07-27 ENCOUNTER — TELEPHONE (OUTPATIENT)
Dept: ALLERGY | Facility: CLINIC | Age: 41
End: 2021-07-27

## 2021-07-28 ENCOUNTER — HOSPITAL ENCOUNTER (OUTPATIENT)
Facility: HOSPITAL | Age: 41
Discharge: HOME | End: 2021-07-30
Attending: EMERGENCY MEDICINE | Admitting: EMERGENCY MEDICINE
Payer: MEDICAID

## 2021-07-28 ENCOUNTER — OFFICE VISIT (OUTPATIENT)
Dept: ALLERGY | Facility: CLINIC | Age: 41
End: 2021-07-28
Payer: MEDICAID

## 2021-07-28 VITALS — HEIGHT: 72 IN | BODY MASS INDEX: 29.05 KG/M2 | WEIGHT: 214.5 LBS

## 2021-07-28 DIAGNOSIS — R00.0 TACHYCARDIA: ICD-10-CM

## 2021-07-28 DIAGNOSIS — D72.110 IDIOPATHIC HYPEREOSINOPHILIC SYNDROME: Primary | ICD-10-CM

## 2021-07-28 DIAGNOSIS — Z91.89 AT RISK FOR LONG QT SYNDROME: ICD-10-CM

## 2021-07-28 DIAGNOSIS — E87.6 HYPOKALEMIA: ICD-10-CM

## 2021-07-28 DIAGNOSIS — D72.119 HYPEREOSINOPHILIC SYNDROME, UNSPECIFIED TYPE: ICD-10-CM

## 2021-07-28 DIAGNOSIS — R07.9 CHEST PAIN: ICD-10-CM

## 2021-07-28 DIAGNOSIS — R10.9 ABDOMINAL PAIN, UNSPECIFIED ABDOMINAL LOCATION: Primary | ICD-10-CM

## 2021-07-28 PROBLEM — K92.2 LOWER GI BLEED: Status: ACTIVE | Noted: 2021-07-28

## 2021-07-28 PROBLEM — F99 PSYCHIATRIC ILLNESS: Status: ACTIVE | Noted: 2021-07-28

## 2021-07-28 PROBLEM — R19.7 DIARRHEA: Status: ACTIVE | Noted: 2021-07-28

## 2021-07-28 LAB
AMPHET+METHAMPHET UR QL: ABNORMAL
AMPHET+METHAMPHET UR QL: ABNORMAL
BARBITURATES UR QL SCN>200 NG/ML: NEGATIVE
BARBITURATES UR QL SCN>200 NG/ML: NEGATIVE
BASOPHILS # BLD AUTO: 0.02 K/UL (ref 0–0.2)
BASOPHILS NFR BLD: 0.3 % (ref 0–1.9)
BENZODIAZ UR QL SCN>200 NG/ML: NEGATIVE
BENZODIAZ UR QL SCN>200 NG/ML: NEGATIVE
BILIRUB UR QL STRIP: NEGATIVE
BUN SERPL-MCNC: 17 MG/DL (ref 6–30)
BZE UR QL SCN: NEGATIVE
BZE UR QL SCN: NEGATIVE
CANNABINOIDS UR QL SCN: NEGATIVE
CANNABINOIDS UR QL SCN: NEGATIVE
CHLORIDE SERPL-SCNC: 106 MMOL/L (ref 95–110)
CLARITY UR REFRACT.AUTO: CLEAR
COLOR UR AUTO: ABNORMAL
CREAT SERPL-MCNC: 1 MG/DL (ref 0.5–1.4)
CREAT UR-MCNC: 62 MG/DL (ref 23–375)
CREAT UR-MCNC: 63 MG/DL (ref 23–375)
CRP SERPL-MCNC: 5.3 MG/L (ref 0–8.2)
DIFFERENTIAL METHOD: ABNORMAL
EOSINOPHIL # BLD AUTO: 0 K/UL (ref 0–0.5)
EOSINOPHIL NFR BLD: 0 % (ref 0–8)
ERYTHROCYTE [DISTWIDTH] IN BLOOD BY AUTOMATED COUNT: 20.2 % (ref 11.5–14.5)
ERYTHROCYTE [SEDIMENTATION RATE] IN BLOOD BY WESTERGREN METHOD: 8 MM/HR (ref 0–23)
ETHANOL SERPL-MCNC: <10 MG/DL
ETHANOL UR-MCNC: <10 MG/DL
GLUCOSE SERPL-MCNC: 108 MG/DL (ref 70–110)
GLUCOSE UR QL STRIP: NEGATIVE
HCT VFR BLD AUTO: 30.4 % (ref 40–54)
HCT VFR BLD CALC: 30 %PCV (ref 36–54)
HGB BLD-MCNC: 9.5 G/DL (ref 14–18)
HGB UR QL STRIP: NEGATIVE
IMM GRANULOCYTES # BLD AUTO: 0.03 K/UL (ref 0–0.04)
IMM GRANULOCYTES NFR BLD AUTO: 0.4 % (ref 0–0.5)
IRON SERPL-MCNC: 21 UG/DL (ref 45–160)
KETONES UR QL STRIP: ABNORMAL
LACTATE SERPL-SCNC: 1 MMOL/L (ref 0.5–2.2)
LEUKOCYTE ESTERASE UR QL STRIP: NEGATIVE
LYMPHOCYTES # BLD AUTO: 0.6 K/UL (ref 1–4.8)
LYMPHOCYTES NFR BLD: 8.2 % (ref 18–48)
MCH RBC QN AUTO: 23.3 PG (ref 27–31)
MCHC RBC AUTO-ENTMCNC: 31.3 G/DL (ref 32–36)
MCV RBC AUTO: 75 FL (ref 82–98)
METHADONE UR QL SCN>300 NG/ML: NEGATIVE
METHADONE UR QL SCN>300 NG/ML: NEGATIVE
MONOCYTES # BLD AUTO: 1 K/UL (ref 0.3–1)
MONOCYTES NFR BLD: 13.2 % (ref 4–15)
NEUTROPHILS # BLD AUTO: 6 K/UL (ref 1.8–7.7)
NEUTROPHILS NFR BLD: 77.9 % (ref 38–73)
NITRITE UR QL STRIP: NEGATIVE
NRBC BLD-RTO: 0 /100 WBC
OPIATES UR QL SCN: ABNORMAL
OPIATES UR QL SCN: ABNORMAL
PCP UR QL SCN>25 NG/ML: NEGATIVE
PCP UR QL SCN>25 NG/ML: NEGATIVE
PH UR STRIP: 6 [PH] (ref 5–8)
PLATELET # BLD AUTO: 319 K/UL (ref 150–450)
PMV BLD AUTO: 10.7 FL (ref 9.2–12.9)
POC IONIZED CALCIUM: 1.04 MMOL/L (ref 1.06–1.42)
POC TCO2 (MEASURED): 21 MMOL/L (ref 23–29)
POTASSIUM BLD-SCNC: 2.8 MMOL/L (ref 3.5–5.1)
PROT UR QL STRIP: NEGATIVE
RBC # BLD AUTO: 4.08 M/UL (ref 4.6–6.2)
SAMPLE: ABNORMAL
SATURATED IRON: 5 % (ref 20–50)
SODIUM BLD-SCNC: 141 MMOL/L (ref 136–145)
SP GR UR STRIP: 1.01 (ref 1–1.03)
TOTAL IRON BINDING CAPACITY: 413 UG/DL (ref 250–450)
TOXICOLOGY INFORMATION: ABNORMAL
TOXICOLOGY INFORMATION: ABNORMAL
TRANSFERRIN SERPL-MCNC: 279 MG/DL (ref 200–375)
URN SPEC COLLECT METH UR: ABNORMAL
WBC # BLD AUTO: 7.66 K/UL (ref 3.9–12.7)

## 2021-07-28 PROCEDURE — G0378 HOSPITAL OBSERVATION PER HR: HCPCS

## 2021-07-28 PROCEDURE — 99999 PR PBB SHADOW E&M-EST. PATIENT-LVL IV: CPT | Mod: PBBFAC,,, | Performed by: STUDENT IN AN ORGANIZED HEALTH CARE EDUCATION/TRAINING PROGRAM

## 2021-07-28 PROCEDURE — 83540 ASSAY OF IRON: CPT | Performed by: STUDENT IN AN ORGANIZED HEALTH CARE EDUCATION/TRAINING PROGRAM

## 2021-07-28 PROCEDURE — 80307 DRUG TEST PRSMV CHEM ANLYZR: CPT | Performed by: STUDENT IN AN ORGANIZED HEALTH CARE EDUCATION/TRAINING PROGRAM

## 2021-07-28 PROCEDURE — 81003 URINALYSIS AUTO W/O SCOPE: CPT | Mod: 59 | Performed by: STUDENT IN AN ORGANIZED HEALTH CARE EDUCATION/TRAINING PROGRAM

## 2021-07-28 PROCEDURE — 99285 PR EMERGENCY DEPT VISIT,LEVEL V: ICD-10-PCS | Mod: ,,, | Performed by: EMERGENCY MEDICINE

## 2021-07-28 PROCEDURE — 96375 TX/PRO/DX INJ NEW DRUG ADDON: CPT

## 2021-07-28 PROCEDURE — 85025 COMPLETE CBC W/AUTO DIFF WBC: CPT | Mod: 91 | Performed by: STUDENT IN AN ORGANIZED HEALTH CARE EDUCATION/TRAINING PROGRAM

## 2021-07-28 PROCEDURE — 63600175 PHARM REV CODE 636 W HCPCS: Performed by: EMERGENCY MEDICINE

## 2021-07-28 PROCEDURE — 93010 ELECTROCARDIOGRAM REPORT: CPT | Mod: ,,, | Performed by: INTERNAL MEDICINE

## 2021-07-28 PROCEDURE — 80047 BASIC METABLC PNL IONIZED CA: CPT

## 2021-07-28 PROCEDURE — 36415 COLL VENOUS BLD VENIPUNCTURE: CPT | Performed by: STUDENT IN AN ORGANIZED HEALTH CARE EDUCATION/TRAINING PROGRAM

## 2021-07-28 PROCEDURE — 83605 ASSAY OF LACTIC ACID: CPT | Performed by: EMERGENCY MEDICINE

## 2021-07-28 PROCEDURE — 25000003 PHARM REV CODE 250: Performed by: STUDENT IN AN ORGANIZED HEALTH CARE EDUCATION/TRAINING PROGRAM

## 2021-07-28 PROCEDURE — 86920 COMPATIBILITY TEST SPIN: CPT | Performed by: STUDENT IN AN ORGANIZED HEALTH CARE EDUCATION/TRAINING PROGRAM

## 2021-07-28 PROCEDURE — 96365 THER/PROPH/DIAG IV INF INIT: CPT

## 2021-07-28 PROCEDURE — 63600175 PHARM REV CODE 636 W HCPCS: Performed by: STUDENT IN AN ORGANIZED HEALTH CARE EDUCATION/TRAINING PROGRAM

## 2021-07-28 PROCEDURE — 99285 EMERGENCY DEPT VISIT HI MDM: CPT | Mod: 25,27

## 2021-07-28 PROCEDURE — 93010 EKG 12-LEAD: ICD-10-PCS | Mod: ,,, | Performed by: INTERNAL MEDICINE

## 2021-07-28 PROCEDURE — 99214 OFFICE O/P EST MOD 30 MIN: CPT | Mod: S$PBB,,, | Performed by: STUDENT IN AN ORGANIZED HEALTH CARE EDUCATION/TRAINING PROGRAM

## 2021-07-28 PROCEDURE — 93005 ELECTROCARDIOGRAM TRACING: CPT

## 2021-07-28 PROCEDURE — 99214 OFFICE O/P EST MOD 30 MIN: CPT | Mod: PBBFAC,25 | Performed by: STUDENT IN AN ORGANIZED HEALTH CARE EDUCATION/TRAINING PROGRAM

## 2021-07-28 PROCEDURE — 86900 BLOOD TYPING SEROLOGIC ABO: CPT | Performed by: STUDENT IN AN ORGANIZED HEALTH CARE EDUCATION/TRAINING PROGRAM

## 2021-07-28 PROCEDURE — C9113 INJ PANTOPRAZOLE SODIUM, VIA: HCPCS | Performed by: STUDENT IN AN ORGANIZED HEALTH CARE EDUCATION/TRAINING PROGRAM

## 2021-07-28 PROCEDURE — 85652 RBC SED RATE AUTOMATED: CPT | Performed by: HOSPITALIST

## 2021-07-28 PROCEDURE — 82077 ASSAY SPEC XCP UR&BREATH IA: CPT | Performed by: STUDENT IN AN ORGANIZED HEALTH CARE EDUCATION/TRAINING PROGRAM

## 2021-07-28 PROCEDURE — 86140 C-REACTIVE PROTEIN: CPT | Performed by: HOSPITALIST

## 2021-07-28 PROCEDURE — 96361 HYDRATE IV INFUSION ADD-ON: CPT

## 2021-07-28 PROCEDURE — 80307 DRUG TEST PRSMV CHEM ANLYZR: CPT | Performed by: EMERGENCY MEDICINE

## 2021-07-28 PROCEDURE — 99999 PR PBB SHADOW E&M-EST. PATIENT-LVL IV: ICD-10-PCS | Mod: PBBFAC,,, | Performed by: STUDENT IN AN ORGANIZED HEALTH CARE EDUCATION/TRAINING PROGRAM

## 2021-07-28 PROCEDURE — 99285 EMERGENCY DEPT VISIT HI MDM: CPT | Mod: ,,, | Performed by: EMERGENCY MEDICINE

## 2021-07-28 PROCEDURE — 99214 PR OFFICE/OUTPT VISIT, EST, LEVL IV, 30-39 MIN: ICD-10-PCS | Mod: S$PBB,,, | Performed by: STUDENT IN AN ORGANIZED HEALTH CARE EDUCATION/TRAINING PROGRAM

## 2021-07-28 RX ORDER — HYDROCODONE BITARTRATE AND ACETAMINOPHEN 500; 5 MG/1; MG/1
TABLET ORAL
Status: DISCONTINUED | OUTPATIENT
Start: 2021-07-28 | End: 2021-07-30 | Stop reason: HOSPADM

## 2021-07-28 RX ORDER — PREDNISONE 20 MG/1
40 TABLET ORAL DAILY
Status: DISCONTINUED | OUTPATIENT
Start: 2021-07-29 | End: 2021-07-30 | Stop reason: HOSPADM

## 2021-07-28 RX ORDER — OXYCODONE HYDROCHLORIDE 5 MG/1
5 TABLET ORAL EVERY 6 HOURS PRN
Status: DISCONTINUED | OUTPATIENT
Start: 2021-07-28 | End: 2021-07-29

## 2021-07-28 RX ORDER — TALC
6 POWDER (GRAM) TOPICAL NIGHTLY PRN
Status: CANCELLED | OUTPATIENT
Start: 2021-07-28

## 2021-07-28 RX ORDER — GLUCAGON 1 MG
1 KIT INJECTION
Status: DISCONTINUED | OUTPATIENT
Start: 2021-07-28 | End: 2021-07-30 | Stop reason: HOSPADM

## 2021-07-28 RX ORDER — SODIUM CHLORIDE 0.9 % (FLUSH) 0.9 %
10 SYRINGE (ML) INJECTION
Status: CANCELLED | OUTPATIENT
Start: 2021-07-28

## 2021-07-28 RX ORDER — SODIUM CHLORIDE 0.9 % (FLUSH) 0.9 %
10 SYRINGE (ML) INJECTION
Status: DISCONTINUED | OUTPATIENT
Start: 2021-07-28 | End: 2021-07-30 | Stop reason: HOSPADM

## 2021-07-28 RX ORDER — POTASSIUM CHLORIDE 7.45 MG/ML
10 INJECTION INTRAVENOUS
Status: COMPLETED | OUTPATIENT
Start: 2021-07-28 | End: 2021-07-28

## 2021-07-28 RX ORDER — IBUPROFEN 200 MG
24 TABLET ORAL
Status: DISCONTINUED | OUTPATIENT
Start: 2021-07-28 | End: 2021-07-30 | Stop reason: HOSPADM

## 2021-07-28 RX ORDER — ACETAMINOPHEN 325 MG/1
650 TABLET ORAL EVERY 4 HOURS PRN
Status: DISCONTINUED | OUTPATIENT
Start: 2021-07-28 | End: 2021-07-30 | Stop reason: HOSPADM

## 2021-07-28 RX ORDER — OXYCODONE HYDROCHLORIDE 10 MG/1
10 TABLET ORAL EVERY 6 HOURS PRN
Status: DISCONTINUED | OUTPATIENT
Start: 2021-07-28 | End: 2021-07-28

## 2021-07-28 RX ORDER — DROPERIDOL 2.5 MG/ML
1.25 INJECTION, SOLUTION INTRAMUSCULAR; INTRAVENOUS
Status: COMPLETED | OUTPATIENT
Start: 2021-07-28 | End: 2021-07-28

## 2021-07-28 RX ORDER — CEFEPIME HYDROCHLORIDE 2 G/1
2 INJECTION, POWDER, FOR SOLUTION INTRAVENOUS
Status: DISCONTINUED | OUTPATIENT
Start: 2021-07-28 | End: 2021-07-29

## 2021-07-28 RX ORDER — IBUPROFEN 200 MG
16 TABLET ORAL
Status: DISCONTINUED | OUTPATIENT
Start: 2021-07-28 | End: 2021-07-30 | Stop reason: HOSPADM

## 2021-07-28 RX ORDER — METRONIDAZOLE 500 MG/1
500 TABLET ORAL EVERY 8 HOURS
Status: DISCONTINUED | OUTPATIENT
Start: 2021-07-28 | End: 2021-07-30 | Stop reason: HOSPADM

## 2021-07-28 RX ORDER — PANTOPRAZOLE SODIUM 40 MG/10ML
40 INJECTION, POWDER, LYOPHILIZED, FOR SOLUTION INTRAVENOUS 2 TIMES DAILY
Status: DISCONTINUED | OUTPATIENT
Start: 2021-07-28 | End: 2021-07-30

## 2021-07-28 RX ADMIN — DROPERIDOL 1.25 MG: 2.5 INJECTION, SOLUTION INTRAMUSCULAR; INTRAVENOUS at 01:07

## 2021-07-28 RX ADMIN — POTASSIUM BICARBONATE 40 MEQ: 391 TABLET, EFFERVESCENT ORAL at 10:07

## 2021-07-28 RX ADMIN — POTASSIUM CHLORIDE 10 MEQ: 7.46 INJECTION, SOLUTION INTRAVENOUS at 04:07

## 2021-07-28 RX ADMIN — VANCOMYCIN HYDROCHLORIDE 2500 MG: 1.5 INJECTION, POWDER, LYOPHILIZED, FOR SOLUTION INTRAVENOUS at 10:07

## 2021-07-28 RX ADMIN — SODIUM CHLORIDE, SODIUM LACTATE, POTASSIUM CHLORIDE, AND CALCIUM CHLORIDE 1000 ML: .6; .31; .03; .02 INJECTION, SOLUTION INTRAVENOUS at 01:07

## 2021-07-28 RX ADMIN — METRONIDAZOLE 500 MG: 500 TABLET ORAL at 10:07

## 2021-07-28 RX ADMIN — PANTOPRAZOLE SODIUM 40 MG: 40 INJECTION, POWDER, FOR SOLUTION INTRAVENOUS at 10:07

## 2021-07-28 RX ADMIN — CEFEPIME 2 G: 2 INJECTION, POWDER, FOR SOLUTION INTRAVENOUS at 10:07

## 2021-07-29 PROBLEM — R10.9 ABDOMINAL PAIN: Status: ACTIVE | Noted: 2021-07-29

## 2021-07-29 LAB
ABO + RH BLD: NORMAL
ALBUMIN SERPL BCP-MCNC: 3.1 G/DL (ref 3.5–5.2)
ALP SERPL-CCNC: 50 U/L (ref 55–135)
ALT SERPL W/O P-5'-P-CCNC: 19 U/L (ref 10–44)
ANION GAP SERPL CALC-SCNC: 10 MMOL/L (ref 8–16)
AST SERPL-CCNC: 22 U/L (ref 10–40)
BASOPHILS # BLD AUTO: 0.03 K/UL (ref 0–0.2)
BASOPHILS NFR BLD: 0.5 % (ref 0–1.9)
BILIRUB SERPL-MCNC: 0.6 MG/DL (ref 0.1–1)
BLD GP AB SCN CELLS X3 SERPL QL: NORMAL
BUN SERPL-MCNC: 17 MG/DL (ref 6–20)
CALCIUM SERPL-MCNC: 8.7 MG/DL (ref 8.7–10.5)
CHLORIDE SERPL-SCNC: 110 MMOL/L (ref 95–110)
CO2 SERPL-SCNC: 22 MMOL/L (ref 23–29)
CREAT SERPL-MCNC: 0.9 MG/DL (ref 0.5–1.4)
DIFFERENTIAL METHOD: ABNORMAL
EOSINOPHIL # BLD AUTO: 0.1 K/UL (ref 0–0.5)
EOSINOPHIL NFR BLD: 1.4 % (ref 0–8)
ERYTHROCYTE [DISTWIDTH] IN BLOOD BY AUTOMATED COUNT: 20.6 % (ref 11.5–14.5)
EST. GFR  (AFRICAN AMERICAN): >60 ML/MIN/1.73 M^2
EST. GFR  (NON AFRICAN AMERICAN): >60 ML/MIN/1.73 M^2
GLUCOSE SERPL-MCNC: 88 MG/DL (ref 70–110)
HCT VFR BLD AUTO: 31.6 % (ref 40–54)
HGB BLD-MCNC: 10 G/DL (ref 14–18)
IMM GRANULOCYTES # BLD AUTO: 0.04 K/UL (ref 0–0.04)
IMM GRANULOCYTES NFR BLD AUTO: 0.6 % (ref 0–0.5)
LYMPHOCYTES # BLD AUTO: 1.2 K/UL (ref 1–4.8)
LYMPHOCYTES NFR BLD: 17.8 % (ref 18–48)
MAGNESIUM SERPL-MCNC: 2.1 MG/DL (ref 1.6–2.6)
MCH RBC QN AUTO: 23.1 PG (ref 27–31)
MCHC RBC AUTO-ENTMCNC: 31.6 G/DL (ref 32–36)
MCV RBC AUTO: 73 FL (ref 82–98)
MONOCYTES # BLD AUTO: 1 K/UL (ref 0.3–1)
MONOCYTES NFR BLD: 14.5 % (ref 4–15)
NEUTROPHILS # BLD AUTO: 4.3 K/UL (ref 1.8–7.7)
NEUTROPHILS NFR BLD: 65.2 % (ref 38–73)
NRBC BLD-RTO: 0 /100 WBC
PHOSPHATE SERPL-MCNC: 2.2 MG/DL (ref 2.7–4.5)
PLATELET # BLD AUTO: 330 K/UL (ref 150–450)
PMV BLD AUTO: 11.2 FL (ref 9.2–12.9)
POTASSIUM SERPL-SCNC: 3.5 MMOL/L (ref 3.5–5.1)
PROT SERPL-MCNC: 5.5 G/DL (ref 6–8.4)
RBC # BLD AUTO: 4.33 M/UL (ref 4.6–6.2)
SODIUM SERPL-SCNC: 142 MMOL/L (ref 136–145)
WBC # BLD AUTO: 6.63 K/UL (ref 3.9–12.7)

## 2021-07-29 PROCEDURE — C9113 INJ PANTOPRAZOLE SODIUM, VIA: HCPCS | Performed by: STUDENT IN AN ORGANIZED HEALTH CARE EDUCATION/TRAINING PROGRAM

## 2021-07-29 PROCEDURE — 93010 ELECTROCARDIOGRAM REPORT: CPT | Mod: ,,, | Performed by: INTERNAL MEDICINE

## 2021-07-29 PROCEDURE — 36415 COLL VENOUS BLD VENIPUNCTURE: CPT | Performed by: STUDENT IN AN ORGANIZED HEALTH CARE EDUCATION/TRAINING PROGRAM

## 2021-07-29 PROCEDURE — 93010 EKG 12-LEAD: ICD-10-PCS | Mod: ,,, | Performed by: INTERNAL MEDICINE

## 2021-07-29 PROCEDURE — 99226 PR SUBSEQUENT OBSERVATION CARE,LEVEL III: CPT | Mod: ,,, | Performed by: HOSPITALIST

## 2021-07-29 PROCEDURE — 96375 TX/PRO/DX INJ NEW DRUG ADDON: CPT

## 2021-07-29 PROCEDURE — 99215 PR OFFICE/OUTPT VISIT, EST, LEVL V, 40-54 MIN: ICD-10-PCS | Mod: ,,, | Performed by: PSYCHIATRY & NEUROLOGY

## 2021-07-29 PROCEDURE — 25000003 PHARM REV CODE 250: Performed by: HOSPITALIST

## 2021-07-29 PROCEDURE — 25000003 PHARM REV CODE 250: Performed by: STUDENT IN AN ORGANIZED HEALTH CARE EDUCATION/TRAINING PROGRAM

## 2021-07-29 PROCEDURE — 99226 PR SUBSEQUENT OBSERVATION CARE,LEVEL III: ICD-10-PCS | Mod: ,,, | Performed by: HOSPITALIST

## 2021-07-29 PROCEDURE — 93005 ELECTROCARDIOGRAM TRACING: CPT

## 2021-07-29 PROCEDURE — G0378 HOSPITAL OBSERVATION PER HR: HCPCS

## 2021-07-29 PROCEDURE — 83735 ASSAY OF MAGNESIUM: CPT | Performed by: STUDENT IN AN ORGANIZED HEALTH CARE EDUCATION/TRAINING PROGRAM

## 2021-07-29 PROCEDURE — 84100 ASSAY OF PHOSPHORUS: CPT | Performed by: STUDENT IN AN ORGANIZED HEALTH CARE EDUCATION/TRAINING PROGRAM

## 2021-07-29 PROCEDURE — 63600175 PHARM REV CODE 636 W HCPCS: Performed by: STUDENT IN AN ORGANIZED HEALTH CARE EDUCATION/TRAINING PROGRAM

## 2021-07-29 PROCEDURE — 80053 COMPREHEN METABOLIC PANEL: CPT | Performed by: STUDENT IN AN ORGANIZED HEALTH CARE EDUCATION/TRAINING PROGRAM

## 2021-07-29 PROCEDURE — 99215 OFFICE O/P EST HI 40 MIN: CPT | Mod: ,,, | Performed by: PSYCHIATRY & NEUROLOGY

## 2021-07-29 PROCEDURE — 85025 COMPLETE CBC W/AUTO DIFF WBC: CPT | Performed by: STUDENT IN AN ORGANIZED HEALTH CARE EDUCATION/TRAINING PROGRAM

## 2021-07-29 PROCEDURE — 96376 TX/PRO/DX INJ SAME DRUG ADON: CPT

## 2021-07-29 RX ORDER — DIPHENHYDRAMINE HCL 25 MG
25 CAPSULE ORAL EVERY 6 HOURS PRN
Status: DISCONTINUED | OUTPATIENT
Start: 2021-07-29 | End: 2021-07-30 | Stop reason: HOSPADM

## 2021-07-29 RX ORDER — POTASSIUM CHLORIDE 750 MG/1
10 CAPSULE, EXTENDED RELEASE ORAL ONCE
Status: DISCONTINUED | OUTPATIENT
Start: 2021-07-29 | End: 2021-07-29

## 2021-07-29 RX ORDER — OXYCODONE HYDROCHLORIDE 10 MG/1
10 TABLET ORAL EVERY 4 HOURS PRN
Status: CANCELLED | OUTPATIENT
Start: 2021-07-29

## 2021-07-29 RX ORDER — CEFEPIME HYDROCHLORIDE 1 G/1
1 INJECTION, POWDER, FOR SOLUTION INTRAMUSCULAR; INTRAVENOUS
Status: DISCONTINUED | OUTPATIENT
Start: 2021-07-29 | End: 2021-07-29

## 2021-07-29 RX ORDER — CIPROFLOXACIN 500 MG/1
500 TABLET ORAL EVERY 12 HOURS
Status: DISCONTINUED | OUTPATIENT
Start: 2021-07-29 | End: 2021-07-30 | Stop reason: HOSPADM

## 2021-07-29 RX ORDER — OXYCODONE HYDROCHLORIDE 10 MG/1
10 TABLET ORAL EVERY 4 HOURS PRN
Status: DISCONTINUED | OUTPATIENT
Start: 2021-07-29 | End: 2021-07-30 | Stop reason: HOSPADM

## 2021-07-29 RX ORDER — MORPHINE SULFATE 4 MG/ML
4 INJECTION, SOLUTION INTRAMUSCULAR; INTRAVENOUS EVERY 4 HOURS PRN
Status: DISCONTINUED | OUTPATIENT
Start: 2021-07-29 | End: 2021-07-30

## 2021-07-29 RX ORDER — DICYCLOMINE HYDROCHLORIDE 10 MG/1
10 CAPSULE ORAL 4 TIMES DAILY
Status: DISCONTINUED | OUTPATIENT
Start: 2021-07-29 | End: 2021-07-29

## 2021-07-29 RX ORDER — MORPHINE SULFATE 2 MG/ML
2 INJECTION, SOLUTION INTRAMUSCULAR; INTRAVENOUS EVERY 4 HOURS PRN
Status: DISCONTINUED | OUTPATIENT
Start: 2021-07-29 | End: 2021-07-29

## 2021-07-29 RX ORDER — ACETAMINOPHEN 325 MG/1
650 TABLET ORAL 3 TIMES DAILY
Status: DISCONTINUED | OUTPATIENT
Start: 2021-07-29 | End: 2021-07-30 | Stop reason: HOSPADM

## 2021-07-29 RX ORDER — POTASSIUM CHLORIDE 20 MEQ/1
40 TABLET, EXTENDED RELEASE ORAL ONCE
Status: COMPLETED | OUTPATIENT
Start: 2021-07-29 | End: 2021-07-29

## 2021-07-29 RX ORDER — DICYCLOMINE HYDROCHLORIDE 10 MG/1
10 CAPSULE ORAL 4 TIMES DAILY PRN
Status: DISCONTINUED | OUTPATIENT
Start: 2021-07-29 | End: 2021-07-30 | Stop reason: HOSPADM

## 2021-07-29 RX ORDER — CEFEPIME HYDROCHLORIDE 1 G/1
1 INJECTION, POWDER, FOR SOLUTION INTRAMUSCULAR; INTRAVENOUS
Status: CANCELLED | OUTPATIENT
Start: 2021-07-29

## 2021-07-29 RX ORDER — DOXYCYCLINE HYCLATE 100 MG
100 TABLET ORAL EVERY 12 HOURS
Status: DISCONTINUED | OUTPATIENT
Start: 2021-07-29 | End: 2021-07-30 | Stop reason: HOSPADM

## 2021-07-29 RX ADMIN — MORPHINE SULFATE 4 MG: 4 INJECTION INTRAVENOUS at 12:07

## 2021-07-29 RX ADMIN — OXYCODONE 5 MG: 5 TABLET ORAL at 01:07

## 2021-07-29 RX ADMIN — METRONIDAZOLE 500 MG: 500 TABLET ORAL at 02:07

## 2021-07-29 RX ADMIN — CEFEPIME 2 G: 2 INJECTION, POWDER, FOR SOLUTION INTRAVENOUS at 07:07

## 2021-07-29 RX ADMIN — MORPHINE SULFATE 2 MG: 2 INJECTION, SOLUTION INTRAMUSCULAR; INTRAVENOUS at 08:07

## 2021-07-29 RX ADMIN — DIPHENHYDRAMINE HYDROCHLORIDE 25 MG: 25 CAPSULE ORAL at 06:07

## 2021-07-29 RX ADMIN — VANCOMYCIN HYDROCHLORIDE 1750 MG: 10 INJECTION, POWDER, LYOPHILIZED, FOR SOLUTION INTRAVENOUS at 07:07

## 2021-07-29 RX ADMIN — PREDNISONE 40 MG: 20 TABLET ORAL at 08:07

## 2021-07-29 RX ADMIN — ACETAMINOPHEN 650 MG: 325 TABLET ORAL at 08:07

## 2021-07-29 RX ADMIN — POTASSIUM CHLORIDE 40 MEQ: 1500 TABLET, EXTENDED RELEASE ORAL at 06:07

## 2021-07-29 RX ADMIN — MORPHINE SULFATE 4 MG: 4 INJECTION INTRAVENOUS at 08:07

## 2021-07-29 RX ADMIN — PANTOPRAZOLE SODIUM 40 MG: 40 INJECTION, POWDER, FOR SOLUTION INTRAVENOUS at 08:07

## 2021-07-29 RX ADMIN — OXYCODONE HYDROCHLORIDE 10 MG: 10 TABLET ORAL at 02:07

## 2021-07-29 RX ADMIN — DOXYCYCLINE HYCLATE 100 MG: 100 TABLET, COATED ORAL at 08:07

## 2021-07-29 RX ADMIN — CIPROFLOXACIN HYDROCHLORIDE 500 MG: 500 TABLET, FILM COATED ORAL at 08:07

## 2021-07-29 RX ADMIN — CIPROFLOXACIN HYDROCHLORIDE 500 MG: 500 TABLET, FILM COATED ORAL at 10:07

## 2021-07-29 RX ADMIN — OXYCODONE 5 MG: 5 TABLET ORAL at 07:07

## 2021-07-29 RX ADMIN — DOXYCYCLINE HYCLATE 100 MG: 100 TABLET, COATED ORAL at 10:07

## 2021-07-29 RX ADMIN — ACETAMINOPHEN 650 MG: 325 TABLET ORAL at 02:07

## 2021-07-29 RX ADMIN — OXYCODONE HYDROCHLORIDE 10 MG: 10 TABLET ORAL at 06:07

## 2021-07-29 RX ADMIN — DICYCLOMINE HYDROCHLORIDE 10 MG: 10 CAPSULE ORAL at 04:07

## 2021-07-29 RX ADMIN — ACETAMINOPHEN 650 MG: 325 TABLET ORAL at 10:07

## 2021-07-29 RX ADMIN — MORPHINE SULFATE 4 MG: 4 INJECTION INTRAVENOUS at 04:07

## 2021-07-29 RX ADMIN — DICYCLOMINE HYDROCHLORIDE 10 MG: 10 CAPSULE ORAL at 12:07

## 2021-07-29 RX ADMIN — METRONIDAZOLE 500 MG: 500 TABLET ORAL at 10:07

## 2021-07-29 RX ADMIN — METRONIDAZOLE 500 MG: 500 TABLET ORAL at 05:07

## 2021-07-30 VITALS
HEART RATE: 61 BPM | HEIGHT: 72 IN | SYSTOLIC BLOOD PRESSURE: 125 MMHG | TEMPERATURE: 97 F | WEIGHT: 221.13 LBS | DIASTOLIC BLOOD PRESSURE: 69 MMHG | RESPIRATION RATE: 16 BRPM | OXYGEN SATURATION: 98 % | BODY MASS INDEX: 29.95 KG/M2

## 2021-07-30 LAB
ALBUMIN SERPL BCP-MCNC: 3 G/DL (ref 3.5–5.2)
ALP SERPL-CCNC: 49 U/L (ref 55–135)
ALT SERPL W/O P-5'-P-CCNC: 19 U/L (ref 10–44)
ANION GAP SERPL CALC-SCNC: 10 MMOL/L (ref 8–16)
AST SERPL-CCNC: 16 U/L (ref 10–40)
BASOPHILS # BLD AUTO: 0.03 K/UL (ref 0–0.2)
BASOPHILS NFR BLD: 0.4 % (ref 0–1.9)
BILIRUB SERPL-MCNC: 0.5 MG/DL (ref 0.1–1)
BUN SERPL-MCNC: 14 MG/DL (ref 6–20)
CALCIUM SERPL-MCNC: 8.8 MG/DL (ref 8.7–10.5)
CHLORIDE SERPL-SCNC: 109 MMOL/L (ref 95–110)
CO2 SERPL-SCNC: 24 MMOL/L (ref 23–29)
CREAT SERPL-MCNC: 0.9 MG/DL (ref 0.5–1.4)
DIFFERENTIAL METHOD: ABNORMAL
EOSINOPHIL # BLD AUTO: 0.1 K/UL (ref 0–0.5)
EOSINOPHIL NFR BLD: 1.8 % (ref 0–8)
ERYTHROCYTE [DISTWIDTH] IN BLOOD BY AUTOMATED COUNT: 20.3 % (ref 11.5–14.5)
EST. GFR  (AFRICAN AMERICAN): >60 ML/MIN/1.73 M^2
EST. GFR  (NON AFRICAN AMERICAN): >60 ML/MIN/1.73 M^2
GLUCOSE SERPL-MCNC: 108 MG/DL (ref 70–110)
HCT VFR BLD AUTO: 32.5 % (ref 40–54)
HGB BLD-MCNC: 10 G/DL (ref 14–18)
IMM GRANULOCYTES # BLD AUTO: 0.04 K/UL (ref 0–0.04)
IMM GRANULOCYTES NFR BLD AUTO: 0.6 % (ref 0–0.5)
LYMPHOCYTES # BLD AUTO: 1.3 K/UL (ref 1–4.8)
LYMPHOCYTES NFR BLD: 18 % (ref 18–48)
MCH RBC QN AUTO: 22.9 PG (ref 27–31)
MCHC RBC AUTO-ENTMCNC: 30.8 G/DL (ref 32–36)
MCV RBC AUTO: 75 FL (ref 82–98)
MONOCYTES # BLD AUTO: 1 K/UL (ref 0.3–1)
MONOCYTES NFR BLD: 13.9 % (ref 4–15)
NEUTROPHILS # BLD AUTO: 4.7 K/UL (ref 1.8–7.7)
NEUTROPHILS NFR BLD: 65.3 % (ref 38–73)
NRBC BLD-RTO: 0 /100 WBC
PHOSPHATE SERPL-MCNC: 2.8 MG/DL (ref 2.7–4.5)
PLATELET # BLD AUTO: 319 K/UL (ref 150–450)
PMV BLD AUTO: 11.2 FL (ref 9.2–12.9)
POTASSIUM SERPL-SCNC: 3.7 MMOL/L (ref 3.5–5.1)
PROT SERPL-MCNC: 5.4 G/DL (ref 6–8.4)
RBC # BLD AUTO: 4.36 M/UL (ref 4.6–6.2)
SODIUM SERPL-SCNC: 143 MMOL/L (ref 136–145)
WBC # BLD AUTO: 7.12 K/UL (ref 3.9–12.7)

## 2021-07-30 PROCEDURE — 36415 COLL VENOUS BLD VENIPUNCTURE: CPT | Performed by: STUDENT IN AN ORGANIZED HEALTH CARE EDUCATION/TRAINING PROGRAM

## 2021-07-30 PROCEDURE — 96376 TX/PRO/DX INJ SAME DRUG ADON: CPT

## 2021-07-30 PROCEDURE — 85025 COMPLETE CBC W/AUTO DIFF WBC: CPT | Performed by: STUDENT IN AN ORGANIZED HEALTH CARE EDUCATION/TRAINING PROGRAM

## 2021-07-30 PROCEDURE — 63600175 PHARM REV CODE 636 W HCPCS: Performed by: STUDENT IN AN ORGANIZED HEALTH CARE EDUCATION/TRAINING PROGRAM

## 2021-07-30 PROCEDURE — 80053 COMPREHEN METABOLIC PANEL: CPT | Performed by: STUDENT IN AN ORGANIZED HEALTH CARE EDUCATION/TRAINING PROGRAM

## 2021-07-30 PROCEDURE — 25000003 PHARM REV CODE 250: Performed by: HOSPITALIST

## 2021-07-30 PROCEDURE — 25000003 PHARM REV CODE 250: Performed by: STUDENT IN AN ORGANIZED HEALTH CARE EDUCATION/TRAINING PROGRAM

## 2021-07-30 PROCEDURE — 84100 ASSAY OF PHOSPHORUS: CPT | Performed by: STUDENT IN AN ORGANIZED HEALTH CARE EDUCATION/TRAINING PROGRAM

## 2021-07-30 PROCEDURE — G0378 HOSPITAL OBSERVATION PER HR: HCPCS

## 2021-07-30 RX ORDER — PANTOPRAZOLE SODIUM 40 MG/1
40 TABLET, DELAYED RELEASE ORAL DAILY
Qty: 30 TABLET | Refills: 2 | Status: SHIPPED | OUTPATIENT
Start: 2021-07-30 | End: 2021-07-30

## 2021-07-30 RX ORDER — PANTOPRAZOLE SODIUM 40 MG/1
40 TABLET, DELAYED RELEASE ORAL
Status: DISCONTINUED | OUTPATIENT
Start: 2021-07-30 | End: 2021-07-30 | Stop reason: HOSPADM

## 2021-07-30 RX ORDER — OXYCODONE HYDROCHLORIDE 10 MG/1
10 TABLET ORAL EVERY 4 HOURS PRN
Status: CANCELLED | OUTPATIENT
Start: 2021-07-30

## 2021-07-30 RX ORDER — PREDNISONE 20 MG/1
40 TABLET ORAL DAILY
Start: 2021-07-30 | End: 2021-11-16 | Stop reason: ALTCHOICE

## 2021-07-30 RX ORDER — DOXYCYCLINE HYCLATE 100 MG
100 TABLET ORAL EVERY 12 HOURS
Qty: 8 TABLET | Refills: 0 | Status: SHIPPED | OUTPATIENT
Start: 2021-07-30 | End: 2021-08-04

## 2021-07-30 RX ORDER — METRONIDAZOLE 500 MG/1
500 TABLET ORAL EVERY 8 HOURS
Qty: 21 TABLET | Refills: 0 | Status: SHIPPED | OUTPATIENT
Start: 2021-07-30 | End: 2021-08-06

## 2021-07-30 RX ORDER — CIPROFLOXACIN 500 MG/1
500 TABLET ORAL EVERY 12 HOURS
Qty: 14 TABLET | Refills: 0 | Status: SHIPPED | OUTPATIENT
Start: 2021-07-30 | End: 2021-08-06

## 2021-07-30 RX ORDER — OXYCODONE HYDROCHLORIDE 15 MG/1
15 TABLET ORAL EVERY 6 HOURS PRN
Qty: 10 TABLET | Refills: 0 | Status: SHIPPED | OUTPATIENT
Start: 2021-07-30 | End: 2021-07-30

## 2021-07-30 RX ORDER — DOXYCYCLINE HYCLATE 100 MG
100 TABLET ORAL EVERY 12 HOURS
Qty: 8 TABLET | Refills: 0 | Status: SHIPPED | OUTPATIENT
Start: 2021-07-30 | End: 2021-07-30

## 2021-07-30 RX ORDER — CIPROFLOXACIN 500 MG/1
500 TABLET ORAL EVERY 12 HOURS
Qty: 14 TABLET | Refills: 0 | Status: SHIPPED | OUTPATIENT
Start: 2021-07-30 | End: 2021-07-30

## 2021-07-30 RX ORDER — SULFAMETHOXAZOLE AND TRIMETHOPRIM 800; 160 MG/1; MG/1
TABLET ORAL
Qty: 30 TABLET | Refills: 4 | Status: SHIPPED | OUTPATIENT
Start: 2021-07-30 | End: 2021-07-30 | Stop reason: SDUPTHER

## 2021-07-30 RX ORDER — HEPARIN 100 UNIT/ML
1 SYRINGE INTRAVENOUS
Status: DISCONTINUED | OUTPATIENT
Start: 2021-07-30 | End: 2021-07-30 | Stop reason: HOSPADM

## 2021-07-30 RX ORDER — SULFAMETHOXAZOLE AND TRIMETHOPRIM 800; 160 MG/1; MG/1
TABLET ORAL
Qty: 30 TABLET | Refills: 4 | Status: SHIPPED | OUTPATIENT
Start: 2021-08-04 | End: 2021-11-16 | Stop reason: ALTCHOICE

## 2021-07-30 RX ORDER — METRONIDAZOLE 500 MG/1
500 TABLET ORAL EVERY 8 HOURS
Qty: 21 TABLET | Refills: 0 | Status: SHIPPED | OUTPATIENT
Start: 2021-07-30 | End: 2021-07-30

## 2021-07-30 RX ORDER — PANTOPRAZOLE SODIUM 40 MG/1
40 TABLET, DELAYED RELEASE ORAL DAILY
Qty: 30 TABLET | Refills: 2 | Status: SHIPPED | OUTPATIENT
Start: 2021-07-30 | End: 2023-05-06

## 2021-07-30 RX ORDER — OXYCODONE HYDROCHLORIDE 15 MG/1
15 TABLET ORAL EVERY 6 HOURS PRN
Qty: 10 TABLET | Refills: 0 | Status: SHIPPED | OUTPATIENT
Start: 2021-07-30 | End: 2021-08-02

## 2021-07-30 RX ADMIN — METRONIDAZOLE 500 MG: 500 TABLET ORAL at 05:07

## 2021-07-30 RX ADMIN — HEPARIN 100 UNITS: 100 SYRINGE at 05:07

## 2021-07-30 RX ADMIN — METRONIDAZOLE 500 MG: 500 TABLET ORAL at 03:07

## 2021-07-30 RX ADMIN — OXYCODONE HYDROCHLORIDE 15 MG: 10 TABLET ORAL at 12:07

## 2021-07-30 RX ADMIN — PANTOPRAZOLE SODIUM 40 MG: 40 TABLET, DELAYED RELEASE ORAL at 10:07

## 2021-07-30 RX ADMIN — MORPHINE SULFATE 4 MG: 4 INJECTION INTRAVENOUS at 08:07

## 2021-07-30 RX ADMIN — CIPROFLOXACIN HYDROCHLORIDE 500 MG: 500 TABLET, FILM COATED ORAL at 08:07

## 2021-07-30 RX ADMIN — PREDNISONE 40 MG: 20 TABLET ORAL at 08:07

## 2021-07-30 RX ADMIN — DOXYCYCLINE HYCLATE 100 MG: 100 TABLET, COATED ORAL at 08:07

## 2021-07-30 RX ADMIN — MORPHINE SULFATE 4 MG: 4 INJECTION INTRAVENOUS at 04:07

## 2021-07-30 RX ADMIN — MORPHINE SULFATE 4 MG: 4 INJECTION INTRAVENOUS at 12:07

## 2021-07-30 RX ADMIN — ACETAMINOPHEN 650 MG: 325 TABLET ORAL at 03:07

## 2021-07-30 RX ADMIN — ACETAMINOPHEN 650 MG: 325 TABLET ORAL at 08:07

## 2021-08-01 LAB
BLD PROD TYP BPU: NORMAL
BLOOD UNIT EXPIRATION DATE: NORMAL
BLOOD UNIT TYPE CODE: 600
BLOOD UNIT TYPE: NORMAL
CODING SYSTEM: NORMAL
DISPENSE STATUS: NORMAL
TRANS ERYTHROCYTES VOL PATIENT: NORMAL ML

## 2021-08-04 ENCOUNTER — HOSPITAL ENCOUNTER (EMERGENCY)
Facility: HOSPITAL | Age: 41
Discharge: HOME | End: 2021-08-04
Attending: EMERGENCY MEDICINE
Payer: MEDICAID

## 2021-08-04 VITALS
TEMPERATURE: 99 F | RESPIRATION RATE: 20 BRPM | SYSTOLIC BLOOD PRESSURE: 137 MMHG | HEART RATE: 110 BPM | DIASTOLIC BLOOD PRESSURE: 73 MMHG | OXYGEN SATURATION: 96 %

## 2021-08-04 DIAGNOSIS — R10.30 LOWER ABDOMINAL PAIN: Primary | ICD-10-CM

## 2021-08-04 LAB
ABO + RH BLD: NORMAL
ALBUMIN SERPL BCP-MCNC: 4.5 G/DL (ref 3.5–5.2)
ALP SERPL-CCNC: 59 U/L (ref 55–135)
ALT SERPL W/O P-5'-P-CCNC: 28 U/L (ref 10–44)
ANION GAP SERPL CALC-SCNC: 15 MMOL/L (ref 8–16)
AST SERPL-CCNC: 23 U/L (ref 10–40)
BASOPHILS # BLD AUTO: 0 K/UL (ref 0–0.2)
BASOPHILS NFR BLD: 0 % (ref 0–1.9)
BILIRUB SERPL-MCNC: 1.2 MG/DL (ref 0.1–1)
BLD GP AB SCN CELLS X3 SERPL QL: NORMAL
BUN SERPL-MCNC: 14 MG/DL (ref 6–30)
BUN SERPL-MCNC: 15 MG/DL (ref 6–20)
CALCIUM SERPL-MCNC: 11.4 MG/DL (ref 8.7–10.5)
CHLORIDE SERPL-SCNC: 101 MMOL/L (ref 95–110)
CHLORIDE SERPL-SCNC: 104 MMOL/L (ref 95–110)
CO2 SERPL-SCNC: 23 MMOL/L (ref 23–29)
CREAT SERPL-MCNC: 1 MG/DL (ref 0.5–1.4)
CREAT SERPL-MCNC: 1 MG/DL (ref 0.5–1.4)
DIFFERENTIAL METHOD: ABNORMAL
EOSINOPHIL # BLD AUTO: 0 K/UL (ref 0–0.5)
EOSINOPHIL NFR BLD: 0 % (ref 0–8)
ERYTHROCYTE [DISTWIDTH] IN BLOOD BY AUTOMATED COUNT: 19.9 % (ref 11.5–14.5)
EST. GFR  (AFRICAN AMERICAN): >60 ML/MIN/1.73 M^2
EST. GFR  (NON AFRICAN AMERICAN): >60 ML/MIN/1.73 M^2
GLUCOSE SERPL-MCNC: 104 MG/DL (ref 70–110)
GLUCOSE SERPL-MCNC: 111 MG/DL (ref 70–110)
HCT VFR BLD AUTO: 39.7 % (ref 40–54)
HCT VFR BLD CALC: 42 %PCV (ref 36–54)
HCV AB SERPL QL IA: NEGATIVE
HGB BLD-MCNC: 12.4 G/DL (ref 14–18)
HIV 1+2 AB+HIV1 P24 AG SERPL QL IA: NEGATIVE
IMM GRANULOCYTES # BLD AUTO: 0.03 K/UL (ref 0–0.04)
IMM GRANULOCYTES NFR BLD AUTO: 0.4 % (ref 0–0.5)
INR PPP: 1.1 (ref 0.8–1.2)
LYMPHOCYTES # BLD AUTO: 0.2 K/UL (ref 1–4.8)
LYMPHOCYTES NFR BLD: 2.9 % (ref 18–48)
MCH RBC QN AUTO: 22.7 PG (ref 27–31)
MCHC RBC AUTO-ENTMCNC: 31.2 G/DL (ref 32–36)
MCV RBC AUTO: 73 FL (ref 82–98)
MONOCYTES # BLD AUTO: 0.2 K/UL (ref 0.3–1)
MONOCYTES NFR BLD: 2.7 % (ref 4–15)
NEUTROPHILS # BLD AUTO: 7.9 K/UL (ref 1.8–7.7)
NEUTROPHILS NFR BLD: 94 % (ref 38–73)
NRBC BLD-RTO: 0 /100 WBC
OB PNL STL: POSITIVE
PLATELET # BLD AUTO: 572 K/UL (ref 150–450)
PMV BLD AUTO: 10.3 FL (ref 9.2–12.9)
POC IONIZED CALCIUM: 1.17 MMOL/L (ref 1.06–1.42)
POC TCO2 (MEASURED): 20 MMOL/L (ref 23–29)
POTASSIUM BLD-SCNC: 3.3 MMOL/L (ref 3.5–5.1)
POTASSIUM SERPL-SCNC: 3.4 MMOL/L (ref 3.5–5.1)
PROT SERPL-MCNC: 8 G/DL (ref 6–8.4)
PROTHROMBIN TIME: 11.6 SEC (ref 9–12.5)
RBC # BLD AUTO: 5.46 M/UL (ref 4.6–6.2)
SAMPLE: ABNORMAL
SODIUM BLD-SCNC: 141 MMOL/L (ref 136–145)
SODIUM SERPL-SCNC: 139 MMOL/L (ref 136–145)
WBC # BLD AUTO: 8.41 K/UL (ref 3.9–12.7)

## 2021-08-04 PROCEDURE — 99284 PR EMERGENCY DEPT VISIT,LEVEL IV: ICD-10-PCS | Mod: ,,, | Performed by: EMERGENCY MEDICINE

## 2021-08-04 PROCEDURE — 85610 PROTHROMBIN TIME: CPT

## 2021-08-04 PROCEDURE — 82272 OCCULT BLD FECES 1-3 TESTS: CPT

## 2021-08-04 PROCEDURE — 99283 EMERGENCY DEPT VISIT LOW MDM: CPT

## 2021-08-04 PROCEDURE — 99284 EMERGENCY DEPT VISIT MOD MDM: CPT | Mod: ,,, | Performed by: EMERGENCY MEDICINE

## 2021-08-04 PROCEDURE — 63600175 PHARM REV CODE 636 W HCPCS

## 2021-08-04 PROCEDURE — 87389 HIV-1 AG W/HIV-1&-2 AB AG IA: CPT | Performed by: EMERGENCY MEDICINE

## 2021-08-04 PROCEDURE — 93010 ELECTROCARDIOGRAM REPORT: CPT | Mod: ,,, | Performed by: INTERNAL MEDICINE

## 2021-08-04 PROCEDURE — 25000003 PHARM REV CODE 250

## 2021-08-04 PROCEDURE — 86900 BLOOD TYPING SEROLOGIC ABO: CPT

## 2021-08-04 PROCEDURE — 86803 HEPATITIS C AB TEST: CPT | Performed by: EMERGENCY MEDICINE

## 2021-08-04 PROCEDURE — 80053 COMPREHEN METABOLIC PANEL: CPT

## 2021-08-04 PROCEDURE — 93010 EKG 12-LEAD: ICD-10-PCS | Mod: ,,, | Performed by: INTERNAL MEDICINE

## 2021-08-04 PROCEDURE — 85025 COMPLETE CBC W/AUTO DIFF WBC: CPT

## 2021-08-04 PROCEDURE — 93005 ELECTROCARDIOGRAM TRACING: CPT

## 2021-08-04 RX ORDER — HEPARIN 100 UNIT/ML
3 SYRINGE INTRAVENOUS
Status: COMPLETED | OUTPATIENT
Start: 2021-08-04 | End: 2021-08-04

## 2021-08-04 RX ORDER — OXYCODONE AND ACETAMINOPHEN 5; 325 MG/1; MG/1
1 TABLET ORAL
Status: COMPLETED | OUTPATIENT
Start: 2021-08-04 | End: 2021-08-04

## 2021-08-04 RX ADMIN — HEPARIN 300 UNITS: 100 SYRINGE at 08:08

## 2021-08-04 RX ADMIN — OXYCODONE HYDROCHLORIDE AND ACETAMINOPHEN 1 TABLET: 5; 325 TABLET ORAL at 08:08

## 2021-08-18 ENCOUNTER — PATIENT MESSAGE (OUTPATIENT)
Dept: PHARMACY | Facility: CLINIC | Age: 41
End: 2021-08-18

## 2021-08-24 ENCOUNTER — SPECIALTY PHARMACY (OUTPATIENT)
Dept: PHARMACY | Facility: CLINIC | Age: 41
End: 2021-08-24

## 2021-09-14 ENCOUNTER — PATIENT MESSAGE (OUTPATIENT)
Dept: PHARMACY | Facility: CLINIC | Age: 41
End: 2021-09-14

## 2021-09-17 DIAGNOSIS — D72.110 IDIOPATHIC HYPEREOSINOPHILIC SYNDROME: ICD-10-CM

## 2021-10-13 ENCOUNTER — HOSPITAL ENCOUNTER (EMERGENCY)
Age: 41
Discharge: HOME | End: 2021-10-13
Payer: MEDICAID

## 2021-10-13 VITALS
DIASTOLIC BLOOD PRESSURE: 87 MMHG | RESPIRATION RATE: 18 BRPM | OXYGEN SATURATION: 97 % | TEMPERATURE: 98.5 F | HEART RATE: 97 BPM | SYSTOLIC BLOOD PRESSURE: 137 MMHG

## 2021-10-13 DIAGNOSIS — Z48.02 ENCOUNTER FOR STAPLE REMOVAL: Primary | ICD-10-CM

## 2021-10-13 PROCEDURE — 99283 EMERGENCY DEPT VISIT LOW MDM: CPT

## 2021-10-13 RX ORDER — ALBUTEROL SULFATE 90 UG/1
1-2 AEROSOL, METERED RESPIRATORY (INHALATION) EVERY 6 HOURS PRN
COMMUNITY

## 2021-10-13 RX ORDER — PREDNISONE 20 MG/1
TABLET ORAL DAILY
COMMUNITY

## 2021-10-13 RX ORDER — LORAZEPAM 0.5 MG/1
0.5 TABLET ORAL EVERY 6 HOURS PRN
COMMUNITY

## 2021-10-13 RX ORDER — METRONIDAZOLE 500 MG/1
TABLET ORAL
COMMUNITY
Start: 2021-07-30

## 2021-10-13 RX ORDER — FAMOTIDINE 20 MG/1
TABLET, FILM COATED ORAL 2 TIMES DAILY
COMMUNITY

## 2021-10-13 RX ORDER — ONDANSETRON 4 MG/1
4 TABLET, ORALLY DISINTEGRATING ORAL EVERY 6 HOURS PRN
COMMUNITY

## 2021-10-13 RX ORDER — PANTOPRAZOLE SODIUM 40 MG/1
40 TABLET, DELAYED RELEASE ORAL 2 TIMES DAILY
COMMUNITY
Start: 2021-05-28

## 2021-10-13 RX ORDER — MEPOLIZUMAB 100 MG/ML
INJECTION, SOLUTION SUBCUTANEOUS
COMMUNITY
Start: 2021-07-19

## 2021-10-13 RX ORDER — GABAPENTIN 300 MG/1
300 CAPSULE ORAL 3 TIMES DAILY
COMMUNITY

## 2021-10-13 RX ORDER — SUCRALFATE ORAL 1 G/10ML
1000 SUSPENSION ORAL
COMMUNITY

## 2021-10-13 RX ORDER — OXYCODONE HYDROCHLORIDE AND ACETAMINOPHEN 5; 325 MG/1; MG/1
TABLET ORAL
COMMUNITY
Start: 2021-10-07

## 2021-10-13 RX ORDER — FERROUS SULFATE 325(65) MG
325 TABLET ORAL
COMMUNITY

## 2021-10-13 RX ORDER — SULFAMETHOXAZOLE AND TRIMETHOPRIM 800; 160 MG/1; MG/1
1 TABLET ORAL 2 TIMES DAILY
COMMUNITY

## 2021-10-13 RX ORDER — TAMSULOSIN HYDROCHLORIDE 0.4 MG/1
0.4 CAPSULE ORAL NIGHTLY
COMMUNITY

## 2021-10-13 RX ORDER — PROMETHAZINE HYDROCHLORIDE 25 MG/1
25 TABLET ORAL EVERY 6 HOURS PRN
COMMUNITY

## 2021-10-13 RX ORDER — ALUMINUM HYDROXIDE, MAGNESIUM HYDROXIDE, DIMETHICONE 400; 400; 40 MG/5ML; MG/5ML; MG/5ML
1 LIQUID ORAL DAILY
COMMUNITY

## 2021-10-13 RX ORDER — HYDROXYZINE HYDROCHLORIDE 25 MG/1
25 TABLET, FILM COATED ORAL 3 TIMES DAILY PRN
COMMUNITY

## 2021-10-13 RX ORDER — DEXTROAMPHETAMINE SACCHARATE, AMPHETAMINE ASPARTATE, DEXTROAMPHETAMINE SULFATE AND AMPHETAMINE SULFATE 5; 5; 5; 5 MG/1; MG/1; MG/1; MG/1
20 TABLET ORAL 2 TIMES DAILY
COMMUNITY

## 2021-10-13 RX ORDER — PAROXETINE HYDROCHLORIDE 20 MG/1
20 TABLET, FILM COATED ORAL EVERY MORNING
COMMUNITY

## 2021-10-13 ASSESSMENT — ENCOUNTER SYMPTOMS
VOMITING: 0
SHORTNESS OF BREATH: 0
CHEST TIGHTNESS: 0
ABDOMINAL PAIN: 0
DIARRHEA: 0
NAUSEA: 0

## 2021-10-13 NOTE — ED NOTES
Pt discharged home, verbalized discharge instructions. Ambulated independently to exit without difficulty.        Karel Sena RN  10/13/21 4624
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
## 2021-10-14 ENCOUNTER — TELEPHONE (OUTPATIENT)
Dept: ALLERGY | Facility: CLINIC | Age: 41
End: 2021-10-14

## 2021-10-26 DIAGNOSIS — D72.110 IDIOPATHIC HYPEREOSINOPHILIC SYNDROME: ICD-10-CM

## 2021-10-29 ENCOUNTER — TELEPHONE (OUTPATIENT)
Dept: SURGERY | Facility: CLINIC | Age: 41
End: 2021-10-29
Payer: MEDICAID

## 2021-11-05 ENCOUNTER — SPECIALTY PHARMACY (OUTPATIENT)
Dept: PHARMACY | Facility: CLINIC | Age: 41
End: 2021-11-05
Payer: MEDICAID

## 2021-11-05 DIAGNOSIS — D72.110 IDIOPATHIC HYPEREOSINOPHILIC SYNDROME: ICD-10-CM

## 2021-11-09 ENCOUNTER — TELEPHONE (OUTPATIENT)
Dept: ALLERGY | Facility: CLINIC | Age: 41
End: 2021-11-09
Payer: MEDICAID

## 2021-11-09 RX ORDER — MEPOLIZUMAB 100 MG/ML
300 INJECTION, SOLUTION SUBCUTANEOUS
Qty: 300 ML | Refills: 11 | OUTPATIENT
Start: 2021-11-09

## 2021-11-16 ENCOUNTER — OFFICE VISIT (OUTPATIENT)
Dept: ALLERGY | Facility: CLINIC | Age: 41
End: 2021-11-16
Payer: MEDICAID

## 2021-11-16 ENCOUNTER — SPECIALTY PHARMACY (OUTPATIENT)
Dept: PHARMACY | Facility: CLINIC | Age: 41
End: 2021-11-16
Payer: MEDICAID

## 2021-11-16 ENCOUNTER — PATIENT MESSAGE (OUTPATIENT)
Dept: SURGERY | Facility: CLINIC | Age: 41
End: 2021-11-16
Payer: MEDICAID

## 2021-11-16 ENCOUNTER — TELEPHONE (OUTPATIENT)
Dept: ALLERGY | Facility: CLINIC | Age: 41
End: 2021-11-16
Payer: MEDICAID

## 2021-11-16 VITALS — HEIGHT: 72 IN | WEIGHT: 196.63 LBS | BODY MASS INDEX: 26.63 KG/M2

## 2021-11-16 DIAGNOSIS — Z87.09 HISTORY OF ASTHMA: ICD-10-CM

## 2021-11-16 DIAGNOSIS — K27.5 PERFORATED ULCER: ICD-10-CM

## 2021-11-16 DIAGNOSIS — D72.110 IDIOPATHIC HYPEREOSINOPHILIC SYNDROME: Primary | ICD-10-CM

## 2021-11-16 DIAGNOSIS — T78.3XXD ANGIOEDEMA, SUBSEQUENT ENCOUNTER: ICD-10-CM

## 2021-11-16 DIAGNOSIS — L50.8 RECURRENT URTICARIA: ICD-10-CM

## 2021-11-16 PROCEDURE — 99214 OFFICE O/P EST MOD 30 MIN: CPT | Mod: S$PBB,,, | Performed by: STUDENT IN AN ORGANIZED HEALTH CARE EDUCATION/TRAINING PROGRAM

## 2021-11-16 PROCEDURE — 99999 PR PBB SHADOW E&M-EST. PATIENT-LVL III: CPT | Mod: PBBFAC,,, | Performed by: STUDENT IN AN ORGANIZED HEALTH CARE EDUCATION/TRAINING PROGRAM

## 2021-11-16 PROCEDURE — 99214 PR OFFICE/OUTPT VISIT, EST, LEVL IV, 30-39 MIN: ICD-10-PCS | Mod: S$PBB,,, | Performed by: STUDENT IN AN ORGANIZED HEALTH CARE EDUCATION/TRAINING PROGRAM

## 2021-11-16 PROCEDURE — 99999 PR PBB SHADOW E&M-EST. PATIENT-LVL III: ICD-10-PCS | Mod: PBBFAC,,, | Performed by: STUDENT IN AN ORGANIZED HEALTH CARE EDUCATION/TRAINING PROGRAM

## 2021-11-16 PROCEDURE — 99213 OFFICE O/P EST LOW 20 MIN: CPT | Mod: PBBFAC,PO | Performed by: STUDENT IN AN ORGANIZED HEALTH CARE EDUCATION/TRAINING PROGRAM

## 2021-11-16 RX ORDER — ALBUTEROL SULFATE 90 UG/1
2 AEROSOL, METERED RESPIRATORY (INHALATION) EVERY 6 HOURS PRN
Qty: 18 G | Refills: 2 | Status: SHIPPED | OUTPATIENT
Start: 2021-11-16 | End: 2022-06-02

## 2021-11-18 ENCOUNTER — TELEPHONE (OUTPATIENT)
Dept: SURGERY | Facility: CLINIC | Age: 41
End: 2021-11-18
Payer: MEDICAID

## 2021-11-18 DIAGNOSIS — D72.110 IDIOPATHIC HYPEREOSINOPHILIC SYNDROME: Primary | ICD-10-CM

## 2021-11-19 NOTE — NURSING
[facility] returned the page.  Ok with leaving port un accessed.  Informed MD of lab draw refusal.  Also no new orders for pain medication frequency changes.  Pt requested Dilaudid freq changed to q 3 hrs instead of 4.     Obstructive Sleep Apnea - PAP Follow-Up Visit:    Chief Complaint   Patient presents with     CPAP Follow Up     Patient is worried about at night he would stop breathing lately than before with CPAP.       Prashant Keyes comes in today for follow-up of their severe sleep apnea, managed with bilevel PAP.     In review, patient underwent PSG on 1/2/12 and was diagnosed with severe apnea.    RDI: 71.3, REM RDI: 60 AHI: 54.2, Lowest O2: 78 %  Wt during sleep study: 285.3#  BMI: 41.8  CPAP TITRATION: same night 1/2/12  CPAP: HYPOVENTILAION.  BI-LEVELPAP: 15/9 cm H2O. Significant improvement    Prashant was set up with a replacement device at [place] on October 11, 2019 Patient received a Resmed AirCurve 10 Bilevel. Pressures were set at IPAP 15, EPAP 9 cm H2O.   Patient's ramp is 5 cm H2O for Off and FLEX/EPR is EPR, 2.    Overall, the patient rates his experience with PAP as 9 (0 poor, 10 great). The mask is comfortable. The mask is not leaking.  He is not snoring with the mask on. He is not having gasp arousals. He is not having any oral or nasal dryness. The pressure settings are comfortable.    His PAP interface is Nasal Pillows.    Bedtime is typically 11 PM. Usually it takes about 5 minutes to fall asleep with the mask on. Wake time is typically 6:30 AM.  Patient is using PAP therapy 8-9 hours per night. The patient is usually getting 8-9 hours of sleep per night.    He does feel rested in the morning.    Total score - [place]: 8 (11/14/2021  6:51 PM)      ResMed   Bilevel PAP 17/10 cmH2O 30 day usage data:  100% of days with > 4 hours of use. 0/30 days with no use.   Average use 574 minutes per day.   95%ile Leak 3.75 L/min.   AHI 5.56 events per hour.     Past medical/surgical history, family history, social history, medications and allergies were reviewed.      Problem List:  Patient Active Problem List    Diagnosis Date Noted     Parkinsonism (H) 01/16/2020     Priority: High     Hypertension goal BP  (blood pressure) < 140/90 09/13/2002     Priority: High     Treatment since 1993       Obesity (BMI 35.0-39.9) with comorbidity (H) 07/01/2019     Priority: Medium     Nonintractable absence epilepsy without status epilepticus (H) 01/29/2018     Priority: Medium     Class 1 obesity with serious comorbidity and body mass index (BMI) of 31.0 to 31.9 in adult, unspecified obesity type 01/29/2018     Priority: Medium     Acute idiopathic gout of foot, unspecified laterality 07/21/2017     Priority: Medium     Acute gouty arthritis 01/16/2017     Priority: Medium     Spells 10/14/2015     Priority: Medium     Onset age 48. Spells uncertain etiology with left sided numbness, speech arrest, amnesia, occasional collapse. Vague inconsistent historian. EEG Lt temporal slowing. Neuropsych w nl cognition; some depression. Presented on levetiracetam, previously Rx w VPA, PHT, lacosamide, eslicarbazine. EEG w left temporal slowing. Adding VPA to LEV appeared to stop spells.       CKD (chronic kidney disease) stage 2, GFR 60-89 ml/min 12/18/2012     Priority: Medium     Lichen planus 08/02/2012     Priority: Medium     KALEB (obstructive sleep apnea) 02/06/2012     Priority: Medium     PSG on 1/2/12, RDI: 71.3, REM RDI: 60 AHI: 54.2, Lowest O2: 78 %. Wt during sleep study: 285.3. BI-LEVELPAP: 15/9 cm H2O.        Eczema 11/16/2011     Priority: Medium     Hyperlipidemia LDL goal <130 03/15/2011     Priority: Medium     Treatment since 1998       Fibromyalgia syndrome      Priority: Medium     per patient  Scheduled med refill protocol  Last refill:8/23/2010  Last clinic visit:8/31/2010  Controlled substance aggreement on file from this date: 8/31/10  Documentation in problem list:  narcotic agreement not on file   #240 tabs of 500mg tabs methocarbamol filled 8/23/10.  No refills anticipated prior to 8/23/11, and needs discussion in clinic prior to refills.         GERD 07/29/2005     Priority: Medium     Treatment since 1998       "    /72   Pulse 66   Ht 1.753 m (5' 9\")   Wt 132.5 kg (292 lb)   SpO2 95%   BMI 43.12 kg/m      Impression/Plan:    Severe obstructive sleep apnea.  Tolerating PAP well. Daytime symptoms are improved.   Continue current treatment  Comprehensive DME    Prashant Keyes will follow up in about 1 year    Stan Huerta PA-C  "

## 2021-11-22 ENCOUNTER — PATIENT MESSAGE (OUTPATIENT)
Dept: SURGERY | Facility: HOSPITAL | Age: 41
End: 2021-11-22
Payer: MEDICAID

## 2021-11-22 NOTE — ASSESSMENT & PLAN NOTE
Patient refers urticarial rash started today, along the onset of his diffuse abdominal pain and episodes of hematemesis/melena. Used his EpiPen this am and took Prednisone, Benadryl, Hydroxizine to some improvement. Given IV Steroids and Benadryl in the ED.     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 Per Allergy, patient's eosinophil count today (400) not high enough to justify this presentation as a flare. There is the concern that his complaints are related to factitious disorder.  -Given IV Benadryl and steroids in ED as pt cannot tolerate PO  -Once patient able to tolerate PO, continue on home dose of prednisone, Benadryl and Hydroxyzine prn for rash  -Now on PRN IV Benadryl, IV solumedrol, and Hydroxizine.     Second attempt to contact patient.       Call to patient. C/o pain and \"a pins and needles\" sensation in her bilateral feet. C/o tingling bilateral arms. States sx started 2 weeks ago. Reports hx of radiation for cancer. States her oncologists recommended testing for vitamin deficiencies and electrolyte imbalances. Reports hx lymphedema in her left lower extremity. States symptoms come and go. Reports a pulling sensation on the sides of her feet. Has a hard time walking at times d/t pain and N/T. States it varies how long symptoms last. Denies any weakness. Denies facial droop. Denies any confusion. No garbled speech. Denies fever. C/o \"frequent\" headaches. \"They aren't severe, but I get one almost every day.\" Pt takes Tylenol \"sometimes\" with minimal to no relief. Denies any dizziness. Reports 1 episode of blurry vision in her right eye upon waking. States sx lasted seconds. Denies any back pain or neck pain/stiffness. States she is feeling a little better today. Several appts offered. Pt limited on appt times d/t work schedule. Appt scheduled for . Advised ER if sx worsen. Pt understands and agrees with plan.           Negative Covid-19 screenin. Are you fully vaccinated for COVID-19? yes  If Yes -> Ask Screening Question #2  If No -> Ask Screening Questions #2, 3 and 4    2. Do you or any of your household members have any of the following symptoms? no  Subjective: Temperature: fever > 100.0F or >37.8C  Resp symptoms: New or worsening cough, Shortness of breath, difficulty breathing or sore throat  GI Symptoms: New onset Nausea/Vomiting or diarrhea  Misc.: New onset chills, congestion, runny nose, muscle or body aches, headache, fatigue or loss of taste or smell

## 2021-11-23 ENCOUNTER — ANESTHESIA EVENT (OUTPATIENT)
Dept: SURGERY | Facility: HOSPITAL | Age: 41
End: 2021-11-23
Payer: MEDICAID

## 2021-11-23 ENCOUNTER — TELEPHONE (OUTPATIENT)
Dept: SURGERY | Facility: CLINIC | Age: 41
End: 2021-11-23
Payer: MEDICAID

## 2021-11-23 RX ORDER — HYDROCODONE BITARTRATE AND ACETAMINOPHEN 10; 325 MG/1; MG/1
1 TABLET ORAL EVERY 4 HOURS PRN
Status: ON HOLD | COMMUNITY
End: 2022-10-18

## 2021-11-23 RX ORDER — PROMETHAZINE HYDROCHLORIDE 25 MG/1
25 TABLET ORAL EVERY 4 HOURS
COMMUNITY
End: 2023-08-15

## 2021-11-23 RX ORDER — OXYCODONE AND ACETAMINOPHEN 5; 325 MG/1; MG/1
1 TABLET ORAL EVERY 4 HOURS PRN
COMMUNITY

## 2021-11-24 ENCOUNTER — HOSPITAL ENCOUNTER (OUTPATIENT)
Facility: HOSPITAL | Age: 41
Discharge: HOME | End: 2021-11-24
Attending: SURGERY | Admitting: SURGERY
Payer: MEDICAID

## 2021-11-24 ENCOUNTER — ANESTHESIA (OUTPATIENT)
Dept: SURGERY | Facility: HOSPITAL | Age: 41
End: 2021-11-24
Payer: MEDICAID

## 2021-11-24 VITALS
TEMPERATURE: 98 F | BODY MASS INDEX: 27.09 KG/M2 | SYSTOLIC BLOOD PRESSURE: 106 MMHG | WEIGHT: 200 LBS | HEART RATE: 84 BPM | RESPIRATION RATE: 17 BRPM | OXYGEN SATURATION: 94 % | HEIGHT: 72 IN | DIASTOLIC BLOOD PRESSURE: 68 MMHG

## 2021-11-24 DIAGNOSIS — D72.119 HYPEREOSINOPHILIC SYNDROME: ICD-10-CM

## 2021-11-24 PROCEDURE — 63600175 PHARM REV CODE 636 W HCPCS: Performed by: SURGERY

## 2021-11-24 PROCEDURE — 25000003 PHARM REV CODE 250: Performed by: SURGERY

## 2021-11-24 PROCEDURE — 36582 PR REPLACE TUNNELED CV CATH - W/ PORT COMPLETE: ICD-10-PCS | Mod: RT,ICN,, | Performed by: SURGERY

## 2021-11-24 PROCEDURE — D9220A PRA ANESTHESIA: ICD-10-PCS | Mod: ,,, | Performed by: ANESTHESIOLOGY

## 2021-11-24 PROCEDURE — 63600175 PHARM REV CODE 636 W HCPCS: Performed by: STUDENT IN AN ORGANIZED HEALTH CARE EDUCATION/TRAINING PROGRAM

## 2021-11-24 PROCEDURE — 88300 PR  SURG PATH,GROSS,LEVEL I: ICD-10-PCS | Mod: 26,,, | Performed by: PATHOLOGY

## 2021-11-24 PROCEDURE — D9220A PRA ANESTHESIA: Mod: ,,, | Performed by: ANESTHESIOLOGY

## 2021-11-24 PROCEDURE — 88300 SURGICAL PATH GROSS: CPT | Mod: 26,,, | Performed by: PATHOLOGY

## 2021-11-24 PROCEDURE — 71000044 HC DOSC ROUTINE RECOVERY FIRST HOUR: Performed by: SURGERY

## 2021-11-24 PROCEDURE — 00532 ANES ACCESS CTR VENOUS CRCJ: CPT | Performed by: SURGERY

## 2021-11-24 PROCEDURE — 36590 PR REMOVAL TUNNELED CV CATH W SUBQ PORT OR PUMP: ICD-10-PCS | Mod: 51,ICN,, | Performed by: SURGERY

## 2021-11-24 PROCEDURE — 36582 REPLACE TUNNELED CV CATH: CPT | Mod: RT,ICN,, | Performed by: SURGERY

## 2021-11-24 PROCEDURE — 36000706: Performed by: SURGERY

## 2021-11-24 PROCEDURE — 36000707: Performed by: SURGERY

## 2021-11-24 PROCEDURE — 36590 REMOVAL TUNNELED CV CATH: CPT | Mod: 51,ICN,, | Performed by: SURGERY

## 2021-11-24 PROCEDURE — 71000015 HC POSTOP RECOV 1ST HR: Performed by: SURGERY

## 2021-11-24 PROCEDURE — 88300 SURGICAL PATH GROSS: CPT | Performed by: PATHOLOGY

## 2021-11-24 PROCEDURE — C1788 PORT, INDWELLING, IMP: HCPCS | Performed by: SURGERY

## 2021-11-24 PROCEDURE — 37000009 HC ANESTHESIA EA ADD 15 MINS: Performed by: SURGERY

## 2021-11-24 PROCEDURE — 37000008 HC ANESTHESIA 1ST 15 MINUTES: Performed by: SURGERY

## 2021-11-24 DEVICE — KIT POWERPORT SINGLE 8FR: Type: IMPLANTABLE DEVICE | Site: CHEST | Status: FUNCTIONAL

## 2021-11-24 RX ORDER — HEPARIN 100 UNIT/ML
SYRINGE INTRAVENOUS
Status: DISCONTINUED | OUTPATIENT
Start: 2021-11-24 | End: 2021-11-24 | Stop reason: HOSPADM

## 2021-11-24 RX ORDER — MIDAZOLAM HYDROCHLORIDE 5 MG/ML
INJECTION INTRAMUSCULAR; INTRAVENOUS
Status: DISCONTINUED | OUTPATIENT
Start: 2021-11-24 | End: 2021-11-24

## 2021-11-24 RX ORDER — ONDANSETRON 2 MG/ML
4 INJECTION INTRAMUSCULAR; INTRAVENOUS DAILY PRN
Status: DISCONTINUED | OUTPATIENT
Start: 2021-11-24 | End: 2021-11-24 | Stop reason: HOSPADM

## 2021-11-24 RX ORDER — DEXTROMETHORPHAN HYDROBROMIDE, GUAIFENESIN 5; 100 MG/5ML; MG/5ML
650 LIQUID ORAL EVERY 8 HOURS
Qty: 40 TABLET | Refills: 0 | Status: ON HOLD | OUTPATIENT
Start: 2021-11-24 | End: 2022-08-05 | Stop reason: HOSPADM

## 2021-11-24 RX ORDER — FENTANYL CITRATE 50 UG/ML
INJECTION, SOLUTION INTRAMUSCULAR; INTRAVENOUS
Status: DISCONTINUED | OUTPATIENT
Start: 2021-11-24 | End: 2021-11-24

## 2021-11-24 RX ORDER — CLINDAMYCIN PHOSPHATE 900 MG/50ML
900 INJECTION, SOLUTION INTRAVENOUS ONCE
Status: DISCONTINUED | OUTPATIENT
Start: 2021-11-24 | End: 2021-11-24 | Stop reason: HOSPADM

## 2021-11-24 RX ORDER — BUPIVACAINE HYDROCHLORIDE 5 MG/ML
INJECTION, SOLUTION EPIDURAL; INTRACAUDAL
Status: DISCONTINUED | OUTPATIENT
Start: 2021-11-24 | End: 2021-11-24 | Stop reason: HOSPADM

## 2021-11-24 RX ORDER — PROPOFOL 10 MG/ML
VIAL (ML) INTRAVENOUS
Status: DISCONTINUED | OUTPATIENT
Start: 2021-11-24 | End: 2021-11-24

## 2021-11-24 RX ORDER — PROPOFOL 10 MG/ML
VIAL (ML) INTRAVENOUS CONTINUOUS PRN
Status: DISCONTINUED | OUTPATIENT
Start: 2021-11-24 | End: 2021-11-24

## 2021-11-24 RX ORDER — SODIUM CHLORIDE 9 MG/ML
INJECTION, SOLUTION INTRAVENOUS CONTINUOUS
Status: DISCONTINUED | OUTPATIENT
Start: 2021-11-24 | End: 2021-11-24 | Stop reason: HOSPADM

## 2021-11-24 RX ORDER — HYDROMORPHONE HYDROCHLORIDE 1 MG/ML
0.2 INJECTION, SOLUTION INTRAMUSCULAR; INTRAVENOUS; SUBCUTANEOUS EVERY 5 MIN PRN
Status: DISCONTINUED | OUTPATIENT
Start: 2021-11-24 | End: 2021-11-24 | Stop reason: HOSPADM

## 2021-11-24 RX ORDER — SODIUM CHLORIDE 0.9 % (FLUSH) 0.9 %
10 SYRINGE (ML) INJECTION
Status: DISCONTINUED | OUTPATIENT
Start: 2021-11-24 | End: 2021-11-24 | Stop reason: HOSPADM

## 2021-11-24 RX ADMIN — FENTANYL CITRATE 50 MCG: 50 INJECTION, SOLUTION INTRAMUSCULAR; INTRAVENOUS at 11:11

## 2021-11-24 RX ADMIN — PROPOFOL 60 MG: 10 INJECTION, EMULSION INTRAVENOUS at 11:11

## 2021-11-24 RX ADMIN — PROPOFOL 30 MG: 10 INJECTION, EMULSION INTRAVENOUS at 11:11

## 2021-11-24 RX ADMIN — MIDAZOLAM 2 MG: 5 INJECTION INTRAMUSCULAR; INTRAVENOUS at 10:11

## 2021-11-24 RX ADMIN — FENTANYL CITRATE 25 MCG: 50 INJECTION, SOLUTION INTRAMUSCULAR; INTRAVENOUS at 11:11

## 2021-11-24 RX ADMIN — Medication 100 MCG/KG/MIN: at 11:11

## 2021-11-25 ENCOUNTER — HOSPITAL ENCOUNTER (EMERGENCY)
Facility: HOSPITAL | Age: 41
Discharge: HOME | End: 2021-11-25
Attending: EMERGENCY MEDICINE
Payer: MEDICAID

## 2021-11-25 VITALS
TEMPERATURE: 99 F | WEIGHT: 200 LBS | DIASTOLIC BLOOD PRESSURE: 92 MMHG | OXYGEN SATURATION: 97 % | HEIGHT: 72 IN | RESPIRATION RATE: 18 BRPM | HEART RATE: 100 BPM | SYSTOLIC BLOOD PRESSURE: 140 MMHG | BODY MASS INDEX: 27.09 KG/M2

## 2021-11-25 DIAGNOSIS — R00.0 TACHYCARDIA: ICD-10-CM

## 2021-11-25 DIAGNOSIS — Z98.890 HISTORY OF INSERTION OF TUNNELED CENTRAL VENOUS CATHETER (CVC) WITH PORT: Primary | ICD-10-CM

## 2021-11-25 DIAGNOSIS — M54.2 NECK PAIN: ICD-10-CM

## 2021-11-25 DIAGNOSIS — Z98.890 HISTORY OF INSERTION OF TUNNELED CENTRAL VENOUS CATHETER (CVC) WITH PORT: ICD-10-CM

## 2021-11-25 DIAGNOSIS — R07.9 CHEST PAIN: ICD-10-CM

## 2021-11-25 DIAGNOSIS — K62.5 BRIGHT RED BLOOD PER RECTUM: ICD-10-CM

## 2021-11-25 LAB
ALBUMIN SERPL BCP-MCNC: 3.9 G/DL (ref 3.5–5.2)
ALP SERPL-CCNC: 78 U/L (ref 55–135)
ALT SERPL W/O P-5'-P-CCNC: 52 U/L (ref 10–44)
ANION GAP SERPL CALC-SCNC: 15 MMOL/L (ref 8–16)
AST SERPL-CCNC: 32 U/L (ref 10–40)
BASOPHILS # BLD AUTO: 0.08 K/UL (ref 0–0.2)
BASOPHILS NFR BLD: 1.2 % (ref 0–1.9)
BILIRUB SERPL-MCNC: 1.1 MG/DL (ref 0.1–1)
BILIRUB UR QL STRIP: NEGATIVE
BUN SERPL-MCNC: 7 MG/DL (ref 6–20)
BUN SERPL-MCNC: 7 MG/DL (ref 6–30)
CALCIUM SERPL-MCNC: 9.1 MG/DL (ref 8.7–10.5)
CHLORIDE SERPL-SCNC: 104 MMOL/L (ref 95–110)
CHLORIDE SERPL-SCNC: 105 MMOL/L (ref 95–110)
CLARITY UR REFRACT.AUTO: CLEAR
CO2 SERPL-SCNC: 18 MMOL/L (ref 23–29)
COLOR UR AUTO: ABNORMAL
CREAT SERPL-MCNC: 0.9 MG/DL (ref 0.5–1.4)
CREAT SERPL-MCNC: 0.9 MG/DL (ref 0.5–1.4)
CTP QC/QA: YES
CTP QC/QA: YES
DIFFERENTIAL METHOD: ABNORMAL
EOSINOPHIL # BLD AUTO: 0.5 K/UL (ref 0–0.5)
EOSINOPHIL NFR BLD: 7.6 % (ref 0–8)
ERYTHROCYTE [DISTWIDTH] IN BLOOD BY AUTOMATED COUNT: 16.9 % (ref 11.5–14.5)
EST. GFR  (AFRICAN AMERICAN): >60 ML/MIN/1.73 M^2
EST. GFR  (NON AFRICAN AMERICAN): >60 ML/MIN/1.73 M^2
GLUCOSE SERPL-MCNC: 83 MG/DL (ref 70–110)
GLUCOSE SERPL-MCNC: 85 MG/DL (ref 70–110)
GLUCOSE UR QL STRIP: NEGATIVE
HCT VFR BLD AUTO: 31.2 % (ref 40–54)
HCT VFR BLD CALC: 33 %PCV (ref 36–54)
HGB BLD-MCNC: 9.4 G/DL (ref 14–18)
HGB UR QL STRIP: NEGATIVE
IMM GRANULOCYTES # BLD AUTO: 0.02 K/UL (ref 0–0.04)
IMM GRANULOCYTES NFR BLD AUTO: 0.3 % (ref 0–0.5)
KETONES UR QL STRIP: ABNORMAL
LACTATE SERPL-SCNC: 2.1 MMOL/L (ref 0.5–2.2)
LEUKOCYTE ESTERASE UR QL STRIP: NEGATIVE
LIPASE SERPL-CCNC: 16 U/L (ref 4–60)
LYMPHOCYTES # BLD AUTO: 0.8 K/UL (ref 1–4.8)
LYMPHOCYTES NFR BLD: 13 % (ref 18–48)
MCH RBC QN AUTO: 21.8 PG (ref 27–31)
MCHC RBC AUTO-ENTMCNC: 30.1 G/DL (ref 32–36)
MCV RBC AUTO: 72 FL (ref 82–98)
MONOCYTES # BLD AUTO: 0.8 K/UL (ref 0.3–1)
MONOCYTES NFR BLD: 12.2 % (ref 4–15)
NEUTROPHILS # BLD AUTO: 4.3 K/UL (ref 1.8–7.7)
NEUTROPHILS NFR BLD: 65.7 % (ref 38–73)
NITRITE UR QL STRIP: NEGATIVE
NRBC BLD-RTO: 0 /100 WBC
PH UR STRIP: 7 [PH] (ref 5–8)
PLATELET # BLD AUTO: 363 K/UL (ref 150–450)
PMV BLD AUTO: 10.7 FL (ref 9.2–12.9)
POC IONIZED CALCIUM: 1.08 MMOL/L (ref 1.06–1.42)
POC MOLECULAR INFLUENZA A AGN: NEGATIVE
POC MOLECULAR INFLUENZA B AGN: NEGATIVE
POC TCO2 (MEASURED): 20 MMOL/L (ref 23–29)
POTASSIUM BLD-SCNC: 3.1 MMOL/L (ref 3.5–5.1)
POTASSIUM SERPL-SCNC: 3.2 MMOL/L (ref 3.5–5.1)
PROT SERPL-MCNC: 6.9 G/DL (ref 6–8.4)
PROT UR QL STRIP: NEGATIVE
RBC # BLD AUTO: 4.32 M/UL (ref 4.6–6.2)
SAMPLE: ABNORMAL
SARS-COV-2 RDRP RESP QL NAA+PROBE: NEGATIVE
SODIUM BLD-SCNC: 140 MMOL/L (ref 136–145)
SODIUM SERPL-SCNC: 137 MMOL/L (ref 136–145)
SP GR UR STRIP: 1 (ref 1–1.03)
URN SPEC COLLECT METH UR: ABNORMAL
WBC # BLD AUTO: 6.48 K/UL (ref 3.9–12.7)

## 2021-11-25 PROCEDURE — 83690 ASSAY OF LIPASE: CPT | Performed by: EMERGENCY MEDICINE

## 2021-11-25 PROCEDURE — U0002 COVID-19 LAB TEST NON-CDC: HCPCS | Performed by: PHYSICIAN ASSISTANT

## 2021-11-25 PROCEDURE — 96375 TX/PRO/DX INJ NEW DRUG ADDON: CPT

## 2021-11-25 PROCEDURE — 96374 THER/PROPH/DIAG INJ IV PUSH: CPT

## 2021-11-25 PROCEDURE — 83605 ASSAY OF LACTIC ACID: CPT | Performed by: EMERGENCY MEDICINE

## 2021-11-25 PROCEDURE — 25000003 PHARM REV CODE 250

## 2021-11-25 PROCEDURE — 99285 PR EMERGENCY DEPT VISIT,LEVEL V: ICD-10-PCS | Mod: CS,,, | Performed by: EMERGENCY MEDICINE

## 2021-11-25 PROCEDURE — 99285 EMERGENCY DEPT VISIT HI MDM: CPT | Mod: 25

## 2021-11-25 PROCEDURE — 81003 URINALYSIS AUTO W/O SCOPE: CPT | Performed by: PHYSICIAN ASSISTANT

## 2021-11-25 PROCEDURE — 25500020 PHARM REV CODE 255: Performed by: EMERGENCY MEDICINE

## 2021-11-25 PROCEDURE — 96361 HYDRATE IV INFUSION ADD-ON: CPT

## 2021-11-25 PROCEDURE — 25000003 PHARM REV CODE 250: Performed by: PHYSICIAN ASSISTANT

## 2021-11-25 PROCEDURE — 63600175 PHARM REV CODE 636 W HCPCS: Performed by: PHYSICIAN ASSISTANT

## 2021-11-25 PROCEDURE — 96372 THER/PROPH/DIAG INJ SC/IM: CPT | Mod: 59

## 2021-11-25 PROCEDURE — 96376 TX/PRO/DX INJ SAME DRUG ADON: CPT | Mod: 59

## 2021-11-25 PROCEDURE — 99285 EMERGENCY DEPT VISIT HI MDM: CPT | Mod: CS,,, | Performed by: EMERGENCY MEDICINE

## 2021-11-25 PROCEDURE — 85025 COMPLETE CBC W/AUTO DIFF WBC: CPT | Performed by: PHYSICIAN ASSISTANT

## 2021-11-25 PROCEDURE — 80053 COMPREHEN METABOLIC PANEL: CPT | Performed by: EMERGENCY MEDICINE

## 2021-11-25 RX ORDER — LORAZEPAM 2 MG/ML
1 INJECTION INTRAMUSCULAR
Status: COMPLETED | OUTPATIENT
Start: 2021-11-25 | End: 2021-11-25

## 2021-11-25 RX ORDER — METHYLPREDNISOLONE SOD SUCC 125 MG
125 VIAL (EA) INJECTION
Status: COMPLETED | OUTPATIENT
Start: 2021-11-25 | End: 2021-11-25

## 2021-11-25 RX ORDER — OXYCODONE HYDROCHLORIDE 5 MG/1
5 TABLET ORAL EVERY 8 HOURS PRN
Qty: 6 TABLET | Refills: 0 | Status: SHIPPED | OUTPATIENT
Start: 2021-11-25 | End: 2021-11-28

## 2021-11-25 RX ORDER — HYDROMORPHONE HYDROCHLORIDE 1 MG/ML
1 INJECTION, SOLUTION INTRAMUSCULAR; INTRAVENOUS; SUBCUTANEOUS
Status: COMPLETED | OUTPATIENT
Start: 2021-11-25 | End: 2021-11-25

## 2021-11-25 RX ORDER — GABAPENTIN 300 MG/1
300 CAPSULE ORAL 3 TIMES DAILY
Qty: 21 CAPSULE | Refills: 0 | Status: SHIPPED | OUTPATIENT
Start: 2021-11-25 | End: 2022-10-05 | Stop reason: CLARIF

## 2021-11-25 RX ORDER — EPINEPHRINE 0.3 MG/.3ML
1 INJECTION SUBCUTANEOUS
Qty: 2 EACH | Refills: 1 | Status: SHIPPED | OUTPATIENT
Start: 2021-11-25 | End: 2022-11-25

## 2021-11-25 RX ORDER — FAMOTIDINE 10 MG/ML
20 INJECTION INTRAVENOUS
Status: COMPLETED | OUTPATIENT
Start: 2021-11-25 | End: 2021-11-25

## 2021-11-25 RX ORDER — HYDROMORPHONE HYDROCHLORIDE 1 MG/ML
0.5 INJECTION, SOLUTION INTRAMUSCULAR; INTRAVENOUS; SUBCUTANEOUS
Status: COMPLETED | OUTPATIENT
Start: 2021-11-25 | End: 2021-11-25

## 2021-11-25 RX ORDER — METHOCARBAMOL 500 MG/1
1000 TABLET, FILM COATED ORAL 3 TIMES DAILY PRN
Qty: 30 TABLET | Refills: 0 | Status: SHIPPED | OUTPATIENT
Start: 2021-11-25 | End: 2021-11-30

## 2021-11-25 RX ORDER — DIPHENHYDRAMINE HYDROCHLORIDE 50 MG/ML
25 INJECTION INTRAMUSCULAR; INTRAVENOUS
Status: COMPLETED | OUTPATIENT
Start: 2021-11-25 | End: 2021-11-25

## 2021-11-25 RX ORDER — HYDROCODONE BITARTRATE AND ACETAMINOPHEN 5; 325 MG/1; MG/1
1 TABLET ORAL EVERY 6 HOURS PRN
Qty: 12 TABLET | Refills: 0 | Status: SHIPPED | OUTPATIENT
Start: 2021-11-25 | End: 2021-11-28

## 2021-11-25 RX ORDER — GABAPENTIN 300 MG/1
300 CAPSULE ORAL ONCE
Status: COMPLETED | OUTPATIENT
Start: 2021-11-25 | End: 2021-11-25

## 2021-11-25 RX ADMIN — DIPHENHYDRAMINE HYDROCHLORIDE 25 MG: 50 INJECTION, SOLUTION INTRAMUSCULAR; INTRAVENOUS at 01:11

## 2021-11-25 RX ADMIN — SODIUM CHLORIDE 1000 ML: 0.9 INJECTION, SOLUTION INTRAVENOUS at 12:11

## 2021-11-25 RX ADMIN — HYDROMORPHONE HYDROCHLORIDE 0.5 MG: 1 INJECTION, SOLUTION INTRAMUSCULAR; INTRAVENOUS; SUBCUTANEOUS at 04:11

## 2021-11-25 RX ADMIN — METHYLPREDNISOLONE SODIUM SUCCINATE 125 MG: 125 INJECTION, POWDER, FOR SOLUTION INTRAMUSCULAR; INTRAVENOUS at 03:11

## 2021-11-25 RX ADMIN — HYDROMORPHONE HYDROCHLORIDE 1 MG: 1 INJECTION, SOLUTION INTRAMUSCULAR; INTRAVENOUS; SUBCUTANEOUS at 01:11

## 2021-11-25 RX ADMIN — HYDROMORPHONE HYDROCHLORIDE 1 MG: 1 INJECTION, SOLUTION INTRAMUSCULAR; INTRAVENOUS; SUBCUTANEOUS at 12:11

## 2021-11-25 RX ADMIN — IOHEXOL 75 ML: 350 INJECTION, SOLUTION INTRAVENOUS at 02:11

## 2021-11-25 RX ADMIN — IOHEXOL 70 ML: 350 INJECTION, SOLUTION INTRAVENOUS at 02:11

## 2021-11-25 RX ADMIN — FAMOTIDINE 20 MG: 10 INJECTION INTRAVENOUS at 12:11

## 2021-11-25 RX ADMIN — LORAZEPAM 1 MG: 2 INJECTION INTRAMUSCULAR; INTRAVENOUS at 03:11

## 2021-11-25 RX ADMIN — GABAPENTIN 300 MG: 300 CAPSULE ORAL at 12:11

## 2021-12-07 LAB
FINAL PATHOLOGIC DIAGNOSIS: NORMAL
GROSS: NORMAL
Lab: NORMAL

## 2021-12-21 ENCOUNTER — PATIENT MESSAGE (OUTPATIENT)
Dept: PHARMACY | Facility: CLINIC | Age: 41
End: 2021-12-21
Payer: MEDICAID

## 2021-12-21 ENCOUNTER — SPECIALTY PHARMACY (OUTPATIENT)
Dept: PHARMACY | Facility: CLINIC | Age: 41
End: 2021-12-21
Payer: MEDICAID

## 2021-12-21 ENCOUNTER — TELEPHONE (OUTPATIENT)
Dept: ALLERGY | Facility: CLINIC | Age: 41
End: 2021-12-21
Payer: MEDICAID

## 2021-12-28 ENCOUNTER — TELEPHONE (OUTPATIENT)
Dept: NEUROLOGY | Facility: HOSPITAL | Age: 41
End: 2021-12-28
Payer: MEDICAID

## 2022-01-05 ENCOUNTER — TELEPHONE (OUTPATIENT)
Dept: NEUROLOGY | Facility: HOSPITAL | Age: 42
End: 2022-01-05
Payer: MEDICAID

## 2022-01-05 NOTE — TELEPHONE ENCOUNTER
----- Message from Giuliana De La Cruz sent at 1/5/2022  2:47 PM CST -----  Type:  Needs Medical Advice     Who Called: Moon Nurse from    Would the patient rather a call back or a response via [facility]? Call back   Best Call Back Number: [phone]   Additional Information: Consult room 371. Bleeding ulcer. Please call to advice

## 2022-01-16 NOTE — SUBJECTIVE & OBJECTIVE
Past Medical History:   Diagnosis Date    C. difficile colitis feb 2014    postop of hand surgery    Eosinophilic esophagitis 3/11/2014    GERD (gastroesophageal reflux disease)     IBS (irritable bowel syndrome)     MRSA carrier     says multiple times nose swab was +    Nephrolithiasis apr 2014    bilateral punctate - never passed a stone    Urinary tract infection feb 2014    MRSA       Past Surgical History:   Procedure Laterality Date    APPENDECTOMY      ARTHROSCOPY, SHOULDER, WITH DISTAL CLAVICLE EXCISION Right 11/1/2018    [facility] at [facility] OR    BACK SURGERY      CIRCUMCISION, PRIMARY      COLONOSCOPY N/A 11/14/2018    [facility]    drainage of abscess from hand  2009    MRSA    drainage of abscess from R thigh  2006    MRSA    EGD (ESOPHAGOGASTRODUODENOSCOPY) N/A 9/5/2018    Performed by Kolby Wall [facility] at [facility] ENDO    EGD (ESOPHAGOGASTRODUODENOSCOPY) Left 3/11/2014    [facility] Galo Hdez [facility] ENDO    ESOPHAGOGASTRODUODENOSCOPY  3/11/2014    ESOPHAGOGASTRODUODENOSCOPY (EGD) N/A 7/21/2016    Performed by Kolby Wall [facility] at [facility] ENDO    ESOPHAGOGASTRODUODENOSCOPY (EGD) N/A 3/17/2016    Performed by Kolby Wall [facility] at [facility] ENDO    HAND SURGERY  jan 2014    power saw fell on hand - laceration 3 tendons    INCISION AND DRAINAGE, LOWER EXTREMITY Left 4/15/2019    Performed by Troy Honeycutt MD at [facility] OR    SIGMOIDOSCOPY, FLEXIBLE N/A 9/5/2018    Performed by Kolby Wall MD at [facility] ENDO       Review of patient's allergies indicates:   Allergen Reactions    Nsaids (non-steroidal anti-inflammatory drug)      GI bleed    Ketamine      Severe dysphoria (bad trip)    Penicillins      Has taken cephalosporins without issue       Medications:  Medications Prior to Admission   Medication Sig    dextroamphetamine-amphetamine (ADDERALL) 20 mg tablet Take 1 tablet by mouth 2 (two) times daily  before meals. Take before breakfast and lunch    famotidine (PEPCID) 20 MG tablet Take 20 mg by mouth 2 (two) times daily.    ferrous sulfate 325 mg (65 mg iron) Tab tablet Take 1 tablet (325 mg total) by mouth 2 (two) times daily.    HYDROcodone-acetaminophen (NORCO) 7.5-325 mg per tablet Take 1 tablet by mouth every 6 (six) hours as needed.    loratadine (CLARITIN) 10 mg tablet Take 10 mg by mouth every morning.    MULTIVIT-IRON-MIN-FOLIC ACID 3,500-18-0.4 UNIT-MG-MG ORAL CHEW Take 10 mg by mouth once daily.    pantoprazole (PROTONIX) 40 MG tablet Take 1 tablet (40 mg total) by mouth 2 (two) times daily.    paroxetine (PAXIL) 20 MG tablet Take 20 mg by mouth every morning.    sucralfate (CARAFATE) 1 gram tablet Take 1 g by mouth 4 (four) times daily before meals and nightly.    predniSONE (DELTASONE) 20 MG tablet Take 20 mg by mouth 2 (two) times daily.    promethazine (PHENERGAN) 12.5 MG Tab Take 25 mg by mouth every 6 (six) hours as needed (nausea/vomiting).     Antibiotics (From admission, onward)    Start     Stop Route Frequency Ordered    05/03/19 1600  vancomycin (VANCOCIN) 2,000 mg in dextrose 5 % 500 mL IVPB  (vancomycin IVPB)      -- IV Every 12 hours (non-standard times) 05/03/19 1531        Antifungals (From admission, onward)    None        Antivirals (From admission, onward)    None           Immunization History   Administered Date(s) Administered    Influenza - Quadrivalent - PF 10/01/2016    Pneumococcal Polysaccharide - 23 Valent 10/01/2016       Family History     Problem Relation (Age of Onset)    Celiac disease Sister    Hypertension Maternal Grandmother, Maternal Grandfather    Urolithiasis Father        Social History     Socioeconomic History    Marital status: Single     Spouse name: Not on file    Number of children: 2    Years of education: Not on file    Highest education level: Not on file   Occupational History    Occupation:  electric forklifts      Employer: [employer]   Social Needs    Financial resource strain: Not on file    Food insecurity:     Worry: Not on file     Inability: Not on file    Transportation needs:     Medical: Not on file     Non-medical: Not on file   Tobacco Use    Smoking status: Never Smoker    Smokeless tobacco: Never Used    Tobacco comment: Pt states he has smoked 1 or 2 cigarettes in his life.   Substance and Sexual Activity    Alcohol use: Yes     Comment: occassionaly    Drug use: No    Sexual activity: Yes     Partners: Female   Lifestyle    Physical activity:     Days per week: Not on file     Minutes per session: Not on file    Stress: Not on file   Relationships    Social connections:     Talks on phone: Not on file     Gets together: Not on file     Attends Congregational service: Not on file     Active member of club or organization: Not on file     Attends meetings of clubs or organizations: Not on file     Relationship status: Not on file   Other Topics Concern    Not on file   Social History Narrative    Not on file     Review of Systems   Constitutional: Positive for activity change, chills and fever. Negative for diaphoresis and fatigue.   HENT: Negative.    Eyes: Negative.    Respiratory: Negative.    Cardiovascular: Negative.    Gastrointestinal: Negative.    Endocrine: Negative.    Genitourinary: Negative.    Musculoskeletal: Positive for myalgias. Negative for joint swelling.   Skin: Positive for color change, pallor, rash and wound.   Allergic/Immunologic: Positive for immunocompromised state.   Neurological: Negative.      Objective:     Vital Signs (Most Recent):  Temp: 97.6 °F (36.4 °C) (05/04/19 1123)  Pulse: (!) 54 (05/04/19 1123)  Resp: 18 (05/04/19 1123)  BP: (!) 100/57 (05/04/19 1123)  SpO2: 97 % (05/04/19 1200) Vital Signs (24h Range):  Temp:  [96.5 °F (35.8 °C)-98.6 °F (37 °C)] 97.6 °F (36.4 °C)  Pulse:  [52-90] 54  Resp:  [16-20] 18  SpO2:  [95 %-98 %] 97 %  BP: ()/(53-83) 100/57      Weight: 93.6 kg (206 lb 5.6 oz)  Body mass index is 27.99 kg/m².    Estimated Creatinine Clearance: 121.3 mL/min (based on SCr of 1 mg/dL).    Physical Exam   Constitutional: He is oriented to person, place, and time. He appears well-developed and well-nourished.   Normal facies    HENT:   Head: Normocephalic and atraumatic.   Eyes: Pupils are equal, round, and reactive to light.   Neck: Neck supple.   Cardiovascular: Normal rate and regular rhythm.   Abdominal: Soft. Bowel sounds are normal.   Musculoskeletal: Normal range of motion.   Neurological: He is alert and oriented to person, place, and time.   Skin: Rash noted. There is erythema.   Left ankle with 2 x 3 cm area of erythema and escar present - some induration but no fluctuance        Significant Labs: All pertinent labs within the past 24 hours have been reviewed.    Significant Imaging: I have reviewed all pertinent imaging results/findings within the past 24 hours.   Patient tolerated procedure well.

## 2022-01-18 ENCOUNTER — TELEPHONE (OUTPATIENT)
Dept: NEUROLOGY | Facility: HOSPITAL | Age: 42
End: 2022-01-18
Payer: MEDICAID

## 2022-01-18 NOTE — TELEPHONE ENCOUNTER
----- Message from Shayna Murcia sent at 1/18/2022  9:36 AM CST -----  Contact: [facility]  Type:  Needs Medical Advice    Who Called:  [facility] Iza   Symptoms (please be specific):  would like to get a call back     Would the patient rather a call back or a response via [facility]?  Call   Best Call Back Number: [phone]  Additional Information:

## 2022-01-19 ENCOUNTER — TELEPHONE (OUTPATIENT)
Dept: NEUROLOGY | Facility: HOSPITAL | Age: 42
End: 2022-01-19
Payer: MEDICAID

## 2022-01-19 NOTE — TELEPHONE ENCOUNTER
----- Message from Shayna Murcia sent at 1/19/2022 11:31 AM CST -----  Contact: [facility]  Type:  Needs Medical Advice    Who Called: [facility]  Symptoms (please be specific): would like to get call back involving PT       Would the patient rather a call back or a response via [facility]? Call back  Best Call Back Number: [phone]  Additional Information:

## 2022-01-20 ENCOUNTER — TELEPHONE (OUTPATIENT)
Dept: NEUROLOGY | Facility: HOSPITAL | Age: 42
End: 2022-01-20
Payer: MEDICAID

## 2022-01-20 NOTE — TELEPHONE ENCOUNTER
----- Message from Radha Coleman sent at 1/20/2022  9:42 AM CST -----  Contact: Savana hill/ [facility] [phone]  Type: Requesting Call Back    Who called:  Savana Waldron Call Back Number:  [phone]  Additional Information: called regarding TPN adjustment

## 2022-02-03 ENCOUNTER — TELEPHONE (OUTPATIENT)
Dept: NEUROLOGY | Facility: HOSPITAL | Age: 42
End: 2022-02-03
Payer: MEDICAID

## 2022-02-03 NOTE — TELEPHONE ENCOUNTER
----- Message from Koki Wall MA sent at 2/3/2022 11:29 AM CST -----  Contact: [phone] / Self  Patient recently had surgery with [facility].  Thanks-    ----- Message -----  From: Giuliana Ferreira  Sent: 2/3/2022  11:12 AM CST  To: Smita Correa Staff    CARLOSB     Type: Requesting to speak with nurse    Who Called: Pt   Regarding: personal matter, refused to give details   Would the patient rather a call back or a response via [facility]? Call back  Best Call Back Number: [phone]   Additional Information: n/a

## 2022-02-03 NOTE — TELEPHONE ENCOUNTER
Pt called to report that he has puss coming out of his abd drain in the tubing and around the tubing.  He reports that the insertion site is red as well.  Denies fever but did wake up sweating last night.  He says he feels fine. He was discharged from hospital on Tuesday.  He has not had any output from his drain since discharge.  There is a very small amount of drainage in the existing bag that is brown from Tuesday.  He is not taking any antibiotics but is on diflucan and pain meds with tylenol.  Appointment made with Dr. Ordoñez for tomorrow. Will discuss with Dr. Ordoñez and call patient back if he has anything additional to add.

## 2022-02-04 ENCOUNTER — OFFICE VISIT (OUTPATIENT)
Dept: NEUROLOGY | Facility: HOSPITAL | Age: 42
End: 2022-02-04
Attending: SURGERY
Payer: MEDICAID

## 2022-02-04 VITALS
SYSTOLIC BLOOD PRESSURE: 130 MMHG | WEIGHT: 186.38 LBS | HEIGHT: 72 IN | DIASTOLIC BLOOD PRESSURE: 90 MMHG | TEMPERATURE: 99 F | RESPIRATION RATE: 18 BRPM | BODY MASS INDEX: 25.24 KG/M2 | HEART RATE: 85 BPM

## 2022-02-04 DIAGNOSIS — K26.4 DUODENAL ULCER HEMORRHAGIC: ICD-10-CM

## 2022-02-04 PROCEDURE — 99215 OFFICE O/P EST HI 40 MIN: CPT | Performed by: SURGERY

## 2022-02-04 RX ORDER — PREDNISONE 10 MG/1
10 TABLET ORAL DAILY
COMMUNITY
Start: 2022-02-01 | End: 2023-08-15 | Stop reason: ALTCHOICE

## 2022-02-04 RX ORDER — FLUCONAZOLE 200 MG/1
TABLET ORAL
Status: ON HOLD | COMMUNITY
Start: 2022-01-31 | End: 2022-10-18

## 2022-02-04 RX ORDER — APIXABAN 5 MG/1
TABLET, FILM COATED ORAL
Status: ON HOLD | COMMUNITY
Start: 2022-02-01 | End: 2022-08-05 | Stop reason: HOSPADM

## 2022-02-04 NOTE — PROGRESS NOTES
[facility]OLAN[facility]:  [facility]  A collaboration between [facility] and [facility]        Chief Complaint:  post op follow up    S/p:   Distal gastrectomy resection of duodenal ulcer,           Vital Sign: There were no vitals filed for this visit.      Incision: healing well    Appetite: good    Restrictions: Restriction as listed:  No lifting over 20 lb for 6 weeks  Drain removed  no issues.    Return to clinic: as needed  Follow-up with Oncology/PCP

## 2022-02-04 NOTE — PATIENT INSTRUCTIONS
Restrictions: No lifting over 20 lb for 6 weeks    Notes From Physician:   - Drain removed with no issues  - Follow-up with Oncology/PCP    Return to clinic: as needed

## 2022-03-22 ENCOUNTER — PATIENT MESSAGE (OUTPATIENT)
Dept: SURGERY | Facility: CLINIC | Age: 42
End: 2022-03-22
Payer: MEDICAID

## 2022-03-22 DIAGNOSIS — D72.119 HYPEREOSINOPHILIC SYNDROME, UNSPECIFIED TYPE: Primary | ICD-10-CM

## 2022-03-30 ENCOUNTER — ANESTHESIA EVENT (OUTPATIENT)
Dept: SURGERY | Facility: HOSPITAL | Age: 42
End: 2022-03-30
Payer: MEDICAID

## 2022-03-30 ENCOUNTER — TELEPHONE (OUTPATIENT)
Dept: SURGERY | Facility: CLINIC | Age: 42
End: 2022-03-30
Payer: MEDICAID

## 2022-03-30 NOTE — PRE-PROCEDURE INSTRUCTIONS
Preop instructions: No food or milk products  after midnight and clears (clear liquids are: water, apple juice, Gatorade & Jell-O) up until 0900a, bathing instructions, directions, medication instructions for PM prior & am of procedure and am anesthesia plan explained. PATIENT stated an understanding.      denies any side effects or issues with anesthesia or sedation.       0500 ARRIVAL GIVEN

## 2022-03-31 ENCOUNTER — ANESTHESIA (OUTPATIENT)
Dept: SURGERY | Facility: HOSPITAL | Age: 42
End: 2022-03-31
Payer: MEDICAID

## 2022-03-31 ENCOUNTER — HOSPITAL ENCOUNTER (OUTPATIENT)
Facility: HOSPITAL | Age: 42
Discharge: HOME | End: 2022-03-31
Attending: SURGERY | Admitting: SURGERY
Payer: MEDICAID

## 2022-03-31 VITALS
WEIGHT: 186 LBS | OXYGEN SATURATION: 100 % | HEART RATE: 72 BPM | DIASTOLIC BLOOD PRESSURE: 81 MMHG | TEMPERATURE: 99 F | HEIGHT: 72 IN | SYSTOLIC BLOOD PRESSURE: 127 MMHG | RESPIRATION RATE: 18 BRPM | BODY MASS INDEX: 25.19 KG/M2

## 2022-03-31 DIAGNOSIS — D72.119 HYPEREOSINOPHILIC SYNDROME, UNSPECIFIED TYPE: ICD-10-CM

## 2022-03-31 PROCEDURE — 71000015 HC POSTOP RECOV 1ST HR: Performed by: SURGERY

## 2022-03-31 PROCEDURE — D9220A PRA ANESTHESIA: Mod: ,,, | Performed by: ANESTHESIOLOGY

## 2022-03-31 PROCEDURE — 37000008 HC ANESTHESIA 1ST 15 MINUTES: Performed by: SURGERY

## 2022-03-31 PROCEDURE — 36561 PR INSERT TUNNELED CV CATH WITH PORT: ICD-10-PCS | Mod: LT,,, | Performed by: SURGERY

## 2022-03-31 PROCEDURE — 25000003 PHARM REV CODE 250: Performed by: SURGERY

## 2022-03-31 PROCEDURE — 77001 FLUOROGUIDE FOR VEIN DEVICE: CPT | Mod: 26,,, | Performed by: SURGERY

## 2022-03-31 PROCEDURE — C1788 PORT, INDWELLING, IMP: HCPCS | Performed by: SURGERY

## 2022-03-31 PROCEDURE — 37000009 HC ANESTHESIA EA ADD 15 MINS: Performed by: SURGERY

## 2022-03-31 PROCEDURE — 77001 CHG FLUOROGUIDE CNTRL VEN ACCESS,PLACE,REPLACE,REMOVE: ICD-10-PCS | Mod: 26,,, | Performed by: SURGERY

## 2022-03-31 PROCEDURE — 63600175 PHARM REV CODE 636 W HCPCS: Performed by: SURGERY

## 2022-03-31 PROCEDURE — 25000003 PHARM REV CODE 250: Performed by: STUDENT IN AN ORGANIZED HEALTH CARE EDUCATION/TRAINING PROGRAM

## 2022-03-31 PROCEDURE — 63600175 PHARM REV CODE 636 W HCPCS: Performed by: ANESTHESIOLOGY

## 2022-03-31 PROCEDURE — 00532 ANES ACCESS CTR VENOUS CRCJ: CPT | Performed by: SURGERY

## 2022-03-31 PROCEDURE — 36000706: Performed by: SURGERY

## 2022-03-31 PROCEDURE — D9220A PRA ANESTHESIA: ICD-10-PCS | Mod: ,,, | Performed by: ANESTHESIOLOGY

## 2022-03-31 PROCEDURE — 71000044 HC DOSC ROUTINE RECOVERY FIRST HOUR: Performed by: SURGERY

## 2022-03-31 PROCEDURE — 36561 INSERT TUNNELED CV CATH: CPT | Mod: LT,,, | Performed by: SURGERY

## 2022-03-31 PROCEDURE — 36000707: Performed by: SURGERY

## 2022-03-31 DEVICE — KIT POWERPORT SINGLE 8FR: Type: IMPLANTABLE DEVICE | Site: CHEST | Status: FUNCTIONAL

## 2022-03-31 RX ORDER — SODIUM CHLORIDE 0.9 % (FLUSH) 0.9 %
10 SYRINGE (ML) INJECTION
Status: DISCONTINUED | OUTPATIENT
Start: 2022-03-31 | End: 2022-03-31 | Stop reason: HOSPADM

## 2022-03-31 RX ORDER — HEPARIN 100 UNIT/ML
SYRINGE INTRAVENOUS
Status: DISCONTINUED | OUTPATIENT
Start: 2022-03-31 | End: 2022-03-31 | Stop reason: HOSPADM

## 2022-03-31 RX ORDER — ONDANSETRON 2 MG/ML
4 INJECTION INTRAMUSCULAR; INTRAVENOUS DAILY PRN
Status: DISCONTINUED | OUTPATIENT
Start: 2022-03-31 | End: 2022-03-31 | Stop reason: HOSPADM

## 2022-03-31 RX ORDER — MIDAZOLAM HYDROCHLORIDE 1 MG/ML
INJECTION, SOLUTION INTRAMUSCULAR; INTRAVENOUS
Status: DISCONTINUED | OUTPATIENT
Start: 2022-03-31 | End: 2022-03-31

## 2022-03-31 RX ORDER — PROPOFOL 10 MG/ML
VIAL (ML) INTRAVENOUS
Status: DISCONTINUED | OUTPATIENT
Start: 2022-03-31 | End: 2022-03-31

## 2022-03-31 RX ORDER — FENTANYL CITRATE 50 UG/ML
INJECTION, SOLUTION INTRAMUSCULAR; INTRAVENOUS
Status: DISCONTINUED | OUTPATIENT
Start: 2022-03-31 | End: 2022-03-31

## 2022-03-31 RX ORDER — LIDOCAINE HYDROCHLORIDE 10 MG/ML
INJECTION INFILTRATION; PERINEURAL
Status: DISCONTINUED | OUTPATIENT
Start: 2022-03-31 | End: 2022-03-31 | Stop reason: HOSPADM

## 2022-03-31 RX ORDER — CLINDAMYCIN PHOSPHATE 900 MG/50ML
900 INJECTION, SOLUTION INTRAVENOUS
Status: COMPLETED | OUTPATIENT
Start: 2022-03-31 | End: 2022-03-31

## 2022-03-31 RX ORDER — SODIUM CHLORIDE 9 MG/ML
INJECTION, SOLUTION INTRAVENOUS CONTINUOUS
Status: DISCONTINUED | OUTPATIENT
Start: 2022-03-31 | End: 2022-03-31 | Stop reason: HOSPADM

## 2022-03-31 RX ORDER — ONDANSETRON 2 MG/ML
INJECTION INTRAMUSCULAR; INTRAVENOUS
Status: DISCONTINUED | OUTPATIENT
Start: 2022-03-31 | End: 2022-03-31

## 2022-03-31 RX ORDER — HYDROMORPHONE HYDROCHLORIDE 1 MG/ML
0.2 INJECTION, SOLUTION INTRAMUSCULAR; INTRAVENOUS; SUBCUTANEOUS EVERY 5 MIN PRN
Status: DISCONTINUED | OUTPATIENT
Start: 2022-03-31 | End: 2022-03-31 | Stop reason: HOSPADM

## 2022-03-31 RX ADMIN — CLINDAMYCIN IN 5 PERCENT DEXTROSE 900 MG: 18 INJECTION, SOLUTION INTRAVENOUS at 07:03

## 2022-03-31 RX ADMIN — FENTANYL CITRATE 25 MCG: 50 INJECTION INTRAMUSCULAR; INTRAVENOUS at 08:03

## 2022-03-31 RX ADMIN — HYDROMORPHONE HYDROCHLORIDE 0.2 MG: 1 INJECTION, SOLUTION INTRAMUSCULAR; INTRAVENOUS; SUBCUTANEOUS at 09:03

## 2022-03-31 RX ADMIN — Medication 150 MCG/KG/MIN: at 07:03

## 2022-03-31 RX ADMIN — Medication 175 MCG/KG/MIN: at 07:03

## 2022-03-31 RX ADMIN — Medication 175 MG: at 07:03

## 2022-03-31 RX ADMIN — FENTANYL CITRATE 25 MCG: 50 INJECTION INTRAMUSCULAR; INTRAVENOUS at 07:03

## 2022-03-31 RX ADMIN — PROPOFOL 50 MG: 10 INJECTION, EMULSION INTRAVENOUS at 07:03

## 2022-03-31 RX ADMIN — MIDAZOLAM 2 MG: 1 INJECTION INTRAMUSCULAR; INTRAVENOUS at 07:03

## 2022-03-31 RX ADMIN — SODIUM CHLORIDE: 0.9 INJECTION, SOLUTION INTRAVENOUS at 07:03

## 2022-03-31 RX ADMIN — FENTANYL CITRATE 50 MCG: 50 INJECTION INTRAMUSCULAR; INTRAVENOUS at 07:03

## 2022-03-31 RX ADMIN — HYDROMORPHONE HYDROCHLORIDE 0.2 MG: 1 INJECTION, SOLUTION INTRAMUSCULAR; INTRAVENOUS; SUBCUTANEOUS at 08:03

## 2022-03-31 RX ADMIN — ONDANSETRON 4 MG: 2 INJECTION INTRAMUSCULAR; INTRAVENOUS at 07:03

## 2022-03-31 NOTE — BRIEF OP NOTE
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 Port draws and flushes with ease    Estimated Blood Loss: Minimal         Specimens:   Specimen (24h ago, onward)            None            Discharge Note    OUTCOME: Patient tolerated treatment/procedure well without complication and is now ready for discharge.    DISPOSITION: Home or Self Care    FINAL DIAGNOSIS:  Hypereosinophilic syndrome    FOLLOWUP: In clinic    DISCHARGE INSTRUCTIONS:    Discharge Procedure Orders   Notify your health care provider if you experience any of the following:  temperature >100.4     Notify your health care provider if you experience any of the following:  persistent nausea and vomiting or diarrhea     Notify your health care provider if you experience any of the following:  severe uncontrolled pain     Notify your health care provider if you experience any of the following:  redness, tenderness, or signs of infection (pain, swelling, redness, odor or green/yellow discharge around incision site)     Notify your health care provider if you experience any of the following:  difficulty breathing or increased cough     Notify your health care provider if you experience any of the following:  severe persistent headache     Notify your health care provider if you experience any of the following:  worsening rash     Notify your health care provider if you experience any of the following:  persistent  dizziness, light-headedness, or visual disturbances     Notify your health care provider if you experience any of the following:  increased confusion or weakness     No dressing needed   Order Comments: Okay to shower tomorrow. Please do not soak in water such as a bath, hot tub, or pool for 2 weeks  Your incision is covered with surgical glue (dermabond). When showering, allow soapy water to run over the incision and then pat dry. Do not scrub or pick at the glue. It will fall off on its own over the next 1-2 weeks.

## 2022-03-31 NOTE — PATIENT INSTRUCTIONS
POSTOPERATIVE INSTRUCTIONS FOLLOWING PORT-A-CATH PLACMENT     The following are post-operative instructions that will help you to recover from your surgery.  Please read over these instructions carefully and contact us if we can answer any of your questions or concerns.     Dressing  After 24 hours you can shower/wash over the incision with antibacterial soap and warm water. Do not take a tub bath and do not soak the surgical site.      Activity   You should be able to return to your regular activities 2 days after your surgery.  However, do not engage in strenuous activities in which you use your upper body such as:  golf, tennis, aerobics, washing windows, raking the yard, mopping, vacuuming, heavy lifting (e.g children) until you are seen for your follow-up appointment in clinic. Do not lift anything heavier than a gallon of milk.     Medication for pain  You may find that over the counter pain medications may be sufficient for your pain.  You will be given a prescription for pain medication for more severe pain.  You should not drive or operate machinery while taking these.  Please take prescription pain medicine (narcotics) with food.  Narcotics can cause, or worsen, constipation.  You will need to increase your fluid intake, eat high fiber foods (such as fruits and bran) and make sure that you are up and walking. You may need to take an over the counter stool softener for constipation.     Please report the following:  Temperature greater than 101 degrees  Discharge or bad odor from the wound  Excessive bleeding, such as saturated bloody dressing or extreme bruising  Redness at incision and/or drain sites  Swelling or buildup of fluid around incision  Shortness of breath     Additional information  Your surgeon or medical oncologist will see you approximately 1-2 weeks following your surgery to check the incision.  If this follow-up appointment has not been made, please call the office.     If you have any  questions or problems, please call my office or my nurse.     After hours and on weekends, you may call the main [facility] line at [phone] and ask to have the general surgery resident paged.

## 2022-03-31 NOTE — ANESTHESIA POSTPROCEDURE EVALUATION
Anesthesia Post Evaluation    Patient: Solo Black    Procedure(s) Performed: Procedure(s) (LRB):  INSERTION, SINGLE LUMEN CATHETER WITH PORT, WITH FLUOROSCOPIC GUIDANCE Left Poss Right Chest (Left)    Final Anesthesia Type: general      Patient location during evaluation: PACU  Patient participation: Yes- Able to Participate  Level of consciousness: awake and alert  Post-procedure vital signs: reviewed and stable  Pain management: adequate  Airway patency: patent    PONV status at discharge: No PONV  Anesthetic complications: no      Cardiovascular status: blood pressure returned to baseline  Respiratory status: unassisted  Hydration status: euvolemic  Follow-up not needed.          Vitals Value Taken Time   /81 03/31/22 0930   Temp 36.9 °C (98.5 °F) 03/31/22 0930   Pulse 72 03/31/22 0930   Resp 16 03/31/22 0930   SpO2 100 % 03/31/22 0930         No case tracking events are documented in the log.      Pain/Marcia Score: Pain Rating Prior to Med Admin: 8 (3/31/2022  9:34 AM)  Marcia Score: 10 (3/31/2022  8:45 AM)

## 2022-03-31 NOTE — ANESTHESIA PREPROCEDURE EVALUATION
03/31/2022  Pre-operative evaluation for Procedure(s) (LRB):  INSERTION, SINGLE LUMEN CATHETER WITH PORT, WITH FLUOROSCOPIC GUIDANCE Left Poss Right Chest (Left)    Solo Black is a 41 y.o. male     Patient Active Problem List   Diagnosis    Eosinophilic esophagitis    PUD (peptic ulcer disease)    Erosive gastritis    Lifetime need for proton pump inhibitor    Cervicalgia    Adult ADHD    Adverse reaction to nonsteroidal anti-inflammatory drug (NSAID)    Therapeutic opioid induced constipation    Iron deficiency anemia    Eosinophilia    Reactive arthritis    Incomplete rotator cuff tear or rupture of right shoulder, not specified as traumatic    Hiatal hernia    Hypereosinophilic syndrome    Encounter for insertion of venous access port    Port-A-Cath in place    Dry eyes    Skin rash    Chronic neck pain    Myofascial pain syndrome    Status post laminectomy - Thoracic & cervical    Sedentary lifestyle    Adjustment disorder with anxious mood    Internal hemorrhoids    Difficulty voiding    Hematuria    Anxiety    Urticarial rash    Effusion of right knee    Upper GI bleed    Substance or medication-induced anxiety disorder    Hypomagnesemia    Hematemesis    Umbilical hernia    Continuous severe abdominal pain    Intractable nausea and vomiting    Gastric erosions    Hypokalemia    Diarrhea    Lower GI bleed    Psychiatric illness    Abdominal pain    Duodenal ulcer hemorrhagic       Review of patient's allergies indicates:   Allergen Reactions    Bean Diarrhea, Edema, Hives, Nausea And Vomiting and Swelling    Legumes Nausea And Vomiting and Swelling     Other reaction(s): GI Intolerance  vomiting  vomiting  Pt reports legumes make his lips swell and induce severe vomiting  Pt reports legumes make his lips swell and induce severe vomiting      Nsaids  "(non-steroidal anti-inflammatory drug)      GI bleed    Bean pod extract      Lips swelling, vomiting  Lips swelling, vomiting      Ketamine      Severe dysphoria (bad trip)    Lentils      Lips swelling, vomiting  Lips swelling, vomiting      Meloxicam Nausea Only     GI bleed    Peas      Lips swelling, vomiting    Penicillins      Has taken third gen cephalosporins without issue per patient report    Haloperidol Anxiety and Other (See Comments)     "feels like crawling out of his skin"         No current facility-administered medications on file prior to encounter.     Current Outpatient Medications on File Prior to Encounter   Medication Sig Dispense Refill    acetaminophen (TYLENOL) 325 MG tablet Take 2 tablets (650 mg total) by mouth every 4 (four) hours as needed. 30 tablet 3    artificial tears (ISOPTO TEARS) 0.5 % ophthalmic solution Place 1 drop into both eyes 6 (six) times daily.      cetirizine (ZYRTEC) 10 MG tablet Take 20 mg by mouth 2 (two) times a day.       dextroamphetamine-amphetamine (ADDERALL) 20 mg tablet Take 1 tablet by mouth 2 (two) times daily.      diphenhydrAMINE (SOMINEX) 25 mg tablet Take 25 mg by mouth nightly as needed.       ferrous sulfate 325 (65 FE) MG EC tablet Take 1 tablet (325 mg total) by mouth once daily. (Patient taking differently: Take 325 mg by mouth nightly.) 90 tablet 1    gabapentin (NEURONTIN) 300 MG capsule Take 1 capsule (300 mg total) by mouth 3 (three) times daily. for 7 days 21 capsule 0    HYDROcodone-acetaminophen (NORCO)  mg per tablet Take 1 tablet by mouth every 4 (four) hours as needed for Pain.      Lactobacillus acidophilus 10 billion cell Cap Take 1 capsule by mouth once daily.       pantoprazole (PROTONIX) 40 MG tablet Take 1 tablet (40 mg total) by mouth once daily. (Patient taking differently: Take 40 mg by mouth 2 (two) times daily.) 30 tablet 2    paroxetine (PAXIL) 20 MG tablet Take 20 mg by mouth every morning.      " predniSONE (DELTASONE) 10 MG tablet Take 20 mg by mouth once daily.      sucralfate (CARAFATE) 1 gram tablet Take 1 g by mouth before meals and at bedtime as needed.       tamsulosin (FLOMAX) 0.4 mg Cap Take 1 capsule (0.4 mg total) by mouth 2 (two) times daily. (Patient taking differently: Take 0.4 mg by mouth nightly.) 60 capsule 11    vitamin D3-vitamin K2 1000-90 unit-mcg TbDL Take by mouth once daily.       acetaminophen (TYLENOL) 650 MG TbSR Take 1 tablet (650 mg total) by mouth every 8 (eight) hours. 40 tablet 0    albuterol (PROVENTIL/VENTOLIN HFA) 90 mcg/actuation inhaler Inhale 2 puffs into the lungs every 6 (six) hours as needed for Wheezing or Shortness of Breath. Rescue 18 g 2    cholecalciferol, vitamin D3, (VITAMIN D3) 25 mcg (1,000 unit) capsule Take 1 capsule (1,000 Units total) by mouth once daily. 90 capsule 1    docusate sodium (COLACE) 100 MG capsule Take 1 capsule (100 mg total) by mouth 2 (two) times daily. 60 capsule 3    ELIQUIS 5 mg Tab TAKE 2 TABLETS BY MOUTH TWICE DAILY FOR 6 DAYS THEN TAKE 1 TABLET BY MOUTH TWICE DAILY      EPINEPHrine (EPIPEN) 0.3 mg/0.3 mL AtIn Inject 0.3 mLs (0.3 mg total) into the muscle as needed. 2 each 1    fluconazole (DIFLUCAN) 200 MG Tab       heparin, porcine, PF, (HEPARIN FLUSH 100 UNITS/ML) 100 unit/mL Syrg       hydrOXYzine HCL (ATARAX) 25 MG tablet Take 1 tablet (25 mg total) by mouth 3 (three) times daily as needed for Itching. 30 tablet 1    lidocaine HCL 2% (XYLOCAINE) 2 % jelly Apply topically 3 (three) times daily as needed (Hemorrhoids). 5 mL 3    mepolizumab (NUCALA) 100 mg/mL Syrg Inject 3 mLs (300 mg total) into the skin every 28 days. 300 mL 11    metoclopramide HCl (REGLAN) 10 MG tablet Take 10 mg by mouth as needed.      nystatin (MYCOSTATIN) 100,000 unit/mL suspension Take by mouth as needed.       ondansetron (ZOFRAN-ODT) 4 MG TbDL Take 4 mg by mouth.      oxyCODONE-acetaminophen (PERCOCET) 5-325 mg per tablet Take 1 tablet  by mouth every 4 (four) hours as needed for Pain.      promethazine (PHENERGAN) 25 MG tablet Take 25 mg by mouth every 4 (four) hours.         Past Surgical History:   Procedure Laterality Date    APPENDECTOMY      ARTHROSCOPY OF SHOULDER WITH REMOVAL OF DISTAL CLAVICLE Right 11/1/2018    Procedure: ARTHROSCOPY, SHOULDER, WITH DISTAL CLAVICLE EXCISION;  Surgeon: Gabino Mejia MD;  Location: [facility];  Service: Orthopedics;  Laterality: Right;  video    BACK SURGERY      BLADDER FULGURATION N/A 9/18/2020    Procedure: FULGURATION, BLADDER;  Surgeon: Randall Moss MD;  Location: [facility];  Service: Urology;  Laterality: N/A;  blood vessels of bladder neck and prostate    BONE MARROW BIOPSY Left 10/1/2020    Procedure: Biopsy-bone marrow;  Surgeon: Trung Polanco MD;  Location: [facility];  Service: Oncology;  Laterality: Left;    CIRCUMCISION, PRIMARY      COLONOSCOPY N/A 11/14/2018    Procedure: COLONOSCOPY;  Surgeon: Milotn Brunson MD;  Location: [facility];  Service: Endoscopy;  Laterality: N/A;    COLONOSCOPY N/A 7/20/2020    Procedure: COLONOSCOPY;  Surgeon: Ruslan Tillman MD;  Location: [facility];  Service: Endoscopy;  Laterality: N/A;    CYSTOSCOPY W/ RETROGRADES Bilateral 9/18/2020    Procedure: CYSTOSCOPY, WITH RETROGRADE PYELOGRAM;  Surgeon: Randall Moss MD;  Location: [facility];  Service: Urology;  Laterality: Bilateral;    CYSTOURETHROSCOPY N/A 2/1/2021    Procedure: CYSTOURETHROSCOPY, short placement;  Surgeon: Boni Benites MD;  Location: [address];  Service: Urology;  Laterality: N/A;    DILATION OF URETHRA N/A 9/18/2020    Procedure: DILATION, URETHRA;  Surgeon: Randall Moss MD;  Location: [facility];  Service: Urology;  Laterality: N/A;    drainage of abscess from hand  2009    MRSA    drainage of abscess from R thigh  2006    MRSA    ESOPHAGOGASTRODUODENOSCOPY  3/11/2014    ESOPHAGOGASTRODUODENOSCOPY N/A 9/5/2018    Procedure: EGD  (ESOPHAGOGASTRODUODENOSCOPY);  Surgeon: Kolby Wall MD;  Location: [facility];  Service: Endoscopy;  Laterality: N/A;    ESOPHAGOGASTRODUODENOSCOPY N/A 3/25/2020    Procedure: EGD (ESOPHAGOGASTRODUODENOSCOPY);  Surgeon: Ruslan Tillman MD;  Location: [facility];  Service: Endoscopy;  Laterality: N/A;    ESOPHAGOGASTRODUODENOSCOPY N/A 7/20/2020    Procedure: EGD (ESOPHAGOGASTRODUODENOSCOPY);  Surgeon: Ruslan Tillman MD;  Location: [facility];  Service: Endoscopy;  Laterality: N/A;  Patient is a very hard stick, requires anesthesia stick.    ESOPHAGOGASTRODUODENOSCOPY N/A 8/31/2020    Procedure: EGD (ESOPHAGOGASTRODUODENOSCOPY);  Surgeon: Kolby Wall MD;  Location: [facility];  Service: Endoscopy;  Laterality: N/A;    ESOPHAGOGASTRODUODENOSCOPY N/A 3/3/2021    Procedure: EGD (ESOPHAGOGASTRODUODENOSCOPY);  Surgeon: Ruslan Tillman MD;  Location: [facility];  Service: Endoscopy;  Laterality: N/A;    ESOPHAGOGASTRODUODENOSCOPY N/A 7/12/2021    Procedure: EGD (ESOPHAGOGASTRODUODENOSCOPY);  Surgeon: Kolby Wall MD;  Location: [facility];  Service: Endoscopy;  Laterality: N/A;    FLEXIBLE SIGMOIDOSCOPY N/A 9/5/2018    Procedure: SIGMOIDOSCOPY, FLEXIBLE;  Surgeon: Kolby Wall MD;  Location: [facility];  Service: Endoscopy;  Laterality: N/A;    HAND SURGERY  jan 2014    power saw fell on hand - laceration 3 tendons    INCISION AND DRAINAGE OF KNEE Right 1/13/2021    Procedure: INCISION AND DRAINAGE, KNEE;  Surgeon: Sony Linton Jr., MD;  Location: [facility];  Service: Orthopedics;  Laterality: Right;    INSERTION OF TUNNELED CENTRAL VENOUS CATHETER (CVC) WITH SUBCUTANEOUS PORT Right 11/24/2021    Procedure: Right port-a-cath removal and exchange.;  Surgeon: Robson Morrison MD;  Location: [address];  Service: General;  Laterality: Right;  Right internal jugular    INSERTION OF VENOUS ACCESS PORT Right 8/21/2020    Procedure: INSERTION, VENOUS ACCESS PORT;   Surgeon: Troy Honeycutt MD;  Location: [facility];  Service: General;  Laterality: Right;    NISSEN FUNDOPLICATION      REVISION OF SCAR N/A 2021    Procedure: REVISION, SCAR;  Surgeon: Robson Morrison MD;  [facility];  Service: General;  Laterality: N/A;    UMBILICAL HERNIA REPAIR N/A 2021    Procedure: REPAIR, HERNIA, UMBILICAL, AGE 5 YEARS OR OLDER;  Surgeon: Robson Morrison MD;  [facility];  Service: General;  Laterality: N/A;       Social History     Socioeconomic History    Marital status: Single    Number of children: 2   Occupational History    Occupation:  electric forklifts     Employer: [employer]   Tobacco Use    Smoking status: Never Smoker    Smokeless tobacco: Never Used    Tobacco comment: Pt states he has smoked 1 or 2 cigarettes in his life.   Substance and Sexual Activity    Alcohol use: Not Currently    Drug use: No    Sexual activity: Not Currently     Partners: Female         CBC: No results for input(s): WBC, RBC, HGB, HCT, PLT, MCV, MCH, MCHC in the last 72 hours.    CMP: No results for input(s): NA, K, CL, CO2, BUN, CREATININE, GLU, MG, PHOS, CALCIUM, ALBUMIN, PROT, ALKPHOS, ALT, AST, BILITOT in the last 72 hours.    INR  No results for input(s): PT, INR, PROTIME, APTT in the last 72 hours.        Diagnostic Studies:      EK21  Sinus tachycardia   Nonspecific T wave abnormality   Abnormal ECG   When compared with ECG of 2021 17:00,   Vent. rate has increased BY  61 BPM     2D Echo:  21  · The left ventricle is normal in size with normal systolic function. The estimated ejection fraction is 60%  · Normal left ventricular diastolic function.  · Normal right ventricular size with normal right ventricular systolic function.  · Mild left atrial enlargement.  · Mild tricuspid regurgitation.  · The estimated PA systolic pressure is 20 mmHg.  · Normal central venous pressure (3 mmHg).           Pre-op  Assessment    I have reviewed the Patient Summary Reports.     I have reviewed the Nursing Notes.    I have reviewed the Medications.     Review of Systems  Anesthesia Hx:  No problems with previous Anesthesia Denies Hx of Anesthetic complications  History of prior surgery of interest to airway management or planning: Denies Family Hx of Anesthesia complications.   Denies Personal Hx of Anesthesia complications.   Social:  Social Alcohol Use, Non-Smoker    Hematology/Oncology:         -- Anemia: Hematology Comments: hypereosinophilic syndrome   Cardiovascular:  Cardiovascular Normal Exercise tolerance: good  Denies Hypertension.  Denies CAD.     Denies Angina.    Pulmonary:  Pulmonary Normal    Renal/:   renal calculi    Hepatic/GI:   PUD, GERD Hx erosive esophagitis    s/p Distal gastrectomy resection of duodenal ulcer   Musculoskeletal:   Septic R prepatella bursitis (1/2021) Spine Disorders: cervical    Neurological:   Myofascial pain syndrome   Endocrine:  Endocrine Normal    Psych:   Psychiatric History ADHD         Physical Exam  General: Well nourished, Cooperative and Alert    Airway:  Mallampati: III / II  Mouth Opening: Normal    Dental:  Intact    Heart:  Rate: Normal  Rhythm: Regular Rhythm        Anesthesia Plan  Type of Anesthesia, risks & benefits discussed:    Anesthesia Type: Gen Natural Airway, MAC  Intra-op Monitoring Plan: Standard ASA Monitors  Post Op Pain Control Plan: multimodal analgesia  Induction:  IV  Informed Consent: Patient consented to blood products? Yes  ASA Score: 2    Ready For Surgery From Anesthesia Perspective.     .

## 2022-03-31 NOTE — OP NOTE
DATE: 3/31/22.    PREOPERATIVE DIAGNOSIS:  Hypereosinophilic syndrome.    POSTOPERATIVE DIAGNOSIS:  Hypereosinophilic syndrome.    PROCEDURE:  Left internal jugular port placement.    ATTENDING SURGEON: Rboson Morrison MD.    RESIDENT:  Magalie Laird MD.    ANESTHESIA:  General LMA.    INDICATION:  Patient is a 41-year-old male with hypereosinophilic syndrome required transfusions.  Had a prior right-sided port that was removed apparently for infection and presents for new port placement.    PROCEDURE IN DETAIL:  Patient was taken to the operating room and placed supine.  After induction of general LMA anesthesia, the left neck and chest were prepped and draped in the standard fashion.  Time-out was performed.  Local anesthetic was applied.  Using landmarks, we attempted to cannulate the left subclavian vein, but never got venous return.  Under ultrasonic guidance, the left internal jugular vein was accessed with a needle a wire was threaded distally.  This was confirmed in the appropriate position by fluoroscopy.  Transverse chest incision was made and a port pocket was fashioned.  Port was tunneled from the pocket to the wire exit site and was secured in the pocket with Vicryl suture.  Under fluoroscopic guidance, a sheath dilator was placed over the wire and wire and dilator were removed.  Catheter was cut to size and placed within the sheath.  Sheath was peeled away, leaving the catheter in intravascular position.  Repeat fluoroscopy confirmed appropriate port placement without kinking or twisting.  Port easily aspirated and flushed and heparin lock was applied.  Hemostasis was confirmed.  Incisions were closed in multiple layers with 3-0 Vicryl and subcuticular 4-0 Monocryl.  Dermabond was applied.  Patient was awakened from anesthesia and transferred to the PACU in good condition.  All needle and sponge counts were correct at the conclusion of the case.  I was present for the procedure in its entirety.    EBL:   Minimal.    FINDINGS:  Successful left IJ port placement.

## 2022-03-31 NOTE — H&P
Terry Álvarez - Surgery ([facility])  General Surgery  History and Physical      Subjective:     CC: Port-a-cath placement    HPI:  Solo Black is a 41 y.o. male with a history of idiopathic hypereosinophilic syndrome. He has undergone port placement twice previously with the first 8/21/2020 (right subclavian). This port malfunctioned and he then had a new port placed in the right internal jugular vein 11/24/2021. Unfortunately this port got infected leading to a hospitalization and so had to be removed. He has since completed a course of antibiotics and it has been over 30 days since the prior line was removed. He desires replacement.      Other than the two previous ports and no previous neck surgeries.     ROS:  Gen: no fevers, no chills, no recent weight loss  CV: no palpitations, no peripheral edema  Respit: no cough, no SOB  Abd: no abd pain, no nausea, no vomiting.  Skin: no rash, no wound  Heme: no lymphadenopathy  Neuro: no headache, no dizziness  Psych: no depression, no anxiety    Medical History  Past Medical History:   Diagnosis Date    Arthritis     C. difficile colitis feb 2014    postop of hand surgery    Cancer     Encounter for blood transfusion     Eosinophilic esophagitis 3/11/2014    GERD (gastroesophageal reflux disease)     Hypereosinophilic syndrome     IBS (irritable bowel syndrome)     Leukemia     MRSA carrier     says multiple times nose swab was +    Nephrolithiasis apr 2014    bilateral punctate - never passed a stone    Septic prepatellar bursitis of right knee 1/12/2021    Urinary tract infection feb 2014    MRSA     Past Surgical History:   Procedure Laterality Date    APPENDECTOMY      ARTHROSCOPY OF SHOULDER WITH REMOVAL OF DISTAL CLAVICLE Right 11/1/2018    Procedure: ARTHROSCOPY, SHOULDER, WITH DISTAL CLAVICLE EXCISION;  Surgeon: Gabino Mejia MD;  Location: [facility];  Service: Orthopedics;  Laterality: Right;  video    BACK SURGERY      BLADDER FULGURATION  N/A 9/18/2020    Procedure: FULGURATION, BLADDER;  Surgeon: Randall Moss MD;  Location: [facility];  Service: Urology;  Laterality: N/A;  blood vessels of bladder neck and prostate    BONE MARROW BIOPSY Left 10/1/2020    Procedure: Biopsy-bone marrow;  Surgeon: Trung Polanco MD;  Location: [facility];  Service: Oncology;  Laterality: Left;    CIRCUMCISION, PRIMARY      COLONOSCOPY N/A 11/14/2018    Procedure: COLONOSCOPY;  Surgeon: Milton Brunson MD;  Location[facility];  Service: Endoscopy;  Laterality: N/A;    COLONOSCOPY N/A 7/20/2020    Procedure: COLONOSCOPY;  Surgeon: Ruslan Tillman MD;  Location: [facility];  Service: Endoscopy;  Laterality: N/A;    CYSTOSCOPY W/ RETROGRADES Bilateral 9/18/2020    Procedure: CYSTOSCOPY, WITH RETROGRADE PYELOGRAM;  Surgeon: Randall Moss MD;  Location: [facility] OR;  Service: Urology;  Laterality: Bilateral;    CYSTOURETHROSCOPY N/A 2/1/2021    Procedure: CYSTOURETHROSCOPY, short placement;  Surgeon: Boni Benites MD;  Location: [address];  Service: Urology;  Laterality: N/A;    DILATION OF URETHRA N/A 9/18/2020    Procedure: DILATION, URETHRA;  Surgeon: Randall Moss MD;  Location: [facility];  Service: Urology;  Laterality: N/A;    drainage of abscess from hand  2009    MRSA    drainage of abscess from R thigh  2006    MRSA    ESOPHAGOGASTRODUODENOSCOPY  3/11/2014    ESOPHAGOGASTRODUODENOSCOPY N/A 9/5/2018    Procedure: EGD (ESOPHAGOGASTRODUODENOSCOPY);  Surgeon: Kolby Wall MD;  Location: [facility];  Service: Endoscopy;  Laterality: N/A;    ESOPHAGOGASTRODUODENOSCOPY N/A 3/25/2020    Procedure: EGD (ESOPHAGOGASTRODUODENOSCOPY);  Surgeon: Ruslan Tillman MD;  Location: [facility];  Service: Endoscopy;  Laterality: N/A;    ESOPHAGOGASTRODUODENOSCOPY N/A 7/20/2020    Procedure: EGD (ESOPHAGOGASTRODUODENOSCOPY);  Surgeon: Ruslan Tillman MD;  Location: [facility];  Service: Endoscopy;  Laterality: N/A;  Patient is a very  hard stick, requires anesthesia stick.    ESOPHAGOGASTRODUODENOSCOPY N/A 8/31/2020    Procedure: EGD (ESOPHAGOGASTRODUODENOSCOPY);  Surgeon: Kolby Wall MD;  Location: [facility];  Service: Endoscopy;  Laterality: N/A;    ESOPHAGOGASTRODUODENOSCOPY N/A 3/3/2021    Procedure: EGD (ESOPHAGOGASTRODUODENOSCOPY);  Surgeon: Ruslan Tillman MD;  Location: [facility] ENDO;  Service: Endoscopy;  Laterality: N/A;    ESOPHAGOGASTRODUODENOSCOPY N/A 7/12/2021    Procedure: EGD (ESOPHAGOGASTRODUODENOSCOPY);  Surgeon: Kolby Wall MD;  Location: [facility];  Service: Endoscopy;  Laterality: N/A;    FLEXIBLE SIGMOIDOSCOPY N/A 9/5/2018    Procedure: SIGMOIDOSCOPY, FLEXIBLE;  Surgeon: Kolby Wall MD;  Location: [facility];  Service: Endoscopy;  Laterality: N/A;    HAND SURGERY  jan 2014    power saw fell on hand - laceration 3 tendons    INCISION AND DRAINAGE OF KNEE Right 1/13/2021    Procedure: INCISION AND DRAINAGE, KNEE;  Surgeon: Sony Linton Jr., MD;  Location: [facility];  Service: Orthopedics;  Laterality: Right;    INSERTION OF TUNNELED CENTRAL VENOUS CATHETER (CVC) WITH SUBCUTANEOUS PORT Right 11/24/2021    Procedure: Right port-a-cath removal and exchange.;  Surgeon: Robson Morrison MD;  [facility];  Service: General;  Laterality: Right;  Right internal jugular    INSERTION OF VENOUS ACCESS PORT Right 8/21/2020    Procedure: INSERTION, VENOUS ACCESS PORT;  Surgeon: Troy Honeycutt MD;  Location: [facility];  Service: General;  Laterality: Right;    NISSEN FUNDOPLICATION      REVISION OF SCAR N/A 4/7/2021    Procedure: REVISION, SCAR;  Surgeon: Robson Morrison MD;  Location: [facility] OR 2ND FLR;  Service: General;  Laterality: N/A;    UMBILICAL HERNIA REPAIR N/A 4/7/2021    Procedure: REPAIR, HERNIA, UMBILICAL, AGE 5 YEARS OR OLDER;  Surgeon: Robson Morrison MD;  Location: [facility] OR 2ND FLR;  Service: General;  Laterality: N/A;     Review of patient's allergies indicates:  "  Allergen Reactions    Bean Diarrhea, Edema, Hives, Nausea And Vomiting and Swelling    Legumes Nausea And Vomiting and Swelling     Other reaction(s): GI Intolerance  vomiting  vomiting  Pt reports legumes make his lips swell and induce severe vomiting  Pt reports legumes make his lips swell and induce severe vomiting      Nsaids (non-steroidal anti-inflammatory drug)      GI bleed    Bean pod extract      Lips swelling, vomiting  Lips swelling, vomiting      Ketamine      Severe dysphoria (bad trip)    Lentils      Lips swelling, vomiting  Lips swelling, vomiting      Meloxicam Nausea Only     GI bleed    Peas      Lips swelling, vomiting    Penicillins      Has taken third gen cephalosporins without issue per patient report    Haloperidol Anxiety and Other (See Comments)     "feels like crawling out of his skin"         Current Facility-Administered Medications:     0.9%  NaCl infusion, , Intravenous, Continuous, Deniz Hollingsworth PA-C    clindamycin in D5W 900 mg/50 mL IVPB 900 mg, 900 mg, Intravenous, On Call Procedure, Deniz Hollingsworth PA-C    Family History     Problem Relation (Age of Onset)    Celiac disease Sister    Hypertension Maternal Grandmother, Maternal Grandfather    Urolithiasis Father        Tobacco Use    Smoking status: Never Smoker    Smokeless tobacco: Never Used    Tobacco comment: Pt states he has smoked 1 or 2 cigarettes in his life.   Substance and Sexual Activity    Alcohol use: Not Currently    Drug use: No    Sexual activity: Not Currently     Partners: Female       Objective:     Vitals:   Vitals:    03/31/22 0541   BP: 128/80   Pulse: 89   Resp: 17   Temp: 97.5 °F (36.4 °C)       Physical Exam:   Gen: well appearing, no acute distress  HEENT: normocephalic, EOMI  Neck: no tracheal deviation, no cervical LAD  CV: RRR, extremities are warm and well perfused  Respit: breathing non-labored  Abd: soft, non-tender, non-distended  MSK: moves all extremities " appropriately  Skin: No rash or wounds, healed port incision R chest  Neuro: alert, CN II - XII grossly intact  Psych: normal mood and affect    Significant Diagnostics:  Reviewed    Assessment/Plan:     Solo Black is a 41 y.o. male with a history of hypereosinophilic sydrome and two prior ports (L subclavian, RIJ). He requires new port placement.    - Discussed the risks, benefits, and alternatives to port placement including but not limited to bleeding, infection, damage to surrounding structures, and pneumothorax. All questions and concerns were addressed to the patient's satisfaction. Informed consent obtained and placed in the chart.      Malia Villareal MD  General Surgery, PGY-2

## 2022-03-31 NOTE — TRANSFER OF CARE
Anesthesia Transfer of Care Note    Patient: Solo Black    Procedure(s) Performed: Procedure(s) (LRB):  INSERTION, SINGLE LUMEN CATHETER WITH PORT, WITH FLUOROSCOPIC GUIDANCE Left Poss Right Chest (Left)    Patient location: [facility]    Anesthesia Type: general    Transport from OR: Transported from OR on 6-10 L/min O2 by face mask with adequate spontaneous ventilation    Post pain: adequate analgesia    Post assessment: no apparent anesthetic complications    Post vital signs: stable    Level of consciousness: alert and oriented    Nausea/Vomiting: no nausea/vomiting    Complications: none    Transfer of care protocol was followed      Last vitals:   Visit Vitals  /80 (BP Location: Left arm, Patient Position: Lying)   Pulse 89   Temp 36.4 °C (97.5 °F) (Temporal)   Resp 17   Ht 6' (1.829 m)   Wt 84.4 kg (186 lb)   SpO2 100%   BMI 25.23 kg/m²

## 2022-03-31 NOTE — PLAN OF CARE
Pt given discharge instructions at the bedside. Pt has copy of discharge instructions. Pt grandmother called for transportation. ISH Laird MD called for CXR release. Ok to release pt. Pt IV access removed; catheter intact and bleeding controlled. Pt tolerated well. Wheelchair transport awaiting pt at the bedside.

## 2022-07-12 ENCOUNTER — OFFICE VISIT (OUTPATIENT)
Dept: SURGERY | Facility: CLINIC | Age: 42
End: 2022-07-12
Payer: MEDICAID

## 2022-07-12 VITALS
DIASTOLIC BLOOD PRESSURE: 86 MMHG | HEIGHT: 72 IN | WEIGHT: 192.75 LBS | SYSTOLIC BLOOD PRESSURE: 139 MMHG | OXYGEN SATURATION: 100 % | HEART RATE: 102 BPM | BODY MASS INDEX: 26.11 KG/M2

## 2022-07-12 DIAGNOSIS — K43.2 INCISIONAL HERNIA, WITHOUT OBSTRUCTION OR GANGRENE: Primary | ICD-10-CM

## 2022-07-12 PROCEDURE — 3008F BODY MASS INDEX DOCD: CPT | Mod: CPTII,,, | Performed by: SURGERY

## 2022-07-12 PROCEDURE — 1159F PR MEDICATION LIST DOCUMENTED IN MEDICAL RECORD: ICD-10-PCS | Mod: CPTII,,, | Performed by: SURGERY

## 2022-07-12 PROCEDURE — 99213 PR OFFICE/OUTPT VISIT, EST, LEVL III, 20-29 MIN: ICD-10-PCS | Mod: S$PBB,,, | Performed by: SURGERY

## 2022-07-12 PROCEDURE — 1159F MED LIST DOCD IN RCRD: CPT | Mod: CPTII,,, | Performed by: SURGERY

## 2022-07-12 PROCEDURE — 99999 PR PBB SHADOW E&M-EST. PATIENT-LVL IV: ICD-10-PCS | Mod: PBBFAC,,, | Performed by: SURGERY

## 2022-07-12 PROCEDURE — 99999 PR PBB SHADOW E&M-EST. PATIENT-LVL IV: CPT | Mod: PBBFAC,,, | Performed by: SURGERY

## 2022-07-12 PROCEDURE — 99214 OFFICE O/P EST MOD 30 MIN: CPT | Mod: PBBFAC | Performed by: SURGERY

## 2022-07-12 PROCEDURE — 3079F PR MOST RECENT DIASTOLIC BLOOD PRESSURE 80-89 MM HG: ICD-10-PCS | Mod: CPTII,,, | Performed by: SURGERY

## 2022-07-12 PROCEDURE — 3008F PR BODY MASS INDEX (BMI) DOCUMENTED: ICD-10-PCS | Mod: CPTII,,, | Performed by: SURGERY

## 2022-07-12 PROCEDURE — 3079F DIAST BP 80-89 MM HG: CPT | Mod: CPTII,,, | Performed by: SURGERY

## 2022-07-12 PROCEDURE — 99213 OFFICE O/P EST LOW 20 MIN: CPT | Mod: S$PBB,,, | Performed by: SURGERY

## 2022-07-12 PROCEDURE — 3075F SYST BP GE 130 - 139MM HG: CPT | Mod: CPTII,,, | Performed by: SURGERY

## 2022-07-12 PROCEDURE — 3075F PR MOST RECENT SYSTOLIC BLOOD PRESS GE 130-139MM HG: ICD-10-PCS | Mod: CPTII,,, | Performed by: SURGERY

## 2022-07-12 NOTE — H&P (VIEW-ONLY)
Subjective:       Patient ID: Solo Black is a 41 y.o. male.    Chief Complaint: Follow-up    Solo Black is a 41-year-old male who presents wanting to schedule a repair for his incisional hernia. Has a prior umbilical hernia repair that is intact. Had an antrectomy for bleeding in February at  and has developed an incisional hernia from that operation. States it does not cause a lot of pain but he would like it repaired. Occasionally it protrudes and he is able to push it back in. Denies any other symptoms; no nausea, vomiting, abdominal pain, fever, chills, change in bowel habits. History of repaired umbilical hernia. 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 He is on daily prednisone 10 mg and 2 x MABs.     Follow-up  Pertinent negatives include no abdominal pain, arthralgias, change in bowel habit, chest pain, chills, fever, headaches, nausea or vomiting.     Past Medical History:   Diagnosis Date    Arthritis     C. difficile colitis feb 2014    postop of hand surgery    Cancer     Encounter for blood transfusion     Eosinophilic esophagitis 3/11/2014    GERD (gastroesophageal reflux disease)     Hypereosinophilic syndrome     IBS (irritable bowel syndrome)     Leukemia     MRSA carrier     says multiple times nose swab was +    Nephrolithiasis apr 2014    bilateral punctate - never passed a stone    Septic prepatellar bursitis of right knee 1/12/2021    Urinary tract infection feb 2014    MRSA     Past Surgical History:   Procedure Laterality Date    APPENDECTOMY      ARTHROSCOPY OF SHOULDER WITH REMOVAL OF DISTAL CLAVICLE Right 11/1/2018    Procedure: ARTHROSCOPY, SHOULDER, WITH DISTAL CLAVICLE EXCISION;  Surgeon: Gabino Mejia MD;  Location: [facility];  Service: Orthopedics;  Laterality: Right;  video    BACK SURGERY      BLADDER FULGURATION N/A 9/18/2020    Procedure: FULGURATION, BLADDER;  Surgeon: Randall POSADAS  MD Ajay;  Location: [facility];  Service: Urology;  Laterality: N/A;  blood vessels of bladder neck and prostate    BONE MARROW BIOPSY Left 10/1/2020    Procedure: Biopsy-bone marrow;  Surgeon: Trung Polanco MD;  Location: [facility];  Service: Oncology;  Laterality: Left;    CIRCUMCISION, PRIMARY      COLONOSCOPY N/A 11/14/2018    Procedure: COLONOSCOPY;  Surgeon: Milton Brunson MD;  Location: [facility];  Service: Endoscopy;  Laterality: N/A;    COLONOSCOPY N/A 7/20/2020    Procedure: COLONOSCOPY;  Surgeon: Ruslan Tillman MD;  Location: [facility];  Service: Endoscopy;  Laterality: N/A;    CYSTOSCOPY W/ RETROGRADES Bilateral 9/18/2020    Procedure: CYSTOSCOPY, WITH RETROGRADE PYELOGRAM;  Surgeon: Randall Moss MD;  Location: [facility];  Service: Urology;  Laterality: Bilateral;    CYSTOURETHROSCOPY N/A 2/1/2021    Procedure: CYSTOURETHROSCOPY, short placement;  Surgeon: Boni Benites MD;  Location: [address];  Service: Urology;  Laterality: N/A;    DILATION OF URETHRA N/A 9/18/2020    Procedure: DILATION, URETHRA;  Surgeon: Randall Moss MD;  Location: [facility];  Service: Urology;  Laterality: N/A;    drainage of abscess from hand  2009    MRSA    drainage of abscess from R thigh  2006    MRSA    ESOPHAGOGASTRODUODENOSCOPY  3/11/2014    ESOPHAGOGASTRODUODENOSCOPY N/A 9/5/2018    Procedure: EGD (ESOPHAGOGASTRODUODENOSCOPY);  Surgeon: Kolby Wall MD;  Location: [facility];  Service: Endoscopy;  Laterality: N/A;    ESOPHAGOGASTRODUODENOSCOPY N/A 3/25/2020    Procedure: EGD (ESOPHAGOGASTRODUODENOSCOPY);  Surgeon: Ruslan Tillman MD;  Location: [facility];  Service: Endoscopy;  Laterality: N/A;    ESOPHAGOGASTRODUODENOSCOPY N/A 7/20/2020    Procedure: EGD (ESOPHAGOGASTRODUODENOSCOPY);  Surgeon: Ruslan Tillman MD;  Location: [facility];  Service: Endoscopy;  Laterality: N/A;  Patient is a very hard stick, requires anesthesia stick.    ESOPHAGOGASTRODUODENOSCOPY  N/A 8/31/2020    Procedure: EGD (ESOPHAGOGASTRODUODENOSCOPY);  Surgeon: Kolby Wall MD;  Location: [facility] ENDO;  Service: Endoscopy;  Laterality: N/A;    ESOPHAGOGASTRODUODENOSCOPY N/A 3/3/2021    Procedure: EGD (ESOPHAGOGASTRODUODENOSCOPY);  Surgeon: Ruslan Tillman MD;  Location: [facility] ENDO;  Service: Endoscopy;  Laterality: N/A;    ESOPHAGOGASTRODUODENOSCOPY N/A 7/12/2021    Procedure: EGD (ESOPHAGOGASTRODUODENOSCOPY);  Surgeon: Kolby Wall MD;  Location: [facility] ENDO;  Service: Endoscopy;  Laterality: N/A;    FLEXIBLE SIGMOIDOSCOPY N/A 9/5/2018    Procedure: SIGMOIDOSCOPY, FLEXIBLE;  Surgeon: Kolby Wall MD;  Location: [facility];  Service: Endoscopy;  Laterality: N/A;    HAND SURGERY  jan 2014    power saw fell on hand - laceration 3 tendons    INCISION AND DRAINAGE OF KNEE Right 1/13/2021    Procedure: INCISION AND DRAINAGE, KNEE;  Surgeon: Sony Linton Jr., MD;  Location: [facility];  Service: Orthopedics;  Laterality: Right;    INSERTION OF TUNNELED CENTRAL VENOUS CATHETER (CVC) WITH SUBCUTANEOUS PORT Right 11/24/2021    Procedure: Right port-a-cath removal and exchange.;  Surgeon: Robson Morrison MD;  [facility];  Service: General;  Laterality: Right;  Right internal jugular    INSERTION OF TUNNELED CENTRAL VENOUS CATHETER (CVC) WITH SUBCUTANEOUS PORT Left 3/31/2022    Procedure: INSERTION, SINGLE LUMEN CATHETER WITH PORT, WITH FLUOROSCOPIC GUIDANCE Left Poss Right Chest;  Surgeon: Robson Morrison MD;  Location: [facility] OR 2ND FLR;  Service: General;  Laterality: Left;    INSERTION OF VENOUS ACCESS PORT Right 8/21/2020    Procedure: INSERTION, VENOUS ACCESS PORT;  Surgeon: Troy Honeycutt MD;  Location: [facility] OR;  Service: General;  Laterality: Right;    NISSEN FUNDOPLICATION      REVISION OF SCAR N/A 4/7/2021    Procedure: REVISION, SCAR;  Surgeon: Robson Morrison MD;  Location: [facility] OR 2ND FLR;  Service: General;  Laterality: N/A;    UMBILICAL  HERNIA REPAIR N/A 4/7/2021    Procedure: REPAIR, HERNIA, UMBILICAL, AGE 5 YEARS OR OLDER;  Surgeon: Robson Morrison MD;  Location: [facility] OR [address] [place] [address];  Service: General;  Laterality: N/A;     Family History   Problem Relation Age of Onset    Urolithiasis Father     Celiac disease Sister     Hypertension Maternal Grandmother     Hypertension Maternal Grandfather     Prostate cancer Neg Hx     Kidney disease Neg Hx      Current Outpatient Medications on File Prior to Visit   Medication Sig Dispense Refill    acetaminophen (TYLENOL) 325 MG tablet Take 2 tablets (650 mg total) by mouth every 4 (four) hours as needed. 30 tablet 3    acetaminophen (TYLENOL) 650 MG TbSR Take 1 tablet (650 mg total) by mouth every 8 (eight) hours. 40 tablet 0    albuterol (PROVENTIL/VENTOLIN HFA) 90 mcg/actuation inhaler INHALE 2 PUFFS INTO THE LUNGS EVERY 6 HOURS AS NEEDED FOR WHEEZING OR SHORTNESS OF BREATH. RESCUE. 18 g 2    artificial tears (ISOPTO TEARS) 0.5 % ophthalmic solution Place 1 drop into both eyes 6 (six) times daily.      cetirizine (ZYRTEC) 10 MG tablet Take 20 mg by mouth 2 (two) times a day.       cholecalciferol, vitamin D3, (VITAMIN D3) 25 mcg (1,000 unit) capsule Take 1 capsule (1,000 Units total) by mouth once daily. 90 capsule 1    dextroamphetamine-amphetamine (ADDERALL) 20 mg tablet Take 1 tablet by mouth 2 (two) times daily.      diphenhydrAMINE (SOMINEX) 25 mg tablet Take 25 mg by mouth nightly as needed.       docusate sodium (COLACE) 100 MG capsule Take 1 capsule (100 mg total) by mouth 2 (two) times daily. 60 capsule 3    ELIQUIS 5 mg Tab TAKE 2 TABLETS BY MOUTH TWICE DAILY FOR 6 DAYS THEN TAKE 1 TABLET BY MOUTH TWICE DAILY      EPINEPHrine (EPIPEN) 0.3 mg/0.3 mL AtIn Inject 0.3 mLs (0.3 mg total) into the muscle as needed. 2 each 1    ferrous sulfate 325 (65 FE) MG EC tablet Take 1 tablet (325 mg total) by mouth once daily. (Patient taking differently: Take 325 mg by mouth nightly.) 90  tablet 1    fluconazole (DIFLUCAN) 200 MG Tab       gabapentin (NEURONTIN) 300 MG capsule Take 1 capsule (300 mg total) by mouth 3 (three) times daily. for 7 days 21 capsule 0    heparin, porcine, PF, (HEPARIN FLUSH 100 UNITS/ML) 100 unit/mL Syrg       HYDROcodone-acetaminophen (NORCO)  mg per tablet Take 1 tablet by mouth every 4 (four) hours as needed for Pain.      hydrOXYzine HCL (ATARAX) 25 MG tablet Take 1 tablet (25 mg total) by mouth 3 (three) times daily as needed for Itching. 30 tablet 1    Lactobacillus acidophilus 10 billion cell Cap Take 1 capsule by mouth once daily.       lidocaine HCL 2% (XYLOCAINE) 2 % jelly Apply topically 3 (three) times daily as needed (Hemorrhoids). 5 mL 3    mepolizumab (NUCALA) 100 mg/mL Syrg Inject 3 mLs (300 mg total) into the skin every 28 days. 300 mL 11    metoclopramide HCl (REGLAN) 10 MG tablet Take 10 mg by mouth as needed.      nystatin (MYCOSTATIN) 100,000 unit/mL suspension Take by mouth as needed.       ondansetron (ZOFRAN-ODT) 4 MG TbDL Take 4 mg by mouth.      oxyCODONE-acetaminophen (PERCOCET) 5-325 mg per tablet Take 1 tablet by mouth every 4 (four) hours as needed for Pain.      pantoprazole (PROTONIX) 40 MG tablet Take 1 tablet (40 mg total) by mouth once daily. (Patient taking differently: Take 40 mg by mouth 2 (two) times daily.) 30 tablet 2    paroxetine (PAXIL) 20 MG tablet Take 20 mg by mouth every morning.      predniSONE (DELTASONE) 10 MG tablet Take 20 mg by mouth once daily.      promethazine (PHENERGAN) 25 MG tablet Take 25 mg by mouth every 4 (four) hours.      sucralfate (CARAFATE) 1 gram tablet Take 1 g by mouth before meals and at bedtime as needed.       tamsulosin (FLOMAX) 0.4 mg Cap Take 1 capsule (0.4 mg total) by mouth 2 (two) times daily. (Patient taking differently: Take 0.4 mg by mouth nightly.) 60 capsule 11    vitamin D3-vitamin K2 1000-90 unit-mcg TbDL Take by mouth once daily.        Current  Facility-Administered Medications on File Prior to Visit   Medication Dose Route Frequency Provider Last Rate Last Admin    mepolizumab (NUCALA) injection 300 mg  300 mg Subcutaneous Q28 Days Michelle Ornelas MD   300 mg at 06/28/21 1511       Review of Systems   Constitutional: Negative for chills and fever.   HENT: Negative for trouble swallowing and voice change.    Respiratory: Negative for shortness of breath.    Cardiovascular: Negative for chest pain.   Gastrointestinal: Negative for abdominal pain, change in bowel habit, nausea, vomiting and change in bowel habit.        Mass   Genitourinary: Negative for dysuria and hematuria.   Musculoskeletal: Negative for arthralgias and back pain.   Integumentary:  Negative for color change and wound.   Allergic/Immunologic: Positive for food allergies and frequent infections.   Neurological: Negative for headaches.         Objective:      Physical Exam  Constitutional:       General: He is not in acute distress.     Appearance: Normal appearance.   HENT:      Head: Normocephalic and atraumatic.   Eyes:      General: No scleral icterus.     Pupils: Pupils are equal, round, and reactive to light.   Cardiovascular:      Rate and Rhythm: Normal rate and regular rhythm.   Pulmonary:      Effort: Pulmonary effort is normal. No respiratory distress.   Abdominal:      Palpations: Abdomen is soft.      Hernia: A hernia (right of midline incision; easily reducible) is present.   Musculoskeletal:         General: No deformity or signs of injury.   Skin:     General: Skin is warm and dry.      Coloration: Skin is not jaundiced.   Neurological:      General: No focal deficit present.      Mental Status: He is alert and oriented to person, place, and time.         Assessment:       Problem List Items Addressed This Visit    None     Visit Diagnoses     Incisional hernia, without obstruction or gangrene    -  Primary    Relevant Orders    CT Abdomen Pelvis  Without Contrast           Plan:       Solo Townsend is a 41-year-old male with a complex medical history and 3 prior open abdominal surgeries. He is requesting a repair of his incisional hernia. I would not recommend replacing his port given multiple infections and 3 mos remaining for treatment. Will obtain CT to eval extent of hernia. If only small hernia present will proceed with repair. If larger or swiss cheese defect present, will hold off until weaned from steroids.     Robson Morrison MD  Acute Care Surgery  [facility] Department of Surgery

## 2022-07-12 NOTE — MEDICAL/APP STUDENT
Subjective:       Patient ID: Solo Black is a 41 y.o. male.    Chief Complaint: Follow-up    Solo Black is a 41-year-old male who presents wanting to schedule a repair for his incisional hernia. Has a prior umbilical hernia repair that is intact. Had an antrectomy for bleeding in February at  and has developed an incisional hernia from that operation. States it does not cause a lot of pain but he would like it repaired. Occasionally it protrudes and he is able to push it back in. Denies any other symptoms; no nausea, vomiting, abdominal pain, fever, chills, change in bowel habits. History of repaired umbilical hernia. 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 He is on daily prednisone 10 mg and 2 x MABs.     Follow-up  Pertinent negatives include no abdominal pain, arthralgias, change in bowel habit, chest pain, chills, fever, headaches, nausea or vomiting.     Past Medical History:   Diagnosis Date    Arthritis     C. difficile colitis feb 2014    postop of hand surgery    Cancer     Encounter for blood transfusion     Eosinophilic esophagitis 3/11/2014    GERD (gastroesophageal reflux disease)     Hypereosinophilic syndrome     IBS (irritable bowel syndrome)     Leukemia     MRSA carrier     says multiple times nose swab was +    Nephrolithiasis apr 2014    bilateral punctate - never passed a stone    Septic prepatellar bursitis of right knee 1/12/2021    Urinary tract infection feb 2014    MRSA     Past Surgical History:   Procedure Laterality Date    APPENDECTOMY      ARTHROSCOPY OF SHOULDER WITH REMOVAL OF DISTAL CLAVICLE Right 11/1/2018    Procedure: ARTHROSCOPY, SHOULDER, WITH DISTAL CLAVICLE EXCISION;  Surgeon: Gabino Mejia MD;  Location: [facility];  Service: Orthopedics;  Laterality: Right;  video    BACK SURGERY      BLADDER FULGURATION N/A 9/18/2020    Procedure: FULGURATION, BLADDER;  Surgeon: Randall POSADAS  MD Ajay;  Location: [facility];  Service: Urology;  Laterality: N/A;  blood vessels of bladder neck and prostate    BONE MARROW BIOPSY Left 10/1/2020    Procedure: Biopsy-bone marrow;  Surgeon: Trung Polanco MD;  Location: [facility];  Service: Oncology;  Laterality: Left;    CIRCUMCISION, PRIMARY      COLONOSCOPY N/A 11/14/2018    Procedure: COLONOSCOPY;  Surgeon: Milton Brunson MD;  Location: [facility];  Service: Endoscopy;  Laterality: N/A;    COLONOSCOPY N/A 7/20/2020    Procedure: COLONOSCOPY;  Surgeon: Ruslan Tillman MD;  Location: [facility];  Service: Endoscopy;  Laterality: N/A;    CYSTOSCOPY W/ RETROGRADES Bilateral 9/18/2020    Procedure: CYSTOSCOPY, WITH RETROGRADE PYELOGRAM;  Surgeon: Randall Moss MD;  Location: [facility];  Service: Urology;  Laterality: Bilateral;    CYSTOURETHROSCOPY N/A 2/1/2021    Procedure: CYSTOURETHROSCOPY, short placement;  Surgeon: Boni Benites MD;  Location: [address];  Service: Urology;  Laterality: N/A;    DILATION OF URETHRA N/A 9/18/2020    Procedure: DILATION, URETHRA;  Surgeon: Randall Moss MD;  Location: [facility];  Service: Urology;  Laterality: N/A;    drainage of abscess from hand  2009    MRSA    drainage of abscess from R thigh  2006    MRSA    ESOPHAGOGASTRODUODENOSCOPY  3/11/2014    ESOPHAGOGASTRODUODENOSCOPY N/A 9/5/2018    Procedure: EGD (ESOPHAGOGASTRODUODENOSCOPY);  Surgeon: Kolby Wall MD;  Location: [facility];  Service: Endoscopy;  Laterality: N/A;    ESOPHAGOGASTRODUODENOSCOPY N/A 3/25/2020    Procedure: EGD (ESOPHAGOGASTRODUODENOSCOPY);  Surgeon: Ruslan Tillman MD;  Location: [facility];  Service: Endoscopy;  Laterality: N/A;    ESOPHAGOGASTRODUODENOSCOPY N/A 7/20/2020    Procedure: EGD (ESOPHAGOGASTRODUODENOSCOPY);  Surgeon: Ruslan Tillman MD;  Location: [facility];  Service: Endoscopy;  Laterality: N/A;  Patient is a very hard stick, requires anesthesia stick.    ESOPHAGOGASTRODUODENOSCOPY  N/A 8/31/2020    Procedure: EGD (ESOPHAGOGASTRODUODENOSCOPY);  Surgeon: Kolby Wall MD;  Location: [facility];  Service: Endoscopy;  Laterality: N/A;    ESOPHAGOGASTRODUODENOSCOPY N/A 3/3/2021    Procedure: EGD (ESOPHAGOGASTRODUODENOSCOPY);  Surgeon: Ruslan Tillman MD;  Location: [facility];  Service: Endoscopy;  Laterality: N/A;    ESOPHAGOGASTRODUODENOSCOPY N/A 7/12/2021    Procedure: EGD (ESOPHAGOGASTRODUODENOSCOPY);  Surgeon: Kolby Wall MD;  Location: [facility];  Service: Endoscopy;  Laterality: N/A;    FLEXIBLE SIGMOIDOSCOPY N/A 9/5/2018    Procedure: SIGMOIDOSCOPY, FLEXIBLE;  Surgeon: Kolby Wall MD;  Location: [facility];  Service: Endoscopy;  Laterality: N/A;    HAND SURGERY  jan 2014    power saw fell on hand - laceration 3 tendons    INCISION AND DRAINAGE OF KNEE Right 1/13/2021    Procedure: INCISION AND DRAINAGE, KNEE;  Surgeon: Sony Linton Jr., MD;  Location: [facility];  Service: Orthopedics;  Laterality: Right;    INSERTION OF TUNNELED CENTRAL VENOUS CATHETER (CVC) WITH SUBCUTANEOUS PORT Right 11/24/2021    Procedure: Right port-a-cath removal and exchange.;  Surgeon: Robson Morrison MD;  [facility];  Service: General;  Laterality: Right;  Right internal jugular    INSERTION OF TUNNELED CENTRAL VENOUS CATHETER (CVC) WITH SUBCUTANEOUS PORT Left 3/31/2022    Procedure: INSERTION, SINGLE LUMEN CATHETER WITH PORT, WITH FLUOROSCOPIC GUIDANCE Left Poss Right Chest;  Surgeon: Robson Morrison MD;  Location: [facility] OR 2ND FLR;  Service: General;  Laterality: Left;    INSERTION OF VENOUS ACCESS PORT Right 8/21/2020    Procedure: INSERTION, VENOUS ACCESS PORT;  Surgeon: Troy Honeycutt MD;  Location: [facility] OR;  Service: General;  Laterality: Right;    NISSEN FUNDOPLICATION      REVISION OF SCAR N/A 4/7/2021    Procedure: REVISION, SCAR;  Surgeon: Robson Morrison MD;  Location: [facility] OR 2ND FLR;  Service: General;  Laterality: N/A;    UMBILICAL  HERNIA REPAIR N/A 4/7/2021    Procedure: REPAIR, HERNIA, UMBILICAL, AGE 5 YEARS OR OLDER;  Surgeon: Robson Morrison MD;  Location: [facility] OR [address] [place] [address];  Service: General;  Laterality: N/A;     Family History   Problem Relation Age of Onset    Urolithiasis Father     Celiac disease Sister     Hypertension Maternal Grandmother     Hypertension Maternal Grandfather     Prostate cancer Neg Hx     Kidney disease Neg Hx      Current Outpatient Medications on File Prior to Visit   Medication Sig Dispense Refill    acetaminophen (TYLENOL) 325 MG tablet Take 2 tablets (650 mg total) by mouth every 4 (four) hours as needed. 30 tablet 3    acetaminophen (TYLENOL) 650 MG TbSR Take 1 tablet (650 mg total) by mouth every 8 (eight) hours. 40 tablet 0    albuterol (PROVENTIL/VENTOLIN HFA) 90 mcg/actuation inhaler INHALE 2 PUFFS INTO THE LUNGS EVERY 6 HOURS AS NEEDED FOR WHEEZING OR SHORTNESS OF BREATH. RESCUE. 18 g 2    artificial tears (ISOPTO TEARS) 0.5 % ophthalmic solution Place 1 drop into both eyes 6 (six) times daily.      cetirizine (ZYRTEC) 10 MG tablet Take 20 mg by mouth 2 (two) times a day.       cholecalciferol, vitamin D3, (VITAMIN D3) 25 mcg (1,000 unit) capsule Take 1 capsule (1,000 Units total) by mouth once daily. 90 capsule 1    dextroamphetamine-amphetamine (ADDERALL) 20 mg tablet Take 1 tablet by mouth 2 (two) times daily.      diphenhydrAMINE (SOMINEX) 25 mg tablet Take 25 mg by mouth nightly as needed.       docusate sodium (COLACE) 100 MG capsule Take 1 capsule (100 mg total) by mouth 2 (two) times daily. 60 capsule 3    ELIQUIS 5 mg Tab TAKE 2 TABLETS BY MOUTH TWICE DAILY FOR 6 DAYS THEN TAKE 1 TABLET BY MOUTH TWICE DAILY      EPINEPHrine (EPIPEN) 0.3 mg/0.3 mL AtIn Inject 0.3 mLs (0.3 mg total) into the muscle as needed. 2 each 1    ferrous sulfate 325 (65 FE) MG EC tablet Take 1 tablet (325 mg total) by mouth once daily. (Patient taking differently: Take 325 mg by mouth nightly.) 90  tablet 1    fluconazole (DIFLUCAN) 200 MG Tab       gabapentin (NEURONTIN) 300 MG capsule Take 1 capsule (300 mg total) by mouth 3 (three) times daily. for 7 days 21 capsule 0    heparin, porcine, PF, (HEPARIN FLUSH 100 UNITS/ML) 100 unit/mL Syrg       HYDROcodone-acetaminophen (NORCO)  mg per tablet Take 1 tablet by mouth every 4 (four) hours as needed for Pain.      hydrOXYzine HCL (ATARAX) 25 MG tablet Take 1 tablet (25 mg total) by mouth 3 (three) times daily as needed for Itching. 30 tablet 1    Lactobacillus acidophilus 10 billion cell Cap Take 1 capsule by mouth once daily.       lidocaine HCL 2% (XYLOCAINE) 2 % jelly Apply topically 3 (three) times daily as needed (Hemorrhoids). 5 mL 3    mepolizumab (NUCALA) 100 mg/mL Syrg Inject 3 mLs (300 mg total) into the skin every 28 days. 300 mL 11    metoclopramide HCl (REGLAN) 10 MG tablet Take 10 mg by mouth as needed.      nystatin (MYCOSTATIN) 100,000 unit/mL suspension Take by mouth as needed.       ondansetron (ZOFRAN-ODT) 4 MG TbDL Take 4 mg by mouth.      oxyCODONE-acetaminophen (PERCOCET) 5-325 mg per tablet Take 1 tablet by mouth every 4 (four) hours as needed for Pain.      pantoprazole (PROTONIX) 40 MG tablet Take 1 tablet (40 mg total) by mouth once daily. (Patient taking differently: Take 40 mg by mouth 2 (two) times daily.) 30 tablet 2    paroxetine (PAXIL) 20 MG tablet Take 20 mg by mouth every morning.      predniSONE (DELTASONE) 10 MG tablet Take 20 mg by mouth once daily.      promethazine (PHENERGAN) 25 MG tablet Take 25 mg by mouth every 4 (four) hours.      sucralfate (CARAFATE) 1 gram tablet Take 1 g by mouth before meals and at bedtime as needed.       tamsulosin (FLOMAX) 0.4 mg Cap Take 1 capsule (0.4 mg total) by mouth 2 (two) times daily. (Patient taking differently: Take 0.4 mg by mouth nightly.) 60 capsule 11    vitamin D3-vitamin K2 1000-90 unit-mcg TbDL Take by mouth once daily.        Current  Facility-Administered Medications on File Prior to Visit   Medication Dose Route Frequency Provider Last Rate Last Admin    mepolizumab (NUCALA) injection 300 mg  300 mg Subcutaneous Q28 Days Michelle Ornelas MD   300 mg at 06/28/21 1511       Review of Systems   Constitutional: Negative for chills and fever.   HENT: Negative for trouble swallowing and voice change.    Respiratory: Negative for shortness of breath.    Cardiovascular: Negative for chest pain.   Gastrointestinal: Negative for abdominal pain, change in bowel habit, nausea, vomiting and change in bowel habit.        Mass   Genitourinary: Negative for dysuria and hematuria.   Musculoskeletal: Negative for arthralgias and back pain.   Integumentary:  Negative for color change and wound.   Allergic/Immunologic: Positive for food allergies and frequent infections.   Neurological: Negative for headaches.         Objective:      Physical Exam  Constitutional:       General: He is not in acute distress.     Appearance: Normal appearance.   HENT:      Head: Normocephalic and atraumatic.   Eyes:      General: No scleral icterus.     Pupils: Pupils are equal, round, and reactive to light.   Cardiovascular:      Rate and Rhythm: Normal rate and regular rhythm.   Pulmonary:      Effort: Pulmonary effort is normal. No respiratory distress.   Abdominal:      Palpations: Abdomen is soft.      Hernia: A hernia (right of midline incision; easily reducible) is present.   Musculoskeletal:         General: No deformity or signs of injury.   Skin:     General: Skin is warm and dry.      Coloration: Skin is not jaundiced.   Neurological:      General: No focal deficit present.      Mental Status: He is alert and oriented to person, place, and time.         Assessment:       Problem List Items Addressed This Visit    None     Visit Diagnoses     Incisional hernia, without obstruction or gangrene    -  Primary    Relevant Orders    CT Abdomen Pelvis  Without Contrast           Plan:       Solo Townsend is a 41-year-old male with a complex medical history and 3 prior open abdominal surgeries. He is requesting a repair of his incisional hernia. I would not recommend replacing his port given multiple infections and 3 mos remaining for treatment. Will obtain CT to eval extent of hernia. If only small hernia present will proceed with repair. If larger or swiss cheese defect present, will hold off until weaned from steroids.     Robson Morrison MD  Acute Care Surgery  [facility]

## 2022-07-12 NOTE — PROGRESS NOTES
Subjective:       Patient ID: Solo Black is a 41 y.o. male.    Chief Complaint: Follow-up    Solo Black is a 41-year-old male who presents wanting to schedule a repair for his incisional hernia. Has a prior umbilical hernia repair that is intact. Had an antrectomy for bleeding in February at  and has developed an incisional hernia from that operation. States it does not cause a lot of pain but he would like it repaired. Occasionally it protrudes and he is able to push it back in. Denies any other symptoms; no nausea, vomiting, abdominal pain, fever, chills, change in bowel habits. History of repaired umbilical hernia. 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 He is on daily prednisone 10 mg and 2 x MABs.     Follow-up  Pertinent negatives include no abdominal pain, arthralgias, change in bowel habit, chest pain, chills, fever, headaches, nausea or vomiting.     Past Medical History:   Diagnosis Date    Arthritis     C. difficile colitis feb 2014    postop of hand surgery    Cancer     Encounter for blood transfusion     Eosinophilic esophagitis 3/11/2014    GERD (gastroesophageal reflux disease)     Hypereosinophilic syndrome     IBS (irritable bowel syndrome)     Leukemia     MRSA carrier     says multiple times nose swab was +    Nephrolithiasis apr 2014    bilateral punctate - never passed a stone    Septic prepatellar bursitis of right knee 1/12/2021    Urinary tract infection feb 2014    MRSA     Past Surgical History:   Procedure Laterality Date    APPENDECTOMY      ARTHROSCOPY OF SHOULDER WITH REMOVAL OF DISTAL CLAVICLE Right 11/1/2018    Procedure: ARTHROSCOPY, SHOULDER, WITH DISTAL CLAVICLE EXCISION;  Surgeon: Gabino Mejia MD;  Location: [facility];  Service: Orthopedics;  Laterality: Right;  video    BACK SURGERY      BLADDER FULGURATION N/A 9/18/2020    Procedure: FULGURATION, BLADDER;  Surgeon: Randall POSADAS  MD Ajay;  Location: [facility];  Service: Urology;  Laterality: N/A;  blood vessels of bladder neck and prostate    BONE MARROW BIOPSY Left 10/1/2020    Procedure: Biopsy-bone marrow;  Surgeon: Trung Polanco MD;  Location: [facility];  Service: Oncology;  Laterality: Left;    CIRCUMCISION, PRIMARY      COLONOSCOPY N/A 11/14/2018    Procedure: COLONOSCOPY;  Surgeon: Milton Brunson MD;  Location: [facility];  Service: Endoscopy;  Laterality: N/A;    COLONOSCOPY N/A 7/20/2020    Procedure: COLONOSCOPY;  Surgeon: Ruslan Tillman MD;  Location: [facility];  Service: Endoscopy;  Laterality: N/A;    CYSTOSCOPY W/ RETROGRADES Bilateral 9/18/2020    Procedure: CYSTOSCOPY, WITH RETROGRADE PYELOGRAM;  Surgeon: Randall Moss MD;  Location: [facility];  Service: Urology;  Laterality: Bilateral;    CYSTOURETHROSCOPY N/A 2/1/2021    Procedure: CYSTOURETHROSCOPY, short placement;  Surgeon: Boni Benites MD;  Location: [address];  Service: Urology;  Laterality: N/A;    DILATION OF URETHRA N/A 9/18/2020    Procedure: DILATION, URETHRA;  Surgeon: Randall Moss MD;  Location: [facility];  Service: Urology;  Laterality: N/A;    drainage of abscess from hand  2009    MRSA    drainage of abscess from R thigh  2006    MRSA    ESOPHAGOGASTRODUODENOSCOPY  3/11/2014    ESOPHAGOGASTRODUODENOSCOPY N/A 9/5/2018    Procedure: EGD (ESOPHAGOGASTRODUODENOSCOPY);  Surgeon: Kolby Wall MD;  Location: [facility];  Service: Endoscopy;  Laterality: N/A;    ESOPHAGOGASTRODUODENOSCOPY N/A 3/25/2020    Procedure: EGD (ESOPHAGOGASTRODUODENOSCOPY);  Surgeon: Ruslan Tillman MD;  Location: [facility];  Service: Endoscopy;  Laterality: N/A;    ESOPHAGOGASTRODUODENOSCOPY N/A 7/20/2020    Procedure: EGD (ESOPHAGOGASTRODUODENOSCOPY);  Surgeon: Ruslan Tillman MD;  Location: [facility];  Service: Endoscopy;  Laterality: N/A;  Patient is a very hard stick, requires anesthesia stick.    ESOPHAGOGASTRODUODENOSCOPY  N/A 8/31/2020    Procedure: EGD (ESOPHAGOGASTRODUODENOSCOPY);  Surgeon: Kolby Wall MD;  Location: [facility];  Service: Endoscopy;  Laterality: N/A;    ESOPHAGOGASTRODUODENOSCOPY N/A 3/3/2021    Procedure: EGD (ESOPHAGOGASTRODUODENOSCOPY);  Surgeon: Ruslan Tillman MD;  Location: [facility] END[facility];  Service: Endoscopy;  Laterality: N/A;    ESOPHAGOGASTRODUODENOSCOPY N/A 7/12/2021    Procedure: EGD (ESOPHAGOGASTRODUODENOSCOPY);  Surgeon: Kolby Wall MD;  Location: [facility] END[facility];  Service: Endoscopy;  Laterality: N/A;    FLEXIBLE SIGMOIDOSCOPY N/A 9/5/2018    Procedure: SIGMOIDOSCOPY, FLEXIBLE;  Surgeon: Kolby Wall MD;  Location: [facility] END[facility];  Service: Endoscopy;  Laterality: N/A;    HAND SURGERY  jan 2014    power saw fell on hand - laceration 3 tendons    INCISION AND DRAINAGE OF KNEE Right 1/13/2021    Procedure: INCISION AND DRAINAGE, KNEE;  Surgeon: Sony Linton Jr., MD;  Location: [facility];  Service: Orthopedics;  Laterality: Right;    INSERTION OF TUNNELED CENTRAL VENOUS CATHETER (CVC) WITH SUBCUTANEOUS PORT Right 11/24/2021    Procedure: Right port-a-cath removal and exchange.;  Surgeon: Robson Morrison MD;  [facility];  Service: General;  Laterality: Right;  Right internal jugular    INSERTION OF TUNNELED CENTRAL VENOUS CATHETER (CVC) WITH SUBCUTANEOUS PORT Left 3/31/2022    Procedure: INSERTION, SINGLE LUMEN CATHETER WITH PORT, WITH FLUOROSCOPIC GUIDANCE Left Poss Right Chest;  Surgeon: Robson Morrison MD;  Location: [facility] OR 2ND FLR;  Service: General;  Laterality: Left;    INSERTION OF VENOUS ACCESS PORT Right 8/21/2020    Procedure: INSERTION, VENOUS ACCESS PORT;  Surgeon: Troy Honeycutt MD;  Location: [facility] OR;  Service: General;  Laterality: Right;    NISSEN FUNDOPLICATION      REVISION OF SCAR N/A 4/7/2021    Procedure: REVISION, SCAR;  Surgeon: Robson Morrison MD;  Location: [facility] OR 2ND FLR;  Service: General;  Laterality: N/A;    UMBILICAL  HERNIA REPAIR N/A 4/7/2021    Procedure: REPAIR, HERNIA, UMBILICAL, AGE 5 YEARS OR OLDER;  Surgeon: Robson Morrison MD;  Location: [facility] OR [address] [place] [address];  Service: General;  Laterality: N/A;     Family History   Problem Relation Age of Onset    Urolithiasis Father     Celiac disease Sister     Hypertension Maternal Grandmother     Hypertension Maternal Grandfather     Prostate cancer Neg Hx     Kidney disease Neg Hx      Current Outpatient Medications on File Prior to Visit   Medication Sig Dispense Refill    acetaminophen (TYLENOL) 325 MG tablet Take 2 tablets (650 mg total) by mouth every 4 (four) hours as needed. 30 tablet 3    acetaminophen (TYLENOL) 650 MG TbSR Take 1 tablet (650 mg total) by mouth every 8 (eight) hours. 40 tablet 0    albuterol (PROVENTIL/VENTOLIN HFA) 90 mcg/actuation inhaler INHALE 2 PUFFS INTO THE LUNGS EVERY 6 HOURS AS NEEDED FOR WHEEZING OR SHORTNESS OF BREATH. RESCUE. 18 g 2    artificial tears (ISOPTO TEARS) 0.5 % ophthalmic solution Place 1 drop into both eyes 6 (six) times daily.      cetirizine (ZYRTEC) 10 MG tablet Take 20 mg by mouth 2 (two) times a day.       cholecalciferol, vitamin D3, (VITAMIN D3) 25 mcg (1,000 unit) capsule Take 1 capsule (1,000 Units total) by mouth once daily. 90 capsule 1    dextroamphetamine-amphetamine (ADDERALL) 20 mg tablet Take 1 tablet by mouth 2 (two) times daily.      diphenhydrAMINE (SOMINEX) 25 mg tablet Take 25 mg by mouth nightly as needed.       docusate sodium (COLACE) 100 MG capsule Take 1 capsule (100 mg total) by mouth 2 (two) times daily. 60 capsule 3    ELIQUIS 5 mg Tab TAKE 2 TABLETS BY MOUTH TWICE DAILY FOR 6 DAYS THEN TAKE 1 TABLET BY MOUTH TWICE DAILY      EPINEPHrine (EPIPEN) 0.3 mg/0.3 mL AtIn Inject 0.3 mLs (0.3 mg total) into the muscle as needed. 2 each 1    ferrous sulfate 325 (65 FE) MG EC tablet Take 1 tablet (325 mg total) by mouth once daily. (Patient taking differently: Take 325 mg by mouth nightly.) 90  tablet 1    fluconazole (DIFLUCAN) 200 MG Tab       gabapentin (NEURONTIN) 300 MG capsule Take 1 capsule (300 mg total) by mouth 3 (three) times daily. for 7 days 21 capsule 0    heparin, porcine, PF, (HEPARIN FLUSH 100 UNITS/ML) 100 unit/mL Syrg       HYDROcodone-acetaminophen (NORCO)  mg per tablet Take 1 tablet by mouth every 4 (four) hours as needed for Pain.      hydrOXYzine HCL (ATARAX) 25 MG tablet Take 1 tablet (25 mg total) by mouth 3 (three) times daily as needed for Itching. 30 tablet 1    Lactobacillus acidophilus 10 billion cell Cap Take 1 capsule by mouth once daily.       lidocaine HCL 2% (XYLOCAINE) 2 % jelly Apply topically 3 (three) times daily as needed (Hemorrhoids). 5 mL 3    mepolizumab (NUCALA) 100 mg/mL Syrg Inject 3 mLs (300 mg total) into the skin every 28 days. 300 mL 11    metoclopramide HCl (REGLAN) 10 MG tablet Take 10 mg by mouth as needed.      nystatin (MYCOSTATIN) 100,000 unit/mL suspension Take by mouth as needed.       ondansetron (ZOFRAN-ODT) 4 MG TbDL Take 4 mg by mouth.      oxyCODONE-acetaminophen (PERCOCET) 5-325 mg per tablet Take 1 tablet by mouth every 4 (four) hours as needed for Pain.      pantoprazole (PROTONIX) 40 MG tablet Take 1 tablet (40 mg total) by mouth once daily. (Patient taking differently: Take 40 mg by mouth 2 (two) times daily.) 30 tablet 2    paroxetine (PAXIL) 20 MG tablet Take 20 mg by mouth every morning.      predniSONE (DELTASONE) 10 MG tablet Take 20 mg by mouth once daily.      promethazine (PHENERGAN) 25 MG tablet Take 25 mg by mouth every 4 (four) hours.      sucralfate (CARAFATE) 1 gram tablet Take 1 g by mouth before meals and at bedtime as needed.       tamsulosin (FLOMAX) 0.4 mg Cap Take 1 capsule (0.4 mg total) by mouth 2 (two) times daily. (Patient taking differently: Take 0.4 mg by mouth nightly.) 60 capsule 11    vitamin D3-vitamin K2 1000-90 unit-mcg TbDL Take by mouth once daily.        Current  Facility-Administered Medications on File Prior to Visit   Medication Dose Route Frequency Provider Last Rate Last Admin    mepolizumab (NUCALA) injection 300 mg  300 mg Subcutaneous Q28 Days Michelle Ornelas MD   300 mg at 06/28/21 1511       Review of Systems   Constitutional: Negative for chills and fever.   HENT: Negative for trouble swallowing and voice change.    Respiratory: Negative for shortness of breath.    Cardiovascular: Negative for chest pain.   Gastrointestinal: Negative for abdominal pain, change in bowel habit, nausea, vomiting and change in bowel habit.        Mass   Genitourinary: Negative for dysuria and hematuria.   Musculoskeletal: Negative for arthralgias and back pain.   Integumentary:  Negative for color change and wound.   Allergic/Immunologic: Positive for food allergies and frequent infections.   Neurological: Negative for headaches.         Objective:      Physical Exam  Constitutional:       General: He is not in acute distress.     Appearance: Normal appearance.   HENT:      Head: Normocephalic and atraumatic.   Eyes:      General: No scleral icterus.     Pupils: Pupils are equal, round, and reactive to light.   Cardiovascular:      Rate and Rhythm: Normal rate and regular rhythm.   Pulmonary:      Effort: Pulmonary effort is normal. No respiratory distress.   Abdominal:      Palpations: Abdomen is soft.      Hernia: A hernia (right of midline incision; easily reducible) is present.   Musculoskeletal:         General: No deformity or signs of injury.   Skin:     General: Skin is warm and dry.      Coloration: Skin is not jaundiced.   Neurological:      General: No focal deficit present.      Mental Status: He is alert and oriented to person, place, and time.         Assessment:       Problem List Items Addressed This Visit    None     Visit Diagnoses     Incisional hernia, without obstruction or gangrene    -  Primary    Relevant Orders    CT Abdomen Pelvis  Without Contrast           Plan:       Solo Townsend is a 41-year-old male with a complex medical history and 3 prior open abdominal surgeries. He is requesting a repair of his incisional hernia. I would not recommend replacing his port given multiple infections and 3 mos remaining for treatment. Will obtain CT to eval extent of hernia. If only small hernia present will proceed with repair. If larger or swiss cheese defect present, will hold off until weaned from steroids.     Robson Morrison MD  Acute Care Surgery  [facility] [place]

## 2022-07-12 NOTE — MEDICAL/APP STUDENT
Subjective:       Patient ID: Solo Black is a 41 y.o. male.    Chief Complaint: Follow-up    Solo Black is a 41-year-old male who presents wanting to schedule a repair for his incisional hernia. States it does not cause a lot of pain but he would like it repaired. Occasionally it protrudes and he is able to push it back in. Denies any other symptoms; no nausea, vomiting, abdominal pain, fever, chills, change in bowel habits. History of repaired umbilical hernia. 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 He is requesting another permacath be placed because his most recent one got infected. History of same x 2. Last infusion treatment was one month ago and he is on daily prednisone 10 mg and 2 x MABs.     Follow-up  Pertinent negatives include no abdominal pain, change in bowel habit, chest pain, chills, fever, headaches, nausea or vomiting.     Review of Systems   Constitutional: Negative for chills and fever.   Respiratory: Negative for shortness of breath.    Cardiovascular: Negative for chest pain.   Gastrointestinal: Negative for abdominal pain, change in bowel habit, nausea, vomiting and change in bowel habit.        Mass   Neurological: Negative for headaches.         Objective:      Physical Exam  Constitutional:       Appearance: Normal appearance.   Cardiovascular:      Rate and Rhythm: Normal rate.   Pulmonary:      Effort: Pulmonary effort is normal.   Abdominal:      Palpations: Abdomen is soft.      Hernia: A hernia (right of midline incision; easily reducible) is present.   Musculoskeletal:         General: Normal range of motion.   Neurological:      General: No focal deficit present.      Mental Status: He is alert.         Assessment:       Problem List Items Addressed This Visit    None         Plan:       Solo Townsend is a 41-year-old male with a complex medical history and 3 prior open abdominal surgeries. He is  requesting a repair of his incisional hernia and placement of PermaCath for his infusion treatment. We discussed the risks and benefits of surgery. Due to location of hernia and complex surgical history, will order CT abdomen to further evaluate.     - Call to schedule after CT scan  - Return as needed    Ayah Colindres, MS-3 [facility]

## 2022-07-13 ENCOUNTER — PATIENT MESSAGE (OUTPATIENT)
Dept: SURGERY | Facility: CLINIC | Age: 42
End: 2022-07-13
Payer: MEDICAID

## 2022-07-21 DIAGNOSIS — K43.2 INCISIONAL HERNIA, WITHOUT OBSTRUCTION OR GANGRENE: Primary | ICD-10-CM

## 2022-08-02 ENCOUNTER — PATIENT MESSAGE (OUTPATIENT)
Dept: SURGERY | Facility: HOSPITAL | Age: 42
End: 2022-08-02
Payer: MEDICAID

## 2022-08-04 ENCOUNTER — TELEPHONE (OUTPATIENT)
Dept: SURGERY | Facility: CLINIC | Age: 42
End: 2022-08-04
Payer: MEDICAID

## 2022-08-04 ENCOUNTER — ANESTHESIA EVENT (OUTPATIENT)
Dept: SURGERY | Facility: HOSPITAL | Age: 42
End: 2022-08-04
Payer: MEDICAID

## 2022-08-04 NOTE — ANESTHESIA PREPROCEDURE EVALUATION
[facility]  Anesthesia Pre-Operative Evaluation        Patient Name: Solo Black  YOB: 1980  MRN: [id number]    SUBJECTIVE:     Pre-operative Evaluation for Procedure(s) (LRB):  REPAIR, HERNIA, INCISIONAL OR VENTRAL, WITHOUT HISTORY OF PRIOR REPAIR Open With Mesh (N/A)     08/04/2022    Solo Black is a 41 y.o. male with a PMHx significant for GERD, anemia, hypereosinophilic syndrome, and inguinal hernia.     He now presents for the above procedure(s).    Previous Airway (4/7/2022):       Mask Ventilation:  Easy with oral airway    Attempts:  1    Attempted By:  Student    Blade:  Knight 3    Laryngeal View Grade: Grade IIA - cords partially seen      Difficult Airway Encountered?: No      Complications:  None    Airway Device:  Oral endotracheal tube    Airway Device Size:  7.5      Patient Active Problem List   Diagnosis    Eosinophilic esophagitis    PUD (peptic ulcer disease)    Erosive gastritis    Lifetime need for proton pump inhibitor    Cervicalgia    Adult ADHD    Adverse reaction to nonsteroidal anti-inflammatory drug (NSAID)    Therapeutic opioid induced constipation    Iron deficiency anemia    Eosinophilia    Reactive arthritis    Incomplete rotator cuff tear or rupture of right shoulder, not specified as traumatic    Hiatal hernia    Hypereosinophilic syndrome    Encounter for insertion of venous access port    Port-A-Cath in place    Dry eyes    Skin rash    Chronic neck pain    Myofascial pain syndrome    Status post laminectomy - Thoracic & cervical    Sedentary lifestyle    Adjustment disorder with anxious mood    Internal hemorrhoids    Difficulty voiding    Hematuria    Anxiety    Urticarial rash    Effusion of right knee    Upper GI bleed    Substance or medication-induced anxiety disorder    Hypomagnesemia    Hematemesis    Umbilical hernia    Continuous severe abdominal pain    Intractable nausea and vomiting  "   Gastric erosions    Hypokalemia    Diarrhea    Lower GI bleed    Psychiatric illness    Abdominal pain    Duodenal ulcer hemorrhagic       Review of patient's allergies indicates:   Allergen Reactions    Bean Diarrhea, Edema, Hives, Nausea And Vomiting and Swelling    Legumes Nausea And Vomiting and Swelling     Other reaction(s): GI Intolerance  vomiting  vomiting  Pt reports legumes make his lips swell and induce severe vomiting  Pt reports legumes make his lips swell and induce severe vomiting      Nsaids (non-steroidal anti-inflammatory drug)      GI bleed    Bean pod extract      Lips swelling, vomiting  Lips swelling, vomiting      Ketamine      Severe dysphoria (bad trip)    Lentils      Lips swelling, vomiting  Lips swelling, vomiting      Meloxicam Nausea Only     GI bleed    Peas      Lips swelling, vomiting    Penicillins      Has taken third gen cephalosporins without issue per patient report    Haloperidol Anxiety and Other (See Comments)     "feels like crawling out of his skin"         Current Outpatient Medications   Medication Instructions    acetaminophen (TYLENOL) 650 mg, Oral, Every 8 hours    albuterol (PROVENTIL/VENTOLIN HFA) 90 mcg/actuation inhaler INHALE 2 PUFFS INTO THE LUNGS EVERY 6 HOURS AS NEEDED FOR WHEEZING OR SHORTNESS OF BREATH. RESCUE.    artificial tears (ISOPTO TEARS) 0.5 % ophthalmic solution 1 drop, Both Eyes, 6 times daily    cetirizine (ZYRTEC) 20 mg, Oral, 2 times daily    cholecalciferol (vitamin D3) (VITAMIN D3) 1,000 Units, Oral, Daily    dextroamphetamine-amphetamine (ADDERALL) 20 mg tablet 1 tablet, Oral, 2 times daily    diphenhydrAMINE (SOMINEX) 25 mg, Oral, Nightly PRN    docusate sodium (COLACE) 100 mg, Oral, 2 times daily    ELIQUIS 5 mg Tab TAKE 2 TABLETS BY MOUTH TWICE DAILY FOR 6 DAYS THEN TAKE 1 TABLET BY MOUTH TWICE DAILY    EPINEPHrine (EPIPEN) 0.3 mg, Intramuscular, As needed (PRN)    ferrous sulfate 325 mg, Oral, Daily    " fluconazole (DIFLUCAN) 200 MG Tab No dose, route, or frequency recorded.    gabapentin (NEURONTIN) 300 mg, Oral, 3 times daily    heparin, porcine, PF, (HEPARIN FLUSH 100 UNITS/ML) 100 unit/mL Syrg No dose, route, or frequency recorded.    HYDROcodone-acetaminophen (NORCO)  mg per tablet 1 tablet, Oral, Every 4 hours PRN    hydrOXYzine HCL (ATARAX) 25 mg, Oral, 3 times daily PRN    Lactobacillus acidophilus 10 billion cell Cap 1 capsule, Oral, Daily    lidocaine HCL 2% (XYLOCAINE) 2 % jelly Topical (Top), 3 times daily PRN    metoclopramide HCl (REGLAN) 10 mg, Oral, As needed (PRN)    NUCALA 300 mg, Subcutaneous, Every 28 days    nystatin (MYCOSTATIN) 100,000 unit/mL suspension Oral, As needed (PRN)    ondansetron (ZOFRAN-ODT) 4 mg, Oral    oxyCODONE-acetaminophen (PERCOCET) 5-325 mg per tablet 1 tablet, Oral, Every 4 hours PRN    pantoprazole (PROTONIX) 40 mg, Oral, Daily    paroxetine (PAXIL) 20 mg, Oral, Nightly    predniSONE (DELTASONE) 5 mg, Oral, Daily    promethazine (PHENERGAN) 25 mg, Oral, Every 4 hours    sucralfate (CARAFATE) 1 g, Oral, Before meals & nightly PRN    tamsulosin (FLOMAX) 0.4 mg, Oral, 2 times daily    vitamin D3-vitamin K2 1000-90 unit-mcg TbDL Oral, Daily       Past Surgical History:   Procedure Laterality Date    APPENDECTOMY      ARTHROSCOPY OF SHOULDER WITH REMOVAL OF DISTAL CLAVICLE Right 11/1/2018    Procedure: ARTHROSCOPY, SHOULDER, WITH DISTAL CLAVICLE EXCISION;  Surgeon: Gabino Mejia MD;  Location: [facility] OR;  Service: Orthopedics;  Laterality: Right;  video    BACK SURGERY      BLADDER FULGURATION N/A 9/18/2020    Procedure: FULGURATION, BLADDER;  Surgeon: Randall Moss MD;  Location: [facility] OR;  Service: Urology;  Laterality: N/A;  blood vessels of bladder neck and prostate    BONE MARROW BIOPSY Left 10/1/2020    Procedure: Biopsy-bone marrow;  Surgeon: Trung Polanco MD;  Location: [facility] OR;  Service: Oncology;  Laterality: Left;     CIRCUMCISION, PRIMARY      COLONOSCOPY N/A 11/14/2018    Procedure: COLONOSCOPY;  Surgeon: Milton Brunson MD;  Location: [facility];  Service: Endoscopy;  Laterality: N/A;    COLONOSCOPY N/A 7/20/2020    Procedure: COLONOSCOPY;  Surgeon: Ruslan Tillman MD;  Location: [facility];  Service: Endoscopy;  Laterality: N/A;    CYSTOSCOPY W/ RETROGRADES Bilateral 9/18/2020    Procedure: CYSTOSCOPY, WITH RETROGRADE PYELOGRAM;  Surgeon: Randall Moss MD;  Location: [facility];  Service: Urology;  Laterality: Bilateral;    CYSTOURETHROSCOPY N/A 2/1/2021    Procedure: CYSTOURETHROSCOPY, short placement;  Surgeon: Boni Benites MD;  Location: [address];  Service: Urology;  Laterality: N/A;    DILATION OF URETHRA N/A 9/18/2020    Procedure: DILATION, URETHRA;  Surgeon: Randall Moss MD;  Location: [facility];  Service: Urology;  Laterality: N/A;    drainage of abscess from hand  2009    MRSA    drainage of abscess from R thigh  2006    MRSA    ESOPHAGOGASTRODUODENOSCOPY  3/11/2014    ESOPHAGOGASTRODUODENOSCOPY N/A 9/5/2018    Procedure: EGD (ESOPHAGOGASTRODUODENOSCOPY);  Surgeon: Kolby Wall MD;  Location: [facility];  Service: Endoscopy;  Laterality: N/A;    ESOPHAGOGASTRODUODENOSCOPY N/A 3/25/2020    Procedure: EGD (ESOPHAGOGASTRODUODENOSCOPY);  Surgeon: Ruslan Tillman MD;  Location: [facility];  Service: Endoscopy;  Laterality: N/A;    ESOPHAGOGASTRODUODENOSCOPY N/A 7/20/2020    Procedure: EGD (ESOPHAGOGASTRODUODENOSCOPY);  Surgeon: Ruslan Tillman MD;  Location: [facility];  Service: Endoscopy;  Laterality: N/A;  Patient is a very hard stick, requires anesthesia stick.    ESOPHAGOGASTRODUODENOSCOPY N/A 8/31/2020    Procedure: EGD (ESOPHAGOGASTRODUODENOSCOPY);  Surgeon: Kolby Wall MD;  Location: [facility];  Service: Endoscopy;  Laterality: N/A;    ESOPHAGOGASTRODUODENOSCOPY N/A 3/3/2021    Procedure: EGD (ESOPHAGOGASTRODUODENOSCOPY);  Surgeon: Ruslan ALEGRIA  MD Primo;  Location: [facility] ENDO;  Service: Endoscopy;  Laterality: N/A;    ESOPHAGOGASTRODUODENOSCOPY N/A 7/12/2021    Procedure: EGD (ESOPHAGOGASTRODUODENOSCOPY);  Surgeon: Kolby Wall MD;  Location: [facility] ENDO;  Service: Endoscopy;  Laterality: N/A;    FLEXIBLE SIGMOIDOSCOPY N/A 9/5/2018    Procedure: SIGMOIDOSCOPY, FLEXIBLE;  Surgeon: Kolby Wall MD;  Location: [facility] ENDO;  Service: Endoscopy;  Laterality: N/A;    HAND SURGERY  jan 2014    power saw fell on hand - laceration 3 tendons    INCISION AND DRAINAGE OF KNEE Right 1/13/2021    Procedure: INCISION AND DRAINAGE, KNEE;  Surgeon: Sony Linton Jr., MD;  Location: [facility] OR;  Service: Orthopedics;  Laterality: Right;    INSERTION OF TUNNELED CENTRAL VENOUS CATHETER (CVC) WITH SUBCUTANEOUS PORT Right 11/24/2021    Procedure: Right port-a-cath removal and exchange.;  Surgeon: Robson Morrison MD;  Location: [facility] OR 2ND FLR;  Service: General;  Laterality: Right;  Right internal jugular    INSERTION OF TUNNELED CENTRAL VENOUS CATHETER (CVC) WITH SUBCUTANEOUS PORT Left 3/31/2022    Procedure: INSERTION, SINGLE LUMEN CATHETER WITH PORT, WITH FLUOROSCOPIC GUIDANCE Left Poss Right Chest;  Surgeon: Robson Morrison MD;  Location: [facility] OR 2ND FLR;  Service: General;  Laterality: Left;    INSERTION OF VENOUS ACCESS PORT Right 8/21/2020    Procedure: INSERTION, VENOUS ACCESS PORT;  Surgeon: Troy Honeycutt MD;  Location: [facility] OR;  Service: General;  Laterality: Right;    NISSEN FUNDOPLICATION      REVISION OF SCAR N/A 4/7/2021    Procedure: REVISION, SCAR;  Surgeon: Robson Morrison MD;  Location: [facility] OR 2ND FLR;  Service: General;  Laterality: N/A;    UMBILICAL HERNIA REPAIR N/A 4/7/2021    Procedure: REPAIR, HERNIA, UMBILICAL, AGE 5 YEARS OR OLDER;  Surgeon: Robson Morrison MD;  Location: [facility] OR 2ND FLR;  Service: General;  Laterality: N/A;       Social History     Substance and Sexual Activity   Drug Use No     Alcohol Use: Not  on file     Tobacco Use: Low Risk     Smoking Tobacco Use: Never Smoker    Smokeless Tobacco Use: Never Used       OBJECTIVE:     Vital Signs Range (Last 24H):         Significant Labs    Heme Profile  Lab Results   Component Value Date    WBC 6.48 11/25/2021    HGB 9.4 (L) 11/25/2021    HCT 33 (L) 11/25/2021     11/25/2021       Coagulation Studies  Lab Results   Component Value Date    LABPROT 11.6 08/04/2021    INR 1.1 08/04/2021    APTT 24.9 03/31/2021       BMP  Lab Results   Component Value Date     11/25/2021    K 3.2 (L) 11/25/2021     11/25/2021    CO2 18 (L) 11/25/2021    BUN 7 11/25/2021    CREATININE 0.9 11/25/2021    MG 2.1 07/29/2021    PHOS 2.8 07/30/2021       Liver Function Tests  Lab Results   Component Value Date    AST 32 11/25/2021    ALT 52 (H) 11/25/2021    ALKPHOS 78 11/25/2021    BILITOT 1.1 (H) 11/25/2021    PROT 6.9 11/25/2021    ALBUMIN 3.9 11/25/2021       Lipid Profile  Lab Results   Component Value Date    CHOL 171 03/02/2021    HDL 45 03/02/2021    TRIG 61 03/02/2021       Endocrine Profile  Lab Results   Component Value Date    HGBA1C 5.3 03/02/2021    TSH 0.927 01/31/2021     Cardiac Studies    EKG:   Results for orders placed or performed during the hospital encounter of 07/28/21   EKG 12-lead    Collection Time: 08/04/21  7:07 AM    Narrative    Test Reason : R00.0,    Vent. Rate : 120 BPM     Atrial Rate : 120 BPM     P-R Int : 126 ms          QRS Dur : 092 ms      QT Int : 324 ms       P-R-T Axes : 087 -19 080 degrees     QTc Int : 457 ms    Sinus tachycardia  Nonspecific T wave abnormality  Abnormal ECG  When compared with ECG of 29-JUL-2021 17:00,  Vent. rate has increased BY  61 BPM  Nonspecific ST and/or T wave abnormalities Now present  Confirmed by Duane Pelaez MD ([id number]) on 8/4/2021 12:24:17 PM    Referred By: [email or web address]   SELF           Confirmed By:Duane Pelaez MD       TTE (1/31/2021):  Results for orders placed during the hospital encounter of  01/31/21  Echo Color Flow Doppler? Yes; Bubble Contrast? No  Interpretation Summary  · The left ventricle is normal in size with normal systolic function. The estimated ejection fraction is 60%  · Normal left ventricular diastolic function.  · Normal right ventricular size with normal right ventricular systolic function.  · Mild left atrial enlargement.  · Mild tricuspid regurgitation.  · The estimated PA systolic pressure is 20 mmHg.  · Normal central venous pressure (3 mmHg).    ASSESSMENT/PLAN:      08/04/2022  Solo Black is a 41 y.o., male.      Pre-op Assessment    I have reviewed the Patient Summary Reports.     I have reviewed the Nursing Notes. I have reviewed the NPO Status.   I have reviewed the Medications.     Review of Systems  Anesthesia Hx:  No problems with previous Anesthesia   Denies Personal Hx of Anesthesia complications.   Social:  Non-Smoker    Hematology/Oncology:     Oncology Normal    -- Anemia:   EENT/Dental:EENT/Dental Normal   Cardiovascular:  Cardiovascular Normal   Functional Capacity good / => 4 METS    Pulmonary:  Pulmonary Normal    Hepatic/GI:   PUD, Hiatal Hernia, GERD    Endocrine:  Endocrine Normal    Psych:   Psychiatric History          Physical Exam  General: Alert    Airway:  Mallampati: II   Mouth Opening: Normal  TM Distance: Normal  Tongue: Normal  Neck ROM: Normal ROM    Dental:  Intact        Anesthesia Plan  Type of Anesthesia, risks & benefits discussed:    Anesthesia Type: Gen ETT  Intra-op Monitoring Plan: Standard ASA Monitors  Post Op Pain Control Plan: multimodal analgesia and IV/PO Opioids PRN  Induction:  IV  Airway Plan: Direct, Post-Induction  Informed Consent: Informed consent signed with the Patient and all parties understand the risks and agree with anesthesia plan.  All questions answered.   ASA Score: 3  Day of Surgery Review of History & Physical: H&P Update referred to the surgeon/provider.I have interviewed and examined the patient. I have  reviewed the patient's H&P dated: There are no significant changes.     Ready For Surgery From Anesthesia Perspective.     .

## 2022-08-05 ENCOUNTER — ANESTHESIA (OUTPATIENT)
Dept: SURGERY | Facility: HOSPITAL | Age: 42
End: 2022-08-05
Payer: MEDICAID

## 2022-08-05 ENCOUNTER — HOSPITAL ENCOUNTER (OUTPATIENT)
Facility: HOSPITAL | Age: 42
Discharge: HOME | End: 2022-08-05
Attending: SURGERY | Admitting: SURGERY
Payer: MEDICAID

## 2022-08-05 VITALS
RESPIRATION RATE: 20 BRPM | HEART RATE: 95 BPM | WEIGHT: 190 LBS | OXYGEN SATURATION: 88 % | DIASTOLIC BLOOD PRESSURE: 83 MMHG | BODY MASS INDEX: 25.73 KG/M2 | SYSTOLIC BLOOD PRESSURE: 126 MMHG | HEIGHT: 72 IN | TEMPERATURE: 98 F

## 2022-08-05 DIAGNOSIS — K43.2 INCISIONAL HERNIA WITHOUT OBSTRUCTION OR GANGRENE: ICD-10-CM

## 2022-08-05 DIAGNOSIS — K43.2 INCISIONAL HERNIA, WITHOUT OBSTRUCTION OR GANGRENE: Primary | ICD-10-CM

## 2022-08-05 PROCEDURE — C1781 MESH (IMPLANTABLE): HCPCS | Performed by: SURGERY

## 2022-08-05 PROCEDURE — 71000044 HC DOSC ROUTINE RECOVERY FIRST HOUR: Performed by: SURGERY

## 2022-08-05 PROCEDURE — 25000003 PHARM REV CODE 250

## 2022-08-05 PROCEDURE — 36000707: Performed by: SURGERY

## 2022-08-05 PROCEDURE — 49560 PR REPAIR INCISIONAL HERNIA,REDUCIBLE: CPT | Mod: ,,, | Performed by: SURGERY

## 2022-08-05 PROCEDURE — 37000009 HC ANESTHESIA EA ADD 15 MINS: Performed by: SURGERY

## 2022-08-05 PROCEDURE — 25000003 PHARM REV CODE 250: Performed by: SURGERY

## 2022-08-05 PROCEDURE — 49560 PR REPAIR INCISIONAL HERNIA,REDUCIBLE: ICD-10-PCS | Mod: ,,, | Performed by: SURGERY

## 2022-08-05 PROCEDURE — 00832 ANES HERNIA REPAIR VNT&INCAL: CPT | Performed by: SURGERY

## 2022-08-05 PROCEDURE — D9220A PRA ANESTHESIA: ICD-10-PCS | Mod: ,,, | Performed by: ANESTHESIOLOGY

## 2022-08-05 PROCEDURE — 36000706: Performed by: SURGERY

## 2022-08-05 PROCEDURE — 37000008 HC ANESTHESIA 1ST 15 MINUTES: Performed by: SURGERY

## 2022-08-05 PROCEDURE — 63600175 PHARM REV CODE 636 W HCPCS

## 2022-08-05 PROCEDURE — 71000015 HC POSTOP RECOV 1ST HR: Performed by: SURGERY

## 2022-08-05 PROCEDURE — 25000003 PHARM REV CODE 250: Performed by: STUDENT IN AN ORGANIZED HEALTH CARE EDUCATION/TRAINING PROGRAM

## 2022-08-05 PROCEDURE — D9220A PRA ANESTHESIA: Mod: ,,, | Performed by: ANESTHESIOLOGY

## 2022-08-05 PROCEDURE — 49568 PR IMPLANT MESH HERNIA REPAIR/DEBRIDEMENT CLOSURE: CPT | Mod: ,,, | Performed by: SURGERY

## 2022-08-05 PROCEDURE — 49568 PR IMPLANT MESH HERNIA REPAIR/DEBRIDEMENT CLOSURE: ICD-10-PCS | Mod: ,,, | Performed by: SURGERY

## 2022-08-05 PROCEDURE — 71000045 HC DOSC ROUTINE RECOVERY EA ADD'L HR: Performed by: SURGERY

## 2022-08-05 DEVICE — PATCH HERNIA MESH VENTRALEX: Type: IMPLANTABLE DEVICE | Site: ABDOMEN | Status: FUNCTIONAL

## 2022-08-05 RX ORDER — ROCURONIUM BROMIDE 10 MG/ML
INJECTION, SOLUTION INTRAVENOUS
Status: DISCONTINUED | OUTPATIENT
Start: 2022-08-05 | End: 2022-08-05

## 2022-08-05 RX ORDER — FENTANYL CITRATE 50 UG/ML
INJECTION, SOLUTION INTRAMUSCULAR; INTRAVENOUS
Status: DISCONTINUED | OUTPATIENT
Start: 2022-08-05 | End: 2022-08-05

## 2022-08-05 RX ORDER — FENTANYL CITRATE 50 UG/ML
25 INJECTION, SOLUTION INTRAMUSCULAR; INTRAVENOUS EVERY 5 MIN PRN
Status: COMPLETED | OUTPATIENT
Start: 2022-08-05 | End: 2022-08-05

## 2022-08-05 RX ORDER — HYDROMORPHONE HYDROCHLORIDE 1 MG/ML
0.2 INJECTION, SOLUTION INTRAMUSCULAR; INTRAVENOUS; SUBCUTANEOUS EVERY 5 MIN PRN
Status: DISCONTINUED | OUTPATIENT
Start: 2022-08-05 | End: 2022-08-05 | Stop reason: HOSPADM

## 2022-08-05 RX ORDER — SODIUM CHLORIDE 9 MG/ML
INJECTION, SOLUTION INTRAVENOUS CONTINUOUS
Status: DISCONTINUED | OUTPATIENT
Start: 2022-08-05 | End: 2022-08-05 | Stop reason: HOSPADM

## 2022-08-05 RX ORDER — DEXAMETHASONE SODIUM PHOSPHATE 4 MG/ML
INJECTION, SOLUTION INTRA-ARTICULAR; INTRALESIONAL; INTRAMUSCULAR; INTRAVENOUS; SOFT TISSUE
Status: DISCONTINUED | OUTPATIENT
Start: 2022-08-05 | End: 2022-08-05

## 2022-08-05 RX ORDER — ACETAMINOPHEN 500 MG
1000 TABLET ORAL EVERY 8 HOURS
Qty: 18 TABLET | Refills: 0 | Status: SHIPPED | OUTPATIENT
Start: 2022-08-05 | End: 2022-08-08

## 2022-08-05 RX ORDER — DEXMEDETOMIDINE HYDROCHLORIDE 100 UG/ML
INJECTION, SOLUTION INTRAVENOUS
Status: DISCONTINUED | OUTPATIENT
Start: 2022-08-05 | End: 2022-08-05

## 2022-08-05 RX ORDER — OXYCODONE HYDROCHLORIDE 5 MG/1
5 TABLET ORAL EVERY 4 HOURS PRN
Qty: 15 TABLET | Refills: 0 | Status: ON HOLD | OUTPATIENT
Start: 2022-08-05 | End: 2022-10-06

## 2022-08-05 RX ORDER — OXYCODONE HYDROCHLORIDE 5 MG/1
5 TABLET ORAL ONCE AS NEEDED
Status: COMPLETED | OUTPATIENT
Start: 2022-08-05 | End: 2022-08-05

## 2022-08-05 RX ORDER — MIDAZOLAM HYDROCHLORIDE 1 MG/ML
INJECTION, SOLUTION INTRAMUSCULAR; INTRAVENOUS
Status: DISCONTINUED | OUTPATIENT
Start: 2022-08-05 | End: 2022-08-05

## 2022-08-05 RX ORDER — PROCHLORPERAZINE EDISYLATE 5 MG/ML
5 INJECTION INTRAMUSCULAR; INTRAVENOUS EVERY 30 MIN PRN
Status: DISCONTINUED | OUTPATIENT
Start: 2022-08-05 | End: 2022-08-05 | Stop reason: HOSPADM

## 2022-08-05 RX ORDER — PROPOFOL 10 MG/ML
VIAL (ML) INTRAVENOUS
Status: DISCONTINUED | OUTPATIENT
Start: 2022-08-05 | End: 2022-08-05

## 2022-08-05 RX ORDER — OXYCODONE HYDROCHLORIDE 5 MG/1
5 TABLET ORAL EVERY 4 HOURS PRN
Qty: 15 TABLET | Refills: 0 | Status: SHIPPED | OUTPATIENT
Start: 2022-08-05 | End: 2022-08-05 | Stop reason: SDUPTHER

## 2022-08-05 RX ORDER — SODIUM CHLORIDE 0.9 % (FLUSH) 0.9 %
10 SYRINGE (ML) INJECTION
Status: DISCONTINUED | OUTPATIENT
Start: 2022-08-05 | End: 2022-08-05 | Stop reason: HOSPADM

## 2022-08-05 RX ORDER — BUPIVACAINE HYDROCHLORIDE 2.5 MG/ML
INJECTION, SOLUTION EPIDURAL; INFILTRATION; INTRACAUDAL
Status: DISCONTINUED | OUTPATIENT
Start: 2022-08-05 | End: 2022-08-05 | Stop reason: HOSPADM

## 2022-08-05 RX ORDER — ACETAMINOPHEN 500 MG
1000 TABLET ORAL EVERY 8 HOURS
Qty: 18 TABLET | Refills: 0 | Status: SHIPPED | OUTPATIENT
Start: 2022-08-05 | End: 2022-08-05 | Stop reason: SDUPTHER

## 2022-08-05 RX ORDER — LIDOCAINE HYDROCHLORIDE 10 MG/ML
INJECTION, SOLUTION EPIDURAL; INFILTRATION; INTRACAUDAL; PERINEURAL
Status: DISCONTINUED
Start: 2022-08-05 | End: 2022-08-05 | Stop reason: HOSPADM

## 2022-08-05 RX ORDER — ACETAMINOPHEN 500 MG
500 TABLET ORAL EVERY 8 HOURS
Qty: 9 TABLET | Refills: 0 | Status: SHIPPED | OUTPATIENT
Start: 2022-08-05 | End: 2022-08-05 | Stop reason: HOSPADM

## 2022-08-05 RX ORDER — ONDANSETRON 2 MG/ML
INJECTION INTRAMUSCULAR; INTRAVENOUS
Status: DISCONTINUED | OUTPATIENT
Start: 2022-08-05 | End: 2022-08-05

## 2022-08-05 RX ORDER — CLINDAMYCIN PHOSPHATE 900 MG/50ML
900 INJECTION, SOLUTION INTRAVENOUS
Status: COMPLETED | OUTPATIENT
Start: 2022-08-05 | End: 2022-08-05

## 2022-08-05 RX ORDER — DIPHENHYDRAMINE HYDROCHLORIDE 50 MG/ML
25 INJECTION INTRAMUSCULAR; INTRAVENOUS EVERY 6 HOURS PRN
Status: DISCONTINUED | OUTPATIENT
Start: 2022-08-05 | End: 2022-08-05 | Stop reason: HOSPADM

## 2022-08-05 RX ORDER — LIDOCAINE HYDROCHLORIDE 10 MG/ML
INJECTION, SOLUTION EPIDURAL; INFILTRATION; INTRACAUDAL; PERINEURAL
Status: DISCONTINUED | OUTPATIENT
Start: 2022-08-05 | End: 2022-08-05

## 2022-08-05 RX ORDER — PHENYLEPHRINE HCL IN 0.9% NACL 1 MG/10 ML
SYRINGE (ML) INTRAVENOUS
Status: DISCONTINUED | OUTPATIENT
Start: 2022-08-05 | End: 2022-08-05

## 2022-08-05 RX ADMIN — OXYCODONE 5 MG: 5 TABLET ORAL at 11:08

## 2022-08-05 RX ADMIN — HYDROMORPHONE HYDROCHLORIDE 0.2 MG: 1 INJECTION, SOLUTION INTRAMUSCULAR; INTRAVENOUS; SUBCUTANEOUS at 11:08

## 2022-08-05 RX ADMIN — MIDAZOLAM 2 MG: 1 INJECTION INTRAMUSCULAR; INTRAVENOUS at 09:08

## 2022-08-05 RX ADMIN — SODIUM CHLORIDE, SODIUM GLUCONATE, SODIUM ACETATE, POTASSIUM CHLORIDE, MAGNESIUM CHLORIDE, SODIUM PHOSPHATE, DIBASIC, AND POTASSIUM PHOSPHATE: .53; .5; .37; .037; .03; .012; .00082 INJECTION, SOLUTION INTRAVENOUS at 10:08

## 2022-08-05 RX ADMIN — PROPOFOL 200 MG: 10 INJECTION, EMULSION INTRAVENOUS at 09:08

## 2022-08-05 RX ADMIN — LIDOCAINE HYDROCHLORIDE 75 MG: 10 INJECTION, SOLUTION EPIDURAL; INFILTRATION; INTRACAUDAL at 09:08

## 2022-08-05 RX ADMIN — SODIUM CHLORIDE: 0.9 INJECTION, SOLUTION INTRAVENOUS at 09:08

## 2022-08-05 RX ADMIN — Medication 100 MCG: at 09:08

## 2022-08-05 RX ADMIN — FENTANYL CITRATE 25 MCG: 50 INJECTION INTRAMUSCULAR; INTRAVENOUS at 11:08

## 2022-08-05 RX ADMIN — Medication 200 MCG: at 09:08

## 2022-08-05 RX ADMIN — DEXMEDETOMIDINE HYDROCHLORIDE 12 MCG: 100 INJECTION, SOLUTION INTRAVENOUS at 10:08

## 2022-08-05 RX ADMIN — SUGAMMADEX 200 MG: 100 INJECTION, SOLUTION INTRAVENOUS at 10:08

## 2022-08-05 RX ADMIN — GLYCOPYRROLATE 0.2 MG: 0.2 INJECTION INTRAMUSCULAR; INTRAVENOUS at 10:08

## 2022-08-05 RX ADMIN — PROPOFOL 40 MG: 10 INJECTION, EMULSION INTRAVENOUS at 10:08

## 2022-08-05 RX ADMIN — DEXAMETHASONE SODIUM PHOSPHATE 4 MG: 4 INJECTION INTRA-ARTICULAR; INTRALESIONAL; INTRAMUSCULAR; INTRAVENOUS; SOFT TISSUE at 09:08

## 2022-08-05 RX ADMIN — PROPOFOL 50 MG: 10 INJECTION, EMULSION INTRAVENOUS at 09:08

## 2022-08-05 RX ADMIN — FENTANYL CITRATE 100 MCG: 50 INJECTION INTRAMUSCULAR; INTRAVENOUS at 09:08

## 2022-08-05 RX ADMIN — ONDANSETRON 4 MG: 2 INJECTION INTRAMUSCULAR; INTRAVENOUS at 10:08

## 2022-08-05 RX ADMIN — ROCURONIUM BROMIDE 50 MG: 50 INJECTION, SOLUTION INTRAVENOUS at 09:08

## 2022-08-05 RX ADMIN — Medication 20 MCG: at 09:08

## 2022-08-05 RX ADMIN — TACROLIMUS 900 MG: 0.5 CAPSULE ORAL at 09:08

## 2022-08-05 RX ADMIN — Medication 200 MCG: at 10:08

## 2022-08-05 NOTE — TRANSFER OF CARE
Anesthesia Transfer of Care Note    Patient: Solo Black    Procedure(s) Performed: Procedure(s) (LRB):  REPAIR, HERNIA, INCISIONAL OR VENTRAL, WITHOUT HISTORY OF PRIOR REPAIR Open With Mesh (N/A)    Patient location: [facility]    Anesthesia Type: general    Transport from OR: Transported from OR on 6-10 L/min O2 by face mask with adequate spontaneous ventilation    Post pain: adequate analgesia    Post assessment: no apparent anesthetic complications    Post vital signs: stable    Level of consciousness: responds to stimulation    Nausea/Vomiting: no nausea/vomiting    Complications: none    Transfer of care protocol was followed      Last vitals:   Visit Vitals  /80 (BP Location: Left arm, Patient Position: Lying)   Pulse 63   Temp 36.9 °C (98.4 °F) (Temporal)   Resp 17   Ht 6' (1.829 m)   Wt 86.2 kg (190 lb)   SpO2 99%   BMI 25.77 kg/m²

## 2022-08-05 NOTE — PROGRESS NOTES
PreOp complete. Surgery consent, anesthesia consent, update to H&P, and IV needed. Notified Dr. Dias that patient is a difficult IV stick, made two IV attempts w/o success. Belongings given to Aunt, Altagracia.

## 2022-08-05 NOTE — OP NOTE
DATE: 8/5/22.    PREOPERATIVE DIAGNOSIS:  Incisional hernia.    POSTOPERATIVE DIAGNOSIS:  Incisional hernia.    PROCEDURE:  Incisional hernia repair with mesh.    ATTENDING SURGEON: Robson Morrison MD.    RESIDENT:  Brandy Lainez MD.    ANESTHESIA:  General.    INDICATION:  Patient is a 41-year-old man who presented to my clinic with a reducible incisional bulge following and gastrectomy.  Exam and imaging were consistent with incisional hernia and he desired repair.    PROCEDURE IN DETAIL:  Patient was taken to the operating room and placed supine.  After induction of general endotracheal tube anesthesia, his abdomen was prepped and draped in standard fashion.  Time-out performed.  Midline incision was made over palpated area of hernia.  This was carried down to the hernia sac which was bluntly dissected circumferentially to the level of fascia.  The sac was reduced and a preperitoneal plane was created beneath the fascia for mesh placement.  Hernia defect measured 1.5 x 1.5 cm appropriate size mesh patch was chosen and was secured and a preperitoneal, underlay fashion with trans fascial 0 Ethibond sutures.  Hernia defect was then closed with interrupted 0 Ethibond suture.  Hemostasis was confirmed.  Incision was then closed in layers with 3-0 Vicryl suture and subcuticular 4-0 Monocryl.  Dermabond was applied.  Patient was awakened from anesthesia and transferred to the PACU in good condition.  All needle and sponge counts were correct at the conclusion of the case.  I was present for the procedure in its entirety.    EBL:  Minimal.    FINDINGS:  1.5 x 1.5 cm hernia defect repaired with preperitoneal mesh underlay.

## 2022-08-05 NOTE — ANESTHESIA PROCEDURE NOTES
Intubation    Date/Time: 8/5/2022 9:24 AM  Performed by: Deniz Gunter MD  Authorized by: Marilou Dias MD     Intubation:     Induction:  Intravenous    Intubated:  Postinduction    Mask Ventilation:  Easy mask    Attempts:  1    Attempted By:  Resident anesthesiologist    Method of Intubation:  Direct    Blade:  Wisam 4    Laryngeal View Grade: Grade I - full view of cords      Difficult Airway Encountered?: No      Complications:  None    Airway Device:  Oral endotracheal tube    Airway Device Size:  7.5    Style/Cuff Inflation:  Cuffed    Tube secured:  22    Secured at:  The lips    Placement Verified By:  Capnometry    Complicating Factors:  None    Findings Post-Intubation:  BS equal bilateral

## 2022-08-05 NOTE — INTERVAL H&P NOTE
The patient has been examined and the H&P has been reviewed:    I concur with the findings and changes have been noted since the H&P was written: increased symptoms from ventral hernia, repair today in OR with mesh    Surgery risks, benefits and alternative options discussed and understood by patient/family.          There are no hospital problems to display for this patient.

## 2022-08-05 NOTE — BRIEF OP NOTE
[facility] - Surgery ([facility])  Brief Operative Note    Surgery Date: 8/5/2022     Surgeon(s) and Role:     * Robson Morrison MD - Primary     * Brandy Lainez MD - Resident - Assisting     * Malia Villareal MD - Resident - Assisting    Pre-op Diagnosis:  Incisional hernia, without obstruction or gangrene [K43.2]    Post-op Diagnosis:  Post-Op Diagnosis Codes:     * Incisional hernia, without obstruction or gangrene [K43.2]    Procedure(s) (LRB):  REPAIR, HERNIA, INCISIONAL OR VENTRAL, WITHOUT HISTORY OF PRIOR REPAIR Open With Mesh (N/A)    Anesthesia: General    Operative Findings: Open incisional hernia repair with mesh. Defect ~1.5cm x 1.5cm. No complications.    Estimated Blood Loss: minimal         Specimens:   Specimen (24h ago, onward)            None            Discharge Note    OUTCOME: Patient tolerated treatment/procedure well without complication and is now ready for discharge.    DISPOSITION: Home or Self Care    FINAL DIAGNOSIS:  Incisional hernia, without obstruction or gangrene    FOLLOWUP: In clinic    DISCHARGE INSTRUCTIONS:    Discharge Procedure Orders   Diet Adult Regular     Lifting restrictions   Order Comments: Do not lift anything heavier than 10lbs for six weeks.     No driving until:   Order Comments: Do not drive while taking pain medications.     No dressing needed   Order Comments: Incision covered with dermabond (superglue), no dressing needed. Can keep covered with gauze as needed to protect clothing.     Activity as tolerated     Shower on day dressing removed (No bath)   Order Comments: Shower after 48 hours after surgery, do not submerge incisions for 2 weeks.     Brandy Lainez MD  Pager: [phone]  General Surgery PGY-4  [facility]

## 2022-08-10 ENCOUNTER — HOSPITAL ENCOUNTER (EMERGENCY)
Facility: HOSPITAL | Age: 42
Discharge: HOME | End: 2022-08-10
Attending: EMERGENCY MEDICINE
Payer: MEDICAID

## 2022-08-10 ENCOUNTER — PATIENT MESSAGE (OUTPATIENT)
Dept: SURGERY | Facility: CLINIC | Age: 42
End: 2022-08-10
Payer: MEDICAID

## 2022-08-10 VITALS
HEART RATE: 97 BPM | TEMPERATURE: 100 F | WEIGHT: 190 LBS | RESPIRATION RATE: 16 BRPM | SYSTOLIC BLOOD PRESSURE: 136 MMHG | HEIGHT: 72 IN | BODY MASS INDEX: 25.73 KG/M2 | OXYGEN SATURATION: 98 % | DIASTOLIC BLOOD PRESSURE: 87 MMHG

## 2022-08-10 DIAGNOSIS — L76.34 POSTOPERATIVE SEROMA OF SKIN AFTER NON-DERMATOLOGIC PROCEDURE: ICD-10-CM

## 2022-08-10 DIAGNOSIS — Z48.89 ENCOUNTER FOR POST SURGICAL WOUND CHECK: ICD-10-CM

## 2022-08-10 DIAGNOSIS — G89.18 POST-OP PAIN: Primary | ICD-10-CM

## 2022-08-10 LAB
ALBUMIN SERPL BCP-MCNC: 4.4 G/DL (ref 3.5–5.2)
ALP SERPL-CCNC: 96 U/L (ref 55–135)
ALT SERPL W/O P-5'-P-CCNC: 58 U/L (ref 10–44)
ANION GAP SERPL CALC-SCNC: 14 MMOL/L (ref 8–16)
AST SERPL-CCNC: 39 U/L (ref 10–40)
BASOPHILS # BLD AUTO: 0.06 K/UL (ref 0–0.2)
BASOPHILS NFR BLD: 1.3 % (ref 0–1.9)
BILIRUB SERPL-MCNC: 2.1 MG/DL (ref 0.1–1)
BILIRUB UR QL STRIP: NEGATIVE
BUN SERPL-MCNC: 17 MG/DL (ref 6–20)
CALCIUM SERPL-MCNC: 10 MG/DL (ref 8.7–10.5)
CHLORIDE SERPL-SCNC: 103 MMOL/L (ref 95–110)
CLARITY UR REFRACT.AUTO: CLEAR
CO2 SERPL-SCNC: 23 MMOL/L (ref 23–29)
COLOR UR AUTO: YELLOW
CREAT SERPL-MCNC: 1.1 MG/DL (ref 0.5–1.4)
DIFFERENTIAL METHOD: ABNORMAL
EOSINOPHIL # BLD AUTO: 0.5 K/UL (ref 0–0.5)
EOSINOPHIL NFR BLD: 10 % (ref 0–8)
ERYTHROCYTE [DISTWIDTH] IN BLOOD BY AUTOMATED COUNT: 14.8 % (ref 11.5–14.5)
EST. GFR  (NO RACE VARIABLE): >60 ML/MIN/1.73 M^2
GLUCOSE SERPL-MCNC: 91 MG/DL (ref 70–110)
GLUCOSE UR QL STRIP: NEGATIVE
HCT VFR BLD AUTO: 42.1 % (ref 40–54)
HGB BLD-MCNC: 14.2 G/DL (ref 14–18)
HGB UR QL STRIP: NEGATIVE
HIV 1+2 AB+HIV1 P24 AG SERPL QL IA: NEGATIVE
IMM GRANULOCYTES # BLD AUTO: 0.01 K/UL (ref 0–0.04)
IMM GRANULOCYTES NFR BLD AUTO: 0.2 % (ref 0–0.5)
KETONES UR QL STRIP: ABNORMAL
LACTATE SERPL-SCNC: 1.8 MMOL/L (ref 0.5–2.2)
LEUKOCYTE ESTERASE UR QL STRIP: NEGATIVE
LYMPHOCYTES # BLD AUTO: 0.7 K/UL (ref 1–4.8)
LYMPHOCYTES NFR BLD: 14.8 % (ref 18–48)
MCH RBC QN AUTO: 26.4 PG (ref 27–31)
MCHC RBC AUTO-ENTMCNC: 33.7 G/DL (ref 32–36)
MCV RBC AUTO: 78 FL (ref 82–98)
MONOCYTES # BLD AUTO: 0.7 K/UL (ref 0.3–1)
MONOCYTES NFR BLD: 14.2 % (ref 4–15)
NEUTROPHILS # BLD AUTO: 2.7 K/UL (ref 1.8–7.7)
NEUTROPHILS NFR BLD: 59.5 % (ref 38–73)
NITRITE UR QL STRIP: NEGATIVE
NRBC BLD-RTO: 0 /100 WBC
PH UR STRIP: 6 [PH] (ref 5–8)
PLATELET # BLD AUTO: 277 K/UL (ref 150–450)
PMV BLD AUTO: 10.4 FL (ref 9.2–12.9)
POTASSIUM SERPL-SCNC: 3.4 MMOL/L (ref 3.5–5.1)
PROT SERPL-MCNC: 8 G/DL (ref 6–8.4)
PROT UR QL STRIP: ABNORMAL
RBC # BLD AUTO: 5.38 M/UL (ref 4.6–6.2)
SODIUM SERPL-SCNC: 140 MMOL/L (ref 136–145)
SP GR UR STRIP: >1.03 (ref 1–1.03)
URN SPEC COLLECT METH UR: ABNORMAL
WBC # BLD AUTO: 4.58 K/UL (ref 3.9–12.7)

## 2022-08-10 PROCEDURE — 63600175 PHARM REV CODE 636 W HCPCS: Performed by: EMERGENCY MEDICINE

## 2022-08-10 PROCEDURE — 87389 HIV-1 AG W/HIV-1&-2 AB AG IA: CPT | Performed by: PHYSICIAN ASSISTANT

## 2022-08-10 PROCEDURE — 80053 COMPREHEN METABOLIC PANEL: CPT | Performed by: EMERGENCY MEDICINE

## 2022-08-10 PROCEDURE — 96361 HYDRATE IV INFUSION ADD-ON: CPT

## 2022-08-10 PROCEDURE — 99284 EMERGENCY DEPT VISIT MOD MDM: CPT | Mod: 25

## 2022-08-10 PROCEDURE — 99024 POSTOP FOLLOW-UP VISIT: CPT | Mod: ,,, | Performed by: EMERGENCY MEDICINE

## 2022-08-10 PROCEDURE — 85025 COMPLETE CBC W/AUTO DIFF WBC: CPT | Performed by: EMERGENCY MEDICINE

## 2022-08-10 PROCEDURE — 96374 THER/PROPH/DIAG INJ IV PUSH: CPT

## 2022-08-10 PROCEDURE — 96376 TX/PRO/DX INJ SAME DRUG ADON: CPT

## 2022-08-10 PROCEDURE — 86803 HEPATITIS C AB TEST: CPT | Performed by: PHYSICIAN ASSISTANT

## 2022-08-10 PROCEDURE — 83605 ASSAY OF LACTIC ACID: CPT | Performed by: EMERGENCY MEDICINE

## 2022-08-10 PROCEDURE — 99024 PR POST-OP FOLLOW-UP VISIT: ICD-10-PCS | Mod: ,,, | Performed by: EMERGENCY MEDICINE

## 2022-08-10 PROCEDURE — 81003 URINALYSIS AUTO W/O SCOPE: CPT | Performed by: EMERGENCY MEDICINE

## 2022-08-10 RX ORDER — MORPHINE SULFATE 2 MG/ML
6 INJECTION, SOLUTION INTRAMUSCULAR; INTRAVENOUS
Status: COMPLETED | OUTPATIENT
Start: 2022-08-10 | End: 2022-08-10

## 2022-08-10 RX ORDER — SULFAMETHOXAZOLE AND TRIMETHOPRIM 800; 160 MG/1; MG/1
1 TABLET ORAL 2 TIMES DAILY
Status: DISCONTINUED | OUTPATIENT
Start: 2022-08-10 | End: 2022-08-10 | Stop reason: HOSPADM

## 2022-08-10 RX ORDER — SULFAMETHOXAZOLE AND TRIMETHOPRIM 800; 160 MG/1; MG/1
1 TABLET ORAL 2 TIMES DAILY
Qty: 14 TABLET | Refills: 0 | Status: SHIPPED | OUTPATIENT
Start: 2022-08-10 | End: 2022-08-17

## 2022-08-10 RX ADMIN — MORPHINE SULFATE 6 MG: 2 INJECTION, SOLUTION INTRAMUSCULAR; INTRAVENOUS at 10:08

## 2022-08-10 RX ADMIN — MORPHINE SULFATE 6 MG: 2 INJECTION, SOLUTION INTRAMUSCULAR; INTRAVENOUS at 12:08

## 2022-08-10 NOTE — DISCHARGE INSTRUCTIONS
Your labs are reassuring without significant infection.  You likely have a seroma which is up postoperative fluid collection that contains fluid and blood.  These typically are not infectious but given your history of MRSA will cover you with Bactrim.  Continue dressing changes.  Follow-up with Dr. Morrison in clinic

## 2022-08-10 NOTE — ED TRIAGE NOTES
Patient reports to the ED today with reports of post-op problem patient states that he had an open hernia repair on Friday and states that he woke up with morning with abdominal pain redness and tenderness to site as well as serosanguineous drainage from site. Patient endorses fever of 101 this morning.    Patient identifiers verified and correct for Solo Black  LOC: The patient is awake, alert and aware of environment with an appropriate affect, the patient is oriented x 3 and speaking appropriately.   APPEARANCE: Patient appears comfortable and in no acute distress, patient is clean and well groomed.  SKIN: The skin is warm and dry, color consistent with ethnicity, patient has normal skin turgor and moist mucus membranes, skin intact, no breakdown or bruising noted.   MUSCULOSKELETAL: Patient moving all extremities spontaneously, no swelling noted.  RESPIRATORY: Airway is open and patent, respirations are spontaneous, patient has a normal effort and rate, no accessory muscle use noted, O2 sats noted at 99% on room air.  CARDIAC: Denies chest pain   GASTRO: Abdominal pain  : Pt denies any pain or frequency with urination.  NEURO: Pt opens eyes spontaneously, behavior appropriate to situation, follows commands, facial expression symmetrical, bilateral hand grasp equal and even, purposeful motor response noted, normal sensation in all extremities when touched with a finger.

## 2022-08-10 NOTE — H&P
"GENERAL SURGERY  Consultation      REASON FOR CONSULT:  Post-op fever and incisional drainage     SUBJECTIVE:     HISTORY OF PRESENT ILLNESS:  Solo Black is a 41 y.o. male who initially presented to the ED on 8/10/2022 for incisional drainage and post-op fever. He is POD 5 from an open incisional hernia repair with mesh.    He states that he had been doing well and stopped taking pain medication 3 days ago until last night when he noticed bloody and foul smelling drainage from incision. He reports fever up to 101.7 at home associated with nausea, vomiting, and diahrrea this morning as well as increased "fullness" and tenderness at midline incision site. Since, he has had continued drainage from midline incision. States that he took 1g of tylenol and some ibuprofen before coming to the ED. Denies any activity changes/inciting events.     In ED, he is afebrile. WBC normal at 4.6      MEDICATIONS:  Home Medications:  Current Facility-Administered Medications on File Prior to Encounter   Medication Dose Route Frequency Provider Last Rate Last Admin    mepolizumab (NUCALA) injection 300 mg  300 mg Subcutaneous Q28 Days Michelle Ornelas MD   300 mg at 06/28/21 1511     Current Outpatient Medications on File Prior to Encounter   Medication Sig Dispense Refill    albuterol (PROVENTIL/VENTOLIN HFA) 90 mcg/actuation inhaler INHALE 2 PUFFS INTO THE LUNGS EVERY 6 HOURS AS NEEDED FOR WHEEZING OR SHORTNESS OF BREATH. RESCUE. 18 g 2    artificial tears (ISOPTO TEARS) 0.5 % ophthalmic solution Place 1 drop into both eyes 6 (six) times daily.      cetirizine (ZYRTEC) 10 MG tablet Take 20 mg by mouth 2 (two) times a day.       cholecalciferol, vitamin D3, (VITAMIN D3) 25 mcg (1,000 unit) capsule Take 1 capsule (1,000 Units total) by mouth once daily. 90 capsule 1    dextroamphetamine-amphetamine (ADDERALL) 20 mg tablet Take 1 tablet by mouth 2 (two) times daily.      diphenhydrAMINE (SOMINEX) 25 mg tablet Take 25 mg by " mouth nightly as needed.       docusate sodium (COLACE) 100 MG capsule Take 1 capsule (100 mg total) by mouth 2 (two) times daily. 60 capsule 3    EPINEPHrine (EPIPEN) 0.3 mg/0.3 mL AtIn Inject 0.3 mLs (0.3 mg total) into the muscle as needed. 2 each 1    ferrous sulfate 325 (65 FE) MG EC tablet Take 1 tablet (325 mg total) by mouth once daily. (Patient taking differently: Take 325 mg by mouth nightly.) 90 tablet 1    fluconazole (DIFLUCAN) 200 MG Tab       gabapentin (NEURONTIN) 300 MG capsule Take 1 capsule (300 mg total) by mouth 3 (three) times daily. for 7 days 21 capsule 0    heparin, porcine, PF, (HEPARIN FLUSH 100 UNITS/ML) 100 unit/mL Syrg       HYDROcodone-acetaminophen (NORCO)  mg per tablet Take 1 tablet by mouth every 4 (four) hours as needed for Pain.      hydrOXYzine HCL (ATARAX) 25 MG tablet Take 1 tablet (25 mg total) by mouth 3 (three) times daily as needed for Itching. 30 tablet 1    Lactobacillus acidophilus 10 billion cell Cap Take 1 capsule by mouth once daily.       lidocaine HCL 2% (XYLOCAINE) 2 % jelly Apply topically 3 (three) times daily as needed (Hemorrhoids). 5 mL 3    mepolizumab (NUCALA) 100 mg/mL Syrg Inject 3 mLs (300 mg total) into the skin every 28 days. 300 mL 11    metoclopramide HCl (REGLAN) 10 MG tablet Take 10 mg by mouth as needed.      nystatin (MYCOSTATIN) 100,000 unit/mL suspension Take by mouth as needed.       ondansetron (ZOFRAN-ODT) 4 MG TbDL Take 4 mg by mouth.      oxyCODONE (ROXICODONE) 5 MG immediate release tablet Take 1 tablet (5 mg total) by mouth every 4 (four) hours as needed for Pain. 15 tablet 0    oxyCODONE-acetaminophen (PERCOCET) 5-325 mg per tablet Take 1 tablet by mouth every 4 (four) hours as needed for Pain.      pantoprazole (PROTONIX) 40 MG tablet Take 1 tablet (40 mg total) by mouth once daily. (Patient taking differently: Take 40 mg by mouth 2 (two) times daily.) 30 tablet 2    paroxetine (PAXIL) 20 MG tablet Take 20 mg by  "mouth nightly.      predniSONE (DELTASONE) 10 MG tablet Take 5 mg by mouth once daily.      promethazine (PHENERGAN) 25 MG tablet Take 25 mg by mouth every 4 (four) hours.      sucralfate (CARAFATE) 1 gram tablet Take 1 g by mouth before meals and at bedtime as needed.       tamsulosin (FLOMAX) 0.4 mg Cap Take 1 capsule (0.4 mg total) by mouth 2 (two) times daily. (Patient taking differently: Take 0.4 mg by mouth nightly.) 60 capsule 11    vitamin D3-vitamin K2 1000-90 unit-mcg TbDL Take by mouth once daily.        Inpatient Medications:   mepolizumab  300 mg Subcutaneous Q28 Days     Infusions:  PRN Medications:    ALLERGIES:    Review of patient's allergies indicates:   Allergen Reactions    Bean Diarrhea, Edema, Hives, Nausea And Vomiting and Swelling    Legumes Nausea And Vomiting and Swelling     Other reaction(s): GI Intolerance  vomiting  vomiting  Pt reports legumes make his lips swell and induce severe vomiting  Pt reports legumes make his lips swell and induce severe vomiting      Nsaids (non-steroidal anti-inflammatory drug)      GI bleed    Bean pod extract      Lips swelling, vomiting  Lips swelling, vomiting      Ketamine      Severe dysphoria (bad trip)    Lentils      Lips swelling, vomiting  Lips swelling, vomiting      Meloxicam Nausea Only     GI bleed    Peas      Lips swelling, vomiting    Penicillins      Has taken third gen cephalosporins without issue per patient report    Haloperidol Anxiety and Other (See Comments)     "feels like crawling out of his skin"         PAST MEDICAL HISTORY:    Past Medical History:   Diagnosis Date    Arthritis     C. difficile colitis feb 2014    postop of hand surgery    Cancer     Encounter for blood transfusion     Eosinophilic esophagitis 3/11/2014    GERD (gastroesophageal reflux disease)     Hypereosinophilic syndrome     IBS (irritable bowel syndrome)     Leukemia     MRSA carrier     says multiple times nose swab was +    " Nephrolithiasis apr 2014    bilateral punctate - never passed a stone    Septic prepatellar bursitis of right knee 1/12/2021    Urinary tract infection feb 2014    MRSA       SURGICAL HISTORY:  Past Surgical History:   Procedure Laterality Date    APPENDECTOMY      ARTHROSCOPY OF SHOULDER WITH REMOVAL OF DISTAL CLAVICLE Right 11/1/2018    Procedure: ARTHROSCOPY, SHOULDER, WITH DISTAL CLAVICLE EXCISION;  Surgeon: Gabino Mejia MD;  Location: [facility];  Service: Orthopedics;  Laterality: Right;  video    BACK SURGERY      BLADDER FULGURATION N/A 9/18/2020    Procedure: FULGURATION, BLADDER;  Surgeon: Randall Moss MD;  Location: [facility];  Service: Urology;  Laterality: N/A;  blood vessels of bladder neck and prostate    BONE MARROW BIOPSY Left 10/1/2020    Procedure: Biopsy-bone marrow;  Surgeon: Trung Polanco MD;  Location: [facility];  Service: Oncology;  Laterality: Left;    CIRCUMCISION, PRIMARY      COLONOSCOPY N/A 11/14/2018    Procedure: COLONOSCOPY;  Surgeon: Milton Brunson MD;  Location: [facility];  Service: Endoscopy;  Laterality: N/A;    COLONOSCOPY N/A 7/20/2020    Procedure: COLONOSCOPY;  Surgeon: Ruslan Tillman MD;  Location: [facility];  Service: Endoscopy;  Laterality: N/A;    CYSTOSCOPY W/ RETROGRADES Bilateral 9/18/2020    Procedure: CYSTOSCOPY, WITH RETROGRADE PYELOGRAM;  Surgeon: Randall Moss MD;  Location: [facility];  Service: Urology;  Laterality: Bilateral;    CYSTOURETHROSCOPY N/A 2/1/2021    Procedure: CYSTOURETHROSCOPY, short placement;  Surgeon: Boni Benites MD;  Location: [address];  Service: Urology;  Laterality: N/A;    DILATION OF URETHRA N/A 9/18/2020    Procedure: DILATION, URETHRA;  Surgeon: Randall Moss MD;  Location: [facility];  Service: Urology;  Laterality: N/A;    drainage of abscess from hand  2009    MRSA    drainage of abscess from R thigh  2006    MRSA    ESOPHAGOGASTRODUODENOSCOPY  3/11/2014    ESOPHAGOGASTRODUODENOSCOPY  N/A 9/5/2018    Procedure: EGD (ESOPHAGOGASTRODUODENOSCOPY);  Surgeon: Kolby Wall MD;  Location: [facility];  Service: Endoscopy;  Laterality: N/A;    ESOPHAGOGASTRODUODENOSCOPY N/A 3/25/2020    Procedure: EGD (ESOPHAGOGASTRODUODENOSCOPY);  Surgeon: Ruslan Tillman MD;  Location: [facility];  Service: Endoscopy;  Laterality: N/A;    ESOPHAGOGASTRODUODENOSCOPY N/A 7/20/2020    Procedure: EGD (ESOPHAGOGASTRODUODENOSCOPY);  Surgeon: Ruslan Tillman MD;  Location: [facility];  Service: Endoscopy;  Laterality: N/A;  Patient is a very hard stick, requires anesthesia stick.    ESOPHAGOGASTRODUODENOSCOPY N/A 8/31/2020    Procedure: EGD (ESOPHAGOGASTRODUODENOSCOPY);  Surgeon: Kolby Wall MD;  Location: [facility];  Service: Endoscopy;  Laterality: N/A;    ESOPHAGOGASTRODUODENOSCOPY N/A 3/3/2021    Procedure: EGD (ESOPHAGOGASTRODUODENOSCOPY);  Surgeon: Ruslan Tillman MD;  Location: [facility];  Service: Endoscopy;  Laterality: N/A;    ESOPHAGOGASTRODUODENOSCOPY N/A 7/12/2021    Procedure: EGD (ESOPHAGOGASTRODUODENOSCOPY);  Surgeon: Kolby Wall MD;  Location: [facility];  Service: Endoscopy;  Laterality: N/A;    FLEXIBLE SIGMOIDOSCOPY N/A 9/5/2018    Procedure: SIGMOIDOSCOPY, FLEXIBLE;  Surgeon: Kolby Wall MD;  Location: [facility];  Service: Endoscopy;  Laterality: N/A;    HAND SURGERY  jan 2014    power saw fell on hand - laceration 3 tendons    INCISION AND DRAINAGE OF KNEE Right 1/13/2021    Procedure: INCISION AND DRAINAGE, KNEE;  Surgeon: Sony Linton Jr., MD;  Location: [facility];  Service: Orthopedics;  Laterality: Right;    INSERTION OF TUNNELED CENTRAL VENOUS CATHETER (CVC) WITH SUBCUTANEOUS PORT Right 11/24/2021    Procedure: Right port-a-cath removal and exchange.;  Surgeon: Robson Morrison MD;  Location: [address];  Service: General;  Laterality: Right;  Right internal jugular    INSERTION OF TUNNELED CENTRAL VENOUS CATHETER (CVC) WITH  SUBCUTANEOUS PORT Left 3/31/2022    Procedure: INSERTION, SINGLE LUMEN CATHETER WITH PORT, WITH FLUOROSCOPIC GUIDANCE Left Poss Right Chest;  Surgeon: Robson Morrison MD;  Location: [facility] OR 2ND FLR;  Service: General;  Laterality: Left;    INSERTION OF VENOUS ACCESS PORT Right 8/21/2020    Procedure: INSERTION, VENOUS ACCESS PORT;  Surgeon: Tryo Honeycutt MD;  Location: [facility] OR;  Service: General;  Laterality: Right;    NISSEN FUNDOPLICATION      REVISION OF SCAR N/A 4/7/2021    Procedure: REVISION, SCAR;  Surgeon: Robson Morrison MD;  Location: [facility] OR 2ND FLR;  Service: General;  Laterality: N/A;    UMBILICAL HERNIA REPAIR N/A 4/7/2021    Procedure: REPAIR, HERNIA, UMBILICAL, AGE 5 YEARS OR OLDER;  Surgeon: Robson Morrison MD;  Location: [facility] OR 2ND FLR;  Service: General;  Laterality: N/A;       FAMILY HISTORY:  Family History   Problem Relation Age of Onset    Urolithiasis Father     Celiac disease Sister     Hypertension Maternal Grandmother     Hypertension Maternal Grandfather     Prostate cancer Neg Hx     Kidney disease Neg Hx        SOCIAL HISTORY:  Social History     Tobacco Use    Smoking status: Never Smoker    Smokeless tobacco: Never Used    Tobacco comment: Pt states he has smoked 1 or 2 cigarettes in his life.   Substance Use Topics    Alcohol use: Not Currently    Drug use: No        REVIEW OF SYSTEMS:  Review of Systems   Constitutional: Positive for chills and fever.   Gastrointestinal: Positive for abdominal pain, diarrhea, nausea and vomiting.        Bloody, puss-like drainage from incision        OBJECTIVE:     Most Recent Vitals:  Temp: 99.7 °F (37.6 °C) (08/10/22 0846)  Pulse: 104 (08/10/22 0846)  Resp: 18 (08/10/22 1212)  BP: (!) 158/95 (08/10/22 0846)  SpO2: 99 % (08/10/22 0846)    24-Hour Vitals:  Temp:  [99.7 °F (37.6 °C)]   Pulse:  [104]   Resp:  [16-18]   BP: (158)/(95)   SpO2:  [99 %]     24-Hour I&O:No intake or output data in the 24 hours ending  08/10/22 1216    PHYSICAL EXAM:  Physical Exam  Constitutional:       General: He is not in acute distress.     Appearance: He is not ill-appearing.   HENT:      Head: Normocephalic and atraumatic.   Eyes:      Extraocular Movements: Extraocular movements intact.   Pulmonary:      Effort: Pulmonary effort is normal.   Abdominal:      General: There is no distension.      Palpations: Abdomen is soft.      Tenderness: There is no guarding or rebound.      Comments: Mild tenderness near incision. Incision is intact. Scant serosanguinous drainage from one small area. No erythema or warmth    Musculoskeletal:         General: Normal range of motion.   Skin:     General: Skin is warm and dry.   Neurological:      General: No focal deficit present.      Mental Status: He is alert.   Psychiatric:         Mood and Affect: Mood normal.         LABORATORY VALUES:  Recent Labs   Lab 08/10/22  0948   WBC 4.58   HGB 14.2   HCT 42.1        Recent Labs   Lab 08/10/22  0948      K 3.4*      CO2 23   BUN 17   CREATININE 1.1   GLU 91   CALCIUM 10.0   AST 39   ALT 58*   ALKPHOS 96   BILITOT 2.1*   PROT 8.0   ALBUMIN 4.4     Recent Labs   Lab 08/10/22  0948   LACTATE 1.8   No results for input(s): PH, PCO2, PO2, HCO3 in the last 72 hours.    DIAGNOSTIC STUDIES:  N/A    ASSESSMENT:     Solo Black is a 41 y.o. male who was admitted on 8/10/2022 for post-op incisional drainage and fever. In ED, afebrile with WBC 4.6.    PLAN:  - Will send patient home with course of PO abx  - Discussed ED precautions and importance of hydration if vomiting   - Patient can be d/c from ED with plan to see patient in clinic for 2 week post-op follow up as scheduled         Noni Lyles MD  General Surgery, PGY-1  [facility]

## 2022-08-10 NOTE — ED PROVIDER NOTES
Encounter Date: 8/10/2022    SCRIBE #1 NOTE: I, Lexi Ortega, am scribing for, and in the presence of,  Mary Najera MD. I have scribed the entire note.       History     Chief Complaint   Patient presents with    Post-op Problem    Fever     drainage     Solo Black is a 41 y.o. male with a past medical history of GERD, C. Difficile colitis, MRSA, nephrolithiasis, irritable bowel syndrome, arthritis, leukemia, hypereosinophilic syndrome, who presents with a complaint of fever, abdominal pain and drainage from surgical incision site onset this morning. This is a patient who underwent an open incisional hernia repair with mesh done on 8/5/2022, performed by Dr. Morrison. Since then his recovery had been going well and states he even stopped taking his pain medications 3 days ago. Last night he began having pain to the surgical incision site with swelling around the area. The pain and swelling gradually worsened over night into this morning. After waking up today he had severe pain which he describes as constant, throbbing pain. He then began having nausea, vomiting and diarrhea. He had 3 episodes of diarrhea since this morning. He also had a fever measured at 101.7 F at home. Patient states he then fell asleep again and later woke up with a large amount of bloody serosanguineous drainage from the surgical site and since then he has soiled through 3 dressings. He has attempted to take OTC pain medications but is unable to tolerate PO intake. Patient states the drainage is still active. Denies any contact with COVID-19 recently.      The history is provided by the patient and medical records. No  was used.     Review of patient's allergies indicates:   Allergen Reactions    Bean Diarrhea, Edema, Hives, Nausea And Vomiting and Swelling    Legumes Nausea And Vomiting and Swelling     Other reaction(s): GI Intolerance  vomiting  vomiting  Pt reports legumes make his lips swell and induce  "severe vomiting  Pt reports legumes make his lips swell and induce severe vomiting      Nsaids (non-steroidal anti-inflammatory drug)      GI bleed    Bean pod extract      Lips swelling, vomiting  Lips swelling, vomiting      Ketamine      Severe dysphoria (bad trip)    Lentils      Lips swelling, vomiting  Lips swelling, vomiting      Meloxicam Nausea Only     GI bleed    Peas      Lips swelling, vomiting    Penicillins      Has taken third gen cephalosporins without issue per patient report    Haloperidol Anxiety and Other (See Comments)     "feels like crawling out of his skin"       Past Medical History:   Diagnosis Date    Arthritis     C. difficile colitis feb 2014    postop of hand surgery    Cancer     Encounter for blood transfusion     Eosinophilic esophagitis 3/11/2014    GERD (gastroesophageal reflux disease)     Hypereosinophilic syndrome     IBS (irritable bowel syndrome)     Leukemia     MRSA carrier     says multiple times nose swab was +    Nephrolithiasis apr 2014    bilateral punctate - never passed a stone    Septic prepatellar bursitis of right knee 1/12/2021    Urinary tract infection feb 2014    MRSA     Past Surgical History:   Procedure Laterality Date    APPENDECTOMY      ARTHROSCOPY OF SHOULDER WITH REMOVAL OF DISTAL CLAVICLE Right 11/1/2018    Procedure: ARTHROSCOPY, SHOULDER, WITH DISTAL CLAVICLE EXCISION;  Surgeon: Gabino Mejia MD;  Location: [facility] OR;  Service: Orthopedics;  Laterality: Right;  video    BACK SURGERY      BLADDER FULGURATION N/A 9/18/2020    Procedure: FULGURATION, BLADDER;  Surgeon: Randall Moss MD;  Location: [facility] OR;  Service: Urology;  Laterality: N/A;  blood vessels of bladder neck and prostate    BONE MARROW BIOPSY Left 10/1/2020    Procedure: Biopsy-bone marrow;  Surgeon: Trung Polanco MD;  Location: [facility] OR;  Service: Oncology;  Laterality: Left;    CIRCUMCISION, PRIMARY      COLONOSCOPY N/A 11/14/2018    Procedure: " COLONOSCOPY;  Surgeon: Milton Brunson MD;  Location: [facility];  Service: Endoscopy;  Laterality: N/A;    COLONOSCOPY N/A 7/20/2020    Procedure: COLONOSCOPY;  Surgeon: Ruslan Tillman MD;  Location: [facility];  Service: Endoscopy;  Laterality: N/A;    CYSTOSCOPY W/ RETROGRADES Bilateral 9/18/2020    Procedure: CYSTOSCOPY, WITH RETROGRADE PYELOGRAM;  Surgeon: Randall Moss MD;  [facility];  Service: Urology;  Laterality: Bilateral;    CYSTOURETHROSCOPY N/A 2/1/2021    Procedure: CYSTOURETHROSCOPY, short placement;  Surgeon: Boni Benites MD;  Location: [address];  Service: Urology;  Laterality: N/A;    DILATION OF URETHRA N/A 9/18/2020    Procedure: DILATION, URETHRA;  Surgeon: Randall Moss MD;  Location: [facility];  Service: Urology;  Laterality: N/A;    drainage of abscess from hand  2009    MRSA    drainage of abscess from R thigh  2006    MRSA    ESOPHAGOGASTRODUODENOSCOPY  3/11/2014    ESOPHAGOGASTRODUODENOSCOPY N/A 9/5/2018    Procedure: EGD (ESOPHAGOGASTRODUODENOSCOPY);  Surgeon: Kolby Wall MD;  Location: [facility];  Service: Endoscopy;  Laterality: N/A;    ESOPHAGOGASTRODUODENOSCOPY N/A 3/25/2020    Procedure: EGD (ESOPHAGOGASTRODUODENOSCOPY);  Surgeon: Ruslan Tillman MD;  Location: [facility];  Service: Endoscopy;  Laterality: N/A;    ESOPHAGOGASTRODUODENOSCOPY N/A 7/20/2020    Procedure: EGD (ESOPHAGOGASTRODUODENOSCOPY);  Surgeon: Ruslna Tillman MD;  Location: [facility];  Service: Endoscopy;  Laterality: N/A;  Patient is a very hard stick, requires anesthesia stick.    ESOPHAGOGASTRODUODENOSCOPY N/A 8/31/2020    Procedure: EGD (ESOPHAGOGASTRODUODENOSCOPY);  Surgeon: Kolby Wall MD;  Location: [facility];  Service: Endoscopy;  Laterality: N/A;    ESOPHAGOGASTRODUODENOSCOPY N/A 3/3/2021    Procedure: EGD (ESOPHAGOGASTRODUODENOSCOPY);  Surgeon: Ruslan Tillman MD;  Location: [facility];  Service: Endoscopy;  Laterality: N/A;     ESOPHAGOGASTRODUODENOSCOPY N/A 7/12/2021    Procedure: EGD (ESOPHAGOGASTRODUODENOSCOPY);  Surgeon: Kolby Wall MD;  Location: [facility] ENDO;  Service: Endoscopy;  Laterality: N/A;    FLEXIBLE SIGMOIDOSCOPY N/A 9/5/2018    Procedure: SIGMOIDOSCOPY, FLEXIBLE;  Surgeon: Kolby Wall MD;  Location: [facility] ENDO;  Service: Endoscopy;  Laterality: N/A;    HAND SURGERY  jan 2014    power saw fell on hand - laceration 3 tendons    INCISION AND DRAINAGE OF KNEE Right 1/13/2021    Procedure: INCISION AND DRAINAGE, KNEE;  Surgeon: Sony Linton Jr., MD;  Location: [facility] OR;  Service: Orthopedics;  Laterality: Right;    INSERTION OF TUNNELED CENTRAL VENOUS CATHETER (CVC) WITH SUBCUTANEOUS PORT Right 11/24/2021    Procedure: Right port-a-cath removal and exchange.;  Surgeon: Robson Morrison MD;  [facility];  Service: General;  Laterality: Right;  Right internal jugular    INSERTION OF TUNNELED CENTRAL VENOUS CATHETER (CVC) WITH SUBCUTANEOUS PORT Left 3/31/2022    Procedure: INSERTION, SINGLE LUMEN CATHETER WITH PORT, WITH FLUOROSCOPIC GUIDANCE Left Poss Right Chest;  Surgeon: Robson Morrison MD;  [facility];  Service: General;  Laterality: Left;    INSERTION OF VENOUS ACCESS PORT Right 8/21/2020    Procedure: INSERTION, VENOUS ACCESS PORT;  Surgeon: Troy Honeycutt MD;  Location: [facility];  Service: General;  Laterality: Right;    NISSEN FUNDOPLICATION      REVISION OF SCAR N/A 4/7/2021    Procedure: REVISION, SCAR;  Surgeon: Robson Morrison MD;  Location: [facility] OR 2ND FLR;  Service: General;  Laterality: N/A;    UMBILICAL HERNIA REPAIR N/A 4/7/2021    Procedure: REPAIR, HERNIA, UMBILICAL, AGE 5 YEARS OR OLDER;  Surgeon: Robson Morrison MD;  Location: [facility] OR 2ND FLR;  Service: General;  Laterality: N/A;     Family History   Problem Relation Age of Onset    Urolithiasis Father     Celiac disease Sister     Hypertension Maternal Grandmother     Hypertension  Maternal Grandfather     Prostate cancer Neg Hx     Kidney disease Neg Hx      Social History     Tobacco Use    Smoking status: Never Smoker    Smokeless tobacco: Never Used    Tobacco comment: Pt states he has smoked 1 or 2 cigarettes in his life.   Substance Use Topics    Alcohol use: Not Currently    Drug use: No     Review of Systems   Constitutional: Positive for fever.   HENT: Negative for sore throat.    Respiratory: Negative for shortness of breath.    Cardiovascular: Negative for chest pain.   Gastrointestinal: Positive for abdominal pain, diarrhea, nausea and vomiting.   Genitourinary: Negative for dysuria.   Musculoskeletal: Negative for back pain.   Skin: Positive for color change and wound.   Neurological: Negative for weakness.   Hematological: Does not bruise/bleed easily.       Physical Exam     Initial Vitals [08/10/22 0846]   BP Pulse Resp Temp SpO2   (!) 158/95 104 18 99.7 °F (37.6 °C) 99 %      MAP       --         Physical Exam    Nursing note and vitals reviewed.  Constitutional: He appears well-developed and well-nourished. He is not diaphoretic. He appears distressed.   HENT:   Head: Normocephalic and atraumatic.   Neck: Neck supple.   Normal range of motion.  Cardiovascular: Normal rate, regular rhythm, normal heart sounds and intact distal pulses.   Pulmonary/Chest: Breath sounds normal. No respiratory distress. He has no wheezes. He has no rhonchi. He has no rales.   Abdominal: Abdomen is soft. He exhibits no distension. There is abdominal tenderness.   Midline vertical abdominal incision with minimal serosanguineous drainage. There is tenderness around the surgical site.   Musculoskeletal:         General: No tenderness or edema. Normal range of motion.      Cervical back: Normal range of motion and neck supple.     Neurological: He is alert and oriented to person, place, and time. He has normal strength. No cranial nerve deficit or sensory deficit.   Skin: Skin is warm and dry.          ED Course   Procedures  Labs Reviewed   CBC W/ AUTO DIFFERENTIAL - Abnormal; Notable for the following components:       Result Value    MCV 78 (*)     MCH 26.4 (*)     RDW 14.8 (*)     Lymph # 0.7 (*)     Lymph % 14.8 (*)     Eosinophil % 10.0 (*)     All other components within normal limits   COMPREHENSIVE METABOLIC PANEL - Abnormal; Notable for the following components:    Potassium 3.4 (*)     Total Bilirubin 2.1 (*)     ALT 58 (*)     All other components within normal limits   URINALYSIS, REFLEX TO URINE CULTURE - Abnormal; Notable for the following components:    Specific Gravity, UA >1.030 (*)     Protein, UA Trace (*)     Ketones, UA 1+ (*)     All other components within normal limits    Narrative:     Specimen Source->Urine   LACTIC ACID, PLASMA   HIV 1 / 2 ANTIBODY   HEPATITIS C ANTIBODY          Imaging Results    None          Medications   lactated ringers bolus 1,000 mL (0 mLs Intravenous Stopped 8/10/22 1209)   morphine injection 6 mg (6 mg Intravenous Given 8/10/22 1024)   morphine injection 6 mg (6 mg Intravenous Given by Other 8/10/22 1212)     Medical Decision Making:   History:   Old Medical Records: I decided to obtain old medical records.  Initial Assessment:   This is an emergent evaluation of 41 y.o. male patient presenting with post-operative wound drainage and fever. He took Tylenol PTA. He is afebrile and tachycardic at 104. Abdomen with minimal drainage and surrounding tenderness.   Differential Diagnosis:   Seroma, hematoma, abscess, wound dehiscence. There is low suspicion for obstruction or ileus as he is having diarrhea.   Clinical Tests:   Lab Tests: Ordered and Reviewed  ED Management:  -Labs  -Morphine  -IV fluids  -General surgery consult  Other:   I have discussed this case with another health care provider.       <> Summary of the Discussion: General surgery          Scribe Attestation:   Scribe #1: I performed the above scribed service and the documentation  accurately describes the services I performed. I attest to the accuracy of the note.        ED Course as of 08/10/22 1249   Wed Aug 10, 2022   1022 D/w general surgery,will eval patient   [GM]   1102 Lactate, Teo: 1.8 [GM]   1102 Potassium(!): 3.4 [GM]   1102 BUN: 17 [GM]   1102 WBC: 4.58 [GM]   1123 UA negative   [GM]   1233 Discussed with General surgery, recommend discharge with antibiotics.  Follow-up with Dr. Morrison in clinic.  Return to ED for worsening symptoms. [GM]      ED Course User Index  [GM] Mary Najera MD          I Mary Najera have personally performed the services described in this documentation by the scribe, in my presence, and it is both accurate and complete.      Clinical Impression:   Final diagnoses:  [G89.18] Post-op pain (Primary)  [Z48.89] Encounter for post surgical wound check  [L76.34] Postoperative seroma of skin after non-dermatologic procedure          ED Disposition Condition    Discharge Stable        ED Prescriptions     Medication Sig Dispense Start Date End Date Auth. Provider    sulfamethoxazole-trimethoprim 800-160mg (BACTRIM DS) 800-160 mg Tab Take 1 tablet by mouth 2 (two) times daily. for 7 days 14 tablet 8/10/2022 8/17/2022 Mary Najera MD        Follow-up Information     Follow up With Specialties Details Why Contact Info    Robson Morrison MD General Surgery Call  For wound re-check [id number] [facility] [address]  [phone]             Mary Najera MD  08/11/22 8843

## 2022-08-11 LAB — HCV AB SERPL QL IA: NEGATIVE

## 2022-09-02 NOTE — PROGRESS NOTES
Subjective:       Patient ID: Solo Black is a 39 y.o. male.    Chief Complaint: Hematuria, Urinary Frequency, and Nocturia    Patient is new to me. He is a 40 yo WM who is here today for evaluation of gross hematuria, difficulty voiding, urinary frequency, and urgency. He is currently taking tamsulosin 0.4 mg as needed. CT of abdomen and pelvis was performed on 8/22/2020. No urolithiasis, renal cyst, or renal mass noted. No known cause noted for gross hematuria noted on CT. Results discussed with patient.     Hematuria  This is a new problem. Episode onset: 3 months ago. The problem has been gradually worsening since onset. He describes the hematuria as microscopic hematuria. The hematuria occurs during the terminal portion of his urinary stream. He reports no clotting in his urine stream. His pain is at a severity of 5/10. The pain is moderate. He describes his urine color as tea colored. Irritative symptoms include frequency, nocturia (x4-5) and urgency. Associated symptoms include abdominal pain, dysuria, hesitancy, nausea and vomiting. Pertinent negatives include no chills, fever, flank pain, genital pain or inability to urinate. There is no history of hypertension,  trauma, kidney stones or UTI.     Review of Systems   Constitutional: Positive for fatigue. Negative for appetite change, chills and fever.   Respiratory: Negative.    Cardiovascular: Negative.    Gastrointestinal: Positive for abdominal pain, diarrhea, nausea and vomiting. Negative for constipation.   Genitourinary: Positive for difficulty urinating (straining and hesitancy), dysuria, frequency, hematuria, hesitancy, nocturia (x4-5) and urgency. Negative for decreased urine volume, discharge, flank pain, penile pain, penile swelling, scrotal swelling and testicular pain.        Nocturia x4-5.  Feeling of incomplete bladder emptying.   Neurological: Positive for dizziness and headaches.   Psychiatric/Behavioral: Negative for agitation and  EENMT confusion. The patient is not nervous/anxious.          Objective:      Physical Exam  Vitals signs and nursing note reviewed.   Constitutional:       General: He is not in acute distress.     Appearance: He is well-developed. He is not ill-appearing.   HENT:      Head: Normocephalic and atraumatic.   Eyes:      Pupils: Pupils are equal, round, and reactive to light.   Neck:      Musculoskeletal: Normal range of motion.   Cardiovascular:      Rate and Rhythm: Normal rate.   Pulmonary:      Effort: Pulmonary effort is normal. No respiratory distress.   Abdominal:      Palpations: Abdomen is soft.      Tenderness: There is no abdominal tenderness.   Genitourinary:     Prostate: Enlarged. Not tender and no nodules present.   Musculoskeletal: Normal range of motion.   Skin:     General: Skin is warm and dry.   Neurological:      Mental Status: He is alert and oriented to person, place, and time.      Coordination: Coordination normal.   Psychiatric:         Mood and Affect: Mood normal.         Behavior: Behavior normal.         Thought Content: Thought content normal.         Judgment: Judgment normal.         Assessment:       1. Gross hematuria    2. Benign prostatic hyperplasia with nocturia    3. Difficulty voiding    4. Feeling of incomplete bladder emptying    5. Dysuria    6. Urinary frequency    7. Urinary urgency        Plan:       Solo was seen today for hematuria, urinary frequency, nocturia and urinary tract infection.    Diagnoses and all orders for this visit:    Gross hematuria  -     POCT URINE DIPSTICK WITHOUT MICROSCOPE  -     Urine culture    Benign prostatic hyperplasia with nocturia    Difficulty voiding  -     POCT URINE DIPSTICK WITHOUT MICROSCOPE  -     Urine culture  -     tamsulosin (FLOMAX) 0.4 mg Cap; Take 1 capsule (0.4 mg total) by mouth every evening.    Feeling of incomplete bladder emptying  -     POCT URINE DIPSTICK WITHOUT MICROSCOPE  -     Urine culture  -     tamsulosin (FLOMAX)  0.4 mg Cap; Take 1 capsule (0.4 mg total) by mouth every evening.    Dysuria  -     POCT URINE DIPSTICK WITHOUT MICROSCOPE  -     Urine culture    Urinary frequency  -     POCT URINE DIPSTICK WITHOUT MICROSCOPE  -     Urine culture    Urinary urgency  -     POCT URINE DIPSTICK WITHOUT MICROSCOPE  -     Urine culture    Other orders  1. Schedule patient for cystoscopy by Dr. Moss for gross hematuria if urine cx is normal.   2. Start taking tamsulosin 0.4 mg at bedtime for difficulty voiding.   3. LEONARD today.    Follow-up pending urine cx result.     Anupama Hobbs NP       [[email or web address]_OB_FT:[id number]],[[email or web address]_OBGYN_ALL_OB_FT:[email or web address]]

## 2022-09-26 ENCOUNTER — PATIENT MESSAGE (OUTPATIENT)
Dept: SURGERY | Facility: CLINIC | Age: 42
End: 2022-09-26
Payer: MEDICAID

## 2022-09-29 ENCOUNTER — PATIENT MESSAGE (OUTPATIENT)
Dept: SURGERY | Facility: CLINIC | Age: 42
End: 2022-09-29
Payer: MEDICAID

## 2022-09-29 DIAGNOSIS — D72.119 HYPEREOSINOPHILIC SYNDROME, UNSPECIFIED TYPE: Primary | ICD-10-CM

## 2022-10-04 ENCOUNTER — PATIENT MESSAGE (OUTPATIENT)
Dept: SURGERY | Facility: HOSPITAL | Age: 42
End: 2022-10-04
Payer: MEDICAID

## 2022-10-05 ENCOUNTER — ANESTHESIA EVENT (OUTPATIENT)
Dept: SURGERY | Facility: HOSPITAL | Age: 42
End: 2022-10-05
Payer: MEDICAID

## 2022-10-05 ENCOUNTER — TELEPHONE (OUTPATIENT)
Dept: SURGERY | Facility: CLINIC | Age: 42
End: 2022-10-05
Payer: MEDICAID

## 2022-10-05 NOTE — PRE-PROCEDURE INSTRUCTIONS
PreOp Instructions given:   - Verbal medication information (what to hold and what to take)   - NPO guidelines 2300  - Arrival place directions given; time to be given the day before procedure by the   Surgeon's Office 0515 DOS  - Bathing with antibacterial soap   - Don't wear any jewelry or bring any valuables AM of surgery   - No makeup or moisturizer to face   - No perfume/cologne, powder, lotions or aftershave   Pt. verbalized understanding.   Pt denies any h/o Anesthesia/Sedation complications or side effects.  Very difficult IV stick

## 2022-10-06 ENCOUNTER — ANESTHESIA (OUTPATIENT)
Dept: SURGERY | Facility: HOSPITAL | Age: 42
End: 2022-10-06
Payer: MEDICAID

## 2022-10-06 ENCOUNTER — HOSPITAL ENCOUNTER (OUTPATIENT)
Facility: HOSPITAL | Age: 42
Discharge: HOME | End: 2022-10-06
Attending: SURGERY | Admitting: SURGERY
Payer: MEDICAID

## 2022-10-06 VITALS
HEIGHT: 72 IN | DIASTOLIC BLOOD PRESSURE: 82 MMHG | RESPIRATION RATE: 19 BRPM | WEIGHT: 185 LBS | HEART RATE: 62 BPM | TEMPERATURE: 98 F | OXYGEN SATURATION: 100 % | BODY MASS INDEX: 25.06 KG/M2 | SYSTOLIC BLOOD PRESSURE: 141 MMHG

## 2022-10-06 DIAGNOSIS — D72.119 HYPEREOSINOPHILIC SYNDROME: Primary | ICD-10-CM

## 2022-10-06 PROCEDURE — 71000044 HC DOSC ROUTINE RECOVERY FIRST HOUR: Performed by: SURGERY

## 2022-10-06 PROCEDURE — 77001 FLUOROGUIDE FOR VEIN DEVICE: CPT | Mod: 26,,, | Performed by: SURGERY

## 2022-10-06 PROCEDURE — 36561 INSERT TUNNELED CV CATH: CPT | Mod: LT,,, | Performed by: SURGERY

## 2022-10-06 PROCEDURE — 77001 CHG FLUOROGUIDE CNTRL VEN ACCESS,PLACE,REPLACE,REMOVE: ICD-10-PCS | Mod: 26,,, | Performed by: SURGERY

## 2022-10-06 PROCEDURE — 37000009 HC ANESTHESIA EA ADD 15 MINS: Performed by: SURGERY

## 2022-10-06 PROCEDURE — D9220A PRA ANESTHESIA: ICD-10-PCS | Mod: ANES,,, | Performed by: ANESTHESIOLOGY

## 2022-10-06 PROCEDURE — C1788 PORT, INDWELLING, IMP: HCPCS | Performed by: SURGERY

## 2022-10-06 PROCEDURE — 36000706: Performed by: SURGERY

## 2022-10-06 PROCEDURE — 25000003 PHARM REV CODE 250: Performed by: NURSE ANESTHETIST, CERTIFIED REGISTERED

## 2022-10-06 PROCEDURE — 71000045 HC DOSC ROUTINE RECOVERY EA ADD'L HR: Performed by: SURGERY

## 2022-10-06 PROCEDURE — 25000003 PHARM REV CODE 250: Performed by: SURGERY

## 2022-10-06 PROCEDURE — D9220A PRA ANESTHESIA: ICD-10-PCS | Mod: CRNA,,, | Performed by: NURSE ANESTHETIST, CERTIFIED REGISTERED

## 2022-10-06 PROCEDURE — D9220A PRA ANESTHESIA: Mod: ANES,,, | Performed by: ANESTHESIOLOGY

## 2022-10-06 PROCEDURE — 63600175 PHARM REV CODE 636 W HCPCS: Performed by: ANESTHESIOLOGY

## 2022-10-06 PROCEDURE — 00532 ANES ACCESS CTR VENOUS CRCJ: CPT | Performed by: SURGERY

## 2022-10-06 PROCEDURE — 71000015 HC POSTOP RECOV 1ST HR: Performed by: SURGERY

## 2022-10-06 PROCEDURE — D9220A PRA ANESTHESIA: Mod: CRNA,,, | Performed by: NURSE ANESTHETIST, CERTIFIED REGISTERED

## 2022-10-06 PROCEDURE — 37000008 HC ANESTHESIA 1ST 15 MINUTES: Performed by: SURGERY

## 2022-10-06 PROCEDURE — 36000707: Performed by: SURGERY

## 2022-10-06 PROCEDURE — 25000003 PHARM REV CODE 250

## 2022-10-06 PROCEDURE — 36561 PR INSERT TUNNELED CV CATH WITH PORT: ICD-10-PCS | Mod: LT,,, | Performed by: SURGERY

## 2022-10-06 PROCEDURE — 63600175 PHARM REV CODE 636 W HCPCS: Performed by: NURSE ANESTHETIST, CERTIFIED REGISTERED

## 2022-10-06 DEVICE — KIT POWERPORT SINGLE 8FR: Type: IMPLANTABLE DEVICE | Site: CHEST | Status: FUNCTIONAL

## 2022-10-06 RX ORDER — DEXMEDETOMIDINE HYDROCHLORIDE 100 UG/ML
INJECTION, SOLUTION INTRAVENOUS
Status: DISCONTINUED | OUTPATIENT
Start: 2022-10-06 | End: 2022-10-06

## 2022-10-06 RX ORDER — BUPIVACAINE HYDROCHLORIDE 5 MG/ML
INJECTION, SOLUTION EPIDURAL; INTRACAUDAL
Status: DISCONTINUED | OUTPATIENT
Start: 2022-10-06 | End: 2022-10-06 | Stop reason: HOSPADM

## 2022-10-06 RX ORDER — MIDAZOLAM HYDROCHLORIDE 1 MG/ML
INJECTION, SOLUTION INTRAMUSCULAR; INTRAVENOUS
Status: DISCONTINUED | OUTPATIENT
Start: 2022-10-06 | End: 2022-10-06

## 2022-10-06 RX ORDER — CLINDAMYCIN PHOSPHATE 900 MG/50ML
900 INJECTION, SOLUTION INTRAVENOUS
Status: COMPLETED | OUTPATIENT
Start: 2022-10-06 | End: 2022-10-06

## 2022-10-06 RX ORDER — OXYCODONE HYDROCHLORIDE 5 MG/1
5 TABLET ORAL EVERY 6 HOURS PRN
Qty: 5 TABLET | Refills: 0 | Status: ON HOLD | OUTPATIENT
Start: 2022-10-06 | End: 2023-03-13 | Stop reason: HOSPADM

## 2022-10-06 RX ORDER — SODIUM CHLORIDE 9 MG/ML
INJECTION, SOLUTION INTRAVENOUS CONTINUOUS
Status: DISCONTINUED | OUTPATIENT
Start: 2022-10-06 | End: 2022-10-06 | Stop reason: HOSPADM

## 2022-10-06 RX ORDER — FENTANYL CITRATE 50 UG/ML
25 INJECTION, SOLUTION INTRAMUSCULAR; INTRAVENOUS
Status: DISCONTINUED | OUTPATIENT
Start: 2022-10-06 | End: 2022-10-06 | Stop reason: HOSPADM

## 2022-10-06 RX ORDER — OXYCODONE HYDROCHLORIDE 5 MG/1
TABLET ORAL
Status: DISCONTINUED
Start: 2022-10-06 | End: 2022-10-06 | Stop reason: HOSPADM

## 2022-10-06 RX ORDER — PROPOFOL 10 MG/ML
VIAL (ML) INTRAVENOUS CONTINUOUS PRN
Status: DISCONTINUED | OUTPATIENT
Start: 2022-10-06 | End: 2022-10-06

## 2022-10-06 RX ORDER — SODIUM CHLORIDE 0.9 % (FLUSH) 0.9 %
10 SYRINGE (ML) INJECTION
Status: DISCONTINUED | OUTPATIENT
Start: 2022-10-06 | End: 2022-10-06 | Stop reason: HOSPADM

## 2022-10-06 RX ORDER — FENTANYL CITRATE 50 UG/ML
INJECTION, SOLUTION INTRAMUSCULAR; INTRAVENOUS
Status: DISCONTINUED | OUTPATIENT
Start: 2022-10-06 | End: 2022-10-06

## 2022-10-06 RX ORDER — OXYCODONE HYDROCHLORIDE 5 MG/1
5 TABLET ORAL ONCE
Status: COMPLETED | OUTPATIENT
Start: 2022-10-06 | End: 2022-10-06

## 2022-10-06 RX ORDER — LIDOCAINE HYDROCHLORIDE 20 MG/ML
INJECTION INTRAVENOUS
Status: DISCONTINUED | OUTPATIENT
Start: 2022-10-06 | End: 2022-10-06

## 2022-10-06 RX ADMIN — FENTANYL CITRATE 50 MCG: 50 INJECTION, SOLUTION INTRAMUSCULAR; INTRAVENOUS at 07:10

## 2022-10-06 RX ADMIN — PROPOFOL 200 MCG/KG/MIN: 10 INJECTION, EMULSION INTRAVENOUS at 07:10

## 2022-10-06 RX ADMIN — PROPOFOL 50 MG: 10 INJECTION, EMULSION INTRAVENOUS at 08:10

## 2022-10-06 RX ADMIN — CLINDAMYCIN IN 5 PERCENT DEXTROSE 900 MG: 18 INJECTION, SOLUTION INTRAVENOUS at 07:10

## 2022-10-06 RX ADMIN — DEXMEDETOMIDINE HYDROCHLORIDE 8 MCG: 100 INJECTION, SOLUTION INTRAVENOUS at 07:10

## 2022-10-06 RX ADMIN — DEXMEDETOMIDINE HYDROCHLORIDE 4 MCG: 100 INJECTION, SOLUTION INTRAVENOUS at 07:10

## 2022-10-06 RX ADMIN — MIDAZOLAM HYDROCHLORIDE 2 MG: 1 INJECTION, SOLUTION INTRAMUSCULAR; INTRAVENOUS at 07:10

## 2022-10-06 RX ADMIN — OXYCODONE 5 MG: 5 TABLET ORAL at 09:10

## 2022-10-06 RX ADMIN — LIDOCAINE HYDROCHLORIDE 100 MG: 20 INJECTION INTRAVENOUS at 07:10

## 2022-10-06 RX ADMIN — DEXMEDETOMIDINE HYDROCHLORIDE 8 MCG: 100 INJECTION, SOLUTION INTRAVENOUS at 08:10

## 2022-10-06 RX ADMIN — FENTANYL CITRATE 25 MCG: 0.05 INJECTION, SOLUTION INTRAMUSCULAR; INTRAVENOUS at 10:10

## 2022-10-06 RX ADMIN — SODIUM CHLORIDE: 0.9 INJECTION, SOLUTION INTRAVENOUS at 07:10

## 2022-10-06 RX ADMIN — FENTANYL CITRATE 25 MCG: 0.05 INJECTION, SOLUTION INTRAMUSCULAR; INTRAVENOUS at 09:10

## 2022-10-06 NOTE — SUBJECTIVE & OBJECTIVE
No current facility-administered medications on file prior to encounter.     Current Outpatient Medications on File Prior to Encounter   Medication Sig    albuterol (PROVENTIL/VENTOLIN HFA) 90 mcg/actuation inhaler INHALE 2 PUFFS INTO THE LUNGS EVERY 6 HOURS AS NEEDED FOR WHEEZING OR SHORTNESS OF BREATH. RESCUE.    artificial tears (ISOPTO TEARS) 0.5 % ophthalmic solution Place 1 drop into both eyes 6 (six) times daily.    cetirizine (ZYRTEC) 10 MG tablet Take 20 mg by mouth 2 (two) times a day.     cholecalciferol, vitamin D3, (VITAMIN D3) 25 mcg (1,000 unit) capsule Take 1 capsule (1,000 Units total) by mouth once daily.    dextroamphetamine-amphetamine (ADDERALL) 20 mg tablet Take 1 tablet by mouth 2 (two) times daily.    diphenhydrAMINE (SOMINEX) 25 mg tablet Take 25 mg by mouth nightly as needed.     ferrous sulfate 325 (65 FE) MG EC tablet Take 1 tablet (325 mg total) by mouth once daily. (Patient taking differently: Take 325 mg by mouth nightly.)    hydrOXYzine HCL (ATARAX) 25 MG tablet Take 1 tablet (25 mg total) by mouth 3 (three) times daily as needed for Itching.    Lactobacillus acidophilus 10 billion cell Cap Take 1 capsule by mouth once daily.     ondansetron (ZOFRAN-ODT) 4 MG TbDL Take 4 mg by mouth.    pantoprazole (PROTONIX) 40 MG tablet Take 1 tablet (40 mg total) by mouth once daily. (Patient taking differently: Take 40 mg by mouth 2 (two) times daily.)    paroxetine (PAXIL) 20 MG tablet Take 20 mg by mouth nightly.    predniSONE (DELTASONE) 10 MG tablet Take 5 mg by mouth once daily.    vitamin D3-vitamin K2 1000-90 unit-mcg TbDL Take by mouth once daily.     docusate sodium (COLACE) 100 MG capsule Take 1 capsule (100 mg total) by mouth 2 (two) times daily.    EPINEPHrine (EPIPEN) 0.3 mg/0.3 mL AtIn Inject 0.3 mLs (0.3 mg total) into the muscle as needed.    fluconazole (DIFLUCAN) 200 MG Tab     heparin, porcine, PF, (HEPARIN FLUSH 100 UNITS/ML) 100 unit/mL Syrg     HYDROcodone-acetaminophen  "(NORCO)  mg per tablet Take 1 tablet by mouth every 4 (four) hours as needed for Pain.    lidocaine HCL 2% (XYLOCAINE) 2 % jelly Apply topically 3 (three) times daily as needed (Hemorrhoids).    mepolizumab (NUCALA) 100 mg/mL Syrg Inject 3 mLs (300 mg total) into the skin every 28 days.    nystatin (MYCOSTATIN) 100,000 unit/mL suspension Take by mouth as needed.     oxyCODONE-acetaminophen (PERCOCET) 5-325 mg per tablet Take 1 tablet by mouth every 4 (four) hours as needed for Pain.    promethazine (PHENERGAN) 25 MG tablet Take 25 mg by mouth every 4 (four) hours.    sucralfate (CARAFATE) 1 gram tablet Take 1 g by mouth before meals and at bedtime as needed.     [DISCONTINUED] metoclopramide HCl (REGLAN) 10 MG tablet Take 10 mg by mouth as needed.    [DISCONTINUED] oxyCODONE (ROXICODONE) 5 MG immediate release tablet Take 1 tablet (5 mg total) by mouth every 4 (four) hours as needed for Pain. (Patient not taking: Reported on 10/5/2022)       Review of patient's allergies indicates:   Allergen Reactions    Bean Diarrhea, Edema, Hives, Nausea And Vomiting and Swelling    Legumes Nausea And Vomiting and Swelling     Other reaction(s): GI Intolerance  vomiting  vomiting  Pt reports legumes make his lips swell and induce severe vomiting  Pt reports legumes make his lips swell and induce severe vomiting      Nsaids (non-steroidal anti-inflammatory drug)      GI bleed    Bean pod extract      Lips swelling, vomiting  Lips swelling, vomiting      Ketamine      Severe dysphoria (bad trip)    Lentils      Lips swelling, vomiting  Lips swelling, vomiting      Meloxicam Nausea Only     GI bleed    Peas      Lips swelling, vomiting    Penicillins      Has taken third gen cephalosporins without issue per patient report    Haloperidol Anxiety and Other (See Comments)     "feels like crawling out of his skin"         Past Medical History:   Diagnosis Date    Arthritis     C. difficile colitis feb 2014    postop of hand " surgery    Cancer     Encounter for blood transfusion     Eosinophilic esophagitis 3/11/2014    GERD (gastroesophageal reflux disease)     Hypereosinophilic syndrome     IBS (irritable bowel syndrome)     Leukemia     MRSA carrier     says multiple times nose swab was +    Nephrolithiasis apr 2014    bilateral punctate - never passed a stone    Septic prepatellar bursitis of right knee 1/12/2021    Urinary tract infection feb 2014    MRSA     Past Surgical History:   Procedure Laterality Date    APPENDECTOMY      ARTHROSCOPY OF SHOULDER WITH REMOVAL OF DISTAL CLAVICLE Right 11/1/2018    Procedure: ARTHROSCOPY, SHOULDER, WITH DISTAL CLAVICLE EXCISION;  Surgeon: Gabino Mejia MD;  Location: [facility];  Service: Orthopedics;  Laterality: Right;  video    BACK SURGERY      BLADDER FULGURATION N/A 9/18/2020    Procedure: FULGURATION, BLADDER;  Surgeon: Randall Moss MD;  Location: [facility];  Service: Urology;  Laterality: N/A;  blood vessels of bladder neck and prostate    BONE MARROW BIOPSY Left 10/1/2020    Procedure: Biopsy-bone marrow;  Surgeon: Trung Polanco MD;  Location: [facility];  Service: Oncology;  Laterality: Left;    CIRCUMCISION, PRIMARY      COLONOSCOPY N/A 11/14/2018    Procedure: COLONOSCOPY;  Surgeon: Mliton Brunson MD;  Location: [facility];  Service: Endoscopy;  Laterality: N/A;    COLONOSCOPY N/A 7/20/2020    Procedure: COLONOSCOPY;  Surgeon: Ruslan Tillman MD;  Location: [facility];  Service: Endoscopy;  Laterality: N/A;    CYSTOSCOPY W/ RETROGRADES Bilateral 9/18/2020    Procedure: CYSTOSCOPY, WITH RETROGRADE PYELOGRAM;  Surgeon: Randall Moss MD;  Location: [facility];  Service: Urology;  Laterality: Bilateral;    CYSTOURETHROSCOPY N/A 2/1/2021    Procedure: CYSTOURETHROSCOPY, short placement;  Surgeon: Boni Benites MD;  Location: [address];  Service: Urology;  Laterality: N/A;    DILATION OF URETHRA N/A 9/18/2020    Procedure: DILATION, URETHRA;  Surgeon: Randall POSADAS  MD Ajay;  Location: [facility] OR;  Service: Urology;  Laterality: N/A;    drainage of abscess from hand  2009    MRSA    drainage of abscess from R thigh  2006    MRSA    ESOPHAGOGASTRODUODENOSCOPY  3/11/2014    ESOPHAGOGASTRODUODENOSCOPY N/A 9/5/2018    Procedure: EGD (ESOPHAGOGASTRODUODENOSCOPY);  Surgeon: Kolby Wall MD;  Location: [facility];  Service: Endoscopy;  Laterality: N/A;    ESOPHAGOGASTRODUODENOSCOPY N/A 3/25/2020    Procedure: EGD (ESOPHAGOGASTRODUODENOSCOPY);  Surgeon: Ruslan Tillman MD;  Location: [facility];  Service: Endoscopy;  Laterality: N/A;    ESOPHAGOGASTRODUODENOSCOPY N/A 7/20/2020    Procedure: EGD (ESOPHAGOGASTRODUODENOSCOPY);  Surgeon: Ruslan Tillman MD;  Location: [facility];  Service: Endoscopy;  Laterality: N/A;  Patient is a very hard stick, requires anesthesia stick.    ESOPHAGOGASTRODUODENOSCOPY N/A 8/31/2020    Procedure: EGD (ESOPHAGOGASTRODUODENOSCOPY);  Surgeon: Kolby Wall MD;  Location: [facility];  Service: Endoscopy;  Laterality: N/A;    ESOPHAGOGASTRODUODENOSCOPY N/A 3/3/2021    Procedure: EGD (ESOPHAGOGASTRODUODENOSCOPY);  Surgeon: Ruslan Tillman MD;  Location: [facility];  Service: Endoscopy;  Laterality: N/A;    ESOPHAGOGASTRODUODENOSCOPY N/A 7/12/2021    Procedure: EGD (ESOPHAGOGASTRODUODENOSCOPY);  Surgeon: Kolby Wall MD;  Location: [facility];  Service: Endoscopy;  Laterality: N/A;    FLEXIBLE SIGMOIDOSCOPY N/A 9/5/2018    Procedure: SIGMOIDOSCOPY, FLEXIBLE;  Surgeon: Kolby Wall MD;  Location: [facility];  Service: Endoscopy;  Laterality: N/A;    HAND SURGERY  jan 2014    power saw fell on hand - laceration 3 tendons    INCISION AND DRAINAGE OF KNEE Right 1/13/2021    Procedure: INCISION AND DRAINAGE, KNEE;  Surgeon: Sony Linton Jr., MD;  Location: [facility] OR;  Service: Orthopedics;  Laterality: Right;    INSERTION OF TUNNELED CENTRAL VENOUS CATHETER (CVC) WITH SUBCUTANEOUS PORT Right 11/24/2021    Procedure: Right  port-a-cath removal and exchange.;  Surgeon: Robson Morrison MD;  [facility];  Service: General;  Laterality: Right;  Right internal jugular    INSERTION OF TUNNELED CENTRAL VENOUS CATHETER (CVC) WITH SUBCUTANEOUS PORT Left 3/31/2022    Procedure: INSERTION, SINGLE LUMEN CATHETER WITH PORT, WITH FLUOROSCOPIC GUIDANCE Left Poss Right Chest;  Surgeon: Robson Morrison MD;  [facility];  Service: General;  Laterality: Left;    INSERTION OF VENOUS ACCESS PORT Right 8/21/2020    Procedure: INSERTION, VENOUS ACCESS PORT;  Surgeon: Troy Honeycutt MD;  Location: [facility] OR;  Service: General;  Laterality: Right;    NISSEN FUNDOPLICATION      REVISION OF SCAR N/A 4/7/2021    Procedure: REVISION, SCAR;  Surgeon: Robson Morrison MD;  [facility];  Service: General;  Laterality: N/A;    UMBILICAL HERNIA REPAIR N/A 4/7/2021    Procedure: REPAIR, HERNIA, UMBILICAL, AGE 5 YEARS OR OLDER;  Surgeon: Robson Morrison MD;  [facility];  Service: General;  Laterality: N/A;     Family History       Problem Relation (Age of Onset)    Celiac disease Sister    Hypertension Maternal Grandmother, Maternal Grandfather    Urolithiasis Father          Tobacco Use    Smoking status: Never    Smokeless tobacco: Never    Tobacco comments:     Pt states he has smoked 1 or 2 cigarettes in his life.   Substance and Sexual Activity    Alcohol use: Not Currently    Drug use: No    Sexual activity: Not Currently     Partners: Female     Review of Systems   Constitutional:  Negative for chills and fever.   HENT:  Negative for facial swelling and sore throat.    Eyes:  Negative for pain and visual disturbance.   Respiratory:  Negative for chest tightness and shortness of breath.    Cardiovascular:  Negative for chest pain and palpitations.   Gastrointestinal:  Negative for abdominal pain and nausea.   Genitourinary:  Negative for difficulty urinating and hematuria.   Musculoskeletal:   Negative for back pain and neck pain.   Neurological:  Negative for dizziness and headaches.   Objective:     Vital Signs (Most Recent):  Temp: 98 °F (36.7 °C) (10/06/22 0601)  Pulse: 64 (10/06/22 0601)  Resp: 18 (10/06/22 0601)  BP: (!) 154/96 (10/06/22 0601)  SpO2: 100 % (10/06/22 0601)   Vital Signs (24h Range):  Temp:  [98 °F (36.7 °C)] 98 °F (36.7 °C)  Pulse:  [64] 64  Resp:  [18] 18  SpO2:  [100 %] 100 %  BP: (154)/(96) 154/96     Weight: 83.9 kg (185 lb)  Body mass index is 25.09 kg/m².    Physical Exam  Vitals and nursing note reviewed.   Constitutional:       Appearance: Normal appearance. He is not ill-appearing.   HENT:      Head: Normocephalic.      Mouth/Throat:      Mouth: Mucous membranes are moist.      Pharynx: Oropharynx is clear.   Eyes:      General: No scleral icterus.     Extraocular Movements: Extraocular movements intact.      Conjunctiva/sclera: Conjunctivae normal.   Cardiovascular:      Rate and Rhythm: Normal rate.      Pulses: Normal pulses.   Pulmonary:      Effort: Pulmonary effort is normal. No respiratory distress.      Breath sounds: No wheezing.   Abdominal:      General: Abdomen is flat. There is no distension.      Palpations: Abdomen is soft.   Musculoskeletal:         General: No swelling or tenderness. Normal range of motion.      Cervical back: Normal range of motion.   Skin:     General: Skin is warm and dry.      Coloration: Skin is not jaundiced or pale.   Neurological:      General: No focal deficit present.      Mental Status: He is alert and oriented to person, place, and time.   Psychiatric:         Mood and Affect: Mood normal.       Significant Labs:  I have reviewed all pertinent lab results within the past 24 hours.    Significant Diagnostics:  I have reviewed all pertinent imaging results/findings within the past 24 hours.

## 2022-10-06 NOTE — TRANSFER OF CARE
Anesthesia Transfer of Care Note    Patient: Solo Black    Procedure(s) Performed: Procedure(s) (LRB):  INSERTION, SINGLE LUMEN CATHETER WITH PORT, WITH FLUOROSCOPIC GUIDANCE Left Possible Right Chest (Right)    Patient location: [facility]    Anesthesia Type: general    Transport from OR: Transported from OR on 6-10 L/min O2 by face mask with adequate spontaneous ventilation    Post pain: adequate analgesia    Post assessment: no apparent anesthetic complications and tolerated procedure well    Post vital signs: stable    Level of consciousness: sedated    Nausea/Vomiting: no nausea/vomiting    Complications: none    Transfer of care protocol was followed      Last vitals:   Visit Vitals  BP (!) 154/96 (BP Location: Right arm, Patient Position: Lying)   Pulse 64   Temp 36.7 °C (98 °F) (Temporal)   Resp 18   Ht 6' (1.829 m)   Wt 83.9 kg (185 lb)   SpO2 100%   BMI 25.09 kg/m²

## 2022-10-06 NOTE — PLAN OF CARE
Patient discharged to home via wheelchair, escorted by his aunt, Altagracia. Pt alert and talkative, vitals stable, tolerating PO intake. Discharge instructions (written and verbal) and follow-up information given to patient who verbalized understanding, as well as a readiness for discharge. C contact info provided for additional questions following discharge.     Home meds delivered by Pharmacy.

## 2022-10-06 NOTE — H&P
[facility] - [facility] (Formerly [facility])  General Surgery  History & Physical    Patient Name: Solo Black  MRN: [id number]  Admission Date: 10/6/2022  Attending Physician: Robson Morrison MD   Primary Care Provider: Primary Doctor No    Patient information was obtained from patient and past medical records.     Subjective:     Chief Complaint/Reason for Admission: port placement    History of Present Illness: Solo Black is a 41 y.o. male with a history of idiopathic hypereosinophilic syndrome. He has undergone port placement twice previously with the first 8/21/2020 (right subclavian). This port malfunctioned and he then had a new port placed in the right internal jugular vein 11/24/2021. Unfortunately this port got infected leading to a hospitalization and so had to be removed. He has since completed a course of antibiotics and it has been over 30 days since the prior line was removed.    Had R IJ port placed 3/31/22 which subsequently was removed again due to infection.             No current facility-administered medications on file prior to encounter.     Current Outpatient Medications on File Prior to Encounter   Medication Sig    albuterol (PROVENTIL/VENTOLIN HFA) 90 mcg/actuation inhaler INHALE 2 PUFFS INTO THE LUNGS EVERY 6 HOURS AS NEEDED FOR WHEEZING OR SHORTNESS OF BREATH. RESCUE.    artificial tears (ISOPTO TEARS) 0.5 % ophthalmic solution Place 1 drop into both eyes 6 (six) times daily.    cetirizine (ZYRTEC) 10 MG tablet Take 20 mg by mouth 2 (two) times a day.     cholecalciferol, vitamin D3, (VITAMIN D3) 25 mcg (1,000 unit) capsule Take 1 capsule (1,000 Units total) by mouth once daily.    dextroamphetamine-amphetamine (ADDERALL) 20 mg tablet Take 1 tablet by mouth 2 (two) times daily.    diphenhydrAMINE (SOMINEX) 25 mg tablet Take 25 mg by mouth nightly as needed.     ferrous sulfate 325 (65 FE) MG EC tablet Take 1 tablet (325 mg total) by mouth once daily. (Patient taking differently: Take  325 mg by mouth nightly.)    hydrOXYzine HCL (ATARAX) 25 MG tablet Take 1 tablet (25 mg total) by mouth 3 (three) times daily as needed for Itching.    Lactobacillus acidophilus 10 billion cell Cap Take 1 capsule by mouth once daily.     ondansetron (ZOFRAN-ODT) 4 MG TbDL Take 4 mg by mouth.    pantoprazole (PROTONIX) 40 MG tablet Take 1 tablet (40 mg total) by mouth once daily. (Patient taking differently: Take 40 mg by mouth 2 (two) times daily.)    paroxetine (PAXIL) 20 MG tablet Take 20 mg by mouth nightly.    predniSONE (DELTASONE) 10 MG tablet Take 5 mg by mouth once daily.    vitamin D3-vitamin K2 1000-90 unit-mcg TbDL Take by mouth once daily.     docusate sodium (COLACE) 100 MG capsule Take 1 capsule (100 mg total) by mouth 2 (two) times daily.    EPINEPHrine (EPIPEN) 0.3 mg/0.3 mL AtIn Inject 0.3 mLs (0.3 mg total) into the muscle as needed.    fluconazole (DIFLUCAN) 200 MG Tab     heparin, porcine, PF, (HEPARIN FLUSH 100 UNITS/ML) 100 unit/mL Syrg     HYDROcodone-acetaminophen (NORCO)  mg per tablet Take 1 tablet by mouth every 4 (four) hours as needed for Pain.    lidocaine HCL 2% (XYLOCAINE) 2 % jelly Apply topically 3 (three) times daily as needed (Hemorrhoids).    mepolizumab (NUCALA) 100 mg/mL Syrg Inject 3 mLs (300 mg total) into the skin every 28 days.    nystatin (MYCOSTATIN) 100,000 unit/mL suspension Take by mouth as needed.     oxyCODONE-acetaminophen (PERCOCET) 5-325 mg per tablet Take 1 tablet by mouth every 4 (four) hours as needed for Pain.    promethazine (PHENERGAN) 25 MG tablet Take 25 mg by mouth every 4 (four) hours.    sucralfate (CARAFATE) 1 gram tablet Take 1 g by mouth before meals and at bedtime as needed.     [DISCONTINUED] metoclopramide HCl (REGLAN) 10 MG tablet Take 10 mg by mouth as needed.    [DISCONTINUED] oxyCODONE (ROXICODONE) 5 MG immediate release tablet Take 1 tablet (5 mg total) by mouth every 4 (four) hours as needed for Pain. (Patient not  "taking: Reported on 10/5/2022)       Review of patient's allergies indicates:   Allergen Reactions    Bean Diarrhea, Edema, Hives, Nausea And Vomiting and Swelling    Legumes Nausea And Vomiting and Swelling     Other reaction(s): GI Intolerance  vomiting  vomiting  Pt reports legumes make his lips swell and induce severe vomiting  Pt reports legumes make his lips swell and induce severe vomiting      Nsaids (non-steroidal anti-inflammatory drug)      GI bleed    Bean pod extract      Lips swelling, vomiting  Lips swelling, vomiting      Ketamine      Severe dysphoria (bad trip)    Lentils      Lips swelling, vomiting  Lips swelling, vomiting      Meloxicam Nausea Only     GI bleed    Peas      Lips swelling, vomiting    Penicillins      Has taken third gen cephalosporins without issue per patient report    Haloperidol Anxiety and Other (See Comments)     "feels like crawling out of his skin"         Past Medical History:   Diagnosis Date    Arthritis     C. difficile colitis feb 2014    postop of hand surgery    Cancer     Encounter for blood transfusion     Eosinophilic esophagitis 3/11/2014    GERD (gastroesophageal reflux disease)     Hypereosinophilic syndrome     IBS (irritable bowel syndrome)     Leukemia     MRSA carrier     says multiple times nose swab was +    Nephrolithiasis apr 2014    bilateral punctate - never passed a stone    Septic prepatellar bursitis of right knee 1/12/2021    Urinary tract infection feb 2014    MRSA     Past Surgical History:   Procedure Laterality Date    APPENDECTOMY      ARTHROSCOPY OF SHOULDER WITH REMOVAL OF DISTAL CLAVICLE Right 11/1/2018    Procedure: ARTHROSCOPY, SHOULDER, WITH DISTAL CLAVICLE EXCISION;  Surgeon: Gabino Mejia MD;  Location: [facility] OR;  Service: Orthopedics;  Laterality: Right;  video    BACK SURGERY      BLADDER FULGURATION N/A 9/18/2020    Procedure: FULGURATION, BLADDER;  Surgeon: Randall Moss MD;  Location: [facility] OR; "  Service: Urology;  Laterality: N/A;  blood vessels of bladder neck and prostate    BONE MARROW BIOPSY Left 10/1/2020    Procedure: Biopsy-bone marrow;  Surgeon: Trung Polanco MD;  [facility];  Service: Oncology;  Laterality: Left;    CIRCUMCISION, PRIMARY      COLONOSCOPY N/A 11/14/2018    Procedure: COLONOSCOPY;  Surgeon: Milton Brunson MD;  Location: [facility];  Service: Endoscopy;  Laterality: N/A;    COLONOSCOPY N/A 7/20/2020    Procedure: COLONOSCOPY;  Surgeon: Ruslan Tillman MD;  Location: [facility];  Service: Endoscopy;  Laterality: N/A;    CYSTOSCOPY W/ RETROGRADES Bilateral 9/18/2020    Procedure: CYSTOSCOPY, WITH RETROGRADE PYELOGRAM;  Surgeon: Randall Moss MD;  Location: [facility];  Service: Urology;  Laterality: Bilateral;    CYSTOURETHROSCOPY N/A 2/1/2021    Procedure: CYSTOURETHROSCOPY, short placement;  Surgeon: Boni Benites MD;  Location: [address];  Service: Urology;  Laterality: N/A;    DILATION OF URETHRA N/A 9/18/2020    Procedure: DILATION, URETHRA;  Surgeon: Randall Moss MD;  Location: [facility];  Service: Urology;  Laterality: N/A;    drainage of abscess from hand  2009    MRSA    drainage of abscess from R thigh  2006    MRSA    ESOPHAGOGASTRODUODENOSCOPY  3/11/2014    ESOPHAGOGASTRODUODENOSCOPY N/A 9/5/2018    Procedure: EGD (ESOPHAGOGASTRODUODENOSCOPY);  Surgeon: Kolby Wall MD;  Location: [facility];  Service: Endoscopy;  Laterality: N/A;    ESOPHAGOGASTRODUODENOSCOPY N/A 3/25/2020    Procedure: EGD (ESOPHAGOGASTRODUODENOSCOPY);  Surgeon: Ruslan Tillman MD;  Location: [facility];  Service: Endoscopy;  Laterality: N/A;    ESOPHAGOGASTRODUODENOSCOPY N/A 7/20/2020    Procedure: EGD (ESOPHAGOGASTRODUODENOSCOPY);  Surgeon: Ruslan Tillman MD;  Location: [facility];  Service: Endoscopy;  Laterality: N/A;  Patient is a very hard stick, requires anesthesia stick.    ESOPHAGOGASTRODUODENOSCOPY N/A 8/31/2020    Procedure: EGD  (ESOPHAGOGASTRODUODENOSCOPY);  Surgeon: Kolby Wall MD;  Location: [facility] ENDO;  Service: Endoscopy;  Laterality: N/A;    ESOPHAGOGASTRODUODENOSCOPY N/A 3/3/2021    Procedure: EGD (ESOPHAGOGASTRODUODENOSCOPY);  Surgeon: Ruslan Tillman MD;  Location: [facility] ENDO;  Service: Endoscopy;  Laterality: N/A;    ESOPHAGOGASTRODUODENOSCOPY N/A 7/12/2021    Procedure: EGD (ESOPHAGOGASTRODUODENOSCOPY);  Surgeon: Kolby Wall MD;  Location: [facility] ENDO;  Service: Endoscopy;  Laterality: N/A;    FLEXIBLE SIGMOIDOSCOPY N/A 9/5/2018    Procedure: SIGMOIDOSCOPY, FLEXIBLE;  Surgeon: Kolby Wall MD;  Location: [facility] ENDO;  Service: Endoscopy;  Laterality: N/A;    HAND SURGERY  jan 2014    power saw fell on hand - laceration 3 tendons    INCISION AND DRAINAGE OF KNEE Right 1/13/2021    Procedure: INCISION AND DRAINAGE, KNEE;  Surgeon: Sony Linton Jr., MD;  Location: [facility];  Service: Orthopedics;  Laterality: Right;    INSERTION OF TUNNELED CENTRAL VENOUS CATHETER (CVC) WITH SUBCUTANEOUS PORT Right 11/24/2021    Procedure: Right port-a-cath removal and exchange.;  Surgeon: Robson Morrison MD;  Location: [facility] OR [address] [place] [address];  Service: General;  Laterality: Right;  Right internal jugular    INSERTION OF TUNNELED CENTRAL VENOUS CATHETER (CVC) WITH SUBCUTANEOUS PORT Left 3/31/2022    Procedure: INSERTION, SINGLE LUMEN CATHETER WITH PORT, WITH FLUOROSCOPIC GUIDANCE Left Poss Right Chest;  Surgeon: Robson Morrison MD;  Location: [facility] OR 2ND FLR;  Service: General;  Laterality: Left;    INSERTION OF VENOUS ACCESS PORT Right 8/21/2020    Procedure: INSERTION, VENOUS ACCESS PORT;  Surgeon: Troy Honeycutt MD;  Location: [facility];  Service: General;  Laterality: Right;    NISSEN FUNDOPLICATION      REVISION OF SCAR N/A 4/7/2021    Procedure: REVISION, SCAR;  Surgeon: Robson Morrison MD;  Location: [facility] OR 2ND FLR;  Service: General;  Laterality: N/A;    UMBILICAL HERNIA REPAIR N/A 4/7/2021     Procedure: REPAIR, HERNIA, UMBILICAL, AGE 5 YEARS OR OLDER;  Surgeon: Robson Morrison MD;  Location: [facility] OR [address] [place] [address];  Service: General;  Laterality: N/A;     Family History       Problem Relation (Age of Onset)    Celiac disease Sister    Hypertension Maternal Grandmother, Maternal Grandfather    Urolithiasis Father          Tobacco Use    Smoking status: Never    Smokeless tobacco: Never    Tobacco comments:     Pt states he has smoked 1 or 2 cigarettes in his life.   Substance and Sexual Activity    Alcohol use: Not Currently    Drug use: No    Sexual activity: Not Currently     Partners: Female     Review of Systems   Constitutional:  Negative for chills and fever.   HENT:  Negative for facial swelling and sore throat.    Eyes:  Negative for pain and visual disturbance.   Respiratory:  Negative for chest tightness and shortness of breath.    Cardiovascular:  Negative for chest pain and palpitations.   Gastrointestinal:  Negative for abdominal pain and nausea.   Genitourinary:  Negative for difficulty urinating and hematuria.   Musculoskeletal:  Negative for back pain and neck pain.   Neurological:  Negative for dizziness and headaches.   Objective:     Vital Signs (Most Recent):  Temp: 98 °F (36.7 °C) (10/06/22 0601)  Pulse: 64 (10/06/22 0601)  Resp: 18 (10/06/22 0601)  BP: (!) 154/96 (10/06/22 0601)  SpO2: 100 % (10/06/22 0601)   Vital Signs (24h Range):  Temp:  [98 °F (36.7 °C)] 98 °F (36.7 °C)  Pulse:  [64] 64  Resp:  [18] 18  SpO2:  [100 %] 100 %  BP: (154)/(96) 154/96     Weight: 83.9 kg (185 lb)  Body mass index is 25.09 kg/m².    Physical Exam  Vitals and nursing note reviewed.   Constitutional:       Appearance: Normal appearance. He is not ill-appearing.   HENT:      Head: Normocephalic.      Mouth/Throat:      Mouth: Mucous membranes are moist.      Pharynx: Oropharynx is clear.   Eyes:      General: No scleral icterus.     Extraocular Movements: Extraocular movements intact.       Conjunctiva/sclera: Conjunctivae normal.   Cardiovascular:      Rate and Rhythm: Normal rate.      Pulses: Normal pulses.   Pulmonary:      Effort: Pulmonary effort is normal. No respiratory distress.      Breath sounds: No wheezing.   Abdominal:      General: Abdomen is flat. There is no distension.      Palpations: Abdomen is soft.   Musculoskeletal:         General: No swelling or tenderness. Normal range of motion.      Cervical back: Normal range of motion.   Skin:     General: Skin is warm and dry.      Coloration: Skin is not jaundiced or pale.   Neurological:      General: No focal deficit present.      Mental Status: He is alert and oriented to person, place, and time.   Psychiatric:         Mood and Affect: Mood normal.       Significant Labs:  I have reviewed all pertinent lab results within the past 24 hours.    Significant Diagnostics:  I have reviewed all pertinent imaging results/findings within the past 24 hours.      Assessment/Plan:     Hypereosinophilic syndrome  42 yo male w hypereosinophilic sx + recurrent port infections.     R IJ port placement today      VTE Risk Mitigation (From admission, onward)         Ordered     IP VTE HIGH RISK PATIENT  Once         10/06/22 0546     Place sequential compression device  Until discontinued         10/06/22 0546                Lennie Clemente MD  General Surgery  [facility] [address] [facility] - Surgery ([facility])

## 2022-10-06 NOTE — PROGRESS NOTES
Unable to obtain PIV. Pt verbalizes he is a difficult stick and usually gets IV placed after induction. Attempted to notify anesthesia, will attempt again.

## 2022-10-06 NOTE — HPI
Solo Black is a 41 y.o. male with a history of idiopathic hypereosinophilic syndrome. He has undergone port placement twice previously with the first 8/21/2020 (right subclavian). This port malfunctioned and he then had a new port placed in the right internal jugular vein 11/24/2021. Unfortunately this port got infected leading to a hospitalization and so had to be removed. He has since completed a course of antibiotics and it has been over 30 days since the prior line was removed.    Had R IJ port placed 3/31/22 which subsequently was removed again due to infection.

## 2022-10-06 NOTE — ANESTHESIA PREPROCEDURE EVALUATION
10/06/2022  Solo Black is a 41 y.o., male.  Pre-operative evaluation for Procedure(s) (LRB):  INSERTION, SINGLE LUMEN CATHETER WITH PORT, WITH FLUOROSCOPIC GUIDANCE Left Possible Right Chest (Right)    Solo Black is a 41 y.o. male     Patient Active Problem List   Diagnosis    Eosinophilic esophagitis    PUD (peptic ulcer disease)    Erosive gastritis    Lifetime need for proton pump inhibitor    Cervicalgia    Adult ADHD    Adverse reaction to nonsteroidal anti-inflammatory drug (NSAID)    Therapeutic opioid induced constipation    Iron deficiency anemia    Eosinophilia    Reactive arthritis    Incomplete rotator cuff tear or rupture of right shoulder, not specified as traumatic    Hiatal hernia    Hypereosinophilic syndrome    Encounter for insertion of venous access port    Port-A-Cath in place    Dry eyes    Skin rash    Chronic neck pain    Myofascial pain syndrome    Status post laminectomy - Thoracic & cervical    Sedentary lifestyle    Adjustment disorder with anxious mood    Internal hemorrhoids    Difficulty voiding    Hematuria    Anxiety    Urticarial rash    Effusion of right knee    Upper GI bleed    Substance or medication-induced anxiety disorder    Hypomagnesemia    Hematemesis    Umbilical hernia    Continuous severe abdominal pain    Intractable nausea and vomiting    Gastric erosions    Hypokalemia    Diarrhea    Lower GI bleed    Psychiatric illness    Abdominal pain    Duodenal ulcer hemorrhagic    Incisional hernia, without obstruction or gangrene       Review of patient's allergies indicates:   Allergen Reactions    Bean Diarrhea, Edema, Hives, Nausea And Vomiting and Swelling    Legumes Nausea And Vomiting and Swelling     Other reaction(s): GI Intolerance  vomiting  vomiting  Pt reports legumes make his lips swell and  "induce severe vomiting  Pt reports legumes make his lips swell and induce severe vomiting      Nsaids (non-steroidal anti-inflammatory drug)      GI bleed    Bean pod extract      Lips swelling, vomiting  Lips swelling, vomiting      Ketamine      Severe dysphoria (bad trip)    Lentils      Lips swelling, vomiting  Lips swelling, vomiting      Meloxicam Nausea Only     GI bleed    Peas      Lips swelling, vomiting    Penicillins      Has taken third gen cephalosporins without issue per patient report    Haloperidol Anxiety and Other (See Comments)     "feels like crawling out of his skin"         No current facility-administered medications on file prior to encounter.     Current Outpatient Medications on File Prior to Encounter   Medication Sig Dispense Refill    albuterol (PROVENTIL/VENTOLIN HFA) 90 mcg/actuation inhaler INHALE 2 PUFFS INTO THE LUNGS EVERY 6 HOURS AS NEEDED FOR WHEEZING OR SHORTNESS OF BREATH. RESCUE. 18 g 2    artificial tears (ISOPTO TEARS) 0.5 % ophthalmic solution Place 1 drop into both eyes 6 (six) times daily.      cetirizine (ZYRTEC) 10 MG tablet Take 20 mg by mouth 2 (two) times a day.       cholecalciferol, vitamin D3, (VITAMIN D3) 25 mcg (1,000 unit) capsule Take 1 capsule (1,000 Units total) by mouth once daily. 90 capsule 1    dextroamphetamine-amphetamine (ADDERALL) 20 mg tablet Take 1 tablet by mouth 2 (two) times daily.      diphenhydrAMINE (SOMINEX) 25 mg tablet Take 25 mg by mouth nightly as needed.       ferrous sulfate 325 (65 FE) MG EC tablet Take 1 tablet (325 mg total) by mouth once daily. (Patient taking differently: Take 325 mg by mouth nightly.) 90 tablet 1    hydrOXYzine HCL (ATARAX) 25 MG tablet Take 1 tablet (25 mg total) by mouth 3 (three) times daily as needed for Itching. 30 tablet 1    Lactobacillus acidophilus 10 billion cell Cap Take 1 capsule by mouth once daily.       ondansetron (ZOFRAN-ODT) 4 MG TbDL Take 4 mg by mouth.      pantoprazole " (PROTONIX) 40 MG tablet Take 1 tablet (40 mg total) by mouth once daily. (Patient taking differently: Take 40 mg by mouth 2 (two) times daily.) 30 tablet 2    paroxetine (PAXIL) 20 MG tablet Take 20 mg by mouth nightly.      predniSONE (DELTASONE) 10 MG tablet Take 5 mg by mouth once daily.      vitamin D3-vitamin K2 1000-90 unit-mcg TbDL Take by mouth once daily.       docusate sodium (COLACE) 100 MG capsule Take 1 capsule (100 mg total) by mouth 2 (two) times daily. 60 capsule 3    EPINEPHrine (EPIPEN) 0.3 mg/0.3 mL AtIn Inject 0.3 mLs (0.3 mg total) into the muscle as needed. 2 each 1    fluconazole (DIFLUCAN) 200 MG Tab       heparin, porcine, PF, (HEPARIN FLUSH 100 UNITS/ML) 100 unit/mL Syrg       HYDROcodone-acetaminophen (NORCO)  mg per tablet Take 1 tablet by mouth every 4 (four) hours as needed for Pain.      lidocaine HCL 2% (XYLOCAINE) 2 % jelly Apply topically 3 (three) times daily as needed (Hemorrhoids). 5 mL 3    mepolizumab (NUCALA) 100 mg/mL Syrg Inject 3 mLs (300 mg total) into the skin every 28 days. 300 mL 11    nystatin (MYCOSTATIN) 100,000 unit/mL suspension Take by mouth as needed.       oxyCODONE-acetaminophen (PERCOCET) 5-325 mg per tablet Take 1 tablet by mouth every 4 (four) hours as needed for Pain.      promethazine (PHENERGAN) 25 MG tablet Take 25 mg by mouth every 4 (four) hours.      sucralfate (CARAFATE) 1 gram tablet Take 1 g by mouth before meals and at bedtime as needed.          Past Surgical History:   Procedure Laterality Date    APPENDECTOMY      ARTHROSCOPY OF SHOULDER WITH REMOVAL OF DISTAL CLAVICLE Right 11/1/2018    Procedure: ARTHROSCOPY, SHOULDER, WITH DISTAL CLAVICLE EXCISION;  Surgeon: Gabino Mejia MD;  Location: [facility] OR;  Service: Orthopedics;  Laterality: Right;  video    BACK SURGERY      BLADDER FULGURATION N/A 9/18/2020    Procedure: FULGURATION, BLADDER;  Surgeon: Randall Moss MD;  Location: [facility] OR;  Service: Urology;   Laterality: N/A;  blood vessels of bladder neck and prostate    BONE MARROW BIOPSY Left 10/1/2020    Procedure: Biopsy-bone marrow;  Surgeon: Trung Polanco MD;  Location: [facility];  Service: Oncology;  Laterality: Left;    CIRCUMCISION, PRIMARY      COLONOSCOPY N/A 11/14/2018    Procedure: COLONOSCOPY;  Surgeon: Milton Brunson MD;  Location: [facility];  Service: Endoscopy;  Laterality: N/A;    COLONOSCOPY N/A 7/20/2020    Procedure: COLONOSCOPY;  Surgeon: Ruslan Tillman MD;  Location[facility];  Service: Endoscopy;  Laterality: N/A;    CYSTOSCOPY W/ RETROGRADES Bilateral 9/18/2020    Procedure: CYSTOSCOPY, WITH RETROGRADE PYELOGRAM;  Surgeon: Randall Moss MD;  Location: [facility];  Service: Urology;  Laterality: Bilateral;    CYSTOURETHROSCOPY N/A 2/1/2021    Procedure: CYSTOURETHROSCOPY, short placement;  Surgeon: Boni Benites MD;  Location: [address];  Service: Urology;  Laterality: N/A;    DILATION OF URETHRA N/A 9/18/2020    Procedure: DILATION, URETHRA;  Surgeon: Randall Moss MD;  Location: [facility];  Service: Urology;  Laterality: N/A;    drainage of abscess from hand  2009    MRSA    drainage of abscess from R thigh  2006    [facility]SA    ESOPHAGOGASTRODUODENOSCOPY  3/11/2014    ESOPHAGOGASTRODUODENOSCOPY N/A 9/5/2018    Procedure: EGD (ESOPHAGOGASTRODUODENOSCOPY);  Surgeon: Kolby Wall MD;  Location: [facility];  Service: Endoscopy;  Laterality: N/A;    ESOPHAGOGASTRODUODENOSCOPY N/A 3/25/2020    Procedure: EGD (ESOPHAGOGASTRODUODENOSCOPY);  Surgeon: Ruslan Tillman MD;  Location: [facility];  Service: Endoscopy;  Laterality: N/A;    ESOPHAGOGASTRODUODENOSCOPY N/A 7/20/2020    Procedure: EGD (ESOPHAGOGASTRODUODENOSCOPY);  Surgeon: Ruslan Tillman MD;  Location: [facility];  Service: Endoscopy;  Laterality: N/A;  Patient is a very hard stick, requires anesthesia stick.    ESOPHAGOGASTRODUODENOSCOPY N/A 8/31/2020    Procedure: EGD  (ESOPHAGOGASTRODUODENOSCOPY);  Surgeon: Kolby Wall MD;  Location: [facility] ENDO;  Service: Endoscopy;  Laterality: N/A;    ESOPHAGOGASTRODUODENOSCOPY N/A 3/3/2021    Procedure: EGD (ESOPHAGOGASTRODUODENOSCOPY);  Surgeon: Ruslan Tillman MD;  Location: [facility] ENDO;  Service: Endoscopy;  Laterality: N/A;    ESOPHAGOGASTRODUODENOSCOPY N/A 7/12/2021    Procedure: EGD (ESOPHAGOGASTRODUODENOSCOPY);  Surgeon: Kolby Wall MD;  Location: [facility] ENDO;  Service: Endoscopy;  Laterality: N/A;    FLEXIBLE SIGMOIDOSCOPY N/A 9/5/2018    Procedure: SIGMOIDOSCOPY, FLEXIBLE;  Surgeon: Kolby Wall MD;  Location: [facility] ENDO;  Service: Endoscopy;  Laterality: N/A;    HAND SURGERY  jan 2014    power saw fell on hand - laceration 3 tendons    INCISION AND DRAINAGE OF KNEE Right 1/13/2021    Procedure: INCISION AND DRAINAGE, KNEE;  Surgeon: Sony Linton Jr., MD;  Location: [facility];  Service: Orthopedics;  Laterality: Right;    INSERTION OF TUNNELED CENTRAL VENOUS CATHETER (CVC) WITH SUBCUTANEOUS PORT Right 11/24/2021    Procedure: Right port-a-cath removal and exchange.;  Surgeon: Robson Morrison MD;  Location: [facility] OR [address] [place] [address];  Service: General;  Laterality: Right;  Right internal jugular    INSERTION OF TUNNELED CENTRAL VENOUS CATHETER (CVC) WITH SUBCUTANEOUS PORT Left 3/31/2022    Procedure: INSERTION, SINGLE LUMEN CATHETER WITH PORT, WITH FLUOROSCOPIC GUIDANCE Left Poss Right Chest;  Surgeon: Robson Morrison MD;  Location: [facility] OR 2ND FLR;  Service: General;  Laterality: Left;    INSERTION OF VENOUS ACCESS PORT Right 8/21/2020    Procedure: INSERTION, VENOUS ACCESS PORT;  Surgeon: Troy Honeycutt MD;  Location: [facility];  Service: General;  Laterality: Right;    NISSEN FUNDOPLICATION      REVISION OF SCAR N/A 4/7/2021    Procedure: REVISION, SCAR;  Surgeon: Robson Morrison MD;  Location: [facility] OR 2ND FLR;  Service: General;  Laterality: N/A;    UMBILICAL HERNIA REPAIR N/A 4/7/2021     Procedure: REPAIR, HERNIA, UMBILICAL, AGE 5 YEARS OR OLDER;  Surgeon: Robson Morrison MD;  Location: [facility] OR [address] [place] [address];  Service: General;  Laterality: N/A;       Social History     Socioeconomic History    Marital status: Single    Number of children: 2   Occupational History    Occupation:  electric forklifts     Employer: [employer]   Tobacco Use    Smoking status: Never    Smokeless tobacco: Never    Tobacco comments:     Pt states he has smoked 1 or 2 cigarettes in his life.   Substance and Sexual Activity    Alcohol use: Not Currently    Drug use: No    Sexual activity: Not Currently     Partners: Female       Pre-op Assessment    I have reviewed the Patient Summary Reports.     I have reviewed the Nursing Notes. I have reviewed the NPO Status.   I have reviewed the Medications.     Review of Systems  Anesthesia Hx:  No problems with previous Anesthesia  History of prior surgery of interest to airway management or planning:  Denies Personal Hx of Anesthesia complications.   Pulmonary:  Pulmonary Normal    Hepatic/GI:   PUD, GERD    Neurological:   Neuromuscular Disease,    Endocrine:  Endocrine Normal        Physical Exam  General: Well nourished, Cooperative, Alert and Oriented    Airway:  Mallampati: / II  Mouth Opening: Normal  Neck ROM: Normal ROM    Dental:  Intact        Anesthesia Plan  Type of Anesthesia, risks & benefits discussed:    Anesthesia Type: Gen Natural Airway  Intra-op Monitoring Plan: Standard ASA Monitors  Post Op Pain Control Plan: multimodal analgesia  Induction:  IV  Airway Plan: Direct, Post-Induction  Informed Consent: Informed consent signed with the Patient and all parties understand the risks and agree with anesthesia plan.  All questions answered.   ASA Score: 2  Anesthesia Plan Notes: Hx of difficult iv stick, will plan to do in OR with ultrasound    Ready For Surgery From Anesthesia Perspective.     .

## 2022-10-06 NOTE — BRIEF OP NOTE
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    DISPOSITION: Home or Self Care    FINAL DIAGNOSIS:  same as pre-op     FOLLOWUP: None    DISCHARGE INSTRUCTIONS:    Discharge Procedure Orders   Diet Adult Regular     No driving until:   Scheduling Instructions: No longer taking narcotic pain medication and can turn around safely without pain.     Notify your health care provider if you experience any of the following:  temperature >100.4     Notify your health care provider if you experience any of the following:  redness, tenderness, or signs of infection (pain, swelling, redness, odor or green/yellow discharge around incision site)     Notify your health care provider if you experience any of the following:  difficulty breathing or increased cough     Notify your health care provider if you experience any of the following:  severe persistent headache     Notify your health care provider if you experience any of the following:  persistent dizziness, light-headedness, or visual disturbances     Notify your health care provider if you experience any of the following:  increased confusion or  weakness     Activity as tolerated   Order Comments: Okay to shower the day after surgery. Please do not submerge wounds underwater (no bath, no pool, no lake, etc.) for at least 2 weeks.

## 2022-10-06 NOTE — OP NOTE
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    INDICATIONS FOR PROCEDURE:  medication administration for hypereosinophilic syndrome    PROCEDURE IN DETAIL:  Once consent was reviewed and a timeout was done in the Operating Room, the patient was positioned in a supine position with a shoulder roll placed behind his two scapulae. The patient's chest was then prepped and draped in the usual surgical sterile fashion.  Landmarks were used to identify the right IJ and this was confirmed with ultrasound.  The finder needle was used.  This was done with 1 attempt without any difficulty.  Upon easy withdrawal of blood the wire was advanced into the needle in the   subclavian vein.  This was visualized under fluoro to be past the diaphragm  In the IVC.  The needle was removed and the wire was left in place.  About 2 cm inferior to the left clavicle a 5cm sharp incision was made and port pocket was created after local infiltration of the area.  A small 0.5 cm thick flap was raised and pocket was created, port fit into pocket well.  The port was sutured in place at two points using a 2-0 Vicryl stitch.  Using the tunneling device the tubing was brought from the port to the IJ incision.  Under fluoroscopy the   dilator and peel-away sheath were advanced over the wire Into the IJ.  The wire/dilator apparatus were removed and the catheter was measured with fluoro and cut to the appropriate length and then was advanced into the peel-away sheath.  The peel-away sheath was  peeled away and the port was flushed with injectable saline without difficulty after aspiration of blood as well as a Hep-Lock with about 3 mL of heparin. The port incision was closed in layers with 3-0 vicryl and 4-0 monocryl.  The IJ incision was reapproximated with 4-0 monocryl. The sites were reinforced with Dermabond.  The patient was awakened from anesthesia without any complications or difficulty and transferred to Recovery Room in stable condition and postoperative chest x-ray will be done to confirm placement of the port, and the patient will be discharged home when he meets Recovery Room criteria.      Pathology none, Complications none, Blood loss minimal     Dr. Morrison was present and available for the entirety of the procedure.

## 2022-10-10 NOTE — ANESTHESIA POSTPROCEDURE EVALUATION
Anesthesia Post Evaluation    Patient: Solo Black    Procedure(s) Performed: Procedure(s) (LRB):  INSERTION, SINGLE LUMEN CATHETER WITH PORT, WITH FLUOROSCOPIC GUIDANCE Left Possible Right Chest (Right)    Final Anesthesia Type: general      Patient location during evaluation: PACU  Patient participation: Yes- Able to Participate  Level of consciousness: awake and alert  Post-procedure vital signs: reviewed and stable  Pain management: adequate  Airway patency: patent    PONV status at discharge: No PONV  Anesthetic complications: no      Cardiovascular status: stable  Respiratory status: unassisted and spontaneous ventilation  Hydration status: euvolemic  Follow-up not needed.          Vitals Value Taken Time   /82 10/06/22 1030   Temp 36.5 °C (97.7 °F) 10/06/22 1030   Pulse 62 10/06/22 1030   Resp 19 10/06/22 1030   SpO2 100 % 10/06/22 1030         No case tracking events are documented in the log.      Pain/Marcia Score: No data recorded

## 2022-10-17 ENCOUNTER — HOSPITAL ENCOUNTER (OUTPATIENT)
Facility: HOSPITAL | Age: 42
Discharge: LEFT BEFORE DISCHARGE | End: 2022-10-18
Attending: INTERNAL MEDICINE | Admitting: INTERNAL MEDICINE
Payer: MEDICAID

## 2022-10-17 DIAGNOSIS — K92.2 GIB (GASTROINTESTINAL BLEEDING): ICD-10-CM

## 2022-10-17 PROCEDURE — G0378 HOSPITAL OBSERVATION PER HR: HCPCS

## 2022-10-17 PROCEDURE — 11000001 HC ACUTE MED/SURG PRIVATE ROOM

## 2022-10-17 PROCEDURE — G0379 DIRECT REFER HOSPITAL OBSERV: HCPCS

## 2022-10-18 VITALS
BODY MASS INDEX: 25.47 KG/M2 | DIASTOLIC BLOOD PRESSURE: 83 MMHG | WEIGHT: 188.06 LBS | OXYGEN SATURATION: 97 % | RESPIRATION RATE: 20 BRPM | SYSTOLIC BLOOD PRESSURE: 131 MMHG | HEART RATE: 77 BPM | HEIGHT: 72 IN | TEMPERATURE: 96 F

## 2022-10-18 PROBLEM — F68.10 MUNCHAUSEN SYNDROME: Status: ACTIVE | Noted: 2022-10-18

## 2022-10-18 PROCEDURE — G0378 HOSPITAL OBSERVATION PER HR: HCPCS

## 2022-10-18 PROCEDURE — C9113 INJ PANTOPRAZOLE SODIUM, VIA: HCPCS | Performed by: HOSPITALIST

## 2022-10-18 PROCEDURE — 63600175 PHARM REV CODE 636 W HCPCS: Performed by: HOSPITALIST

## 2022-10-18 PROCEDURE — 25000003 PHARM REV CODE 250: Performed by: HOSPITALIST

## 2022-10-18 RX ORDER — PREDNISONE 20 MG/1
20 TABLET ORAL DAILY
Status: DISCONTINUED | OUTPATIENT
Start: 2022-10-18 | End: 2022-10-18 | Stop reason: HOSPADM

## 2022-10-18 RX ORDER — PANTOPRAZOLE SODIUM 40 MG/10ML
40 INJECTION, POWDER, LYOPHILIZED, FOR SOLUTION INTRAVENOUS 2 TIMES DAILY
Status: DISCONTINUED | OUTPATIENT
Start: 2022-10-18 | End: 2022-10-18 | Stop reason: HOSPADM

## 2022-10-18 RX ORDER — CETIRIZINE HYDROCHLORIDE 10 MG/1
20 TABLET ORAL 2 TIMES DAILY
Status: DISCONTINUED | OUTPATIENT
Start: 2022-10-18 | End: 2022-10-18 | Stop reason: HOSPADM

## 2022-10-18 RX ORDER — HYDROCODONE BITARTRATE AND ACETAMINOPHEN 5; 325 MG/1; MG/1
1 TABLET ORAL EVERY 6 HOURS PRN
Status: DISCONTINUED | OUTPATIENT
Start: 2022-10-18 | End: 2022-10-18 | Stop reason: HOSPADM

## 2022-10-18 RX ORDER — PAROXETINE HYDROCHLORIDE 20 MG/1
20 TABLET, FILM COATED ORAL NIGHTLY
Status: DISCONTINUED | OUTPATIENT
Start: 2022-10-18 | End: 2022-10-18 | Stop reason: HOSPADM

## 2022-10-18 RX ORDER — CHOLECALCIFEROL (VITAMIN D3) 25 MCG
1000 TABLET ORAL DAILY
Status: DISCONTINUED | OUTPATIENT
Start: 2022-10-18 | End: 2022-10-18 | Stop reason: HOSPADM

## 2022-10-18 RX ORDER — LANOLIN ALCOHOL/MO/W.PET/CERES
1 CREAM (GRAM) TOPICAL DAILY
Status: DISCONTINUED | OUTPATIENT
Start: 2022-10-18 | End: 2022-10-18 | Stop reason: HOSPADM

## 2022-10-18 RX ORDER — MUPIROCIN 20 MG/G
OINTMENT TOPICAL 2 TIMES DAILY
Status: DISCONTINUED | OUTPATIENT
Start: 2022-10-18 | End: 2022-10-18 | Stop reason: HOSPADM

## 2022-10-18 RX ADMIN — HYDROCODONE BITARTRATE AND ACETAMINOPHEN 1 TABLET: 5; 325 TABLET ORAL at 01:10

## 2022-10-18 RX ADMIN — PAROXETINE HYDROCHLORIDE 20 MG: 20 TABLET, FILM COATED ORAL at 01:10

## 2022-10-18 RX ADMIN — PANTOPRAZOLE SODIUM 40 MG: 40 INJECTION, POWDER, FOR SOLUTION INTRAVENOUS at 01:10

## 2022-10-18 RX ADMIN — HYPROMELLOSE 2910 1 DROP: 5 SOLUTION OPHTHALMIC at 01:10

## 2022-10-18 RX ADMIN — POTASSIUM BICARBONATE 40 MEQ: 391 TABLET, EFFERVESCENT ORAL at 01:10

## 2022-10-18 RX ADMIN — CETIRIZINE HYDROCHLORIDE 20 MG: 10 TABLET, FILM COATED ORAL at 01:10

## 2022-10-18 NOTE — ASSESSMENT & PLAN NOTE
- history of peptic ulcers; reportedly has had perforated ulcer in the past, although this was not taking care of at our healthcare system  -continue Protonix via IV

## 2022-10-18 NOTE — NURSING
"Patient stated "I need to sign out AMA due to my grandpaw currently dying" I informed the patient that I need to get in touch with the doctor because the doctor have to come speak with him regarding the risks of leaving AMA. Dr. Manuel notified and aware and informed me that he would be able to come see the patient in 15 mins. I informed the doctor that the patient is severely agitated and wants to leave now. Doctor Manuel okayed for him to sign the AMA form now.  Security called and notified and is at the bedside. Jeri the charge nurse notified and aware. I educated the patient on this risks of leaving AMA he stated "I don't care I still need to leave." Patient was informed that he would have to wait for the doctor to put in an order to heparinize and deaccess the port. Patient stated I can not wait and Im taking it out myself right now. Patient informed that the port needs to be heparinized or it will clot off. He once again stated "I dont care" Patient then proceeded to pull out his port. Patient then signed his AMA form and was excorted out by security. Patient also left a few of his belongings behind. Patients belongings bagged and labeled. Dr Manuel notified once again that the patient has now left the building.   "

## 2022-10-18 NOTE — H&P
"[facility] Medicine  History & Physical    Patient Name: Solo Black  MRN: [id number]  Patient Class: IP- Inpatient  Admission Date: 10/17/2022  Attending Physician: Trey Manuel MD  Primary Care Provider: Primary Doctor No         Patient information was obtained from patient, past medical records and ER records.     Subjective:     Principal Problem:Hematemesis    Chief Complaint:   Chief Complaint   Patient presents with    GI Bleeding        HPI: Per , "Mr. Black is a 41 y.o. man who has a past medical history of Arthritis, C. difficile colitis, Eosinophilic esophagitis, GERD (gastroesophageal reflux disease), Hypereosinophilic syndrome, IBS (irritable bowel syndrome), Leukemia, MRSA carrier, Nephrolithiasis, Septic prepatellar bursitis of right knee presented with hematemesis. Pt was recently admitted at  for hematemesis. GI was consulted and stated no need for scope at that time. His HGB/HCT were normal at that time and he had no further episodes of hematemesis during his admit thus he was discharged home. On arrival to ED patient was normotensive but tachycardic. See below labs. No significant drop in H/H from baseline. No further episodes while in ED. Hemodynamically stable. Treated with IV Protonix. Seeking transfer for GI. Stable for transfer."    He reports episode sudden onset vomiting blood with clots about 1-1/2 hours prior to presentation to the ED.  Associated with some abdominal discomfort, although this is now resolved.  States that he had melena that developed shortly after this and has been having diarrhea.  He has had multiple scopes in the past, most recently 7/2021, which have been grossly concerning for vasculitis, although not noted on actual pathology.    Patient chart advisory mentions that he has been diagnosed with Munchausen syndrome at multiple different facilities throughout the [place] in the past and has been found with 17 syringes filled with " blood mixed with feces.      Past Medical History:   Diagnosis Date    Arthritis     C. difficile colitis feb 2014    postop of hand surgery    Cancer     Encounter for blood transfusion     Eosinophilic esophagitis 3/11/2014    GERD (gastroesophageal reflux disease)     Hypereosinophilic syndrome     IBS (irritable bowel syndrome)     Leukemia     MRSA carrier     says multiple times nose swab was +    Nephrolithiasis apr 2014    bilateral punctate - never passed a stone    Septic prepatellar bursitis of right knee 1/12/2021    Urinary tract infection feb 2014    MRSA       Past Surgical History:   Procedure Laterality Date    APPENDECTOMY      ARTHROSCOPY OF SHOULDER WITH REMOVAL OF DISTAL CLAVICLE Right 11/1/2018    Procedure: ARTHROSCOPY, SHOULDER, WITH DISTAL CLAVICLE EXCISION;  Surgeon: Gabino Mejia MD;  Location: [facility];  Service: Orthopedics;  Laterality: Right;  video    BACK SURGERY      BLADDER FULGURATION N/A 9/18/2020    Procedure: FULGURATION, BLADDER;  Surgeon: Randall Moss MD;  Location: [facility];  Service: Urology;  Laterality: N/A;  blood vessels of bladder neck and prostate    BONE MARROW BIOPSY Left 10/1/2020    Procedure: Biopsy-bone marrow;  Surgeon: Trung Polanco MD;  Location: [facility];  Service: Oncology;  Laterality: Left;    CIRCUMCISION, PRIMARY      COLONOSCOPY N/A 11/14/2018    Procedure: COLONOSCOPY;  Surgeon: Milton Brunson MD;  Location: [facility];  Service: Endoscopy;  Laterality: N/A;    COLONOSCOPY N/A 7/20/2020    Procedure: COLONOSCOPY;  Surgeon: Ruslan Tillman MD;  Location: [facility];  Service: Endoscopy;  Laterality: N/A;    CYSTOSCOPY W/ RETROGRADES Bilateral 9/18/2020    Procedure: CYSTOSCOPY, WITH RETROGRADE PYELOGRAM;  Surgeon: Randall Moss MD;  Location: [facility];  Service: Urology;  Laterality: Bilateral;    CYSTOURETHROSCOPY N/A 2/1/2021    Procedure: CYSTOURETHROSCOPY, short placement;  Surgeon: Boni Benites MD;   Location: [address] [facility];  Service: Urology;  Laterality: N/A;    DILATION OF URETHRA N/A 9/18/2020    Procedure: DILATION, URETHRA;  Surgeon: Randall Moss MD;  Location: [facility] OR;  Service: Urology;  Laterality: N/A;    drainage of abscess from hand  2009    MRSA    drainage of abscess from R thigh  2006    MRSA    ESOPHAGOGASTRODUODENOSCOPY  3/11/2014    ESOPHAGOGASTRODUODENOSCOPY N/A 9/5/2018    Procedure: EGD (ESOPHAGOGASTRODUODENOSCOPY);  Surgeon: Kolby Wall MD;  Location: [facility];  Service: Endoscopy;  Laterality: N/A;    ESOPHAGOGASTRODUODENOSCOPY N/A 3/25/2020    Procedure: EGD (ESOPHAGOGASTRODUODENOSCOPY);  Surgeon: Ruslan Tillman MD;  Location: [facility];  Service: Endoscopy;  Laterality: N/A;    ESOPHAGOGASTRODUODENOSCOPY N/A 7/20/2020    Procedure: EGD (ESOPHAGOGASTRODUODENOSCOPY);  Surgeon: Ruslan Tillman MD;  Location: [facility];  Service: Endoscopy;  Laterality: N/A;  Patient is a very hard stick, requires anesthesia stick.    ESOPHAGOGASTRODUODENOSCOPY N/A 8/31/2020    Procedure: EGD (ESOPHAGOGASTRODUODENOSCOPY);  Surgeon: Kolby Wall MD;  Location: [facility];  Service: Endoscopy;  Laterality: N/A;    ESOPHAGOGASTRODUODENOSCOPY N/A 3/3/2021    Procedure: EGD (ESOPHAGOGASTRODUODENOSCOPY);  Surgeon: Ruslan Tillman MD;  Location: [facility];  Service: Endoscopy;  Laterality: N/A;    ESOPHAGOGASTRODUODENOSCOPY N/A 7/12/2021    Procedure: EGD (ESOPHAGOGASTRODUODENOSCOPY);  Surgeon: Kolby Wall MD;  Location: [facility];  Service: Endoscopy;  Laterality: N/A;    FLEXIBLE SIGMOIDOSCOPY N/A 9/5/2018    Procedure: SIGMOIDOSCOPY, FLEXIBLE;  Surgeon: Kolby Wall MD;  Location: [facility];  Service: Endoscopy;  Laterality: N/A;    HAND SURGERY  jan 2014    power saw fell on hand - laceration 3 tendons    INCISION AND DRAINAGE OF KNEE Right 1/13/2021    Procedure: INCISION AND DRAINAGE, KNEE;  Surgeon: Sony Linton Jr., MD;  Location:  [facility] OR;  Service: Orthopedics;  Laterality: Right;    INSERTION OF TUNNELED CENTRAL VENOUS CATHETER (CVC) WITH SUBCUTANEOUS PORT Right 11/24/2021    Procedure: Right port-a-cath removal and exchange.;  Surgeon: Robson Morrison MD;  Location: [facility] OR [address] [place] [address];  Service: General;  Laterality: Right;  Right internal jugular    INSERTION OF TUNNELED CENTRAL VENOUS CATHETER (CVC) WITH SUBCUTANEOUS PORT Left 3/31/2022    Procedure: INSERTION, SINGLE LUMEN CATHETER WITH PORT, WITH FLUOROSCOPIC GUIDANCE Left Poss Right Chest;  Surgeon: Robson Morrison MD;  [facility];  Service: General;  Laterality: Left;    INSERTION OF TUNNELED CENTRAL VENOUS CATHETER (CVC) WITH SUBCUTANEOUS PORT Right 10/6/2022    Procedure: INSERTION, SINGLE LUMEN CATHETER WITH PORT, WITH FLUOROSCOPIC GUIDANCE Left Possible Right Chest;  Surgeon: Robson Morrison MD;  Location: [facility] OR [address] [place] [address];  Service: General;  Laterality: Right;    INSERTION OF VENOUS ACCESS PORT Right 8/21/2020    Procedure: INSERTION, VENOUS ACCESS PORT;  Surgeon: Troy Honeycutt MD;  Location: [facility] OR;  Service: General;  Laterality: Right;    NISSEN FUNDOPLICATION      REVISION OF SCAR N/A 4/7/2021    Procedure: REVISION, SCAR;  Surgeon: Robson Morrison MD;  Location: [facility] OR [address] [place] [address];  Service: General;  Laterality: N/A;    UMBILICAL HERNIA REPAIR N/A 4/7/2021    Procedure: REPAIR, HERNIA, UMBILICAL, AGE 5 YEARS OR OLDER;  Surgeon: Robson Morrison MD;  Location: [facility] OR [address] [place] [address];  Service: General;  Laterality: N/A;       Review of patient's allergies indicates:   Allergen Reactions    Bean Diarrhea, Edema, Hives, Nausea And Vomiting and Swelling    Legumes Nausea And Vomiting and Swelling     Other reaction(s): GI Intolerance  vomiting  vomiting  Pt reports legumes make his lips swell and induce severe vomiting  Pt reports legumes make his lips swell and induce severe vomiting      Nsaids (non-steroidal anti-inflammatory drug)      GI  "bleed    Bean pod extract      Lips swelling, vomiting  Lips swelling, vomiting      Ketamine      Severe dysphoria (bad trip)    Lentils      Lips swelling, vomiting  Lips swelling, vomiting      Meloxicam Nausea Only     GI bleed    Peas      Lips swelling, vomiting    Penicillins      Has taken third gen cephalosporins without issue per patient report    Haloperidol Anxiety and Other (See Comments)     "feels like crawling out of his skin"         Current Facility-Administered Medications on File Prior to Encounter   Medication    [COMPLETED] droperidoL injection 2.5 mg    [COMPLETED] HYDROmorphone (PF) injection 1 mg    [COMPLETED] lactated ringers bolus 1,000 mL    [COMPLETED] octreotide injection 100 mcg    [COMPLETED] ondansetron injection 8 mg    [COMPLETED] pantoprazole 40 mg in  mL infusion (ready to mix system)    [COMPLETED] pantoprazole injection 80 mg     Current Outpatient Medications on File Prior to Encounter   Medication Sig    albuterol (PROVENTIL/VENTOLIN HFA) 90 mcg/actuation inhaler INHALE 2 PUFFS INTO THE LUNGS EVERY 6 HOURS AS NEEDED FOR WHEEZING OR SHORTNESS OF BREATH. RESCUE.    artificial tears (ISOPTO TEARS) 0.5 % ophthalmic solution Place 1 drop into both eyes 6 (six) times daily.    cetirizine (ZYRTEC) 10 MG tablet Take 20 mg by mouth 2 (two) times a day.     cholecalciferol, vitamin D3, (VITAMIN D3) 25 mcg (1,000 unit) capsule Take 1 capsule (1,000 Units total) by mouth once daily.    dextroamphetamine-amphetamine (ADDERALL) 20 mg tablet Take 1 tablet by mouth 2 (two) times daily.    diphenhydrAMINE (SOMINEX) 25 mg tablet Take 25 mg by mouth nightly as needed.     docusate sodium (COLACE) 100 MG capsule Take 1 capsule (100 mg total) by mouth 2 (two) times daily.    EPINEPHrine (EPIPEN) 0.3 mg/0.3 mL AtIn Inject 0.3 mLs (0.3 mg total) into the muscle as needed.    ferrous sulfate 325 (65 FE) MG EC tablet Take 1 tablet (325 mg total) by mouth once daily. " (Patient taking differently: Take 325 mg by mouth nightly.)    hydrOXYzine HCL (ATARAX) 25 MG tablet Take 1 tablet (25 mg total) by mouth 3 (three) times daily as needed for Itching.    Lactobacillus acidophilus 10 billion cell Cap Take 1 capsule by mouth once daily.     lidocaine HCL 2% (XYLOCAINE) 2 % jelly Apply topically 3 (three) times daily as needed (Hemorrhoids).    mepolizumab (NUCALA) 100 mg/mL Syrg Inject 3 mLs (300 mg total) into the skin every 28 days.    nystatin (MYCOSTATIN) 100,000 unit/mL suspension Take by mouth as needed.     ondansetron (ZOFRAN-ODT) 4 MG TbDL Take 4 mg by mouth.    oxyCODONE (ROXICODONE) 5 MG immediate release tablet Take 1 tablet (5 mg total) by mouth every 6 (six) hours as needed for Pain.    oxyCODONE-acetaminophen (PERCOCET) 5-325 mg per tablet Take 1 tablet by mouth every 4 (four) hours as needed for Pain.    pantoprazole (PROTONIX) 40 MG tablet Take 1 tablet (40 mg total) by mouth once daily. (Patient taking differently: Take 40 mg by mouth 2 (two) times daily.)    paroxetine (PAXIL) 20 MG tablet Take 20 mg by mouth nightly.    predniSONE (DELTASONE) 10 MG tablet Take 5 mg by mouth once daily.    promethazine (PHENERGAN) 25 MG tablet Take 25 mg by mouth every 4 (four) hours.    sucralfate (CARAFATE) 1 gram tablet Take 1 g by mouth before meals and at bedtime as needed.     vitamin D3-vitamin K2 1000-90 unit-mcg TbDL Take by mouth once daily.     [DISCONTINUED] fluconazole (DIFLUCAN) 200 MG Tab     [DISCONTINUED] heparin, porcine, PF, (HEPARIN FLUSH 100 UNITS/ML) 100 unit/mL Syrg     [DISCONTINUED] HYDROcodone-acetaminophen (NORCO)  mg per tablet Take 1 tablet by mouth every 4 (four) hours as needed for Pain.     Family History       Problem Relation (Age of Onset)    Celiac disease Sister    Hypertension Maternal Grandmother, Maternal Grandfather    Urolithiasis Father          Tobacco Use    Smoking status: Never    Smokeless tobacco: Never     Tobacco comments:     Pt states he has smoked 1 or 2 cigarettes in his life.   Substance and Sexual Activity    Alcohol use: Not Currently    Drug use: No    Sexual activity: Not Currently     Partners: Female     Review of Systems   Constitutional:  Negative for chills, diaphoresis and fever.   HENT:  Negative for congestion and sore throat.    Eyes:  Negative for discharge and visual disturbance.   Respiratory:  Negative for cough and shortness of breath.    Cardiovascular:  Negative for chest pain and leg swelling.   Gastrointestinal:  Positive for abdominal pain, blood in stool, nausea and vomiting (hematemesis).   Genitourinary:  Negative for dysuria and flank pain.   Musculoskeletal:  Negative for arthralgias and joint swelling.   Skin:  Negative for rash and wound.   Allergic/Immunologic: Positive for environmental allergies. Negative for immunocompromised state.   Neurological:  Negative for light-headedness and headaches.   Psychiatric/Behavioral:  Negative for agitation and confusion.    Objective:     Vital Signs (Most Recent):  Temp: 96.1 °F (35.6 °C) (10/17/22 2344)  Pulse: 73 (10/17/22 2344)  Resp: 18 (10/17/22 2344)  BP: 131/83 (10/17/22 2344)  SpO2: 97 % (10/17/22 2344) Vital Signs (24h Range):  Temp:  [96.1 °F (35.6 °C)-98.1 °F (36.7 °C)] 96.1 °F (35.6 °C)  Pulse:  [] 73  Resp:  [13-25] 18  SpO2:  [69 %-100 %] 97 %  BP: (114-148)/() 131/83     Weight: 85.3 kg (188 lb 0.8 oz)  Body mass index is 25.5 kg/m².    Physical Exam  Vitals reviewed.   Constitutional:       General: He is not in acute distress.     Appearance: He is well-developed. He is not diaphoretic.   HENT:      Head: Normocephalic and atraumatic.      Nose: Nose normal.   Eyes:      General: No scleral icterus.     Pupils: Pupils are equal, round, and reactive to light.   Neck:      Vascular: No JVD.      Trachea: No tracheal deviation.   Cardiovascular:      Rate and Rhythm: Normal rate and regular rhythm.      Heart  sounds: Normal heart sounds. No murmur heard.    No friction rub. No gallop.   Pulmonary:      Effort: Pulmonary effort is normal. No respiratory distress.      Breath sounds: Normal breath sounds.   Abdominal:      General: There is no distension.      Palpations: Abdomen is soft. There is no mass.      Tenderness: There is abdominal tenderness (epigastric). There is guarding.      Hernia: No hernia is present.   Musculoskeletal:         General: No deformity.      Cervical back: Normal range of motion.   Skin:     General: Skin is warm and dry.      Findings: No rash.   Neurological:      Mental Status: He is alert and oriented to person, place, and time.   Psychiatric:         Behavior: Behavior normal.         CRANIAL NERVES     CN III, IV, VI   Pupils are equal, round, and reactive to light.     Significant Labs: All pertinent labs within the past 24 hours have been reviewed.    Significant Imaging: I have reviewed all pertinent imaging results/findings within the past 24 hours.    Assessment/Plan:     * Hematemesis  - presentation concerning for upper GI bleed, although with stable hemoglobin; hemodynamically stable  - Pathway initiated  - 2 LBIV  - orthostatics ordered  - initiated on protonix 40mg IV bid  - hold anticoagulation  - trend CBC tid for now; transfuse Hb <7  - NPO  - GI consulted    Munchausen syndrome  -reportedly has been diagnosed with Munchausen syndrome in the past per patient chart advisory; will need to follow-up on this in the a.m.  -it does appear from looking at his multiple prior scopes that the patient does have gastritis and has had nonbleeding ulcers in the past, although it is unclear to me that these are causing symptoms as severe as the patient is reporting   -pathology from these scopes in our system consistent with eosinophilic esophagitis in 2014, although everything else has been consistent with just reactive gastritis   -psychiatry consult  -reportedly has drained blood from  phlebotomy tubes and spit it out of his mouth in the past and has been found with up to 17 syringes with blood in his room during past hospitalizations outside of our system      Abdominal pain  -possibly related to esophagitis      Hypokalemia  -replace   -check magnesium      Hypereosinophilic syndrome  -reported extensively throughout his chart, although I do not see biopsy proven evidence of this   -continue home prednisone for now  -GI consulted      Iron deficiency anemia  -check iron studies to confirm   -hemoglobin appears stable    PUD (peptic ulcer disease)  - history of peptic ulcers; reportedly has had perforated ulcer in the past, although this was not taking care of at our healthcare system  -continue Protonix via IV      Eosinophilic esophagitis  -biopsy proven in 2014   -GI following   -continue steroids    VTE Risk Mitigation (From admission, onward)         Ordered     IP VTE HIGH RISK PATIENT  Once         10/18/22 0002     Place sequential compression device  Until discontinued         10/18/22 0002                   Trey Manuel MD  Department of Hospital Medicine   [place] - Telemetry

## 2022-10-18 NOTE — NURSING
"0410 pt reported that he wanted to leave.   0415 pt advised that he can not leave with port accessed.    0416 pt reported that he "can't wait" for port to be deaccessed.   0417 Nurse advised pt that if port is not heparnized it may clott.  He proceeded to de-access needle from port.  Nurse placed 2x2 gauze   0418 pt walked out with .    "

## 2022-10-18 NOTE — SUBJECTIVE & OBJECTIVE
Past Medical History:   Diagnosis Date    Arthritis     C. difficile colitis feb 2014    postop of hand surgery    Cancer     Encounter for blood transfusion     Eosinophilic esophagitis 3/11/2014    GERD (gastroesophageal reflux disease)     Hypereosinophilic syndrome     IBS (irritable bowel syndrome)     Leukemia     MRSA carrier     says multiple times nose swab was +    Nephrolithiasis apr 2014    bilateral punctate - never passed a stone    Septic prepatellar bursitis of right knee 1/12/2021    Urinary tract infection feb 2014    MRSA       Past Surgical History:   Procedure Laterality Date    APPENDECTOMY      ARTHROSCOPY OF SHOULDER WITH REMOVAL OF DISTAL CLAVICLE Right 11/1/2018    Procedure: ARTHROSCOPY, SHOULDER, WITH DISTAL CLAVICLE EXCISION;  Surgeon: Gabino Mejia MD;  Location: [facility];  Service: Orthopedics;  Laterality: Right;  video    BACK SURGERY      BLADDER FULGURATION N/A 9/18/2020    Procedure: FULGURATION, BLADDER;  Surgeon: Randall Moss MD;  Location: [facility];  Service: Urology;  Laterality: N/A;  blood vessels of bladder neck and prostate    BONE MARROW BIOPSY Left 10/1/2020    Procedure: Biopsy-bone marrow;  Surgeon: Trung Polanco MD;  Location: [facility];  Service: Oncology;  Laterality: Left;    CIRCUMCISION, PRIMARY      COLONOSCOPY N/A 11/14/2018    Procedure: COLONOSCOPY;  Surgeon: Milton Brunson MD;  Location: [facility];  Service: Endoscopy;  Laterality: N/A;    COLONOSCOPY N/A 7/20/2020    Procedure: COLONOSCOPY;  Surgeon: Ruslan Tillman MD;  Location: [facility];  Service: Endoscopy;  Laterality: N/A;    CYSTOSCOPY W/ RETROGRADES Bilateral 9/18/2020    Procedure: CYSTOSCOPY, WITH RETROGRADE PYELOGRAM;  Surgeon: Randall Moss MD;  Location: [facility];  Service: Urology;  Laterality: Bilateral;    CYSTOURETHROSCOPY N/A 2/1/2021    Procedure: CYSTOURETHROSCOPY, short placement;  Surgeon: Boni Benites MD;  Location: [address];  Service: Urology;   Laterality: N/A;    DILATION OF URETHRA N/A 9/18/2020    Procedure: DILATION, URETHRA;  Surgeon: Randall Moss MD;  Location: [facility];  Service: Urology;  Laterality: N/A;    drainage of abscess from hand  2009    MRSA    drainage of abscess from R thigh  2006    MRSA    ESOPHAGOGASTRODUODENOSCOPY  3/11/2014    ESOPHAGOGASTRODUODENOSCOPY N/A 9/5/2018    Procedure: EGD (ESOPHAGOGASTRODUODENOSCOPY);  Surgeon: Kolby Wall MD;  Location: [facility];  Service: Endoscopy;  Laterality: N/A;    ESOPHAGOGASTRODUODENOSCOPY N/A 3/25/2020    Procedure: EGD (ESOPHAGOGASTRODUODENOSCOPY);  Surgeon: Ruslan Tillman MD;  Location: [facility];  Service: Endoscopy;  Laterality: N/A;    ESOPHAGOGASTRODUODENOSCOPY N/A 7/20/2020    Procedure: EGD (ESOPHAGOGASTRODUODENOSCOPY);  Surgeon: Ruslan Tillman MD;  Location: [facility];  Service: Endoscopy;  Laterality: N/A;  Patient is a very hard stick, requires anesthesia stick.    ESOPHAGOGASTRODUODENOSCOPY N/A 8/31/2020    Procedure: EGD (ESOPHAGOGASTRODUODENOSCOPY);  Surgeon: Kolby Wall MD;  Location: [facility];  Service: Endoscopy;  Laterality: N/A;    ESOPHAGOGASTRODUODENOSCOPY N/A 3/3/2021    Procedure: EGD (ESOPHAGOGASTRODUODENOSCOPY);  Surgeon: Ruslan Tillman MD;  Location: [facility];  Service: Endoscopy;  Laterality: N/A;    ESOPHAGOGASTRODUODENOSCOPY N/A 7/12/2021    Procedure: EGD (ESOPHAGOGASTRODUODENOSCOPY);  Surgeon: Kolby Wall MD;  Location: [facility];  Service: Endoscopy;  Laterality: N/A;    FLEXIBLE SIGMOIDOSCOPY N/A 9/5/2018    Procedure: SIGMOIDOSCOPY, FLEXIBLE;  Surgeon: Kolby Wall MD;  Location: [facility];  Service: Endoscopy;  Laterality: N/A;    HAND SURGERY  jan 2014    power saw fell on hand - laceration 3 tendons    INCISION AND DRAINAGE OF KNEE Right 1/13/2021    Procedure: INCISION AND DRAINAGE, KNEE;  Surgeon: Sony Linton Jr., MD;  Location: [facility];  Service: Orthopedics;  Laterality: Right;    INSERTION  OF TUNNELED CENTRAL VENOUS CATHETER (CVC) WITH SUBCUTANEOUS PORT Right 11/24/2021    Procedure: Right port-a-cath removal and exchange.;  Surgeon: Robson Morrison MD;  Location: [facility] OR [address] [place] [address];  Service: General;  Laterality: Right;  Right internal jugular    INSERTION OF TUNNELED CENTRAL VENOUS CATHETER (CVC) WITH SUBCUTANEOUS PORT Left 3/31/2022    Procedure: INSERTION, SINGLE LUMEN CATHETER WITH PORT, WITH FLUOROSCOPIC GUIDANCE Left Poss Right Chest;  Surgeon: Robson Morrison MD;  [facility];  Service: General;  Laterality: Left;    INSERTION OF TUNNELED CENTRAL VENOUS CATHETER (CVC) WITH SUBCUTANEOUS PORT Right 10/6/2022    Procedure: INSERTION, SINGLE LUMEN CATHETER WITH PORT, WITH FLUOROSCOPIC GUIDANCE Left Possible Right Chest;  Surgeon: Robson Morrison MD;  Location: [facility];  Service: General;  Laterality: Right;    INSERTION OF VENOUS ACCESS PORT Right 8/21/2020    Procedure: INSERTION, VENOUS ACCESS PORT;  Surgeon: Troy Honeycutt MD;  Location: [facility];  Service: General;  Laterality: Right;    NISSEN FUNDOPLICATION      REVISION OF SCAR N/A 4/7/2021    Procedure: REVISION, SCAR;  Surgeon: Robson Morrison MD;  Location: [facility] OR [address] [place] [address];  Service: General;  Laterality: N/A;    UMBILICAL HERNIA REPAIR N/A 4/7/2021    Procedure: REPAIR, HERNIA, UMBILICAL, AGE 5 YEARS OR OLDER;  Surgeon: Robson Morrison MD;  Location: [facility] OR [address] [place] [address];  Service: General;  Laterality: N/A;       Review of patient's allergies indicates:   Allergen Reactions    Bean Diarrhea, Edema, Hives, Nausea And Vomiting and Swelling    Legumes Nausea And Vomiting and Swelling     Other reaction(s): GI Intolerance  vomiting  vomiting  Pt reports legumes make his lips swell and induce severe vomiting  Pt reports legumes make his lips swell and induce severe vomiting      Nsaids (non-steroidal anti-inflammatory drug)      GI bleed    Bean pod extract      Lips swelling, vomiting  Lips swelling,  "vomiting      Ketamine      Severe dysphoria (bad trip)    Lentils      Lips swelling, vomiting  Lips swelling, vomiting      Meloxicam Nausea Only     GI bleed    Peas      Lips swelling, vomiting    Penicillins      Has taken third gen cephalosporins without issue per patient report    Haloperidol Anxiety and Other (See Comments)     "feels like crawling out of his skin"         Current Facility-Administered Medications on File Prior to Encounter   Medication    [COMPLETED] droperidoL injection 2.5 mg    [COMPLETED] HYDROmorphone (PF) injection 1 mg    [COMPLETED] lactated ringers bolus 1,000 mL    [COMPLETED] octreotide injection 100 mcg    [COMPLETED] ondansetron injection 8 mg    [COMPLETED] pantoprazole 40 mg in  mL infusion (ready to mix system)    [COMPLETED] pantoprazole injection 80 mg     Current Outpatient Medications on File Prior to Encounter   Medication Sig    albuterol (PROVENTIL/VENTOLIN HFA) 90 mcg/actuation inhaler INHALE 2 PUFFS INTO THE LUNGS EVERY 6 HOURS AS NEEDED FOR WHEEZING OR SHORTNESS OF BREATH. RESCUE.    artificial tears (ISOPTO TEARS) 0.5 % ophthalmic solution Place 1 drop into both eyes 6 (six) times daily.    cetirizine (ZYRTEC) 10 MG tablet Take 20 mg by mouth 2 (two) times a day.     cholecalciferol, vitamin D3, (VITAMIN D3) 25 mcg (1,000 unit) capsule Take 1 capsule (1,000 Units total) by mouth once daily.    dextroamphetamine-amphetamine (ADDERALL) 20 mg tablet Take 1 tablet by mouth 2 (two) times daily.    diphenhydrAMINE (SOMINEX) 25 mg tablet Take 25 mg by mouth nightly as needed.     docusate sodium (COLACE) 100 MG capsule Take 1 capsule (100 mg total) by mouth 2 (two) times daily.    EPINEPHrine (EPIPEN) 0.3 mg/0.3 mL AtIn Inject 0.3 mLs (0.3 mg total) into the muscle as needed.    ferrous sulfate 325 (65 FE) MG EC tablet Take 1 tablet (325 mg total) by mouth once daily. (Patient taking differently: Take 325 mg by mouth nightly.)    hydrOXYzine HCL (ATARAX) 25 MG " tablet Take 1 tablet (25 mg total) by mouth 3 (three) times daily as needed for Itching.    Lactobacillus acidophilus 10 billion cell Cap Take 1 capsule by mouth once daily.     lidocaine HCL 2% (XYLOCAINE) 2 % jelly Apply topically 3 (three) times daily as needed (Hemorrhoids).    mepolizumab (NUCALA) 100 mg/mL Syrg Inject 3 mLs (300 mg total) into the skin every 28 days.    nystatin (MYCOSTATIN) 100,000 unit/mL suspension Take by mouth as needed.     ondansetron (ZOFRAN-ODT) 4 MG TbDL Take 4 mg by mouth.    oxyCODONE (ROXICODONE) 5 MG immediate release tablet Take 1 tablet (5 mg total) by mouth every 6 (six) hours as needed for Pain.    oxyCODONE-acetaminophen (PERCOCET) 5-325 mg per tablet Take 1 tablet by mouth every 4 (four) hours as needed for Pain.    pantoprazole (PROTONIX) 40 MG tablet Take 1 tablet (40 mg total) by mouth once daily. (Patient taking differently: Take 40 mg by mouth 2 (two) times daily.)    paroxetine (PAXIL) 20 MG tablet Take 20 mg by mouth nightly.    predniSONE (DELTASONE) 10 MG tablet Take 5 mg by mouth once daily.    promethazine (PHENERGAN) 25 MG tablet Take 25 mg by mouth every 4 (four) hours.    sucralfate (CARAFATE) 1 gram tablet Take 1 g by mouth before meals and at bedtime as needed.     vitamin D3-vitamin K2 1000-90 unit-mcg TbDL Take by mouth once daily.     [DISCONTINUED] fluconazole (DIFLUCAN) 200 MG Tab     [DISCONTINUED] heparin, porcine, PF, (HEPARIN FLUSH 100 UNITS/ML) 100 unit/mL Syrg     [DISCONTINUED] HYDROcodone-acetaminophen (NORCO)  mg per tablet Take 1 tablet by mouth every 4 (four) hours as needed for Pain.     Family History       Problem Relation (Age of Onset)    Celiac disease Sister    Hypertension Maternal Grandmother, Maternal Grandfather    Urolithiasis Father          Tobacco Use    Smoking status: Never    Smokeless tobacco: Never    Tobacco comments:     Pt states he has smoked 1 or 2 cigarettes in his life.   Substance and Sexual Activity     Alcohol use: Not Currently    Drug use: No    Sexual activity: Not Currently     Partners: Female     Review of Systems   Constitutional:  Negative for chills, diaphoresis and fever.   HENT:  Negative for congestion and sore throat.    Eyes:  Negative for discharge and visual disturbance.   Respiratory:  Negative for cough and shortness of breath.    Cardiovascular:  Negative for chest pain and leg swelling.   Gastrointestinal:  Positive for abdominal pain, blood in stool, nausea and vomiting (hematemesis).   Genitourinary:  Negative for dysuria and flank pain.   Musculoskeletal:  Negative for arthralgias and joint swelling.   Skin:  Negative for rash and wound.   Allergic/Immunologic: Positive for environmental allergies. Negative for immunocompromised state.   Neurological:  Negative for light-headedness and headaches.   Psychiatric/Behavioral:  Negative for agitation and confusion.    Objective:     Vital Signs (Most Recent):  Temp: 96.1 °F (35.6 °C) (10/17/22 2344)  Pulse: 73 (10/17/22 2344)  Resp: 18 (10/17/22 2344)  BP: 131/83 (10/17/22 2344)  SpO2: 97 % (10/17/22 2344) Vital Signs (24h Range):  Temp:  [96.1 °F (35.6 °C)-98.1 °F (36.7 °C)] 96.1 °F (35.6 °C)  Pulse:  [] 73  Resp:  [13-25] 18  SpO2:  [69 %-100 %] 97 %  BP: (114-148)/() 131/83     Weight: 85.3 kg (188 lb 0.8 oz)  Body mass index is 25.5 kg/m².    Physical Exam  Vitals reviewed.   Constitutional:       General: He is not in acute distress.     Appearance: He is well-developed. He is not diaphoretic.   HENT:      Head: Normocephalic and atraumatic.      Nose: Nose normal.   Eyes:      General: No scleral icterus.     Pupils: Pupils are equal, round, and reactive to light.   Neck:      Vascular: No JVD.      Trachea: No tracheal deviation.   Cardiovascular:      Rate and Rhythm: Normal rate and regular rhythm.      Heart sounds: Normal heart sounds. No murmur heard.    No friction rub. No gallop.   Pulmonary:      Effort: Pulmonary  effort is normal. No respiratory distress.      Breath sounds: Normal breath sounds.   Abdominal:      General: There is no distension.      Palpations: Abdomen is soft. There is no mass.      Tenderness: There is abdominal tenderness (epigastric). There is guarding.      Hernia: No hernia is present.   Musculoskeletal:         General: No deformity.      Cervical back: Normal range of motion.   Skin:     General: Skin is warm and dry.      Findings: No rash.   Neurological:      Mental Status: He is alert and oriented to person, place, and time.   Psychiatric:         Behavior: Behavior normal.         CRANIAL NERVES     CN III, IV, VI   Pupils are equal, round, and reactive to light.     Significant Labs: All pertinent labs within the past 24 hours have been reviewed.    Significant Imaging: I have reviewed all pertinent imaging results/findings within the past 24 hours.

## 2022-10-18 NOTE — PLAN OF CARE
Problem: Adult Inpatient Plan of Care  Goal: Plan of Care Review  Outcome: Ongoing, Progressing    VN cued into room to complete admit assessment. VIP model introduced; VN working alongside bedside treatment team.  Plan of care reviewed with patient. Patient informed of fall risk, fall precautions, call light within reach, side rails x2 elevated. Patient notified to ask staff for assistance. Patient verbalized complete understanding. Time allowed for questions. Will continue to monitor and intervene as needed. Chart reviewed.

## 2022-10-18 NOTE — ASSESSMENT & PLAN NOTE
-reported extensively throughout his chart, although I do not see biopsy proven evidence of this   -continue home prednisone for now  -GI consulted

## 2022-10-18 NOTE — ASSESSMENT & PLAN NOTE
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  -it does appear from looking at his multiple prior scopes that the patient does have gastritis and has had nonbleeding ulcers in the past, although it is unclear to me that these are causing symptoms as severe as the patient is reporting   -pathology from these scopes in our system consistent with eosinophilic esophagitis in 2014, although everything else has been consistent with just reactive gastritis   -psychiatry consult  -reportedly has drained blood from phlebotomy tubes and spit it out of his mouth in the past and has been found with up to 17 syringes with blood in his room during past hospitalizations outside of our system

## 2022-10-18 NOTE — ASSESSMENT & PLAN NOTE
- presentation concerning for upper GI bleed, although with stable hemoglobin; hemodynamically stable  - Pathway initiated  - 2 LBIV  - orthostatics ordered  - initiated on protonix 40mg IV bid  - hold anticoagulation  - trend CBC tid for now; transfuse Hb <7  - NPO  - GI consulted

## 2022-10-18 NOTE — HPI
"Per , "Mr. Black is a 41 y.o. man who has a past medical history of Arthritis, C. difficile colitis, Eosinophilic esophagitis, GERD (gastroesophageal reflux disease), Hypereosinophilic syndrome, IBS (irritable bowel syndrome), Leukemia, MRSA carrier, Nephrolithiasis, Septic prepatellar bursitis of right knee presented with hematemesis. Pt was recently admitted at  for hematemesis. GI was consulted and stated no need for scope at that time. His HGB/HCT were normal at that time and he had no further episodes of hematemesis during his admit thus he was discharged home. On arrival to ED patient was normotensive but tachycardic. See below labs. No significant drop in H/H from baseline. No further episodes while in ED. Hemodynamically stable. Treated with IV Protonix. Seeking transfer for GI. Stable for transfer."    He reports episode sudden onset vomiting blood with clots about 1-1/2 hours prior to presentation to the ED.  Associated with some abdominal discomfort, although this is now resolved.  States that he had melena that developed shortly after this and has been having diarrhea.  He has had multiple scopes in the past, most recently 7/2021, which have been grossly concerning for vasculitis, although not noted on actual pathology.    Patient chart advisory mentions that he has been diagnosed with Munchausen syndrome at multiple different facilities throughout the [place] in the past and has been found with 17 syringes filled with blood mixed with feces.  "

## 2022-11-13 NOTE — DISCHARGE SUMMARY
"[facility] Medicine  Discharge Summary      Patient Name: Solo Black  MRN: [id number]  SAIMA: [id number]  Patient Class: OP- Observation  Admission Date: 10/17/2022  Hospital Length of Stay: 0 days  Discharge Date and Time: 10/18/2022  4:29 AM  Attending Physician: Leyda att. providers found   Discharging Provider: Trey Manuel MD  Primary Care Provider: Primary Doctor Leyda    Primary Care Team: Networked reference to record PCT     HPI:   Per , "Mr. Black is a 41 y.o. man who has a past medical history of Arthritis, C. difficile colitis, Eosinophilic esophagitis, GERD (gastroesophageal reflux disease), Hypereosinophilic syndrome, IBS (irritable bowel syndrome), Leukemia, MRSA carrier, Nephrolithiasis, Septic prepatellar bursitis of right knee presented with hematemesis. Pt was recently admitted at  for hematemesis. GI was consulted and stated no need for scope at that time. His HGB/HCT were normal at that time and he had no further episodes of hematemesis during his admit thus he was discharged home. On arrival to ED patient was normotensive but tachycardic. See below labs. No significant drop in H/H from baseline. No further episodes while in ED. Hemodynamically stable. Treated with IV Protonix. Seeking transfer for GI. Stable for transfer."    He reports episode sudden onset vomiting blood with clots about 1-1/2 hours prior to presentation to the ED.  Associated with some abdominal discomfort, although this is now resolved.  States that he had melena that developed shortly after this and has been having diarrhea.  He has had multiple scopes in the past, most recently 7/2021, which have been grossly concerning for vasculitis, although not noted on actual pathology.    Patient chart advisory mentions that he has been diagnosed with Munchausen syndrome at multiple different facilities throughout the [place] in the past and has been found with 17 syringes filled with blood mixed with " feces.      * No surgery found *      Hospital Course:   Mr. Black presented as transfer with concern for hematemesis with stable hemoglobin.  Vitals stable at time of admission.  Upon chart review, saw that patient has been diagnosed with Munchausen syndrome and has been found with syringes his own blood that he has spit out in the past.  He was initiated on GI bleed pathway overnight; however, he eloped in the middle of the night stating to nursing that a family member was acutely ill in the hospital and he needed to leave right away.         Goals of Care Treatment Preferences:  Code Status: Full Code      Consults:     No new Assessment & Plan notes have been filed under this hospital service since the last note was generated.  Service: Hospital Medicine    Final Active Diagnoses:    Diagnosis Date Noted POA    PRINCIPAL PROBLEM:  Hematemesis [K92.0] 03/02/2021 Yes    Munchausen syndrome [F68.10] 10/18/2022 Yes    Abdominal pain [R10.9] 07/29/2021 Yes    Hypokalemia [E87.6] 07/28/2021 Yes    Hypereosinophilic syndrome [D72.119] 08/06/2020 Yes     Chronic    Iron deficiency anemia [D50.9] 09/04/2018 Yes    PUD (peptic ulcer disease) [K27.9] 03/17/2016 Yes     Chronic    Eosinophilic esophagitis [K20.0] 03/11/2014 Yes     Chronic      Problems Resolved During this Admission:       Discharged Condition: against medical advice    Disposition: Left Against Medical Adv*    Follow Up:    Patient Instructions:   No discharge procedures on file.    Significant Diagnostic Studies:  See above    Pending Diagnostic Studies:     None         Medications:  Reconciled Home Medications:      Medication List      ASK your doctor about these medications    albuterol 90 mcg/actuation inhaler  Commonly known as: PROVENTIL/VENTOLIN HFA  INHALE 2 PUFFS INTO THE LUNGS EVERY 6 HOURS AS NEEDED FOR WHEEZING OR SHORTNESS OF BREATH. RESCUE.     artificial tears 0.5 % ophthalmic solution  Commonly known as: ISOPTO TEARS  Place 1  drop into both eyes 6 (six) times daily.     cetirizine 10 MG tablet  Commonly known as: ZYRTEC  Take 20 mg by mouth 2 (two) times a day.     cholecalciferol (vitamin D3) 25 mcg (1,000 unit) capsule  Commonly known as: VITAMIN D3  Take 1 capsule (1,000 Units total) by mouth once daily.     dextroamphetamine-amphetamine 20 mg tablet  Commonly known as: ADDERALL  Take 1 tablet by mouth 2 (two) times daily.     diphenhydrAMINE 25 mg tablet  Commonly known as: SOMINEX  Take 25 mg by mouth nightly as needed.     docusate sodium 100 MG capsule  Commonly known as: COLACE  Take 1 capsule (100 mg total) by mouth 2 (two) times daily.     EPINEPHrine 0.3 mg/0.3 mL Atin  Commonly known as: EPIPEN  Inject 0.3 mLs (0.3 mg total) into the muscle as needed.     ferrous sulfate 325 (65 FE) MG EC tablet  Take 1 tablet (325 mg total) by mouth once daily.     hydrOXYzine HCL 25 MG tablet  Commonly known as: ATARAX  Take 1 tablet (25 mg total) by mouth 3 (three) times daily as needed for Itching.     Lactobacillus acidophilus 10 billion cell Cap  Take 1 capsule by mouth once daily.     LIDOcaine HCL 2% 2 % jelly  Commonly known as: XYLOCAINE  Apply topically 3 (three) times daily as needed (Hemorrhoids).     NUCALA 100 mg/mL Syrg  Generic drug: mepolizumab  Inject 3 mLs (300 mg total) into the skin every 28 days.     ondansetron 4 MG Tbdl  Commonly known as: ZOFRAN-ODT  Take 4 mg by mouth.     oxyCODONE 5 MG immediate release tablet  Commonly known as: ROXICODONE  Take 1 tablet (5 mg total) by mouth every 6 (six) hours as needed for Pain.     oxyCODONE-acetaminophen 5-325 mg per tablet  Commonly known as: PERCOCET  Take 1 tablet by mouth every 4 (four) hours as needed for Pain.     pantoprazole 40 MG tablet  Commonly known as: PROTONIX  Take 1 tablet (40 mg total) by mouth once daily.     paroxetine 20 MG tablet  Commonly known as: PAXIL  Take 20 mg by mouth nightly.     predniSONE 10 MG tablet  Commonly known as: DELTASONE  Take 20 mg  by mouth once daily.     promethazine 25 MG tablet  Commonly known as: PHENERGAN  Take 25 mg by mouth every 4 (four) hours.     sucralfate 1 gram tablet  Commonly known as: CARAFATE  Take 1 g by mouth before meals and at bedtime as needed.     vitamin D3-vitamin K2 1000-90 unit-mcg Tbdl  Take by mouth once daily.            Indwelling Lines/Drains at time of discharge:   Lines/Drains/Airways     Central Venous Catheter Line  Duration                PowerPort A Cath Single Lumen 10/06/22 0743 right internal jugular 38 days                Time spent on the discharge of patient: 15 minutes         Trey Manuel MD  Department of Hospital Medicine  [place] [facility]

## 2022-11-13 NOTE — HOSPITAL COURSE
Mr. Black presented as transfer with concern for hematemesis with stable hemoglobin.  Vitals stable at time of admission.  Upon chart review, saw that patient has been diagnosed with Munchausen syndrome and has been found with syringes his own blood that he has spit out in the past.  He was initiated on GI bleed pathway overnight; however, he eloped in the middle of the night stating to nursing that a family member was acutely ill in the hospital and he needed to leave right away.

## 2023-01-23 PROBLEM — K92.0 HEMATEMESIS: Status: RESOLVED | Noted: 2021-03-02 | Resolved: 2023-01-23

## 2023-01-24 ENCOUNTER — PATIENT MESSAGE (OUTPATIENT)
Dept: SURGERY | Facility: CLINIC | Age: 43
End: 2023-01-24
Payer: MEDICAID

## 2023-01-31 DIAGNOSIS — K43.2 INCISIONAL HERNIA WITHOUT OBSTRUCTION OR GANGRENE: Primary | ICD-10-CM

## 2023-02-06 ENCOUNTER — PATIENT MESSAGE (OUTPATIENT)
Dept: SURGERY | Facility: CLINIC | Age: 43
End: 2023-02-06
Payer: MEDICAID

## 2023-02-22 ENCOUNTER — PATIENT MESSAGE (OUTPATIENT)
Dept: SURGERY | Facility: CLINIC | Age: 43
End: 2023-02-22
Payer: MEDICAID

## 2023-02-24 ENCOUNTER — PATIENT MESSAGE (OUTPATIENT)
Dept: SURGERY | Facility: CLINIC | Age: 43
End: 2023-02-24
Payer: MEDICAID

## 2023-02-28 ENCOUNTER — PATIENT MESSAGE (OUTPATIENT)
Dept: SURGERY | Facility: CLINIC | Age: 43
End: 2023-02-28
Payer: MEDICAID

## 2023-03-07 ENCOUNTER — OFFICE VISIT (OUTPATIENT)
Dept: SURGERY | Facility: CLINIC | Age: 43
End: 2023-03-07
Payer: MEDICAID

## 2023-03-07 VITALS
OXYGEN SATURATION: 99 % | DIASTOLIC BLOOD PRESSURE: 89 MMHG | HEIGHT: 72 IN | SYSTOLIC BLOOD PRESSURE: 156 MMHG | WEIGHT: 189.38 LBS | HEART RATE: 60 BPM | BODY MASS INDEX: 25.65 KG/M2

## 2023-03-07 DIAGNOSIS — K43.2 INCISIONAL HERNIA WITHOUT OBSTRUCTION OR GANGRENE: Primary | ICD-10-CM

## 2023-03-07 PROCEDURE — 99214 OFFICE O/P EST MOD 30 MIN: CPT | Mod: S$PBB,,, | Performed by: SURGERY

## 2023-03-07 PROCEDURE — 3077F PR MOST RECENT SYSTOLIC BLOOD PRESSURE >= 140 MM HG: ICD-10-PCS | Mod: CPTII,,, | Performed by: SURGERY

## 2023-03-07 PROCEDURE — 3008F PR BODY MASS INDEX (BMI) DOCUMENTED: ICD-10-PCS | Mod: CPTII,,, | Performed by: SURGERY

## 2023-03-07 PROCEDURE — 99214 PR OFFICE/OUTPT VISIT, EST, LEVL IV, 30-39 MIN: ICD-10-PCS | Mod: S$PBB,,, | Performed by: SURGERY

## 2023-03-07 PROCEDURE — 99215 OFFICE O/P EST HI 40 MIN: CPT | Mod: PBBFAC | Performed by: SURGERY

## 2023-03-07 PROCEDURE — 99999 PR PBB SHADOW E&M-EST. PATIENT-LVL V: CPT | Mod: PBBFAC,,, | Performed by: SURGERY

## 2023-03-07 PROCEDURE — 1159F PR MEDICATION LIST DOCUMENTED IN MEDICAL RECORD: ICD-10-PCS | Mod: CPTII,,, | Performed by: SURGERY

## 2023-03-07 PROCEDURE — 3077F SYST BP >= 140 MM HG: CPT | Mod: CPTII,,, | Performed by: SURGERY

## 2023-03-07 PROCEDURE — 3008F BODY MASS INDEX DOCD: CPT | Mod: CPTII,,, | Performed by: SURGERY

## 2023-03-07 PROCEDURE — 3079F DIAST BP 80-89 MM HG: CPT | Mod: CPTII,,, | Performed by: SURGERY

## 2023-03-07 PROCEDURE — 1159F MED LIST DOCD IN RCRD: CPT | Mod: CPTII,,, | Performed by: SURGERY

## 2023-03-07 PROCEDURE — 3079F PR MOST RECENT DIASTOLIC BLOOD PRESSURE 80-89 MM HG: ICD-10-PCS | Mod: CPTII,,, | Performed by: SURGERY

## 2023-03-07 PROCEDURE — 99999 PR PBB SHADOW E&M-EST. PATIENT-LVL V: ICD-10-PCS | Mod: PBBFAC,,, | Performed by: SURGERY

## 2023-03-07 NOTE — H&P (VIEW-ONLY)
"History & Physical  General Surgery    SUBJECTIVE:     History of Present Illness:  Patient is a 42 y.o. male presents for evaluation of recurrent incisional hernias. Had prior history of ex lap for PUD and incisional hernia repair. Developed swelling and pain at the upper and lower portions of his incision after lifting his grandfather from the floor. CT scan showed small recurrent hernias. Denies nausea, vomiting, constipation, or other obstructive symptoms. He has been off of steroids.     Chief Complaint   Patient presents with    Follow-up       Review of patient's allergies indicates:   Allergen Reactions    Bean Diarrhea, Edema, Hives, Nausea And Vomiting and Swelling    Bean pod extract Nausea And Vomiting and Swelling     Lips swelling, vomiting  Lips swelling, vomiting      Legumes Nausea And Vomiting and Swelling     Other reaction(s): GI Intolerance  vomiting  vomiting  Pt reports legumes make his lips swell and induce severe vomiting  Pt reports legumes make his lips swell and induce severe vomiting      Lentils Nausea And Vomiting and Swelling     Lips swelling, vomiting  Lips swelling, vomiting      Nsaids (non-steroidal anti-inflammatory drug)      GI bleed    Peas Nausea And Vomiting and Swelling     Lips swelling, vomiting    Ketamine      Severe dysphoria (bad trip)    Haloperidol Other (See Comments)     "feels like crawling out of his skin"      Meloxicam Nausea Only     GI bleed    Penicillins Nausea And Vomiting     Has taken third gen cephalosporins without issue per patient report       Current Outpatient Medications   Medication Sig Dispense Refill    albuterol (PROVENTIL/VENTOLIN HFA) 90 mcg/actuation inhaler INHALE 2 PUFFS INTO THE LUNGS EVERY 6 HOURS AS NEEDED FOR WHEEZING OR SHORTNESS OF BREATH. RESCUE. 18 g 2    artificial tears (ISOPTO TEARS) 0.5 % ophthalmic solution Place 1 drop into both eyes 6 (six) times daily.      cetirizine (ZYRTEC) 10 MG tablet Take 20 mg by mouth 2 (two) " times a day.       cholecalciferol, vitamin D3, (VITAMIN D3) 25 mcg (1,000 unit) capsule Take 1 capsule (1,000 Units total) by mouth once daily. 90 capsule 1    dextroamphetamine-amphetamine (ADDERALL) 20 mg tablet Take 1 tablet by mouth 2 (two) times daily.      diphenhydrAMINE (SOMINEX) 25 mg tablet Take 25 mg by mouth nightly as needed.       docusate sodium (COLACE) 100 MG capsule Take 1 capsule (100 mg total) by mouth 2 (two) times daily. 60 capsule 3    EPINEPHrine (EPIPEN) 0.3 mg/0.3 mL AtIn Inject 0.3 mLs (0.3 mg total) into the muscle as needed. 2 each 1    ferrous sulfate 325 (65 FE) MG EC tablet Take 1 tablet (325 mg total) by mouth once daily. (Patient taking differently: Take 325 mg by mouth nightly.) 90 tablet 1    hydrOXYzine HCL (ATARAX) 25 MG tablet Take 1 tablet (25 mg total) by mouth 3 (three) times daily as needed for Itching. 30 tablet 1    Lactobacillus acidophilus 10 billion cell Cap Take 1 capsule by mouth once daily.       lidocaine HCL 2% (XYLOCAINE) 2 % jelly Apply topically 3 (three) times daily as needed (Hemorrhoids). 5 mL 3    mepolizumab (NUCALA) 100 mg/mL Syrg Inject 3 mLs (300 mg total) into the skin every 28 days. 300 mL 11    ondansetron (ZOFRAN-ODT) 4 MG TbDL Take 4 mg by mouth.      oxyCODONE (ROXICODONE) 5 MG immediate release tablet Take 1 tablet (5 mg total) by mouth every 6 (six) hours as needed for Pain. 5 tablet 0    oxyCODONE-acetaminophen (PERCOCET) 5-325 mg per tablet Take 1 tablet by mouth every 4 (four) hours as needed for Pain.      pantoprazole (PROTONIX) 40 MG tablet Take 1 tablet (40 mg total) by mouth once daily. (Patient taking differently: Take 40 mg by mouth 2 (two) times daily.) 30 tablet 2    paroxetine (PAXIL) 20 MG tablet Take 20 mg by mouth nightly.      predniSONE (DELTASONE) 10 MG tablet Take 20 mg by mouth once daily.      promethazine (PHENERGAN) 25 MG tablet Take 25 mg by mouth every 4 (four) hours.      sucralfate (CARAFATE) 1 gram tablet Take 1 g  by mouth before meals and at bedtime as needed.       vitamin D3-vitamin K2 1000-90 unit-mcg TbDL Take by mouth once daily.        Current Facility-Administered Medications   Medication Dose Route Frequency Provider Last Rate Last Admin    mepolizumab (NUCALA) injection 300 mg  300 mg Subcutaneous Q28 Days Michelle Ornelas MD   300 mg at 06/28/21 1511       Past Medical History:   Diagnosis Date    Arthritis     C. difficile colitis 02/2014    postop of hand surgery    Cancer     Encounter for blood transfusion     Eosinophilic esophagitis 03/11/2014    GERD (gastroesophageal reflux disease)     Hypereosinophilic syndrome     IBS (irritable bowel syndrome)     Leukemia     MRSA carrier     says multiple times nose swab was +    Munchausen syndrome     Nephrolithiasis 04/2014    bilateral punctate - never passed a stone    Septic prepatellar bursitis of right knee 01/12/2021    Urinary tract infection 02/2014    MRSA     Past Surgical History:   Procedure Laterality Date    APPENDECTOMY      ARTHROSCOPY OF SHOULDER WITH REMOVAL OF DISTAL CLAVICLE Right 11/1/2018    Procedure: ARTHROSCOPY, SHOULDER, WITH DISTAL CLAVICLE EXCISION;  Surgeon: Gabino Mejia MD;  Location: [facility] [place];  Service: Orthopedics;  Laterality: Right;  video    BACK SURGERY      BLADDER FULGURATION N/A 9/18/2020    Procedure: FULGURATION, BLADDER;  Surgeon: Randall Moss MD;  Location: [facility] OR;  Service: Urology;  Laterality: N/A;  blood vessels of bladder neck and prostate    BONE MARROW BIOPSY Left 10/1/2020    Procedure: Biopsy-bone marrow;  Surgeon: Trung Polanco MD;  Location: [facility] OR;  Service: Oncology;  Laterality: Left;    CIRCUMCISION, PRIMARY      COLONOSCOPY N/A 11/14/2018    Procedure: COLONOSCOPY;  Surgeon: Milton Brunson MD;  Location: [facility];  Service: Endoscopy;  Laterality: N/A;    COLONOSCOPY N/A 7/20/2020    Procedure: COLONOSCOPY;  Surgeon: Ruslan Tillman MD;  Location: [facility];  Service: Endoscopy;   Laterality: N/A;    CYSTOSCOPY W/ RETROGRADES Bilateral 9/18/2020    Procedure: CYSTOSCOPY, WITH RETROGRADE PYELOGRAM;  Surgeon: Randall Moss MD;  Location: [facility] OR;  Service: Urology;  Laterality: Bilateral;    CYSTOURETHROSCOPY N/A 2/1/2021    Procedure: CYSTOURETHROSCOPY, short placement;  Surgeon: Boni Benites MD;  Location: [facility];  Service: Urology;  Laterality: N/A;    DILATION OF URETHRA N/A 9/18/2020    Procedure: DILATION, URETHRA;  Surgeon: Randall Moss MD;  Location: [facility] OR;  Service: Urology;  Laterality: N/A;    drainage of abscess from hand  2009    MRSA    drainage of abscess from R thigh  2006    MRSA    ESOPHAGOGASTRODUODENOSCOPY  3/11/2014    ESOPHAGOGASTRODUODENOSCOPY N/A 9/5/2018    Procedure: EGD (ESOPHAGOGASTRODUODENOSCOPY);  Surgeon: Kolby Wall MD;  Location: [facility];  Service: Endoscopy;  Laterality: N/A;    ESOPHAGOGASTRODUODENOSCOPY N/A 3/25/2020    Procedure: EGD (ESOPHAGOGASTRODUODENOSCOPY);  Surgeon: Ruslan Tillman MD;  Location: [facility];  Service: Endoscopy;  Laterality: N/A;    ESOPHAGOGASTRODUODENOSCOPY N/A 7/20/2020    Procedure: EGD (ESOPHAGOGASTRODUODENOSCOPY);  Surgeon: Ruslan Tillman MD;  Location: [facility];  Service: Endoscopy;  Laterality: N/A;  Patient is a very hard stick, requires anesthesia stick.    ESOPHAGOGASTRODUODENOSCOPY N/A 8/31/2020    Procedure: EGD (ESOPHAGOGASTRODUODENOSCOPY);  Surgeon: Kolby Wall MD;  Location: [facility];  Service: Endoscopy;  Laterality: N/A;    ESOPHAGOGASTRODUODENOSCOPY N/A 3/3/2021    Procedure: EGD (ESOPHAGOGASTRODUODENOSCOPY);  Surgeon: Ruslan Tillman MD;  Location: [facility];  Service: Endoscopy;  Laterality: N/A;    ESOPHAGOGASTRODUODENOSCOPY N/A 7/12/2021    Procedure: EGD (ESOPHAGOGASTRODUODENOSCOPY);  Surgeon: Kolby Wall MD;  Location: [facility];  Service: Endoscopy;  Laterality: N/A;    FLEXIBLE SIGMOIDOSCOPY N/A 9/5/2018    Procedure: SIGMOIDOSCOPY,  FLEXIBLE;  Surgeon: Kolby Wall MD;  Location: [facility] ENDO;  Service: Endoscopy;  Laterality: N/A;    HAND SURGERY  jan 2014    power saw fell on hand - laceration 3 tendons    INCISION AND DRAINAGE OF KNEE Right 1/13/2021    Procedure: INCISION AND DRAINAGE, KNEE;  Surgeon: Sony Linton Jr., MD;  Location: [facility] OR;  Service: Orthopedics;  Laterality: Right;    INSERTION OF TUNNELED CENTRAL VENOUS CATHETER (CVC) WITH SUBCUTANEOUS PORT Right 11/24/2021    Procedure: Right port-a-cath removal and exchange.;  Surgeon: Robson Morrison MD;  [facility];  Service: General;  Laterality: Right;  Right internal jugular    INSERTION OF TUNNELED CENTRAL VENOUS CATHETER (CVC) WITH SUBCUTANEOUS PORT Left 3/31/2022    Procedure: INSERTION, SINGLE LUMEN CATHETER WITH PORT, WITH FLUOROSCOPIC GUIDANCE Left Poss Right Chest;  Surgeon: Robson Morrison MD;  Location: [facility] OR [facility];  Service: General;  Laterality: Left;    INSERTION OF TUNNELED CENTRAL VENOUS CATHETER (CVC) WITH SUBCUTANEOUS PORT Right 10/6/2022    Procedure: INSERTION, SINGLE LUMEN CATHETER WITH PORT, WITH FLUOROSCOPIC GUIDANCE Left Possible Right Chest;  Surgeon: Robson Morrison MD;  Location: [facility];  Service: General;  Laterality: Right;    INSERTION OF VENOUS ACCESS PORT Right 8/21/2020    Procedure: INSERTION, VENOUS ACCESS PORT;  Surgeon: Troy Honeycutt MD;  Location: [facility] OR;  Service: General;  Laterality: Right;    NISSEN FUNDOPLICATION      REVISION OF SCAR N/A 4/7/2021    Procedure: REVISION, SCAR;  Surgeon: Robson Morrison MD;  Location: [facility];  Service: General;  Laterality: N/A;    UMBILICAL HERNIA REPAIR N/A 4/7/2021    Procedure: REPAIR, HERNIA, UMBILICAL, AGE 5 YEARS OR OLDER;  Surgeon: Robson Morrison MD;  Location: [facility];  Service: General;  Laterality: N/A;     Family History   Problem Relation Age of Onset    Urolithiasis Father     Celiac disease Sister     Hypertension  Maternal Grandmother     Hypertension Maternal Grandfather     Prostate cancer Neg Hx     Kidney disease Neg Hx      Social History     Tobacco Use    Smoking status: Never    Smokeless tobacco: Never    Tobacco comments:     Pt states he has smoked 1 or 2 cigarettes in his life.   Substance Use Topics    Alcohol use: Not Currently    Drug use: No        Review of Systems:  Review of Systems   Constitutional:  Negative for chills and fever.   Respiratory:  Negative for cough and shortness of breath.    Cardiovascular:  Negative for chest pain and palpitations.   Gastrointestinal:  Positive for abdominal pain. Negative for nausea and vomiting.   Genitourinary:  Negative for dysuria and hematuria.   Musculoskeletal:  Negative for back pain and gait problem.   Skin:  Negative for color change, rash and wound.   Neurological:  Negative for weakness and headaches.   Hematological:  Negative for adenopathy. Does not bruise/bleed easily.   Psychiatric/Behavioral:  Negative for agitation and confusion.      OBJECTIVE:     Vital Signs (Most Recent)  Pulse: 60 (03/07/23 1315)  BP: (!) 156/89 (03/07/23 1315)  SpO2: 99 % (03/07/23 1315)  6' (1.829 m)  85.9 kg (189 lb 6 oz)     Physical Exam:  Physical Exam  Constitutional:       General: He is not in acute distress.     Appearance: Normal appearance. He is not ill-appearing.   HENT:      Head: Normocephalic and atraumatic.   Eyes:      General: No scleral icterus.     Pupils: Pupils are equal, round, and reactive to light.   Neck:      Trachea: No tracheal deviation.   Cardiovascular:      Rate and Rhythm: Normal rate and regular rhythm.   Pulmonary:      Effort: Pulmonary effort is normal. No respiratory distress.      Breath sounds: No stridor.   Abdominal:      General: There is no distension.      Palpations: Abdomen is soft.      Tenderness: There is no abdominal tenderness.      Hernia: A hernia (upper and suprumbilical hernias, reducible) is present.   Musculoskeletal:          General: No deformity or signs of injury.      Cervical back: No edema or erythema.   Skin:     General: Skin is warm and dry.      Coloration: Skin is not jaundiced.   Neurological:      General: No focal deficit present.      Mental Status: He is alert and oriented to person, place, and time.   Psychiatric:         Mood and Affect: Mood normal.         Behavior: Behavior normal.       Diagnostic Results Reviewed independently:  CT abd/pelvis scan demonstrates rectus diastasis with small hernias at the highest and lowest points of his prior incision.    ASSESSMENT/PLAN:   42-year-old man with recurrent incisional hernias.  He desires repair.    PLAN:Plan   Plan for open repair of recurrent hernias.    Robson Morrison MD  Acute Care Surgery  [facility] [place] [facility] of [facility]

## 2023-03-07 NOTE — PROGRESS NOTES
"History & Physical  General Surgery    SUBJECTIVE:     History of Present Illness:  Patient is a 42 y.o. male presents for evaluation of recurrent incisional hernias. Had prior history of ex lap for PUD and incisional hernia repair. Developed swelling and pain at the upper and lower portions of his incision after lifting his grandfather from the floor. CT scan showed small recurrent hernias. Denies nausea, vomiting, constipation, or other obstructive symptoms. He has been off of steroids.     Chief Complaint   Patient presents with    Follow-up       Review of patient's allergies indicates:   Allergen Reactions    Bean Diarrhea, Edema, Hives, Nausea And Vomiting and Swelling    Bean pod extract Nausea And Vomiting and Swelling     Lips swelling, vomiting  Lips swelling, vomiting      Legumes Nausea And Vomiting and Swelling     Other reaction(s): GI Intolerance  vomiting  vomiting  Pt reports legumes make his lips swell and induce severe vomiting  Pt reports legumes make his lips swell and induce severe vomiting      Lentils Nausea And Vomiting and Swelling     Lips swelling, vomiting  Lips swelling, vomiting      Nsaids (non-steroidal anti-inflammatory drug)      GI bleed    Peas Nausea And Vomiting and Swelling     Lips swelling, vomiting    Ketamine      Severe dysphoria (bad trip)    Haloperidol Other (See Comments)     "feels like crawling out of his skin"      Meloxicam Nausea Only     GI bleed    Penicillins Nausea And Vomiting     Has taken third gen cephalosporins without issue per patient report       Current Outpatient Medications   Medication Sig Dispense Refill    albuterol (PROVENTIL/VENTOLIN HFA) 90 mcg/actuation inhaler INHALE 2 PUFFS INTO THE LUNGS EVERY 6 HOURS AS NEEDED FOR WHEEZING OR SHORTNESS OF BREATH. RESCUE. 18 g 2    artificial tears (ISOPTO TEARS) 0.5 % ophthalmic solution Place 1 drop into both eyes 6 (six) times daily.      cetirizine (ZYRTEC) 10 MG tablet Take 20 mg by mouth 2 (two) " times a day.       cholecalciferol, vitamin D3, (VITAMIN D3) 25 mcg (1,000 unit) capsule Take 1 capsule (1,000 Units total) by mouth once daily. 90 capsule 1    dextroamphetamine-amphetamine (ADDERALL) 20 mg tablet Take 1 tablet by mouth 2 (two) times daily.      diphenhydrAMINE (SOMINEX) 25 mg tablet Take 25 mg by mouth nightly as needed.       docusate sodium (COLACE) 100 MG capsule Take 1 capsule (100 mg total) by mouth 2 (two) times daily. 60 capsule 3    EPINEPHrine (EPIPEN) 0.3 mg/0.3 mL AtIn Inject 0.3 mLs (0.3 mg total) into the muscle as needed. 2 each 1    ferrous sulfate 325 (65 FE) MG EC tablet Take 1 tablet (325 mg total) by mouth once daily. (Patient taking differently: Take 325 mg by mouth nightly.) 90 tablet 1    hydrOXYzine HCL (ATARAX) 25 MG tablet Take 1 tablet (25 mg total) by mouth 3 (three) times daily as needed for Itching. 30 tablet 1    Lactobacillus acidophilus 10 billion cell Cap Take 1 capsule by mouth once daily.       lidocaine HCL 2% (XYLOCAINE) 2 % jelly Apply topically 3 (three) times daily as needed (Hemorrhoids). 5 mL 3    mepolizumab (NUCALA) 100 mg/mL Syrg Inject 3 mLs (300 mg total) into the skin every 28 days. 300 mL 11    ondansetron (ZOFRAN-ODT) 4 MG TbDL Take 4 mg by mouth.      oxyCODONE (ROXICODONE) 5 MG immediate release tablet Take 1 tablet (5 mg total) by mouth every 6 (six) hours as needed for Pain. 5 tablet 0    oxyCODONE-acetaminophen (PERCOCET) 5-325 mg per tablet Take 1 tablet by mouth every 4 (four) hours as needed for Pain.      pantoprazole (PROTONIX) 40 MG tablet Take 1 tablet (40 mg total) by mouth once daily. (Patient taking differently: Take 40 mg by mouth 2 (two) times daily.) 30 tablet 2    paroxetine (PAXIL) 20 MG tablet Take 20 mg by mouth nightly.      predniSONE (DELTASONE) 10 MG tablet Take 20 mg by mouth once daily.      promethazine (PHENERGAN) 25 MG tablet Take 25 mg by mouth every 4 (four) hours.      sucralfate (CARAFATE) 1 gram tablet Take 1 g  by mouth before meals and at bedtime as needed.       vitamin D3-vitamin K2 1000-90 unit-mcg TbDL Take by mouth once daily.        Current Facility-Administered Medications   Medication Dose Route Frequency Provider Last Rate Last Admin    mepolizumab (NUCALA) injection 300 mg  300 mg Subcutaneous Q28 Days Michelle Ornelas MD   300 mg at 06/28/21 1511       Past Medical History:   Diagnosis Date    Arthritis     C. difficile colitis 02/2014    postop of hand surgery    Cancer     Encounter for blood transfusion     Eosinophilic esophagitis 03/11/2014    GERD (gastroesophageal reflux disease)     Hypereosinophilic syndrome     IBS (irritable bowel syndrome)     Leukemia     MRSA carrier     says multiple times nose swab was +    Munchausen syndrome     Nephrolithiasis 04/2014    bilateral punctate - never passed a stone    Septic prepatellar bursitis of right knee 01/12/2021    Urinary tract infection 02/2014    MRSA     Past Surgical History:   Procedure Laterality Date    APPENDECTOMY      ARTHROSCOPY OF SHOULDER WITH REMOVAL OF DISTAL CLAVICLE Right 11/1/2018    Procedure: ARTHROSCOPY, SHOULDER, WITH DISTAL CLAVICLE EXCISION;  Surgeon: Gabino Mejia MD;  Location: [facility];  Service: Orthopedics;  Laterality: Right;  video    BACK SURGERY      BLADDER FULGURATION N/A 9/18/2020    Procedure: FULGURATION, BLADDER;  Surgeon: Randall Moss MD;  [facility] OR;  Service: Urology;  Laterality: N/A;  blood vessels of bladder neck and prostate    BONE MARROW BIOPSY Left 10/1/2020    Procedure: Biopsy-bone marrow;  Surgeon: Trung Polanco MD;  Location: [facility] OR;  Service: Oncology;  Laterality: Left;    CIRCUMCISION, PRIMARY      COLONOSCOPY N/A 11/14/2018    Procedure: COLONOSCOPY;  Surgeon: Milton Brunson MD;  Location: [facility];  Service: Endoscopy;  Laterality: N/A;    COLONOSCOPY N/A 7/20/2020    Procedure: COLONOSCOPY;  Surgeon: Ruslan Tillman MD;  Location: [facility];  Service: Endoscopy;   Laterality: N/A;    CYSTOSCOPY W/ RETROGRADES Bilateral 9/18/2020    Procedure: CYSTOSCOPY, WITH RETROGRADE PYELOGRAM;  Surgeon: Randall Moss MD;  Location: [facility] OR;  Service: Urology;  Laterality: Bilateral;    CYSTOURETHROSCOPY N/A 2/1/2021    Procedure: CYSTOURETHROSCOPY, short placement;  Surgeon: Boni Benites MD;  Location: [facility];  Service: Urology;  Laterality: N/A;    DILATION OF URETHRA N/A 9/18/2020    Procedure: DILATION, URETHRA;  Surgeon: Randall Moss MD;  Location: [facility] OR;  Service: Urology;  Laterality: N/A;    drainage of abscess from hand  2009    MRSA    drainage of abscess from R thigh  2006    MRSA    ESOPHAGOGASTRODUODENOSCOPY  3/11/2014    ESOPHAGOGASTRODUODENOSCOPY N/A 9/5/2018    Procedure: EGD (ESOPHAGOGASTRODUODENOSCOPY);  Surgeon: Kolby Wall MD;  Location: [facility];  Service: Endoscopy;  Laterality: N/A;    ESOPHAGOGASTRODUODENOSCOPY N/A 3/25/2020    Procedure: EGD (ESOPHAGOGASTRODUODENOSCOPY);  Surgeon: Ruslan Tillman MD;  Location: [facility];  Service: Endoscopy;  Laterality: N/A;    ESOPHAGOGASTRODUODENOSCOPY N/A 7/20/2020    Procedure: EGD (ESOPHAGOGASTRODUODENOSCOPY);  Surgeon: Ruslan Tillman MD;  Location: [facility];  Service: Endoscopy;  Laterality: N/A;  Patient is a very hard stick, requires anesthesia stick.    ESOPHAGOGASTRODUODENOSCOPY N/A 8/31/2020    Procedure: EGD (ESOPHAGOGASTRODUODENOSCOPY);  Surgeon: Kolby Wall MD;  Location: [facility];  Service: Endoscopy;  Laterality: N/A;    ESOPHAGOGASTRODUODENOSCOPY N/A 3/3/2021    Procedure: EGD (ESOPHAGOGASTRODUODENOSCOPY);  Surgeon: Ruslan Tillman MD;  Location: [facility];  Service: Endoscopy;  Laterality: N/A;    ESOPHAGOGASTRODUODENOSCOPY N/A 7/12/2021    Procedure: EGD (ESOPHAGOGASTRODUODENOSCOPY);  Surgeon: Kolby Wall MD;  Location: [facility];  Service: Endoscopy;  Laterality: N/A;    FLEXIBLE SIGMOIDOSCOPY N/A 9/5/2018    Procedure: SIGMOIDOSCOPY,  FLEXIBLE;  Surgeon: Kolby Wall MD;  Location: [facility] ENDO;  Service: Endoscopy;  Laterality: N/A;    HAND SURGERY  jan 2014    power saw fell on hand - laceration 3 tendons    INCISION AND DRAINAGE OF KNEE Right 1/13/2021    Procedure: INCISION AND DRAINAGE, KNEE;  Surgeon: Sony Linton Jr., MD;  Location: [facility] OR;  Service: Orthopedics;  Laterality: Right;    INSERTION OF TUNNELED CENTRAL VENOUS CATHETER (CVC) WITH SUBCUTANEOUS PORT Right 11/24/2021    Procedure: Right port-a-cath removal and exchange.;  Surgeon: Robson Morrison MD;  [facility];  Service: General;  Laterality: Right;  Right internal jugular    INSERTION OF TUNNELED CENTRAL VENOUS CATHETER (CVC) WITH SUBCUTANEOUS PORT Left 3/31/2022    Procedure: INSERTION, SINGLE LUMEN CATHETER WITH PORT, WITH FLUOROSCOPIC GUIDANCE Left Poss Right Chest;  Surgeon: Robson Morrison MD;  Location: [facility] OR [facility];  Service: General;  Laterality: Left;    INSERTION OF TUNNELED CENTRAL VENOUS CATHETER (CVC) WITH SUBCUTANEOUS PORT Right 10/6/2022    Procedure: INSERTION, SINGLE LUMEN CATHETER WITH PORT, WITH FLUOROSCOPIC GUIDANCE Left Possible Right Chest;  Surgeon: Robson Morrison MD;  [facility];  Service: General;  Laterality: Right;    INSERTION OF VENOUS ACCESS PORT Right 8/21/2020    Procedure: INSERTION, VENOUS ACCESS PORT;  Surgeon: Troy Honeycutt MD;  Location: [facility] OR;  Service: General;  Laterality: Right;    NISSEN FUNDOPLICATION      REVISION OF SCAR N/A 4/7/2021    Procedure: REVISION, SCAR;  Surgeon: Robson Morrison MD;  Location: [facility] OR [facility];  Service: General;  Laterality: N/A;    UMBILICAL HERNIA REPAIR N/A 4/7/2021    Procedure: REPAIR, HERNIA, UMBILICAL, AGE 5 YEARS OR OLDER;  Surgeon: Robson Morrison MD;  [facility];  Service: General;  Laterality: N/A;     Family History   Problem Relation Age of Onset    Urolithiasis Father     Celiac disease Sister     Hypertension  Maternal Grandmother     Hypertension Maternal Grandfather     Prostate cancer Neg Hx     Kidney disease Neg Hx      Social History     Tobacco Use    Smoking status: Never    Smokeless tobacco: Never    Tobacco comments:     Pt states he has smoked 1 or 2 cigarettes in his life.   Substance Use Topics    Alcohol use: Not Currently    Drug use: No        Review of Systems:  Review of Systems   Constitutional:  Negative for chills and fever.   Respiratory:  Negative for cough and shortness of breath.    Cardiovascular:  Negative for chest pain and palpitations.   Gastrointestinal:  Positive for abdominal pain. Negative for nausea and vomiting.   Genitourinary:  Negative for dysuria and hematuria.   Musculoskeletal:  Negative for back pain and gait problem.   Skin:  Negative for color change, rash and wound.   Neurological:  Negative for weakness and headaches.   Hematological:  Negative for adenopathy. Does not bruise/bleed easily.   Psychiatric/Behavioral:  Negative for agitation and confusion.      OBJECTIVE:     Vital Signs (Most Recent)  Pulse: 60 (03/07/23 1315)  BP: (!) 156/89 (03/07/23 1315)  SpO2: 99 % (03/07/23 1315)  6' (1.829 m)  85.9 kg (189 lb 6 oz)     Physical Exam:  Physical Exam  Constitutional:       General: He is not in acute distress.     Appearance: Normal appearance. He is not ill-appearing.   HENT:      Head: Normocephalic and atraumatic.   Eyes:      General: No scleral icterus.     Pupils: Pupils are equal, round, and reactive to light.   Neck:      Trachea: No tracheal deviation.   Cardiovascular:      Rate and Rhythm: Normal rate and regular rhythm.   Pulmonary:      Effort: Pulmonary effort is normal. No respiratory distress.      Breath sounds: No stridor.   Abdominal:      General: There is no distension.      Palpations: Abdomen is soft.      Tenderness: There is no abdominal tenderness.      Hernia: A hernia (upper and suprumbilical hernias, reducible) is present.   Musculoskeletal:          General: No deformity or signs of injury.      Cervical back: No edema or erythema.   Skin:     General: Skin is warm and dry.      Coloration: Skin is not jaundiced.   Neurological:      General: No focal deficit present.      Mental Status: He is alert and oriented to person, place, and time.   Psychiatric:         Mood and Affect: Mood normal.         Behavior: Behavior normal.       Diagnostic Results Reviewed independently:  CT abd/pelvis scan demonstrates rectus diastasis with small hernias at the highest and lowest points of his prior incision.    ASSESSMENT/PLAN:   42-year-old man with recurrent incisional hernias.  He desires repair.    PLAN:Plan   Plan for open repair of recurrent hernias.    Robson Morrison MD  Acute Care Surgery  [facility] [place] Department of Surgery

## 2023-03-08 ENCOUNTER — PATIENT MESSAGE (OUTPATIENT)
Dept: SURGERY | Facility: HOSPITAL | Age: 43
End: 2023-03-08
Payer: MEDICAID

## 2023-03-09 RX ORDER — SODIUM CHLORIDE 9 MG/ML
INJECTION, SOLUTION INTRAVENOUS CONTINUOUS
Status: CANCELLED | OUTPATIENT
Start: 2023-03-09

## 2023-03-10 ENCOUNTER — ANESTHESIA EVENT (OUTPATIENT)
Dept: SURGERY | Facility: HOSPITAL | Age: 43
End: 2023-03-10
Payer: MEDICAID

## 2023-03-10 ENCOUNTER — TELEPHONE (OUTPATIENT)
Dept: SURGERY | Facility: CLINIC | Age: 43
End: 2023-03-10
Payer: MEDICAID

## 2023-03-10 NOTE — PRE-PROCEDURE INSTRUCTIONS
PreOp Instructions given:   - Verbal medication information (what to hold and what to take)   - NPO guidelines   - Arrival place directions given; time to be given the day before procedure by the   Surgeon's Office 2015 DOS  - Bathing with antibacterial soap   - Don't wear any jewelry or bring any valuables AM of surgery   - No makeup or moisturizer to face   - No perfume/cologne, powder, lotions or aftershave   Pt. verbalized understanding.   Pt denies any h/o Anesthesia/Sedation complications or side effects.

## 2023-03-13 ENCOUNTER — ANESTHESIA (OUTPATIENT)
Dept: SURGERY | Facility: HOSPITAL | Age: 43
End: 2023-03-13
Payer: MEDICAID

## 2023-03-13 ENCOUNTER — HOSPITAL ENCOUNTER (OUTPATIENT)
Facility: HOSPITAL | Age: 43
Discharge: HOME | End: 2023-03-13
Attending: SURGERY | Admitting: SURGERY
Payer: MEDICAID

## 2023-03-13 VITALS
OXYGEN SATURATION: 96 % | HEIGHT: 72 IN | HEART RATE: 84 BPM | DIASTOLIC BLOOD PRESSURE: 58 MMHG | SYSTOLIC BLOOD PRESSURE: 123 MMHG | RESPIRATION RATE: 20 BRPM | BODY MASS INDEX: 25.65 KG/M2 | WEIGHT: 189.38 LBS | TEMPERATURE: 98 F

## 2023-03-13 DIAGNOSIS — K43.2 INCISIONAL HERNIA WITHOUT OBSTRUCTION OR GANGRENE: ICD-10-CM

## 2023-03-13 DIAGNOSIS — K43.2 INCISIONAL HERNIA, WITHOUT OBSTRUCTION OR GANGRENE: Primary | ICD-10-CM

## 2023-03-13 PROCEDURE — 37000009 HC ANESTHESIA EA ADD 15 MINS: Performed by: SURGERY

## 2023-03-13 PROCEDURE — 25000003 PHARM REV CODE 250: Performed by: SURGERY

## 2023-03-13 PROCEDURE — 49615 PR REPAIR ANT ABD HERNIA 3-10 CM REDUCIBLE: ICD-10-PCS | Mod: ,,, | Performed by: SURGERY

## 2023-03-13 PROCEDURE — 63600175 PHARM REV CODE 636 W HCPCS: Performed by: ANESTHESIOLOGY

## 2023-03-13 PROCEDURE — 36000706: Performed by: SURGERY

## 2023-03-13 PROCEDURE — 63600175 PHARM REV CODE 636 W HCPCS: Performed by: SURGERY

## 2023-03-13 PROCEDURE — 25000003 PHARM REV CODE 250

## 2023-03-13 PROCEDURE — 49615 RPR AA HRN RCR 3-10 RDC: CPT | Mod: ,,, | Performed by: SURGERY

## 2023-03-13 PROCEDURE — D9220A PRA ANESTHESIA: Mod: ANES,,, | Performed by: ANESTHESIOLOGY

## 2023-03-13 PROCEDURE — 25000003 PHARM REV CODE 250: Performed by: STUDENT IN AN ORGANIZED HEALTH CARE EDUCATION/TRAINING PROGRAM

## 2023-03-13 PROCEDURE — 94761 N-INVAS EAR/PLS OXIMETRY MLT: CPT

## 2023-03-13 PROCEDURE — D9220A PRA ANESTHESIA: Mod: CRNA,,, | Performed by: NURSE ANESTHETIST, CERTIFIED REGISTERED

## 2023-03-13 PROCEDURE — 00832 ANES HERNIA REPAIR VNT&INCAL: CPT | Performed by: SURGERY

## 2023-03-13 PROCEDURE — D9220A PRA ANESTHESIA: ICD-10-PCS | Mod: ANES,,, | Performed by: ANESTHESIOLOGY

## 2023-03-13 PROCEDURE — 71000033 HC RECOVERY, INTIAL HOUR: Performed by: SURGERY

## 2023-03-13 PROCEDURE — 71000015 HC POSTOP RECOV 1ST HR: Performed by: SURGERY

## 2023-03-13 PROCEDURE — 63600175 PHARM REV CODE 636 W HCPCS: Performed by: NURSE ANESTHETIST, CERTIFIED REGISTERED

## 2023-03-13 PROCEDURE — 71000044 HC DOSC ROUTINE RECOVERY FIRST HOUR: Performed by: SURGERY

## 2023-03-13 PROCEDURE — 71000016 HC POSTOP RECOV ADDL HR: Performed by: SURGERY

## 2023-03-13 PROCEDURE — 25000003 PHARM REV CODE 250: Performed by: NURSE ANESTHETIST, CERTIFIED REGISTERED

## 2023-03-13 PROCEDURE — D9220A PRA ANESTHESIA: ICD-10-PCS | Mod: CRNA,,, | Performed by: NURSE ANESTHETIST, CERTIFIED REGISTERED

## 2023-03-13 PROCEDURE — 37000008 HC ANESTHESIA 1ST 15 MINUTES: Performed by: SURGERY

## 2023-03-13 PROCEDURE — 36000707: Performed by: SURGERY

## 2023-03-13 RX ORDER — HYDROMORPHONE HYDROCHLORIDE 1 MG/ML
0.2 INJECTION, SOLUTION INTRAMUSCULAR; INTRAVENOUS; SUBCUTANEOUS EVERY 5 MIN PRN
Status: DISCONTINUED | OUTPATIENT
Start: 2023-03-13 | End: 2023-03-13 | Stop reason: HOSPADM

## 2023-03-13 RX ORDER — HYDROMORPHONE HYDROCHLORIDE 1 MG/ML
0.5 INJECTION, SOLUTION INTRAMUSCULAR; INTRAVENOUS; SUBCUTANEOUS ONCE
Status: COMPLETED | OUTPATIENT
Start: 2023-03-13 | End: 2023-03-13

## 2023-03-13 RX ORDER — ACETAMINOPHEN 325 MG/1
650 TABLET ORAL ONCE
Status: COMPLETED | OUTPATIENT
Start: 2023-03-13 | End: 2023-03-13

## 2023-03-13 RX ORDER — OXYCODONE HYDROCHLORIDE 5 MG/1
5 TABLET ORAL ONCE
Status: COMPLETED | OUTPATIENT
Start: 2023-03-13 | End: 2023-03-13

## 2023-03-13 RX ORDER — FENTANYL CITRATE 50 UG/ML
25-200 INJECTION, SOLUTION INTRAMUSCULAR; INTRAVENOUS
Status: DISCONTINUED | OUTPATIENT
Start: 2023-03-13 | End: 2023-03-13 | Stop reason: HOSPADM

## 2023-03-13 RX ORDER — SODIUM CHLORIDE 9 MG/ML
INJECTION, SOLUTION INTRAVENOUS CONTINUOUS
Status: DISCONTINUED | OUTPATIENT
Start: 2023-03-13 | End: 2023-03-13 | Stop reason: HOSPADM

## 2023-03-13 RX ORDER — DEXAMETHASONE SODIUM PHOSPHATE 4 MG/ML
INJECTION, SOLUTION INTRA-ARTICULAR; INTRALESIONAL; INTRAMUSCULAR; INTRAVENOUS; SOFT TISSUE
Status: DISCONTINUED | OUTPATIENT
Start: 2023-03-13 | End: 2023-03-13

## 2023-03-13 RX ORDER — LIDOCAINE HYDROCHLORIDE 10 MG/ML
1 INJECTION INFILTRATION; PERINEURAL ONCE
Status: DISCONTINUED | OUTPATIENT
Start: 2023-03-13 | End: 2023-03-13 | Stop reason: HOSPADM

## 2023-03-13 RX ORDER — CLINDAMYCIN PHOSPHATE 900 MG/50ML
900 INJECTION, SOLUTION INTRAVENOUS
Status: DISCONTINUED | OUTPATIENT
Start: 2023-03-13 | End: 2023-03-13 | Stop reason: HOSPADM

## 2023-03-13 RX ORDER — DEXMEDETOMIDINE HYDROCHLORIDE 100 UG/ML
INJECTION, SOLUTION INTRAVENOUS
Status: DISCONTINUED | OUTPATIENT
Start: 2023-03-13 | End: 2023-03-13

## 2023-03-13 RX ORDER — GABAPENTIN 300 MG/1
300 CAPSULE ORAL ONCE
Status: COMPLETED | OUTPATIENT
Start: 2023-03-13 | End: 2023-03-13

## 2023-03-13 RX ORDER — ROCURONIUM BROMIDE 10 MG/ML
INJECTION, SOLUTION INTRAVENOUS
Status: DISCONTINUED | OUTPATIENT
Start: 2023-03-13 | End: 2023-03-13

## 2023-03-13 RX ORDER — IBUPROFEN 200 MG
600 TABLET ORAL ONCE
Status: COMPLETED | OUTPATIENT
Start: 2023-03-13 | End: 2023-03-13

## 2023-03-13 RX ORDER — ACETAMINOPHEN 10 MG/ML
INJECTION, SOLUTION INTRAVENOUS
Status: DISCONTINUED | OUTPATIENT
Start: 2023-03-13 | End: 2023-03-13

## 2023-03-13 RX ORDER — SODIUM CHLORIDE 0.9 % (FLUSH) 0.9 %
20 SYRINGE (ML) INJECTION
Status: DISCONTINUED | OUTPATIENT
Start: 2023-03-13 | End: 2023-03-13 | Stop reason: HOSPADM

## 2023-03-13 RX ORDER — METHOCARBAMOL 500 MG/1
500 TABLET, FILM COATED ORAL 3 TIMES DAILY PRN
Qty: 15 TABLET | Refills: 0 | Status: SHIPPED | OUTPATIENT
Start: 2023-03-13 | End: 2023-03-18

## 2023-03-13 RX ORDER — MIDAZOLAM HYDROCHLORIDE 1 MG/ML
.5-4 INJECTION INTRAMUSCULAR; INTRAVENOUS
Status: DISCONTINUED | OUTPATIENT
Start: 2023-03-13 | End: 2023-03-13 | Stop reason: HOSPADM

## 2023-03-13 RX ORDER — LIDOCAINE HYDROCHLORIDE 20 MG/ML
INJECTION, SOLUTION EPIDURAL; INFILTRATION; INTRACAUDAL; PERINEURAL
Status: DISCONTINUED | OUTPATIENT
Start: 2023-03-13 | End: 2023-03-13

## 2023-03-13 RX ORDER — OXYCODONE HYDROCHLORIDE 5 MG/1
5 TABLET ORAL EVERY 4 HOURS PRN
Qty: 15 TABLET | Refills: 0 | Status: ON HOLD | OUTPATIENT
Start: 2023-03-13 | End: 2023-03-24 | Stop reason: SDUPTHER

## 2023-03-13 RX ORDER — MIDAZOLAM HYDROCHLORIDE 1 MG/ML
INJECTION, SOLUTION INTRAMUSCULAR; INTRAVENOUS
Status: DISCONTINUED | OUTPATIENT
Start: 2023-03-13 | End: 2023-03-13

## 2023-03-13 RX ORDER — SODIUM CHLORIDE 0.9 % (FLUSH) 0.9 %
3 SYRINGE (ML) INJECTION
Status: DISCONTINUED | OUTPATIENT
Start: 2023-03-13 | End: 2023-03-13 | Stop reason: HOSPADM

## 2023-03-13 RX ORDER — OXYCODONE HYDROCHLORIDE 5 MG/1
5 TABLET ORAL ONCE AS NEEDED
Status: DISCONTINUED | OUTPATIENT
Start: 2023-03-13 | End: 2023-03-13 | Stop reason: HOSPADM

## 2023-03-13 RX ORDER — PROPOFOL 10 MG/ML
VIAL (ML) INTRAVENOUS
Status: DISCONTINUED | OUTPATIENT
Start: 2023-03-13 | End: 2023-03-13

## 2023-03-13 RX ORDER — PROCHLORPERAZINE EDISYLATE 5 MG/ML
5 INJECTION INTRAMUSCULAR; INTRAVENOUS EVERY 30 MIN PRN
Status: DISCONTINUED | OUTPATIENT
Start: 2023-03-13 | End: 2023-03-13 | Stop reason: HOSPADM

## 2023-03-13 RX ORDER — ONDANSETRON 2 MG/ML
INJECTION INTRAMUSCULAR; INTRAVENOUS
Status: DISCONTINUED | OUTPATIENT
Start: 2023-03-13 | End: 2023-03-13

## 2023-03-13 RX ORDER — SODIUM CHLORIDE 0.9 % (FLUSH) 0.9 %
10 SYRINGE (ML) INJECTION
Status: DISCONTINUED | OUTPATIENT
Start: 2023-03-13 | End: 2023-03-13 | Stop reason: HOSPADM

## 2023-03-13 RX ORDER — EPHEDRINE SULFATE 50 MG/ML
INJECTION, SOLUTION INTRAVENOUS
Status: DISCONTINUED | OUTPATIENT
Start: 2023-03-13 | End: 2023-03-13

## 2023-03-13 RX ORDER — HYDROMORPHONE HYDROCHLORIDE 1 MG/ML
INJECTION, SOLUTION INTRAMUSCULAR; INTRAVENOUS; SUBCUTANEOUS
Status: DISCONTINUED | OUTPATIENT
Start: 2023-03-13 | End: 2023-03-13

## 2023-03-13 RX ORDER — FENTANYL CITRATE 50 UG/ML
INJECTION, SOLUTION INTRAMUSCULAR; INTRAVENOUS
Status: DISCONTINUED | OUTPATIENT
Start: 2023-03-13 | End: 2023-03-13

## 2023-03-13 RX ORDER — PHENYLEPHRINE HCL IN 0.9% NACL 1 MG/10 ML
SYRINGE (ML) INTRAVENOUS
Status: DISCONTINUED | OUTPATIENT
Start: 2023-03-13 | End: 2023-03-13

## 2023-03-13 RX ORDER — METHOCARBAMOL 500 MG/1
500 TABLET, FILM COATED ORAL ONCE
Status: COMPLETED | OUTPATIENT
Start: 2023-03-13 | End: 2023-03-13

## 2023-03-13 RX ORDER — BUPIVACAINE HYDROCHLORIDE 5 MG/ML
INJECTION, SOLUTION EPIDURAL; INTRACAUDAL
Status: DISCONTINUED | OUTPATIENT
Start: 2023-03-13 | End: 2023-03-13 | Stop reason: HOSPADM

## 2023-03-13 RX ORDER — HEPARIN 100 UNIT/ML
500 SYRINGE INTRAVENOUS
Status: DISCONTINUED | OUTPATIENT
Start: 2023-03-13 | End: 2023-03-13 | Stop reason: HOSPADM

## 2023-03-13 RX ADMIN — IBUPROFEN 600 MG: 200 TABLET, FILM COATED ORAL at 12:03

## 2023-03-13 RX ADMIN — DEXMEDETOMIDINE 8 MCG: 100 INJECTION, SOLUTION INTRAVENOUS at 09:03

## 2023-03-13 RX ADMIN — MIDAZOLAM 2 MG: 1 INJECTION INTRAMUSCULAR; INTRAVENOUS at 07:03

## 2023-03-13 RX ADMIN — Medication 100 MCG: at 08:03

## 2023-03-13 RX ADMIN — HYDROMORPHONE HYDROCHLORIDE 0.3 MG: 1 INJECTION, SOLUTION INTRAMUSCULAR; INTRAVENOUS; SUBCUTANEOUS at 09:03

## 2023-03-13 RX ADMIN — SODIUM CHLORIDE: 0.9 INJECTION, SOLUTION INTRAVENOUS at 07:03

## 2023-03-13 RX ADMIN — ROCURONIUM BROMIDE 40 MG: 10 INJECTION, SOLUTION INTRAVENOUS at 07:03

## 2023-03-13 RX ADMIN — HYDROMORPHONE HYDROCHLORIDE 0.2 MG: 1 INJECTION, SOLUTION INTRAMUSCULAR; INTRAVENOUS; SUBCUTANEOUS at 10:03

## 2023-03-13 RX ADMIN — EPHEDRINE SULFATE 10 MG: 50 INJECTION INTRAVENOUS at 08:03

## 2023-03-13 RX ADMIN — ONDANSETRON 4 MG: 2 INJECTION INTRAMUSCULAR; INTRAVENOUS at 07:03

## 2023-03-13 RX ADMIN — METHOCARBAMOL 500 MG: 500 TABLET ORAL at 11:03

## 2023-03-13 RX ADMIN — LIDOCAINE HYDROCHLORIDE 100 MG: 20 INJECTION, SOLUTION EPIDURAL; INFILTRATION; INTRACAUDAL at 07:03

## 2023-03-13 RX ADMIN — DEXAMETHASONE SODIUM PHOSPHATE 4 MG: 4 INJECTION INTRA-ARTICULAR; INTRALESIONAL; INTRAMUSCULAR; INTRAVENOUS; SOFT TISSUE at 07:03

## 2023-03-13 RX ADMIN — ROCURONIUM BROMIDE 10 MG: 10 INJECTION, SOLUTION INTRAVENOUS at 08:03

## 2023-03-13 RX ADMIN — GLYCOPYRROLATE 0.2 MG: 0.2 INJECTION INTRAMUSCULAR; INTRAVENOUS at 07:03

## 2023-03-13 RX ADMIN — ACETAMINOPHEN 650 MG: 325 TABLET ORAL at 12:03

## 2023-03-13 RX ADMIN — EPHEDRINE SULFATE 15 MG: 50 INJECTION INTRAVENOUS at 08:03

## 2023-03-13 RX ADMIN — EPHEDRINE SULFATE 5 MG: 50 INJECTION INTRAVENOUS at 08:03

## 2023-03-13 RX ADMIN — FENTANYL CITRATE 100 MCG: 50 INJECTION INTRAMUSCULAR; INTRAVENOUS at 07:03

## 2023-03-13 RX ADMIN — HYDROMORPHONE HYDROCHLORIDE 0.5 MG: 1 INJECTION, SOLUTION INTRAMUSCULAR; INTRAVENOUS; SUBCUTANEOUS at 11:03

## 2023-03-13 RX ADMIN — PROPOFOL 200 MG: 10 INJECTION, EMULSION INTRAVENOUS at 07:03

## 2023-03-13 RX ADMIN — Medication 300 MCG: at 08:03

## 2023-03-13 RX ADMIN — OXYCODONE 5 MG: 5 TABLET ORAL at 10:03

## 2023-03-13 RX ADMIN — SUGAMMADEX 200 MG: 100 INJECTION, SOLUTION INTRAVENOUS at 09:03

## 2023-03-13 RX ADMIN — GABAPENTIN 300 MG: 300 CAPSULE ORAL at 11:03

## 2023-03-13 RX ADMIN — SODIUM CHLORIDE: 0.9 INJECTION, SOLUTION INTRAVENOUS at 08:03

## 2023-03-13 RX ADMIN — Medication 100 MCG: at 09:03

## 2023-03-13 RX ADMIN — ACETAMINOPHEN 1000 MG: 10 INJECTION INTRAVENOUS at 07:03

## 2023-03-13 RX ADMIN — DEXTROSE 2 G: 50 INJECTION, SOLUTION INTRAVENOUS at 07:03

## 2023-03-13 RX ADMIN — LIDOCAINE HYDROCHLORIDE 100 MG: 20 INJECTION, SOLUTION EPIDURAL; INFILTRATION; INTRACAUDAL at 09:03

## 2023-03-13 RX ADMIN — HYDROMORPHONE HYDROCHLORIDE 0.5 MG: 1 INJECTION, SOLUTION INTRAMUSCULAR; INTRAVENOUS; SUBCUTANEOUS at 09:03

## 2023-03-13 RX ADMIN — HYDROMORPHONE HYDROCHLORIDE 0.2 MG: 1 INJECTION, SOLUTION INTRAMUSCULAR; INTRAVENOUS; SUBCUTANEOUS at 09:03

## 2023-03-13 RX ADMIN — HYDROMORPHONE HYDROCHLORIDE 0.5 MG: 1 INJECTION, SOLUTION INTRAMUSCULAR; INTRAVENOUS; SUBCUTANEOUS at 12:03

## 2023-03-13 NOTE — ANESTHESIA PROCEDURE NOTES
Intubation    Date/Time: 3/13/2023 7:29 AM  Performed by: Esther Onofre CRNA  Authorized by: Asif Navarro MD     Intubation:     Induction:  Intravenous    Intubated:  Postinduction    Mask Ventilation:  Easy mask    Attempts:  1    Attempted By:  CRNA    Method of Intubation:  Video laryngoscopy    Blade:  Knight 3    Laryngeal View Grade: Grade IIA - cords partially seen      Difficult Airway Encountered?: No      Complications:  None    Airway Device:  Oral endotracheal tube    Airway Device Size:  7.5    Style/Cuff Inflation:  Cuffed (inflated to minimal occlusive pressure)    Tube secured:  21    Secured at:  The lips    Placement Verified By:  Capnometry    Complicating Factors:  None and large/floppy epiglottis    Findings Post-Intubation:  BS equal bilateral and atraumatic/condition of teeth unchanged

## 2023-03-13 NOTE — PLAN OF CARE
Attempted to access right port using sterile technique. Swelling noted up neck along catheter area when flushed. No blood return noted. Pt states didn't get blood return last time it was accessed and he had a CT last week and the contrast dye was burning his neck and made his neck turn red. Dr Navarro at bedside and aware.

## 2023-03-13 NOTE — ANESTHESIA PREPROCEDURE EVALUATION
03/13/2023  Solo Black is a 42 y.o., male.      Pre-op Assessment    I have reviewed the Patient Summary Reports.     I have reviewed the Nursing Notes. I have reviewed the NPO Status.   I have reviewed the Medications.     Review of Systems  Anesthesia Hx:  No problems with previous Anesthesia  History of prior surgery of interest to airway management or planning: Denies Family Hx of Anesthesia complications.   Denies Personal Hx of Anesthesia complications.   Hematology/Oncology:  Hematology Normal   Oncology Normal     EENT/Dental:EENT/Dental Normal   Cardiovascular:  Cardiovascular Normal Exercise tolerance: good  ECG has been reviewed.    Pulmonary:  Pulmonary Normal    Renal/:   renal calculi    Hepatic/GI:   PUD, Hiatal Hernia, GERD    Musculoskeletal:  Musculoskeletal Normal    Neurological:   Chronic Pain Syndrome   Endocrine:  Endocrine Normal    Dermatological:  Skin Normal    Psych:   Psychiatric History          Physical Exam  General: Well nourished, Cooperative, Alert and Oriented    Airway:  Mallampati: II   Mouth Opening: Normal  TM Distance: Normal  Tongue: Normal  Neck ROM: Normal ROM    Dental:  Intact        Anesthesia Plan  Type of Anesthesia, risks & benefits discussed:    Anesthesia Type: Gen ETT  Intra-op Monitoring Plan: Standard ASA Monitors  Post Op Pain Control Plan: multimodal analgesia and IV/PO Opioids PRN  Induction:  IV  Airway Plan: Direct, Post-Induction  ASA Score: 2  Day of Surgery Review of History & Physical: H&P Update referred to the surgeon/provider.    Ready For Surgery From Anesthesia Perspective.     .

## 2023-03-13 NOTE — TRANSFER OF CARE
Anesthesia Transfer of Care Note    Patient: Solo Black    Procedure(s) Performed: Procedure(s) (LRB):  REPAIR, HERNIA, INCISIONAL OR VENTRAL, WITHOUT HISTORY OF PRIOR REPAIR x2 (N/A)    Patient location: PACU    Anesthesia Type: general    Transport from OR: Transported from OR on 6-10 L/min O2 by face mask with adequate spontaneous ventilation    Post pain: adequate analgesia    Post assessment: no apparent anesthetic complications and tolerated procedure well    Post vital signs: stable    Level of consciousness: sedated    Nausea/Vomiting: no nausea/vomiting    Complications: none    Transfer of care protocol was followed      Last vitals:   Visit Vitals  BP 96/67 (BP Location: Left arm, Patient Position: Lying)   Pulse 66   Temp 36.9 °C (98.4 °F) (Oral)   Resp 16   Ht 6' (1.829 m)   Wt 85.9 kg (189 lb 6 oz)   SpO2 100%   BMI 25.68 kg/m²

## 2023-03-13 NOTE — BRIEF OP NOTE
[facility]erry [facility] - Surgery ([facility])  Brief Operative Note    Surgery Date: 3/13/2023     Surgeon(s) and Role:     * Robson Morrison MD - Primary     * Denise Alejo MD - Resident - Assisting        Pre-op Diagnosis:  Incisional hernia without obstruction or gangrene [K43.2]    Post-op Diagnosis:  Post-Op Diagnosis Codes:     * Incisional hernia without obstruction or gangrene [K43.2]    Procedure(s) (LRB):  REPAIR, HERNIA, INCISIONAL OR VENTRAL, WITHOUT HISTORY OF PRIOR REPAIR x2 (N/A)    Anesthesia: General    Operative Findings: Two fat containing ventral hernias repaired primarily.     Estimated Blood Loss: * No values recorded between 3/13/2023  7:40 AM and 3/13/2023  9:39 AM *         Specimens:   Specimen (24h ago, onward)      None              Discharge Note    OUTCOME: Patient tolerated treatment/procedure well without complication and is now ready for discharge.    DISPOSITION: Home or Self Care    FINAL DIAGNOSIS:  <principal problem not specified>    FOLLOWUP: In clinic    DISCHARGE INSTRUCTIONS:    Discharge Procedure Orders   Lifting restrictions   Order Comments: Do not lift anything >10 lbs (about a gallon of milk) for 6 weeks  Please wear abdominal binder at all times when out of bed.     No dressing needed   Order Comments: Your incision is covered with surgical glue (dermabond). It is okay to shower with this on, but do not scrub or pick at it. It will fall off on its own over the next 1-2 weeks.    Okay to shower tomorrow. Please do not soak in water such as a bath, hot tub, or pool for 2 weeks     Notify your health care provider if you experience any of the following:  temperature >100.4     Notify your health care provider if you experience any of the following:  persistent nausea and vomiting or diarrhea     Notify your health care provider if you experience any of the following:  severe uncontrolled pain     Notify your health care provider if you experience any of the following:  redness,  tenderness, or signs of infection (pain, swelling, redness, odor or green/yellow discharge around incision site)     Notify your health care provider if you experience any of the following:  difficulty breathing or increased cough     Notify your health care provider if you experience any of the following:  severe persistent headache     Notify your health care provider if you experience any of the following:  worsening rash     Notify your health care provider if you experience any of the following:  increased confusion or weakness     Activity as tolerated

## 2023-03-13 NOTE — INTERVAL H&P NOTE
The patient has been examined and the H&P has been reviewed:    I concur with the findings and changes have been noted since the H&P was written: Patient presented to emergency department on 3/8/2023 for nausea and vomiting and abdominal pain . Patient's hernia was reducible. Patient underwent CT A/P which demonstrated no evidence of bowel obstruction or necrosis. This morning patient is TTP with no erythema, skin change, or severe pain. Denies nausea or vomiting.     Patient has a right IJ port, currently refusing PIV placement while awake. States ok to place PIV in OR.     Consent obtained this morning.    Surgery risks, benefits and alternative options discussed and understood by patient/family.          There are no hospital problems to display for this patient.

## 2023-03-13 NOTE — NURSING TRANSFER
Nursing Transfer Note      3/13/2023     Reason patient is being transferred: post op    [facility]    Transfer via stretcher    Transfer with 2L NC    Transported by rn    Medicines sent:  oxycodone and methocarbamol delivered to patient in pacu      Any special needs or follow-up needed: pt. Requesting to speak with Dr. Morrison or his resident prior to discharge     Chart send with patient: Yes    Patient reassessed at: 1100

## 2023-03-13 NOTE — ADDENDUM NOTE
Addendum  created 03/13/23 1239 by Asif Navarro MD    Order list changed, Pharmacy for encounter modified

## 2023-03-13 NOTE — OP NOTE
DATE: 3/13/23.    PREOPERATIVE DIAGNOSIS:   1. Recurrent incisional hernia x2.    2. Rectus diastasis.    POSTOPERATIVE DIAGNOSIS:   1. Recurrent incisional hernia x2.  2. Rectus diastasis.    PROCEDURE:   1. Recurrent incisional hernia repair x2 (each 1.5 cm).    2. Abdominal wall plication for rectus diastasis.    ATTENDING SURGEON: Robson Morrison MD.    RESIDENT: Denise Alejo MD.    ANESTHESIA: General.    INDICATION:  Patient is a 42-year-old male with a history of multiple abdominal surgeries who presented to my clinic with recurrent incisional hernias and significant rectus diastasis around his previous repair.  He was symptomatic and desired repair of recurrent hernias.    PROCEDURE IN DETAIL:  Patient was taken to the operating room and placed supine.  After induction of general endotracheal tube anesthesia, his abdomen was prepped and draped in the standard fashion.  Time-out was performed.  Two small palpable hernias were present.  One was in the subxiphoid area and 1 was in the supraumbilical area.  Incision was made over the subxiphoid space was carried down with cautery until the hernia sac was identified.  This was circumferentially dissected the level of the fascia until the hernia defect was identified.  This measured approximately 1 cm.  Preperitoneal fat was reduced into the abdomen and the hernia was closed primarily with interrupted 0 Ethibond suture.  Incision was then made in the supraumbilical area and carried down through significant scar tissue.  Rectus diastasis and previously placed mesh was encountered.  A recurrent incisional hernia to the right lateral side of the mesh was identified.  This was circumferentially dissected to the level of the fascia and was reduced.  Hernia defect measured 1.5 cm.  This was closed primarily with interrupted 0 Ethibond suture.  We then imbricated the rectus diastasis over the previously placed hernia mesh by sewing the edges of rectus together with  running 0 Vicryl suture.  We had excellent mesh coverage and good closure of our repairs.  Hemostasis was confirmed.  Each incision was closed in layers with 2-0 Vicryl and subcuticular Monocryl.  Dermabond was applied.  Patient was awakened from anesthesia and transferred to the PACU in good condition.  All needle and sponge counts were correct at the conclusion of the case.  I was present for the procedure in its entirety.    EBL: 5 mL.    FINDINGS: Two small recurrent incisional hernias repaired primarily. Rectus diastasis imbricated with running Vicryl.

## 2023-03-13 NOTE — ANESTHESIA POSTPROCEDURE EVALUATION
Anesthesia Post Evaluation    Patient: Solo Black    Procedure(s) Performed: Procedure(s) (LRB):  REPAIR, HERNIA, INCISIONAL OR VENTRAL, WITHOUT HISTORY OF PRIOR REPAIR x2 (N/A)    Final Anesthesia Type: general      Patient location during evaluation: PACU  Patient participation: Yes- Able to Participate  Level of consciousness: awake and alert and oriented  Post-procedure vital signs: reviewed and stable  Pain management: adequate  Airway patency: patent    PONV status at discharge: No PONV  Anesthetic complications: no      Cardiovascular status: blood pressure returned to baseline  Respiratory status: unassisted, room air and spontaneous ventilation  Hydration status: euvolemic  Follow-up not needed.          Vitals Value Taken Time   BP 98/55 03/13/23 1017   Temp 37 03/13/23 1021   Pulse 93 03/13/23 1021   Resp 23 03/13/23 1021   SpO2 97 % 03/13/23 1021   Vitals shown include unvalidated device data.      No case tracking events are documented in the log.      Pain/Marcia Score: Pain Rating Prior to Med Admin: 9 (3/13/2023 10:17 AM)  Marcia Score: 7 (3/13/2023 10:00 AM)

## 2023-03-17 ENCOUNTER — PATIENT MESSAGE (OUTPATIENT)
Dept: RESEARCH | Facility: HOSPITAL | Age: 43
End: 2023-03-17
Payer: MEDICAID

## 2023-03-21 ENCOUNTER — OFFICE VISIT (OUTPATIENT)
Dept: SURGERY | Facility: CLINIC | Age: 43
End: 2023-03-21
Payer: MEDICAID

## 2023-03-21 VITALS
HEART RATE: 108 BPM | DIASTOLIC BLOOD PRESSURE: 85 MMHG | WEIGHT: 191.19 LBS | BODY MASS INDEX: 25.9 KG/M2 | SYSTOLIC BLOOD PRESSURE: 122 MMHG | HEIGHT: 72 IN

## 2023-03-21 DIAGNOSIS — K64.4 SKIN TAG OF ANUS: Primary | ICD-10-CM

## 2023-03-21 PROCEDURE — 3008F PR BODY MASS INDEX (BMI) DOCUMENTED: ICD-10-PCS | Mod: CPTII,,, | Performed by: STUDENT IN AN ORGANIZED HEALTH CARE EDUCATION/TRAINING PROGRAM

## 2023-03-21 PROCEDURE — 99214 OFFICE O/P EST MOD 30 MIN: CPT | Mod: S$PBB,,, | Performed by: STUDENT IN AN ORGANIZED HEALTH CARE EDUCATION/TRAINING PROGRAM

## 2023-03-21 PROCEDURE — 3079F DIAST BP 80-89 MM HG: CPT | Mod: CPTII,,, | Performed by: STUDENT IN AN ORGANIZED HEALTH CARE EDUCATION/TRAINING PROGRAM

## 2023-03-21 PROCEDURE — 1159F MED LIST DOCD IN RCRD: CPT | Mod: CPTII,,, | Performed by: STUDENT IN AN ORGANIZED HEALTH CARE EDUCATION/TRAINING PROGRAM

## 2023-03-21 PROCEDURE — 3079F PR MOST RECENT DIASTOLIC BLOOD PRESSURE 80-89 MM HG: ICD-10-PCS | Mod: CPTII,,, | Performed by: STUDENT IN AN ORGANIZED HEALTH CARE EDUCATION/TRAINING PROGRAM

## 2023-03-21 PROCEDURE — 99214 PR OFFICE/OUTPT VISIT, EST, LEVL IV, 30-39 MIN: ICD-10-PCS | Mod: S$PBB,,, | Performed by: STUDENT IN AN ORGANIZED HEALTH CARE EDUCATION/TRAINING PROGRAM

## 2023-03-21 PROCEDURE — 3074F PR MOST RECENT SYSTOLIC BLOOD PRESSURE < 130 MM HG: ICD-10-PCS | Mod: CPTII,,, | Performed by: STUDENT IN AN ORGANIZED HEALTH CARE EDUCATION/TRAINING PROGRAM

## 2023-03-21 PROCEDURE — 3074F SYST BP LT 130 MM HG: CPT | Mod: CPTII,,, | Performed by: STUDENT IN AN ORGANIZED HEALTH CARE EDUCATION/TRAINING PROGRAM

## 2023-03-21 PROCEDURE — 99999 PR PBB SHADOW E&M-EST. PATIENT-LVL IV: ICD-10-PCS | Mod: PBBFAC,,, | Performed by: STUDENT IN AN ORGANIZED HEALTH CARE EDUCATION/TRAINING PROGRAM

## 2023-03-21 PROCEDURE — 99214 OFFICE O/P EST MOD 30 MIN: CPT | Mod: PBBFAC | Performed by: STUDENT IN AN ORGANIZED HEALTH CARE EDUCATION/TRAINING PROGRAM

## 2023-03-21 PROCEDURE — 1159F PR MEDICATION LIST DOCUMENTED IN MEDICAL RECORD: ICD-10-PCS | Mod: CPTII,,, | Performed by: STUDENT IN AN ORGANIZED HEALTH CARE EDUCATION/TRAINING PROGRAM

## 2023-03-21 PROCEDURE — 99999 PR PBB SHADOW E&M-EST. PATIENT-LVL IV: CPT | Mod: PBBFAC,,, | Performed by: STUDENT IN AN ORGANIZED HEALTH CARE EDUCATION/TRAINING PROGRAM

## 2023-03-21 PROCEDURE — 3008F BODY MASS INDEX DOCD: CPT | Mod: CPTII,,, | Performed by: STUDENT IN AN ORGANIZED HEALTH CARE EDUCATION/TRAINING PROGRAM

## 2023-03-21 NOTE — PROGRESS NOTES
[facility]  Colon and Rectal Surgery    [address]  [place], [place]  Tel: [phone]  Fax: [phone]  [email or web address]   MD Milton Spring MD Brian Kann, MD W. Forrest Johnston, MD Matthew Giglia, MD Jennifer Paruch, MD William Kethman, MD     Date of visit: 03/21/2023  Patient name: Solo Black   YOB: 1980   MRN: [id number]    Dear Loreta Wall and Wayne,    It was a pleasure seeing Mr. Black in the Colon and Rectal Surgery clinic here at [facility].     As you know, Mr. Black is a 42 year old man with a history of eosinophilic esophagitis, recurrent angioedema/urticaria, nephrolithiasis, IBS, GERD, C. difficile colitis, and possible leukemia vs. HES (he is scheduled for a BM biopsy on Monday and has a Mediport placed) who presents for evaluation of hemorrhoids. He noticed he has hemorrhoids 4 years ago and had 2 banding procedures. The hemorrhoids have recurred since - he has intermittent bleeding in his stool, when he wipes, and sometimes asaf blood. He has 1-2 bowel movements daily but recently with a change in medications he has had more loose stools. He does have to strain and doesn't feel like he fully evacuates. He does not have fecal incontinence    Last colonoscopy: 7/20/20 (Complete)  Pertinent findings: Perianal skin tags, normal TI, small internal hemorrhoids noted    3/21/2023  Here today for re-evaluation - he was last seen in 9/2020. He recently underwent recurrent incisional hernia repair and abdominal wall plication with Dr. Morrison on 3/13/2023.    The patient was informed of the availability of a certified  without charge. A certified  was not necessary for this visit.      Past Medical History:   Diagnosis Date    Arthritis     C. difficile colitis 02/2014    postop of hand surgery    Cancer     Encounter for blood transfusion     Eosinophilic esophagitis  03/11/2014    GERD (gastroesophageal reflux disease)     Hypereosinophilic syndrome     IBS (irritable bowel syndrome)     Leukemia     MRSA carrier     says multiple times nose swab was +    Munchausen syndrome     Nephrolithiasis 04/2014    bilateral punctate - never passed a stone    Septic prepatellar bursitis of right knee 01/12/2021    Urinary tract infection 02/2014    MRSA     Past Surgical History:   Procedure Laterality Date    APPENDECTOMY      ARTHROSCOPY OF SHOULDER WITH REMOVAL OF DISTAL CLAVICLE Right 11/1/2018    Procedure: ARTHROSCOPY, SHOULDER, WITH DISTAL CLAVICLE EXCISION;  Surgeon: Gabino Mejia MD;  Location: [facility];  Service: Orthopedics;  Laterality: Right;  video    BACK SURGERY      BLADDER FULGURATION N/A 9/18/2020    Procedure: FULGURATION, BLADDER;  Surgeon: Randall Moss MD;  Location: [facility];  Service: Urology;  Laterality: N/A;  blood vessels of bladder neck and prostate    BONE MARROW BIOPSY Left 10/1/2020    Procedure: Biopsy-bone marrow;  Surgeon: Trung Polanco MD;  Location: [facility];  Service: Oncology;  Laterality: Left;    CIRCUMCISION, PRIMARY      COLONOSCOPY N/A 11/14/2018    Procedure: COLONOSCOPY;  Surgeon: Milton Brunson MD;  Location: [facility];  Service: Endoscopy;  Laterality: N/A;    COLONOSCOPY N/A 7/20/2020    Procedure: COLONOSCOPY;  Surgeon: Ruslan Tillman MD;  Location: [facility];  Service: Endoscopy;  Laterality: N/A;    CYSTOSCOPY W/ RETROGRADES Bilateral 9/18/2020    Procedure: CYSTOSCOPY, WITH RETROGRADE PYELOGRAM;  Surgeon: Randall Moss MD;  Location: [facility];  Service: Urology;  Laterality: Bilateral;    CYSTOURETHROSCOPY N/A 2/1/2021    Procedure: CYSTOURETHROSCOPY, short placement;  Surgeon: Boni Benites MD;  [address];  Service: Urology;  Laterality: N/A;    DILATION OF URETHRA N/A 9/18/2020    Procedure: DILATION, URETHRA;  Surgeon: Randall Moss MD;  Location: [facility];  Service: Urology;   Laterality: N/A;    drainage of abscess from hand  2009    MRSA    drainage of abscess from R thigh  2006    MRSA    ESOPHAGOGASTRODUODENOSCOPY  3/11/2014    ESOPHAGOGASTRODUODENOSCOPY N/A 9/5/2018    Procedure: EGD (ESOPHAGOGASTRODUODENOSCOPY);  Surgeon: Kolby Wall MD;  Location: [facility];  Service: Endoscopy;  Laterality: N/A;    ESOPHAGOGASTRODUODENOSCOPY N/A 3/25/2020    Procedure: EGD (ESOPHAGOGASTRODUODENOSCOPY);  Surgeon: Ruslan Tillman MD;  Location: [facility];  Service: Endoscopy;  Laterality: N/A;    ESOPHAGOGASTRODUODENOSCOPY N/A 7/20/2020    Procedure: EGD (ESOPHAGOGASTRODUODENOSCOPY);  Surgeon: Ruslan Tillman MD;  Location: [facility];  Service: Endoscopy;  Laterality: N/A;  Patient is a very hard stick, requires anesthesia stick.    ESOPHAGOGASTRODUODENOSCOPY N/A 8/31/2020    Procedure: EGD (ESOPHAGOGASTRODUODENOSCOPY);  Surgeon: Kolby Wall MD;  Location: [facility];  Service: Endoscopy;  Laterality: N/A;    ESOPHAGOGASTRODUODENOSCOPY N/A 3/3/2021    Procedure: EGD (ESOPHAGOGASTRODUODENOSCOPY);  Surgeon: Ruslan Tillman MD;  Location: [facility];  Service: Endoscopy;  Laterality: N/A;    ESOPHAGOGASTRODUODENOSCOPY N/A 7/12/2021    Procedure: EGD (ESOPHAGOGASTRODUODENOSCOPY);  Surgeon: Kolby Wall MD;  Location: [facility];  Service: Endoscopy;  Laterality: N/A;    FLEXIBLE SIGMOIDOSCOPY N/A 9/5/2018    Procedure: SIGMOIDOSCOPY, FLEXIBLE;  Surgeon: Kolby Wall MD;  Location: [facility];  Service: Endoscopy;  Laterality: N/A;    HAND SURGERY  jan 2014    power saw fell on hand - laceration 3 tendons    INCISION AND DRAINAGE OF KNEE Right 1/13/2021    Procedure: INCISION AND DRAINAGE, KNEE;  Surgeon: Sony Linton Jr., MD;  Location: [facility];  Service: Orthopedics;  Laterality: Right;    INSERTION OF TUNNELED CENTRAL VENOUS CATHETER (CVC) WITH SUBCUTANEOUS PORT Right 11/24/2021    Procedure: Right port-a-cath removal and exchange.;  Surgeon: Robson  MARCIAL Morrison MD;  [facility];  Service: General;  Laterality: Right;  Right internal jugular    INSERTION OF TUNNELED CENTRAL VENOUS CATHETER (CVC) WITH SUBCUTANEOUS PORT Left 3/31/2022    Procedure: INSERTION, SINGLE LUMEN CATHETER WITH PORT, WITH FLUOROSCOPIC GUIDANCE Left Poss Right Chest;  Surgeon: Robson Morrison MD;  [facility];  Service: General;  Laterality: Left;    INSERTION OF TUNNELED CENTRAL VENOUS CATHETER (CVC) WITH SUBCUTANEOUS PORT Right 10/6/2022    Procedure: INSERTION, SINGLE LUMEN CATHETER WITH PORT, WITH FLUOROSCOPIC GUIDANCE Left Possible Right Chest;  Surgeon: Robson Morrison MD;  [facility];  Service: General;  Laterality: Right;    INSERTION OF VENOUS ACCESS PORT Right 8/21/2020    Procedure: INSERTION, VENOUS ACCESS PORT;  Surgeon: Troy Honeycutt MD;  Location: [facility];  Service: General;  Laterality: Right;    NISSEN FUNDOPLICATION      REPAIR, HERNIA, INCISIONAL OR VENTRAL, WITHOUT HISTORY OF PRIOR REPAIR N/A 3/13/2023    Procedure: REPAIR, HERNIA, INCISIONAL OR VENTRAL, WITHOUT HISTORY OF PRIOR REPAIR x2;  Surgeon: Robson Morrison MD;  [facility];  Service: General;  Laterality: N/A;    REVISION OF SCAR N/A 4/7/2021    Procedure: REVISION, SCAR;  Surgeon: Robson Morrison MD;  [facility];  Service: General;  Laterality: N/A;    UMBILICAL HERNIA REPAIR N/A 4/7/2021    Procedure: REPAIR, HERNIA, UMBILICAL, AGE 5 YEARS OR OLDER;  Surgeon: Robson Morrison MD;  [facility];  Service: General;  Laterality: N/A;     Family History   Problem Relation Age of Onset    Urolithiasis Father     Celiac disease Sister     Hypertension Maternal Grandmother     Hypertension Maternal Grandfather     Prostate cancer Neg Hx     Kidney disease Neg Hx      Social History     Tobacco Use    Smoking status: Never    Smokeless tobacco: Never    Tobacco comments:     Pt states he has smoked 1 or 2 cigarettes  "in his life.   Substance Use Topics    Alcohol use: Not Currently    Drug use: No     Review of patient's allergies indicates:   Allergen Reactions    Bean Diarrhea, Edema, Hives, Nausea And Vomiting and Swelling    Bean pod extract Nausea And Vomiting and Swelling     Lips swelling, vomiting  Lips swelling, vomiting      Legumes Nausea And Vomiting and Swelling     Other reaction(s): GI Intolerance  vomiting  vomiting  Pt reports legumes make his lips swell and induce severe vomiting  Pt reports legumes make his lips swell and induce severe vomiting      Lentils Nausea And Vomiting and Swelling     Lips swelling, vomiting  Lips swelling, vomiting      Nsaids (non-steroidal anti-inflammatory drug)      GI bleed    Peas Nausea And Vomiting and Swelling     Lips swelling, vomiting    Ketamine      Severe dysphoria (bad trip)    Haloperidol Other (See Comments)     "feels like crawling out of his skin"      Meloxicam Nausea Only     GI bleed    Penicillins Nausea And Vomiting     Has taken third gen cephalosporins without issue per patient report     Current Outpatient Medications on File Prior to Visit   Medication Sig Dispense Refill    albuterol (PROVENTIL/VENTOLIN HFA) 90 mcg/actuation inhaler INHALE 2 PUFFS INTO THE LUNGS EVERY 6 HOURS AS NEEDED FOR WHEEZING OR SHORTNESS OF BREATH. RESCUE. 18 g 2    artificial tears (ISOPTO TEARS) 0.5 % ophthalmic solution Place 1 drop into both eyes 6 (six) times daily.      cetirizine (ZYRTEC) 10 MG tablet Take 20 mg by mouth 2 (two) times a day.       cholecalciferol, vitamin D3, (VITAMIN D3) 25 mcg (1,000 unit) capsule Take 1 capsule (1,000 Units total) by mouth once daily. 90 capsule 1    diphenhydrAMINE (SOMINEX) 25 mg tablet Take 25 mg by mouth nightly as needed.       ferrous sulfate 325 (65 FE) MG EC tablet Take 1 tablet (325 mg total) by mouth once daily. (Patient taking differently: Take 325 mg by mouth nightly.) 90 tablet 1    Lactobacillus acidophilus 10 billion cell " Cap Take 1 capsule by mouth once daily.       lidocaine HCL 2% (XYLOCAINE) 2 % jelly Apply topically 3 (three) times daily as needed (Hemorrhoids). 5 mL 3    mepolizumab (NUCALA) 100 mg/mL Syrg Inject 3 mLs (300 mg total) into the skin every 28 days. 300 mL 11    ondansetron (ZOFRAN-ODT) 4 MG TbDL Take 4 mg by mouth.      paroxetine (PAXIL) 20 MG tablet Take 20 mg by mouth nightly.      vitamin D3-vitamin K2 1000-90 unit-mcg TbDL Take by mouth once daily.       dextroamphetamine-amphetamine (ADDERALL) 20 mg tablet Take 1 tablet by mouth 2 (two) times daily.      docusate sodium (COLACE) 100 MG capsule Take 1 capsule (100 mg total) by mouth 2 (two) times daily. (Patient not taking: Reported on 3/21/2023) 60 capsule 3    EPINEPHrine (EPIPEN) 0.3 mg/0.3 mL AtIn Inject 0.3 mLs (0.3 mg total) into the muscle as needed. 2 each 1    hydrOXYzine HCL (ATARAX) 25 MG tablet Take 1 tablet (25 mg total) by mouth 3 (three) times daily as needed for Itching. (Patient not taking: Reported on 3/21/2023) 30 tablet 1    oxyCODONE (ROXICODONE) 5 MG immediate release tablet Take 1 tablet (5 mg total) by mouth every 4 (four) hours as needed for Pain. (Patient not taking: Reported on 3/21/2023) 15 tablet 0    oxyCODONE-acetaminophen (PERCOCET) 5-325 mg per tablet Take 1 tablet by mouth every 4 (four) hours as needed for Pain.      pantoprazole (PROTONIX) 40 MG tablet Take 1 tablet (40 mg total) by mouth once daily. (Patient taking differently: Take 40 mg by mouth 2 (two) times daily.) 30 tablet 2    predniSONE (DELTASONE) 10 MG tablet Take 20 mg by mouth once daily.      promethazine (PHENERGAN) 25 MG tablet Take 25 mg by mouth every 4 (four) hours.      sucralfate (CARAFATE) 1 gram tablet Take 1 g by mouth before meals and at bedtime as needed.        Current Facility-Administered Medications on File Prior to Visit   Medication Dose Route Frequency Provider Last Rate Last Admin    mepolizumab (NUCALA) injection 300 mg  300 mg  Subcutaneous Q28 Days Michelle Ornelas MD   300 mg at 06/28/21 1511     Physical Examination  Ht 6' (1.829 m)   Wt 86.7 kg (191 lb 3.2 oz)   BMI 25.93 kg/m²      A chaperone was present for the physical examination.    Constitutional: well developed, no cough, no dyspnea, alert and no acute distress    Head: Normocephalic, no lesions, without obvious abnormality  Eye: Normal external eye, conjunctiva, and lids, PERRL  Cardiovascular: regular rate, regular rhythm  Respiratory: normal air entry  Gastrointestinal: soft, non-tender, without masses or organomegaly  Rectal: No masses, no gross blood, on external exam he has some circumferential irritation  Musculoskeletal: no muscular tenderness noted, full range of motion without pain  Neurologic: alert, oriented, normal speech, no focal findings or movement disorder noted  Psychiatric: appropriate, normal mood    Anoscopy Procedure Note  Pre-procedure diagnosis: Rectal bleeding  Post-procedure diagnosis: Internal hemorrhoids    Procedure: Anoscopy    Surgeon: Mike Reyna MD    Specimen: None    Findings: Single 1 cm skin tag right posterior    Procedure Description:   A digital rectal exam was first performed and then a lubricated lighted anoscope was then inserted and a 4 quadrant evaluation was performed. The patient tolerated procedure well without complications.    Assessment and Plan of Care    Thank you again for referring Mr. Black to my care. In summary, Mr. Black is a 39 year old man presenting with hemorrhoidal disease. We had a discussion about his hemorrhoids and treatment options. I have provided the following recommendations:    1. We had a discussion about conservative management options for symptoms related to hemorrhoids. Irritation or itching can be treated with Lidocaine cream/ointment, over-the-counter Hydrocortisone suppositories, warm baths or soaking. Bleeding often improves by reducing hard stools and straining - Fiber  supplementation (20-30 grams daily) and hydration (1.5 - 2 L daily) are effective. Regular exercise, avoiding constipating medications (narcotic pain medications), and limiting alcohol and fatty foods can also be effective. Discussed importance of perianal hygeine - dryness is diana. Recommended against excision of small skin redundancy.  2. Follow-up as needed    Please do not hesitate to contact me if you have any questions.      Mike Reyna MD - Staff Surgeon  Department of Colon & Rectal Surgery  [facility]

## 2023-03-22 DIAGNOSIS — D72.119 HYPEREOSINOPHILIC SYNDROME, UNSPECIFIED TYPE: Primary | ICD-10-CM

## 2023-03-23 ENCOUNTER — TELEPHONE (OUTPATIENT)
Dept: SURGERY | Facility: CLINIC | Age: 43
End: 2023-03-23
Payer: MEDICAID

## 2023-03-23 NOTE — PRE-PROCEDURE INSTRUCTIONS
Pt called POC for Pre-Op instructions  PreOp Instructions given:   - Verbal medication information (what to hold and what to take)   - NPO guidelines   - Arrival place directions given; time to be given the day before procedure by the   Surgeon's Office 114[address]  - Bathing with antibacterial soap   - Don't wear any jewelry or bring any valuables AM of surgery   - No makeup or moisturizer to face   - No perfume/cologne, powder, lotions or aftershave   Pt. verbalized understanding.   Pt denies any h/o Anesthesia/Sedation complications or side effects.

## 2023-03-24 ENCOUNTER — ANESTHESIA EVENT (OUTPATIENT)
Dept: SURGERY | Facility: HOSPITAL | Age: 43
End: 2023-03-24
Payer: MEDICAID

## 2023-03-24 ENCOUNTER — HOSPITAL ENCOUNTER (OUTPATIENT)
Facility: HOSPITAL | Age: 43
Discharge: HOME | End: 2023-03-24
Attending: SURGERY | Admitting: SURGERY
Payer: MEDICAID

## 2023-03-24 ENCOUNTER — ANESTHESIA (OUTPATIENT)
Dept: SURGERY | Facility: HOSPITAL | Age: 43
End: 2023-03-24
Payer: MEDICAID

## 2023-03-24 VITALS
HEART RATE: 89 BPM | BODY MASS INDEX: 25.89 KG/M2 | WEIGHT: 191.13 LBS | DIASTOLIC BLOOD PRESSURE: 83 MMHG | OXYGEN SATURATION: 100 % | HEIGHT: 72 IN | SYSTOLIC BLOOD PRESSURE: 122 MMHG | RESPIRATION RATE: 18 BRPM | TEMPERATURE: 99 F

## 2023-03-24 DIAGNOSIS — D72.119 HYPEREOSINOPHILIC SYNDROME, UNSPECIFIED TYPE: Primary | ICD-10-CM

## 2023-03-24 PROCEDURE — D9220A PRA ANESTHESIA: Mod: ANES,,, | Performed by: ANESTHESIOLOGY

## 2023-03-24 PROCEDURE — 36590 PR REMOVAL TUNNELED CV CATH W SUBQ PORT OR PUMP: ICD-10-PCS | Mod: RT,,, | Performed by: SURGERY

## 2023-03-24 PROCEDURE — 00400 ANES INTEGUMENTARY SYS NOS: CPT | Performed by: SURGERY

## 2023-03-24 PROCEDURE — D9220A PRA ANESTHESIA: ICD-10-PCS | Mod: ANES,,, | Performed by: ANESTHESIOLOGY

## 2023-03-24 PROCEDURE — 25000003 PHARM REV CODE 250: Performed by: NURSE ANESTHETIST, CERTIFIED REGISTERED

## 2023-03-24 PROCEDURE — 37000009 HC ANESTHESIA EA ADD 15 MINS: Performed by: SURGERY

## 2023-03-24 PROCEDURE — 25000003 PHARM REV CODE 250: Performed by: ANESTHESIOLOGY

## 2023-03-24 PROCEDURE — 71000016 HC POSTOP RECOV ADDL HR: Performed by: SURGERY

## 2023-03-24 PROCEDURE — 36000707: Performed by: SURGERY

## 2023-03-24 PROCEDURE — 71000015 HC POSTOP RECOV 1ST HR: Performed by: SURGERY

## 2023-03-24 PROCEDURE — D9220A PRA ANESTHESIA: ICD-10-PCS | Mod: CRNA,,, | Performed by: NURSE ANESTHETIST, CERTIFIED REGISTERED

## 2023-03-24 PROCEDURE — 71000044 HC DOSC ROUTINE RECOVERY FIRST HOUR: Performed by: SURGERY

## 2023-03-24 PROCEDURE — 36590 REMOVAL TUNNELED CV CATH: CPT | Mod: RT,,, | Performed by: SURGERY

## 2023-03-24 PROCEDURE — 37000008 HC ANESTHESIA 1ST 15 MINUTES: Performed by: SURGERY

## 2023-03-24 PROCEDURE — D9220A PRA ANESTHESIA: Mod: CRNA,,, | Performed by: NURSE ANESTHETIST, CERTIFIED REGISTERED

## 2023-03-24 PROCEDURE — 88300 PR  SURG PATH,GROSS,LEVEL I: ICD-10-PCS | Mod: 26,,, | Performed by: STUDENT IN AN ORGANIZED HEALTH CARE EDUCATION/TRAINING PROGRAM

## 2023-03-24 PROCEDURE — 88300 SURGICAL PATH GROSS: CPT | Performed by: STUDENT IN AN ORGANIZED HEALTH CARE EDUCATION/TRAINING PROGRAM

## 2023-03-24 PROCEDURE — 63600175 PHARM REV CODE 636 W HCPCS: Performed by: NURSE ANESTHETIST, CERTIFIED REGISTERED

## 2023-03-24 PROCEDURE — 25000003 PHARM REV CODE 250: Performed by: SURGERY

## 2023-03-24 PROCEDURE — 25000003 PHARM REV CODE 250: Performed by: STUDENT IN AN ORGANIZED HEALTH CARE EDUCATION/TRAINING PROGRAM

## 2023-03-24 PROCEDURE — 36000706: Performed by: SURGERY

## 2023-03-24 PROCEDURE — 88300 SURGICAL PATH GROSS: CPT | Mod: 26,,, | Performed by: STUDENT IN AN ORGANIZED HEALTH CARE EDUCATION/TRAINING PROGRAM

## 2023-03-24 RX ORDER — MIDAZOLAM HYDROCHLORIDE 1 MG/ML
INJECTION, SOLUTION INTRAMUSCULAR; INTRAVENOUS
Status: DISCONTINUED | OUTPATIENT
Start: 2023-03-24 | End: 2023-03-24

## 2023-03-24 RX ORDER — OXYCODONE HYDROCHLORIDE 10 MG/1
10 TABLET ORAL ONCE
Status: COMPLETED | OUTPATIENT
Start: 2023-03-24 | End: 2023-03-24

## 2023-03-24 RX ORDER — FENTANYL CITRATE 50 UG/ML
INJECTION, SOLUTION INTRAMUSCULAR; INTRAVENOUS
Status: DISCONTINUED | OUTPATIENT
Start: 2023-03-24 | End: 2023-03-24

## 2023-03-24 RX ORDER — ROCURONIUM BROMIDE 10 MG/ML
INJECTION, SOLUTION INTRAVENOUS
Status: DISCONTINUED | OUTPATIENT
Start: 2023-03-24 | End: 2023-03-24

## 2023-03-24 RX ORDER — OXYCODONE HYDROCHLORIDE 5 MG/1
5 TABLET ORAL EVERY 4 HOURS PRN
Qty: 10 TABLET | Refills: 0 | Status: ON HOLD | OUTPATIENT
Start: 2023-03-24 | End: 2023-08-16 | Stop reason: HOSPADM

## 2023-03-24 RX ORDER — SODIUM CHLORIDE 9 MG/ML
INJECTION, SOLUTION INTRAVENOUS CONTINUOUS
Status: DISCONTINUED | OUTPATIENT
Start: 2023-03-24 | End: 2023-03-24 | Stop reason: HOSPADM

## 2023-03-24 RX ORDER — BUPIVACAINE HYDROCHLORIDE 2.5 MG/ML
INJECTION, SOLUTION EPIDURAL; INFILTRATION; INTRACAUDAL
Status: DISCONTINUED | OUTPATIENT
Start: 2023-03-24 | End: 2023-03-24 | Stop reason: HOSPADM

## 2023-03-24 RX ORDER — SODIUM CHLORIDE 0.9 % (FLUSH) 0.9 %
10 SYRINGE (ML) INJECTION
Status: DISCONTINUED | OUTPATIENT
Start: 2023-03-24 | End: 2023-03-24 | Stop reason: HOSPADM

## 2023-03-24 RX ORDER — CLINDAMYCIN PHOSPHATE 900 MG/50ML
900 INJECTION, SOLUTION INTRAVENOUS
Status: COMPLETED | OUTPATIENT
Start: 2023-03-24 | End: 2023-03-24

## 2023-03-24 RX ORDER — PROPOFOL 10 MG/ML
VIAL (ML) INTRAVENOUS
Status: DISCONTINUED | OUTPATIENT
Start: 2023-03-24 | End: 2023-03-24

## 2023-03-24 RX ORDER — LIDOCAINE HYDROCHLORIDE 20 MG/ML
INJECTION, SOLUTION EPIDURAL; INFILTRATION; INTRACAUDAL; PERINEURAL
Status: DISCONTINUED | OUTPATIENT
Start: 2023-03-24 | End: 2023-03-24

## 2023-03-24 RX ADMIN — ROCURONIUM BROMIDE 30 MG: 10 INJECTION INTRAVENOUS at 01:03

## 2023-03-24 RX ADMIN — SODIUM CHLORIDE: 0.9 INJECTION, SOLUTION INTRAVENOUS at 12:03

## 2023-03-24 RX ADMIN — OXYCODONE HYDROCHLORIDE 10 MG: 10 TABLET ORAL at 02:03

## 2023-03-24 RX ADMIN — PROPOFOL 200 MG: 10 INJECTION, EMULSION INTRAVENOUS at 12:03

## 2023-03-24 RX ADMIN — SUGAMMADEX 200 MG: 100 INJECTION, SOLUTION INTRAVENOUS at 01:03

## 2023-03-24 RX ADMIN — CLINDAMYCIN IN 5 PERCENT DEXTROSE 900 MG: 18 INJECTION, SOLUTION INTRAVENOUS at 01:03

## 2023-03-24 RX ADMIN — LIDOCAINE HYDROCHLORIDE 60 MG: 20 INJECTION, SOLUTION EPIDURAL; INFILTRATION; INTRACAUDAL; PERINEURAL at 12:03

## 2023-03-24 RX ADMIN — MIDAZOLAM HYDROCHLORIDE 4 MG: 1 INJECTION, SOLUTION INTRAMUSCULAR; INTRAVENOUS at 12:03

## 2023-03-24 RX ADMIN — FENTANYL CITRATE 100 MCG: 50 INJECTION, SOLUTION INTRAMUSCULAR; INTRAVENOUS at 12:03

## 2023-03-24 NOTE — ANESTHESIA PROCEDURE NOTES
Intubation    Date/Time: 3/24/2023 12:57 PM  Performed by: Codie Woodward CRNA  Authorized by: Jovanny Chery Jr., MD     Intubation:     Induction:  Intravenous    Intubated:  Postinduction    Mask Ventilation:  Easy mask    Attempts:  1    Attempted By:  CRNA    Method of Intubation:  Video laryngoscopy    Blade:  Knight 3    Laryngeal View Grade: Grade I - full view of cords      Difficult Airway Encountered?: No      Complications:  None    Airway Device:  Oral endotracheal tube    Airway Device Size:  7.5    Style/Cuff Inflation:  Cuffed    Inflation Amount (mL):  6    Tube secured:  21    Secured at:  The lips    Placement Verified By:  Colorimetric ETCO2 device    Complicating Factors:  None    Findings Post-Intubation:  BS equal bilateral

## 2023-03-24 NOTE — BRIEF OP NOTE
[facility] - Surgery ([facility])  Brief Operative Note    Surgery Date: 3/24/2023     Surgeon(s) and Role:     * Robson Morrison MD - Primary     * Gulshan Mayfield MD - Resident - Assisting        Pre-op Diagnosis:  Hypereosinophilic syndrome, unspecified type [D72.119  -Port malfunction    Post-op Diagnosis:  Post-Op Diagnosis Codes:     * Hypereosinophilic syndrome, unspecified type [D72.119]  Central Venous Stenosis    Procedure(s) (LRB):  REMOVAL, CATHETER, CENTRAL VENOUS, TUNNELED, WITH PORT (N/A)    Anesthesia: General    Operative Findings:   Significant central stenosis. Unable to pass wire. Prior port which was malfunctioning was removed.    Estimated Blood Loss: Minimal         Specimens:   Specimen (24h ago, onward)      None              Discharge Note    OUTCOME: Patient tolerated treatment/procedure well without complication and is now ready for discharge.    DISPOSITION: Home or Self Care    FINAL DIAGNOSIS:  Eosinophilic esophagitis    FOLLOWUP: In clinic    DISCHARGE INSTRUCTIONS:    Discharge Procedure Orders   Diet Adult Regular     Notify your health care provider if you experience any of the following:  severe uncontrolled pain     Notify your health care provider if you experience any of the following:  persistent nausea and vomiting or diarrhea     Notify your health care provider if you experience any of the following:  temperature >100.4     Notify your health care provider if you experience any of the following:  redness, tenderness, or signs of infection (pain, swelling, redness, odor or green/yellow discharge around incision site)     Activity as tolerated     Gulshan Mayfield MD  General Surgery PGY-3  03/24/2023

## 2023-03-24 NOTE — TRANSFER OF CARE
Anesthesia Transfer of Care Note    Patient: Solo Black    Procedure(s) Performed: Procedure(s) (LRB):  REMOVAL, CATHETER, CENTRAL VENOUS, TUNNELED, WITH PORT (N/A)    Patient location: PACU    Anesthesia Type: general    Transport from OR: Transported from OR on 6-10 L/min O2 by face mask with adequate spontaneous ventilation    Post pain: adequate analgesia    Post assessment: no apparent anesthetic complications and tolerated procedure well    Post vital signs: stable    Level of consciousness: awake, alert and oriented    Nausea/Vomiting: no nausea/vomiting    Complications: none    Transfer of care protocol was followed      Last vitals:   Visit Vitals  BP (!) 132/92 (BP Location: Right arm, Patient Position: Lying)   Pulse 86   Temp 37.2 °C (99 °F) (Oral)   Resp 18   Ht 6' (1.829 m)   Wt 86.7 kg (191 lb 2.2 oz)   SpO2 97%   BMI 25.92 kg/m²

## 2023-03-24 NOTE — H&P
" General Surgery Office Visit   History and Physical    Patient Name: Solo Black  YOB: 1980 (42 y.o.)  MRN: [id number]  Today's Date: 03/24/2023    Referring Md:   Robson Morrison Md  [address] [place] [address]    SUBJECTIVE:     Chief Complaint: Port infiltration     History of Present Illness:  Solo Black is a 42 y.o. male with past medical history of hypereosinophilic esoghagitis requiring IVIG infusions and prior history of ex lap for PUD and incisional hernia repair most recently repaired 3/13/2023. Now having problems with his R IJ port. Patient states port was recently infiltrated with cath flow and saline. Which caused significant bruising to his chest and neck. Now having trouble drawing back and would like it replaced.    Patient has had 2 ports placed on the right and 1 on the left. Will likely attempt L port placement      Review of patient's allergies indicates:   Allergen Reactions    Bean Diarrhea, Edema, Hives, Nausea And Vomiting and Swelling    Bean pod extract Nausea And Vomiting and Swelling     Lips swelling, vomiting  Lips swelling, vomiting      Legumes Nausea And Vomiting and Swelling     Other reaction(s): GI Intolerance  vomiting  vomiting  Pt reports legumes make his lips swell and induce severe vomiting  Pt reports legumes make his lips swell and induce severe vomiting      Lentils Nausea And Vomiting and Swelling     Lips swelling, vomiting  Lips swelling, vomiting      Nsaids (non-steroidal anti-inflammatory drug)      GI bleed    Peas Nausea And Vomiting and Swelling     Lips swelling, vomiting    Ketamine      Severe dysphoria (bad trip)    Haloperidol Other (See Comments)     "feels like crawling out of his skin"      Meloxicam Nausea Only     GI bleed    Penicillins Nausea And Vomiting     Has taken third gen cephalosporins without issue per patient report       Past Medical History:   Diagnosis Date    Arthritis     C. difficile " colitis 02/2014    postop of hand surgery    Cancer     Encounter for blood transfusion     Eosinophilic esophagitis 03/11/2014    GERD (gastroesophageal reflux disease)     Hypereosinophilic syndrome     IBS (irritable bowel syndrome)     Leukemia     MRSA carrier     says multiple times nose swab was +    Munchausen syndrome     Nephrolithiasis 04/2014    bilateral punctate - never passed a stone    Septic prepatellar bursitis of right knee 01/12/2021    Urinary tract infection 02/2014    MRSA     Past Surgical History:   Procedure Laterality Date    APPENDECTOMY      ARTHROSCOPY OF SHOULDER WITH REMOVAL OF DISTAL CLAVICLE Right 11/1/2018    Procedure: ARTHROSCOPY, SHOULDER, WITH DISTAL CLAVICLE EXCISION;  Surgeon: Gabino Mejia MD;  Location: [facility];  Service: Orthopedics;  Laterality: Right;  video    BACK SURGERY      BLADDER FULGURATION N/A 9/18/2020    Procedure: FULGURATION, BLADDER;  Surgeon: Randall Moss MD;  Location: [facility];  Service: Urology;  Laterality: N/A;  blood vessels of bladder neck and prostate    BONE MARROW BIOPSY Left 10/1/2020    Procedure: Biopsy-bone marrow;  Surgeon: Trung Polanco MD;  Location: [facility];  Service: Oncology;  Laterality: Left;    CIRCUMCISION, PRIMARY      COLONOSCOPY N/A 11/14/2018    Procedure: COLONOSCOPY;  Surgeon: Milton Brunson MD;  Location: [facility];  Service: Endoscopy;  Laterality: N/A;    COLONOSCOPY N/A 7/20/2020    Procedure: COLONOSCOPY;  Surgeon: Ruslan Tillman MD;  Location: [facility];  Service: Endoscopy;  Laterality: N/A;    CYSTOSCOPY W/ RETROGRADES Bilateral 9/18/2020    Procedure: CYSTOSCOPY, WITH RETROGRADE PYELOGRAM;  Surgeon: Randall Moss MD;  Location: [facility];  Service: Urology;  Laterality: Bilateral;    CYSTOURETHROSCOPY N/A 2/1/2021    Procedure: CYSTOURETHROSCOPY, short placement;  Surgeon: Boni Benites MD;  Location: [address];  Service: Urology;  Laterality: N/A;    DILATION OF URETHRA N/A  9/18/2020    Procedure: DILATION, URETHRA;  Surgeon: Randall Moss MD;  Location: [facility];  Service: Urology;  Laterality: N/A;    drainage of abscess from hand  2009    MRSA    drainage of abscess from R thigh  2006    MRSA    ESOPHAGOGASTRODUODENOSCOPY  3/11/2014    ESOPHAGOGASTRODUODENOSCOPY N/A 9/5/2018    Procedure: EGD (ESOPHAGOGASTRODUODENOSCOPY);  Surgeon: Kolby Wall MD;  Location: [facility];  Service: Endoscopy;  Laterality: N/A;    ESOPHAGOGASTRODUODENOSCOPY N/A 3/25/2020    Procedure: EGD (ESOPHAGOGASTRODUODENOSCOPY);  Surgeon: Ruslan Tillman MD;  Location: [facility];  Service: Endoscopy;  Laterality: N/A;    ESOPHAGOGASTRODUODENOSCOPY N/A 7/20/2020    Procedure: EGD (ESOPHAGOGASTRODUODENOSCOPY);  Surgeon: Ruslan Tillman MD;  Location: [facility];  Service: Endoscopy;  Laterality: N/A;  Patient is a very hard stick, requires anesthesia stick.    ESOPHAGOGASTRODUODENOSCOPY N/A 8/31/2020    Procedure: EGD (ESOPHAGOGASTRODUODENOSCOPY);  Surgeon: Kolby Wall MD;  Location: [facility];  Service: Endoscopy;  Laterality: N/A;    ESOPHAGOGASTRODUODENOSCOPY N/A 3/3/2021    Procedure: EGD (ESOPHAGOGASTRODUODENOSCOPY);  Surgeon: Ruslan Tillman MD;  Location: [facility];  Service: Endoscopy;  Laterality: N/A;    ESOPHAGOGASTRODUODENOSCOPY N/A 7/12/2021    Procedure: EGD (ESOPHAGOGASTRODUODENOSCOPY);  Surgeon: Kolby Wall MD;  Location: [facility];  Service: Endoscopy;  Laterality: N/A;    FLEXIBLE SIGMOIDOSCOPY N/A 9/5/2018    Procedure: SIGMOIDOSCOPY, FLEXIBLE;  Surgeon: Kolby Wall MD;  Location: [facility];  Service: Endoscopy;  Laterality: N/A;    HAND SURGERY  jan 2014    power saw fell on hand - laceration 3 tendons    INCISION AND DRAINAGE OF KNEE Right 1/13/2021    Procedure: INCISION AND DRAINAGE, KNEE;  Surgeon: Sony Linton Jr., MD;  Location: [facility];  Service: Orthopedics;  Laterality: Right;    INSERTION OF TUNNELED CENTRAL VENOUS CATHETER (CVC)  WITH SUBCUTANEOUS PORT Right 11/24/2021    Procedure: Right port-a-cath removal and exchange.;  Surgeon: Robson Morrison MD;  [facility];  Service: General;  Laterality: Right;  Right internal jugular    INSERTION OF TUNNELED CENTRAL VENOUS CATHETER (CVC) WITH SUBCUTANEOUS PORT Left 3/31/2022    Procedure: INSERTION, SINGLE LUMEN CATHETER WITH PORT, WITH FLUOROSCOPIC GUIDANCE Left Poss Right Chest;  Surgeon: Robson Morrison MD;  Location: [facility] OR 2ND FLR;  Service: General;  Laterality: Left;    INSERTION OF TUNNELED CENTRAL VENOUS CATHETER (CVC) WITH SUBCUTANEOUS PORT Right 10/6/2022    Procedure: INSERTION, SINGLE LUMEN CATHETER WITH PORT, WITH FLUOROSCOPIC GUIDANCE Left Possible Right Chest;  Surgeon: Robson Morrison MD;  [facility];  Service: General;  Laterality: Right;    INSERTION OF VENOUS ACCESS PORT Right 8/21/2020    Procedure: INSERTION, VENOUS ACCESS PORT;  Surgeon: Troy Honeycutt MD;  Location: [facility];  Service: General;  Laterality: Right;    NISSEN FUNDOPLICATION      REPAIR, HERNIA, INCISIONAL OR VENTRAL, WITHOUT HISTORY OF PRIOR REPAIR N/A 3/13/2023    Procedure: REPAIR, HERNIA, INCISIONAL OR VENTRAL, WITHOUT HISTORY OF PRIOR REPAIR x2;  Surgeon: Robson Morrison MD;  [facility];  Service: General;  Laterality: N/A;    REVISION OF SCAR N/A 4/7/2021    Procedure: REVISION, SCAR;  Surgeon: Robson Morrison MD;  [facility];  Service: General;  Laterality: N/A;    UMBILICAL HERNIA REPAIR N/A 4/7/2021    Procedure: REPAIR, HERNIA, UMBILICAL, AGE 5 YEARS OR OLDER;  Surgeon: Robson Morrison MD;  [facility];  Service: General;  Laterality: N/A;     Family History   Problem Relation Age of Onset    Urolithiasis Father     Celiac disease Sister     Hypertension Maternal Grandmother     Hypertension Maternal Grandfather     Prostate cancer Neg Hx     Kidney disease Neg Hx      Social History     Tobacco Use     Smoking status: Never    Smokeless tobacco: Never    Tobacco comments:     Pt states he has smoked 1 or 2 cigarettes in his life.   Substance Use Topics    Alcohol use: Not Currently    Drug use: No        Review of Systems:  Review of Systems   Constitutional:  Negative for chills and fever.   HENT:  Negative for hearing loss.    Eyes:  Negative for blurred vision, double vision and photophobia.   Respiratory:  Negative for cough and sputum production.    Gastrointestinal:  Positive for abdominal pain. Negative for constipation, diarrhea, heartburn, nausea and vomiting.   Genitourinary:  Negative for dysuria, frequency and urgency.   Skin:  Negative for itching and rash.   Neurological:  Negative for dizziness and headaches.   Psychiatric/Behavioral:  Negative for depression.      OBJECTIVE:     Vital Signs (Most Recent)  There were no vitals taken for this visit.    Physical Exam:  Physical Exam  HENT:      Head: Normocephalic.      Mouth/Throat:      Mouth: Mucous membranes are moist.   Eyes:      Pupils: Pupils are equal, round, and reactive to light.   Neck:      Comments: R chest port in place  No erythema, welling, fluctuance, or induration   Cardiovascular:      Rate and Rhythm: Normal rate.      Pulses: Normal pulses.   Pulmonary:      Effort: Pulmonary effort is normal.   Abdominal:      General: Abdomen is flat. Bowel sounds are normal.      Palpations: Abdomen is soft.      Comments: Midline incision healing well   Musculoskeletal:         General: Normal range of motion.      Cervical back: Normal range of motion.   Skin:     General: Skin is warm.      Capillary Refill: Capillary refill takes less than 2 seconds.   Neurological:      General: No focal deficit present.      Mental Status: He is alert.   Psychiatric:         Mood and Affect: Mood normal.         ASSESSMENT/PLAN:     Solo Black is a 42 y.o. male presenting for elective removal and replacement of port.     Plan:  - consent obtained  this morning   - risks and benefits reviewed and all questions answered   - proceed to OR for removal and replacement     Anahi Carpenter MD  General Surgery   PGY-1

## 2023-03-24 NOTE — OP NOTE
[facility]  Surgery Department  Operative Note    SUMMARY     Date of Procedure: 3/24/2023     Primary Surgeon: Surgeon(s) and Role:     * Robson Morrison MD - Primary     * Gulshan Mayfield MD - Resident - Assisting       Pre-Operative Diagnosis:   Hypereosinophilic syndrome, unspecified type [D72.119]  Malfunctioning Port    Post-op Diagnosis: Same    Anesthesia: General     Procedure: Procedure(s) (LRB):  REMOVAL, CATHETER, CENTRAL VENOUS, TUNNELED, WITH PORT (N/A)     Indications for the procedure: Solo Black is a 42 y.o. with Hypereosinophilic syndrome who has had multiple ports for infusions. His current port is no longer working. We recommended they undergo port revision and the patient agreed to proceed. The patient signed informed consent and expressed understanding of the risks, benefits, and alternatives of surgery.     Operative Findings (including complications, if any):   -Significant central stenosis. Unable to pass wire. Prior port which was malfunctioning was removed.    Procedure in Detail: After the procedure was explained and all questions answered appropriately, consent was obtained. The patient was then brought back to the Operating Room, where he was placed in the supine position.  General anesthesia was then induced.  Next, the patient was prepped and draped in the usual sterile fashion.  A timeout, including the patient's name, allergies, procedure, was then performed, to which all members of the operative team agreed. We also confirmed the administration of appropriate pre-operative antibiotics.     We began by injecting local anesthetic in a field block fashion around the right chest port site. We made an incision over the prior incision with a 15 blade. We dissected the port free from the chest with electrocautery and blunt dissection. The port was disconnected from the catheter. A wire was passed down the catheter. We went liver with fluoro while trying to  pass the wire into the SVC. Despite multiple attempts, we could not pass the wire into the SVC due to significant central stenosis. Due to this, we decided to abort placement of a new port and pursue subsequent placement with IR. We removed the catheter through the chest incision, which fully removed the existing port. Pressure was held at the IJ insertion site. The catheter tract was ligated with a 3-0 Vicryl in a figure of 8 fashion. The deep tissues were closed with 3-0 Vicryl in interrupted fashion. The skin was closed with a running 4-0 Monocryl. The skin was cleaned and dermabond was applied. The concluded our operation. Counter were correct x2.    Estimated Blood Loss (EBL): Minimal           Implants: * No implants in log *    Specimens:   Specimen (24h ago, onward)       Start     Ordered    03/24/23 1350  Specimen to Pathology, Surgery General Surgery  Once        Comments: Pre-op Diagnosis: Hypereosinophilic syndrome, unspecified type [D72.119]Procedure(s):REMOVAL, CATHETER, CENTRAL VENOUS, TUNNELED, WITH PORT 1. Port--gross only     References:    Click here for ordering Quick Tip   Question Answer Comment   Procedure Type: General Surgery    Which provider would you like to cc? OANH MORRISON        03/24/23 1350                            Condition: Good    Disposition: PACU - hemodynamically stable.    Attestation: Dr. Morrison was present and scrubbed for the entire procedure.    Gulshan Mayfield MD  General Surgery PGY-3  03/24/2023

## 2023-03-24 NOTE — ANESTHESIA PREPROCEDURE EVALUATION
03/24/2023  Pre-operative evaluation for Procedure(s) (LRB):  REMOVAL, CATHETER, CENTRAL VENOUS, TUNNELED, WITH PORT (N/A)  INSERTION, SINGLE LUMEN CATHETER WITH PORT, WITH FLUOROSCOPIC GUIDANCE Right Possible Left Chest (Right)    Solo Black is a 42 y.o. male     Patient Active Problem List   Diagnosis    Eosinophilic esophagitis    PUD (peptic ulcer disease)    Erosive gastritis    Lifetime need for proton pump inhibitor    Cervicalgia    Adult ADHD    Adverse reaction to nonsteroidal anti-inflammatory drug (NSAID)    Therapeutic opioid induced constipation    Iron deficiency anemia    Eosinophilia    Reactive arthritis    Incomplete rotator cuff tear or rupture of right shoulder, not specified as traumatic    Hiatal hernia    Hypereosinophilic syndrome    Encounter for insertion of venous access port    Port-A-Cath in place    Dry eyes    Skin rash    Chronic neck pain    Myofascial pain syndrome    Status post laminectomy - Thoracic & cervical    Sedentary lifestyle    Adjustment disorder with anxious mood    Internal hemorrhoids    Difficulty voiding    Hematuria    Anxiety    Urticarial rash    Effusion of right knee    Upper GI bleed    Substance or medication-induced anxiety disorder    Hypomagnesemia    Umbilical hernia    Continuous severe abdominal pain    Intractable nausea and vomiting    Gastric erosions    Hypokalemia    Diarrhea    Lower GI bleed    Psychiatric illness    Abdominal pain    Duodenal ulcer hemorrhagic    Incisional hernia, without obstruction or gangrene    Munchausen syndrome       Review of patient's allergies indicates:   Allergen Reactions    Bean Diarrhea, Edema, Hives, Nausea And Vomiting and Swelling    Bean pod extract Nausea And Vomiting and Swelling     Lips swelling, vomiting  Lips swelling, vomiting      Legumes  "Nausea And Vomiting and Swelling     Other reaction(s): GI Intolerance  vomiting  vomiting  Pt reports legumes make his lips swell and induce severe vomiting  Pt reports legumes make his lips swell and induce severe vomiting      Lentils Nausea And Vomiting and Swelling     Lips swelling, vomiting  Lips swelling, vomiting      Nsaids (non-steroidal anti-inflammatory drug)      GI bleed    Peas Nausea And Vomiting and Swelling     Lips swelling, vomiting    Ketamine      Severe dysphoria (bad trip)    Haloperidol Other (See Comments)     "feels like crawling out of his skin"      Meloxicam Nausea Only     GI bleed    Penicillins Nausea And Vomiting     Has taken third gen cephalosporins without issue per patient report       No current facility-administered medications on file prior to encounter.     Current Outpatient Medications on File Prior to Encounter   Medication Sig Dispense Refill    albuterol (PROVENTIL/VENTOLIN HFA) 90 mcg/actuation inhaler INHALE 2 PUFFS INTO THE LUNGS EVERY 6 HOURS AS NEEDED FOR WHEEZING OR SHORTNESS OF BREATH. RESCUE. 18 g 2    artificial tears (ISOPTO TEARS) 0.5 % ophthalmic solution Place 1 drop into both eyes 6 (six) times daily.      cetirizine (ZYRTEC) 10 MG tablet Take 20 mg by mouth 2 (two) times a day.       cholecalciferol, vitamin D3, (VITAMIN D3) 25 mcg (1,000 unit) capsule Take 1 capsule (1,000 Units total) by mouth once daily. 90 capsule 1    dextroamphetamine-amphetamine (ADDERALL) 20 mg tablet Take 1 tablet by mouth 2 (two) times daily.      diphenhydrAMINE (SOMINEX) 25 mg tablet Take 25 mg by mouth nightly as needed.       docusate sodium (COLACE) 100 MG capsule Take 1 capsule (100 mg total) by mouth 2 (two) times daily. 60 capsule 3    ferrous sulfate 325 (65 FE) MG EC tablet Take 1 tablet (325 mg total) by mouth once daily. (Patient taking differently: Take 325 mg by mouth nightly.) 90 tablet 1    Lactobacillus acidophilus 10 billion cell Cap Take 1 " capsule by mouth once daily.       ondansetron (ZOFRAN-ODT) 4 MG TbDL Take 4 mg by mouth.      oxyCODONE (ROXICODONE) 5 MG immediate release tablet Take 1 tablet (5 mg total) by mouth every 4 (four) hours as needed for Pain. 15 tablet 0    oxyCODONE-acetaminophen (PERCOCET) 5-325 mg per tablet Take 1 tablet by mouth every 4 (four) hours as needed for Pain.      pantoprazole (PROTONIX) 40 MG tablet Take 1 tablet (40 mg total) by mouth once daily. (Patient taking differently: Take 40 mg by mouth 2 (two) times daily.) 30 tablet 2    paroxetine (PAXIL) 20 MG tablet Take 20 mg by mouth nightly.      predniSONE (DELTASONE) 10 MG tablet Take 20 mg by mouth once daily.      sucralfate (CARAFATE) 1 gram tablet Take 1 g by mouth before meals and at bedtime as needed.       vitamin D3-vitamin K2 1000-90 unit-mcg TbDL Take by mouth once daily.       EPINEPHrine (EPIPEN) 0.3 mg/0.3 mL AtIn Inject 0.3 mLs (0.3 mg total) into the muscle as needed. 2 each 1    hydrOXYzine HCL (ATARAX) 25 MG tablet Take 1 tablet (25 mg total) by mouth 3 (three) times daily as needed for Itching. (Patient not taking: Reported on 3/21/2023) 30 tablet 1    lidocaine HCL 2% (XYLOCAINE) 2 % jelly Apply topically 3 (three) times daily as needed (Hemorrhoids). 5 mL 3    mepolizumab (NUCALA) 100 mg/mL Syrg Inject 3 mLs (300 mg total) into the skin every 28 days. 300 mL 11    promethazine (PHENERGAN) 25 MG tablet Take 25 mg by mouth every 4 (four) hours.         Past Surgical History:   Procedure Laterality Date    APPENDECTOMY      ARTHROSCOPY OF SHOULDER WITH REMOVAL OF DISTAL CLAVICLE Right 11/1/2018    Procedure: ARTHROSCOPY, SHOULDER, WITH DISTAL CLAVICLE EXCISION;  Surgeon: Gabino Mejia MD;  Location: [facility] OR;  Service: Orthopedics;  Laterality: Right;  video    BACK SURGERY      BLADDER FULGURATION N/A 9/18/2020    Procedure: FULGURATION, BLADDER;  Surgeon: Randall Moss MD;  Location: [facility] OR;  Service: Urology;   Laterality: N/A;  blood vessels of bladder neck and prostate    BONE MARROW BIOPSY Left 10/1/2020    Procedure: Biopsy-bone marrow;  Surgeon: Trung Polanco MD;  Location: [facility];  Service: Oncology;  Laterality: Left;    CIRCUMCISION, PRIMARY      COLONOSCOPY N/A 11/14/2018    Procedure: COLONOSCOPY;  Surgeon: Milton Brunson MD;  Location: [facility];  Service: Endoscopy;  Laterality: N/A;    COLONOSCOPY N/A 7/20/2020    Procedure: COLONOSCOPY;  Surgeon: Ruslan Tillman MD;  Location: [facility];  Service: Endoscopy;  Laterality: N/A;    CYSTOSCOPY W/ RETROGRADES Bilateral 9/18/2020    Procedure: CYSTOSCOPY, WITH RETROGRADE PYELOGRAM;  Surgeon: Randall Moss MD;  Location: [facility];  Service: Urology;  Laterality: Bilateral;    CYSTOURETHROSCOPY N/A 2/1/2021    Procedure: CYSTOURETHROSCOPY, short placement;  Surgeon: Boni Benites MD;  Location: [address];  Service: Urology;  Laterality: N/A;    DILATION OF URETHRA N/A 9/18/2020    Procedure: DILATION, URETHRA;  Surgeon: Randall Moss MD;  Location: [facility];  Service: Urology;  Laterality: N/A;    drainage of abscess from hand  2009    [facility]    drainage of abscess from R thigh  2006    [facility]    ESOPHAGOGASTRODUODENOSCOPY  3/11/2014    ESOPHAGOGASTRODUODENOSCOPY N/A 9/5/2018    Procedure: EGD (ESOPHAGOGASTRODUODENOSCOPY);  Surgeon: Kolby Wall MD;  Location: [facility];  Service: Endoscopy;  Laterality: N/A;    ESOPHAGOGASTRODUODENOSCOPY N/A 3/25/2020    Procedure: EGD (ESOPHAGOGASTRODUODENOSCOPY);  Surgeon: Ruslan Tillman MD;  Location: [facility];  Service: Endoscopy;  Laterality: N/A;    ESOPHAGOGASTRODUODENOSCOPY N/A 7/20/2020    Procedure: EGD (ESOPHAGOGASTRODUODENOSCOPY);  Surgeon: Ruslan iTllman MD;  Location: [facility];  Service: Endoscopy;  Laterality: N/A;  Patient is a very hard stick, requires anesthesia stick.    ESOPHAGOGASTRODUODENOSCOPY N/A 8/31/2020    Procedure: EGD  (ESOPHAGOGASTRODUODENOSCOPY);  Surgeon: Kolby Wall MD;  Location: [facility] ENDO;  Service: Endoscopy;  Laterality: N/A;    ESOPHAGOGASTRODUODENOSCOPY N/A 3/3/2021    Procedure: EGD (ESOPHAGOGASTRODUODENOSCOPY);  Surgeon: Ruslan Tillman MD;  Location: [facility] ENDO;  Service: Endoscopy;  Laterality: N/A;    ESOPHAGOGASTRODUODENOSCOPY N/A 7/12/2021    Procedure: EGD (ESOPHAGOGASTRODUODENOSCOPY);  Surgeon: Kolby Wall MD;  Location: [facility];  Service: Endoscopy;  Laterality: N/A;    FLEXIBLE SIGMOIDOSCOPY N/A 9/5/2018    Procedure: SIGMOIDOSCOPY, FLEXIBLE;  Surgeon: Kolby Wall MD;  Location: [facility];  Service: Endoscopy;  Laterality: N/A;    HAND SURGERY  jan 2014    power saw fell on hand - laceration 3 tendons    INCISION AND DRAINAGE OF KNEE Right 1/13/2021    Procedure: INCISION AND DRAINAGE, KNEE;  Surgeon: Sony Linton Jr., MD;  Location: [facility];  Service: Orthopedics;  Laterality: Right;    INSERTION OF TUNNELED CENTRAL VENOUS CATHETER (CVC) WITH SUBCUTANEOUS PORT Right 11/24/2021    Procedure: Right port-a-cath removal and exchange.;  Surgeon: Robson Morrison MD;  Location: [address];  Service: General;  Laterality: Right;  Right internal jugular    INSERTION OF TUNNELED CENTRAL VENOUS CATHETER (CVC) WITH SUBCUTANEOUS PORT Left 3/31/2022    Procedure: INSERTION, SINGLE LUMEN CATHETER WITH PORT, WITH FLUOROSCOPIC GUIDANCE Left Poss Right Chest;  Surgeon: Robson Morrison MD;  Location: [address];  Service: General;  Laterality: Left;    INSERTION OF TUNNELED CENTRAL VENOUS CATHETER (CVC) WITH SUBCUTANEOUS PORT Right 10/6/2022    Procedure: INSERTION, SINGLE LUMEN CATHETER WITH PORT, WITH FLUOROSCOPIC GUIDANCE Left Possible Right Chest;  Surgeon: Robson Morrison MD;  Location: [address];  Service: General;  Laterality: Right;    INSERTION OF VENOUS ACCESS PORT Right 8/21/2020    Procedure: INSERTION, VENOUS ACCESS PORT;  Surgeon: Troy MILNER  MD Yinka;  Location: [facility] OR;  Service: General;  Laterality: Right;    NISSEN FUNDOPLICATION      REPAIR, HERNIA, INCISIONAL OR VENTRAL, WITHOUT HISTORY OF PRIOR REPAIR N/A 3/13/2023    Procedure: REPAIR, HERNIA, INCISIONAL OR VENTRAL, WITHOUT HISTORY OF PRIOR REPAIR x2;  Surgeon: Robson Morrison MD;  [facility];  Service: General;  Laterality: N/A;    REVISION OF SCAR N/A 4/7/2021    Procedure: REVISION, SCAR;  Surgeon: Robson Morrison MD;  [facility];  Service: General;  Laterality: N/A;    UMBILICAL HERNIA REPAIR N/A 4/7/2021    Procedure: REPAIR, HERNIA, UMBILICAL, AGE 5 YEARS OR OLDER;  Surgeon: Robson Morrison MD;  [facility];  Service: General;  Laterality: N/A;       Social History     Socioeconomic History    Marital status: Single    Number of children: 2   Occupational History    Occupation:  electric forklifts     Employer: [employer]   Tobacco Use    Smoking status: Never    Smokeless tobacco: Never    Tobacco comments:     Pt states he has smoked 1 or 2 cigarettes in his life.   Substance and Sexual Activity    Alcohol use: Not Currently    Drug use: No    Sexual activity: Not Currently     Partners: Female     Social Determinants of Health     Financial Resource Strain: Low Risk     Difficulty of Paying Living Expenses: Not hard at all   Food Insecurity: No Food Insecurity    Worried About Running Out of Food in the Last Year: Never true    Ran Out of Food in the Last Year: Never true   Transportation Needs: No Transportation Needs    Lack of Transportation (Medical): No    Lack of Transportation (Non-Medical): No   Physical Activity: Insufficiently Active    Days of Exercise per Week: 3 days    Minutes of Exercise per Session: 40 min   Stress: No Stress Concern Present    Feeling of Stress : Not at all   Social Connections: Socially Isolated    Frequency of Communication with Friends and Family: Twice  a week    Frequency of Social Gatherings with Friends and Family: Twice a week    Attends Latter day Services: Never    Active Member of Clubs or Organizations: No    Attends Club or Organization Meetings: Never    Marital Status: Never    Housing Stability: Low Risk     Unable to Pay for Housing in the Last Year: No    Number of Places Lived in the Last Year: 1    Unstable Housing in the Last Year: No         CBC: No results for input(s): WBC, RBC, HGB, HCT, PLT, MCV, MCH, MCHC in the last 72 hours.    CMP: No results for input(s): NA, K, CL, CO2, BUN, CREATININE, GLU, MG, PHOS, CALCIUM, ALBUMIN, PROT, ALKPHOS, ALT, AST, BILITOT in the last 72 hours.    INR  No results for input(s): PT, INR, PROTIME, APTT in the last 72 hours.        Diagnostic Studies:      EKD Echo:  No results found. However, due to the size of the patient record, not all encounters were searched. Please check Results Review for a complete set of results.        Pre-op Assessment    I have reviewed the Patient Summary Reports.     I have reviewed the Nursing Notes. I have reviewed the NPO Status.   I have reviewed the Medications.     Review of Systems  Anesthesia Hx:  No problems with previous Anesthesia  History of prior surgery of interest to airway management or planning: Denies Family Hx of Anesthesia complications.   Denies Personal Hx of Anesthesia complications.   Hematology/Oncology:         -- Denies Anemia:   Cardiovascular:  Cardiovascular Normal     Pulmonary:   Denies COPD.  Denies Asthma.  Denies Sleep Apnea.    Renal/:   Denies Chronic Renal Disease. renal calculi     Hepatic/GI:   GERD    Neurological:   Denies CVA. Denies Seizures.    Endocrine:   Denies Diabetes.        Physical Exam  General: Well nourished and Cooperative    Airway:  Mallampati: III / II  Mouth Opening: Normal  TM Distance: Normal  Tongue: Normal  Neck ROM: Normal ROM    Dental:  Intact    Chest/Lungs:  Clear to auscultation, Normal  Respiratory Rate    Heart:  Rate: Normal  Rhythm: Regular Rhythm  Sounds: Normal        Anesthesia Plan  Type of Anesthesia, risks & benefits discussed:    Anesthesia Type: Gen Natural Airway  Intra-op Monitoring Plan: Standard ASA Monitors  Post Op Pain Control Plan: multimodal analgesia  Induction:  IV  Informed Consent: Informed consent signed with the Patient and all parties understand the risks and agree with anesthesia plan.  All questions answered.   ASA Score: 2  Day of Surgery Review of History & Physical: H&P Update referred to the surgeon/provider.    Ready For Surgery From Anesthesia Perspective.     .

## 2023-03-27 NOTE — ANESTHESIA POSTPROCEDURE EVALUATION
Anesthesia Post Evaluation    Patient: Solo Black    Procedure(s) Performed: Procedure(s) (LRB):  REMOVAL, CATHETER, CENTRAL VENOUS, TUNNELED, WITH PORT (N/A)    Final Anesthesia Type: general      Patient location during evaluation: PACU  Patient participation: Yes- Able to Participate  Level of consciousness: awake and alert and oriented  Post-procedure vital signs: reviewed and stable  Pain management: adequate  Airway patency: patent    PONV status at discharge: No PONV  Anesthetic complications: no      Cardiovascular status: blood pressure returned to baseline, hemodynamically stable and stable  Respiratory status: unassisted, room air and spontaneous ventilation  Hydration status: euvolemic  Follow-up not needed.          Vitals Value Taken Time   /83 03/24/23 1446   Temp  03/26/23 1902   Pulse 135 03/24/23 1502   Resp 93 03/24/23 1502   SpO2 93 % 03/24/23 1501   Vitals shown include unvalidated device data.      No case tracking events are documented in the log.      Pain/Marcia Score: No data recorded

## 2023-03-28 ENCOUNTER — TELEPHONE (OUTPATIENT)
Dept: INTERVENTIONAL RADIOLOGY/VASCULAR | Facility: CLINIC | Age: 43
End: 2023-03-28
Payer: MEDICAID

## 2023-03-28 DIAGNOSIS — Z45.2 ENCOUNTER FOR INSERTION OF VENOUS ACCESS PORT: Primary | ICD-10-CM

## 2023-03-28 NOTE — TELEPHONE ENCOUNTER
Call and spoke to patient to schedule venogram/port replacement on Fro 3/31/23 at 12n. Patient confirm. Patient was given preop instruction, arrival time (1030 am), and location.  Patient verbally understands.

## 2023-03-30 ENCOUNTER — DOCUMENTATION ONLY (OUTPATIENT)
Dept: PREADMISSION TESTING | Facility: HOSPITAL | Age: 43
End: 2023-03-30
Payer: MEDICAID

## 2023-03-30 NOTE — PRE-PROCEDURE INSTRUCTIONS
PRE-OP INSTRUCTIONS:  Instructed patient to have no food,milk or milk products after midnight   It is ok to take AM medications with a few sips of water   Medication instructions for pm prior to and am of surgery reviewed.  Instructed patient to avoid taking vitamins,supplements,aspirin or ibuprofen the am of surgery.  Shower instructions provided    Patient denies any side effects or issues with anesthesia or sedation.   PATIENT IS ALLERGIC TO KETAMINE     PATIENT IS REPORTEDLY A HARD STICK

## 2023-03-31 ENCOUNTER — HOSPITAL ENCOUNTER (OUTPATIENT)
Dept: INTERVENTIONAL RADIOLOGY/VASCULAR | Facility: HOSPITAL | Age: 43
Discharge: HOME | End: 2023-03-31
Attending: SURGERY
Payer: MEDICAID

## 2023-03-31 ENCOUNTER — ANESTHESIA EVENT (OUTPATIENT)
Dept: INTERVENTIONAL RADIOLOGY/VASCULAR | Facility: HOSPITAL | Age: 43
End: 2023-03-31
Payer: MEDICAID

## 2023-03-31 VITALS
WEIGHT: 195 LBS | RESPIRATION RATE: 12 BRPM | SYSTOLIC BLOOD PRESSURE: 110 MMHG | OXYGEN SATURATION: 97 % | DIASTOLIC BLOOD PRESSURE: 80 MMHG | HEIGHT: 72 IN | HEART RATE: 100 BPM | TEMPERATURE: 98 F | BODY MASS INDEX: 26.41 KG/M2

## 2023-03-31 DIAGNOSIS — Z45.2 ENCOUNTER FOR INSERTION OF VENOUS ACCESS PORT: Primary | ICD-10-CM

## 2023-03-31 DIAGNOSIS — D72.119 HYPEREOSINOPHILIC SYNDROME, UNSPECIFIED TYPE: ICD-10-CM

## 2023-03-31 PROCEDURE — 77001 FLUOROGUIDE FOR VEIN DEVICE: CPT | Mod: 26,,, | Performed by: RADIOLOGY

## 2023-03-31 PROCEDURE — 77001 FLUOROGUIDE FOR VEIN DEVICE: CPT | Mod: TC | Performed by: RADIOLOGY

## 2023-03-31 PROCEDURE — 37000008 HC ANESTHESIA 1ST 15 MINUTES

## 2023-03-31 PROCEDURE — D9220A PRA ANESTHESIA: Mod: CRNA,,, | Performed by: REGISTERED NURSE

## 2023-03-31 PROCEDURE — A4550 SURGICAL TRAYS: HCPCS

## 2023-03-31 PROCEDURE — 77001 CHG FLUOROGUIDE CNTRL VEN ACCESS,PLACE,REPLACE,REMOVE: ICD-10-PCS | Mod: 26,,, | Performed by: RADIOLOGY

## 2023-03-31 PROCEDURE — 36561 INSERT TUNNELED CV CATH: CPT | Mod: RT,,, | Performed by: RADIOLOGY

## 2023-03-31 PROCEDURE — D9220A PRA ANESTHESIA: ICD-10-PCS | Mod: ANES,,, | Performed by: ANESTHESIOLOGY

## 2023-03-31 PROCEDURE — 76937 US GUIDE VASCULAR ACCESS: CPT | Mod: TC | Performed by: RADIOLOGY

## 2023-03-31 PROCEDURE — 37000009 HC ANESTHESIA EA ADD 15 MINS

## 2023-03-31 PROCEDURE — D9220A PRA ANESTHESIA: ICD-10-PCS | Mod: CRNA,,, | Performed by: REGISTERED NURSE

## 2023-03-31 PROCEDURE — 63600175 PHARM REV CODE 636 W HCPCS: Performed by: REGISTERED NURSE

## 2023-03-31 PROCEDURE — 25000003 PHARM REV CODE 250

## 2023-03-31 PROCEDURE — 63600175 PHARM REV CODE 636 W HCPCS

## 2023-03-31 PROCEDURE — 63600175 PHARM REV CODE 636 W HCPCS: Performed by: STUDENT IN AN ORGANIZED HEALTH CARE EDUCATION/TRAINING PROGRAM

## 2023-03-31 PROCEDURE — 25000003 PHARM REV CODE 250: Performed by: STUDENT IN AN ORGANIZED HEALTH CARE EDUCATION/TRAINING PROGRAM

## 2023-03-31 PROCEDURE — 00532 ANES ACCESS CTR VENOUS CRCJ: CPT

## 2023-03-31 PROCEDURE — 25000003 PHARM REV CODE 250: Performed by: REGISTERED NURSE

## 2023-03-31 PROCEDURE — 76937 US GUIDE VASCULAR ACCESS: CPT | Mod: 26,,, | Performed by: RADIOLOGY

## 2023-03-31 PROCEDURE — D9220A PRA ANESTHESIA: Mod: ANES,,, | Performed by: ANESTHESIOLOGY

## 2023-03-31 PROCEDURE — 76937 PR  US GUIDE, VASCULAR ACCESS: ICD-10-PCS | Mod: 26,,, | Performed by: RADIOLOGY

## 2023-03-31 PROCEDURE — 36561 IR TUNNELED CATH PLACEMENT WITH PORT: ICD-10-PCS | Mod: RT,,, | Performed by: RADIOLOGY

## 2023-03-31 PROCEDURE — 36561 INSERT TUNNELED CV CATH: CPT | Performed by: RADIOLOGY

## 2023-03-31 RX ORDER — HYDROMORPHONE HYDROCHLORIDE 1 MG/ML
0.2 INJECTION, SOLUTION INTRAMUSCULAR; INTRAVENOUS; SUBCUTANEOUS EVERY 5 MIN PRN
Status: DISCONTINUED | OUTPATIENT
Start: 2023-03-31 | End: 2023-03-31

## 2023-03-31 RX ORDER — HYDROMORPHONE HYDROCHLORIDE 1 MG/ML
2 INJECTION, SOLUTION INTRAMUSCULAR; INTRAVENOUS; SUBCUTANEOUS ONCE
Status: COMPLETED | OUTPATIENT
Start: 2023-03-31 | End: 2023-03-31

## 2023-03-31 RX ORDER — SODIUM CHLORIDE 0.9 % (FLUSH) 0.9 %
3 SYRINGE (ML) INJECTION
Status: DISCONTINUED | OUTPATIENT
Start: 2023-03-31 | End: 2023-04-01 | Stop reason: HOSPADM

## 2023-03-31 RX ORDER — LIDOCAINE HYDROCHLORIDE 10 MG/ML
1 INJECTION, SOLUTION EPIDURAL; INFILTRATION; INTRACAUDAL; PERINEURAL ONCE
Status: DISCONTINUED | OUTPATIENT
Start: 2023-03-31 | End: 2023-04-01 | Stop reason: HOSPADM

## 2023-03-31 RX ORDER — HYDROMORPHONE HYDROCHLORIDE 1 MG/ML
INJECTION, SOLUTION INTRAMUSCULAR; INTRAVENOUS; SUBCUTANEOUS
Status: DISCONTINUED
Start: 2023-03-31 | End: 2023-03-31 | Stop reason: WASHOUT

## 2023-03-31 RX ORDER — SODIUM CHLORIDE 9 MG/ML
INJECTION, SOLUTION INTRAVENOUS CONTINUOUS
Status: DISCONTINUED | OUTPATIENT
Start: 2023-03-31 | End: 2023-04-01 | Stop reason: HOSPADM

## 2023-03-31 RX ORDER — OXYCODONE AND ACETAMINOPHEN 10; 325 MG/1; MG/1
1 TABLET ORAL ONCE
Status: COMPLETED | OUTPATIENT
Start: 2023-03-31 | End: 2023-03-31

## 2023-03-31 RX ORDER — HEPARIN 100 UNIT/ML
SYRINGE INTRAVENOUS
Status: COMPLETED | OUTPATIENT
Start: 2023-03-31 | End: 2023-03-31

## 2023-03-31 RX ORDER — LIDOCAINE HYDROCHLORIDE 20 MG/ML
INJECTION INTRAVENOUS
Status: DISCONTINUED | OUTPATIENT
Start: 2023-03-31 | End: 2023-03-31

## 2023-03-31 RX ORDER — FENTANYL CITRATE 50 UG/ML
INJECTION, SOLUTION INTRAMUSCULAR; INTRAVENOUS
Status: DISCONTINUED | OUTPATIENT
Start: 2023-03-31 | End: 2023-03-31

## 2023-03-31 RX ORDER — OXYCODONE AND ACETAMINOPHEN 10; 325 MG/1; MG/1
TABLET ORAL
Status: COMPLETED
Start: 2023-03-31 | End: 2023-03-31

## 2023-03-31 RX ORDER — EPINEPHRINE 1 MG/ML
INJECTION, SOLUTION, CONCENTRATE INTRAVENOUS
Status: DISCONTINUED | OUTPATIENT
Start: 2023-03-31 | End: 2023-03-31

## 2023-03-31 RX ORDER — DEXMEDETOMIDINE HYDROCHLORIDE 100 UG/ML
INJECTION, SOLUTION INTRAVENOUS
Status: DISCONTINUED | OUTPATIENT
Start: 2023-03-31 | End: 2023-03-31

## 2023-03-31 RX ORDER — LIDOCAINE HYDROCHLORIDE 10 MG/ML
INJECTION INFILTRATION; PERINEURAL
Status: COMPLETED | OUTPATIENT
Start: 2023-03-31 | End: 2023-03-31

## 2023-03-31 RX ORDER — PROPOFOL 10 MG/ML
VIAL (ML) INTRAVENOUS
Status: DISCONTINUED | OUTPATIENT
Start: 2023-03-31 | End: 2023-03-31

## 2023-03-31 RX ORDER — ONDANSETRON 2 MG/ML
INJECTION INTRAMUSCULAR; INTRAVENOUS
Status: DISCONTINUED | OUTPATIENT
Start: 2023-03-31 | End: 2023-03-31

## 2023-03-31 RX ORDER — PHENYLEPHRINE HYDROCHLORIDE 10 MG/ML
INJECTION INTRAVENOUS
Status: DISCONTINUED | OUTPATIENT
Start: 2023-03-31 | End: 2023-03-31

## 2023-03-31 RX ORDER — MIDAZOLAM HYDROCHLORIDE 1 MG/ML
INJECTION INTRAMUSCULAR; INTRAVENOUS
Status: DISCONTINUED | OUTPATIENT
Start: 2023-03-31 | End: 2023-03-31

## 2023-03-31 RX ORDER — HYDROMORPHONE HYDROCHLORIDE 1 MG/ML
INJECTION, SOLUTION INTRAMUSCULAR; INTRAVENOUS; SUBCUTANEOUS
Status: COMPLETED
Start: 2023-03-31 | End: 2023-03-31

## 2023-03-31 RX ORDER — DEXAMETHASONE SODIUM PHOSPHATE 4 MG/ML
INJECTION, SOLUTION INTRA-ARTICULAR; INTRALESIONAL; INTRAMUSCULAR; INTRAVENOUS; SOFT TISSUE
Status: DISCONTINUED | OUTPATIENT
Start: 2023-03-31 | End: 2023-03-31

## 2023-03-31 RX ADMIN — MIDAZOLAM HYDROCHLORIDE 2 MG: 1 INJECTION INTRAMUSCULAR; INTRAVENOUS at 01:03

## 2023-03-31 RX ADMIN — HYDROMORPHONE HYDROCHLORIDE 2 MG: 1 INJECTION, SOLUTION INTRAMUSCULAR; INTRAVENOUS; SUBCUTANEOUS at 04:03

## 2023-03-31 RX ADMIN — OXYCODONE AND ACETAMINOPHEN 1 TABLET: 10; 325 TABLET ORAL at 04:03

## 2023-03-31 RX ADMIN — EPINEPHRINE 10 MCG: 1 INJECTION, SOLUTION, CONCENTRATE INTRAVENOUS at 02:03

## 2023-03-31 RX ADMIN — FENTANYL CITRATE 25 MCG: 50 INJECTION, SOLUTION INTRAMUSCULAR; INTRAVENOUS at 02:03

## 2023-03-31 RX ADMIN — DEXMEDETOMIDINE HYDROCHLORIDE 8 MCG: 100 INJECTION, SOLUTION INTRAVENOUS at 01:03

## 2023-03-31 RX ADMIN — ONDANSETRON 4 MG: 2 INJECTION INTRAMUSCULAR; INTRAVENOUS at 02:03

## 2023-03-31 RX ADMIN — LIDOCAINE HYDROCHLORIDE 10 ML: 10 INJECTION, SOLUTION INFILTRATION; PERINEURAL at 02:03

## 2023-03-31 RX ADMIN — PHENYLEPHRINE HYDROCHLORIDE 200 MCG: 10 INJECTION INTRAVENOUS at 02:03

## 2023-03-31 RX ADMIN — DEXAMETHASONE SODIUM PHOSPHATE 4 MG: 4 INJECTION, SOLUTION INTRAMUSCULAR; INTRAVENOUS at 02:03

## 2023-03-31 RX ADMIN — HEPARIN 5 ML: 100 SYRINGE at 02:03

## 2023-03-31 RX ADMIN — PROPOFOL 400 MG: 10 INJECTION, EMULSION INTRAVENOUS at 01:03

## 2023-03-31 RX ADMIN — OXYCODONE AND ACETAMINOPHEN 1 TABLET: 10; 325 TABLET ORAL at 03:03

## 2023-03-31 RX ADMIN — LIDOCAINE HYDROCHLORIDE 100 MG: 20 INJECTION INTRAVENOUS at 01:03

## 2023-03-31 NOTE — PROGRESS NOTES
Dr. Moore called, notified that patient is still c/o pain 10/10.  Dr. Moore states give patient another percocet 10/325 and he will be here to see patient shortly.

## 2023-03-31 NOTE — NURSING
Pt arrived to [facility] for port placement and venogram with anesthesia, escorted to room by this RN and CRNA who will assume care. Pt oriented to unit and staff. Plan of care reviewed with patient, patient verbalizes understanding. Comfort measures utilized. Pt safely transferred from stretcher to procedural table. Fall risk reviewed with patient, fall risk interventions maintained with direct observation/attendance.  positioner pillows utilized to minimize pressure points. Blankets applied. Pt prepped and draped utilizing standard sterile technique. Patient placed on continuous monitoring, as required by sedation policy. Timeouts completed utilizing standard universal time-out, per department and facility policy. RN to remain at bedside, continuous monitoring maintained. Pt resting comfortably. Denies pain/discomfort. Will continue to monitor. See flow sheets for monitoring, medication administration, and updates.

## 2023-03-31 NOTE — TRANSFER OF CARE
Anesthesia Transfer of Care Note    Patient: Solo Black    Procedure(s) Performed: * No procedures listed *    Patient location: [facility]    Anesthesia Type: general    Transport from OR: Transported from OR on 6-10 L/min O2 by face mask with adequate spontaneous ventilation    Post pain: adequate analgesia    Post assessment: no apparent anesthetic complications and tolerated procedure well    Post vital signs: stable    Level of consciousness: awake, alert and oriented    Nausea/Vomiting: no nausea/vomiting    Complications: none    Transfer of care protocol was followedComments: Nurse at bedside, VSS, spont reg resp noted      Last vitals:   Visit Vitals  BP (!) 92/53   Pulse 97   Temp 36.7 °C (98 °F)   Resp 16   Ht 6' (1.829 m)   Wt 88.5 kg (195 lb)   SpO2 100%   BMI 26.45 kg/m²

## 2023-03-31 NOTE — H&P
Radiology History & Physical      SUBJECTIVE:     Chief Complaint: Possible central venous stenosis     History of Present Illness:  Solo Black is a 42 y.o. male who presents for venography with possible intervention for central venous stenosis as well as port placement. Patient requires frequent infusions for Hypereosinophilic syndrome. Recent attempt with surgery for port exchange but unsuccessful.      Past Medical History:   Diagnosis Date    Arthritis     C. difficile colitis 02/2014    postop of hand surgery    Cancer     Encounter for blood transfusion     Eosinophilic esophagitis 03/11/2014    GERD (gastroesophageal reflux disease)     Hypereosinophilic syndrome     IBS (irritable bowel syndrome)     Leukemia     MRSA carrier     says multiple times nose swab was +    Munchausen syndrome     Nephrolithiasis 04/2014    bilateral punctate - never passed a stone    Septic prepatellar bursitis of right knee 01/12/2021    Urinary tract infection 02/2014    MRSA     Past Surgical History:   Procedure Laterality Date    APPENDECTOMY      ARTHROSCOPY OF SHOULDER WITH REMOVAL OF DISTAL CLAVICLE Right 11/1/2018    Procedure: ARTHROSCOPY, SHOULDER, WITH DISTAL CLAVICLE EXCISION;  Surgeon: Gabino Mejia MD;  Location: [facility] OR;  Service: Orthopedics;  Laterality: Right;  video    BACK SURGERY      BLADDER FULGURATION N/A 9/18/2020    Procedure: FULGURATION, BLADDER;  Surgeon: Randall Moss MD;  Location: [facility] OR;  Service: Urology;  Laterality: N/A;  blood vessels of bladder neck and prostate    BONE MARROW BIOPSY Left 10/1/2020    Procedure: Biopsy-bone marrow;  Surgeon: Trung Polanco MD;  Location: [facility] OR;  Service: Oncology;  Laterality: Left;    CIRCUMCISION, PRIMARY      COLONOSCOPY N/A 11/14/2018    Procedure: COLONOSCOPY;  Surgeon: Milton Brunson MD;  Location: [facility];  Service: Endoscopy;  Laterality: N/A;    COLONOSCOPY N/A 7/20/2020    Procedure: COLONOSCOPY;  Surgeon: Ruslan ALEGRIA  MD Primo;  Location: [facility];  Service: Endoscopy;  Laterality: N/A;    CYSTOSCOPY W/ RETROGRADES Bilateral 9/18/2020    Procedure: CYSTOSCOPY, WITH RETROGRADE PYELOGRAM;  Surgeon: Randall Moss MD;  Location: [facility] OR;  Service: Urology;  Laterality: Bilateral;    CYSTOURETHROSCOPY N/A 2/1/2021    Procedure: CYSTOURETHROSCOPY, short placement;  Surgeon: Boni Benites MD;  [address];  Service: Urology;  Laterality: N/A;    DILATION OF URETHRA N/A 9/18/2020    Procedure: DILATION, URETHRA;  Surgeon: Randall Moss MD;  Location: [facility] OR;  Service: Urology;  Laterality: N/A;    drainage of abscess from hand  2009    MRSA    drainage of abscess from R thigh  2006    MRSA    ESOPHAGOGASTRODUODENOSCOPY  3/11/2014    ESOPHAGOGASTRODUODENOSCOPY N/A 9/5/2018    Procedure: EGD (ESOPHAGOGASTRODUODENOSCOPY);  Surgeon: Kolby Wall MD;  Location: [facility];  Service: Endoscopy;  Laterality: N/A;    ESOPHAGOGASTRODUODENOSCOPY N/A 3/25/2020    Procedure: EGD (ESOPHAGOGASTRODUODENOSCOPY);  Surgeon: Ruslan Tillman MD;  Location: [facility];  Service: Endoscopy;  Laterality: N/A;    ESOPHAGOGASTRODUODENOSCOPY N/A 7/20/2020    Procedure: EGD (ESOPHAGOGASTRODUODENOSCOPY);  Surgeon: Ruslan Tillman MD;  Location: [facility];  Service: Endoscopy;  Laterality: N/A;  Patient is a very hard stick, requires anesthesia stick.    ESOPHAGOGASTRODUODENOSCOPY N/A 8/31/2020    Procedure: EGD (ESOPHAGOGASTRODUODENOSCOPY);  Surgeon: Kolby Wall MD;  Location: [facility];  Service: Endoscopy;  Laterality: N/A;    ESOPHAGOGASTRODUODENOSCOPY N/A 3/3/2021    Procedure: EGD (ESOPHAGOGASTRODUODENOSCOPY);  Surgeon: Ruslan Tillman MD;  Location: [facility];  Service: Endoscopy;  Laterality: N/A;    ESOPHAGOGASTRODUODENOSCOPY N/A 7/12/2021    Procedure: EGD (ESOPHAGOGASTRODUODENOSCOPY);  Surgeon: Kolby Wall MD;  Location: [facility];  Service: Endoscopy;  Laterality: N/A;    FLEXIBLE  SIGMOIDOSCOPY N/A 9/5/2018    Procedure: SIGMOIDOSCOPY, FLEXIBLE;  Surgeon: Kolby Wall MD;  Location: [facility] ENDO;  Service: Endoscopy;  Laterality: N/A;    HAND SURGERY  jan 2014    power saw fell on hand - laceration 3 tendons    INCISION AND DRAINAGE OF KNEE Right 1/13/2021    Procedure: INCISION AND DRAINAGE, KNEE;  Surgeon: Sony Linton Jr., MD;  Location: [facility] OR;  Service: Orthopedics;  Laterality: Right;    INSERTION OF TUNNELED CENTRAL VENOUS CATHETER (CVC) WITH SUBCUTANEOUS PORT Right 11/24/2021    Procedure: Right port-a-cath removal and exchange.;  Surgeon: Robson Morrison MD;  Location: [facility] OR 2ND FLR;  Service: General;  Laterality: Right;  Right internal jugular    INSERTION OF TUNNELED CENTRAL VENOUS CATHETER (CVC) WITH SUBCUTANEOUS PORT Left 3/31/2022    Procedure: INSERTION, SINGLE LUMEN CATHETER WITH PORT, WITH FLUOROSCOPIC GUIDANCE Left Poss Right Chest;  Surgeon: Robson Morrison MD;  Location: [facility] OR 2ND FLR;  Service: General;  Laterality: Left;    INSERTION OF TUNNELED CENTRAL VENOUS CATHETER (CVC) WITH SUBCUTANEOUS PORT Right 10/6/2022    Procedure: INSERTION, SINGLE LUMEN CATHETER WITH PORT, WITH FLUOROSCOPIC GUIDANCE Left Possible Right Chest;  Surgeon: Robson Morrison MD;  Location: [facility] OR [facility] FLR;  Service: General;  Laterality: Right;    INSERTION OF VENOUS ACCESS PORT Right 8/21/2020    Procedure: INSERTION, VENOUS ACCESS PORT;  Surgeon: Troy Honeycutt MD;  Location: [facility] OR;  Service: General;  Laterality: Right;    MEDIPORT REMOVAL N/A 3/24/2023    Procedure: REMOVAL, CATHETER, CENTRAL VENOUS, TUNNELED, WITH PORT;  Surgeon: Robson Morrison MD;  Location: [facility] OR 2ND FLR;  Service: General;  Laterality: N/A;    NISSEN FUNDOPLICATION      REPAIR, HERNIA, INCISIONAL OR VENTRAL, WITHOUT HISTORY OF PRIOR REPAIR N/A 3/13/2023    Procedure: REPAIR, HERNIA, INCISIONAL OR VENTRAL, WITHOUT HISTORY OF PRIOR REPAIR x2;  Surgeon: Robson Morrison MD;  Location: [facility]  OR 2ND FLR;  Service: General;  Laterality: N/A;    REVISION OF SCAR N/A 4/7/2021    Procedure: REVISION, SCAR;  Surgeon: Robson Morrison MD;  Location: [facility] OR 2ND FLR;  Service: General;  Laterality: N/A;    UMBILICAL HERNIA REPAIR N/A 4/7/2021    Procedure: REPAIR, HERNIA, UMBILICAL, AGE 5 YEARS OR OLDER;  Surgeon: Robson Morrison MD;  Location: [facility] OR 2ND FLR;  Service: General;  Laterality: N/A;       Home Meds:   Prior to Admission medications    Medication Sig Start Date End Date Taking? Authorizing Provider   albuterol (PROVENTIL/VENTOLIN HFA) 90 mcg/actuation inhaler INHALE 2 PUFFS INTO THE LUNGS EVERY 6 HOURS AS NEEDED FOR WHEEZING OR SHORTNESS OF BREATH. RESCUE. 6/2/22  Yes Michelle Ornelas MD   artificial tears (ISOPTO TEARS) 0.5 % ophthalmic solution Place 1 drop into both eyes 6 (six) times daily. 8/10/20  Yes Memo Solano MD   cetirizine (ZYRTEC) 10 MG tablet Take 20 mg by mouth 2 (two) times a day.    Yes Historical Provider   cholecalciferol, vitamin D3, (VITAMIN D3) 25 mcg (1,000 unit) capsule Take 1 capsule (1,000 Units total) by mouth once daily. 3/4/21  Yes Omar Mann MD   dextroamphetamine-amphetamine (ADDERALL) 20 mg tablet Take 1 tablet by mouth 2 (two) times daily. 3/29/21  Yes Historical Provider   diphenhydrAMINE (SOMINEX) 25 mg tablet Take 25 mg by mouth nightly as needed.    Yes Historical Provider   docusate sodium (COLACE) 100 MG capsule Take 1 capsule (100 mg total) by mouth 2 (two) times daily. 3/4/21  Yes Omar Mann MD   ferrous sulfate 325 (65 FE) MG EC tablet Take 1 tablet (325 mg total) by mouth once daily.  Patient taking differently: Take 325 mg by mouth nightly. 3/5/21  Yes Omar Mann MD   Lactobacillus acidophilus 10 billion cell Cap Take 1 capsule by mouth once daily.    Yes Historical Provider   mepolizumab (NUCALA) 100 mg/mL Syrg Inject 3 mLs (300 mg total) into the skin every 28 days. 11/16/21  Yes Michelle Ornelas MD   oxyCODONE (ROXICODONE) 5 MG immediate  release tablet Take 1 tablet (5 mg total) by mouth every 4 (four) hours as needed for Pain. 3/24/23  Yes Gulshan Mayfield MD   oxyCODONE-acetaminophen (PERCOCET) 5-325 mg per tablet Take 1 tablet by mouth every 4 (four) hours as needed for Pain.   Yes Historical Provider   pantoprazole (PROTONIX) 40 MG tablet Take 1 tablet (40 mg total) by mouth once daily.  Patient taking differently: Take 40 mg by mouth 2 (two) times daily. 7/30/21 3/31/23 Yes Ana Maynard MD   paroxetine (PAXIL) 20 MG tablet Take 20 mg by mouth nightly.   Yes Historical Provider   predniSONE (DELTASONE) 10 MG tablet Take 20 mg by mouth once daily. 2/1/22  Yes Historical Provider   promethazine (PHENERGAN) 25 MG tablet Take 25 mg by mouth every 4 (four) hours.   Yes Historical Provider   vitamin D3-vitamin K2 1000-90 unit-mcg TbDL Take by mouth once daily.    Yes Historical Provider   EPINEPHrine (EPIPEN) 0.3 mg/0.3 mL AtIn Inject 0.3 mLs (0.3 mg total) into the muscle as needed. 11/25/21 11/25/22  Shirley Sahni-MARTIN Martinez   hydrOXYzine HCL (ATARAX) 25 MG tablet Take 1 tablet (25 mg total) by mouth 3 (three) times daily as needed for Itching.  Patient not taking: Reported on 3/21/2023 8/31/20   Doretha Armstrong MD   lidocaine HCL 2% (XYLOCAINE) 2 % jelly Apply topically 3 (three) times daily as needed (Hemorrhoids). 10/13/20   Manuel Cardoza MD   ondansetron (ZOFRAN-ODT) 4 MG TbDL Take 4 mg by mouth.    Historical Provider   sucralfate (CARAFATE) 1 gram tablet Take 1 g by mouth before meals and at bedtime as needed.     Historical Provider     Anticoagulants/Antiplatelets: no anticoagulation    Allergies:   Review of patient's allergies indicates:   Allergen Reactions    Bean Diarrhea, Edema, Hives, Nausea And Vomiting and Swelling    Bean pod extract Nausea And Vomiting and Swelling     Lips swelling, vomiting  Lips swelling, vomiting      Legumes Nausea And Vomiting and Swelling     Other reaction(s): GI  "Intolerance  vomiting  vomiting  Pt reports legumes make his lips swell and induce severe vomiting  Pt reports legumes make his lips swell and induce severe vomiting      Lentils Nausea And Vomiting and Swelling     Lips swelling, vomiting  Lips swelling, vomiting      Nsaids (non-steroidal anti-inflammatory drug)      GI bleed    Peas Nausea And Vomiting and Swelling     Lips swelling, vomiting    Ketamine      Severe dysphoria (bad trip)    Haloperidol Other (See Comments)     "feels like crawling out of his skin"      Meloxicam Nausea Only     GI bleed    Penicillins Nausea And Vomiting     Has taken third gen cephalosporins without issue per patient report     Sedation History:  have not been any systemic reactions    Review of Systems:   Hematological: negative  no known coagulopathies  Respiratory: no cough, shortness of breath, or wheezing  no shortness of breath  Cardiovascular: no chest pain or dyspnea on exertion  no chest pain  Gastrointestinal: no abdominal pain, change in bowel habits, or black or bloody stools  no abdominal pain  Genito-Urinary: no dysuria, trouble voiding, or hematuria  no dysuria  Musculoskeletal: negative  Neurological: no TIA or stroke symptoms         OBJECTIVE:     Vital Signs (Most Recent)  Temp: 99 °F (37.2 °C) (03/31/23 1024)  Pulse: 90 (03/31/23 1024)  Resp: 16 (03/31/23 1024)  BP: 133/85 (03/31/23 1026)  SpO2: 100 % (03/31/23 1024)    Physical Exam:  ASA: Per anesthesia  Mallampati: Per anesthesia    General: no acute distress  Mental Status: alert and oriented to person, place and time  HEENT: normocephalic, atraumatic  Chest: unlabored breathing  Heart: regular heart rate  Abdomen: nondistended  Extremity: moves all extremities    Laboratory  Lab Results   Component Value Date    INR 1.0 03/31/2023       Lab Results   Component Value Date    WBC 9.01 03/08/2023    HGB 10.7 (L) 03/08/2023    HCT 35.3 (L) 03/08/2023    MCV 76 (L) 03/08/2023     03/08/2023      Lab " Results   Component Value Date     03/08/2023     03/08/2023    K 3.6 03/08/2023     03/08/2023    CO2 21 (L) 03/08/2023    BUN 11 03/08/2023    CREATININE 1.2 03/08/2023    CALCIUM 9.4 03/08/2023    MG 2.1 07/29/2021    ALT 28 03/08/2023    AST 25 03/08/2023    ALBUMIN 4.1 03/08/2023    BILITOT 1.3 (H) 03/08/2023       ASSESSMENT/PLAN:     Sedation Plan: Per anesthesia  Patient will undergo venography with possible venoplasty and re cannulation as well as port placement.    Arya Ortiz DO

## 2023-03-31 NOTE — NURSING
Port placement complete. Pt tolerated well. VSS. No signs or symptoms of distress noted. Pt will be transferred to PACU bed escorted by this RN and CRNA who will give report.   Port placed under direct fluoro and OK to use per ZAIDA Ortiz DO.

## 2023-03-31 NOTE — PROCEDURES
Interventional Radiology postop note    Pre Op Diagnosis: Hypereosinophilic syndrome  Post Op Diagnosis: Same    Procedure: Tunneled port placement    Procedure performed by: David    Written Informed Consent Obtained: Yes  Specimen Removed: NO  Estimated Blood Loss: Minimal    Findings:   Image-guided right IJ tunneled port placement. Cut at 20 cm. No acute complication.     Port is ok to use.     Patient tolerated procedure well.    Arya Ortiz,   Interventional Radiology

## 2023-03-31 NOTE — PLAN OF CARE
Discharge instructions reviewed with patient and friend. Verbalizes understanding. Copies of instructions given to patient. Tolerating PO fluids. Denies nausea. Dr. Moore aware of pt. C/o pain, patient has received PO pain pill.  Patient has no IV access and is a very difficult IV stick.  Dr. Moore discussed pain management with patient. States will follow up with patient in 15-20 minutes to assess how PO pain pill is working.

## 2023-03-31 NOTE — DISCHARGE INSTRUCTIONS
For scheduling: Call Amelia at [phone]    For questions or concerns call: [facility] MON-FRI 8 AM- 5PM [phone]. Radiology resident on call [phone].    For immediate concerns that are not emergent, you may call our radiology clinic at: [phone]    Sponge bath only today.  You may shower tomorrow with dressing covered.     Remove dressing after 48 hours and leave open to air. You may wash the site with soap and water at this time.Do not soak the site until completely healed.    If there are skin tapes over the incision, leave them in place and wash over them. The skin tapes should fall off in 7-10 days. If they have not come off by themselves in 10 days ,you may remove them.    Rest and take it easy today. Do not drive for 24 hours. Gradually resume your normal activity.    If the port is in your arm, move you arm and hand normally. DO NOT hold it still.  Avoid lifting heavy objects or overusing the arm.    At home,continue with liquids and if there is no nausea, you may resume your normal diet.    If you have any discomfort, take what you normally take for pain. If it is not effective, call us.    WHEN TO CALL THE DOCTOR:    Obvious bleeding (dried blood over the incision is normal)    Redness or swelling in the affected area    Fever over 101 F (38.4C)    Drainage or pus from the wound    Pain not relieved by normal pain medication    If you have any problems or questions call us:    Normal working hours= [facility] [phone]    24/7= Call the page  at [phone] and ask for the Radiology Resident on call

## 2023-03-31 NOTE — DISCHARGE SUMMARY
Radiology Discharge Summary      Hospital Course: No complications    Admit Date: 3/31/2023  Discharge Date: 03/31/2023     Instructions Given to Patient: Yes  Diet: Resume prior diet  Activity: activity as tolerated    Description of Condition on Discharge: Stable  Vital Signs (Most Recent): Temp: 99 °F (37.2 °C) (03/31/23 1024)  Pulse: 90 (03/31/23 1024)  Resp: 16 (03/31/23 1024)  BP: 133/85 (03/31/23 1026)  SpO2: 100 % (03/31/23 1024)    Discharge Disposition: Home    Discharge Diagnosis: Hypereosinophilic syndrome    Arya Ortiz DO

## 2023-03-31 NOTE — ANESTHESIA PREPROCEDURE EVALUATION
03/31/2023  Solo Black is a 42 y.o., male.    [facility]  Anesthesia Pre-Operative Evaluation         Patient Name: Solo Black  YOB: 1980  MRN: [id number]    SUBJECTIVE:     Pre-operative evaluation for * No procedures listed *     03/31/2023    Solo Black is a 42 y.o. male     Patient now presents for the above procedure(s).      LDA:       Peripheral IV - Single Lumen 03/31/23 1034 22 G Posterior;Right Hand (Active)   Site Assessment Clean;Dry;Intact;No redness;No swelling 03/31/23 1034   Extremity Assessment Distal to IV No warmth;No swelling;No redness;No abnormal discoloration 03/31/23 1034   Line Status Blood return noted;Saline locked 03/31/23 1034   Dressing Status Clean;Dry;Intact 03/31/23 1034   Dressing Intervention First dressing 03/31/23 1034   Number of days: 0       Prev airway:     Drips:    sodium chloride 0.9%         Patient Active Problem List   Diagnosis    Eosinophilic esophagitis    PUD (peptic ulcer disease)    Erosive gastritis    Lifetime need for proton pump inhibitor    Cervicalgia    Adult ADHD    Adverse reaction to nonsteroidal anti-inflammatory drug (NSAID)    Therapeutic opioid induced constipation    Iron deficiency anemia    Eosinophilia    Reactive arthritis    Incomplete rotator cuff tear or rupture of right shoulder, not specified as traumatic    Hiatal hernia    Hypereosinophilic syndrome    Encounter for insertion of venous access port    Port-A-Cath in place    Dry eyes    Skin rash    Chronic neck pain    Myofascial pain syndrome    Status post laminectomy - Thoracic & cervical    Sedentary lifestyle    Adjustment disorder with anxious mood    Internal hemorrhoids    Difficulty voiding    Hematuria    Anxiety    Urticarial rash    Effusion of right knee    Upper GI bleed     "Substance or medication-induced anxiety disorder    Hypomagnesemia    Umbilical hernia    Continuous severe abdominal pain    Intractable nausea and vomiting    Gastric erosions    Hypokalemia    Diarrhea    Lower GI bleed    Psychiatric illness    Abdominal pain    Duodenal ulcer hemorrhagic    Incisional hernia, without obstruction or gangrene    Munchausen syndrome       Review of patient's allergies indicates:   Allergen Reactions    Bean Diarrhea, Edema, Hives, Nausea And Vomiting and Swelling    Bean pod extract Nausea And Vomiting and Swelling     Lips swelling, vomiting  Lips swelling, vomiting      Legumes Nausea And Vomiting and Swelling     Other reaction(s): GI Intolerance  vomiting  vomiting  Pt reports legumes make his lips swell and induce severe vomiting  Pt reports legumes make his lips swell and induce severe vomiting      Lentils Nausea And Vomiting and Swelling     Lips swelling, vomiting  Lips swelling, vomiting      Nsaids (non-steroidal anti-inflammatory drug)      GI bleed    Peas Nausea And Vomiting and Swelling     Lips swelling, vomiting    Ketamine      Severe dysphoria (bad trip)    Haloperidol Other (See Comments)     "feels like crawling out of his skin"      Meloxicam Nausea Only     GI bleed    Penicillins Nausea And Vomiting     Has taken third gen cephalosporins without issue per patient report       Current Inpatient Medications:   LIDOcaine (PF) 10 mg/ml (1%)  1 mL Other Once    mepolizumab  300 mg Subcutaneous Q28 Days       Current Outpatient Medications on File Prior to Encounter   Medication Sig Dispense Refill    albuterol (PROVENTIL/VENTOLIN HFA) 90 mcg/actuation inhaler INHALE 2 PUFFS INTO THE LUNGS EVERY 6 HOURS AS NEEDED FOR WHEEZING OR SHORTNESS OF BREATH. RESCUE. 18 g 2    artificial tears (ISOPTO TEARS) 0.5 % ophthalmic solution Place 1 drop into both eyes 6 (six) times daily.      cetirizine (ZYRTEC) 10 MG tablet Take 20 mg by mouth 2 " (two) times a day.       cholecalciferol, vitamin D3, (VITAMIN D3) 25 mcg (1,000 unit) capsule Take 1 capsule (1,000 Units total) by mouth once daily. 90 capsule 1    dextroamphetamine-amphetamine (ADDERALL) 20 mg tablet Take 1 tablet by mouth 2 (two) times daily.      diphenhydrAMINE (SOMINEX) 25 mg tablet Take 25 mg by mouth nightly as needed.       docusate sodium (COLACE) 100 MG capsule Take 1 capsule (100 mg total) by mouth 2 (two) times daily. 60 capsule 3    ferrous sulfate 325 (65 FE) MG EC tablet Take 1 tablet (325 mg total) by mouth once daily. (Patient taking differently: Take 325 mg by mouth nightly.) 90 tablet 1    Lactobacillus acidophilus 10 billion cell Cap Take 1 capsule by mouth once daily.       mepolizumab (NUCALA) 100 mg/mL Syrg Inject 3 mLs (300 mg total) into the skin every 28 days. 300 mL 11    oxyCODONE (ROXICODONE) 5 MG immediate release tablet Take 1 tablet (5 mg total) by mouth every 4 (four) hours as needed for Pain. 10 tablet 0    oxyCODONE-acetaminophen (PERCOCET) 5-325 mg per tablet Take 1 tablet by mouth every 4 (four) hours as needed for Pain.      pantoprazole (PROTONIX) 40 MG tablet Take 1 tablet (40 mg total) by mouth once daily. (Patient taking differently: Take 40 mg by mouth 2 (two) times daily.) 30 tablet 2    paroxetine (PAXIL) 20 MG tablet Take 20 mg by mouth nightly.      predniSONE (DELTASONE) 10 MG tablet Take 20 mg by mouth once daily.      promethazine (PHENERGAN) 25 MG tablet Take 25 mg by mouth every 4 (four) hours.      vitamin D3-vitamin K2 1000-90 unit-mcg TbDL Take by mouth once daily.       EPINEPHrine (EPIPEN) 0.3 mg/0.3 mL AtIn Inject 0.3 mLs (0.3 mg total) into the muscle as needed. 2 each 1    hydrOXYzine HCL (ATARAX) 25 MG tablet Take 1 tablet (25 mg total) by mouth 3 (three) times daily as needed for Itching. (Patient not taking: Reported on 3/21/2023) 30 tablet 1    lidocaine HCL 2% (XYLOCAINE) 2 % jelly Apply topically 3 (three) times  daily as needed (Hemorrhoids). 5 mL 3    ondansetron (ZOFRAN-ODT) 4 MG TbDL Take 4 mg by mouth.      sucralfate (CARAFATE) 1 gram tablet Take 1 g by mouth before meals and at bedtime as needed.        Current Facility-Administered Medications on File Prior to Encounter   Medication Dose Route Frequency Provider Last Rate Last Admin    mepolizumab (NUCALA) injection 300 mg  300 mg Subcutaneous Q28 Days Michelle Ornelas MD   300 mg at 06/28/21 1511       Past Surgical History:   Procedure Laterality Date    APPENDECTOMY      ARTHROSCOPY OF SHOULDER WITH REMOVAL OF DISTAL CLAVICLE Right 11/1/2018    Procedure: ARTHROSCOPY, SHOULDER, WITH DISTAL CLAVICLE EXCISION;  Surgeon: Gabino Mejia MD;  Location: [facility];  Service: Orthopedics;  Laterality: Right;  video    BACK SURGERY      BLADDER FULGURATION N/A 9/18/2020    Procedure: FULGURATION, BLADDER;  Surgeon: Randall Moss MD;  Location: [facility];  Service: Urology;  Laterality: N/A;  blood vessels of bladder neck and prostate    BONE MARROW BIOPSY Left 10/1/2020    Procedure: Biopsy-bone marrow;  Surgeon: Trung Polanco MD;  Location: [facility];  Service: Oncology;  Laterality: Left;    CIRCUMCISION, PRIMARY      COLONOSCOPY N/A 11/14/2018    Procedure: COLONOSCOPY;  Surgeon: Milton Brunson MD;  Location: [facility];  Service: Endoscopy;  Laterality: N/A;    COLONOSCOPY N/A 7/20/2020    Procedure: COLONOSCOPY;  Surgeon: Ruslan Tillman MD;  Location: [facility];  Service: Endoscopy;  Laterality: N/A;    CYSTOSCOPY W/ RETROGRADES Bilateral 9/18/2020    Procedure: CYSTOSCOPY, WITH RETROGRADE PYELOGRAM;  Surgeon: Randall Moss MD;  Location: [facility];  Service: Urology;  Laterality: Bilateral;    CYSTOURETHROSCOPY N/A 2/1/2021    Procedure: CYSTOURETHROSCOPY, short placement;  Surgeon: Boni Benites MD;  Location: [address];  Service: Urology;  Laterality: N/A;    DILATION OF URETHRA N/A 9/18/2020    Procedure: DILATION,  URETHRA;  Surgeon: Randall Moss MD;  Location: [facility];  Service: Urology;  Laterality: N/A;    drainage of abscess from hand  2009    MRSA    drainage of abscess from R thigh  2006    MRSA    ESOPHAGOGASTRODUODENOSCOPY  3/11/2014    ESOPHAGOGASTRODUODENOSCOPY N/A 9/5/2018    Procedure: EGD (ESOPHAGOGASTRODUODENOSCOPY);  Surgeon: Kolby Wall MD;  Location: [facility];  Service: Endoscopy;  Laterality: N/A;    ESOPHAGOGASTRODUODENOSCOPY N/A 3/25/2020    Procedure: EGD (ESOPHAGOGASTRODUODENOSCOPY);  Surgeon: Ruslan Tillman MD;  Location: [facility];  Service: Endoscopy;  Laterality: N/A;    ESOPHAGOGASTRODUODENOSCOPY N/A 7/20/2020    Procedure: EGD (ESOPHAGOGASTRODUODENOSCOPY);  Surgeon: Ruslan Tillman MD;  Location: [facility];  Service: Endoscopy;  Laterality: N/A;  Patient is a very hard stick, requires anesthesia stick.    ESOPHAGOGASTRODUODENOSCOPY N/A 8/31/2020    Procedure: EGD (ESOPHAGOGASTRODUODENOSCOPY);  Surgeon: Kolby Wall MD;  Location: [facility];  Service: Endoscopy;  Laterality: N/A;    ESOPHAGOGASTRODUODENOSCOPY N/A 3/3/2021    Procedure: EGD (ESOPHAGOGASTRODUODENOSCOPY);  Surgeon: Ruslan Tillman MD;  Location: [facility];  Service: Endoscopy;  Laterality: N/A;    ESOPHAGOGASTRODUODENOSCOPY N/A 7/12/2021    Procedure: EGD (ESOPHAGOGASTRODUODENOSCOPY);  Surgeon: Kolby Wall MD;  Location: [facility];  Service: Endoscopy;  Laterality: N/A;    FLEXIBLE SIGMOIDOSCOPY N/A 9/5/2018    Procedure: SIGMOIDOSCOPY, FLEXIBLE;  Surgeon: Kolby Wall MD;  Location: [facility];  Service: Endoscopy;  Laterality: N/A;    HAND SURGERY  jan 2014    power saw fell on hand - laceration 3 tendons    INCISION AND DRAINAGE OF KNEE Right 1/13/2021    Procedure: INCISION AND DRAINAGE, KNEE;  Surgeon: Sony Linton Jr., MD;  Location: [facility];  Service: Orthopedics;  Laterality: Right;    INSERTION OF TUNNELED CENTRAL VENOUS CATHETER (CVC) WITH SUBCUTANEOUS PORT  Right 11/24/2021    Procedure: Right port-a-cath removal and exchange.;  Surgeon: Robson Morrison MD;  Location: [facility] OR 2ND FLR;  Service: General;  Laterality: Right;  Right internal jugular    INSERTION OF TUNNELED CENTRAL VENOUS CATHETER (CVC) WITH SUBCUTANEOUS PORT Left 3/31/2022    Procedure: INSERTION, SINGLE LUMEN CATHETER WITH PORT, WITH FLUOROSCOPIC GUIDANCE Left Poss Right Chest;  Surgeon: Robson Morrison MD;  Location: [facility] OR 2ND FLR;  Service: General;  Laterality: Left;    INSERTION OF TUNNELED CENTRAL VENOUS CATHETER (CVC) WITH SUBCUTANEOUS PORT Right 10/6/2022    Procedure: INSERTION, SINGLE LUMEN CATHETER WITH PORT, WITH FLUOROSCOPIC GUIDANCE Left Possible Right Chest;  Surgeon: Robson Morrison MD;  Location: [facility] OR 2ND FLR;  Service: General;  Laterality: Right;    INSERTION OF VENOUS ACCESS PORT Right 8/21/2020    Procedure: INSERTION, VENOUS ACCESS PORT;  Surgeon: Troy Honeycutt MD;  Location: [facility] OR;  Service: General;  Laterality: Right;    MEDIPORT REMOVAL N/A 3/24/2023    Procedure: REMOVAL, CATHETER, CENTRAL VENOUS, TUNNELED, WITH PORT;  Surgeon: Robson Morrison MD;  Location: [facility] OR 2ND FLR;  Service: General;  Laterality: N/A;    NISSEN FUNDOPLICATION      REPAIR, HERNIA, INCISIONAL OR VENTRAL, WITHOUT HISTORY OF PRIOR REPAIR N/A 3/13/2023    Procedure: REPAIR, HERNIA, INCISIONAL OR VENTRAL, WITHOUT HISTORY OF PRIOR REPAIR x2;  Surgeon: Robson Morrison MD;  Location: [facility] OR 2ND FLR;  Service: General;  Laterality: N/A;    REVISION OF SCAR N/A 4/7/2021    Procedure: REVISION, SCAR;  Surgeon: Robson Morrison MD;  Location: [facility] OR 2ND FLR;  Service: General;  Laterality: N/A;    UMBILICAL HERNIA REPAIR N/A 4/7/2021    Procedure: REPAIR, HERNIA, UMBILICAL, AGE 5 YEARS OR OLDER;  Surgeon: Robson Morrison MD;  Location: [facility] OR 2ND FLR;  Service: General;  Laterality: N/A;       Social History     Socioeconomic History    Marital status: Single     Number of children: 2   Occupational History    Occupation:  electric forklifts     Employer: [employer]   Tobacco Use    Smoking status: Never    Smokeless tobacco: Never    Tobacco comments:     Pt states he has smoked 1 or 2 cigarettes in his life.   Substance and Sexual Activity    Alcohol use: Not Currently    Drug use: No    Sexual activity: Not Currently     Partners: Female     Social Determinants of Health     Financial Resource Strain: Low Risk     Difficulty of Paying Living Expenses: Not hard at all   Food Insecurity: No Food Insecurity    Worried About Running Out of Food in the Last Year: Never true    Ran Out of Food in the Last Year: Never true   Transportation Needs: No Transportation Needs    Lack of Transportation (Medical): No    Lack of Transportation (Non-Medical): No   Physical Activity: Insufficiently Active    Days of Exercise per Week: 3 days    Minutes of Exercise per Session: 40 min   Stress: No Stress Concern Present    Feeling of Stress : Not at all   Social Connections: Socially Isolated    Frequency of Communication with Friends and Family: Twice a week    Frequency of Social Gatherings with Friends and Family: Twice a week    Attends Yarsanism Services: Never    Active Member of Clubs or Organizations: No    Attends Club or Organization Meetings: Never    Marital Status: Never    Housing Stability: Low Risk     Unable to Pay for Housing in the Last Year: No    Number of Places Lived in the Last Year: 1    Unstable Housing in the Last Year: No       OBJECTIVE:     Vital Signs Range (Last 24H):  Temp:  [37.2 °C (99 °F)]   Pulse:  [90]   Resp:  [16]   BP: (125-133)/(79-85)   SpO2:  [100 %]       Significant Labs:  Lab Results   Component Value Date    WBC 9.01 03/08/2023    HGB 10.7 (L) 03/08/2023    HCT 35.3 (L) 03/08/2023     03/08/2023    CHOL 171 03/02/2021    TRIG 61 03/02/2021    HDL 45 03/02/2021    ALT 28 03/08/2023     "AST 25 03/08/2023     03/08/2023    K 3.6 03/08/2023     03/08/2023    CREATININE 1.2 03/08/2023    BUN 11 03/08/2023    CO2 21 (L) 03/08/2023    TSH 0.927 01/31/2021    INR 1.0 03/31/2023    HGBA1C 5.3 03/02/2021       Diagnostic Studies: No relevant studies.    EKG:   Results for orders placed or performed during the hospital encounter of 03/08/23   EKG 12-lead    Collection Time: 03/08/23  9:39 AM    Narrative    Test Reason : R10.9,    Vent. Rate : 112 BPM     Atrial Rate : 112 BPM     P-R Int : 124 ms          QRS Dur : 090 ms      QT Int : 328 ms       P-R-T Axes : 058 -28 -05 degrees     QTc Int : 447 ms    Sinus tachycardia  Otherwise normal ECG  When compared with ECG of 20-DEC-2022 01:50,  Premature ventricular complexes are no longer Present    Confirmed by Srinivas Lancaster MD ([id number]) on 3/8/2023 5:31:22 PM    Referred By:             Confirmed By:Sirnivas Lancaster MD       2D ECHO:  TTE:  Results for orders placed or performed during the hospital encounter of 01/31/21   Echo Color Flow Doppler? Yes; Bubble Contrast? No   Result Value Ref Range    Ascending aorta 3.29 cm    STJ 3.10 cm    AV mean gradient 4 mmHg    Ao peak vahe 1.32 m/s    Ao VTI 25.50 cm    IVS 0.89 0.6 - 1.1 cm    LA size 3.98 cm    Left Atrium Major Axis 4.84 cm    Left Atrium Minor Axis 5.72 cm    LVIDd 5.49 3.5 - 6.0 cm    LVIDs 3.48 2.1 - 4.0 cm    LVOT diameter 2.59 cm    LVOT peak VTI 17.08 cm    Posterior Wall 0.91 0.6 - 1.1 cm    MV Peak A Vahe 0.70 m/s    E wave deceleration time 85.65 msec    MV Peak E Vahe 0.89 m/s    PV Peak D Vahe 0.45 m/s    PV Peak S Vahe 0.66 m/s    RA Major Axis 5.14 cm    RA Width 3.99 cm    RVDD 4.25 cm    Sinus 4.28 cm    TAPSE 2.22 cm    TR Max Vahe 2.06 m/s    TDI LATERAL 0.16 m/s    TDI SEPTAL 0.11 m/s    LA WIDTH 4.27 cm    LV Diastolic Volume 146.51 mL    LV Systolic Volume 50.23 mL    RV S' 14.62 cm/s    LVOT peak vahe 0.91 m/s    LA volume (mod) 66.51 cm3    MV "A" wave duration 14.56 msec "    LV LATERAL E/E' RATIO 5.56 m/s    LV SEPTAL E/E' RATIO 8.09 m/s    FS 37 %    LA volume 75.74 cm3    LV mass 185.26 g    Left Ventricle Relative Wall Thickness 0.33 cm    AV valve area 3.53 cm2    AV Velocity Ratio 0.69     AV index (prosthetic) 0.67     E/A ratio 1.27     Mean e' 0.14 m/s    Pulm vein S/D ratio 1.47     LVOT area 5.3 cm2    LVOT stroke volume 89.94 cm3    AV peak gradient 7 mmHg    E/E' ratio 6.59 m/s    Triscuspid Valve Regurgitation Peak Gradient 17 mmHg    BSA 2.21 m2    LV Systolic Volume Index 23.0 mL/m2    LV Diastolic Volume Index 67.21 mL/m2    LA Volume Index 34.7 mL/m2    LV Mass Index 85 g/m2    LA Volume Index (Mod) 30.5 mL/m2    Right Atrial Pressure (from IVC) 3 mmHg    TV rest pulmonary artery pressure 20 mmHg    Narrative    · The left ventricle is normal in size with normal systolic function. The   estimated ejection fraction is 60%  · Normal left ventricular diastolic function.  · Normal right ventricular size with normal right ventricular systolic   function.  · Mild left atrial enlargement.  · Mild tricuspid regurgitation.  · The estimated PA systolic pressure is 20 mmHg.  · Normal central venous pressure (3 mmHg).          YOVNAA:  No results found. However, due to the size of the patient record, not all encounters were searched. Please check Results Review for a complete set of results.    ASSESSMENT/PLAN:       Pre-op Assessment    I have reviewed the Patient Summary Reports.       I have reviewed the Medications.     Review of Systems  Anesthesia Hx:  No problems with previous Anesthesia  History of prior surgery of interest to airway management or planning: Previous anesthesia: General   Social:  Non-Smoker, No Alcohol Use    Hematology/Oncology:  Hematology Normal        Cardiovascular:   Exercise tolerance: good    Renal/:   Chronic Renal Disease renal calculi    Hepatic/GI:   PUD, Hiatal Hernia, GERD, well controlled eosinphilic esophagitis   Neurological:    Neuromuscular Disease,    Endocrine:  Endocrine Normal    Dermatological:  Skin Normal    Psych:   Psychiatric History Munchausen's syndrome         Physical Exam  General: Well nourished, Cooperative, Alert and Oriented    Airway:  Mallampati: II   Mouth Opening: Normal  TM Distance: Normal  Tongue: Normal  Neck ROM: Normal ROM    Dental:  Intact        Anesthesia Plan  Type of Anesthesia, risks & benefits discussed:    Anesthesia Type: Gen ETT, Gen Supraglottic Airway  Intra-op Monitoring Plan: Standard ASA Monitors  Post Op Pain Control Plan: multimodal analgesia and IV/PO Opioids PRN  Induction:  IV  Airway Plan: Direct and Video, Post-Induction  Informed Consent: Informed consent signed with the Patient and all parties understand the risks and agree with anesthesia plan.  All questions answered.   ASA Score: 3    Ready For Surgery From Anesthesia Perspective.     .

## 2023-04-03 NOTE — ANESTHESIA POSTPROCEDURE EVALUATION
Anesthesia Post Evaluation    Patient: Solo Black    Procedure(s) Performed: * No procedures listed *    Final Anesthesia Type: general      Patient location during evaluation: [facility]  Patient participation: Yes- Able to Participate  Level of consciousness: awake and alert  Post-procedure vital signs: reviewed and stable  Pain management: adequate  Airway patency: patent    PONV status at discharge: No PONV  Anesthetic complications: no    Maisha-operative Events Comments: Post operatively patient complained of discomfort (8/10) in his upper R cervical region.  No incisional pain.  IV d/cd and very difficult access. Treated with po meds X 2.  Pt still complaining of discomfort.  After discussion with patient, hydrpmorphone 2 mg IM given.  Pt discharged uneventfully.  Cardiovascular status: blood pressure returned to baseline  Respiratory status: unassisted  Hydration status: euvolemic  Follow-up not needed.          Vitals Value Taken Time   /80 03/31/23 1730   Temp 36.7 °C (98 °F) 03/31/23 1730   Pulse 100 03/31/23 1730   Resp 12 03/31/23 1730   SpO2 97 % 03/31/23 1730         No case tracking events are documented in the log.      Pain/Marcia Score: No data recorded

## 2023-04-11 ENCOUNTER — PATIENT MESSAGE (OUTPATIENT)
Dept: SURGERY | Facility: CLINIC | Age: 43
End: 2023-04-11
Payer: MEDICAID

## 2023-04-12 LAB
FINAL PATHOLOGIC DIAGNOSIS: NORMAL
GROSS: NORMAL
Lab: NORMAL
MICROSCOPIC EXAM: NORMAL

## 2023-07-07 ENCOUNTER — TELEPHONE (OUTPATIENT)
Dept: SURGERY | Facility: CLINIC | Age: 43
End: 2023-07-07
Payer: MEDICAID

## 2023-07-07 NOTE — TELEPHONE ENCOUNTER
----- Message from Yu Reece sent at 7/7/2023 11:17 AM CDT -----  Regarding: Procedure scheduling  Contact: [phone]  Calling to speak with nurse regarding to two hernias that are poking out and needs procedure scheduled. Please call and discuss with patient.  .ph

## 2023-07-07 NOTE — TELEPHONE ENCOUNTER
Unable to reach patient regarding return phone call:  At number given, the person answering stated that there is no Solo Black at that number.

## 2023-07-10 ENCOUNTER — PATIENT MESSAGE (OUTPATIENT)
Dept: SURGERY | Facility: CLINIC | Age: 43
End: 2023-07-10
Payer: MEDICAID

## 2023-07-11 DIAGNOSIS — Z98.890 HISTORY OF HERNIA REPAIR: Primary | ICD-10-CM

## 2023-07-11 DIAGNOSIS — Z87.19 HISTORY OF HERNIA REPAIR: Primary | ICD-10-CM

## 2023-07-19 DIAGNOSIS — K43.9 VENTRAL HERNIA WITHOUT OBSTRUCTION OR GANGRENE: Primary | ICD-10-CM

## 2023-08-15 ENCOUNTER — TELEPHONE (OUTPATIENT)
Dept: SURGERY | Facility: CLINIC | Age: 43
End: 2023-08-15
Payer: MEDICAID

## 2023-08-15 ENCOUNTER — ANESTHESIA EVENT (OUTPATIENT)
Dept: SURGERY | Facility: HOSPITAL | Age: 43
End: 2023-08-15
Payer: MEDICAID

## 2023-08-15 NOTE — ANESTHESIA PREPROCEDURE EVALUATION
[facility]  Anesthesia Pre-Operative Evaluation         Patient Name/: Solo Black, 1980  MRN: [id number]    SUBJECTIVE:     Pre-operative evaluation for Procedure(s) (LRB):  REPAIR, HERNIA, INCISIONAL OR VENTRAL, WITHOUT HISTORY OF PRIOR REPAIR Open With Mesh x2 (N/A)     08/15/2023    Solo Black is a 42 y.o. male w/ a significant PMHx of GERD, hypereosinophilic syndrome, eosinophilic esophagitis, leukemia, PUD, iron deficiency anemia, anxiety, chronic neck pain s/p laminectomy of thoracic and cervical spine, and ventral hernia without obstruction or gangrene. Patient now presents for the above procedure(s).    Of note,   Patient with very complicated medical history with high utilization at multiple different hospital systems throughout the [place]. He frequently presents with reported episodes of hematemesis and/or reported episodes of angioedema secondary to possible Hypereosinophilic Syndrome. These events have been unwitnessed on chart review. There have been concerns from multiple hospital systems on review of Care Everywhere for Factitious Disease/Munchausen.    Patient denies any other known cardiopulmonary disease.       NPO status: Appropriate    Previous Anesthetics:   REMOVAL, CATHETER, CENTRAL VENOUS, TUNNELED, WITH PORT (Chest)  Procedure  Date: 3/24/2023  Anesthesia Type: General  Complications: None reported    Previous Airway:  Intubation:     Induction:  Intravenous    Intubated:  Postinduction    Mask Ventilation:  Easy mask    Attempts:  1    Attempted By:  CRNA    Method of Intubation:  Video laryngoscopy    Blade:  Knight 3    Laryngeal View Grade: Grade I - full view of cords      Difficult Airway Encountered?: No      Complications:  None    Airway Device:  Oral endotracheal tube    Airway Device Size:  7.5    Style/Cuff Inflation:  Cuffed    Inflation Amount (mL):  6    Tube secured:  21    Secured at:  The lips    Placement Verified By:   Colorimetric ETCO2 device    Complicating Factors:  None    Findings Post-Intubation:  BS equal bilateral    ________________________________________  Results for orders placed during the hospital encounter of 01/31/21    Echo Color Flow Doppler? Yes; Bubble Contrast? No    Interpretation Summary  · The left ventricle is normal in size with normal systolic function. The estimated ejection fraction is 60%  · Normal left ventricular diastolic function.  · Normal right ventricular size with normal right ventricular systolic function.  · Mild left atrial enlargement.  · Mild tricuspid regurgitation.  · The estimated PA systolic pressure is 20 mmHg.  · Normal central venous pressure (3 mmHg).    ________________________________________    LDA:   Port A Cath Single Lumen 03/31/23 1415 Internal Jugular Right (Active)   Number of days: 137       Drips: None documented      Patient Active Problem List   Diagnosis    Eosinophilic esophagitis    PUD (peptic ulcer disease)    Erosive gastritis    Lifetime need for proton pump inhibitor    Cervicalgia    Adult ADHD    Adverse reaction to nonsteroidal anti-inflammatory drug (NSAID)    Therapeutic opioid induced constipation    Iron deficiency anemia    Eosinophilia    Reactive arthritis    Incomplete rotator cuff tear or rupture of right shoulder, not specified as traumatic    Hiatal hernia    Hypereosinophilic syndrome    Encounter for insertion of venous access port    Port-A-Cath in place    Dry eyes    Skin rash    Chronic neck pain    Myofascial pain syndrome    Status post laminectomy - Thoracic & cervical    Sedentary lifestyle    Adjustment disorder with anxious mood    Internal hemorrhoids    Difficulty voiding    Hematuria    Anxiety    Urticarial rash    Effusion of right knee    Upper GI bleed    Substance or medication-induced anxiety disorder    Hypomagnesemia    Umbilical hernia    Continuous severe abdominal pain    Intractable  "nausea and vomiting    Gastric erosions    Hypokalemia    Diarrhea    Lower GI bleed    Psychiatric illness    Abdominal pain    Duodenal ulcer hemorrhagic    Incisional hernia, without obstruction or gangrene    Munchausen syndrome       Review of patient's allergies indicates:   Allergen Reactions    Bean Diarrhea, Edema, Hives, Nausea And Vomiting and Swelling    Legumes Nausea And Vomiting and Swelling     Oral swelling    Lentils Nausea And Vomiting and Swelling     Oral swelling      Nsaids (non-steroidal anti-inflammatory drug)      GI bleed    Peas Nausea And Vomiting and Swelling     oral swelling    Ketamine Hallucinations    Haloperidol      "feels like crawling out of his skin"      Meloxicam Nausea Only     GI bleed    Penicillins Nausea And Vomiting     Can take third gen cephalosporins per patient        Current Inpatient Medications:       No current facility-administered medications on file prior to encounter.     Current Outpatient Medications on File Prior to Encounter   Medication Sig Dispense Refill    albuterol (PROVENTIL/VENTOLIN HFA) 90 mcg/actuation inhaler INHALE 2 PUFFS INTO THE LUNGS EVERY 6 HOURS AS NEEDED FOR WHEEZING OR SHORTNESS OF BREATH. RESCUE. 18 g 2    artificial tears (ISOPTO TEARS) 0.5 % ophthalmic solution Place 1 drop into both eyes 6 (six) times daily.      cetirizine (ZYRTEC) 10 MG tablet Take 20 mg by mouth 2 (two) times a day.       cholecalciferol, vitamin D3, (VITAMIN D3) 25 mcg (1,000 unit) capsule Take 1 capsule (1,000 Units total) by mouth once daily. 90 capsule 1    diphenhydrAMINE (SOMINEX) 25 mg tablet Take 25 mg by mouth nightly as needed.       docusate sodium (COLACE) 100 MG capsule Take 1 capsule (100 mg total) by mouth 2 (two) times daily. 60 capsule 3    famotidine (PEPCID) 20 MG tablet Take 1 tablet (20 mg total) by mouth 2 (two) times daily. 60 tablet 0    ferrous sulfate 325 (65 FE) MG EC tablet Take 1 tablet (325 mg total) by " mouth once daily. (Patient taking differently: Take 325 mg by mouth nightly.) 90 tablet 1    ondansetron (ZOFRAN-ODT) 4 MG TbDL Take 4 mg by mouth.      oxyCODONE (ROXICODONE) 5 MG immediate release tablet Take 1 tablet (5 mg total) by mouth every 4 (four) hours as needed for Pain. 10 tablet 0    oxyCODONE-acetaminophen (PERCOCET) 5-325 mg per tablet Take 1 tablet by mouth every 4 (four) hours as needed for Pain.      pantoprazole (PROTONIX) 40 MG tablet Take 1 tablet (40 mg total) by mouth 2 (two) times daily. 60 tablet 0    dextroamphetamine-amphetamine (ADDERALL) 20 mg tablet Take 1 tablet by mouth 2 (two) times daily.      EPINEPHrine (EPIPEN) 0.3 mg/0.3 mL AtIn Inject 0.3 mLs (0.3 mg total) into the muscle as needed. 2 each 1    hydrOXYzine HCL (ATARAX) 25 MG tablet Take 1 tablet (25 mg total) by mouth 3 (three) times daily as needed for Itching. 30 tablet 1    Lactobacillus acidophilus 10 billion cell Cap Take 1 capsule by mouth once daily.       lidocaine HCL 2% (XYLOCAINE) 2 % jelly Apply topically 3 (three) times daily as needed (Hemorrhoids). 5 mL 3    mepolizumab (NUCALA) 100 mg/mL Syrg Inject 3 mLs (300 mg total) into the skin every 28 days. 300 mL 11       Past Surgical History:   Procedure Laterality Date    APPENDECTOMY      ARTHROSCOPY OF SHOULDER WITH REMOVAL OF DISTAL CLAVICLE Right 11/1/2018    Procedure: ARTHROSCOPY, SHOULDER, WITH DISTAL CLAVICLE EXCISION;  Surgeon: Gabino Mejia MD;  Location: [facility] OR;  Service: Orthopedics;  Laterality: Right;  video    BACK SURGERY      BLADDER FULGURATION N/A 9/18/2020    Procedure: FULGURATION, BLADDER;  Surgeon: Randall Moss MD;  Location: [facility] OR;  Service: Urology;  Laterality: N/A;  blood vessels of bladder neck and prostate    BONE MARROW BIOPSY Left 10/1/2020    Procedure: Biopsy-bone marrow;  Surgeon: Trung Polanco MD;  Location: [facility] OR;  Service: Oncology;  Laterality: Left;    CIRCUMCISION, PRIMARY      COLONOSCOPY N/A  11/14/2018    Procedure: COLONOSCOPY;  Surgeon: Milton Brunson MD;  Location: [facility];  Service: Endoscopy;  Laterality: N/A;    COLONOSCOPY N/A 7/20/2020    Procedure: COLONOSCOPY;  Surgeon: Ruslan Tillman MD;  Location: [facility];  Service: Endoscopy;  Laterality: N/A;    CYSTOSCOPY W/ RETROGRADES Bilateral 9/18/2020    Procedure: CYSTOSCOPY, WITH RETROGRADE PYELOGRAM;  Surgeon: Randall Moss MD;  Location: [facility];  Service: Urology;  Laterality: Bilateral;    CYSTOURETHROSCOPY N/A 2/1/2021    Procedure: CYSTOURETHROSCOPY, short placement;  Surgeon: Boni Benites MD;  Location: [address];  Service: Urology;  Laterality: N/A;    DILATION OF URETHRA N/A 9/18/2020    Procedure: DILATION, URETHRA;  Surgeon: Randall Moss MD;  Location: [facility];  Service: Urology;  Laterality: N/A;    drainage of abscess from hand  2009    MRSA    drainage of abscess from R thigh  2006    MRSA    ESOPHAGOGASTRODUODENOSCOPY  3/11/2014    ESOPHAGOGASTRODUODENOSCOPY N/A 9/5/2018    Procedure: EGD (ESOPHAGOGASTRODUODENOSCOPY);  Surgeon: Kolby Wall MD;  Location: [facility];  Service: Endoscopy;  Laterality: N/A;    ESOPHAGOGASTRODUODENOSCOPY N/A 3/25/2020    Procedure: EGD (ESOPHAGOGASTRODUODENOSCOPY);  Surgeon: Ruslan Tillman MD;  Location: [facility];  Service: Endoscopy;  Laterality: N/A;    ESOPHAGOGASTRODUODENOSCOPY N/A 7/20/2020    Procedure: EGD (ESOPHAGOGASTRODUODENOSCOPY);  Surgeon: Ruslan Tillman MD;  Location: [facility];  Service: Endoscopy;  Laterality: N/A;  Patient is a very hard stick, requires anesthesia stick.    ESOPHAGOGASTRODUODENOSCOPY N/A 8/31/2020    Procedure: EGD (ESOPHAGOGASTRODUODENOSCOPY);  Surgeon: Kolby Wall MD;  Location: [facility];  Service: Endoscopy;  Laterality: N/A;    ESOPHAGOGASTRODUODENOSCOPY N/A 3/3/2021    Procedure: EGD (ESOPHAGOGASTRODUODENOSCOPY);  Surgeon: Ruslan Tillman MD;  Location: [facility];  Service:  Endoscopy;  Laterality: N/A;    ESOPHAGOGASTRODUODENOSCOPY N/A 7/12/2021    Procedure: EGD (ESOPHAGOGASTRODUODENOSCOPY);  Surgeon: Kolby Wall MD;  Location: [facility] ENDO;  Service: Endoscopy;  Laterality: N/A;    FLEXIBLE SIGMOIDOSCOPY N/A 9/5/2018    Procedure: SIGMOIDOSCOPY, FLEXIBLE;  Surgeon: Kolby Wall MD;  Location: [facility] ENDO;  Service: Endoscopy;  Laterality: N/A;    HAND SURGERY  jan 2014    power saw fell on hand - laceration 3 tendons    INCISION AND DRAINAGE OF KNEE Right 1/13/2021    Procedure: INCISION AND DRAINAGE, KNEE;  Surgeon: Sony Linton Jr., MD;  Location: [facility] OR;  Service: Orthopedics;  Laterality: Right;    INSERTION OF TUNNELED CENTRAL VENOUS CATHETER (CVC) WITH SUBCUTANEOUS PORT Right 11/24/2021    Procedure: Right port-a-cath removal and exchange.;  Surgeon: Robson Morrison MD;  [facility];  Service: General;  Laterality: Right;  Right internal jugular    INSERTION OF TUNNELED CENTRAL VENOUS CATHETER (CVC) WITH SUBCUTANEOUS PORT Left 3/31/2022    Procedure: INSERTION, SINGLE LUMEN CATHETER WITH PORT, WITH FLUOROSCOPIC GUIDANCE Left Poss Right Chest;  Surgeon: Robson Morrison MD;  Location: [facility] OR [facility] FLR;  Service: General;  Laterality: Left;    INSERTION OF TUNNELED CENTRAL VENOUS CATHETER (CVC) WITH SUBCUTANEOUS PORT Right 10/6/2022    Procedure: INSERTION, SINGLE LUMEN CATHETER WITH PORT, WITH FLUOROSCOPIC GUIDANCE Left Possible Right Chest;  Surgeon: Robson Morrison MD;  Location: [facility] OR [facility];  Service: General;  Laterality: Right;    INSERTION OF VENOUS ACCESS PORT Right 8/21/2020    Procedure: INSERTION, VENOUS ACCESS PORT;  Surgeon: Troy Honeycutt MD;  Location: [facility] OR;  Service: General;  Laterality: Right;    MEDIPORT REMOVAL N/A 3/24/2023    Procedure: REMOVAL, CATHETER, CENTRAL VENOUS, TUNNELED, WITH PORT;  Surgeon: Robson Morrison MD;  Location: [facility] OR 2ND FLR;  Service: General;  Laterality: N/A;    NISSEN  FUNDOPLICATION      REPAIR, HERNIA, INCISIONAL OR VENTRAL, WITHOUT HISTORY OF PRIOR REPAIR N/A 3/13/2023    Procedure: REPAIR, HERNIA, INCISIONAL OR VENTRAL, WITHOUT HISTORY OF PRIOR REPAIR x2;  Surgeon: Robson Morrison MD;  Location: [facility] OR 2ND FLR;  Service: General;  Laterality: N/A;    REVISION OF SCAR N/A 4/7/2021    Procedure: REVISION, SCAR;  Surgeon: Robson Morrison MD;  Location: [facility] OR 2ND FLR;  Service: General;  Laterality: N/A;    UMBILICAL HERNIA REPAIR N/A 4/7/2021    Procedure: REPAIR, HERNIA, UMBILICAL, AGE 5 YEARS OR OLDER;  Surgeon: Robson Morrison MD;  Location: [facility] OR 2ND FLR;  Service: General;  Laterality: N/A;       Social History:  Tobacco Use: Low Risk  (8/12/2023)    Patient History     Smoking Tobacco Use: Never     Smokeless Tobacco Use: Never     Passive Exposure: Not on file       Alcohol Use: Not At Risk (3/9/2023)    AUDIT-C     Frequency of Alcohol Consumption: Never     Average Number of Drinks: Patient does not drink     Frequency of Binge Drinking: Never       OBJECTIVE:     Vital Signs Range:  BMI Readings from Last 1 Encounters:   08/12/23 26.72 kg/m²               Significant Labs:        Component Value Date/Time    WBC 2.4 (L) 07/15/2023 0304    WBC 6.37 05/05/2023 2238    HGB 9.8 (L) 07/22/2023 1607    HGB 12.4 (L) 05/05/2023 2238    HCT 30.7 (L) 07/22/2023 1607    HCT 39.6 (L) 05/05/2023 2238    HCT 33 (L) 11/25/2021 1339     05/05/2023 2238     05/05/2023 2238    K 3.5 07/22/2023 2114    K 4.0 05/05/2023 2238     05/05/2023 2238    CO2 26 05/05/2023 2238     05/05/2023 2238     (H) 11/16/2016 0601    BUN 12 05/05/2023 2238    CREATININE 0.73 05/05/2023 2238    MG 2.1 07/29/2021 0647    PHOS 2.8 07/30/2021 0937    CALCIUM 9.2 05/05/2023 2238    ALBUMIN 4.2 05/05/2023 2238    ALBUMIN 3.3 (L) 11/16/2016 0601    PROT 7.1 05/05/2023 2238    ALKPHOS 57 05/05/2023 2238    BILITOT 1.0 05/05/2023 2238    AST 26 05/05/2023 2238     ALT 26 05/05/2023 2238    INR 1.0 03/31/2023 0944    HGBA1C 5.3 03/02/2021 2000        Please see Results Review for additional labs.     Diagnostic Studies: No relevant studies.    EKG:   Results for orders placed or performed during the hospital encounter of 03/08/23   EKG 12-lead    Collection Time: 03/08/23  9:39 AM    Narrative    Test Reason : R10.9,    Vent. Rate : 112 BPM     Atrial Rate : 112 BPM     P-R Int : 124 ms          QRS Dur : 090 ms      QT Int : 328 ms       P-R-T Axes : 058 -28 -05 degrees     QTc Int : 447 ms    Sinus tachycardia  Otherwise normal ECG  When compared with ECG of 20-DEC-2022 01:50,  Premature ventricular complexes are no longer Present    Confirmed by Srinivas Lanacster MD ([id number]) on 3/8/2023 5:31:22 PM    Referred By:             Confirmed By:Srinivas Lancaster MD       ECHO:  See subjective, if available.      ASSESSMENT/PLAN:           Pre-op Assessment    I have reviewed the Patient Summary Reports.     I have reviewed the Nursing Notes. I have reviewed the NPO Status.   I have reviewed the Medications.     Review of Systems  Anesthesia Hx:  No problems with previous Anesthesia  History of prior surgery of interest to airway management or planning: Denies Family Hx of Anesthesia complications.   Denies Personal Hx of Anesthesia complications.   Social:  Non-Smoker, No Alcohol Use    Hematology/Oncology:         -- Anemia:   Cardiovascular:   Denies Hypertension.  Denies CAD.    Denies Dysrhythmias.   Denies CHF.    Pulmonary:   Denies COPD.  Denies Asthma.    Renal/:   Denies Chronic Renal Disease.     Hepatic/GI:   PUD, Hiatal Hernia, GERD    Musculoskeletal:   Arthritis     Neurological:   Denies CVA. Neuromuscular Disease,  Denies Seizures.    Endocrine:   Denies Diabetes.    Psych:   Psychiatric History anxiety          Physical Exam  General: Well nourished, Cooperative, Alert and Oriented    Airway:  Mallampati: II   Mouth Opening: Normal  TM Distance: Normal  Neck ROM:  Normal ROM    Dental:  Intact        Anesthesia Plan  Type of Anesthesia, risks & benefits discussed:    Anesthesia Type: Gen ETT  Intra-op Monitoring Plan: Standard ASA Monitors  Post Op Pain Control Plan: multimodal analgesia  Induction:  IV  Airway Plan: Video and Direct, Post-Induction  Informed Consent: Informed consent signed with the Patient and all parties understand the risks and agree with anesthesia plan.  All questions answered.   ASA Score: 3  Day of Surgery Review of History & Physical: H&P Update referred to the surgeon/provider.    Ready For Surgery From Anesthesia Perspective.     .

## 2023-08-15 NOTE — PRE-PROCEDURE INSTRUCTIONS
Preop instructions: NPO solids/ milk products after midnight and clears up to 2 hours before arrival (clear liquids are: water, apple juice, Gatorade & Jell-O), shower instructions, directions, leave all valuables at home, wear comfortable clothes, medication instructions explained. Patient stated an understanding.     Patient denies any side effects or issues with anesthesia or sedation.

## 2023-08-16 ENCOUNTER — ANESTHESIA (OUTPATIENT)
Dept: SURGERY | Facility: HOSPITAL | Age: 43
End: 2023-08-16
Payer: MEDICAID

## 2023-08-16 ENCOUNTER — HOSPITAL ENCOUNTER (OUTPATIENT)
Facility: HOSPITAL | Age: 43
Discharge: HOME | End: 2023-08-16
Attending: SURGERY | Admitting: SURGERY
Payer: MEDICAID

## 2023-08-16 VITALS
BODY MASS INDEX: 26.69 KG/M2 | DIASTOLIC BLOOD PRESSURE: 79 MMHG | HEART RATE: 79 BPM | SYSTOLIC BLOOD PRESSURE: 127 MMHG | TEMPERATURE: 98 F | WEIGHT: 197.06 LBS | OXYGEN SATURATION: 96 % | HEIGHT: 72 IN | RESPIRATION RATE: 18 BRPM

## 2023-08-16 DIAGNOSIS — Z87.19 HISTORY OF HERNIA REPAIR: Primary | ICD-10-CM

## 2023-08-16 DIAGNOSIS — Z98.890 HISTORY OF HERNIA REPAIR: Primary | ICD-10-CM

## 2023-08-16 PROCEDURE — 88305 TISSUE EXAM BY PATHOLOGIST: CPT | Performed by: PATHOLOGY

## 2023-08-16 PROCEDURE — 00832 ANES HERNIA REPAIR VNT&INCAL: CPT | Performed by: SURGERY

## 2023-08-16 PROCEDURE — 63600175 PHARM REV CODE 636 W HCPCS

## 2023-08-16 PROCEDURE — 25000003 PHARM REV CODE 250

## 2023-08-16 PROCEDURE — 36000707: Performed by: SURGERY

## 2023-08-16 PROCEDURE — 36000706: Performed by: SURGERY

## 2023-08-16 PROCEDURE — 37000009 HC ANESTHESIA EA ADD 15 MINS: Performed by: SURGERY

## 2023-08-16 PROCEDURE — 63600175 PHARM REV CODE 636 W HCPCS: Performed by: SURGERY

## 2023-08-16 PROCEDURE — 71000044 HC DOSC ROUTINE RECOVERY FIRST HOUR: Performed by: SURGERY

## 2023-08-16 PROCEDURE — 49613 PR REPAIR ANT ABD HERNIA RECURR < 3 CM REDUCIBLE: ICD-10-PCS | Mod: ,,, | Performed by: SURGERY

## 2023-08-16 PROCEDURE — 63600175 PHARM REV CODE 636 W HCPCS: Performed by: ANESTHESIOLOGY

## 2023-08-16 PROCEDURE — 37000008 HC ANESTHESIA 1ST 15 MINUTES: Performed by: SURGERY

## 2023-08-16 PROCEDURE — C1781 MESH (IMPLANTABLE): HCPCS | Performed by: SURGERY

## 2023-08-16 PROCEDURE — D9220A PRA ANESTHESIA: Mod: ,,, | Performed by: ANESTHESIOLOGY

## 2023-08-16 PROCEDURE — D9220A PRA ANESTHESIA: ICD-10-PCS | Mod: ,,, | Performed by: ANESTHESIOLOGY

## 2023-08-16 PROCEDURE — 88305 TISSUE EXAM BY PATHOLOGIST: CPT | Mod: 26,,, | Performed by: PATHOLOGY

## 2023-08-16 PROCEDURE — 49613 RPR AA HRN RCR < 3 RDC: CPT | Mod: ,,, | Performed by: SURGERY

## 2023-08-16 PROCEDURE — 71000016 HC POSTOP RECOV ADDL HR: Performed by: SURGERY

## 2023-08-16 PROCEDURE — 71000015 HC POSTOP RECOV 1ST HR: Performed by: SURGERY

## 2023-08-16 PROCEDURE — 88305 TISSUE EXAM BY PATHOLOGIST: ICD-10-PCS | Mod: 26,,, | Performed by: PATHOLOGY

## 2023-08-16 DEVICE — PATCH HERNIA MESH VENTRALEX: Type: IMPLANTABLE DEVICE | Site: ABDOMEN | Status: FUNCTIONAL

## 2023-08-16 RX ORDER — PROPOFOL 10 MG/ML
VIAL (ML) INTRAVENOUS
Status: DISCONTINUED | OUTPATIENT
Start: 2023-08-16 | End: 2023-08-16

## 2023-08-16 RX ORDER — LIDOCAINE HYDROCHLORIDE 20 MG/ML
INJECTION INTRAVENOUS
Status: DISCONTINUED | OUTPATIENT
Start: 2023-08-16 | End: 2023-08-16

## 2023-08-16 RX ORDER — HEPARIN 100 UNIT/ML
5 SYRINGE INTRAVENOUS ONCE
Status: COMPLETED | OUTPATIENT
Start: 2023-08-16 | End: 2023-08-16

## 2023-08-16 RX ORDER — DEXMEDETOMIDINE HYDROCHLORIDE 100 UG/ML
INJECTION, SOLUTION INTRAVENOUS
Status: DISCONTINUED | OUTPATIENT
Start: 2023-08-16 | End: 2023-08-16

## 2023-08-16 RX ORDER — BUPIVACAINE HYDROCHLORIDE 5 MG/ML
INJECTION, SOLUTION EPIDURAL; INTRACAUDAL
Status: DISCONTINUED | OUTPATIENT
Start: 2023-08-16 | End: 2023-08-16 | Stop reason: HOSPADM

## 2023-08-16 RX ORDER — HYDROMORPHONE HYDROCHLORIDE 1 MG/ML
0.2 INJECTION, SOLUTION INTRAMUSCULAR; INTRAVENOUS; SUBCUTANEOUS EVERY 5 MIN PRN
Status: DISCONTINUED | OUTPATIENT
Start: 2023-08-16 | End: 2023-08-16 | Stop reason: HOSPADM

## 2023-08-16 RX ORDER — DEXAMETHASONE SODIUM PHOSPHATE 4 MG/ML
INJECTION, SOLUTION INTRA-ARTICULAR; INTRALESIONAL; INTRAMUSCULAR; INTRAVENOUS; SOFT TISSUE
Status: DISCONTINUED | OUTPATIENT
Start: 2023-08-16 | End: 2023-08-16

## 2023-08-16 RX ORDER — CEFAZOLIN SODIUM 1 G/3ML
INJECTION, POWDER, FOR SOLUTION INTRAMUSCULAR; INTRAVENOUS
Status: DISCONTINUED | OUTPATIENT
Start: 2023-08-16 | End: 2023-08-16

## 2023-08-16 RX ORDER — ONDANSETRON 2 MG/ML
INJECTION INTRAMUSCULAR; INTRAVENOUS
Status: DISCONTINUED | OUTPATIENT
Start: 2023-08-16 | End: 2023-08-16

## 2023-08-16 RX ORDER — FENTANYL CITRATE 50 UG/ML
INJECTION, SOLUTION INTRAMUSCULAR; INTRAVENOUS
Status: DISCONTINUED | OUTPATIENT
Start: 2023-08-16 | End: 2023-08-16

## 2023-08-16 RX ORDER — HYDROMORPHONE HYDROCHLORIDE 1 MG/ML
INJECTION, SOLUTION INTRAMUSCULAR; INTRAVENOUS; SUBCUTANEOUS
Status: DISCONTINUED
Start: 2023-08-16 | End: 2023-08-16 | Stop reason: HOSPADM

## 2023-08-16 RX ORDER — OXYCODONE HYDROCHLORIDE 5 MG/1
5 TABLET ORAL EVERY 6 HOURS PRN
Qty: 8 TABLET | Refills: 0 | Status: SHIPPED | OUTPATIENT
Start: 2023-08-16 | End: 2023-08-18 | Stop reason: SDUPTHER

## 2023-08-16 RX ORDER — ROCURONIUM BROMIDE 10 MG/ML
INJECTION, SOLUTION INTRAVENOUS
Status: DISCONTINUED | OUTPATIENT
Start: 2023-08-16 | End: 2023-08-16

## 2023-08-16 RX ORDER — PROCHLORPERAZINE EDISYLATE 5 MG/ML
5 INJECTION INTRAMUSCULAR; INTRAVENOUS EVERY 30 MIN PRN
Status: DISCONTINUED | OUTPATIENT
Start: 2023-08-16 | End: 2023-08-16 | Stop reason: HOSPADM

## 2023-08-16 RX ORDER — SODIUM CHLORIDE 9 MG/ML
INJECTION, SOLUTION INTRAVENOUS CONTINUOUS
Status: ACTIVE | OUTPATIENT
Start: 2023-08-16

## 2023-08-16 RX ORDER — ONDANSETRON 2 MG/ML
4 INJECTION INTRAMUSCULAR; INTRAVENOUS DAILY PRN
Status: DISCONTINUED | OUTPATIENT
Start: 2023-08-16 | End: 2023-08-16 | Stop reason: HOSPADM

## 2023-08-16 RX ORDER — MIDAZOLAM HYDROCHLORIDE 1 MG/ML
INJECTION INTRAMUSCULAR; INTRAVENOUS
Status: DISCONTINUED | OUTPATIENT
Start: 2023-08-16 | End: 2023-08-16

## 2023-08-16 RX ORDER — SODIUM CHLORIDE 9 MG/ML
INJECTION, SOLUTION INTRAVENOUS CONTINUOUS PRN
Status: DISCONTINUED | OUTPATIENT
Start: 2023-08-16 | End: 2023-08-16

## 2023-08-16 RX ORDER — ACETAMINOPHEN 500 MG
1000 TABLET ORAL ONCE
Status: COMPLETED | OUTPATIENT
Start: 2023-08-16 | End: 2023-08-16

## 2023-08-16 RX ORDER — HYDROMORPHONE HYDROCHLORIDE 1 MG/ML
0.2 INJECTION, SOLUTION INTRAMUSCULAR; INTRAVENOUS; SUBCUTANEOUS EVERY 5 MIN PRN
Status: COMPLETED | OUTPATIENT
Start: 2023-08-16 | End: 2023-08-16

## 2023-08-16 RX ADMIN — HYDROMORPHONE HYDROCHLORIDE 0.2 MG: 1 INJECTION, SOLUTION INTRAMUSCULAR; INTRAVENOUS; SUBCUTANEOUS at 09:08

## 2023-08-16 RX ADMIN — DEXMEDETOMIDINE 8 MCG: 100 INJECTION, SOLUTION, CONCENTRATE INTRAVENOUS at 08:08

## 2023-08-16 RX ADMIN — SUGAMMADEX 400 MG: 100 INJECTION, SOLUTION INTRAVENOUS at 09:08

## 2023-08-16 RX ADMIN — DEXAMETHASONE SODIUM PHOSPHATE 4 MG: 4 INJECTION, SOLUTION INTRAMUSCULAR; INTRAVENOUS at 08:08

## 2023-08-16 RX ADMIN — PROPOFOL 30 MG: 10 INJECTION, EMULSION INTRAVENOUS at 08:08

## 2023-08-16 RX ADMIN — CEFAZOLIN 2 G: 330 INJECTION, POWDER, FOR SOLUTION INTRAMUSCULAR; INTRAVENOUS at 08:08

## 2023-08-16 RX ADMIN — LIDOCAINE HYDROCHLORIDE 100 MG: 20 INJECTION INTRAVENOUS at 08:08

## 2023-08-16 RX ADMIN — HEPARIN 500 UNITS: 100 SYRINGE at 11:08

## 2023-08-16 RX ADMIN — DEXMEDETOMIDINE 12 MCG: 100 INJECTION, SOLUTION, CONCENTRATE INTRAVENOUS at 08:08

## 2023-08-16 RX ADMIN — ONDANSETRON 4 MG: 2 INJECTION INTRAMUSCULAR; INTRAVENOUS at 09:08

## 2023-08-16 RX ADMIN — ACETAMINOPHEN 1000 MG: 500 TABLET ORAL at 07:08

## 2023-08-16 RX ADMIN — ROCURONIUM BROMIDE 30 MG: 10 INJECTION, SOLUTION INTRAVENOUS at 08:08

## 2023-08-16 RX ADMIN — ROCURONIUM BROMIDE 60 MG: 10 INJECTION, SOLUTION INTRAVENOUS at 08:08

## 2023-08-16 RX ADMIN — HYDROMORPHONE HYDROCHLORIDE 0.2 MG: 1 INJECTION, SOLUTION INTRAMUSCULAR; INTRAVENOUS; SUBCUTANEOUS at 10:08

## 2023-08-16 RX ADMIN — PROPOFOL 200 MG: 10 INJECTION, EMULSION INTRAVENOUS at 08:08

## 2023-08-16 RX ADMIN — MIDAZOLAM HYDROCHLORIDE 2 MG: 1 INJECTION INTRAMUSCULAR; INTRAVENOUS at 07:08

## 2023-08-16 RX ADMIN — FENTANYL CITRATE 50 MCG: 50 INJECTION, SOLUTION INTRAMUSCULAR; INTRAVENOUS at 08:08

## 2023-08-16 RX ADMIN — SODIUM CHLORIDE: 0.9 INJECTION, SOLUTION INTRAVENOUS at 07:08

## 2023-08-16 NOTE — OP NOTE
DATE: 8/16/23.    PREOPERATIVE DIAGNOSIS:  Recurrent incisional hernia.    POSTOPERATIVE DIAGNOSIS:  Recurrent incarcerated incisional hernia.    PROCEDURE:  Repair of recurrent incarcerated incisional hernia with mesh (1.5 x 1.5 cm).    ATTENDING SURGEON: Robson Morrison MD.    RESIDENT:  None.    ANESTHESIA:  General.    INDICATION:  Patient is a 42-year-old man with a history of multiple abdominal operations and recurrent incisional hernias.  He presents for repair of a small recurrent epigastric incisional hernia.    PROCEDURE IN DETAIL:  Patient was taken to the operating room and placed supine.  After induction of general endotracheal tube anesthesia, his abdomen was prepped and draped in the standard fashion.  Time-out was performed.  Small midline incision was made over the palpable hernia.  This was carried down to the subcutaneous tissues and the hernia sac was identified.  It contained incarcerated preperitoneal fat.  This was divided at the level of fascia with cautery and passed off the field as specimen.  Hernia defect measured 1.5 x 1.5 cm.  Preperitoneal space was developed bluntly to allow for mesh placement.  Appropriate size mesh hernia patch was placed in the preperitoneal space and laid in good position.  This was secured with 0 Ethibond suture.  Fascial defect was then closed with interrupted 0 Ethibond suture.  Hemostasis was confirmed.  Wound was then closed in layers with deep 2-0 Vicryl suture and subcuticular 4-0 Monocryl.  Dermabond was applied.  Patient was awakened from anesthesia transferred to the PACU in good condition.  All needle and sponge counts were correct at the conclusion of the case.  I was present for the procedure in its entirety.    EBL:  Less than 5 mL.    FINDINGS:  1.5 x 1.5 cm recurrent incisional hernia with incarcerated preperitoneal fat repaired with mesh underlay.

## 2023-08-16 NOTE — TRANSFER OF CARE
Anesthesia Transfer of Care Note    Patient: Solo Black    Procedure(s) Performed: Procedure(s) (LRB):  REPAIR, HERNIA, INCISIONAL, INCARCERATED, RECURRENT, W MESH (N/A)    Patient location: PACU    Anesthesia Type: general    Transport from OR: Transported from OR on 6-10 L/min O2 by face mask with adequate spontaneous ventilation    Post pain: other: complained of pain at incision site    Post assessment: no apparent anesthetic complications    Post vital signs: stable    Level of consciousness: responds to stimulation, awake, alert and oriented    Nausea/Vomiting: no nausea/vomiting    Complications: none    Transfer of care protocol was followedComments: Patient awake, alert, oriented, and stable in PACU. Handoff given to RN      Last vitals:   Visit Vitals  BP (!) 132/93   Pulse 79   Temp 37.1 °C (98.8 °F) (Temporal)   Resp 16   Ht 6' (1.829 m)   Wt 89.4 kg (197 lb 1.5 oz)   SpO2 99%   BMI 26.73 kg/m²

## 2023-08-16 NOTE — H&P
Acute Care Surgery: History & Physical     Chief Complaint: Incisional/ventral hernia repair     Subjective:  Mr. Black is a 42 y.o. male who presents for repair of incisional/ventral hernia       On examination, Mr. Black is sitting comfortably in the room upon entering in no acute distress. He denies headaches, malaise, shortness of breath, chest pain, or nausea, vomiting, fever, chills, or nightsweats. He denies any abdominal pain, and the abdomen is soft, nontender, and nondistended on palpation.      PMHx, SHx, FHx, SocHx, Allergies, Medications:  Mr. Black  has a past medical history of Arthritis, C. difficile colitis (02/2014), Cancer, Encounter for blood transfusion, Eosinophilic esophagitis (03/11/2014), GERD (gastroesophageal reflux disease), Hypereosinophilic syndrome, IBS (irritable bowel syndrome), Leukemia, MRSA carrier, Munchausen syndrome, Nephrolithiasis (04/2014), Septic prepatellar bursitis of right knee (01/12/2021), and Urinary tract infection (02/2014).      He  has a past surgical history that includes Appendectomy; Back surgery; Hand surgery (jan 2014); Circumcision, primary; drainage of abscess from R thigh (2006); drainage of abscess from hand (2009); Esophagogastroduodenoscopy (3/11/2014); Flexible sigmoidoscopy (N/A, 9/5/2018); Esophagogastroduodenoscopy (N/A, 9/5/2018); Arthroscopy of shoulder with removal of distal clavicle (Right, 11/1/2018); Colonoscopy (N/A, 11/14/2018); Esophagogastroduodenoscopy (N/A, 3/25/2020); Esophagogastroduodenoscopy (N/A, 7/20/2020); Colonoscopy (N/A, 7/20/2020); Insertion of venous access port (Right, 8/21/2020); Nissen fundoplication; Esophagogastroduodenoscopy (N/A, 8/31/2020); Bladder fulguration (N/A, 9/18/2020); Cystoscopy w/ retrogrades (Bilateral, 9/18/2020); Dilation of urethra (N/A, 9/18/2020); Bone marrow biopsy (Left, 10/1/2020); Incision and drainage of knee (Right, 1/13/2021); Cystourethroscopy (N/A, 2/1/2021);  Esophagogastroduodenoscopy (N/A, 3/3/2021); Umbilical hernia repair (N/A, 4/7/2021); Revision of scar (N/A, 4/7/2021); Esophagogastroduodenoscopy (N/A, 7/12/2021); Insertion of tunneled central venous catheter (CVC) with subcutaneous port (Right, 11/24/2021); Insertion of tunneled central venous catheter (CVC) with subcutaneous port (Left, 3/31/2022); Insertion of tunneled central venous catheter (CVC) with subcutaneous port (Right, 10/6/2022); repair, hernia, incisional or ventral, without history of prior repair (N/A, 3/13/2023); and Mediport removal (N/A, 3/24/2023).     He  reports that he has never smoked. He has never used smokeless tobacco. He reports that he does not currently use alcohol. He reports that he does not use drugs.      Mr. Black's family history includes Celiac disease in his sister; Hypertension in his maternal grandfather and maternal grandmother; Urolithiasis in his father.  He is allergic to bean, legumes, lentils, nsaids (non-steroidal anti-inflammatory drug), peas, ketamine, haloperidol, meloxicam, and penicillins.     Mr. Black has a current medication list which includes the following prescription(s): albuterol, artificial tears, cetirizine, cholecalciferol (vitamin d3), diphenhydramine, docusate sodium, famotidine, ferrous sulfate, ondansetron, oxycodone, oxycodone-acetaminophen, pantoprazole, paroxetine, dextroamphetamine-amphetamine, epinephrine, hydroxyzine hcl, lactobacillus acidophilus, lidocaine hcl 2%, and mepolizumab, and the following Facility-Administered Medications: sodium chloride 0.9%, acetaminophen, and ceFAZolin 2 g in dextrose 5 % in water (D5W) 50 mL IVPB (MB+).     ROS:  Pertinent items from 14 system review are noted in HPI, all others negative      Objective:  There were no vitals taken for this visit.     Physical Exam:  Gen: no acute distress, alert and oriented  HEENT: extraocular movements intact   CV: regular rate and rhythm   Pulm: no increased work of  breathing, symmetrical chest rise  Abd: Soft, nontender, nondistended, ventral/incisional hernia in epigastric region  Extr: moves all extremities well  Neuro: CN II-XII grossly intact w/o focal deficit  Integument: Normal turgor and tone. No jaundice.  Psych: appropriate affect, normal speech     Assessment:   Mr. Black is a 42 y.o. male who here for repair of incisional/ventral hernia     After discussing the alternatives, benefits, and risks for the proposed operation, Mr. Black wished to proceed. All of Mr. Black questions concerning the operation were answered, he expressed full understanding of the procedure and the risks/benefits associated, and signed informed consent was obtained.     Plan:  -OR for repair of incisional/ventral hernia  -informed consent obtained      Case discussed, reviewed, and helped formulated by Dr. Morrison, attending physician, who agrees with the above plan.      Skyler Whitt MD  Acute Care Surgery Team  [facility]  8/16/2023

## 2023-08-16 NOTE — ANESTHESIA POSTPROCEDURE EVALUATION
Anesthesia Post Evaluation    Patient: Solo Black    Procedure(s) Performed: Procedure(s) (LRB):  REPAIR, HERNIA, INCISIONAL, INCARCERATED, RECURRENT, W MESH (N/A)    Final Anesthesia Type: general      Patient location during evaluation: PACU  Patient participation: Yes- Able to Participate  Level of consciousness: awake and alert  Post-procedure vital signs: reviewed and stable  Pain management: adequate  Airway patency: patent    PONV status at discharge: No PONV  Anesthetic complications: no      Cardiovascular status: blood pressure returned to baseline  Respiratory status: unassisted  Hydration status: euvolemic  Follow-up not needed.          Vitals Value Taken Time   /79 08/16/23 1102   Temp 36.4 °C (97.6 °F) 08/16/23 0920   Pulse 84 08/16/23 1107   Resp 41 08/16/23 1107   SpO2 96 % 08/16/23 1107   Vitals shown include unvalidated device data.      No case tracking events are documented in the log.      Pain/Marcia Score: Pain Rating Prior to Med Admin: 4 (8/16/2023 11:00 AM)  Marcia Score: 10 (8/16/2023 11:00 AM)

## 2023-08-16 NOTE — BRIEF OP NOTE
[facility] - Surgery (2nd Fl)  Brief Operative Note    Surgery Date: 8/16/2023     Surgeon(s) and Role:     * Oanh Morrison MD - Primary    Assisting Surgeon: None    Pre-op Diagnosis:  Ventral hernia without obstruction or gangrene [K43.9]    Post-op Diagnosis:  Post-Op Diagnosis Codes:     * Ventral hernia without obstruction or gangrene [K43.9]    Procedure(s) (LRB):  REPAIR, HERNIA, INCISIONAL, INCARCERATED, RECURRENT, W MESH (N/A)    Anesthesia:     Operative Findings: ventral hernia repair    Estimated Blood Loss: minimal          Specimens:   Specimen (24h ago, onward)       Start     Ordered    08/16/23 0852  Specimen to Pathology, Surgery General Surgery  Once        Comments: Pre-op Diagnosis: Ventral hernia without obstruction or gangrene [K43.9]Procedure(s):REPAIR, HERNIA, INCISIONAL, INCARCERATED, RECURRENT, W MESH Number of specimens: 1Name of specimens: 1.) Incarcerated Pre-peritoneal Fat, Permanent.     References:    Click here for ordering Quick Tip   Question Answer Comment   Procedure Type: General Surgery    Specimen Class: Routine/Screening    Which provider would you like to cc? OANH MORRISON        08/16/23 0907                      Discharge Note    OUTCOME: Patient tolerated treatment/procedure well without complication and is now ready for discharge.    DISPOSITION: Home or Self Care    FINAL DIAGNOSIS:  ventral hernia     FOLLOWUP: In clinic    DISCHARGE INSTRUCTIONS:    Discharge Procedure Orders   Diet Adult Regular     Lifting restrictions   Order Comments: No lifting greater than 10 pounds for 6 weeks from day of surgery.  No pushing/pulling such as vacuuming or raking.  No straining, avoid constipation and take stool softeners as described and laxatives as needed.  No driving while on narcotics and until you can react quickly without pain.     No dressing needed   Order Comments: WOUND CARE  You have skin glue over your incision(s)  This will slowly flake away in about 10  days  It is okay to shower starting the day after surgery  Can pat your incision dry  Please do not scrub hard over your incisions  Please do not pick the glue off or it will reopen the wound     Notify your health care provider if you experience any of the following:  temperature >100.4     Notify your health care provider if you experience any of the following:  persistent nausea and vomiting or diarrhea     Notify your health care provider if you experience any of the following:  severe uncontrolled pain     Notify your health care provider if you experience any of the following:  redness, tenderness, or signs of infection (pain, swelling, redness, odor or green/yellow discharge around incision site)     Notify your health care provider if you experience any of the following:  difficulty breathing or increased cough     Notify your health care provider if you experience any of the following:  severe persistent headache     Notify your health care provider if you experience any of the following:  worsening rash     Notify your health care provider if you experience any of the following:  persistent dizziness, light-headedness, or visual disturbances     Notify your health care provider if you experience any of the following:  increased confusion or weakness     Activity as tolerated   Order Comments: You had an incisional hernia repair performed.     Please alternate tylenol and ibuprofen for pain as directed by the bottle. You have also been prescribed a narcotic pain medication to help with post-operative pain.   Please make sure to take 1 capful of Miralax per day to help with narcotic associated constipation.     Please do not drive while on narcotic pain medication.    You have skin glue (dermabond) over your incision. Skin glue will fall off on its own  You may shower and let soapy water run over your incision, do not scrub. Please no baths or soaking for 2 weeks.    [place] no heavy lifting/pushing/pulling.  Do not lift anything heavier than a gallon of milk (about 10-15 lbs) for the next 6 weeks.    You have no diet restrictions.    Please follow up in clinic with Dr. Morrison in 2 weeks.        Clinical Reference Documents Added to Patient Instructions         Document    HERNIA REPAIR DISCHARGE INSTRUCTIONS (ENGLISH)

## 2023-08-16 NOTE — ANESTHESIA PROCEDURE NOTES
Intubation    Date/Time: 8/16/2023 8:06 AM    Performed by: Jeri Elliott DO  Authorized by: Dimple Figueroa MD    Intubation:     Induction:  Rapid sequence induction    Intubated:  Postinduction    Mask Ventilation:  Not attempted    Attempts:  1    Attempted By:  Student    Method of Intubation:  Video laryngoscopy    Blade:  Knight 3    Laryngeal View Grade: Grade IIA - cords partially seen      Difficult Airway Encountered?: No      Complications:  None    Airway Device:  Oral endotracheal tube    Airway Device Size:  7.5    Style/Cuff Inflation:  Cuffed (inflated to minimal occlusive pressure)    Tube secured:  22    Secured at:  The lips    Placement Verified By:  Capnometry    Complicating Factors:  None    Findings Post-Intubation:  BS equal bilateral and atraumatic/condition of teeth unchanged

## 2023-08-16 NOTE — PLAN OF CARE
Pt a/o x4.VSS. pain tolerable 4/10. No c/o nausea. No signs of distress noted. Surgical site clean dry and intact. No drainage present. Pt able to tolerate oral intake. D/c instructions given to pt and grandmother. Both verbally agreed to understanding. Pt meets criteria for discharge and discharged in stable condition.

## 2023-08-16 NOTE — PATIENT INSTRUCTIONS
You had an incisional hernia repair performed.     Please alternate tylenol and ibuprofen for pain as directed by the bottle. You have also been prescribed a narcotic pain medication to help with post-operative pain.   Please make sure to take 1 capful of Miralax per day to help with narcotic associated constipation.     Please do not drive while on narcotic pain medication.    You have skin glue (dermabond) over your incision. Skin glue will fall off on its own  You may shower and let soapy water run over your incision, do not scrub. Please no baths or soaking for 2 weeks.    [place] no heavy lifting/pushing/pulling. Do not lift anything heavier than a gallon of milk (about 10-15 lbs) for the next 6 weeks.    You have no diet restrictions.    Please follow up in clinic with Dr. Morrison in 2 weeks.

## 2023-08-17 ENCOUNTER — TELEPHONE (OUTPATIENT)
Dept: SURGERY | Facility: CLINIC | Age: 43
End: 2023-08-17
Payer: MEDICAID

## 2023-08-17 ENCOUNTER — HOSPITAL ENCOUNTER (EMERGENCY)
Facility: HOSPITAL | Age: 43
Discharge: HOME | End: 2023-08-17
Attending: EMERGENCY MEDICINE
Payer: MEDICAID

## 2023-08-17 VITALS
DIASTOLIC BLOOD PRESSURE: 69 MMHG | OXYGEN SATURATION: 96 % | TEMPERATURE: 99 F | SYSTOLIC BLOOD PRESSURE: 131 MMHG | WEIGHT: 200 LBS | BODY MASS INDEX: 27.12 KG/M2 | RESPIRATION RATE: 18 BRPM | HEART RATE: 97 BPM

## 2023-08-17 DIAGNOSIS — G89.18 POST-OP PAIN: Primary | ICD-10-CM

## 2023-08-17 DIAGNOSIS — R00.0 TACHYCARDIA: ICD-10-CM

## 2023-08-17 LAB
ABO + RH BLD: NORMAL
ALBUMIN SERPL BCP-MCNC: 3.2 G/DL (ref 3.5–5.2)
ALLENS TEST: NORMAL
ALP SERPL-CCNC: 161 U/L (ref 55–135)
ALT SERPL W/O P-5'-P-CCNC: 237 U/L (ref 10–44)
ANION GAP SERPL CALC-SCNC: 8 MMOL/L (ref 8–16)
AST SERPL-CCNC: 110 U/L (ref 10–40)
BASOPHILS # BLD AUTO: 0.02 K/UL (ref 0–0.2)
BASOPHILS NFR BLD: 0.6 % (ref 0–1.9)
BILIRUB SERPL-MCNC: 0.8 MG/DL (ref 0.1–1)
BILIRUB UR QL STRIP: NEGATIVE
BLD GP AB SCN CELLS X3 SERPL QL: NORMAL
BUN SERPL-MCNC: 13 MG/DL (ref 6–20)
CALCIUM SERPL-MCNC: 8.3 MG/DL (ref 8.7–10.5)
CHLORIDE SERPL-SCNC: 108 MMOL/L (ref 95–110)
CLARITY UR REFRACT.AUTO: CLEAR
CO2 SERPL-SCNC: 23 MMOL/L (ref 23–29)
COLOR UR AUTO: YELLOW
CREAT SERPL-MCNC: 1 MG/DL (ref 0.5–1.4)
DIFFERENTIAL METHOD: ABNORMAL
EOSINOPHIL # BLD AUTO: 0 K/UL (ref 0–0.5)
EOSINOPHIL NFR BLD: 0.3 % (ref 0–8)
ERYTHROCYTE [DISTWIDTH] IN BLOOD BY AUTOMATED COUNT: 19.3 % (ref 11.5–14.5)
EST. GFR  (NO RACE VARIABLE): >60 ML/MIN/1.73 M^2
GLUCOSE SERPL-MCNC: 92 MG/DL (ref 70–110)
GLUCOSE UR QL STRIP: NEGATIVE
HCT VFR BLD AUTO: 39.1 % (ref 40–54)
HGB BLD-MCNC: 11.8 G/DL (ref 14–18)
HGB UR QL STRIP: NEGATIVE
IMM GRANULOCYTES # BLD AUTO: 0.02 K/UL (ref 0–0.04)
IMM GRANULOCYTES NFR BLD AUTO: 0.6 % (ref 0–0.5)
KETONES UR QL STRIP: NEGATIVE
LDH SERPL L TO P-CCNC: 0.93 MMOL/L (ref 0.5–2.2)
LEUKOCYTE ESTERASE UR QL STRIP: NEGATIVE
LIPASE SERPL-CCNC: 17 U/L (ref 4–60)
LYMPHOCYTES # BLD AUTO: 0.3 K/UL (ref 1–4.8)
LYMPHOCYTES NFR BLD: 10.3 % (ref 18–48)
MCH RBC QN AUTO: 24.6 PG (ref 27–31)
MCHC RBC AUTO-ENTMCNC: 30.2 G/DL (ref 32–36)
MCV RBC AUTO: 82 FL (ref 82–98)
MONOCYTES # BLD AUTO: 0.3 K/UL (ref 0.3–1)
MONOCYTES NFR BLD: 9.6 % (ref 4–15)
NEUTROPHILS # BLD AUTO: 2.4 K/UL (ref 1.8–7.7)
NEUTROPHILS NFR BLD: 78.6 % (ref 38–73)
NITRITE UR QL STRIP: NEGATIVE
NRBC BLD-RTO: 0 /100 WBC
PH UR STRIP: 7 [PH] (ref 5–8)
PLATELET # BLD AUTO: 221 K/UL (ref 150–450)
PMV BLD AUTO: 9.8 FL (ref 9.2–12.9)
POTASSIUM SERPL-SCNC: 3.8 MMOL/L (ref 3.5–5.1)
PROCALCITONIN SERPL IA-MCNC: 0.24 NG/ML
PROT SERPL-MCNC: 6.4 G/DL (ref 6–8.4)
PROT UR QL STRIP: NEGATIVE
RBC # BLD AUTO: 4.79 M/UL (ref 4.6–6.2)
SAMPLE: NORMAL
SARS-COV-2 RDRP RESP QL NAA+PROBE: NEGATIVE
SITE: NORMAL
SODIUM SERPL-SCNC: 139 MMOL/L (ref 136–145)
SP GR UR STRIP: >1.03 (ref 1–1.03)
SPECIMEN OUTDATE: NORMAL
URN SPEC COLLECT METH UR: ABNORMAL
WBC # BLD AUTO: 3.11 K/UL (ref 3.9–12.7)

## 2023-08-17 PROCEDURE — 99285 EMERGENCY DEPT VISIT HI MDM: CPT | Mod: 25

## 2023-08-17 PROCEDURE — 63600175 PHARM REV CODE 636 W HCPCS

## 2023-08-17 PROCEDURE — U0002 COVID-19 LAB TEST NON-CDC: HCPCS | Performed by: EMERGENCY MEDICINE

## 2023-08-17 PROCEDURE — 99900035 HC TECH TIME PER 15 MIN (STAT)

## 2023-08-17 PROCEDURE — 93005 ELECTROCARDIOGRAM TRACING: CPT

## 2023-08-17 PROCEDURE — 96375 TX/PRO/DX INJ NEW DRUG ADDON: CPT

## 2023-08-17 PROCEDURE — 83605 ASSAY OF LACTIC ACID: CPT

## 2023-08-17 PROCEDURE — 96376 TX/PRO/DX INJ SAME DRUG ADON: CPT

## 2023-08-17 PROCEDURE — 83690 ASSAY OF LIPASE: CPT | Performed by: EMERGENCY MEDICINE

## 2023-08-17 PROCEDURE — 94761 N-INVAS EAR/PLS OXIMETRY MLT: CPT

## 2023-08-17 PROCEDURE — 96365 THER/PROPH/DIAG IV INF INIT: CPT | Mod: 59

## 2023-08-17 PROCEDURE — 86900 BLOOD TYPING SEROLOGIC ABO: CPT

## 2023-08-17 PROCEDURE — 25000003 PHARM REV CODE 250: Performed by: EMERGENCY MEDICINE

## 2023-08-17 PROCEDURE — 25500020 PHARM REV CODE 255: Performed by: EMERGENCY MEDICINE

## 2023-08-17 PROCEDURE — 63600175 PHARM REV CODE 636 W HCPCS: Performed by: EMERGENCY MEDICINE

## 2023-08-17 PROCEDURE — 96361 HYDRATE IV INFUSION ADD-ON: CPT

## 2023-08-17 PROCEDURE — 81003 URINALYSIS AUTO W/O SCOPE: CPT | Performed by: EMERGENCY MEDICINE

## 2023-08-17 PROCEDURE — 93010 EKG 12-LEAD: ICD-10-PCS | Mod: ,,, | Performed by: INTERNAL MEDICINE

## 2023-08-17 PROCEDURE — 80053 COMPREHEN METABOLIC PANEL: CPT | Performed by: EMERGENCY MEDICINE

## 2023-08-17 PROCEDURE — 87040 BLOOD CULTURE FOR BACTERIA: CPT | Mod: 59 | Performed by: EMERGENCY MEDICINE

## 2023-08-17 PROCEDURE — 25000003 PHARM REV CODE 250

## 2023-08-17 PROCEDURE — 93010 ELECTROCARDIOGRAM REPORT: CPT | Mod: ,,, | Performed by: INTERNAL MEDICINE

## 2023-08-17 PROCEDURE — 85025 COMPLETE CBC W/AUTO DIFF WBC: CPT | Performed by: EMERGENCY MEDICINE

## 2023-08-17 PROCEDURE — 84145 PROCALCITONIN (PCT): CPT

## 2023-08-17 RX ORDER — MORPHINE SULFATE 4 MG/ML
4 INJECTION, SOLUTION INTRAMUSCULAR; INTRAVENOUS
Status: COMPLETED | OUTPATIENT
Start: 2023-08-17 | End: 2023-08-17

## 2023-08-17 RX ORDER — ONDANSETRON 4 MG/1
4 TABLET, ORALLY DISINTEGRATING ORAL
Status: COMPLETED | OUTPATIENT
Start: 2023-08-17 | End: 2023-08-17

## 2023-08-17 RX ORDER — HEPARIN 100 UNIT/ML
5 SYRINGE INTRAVENOUS
Status: COMPLETED | OUTPATIENT
Start: 2023-08-17 | End: 2023-08-17

## 2023-08-17 RX ADMIN — IOHEXOL 75 ML: 350 INJECTION, SOLUTION INTRAVENOUS at 06:08

## 2023-08-17 RX ADMIN — HEPARIN 500 UNITS: 100 SYRINGE at 10:08

## 2023-08-17 RX ADMIN — MORPHINE SULFATE 4 MG: 4 INJECTION INTRAVENOUS at 05:08

## 2023-08-17 RX ADMIN — ONDANSETRON 4 MG: 4 TABLET, ORALLY DISINTEGRATING ORAL at 05:08

## 2023-08-17 RX ADMIN — MORPHINE SULFATE 4 MG: 4 INJECTION INTRAVENOUS at 07:08

## 2023-08-17 RX ADMIN — CEFTRIAXONE 1 G: 1 INJECTION, POWDER, FOR SOLUTION INTRAMUSCULAR; INTRAVENOUS at 07:08

## 2023-08-17 NOTE — ED NOTES
"..Patient identifiers for Solo Black 42 y.o. male checked and correct.  Chief Complaint   Patient presents with    Post-op Problem     Pt had 2 hernias repaired yesterday. Pt now experiencing fever and swelling.      Past Medical History:   Diagnosis Date    Arthritis     C. difficile colitis 02/2014    postop of hand surgery    Cancer     Encounter for blood transfusion     Eosinophilic esophagitis 03/11/2014    GERD (gastroesophageal reflux disease)     Hypereosinophilic syndrome     IBS (irritable bowel syndrome)     Leukemia     MRSA carrier     says multiple times nose swab was +    Munchausen syndrome     Nephrolithiasis 04/2014    bilateral punctate - never passed a stone    Septic prepatellar bursitis of right knee 01/12/2021    Urinary tract infection 02/2014    MRSA     Allergies reported:   Review of patient's allergies indicates:   Allergen Reactions    Bean Diarrhea, Edema, Hives, Nausea And Vomiting and Swelling    Legumes Nausea And Vomiting and Swelling     Oral swelling    Lentils Nausea And Vomiting and Swelling     Oral swelling      Nsaids (non-steroidal anti-inflammatory drug)      GI bleed    Peas Nausea And Vomiting and Swelling     oral swelling    Ketamine Hallucinations    Haloperidol      "feels like crawling out of his skin"      Meloxicam Nausea Only     GI bleed    Penicillins Nausea And Vomiting     Can take third gen cephalosporins per patient          LOC: Patient is awake, alert, and aware of environment with an appropriate affect. Patient is oriented x 3 and speaking appropriately.  APPEARANCE: Patient resting comfortably and in no acute distress. Patient is clean and well groomed, patient's clothing is properly fastened.  HEENT: **AAO --c/o fever today .nausea-and right upper abdominal pain --Recently,had hernia surgery   SKIN: The skin is warm and dry. Patient has normal skin turgor and moist mucus membranes. Skin is intact; no bruising or breakdown " noted.  MUSKULOSKELETAL: Patient is moving all extremities well, no obvious deformities noted. Pulses intact.   RESPIRATORY: Airway is open and patent. Respirations are spontaneous and non-labored with normal effort and rate, BBS=clear  CARDIAC: Patient has a normal rate and rhythm. Sinus tach  on cardiac monitor,No peripheral edema noted.   ABDOMEN: No distention noted. Bowel sounds active in all 4 quadrants. Soft and non-tender upon palpation.  NEUROLOGICAL: , PERRL. Facial expression is symmetrical. Hand grasps are equal bilaterally. Normal sensation in all extremities when touched with finger.

## 2023-08-17 NOTE — TELEPHONE ENCOUNTER
----- Message from Astrid Nash sent at 8/17/2023  2:21 PM CDT -----  Regarding: Procedure Concern  Contact: Pt [phone]  Pt is calling stating he is going to emergency room states he has a bad fever please call

## 2023-08-17 NOTE — TELEPHONE ENCOUNTER
Pt called with c/o fever 100.7F earlier this morning (5am) that is now down to 100.1F.  He will continue to take Advil, Tylenol and narcotic as directed.  He will call back with continued issues with elevated temperature.

## 2023-08-17 NOTE — TELEPHONE ENCOUNTER
----- Message from Zabrinayoulizzie Sloan sent at 8/17/2023  8:16 AM CDT -----  Regarding: pt post-op advice  Contact: pt [phone]  PATIENT CALL    Pt is calling to speak with someone in provider's office regarding post-op fever and pain and would like a call back. He reports mild fever of 100.7 and 6/10 pain. He states that the pain is manageable but he's concerned about the fever and took both Tylenol and Advil along with the prescribed pain meds. Please call pt at [phone].

## 2023-08-17 NOTE — ED PROVIDER NOTES
"Encounter Date: 8/17/2023       History     Chief Complaint   Patient presents with    Post-op Problem     Pt had 2 hernias repaired yesterday. Pt now experiencing fever and swelling.      The history is provided by the patient and a relative.     Mr. Black is a 43 yo male post op day #1 after a ventral hernia repair who presents due to fever, RUQ tenderness, emesis and general malaise for the past 8 hours. He states he felt ok after the surgery yesterday but that this morning after waking up he felt much worse. He states he has felt nauseous and has had multiple episodes of emesis. He also states his abdominal pain is much worse today than it was yesterday. The highest the fever was was 102. Of note he has had previous surgeries to remove both his gallbladder and his appendix.    Review of patient's allergies indicates:   Allergen Reactions    Bean Diarrhea, Edema, Hives, Nausea And Vomiting and Swelling    Legumes Nausea And Vomiting and Swelling     Oral swelling    Lentils Nausea And Vomiting and Swelling     Oral swelling      Nsaids (non-steroidal anti-inflammatory drug)      GI bleed    Peas Nausea And Vomiting and Swelling     oral swelling    Ketamine Hallucinations    Haloperidol      "feels like crawling out of his skin"      Meloxicam Nausea Only     GI bleed    Penicillins Nausea And Vomiting     Can take third gen cephalosporins per patient      Past Medical History:   Diagnosis Date    Arthritis     C. difficile colitis 02/2014    postop of hand surgery    Cancer     Encounter for blood transfusion     Eosinophilic esophagitis 03/11/2014    GERD (gastroesophageal reflux disease)     Hypereosinophilic syndrome     IBS (irritable bowel syndrome)     Leukemia     MRSA carrier     says multiple times nose swab was +    Munchausen syndrome     Nephrolithiasis 04/2014    bilateral punctate - never passed a stone    Septic prepatellar bursitis of right knee 01/12/2021    Urinary tract infection 02/2014    " MRSA     Past Surgical History:   Procedure Laterality Date    APPENDECTOMY      ARTHROSCOPY OF SHOULDER WITH REMOVAL OF DISTAL CLAVICLE Right 11/1/2018    Procedure: ARTHROSCOPY, SHOULDER, WITH DISTAL CLAVICLE EXCISION;  Surgeon: Gabino Mejia MD;  Location: [facility];  Service: Orthopedics;  Laterality: Right;  video    BACK SURGERY      BLADDER FULGURATION N/A 9/18/2020    Procedure: FULGURATION, BLADDER;  Surgeon: Randall Moss MD;  Location: [facility];  Service: Urology;  Laterality: N/A;  blood vessels of bladder neck and prostate    BONE MARROW BIOPSY Left 10/1/2020    Procedure: Biopsy-bone marrow;  Surgeon: Trung Polanco MD;  Location: [facility];  Service: Oncology;  Laterality: Left;    CIRCUMCISION, PRIMARY      COLONOSCOPY N/A 11/14/2018    Procedure: COLONOSCOPY;  Surgeon: Milton Brunson MD;  Location: [facility];  Service: Endoscopy;  Laterality: N/A;    COLONOSCOPY N/A 7/20/2020    Procedure: COLONOSCOPY;  Surgeon: Ruslan Tillman MD;  Location: [facility];  Service: Endoscopy;  Laterality: N/A;    CYSTOSCOPY W/ RETROGRADES Bilateral 9/18/2020    Procedure: CYSTOSCOPY, WITH RETROGRADE PYELOGRAM;  Surgeon: Randall Moss MD;  Location: [facility];  Service: Urology;  Laterality: Bilateral;    CYSTOURETHROSCOPY N/A 2/1/2021    Procedure: CYSTOURETHROSCOPY, shrot placement;  Surgeon: Boni Benites MD;  Location: [address];  Service: Urology;  Laterality: N/A;    DILATION OF URETHRA N/A 9/18/2020    Procedure: DILATION, URETHRA;  Surgeon: Randall Moss MD;  Location: [facility];  Service: Urology;  Laterality: N/A;    drainage of abscess from hand  2009    MRSA    drainage of abscess from R thigh  2006    MRSA    ESOPHAGOGASTRODUODENOSCOPY  3/11/2014    ESOPHAGOGASTRODUODENOSCOPY N/A 9/5/2018    Procedure: EGD (ESOPHAGOGASTRODUODENOSCOPY);  Surgeon: Kolby Wall MD;  Location: [facility];  Service: Endoscopy;  Laterality: N/A;    ESOPHAGOGASTRODUODENOSCOPY N/A 3/25/2020     Procedure: EGD (ESOPHAGOGASTRODUODENOSCOPY);  Surgeon: Ruslan Tillman MD;  Location: [facility];  Service: Endoscopy;  Laterality: N/A;    ESOPHAGOGASTRODUODENOSCOPY N/A 7/20/2020    Procedure: EGD (ESOPHAGOGASTRODUODENOSCOPY);  Surgeon: Ruslan Tillman MD;  Location: [facility];  Service: Endoscopy;  Laterality: N/A;  Patient is a very hard stick, requires anesthesia stick.    ESOPHAGOGASTRODUODENOSCOPY N/A 8/31/2020    Procedure: EGD (ESOPHAGOGASTRODUODENOSCOPY);  Surgeon: Kolby Wall MD;  Location: [facility];  Service: Endoscopy;  Laterality: N/A;    ESOPHAGOGASTRODUODENOSCOPY N/A 3/3/2021    Procedure: EGD (ESOPHAGOGASTRODUODENOSCOPY);  Surgeon: Ruslan Tillman MD;  Location: [facility];  Service: Endoscopy;  Laterality: N/A;    ESOPHAGOGASTRODUODENOSCOPY N/A 7/12/2021    Procedure: EGD (ESOPHAGOGASTRODUODENOSCOPY);  Surgeon: Kolby Wall MD;  Location: [facility];  Service: Endoscopy;  Laterality: N/A;    FLEXIBLE SIGMOIDOSCOPY N/A 9/5/2018    Procedure: SIGMOIDOSCOPY, FLEXIBLE;  Surgeon: Kolby Wall MD;  Location: [facility];  Service: Endoscopy;  Laterality: N/A;    HAND SURGERY  jan 2014    power saw fell on hand - laceration 3 tendons    INCISION AND DRAINAGE OF KNEE Right 1/13/2021    Procedure: INCISION AND DRAINAGE, KNEE;  Surgeon: Sony Linton Jr., MD;  Location: [facility];  Service: Orthopedics;  Laterality: Right;    INSERTION OF TUNNELED CENTRAL VENOUS CATHETER (CVC) WITH SUBCUTANEOUS PORT Right 11/24/2021    Procedure: Right port-a-cath removal and exchange.;  Surgeon: Robson Morrison MD;  Location: [address];  Service: General;  Laterality: Right;  Right internal jugular    INSERTION OF TUNNELED CENTRAL VENOUS CATHETER (CVC) WITH SUBCUTANEOUS PORT Left 3/31/2022    Procedure: INSERTION, SINGLE LUMEN CATHETER WITH PORT, WITH FLUOROSCOPIC GUIDANCE Left Poss Right Chest;  Surgeon: Robson Morrison MD;  Location: [facility] OR [address] [place] [address];  Service: General;   Laterality: Left;    INSERTION OF TUNNELED CENTRAL VENOUS CATHETER (CVC) WITH SUBCUTANEOUS PORT Right 10/6/2022    Procedure: INSERTION, SINGLE LUMEN CATHETER WITH PORT, WITH FLUOROSCOPIC GUIDANCE Left Possible Right Chest;  Surgeon: Robson Morrison MD;  [facility];  Service: General;  Laterality: Right;    INSERTION OF VENOUS ACCESS PORT Right 8/21/2020    Procedure: INSERTION, VENOUS ACCESS PORT;  Surgeon: Troy Honeycutt MD;  Location: [facility] OR;  Service: General;  Laterality: Right;    MEDIPORT REMOVAL N/A 3/24/2023    Procedure: REMOVAL, CATHETER, CENTRAL VENOUS, TUNNELED, WITH PORT;  Surgeon: Robson Morrison MD;  Location: [facility] OR [address] [place] [address];  Service: General;  Laterality: N/A;    NISSEN FUNDOPLICATION      REPAIR OF RECURRENT INCARCERATED INCISIONAL HERNIA N/A 8/16/2023    Procedure: REPAIR, HERNIA, INCISIONAL, INCARCERATED, RECURRENT, W MESH;  Surgeon: Robson Morrison MD;  [facility];  Service: General;  Laterality: N/A;    REPAIR, HERNIA, INCISIONAL OR VENTRAL, WITHOUT HISTORY OF PRIOR REPAIR N/A 3/13/2023    Procedure: REPAIR, HERNIA, INCISIONAL OR VENTRAL, WITHOUT HISTORY OF PRIOR REPAIR x2;  Surgeon: Robson Morrison MD;  [address];  Service: General;  Laterality: N/A;    REVISION OF SCAR N/A 4/7/2021    Procedure: REVISION, SCAR;  Surgeon: Robson Morrison MD;  [facility];  Service: General;  Laterality: N/A;    UMBILICAL HERNIA REPAIR N/A 4/7/2021    Procedure: REPAIR, HERNIA, UMBILICAL, AGE 5 YEARS OR OLDER;  Surgeon: Robson Morrison MD;  Location: [facility] OR [address] [place] [address];  Service: General;  Laterality: N/A;     Family History   Problem Relation Age of Onset    Urolithiasis Father     Celiac disease Sister     Hypertension Maternal Grandmother     Hypertension Maternal Grandfather     Prostate cancer Neg Hx     Kidney disease Neg Hx      Social History     Tobacco Use    Smoking status: Never    Smokeless tobacco: Never     Tobacco comments:     Pt states he has smoked 1 or 2 cigarettes in his life.   Substance Use Topics    Alcohol use: Not Currently    Drug use: No       Physical Exam     Initial Vitals [08/17/23 1500]   BP Pulse Resp Temp SpO2   138/87 (!) 135 20 99.6 °F (37.6 °C) 97 %      MAP       --         Physical Exam    Nursing note and vitals reviewed.  Constitutional: He appears well-developed. No distress.   HENT:   Head: Normocephalic and atraumatic.   Mouth/Throat: Oropharynx is clear and moist and mucous membranes are normal.   Eyes: EOM are normal. Pupils are equal, round, and reactive to light.   Neck:   Normal range of motion.  Cardiovascular:  Normal rate and regular rhythm.           Abdominal:   Tenderness to palpation most noted in the right upper quadrant. A vertically oriented surgical scar is present and appears to be healing well/is not erythematous   Musculoskeletal:      Cervical back: Normal range of motion.     Neurological: He is alert and oriented to person, place, and time.   Skin: Skin is warm.   Psychiatric: He has a normal mood and affect. His behavior is normal.         ED Course   Procedures  Labs Reviewed   CULTURE, BLOOD   CULTURE, BLOOD   CBC W/ AUTO DIFFERENTIAL   COMPREHENSIVE METABOLIC PANEL   URINALYSIS, REFLEX TO URINE CULTURE          Imaging Results    None          Medications - No data to display  Medical Decision Making  Amount and/or Complexity of Data Reviewed  Labs: ordered.  Radiology: ordered.    Risk  Prescription drug management.                          Medical Decision Making:   Initial Assessment:   Mr. Black is a 41 yo male post op day #1 after a ventral hernia repair who presents due to fever, RUQ tenderness, emesis and general malaise for the past 8 hours.  Differential Diagnosis:   SBO, Liver laceration, free fluid  Clinical Tests:   Lab Tests: Ordered and Reviewed  Radiological Study: Ordered and Reviewed  Medical Tests: Ordered and Reviewed  ED Management:  Concern  for intraperitoneal bleed given the acute onset of abdominal tenderness, new onset fever and tachycardia, however beside FAST exam shown to be negative and CT abdomen unremarkable. Morphine and zofran was able to relieve most of his pain/nausea and he remained afebrile during his time in the ED. General Surgery was consulted but were not concerned for surgical complication or need for intervention.    Patient was stable for discharge with close follow up with surgery       Clinical Impression:   Final diagnoses:  [R00.0] Tachycardia               Murray Ndiaye MD  Resident  08/18/23 0020

## 2023-08-18 ENCOUNTER — PATIENT MESSAGE (OUTPATIENT)
Dept: SURGERY | Facility: CLINIC | Age: 43
End: 2023-08-18
Payer: MEDICAID

## 2023-08-18 RX ORDER — OXYCODONE HYDROCHLORIDE 5 MG/1
5 TABLET ORAL EVERY 4 HOURS PRN
Qty: 10 TABLET | Refills: 0 | Status: SHIPPED | OUTPATIENT
Start: 2023-08-18

## 2023-08-18 NOTE — CONSULTS
[facility] - Emergency Dept  General Surgery  Consult Note    Inpatient consult to General Surgery  Consult performed by: Mily Beckford MD  Consult ordered by: Murray Ndiaye MD        Subjective:     Chief Complaint/Reason for Admission: abdominal pain     History of Present Illness: Patient is a 42 y M w/ a complex medical history that is well known to the general surgery team who presents POD 1 s/p ventral hernia repair. He reports that he felt fine when at the hospital but his pain got worse when he got home. Denies fevers or chills. No CP or SOB. He does endorse some anxiety because he says he has had multiple infections in the past. He denies any redness or swelling at incision. No nausea, vomiting, diarrhea, abdominal distension. He is tolerating a diet and passing gas.     Current Facility-Administered Medications on File Prior to Encounter   Medication    0.9%  NaCl infusion    ceFAZolin 2 g in dextrose 5 % in water (D5W) 50 mL IVPB (MB+)     Current Outpatient Medications on File Prior to Encounter   Medication Sig    albuterol (PROVENTIL/VENTOLIN HFA) 90 mcg/actuation inhaler INHALE 2 PUFFS INTO THE LUNGS EVERY 6 HOURS AS NEEDED FOR WHEEZING OR SHORTNESS OF BREATH. RESCUE.    artificial tears (ISOPTO TEARS) 0.5 % ophthalmic solution Place 1 drop into both eyes 6 (six) times daily.    cetirizine (ZYRTEC) 10 MG tablet Take 20 mg by mouth 2 (two) times a day.     cholecalciferol, vitamin D3, (VITAMIN D3) 25 mcg (1,000 unit) capsule Take 1 capsule (1,000 Units total) by mouth once daily.    dextroamphetamine-amphetamine (ADDERALL) 20 mg tablet Take 1 tablet by mouth 2 (two) times daily.    diphenhydrAMINE (SOMINEX) 25 mg tablet Take 25 mg by mouth nightly as needed.     docusate sodium (COLACE) 100 MG capsule Take 1 capsule (100 mg total) by mouth 2 (two) times daily.    EPINEPHrine (EPIPEN) 0.3 mg/0.3 mL AtIn Inject 0.3 mLs (0.3 mg total) into the muscle as needed.    famotidine (PEPCID) 20 MG tablet  "Take 1 tablet (20 mg total) by mouth 2 (two) times daily.    ferrous sulfate 325 (65 FE) MG EC tablet Take 1 tablet (325 mg total) by mouth once daily. (Patient taking differently: Take 325 mg by mouth nightly.)    hydrOXYzine HCL (ATARAX) 25 MG tablet Take 1 tablet (25 mg total) by mouth 3 (three) times daily as needed for Itching.    Lactobacillus acidophilus 10 billion cell Cap Take 1 capsule by mouth once daily.     lidocaine HCL 2% (XYLOCAINE) 2 % jelly Apply topically 3 (three) times daily as needed (Hemorrhoids).    mepolizumab (NUCALA) 100 mg/mL Syrg Inject 3 mLs (300 mg total) into the skin every 28 days.    ondansetron (ZOFRAN-ODT) 4 MG TbDL Take 4 mg by mouth.    oxyCODONE (ROXICODONE) 5 MG immediate release tablet Take 1 tablet (5 mg total) by mouth every 6 (six) hours as needed for Pain.    oxyCODONE-acetaminophen (PERCOCET) 5-325 mg per tablet Take 1 tablet by mouth every 4 (four) hours as needed for Pain.    pantoprazole (PROTONIX) 40 MG tablet Take 1 tablet (40 mg total) by mouth 2 (two) times daily.    paroxetine (PAXIL) 20 MG tablet Take 1 tablet (20 mg total) by mouth every morning. (Patient taking differently: Take 20 mg by mouth nightly.)       Review of patient's allergies indicates:   Allergen Reactions    Bean Diarrhea, Edema, Hives, Nausea And Vomiting and Swelling    Legumes Nausea And Vomiting and Swelling     Oral swelling    Lentils Nausea And Vomiting and Swelling     Oral swelling      Nsaids (non-steroidal anti-inflammatory drug)      GI bleed    Peas Nausea And Vomiting and Swelling     oral swelling    Ketamine Hallucinations    Haloperidol      "feels like crawling out of his skin"      Meloxicam Nausea Only     GI bleed    Penicillins Nausea And Vomiting     Can take third gen cephalosporins per patient        Past Medical History:   Diagnosis Date    Arthritis     C. difficile colitis 02/2014    postop of hand surgery    Cancer     Encounter for blood transfusion     Eosinophilic " esophagitis 03/11/2014    GERD (gastroesophageal reflux disease)     Hypereosinophilic syndrome     IBS (irritable bowel syndrome)     Leukemia     MRSA carrier     says multiple times nose swab was +    Munchausen syndrome     Nephrolithiasis 04/2014    bilateral punctate - never passed a stone    Septic prepatellar bursitis of right knee 01/12/2021    Urinary tract infection 02/2014    MRSA     Past Surgical History:   Procedure Laterality Date    APPENDECTOMY      ARTHROSCOPY OF SHOULDER WITH REMOVAL OF DISTAL CLAVICLE Right 11/1/2018    Procedure: ARTHROSCOPY, SHOULDER, WITH DISTAL CLAVICLE EXCISION;  Surgeon: Gabino Mejia MD;  Location: [facility];  Service: Orthopedics;  Laterality: Right;  video    BACK SURGERY      BLADDER FULGURATION N/A 9/18/2020    Procedure: FULGURATION, BLADDER;  Surgeon: Randall Moss MD;  Location: [facility];  Service: Urology;  Laterality: N/A;  blood vessels of bladder neck and prostate    BONE MARROW BIOPSY Left 10/1/2020    Procedure: Biopsy-bone marrow;  Surgeon: Trung Polanco MD;  Location: [facility];  Service: Oncology;  Laterality: Left;    CIRCUMCISION, PRIMARY      COLONOSCOPY N/A 11/14/2018    Procedure: COLONOSCOPY;  Surgeon: Milton Brunson MD;  Location: [facility];  Service: Endoscopy;  Laterality: N/A;    COLONOSCOPY N/A 7/20/2020    Procedure: COLONOSCOPY;  Surgeon: Ruslan Tillman MD;  Location: [facility];  Service: Endoscopy;  Laterality: N/A;    CYSTOSCOPY W/ RETROGRADES Bilateral 9/18/2020    Procedure: CYSTOSCOPY, WITH RETROGRADE PYELOGRAM;  Surgeon: Randall Moss MD;  Location: [facility];  Service: Urology;  Laterality: Bilateral;    CYSTOURETHROSCOPY N/A 2/1/2021    Procedure: CYSTOURETHROSCOPY, short placement;  Surgeon: Boni Benites MD;  [address];  Service: Urology;  Laterality: N/A;    DILATION OF URETHRA N/A 9/18/2020    Procedure: DILATION, URETHRA;  Surgeon: Randall Moss MD;  Location: [facility];  Service: Urology;   Laterality: N/A;    drainage of abscess from hand  2009    MRSA    drainage of abscess from R thigh  2006    MRSA    ESOPHAGOGASTRODUODENOSCOPY  3/11/2014    ESOPHAGOGASTRODUODENOSCOPY N/A 9/5/2018    Procedure: EGD (ESOPHAGOGASTRODUODENOSCOPY);  Surgeon: Kolby Wall MD;  Location: [facility];  Service: Endoscopy;  Laterality: N/A;    ESOPHAGOGASTRODUODENOSCOPY N/A 3/25/2020    Procedure: EGD (ESOPHAGOGASTRODUODENOSCOPY);  Surgeon: Ruslan Tillman MD;  Location: [facility];  Service: Endoscopy;  Laterality: N/A;    ESOPHAGOGASTRODUODENOSCOPY N/A 7/20/2020    Procedure: EGD (ESOPHAGOGASTRODUODENOSCOPY);  Surgeon: Ruslan Tillman MD;  Location: [facility];  Service: Endoscopy;  Laterality: N/A;  Patient is a very hard stick, requires anesthesia stick.    ESOPHAGOGASTRODUODENOSCOPY N/A 8/31/2020    Procedure: EGD (ESOPHAGOGASTRODUODENOSCOPY);  Surgeon: Kolby Wall MD;  Location: [facility];  Service: Endoscopy;  Laterality: N/A;    ESOPHAGOGASTRODUODENOSCOPY N/A 3/3/2021    Procedure: EGD (ESOPHAGOGASTRODUODENOSCOPY);  Surgeon: Ruslan Tilmlan MD;  Location: [facility];  Service: Endoscopy;  Laterality: N/A;    ESOPHAGOGASTRODUODENOSCOPY N/A 7/12/2021    Procedure: EGD (ESOPHAGOGASTRODUODENOSCOPY);  Surgeon: Kolby Wall MD;  Location: [facility];  Service: Endoscopy;  Laterality: N/A;    FLEXIBLE SIGMOIDOSCOPY N/A 9/5/2018    Procedure: SIGMOIDOSCOPY, FLEXIBLE;  Surgeon: Kolby Wall MD;  Location: [facility];  Service: Endoscopy;  Laterality: N/A;    HAND SURGERY  jan 2014    power saw fell on hand - laceration 3 tendons    INCISION AND DRAINAGE OF KNEE Right 1/13/2021    Procedure: INCISION AND DRAINAGE, KNEE;  Surgeon: Sony Linton Jr., MD;  Location[facility];  Service: Orthopedics;  Laterality: Right;    INSERTION OF TUNNELED CENTRAL VENOUS CATHETER (CVC) WITH SUBCUTANEOUS PORT Right 11/24/2021    Procedure: Right port-a-cath removal and exchange.;  Surgeon: Robson  MARCIAL Morrison MD;  [facility];  Service: General;  Laterality: Right;  Right internal jugular    INSERTION OF TUNNELED CENTRAL VENOUS CATHETER (CVC) WITH SUBCUTANEOUS PORT Left 3/31/2022    Procedure: INSERTION, SINGLE LUMEN CATHETER WITH PORT, WITH FLUOROSCOPIC GUIDANCE Left Poss Right Chest;  Surgeon: Robson Morrison MD;  [facility];  Service: General;  Laterality: Left;    INSERTION OF TUNNELED CENTRAL VENOUS CATHETER (CVC) WITH SUBCUTANEOUS PORT Right 10/6/2022    Procedure: INSERTION, SINGLE LUMEN CATHETER WITH PORT, WITH FLUOROSCOPIC GUIDANCE Left Possible Right Chest;  Surgeon: Robson Morrison MD;  [facility];  Service: General;  Laterality: Right;    INSERTION OF VENOUS ACCESS PORT Right 8/21/2020    Procedure: INSERTION, VENOUS ACCESS PORT;  Surgeon: Troy Honeycutt MD;  Location: [facility];  Service: General;  Laterality: Right;    MEDIPORT REMOVAL N/A 3/24/2023    Procedure: REMOVAL, CATHETER, CENTRAL VENOUS, TUNNELED, WITH PORT;  Surgeon: Robson Morrison MD;  Location: [facility] OR [address] [place] [address];  Service: General;  Laterality: N/A;    NISSEN FUNDOPLICATION      REPAIR OF RECURRENT INCARCERATED INCISIONAL HERNIA N/A 8/16/2023    Procedure: REPAIR, HERNIA, INCISIONAL, INCARCERATED, RECURRENT, W MESH;  Surgeon: Robson Morrison MD;  Location: [facility] OR [address] [place] [address];  Service: General;  Laterality: N/A;    REPAIR, HERNIA, INCISIONAL OR VENTRAL, WITHOUT HISTORY OF PRIOR REPAIR N/A 3/13/2023    Procedure: REPAIR, HERNIA, INCISIONAL OR VENTRAL, WITHOUT HISTORY OF PRIOR REPAIR x2;  Surgeon: Robson Morrison MD;  Location: [facility] OR [address] [place] [address];  Service: General;  Laterality: N/A;    REVISION OF SCAR N/A 4/7/2021    Procedure: REVISION, SCAR;  Surgeon: Robson Morrison MD;  [facility];  Service: General;  Laterality: N/A;    UMBILICAL HERNIA REPAIR N/A 4/7/2021    Procedure: REPAIR, HERNIA, UMBILICAL, AGE 5 YEARS OR OLDER;  Surgeon: Robson Morrison MD;   Location: [facility] OR [address] [place] [address];  Service: General;  Laterality: N/A;     Family History       Problem Relation (Age of Onset)    Celiac disease Sister    Hypertension Maternal Grandmother, Maternal Grandfather    Urolithiasis Father          Tobacco Use    Smoking status: Never    Smokeless tobacco: Never    Tobacco comments:     Pt states he has smoked 1 or 2 cigarettes in his life.   Substance and Sexual Activity    Alcohol use: Not Currently    Drug use: No    Sexual activity: Not Currently     Partners: Female     Review of Systems   Constitutional: Negative.    HENT: Negative.     Eyes: Negative.    Respiratory: Negative.     Cardiovascular: Negative.    Gastrointestinal:  Positive for abdominal pain. Negative for abdominal distention, diarrhea, nausea and vomiting.   Genitourinary: Negative.      Objective:     Vital Signs (Most Recent):  Temp: 99.6 °F (37.6 °C) (08/17/23 1500)  Pulse: 99 (08/17/23 1804)  Resp: 20 (08/17/23 1804)  BP: 122/69 (08/17/23 1804)  SpO2: 97 % (08/17/23 1804) Vital Signs (24h Range):  Temp:  [99.6 °F (37.6 °C)] 99.6 °F (37.6 °C)  Pulse:  [] 99  Resp:  [20] 20  SpO2:  [97 %-99 %] 97 %  BP: (115-138)/(69-87) 122/69     Weight: 90.7 kg (200 lb)  Body mass index is 27.12 kg/m².    No intake or output data in the 24 hours ending 08/17/23 1914    Physical Exam  Constitutional:       General: He is not in acute distress.     Appearance: Normal appearance.   HENT:      Head: Normocephalic and atraumatic.      Mouth/Throat:      Mouth: Mucous membranes are moist.   Eyes:      Pupils: Pupils are equal, round, and reactive to light.   Cardiovascular:      Rate and Rhythm: Normal rate.   Pulmonary:      Effort: Pulmonary effort is normal. No respiratory distress.   Abdominal:      General: Abdomen is flat. There is no distension.      Palpations: Abdomen is soft.      Comments: Midline incision clean, dry, intact with dermabond in place. No swelling or erythema.    Musculoskeletal:          General: Normal range of motion.      Cervical back: Normal range of motion.   Skin:     General: Skin is warm and dry.   Neurological:      General: No focal deficit present.      Mental Status: He is alert and oriented to person, place, and time.   Psychiatric:         Mood and Affect: Mood normal.         Behavior: Behavior normal.         Significant Labs:  All pertinent labs from the last 24 hours have been reviewed.    Significant Diagnostics:  I have reviewed all pertinent imaging results/findings within the past 24 hours.    Assessment/Plan:     There are no hospital problems to display for this patient.  Patient is a 42y M w/ complex past medical history who presents POD1 s/p ventral hernia repair with mesh c/o fevers at home in the hours following the surgery. He denies any SOB or CP, leg swelling, incision swelling or redness. No evidence of infection at this time.     OK for discharge home.   He will f/u with us in clinic at his post-op appointment.    Thank you for your consult. I will follow-up with patient. Please contact us if you have any additional questions.    Mily Beckford MD  General Surgery  [facility] - Emergency Dept

## 2023-08-22 LAB
BACTERIA BLD CULT: NORMAL
BACTERIA BLD CULT: NORMAL
FINAL PATHOLOGIC DIAGNOSIS: NORMAL
Lab: NORMAL

## 2023-10-09 ENCOUNTER — PATIENT MESSAGE (OUTPATIENT)
Dept: RESEARCH | Facility: HOSPITAL | Age: 43
End: 2023-10-09
Payer: MEDICAID

## 2023-10-19 NOTE — PRE-PROCEDURE INSTRUCTIONS
PreOp Instructions given:   - Verbal medication information (what to hold and what to take)   - NPO guidelines 2400  - Arrival place directions given; time to be given the day before procedure by the   Surgeon's Office 0700 [address]C  - Bathing with antibacterial soap   - Don't wear any jewelry or bring any valuables AM of surgery   - No makeup or moisturizer to face   - No perfume/cologne, powder, lotions or aftershave   Pt. verbalized understanding.   Pt denies any h/o Anesthesia/Sedation complications or side effects.     Statement Selected

## 2024-01-03 NOTE — TELEPHONE ENCOUNTER
----- Message from Giuliana Ferreira sent at 3/19/2020  2:30 PM CDT -----  Contact: [phone]/self   GI  Patient is requesting to speak with nurse regarding having heart burn and spitting blood. Pt has not been able to sleep comfortable in days. Please call and advise.     Patient arrived to ICU room 307 via bed. Accompanied by daughter.

## 2024-01-28 NOTE — ED PROVIDER NOTES
Encounter Date: 8/6/2020       History     Chief Complaint   Patient presents with    Rectal Bleeding     + bright red rectal bleeding w/ new onset today. Denies dizziness or weakness.      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 He reports his condition has become more frequent and more severe over the past few years. He is following with Ruslan Tillman MD gastroenterology and Michelle Ornelas MD immunology. He is usually on 50 mg prednisone daily along with H1 blockers, H2 blockers, and benadryl. He also uses epi pens PRN for angioedema. 2 weeks ago he was told to discontinue his daily medications to allow for blood tests and biopsies, he restarted his medications after a GED and colonoscopy on 07/20/20. Whenever he is off medications his condition flares up and he is currently experiencing eruptions on the upper back, upper chest, hands, feet, anus, groin. Yesterday he was experiencing angioedema of the face which required epi pen to resolve, today he had another episode of facial angioedema which resolved spontaneously. He has been experiencing severe abdominal pain for the last few days which he attributes to internal eruptions and angioedema, he has been experiencing diarrhea with blood in his stool for the last few days which has progressed to a bleeding rectum regardless of bowel movements today. He had 8 bowel movements today. He says that when his flares get this bad he usually needs to be admitted for IV methylprednisolone and IV benadryl. He says that this is one of the worst flares he has had.             Review of patient's allergies indicates:   Allergen Reactions    Legumes Nausea And Vomiting and Swelling     Other reaction(s): GI Intolerance  vomiting  vomiting  Pt reports legumes make his lips swell and induce severe vomiting  Pt reports legumes make his lips swell and induce  "severe vomiting      Nsaids (non-steroidal anti-inflammatory drug)      GI bleed    Bean pod extract      Lips swelling, vomiting  Lips swelling, vomiting      Ketamine      Severe dysphoria (bad trip)    Lentils      Lips swelling, vomiting  Lips swelling, vomiting      Meloxicam Nausea Only     GI bleed    Peas      Lips swelling, vomiting    Penicillins      Has taken cephalosporins without issue    Haloperidol Anxiety and Other (See Comments)     "feels like crawling out of his skin"       Past Medical History:   Diagnosis Date    C. difficile colitis feb 2014    postop of hand surgery    Eosinophilic esophagitis 3/11/2014    GERD (gastroesophageal reflux disease)     IBS (irritable bowel syndrome)     MRSA carrier     says multiple times nose swab was +    Nephrolithiasis apr 2014    bilateral punctate - never passed a stone    Urinary tract infection feb 2014    MRSA     Past Surgical History:   Procedure Laterality Date    APPENDECTOMY      ARTHROSCOPY OF SHOULDER WITH REMOVAL OF DISTAL CLAVICLE Right 11/1/2018    Procedure: ARTHROSCOPY, SHOULDER, WITH DISTAL CLAVICLE EXCISION;  Surgeon: Gabino Mejia MD;  Location: [facility];  Service: Orthopedics;  Laterality: Right;  video    BACK SURGERY      CIRCUMCISION, PRIMARY      COLONOSCOPY N/A 11/14/2018    Procedure: COLONOSCOPY;  Surgeon: Milton Brunson MD;  Location: [facility];  Service: Endoscopy;  Laterality: N/A;    COLONOSCOPY N/A 7/20/2020    Procedure: COLONOSCOPY;  Surgeon: Ruslan Tillman MD;  Location: [facility];  Service: Endoscopy;  Laterality: N/A;    drainage of abscess from hand  2009    MRSA    drainage of abscess from R thigh  2006    MRSA    ESOPHAGOGASTRODUODENOSCOPY  3/11/2014    ESOPHAGOGASTRODUODENOSCOPY N/A 9/5/2018    Procedure: EGD (ESOPHAGOGASTRODUODENOSCOPY);  Surgeon: Kolby Wall MD;  Location: [facility];  Service: Endoscopy;  Laterality: N/A;    ESOPHAGOGASTRODUODENOSCOPY N/A 3/25/2020    " Procedure: EGD (ESOPHAGOGASTRODUODENOSCOPY);  Surgeon: Ruslan Tillman MD;  Location: [facility];  Service: Endoscopy;  Laterality: N/A;    ESOPHAGOGASTRODUODENOSCOPY N/A 7/20/2020    Procedure: EGD (ESOPHAGOGASTRODUODENOSCOPY);  Surgeon: Ruslan Tillman MD;  Location: [facility] ENDO;  Service: Endoscopy;  Laterality: N/A;  Patient is a very hard stick, requires anesthesia stick.    FLEXIBLE SIGMOIDOSCOPY N/A 9/5/2018    Procedure: SIGMOIDOSCOPY, FLEXIBLE;  Surgeon: Kolby Wall MD;  Location: [facility] ENDO;  Service: Endoscopy;  Laterality: N/A;    HAND SURGERY  jan 2014    power saw fell on hand - laceration 3 tendons     Family History   Problem Relation Age of Onset    Urolithiasis Father     Celiac disease Sister     Hypertension Maternal Grandmother     Hypertension Maternal Grandfather      Social History     Tobacco Use    Smoking status: Never Smoker    Smokeless tobacco: Never Used    Tobacco comment: Pt states he has smoked 1 or 2 cigarettes in his life.   Substance Use Topics    Alcohol use: Not Currently    Drug use: No     Review of Systems   Constitutional: Positive for fatigue. Negative for chills and fever.   HENT: Positive for facial swelling. Negative for ear pain, sneezing and sore throat.    Eyes: Negative for pain.   Respiratory: Negative for cough and shortness of breath.    Cardiovascular: Negative for chest pain.   Gastrointestinal: Positive for abdominal pain, anal bleeding, blood in stool, diarrhea and nausea (retching). Negative for constipation and vomiting.   Endocrine: Negative for polyuria.   Genitourinary: Negative for decreased urine volume, difficulty urinating, dysuria, flank pain and urgency.   Musculoskeletal: Negative for neck pain and neck stiffness.   Skin:        Painful eruptions of skin at upper back, upper chest, feet, hands   Allergic/Immunologic: Positive for immunocompromised state.   Neurological: Positive for light-headedness. Negative for  numbness.   Psychiatric/Behavioral: Negative for confusion.       Physical Exam     Initial Vitals [08/06/20 1534]   BP Pulse Resp Temp SpO2   (!) 155/91 110 16 98.4 °F (36.9 °C) 100 %      MAP       --         Physical Exam    Constitutional: He appears well-developed and well-nourished. He is not diaphoretic. He appears distressed.   HENT:   Head: Normocephalic and atraumatic.   Right Ear: External ear normal.   Left Ear: External ear normal.   Nose: Nose normal.   Eyes: EOM are normal. Right eye exhibits no discharge. Left eye exhibits no discharge.   Neck: Normal range of motion. Neck supple.   Cardiovascular: Normal rate, regular rhythm, normal heart sounds and intact distal pulses.   Pulmonary/Chest: Breath sounds normal. No respiratory distress.   Abdominal: Soft. There is abdominal tenderness.   Musculoskeletal: Tenderness and edema present.   Neurological: He is alert and oriented to person, place, and time. He has normal strength. No sensory deficit.   Skin: Skin is warm and dry. Rash noted. There is erythema.   Skin eruptions at upper back, upper chest, anus, groin, feet, hands   Psychiatric: He has a normal mood and affect. His behavior is normal. Judgment and thought content normal.         ED Course   Procedures  Labs Reviewed   CBC W/ AUTO DIFFERENTIAL - Abnormal; Notable for the following components:       Result Value    Hemoglobin 13.7 (*)     Mean Corpuscular Volume 79 (*)     Mean Corpuscular Hemoglobin 23.8 (*)     Mean Corpuscular Hemoglobin Conc 30.2 (*)     RDW 22.0 (*)     Lymph% 17.4 (*)     All other components within normal limits   CK - Abnormal; Notable for the following components:     (*)     All other components within normal limits   COMPREHENSIVE METABOLIC PANEL   SEDIMENTATION RATE   C-REACTIVE PROTEIN   SEDIMENTATION RATE   C-REACTIVE PROTEIN   PROCALCITONIN   SARS-COV-2 RNA AMPLIFICATION, QUAL          Imaging Results    None          Medical Decision Making:   History:    Old Medical Records: I decided to obtain old medical records.  Old Records Summarized: other records.       <> Summary of Records: Michelle Ornelas MD believes patient has hypereosinophilic syndrome.   Initial Assessment:   40 yo M presents complaining of skin eruptions, angioedema, abdominal pain, rectal bleeding.   Differential Diagnosis:   All signs/symptoms likely due to hypereosinophilic syndrome flare as suspected by Michelle Ornelas MD, patient has been hospitalized by similar flares many times in the past.   ED Management:  Patient given IV methylprednisolone, diphenhydramine, dilaudid for pain and immunologic signs/symptoms. Admitted to inpatient floor. Consulted allergy/immunology.               Attending Attestation:   Physician Attestation Statement for Resident:  As the supervising MD   Physician Attestation Statement: I have personally seen and examined this patient.   I agree with the above history. -:   As the supervising MD I agree with the above PE.    As the supervising MD I agree with the above treatment, course, plan, and disposition.   -: Patient mildly agitated in the ED, denies drugs. Reports being on chronic prednisone, benadryl, PPI/H2 blockers, prn epi for episodes of angioedema last used last night. It feels like previous episodes associated w/ HES. Eosinophils today wnl. Discussed w/ GI, rx per allergy/immunology recs.    Chart review w/ EGD and colonoscopy 7/20/2020 w/ internal hemorrhoids, esophagitis/gastropathy    patient noticed to make noises in the ED, reports he feels he need to clear his throat. Airway intact w/ no facial/intraoral edema in the ED. Skin rash: erythema upper chest w/ nodules upper back and small crusted ulcerations    Dg: skin rash, abdominal pain, bloody diarrhea  I have reviewed and agree with the residents interpretation of the following: lab data.  I have reviewed the following: old records at this facility.                                  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 Fatigue  R53.83 780.79             ED Disposition Condition    Admit                           Trey Ivory MD  Resident  08/06/20 1951       Lois Andrade MD  08/06/20 0920     appears normal and intact/caps/bridge/implants appears normal and intact

## 2024-01-30 ENCOUNTER — PATIENT MESSAGE (OUTPATIENT)
Dept: SURGERY | Facility: CLINIC | Age: 44
End: 2024-01-30
Payer: MEDICAID

## 2024-01-30 DIAGNOSIS — Z98.890 HISTORY OF HERNIA REPAIR: Primary | ICD-10-CM

## 2024-01-30 DIAGNOSIS — R10.9 ABDOMINAL PAIN, UNSPECIFIED ABDOMINAL LOCATION: ICD-10-CM

## 2024-01-30 DIAGNOSIS — Z87.19 HISTORY OF HERNIA REPAIR: Primary | ICD-10-CM

## 2024-02-05 ENCOUNTER — PATIENT MESSAGE (OUTPATIENT)
Dept: SURGERY | Facility: CLINIC | Age: 44
End: 2024-02-05
Payer: MEDICAID

## 2024-02-06 ENCOUNTER — PATIENT MESSAGE (OUTPATIENT)
Dept: SURGERY | Facility: CLINIC | Age: 44
End: 2024-02-06
Payer: MEDICAID

## 2024-02-20 ENCOUNTER — PATIENT MESSAGE (OUTPATIENT)
Dept: SURGERY | Facility: CLINIC | Age: 44
End: 2024-02-20
Payer: MEDICAID

## 2024-02-20 DIAGNOSIS — D72.119 HYPEREOSINOPHILIC SYNDROME, UNSPECIFIED TYPE: Primary | ICD-10-CM

## 2024-02-21 ENCOUNTER — TELEPHONE (OUTPATIENT)
Dept: SURGERY | Facility: CLINIC | Age: 44
End: 2024-02-21
Payer: MEDICAID

## 2024-02-21 NOTE — TELEPHONE ENCOUNTER
Spoke to the pt and advised that the referral has been placed and he will receive a call to schedule an appt. Pt verbalized understanding.

## 2024-03-05 ENCOUNTER — DOCUMENTATION ONLY (OUTPATIENT)
Dept: PREADMISSION TESTING | Facility: HOSPITAL | Age: 44
End: 2024-03-05
Payer: MEDICAID

## 2024-03-05 RX ORDER — PREDNISONE 10 MG/1
10 TABLET ORAL EVERY MORNING
COMMUNITY

## 2024-03-05 NOTE — PRE-PROCEDURE INSTRUCTIONS
PRE-OP INSTRUCTIONS:  Instructed patient to have no food,milk or milk products after midnight 3/6/2024  It is ok to take AM medications with a few sips of water   Medication instructions for pm prior to and am of surgery reviewed.  Instructed patient to avoid taking vitamins,supplements,aspirin or ibuprofen the am of surgery.  Shower instructions provided    Patient denies any side effects or issues with anesthesia or sedation.

## 2024-03-07 ENCOUNTER — HOSPITAL ENCOUNTER (OUTPATIENT)
Dept: INTERVENTIONAL RADIOLOGY/VASCULAR | Facility: HOSPITAL | Age: 44
Discharge: HOME | End: 2024-03-07
Attending: SURGERY
Payer: MEDICAID

## 2024-03-07 ENCOUNTER — ANESTHESIA (OUTPATIENT)
Dept: INTERVENTIONAL RADIOLOGY/VASCULAR | Facility: HOSPITAL | Age: 44
End: 2024-03-07
Payer: MEDICAID

## 2024-03-07 ENCOUNTER — ON-DEMAND VIRTUAL (OUTPATIENT)
Dept: URGENT CARE | Facility: CLINIC | Age: 44
End: 2024-03-07
Payer: MEDICAID

## 2024-03-07 ENCOUNTER — PATIENT MESSAGE (OUTPATIENT)
Dept: SURGERY | Facility: CLINIC | Age: 44
End: 2024-03-07
Payer: MEDICAID

## 2024-03-07 VITALS
DIASTOLIC BLOOD PRESSURE: 84 MMHG | HEIGHT: 72 IN | WEIGHT: 190 LBS | RESPIRATION RATE: 18 BRPM | TEMPERATURE: 98 F | SYSTOLIC BLOOD PRESSURE: 135 MMHG | HEART RATE: 83 BPM | BODY MASS INDEX: 25.73 KG/M2 | OXYGEN SATURATION: 98 %

## 2024-03-07 DIAGNOSIS — D72.119 HYPEREOSINOPHILIC SYNDROME, UNSPECIFIED TYPE: Primary | ICD-10-CM

## 2024-03-07 DIAGNOSIS — R52 PAIN: Primary | ICD-10-CM

## 2024-03-07 PROCEDURE — C1788 PORT, INDWELLING, IMP: HCPCS

## 2024-03-07 PROCEDURE — 63600175 PHARM REV CODE 636 W HCPCS

## 2024-03-07 PROCEDURE — 76937 US GUIDE VASCULAR ACCESS: CPT | Mod: 26,,, | Performed by: RADIOLOGY

## 2024-03-07 PROCEDURE — A4215 STERILE NEEDLE: HCPCS

## 2024-03-07 PROCEDURE — 25000003 PHARM REV CODE 250: Performed by: RADIOLOGY

## 2024-03-07 PROCEDURE — 63600175 PHARM REV CODE 636 W HCPCS: Performed by: NURSE ANESTHETIST, CERTIFIED REGISTERED

## 2024-03-07 PROCEDURE — D9220A PRA ANESTHESIA: Mod: ANES,,, | Performed by: ANESTHESIOLOGY

## 2024-03-07 PROCEDURE — D9220A PRA ANESTHESIA: Mod: CRNA,,, | Performed by: NURSE ANESTHETIST, CERTIFIED REGISTERED

## 2024-03-07 PROCEDURE — 63600175 PHARM REV CODE 636 W HCPCS: Performed by: ANESTHESIOLOGY

## 2024-03-07 PROCEDURE — 37000009 HC ANESTHESIA EA ADD 15 MINS

## 2024-03-07 PROCEDURE — 36561 INSERT TUNNELED CV CATH: CPT | Performed by: RADIOLOGY

## 2024-03-07 PROCEDURE — 99212 OFFICE O/P EST SF 10 MIN: CPT | Mod: 95,,, | Performed by: INTERNAL MEDICINE

## 2024-03-07 PROCEDURE — 76937 US GUIDE VASCULAR ACCESS: CPT | Mod: TC | Performed by: RADIOLOGY

## 2024-03-07 PROCEDURE — 25000003 PHARM REV CODE 250: Performed by: NURSE ANESTHETIST, CERTIFIED REGISTERED

## 2024-03-07 PROCEDURE — 77001 FLUOROGUIDE FOR VEIN DEVICE: CPT | Mod: 26,,, | Performed by: RADIOLOGY

## 2024-03-07 PROCEDURE — 37000008 HC ANESTHESIA 1ST 15 MINUTES

## 2024-03-07 PROCEDURE — 63600175 PHARM REV CODE 636 W HCPCS: Performed by: RADIOLOGY

## 2024-03-07 RX ORDER — MIDAZOLAM HYDROCHLORIDE 1 MG/ML
INJECTION, SOLUTION INTRAMUSCULAR; INTRAVENOUS
Status: DISCONTINUED | OUTPATIENT
Start: 2024-03-07 | End: 2024-03-07

## 2024-03-07 RX ORDER — ONDANSETRON HYDROCHLORIDE 2 MG/ML
4 INJECTION, SOLUTION INTRAVENOUS DAILY PRN
Status: DISCONTINUED | OUTPATIENT
Start: 2024-03-07 | End: 2024-03-08 | Stop reason: HOSPADM

## 2024-03-07 RX ORDER — ONDANSETRON HYDROCHLORIDE 2 MG/ML
INJECTION, SOLUTION INTRAVENOUS
Status: DISCONTINUED | OUTPATIENT
Start: 2024-03-07 | End: 2024-03-07

## 2024-03-07 RX ORDER — HYDROMORPHONE HYDROCHLORIDE 1 MG/ML
INJECTION, SOLUTION INTRAMUSCULAR; INTRAVENOUS; SUBCUTANEOUS
Status: COMPLETED
Start: 2024-03-07 | End: 2024-03-07

## 2024-03-07 RX ORDER — SODIUM CHLORIDE 9 MG/ML
INJECTION, SOLUTION INTRAVENOUS CONTINUOUS
Status: DISCONTINUED | OUTPATIENT
Start: 2024-03-07 | End: 2024-03-08 | Stop reason: HOSPADM

## 2024-03-07 RX ORDER — DEXAMETHASONE SODIUM PHOSPHATE 4 MG/ML
INJECTION, SOLUTION INTRA-ARTICULAR; INTRALESIONAL; INTRAMUSCULAR; INTRAVENOUS; SOFT TISSUE
Status: DISCONTINUED | OUTPATIENT
Start: 2024-03-07 | End: 2024-03-07

## 2024-03-07 RX ORDER — DEXMEDETOMIDINE HYDROCHLORIDE 100 UG/ML
INJECTION, SOLUTION INTRAVENOUS
Status: DISCONTINUED | OUTPATIENT
Start: 2024-03-07 | End: 2024-03-07

## 2024-03-07 RX ORDER — LIDOCAINE HYDROCHLORIDE 10 MG/ML
INJECTION INFILTRATION; PERINEURAL
Status: COMPLETED | OUTPATIENT
Start: 2024-03-07 | End: 2024-03-07

## 2024-03-07 RX ORDER — LORAZEPAM 2 MG/ML
0.25 INJECTION INTRAMUSCULAR ONCE AS NEEDED
Status: DISCONTINUED | OUTPATIENT
Start: 2024-03-07 | End: 2024-03-08 | Stop reason: HOSPADM

## 2024-03-07 RX ORDER — LIDOCAINE HYDROCHLORIDE 20 MG/ML
INJECTION, SOLUTION EPIDURAL; INFILTRATION; INTRACAUDAL; PERINEURAL
Status: DISCONTINUED | OUTPATIENT
Start: 2024-03-07 | End: 2024-03-07

## 2024-03-07 RX ORDER — PROPOFOL 10 MG/ML
VIAL (ML) INTRAVENOUS
Status: DISCONTINUED | OUTPATIENT
Start: 2024-03-07 | End: 2024-03-07

## 2024-03-07 RX ORDER — LIDOCAINE HYDROCHLORIDE 10 MG/ML
1 INJECTION, SOLUTION EPIDURAL; INFILTRATION; INTRACAUDAL; PERINEURAL ONCE
Status: DISCONTINUED | OUTPATIENT
Start: 2024-03-07 | End: 2024-03-08 | Stop reason: HOSPADM

## 2024-03-07 RX ORDER — HYDROMORPHONE HYDROCHLORIDE 1 MG/ML
0.2 INJECTION, SOLUTION INTRAMUSCULAR; INTRAVENOUS; SUBCUTANEOUS EVERY 5 MIN PRN
Status: COMPLETED | OUTPATIENT
Start: 2024-03-07 | End: 2024-03-07

## 2024-03-07 RX ORDER — FENTANYL CITRATE 50 UG/ML
INJECTION, SOLUTION INTRAMUSCULAR; INTRAVENOUS
Status: DISCONTINUED | OUTPATIENT
Start: 2024-03-07 | End: 2024-03-07

## 2024-03-07 RX ORDER — MEPERIDINE HYDROCHLORIDE 50 MG/ML
12.5 INJECTION INTRAMUSCULAR; INTRAVENOUS; SUBCUTANEOUS ONCE AS NEEDED
Status: DISCONTINUED | OUTPATIENT
Start: 2024-03-07 | End: 2024-03-08 | Stop reason: HOSPADM

## 2024-03-07 RX ORDER — HEPARIN 100 UNIT/ML
SYRINGE INTRAVENOUS
Status: COMPLETED | OUTPATIENT
Start: 2024-03-07 | End: 2024-03-07

## 2024-03-07 RX ADMIN — LIDOCAINE HYDROCHLORIDE 100 MG: 20 INJECTION, SOLUTION EPIDURAL; INFILTRATION; INTRACAUDAL; PERINEURAL at 07:03

## 2024-03-07 RX ADMIN — MIDAZOLAM HYDROCHLORIDE 2 MG: 1 INJECTION, SOLUTION INTRAMUSCULAR; INTRAVENOUS at 07:03

## 2024-03-07 RX ADMIN — PROPOFOL 100 MG: 10 INJECTION, EMULSION INTRAVENOUS at 07:03

## 2024-03-07 RX ADMIN — HEPARIN 5 ML: 100 SYRINGE at 08:03

## 2024-03-07 RX ADMIN — DEXAMETHASONE SODIUM PHOSPHATE 4 MG: 4 INJECTION, SOLUTION INTRAMUSCULAR; INTRAVENOUS at 08:03

## 2024-03-07 RX ADMIN — SODIUM CHLORIDE: 0.9 INJECTION, SOLUTION INTRAVENOUS at 07:03

## 2024-03-07 RX ADMIN — HYDROMORPHONE HYDROCHLORIDE 0.2 MG: 1 INJECTION, SOLUTION INTRAMUSCULAR; INTRAVENOUS; SUBCUTANEOUS at 09:03

## 2024-03-07 RX ADMIN — HYDROMORPHONE HYDROCHLORIDE 0.2 MG: 1 INJECTION, SOLUTION INTRAMUSCULAR; INTRAVENOUS; SUBCUTANEOUS at 10:03

## 2024-03-07 RX ADMIN — DEXMEDETOMIDINE 8 MCG: 100 INJECTION, SOLUTION, CONCENTRATE INTRAVENOUS at 08:03

## 2024-03-07 RX ADMIN — LIDOCAINE HYDROCHLORIDE 5 ML: 10 INJECTION, SOLUTION INFILTRATION; PERINEURAL at 08:03

## 2024-03-07 RX ADMIN — FENTANYL CITRATE 100 MCG: 50 INJECTION, SOLUTION INTRAMUSCULAR; INTRAVENOUS at 07:03

## 2024-03-07 RX ADMIN — MIDAZOLAM HYDROCHLORIDE 2 MG: 1 INJECTION, SOLUTION INTRAMUSCULAR; INTRAVENOUS at 08:03

## 2024-03-07 RX ADMIN — ONDANSETRON 4 MG: 2 INJECTION INTRAMUSCULAR; INTRAVENOUS at 08:03

## 2024-03-07 RX ADMIN — DEXMEDETOMIDINE 8 MCG: 100 INJECTION, SOLUTION, CONCENTRATE INTRAVENOUS at 07:03

## 2024-03-07 RX ADMIN — GLYCOPYRROLATE 0.2 MG: 0.2 INJECTION, SOLUTION INTRAMUSCULAR; INTRAVENOUS at 08:03

## 2024-03-07 RX ADMIN — PROPOFOL 100 MG: 10 INJECTION, EMULSION INTRAVENOUS at 08:03

## 2024-03-07 RX ADMIN — PROPOFOL 150 MCG/KG/MIN: 10 INJECTION, EMULSION INTRAVENOUS at 07:03

## 2024-03-07 NOTE — H&P
VIR Pre-Procedure H&P      SUBJECTIVE:          History of Present Illness:  Solo Black is a 43 y.o. male who presents for chest port placement. Patient says there was prior difficulty going through venous stenosis during last port placement.     Past Medical History:   Diagnosis Date    Arthritis     C. difficile colitis 02/2014    postop of hand surgery    Cancer     Encounter for blood transfusion     Eosinophilic esophagitis 03/11/2014    GERD (gastroesophageal reflux disease)     Hypereosinophilic syndrome     IBS (irritable bowel syndrome)     Leukemia     MRSA carrier     says multiple times nose swab was +    Munchausen syndrome     Nephrolithiasis 04/2014    bilateral punctate - never passed a stone    Septic prepatellar bursitis of right knee 01/12/2021    Urinary tract infection 02/2014    MRSA     Past Surgical History:   Procedure Laterality Date    APPENDECTOMY      ARTHROSCOPY OF SHOULDER WITH REMOVAL OF DISTAL CLAVICLE Right 11/1/2018    Procedure: ARTHROSCOPY, SHOULDER, WITH DISTAL CLAVICLE EXCISION;  Surgeon: Gabino Mejia MD;  Location: [facility];  Service: Orthopedics;  Laterality: Right;  video    BACK SURGERY      BLADDER FULGURATION N/A 9/18/2020    Procedure: FULGURATION, BLADDER;  Surgeon: Randall Moss MD;  Location: [facility] OR;  Service: Urology;  Laterality: N/A;  blood vessels of bladder neck and prostate    BONE MARROW BIOPSY Left 10/1/2020    Procedure: Biopsy-bone marrow;  Surgeon: Trung Polanco MD;  Location: [facility];  Service: Oncology;  Laterality: Left;    CIRCUMCISION, PRIMARY      COLONOSCOPY N/A 11/14/2018    Procedure: COLONOSCOPY;  Surgeon: Milton Brunson MD;  Location: [facility];  Service: Endoscopy;  Laterality: N/A;    COLONOSCOPY N/A 7/20/2020    Procedure: COLONOSCOPY;  Surgeon: Ruslan Tillman MD;  Location: [facility];  Service: Endoscopy;  Laterality: N/A;    CYSTOSCOPY W/ RETROGRADES Bilateral 9/18/2020    Procedure: CYSTOSCOPY, WITH RETROGRADE  PYELOGRAM;  Surgeon: Randall Moss MD;  Location: [facility] OR;  Service: Urology;  Laterality: Bilateral;    CYSTOURETHROSCOPY N/A 2/1/2021    Procedure: CYSTOURETHROSCOPY, short placement;  Surgeon: Boni Benites MD;  [address];  Service: Urology;  Laterality: N/A;    DILATION OF URETHRA N/A 9/18/2020    Procedure: DILATION, URETHRA;  Surgeon: Randall Moss MD;  Location: [facility] OR;  Service: Urology;  Laterality: N/A;    drainage of abscess from hand  2009    MRSA    drainage of abscess from R thigh  2006    MRSA    ESOPHAGOGASTRODUODENOSCOPY  3/11/2014    ESOPHAGOGASTRODUODENOSCOPY N/A 9/5/2018    Procedure: EGD (ESOPHAGOGASTRODUODENOSCOPY);  Surgeon: Kolby Wall MD;  Location: [facility];  Service: Endoscopy;  Laterality: N/A;    ESOPHAGOGASTRODUODENOSCOPY N/A 3/25/2020    Procedure: EGD (ESOPHAGOGASTRODUODENOSCOPY);  Surgeon: Ruslan Tillman MD;  Location: [facility];  Service: Endoscopy;  Laterality: N/A;    ESOPHAGOGASTRODUODENOSCOPY N/A 7/20/2020    Procedure: EGD (ESOPHAGOGASTRODUODENOSCOPY);  Surgeon: Ruslan Tillman MD;  Location: [facility];  Service: Endoscopy;  Laterality: N/A;  Patient is a very hard stick, requires anesthesia stick.    ESOPHAGOGASTRODUODENOSCOPY N/A 8/31/2020    Procedure: EGD (ESOPHAGOGASTRODUODENOSCOPY);  Surgeon: Kolby Wall MD;  Location: [facility];  Service: Endoscopy;  Laterality: N/A;    ESOPHAGOGASTRODUODENOSCOPY N/A 3/3/2021    Procedure: EGD (ESOPHAGOGASTRODUODENOSCOPY);  Surgeon: Ruslan Tillman MD;  Location: [facility];  Service: Endoscopy;  Laterality: N/A;    ESOPHAGOGASTRODUODENOSCOPY N/A 7/12/2021    Procedure: EGD (ESOPHAGOGASTRODUODENOSCOPY);  Surgeon: Kolby Wall MD;  Location: [facility] ENDO;  Service: Endoscopy;  Laterality: N/A;    FLEXIBLE SIGMOIDOSCOPY N/A 9/5/2018    Procedure: SIGMOIDOSCOPY, FLEXIBLE;  Surgeon: Kolby Wall MD;  Location: [facility] ENDO;  Service: Endoscopy;  Laterality: N/A;     HAND SURGERY  jan 2014    power saw fell on hand - laceration 3 tendons    INCISION AND DRAINAGE OF KNEE Right 1/13/2021    Procedure: INCISION AND DRAINAGE, KNEE;  Surgeon: Sony Linton Jr., MD;  Location: [facility] OR;  Service: Orthopedics;  Laterality: Right;    INSERTION OF TUNNELED CENTRAL VENOUS CATHETER (CVC) WITH SUBCUTANEOUS PORT Right 11/24/2021    Procedure: Right port-a-cath removal and exchange.;  Surgeon: Robson Morrison MD;  Location: [facility] OR [address] [place] [address];  Service: General;  Laterality: Right;  Right internal jugular    INSERTION OF TUNNELED CENTRAL VENOUS CATHETER (CVC) WITH SUBCUTANEOUS PORT Left 3/31/2022    Procedure: INSERTION, SINGLE LUMEN CATHETER WITH PORT, WITH FLUOROSCOPIC GUIDANCE Left Poss Right Chest;  Surgeon: Robson Morrison MD;  Location: [facility] OR [address] [place] [address];  Service: General;  Laterality: Left;    INSERTION OF TUNNELED CENTRAL VENOUS CATHETER (CVC) WITH SUBCUTANEOUS PORT Right 10/6/2022    Procedure: INSERTION, SINGLE LUMEN CATHETER WITH PORT, WITH FLUOROSCOPIC GUIDANCE Left Possible Right Chest;  Surgeon: Robson Morrison MD;  Location: [facility] OR [address] [place] [address];  Service: General;  Laterality: Right;    INSERTION OF VENOUS ACCESS PORT Right 8/21/2020    Procedure: INSERTION, VENOUS ACCESS PORT;  Surgeon: Troy Honeycutt MD;  Location: [facility];  Service: General;  Laterality: Right;    MEDIPORT REMOVAL N/A 3/24/2023    Procedure: REMOVAL, CATHETER, CENTRAL VENOUS, TUNNELED, WITH PORT;  Surgeon: Robson Morrison MD;  Location: [facility] OR [address] [place] [address];  Service: General;  Laterality: N/A;    NISSEN FUNDOPLICATION      REPAIR OF RECURRENT INCARCERATED INCISIONAL HERNIA N/A 8/16/2023    Procedure: REPAIR, HERNIA, INCISIONAL, INCARCERATED, RECURRENT, W MESH;  Surgeon: Robson Morrison MD;  Location: [facility] OR [address] [place] [address];  Service: General;  Laterality: N/A;    REPAIR, HERNIA, INCISIONAL OR VENTRAL, WITHOUT HISTORY OF PRIOR REPAIR N/A 3/13/2023    Procedure: REPAIR, HERNIA, INCISIONAL OR VENTRAL,  WITHOUT HISTORY OF PRIOR REPAIR x2;  Surgeon: Robson Morrison MD;  Location: [facility] OR 2ND FLR;  Service: General;  Laterality: N/A;    REVISION OF SCAR N/A 4/7/2021    Procedure: REVISION, SCAR;  Surgeon: Robson Morrison MD;  Location: [facility] OR 2ND FLR;  Service: General;  Laterality: N/A;    UMBILICAL HERNIA REPAIR N/A 4/7/2021    Procedure: REPAIR, HERNIA, UMBILICAL, AGE 5 YEARS OR OLDER;  Surgeon: Robson Morrison MD;  Location: [facility] OR 2ND FLR;  Service: General;  Laterality: N/A;       Home Meds:   Prior to Admission medications    Medication Sig Start Date End Date Taking? Authorizing Provider   albuterol (PROVENTIL/VENTOLIN HFA) 90 mcg/actuation inhaler INHALE 2 PUFFS INTO THE LUNGS EVERY 6 HOURS AS NEEDED FOR WHEEZING OR SHORTNESS OF BREATH. RESCUE. 6/2/22  Yes Michelle Ornelas MD   artificial tears (ISOPTO TEARS) 0.5 % ophthalmic solution Place 1 drop into both eyes 6 (six) times daily. 8/10/20  Yes Memo Solano MD   cetirizine (ZYRTEC) 10 MG tablet Take 20 mg by mouth 2 (two) times a day.    Yes Provider, Historical   cholecalciferol, vitamin D3, (VITAMIN D3) 25 mcg (1,000 unit) capsule Take 1 capsule (1,000 Units total) by mouth once daily. 3/4/21  Yes Omar Mann MD   dextroamphetamine-amphetamine (ADDERALL) 20 mg tablet Take 1 tablet by mouth 2 (two) times daily. 3/29/21  Yes Provider, Historical   diphenhydrAMINE (SOMINEX) 25 mg tablet Take 25 mg by mouth nightly as needed.    Yes Provider, Historical   ferrous sulfate 325 (65 FE) MG EC tablet Take 1 tablet (325 mg total) by mouth once daily.  Patient taking differently: Take 325 mg by mouth nightly. 3/5/21  Yes Omar Mann MD   Lactobacillus acidophilus 10 billion cell Cap Take 1 capsule by mouth once daily.    Yes Provider, Historical   ondansetron (ZOFRAN-ODT) 4 MG TbDL Take 4 mg by mouth.   Yes Provider, Historical   oxyCODONE (ROXICODONE) 5 MG immediate release tablet Take 1 tablet (5 mg total) by mouth every 4 (four) hours as needed  "for Pain. 8/18/23  Yes Skyler Whitt MD   oxyCODONE-acetaminophen (PERCOCET) 5-325 mg per tablet Take 1 tablet by mouth every 4 (four) hours as needed for Pain.   Yes Provider, Historical   pantoprazole (PROTONIX) 40 MG tablet Take 1 tablet (40 mg total) by mouth 2 (two) times daily. 5/6/23 3/7/24 Yes Chaim Redman MD   paroxetine (PAXIL) 20 MG tablet Take 1 tablet (20 mg total) by mouth every morning.  Patient taking differently: Take 20 mg by mouth nightly. 8/12/23 8/11/24 Yes Chaim Redman MD   predniSONE (DELTASONE) 10 MG tablet Take 10 mg by mouth every morning.   Yes Provider, Historical   docusate sodium (COLACE) 100 MG capsule Take 1 capsule (100 mg total) by mouth 2 (two) times daily. 3/4/21   Omar Mann MD   EPINEPHrine (EPIPEN) 0.3 mg/0.3 mL AtIn Inject 0.3 mLs (0.3 mg total) into the muscle as needed. 11/25/21 11/25/22  Shirley Snyder PA-C   famotidine (PEPCID) 20 MG tablet Take 1 tablet (20 mg total) by mouth 2 (two) times daily. 5/6/23 8/16/23  Chaim Redman MD   hydrOXYzine HCL (ATARAX) 25 MG tablet Take 1 tablet (25 mg total) by mouth 3 (three) times daily as needed for Itching. 8/31/20   Doretha Armstrong MD   lidocaine HCL 2% (XYLOCAINE) 2 % jelly Apply topically 3 (three) times daily as needed (Hemorrhoids). 10/13/20   Manuel Cardoza MD   mepolizumab (NUCALA) 100 mg/mL Syrg Inject 3 mLs (300 mg total) into the skin every 28 days. 11/16/21   Michelle Ornelas MD     Anticoagulants/Antiplatelets: no anticoagulation    Allergies:   Review of patient's allergies indicates:   Allergen Reactions    Bean Diarrhea, Edema, Hives, Nausea And Vomiting and Swelling    Legumes Nausea And Vomiting and Swelling     Oral swelling    Lentils Nausea And Vomiting and Swelling     Oral swelling      Nsaids (non-steroidal anti-inflammatory drug)      GI bleed    Peas Nausea And Vomiting and Swelling     oral swelling    Ketamine Hallucinations    Haloperidol      "feels " "like crawling out of his skin"      Meloxicam Nausea Only     GI bleed    Penicillins Nausea And Vomiting     Can take third gen cephalosporins per patient      Sedation History:  no adverse reactions    Review of Systems:   Hematological: no known coagulopathies  Respiratory: no shortness of breath  Cardiovascular: no chest pain  Gastrointestinal: no abdominal pain  Genito-Urinary: no dysuria  Musculoskeletal: negative  Neurological: no TIA or stroke symptoms         OBJECTIVE:     Vital Signs (Most Recent)  Temp: 99 °F (37.2 °C) (03/07/24 0635)  Pulse: 81 (03/07/24 0635)  Resp: 17 (03/07/24 0635)  BP: (!) 146/88 (03/07/24 0650)  SpO2: 97 % (03/07/24 0635)    Physical Exam:  ASA: per anesthesia.  Mallampati: per anesthesia.    General: no acute distress  Mental Status: alert and oriented to person, place and time  HEENT: normocephalic, atraumatic  Chest: unlabored breathing  Heart: regular heart rate  Abdomen: nondistended  Extremity: moves all extremities    Laboratory  Lab Results   Component Value Date    INR 1.0 03/06/2024       Lab Results   Component Value Date    WBC 4.31 03/06/2024    HGB 11.9 (L) 03/06/2024    HCT 39.4 (L) 03/06/2024    MCV 72 (L) 03/06/2024     03/06/2024      Lab Results   Component Value Date    GLU 92 08/17/2023     08/17/2023    K 3.8 08/17/2023     08/17/2023    CO2 23 08/17/2023    BUN 13 08/17/2023    CREATININE 1.0 08/17/2023    CALCIUM 8.3 (L) 08/17/2023    MG 2.1 07/29/2021     (H) 08/17/2023     (H) 08/17/2023    ALBUMIN 3.2 (L) 08/17/2023    BILITOT 0.8 08/17/2023       ASSESSMENT/PLAN:   Solo Black is a 43 y.o. male who presents for chest port placement. Patient says there was prior difficulty going through venous stenosis during last port placement. .    Sedation Plan: anesthesia.  Patient will undergo port placement. Patient prefers left side if possible.       Irving Rodgers MD  VIR Fellow    "

## 2024-03-07 NOTE — PROGRESS NOTES
Subjective:      Patient ID: Solo Black is a 43 y.o. male.    Vitals:  vitals were not taken for this visit.     Chief Complaint: Pain      Visit Type: TELE AUDIOVISUAL    Present with the patient at the time of consultation: TELEMED PRESENT WITH PATIENT: None    Past Medical History:   Diagnosis Date    Arthritis     C. difficile colitis 02/2014    postop of hand surgery    Cancer     Encounter for blood transfusion     Eosinophilic esophagitis 03/11/2014    GERD (gastroesophageal reflux disease)     Hypereosinophilic syndrome     IBS (irritable bowel syndrome)     Leukemia     MRSA carrier     says multiple times nose swab was +    Munchausen syndrome     Nephrolithiasis 04/2014    bilateral punctate - never passed a stone    Septic prepatellar bursitis of right knee 01/12/2021    Urinary tract infection 02/2014    MRSA     Past Surgical History:   Procedure Laterality Date    APPENDECTOMY      ARTHROSCOPY OF SHOULDER WITH REMOVAL OF DISTAL CLAVICLE Right 11/1/2018    Procedure: ARTHROSCOPY, SHOULDER, WITH DISTAL CLAVICLE EXCISION;  Surgeon: Gabino Mejia MD;  Location: [facility];  Service: Orthopedics;  Laterality: Right;  video    BACK SURGERY      BLADDER FULGURATION N/A 9/18/2020    Procedure: FULGURATION, BLADDER;  Surgeon: Randall Moss MD;  Location: [facility];  Service: Urology;  Laterality: N/A;  blood vessels of bladder neck and prostate    BONE MARROW BIOPSY Left 10/1/2020    Procedure: Biopsy-bone marrow;  Surgeon: Trung Polanco MD;  Location: [facility];  Service: Oncology;  Laterality: Left;    CIRCUMCISION, PRIMARY      COLONOSCOPY N/A 11/14/2018    Procedure: COLONOSCOPY;  Surgeon: Milton Brunson MD;  Location: [facility];  Service: Endoscopy;  Laterality: N/A;    COLONOSCOPY N/A 7/20/2020    Procedure: COLONOSCOPY;  Surgeon: Ruslan Tillman MD;  Location: [facility];  Service: Endoscopy;  Laterality: N/A;    CYSTOSCOPY W/ RETROGRADES Bilateral 9/18/2020    Procedure:  CYSTOSCOPY, WITH RETROGRADE PYELOGRAM;  Surgeon: Randall Moss MD;  Location: [facility] OR;  Service: Urology;  Laterality: Bilateral;    CYSTOURETHROSCOPY N/A 2/1/2021    Procedure: CYSTOURETHROSCOPY, short placement;  Surgeon: Boni Benites MD;  Location: [facility] OR [facility];  Service: Urology;  Laterality: N/A;    DILATION OF URETHRA N/A 9/18/2020    Procedure: DILATION, URETHRA;  Surgeon: Randall Moss MD;  Location: [facility] OR;  Service: Urology;  Laterality: N/A;    drainage of abscess from hand  2009    MRSA    drainage of abscess from R thigh  2006    MRSA    ESOPHAGOGASTRODUODENOSCOPY  3/11/2014    ESOPHAGOGASTRODUODENOSCOPY N/A 9/5/2018    Procedure: EGD (ESOPHAGOGASTRODUODENOSCOPY);  Surgeon: Kolby Wall MD;  Location: [facility];  Service: Endoscopy;  Laterality: N/A;    ESOPHAGOGASTRODUODENOSCOPY N/A 3/25/2020    Procedure: EGD (ESOPHAGOGASTRODUODENOSCOPY);  Surgeon: Ruslan Tillman MD;  Location: [facility];  Service: Endoscopy;  Laterality: N/A;    ESOPHAGOGASTRODUODENOSCOPY N/A 7/20/2020    Procedure: EGD (ESOPHAGOGASTRODUODENOSCOPY);  Surgeon: Ruslan Tillman MD;  Location: [facility];  Service: Endoscopy;  Laterality: N/A;  Patient is a very hard stick, requires anesthesia stick.    ESOPHAGOGASTRODUODENOSCOPY N/A 8/31/2020    Procedure: EGD (ESOPHAGOGASTRODUODENOSCOPY);  Surgeon: Kolby Wall MD;  Location: [facility];  Service: Endoscopy;  Laterality: N/A;    ESOPHAGOGASTRODUODENOSCOPY N/A 3/3/2021    Procedure: EGD (ESOPHAGOGASTRODUODENOSCOPY);  Surgeon: Ruslan Tillman MD;  Location: [facility];  Service: Endoscopy;  Laterality: N/A;    ESOPHAGOGASTRODUODENOSCOPY N/A 7/12/2021    Procedure: EGD (ESOPHAGOGASTRODUODENOSCOPY);  Surgeon: Kolby Wall MD;  Location: [facility];  Service: Endoscopy;  Laterality: N/A;    FLEXIBLE SIGMOIDOSCOPY N/A 9/5/2018    Procedure: SIGMOIDOSCOPY, FLEXIBLE;  Surgeon: Kolby Wall MD;  Location: [facility] END[facility];  Service:  Endoscopy;  Laterality: N/A;    HAND SURGERY  jan 2014    power saw fell on hand - laceration 3 tendons    INCISION AND DRAINAGE OF KNEE Right 1/13/2021    Procedure: INCISION AND DRAINAGE, KNEE;  Surgeon: Sony Linton Jr., MD;  Location: [facility] OR;  Service: Orthopedics;  Laterality: Right;    INSERTION OF TUNNELED CENTRAL VENOUS CATHETER (CVC) WITH SUBCUTANEOUS PORT Right 11/24/2021    Procedure: Right port-a-cath removal and exchange.;  Surgeon: Robson Morrison MD;  Location: [facility] OR [address] [place] [address];  Service: General;  Laterality: Right;  Right internal jugular    INSERTION OF TUNNELED CENTRAL VENOUS CATHETER (CVC) WITH SUBCUTANEOUS PORT Left 3/31/2022    Procedure: INSERTION, SINGLE LUMEN CATHETER WITH PORT, WITH FLUOROSCOPIC GUIDANCE Left Poss Right Chest;  Surgeon: Robson Morrison MD;  [facility];  Service: General;  Laterality: Left;    INSERTION OF TUNNELED CENTRAL VENOUS CATHETER (CVC) WITH SUBCUTANEOUS PORT Right 10/6/2022    Procedure: INSERTION, SINGLE LUMEN CATHETER WITH PORT, WITH FLUOROSCOPIC GUIDANCE Left Possible Right Chest;  Surgeon: Robson Morrison MD;  [facility];  Service: General;  Laterality: Right;    INSERTION OF VENOUS ACCESS PORT Right 8/21/2020    Procedure: INSERTION, VENOUS ACCESS PORT;  Surgeon: Troy Honeycutt MD;  Location: [facility];  Service: General;  Laterality: Right;    MEDIPORT REMOVAL N/A 3/24/2023    Procedure: REMOVAL, CATHETER, CENTRAL VENOUS, TUNNELED, WITH PORT;  Surgeon: Robson Morrison MD;  [facility];  Service: General;  Laterality: N/A;    NISSEN FUNDOPLICATION      REPAIR OF RECURRENT INCARCERATED INCISIONAL HERNIA N/A 8/16/2023    Procedure: REPAIR, HERNIA, INCISIONAL, INCARCERATED, RECURRENT, W MESH;  Surgeon: Robson Morrison MD;  Location: [facility] OR [address] [place] [address];  Service: General;  Laterality: N/A;    REPAIR, HERNIA, INCISIONAL OR VENTRAL, WITHOUT HISTORY OF PRIOR REPAIR N/A 3/13/2023    Procedure: REPAIR,  "HERNIA, INCISIONAL OR VENTRAL, WITHOUT HISTORY OF PRIOR REPAIR x2;  Surgeon: Robson Morrison MD;  Location: [facility] OR 2ND [facility];  Service: General;  Laterality: N/A;    REVISION OF SCAR N/A 4/7/2021    Procedure: REVISION, SCAR;  Surgeon: Robson Morrison MD;  Location: [facility] OR 2ND [facility];  Service: General;  Laterality: N/A;    UMBILICAL HERNIA REPAIR N/A 4/7/2021    Procedure: REPAIR, HERNIA, UMBILICAL, AGE 5 YEARS OR OLDER;  Surgeon: Robson Morrison MD;  Location: [facility] OR 2ND FLR;  Service: General;  Laterality: N/A;     Review of patient's allergies indicates:   Allergen Reactions    Bean Diarrhea, Edema, Hives, Nausea And Vomiting and Swelling    Legumes Nausea And Vomiting and Swelling     Oral swelling    Lentils Nausea And Vomiting and Swelling     Oral swelling      Nsaids (non-steroidal anti-inflammatory drug)      GI bleed    Peas Nausea And Vomiting and Swelling     oral swelling    Ketamine Hallucinations    Haloperidol      "feels like crawling out of his skin"      Meloxicam Nausea Only     GI bleed    Penicillins Nausea And Vomiting     Can take third gen cephalosporins per patient      Current Outpatient Medications on File Prior to Visit   Medication Sig Dispense Refill    albuterol (PROVENTIL/VENTOLIN HFA) 90 mcg/actuation inhaler INHALE 2 PUFFS INTO THE LUNGS EVERY 6 HOURS AS NEEDED FOR WHEEZING OR SHORTNESS OF BREATH. RESCUE. 18 g 2    artificial tears (ISOPTO TEARS) 0.5 % ophthalmic solution Place 1 drop into both eyes 6 (six) times daily.      cetirizine (ZYRTEC) 10 MG tablet Take 20 mg by mouth 2 (two) times a day.       cholecalciferol, vitamin D3, (VITAMIN D3) 25 mcg (1,000 unit) capsule Take 1 capsule (1,000 Units total) by mouth once daily. 90 capsule 1    dextroamphetamine-amphetamine (ADDERALL) 20 mg tablet Take 1 tablet by mouth 2 (two) times daily.      diphenhydrAMINE (SOMINEX) 25 mg tablet Take 25 mg by mouth nightly as needed.       docusate sodium (COLACE) 100 MG capsule Take 1 " capsule (100 mg total) by mouth 2 (two) times daily. 60 capsule 3    EPINEPHrine (EPIPEN) 0.3 mg/0.3 mL AtIn Inject 0.3 mLs (0.3 mg total) into the muscle as needed. 2 each 1    famotidine (PEPCID) 20 MG tablet Take 1 tablet (20 mg total) by mouth 2 (two) times daily. 60 tablet 0    ferrous sulfate 325 (65 FE) MG EC tablet Take 1 tablet (325 mg total) by mouth once daily. (Patient taking differently: Take 325 mg by mouth nightly.) 90 tablet 1    hydrOXYzine HCL (ATARAX) 25 MG tablet Take 1 tablet (25 mg total) by mouth 3 (three) times daily as needed for Itching. 30 tablet 1    Lactobacillus acidophilus 10 billion cell Cap Take 1 capsule by mouth once daily.       lidocaine HCL 2% (XYLOCAINE) 2 % jelly Apply topically 3 (three) times daily as needed (Hemorrhoids). 5 mL 3    mepolizumab (NUCALA) 100 mg/mL Syrg Inject 3 mLs (300 mg total) into the skin every 28 days. 300 mL 11    ondansetron (ZOFRAN-ODT) 4 MG TbDL Take 4 mg by mouth.      oxyCODONE (ROXICODONE) 5 MG immediate release tablet Take 1 tablet (5 mg total) by mouth every 4 (four) hours as needed for Pain. 10 tablet 0    oxyCODONE-acetaminophen (PERCOCET) 5-325 mg per tablet Take 1 tablet by mouth every 4 (four) hours as needed for Pain.      pantoprazole (PROTONIX) 40 MG tablet Take 1 tablet (40 mg total) by mouth 2 (two) times daily. 60 tablet 0    paroxetine (PAXIL) 20 MG tablet Take 1 tablet (20 mg total) by mouth every morning. (Patient taking differently: Take 20 mg by mouth nightly.) 30 tablet 0    predniSONE (DELTASONE) 10 MG tablet Take 10 mg by mouth every morning.       Current Facility-Administered Medications on File Prior to Visit   Medication Dose Route Frequency Provider Last Rate Last Admin    0.9%  NaCl infusion   Intravenous Continuous Deniz Hollingsworth PA-C        0.9%  NaCl infusion   Intravenous Continuous Jodi Rodriguez PA-C        0.9%  NaCl infusion   Intravenous Continuous Wilberto Chauhan Jr., MD        ceFAZolin 2 g in  dextrose 5 % in water (D5W) 50 mL IVPB (MB+)  2 g Intravenous On Call Procedure Deniz Hollingsworth PA-C        [COMPLETED] heparin, porcine (PF) 100 unit/mL injection flush    PRN Osmel Luciano MD   5 mL at 03/07/24 0831    [COMPLETED] HYDROmorphone injection 0.2 mg  0.2 mg Intravenous Q5 Min PRN Wilberto Chauhan Jr., MD   0.2 mg at 03/07/24 1007    LIDOcaine (PF) 10 mg/ml (1%) injection 10 mg  1 mL Other Once Jodi Rodriguez PA-C        [COMPLETED] LIDOcaine HCL 10 mg/ml (1%) injection   Other PRN Osmel Luciano MD   5 mL at 03/07/24 0823    LORazepam injection 0.25 mg  0.25 mg Intravenous Once PRN Wilberto Chauhan Jr., MD        meperidine injection 12.5 mg  12.5 mg Intravenous Once PRN Wilberto Chauhan Jr., MD        ondansetron injection 4 mg  4 mg Intravenous Daily PRN Wilberto Chauhan Jr., MD        sodium chloride 0.9% bolus 250 mL 250 mL  250 mL Intravenous Once Wilberto Chauhan Jr., MD        [DISCONTINUED] dexAMETHasone injection   Intravenous PRN John Plaza, CRNA   4 mg at 03/07/24 0837    [DISCONTINUED] dexmedeTOMIDine injection   Intravenous PRN John Plaza, CRNA   8 mcg at 03/07/24 0805    [DISCONTINUED] fentaNYL 50 mcg/mL injection   Intravenous PRN John Plaza, CRNA   100 mcg at 03/07/24 0754    [DISCONTINUED] glycopyrrolate (PF) injection   Intravenous PRN John Plaza, CRNA   0.2 mg at 03/07/24 0837    [DISCONTINUED] LIDOcaine (PF) 20 mg/ml (2%) injection   Intravenous PRN John Plaza, CRNA   100 mg at 03/07/24 0754    [DISCONTINUED] midazolam injection   Intravenous PRN John Plaza., CRNA   2 mg at 03/07/24 0800    [DISCONTINUED] ondansetron injection   Intravenous PRN John Plaza, CRNA   4 mg at 03/07/24 0837    [DISCONTINUED] propofol (DIPRIVAN) 10 mg/mL infusion   Intravenous PRN John Plaza, CRNA   Stopped at 03/07/24 0812    [DISCONTINUED] sodium chloride 0.9% infusion   Intravenous Continuous PRN John Plaza, CRNA   Stopped at 03/07/24  0842     Family History   Problem Relation Age of Onset    Urolithiasis Father     Celiac disease Sister     Hypertension Maternal Grandmother     Hypertension Maternal Grandfather     Prostate cancer Neg Hx     Kidney disease Neg Hx            Ohs Peq Odvv Intake    3/7/2024  2:19 PM CST - Filed by Patient   What is your current physical address in the event of a medical emergency? [address] [place] [address]   Are you able to take your vital signs? Yes   Systolic Blood Pressure: 147   Diastolic Blood Pressure: 89   Weight: 190   Height: 72   Pulse: 82   Temperature: 99.1   Respiration rate: 17   Pulse Oxygen: 99   Please attach any relevant images or files          In [place] via video conference.     44 y/o man reports that he had a port placed today by interventional radiology and is in pain. He is asking for something stronger than motrin. Chart reviewed including history noted on popup.     Pain      ROS     Objective:   The physical exam was conducted virtually.  Physical Exam   Constitutional:  Non-toxic appearance. No distress.   HENT:   Nose: No congestion.   Pulmonary/Chest: Effort normal.   Neurological: He is alert.   No other pertinent findings on examination.     Assessment:     1. Pain        Plan:       Pain    Unfortunately on this platform we can only prescribe tylenol or ibuprofen. I recommend you get in touch with the doctor's who did your procedure and see what they recommend.

## 2024-03-07 NOTE — PLAN OF CARE
Left upper chest port insertion procedure completed. Patient tolerated well. Patient drowsy from sedation, no distress noted, respirations even and unlabored, will continue to monitor. VSS. Left upper chest procedure site clean, dry, and intact; no bleeding or hematoma noted. Patient to be transferred to [facility] for 1 hour post-procedural recovery per MD. Report to be given at bedside to [facility] Phase 2 RN.  Anesthesia at bedside; Refer to anesthesia record for further information regarding sedation and vital signs.

## 2024-03-07 NOTE — ANESTHESIA POSTPROCEDURE EVALUATION
Anesthesia Post Evaluation    Patient: Solo Black    Procedure(s) Performed: * No procedures listed *    Final Anesthesia Type: general      Patient location during evaluation: PACU  Patient participation: Yes- Able to Participate  Level of consciousness: awake and alert  Post-procedure vital signs: reviewed and stable  Pain management: adequate  Airway patency: patent    PONV status at discharge: No PONV  Anesthetic complications: no      Cardiovascular status: blood pressure returned to baseline  Respiratory status: spontaneous ventilation and room air  Hydration status: euvolemic  Follow-up not needed.              Vitals Value Taken Time   /91 03/07/24 1016   Temp 36.4 °C (97.5 °F) 03/07/24 0851   Pulse 103 03/07/24 1019   Resp 33 03/07/24 1019   SpO2 99 % 03/07/24 1019   Vitals shown include unvalidated device data.      No case tracking events are documented in the log.      Pain/Marcia Score: Pain Rating Prior to Med Admin: 6 (3/7/2024 10:07 AM)  Marcia Score: 10 (3/7/2024  9:31 AM)

## 2024-03-07 NOTE — DISCHARGE INSTRUCTIONS
Please call with any questions or concerns.      Monday thru Friday 8:00 am - 4:30 pm    Interventional Radiology   [phone]    After Hours    Ask for the Radiology Intern on call  [phone]

## 2024-03-07 NOTE — ANESTHESIA PROCEDURE NOTES
Intubation    Date/Time: 3/7/2024 8:09 AM    Performed by: John Plaza CRNA  Authorized by: Wilberto Chauhan Jr., MD    Intubation:     Induction:  Intravenous    Intubated:  Postinduction    Mask Ventilation:  Easy mask    Attempts:  1    Attempted By:  CRNA    Difficult Airway Encountered?: No      Airway Device:  Supraglottic airway/LMA    Airway Device Size:  4.5    Style/Cuff Inflation:  Cuffed    Secured at:  The lips    Placement Verified By:  Capnometry    Complicating Factors:  None    Findings Post-Intubation:  BS equal bilateral and atraumatic/condition of teeth unchanged

## 2024-03-07 NOTE — ANESTHESIA PREPROCEDURE EVALUATION
03/07/2024  Solo Black is a 43 y.o., male.      Pre-op Assessment    I have reviewed the Patient Summary Reports.     I have reviewed the Nursing Notes.       Review of Systems  Anesthesia Hx:  No problems with previous Anesthesia                Hematology/Oncology:  Hematology Normal   Oncology Normal                                   EENT/Dental:  EENT/Dental Normal           Cardiovascular:  Cardiovascular Normal                                            Pulmonary:  Pulmonary Normal                       Renal/:  Chronic Renal Disease                Hepatic/GI:   PUD, Hiatal Hernia, GERD             Musculoskeletal:  Musculoskeletal Normal                Neurological:    Neuromuscular Disease,                                   Endocrine:  Endocrine Normal            Dermatological:  Skin Normal    Psych:  Psychiatric History                  Physical Exam  General: Well nourished    Airway:  Mallampati: II   Mouth Opening: Normal  TM Distance: Normal  Tongue: Normal  Neck ROM: Normal ROM    Dental:  Intact        Anesthesia Plan  Type of Anesthesia, risks & benefits discussed:    Anesthesia Type: Gen ETT, Gen Supraglottic Airway  Intra-op Monitoring Plan: Standard ASA Monitors  Post Op Pain Control Plan: multimodal analgesia  Induction:  IV  Airway Plan: Direct  Informed Consent: Informed consent signed with the Patient and all parties understand the risks and agree with anesthesia plan.  All questions answered. Patient consented to blood products? No  ASA Score: 3  Day of Surgery Review of History & Physical: H&P Update referred to the surgeon/provider.    Ready For Surgery From Anesthesia Perspective.     .

## 2024-03-07 NOTE — PLAN OF CARE
Pt arrived to  for port placement. Pt oriented to unit and staff, Pt safely transferred from stretcher to procedural table. Fall risk reviewed and comfort measures utilized with interventions. Safety strap applied, position pillows to minimize pressure points. Blankets applied. Pt prepped and draped utilizing standard sterile technique. Patient placed on continuous monitoring, as required by sedation policy. Timeouts implemented utilizing standard universal time-out per department and facility policy. CRNA to remain at bedside with continuous monitoring. Pt resting comfortably. Denies pain/discomfort. Will continue to monitor. See flow sheets for monitoring, medication administration, and updates. patient verbalizes understanding.

## 2024-03-08 ENCOUNTER — PATIENT MESSAGE (OUTPATIENT)
Dept: SURGERY | Facility: CLINIC | Age: 44
End: 2024-03-08
Payer: MEDICAID

## 2024-03-08 ENCOUNTER — E-VISIT (OUTPATIENT)
Dept: FAMILY MEDICINE | Facility: CLINIC | Age: 44
End: 2024-03-08
Payer: MEDICAID

## 2024-03-08 DIAGNOSIS — R06.2 WHEEZING: Primary | ICD-10-CM

## 2024-03-08 PROCEDURE — 99421 OL DIG E/M SVC 5-10 MIN: CPT | Mod: S$PBB,,, | Performed by: STUDENT IN AN ORGANIZED HEALTH CARE EDUCATION/TRAINING PROGRAM

## 2024-03-08 RX ORDER — ALBUTEROL SULFATE 90 UG/1
2 AEROSOL, METERED RESPIRATORY (INHALATION) EVERY 6 HOURS PRN
Qty: 18 G | Refills: 2 | Status: SHIPPED | OUTPATIENT
Start: 2024-03-08

## 2024-03-08 NOTE — PROGRESS NOTES
Patient ID: Solo Black is a 43 y.o. male.    Chief Complaint: URI (Entered automatically based on patient selection in Patient Portal.)          274}  The patient initiated a request through [employer] on 3/8/2024 for evaluation and management with a chief complaint of URI (Entered automatically based on patient selection in Patient Portal.)     I evaluated the questionnaire submission on 03/08/2024 .    Total Time (in minutes): 8     Ohs Peq Evisit Upper Respitatory/Cough Questionnaire    3/8/2024  9:45 AM CST - Filed by Patient   Do you agree to participate in an E-Visit? Yes   If you have any of the following symptoms, please present to your local ER or call 911:  I acknowledge   What is the main issue that you would like for your doctor to address today? Need an inhaler refill albuteral   Are you able to take your vital signs? Yes   Systolic Blood Pressure: 137   Diastolic Blood Pressure: 88   Weight: 190   Height: 72   Pulse: 72   Temperature: 99.1   Respiration rate: 19   Pulse Oxygen: 99   What symptoms do you currently have?  None of these   Have you ever smoked? I have never smoked   Have you had a fever? No   When did your symptoms first appear? 3/8/2024   In the last two weeks, have you been in close contact with someone who has COVID-19 or the Flu? No   In the last two weeks, have you worked or volunteered in a healthcare facility or as a ? Healthcare facilities include a hospital, medical or dental clinic, long-term care facility, or nursing home No   Do you live in a long-term care facility, nursing home, group home, or homeless shelter? No   List what you have done or taken to help your symptoms. My inhaler is almost out   How severe are your symptoms? Mild   Have your symptoms improved since they first appeared? No change   Have you taken an at home Covid test? No   Have you taken a Flu test? No   Have you been fully vaccinated for COVID? (2 Pfizer, 2 Moderna or 1 [employer]  Fernando vaccine injections) Yes   Have you received a booster? Yes   Have you recieved a Flu shot? Yes   When did you recieve your Flu shot? 10/17/2023   Do you have transportation to get tested for COVID if it is indicated and ordered for you at an [facility] location? No   Provide any information you feel is important to your history not asked above Just really need inhaler refill Albuterol tgis was the most relevant option.   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 Used his EpiPen this am and took Prednisone, Benadryl, Hydroxizine to some improvement. Given IV Steroids and Benadryl in the ED.     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 Per Allergy, patient's eosinophil count today (400) not high enough to justify this presentation as a flare. There is the concern that his complaints are related to factitious disorder.  -Given IV Benadryl and steroids in ED as pt cannot tolerate PO  -Once patient able to tolerate PO, continue on home dose of prednisone, Benadryl and Hydroxyzine prn for rash         Hematuria 09/18/2020    Anxiety  "09/18/2020    Difficulty voiding 09/16/2020    Internal hemorrhoids 09/11/2020    Chronic neck pain 09/09/2020    Myofascial pain syndrome 09/09/2020    Status post laminectomy - Thoracic & cervical 09/09/2020    Sedentary lifestyle 09/09/2020    Adjustment disorder with anxious mood 09/09/2020    Skin rash 08/29/2020    Port-A-Cath in place 08/28/2020    Dry eyes 08/28/2020    Encounter for insertion of venous access port 08/21/2020    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    Hiatal hernia 03/27/2020    Incomplete rotator cuff tear or rupture of right shoulder, not specified as traumatic 11/01/2018    Reactive arthritis 09/05/2018    Iron deficiency anemia 09/04/2018    Eosinophilia 09/04/2018    Therapeutic opioid induced constipation 07/23/2016    Cervicalgia 07/21/2016    Adult ADHD 07/21/2016    Adverse reaction to nonsteroidal anti-inflammatory drug (NSAID) 07/21/2016    Lifetime need for proton pump inhibitor 03/18/2016    PUD (peptic ulcer disease) 03/17/2016    Erosive gastritis 03/17/2016    Eosinophilic esophagitis 03/11/2014      Recent Labs Obtained:  Lab Results   Component Value Date    WBC 4.31 03/06/2024    HGB 11.9 (L) 03/06/2024    HCT 39.4 (L) 03/06/2024    MCV 72 (L) 03/06/2024     03/06/2024     08/17/2023    K 3.8 08/17/2023    GLU 92 08/17/2023    CREATININE 1.0 08/17/2023    EGFRNORACEVR >60.0 08/17/2023    HGBA1C 5.3 03/02/2021      Review of patient's allergies indicates:   Allergen Reactions    Bean Diarrhea, Edema, Hives, Nausea And Vomiting and Swelling    Legumes Nausea And Vomiting and Swelling     Oral swelling    Lentils Nausea And Vomiting and Swelling     Oral swelling      Nsaids (non-steroidal anti-inflammatory drug)      GI bleed    Peas Nausea And Vomiting and Swelling     oral swelling    Ketamine Hallucinations    Haloperidol      "feels like crawling out of his skin"      Meloxicam Nausea Only     GI bleed    Penicillins Nausea And Vomiting     Can take third " gen cephalosporins per patient        Encounter Diagnosis   Name Primary?    Wheezing Yes        No orders of the defined types were placed in this encounter.     Medications Ordered This Encounter   Medications    albuterol (PROVENTIL/VENTOLIN HFA) 90 mcg/actuation inhaler     Sig: Inhale 2 puffs into the lungs every 6 (six) hours as needed for Wheezing. Rescue     Dispense:  18 g     Refill:  2        E-Visit Time Tracking:    Day 1 Time (in minutes): 8    Total Time (in minutes): 8      274}

## 2024-03-08 NOTE — PROGRESS NOTES
Pt called requesting pain management / called IR doctors and referred pt to PC / nurse relayed message to patient  and understood     03/08/2024 @ 15:10

## 2024-04-03 NOTE — ANESTHESIA PROCEDURE NOTES
Intubation    Date/Time: 3/31/2023 1:42 PM  Performed by: Yashira Owens CRNA  Authorized by: River Moore MD     Intubation:     Induction:  Intravenous    Intubated:  Postinduction    Mask Ventilation:  N/a    Attempts:  1    Attempted By:  CRNA    Difficult Airway Encountered?: No      Complications:  None    Airway Device:  Supraglottic airway/LMA    Airway Device Size:  4.5    Style/Cuff Inflation:  Cuffed (inflated to minimal occlusive pressure)    Placement Verified By:  Capnometry    Complicating Factors:  None    Findings Post-Intubation:  BS equal bilateral and atraumatic/condition of teeth unchanged  Notes:      Head and neck remained neutral     84

## 2024-06-18 RX ORDER — ALBUTEROL SULFATE 90 UG/1
AEROSOL, METERED RESPIRATORY (INHALATION)
Qty: 54 G | Refills: 0 | OUTPATIENT
Start: 2024-06-18

## 2024-06-18 NOTE — TELEPHONE ENCOUNTER
Refill Routing Note   Medication(s) are not appropriate for processing by [facility] for the following reason(s):        Responsible provider unclear    [facility] action(s):  Defer               Appointments  past 12m or future 3m with PCP    Date Provider   Last Visit   3/8/2024 Med Chiu MD   Next Visit   Visit date not found Med Chiu MD   ED visits in past 90 days: 0        Note composed:3:26 AM 06/18/2024

## 2024-10-24 ENCOUNTER — PATIENT MESSAGE (OUTPATIENT)
Dept: SURGERY | Facility: CLINIC | Age: 44
End: 2024-10-24
Payer: MEDICAID

## 2024-10-24 ENCOUNTER — PATIENT MESSAGE (OUTPATIENT)
Dept: INTERVENTIONAL RADIOLOGY/VASCULAR | Facility: CLINIC | Age: 44
End: 2024-10-24
Payer: MEDICAID

## 2024-10-24 DIAGNOSIS — D72.119 HYPEREOSINOPHILIC SYNDROME, UNSPECIFIED TYPE: Primary | ICD-10-CM

## 2024-10-28 ENCOUNTER — DOCUMENTATION ONLY (OUTPATIENT)
Dept: PREADMISSION TESTING | Facility: HOSPITAL | Age: 44
End: 2024-10-28
Payer: MEDICAID

## 2024-10-29 ENCOUNTER — TELEPHONE (OUTPATIENT)
Dept: SURGERY | Facility: CLINIC | Age: 44
End: 2024-10-29
Payer: MEDICAID

## 2024-10-29 ENCOUNTER — ANESTHESIA (OUTPATIENT)
Dept: INTERVENTIONAL RADIOLOGY/VASCULAR | Facility: HOSPITAL | Age: 44
End: 2024-10-29
Payer: MEDICAID

## 2024-10-29 ENCOUNTER — HOSPITAL ENCOUNTER (OUTPATIENT)
Dept: INTERVENTIONAL RADIOLOGY/VASCULAR | Facility: HOSPITAL | Age: 44
Discharge: HOME | End: 2024-10-29
Attending: SURGERY
Payer: MEDICAID

## 2024-10-29 VITALS
DIASTOLIC BLOOD PRESSURE: 84 MMHG | HEART RATE: 67 BPM | HEIGHT: 72 IN | OXYGEN SATURATION: 100 % | RESPIRATION RATE: 18 BRPM | WEIGHT: 190.06 LBS | TEMPERATURE: 99 F | BODY MASS INDEX: 25.74 KG/M2 | SYSTOLIC BLOOD PRESSURE: 122 MMHG

## 2024-10-29 DIAGNOSIS — D72.119 HYPEREOSINOPHILIC SYNDROME, UNSPECIFIED TYPE: ICD-10-CM

## 2024-10-29 PROCEDURE — 77001 FLUOROGUIDE FOR VEIN DEVICE: CPT | Mod: 26,,, | Performed by: STUDENT IN AN ORGANIZED HEALTH CARE EDUCATION/TRAINING PROGRAM

## 2024-10-29 PROCEDURE — 36561 INSERT TUNNELED CV CATH: CPT | Mod: LT | Performed by: STUDENT IN AN ORGANIZED HEALTH CARE EDUCATION/TRAINING PROGRAM

## 2024-10-29 PROCEDURE — 25000003 PHARM REV CODE 250: Performed by: ANESTHESIOLOGY

## 2024-10-29 PROCEDURE — 25000003 PHARM REV CODE 250

## 2024-10-29 PROCEDURE — 63600175 PHARM REV CODE 636 W HCPCS

## 2024-10-29 PROCEDURE — 37000008 HC ANESTHESIA 1ST 15 MINUTES

## 2024-10-29 PROCEDURE — 77001 FLUOROGUIDE FOR VEIN DEVICE: CPT | Mod: TC | Performed by: STUDENT IN AN ORGANIZED HEALTH CARE EDUCATION/TRAINING PROGRAM

## 2024-10-29 PROCEDURE — 63600175 PHARM REV CODE 636 W HCPCS: Performed by: ANESTHESIOLOGY

## 2024-10-29 PROCEDURE — 63600175 PHARM REV CODE 636 W HCPCS: Performed by: STUDENT IN AN ORGANIZED HEALTH CARE EDUCATION/TRAINING PROGRAM

## 2024-10-29 PROCEDURE — 37000009 HC ANESTHESIA EA ADD 15 MINS

## 2024-10-29 PROCEDURE — C1788 PORT, INDWELLING, IMP: HCPCS

## 2024-10-29 PROCEDURE — 76937 US GUIDE VASCULAR ACCESS: CPT | Mod: 26,,, | Performed by: STUDENT IN AN ORGANIZED HEALTH CARE EDUCATION/TRAINING PROGRAM

## 2024-10-29 PROCEDURE — 76937 US GUIDE VASCULAR ACCESS: CPT | Mod: TC | Performed by: STUDENT IN AN ORGANIZED HEALTH CARE EDUCATION/TRAINING PROGRAM

## 2024-10-29 RX ORDER — ONDANSETRON HYDROCHLORIDE 2 MG/ML
4 INJECTION, SOLUTION INTRAVENOUS DAILY PRN
Status: DISCONTINUED | OUTPATIENT
Start: 2024-10-29 | End: 2024-10-30 | Stop reason: HOSPADM

## 2024-10-29 RX ORDER — EPHEDRINE SULFATE 50 MG/ML
INJECTION, SOLUTION INTRAVENOUS
Status: DISCONTINUED | OUTPATIENT
Start: 2024-10-29 | End: 2024-10-29

## 2024-10-29 RX ORDER — PHENYLEPHRINE HYDROCHLORIDE 10 MG/ML
INJECTION INTRAVENOUS
Status: DISCONTINUED | OUTPATIENT
Start: 2024-10-29 | End: 2024-10-29

## 2024-10-29 RX ORDER — MIDAZOLAM HYDROCHLORIDE 1 MG/ML
INJECTION INTRAMUSCULAR; INTRAVENOUS
Status: DISCONTINUED | OUTPATIENT
Start: 2024-10-29 | End: 2024-10-29

## 2024-10-29 RX ORDER — GLUCAGON 1 MG
1 KIT INJECTION
Status: DISCONTINUED | OUTPATIENT
Start: 2024-10-29 | End: 2024-10-30 | Stop reason: HOSPADM

## 2024-10-29 RX ORDER — ONDANSETRON HYDROCHLORIDE 2 MG/ML
INJECTION, SOLUTION INTRAVENOUS
Status: DISCONTINUED | OUTPATIENT
Start: 2024-10-29 | End: 2024-10-29

## 2024-10-29 RX ORDER — SODIUM CHLORIDE 0.9 % (FLUSH) 0.9 %
3 SYRINGE (ML) INJECTION
Status: DISCONTINUED | OUTPATIENT
Start: 2024-10-29 | End: 2024-10-30 | Stop reason: HOSPADM

## 2024-10-29 RX ORDER — KETAMINE HCL IN 0.9 % NACL 50 MG/5 ML
SYRINGE (ML) INTRAVENOUS
Status: DISCONTINUED | OUTPATIENT
Start: 2024-10-29 | End: 2024-10-29

## 2024-10-29 RX ORDER — TRAMADOL HYDROCHLORIDE 50 MG/1
50 TABLET ORAL EVERY 6 HOURS PRN
COMMUNITY

## 2024-10-29 RX ORDER — LIDOCAINE HYDROCHLORIDE 20 MG/ML
INJECTION INTRAVENOUS
Status: DISCONTINUED | OUTPATIENT
Start: 2024-10-29 | End: 2024-10-29

## 2024-10-29 RX ORDER — HEPARIN 100 UNIT/ML
SYRINGE INTRAVENOUS
Status: COMPLETED | OUTPATIENT
Start: 2024-10-29 | End: 2024-10-29

## 2024-10-29 RX ORDER — FENTANYL CITRATE 50 UG/ML
INJECTION, SOLUTION INTRAMUSCULAR; INTRAVENOUS
Status: DISCONTINUED | OUTPATIENT
Start: 2024-10-29 | End: 2024-10-29

## 2024-10-29 RX ORDER — PROPOFOL 10 MG/ML
VIAL (ML) INTRAVENOUS CONTINUOUS PRN
Status: DISCONTINUED | OUTPATIENT
Start: 2024-10-29 | End: 2024-10-29

## 2024-10-29 RX ORDER — DEXAMETHASONE SODIUM PHOSPHATE 4 MG/ML
INJECTION, SOLUTION INTRA-ARTICULAR; INTRALESIONAL; INTRAMUSCULAR; INTRAVENOUS; SOFT TISSUE
Status: DISCONTINUED | OUTPATIENT
Start: 2024-10-29 | End: 2024-10-29

## 2024-10-29 RX ORDER — CEFAZOLIN 2 G/1
INJECTION, POWDER, FOR SOLUTION INTRAMUSCULAR; INTRAVENOUS
Status: DISCONTINUED | OUTPATIENT
Start: 2024-10-29 | End: 2024-10-29

## 2024-10-29 RX ORDER — OXYCODONE AND ACETAMINOPHEN 5; 325 MG/1; MG/1
1 TABLET ORAL
Status: DISCONTINUED | OUTPATIENT
Start: 2024-10-29 | End: 2024-10-30 | Stop reason: HOSPADM

## 2024-10-29 RX ORDER — DEXMEDETOMIDINE HYDROCHLORIDE 100 UG/ML
INJECTION, SOLUTION INTRAVENOUS
Status: DISCONTINUED | OUTPATIENT
Start: 2024-10-29 | End: 2024-10-29

## 2024-10-29 RX ORDER — ROCURONIUM BROMIDE 10 MG/ML
INJECTION, SOLUTION INTRAVENOUS
Status: DISCONTINUED | OUTPATIENT
Start: 2024-10-29 | End: 2024-10-29

## 2024-10-29 RX ORDER — HYDROMORPHONE HYDROCHLORIDE 1 MG/ML
0.4 INJECTION, SOLUTION INTRAMUSCULAR; INTRAVENOUS; SUBCUTANEOUS EVERY 5 MIN PRN
Status: DISCONTINUED | OUTPATIENT
Start: 2024-10-29 | End: 2024-10-30 | Stop reason: HOSPADM

## 2024-10-29 RX ADMIN — HYDROMORPHONE HYDROCHLORIDE 0.4 MG: 1 INJECTION, SOLUTION INTRAMUSCULAR; INTRAVENOUS; SUBCUTANEOUS at 01:10

## 2024-10-29 RX ADMIN — PHENYLEPHRINE HYDROCHLORIDE 100 MCG: 10 INJECTION INTRAVENOUS at 11:10

## 2024-10-29 RX ADMIN — CEFAZOLIN 2 G: 2 INJECTION, POWDER, FOR SOLUTION INTRAMUSCULAR; INTRAVENOUS at 10:10

## 2024-10-29 RX ADMIN — HEPARIN 5 ML: 100 SYRINGE at 11:10

## 2024-10-29 RX ADMIN — DEXAMETHASONE SODIUM PHOSPHATE 4 MG: 4 INJECTION, SOLUTION INTRAMUSCULAR; INTRAVENOUS at 10:10

## 2024-10-29 RX ADMIN — PROPOFOL 175 MCG/KG/MIN: 10 INJECTION, EMULSION INTRAVENOUS at 10:10

## 2024-10-29 RX ADMIN — ONDANSETRON 4 MG: 2 INJECTION INTRAMUSCULAR; INTRAVENOUS at 10:10

## 2024-10-29 RX ADMIN — Medication 20 MG: at 10:10

## 2024-10-29 RX ADMIN — EPHEDRINE SULFATE 10 MG: 50 INJECTION INTRAVENOUS at 11:10

## 2024-10-29 RX ADMIN — OXYCODONE HYDROCHLORIDE AND ACETAMINOPHEN 1 TABLET: 5; 325 TABLET ORAL at 01:10

## 2024-10-29 RX ADMIN — HYDROMORPHONE HYDROCHLORIDE 0.4 MG: 1 INJECTION, SOLUTION INTRAMUSCULAR; INTRAVENOUS; SUBCUTANEOUS at 12:10

## 2024-10-29 RX ADMIN — FENTANYL CITRATE 25 MCG: 50 INJECTION, SOLUTION INTRAMUSCULAR; INTRAVENOUS at 10:10

## 2024-10-29 RX ADMIN — SUGAMMADEX 200 MG: 100 INJECTION, SOLUTION INTRAVENOUS at 11:10

## 2024-10-29 RX ADMIN — DEXMEDETOMIDINE 8 MCG: 200 INJECTION, SOLUTION INTRAVENOUS at 10:10

## 2024-10-29 RX ADMIN — MIDAZOLAM HYDROCHLORIDE 2 MG: 2 INJECTION, SOLUTION INTRAMUSCULAR; INTRAVENOUS at 10:10

## 2024-10-29 RX ADMIN — ROCURONIUM BROMIDE 50 MG: 10 INJECTION INTRAVENOUS at 10:10

## 2024-10-29 RX ADMIN — LIDOCAINE HYDROCHLORIDE 100 MG: 20 INJECTION INTRAVENOUS at 10:10

## 2024-10-29 RX ADMIN — PROPOFOL 50 MG: 10 INJECTION, EMULSION INTRAVENOUS at 10:10

## 2024-10-29 RX ADMIN — PROPOFOL 100 MG: 10 INJECTION, EMULSION INTRAVENOUS at 10:10

## 2024-11-17 NOTE — ED NOTES
"Pt laying in bed thrashing in pain post medication administration. Pt whaling and screaming. Nurse tried to redirect and calm patient down. Patient just keeps stating that he is in pain and it feels like "his leg is about to burst open".    " Ortho @ bedside

## 2025-01-28 ENCOUNTER — TELEPHONE (OUTPATIENT)
Dept: SURGERY | Facility: CLINIC | Age: 45
End: 2025-01-28
Payer: MEDICAID

## 2025-01-28 DIAGNOSIS — K43.2 INCISIONAL HERNIA WITHOUT OBSTRUCTION OR GANGRENE: Primary | ICD-10-CM

## 2025-01-28 NOTE — TELEPHONE ENCOUNTER
Left message on patient's voicemail after Dr. Morrison's review of CT Scan:  Small incisional hernia noted.  Direct phone number given for him to call to discuss surgery date.

## 2025-01-28 NOTE — TELEPHONE ENCOUNTER
Spoke with patient after Dr. Morrison's review of CT Scan:  Small incisional hernia to be repaired.  Surgery scheduled for 2/14/25.  Pre-Op instructions sent via email.  He verbalized understanding and is agreeable to this plan of care.

## 2025-02-10 RX ORDER — CEFAZOLIN 2 G/1
2 INJECTION, POWDER, FOR SOLUTION INTRAMUSCULAR; INTRAVENOUS
Status: CANCELLED | OUTPATIENT
Start: 2025-02-10

## 2025-02-13 ENCOUNTER — TELEPHONE (OUTPATIENT)
Dept: SURGERY | Facility: CLINIC | Age: 45
End: 2025-02-13
Payer: MEDICAID

## 2025-02-13 ENCOUNTER — ANESTHESIA EVENT (OUTPATIENT)
Dept: SURGERY | Facility: HOSPITAL | Age: 45
End: 2025-02-13
Payer: MEDICAID

## 2025-02-13 NOTE — TELEPHONE ENCOUNTER
Left message on patient's voicemail and email for him to arrive at the [facility] tomorrow 2/14/25 at 5:15am for surgery with Dr. Morrison.  Pre-Op instructions reinforced.  Phone number given for him to call back with any questions or concerns.

## 2025-02-14 ENCOUNTER — ANESTHESIA (OUTPATIENT)
Dept: SURGERY | Facility: HOSPITAL | Age: 45
End: 2025-02-14
Payer: MEDICAID

## 2025-02-14 ENCOUNTER — HOSPITAL ENCOUNTER (OUTPATIENT)
Facility: HOSPITAL | Age: 45
Discharge: HOME | End: 2025-02-14
Attending: SURGERY | Admitting: SURGERY
Payer: MEDICAID

## 2025-02-14 VITALS
HEIGHT: 72 IN | HEART RATE: 80 BPM | TEMPERATURE: 98 F | DIASTOLIC BLOOD PRESSURE: 74 MMHG | OXYGEN SATURATION: 99 % | RESPIRATION RATE: 30 BRPM | BODY MASS INDEX: 25.74 KG/M2 | WEIGHT: 190.06 LBS | SYSTOLIC BLOOD PRESSURE: 117 MMHG

## 2025-02-14 DIAGNOSIS — K43.2 INCISIONAL HERNIA, WITHOUT OBSTRUCTION OR GANGRENE: Primary | ICD-10-CM

## 2025-02-14 DIAGNOSIS — K43.2 INCISIONAL HERNIA WITHOUT OBSTRUCTION OR GANGRENE: ICD-10-CM

## 2025-02-14 PROCEDURE — 71000044 HC DOSC ROUTINE RECOVERY FIRST HOUR: Performed by: SURGERY

## 2025-02-14 PROCEDURE — 37000009 HC ANESTHESIA EA ADD 15 MINS: Performed by: SURGERY

## 2025-02-14 PROCEDURE — C1781 MESH (IMPLANTABLE): HCPCS | Performed by: SURGERY

## 2025-02-14 PROCEDURE — 25000003 PHARM REV CODE 250: Performed by: NURSE ANESTHETIST, CERTIFIED REGISTERED

## 2025-02-14 PROCEDURE — 63600175 PHARM REV CODE 636 W HCPCS: Performed by: NURSE ANESTHETIST, CERTIFIED REGISTERED

## 2025-02-14 PROCEDURE — 71000016 HC POSTOP RECOV ADDL HR: Performed by: SURGERY

## 2025-02-14 PROCEDURE — 25000003 PHARM REV CODE 250: Performed by: STUDENT IN AN ORGANIZED HEALTH CARE EDUCATION/TRAINING PROGRAM

## 2025-02-14 PROCEDURE — 36000707: Performed by: SURGERY

## 2025-02-14 PROCEDURE — 63600175 PHARM REV CODE 636 W HCPCS: Mod: JZ,TB | Performed by: ANESTHESIOLOGY

## 2025-02-14 PROCEDURE — 37000008 HC ANESTHESIA 1ST 15 MINUTES: Performed by: SURGERY

## 2025-02-14 PROCEDURE — 63600175 PHARM REV CODE 636 W HCPCS: Performed by: STUDENT IN AN ORGANIZED HEALTH CARE EDUCATION/TRAINING PROGRAM

## 2025-02-14 PROCEDURE — 71000015 HC POSTOP RECOV 1ST HR: Performed by: SURGERY

## 2025-02-14 PROCEDURE — 36000706: Performed by: SURGERY

## 2025-02-14 PROCEDURE — 63600175 PHARM REV CODE 636 W HCPCS: Performed by: ANESTHESIOLOGY

## 2025-02-14 PROCEDURE — 63600175 PHARM REV CODE 636 W HCPCS: Performed by: SURGERY

## 2025-02-14 DEVICE — VENTRALEX ST HERNIA PATCH, 4.3 CM (1.7"), CIRCLE
Type: IMPLANTABLE DEVICE | Site: ABDOMEN | Status: FUNCTIONAL
Brand: VENTRALEX

## 2025-02-14 RX ORDER — OXYCODONE HYDROCHLORIDE 5 MG/1
5 TABLET ORAL EVERY 6 HOURS PRN
Qty: 10 TABLET | Refills: 0 | Status: SHIPPED | OUTPATIENT
Start: 2025-02-14 | End: 2025-02-15

## 2025-02-14 RX ORDER — DEXTROMETHORPHAN HYDROBROMIDE, GUAIFENESIN 5; 100 MG/5ML; MG/5ML
650 LIQUID ORAL EVERY 8 HOURS
COMMUNITY
Start: 2025-02-14

## 2025-02-14 RX ORDER — SODIUM CHLORIDE 9 MG/ML
INJECTION, SOLUTION INTRAVENOUS CONTINUOUS
Status: DISCONTINUED | OUTPATIENT
Start: 2025-02-14 | End: 2025-02-14 | Stop reason: HOSPADM

## 2025-02-14 RX ORDER — OXYCODONE HYDROCHLORIDE 5 MG/1
5 TABLET ORAL EVERY 4 HOURS PRN
Qty: 10 TABLET | Refills: 0 | Status: SHIPPED | OUTPATIENT
Start: 2025-02-14 | End: 2025-02-14

## 2025-02-14 RX ORDER — ONDANSETRON HYDROCHLORIDE 2 MG/ML
INJECTION, SOLUTION INTRAVENOUS
Status: DISCONTINUED | OUTPATIENT
Start: 2025-02-14 | End: 2025-02-14

## 2025-02-14 RX ORDER — MIDAZOLAM HYDROCHLORIDE 1 MG/ML
INJECTION INTRAMUSCULAR; INTRAVENOUS
Status: DISCONTINUED | OUTPATIENT
Start: 2025-02-14 | End: 2025-02-14

## 2025-02-14 RX ORDER — METHOCARBAMOL 500 MG/1
500 TABLET, FILM COATED ORAL 3 TIMES DAILY
Qty: 9 TABLET | Refills: 0 | Status: SHIPPED | OUTPATIENT
Start: 2025-02-14 | End: 2025-02-14

## 2025-02-14 RX ORDER — FENTANYL CITRATE 50 UG/ML
25 INJECTION, SOLUTION INTRAMUSCULAR; INTRAVENOUS EVERY 5 MIN PRN
Status: COMPLETED | OUTPATIENT
Start: 2025-02-14 | End: 2025-02-14

## 2025-02-14 RX ORDER — DEXAMETHASONE SODIUM PHOSPHATE 4 MG/ML
INJECTION, SOLUTION INTRA-ARTICULAR; INTRALESIONAL; INTRAMUSCULAR; INTRAVENOUS; SOFT TISSUE
Status: DISCONTINUED | OUTPATIENT
Start: 2025-02-14 | End: 2025-02-14

## 2025-02-14 RX ORDER — DIPHENHYDRAMINE HYDROCHLORIDE 50 MG/ML
25 INJECTION INTRAMUSCULAR; INTRAVENOUS EVERY 6 HOURS PRN
Status: DISCONTINUED | OUTPATIENT
Start: 2025-02-14 | End: 2025-02-14 | Stop reason: HOSPADM

## 2025-02-14 RX ORDER — SODIUM CHLORIDE 0.9 % (FLUSH) 0.9 %
10 SYRINGE (ML) INJECTION
Status: DISCONTINUED | OUTPATIENT
Start: 2025-02-14 | End: 2025-02-14 | Stop reason: HOSPADM

## 2025-02-14 RX ORDER — HEPARIN 100 UNIT/ML
5 SYRINGE INTRAVENOUS ONCE
Status: COMPLETED | OUTPATIENT
Start: 2025-02-14 | End: 2025-02-14

## 2025-02-14 RX ORDER — ONDANSETRON HYDROCHLORIDE 2 MG/ML
4 INJECTION, SOLUTION INTRAVENOUS ONCE AS NEEDED
Status: DISCONTINUED | OUTPATIENT
Start: 2025-02-14 | End: 2025-02-14 | Stop reason: HOSPADM

## 2025-02-14 RX ORDER — DEXMEDETOMIDINE HYDROCHLORIDE 100 UG/ML
INJECTION, SOLUTION INTRAVENOUS
Status: DISCONTINUED | OUTPATIENT
Start: 2025-02-14 | End: 2025-02-14

## 2025-02-14 RX ORDER — DEXTROMETHORPHAN HYDROBROMIDE, GUAIFENESIN 5; 100 MG/5ML; MG/5ML
650 LIQUID ORAL EVERY 8 HOURS
COMMUNITY
Start: 2025-02-14 | End: 2025-02-14

## 2025-02-14 RX ORDER — ENOXAPARIN SODIUM 100 MG/ML
40 INJECTION SUBCUTANEOUS EVERY 24 HOURS
Status: DISCONTINUED | OUTPATIENT
Start: 2025-02-14 | End: 2025-02-14 | Stop reason: HOSPADM

## 2025-02-14 RX ORDER — PROCHLORPERAZINE EDISYLATE 5 MG/ML
5 INJECTION INTRAMUSCULAR; INTRAVENOUS EVERY 30 MIN PRN
Status: DISCONTINUED | OUTPATIENT
Start: 2025-02-14 | End: 2025-02-14 | Stop reason: HOSPADM

## 2025-02-14 RX ORDER — HYDROMORPHONE HYDROCHLORIDE 1 MG/ML
0.2 INJECTION, SOLUTION INTRAMUSCULAR; INTRAVENOUS; SUBCUTANEOUS EVERY 5 MIN PRN
Status: COMPLETED | OUTPATIENT
Start: 2025-02-14 | End: 2025-02-14

## 2025-02-14 RX ORDER — PROPOFOL 10 MG/ML
VIAL (ML) INTRAVENOUS
Status: DISCONTINUED | OUTPATIENT
Start: 2025-02-14 | End: 2025-02-14

## 2025-02-14 RX ORDER — CLINDAMYCIN PHOSPHATE 900 MG/50ML
900 INJECTION, SOLUTION INTRAVENOUS
Status: COMPLETED | OUTPATIENT
Start: 2025-02-14 | End: 2025-02-14

## 2025-02-14 RX ORDER — ROCURONIUM BROMIDE 10 MG/ML
INJECTION, SOLUTION INTRAVENOUS
Status: DISCONTINUED | OUTPATIENT
Start: 2025-02-14 | End: 2025-02-14

## 2025-02-14 RX ORDER — FENTANYL CITRATE 50 UG/ML
INJECTION, SOLUTION INTRAMUSCULAR; INTRAVENOUS
Status: DISCONTINUED | OUTPATIENT
Start: 2025-02-14 | End: 2025-02-14

## 2025-02-14 RX ORDER — METHOCARBAMOL 500 MG/1
500 TABLET, FILM COATED ORAL 3 TIMES DAILY
Qty: 10 TABLET | Refills: 0 | Status: SHIPPED | OUTPATIENT
Start: 2025-02-14 | End: 2025-02-17 | Stop reason: SDUPTHER

## 2025-02-14 RX ORDER — BUPIVACAINE HYDROCHLORIDE 2.5 MG/ML
INJECTION, SOLUTION EPIDURAL; INFILTRATION; INTRACAUDAL
Status: DISCONTINUED | OUTPATIENT
Start: 2025-02-14 | End: 2025-02-14 | Stop reason: HOSPADM

## 2025-02-14 RX ORDER — LIDOCAINE HYDROCHLORIDE 20 MG/ML
INJECTION, SOLUTION EPIDURAL; INFILTRATION; INTRACAUDAL; PERINEURAL
Status: DISCONTINUED | OUTPATIENT
Start: 2025-02-14 | End: 2025-02-14

## 2025-02-14 RX ADMIN — TACROLIMUS 900 MG: 0.5 CAPSULE ORAL at 07:02

## 2025-02-14 RX ADMIN — LIDOCAINE HYDROCHLORIDE 100 MG: 20 INJECTION, SOLUTION EPIDURAL; INFILTRATION; INTRACAUDAL; PERINEURAL at 07:02

## 2025-02-14 RX ADMIN — HYDROMORPHONE HYDROCHLORIDE 0.2 MG: 1 INJECTION, SOLUTION INTRAMUSCULAR; INTRAVENOUS; SUBCUTANEOUS at 09:02

## 2025-02-14 RX ADMIN — MIDAZOLAM HYDROCHLORIDE 4 MG: 2 INJECTION, SOLUTION INTRAMUSCULAR; INTRAVENOUS at 07:02

## 2025-02-14 RX ADMIN — DEXAMETHASONE SODIUM PHOSPHATE 4 MG: 4 INJECTION, SOLUTION INTRAMUSCULAR; INTRAVENOUS at 07:02

## 2025-02-14 RX ADMIN — GLYCOPYRROLATE 0.2 MG: 0.2 INJECTION, SOLUTION INTRAMUSCULAR; INTRAVENOUS at 07:02

## 2025-02-14 RX ADMIN — HYDROMORPHONE HYDROCHLORIDE 0.2 MG: 1 INJECTION, SOLUTION INTRAMUSCULAR; INTRAVENOUS; SUBCUTANEOUS at 10:02

## 2025-02-14 RX ADMIN — HEPARIN 500 UNITS: 100 SYRINGE at 10:02

## 2025-02-14 RX ADMIN — FENTANYL CITRATE 25 MCG: 50 INJECTION INTRAMUSCULAR; INTRAVENOUS at 09:02

## 2025-02-14 RX ADMIN — PROPOFOL 200 MG: 10 INJECTION, EMULSION INTRAVENOUS at 07:02

## 2025-02-14 RX ADMIN — FENTANYL CITRATE 100 MCG: 50 INJECTION, SOLUTION INTRAMUSCULAR; INTRAVENOUS at 07:02

## 2025-02-14 RX ADMIN — ROCURONIUM BROMIDE 50 MG: 10 INJECTION, SOLUTION INTRAVENOUS at 07:02

## 2025-02-14 RX ADMIN — SODIUM CHLORIDE, SODIUM LACTATE, POTASSIUM CHLORIDE, AND CALCIUM CHLORIDE: .6; .31; .03; .02 INJECTION, SOLUTION INTRAVENOUS at 07:02

## 2025-02-14 RX ADMIN — ONDANSETRON 4 MG: 2 INJECTION INTRAMUSCULAR; INTRAVENOUS at 08:02

## 2025-02-14 RX ADMIN — DEXMEDETOMIDINE 20 MCG: 100 INJECTION, SOLUTION, CONCENTRATE INTRAVENOUS at 07:02

## 2025-02-14 RX ADMIN — ENOXAPARIN SODIUM 40 MG: 40 INJECTION SUBCUTANEOUS at 07:02

## 2025-02-14 RX ADMIN — SUGAMMADEX 200 MG: 100 INJECTION, SOLUTION INTRAVENOUS at 08:02

## 2025-02-14 RX ADMIN — SODIUM CHLORIDE: 9 INJECTION, SOLUTION INTRAVENOUS at 07:02

## 2025-02-14 RX ADMIN — DEXMEDETOMIDINE 12 MCG: 100 INJECTION, SOLUTION, CONCENTRATE INTRAVENOUS at 08:02

## 2025-02-14 NOTE — BRIEF OP NOTE
[facility] - [facility] ([facility])  Brief Operative Note    SUMMARY     Surgery Date: 2/14/2025     Surgeons and Role:     * Robson Morrison MD - Primary     * Deborah Estevez MD - Resident - Assisting     * Don Llanes MD - Resident - Chief        Pre-op Diagnosis:  Incisional hernia without obstruction or gangrene [K43.2]    Post-op Diagnosis:  Post-Op Diagnosis Codes:     * Incisional hernia without obstruction or gangrene [K43.2]    Procedure(s) (LRB):  REPAIR, HERNIA, INCISIONAL OR VENTRAL, WITHOUT HISTORY OF PRIOR REPAIR Open With Mesh (N/A)    Anesthesia: General    Implants:  Implant Name Type Inv. Item Serial No.  Lot No. LRB No. Used Action   PATCH HERNIA MESH VENTRALEX - [id number]  PATCH HERNIA MESH VENTRALEX  [employer] [id number] N/A 1 Implanted       Operative Findings: small epigastric 5mm incisional hernia fixed with mesh    Estimated Blood Loss: * No values recorded between 2/14/2025  8:21 AM and 2/14/2025  8:50 AM *    Estimated Blood Loss has not been documented. EBL = under 5ml.         Specimens:   Specimen (24h ago, onward)      None            [id number]

## 2025-02-14 NOTE — DISCHARGE SUMMARY
[facility] - Surgery (2nd Fl)  Discharge Note  Short Stay    Procedure(s) (LRB):  REPAIR, HERNIA, INCISIONAL OR VENTRAL, WITHOUT HISTORY OF PRIOR REPAIR Open With Mesh (N/A)      OUTCOME: Patient tolerated treatment/procedure well without complication and is now ready for discharge.    DISPOSITION: Home or Self Care    FINAL DIAGNOSIS:  <principal problem not specified>    FOLLOWUP: In clinic    DISCHARGE INSTRUCTIONS:    Discharge Procedure Orders   Diet Adult Regular     Lifting restrictions   Order Comments: No pushing puling or lifting anything heavier than 10 lbs for 6 weeks     No dressing needed     Notify your health care provider if you experience any of the following:  temperature >100.4     Notify your health care provider if you experience any of the following:  persistent nausea and vomiting or diarrhea     Notify your health care provider if you experience any of the following:  severe uncontrolled pain     Notify your health care provider if you experience any of the following:  redness, tenderness, or signs of infection (pain, swelling, redness, odor or green/yellow discharge around incision site)     Notify your health care provider if you experience any of the following:  difficulty breathing or increased cough     Notify your health care provider if you experience any of the following:  severe persistent headache     Notify your health care provider if you experience any of the following:  worsening rash     Notify your health care provider if you experience any of the following:  persistent dizziness, light-headedness, or visual disturbances     Notify your health care provider if you experience any of the following:  increased confusion or weakness     Notify your health care provider if you experience any of the following:     Activity as tolerated

## 2025-02-14 NOTE — ANESTHESIA PREPROCEDURE EVALUATION
02/13/2025  Solo Black is a 44 y.o., male.        Pre-op Assessment          Review of Systems  Renal/:  Chronic Renal Disease        Kidney Function/Disease             Hepatic/GI:   PUD, Hiatal Hernia, GERD         Gerd, Peptic Ulcer Disease    Hernia, Hiatal Hernia      Neurological:    Neuromuscular Disease,                                 Neuromuscular Disease   Psych:  Psychiatric History                  Physical Exam    Airway:  No airway management difficulties anticipated  Dental:  No active dental issues noted  Chest/Lungs:  Clear to auscultation    Heart:  Rate: Normal  Rhythm: Regular Rhythm  Sounds: Normal        Anesthesia Plan  Type of Anesthesia, risks & benefits discussed:    Anesthesia Type: Gen ETT  Informed Consent: Informed consent signed with the Patient and all parties understand the risks and agree with anesthesia plan.  All questions answered.   ASA Score: 3  Anesthesia Plan Notes: Chart reviewed. Patient seen and examined. Anesthesia plan discussed and questions answered. E-consent signed. Sae Alexander MD    Ready For Surgery From Anesthesia Perspective.     .

## 2025-02-14 NOTE — TRANSFER OF CARE
Anesthesia Transfer of Care Note    Patient: Solo Black    Procedure(s) Performed: Procedure(s) (LRB):  REPAIR, HERNIA, INCISIONAL OR VENTRAL, WITHOUT HISTORY OF PRIOR REPAIR Open With Mesh (N/A)    Patient location: [facility]    Anesthesia Type: general    Transport from OR: Transported from OR on room air with adequate spontaneous ventilation    Post pain: adequate analgesia    Post assessment: no apparent anesthetic complications and tolerated procedure well    Post vital signs: stable    Level of consciousness: awake    Nausea/Vomiting: no nausea/vomiting    Complications: none    Transfer of care protocol was followed      Last vitals: Visit Vitals  /76   Pulse 66   Temp 36.4 °C (97.6 °F) (Temporal)   Resp 18   Ht 6' (1.829 m)   Wt 86.2 kg (190 lb 0.6 oz)   SpO2 100%   BMI 25.77 kg/m²

## 2025-02-14 NOTE — DISCHARGE INSTRUCTIONS
Discharge Instructions     WOUND CARE  -- You have skin glue over your incision(s); this will slowly flake away over the next few weeks. Do not pick at surgical glue, it will come off on its own.   -- Ok to shower; however, no baths or submerging in water (I.e. swimming, submerging in water) for at least two weeks.  -- Please keep the incision clean with soap and water, pat your incision dry, do not scrub hard over your incisions.     MEDICATIONS AND PAIN CONTROL  -- Please resume all home medications as instructed and take any newly prescribed medications.  -- Most people find that over-the-counter pain medications (Tylenol combined with Ibuprofen) will be sufficient for most pain control.  -- You have been prescribed a narcotic pain medication. Please wean narcotic over the next few daysIf you're taking prescription narcotics, do not drive or operate heavy machinery. Do not drive if your pain is not controlled enough for you to react quickly safely.  -- No straining, avoid constipation. May take OTC stool softeners as described and laxatives as needed.     OTHER INSTRUCTIONS  -- Monitor for temp > 101 F, bleeding, redness, purulent drainage, or any sudden, new extreme pain. If any occur, please call our clinic or go to the emergency department if after normal business hours.  -- You may resume your regular diet as tolerated.   -- No heavy lifting (anything >10 lbs or = to a gallon of milk) or strenuous exercise until cleared by a physician. No pushing or pulling activities such as vacuuming or raking. Otherwise, activity as tolerated.   -- Follow up with Dr. Morrison in 2 weeks in clinic for a post-op check. Message sent to clinic for scheduling. Clinic # is [phone]

## 2025-02-14 NOTE — H&P
"General SurgeryHistory and Physical    Patient Name: Solo Black  YOB: 1980 (44 y.o.)  MRN: [id number]  Today's Date: 02/14/2025    Referring Md:   Robson Morrison Md  [address] [place] [address]    SUBJECTIVE:     Chief Complaint: Incisional hernia     History of Present Illness:  Solo Black is a 44 y.o. male with a complex past surgical history who presents for repair of recurrent incisional hernia. He last had surgery on 8/16/23 for an incarcerated hernia, which was repaired with mesh. He now reports recurrence of the midline hernia, which he has noticed for about 2 months. The hernia is reducible. Denies any nausea/vomiting, pain, or changes in bowel function. No concern for obstructive symptoms.     Patient denies personal history of MI, CVA, lung disease, DM  Denies alcohol, tobacco, and elicit drug use.   Not currently on any anticoagulants    No current facility-administered medications for this encounter.     Facility-Administered Medications Ordered in Other Encounters   Medication Dose Route Frequency Provider Last Rate Last Admin    0.9%  NaCl infusion   Intravenous Continuous Deniz Can PA-C        ceFAZolin 2 g in dextrose 5 % in water (D5W) 50 mL IVPB (MB+)  2 g Intravenous On Call Procedure Deniz Can PA-C         Review of patient's allergies indicates:   Allergen Reactions    Bean Diarrhea, Edema, Hives, Nausea And Vomiting and Swelling    Legumes Nausea And Vomiting and Swelling     Oral swelling    Lentils Nausea And Vomiting and Swelling     Oral swelling      Nsaids (non-steroidal anti-inflammatory drug)      GI bleed    Peas Nausea And Vomiting and Swelling     oral swelling    Ketamine Hallucinations    Haloperidol      "feels like crawling out of his skin"      Meloxicam Nausea Only     GI bleed    Penicillins Nausea And Vomiting     Can take third gen cephalosporins per patient      Past Medical History:   Diagnosis Date    " Arthritis     C. difficile colitis 02/2014    postop of hand surgery    Cancer     Encounter for blood transfusion     Eosinophilic esophagitis 03/11/2014    GERD (gastroesophageal reflux disease)     Hypereosinophilic syndrome     IBS (irritable bowel syndrome)     Leukemia     MRSA carrier     says multiple times nose swab was +    Munchausen syndrome     Nephrolithiasis 04/2014    bilateral punctate - never passed a stone    Septic prepatellar bursitis of right knee 01/12/2021    Urinary tract infection 02/2014    MRSA     Past Surgical History:   Procedure Laterality Date    APPENDECTOMY      ARTHROSCOPY OF SHOULDER WITH REMOVAL OF DISTAL CLAVICLE Right 11/1/2018    Procedure: ARTHROSCOPY, SHOULDER, WITH DISTAL CLAVICLE EXCISION;  Surgeon: Gabino Mejia MD;  Location: [facility];  Service: Orthopedics;  Laterality: Right;  video    BACK SURGERY      BLADDER FULGURATION N/A 9/18/2020    Procedure: FULGURATION, BLADDER;  Surgeon: Randall Moss MD;  Location: [facility];  Service: Urology;  Laterality: N/A;  blood vessels of bladder neck and prostate    BONE MARROW BIOPSY Left 10/1/2020    Procedure: Biopsy-bone marrow;  Surgeon: Trung Polanco MD;  Location: [facility];  Service: Oncology;  Laterality: Left;    CIRCUMCISION, PRIMARY      COLONOSCOPY N/A 11/14/2018    Procedure: COLONOSCOPY;  Surgeon: Milton Brunson MD;  Location: [facility];  Service: Endoscopy;  Laterality: N/A;    COLONOSCOPY N/A 7/20/2020    Procedure: COLONOSCOPY;  Surgeon: Ruslan Tillman MD;  Location: [facility];  Service: Endoscopy;  Laterality: N/A;    CYSTOSCOPY W/ RETROGRADES Bilateral 9/18/2020    Procedure: CYSTOSCOPY, WITH RETROGRADE PYELOGRAM;  Surgeon: Randall Moss MD;  Location: [facility];  Service: Urology;  Laterality: Bilateral;    CYSTOURETHROSCOPY N/A 2/1/2021    Procedure: CYSTOURETHROSCOPY, short placement;  Surgeon: Boni Benites MD;  Location: [address];  Service: Urology;  Laterality: N/A;     DILATION OF URETHRA N/A 9/18/2020    Procedure: DILATION, URETHRA;  Surgeon: Randall Moss MD;  Location: [facility];  Service: Urology;  Laterality: N/A;    drainage of abscess from hand  2009    MRSA    drainage of abscess from R thigh  2006    MRSA    ESOPHAGOGASTRODUODENOSCOPY  3/11/2014    ESOPHAGOGASTRODUODENOSCOPY N/A 9/5/2018    Procedure: EGD (ESOPHAGOGASTRODUODENOSCOPY);  Surgeon: Kolby Wall MD;  Location: [facility];  Service: Endoscopy;  Laterality: N/A;    ESOPHAGOGASTRODUODENOSCOPY N/A 3/25/2020    Procedure: EGD (ESOPHAGOGASTRODUODENOSCOPY);  Surgeon: Ruslan Tillman MD;  Location: [facility];  Service: Endoscopy;  Laterality: N/A;    ESOPHAGOGASTRODUODENOSCOPY N/A 7/20/2020    Procedure: EGD (ESOPHAGOGASTRODUODENOSCOPY);  Surgeon: Ruslan Tillman MD;  Location: [facility];  Service: Endoscopy;  Laterality: N/A;  Patient is a very hard stick, requires anesthesia stick.    ESOPHAGOGASTRODUODENOSCOPY N/A 8/31/2020    Procedure: EGD (ESOPHAGOGASTRODUODENOSCOPY);  Surgeon: Kolby Wall MD;  Location: [facility];  Service: Endoscopy;  Laterality: N/A;    ESOPHAGOGASTRODUODENOSCOPY N/A 3/3/2021    Procedure: EGD (ESOPHAGOGASTRODUODENOSCOPY);  Surgeon: Ruslan Tillman MD;  Location: [facility];  Service: Endoscopy;  Laterality: N/A;    ESOPHAGOGASTRODUODENOSCOPY N/A 7/12/2021    Procedure: EGD (ESOPHAGOGASTRODUODENOSCOPY);  Surgeon: Kolby Wall MD;  Location: [facility];  Service: Endoscopy;  Laterality: N/A;    FLEXIBLE SIGMOIDOSCOPY N/A 9/5/2018    Procedure: SIGMOIDOSCOPY, FLEXIBLE;  Surgeon: Kolby Wall MD;  Location: [facility];  Service: Endoscopy;  Laterality: N/A;    HAND SURGERY  jan 2014    power saw fell on hand - laceration 3 tendons    INCISION AND DRAINAGE OF KNEE Right 1/13/2021    Procedure: INCISION AND DRAINAGE, KNEE;  Surgeon: Sony Linton Jr., MD;  Location: [facility];  Service: Orthopedics;  Laterality: Right;    INSERTION OF TUNNELED CENTRAL  VENOUS CATHETER (CVC) WITH SUBCUTANEOUS PORT Right 11/24/2021    Procedure: Right port-a-cath removal and exchange.;  Surgeon: Robson Morrison MD;  [facility];  Service: General;  Laterality: Right;  Right internal jugular    INSERTION OF TUNNELED CENTRAL VENOUS CATHETER (CVC) WITH SUBCUTANEOUS PORT Left 3/31/2022    Procedure: INSERTION, SINGLE LUMEN CATHETER WITH PORT, WITH FLUOROSCOPIC GUIDANCE Left Poss Right Chest;  Surgeon: Robson Morrison MD;  [facility];  Service: General;  Laterality: Left;    INSERTION OF TUNNELED CENTRAL VENOUS CATHETER (CVC) WITH SUBCUTANEOUS PORT Right 10/6/2022    Procedure: INSERTION, SINGLE LUMEN CATHETER WITH PORT, WITH FLUOROSCOPIC GUIDANCE Left Possible Right Chest;  Surgeon: Robson Morrison MD;  [facility];  Service: General;  Laterality: Right;    INSERTION OF VENOUS ACCESS PORT Right 8/21/2020    Procedure: INSERTION, VENOUS ACCESS PORT;  Surgeon: Troy Honeycutt MD;  Location: [facility];  Service: General;  Laterality: Right;    MEDIPORT REMOVAL N/A 3/24/2023    Procedure: REMOVAL, CATHETER, CENTRAL VENOUS, TUNNELED, WITH PORT;  Surgeon: Robson Morrison MD;  [facility];  Service: General;  Laterality: N/A;    NISSEN FUNDOPLICATION      REPAIR OF RECURRENT INCARCERATED INCISIONAL HERNIA N/A 8/16/2023    Procedure: REPAIR, HERNIA, INCISIONAL, INCARCERATED, RECURRENT, W MESH;  Surgeon: Robson Morrison MD;  [facility];  Service: General;  Laterality: N/A;    REPAIR, HERNIA, INCISIONAL OR VENTRAL, WITHOUT HISTORY OF PRIOR REPAIR N/A 3/13/2023    Procedure: REPAIR, HERNIA, INCISIONAL OR VENTRAL, WITHOUT HISTORY OF PRIOR REPAIR x2;  Surgeon: Robson Morrison MD;  [facility];  Service: General;  Laterality: N/A;    REVISION OF SCAR N/A 4/7/2021    Procedure: REVISION, SCAR;  Surgeon: Robson Morrison MD;  [facility];  Service: General;  Laterality: N/A;     UMBILICAL HERNIA REPAIR N/A 4/7/2021    Procedure: REPAIR, HERNIA, UMBILICAL, AGE 5 YEARS OR OLDER;  Surgeon: Robson Morrison MD;  Location: [facility] OR [address] [place] [address];  Service: General;  Laterality: N/A;     Family History   Problem Relation Name Age of Onset    Urolithiasis Father      Celiac disease Sister      Hypertension Maternal Grandmother      Hypertension Maternal Grandfather      Prostate cancer Neg Hx      Kidney disease Neg Hx       Social History     Tobacco Use    Smoking status: Never    Smokeless tobacco: Never    Tobacco comments:     Pt states he has smoked 1 or 2 cigarettes in his life.   Substance Use Topics    Alcohol use: Not Currently    Drug use: No        Review of Systems:  Review of Systems   Constitutional:  Negative for chills and fever.   Eyes:  Negative for blurred vision.   Respiratory:  Negative for cough and shortness of breath.    Cardiovascular:  Negative for chest pain and leg swelling.   Gastrointestinal:  Negative for abdominal pain, constipation, diarrhea, nausea and vomiting.   Musculoskeletal:  Negative for myalgias.   Skin:  Negative for rash.   Neurological:  Negative for dizziness and headaches.       OBJECTIVE:     Vital Signs (Most Recent)  There were no vitals taken for this visit.    Physical Exam  Constitutional:       General: He is not in acute distress.  HENT:      Head: Normocephalic and atraumatic.   Eyes:      Conjunctiva/sclera: Conjunctivae normal.   Cardiovascular:      Rate and Rhythm: Normal rate and regular rhythm.      Pulses: Normal pulses.   Pulmonary:      Effort: Pulmonary effort is normal. No respiratory distress.   Abdominal:      General: There is no distension.      Palpations: Abdomen is soft.      Comments: Well healed midline incision, reducible hernia present, no tenderness to plapation    Musculoskeletal:         General: No swelling.      Cervical back: Normal range of motion.   Skin:     General: Skin is warm and dry.   Neurological:       General: No focal deficit present.      Mental Status: He is alert and oriented to person, place, and time.           Labs:     Lab Results   Component Value Date    WBC 4.31 03/06/2024    HGB 11.9 (L) 03/06/2024    HCT 39.4 (L) 03/06/2024    MCV 72 (L) 03/06/2024     03/06/2024         CMP  Sodium   Date Value Ref Range Status   12/06/2023 139 135 - 146 mmol/L Final   08/17/2023 139 136 - 145 mmol/L Final     Potassium   Date Value Ref Range Status   12/06/2023 3.7 3.6 - 5.2 mmol/L Final   08/17/2023 3.8 3.5 - 5.1 mmol/L Final     Chloride   Date Value Ref Range Status   08/17/2023 108 95 - 110 mmol/L Final     CO2   Date Value Ref Range Status   08/17/2023 23 23 - 29 mmol/L Final     Carbon Dioxide   Date Value Ref Range Status   12/06/2023 28 24 - 32 mmol/L Final     Glucose   Date Value Ref Range Status   08/17/2023 92 70 - 110 mg/dL Final   11/16/2016 115 (H) 74 - 106 MG/DL Final     BUN   Date Value Ref Range Status   12/06/2023 10.0 7.0 - 25.0 mg/dL Final   08/17/2023 13 6 - 20 mg/dL Final     Creatinine   Date Value Ref Range Status   12/06/2023 1.05 0.70 - 1.40 mg/dL Final   08/17/2023 1.0 0.5 - 1.4 mg/dL Final     Calcium   Date Value Ref Range Status   12/06/2023 9.3 8.4 - 10.3 mg/dL Final   08/17/2023 8.3 (L) 8.7 - 10.5 mg/dL Final     Total Protein   Date Value Ref Range Status   08/17/2023 6.4 6.0 - 8.4 g/dL Final     Albumin   Date Value Ref Range Status   11/30/2023 4.1 3.4 - 5.0 g/dL Final   08/17/2023 3.2 (L) 3.5 - 5.2 g/dL Final   11/16/2016 3.3 (L) 3.5 - 5.0 G/DL Final     Total Bilirubin   Date Value Ref Range Status   11/30/2023 1.4 (H) <1.3 mg/dL Final   08/17/2023 0.8 0.1 - 1.0 mg/dL Final     Comment:     For infants and newborns, interpretation of results should be based  on gestational age, weight and in agreement with clinical  observations.    Premature Infant recommended reference ranges:  Up to 24 hours.............<8.0 mg/dL  Up to 48 hours............<12.0 mg/dL  3-5  days..................<15.0 mg/dL  6-29 days.................<15.0 mg/dL       Alkaline Phosphatase   Date Value Ref Range Status   08/17/2023 161 (H) 55 - 135 U/L Final     AST   Date Value Ref Range Status   11/30/2023 34 <45 U/L Final   08/17/2023 110 (H) 10 - 40 U/L Final     ALT   Date Value Ref Range Status   11/30/2023 48 (H) <46 U/L Final   08/17/2023 237 (H) 10 - 44 U/L Final     Anion Gap   Date Value Ref Range Status   12/06/2023 11 8 - 16 Final     Comment:     Calculation does not include K+   08/17/2023 8 8 - 16 mmol/L Final     eGFR if    Date Value Ref Range Status   11/25/2021 >60.0 >60 mL/min/1.73 m^2 Final     eGFR if non    Date Value Ref Range Status   11/25/2021 >60.0 >60 mL/min/1.73 m^2 Final     Comment:     Calculation used to obtain the estimated glomerular filtration  rate (eGFR) is the CKD-EPI equation.        ASSESSMENT/PLAN:     Solo Black is a 44 y.o. male with a complex past medical and surgical history who presents with a recurrent incisional hernia. Will plan for open repair with mesh.      - To OR for repair of incisional hernia, with mesh  - Consent obtained  - Patient understands and in agreement with plan; all questions answered    Case discussed with Dr. Morrison.      Noni Linton MD   General Surgery PGY-1  2/14/2025

## 2025-02-14 NOTE — ANESTHESIA POSTPROCEDURE EVALUATION
Anesthesia Post Evaluation    Patient: Solo Black    Procedure(s) Performed: Procedure(s) (LRB):  REPAIR, HERNIA, INCISIONAL OR VENTRAL, WITHOUT HISTORY OF PRIOR REPAIR Open With Mesh (N/A)    Final Anesthesia Type: general      Patient participation: Yes- Able to Participate  Level of consciousness: awake and alert  Post-procedure vital signs: reviewed and stable  Pain control: Pain has been treated.  Airway patency: patent    PONV status: Absent or treated.  Anesthetic complications: no      Cardiovascular status: hemodynamically stable  Respiratory status: unassisted  Hydration status: euvolemic  Follow-up not needed.              Vitals Value Taken Time   /73 02/14/25 0950   Temp 36.6 °C (97.9 °F) 02/14/25 0900   Pulse 77 02/14/25 0958   Resp 29 02/14/25 0958   SpO2 99 % 02/14/25 0958   Vitals shown include unfiled device data.      No case tracking events are documented in the log.      Pain/Marcia Score: Pain Rating Prior to Med Admin: 10 (2/14/2025  9:55 AM)  Marcia Score: 6 (2/14/2025  9:00 AM)

## 2025-02-15 ENCOUNTER — HOSPITAL ENCOUNTER (EMERGENCY)
Facility: HOSPITAL | Age: 45
Discharge: HOME | End: 2025-02-15
Attending: EMERGENCY MEDICINE
Payer: MEDICAID

## 2025-02-15 VITALS
HEIGHT: 72 IN | HEART RATE: 70 BPM | DIASTOLIC BLOOD PRESSURE: 73 MMHG | RESPIRATION RATE: 16 BRPM | WEIGHT: 190 LBS | TEMPERATURE: 99 F | BODY MASS INDEX: 25.73 KG/M2 | SYSTOLIC BLOOD PRESSURE: 124 MMHG | OXYGEN SATURATION: 95 %

## 2025-02-15 DIAGNOSIS — K43.2 INCISIONAL HERNIA, WITHOUT OBSTRUCTION OR GANGRENE: ICD-10-CM

## 2025-02-15 DIAGNOSIS — K43.2 INCISIONAL HERNIA, WITHOUT OBSTRUCTION OR GANGRENE: Primary | ICD-10-CM

## 2025-02-15 DIAGNOSIS — R10.9 ABDOMINAL PAIN, UNSPECIFIED ABDOMINAL LOCATION: Primary | ICD-10-CM

## 2025-02-15 DIAGNOSIS — T81.9XXA COMPLICATION OF PROCEDURE, INITIAL ENCOUNTER: ICD-10-CM

## 2025-02-15 LAB
ALBUMIN SERPL BCP-MCNC: 3.7 G/DL (ref 3.5–5.2)
ALP SERPL-CCNC: 101 U/L (ref 40–150)
ALT SERPL W/O P-5'-P-CCNC: 141 U/L (ref 10–44)
ANION GAP SERPL CALC-SCNC: 8 MMOL/L (ref 8–16)
AST SERPL-CCNC: 83 U/L (ref 10–40)
BASOPHILS # BLD AUTO: 0.01 K/UL (ref 0–0.2)
BASOPHILS NFR BLD: 0.1 % (ref 0–1.9)
BILIRUB SERPL-MCNC: 0.8 MG/DL (ref 0.1–1)
BILIRUB UR QL STRIP: NEGATIVE
BUN SERPL-MCNC: 9 MG/DL (ref 6–20)
BUN SERPL-MCNC: 9 MG/DL (ref 6–30)
CALCIUM SERPL-MCNC: 8.7 MG/DL (ref 8.7–10.5)
CHLORIDE SERPL-SCNC: 102 MMOL/L (ref 95–110)
CHLORIDE SERPL-SCNC: 107 MMOL/L (ref 95–110)
CLARITY UR REFRACT.AUTO: CLEAR
CO2 SERPL-SCNC: 24 MMOL/L (ref 23–29)
COLOR UR AUTO: COLORLESS
CREAT SERPL-MCNC: 0.8 MG/DL (ref 0.5–1.4)
CREAT SERPL-MCNC: 1 MG/DL (ref 0.5–1.4)
DIFFERENTIAL METHOD BLD: ABNORMAL
EOSINOPHIL # BLD AUTO: 0 K/UL (ref 0–0.5)
EOSINOPHIL NFR BLD: 0.3 % (ref 0–8)
ERYTHROCYTE [DISTWIDTH] IN BLOOD BY AUTOMATED COUNT: 17 % (ref 11.5–14.5)
EST. GFR  (NO RACE VARIABLE): >60 ML/MIN/1.73 M^2
GLUCOSE SERPL-MCNC: 98 MG/DL (ref 70–110)
GLUCOSE SERPL-MCNC: 99 MG/DL (ref 70–110)
GLUCOSE UR QL STRIP: NEGATIVE
HCT VFR BLD AUTO: 27.7 % (ref 40–54)
HCT VFR BLD CALC: 28 %PCV (ref 36–54)
HGB BLD-MCNC: 7.9 G/DL (ref 14–18)
HGB UR QL STRIP: NEGATIVE
IMM GRANULOCYTES # BLD AUTO: 0.03 K/UL (ref 0–0.04)
IMM GRANULOCYTES NFR BLD AUTO: 0.4 % (ref 0–0.5)
KETONES UR QL STRIP: NEGATIVE
LEUKOCYTE ESTERASE UR QL STRIP: NEGATIVE
LIPASE SERPL-CCNC: 12 U/L (ref 4–60)
LYMPHOCYTES # BLD AUTO: 0.6 K/UL (ref 1–4.8)
LYMPHOCYTES NFR BLD: 7.2 % (ref 18–48)
MCH RBC QN AUTO: 20 PG (ref 27–31)
MCHC RBC AUTO-ENTMCNC: 28.5 G/DL (ref 32–36)
MCV RBC AUTO: 70 FL (ref 82–98)
MONOCYTES # BLD AUTO: 1.1 K/UL (ref 0.3–1)
MONOCYTES NFR BLD: 13.3 % (ref 4–15)
NEUTROPHILS # BLD AUTO: 6.2 K/UL (ref 1.8–7.7)
NEUTROPHILS NFR BLD: 78.7 % (ref 38–73)
NITRITE UR QL STRIP: NEGATIVE
NRBC BLD-RTO: 0 /100 WBC
PH UR STRIP: 6 [PH] (ref 5–8)
PLATELET # BLD AUTO: 271 K/UL (ref 150–450)
PMV BLD AUTO: 10.5 FL (ref 9.2–12.9)
POC IONIZED CALCIUM: 1.11 MMOL/L (ref 1.06–1.42)
POC TCO2 (MEASURED): 24 MMOL/L (ref 23–29)
POTASSIUM BLD-SCNC: 3.5 MMOL/L (ref 3.5–5.1)
POTASSIUM SERPL-SCNC: 3.5 MMOL/L (ref 3.5–5.1)
PROT SERPL-MCNC: 6.5 G/DL (ref 6–8.4)
PROT UR QL STRIP: NEGATIVE
RBC # BLD AUTO: 3.95 M/UL (ref 4.6–6.2)
SAMPLE: ABNORMAL
SODIUM BLD-SCNC: 140 MMOL/L (ref 136–145)
SODIUM SERPL-SCNC: 139 MMOL/L (ref 136–145)
SP GR UR STRIP: 1.01 (ref 1–1.03)
URN SPEC COLLECT METH UR: ABNORMAL
WBC # BLD AUTO: 7.9 K/UL (ref 3.9–12.7)

## 2025-02-15 PROCEDURE — 63600175 PHARM REV CODE 636 W HCPCS

## 2025-02-15 PROCEDURE — 83690 ASSAY OF LIPASE: CPT

## 2025-02-15 PROCEDURE — 99285 EMERGENCY DEPT VISIT HI MDM: CPT | Mod: 25

## 2025-02-15 PROCEDURE — 80047 BASIC METABLC PNL IONIZED CA: CPT

## 2025-02-15 PROCEDURE — 25000003 PHARM REV CODE 250

## 2025-02-15 PROCEDURE — 96374 THER/PROPH/DIAG INJ IV PUSH: CPT

## 2025-02-15 PROCEDURE — 81003 URINALYSIS AUTO W/O SCOPE: CPT

## 2025-02-15 PROCEDURE — 85025 COMPLETE CBC W/AUTO DIFF WBC: CPT

## 2025-02-15 PROCEDURE — 25500020 PHARM REV CODE 255: Performed by: EMERGENCY MEDICINE

## 2025-02-15 PROCEDURE — 96375 TX/PRO/DX INJ NEW DRUG ADDON: CPT

## 2025-02-15 PROCEDURE — 80053 COMPREHEN METABOLIC PANEL: CPT

## 2025-02-15 PROCEDURE — 63600175 PHARM REV CODE 636 W HCPCS: Mod: JZ,TB | Performed by: EMERGENCY MEDICINE

## 2025-02-15 PROCEDURE — 82330 ASSAY OF CALCIUM: CPT | Mod: 59

## 2025-02-15 RX ORDER — OXYCODONE HYDROCHLORIDE 10 MG/1
10 TABLET ORAL EVERY 6 HOURS PRN
Qty: 15 TABLET | Refills: 0 | Status: SHIPPED | OUTPATIENT
Start: 2025-02-15 | End: 2025-02-15

## 2025-02-15 RX ORDER — HEPARIN 100 UNIT/ML
5 SYRINGE INTRAVENOUS
Status: DISCONTINUED | OUTPATIENT
Start: 2025-02-15 | End: 2025-02-15 | Stop reason: HOSPADM

## 2025-02-15 RX ORDER — HYDROMORPHONE HYDROCHLORIDE 1 MG/ML
1 INJECTION, SOLUTION INTRAMUSCULAR; INTRAVENOUS; SUBCUTANEOUS
Refills: 0 | Status: COMPLETED | OUTPATIENT
Start: 2025-02-15 | End: 2025-02-15

## 2025-02-15 RX ORDER — ONDANSETRON HYDROCHLORIDE 2 MG/ML
4 INJECTION, SOLUTION INTRAVENOUS
Status: COMPLETED | OUTPATIENT
Start: 2025-02-15 | End: 2025-02-15

## 2025-02-15 RX ORDER — ACETAMINOPHEN 500 MG
1000 TABLET ORAL
Status: COMPLETED | OUTPATIENT
Start: 2025-02-15 | End: 2025-02-15

## 2025-02-15 RX ORDER — DIPHENHYDRAMINE HYDROCHLORIDE 50 MG/ML
12.5 INJECTION INTRAMUSCULAR; INTRAVENOUS
Status: COMPLETED | OUTPATIENT
Start: 2025-02-15 | End: 2025-02-15

## 2025-02-15 RX ORDER — OXYCODONE HYDROCHLORIDE 5 MG/1
5 TABLET ORAL EVERY 6 HOURS PRN
Qty: 15 TABLET | Refills: 0 | Status: SHIPPED | OUTPATIENT
Start: 2025-02-15

## 2025-02-15 RX ADMIN — HYDROMORPHONE HYDROCHLORIDE 1 MG: 1 INJECTION, SOLUTION INTRAMUSCULAR; INTRAVENOUS; SUBCUTANEOUS at 08:02

## 2025-02-15 RX ADMIN — IOHEXOL 100 ML: 350 INJECTION, SOLUTION INTRAVENOUS at 09:02

## 2025-02-15 RX ADMIN — HEPARIN 500 UNITS: 100 SYRINGE at 12:02

## 2025-02-15 RX ADMIN — DIPHENHYDRAMINE HYDROCHLORIDE 12.5 MG: 50 INJECTION, SOLUTION INTRAMUSCULAR; INTRAVENOUS at 08:02

## 2025-02-15 RX ADMIN — ONDANSETRON 4 MG: 2 INJECTION INTRAMUSCULAR; INTRAVENOUS at 08:02

## 2025-02-15 RX ADMIN — ACETAMINOPHEN 1000 MG: 500 TABLET ORAL at 08:02

## 2025-02-15 NOTE — HPI
Solo Black is a 44 y.o. male with a history of incisional hernia repair with mesh yesterday. He states that he had increased pain overnight and felt like he had a bulge near his incision prompting him to come to the ED. Lab work unremarkable. CT A/P with read stating recurrent hernia. On my review the repair appears to be intact. Additionally, he does not have any evidence of hernia on physical exam.

## 2025-02-15 NOTE — ED TRIAGE NOTES
Pt arrives POV with family with complaints of abd pain following a surgery yesterday. Reports increased pain when standing and breathing. Reports nausea, no vomiting. Reports 9/10.15mg of oxycodone taken at approx 0500. BM last night. No CP and SOB. Recommended to come to the ER by on call

## 2025-02-15 NOTE — ASSESSMENT & PLAN NOTE
44M POD1 from incisional hernia repair with mesh. No concern for recurrent hernia on exam. Imaging reviewed, repair appears intact. He is having bowel function and appropriate pain post op. Can follow up in clinic.     -- okay for dc per ED and follow up in clinic   -- discussed with the patient

## 2025-02-15 NOTE — OP NOTE
[facility]  General Surgery  Operative Note    DATE: 2/14/2025    PREOPERATIVE DIAGNOSIS: Incisional hernia without obstruction or gangrene [K43.2]     POSTOPERATIVE DIAGNOSIS: Incisional hernia without obstruction or gangrene [K43.2]     Procedure(s):  REPAIR, HERNIA, INCISIONAL OR VENTRAL, WITHOUT HISTORY OF PRIOR REPAIR Open With Mesh     Surgeons and Role:     * Robson Morrison MD - Primary     * Deborah Estevez MD - Resident - Assisting     * Don Llanes MD - Resident - Chief    ANESTHESIA: General endotracheal anesthesia    FINDINGS:  small epigastric 5mm incisional hernia fixed with mesh       PROCEDURE IN DETAIL: Mr. Black was brought to the operating room where he was placed in supine position on the operating room table and underwent general anesthesia with endotracheal intubation without complication. The field was sterilely prepped out and draped in standard fashion, and a timeout identifying the correct patient, placement, procedure, and preoperative antibiotics was called with everyone in agreement.     A 4cm incision was made overlying the suspected area of incisional hernia on the epigastric area. Incision was extended diwn to the subcutaneous tissue and down to the fascia. With careful dissection we were able to appreciate a small 5mm midline incisional hernia. We confirmed defect by asking for a valsalva and noting bulging from the defect. A 4.3 cm ventralex mesh patch was inserted behind the defect and sown into place with two ethibond sutures. This repaired the defect appropriately.     Skin was then closed with subcuticular 4-0 Monocryl and Dermabond was applied.   This completed the proposed operation. All instruments, needles, and sponge counts were reported as correct x2 by the nursing staff. Patient was extubated and awakened from general anesthesia without complication. He was sent to the recovery unit in stable condition.     EBL: under 10  mL    COMPLICATIONS: none apparent.    SPECIMEN:   Specimens (From admission, onward)      None               DRAINS: None    DISPOSITION: PACU.

## 2025-02-15 NOTE — ED NOTES
Pt stating he is allergic to contrast and needs benadryl and prednisone before getting IV contrast. Dr. Tavera made aware.

## 2025-02-15 NOTE — ED NOTES
"Received report and assumed care of patient at this time.  Patient is AAOx4 with a calm affect and aware of environment. Airway is open and patent, respirations are spontaneous, normal effort and rate noted. Pt denies chest pain at this time. Skin warm and dry. Movement to all extremities noted. Pt notes he feels abdominal pain near his incision site and "slight" nausea. Bed placed in low position, side rails up x 2, call light is within reach of patient. Explanation of care provided to patient, pt placed on BP, pulse-ox and cardiac monitoring. Awaiting further MD orders and bed assignment, POC continues.    "

## 2025-02-15 NOTE — ED PROVIDER NOTES
"Encounter Date: 2/15/2025       History     Chief Complaint   Patient presents with    Abdominal Pain     Had surgery yesterday being treated for cancer - felt a pop in abdominal wall this morning.     HPI    Solo Black is a 44 y.o. male with PMH of hyper eosinophilic syndrome, fictitious disorder, incisional hernia repair 2/14, presenting to [facility] ED for abdominal pain.  Patient underwent successful incisional hernia repair yesterday.  Patient has been having severe abdominal pain for several hours.  Endorses nausea but no vomiting.  Patient has had a bowel movement.  He has been taking 15 mg of oxycodone without significant improvement in symptoms.  Denies fever, chills, cough/congestion, chest pain/shortness of breath.      Review of patient's allergies indicates:   Allergen Reactions    Bean Diarrhea, Edema, Hives, Nausea And Vomiting and Swelling    Legumes Nausea And Vomiting and Swelling     Oral swelling    Lentils Nausea And Vomiting and Swelling     Oral swelling      Nsaids (non-steroidal anti-inflammatory drug)      GI bleed    Peas Nausea And Vomiting and Swelling     oral swelling    Ketamine Hallucinations    Haloperidol      "feels like crawling out of his skin"      Meloxicam Nausea Only     GI bleed    Penicillins Nausea And Vomiting     Can take third gen cephalosporins per patient      Past Medical History:   Diagnosis Date    Arthritis     C. difficile colitis 02/2014    postop of hand surgery    Cancer     Encounter for blood transfusion     Eosinophilic esophagitis 03/11/2014    GERD (gastroesophageal reflux disease)     Hypereosinophilic syndrome     IBS (irritable bowel syndrome)     Leukemia     MRSA carrier     says multiple times nose swab was +    Munchausen syndrome     Nephrolithiasis 04/2014    bilateral punctate - never passed a stone    Septic prepatellar bursitis of right knee 01/12/2021    Urinary tract infection 02/2014    MRSA     Past Surgical History:   Procedure " Laterality Date    APPENDECTOMY      ARTHROSCOPY OF SHOULDER WITH REMOVAL OF DISTAL CLAVICLE Right 11/1/2018    Procedure: ARTHROSCOPY, SHOULDER, WITH DISTAL CLAVICLE EXCISION;  Surgeon: Gabino Mejia MD;  Location: [facility];  Service: Orthopedics;  Laterality: Right;  video    BACK SURGERY      BLADDER FULGURATION N/A 9/18/2020    Procedure: FULGURATION, BLADDER;  Surgeon: Randall Moss MD;  Location: [facility];  Service: Urology;  Laterality: N/A;  blood vessels of bladder neck and prostate    BONE MARROW BIOPSY Left 10/1/2020    Procedure: Biopsy-bone marrow;  Surgeon: Trung Polanco MD;  Location: [facility];  Service: Oncology;  Laterality: Left;    CIRCUMCISION, PRIMARY      COLONOSCOPY N/A 11/14/2018    Procedure: COLONOSCOPY;  Surgeon: Milton Brunson MD;  Location: [facility];  Service: Endoscopy;  Laterality: N/A;    COLONOSCOPY N/A 7/20/2020    Procedure: COLONOSCOPY;  Surgeon: Ruslan Tillman MD;  Location: [facility];  Service: Endoscopy;  Laterality: N/A;    CYSTOSCOPY W/ RETROGRADES Bilateral 9/18/2020    Procedure: CYSTOSCOPY, WITH RETROGRADE PYELOGRAM;  Surgeon: Randall Moss MD;  Location: [facility] OR;  Service: Urology;  Laterality: Bilateral;    CYSTOURETHROSCOPY N/A 2/1/2021    Procedure: CYSTOURETHROSCOPY, short placement;  Surgeon: Boni Benites MD;  Location: [address];  Service: Urology;  Laterality: N/A;    DILATION OF URETHRA N/A 9/18/2020    Procedure: DILATION, URETHRA;  Surgeon: Randall Moss MD;  Location: [facility];  Service: Urology;  Laterality: N/A;    drainage of abscess from hand  2009    MRSA    drainage of abscess from R thigh  2006    MRSA    ESOPHAGOGASTRODUODENOSCOPY  3/11/2014    ESOPHAGOGASTRODUODENOSCOPY N/A 9/5/2018    Procedure: EGD (ESOPHAGOGASTRODUODENOSCOPY);  Surgeon: Kolby Wall MD;  Location: [facility];  Service: Endoscopy;  Laterality: N/A;    ESOPHAGOGASTRODUODENOSCOPY N/A 3/25/2020    Procedure: EGD (ESOPHAGOGASTRODUODENOSCOPY);   Surgeon: Ruslan Tillman MD;  Location: [facility];  Service: Endoscopy;  Laterality: N/A;    ESOPHAGOGASTRODUODENOSCOPY N/A 7/20/2020    Procedure: EGD (ESOPHAGOGASTRODUODENOSCOPY);  Surgeon: Ruslan Tillman MD;  Location: [facility];  Service: Endoscopy;  Laterality: N/A;  Patient is a very hard stick, requires anesthesia stick.    ESOPHAGOGASTRODUODENOSCOPY N/A 8/31/2020    Procedure: EGD (ESOPHAGOGASTRODUODENOSCOPY);  Surgeon: Kolby Wall MD;  Location: [facility];  Service: Endoscopy;  Laterality: N/A;    ESOPHAGOGASTRODUODENOSCOPY N/A 3/3/2021    Procedure: EGD (ESOPHAGOGASTRODUODENOSCOPY);  Surgeon: Ruslan Tillman MD;  Location: [facility];  Service: Endoscopy;  Laterality: N/A;    ESOPHAGOGASTRODUODENOSCOPY N/A 7/12/2021    Procedure: EGD (ESOPHAGOGASTRODUODENOSCOPY);  Surgeon: Kolby Wall MD;  Location: [facility];  Service: Endoscopy;  Laterality: N/A;    FLEXIBLE SIGMOIDOSCOPY N/A 9/5/2018    Procedure: SIGMOIDOSCOPY, FLEXIBLE;  Surgeon: Kolby Wall MD;  Location: [facility];  Service: Endoscopy;  Laterality: N/A;    HAND SURGERY  jan 2014    power saw fell on hand - laceration 3 tendons    INCISION AND DRAINAGE OF KNEE Right 1/13/2021    Procedure: INCISION AND DRAINAGE, KNEE;  Surgeon: Sony Linton Jr., MD;  Location[facility];  Service: Orthopedics;  Laterality: Right;    INSERTION OF TUNNELED CENTRAL VENOUS CATHETER (CVC) WITH SUBCUTANEOUS PORT Right 11/24/2021    Procedure: Right port-a-cath removal and exchange.;  Surgeon: Robson Morrison MD;  Location: [facility] OR 2ND FLR;  Service: General;  Laterality: Right;  Right internal jugular    INSERTION OF TUNNELED CENTRAL VENOUS CATHETER (CVC) WITH SUBCUTANEOUS PORT Left 3/31/2022    Procedure: INSERTION, SINGLE LUMEN CATHETER WITH PORT, WITH FLUOROSCOPIC GUIDANCE Left Poss Right Chest;  Surgeon: Robson Morrison MD;  Location: [facility] OR 2ND FLR;  Service: General;  Laterality: Left;    INSERTION OF TUNNELED  CENTRAL VENOUS CATHETER (CVC) WITH SUBCUTANEOUS PORT Right 10/6/2022    Procedure: INSERTION, SINGLE LUMEN CATHETER WITH PORT, WITH FLUOROSCOPIC GUIDANCE Left Possible Right Chest;  Surgeon: Robson Morrison MD;  [facility];  Service: General;  Laterality: Right;    INSERTION OF VENOUS ACCESS PORT Right 8/21/2020    Procedure: INSERTION, VENOUS ACCESS PORT;  Surgeon: Troy Honeycutt MD;  Location: [facility] OR;  Service: General;  Laterality: Right;    MEDIPORT REMOVAL N/A 3/24/2023    Procedure: REMOVAL, CATHETER, CENTRAL VENOUS, TUNNELED, WITH PORT;  Surgeon: Robson Morrison MD;  [facility];  Service: General;  Laterality: N/A;    NISSEN FUNDOPLICATION      REPAIR OF RECURRENT INCARCERATED INCISIONAL HERNIA N/A 8/16/2023    Procedure: REPAIR, HERNIA, INCISIONAL, INCARCERATED, RECURRENT, W MESH;  Surgeon: Robson Morrison MD;  [facility];  Service: General;  Laterality: N/A;    REPAIR, HERNIA, INCISIONAL OR VENTRAL, WITHOUT HISTORY OF PRIOR REPAIR N/A 3/13/2023    Procedure: REPAIR, HERNIA, INCISIONAL OR VENTRAL, WITHOUT HISTORY OF PRIOR REPAIR x2;  Surgeon: Robson Morrison MD;  [facility];  Service: General;  Laterality: N/A;    REVISION OF SCAR N/A 4/7/2021    Procedure: REVISION, SCAR;  Surgeon: Robson Morrison MD;  [facility];  Service: General;  Laterality: N/A;    UMBILICAL HERNIA REPAIR N/A 4/7/2021    Procedure: REPAIR, HERNIA, UMBILICAL, AGE 5 YEARS OR OLDER;  Surgeon: Robson Morrison MD;  [facility];  Service: General;  Laterality: N/A;     Family History   Problem Relation Name Age of Onset    Urolithiasis Father      Celiac disease Sister      Hypertension Maternal Grandmother      Hypertension Maternal Grandfather      Prostate cancer Neg Hx      Kidney disease Neg Hx       Social History[1]  Review of Systems   Constitutional:  Negative for fever.   HENT:  Negative for congestion, rhinorrhea and  sore throat.    Eyes:  Negative for visual disturbance.   Respiratory:  Negative for cough and shortness of breath.    Cardiovascular:  Negative for chest pain and leg swelling.   Gastrointestinal:  Positive for abdominal pain. Negative for diarrhea, nausea and vomiting.   Genitourinary:  Negative for dysuria and hematuria.   Skin:  Negative for rash.   Neurological:  Negative for weakness.       Physical Exam     Initial Vitals [02/15/25 0650]   BP Pulse Resp Temp SpO2   (!) 147/85 94 16 98.4 °F (36.9 °C) 98 %      MAP       --         Physical Exam    Nursing note and vitals reviewed.  Constitutional: He appears well-developed and well-nourished. He is cooperative.  Non-toxic appearance. He does not appear ill.   HENT:   Head: Normocephalic and atraumatic. Mouth/Throat: Mucous membranes are normal. Mucous membranes are not dry.   Eyes: Conjunctivae are normal.   Neck: Phonation normal.   Cardiovascular:  Normal rate, regular rhythm and normal heart sounds.     Exam reveals no gallop, no S3, no S4 and no friction rub.       No murmur heard.  Pulmonary/Chest: Breath sounds normal. No respiratory distress. He has no wheezes. He has no rhonchi. He has no rales.   Abdominal: Abdomen is soft. He exhibits no distension. There is no abdominal tenderness.   Musculoskeletal:      Right lower leg: No edema.      Left lower leg: No edema.     Neurological: He is alert.   Skin: Skin is warm, dry and intact. Capillary refill takes less than 2 seconds.   Psychiatric: He has a normal mood and affect. His speech is normal.         ED Course   Procedures  Labs Reviewed   CBC W/ AUTO DIFFERENTIAL - Abnormal       Result Value    WBC 7.90      RBC 3.95 (*)     Hemoglobin 7.9 (*)     Hematocrit 27.7 (*)     MCV 70 (*)     MCH 20.0 (*)     MCHC 28.5 (*)     RDW 17.0 (*)     Platelets 271      MPV 10.5      Immature Granulocytes 0.4      Gran # (ANC) 6.2      Immature Grans (Abs) 0.03      Lymph # 0.6 (*)     Mono # 1.1 (*)     Eos #  0.0      Baso # 0.01      nRBC 0      Gran % 78.7 (*)     Lymph % 7.2 (*)     Mono % 13.3      Eosinophil % 0.3      Basophil % 0.1      Differential Method Automated     COMPREHENSIVE METABOLIC PANEL - Abnormal    Sodium 139      Potassium 3.5      Chloride 107      CO2 24      Glucose 98      BUN 9      Creatinine 0.8      Calcium 8.7      Total Protein 6.5      Albumin 3.7      Total Bilirubin 0.8      Alkaline Phosphatase 101      AST 83 (*)      (*)     eGFR >60.0      Anion Gap 8     URINALYSIS, REFLEX TO URINE CULTURE - Abnormal    Specimen UA Urine, Clean Catch      Color, UA Colorless (*)     Appearance, UA Clear      pH, UA 6.0      Specific Gravity, UA 1.010      Protein, UA Negative      Glucose, UA Negative      Ketones, UA Negative      Bilirubin (UA) Negative      Occult Blood UA Negative      Nitrite, UA Negative      Leukocytes, UA Negative      Narrative:     Specimen Source->Urine   ISTAT PROCEDURE - Abnormal    POC Glucose 99      POC BUN 9      POC Creatinine 1.0      POC Sodium 140      POC Potassium 3.5      POC Chloride 102      POC TCO2 (MEASURED) 24      POC Ionized Calcium 1.11      POC Hematocrit 28 (*)     Sample AC     LIPASE    Lipase 12     ISTAT CHEM8          Imaging Results              CT Abdomen Pelvis With IV Contrast NO Oral Contrast (Final result)  Result time 02/15/25 09:53:28      Final result by Momo Stanley MD (02/15/25 09:53:28)                   Impression:      Interval incisional hernia repair with smaller but persistent ventral abdominal hernia containing fat and short segment of uncomplicated colon.  Air density within abdominal wall could be related to recent surgery or wound dehiscence.  Questionable trace pneumoperitoneum or air in the adjacent abdominal wall deep to surgical changes.  No gross pneumoperitoneum elsewhere in the abdomen.  Suggest correlation with physical exam findings.    Hiatal hernia and operative changes in the stomach and small  bowel.    Mild stool burden in the colon.    Bilateral nonobstructing renal stones.    Other incidental findings discussed in the body of the report.      Electronically signed by: Momo Stanley MD  Date:    02/15/2025  Time:    09:53               Narrative:    EXAMINATION:  CT ABDOMEN PELVIS WITH IV CONTRAST    CLINICAL HISTORY:  Abdominal pain, post-op;Abdominal pain, acute, nonlocalized;    TECHNIQUE:  Low dose axial images, sagittal and coronal reformations were obtained from the lung bases to the pubic symphysis following the IV administration of 100 mL of Omnipaque 350 .  Oral contrast was not given.    COMPARISON:  CT abdomen pelvis without contrast, 02/01/2024.  CT abdomen pelvis with contrast, 08/17/2023.    FINDINGS:  Lower Chest:    Lung bases are essentially clear.  Heart size is normal.  Small hiatal hernia.    Abdomen:    Liver is normal in size and contour.  Gallbladder is surgically absent.  Mild intra and extrahepatic biliary duct dilatation.    Spleen is not enlarged.  Adrenal glands and pancreas are unremarkable.    The kidneys are symmetric.  Punctate bilateral nonobstructing renal stones.  No hydronephrosis.  No asymmetric perinephric fat stranding.    Small hiatal hernia.  Operative changes in the stomach and small bowel.  No bowel obstruction.  No inflammatory changes identified in bowel.  Mild stool burden in the colon.  Small anterior abdominal hernia containing fat and uncomplicated segment colon.  Decreased size of incisional hernia in the anterior abdominal wall.    Question trace pneumoperitoneum immediately adjacent to operative changes in the abdominal wall, though this could be related to air in the abdominal wall itself.  No gross pneumoperitoneum elsewhere.  No organized fluid collection.    No bulky retroperitoneal lymphadenopathy.    Abdominal aorta is normal in caliber without significant atherosclerosis.    Portal, splenic, and superior mesenteric veins are patent. No portal  venous gas.    Pelvis:    Urinary bladder is decompressed and not well evaluated.  Prostate is borderline enlarged.  Rectum is unremarkable.  No significant pelvic free fluid.    Bones and soft tissues:    No aggressive osseous lesions.  Mild degenerative changes.  Extraperitoneal soft tissues demonstrate interval incisional hernia repair.  Persistent fat and bowel containing hernia in the mid abdominal wall to the right of midline (axial image 91).  Air density within the anterior abdominal wall presumably for recent hernia repair.  Suggest correlation for wound dehiscence as this would be difficult to evaluate with CT.  Mild adjacent soft tissue edema.  No organized fluid collection in the anterior abdominal wall.  Mild gynecomastia.                                       Medications   acetaminophen tablet 1,000 mg (1,000 mg Oral Given 2/15/25 0821)   ondansetron injection 4 mg (4 mg Intravenous Given 2/15/25 0822)   HYDROmorphone injection 1 mg (1 mg Intravenous Given 2/15/25 0822)   diphenhydrAMINE injection 12.5 mg (12.5 mg Intravenous Given 2/15/25 0845)   iohexoL (OMNIPAQUE 350) injection 100 mL (100 mLs Intravenous Given 2/15/25 0914)     Medical Decision Making  44-year-old male with complex medical history presenting for abdominal pain.  Initially, patient is mildly hypertensive, hemodynamically stable and well-appearing.      Since patient had recent procedure, reporting abdominal pain around his hernia site, will obtain CT scan to assess for evidence of recurrent hernia.  Patient does not have any evidence of incarceration.  Will obtain basic labs to look for electrolyte disturbances.    Patient is not have any significant electrolyte disturbances.  Patient has a microcytic anemia.  He is anemic at baseline.  Down trending hemoglobin would be expected postop.  CT abdomen concerning for hernia.  Discussed patient's case with General surgery who agreed to evaluate patient.      Per general surgery, do not  believe that patient has a hernia at this time.  States that he does have expected postop inflammation around the mesh.  General surgery states that they will call in pain medications for patient.    He is hemodynamically stable, well-appearing, he is appropriate for discharge at this time.  Discussed return precautions.  Encouraged close general surgery follow up.    Amount and/or Complexity of Data Reviewed  Labs: ordered. Decision-making details documented in ED Course.  Radiology: ordered and independent interpretation performed. Decision-making details documented in ED Course.    Risk  OTC drugs.  Prescription drug management.               ED Course as of 02/15/25 1240   Sat Feb 15, 2025   0814 Patient is a 43 yo M with ventral hernia repair yesterday, feels like it is bulging like it was prior to surgery, is very tender on exam, difficult to tell if swelling related to repair or if he has a recurrence of the hernia. Will give one dose of dilaudid iv for pain pending CT results [GK]   0834 Hemoglobin(!): 7.9  Down trending microcytic anemia. [ES]   1240 CT Abdomen Pelvis With IV Contrast NO Oral Contrast  Interval incisional hernia repair with smaller but persistent ventral abdominal hernia containing fat and short segment of uncomplicated colon.  Air density within abdominal wall could be related to recent surgery or wound dehiscence.  Questionable trace pneumoperitoneum or air in the adjacent abdominal wall deep to surgical changes.  No gross pneumoperitoneum elsewhere in the abdomen.  Suggest correlation with physical exam findings.     Hiatal hernia and operative changes in the stomach and small bowel.     Mild stool burden in the colon.     Bilateral nonobstructing renal stones.   [ES]      ED Course User Index  [ES] Mohini Díaz MD  [GK] Dora Tavera MD                           Clinical Impression:  Final diagnoses:  [R10.9] Abdominal pain, unspecified abdominal location  (Primary)  [T81.9XXA] Complication of procedure, initial encounter                   [1]   Social History  Tobacco Use    Smoking status: Never    Smokeless tobacco: Never    Tobacco comments:     Pt states he has smoked 1 or 2 cigarettes in his life.   Substance Use Topics    Alcohol use: Not Currently    Drug use: No        Mohini Díaz MD  Resident  02/15/25 [id number]

## 2025-02-15 NOTE — SUBJECTIVE & OBJECTIVE
Current Facility-Administered Medications on File Prior to Encounter   Medication    0.9%  NaCl infusion    ceFAZolin 2 g in dextrose 5 % in water (D5W) 50 mL IVPB (MB+)    [DISCONTINUED] 0.9% NaCl infusion    [DISCONTINUED] diphenhydrAMINE injection 25 mg    [DISCONTINUED] enoxaparin injection 40 mg    [DISCONTINUED] ondansetron injection 4 mg    [DISCONTINUED] prochlorperazine injection Soln 5 mg    [DISCONTINUED] sodium chloride 0.9% flush 10 mL     Current Outpatient Medications on File Prior to Encounter   Medication Sig    acetaminophen (TYLENOL) 650 MG TbSR Take 1 tablet (650 mg total) by mouth every 8 (eight) hours.    albuterol (PROVENTIL/VENTOLIN HFA) 90 mcg/actuation inhaler Inhale 2 puffs into the lungs every 6 (six) hours as needed for Wheezing. Rescue    artificial tears (ISOPTO TEARS) 0.5 % ophthalmic solution Place 1 drop into both eyes 6 (six) times daily.    cetirizine (ZYRTEC) 10 MG tablet Take 20 mg by mouth once daily.    cholecalciferol, vitamin D3, (VITAMIN D3) 25 mcg (1,000 unit) capsule Take 1 capsule (1,000 Units total) by mouth once daily.    dextroamphetamine-amphetamine (ADDERALL) 20 mg tablet Take 1 tablet by mouth 2 (two) times daily.    diphenhydrAMINE (SOMINEX) 25 mg tablet Take 25 mg by mouth nightly as needed.     docusate sodium (COLACE) 100 MG capsule Take 1 capsule (100 mg total) by mouth 2 (two) times daily.    EPINEPHrine (EPIPEN) 0.3 mg/0.3 mL AtIn Inject 0.3 mLs (0.3 mg total) into the muscle as needed.    famotidine (PEPCID) 20 MG tablet Take 1 tablet (20 mg total) by mouth 2 (two) times daily.    ferrous sulfate 325 (65 FE) MG EC tablet Take 1 tablet (325 mg total) by mouth once daily. (Patient taking differently: Take 325 mg by mouth every evening.)    hydrOXYzine HCL (ATARAX) 25 MG tablet Take 1 tablet (25 mg total) by mouth 3 (three) times daily as needed for Itching.    Lactobacillus acidophilus 10 billion cell Cap Take 1 capsule by mouth once daily.     lidocaine HCL  "2% (XYLOCAINE) 2 % jelly Apply topically 3 (three) times daily as needed (Hemorrhoids).    mepolizumab (NUCALA) 100 mg/mL Syrg Inject 3 mLs (300 mg total) into the skin every 28 days.    methocarbamoL (ROBAXIN) 500 MG Tab Take 1 tablet (500 mg total) by mouth 3 (three) times daily. for 3 days    ondansetron (ZOFRAN-ODT) 4 MG TbDL Take 4 mg by mouth.    oxyCODONE (ROXICODONE) 10 mg Tab immediate release tablet Take 1 tablet (10 mg total) by mouth every 6 (six) hours as needed for Pain.    oxyCODONE (ROXICODONE) 5 MG immediate release tablet Take 1 tablet (5 mg total) by mouth every 6 (six) hours as needed for Pain.    pantoprazole (PROTONIX) 40 MG tablet Take 1 tablet (40 mg total) by mouth 2 (two) times daily.    paroxetine (PAXIL) 20 MG tablet Take 1 tablet (20 mg total) by mouth every morning. (Patient taking differently: Take 20 mg by mouth nightly.)    predniSONE (DELTASONE) 10 MG tablet Take 10 mg by mouth every morning.       Review of patient's allergies indicates:   Allergen Reactions    Bean Diarrhea, Edema, Hives, Nausea And Vomiting and Swelling    Legumes Nausea And Vomiting and Swelling     Oral swelling    Lentils Nausea And Vomiting and Swelling     Oral swelling      Nsaids (non-steroidal anti-inflammatory drug)      GI bleed    Peas Nausea And Vomiting and Swelling     oral swelling    Ketamine Hallucinations    Haloperidol      "feels like crawling out of his skin"      Meloxicam Nausea Only     GI bleed    Penicillins Nausea And Vomiting     Can take third gen cephalosporins per patient        Past Medical History:   Diagnosis Date    Arthritis     C. difficile colitis 02/2014    postop of hand surgery    Cancer     Encounter for blood transfusion     Eosinophilic esophagitis 03/11/2014    GERD (gastroesophageal reflux disease)     Hypereosinophilic syndrome     IBS (irritable bowel syndrome)     Leukemia     MRSA carrier     says multiple times nose swab was +    Munchausen syndrome     " Nephrolithiasis 04/2014    bilateral punctate - never passed a stone    Septic prepatellar bursitis of right knee 01/12/2021    Urinary tract infection 02/2014    MRSA     Past Surgical History:   Procedure Laterality Date    APPENDECTOMY      ARTHROSCOPY OF SHOULDER WITH REMOVAL OF DISTAL CLAVICLE Right 11/1/2018    Procedure: ARTHROSCOPY, SHOULDER, WITH DISTAL CLAVICLE EXCISION;  Surgeon: Gabino Mejia MD;  Location: [facility];  Service: Orthopedics;  Laterality: Right;  video    BACK SURGERY      BLADDER FULGURATION N/A 9/18/2020    Procedure: FULGURATION, BLADDER;  Surgeon: Randall Moss MD;  Location: [facility];  Service: Urology;  Laterality: N/A;  blood vessels of bladder neck and prostate    BONE MARROW BIOPSY Left 10/1/2020    Procedure: Biopsy-bone marrow;  Surgeon: Trung Polanco MD;  Location: [facility];  Service: Oncology;  Laterality: Left;    CIRCUMCISION, PRIMARY      COLONOSCOPY N/A 11/14/2018    Procedure: COLONOSCOPY;  Surgeon: Milton Brunson MD;  Location: [facility];  Service: Endoscopy;  Laterality: N/A;    COLONOSCOPY N/A 7/20/2020    Procedure: COLONOSCOPY;  Surgeon: Ruslan Tillman MD;  Location: [facility];  Service: Endoscopy;  Laterality: N/A;    CYSTOSCOPY W/ RETROGRADES Bilateral 9/18/2020    Procedure: CYSTOSCOPY, WITH RETROGRADE PYELOGRAM;  Surgeon: Randall Moss MD;  Location: [facility];  Service: Urology;  Laterality: Bilateral;    CYSTOURETHROSCOPY N/A 2/1/2021    Procedure: CYSTOURETHROSCOPY, short placement;  Surgeon: Boni Benites MD;  Location: [address];  Service: Urology;  Laterality: N/A;    DILATION OF URETHRA N/A 9/18/2020    Procedure: DILATION, URETHRA;  Surgeon: Randall Moss MD;  Location: [facility];  Service: Urology;  Laterality: N/A;    drainage of abscess from hand  2009    MRSA    drainage of abscess from R thigh  2006    MRSA    ESOPHAGOGASTRODUODENOSCOPY  3/11/2014    ESOPHAGOGASTRODUODENOSCOPY N/A 9/5/2018    Procedure: EGD  (ESOPHAGOGASTRODUODENOSCOPY);  Surgeon: Kobly Wall MD;  Location: [facility];  Service: Endoscopy;  Laterality: N/A;    ESOPHAGOGASTRODUODENOSCOPY N/A 3/25/2020    Procedure: EGD (ESOPHAGOGASTRODUODENOSCOPY);  Surgeon: Ruslan Tillman MD;  Location: [facility];  Service: Endoscopy;  Laterality: N/A;    ESOPHAGOGASTRODUODENOSCOPY N/A 7/20/2020    Procedure: EGD (ESOPHAGOGASTRODUODENOSCOPY);  Surgeon: Ruslan Tillman MD;  Location: [facility];  Service: Endoscopy;  Laterality: N/A;  Patient is a very hard stick, requires anesthesia stick.    ESOPHAGOGASTRODUODENOSCOPY N/A 8/31/2020    Procedure: EGD (ESOPHAGOGASTRODUODENOSCOPY);  Surgeon: Kolby Wall MD;  Location: [facility];  Service: Endoscopy;  Laterality: N/A;    ESOPHAGOGASTRODUODENOSCOPY N/A 3/3/2021    Procedure: EGD (ESOPHAGOGASTRODUODENOSCOPY);  Surgeon: Ruslan Tillman MD;  Location: [facility];  Service: Endoscopy;  Laterality: N/A;    ESOPHAGOGASTRODUODENOSCOPY N/A 7/12/2021    Procedure: EGD (ESOPHAGOGASTRODUODENOSCOPY);  Surgeon: Kolby Wall MD;  Location: [facility];  Service: Endoscopy;  Laterality: N/A;    FLEXIBLE SIGMOIDOSCOPY N/A 9/5/2018    Procedure: SIGMOIDOSCOPY, FLEXIBLE;  Surgeon: Kolby Wall MD;  Location: [facility];  Service: Endoscopy;  Laterality: N/A;    HAND SURGERY  jan 2014    power saw fell on hand - laceration 3 tendons    INCISION AND DRAINAGE OF KNEE Right 1/13/2021    Procedure: INCISION AND DRAINAGE, KNEE;  Surgeon: Sony Linton Jr., MD;  Location: [facility];  Service: Orthopedics;  Laterality: Right;    INSERTION OF TUNNELED CENTRAL VENOUS CATHETER (CVC) WITH SUBCUTANEOUS PORT Right 11/24/2021    Procedure: Right port-a-cath removal and exchange.;  Surgeon: Robson Morrison MD;  Location: [address];  Service: General;  Laterality: Right;  Right internal jugular    INSERTION OF TUNNELED CENTRAL VENOUS CATHETER (CVC) WITH SUBCUTANEOUS PORT Left 3/31/2022    Procedure:  INSERTION, SINGLE LUMEN CATHETER WITH PORT, WITH FLUOROSCOPIC GUIDANCE Left Poss Right Chest;  Surgeon: Robson Morrison MD;  [facility];  Service: General;  Laterality: Left;    INSERTION OF TUNNELED CENTRAL VENOUS CATHETER (CVC) WITH SUBCUTANEOUS PORT Right 10/6/2022    Procedure: INSERTION, SINGLE LUMEN CATHETER WITH PORT, WITH FLUOROSCOPIC GUIDANCE Left Possible Right Chest;  Surgeon: Robson Morrison MD;  [facility];  Service: General;  Laterality: Right;    INSERTION OF VENOUS ACCESS PORT Right 8/21/2020    Procedure: INSERTION, VENOUS ACCESS PORT;  Surgeon: Troy Honeycutt MD;  Location: [facility];  Service: General;  Laterality: Right;    MEDIPORT REMOVAL N/A 3/24/2023    Procedure: REMOVAL, CATHETER, CENTRAL VENOUS, TUNNELED, WITH PORT;  Surgeon: Robson Morrison MD;  [facility];  Service: General;  Laterality: N/A;    NISSEN FUNDOPLICATION      REPAIR OF RECURRENT INCARCERATED INCISIONAL HERNIA N/A 8/16/2023    Procedure: REPAIR, HERNIA, INCISIONAL, INCARCERATED, RECURRENT, W MESH;  Surgeon: Robson Morrison MD;  [facility];  Service: General;  Laterality: N/A;    REPAIR, HERNIA, INCISIONAL OR VENTRAL, WITHOUT HISTORY OF PRIOR REPAIR N/A 3/13/2023    Procedure: REPAIR, HERNIA, INCISIONAL OR VENTRAL, WITHOUT HISTORY OF PRIOR REPAIR x2;  Surgeon: Robson Morrison MD;  [facility];  Service: General;  Laterality: N/A;    REVISION OF SCAR N/A 4/7/2021    Procedure: REVISION, SCAR;  Surgeon: Robson Morrison MD;  [facility];  Service: General;  Laterality: N/A;    UMBILICAL HERNIA REPAIR N/A 4/7/2021    Procedure: REPAIR, HERNIA, UMBILICAL, AGE 5 YEARS OR OLDER;  Surgeon: Robson Morrison MD;  [facility];  Service: General;  Laterality: N/A;     Family History       Problem Relation (Age of Onset)    Celiac disease Sister    Hypertension Maternal Grandmother, Maternal Grandfather     Urolithiasis Father          Tobacco Use    Smoking status: Never    Smokeless tobacco: Never    Tobacco comments:     Pt states he has smoked 1 or 2 cigarettes in his life.   Substance and Sexual Activity    Alcohol use: Not Currently    Drug use: No    Sexual activity: Not Currently     Partners: Female     Review of Systems   Constitutional:  Negative for chills and fever.   HENT:  Negative for trouble swallowing and voice change.    Eyes: Negative.    Respiratory:  Negative for chest tightness and shortness of breath.    Cardiovascular: Negative.    Gastrointestinal:  Positive for abdominal pain.   Endocrine: Negative.    Genitourinary: Negative.    Musculoskeletal: Negative.    Skin: Negative.    Hematological: Negative.    Psychiatric/Behavioral: Negative.       Objective:     Vital Signs (Most Recent):  Temp: 98.5 °F (36.9 °C) (02/15/25 1102)  Pulse: 70 (02/15/25 1102)  Resp: 16 (02/15/25 1102)  BP: 124/73 (02/15/25 1102)  SpO2: 95 % (02/15/25 1102) Vital Signs (24h Range):  Temp:  [98.4 °F (36.9 °C)-98.5 °F (36.9 °C)] 98.5 °F (36.9 °C)  Pulse:  [70-94] 70  Resp:  [16-18] 16  SpO2:  [95 %-100 %] 95 %  BP: (124-147)/(65-85) 124/73     Weight: 86.2 kg (190 lb)  Body mass index is 25.77 kg/m².     Physical Exam  Vitals reviewed.   Constitutional:       General: He is not in acute distress.     Appearance: Normal appearance. He is not toxic-appearing.   HENT:      Head: Normocephalic and atraumatic.      Nose: Nose normal.      Mouth/Throat:      Mouth: Mucous membranes are moist.   Cardiovascular:      Rate and Rhythm: Normal rate.      Pulses: Normal pulses.   Pulmonary:      Effort: Pulmonary effort is normal. No respiratory distress.   Abdominal:      General: Abdomen is flat.      Palpations: Abdomen is soft.      Comments: Upper midline incision clean dry and intact with dermabond over laying. No hernia defect appreciated on exam. Some soft tissue swelling, expected. Pain is appropriate for post op.     Skin:     General: Skin is warm.   Neurological:      General: No focal deficit present.      Mental Status: He is alert and oriented to person, place, and time.   Psychiatric:         Mood and Affect: Mood normal.            I have reviewed all pertinent lab results within the past 24 hours.  CBC:   Recent Labs   Lab 02/15/25  0801 02/15/25  0811   WBC 7.90  --    RBC 3.95*  --    HGB 7.9*  --    HCT 27.7* 28*     --    MCV 70*  --    MCH 20.0*  --    MCHC 28.5*  --      CMP:   Recent Labs   Lab 02/15/25  0801   GLU 98   CALCIUM 8.7   ALBUMIN 3.7   PROT 6.5      K 3.5   CO2 24      BUN 9   CREATININE 0.8   ALKPHOS 101   *   AST 83*   BILITOT 0.8       Significant Diagnostics:  I have reviewed all pertinent imaging results/findings within the past 24 hours.    CT Abdomen Pelvis With IV Contrast NO Oral Contrast  2/15/2025    Interval incisional hernia repair with smaller but persistent ventral abdominal hernia containing fat and short segment of uncomplicated colon.  Air density within abdominal wall could be related to recent surgery or wound dehiscence.  Questionable trace pneumoperitoneum or air in the adjacent abdominal wall deep to surgical changes.  No gross pneumoperitoneum elsewhere in the abdomen.  Suggest correlation with physical exam findings. Hiatal hernia and operative changes in the stomach and small bowel. Mild stool burden in the colon. Bilateral nonobstructing renal stones.

## 2025-02-15 NOTE — DISCHARGE INSTRUCTIONS
Diagnosis: Abdominal pain    Tests you had showed:   Labs Reviewed   CBC W/ AUTO DIFFERENTIAL - Abnormal       Result Value    WBC 7.90      RBC 3.95 (*)     Hemoglobin 7.9 (*)     Hematocrit 27.7 (*)     MCV 70 (*)     MCH 20.0 (*)     MCHC 28.5 (*)     RDW 17.0 (*)     Platelets 271      MPV 10.5      Immature Granulocytes 0.4      Gran # (ANC) 6.2      Immature Grans (Abs) 0.03      Lymph # 0.6 (*)     Mono # 1.1 (*)     Eos # 0.0      Baso # 0.01      nRBC 0      Gran % 78.7 (*)     Lymph % 7.2 (*)     Mono % 13.3      Eosinophil % 0.3      Basophil % 0.1      Differential Method Automated     COMPREHENSIVE METABOLIC PANEL - Abnormal    Sodium 139      Potassium 3.5      Chloride 107      CO2 24      Glucose 98      BUN 9      Creatinine 0.8      Calcium 8.7      Total Protein 6.5      Albumin 3.7      Total Bilirubin 0.8      Alkaline Phosphatase 101      AST 83 (*)      (*)     eGFR >60.0      Anion Gap 8     URINALYSIS, REFLEX TO URINE CULTURE - Abnormal    Specimen UA Urine, Clean Catch      Color, UA Colorless (*)     Appearance, UA Clear      pH, UA 6.0      Specific Gravity, UA 1.010      Protein, UA Negative      Glucose, UA Negative      Ketones, UA Negative      Bilirubin (UA) Negative      Occult Blood UA Negative      Nitrite, UA Negative      Leukocytes, UA Negative      Narrative:     Specimen Source->Urine   ISTAT PROCEDURE - Abnormal    POC Glucose 99      POC BUN 9      POC Creatinine 1.0      POC Sodium 140      POC Potassium 3.5      POC Chloride 102      POC TCO2 (MEASURED) 24      POC Ionized Calcium 1.11      POC Hematocrit 28 (*)     Sample AC     LIPASE    Lipase 12     ISTAT CHEM8      CT Abdomen Pelvis With IV Contrast NO Oral Contrast   Final Result      Interval incisional hernia repair with smaller but persistent ventral abdominal hernia containing fat and short segment of uncomplicated colon.  Air density within abdominal wall could be related to recent surgery or wound  dehiscence.  Questionable trace pneumoperitoneum or air in the adjacent abdominal wall deep to surgical changes.  No gross pneumoperitoneum elsewhere in the abdomen.  Suggest correlation with physical exam findings.      Hiatal hernia and operative changes in the stomach and small bowel.      Mild stool burden in the colon.      Bilateral nonobstructing renal stones.      Other incidental findings discussed in the body of the report.         Electronically signed by: Momo Stanley MD   Date:    02/15/2025   Time:    09:53          Treatments you received were:   Medications   acetaminophen tablet 1,000 mg (1,000 mg Oral Given 2/15/25 0821)   ondansetron injection 4 mg (4 mg Intravenous Given 2/15/25 0822)   HYDROmorphone injection 1 mg (1 mg Intravenous Given 2/15/25 0822)   diphenhydrAMINE injection 12.5 mg (12.5 mg Intravenous Given 2/15/25 0845)   iohexoL (OMNIPAQUE 350) injection 100 mL (100 mLs Intravenous Given 2/15/25 0914)       Home Care Instructions:  - Medications: Continue taking your home medications as prescribed    Follow-Up Plan:  - Follow-up with: Primary care doctor within 3 - 5  days  - Additional testing and/or evaluation will be directed by your primary doctor    Return to the Emergency Department for symptoms including but not limited to: worsening symptoms, severe back pain, shortness of breath or chest pain, vomiting with inability to hold down fluids, blood in vomit or poop, fevers greater than 100.4°F, passing out/fainting/unconsciousness, or other concerning symptoms.

## 2025-02-15 NOTE — CONSULTS
[facility] Dept  General Surgery  Consult Note    Patient Name: Solo Black  MRN: [id number]  Code Status: Prior  Admission Date: 2/15/2025  Hospital Length of Stay: 0 days  Attending Physician: Dora Tavera,*  Primary Care Provider: Leyda, Primary Doctor    Patient information was obtained from patient.     Inpatient consult to General surgery  Consult performed by: Norma Livingston MD  Consult ordered by: Mohini Díaz MD        Subjective:     Principal Problem: <principal problem not specified>    History of Present Illness: Solo Black is a 44 y.o. male with a history of incisional hernia repair with mesh yesterday. He states that he had increased pain overnight and felt like he had a bulge near his incision prompting him to come to the ED. Lab work unremarkable. CT A/P with read stating recurrent hernia. On my review the repair appears to be intact. Additionally, he does not have any evidence of hernia on physical exam.         Current Facility-Administered Medications on File Prior to Encounter   Medication    0.9%  NaCl infusion    ceFAZolin 2 g in dextrose 5 % in water (D5W) 50 mL IVPB (MB+)    [DISCONTINUED] 0.9% NaCl infusion    [DISCONTINUED] diphenhydrAMINE injection 25 mg    [DISCONTINUED] enoxaparin injection 40 mg    [DISCONTINUED] ondansetron injection 4 mg    [DISCONTINUED] prochlorperazine injection Soln 5 mg    [DISCONTINUED] sodium chloride 0.9% flush 10 mL     Current Outpatient Medications on File Prior to Encounter   Medication Sig    acetaminophen (TYLENOL) 650 MG TbSR Take 1 tablet (650 mg total) by mouth every 8 (eight) hours.    albuterol (PROVENTIL/VENTOLIN HFA) 90 mcg/actuation inhaler Inhale 2 puffs into the lungs every 6 (six) hours as needed for Wheezing. Rescue    artificial tears (ISOPTO TEARS) 0.5 % ophthalmic solution Place 1 drop into both eyes 6 (six) times daily.    cetirizine (ZYRTEC) 10 MG tablet Take 20 mg by mouth once daily.     cholecalciferol, vitamin D3, (VITAMIN D3) 25 mcg (1,000 unit) capsule Take 1 capsule (1,000 Units total) by mouth once daily.    dextroamphetamine-amphetamine (ADDERALL) 20 mg tablet Take 1 tablet by mouth 2 (two) times daily.    diphenhydrAMINE (SOMINEX) 25 mg tablet Take 25 mg by mouth nightly as needed.     docusate sodium (COLACE) 100 MG capsule Take 1 capsule (100 mg total) by mouth 2 (two) times daily.    EPINEPHrine (EPIPEN) 0.3 mg/0.3 mL AtIn Inject 0.3 mLs (0.3 mg total) into the muscle as needed.    famotidine (PEPCID) 20 MG tablet Take 1 tablet (20 mg total) by mouth 2 (two) times daily.    ferrous sulfate 325 (65 FE) MG EC tablet Take 1 tablet (325 mg total) by mouth once daily. (Patient taking differently: Take 325 mg by mouth every evening.)    hydrOXYzine HCL (ATARAX) 25 MG tablet Take 1 tablet (25 mg total) by mouth 3 (three) times daily as needed for Itching.    Lactobacillus acidophilus 10 billion cell Cap Take 1 capsule by mouth once daily.     lidocaine HCL 2% (XYLOCAINE) 2 % jelly Apply topically 3 (three) times daily as needed (Hemorrhoids).    mepolizumab (NUCALA) 100 mg/mL Syrg Inject 3 mLs (300 mg total) into the skin every 28 days.    methocarbamoL (ROBAXIN) 500 MG Tab Take 1 tablet (500 mg total) by mouth 3 (three) times daily. for 3 days    ondansetron (ZOFRAN-ODT) 4 MG TbDL Take 4 mg by mouth.    oxyCODONE (ROXICODONE) 10 mg Tab immediate release tablet Take 1 tablet (10 mg total) by mouth every 6 (six) hours as needed for Pain.    oxyCODONE (ROXICODONE) 5 MG immediate release tablet Take 1 tablet (5 mg total) by mouth every 6 (six) hours as needed for Pain.    pantoprazole (PROTONIX) 40 MG tablet Take 1 tablet (40 mg total) by mouth 2 (two) times daily.    paroxetine (PAXIL) 20 MG tablet Take 1 tablet (20 mg total) by mouth every morning. (Patient taking differently: Take 20 mg by mouth nightly.)    predniSONE (DELTASONE) 10 MG tablet Take 10 mg by mouth every morning.       Review of  "patient's allergies indicates:   Allergen Reactions    Bean Diarrhea, Edema, Hives, Nausea And Vomiting and Swelling    Legumes Nausea And Vomiting and Swelling     Oral swelling    Lentils Nausea And Vomiting and Swelling     Oral swelling      Nsaids (non-steroidal anti-inflammatory drug)      GI bleed    Peas Nausea And Vomiting and Swelling     oral swelling    Ketamine Hallucinations    Haloperidol      "feels like crawling out of his skin"      Meloxicam Nausea Only     GI bleed    Penicillins Nausea And Vomiting     Can take third gen cephalosporins per patient        Past Medical History:   Diagnosis Date    Arthritis     C. difficile colitis 02/2014    postop of hand surgery    Cancer     Encounter for blood transfusion     Eosinophilic esophagitis 03/11/2014    GERD (gastroesophageal reflux disease)     Hypereosinophilic syndrome     IBS (irritable bowel syndrome)     Leukemia     MRSA carrier     says multiple times nose swab was +    Munchausen syndrome     Nephrolithiasis 04/2014    bilateral punctate - never passed a stone    Septic prepatellar bursitis of right knee 01/12/2021    Urinary tract infection 02/2014    MRSA     Past Surgical History:   Procedure Laterality Date    APPENDECTOMY      ARTHROSCOPY OF SHOULDER WITH REMOVAL OF DISTAL CLAVICLE Right 11/1/2018    Procedure: ARTHROSCOPY, SHOULDER, WITH DISTAL CLAVICLE EXCISION;  Surgeon: Gabino Mejia MD;  Location: [facility] OR;  Service: Orthopedics;  Laterality: Right;  video    BACK SURGERY      BLADDER FULGURATION N/A 9/18/2020    Procedure: FULGURATION, BLADDER;  Surgeon: Randall Moss MD;  Location: [facility] OR;  Service: Urology;  Laterality: N/A;  blood vessels of bladder neck and prostate    BONE MARROW BIOPSY Left 10/1/2020    Procedure: Biopsy-bone marrow;  Surgeon: Trung Polanco MD;  Location: [facility] OR;  Service: Oncology;  Laterality: Left;    CIRCUMCISION, PRIMARY      COLONOSCOPY N/A 11/14/2018    Procedure: COLONOSCOPY;  Surgeon: " Milton Brunson MD;  Location: [facility] ENDO;  Service: Endoscopy;  Laterality: N/A;    COLONOSCOPY N/A 7/20/2020    Procedure: COLONOSCOPY;  Surgeon: Ruslan Tillman MD;  Location: [facility];  Service: Endoscopy;  Laterality: N/A;    CYSTOSCOPY W/ RETROGRADES Bilateral 9/18/2020    Procedure: CYSTOSCOPY, WITH RETROGRADE PYELOGRAM;  Surgeon: Randall Moss MD;  Location: [facility] OR;  Service: Urology;  Laterality: Bilateral;    CYSTOURETHROSCOPY N/A 2/1/2021    Procedure: CYSTOURETHROSCOPY, short placement;  Surgeon: Boni Benites MD;  Location: [facility] OR [facility] FLR;  Service: Urology;  Laterality: N/A;    DILATION OF URETHRA N/A 9/18/2020    Procedure: DILATION, URETHRA;  Surgeon: Randall Moss MD;  Location: [facility] OR;  Service: Urology;  Laterality: N/A;    drainage of abscess from hand  2009    MRSA    drainage of abscess from R thigh  2006    MRSA    ESOPHAGOGASTRODUODENOSCOPY  3/11/2014    ESOPHAGOGASTRODUODENOSCOPY N/A 9/5/2018    Procedure: EGD (ESOPHAGOGASTRODUODENOSCOPY);  Surgeon: Kolby Wall MD;  Location: [facility];  Service: Endoscopy;  Laterality: N/A;    ESOPHAGOGASTRODUODENOSCOPY N/A 3/25/2020    Procedure: EGD (ESOPHAGOGASTRODUODENOSCOPY);  Surgeon: Ruslan Tillman MD;  Location: [facility];  Service: Endoscopy;  Laterality: N/A;    ESOPHAGOGASTRODUODENOSCOPY N/A 7/20/2020    Procedure: EGD (ESOPHAGOGASTRODUODENOSCOPY);  Surgeon: Ruslan Tillman MD;  Location: [facility];  Service: Endoscopy;  Laterality: N/A;  Patient is a very hard stick, requires anesthesia stick.    ESOPHAGOGASTRODUODENOSCOPY N/A 8/31/2020    Procedure: EGD (ESOPHAGOGASTRODUODENOSCOPY);  Surgeon: Kolby Wall MD;  Location: [facility];  Service: Endoscopy;  Laterality: N/A;    ESOPHAGOGASTRODUODENOSCOPY N/A 3/3/2021    Procedure: EGD (ESOPHAGOGASTRODUODENOSCOPY);  Surgeon: Ruslan Tillman MD;  Location: [facility];  Service: Endoscopy;  Laterality: N/A;    ESOPHAGOGASTRODUODENOSCOPY N/A  7/12/2021    Procedure: EGD (ESOPHAGOGASTRODUODENOSCOPY);  Surgeon: Kolby Wall MD;  Location: [facility] ENDO;  Service: Endoscopy;  Laterality: N/A;    FLEXIBLE SIGMOIDOSCOPY N/A 9/5/2018    Procedure: SIGMOIDOSCOPY, FLEXIBLE;  Surgeon: Kolby Wall MD;  Location: [facility] ENDO;  Service: Endoscopy;  Laterality: N/A;    HAND SURGERY  jan 2014    power saw fell on hand - laceration 3 tendons    INCISION AND DRAINAGE OF KNEE Right 1/13/2021    Procedure: INCISION AND DRAINAGE, KNEE;  Surgeon: Sony Linton Jr., MD;  Location: [facility] OR;  Service: Orthopedics;  Laterality: Right;    INSERTION OF TUNNELED CENTRAL VENOUS CATHETER (CVC) WITH SUBCUTANEOUS PORT Right 11/24/2021    Procedure: Right port-a-cath removal and exchange.;  Surgeon: Robson Morrison MD;  Location: [facility] OR [address] [address];  Service: General;  Laterality: Right;  Right internal jugular    INSERTION OF TUNNELED CENTRAL VENOUS CATHETER (CVC) WITH SUBCUTANEOUS PORT Left 3/31/2022    Procedure: INSERTION, SINGLE LUMEN CATHETER WITH PORT, WITH FLUOROSCOPIC GUIDANCE Left Poss Right Chest;  Surgeon: Robson Morrison MD;  Location: [facility] OR 2ND FLR;  Service: General;  Laterality: Left;    INSERTION OF TUNNELED CENTRAL VENOUS CATHETER (CVC) WITH SUBCUTANEOUS PORT Right 10/6/2022    Procedure: INSERTION, SINGLE LUMEN CATHETER WITH PORT, WITH FLUOROSCOPIC GUIDANCE Left Possible Right Chest;  Surgeon: Robson Morrison MD;  Location: [facility] OR [facility] FLR;  Service: General;  Laterality: Right;    INSERTION OF VENOUS ACCESS PORT Right 8/21/2020    Procedure: INSERTION, VENOUS ACCESS PORT;  Surgeon: Troy Honeycutt MD;  Location: [facility] OR;  Service: General;  Laterality: Right;    MEDIPORT REMOVAL N/A 3/24/2023    Procedure: REMOVAL, CATHETER, CENTRAL VENOUS, TUNNELED, WITH PORT;  Surgeon: Robson Morrison MD;  Location: [facility] OR 2ND FLR;  Service: General;  Laterality: N/A;    NISSEN FUNDOPLICATION      REPAIR OF RECURRENT INCARCERATED INCISIONAL HERNIA N/A  8/16/2023    Procedure: REPAIR, HERNIA, INCISIONAL, INCARCERATED, RECURRENT, W MESH;  Surgeon: Robson Morrison MD;  Location: [facility] OR 2ND FLR;  Service: General;  Laterality: N/A;    REPAIR, HERNIA, INCISIONAL OR VENTRAL, WITHOUT HISTORY OF PRIOR REPAIR N/A 3/13/2023    Procedure: REPAIR, HERNIA, INCISIONAL OR VENTRAL, WITHOUT HISTORY OF PRIOR REPAIR x2;  Surgeon: Robson Morrison MD;  Location: [facility] OR 2ND FLR;  Service: General;  Laterality: N/A;    REVISION OF SCAR N/A 4/7/2021    Procedure: REVISION, SCAR;  Surgeon: Robson Morrison MD;  Location: [facility] OR 2ND FLR;  Service: General;  Laterality: N/A;    UMBILICAL HERNIA REPAIR N/A 4/7/2021    Procedure: REPAIR, HERNIA, UMBILICAL, AGE 5 YEARS OR OLDER;  Surgeon: Robson Morrison MD;  Location: [facility] OR 2ND FLR;  Service: General;  Laterality: N/A;     Family History       Problem Relation (Age of Onset)    Celiac disease Sister    Hypertension Maternal Grandmother, Maternal Grandfather    Urolithiasis Father          Tobacco Use    Smoking status: Never    Smokeless tobacco: Never    Tobacco comments:     Pt states he has smoked 1 or 2 cigarettes in his life.   Substance and Sexual Activity    Alcohol use: Not Currently    Drug use: No    Sexual activity: Not Currently     Partners: Female     Review of Systems   Constitutional:  Negative for chills and fever.   HENT:  Negative for trouble swallowing and voice change.    Eyes: Negative.    Respiratory:  Negative for chest tightness and shortness of breath.    Cardiovascular: Negative.    Gastrointestinal:  Positive for abdominal pain.   Endocrine: Negative.    Genitourinary: Negative.    Musculoskeletal: Negative.    Skin: Negative.    Hematological: Negative.    Psychiatric/Behavioral: Negative.       Objective:     Vital Signs (Most Recent):  Temp: 98.5 °F (36.9 °C) (02/15/25 1102)  Pulse: 70 (02/15/25 1102)  Resp: 16 (02/15/25 1102)  BP: 124/73 (02/15/25 1102)  SpO2: 95 % (02/15/25 1102) Vital Signs  (24h Range):  Temp:  [98.4 °F (36.9 °C)-98.5 °F (36.9 °C)] 98.5 °F (36.9 °C)  Pulse:  [70-94] 70  Resp:  [16-18] 16  SpO2:  [95 %-100 %] 95 %  BP: (124-147)/(65-85) 124/73     Weight: 86.2 kg (190 lb)  Body mass index is 25.77 kg/m².     Physical Exam  Vitals reviewed.   Constitutional:       General: He is not in acute distress.     Appearance: Normal appearance. He is not toxic-appearing.   HENT:      Head: Normocephalic and atraumatic.      Nose: Nose normal.      Mouth/Throat:      Mouth: Mucous membranes are moist.   Cardiovascular:      Rate and Rhythm: Normal rate.      Pulses: Normal pulses.   Pulmonary:      Effort: Pulmonary effort is normal. No respiratory distress.   Abdominal:      General: Abdomen is flat.      Palpations: Abdomen is soft.      Comments: Upper midline incision clean dry and intact with dermabond over laying. No hernia defect appreciated on exam. Some soft tissue swelling, expected. Pain is appropriate for post op.    Skin:     General: Skin is warm.   Neurological:      General: No focal deficit present.      Mental Status: He is alert and oriented to person, place, and time.   Psychiatric:         Mood and Affect: Mood normal.            I have reviewed all pertinent lab results within the past 24 hours.  CBC:   Recent Labs   Lab 02/15/25  0801 02/15/25  0811   WBC 7.90  --    RBC 3.95*  --    HGB 7.9*  --    HCT 27.7* 28*     --    MCV 70*  --    MCH 20.0*  --    MCHC 28.5*  --      CMP:   Recent Labs   Lab 02/15/25  0801   GLU 98   CALCIUM 8.7   ALBUMIN 3.7   PROT 6.5      K 3.5   CO2 24      BUN 9   CREATININE 0.8   ALKPHOS 101   *   AST 83*   BILITOT 0.8       Significant Diagnostics:  I have reviewed all pertinent imaging results/findings within the past 24 hours.    CT Abdomen Pelvis With IV Contrast NO Oral Contrast  2/15/2025    Interval incisional hernia repair with smaller but persistent ventral abdominal hernia containing fat and short segment of  uncomplicated colon.  Air density within abdominal wall could be related to recent surgery or wound dehiscence.  Questionable trace pneumoperitoneum or air in the adjacent abdominal wall deep to surgical changes.  No gross pneumoperitoneum elsewhere in the abdomen.  Suggest correlation with physical exam findings. Hiatal hernia and operative changes in the stomach and small bowel. Mild stool burden in the colon. Bilateral nonobstructing renal stones.      Assessment/Plan:     Abdominal pain  44M POD1 from incisional hernia repair with mesh. No concern for recurrent hernia on exam. Imaging reviewed, repair appears intact. He is having bowel function and appropriate pain post op. Can follow up in clinic.     -- okay for dc per ED and follow up in clinic   -- discussed with the patient         Norma Livingston MD  General Surgery  [facility] - [facility] Dept

## 2025-02-15 NOTE — ED NOTES
I-STAT Chem-8+ Results:   Value Reference Range   Sodium 140 136-145 mmol/L   Potassium  3.5 3.5-5.1 mmol/L   Chloride 102  mmol/L   Ionized Calcium 1.11 1.06-1.42 mmol/L   CO2 (measured) 24 23-29 mmol/L   Glucose 99  mg/dL   BUN 9 6-30 mg/dL   Creatinine 1.0 0.5-1.4 mg/dL   Hematocrit 28 36-54%      Abnormal istat values reported to MD Sal.

## 2025-02-18 ENCOUNTER — TELEPHONE (OUTPATIENT)
Dept: SURGERY | Facility: CLINIC | Age: 45
End: 2025-02-18
Payer: MEDICAID

## 2025-02-18 RX ORDER — TRAMADOL HYDROCHLORIDE 50 MG/1
50 TABLET ORAL EVERY 6 HOURS PRN
COMMUNITY

## 2025-02-18 NOTE — TELEPHONE ENCOUNTER
Rx called for Ultram 50mg Tablet per Dr. Morrison for continued pain after ventral hernia repair on 2/14/25.  Pt is aware.

## 2025-05-31 NOTE — ANESTHESIA POSTPROCEDURE EVALUATION
Anesthesia Post Evaluation    Patient: Solo Black    Procedure(s) Performed: Procedure(s) (LRB):  REPAIR, HERNIA, INCISIONAL OR VENTRAL, WITHOUT HISTORY OF PRIOR REPAIR Open With Mesh (N/A)    Final Anesthesia Type: general      Patient location during evaluation: PACU  Patient participation: Yes- Able to Participate  Level of consciousness: awake and alert and oriented  Post-procedure vital signs: reviewed and stable  Pain management: adequate  Airway patency: patent    PONV status at discharge: No PONV  Anesthetic complications: no      Cardiovascular status: hemodynamically stable  Respiratory status: unassisted and spontaneous ventilation  Hydration status: euvolemic  Follow-up not needed.          Vitals Value Taken Time   /87 08/05/22 1202   Temp 36.7 °C (98 °F) 08/05/22 1038   Pulse 95 08/05/22 1211   Resp 38 08/05/22 1211   SpO2 94 % 08/05/22 1211   Vitals shown include unvalidated device data.      No case tracking events are documented in the log.      Pain/Marcia Score: Pain Rating Prior to Med Admin: 6 (8/5/2022 11:40 AM)  Marcia Score: 10 (8/5/2022 11:15 AM)         4 (moderate pain)

## (undated) DEVICE — SEE MEDLINE ITEM 157131

## (undated) DEVICE — SPONGE COTTON TRAY 4X4IN

## (undated) DEVICE — SUT 1 36IN PDS II VIO MONO

## (undated) DEVICE — DRAPE INCISE IOBAN 2 23X17IN

## (undated) DEVICE — SUT ETHIBOND EXCEL 0 CT2 30

## (undated) DEVICE — SEE MEDLINE ITEM 157125

## (undated) DEVICE — SUT PDS II O CTX MONO 60

## (undated) DEVICE — SUT VICRYL CTD 2-0 GI 27 SH

## (undated) DEVICE — SEE MEDLINE ITEM 157117

## (undated) DEVICE — PACK BASIC

## (undated) DEVICE — SPONGE GAUZE 16PLY 4X4

## (undated) DEVICE — SYR 10CC LUER LOCK

## (undated) DEVICE — SUT MCRYL PLUS 4-0 PS2 27IN

## (undated) DEVICE — SEE MEDLINE ITEM 157116

## (undated) DEVICE — SET DECANTER MEDICHOICE

## (undated) DEVICE — SPONGE LAP 18X18 PREWASHED

## (undated) DEVICE — ADHESIVE DERMABOND ADVANCED

## (undated) DEVICE — TRAY MINOR GEN SURG OMC

## (undated) DEVICE — SYR 30CC LUER LOCK

## (undated) DEVICE — SYR ONLY LUER LOCK 20CC

## (undated) DEVICE — TRAY CYSTO BASIN

## (undated) DEVICE — SUT PROLENE 2-0 30 SH

## (undated) DEVICE — SUT X424H ETHIBOND 0-0

## (undated) DEVICE — GLOVE BIOGEL ECLIPSE SZ 7.5

## (undated) DEVICE — PAD ABDOMINAL 5X9 STERILE

## (undated) DEVICE — MANIFOLD 4 PORT

## (undated) DEVICE — PAD CAST SPECIALIST STRL 4

## (undated) DEVICE — SEE MEDLINE ITEM 156955

## (undated) DEVICE — DRESSING TRANS 4X4 TEGADERM

## (undated) DEVICE — SUT VICRYL 3-0 27 SH

## (undated) DEVICE — BLADE SURG #15 CARBON STEEL

## (undated) DEVICE — SUPPORT SLING SHOT II MEDIUM

## (undated) DEVICE — SEE MEDLINE ITEM 157173

## (undated) DEVICE — COVER PROBE 6X48

## (undated) DEVICE — SEE MEDLINE ITEM 146292

## (undated) DEVICE — DRAPE C-ARM ELAS CLIP 42X120IN

## (undated) DEVICE — SUT VICRYL PLUS 3-0 SH 18IN

## (undated) DEVICE — GLOVE GAMMEX SURG LF PI SZ 7.5

## (undated) DEVICE — MAT SUCTION PUDDLEVAC ORANGE

## (undated) DEVICE — SUT 2-0 ETHILON 18 FS

## (undated) DEVICE — SYR B-D DISP CONTROL 10CC100/C

## (undated) DEVICE — COVER OVERHEAD SURG LT BLUE

## (undated) DEVICE — APPLICATOR CHLORAPREP ORN 26ML

## (undated) DEVICE — BOVIE SUCTION

## (undated) DEVICE — DRAPE ABDOMINAL TIBURON 14X11

## (undated) DEVICE — NDL HYPO REG 25G X 1 1/2

## (undated) DEVICE — SUT VICRYL CT-1 2-0 27IN

## (undated) DEVICE — PENCIL ROCKER SWITCH 10FT CORD

## (undated) DEVICE — DRESSING XEROFORM 1X8IN

## (undated) DEVICE — DRAPE C-ARM/MOBILE XRAY 44X80

## (undated) DEVICE — SUT ETHIBOND EX 0SH 30IN GR

## (undated) DEVICE — SUPPORT ULNA NERVE PROTECTOR

## (undated) DEVICE — Device

## (undated) DEVICE — DRESSING ANTIMICROBIAL 1 INCH

## (undated) DEVICE — SYR 50CC LL

## (undated) DEVICE — ELECTRODE REM PLYHSV RETURN 9

## (undated) DEVICE — GOWN POLY REINF BRTH SLV XL

## (undated) DEVICE — PAD PREP 50/CA

## (undated) DEVICE — SUT VICRYL COAT ANTI 2-0 27IN

## (undated) DEVICE — GOWN XX-LARGE

## (undated) DEVICE — DRESSING TELFA STRL 4X3 LF

## (undated) DEVICE — TIP YANKAUERS BULB NO VENT

## (undated) DEVICE — PACK FLUID CONTROL SHOULDER

## (undated) DEVICE — GOWN SURGICAL X-LARGE

## (undated) DEVICE — BLADE SURG CARBON STEEL SZ11

## (undated) DEVICE — TUBE SET INFLOW/OUTFLOW

## (undated) DEVICE — BANDAGE ELASTIC 2X5 VELCRO ST

## (undated) DEVICE — GAUZE SPONGE 4X4 12PLY

## (undated) DEVICE — DRESSING XEROFORM GAUZE 5X9

## (undated) DEVICE — DRAPE STERI U-SHAPED 47X51IN

## (undated) DEVICE — UNDERGLOVES BIOGEL PI SZ 7 LF

## (undated) DEVICE — SPONGE DERMACEA 4X4IN 12PLY

## (undated) DEVICE — ELECTRODE MEGADYNE RETURN DUAL

## (undated) DEVICE — TOURNIQUET SB QC DP 24X4IN

## (undated) DEVICE — DRAPE THYROID WITH ARMBOARD

## (undated) DEVICE — SHEET THYROID W/ISO-BAC

## (undated) DEVICE — TRAY MINOR GEN SURG

## (undated) DEVICE — SUT 2-0 12-18IN SILK

## (undated) DEVICE — DRAPE T THYROID STERILE

## (undated) DEVICE — NDL SPINAL 18GX3.5 SPINOCAN

## (undated) DEVICE — SEE MEDLINE ITEM 146313

## (undated) DEVICE — INSTRAMOD SHOULDER TRACTION

## (undated) DEVICE — DRESSING AQUACEL SACRAL 9 X 9

## (undated) DEVICE — SEE MEDLINE ITEM 156953

## (undated) DEVICE — SLING ARM COMFT NAVY BLU MED

## (undated) DEVICE — BLADE OSCIL 9MM X .4MM X 31MM

## (undated) DEVICE — PENCIL VALLEYLAB TELSCP SMK

## (undated) DEVICE — GLOVE SURG BIOGEL LATEX SZ 7.5

## (undated) DEVICE — GOWN SURGICAL XX LARGE X LONG

## (undated) DEVICE — DRESSING XEROFORM FOIL PK 1X8

## (undated) DEVICE — CONTAINER SPECIMEN OR STER 4OZ

## (undated) DEVICE — SOL NACL 0.9% INJ PF/50151

## (undated) DEVICE — SEE MEDLINE ITEM 153151

## (undated) DEVICE — SPONGE DERMACEA GAUZE 4X4

## (undated) DEVICE — SEE MEDLINE ITEM 146231

## (undated) DEVICE — NDL 22GA X1 1/2 REG BEVEL

## (undated) DEVICE — GLOVE 8 PROTEXIS PI BLUE

## (undated) DEVICE — DRESSING TEGADERM 4.4X5IN

## (undated) DEVICE — DRAPE C ARM 42 X 120 10/BX

## (undated) DEVICE — SEE L#120831

## (undated) DEVICE — SEE MEDLINE ITEM 154981

## (undated) DEVICE — SEE MEDLINE ITEM 146420

## (undated) DEVICE — BLADE SCALP OPHTL BEVEL STR

## (undated) DEVICE — SEE MEDLINE ITEM 152622

## (undated) DEVICE — SYR DISP LL 5CC

## (undated) DEVICE — SET CYSTO IRRIGATION UNIV SPIK

## (undated) DEVICE — SET DILATOR COAXIAL 4F

## (undated) DEVICE — DRAPE PLASTIC U 60X72

## (undated) DEVICE — SEE MEDLINE ITEM 146417

## (undated) DEVICE — GLOVE 8 PROTEXIS PI ORTHO

## (undated) DEVICE — PACK CYSTO

## (undated) DEVICE — SEE MEDLINE ITEM 152523

## (undated) DEVICE — SEE MEDLINE ITEM 146308

## (undated) DEVICE — SUT PDS II 1 TP-1 VIL

## (undated) DEVICE — SEE MEDLINE ITEM 157148

## (undated) DEVICE — DRAPE STERI-DRAPE 1000 17X11IN

## (undated) DEVICE — SEE MEDLINE ITEM 152522

## (undated) DEVICE — DRESSING TRANS 4X4 3/4

## (undated) DEVICE — PAD CAST SPECIALIST STRL 6

## (undated) DEVICE — NDL 18GA X1 1/2 REG BEVEL

## (undated) DEVICE — SUT ETHILON 2-0 FS 18IN BLK

## (undated) DEVICE — DRESSING TEGADERM 2 3/8 X 2.75

## (undated) DEVICE — GOWN SMART IMP BREATHABLE XXLG

## (undated) DEVICE — ABLATOR EXTENDED LENGTH